# Patient Record
Sex: MALE | Race: ASIAN | URBAN - METROPOLITAN AREA
[De-identification: names, ages, dates, MRNs, and addresses within clinical notes are randomized per-mention and may not be internally consistent; named-entity substitution may affect disease eponyms.]

---

## 2023-02-13 ENCOUNTER — INPATIENT (INPATIENT)
Facility: HOSPITAL | Age: 78
LOS: 11 days | Discharge: HOME HEALTH | DRG: 65 | End: 2023-02-25
Attending: INTERNAL MEDICINE | Admitting: INTERNAL MEDICINE
Payer: MEDICARE

## 2023-02-13 VITALS
HEART RATE: 68 BPM | TEMPERATURE: 98 F | WEIGHT: 134.92 LBS | OXYGEN SATURATION: 98 % | DIASTOLIC BLOOD PRESSURE: 65 MMHG | SYSTOLIC BLOOD PRESSURE: 149 MMHG | RESPIRATION RATE: 18 BRPM

## 2023-02-13 DIAGNOSIS — G93.40 ENCEPHALOPATHY, UNSPECIFIED: ICD-10-CM

## 2023-02-13 LAB
ALBUMIN SERPL ELPH-MCNC: 3.4 G/DL — LOW (ref 3.5–5.2)
ALP SERPL-CCNC: 72 U/L — SIGNIFICANT CHANGE UP (ref 30–115)
ALT FLD-CCNC: 15 U/L — SIGNIFICANT CHANGE UP (ref 0–41)
ANION GAP SERPL CALC-SCNC: 6 MMOL/L — LOW (ref 7–14)
APPEARANCE UR: CLEAR — SIGNIFICANT CHANGE UP
AST SERPL-CCNC: 13 U/L — SIGNIFICANT CHANGE UP (ref 0–41)
B-OH-BUTYR SERPL-SCNC: <0.2 MMOL/L — SIGNIFICANT CHANGE UP
BACTERIA # UR AUTO: NEGATIVE — SIGNIFICANT CHANGE UP
BASE EXCESS BLDV CALC-SCNC: -1.2 MMOL/L — SIGNIFICANT CHANGE UP (ref -2–3)
BASOPHILS # BLD AUTO: 0.04 K/UL — SIGNIFICANT CHANGE UP (ref 0–0.2)
BASOPHILS NFR BLD AUTO: 0.5 % — SIGNIFICANT CHANGE UP (ref 0–1)
BILIRUB SERPL-MCNC: 0.3 MG/DL — SIGNIFICANT CHANGE UP (ref 0.2–1.2)
BILIRUB UR-MCNC: NEGATIVE — SIGNIFICANT CHANGE UP
BUN SERPL-MCNC: 35 MG/DL — HIGH (ref 10–20)
CA-I SERPL-SCNC: 1.24 MMOL/L — SIGNIFICANT CHANGE UP (ref 1.15–1.33)
CALCIUM SERPL-MCNC: 8.5 MG/DL — SIGNIFICANT CHANGE UP (ref 8.4–10.4)
CHLORIDE SERPL-SCNC: 104 MMOL/L — SIGNIFICANT CHANGE UP (ref 98–110)
CO2 SERPL-SCNC: 27 MMOL/L — SIGNIFICANT CHANGE UP (ref 17–32)
COLOR SPEC: SIGNIFICANT CHANGE UP
CREAT SERPL-MCNC: 2.1 MG/DL — HIGH (ref 0.7–1.5)
DIFF PNL FLD: NEGATIVE — SIGNIFICANT CHANGE UP
EGFR: 32 ML/MIN/1.73M2 — LOW
EOSINOPHIL # BLD AUTO: 0.4 K/UL — SIGNIFICANT CHANGE UP (ref 0–0.7)
EOSINOPHIL NFR BLD AUTO: 5 % — SIGNIFICANT CHANGE UP (ref 0–8)
EPI CELLS # UR: 1 /HPF — SIGNIFICANT CHANGE UP (ref 0–5)
GAS PNL BLDV: 139 MMOL/L — SIGNIFICANT CHANGE UP (ref 136–145)
GAS PNL BLDV: SIGNIFICANT CHANGE UP
GAS PNL BLDV: SIGNIFICANT CHANGE UP
GLUCOSE SERPL-MCNC: 224 MG/DL — HIGH (ref 70–99)
GLUCOSE UR QL: ABNORMAL
HCO3 BLDV-SCNC: 24 MMOL/L — SIGNIFICANT CHANGE UP (ref 22–29)
HCT VFR BLD CALC: 33.1 % — LOW (ref 42–52)
HCT VFR BLDA CALC: 33 % — LOW (ref 39–51)
HGB BLD CALC-MCNC: 11.1 G/DL — LOW (ref 12.6–17.4)
HGB BLD-MCNC: 11.1 G/DL — LOW (ref 14–18)
HYALINE CASTS # UR AUTO: 3 /LPF — SIGNIFICANT CHANGE UP (ref 0–7)
IMM GRANULOCYTES NFR BLD AUTO: 1 % — HIGH (ref 0.1–0.3)
KETONES UR-MCNC: NEGATIVE — SIGNIFICANT CHANGE UP
LACTATE BLDV-MCNC: 1.2 MMOL/L — SIGNIFICANT CHANGE UP (ref 0.5–2)
LACTATE SERPL-SCNC: 1.4 MMOL/L — SIGNIFICANT CHANGE UP (ref 0.7–2)
LEUKOCYTE ESTERASE UR-ACNC: NEGATIVE — SIGNIFICANT CHANGE UP
LYMPHOCYTES # BLD AUTO: 0.91 K/UL — LOW (ref 1.2–3.4)
LYMPHOCYTES # BLD AUTO: 11.4 % — LOW (ref 20.5–51.1)
MCHC RBC-ENTMCNC: 29.6 PG — SIGNIFICANT CHANGE UP (ref 27–31)
MCHC RBC-ENTMCNC: 33.5 G/DL — SIGNIFICANT CHANGE UP (ref 32–37)
MCV RBC AUTO: 88.3 FL — SIGNIFICANT CHANGE UP (ref 80–94)
MONOCYTES # BLD AUTO: 0.43 K/UL — SIGNIFICANT CHANGE UP (ref 0.1–0.6)
MONOCYTES NFR BLD AUTO: 5.4 % — SIGNIFICANT CHANGE UP (ref 1.7–9.3)
NEUTROPHILS # BLD AUTO: 6.12 K/UL — SIGNIFICANT CHANGE UP (ref 1.4–6.5)
NEUTROPHILS NFR BLD AUTO: 76.7 % — HIGH (ref 42.2–75.2)
NITRITE UR-MCNC: NEGATIVE — SIGNIFICANT CHANGE UP
NRBC # BLD: 0 /100 WBCS — SIGNIFICANT CHANGE UP (ref 0–0)
PCO2 BLDV: 43 MMHG — SIGNIFICANT CHANGE UP (ref 42–55)
PH BLDV: 7.36 — SIGNIFICANT CHANGE UP (ref 7.32–7.43)
PH UR: 6.5 — SIGNIFICANT CHANGE UP (ref 5–8)
PLATELET # BLD AUTO: 201 K/UL — SIGNIFICANT CHANGE UP (ref 130–400)
PO2 BLDV: 48 MMHG — SIGNIFICANT CHANGE UP
POTASSIUM BLDV-SCNC: 3.8 MMOL/L — SIGNIFICANT CHANGE UP (ref 3.5–5.1)
POTASSIUM SERPL-MCNC: 3.9 MMOL/L — SIGNIFICANT CHANGE UP (ref 3.5–5)
POTASSIUM SERPL-SCNC: 3.9 MMOL/L — SIGNIFICANT CHANGE UP (ref 3.5–5)
PROT SERPL-MCNC: 5.2 G/DL — LOW (ref 6–8)
PROT UR-MCNC: ABNORMAL
RBC # BLD: 3.75 M/UL — LOW (ref 4.7–6.1)
RBC # FLD: 13.5 % — SIGNIFICANT CHANGE UP (ref 11.5–14.5)
RBC CASTS # UR COMP ASSIST: 0 /HPF — SIGNIFICANT CHANGE UP (ref 0–4)
SALICYLATES SERPL-MCNC: <0.3 MG/DL — LOW (ref 4–30)
SAO2 % BLDV: 80.9 % — SIGNIFICANT CHANGE UP
SARS-COV-2 RNA SPEC QL NAA+PROBE: SIGNIFICANT CHANGE UP
SODIUM SERPL-SCNC: 137 MMOL/L — SIGNIFICANT CHANGE UP (ref 135–146)
SP GR SPEC: 1.02 — SIGNIFICANT CHANGE UP (ref 1.01–1.03)
TROPONIN T SERPL-MCNC: <0.01 NG/ML — SIGNIFICANT CHANGE UP
UROBILINOGEN FLD QL: SIGNIFICANT CHANGE UP
WBC # BLD: 7.98 K/UL — SIGNIFICANT CHANGE UP (ref 4.8–10.8)
WBC # FLD AUTO: 7.98 K/UL — SIGNIFICANT CHANGE UP (ref 4.8–10.8)
WBC UR QL: 1 /HPF — SIGNIFICANT CHANGE UP (ref 0–5)

## 2023-02-13 PROCEDURE — 73560 X-RAY EXAM OF KNEE 1 OR 2: CPT | Mod: LT

## 2023-02-13 PROCEDURE — 71045 X-RAY EXAM CHEST 1 VIEW: CPT

## 2023-02-13 PROCEDURE — 83721 ASSAY OF BLOOD LIPOPROTEIN: CPT

## 2023-02-13 PROCEDURE — 89060 EXAM SYNOVIAL FLUID CRYSTALS: CPT

## 2023-02-13 PROCEDURE — 83540 ASSAY OF IRON: CPT

## 2023-02-13 PROCEDURE — 36415 COLL VENOUS BLD VENIPUNCTURE: CPT

## 2023-02-13 PROCEDURE — 95816 EEG AWAKE AND DROWSY: CPT

## 2023-02-13 PROCEDURE — 87046 STOOL CULTR AEROBIC BACT EA: CPT

## 2023-02-13 PROCEDURE — 84550 ASSAY OF BLOOD/URIC ACID: CPT

## 2023-02-13 PROCEDURE — 84156 ASSAY OF PROTEIN URINE: CPT

## 2023-02-13 PROCEDURE — 87045 FECES CULTURE AEROBIC BACT: CPT

## 2023-02-13 PROCEDURE — 84100 ASSAY OF PHOSPHORUS: CPT

## 2023-02-13 PROCEDURE — 82570 ASSAY OF URINE CREATININE: CPT

## 2023-02-13 PROCEDURE — 82746 ASSAY OF FOLIC ACID SERUM: CPT

## 2023-02-13 PROCEDURE — 87075 CULTR BACTERIA EXCEPT BLOOD: CPT

## 2023-02-13 PROCEDURE — 85027 COMPLETE CBC AUTOMATED: CPT

## 2023-02-13 PROCEDURE — 82962 GLUCOSE BLOOD TEST: CPT

## 2023-02-13 PROCEDURE — 70450 CT HEAD/BRAIN W/O DYE: CPT | Mod: 26,MA

## 2023-02-13 PROCEDURE — 86850 RBC ANTIBODY SCREEN: CPT

## 2023-02-13 PROCEDURE — 83036 HEMOGLOBIN GLYCOSYLATED A1C: CPT

## 2023-02-13 PROCEDURE — 87493 C DIFF AMPLIFIED PROBE: CPT

## 2023-02-13 PROCEDURE — 87077 CULTURE AEROBIC IDENTIFY: CPT

## 2023-02-13 PROCEDURE — 85025 COMPLETE CBC W/AUTO DIFF WBC: CPT

## 2023-02-13 PROCEDURE — 87040 BLOOD CULTURE FOR BACTERIA: CPT

## 2023-02-13 PROCEDURE — U0005: CPT

## 2023-02-13 PROCEDURE — 70547 MR ANGIOGRAPHY NECK W/O DYE: CPT

## 2023-02-13 PROCEDURE — 86900 BLOOD TYPING SEROLOGIC ABO: CPT

## 2023-02-13 PROCEDURE — 76775 US EXAM ABDO BACK WALL LIM: CPT

## 2023-02-13 PROCEDURE — 80053 COMPREHEN METABOLIC PANEL: CPT

## 2023-02-13 PROCEDURE — 93970 EXTREMITY STUDY: CPT

## 2023-02-13 PROCEDURE — 89051 BODY FLUID CELL COUNT: CPT

## 2023-02-13 PROCEDURE — 87070 CULTURE OTHR SPECIMN AEROBIC: CPT

## 2023-02-13 PROCEDURE — 87205 SMEAR GRAM STAIN: CPT

## 2023-02-13 PROCEDURE — 86803 HEPATITIS C AB TEST: CPT

## 2023-02-13 PROCEDURE — 83935 ASSAY OF URINE OSMOLALITY: CPT

## 2023-02-13 PROCEDURE — 84133 ASSAY OF URINE POTASSIUM: CPT

## 2023-02-13 PROCEDURE — 71045 X-RAY EXAM CHEST 1 VIEW: CPT | Mod: 26

## 2023-02-13 PROCEDURE — 86901 BLOOD TYPING SEROLOGIC RH(D): CPT

## 2023-02-13 PROCEDURE — 97166 OT EVAL MOD COMPLEX 45 MIN: CPT | Mod: GO

## 2023-02-13 PROCEDURE — 97116 GAIT TRAINING THERAPY: CPT | Mod: GP

## 2023-02-13 PROCEDURE — 83550 IRON BINDING TEST: CPT

## 2023-02-13 PROCEDURE — 93005 ELECTROCARDIOGRAM TRACING: CPT

## 2023-02-13 PROCEDURE — 97161 PT EVAL LOW COMPLEX 20 MIN: CPT | Mod: GP

## 2023-02-13 PROCEDURE — 70544 MR ANGIOGRAPHY HEAD W/O DYE: CPT

## 2023-02-13 PROCEDURE — 70551 MRI BRAIN STEM W/O DYE: CPT

## 2023-02-13 PROCEDURE — 82607 VITAMIN B-12: CPT

## 2023-02-13 PROCEDURE — U0003: CPT

## 2023-02-13 PROCEDURE — 93306 TTE W/DOPPLER COMPLETE: CPT

## 2023-02-13 PROCEDURE — 84540 ASSAY OF URINE/UREA-N: CPT

## 2023-02-13 PROCEDURE — 83735 ASSAY OF MAGNESIUM: CPT

## 2023-02-13 PROCEDURE — 84443 ASSAY THYROID STIM HORMONE: CPT

## 2023-02-13 PROCEDURE — 80061 LIPID PANEL: CPT

## 2023-02-13 PROCEDURE — 84300 ASSAY OF URINE SODIUM: CPT

## 2023-02-13 PROCEDURE — 82728 ASSAY OF FERRITIN: CPT

## 2023-02-13 PROCEDURE — 81001 URINALYSIS AUTO W/SCOPE: CPT

## 2023-02-13 RX ORDER — SODIUM CHLORIDE 9 MG/ML
1000 INJECTION, SOLUTION INTRAVENOUS ONCE
Refills: 0 | Status: COMPLETED | OUTPATIENT
Start: 2023-02-13 | End: 2023-02-13

## 2023-02-13 RX ADMIN — SODIUM CHLORIDE 1000 MILLILITER(S): 9 INJECTION, SOLUTION INTRAVENOUS at 16:39

## 2023-02-13 NOTE — ED ADULT NURSE NOTE - NSIMPLEMENTINTERV_GEN_ALL_ED
Implemented All Fall with Harm Risk Interventions:  [place] to call system. Call bell, personal items and telephone within reach. Instruct patient to call for assistance. Room bathroom lighting operational. Non-slip footwear when patient is off stretcher. Physically safe environment: no spills, clutter or unnecessary equipment. Stretcher in lowest position, wheels locked, appropriate side rails in place. Provide visual cue, wrist band, yellow gown, etc. Monitor gait and stability. Monitor for mental status changes and reorient to person, place, and time. Review medications for side effects contributing to fall risk. Reinforce activity limits and safety measures with patient and family. Provide visual clues: red socks.

## 2023-02-13 NOTE — ED ADULT NURSE REASSESSMENT NOTE - NS ED NURSE REASSESS COMMENT FT1
Received pt from previous RN, cantonese speaking, Pt AxOx3. Family present at bedside. NAD. Resting comfortably.  Call bell within reach. Safety measures maintained. Bed alarm maintained.

## 2023-02-13 NOTE — ED PROVIDER NOTE - CLINICAL SUMMARY MEDICAL DECISION MAKING FREE TEXT BOX
Labs and EKG were ordered and reviewed.  Imaging was ordered and reviewed by me.  Appropriate medications for patient's presenting complaints were ordered and effects were reassessed.  Patient's records (prior hospital, ED visit, and/or nursing home notes if available) were reviewed.  Additional history was obtained from EMS, family, and/or PCP (where available).  Escalation to admission/observation was considered.  Patient requires inpatient hospitalization - monitored setting.  Concern for increased confusion in the setting of abnormal CT scan.  Will need inpatient workup.

## 2023-02-13 NOTE — ED PROVIDER NOTE - OBJECTIVE STATEMENT
77-year-old male with past medical history of hypertension, hyperlipidemia, diabetes presenting for evaluation of weakness, dizziness, urinary frequency over the last 3 days.  Patient is currently not dizzy.  No head injury.  No LOC.  No fevers, chills, dysuria, nausea, vomiting, abdominal pain, or flank pain.  No chest pain or shortness of breath.  No headache or paresthesias.

## 2023-02-13 NOTE — ED PROVIDER NOTE - NS ED ATTENDING STATEMENT MOD
This was a shared visit with the ZUIR. I reviewed and verified the documentation and independently performed the documented:

## 2023-02-14 LAB
A1C WITH ESTIMATED AVERAGE GLUCOSE RESULT: 6.3 % — HIGH (ref 4–5.6)
ALBUMIN SERPL ELPH-MCNC: 3.1 G/DL — LOW (ref 3.5–5.2)
ALP SERPL-CCNC: 69 U/L — SIGNIFICANT CHANGE UP (ref 30–115)
ALT FLD-CCNC: 15 U/L — SIGNIFICANT CHANGE UP (ref 0–41)
ANION GAP SERPL CALC-SCNC: 11 MMOL/L — SIGNIFICANT CHANGE UP (ref 7–14)
AST SERPL-CCNC: 14 U/L — SIGNIFICANT CHANGE UP (ref 0–41)
BASOPHILS # BLD AUTO: 0.06 K/UL — SIGNIFICANT CHANGE UP (ref 0–0.2)
BASOPHILS NFR BLD AUTO: 0.9 % — SIGNIFICANT CHANGE UP (ref 0–1)
BILIRUB SERPL-MCNC: 0.3 MG/DL — SIGNIFICANT CHANGE UP (ref 0.2–1.2)
BUN SERPL-MCNC: 27 MG/DL — HIGH (ref 10–20)
CALCIUM SERPL-MCNC: 8.7 MG/DL — SIGNIFICANT CHANGE UP (ref 8.4–10.5)
CHLORIDE SERPL-SCNC: 111 MMOL/L — HIGH (ref 98–110)
CO2 SERPL-SCNC: 23 MMOL/L — SIGNIFICANT CHANGE UP (ref 17–32)
CREAT SERPL-MCNC: 1.8 MG/DL — HIGH (ref 0.7–1.5)
EGFR: 38 ML/MIN/1.73M2 — LOW
EOSINOPHIL # BLD AUTO: 0.44 K/UL — SIGNIFICANT CHANGE UP (ref 0–0.7)
EOSINOPHIL NFR BLD AUTO: 6.4 % — SIGNIFICANT CHANGE UP (ref 0–8)
ESTIMATED AVERAGE GLUCOSE: 134 MG/DL — HIGH (ref 68–114)
GLUCOSE BLDC GLUCOMTR-MCNC: 119 MG/DL — HIGH (ref 70–99)
GLUCOSE BLDC GLUCOMTR-MCNC: 149 MG/DL — HIGH (ref 70–99)
GLUCOSE BLDC GLUCOMTR-MCNC: 160 MG/DL — HIGH (ref 70–99)
GLUCOSE BLDC GLUCOMTR-MCNC: 282 MG/DL — HIGH (ref 70–99)
GLUCOSE SERPL-MCNC: 129 MG/DL — HIGH (ref 70–99)
HCT VFR BLD CALC: 34.5 % — LOW (ref 42–52)
HCV AB S/CO SERPL IA: 0.03 COI — SIGNIFICANT CHANGE UP
HCV AB SERPL-IMP: SIGNIFICANT CHANGE UP
HGB BLD-MCNC: 11.6 G/DL — LOW (ref 14–18)
IMM GRANULOCYTES NFR BLD AUTO: 1.2 % — HIGH (ref 0.1–0.3)
LYMPHOCYTES # BLD AUTO: 1.12 K/UL — LOW (ref 1.2–3.4)
LYMPHOCYTES # BLD AUTO: 16.4 % — LOW (ref 20.5–51.1)
MAGNESIUM SERPL-MCNC: 2 MG/DL — SIGNIFICANT CHANGE UP (ref 1.8–2.4)
MCHC RBC-ENTMCNC: 29.6 PG — SIGNIFICANT CHANGE UP (ref 27–31)
MCHC RBC-ENTMCNC: 33.6 G/DL — SIGNIFICANT CHANGE UP (ref 32–37)
MCV RBC AUTO: 88 FL — SIGNIFICANT CHANGE UP (ref 80–94)
MONOCYTES # BLD AUTO: 0.42 K/UL — SIGNIFICANT CHANGE UP (ref 0.1–0.6)
MONOCYTES NFR BLD AUTO: 6.1 % — SIGNIFICANT CHANGE UP (ref 1.7–9.3)
NEUTROPHILS # BLD AUTO: 4.73 K/UL — SIGNIFICANT CHANGE UP (ref 1.4–6.5)
NEUTROPHILS NFR BLD AUTO: 69 % — SIGNIFICANT CHANGE UP (ref 42.2–75.2)
NRBC # BLD: 0 /100 WBCS — SIGNIFICANT CHANGE UP (ref 0–0)
PHOSPHATE SERPL-MCNC: 3.8 MG/DL — SIGNIFICANT CHANGE UP (ref 2.1–4.9)
PLATELET # BLD AUTO: 207 K/UL — SIGNIFICANT CHANGE UP (ref 130–400)
POTASSIUM SERPL-MCNC: 3.7 MMOL/L — SIGNIFICANT CHANGE UP (ref 3.5–5)
POTASSIUM SERPL-SCNC: 3.7 MMOL/L — SIGNIFICANT CHANGE UP (ref 3.5–5)
PROT SERPL-MCNC: 5 G/DL — LOW (ref 6–8)
RBC # BLD: 3.92 M/UL — LOW (ref 4.7–6.1)
RBC # FLD: 13.4 % — SIGNIFICANT CHANGE UP (ref 11.5–14.5)
SODIUM SERPL-SCNC: 145 MMOL/L — SIGNIFICANT CHANGE UP (ref 135–146)
WBC # BLD: 6.85 K/UL — SIGNIFICANT CHANGE UP (ref 4.8–10.8)
WBC # FLD AUTO: 6.85 K/UL — SIGNIFICANT CHANGE UP (ref 4.8–10.8)

## 2023-02-14 PROCEDURE — 99222 1ST HOSP IP/OBS MODERATE 55: CPT

## 2023-02-14 RX ORDER — HEPARIN SODIUM 5000 [USP'U]/ML
5000 INJECTION INTRAVENOUS; SUBCUTANEOUS EVERY 8 HOURS
Refills: 0 | Status: DISCONTINUED | OUTPATIENT
Start: 2023-02-14 | End: 2023-02-25

## 2023-02-14 RX ORDER — SODIUM CHLORIDE 9 MG/ML
1000 INJECTION, SOLUTION INTRAVENOUS
Refills: 0 | Status: DISCONTINUED | OUTPATIENT
Start: 2023-02-14 | End: 2023-02-25

## 2023-02-14 RX ORDER — TAMSULOSIN HYDROCHLORIDE 0.4 MG/1
0.4 CAPSULE ORAL AT BEDTIME
Refills: 0 | Status: DISCONTINUED | OUTPATIENT
Start: 2023-02-14 | End: 2023-02-25

## 2023-02-14 RX ORDER — ATORVASTATIN CALCIUM 80 MG/1
20 TABLET, FILM COATED ORAL AT BEDTIME
Refills: 0 | Status: DISCONTINUED | OUTPATIENT
Start: 2023-02-14 | End: 2023-02-16

## 2023-02-14 RX ORDER — VALSARTAN 80 MG/1
1 TABLET ORAL
Qty: 0 | Refills: 0 | DISCHARGE

## 2023-02-14 RX ORDER — AMLODIPINE BESYLATE 2.5 MG/1
5 TABLET ORAL DAILY
Refills: 0 | Status: DISCONTINUED | OUTPATIENT
Start: 2023-02-14 | End: 2023-02-18

## 2023-02-14 RX ORDER — DEXTROSE 50 % IN WATER 50 %
15 SYRINGE (ML) INTRAVENOUS ONCE
Refills: 0 | Status: DISCONTINUED | OUTPATIENT
Start: 2023-02-14 | End: 2023-02-25

## 2023-02-14 RX ORDER — INSULIN GLARGINE 100 [IU]/ML
4 INJECTION, SOLUTION SUBCUTANEOUS AT BEDTIME
Refills: 0 | Status: DISCONTINUED | OUTPATIENT
Start: 2023-02-14 | End: 2023-02-22

## 2023-02-14 RX ORDER — ASPIRIN/CALCIUM CARB/MAGNESIUM 324 MG
81 TABLET ORAL DAILY
Refills: 0 | Status: DISCONTINUED | OUTPATIENT
Start: 2023-02-14 | End: 2023-02-25

## 2023-02-14 RX ORDER — CARVEDILOL PHOSPHATE 80 MG/1
1 CAPSULE, EXTENDED RELEASE ORAL
Qty: 0 | Refills: 0 | DISCHARGE

## 2023-02-14 RX ORDER — INSULIN LISPRO 100/ML
VIAL (ML) SUBCUTANEOUS
Refills: 0 | Status: DISCONTINUED | OUTPATIENT
Start: 2023-02-14 | End: 2023-02-25

## 2023-02-14 RX ORDER — DEXTROSE 50 % IN WATER 50 %
25 SYRINGE (ML) INTRAVENOUS ONCE
Refills: 0 | Status: DISCONTINUED | OUTPATIENT
Start: 2023-02-14 | End: 2023-02-25

## 2023-02-14 RX ORDER — CARVEDILOL PHOSPHATE 80 MG/1
6.25 CAPSULE, EXTENDED RELEASE ORAL EVERY 12 HOURS
Refills: 0 | Status: DISCONTINUED | OUTPATIENT
Start: 2023-02-14 | End: 2023-02-25

## 2023-02-14 RX ORDER — AMLODIPINE BESYLATE 2.5 MG/1
1 TABLET ORAL
Qty: 0 | Refills: 0 | DISCHARGE

## 2023-02-14 RX ORDER — GLUCAGON INJECTION, SOLUTION 0.5 MG/.1ML
1 INJECTION, SOLUTION SUBCUTANEOUS ONCE
Refills: 0 | Status: DISCONTINUED | OUTPATIENT
Start: 2023-02-14 | End: 2023-02-25

## 2023-02-14 RX ORDER — VALSARTAN 80 MG/1
320 TABLET ORAL DAILY
Refills: 0 | Status: DISCONTINUED | OUTPATIENT
Start: 2023-02-14 | End: 2023-02-20

## 2023-02-14 RX ORDER — INSULIN LISPRO 100/ML
3 VIAL (ML) SUBCUTANEOUS
Refills: 0 | Status: DISCONTINUED | OUTPATIENT
Start: 2023-02-14 | End: 2023-02-22

## 2023-02-14 RX ORDER — SODIUM CHLORIDE 9 MG/ML
1000 INJECTION, SOLUTION INTRAVENOUS
Refills: 0 | Status: DISCONTINUED | OUTPATIENT
Start: 2023-02-14 | End: 2023-02-17

## 2023-02-14 RX ORDER — DEXTROSE 50 % IN WATER 50 %
12.5 SYRINGE (ML) INTRAVENOUS ONCE
Refills: 0 | Status: DISCONTINUED | OUTPATIENT
Start: 2023-02-14 | End: 2023-02-25

## 2023-02-14 RX ADMIN — HEPARIN SODIUM 5000 UNIT(S): 5000 INJECTION INTRAVENOUS; SUBCUTANEOUS at 21:33

## 2023-02-14 RX ADMIN — Medication 81 MILLIGRAM(S): at 11:10

## 2023-02-14 RX ADMIN — VALSARTAN 320 MILLIGRAM(S): 80 TABLET ORAL at 05:54

## 2023-02-14 RX ADMIN — HEPARIN SODIUM 5000 UNIT(S): 5000 INJECTION INTRAVENOUS; SUBCUTANEOUS at 05:54

## 2023-02-14 RX ADMIN — ATORVASTATIN CALCIUM 20 MILLIGRAM(S): 80 TABLET, FILM COATED ORAL at 21:33

## 2023-02-14 RX ADMIN — HEPARIN SODIUM 5000 UNIT(S): 5000 INJECTION INTRAVENOUS; SUBCUTANEOUS at 14:27

## 2023-02-14 RX ADMIN — AMLODIPINE BESYLATE 5 MILLIGRAM(S): 2.5 TABLET ORAL at 05:54

## 2023-02-14 RX ADMIN — Medication 3 UNIT(S): at 11:10

## 2023-02-14 RX ADMIN — TAMSULOSIN HYDROCHLORIDE 0.4 MILLIGRAM(S): 0.4 CAPSULE ORAL at 21:33

## 2023-02-14 RX ADMIN — Medication 3 UNIT(S): at 08:44

## 2023-02-14 RX ADMIN — INSULIN GLARGINE 4 UNIT(S): 100 INJECTION, SOLUTION SUBCUTANEOUS at 21:33

## 2023-02-14 RX ADMIN — Medication 2: at 17:20

## 2023-02-14 RX ADMIN — Medication 3 UNIT(S): at 17:19

## 2023-02-14 RX ADMIN — SODIUM CHLORIDE 100 MILLILITER(S): 9 INJECTION, SOLUTION INTRAVENOUS at 11:09

## 2023-02-14 RX ADMIN — CARVEDILOL PHOSPHATE 6.25 MILLIGRAM(S): 80 CAPSULE, EXTENDED RELEASE ORAL at 05:54

## 2023-02-14 RX ADMIN — Medication 6: at 11:10

## 2023-02-14 RX ADMIN — CARVEDILOL PHOSPHATE 6.25 MILLIGRAM(S): 80 CAPSULE, EXTENDED RELEASE ORAL at 17:54

## 2023-02-14 NOTE — H&P ADULT - NSICDXPASTMEDICALHX_GEN_ALL_CORE_FT
PAST MEDICAL HISTORY:  Diabetes     History of BPH     HTN (hypertension)     Seasonal allergies

## 2023-02-14 NOTE — H&P ADULT - NSHPPHYSICALEXAM_GEN_ALL_CORE
T(C): 36.3 (02-13-23 @ 22:40), Max: 36.4 (02-13-23 @ 12:42)  HR: 83 (02-13-23 @ 22:40) (68 - 83)  BP: 144/74 (02-13-23 @ 22:40) (144/74 - 149/65)  RR: 18 (02-13-23 @ 22:40) (18 - 18)  SpO2: 96% (02-13-23 @ 22:40) (96% - 98%)    CONSTITUTIONAL: Well groomed, no apparent distress  RESP: No respiratory distress, no use of accessory muscles; CTA b/l, no WRR  CV: RRR, +S1S2, no MRG; no JVD; no peripheral edema  GI: Soft, NT, ND, no rebound, no guarding; no palpable masses; no hepatosplenomegaly; no hernia palpated  PSYCH: Appropriate insight/judgment; A+O x 3, mood and affect appropriate, recent/remote memory intact T(C): 36.3 (02-13-23 @ 22:40), Max: 36.4 (02-13-23 @ 12:42)  HR: 83 (02-13-23 @ 22:40) (68 - 83)  BP: 144/74 (02-13-23 @ 22:40) (144/74 - 149/65)  RR: 18 (02-13-23 @ 22:40) (18 - 18)  SpO2: 96% (02-13-23 @ 22:40) (96% - 98%)    CONSTITUTIONAL: Well groomed, no apparent distress  RESP: No respiratory distress, no use of accessory muscles; CTA b/l, no WRR  CV: RRR, +S1S2, no MRG; no JVD; no peripheral edema  GI: Soft, NT, ND, no rebound, no guarding; no palpable masses; no hepatosplenomegaly; no hernia palpated  PSYCH:  A+O x 1 follows command. Most exam limited due to translation issues

## 2023-02-14 NOTE — H&P ADULT - ASSESSMENT
77-year-old cantonese speaking male with past medical history of hypertension, hyperlipidemia, DM and BPH presenting for evaluation of weakness, dizziness, urinary frequency over the last 3 days.     # Lethargy   # Delirium   Lethargy wit no focal findings or LOC. As per family no fevers or weight changes at home   - On examination patient wakes up and responds to his name. Follows commands, but looks tired  - Hemodynamically stable   - CT head:  Right basilar ganglia hypodensity possible chronic infarcts   - f/u  MRI head   - No evidence of infection  and Home medications do not involve any neurologic side effects   - If workup negative - Might consider doing age related cancer screening       # HTN  - c/w Amlodipine 5mg and Valsartan 320ng   - Monitor BP     # DM  - Jardiance 25mg at home   - Monitor FS and start Insulin premeal/long acting     # HLD  - c/w statin   - ASA primary ppx    # BPH  - c/w Tamsulosin     #DVT - lovenox sq   #GI - pantoprazole   #Diet - DASH, CC  #Activity - IAT   #Code - Full code     Disposition - IP  Med/Rec: Completed bedside with wife            77-year-old cantonese speaking male with past medical history of hypertension, hyperlipidemia, DM and BPH presenting for evaluation of weakness, dizziness, urinary frequency over the last 3 days.     # Lethargy   # Delirium   Lethargy wit no focal findings or LOC. As per family no fevers or weight changes at home   - On examination patient wakes up and responds to his name. Follows commands, but looks tired  - Hemodynamically stable   - CT head:  Right basilar ganglia hypodensity possible chronic infarcts   - f/u  MRI head   - No evidence of infection  and Home medications do not involve any neurologic side effects   - If workup negative - Might consider doing age related cancer screening       # HTN  - c/w Amlodipine 5mg and Valsartan 320ng   - Monitor BP     # DM  - Jardiance 25mg at home   - Monitor FS and start Insulin premeal/long acting     # HLD  - c/w statin   - ASA primary ppx    # BPH  - c/w Tamsulosin     #DVT - Heparin  #GI - N/A   #Diet -  CC  #Activity - IAT   #Code - Full code     Disposition - IP  Med/Rec: Completed bedside with wife

## 2023-02-14 NOTE — H&P ADULT - HISTORY OF PRESENT ILLNESS
77-year-old male with past medical history of hypertension, hyperlipidemia, diabetes presenting for evaluation of weakness, dizziness, urinary frequency over the last 3 days.  Patient is currently not dizzy.  No head injury.  No LOC.  No fevers, chills, dysuria, nausea, vomiting, abdominal pain, or flank pain.  No chest pain or shortness of breath.  No headache or paresthesias    Vital Signs Last 24 Hrs  T(C): 36.3 (13 Feb 2023 22:40), Max: 36.4 (13 Feb 2023 12:42)  T(F): 97.4 (13 Feb 2023 22:40), Max: 97.6 (13 Feb 2023 12:42)  HR: 83 (13 Feb 2023 22:40) (68 - 83)  BP: 144/74 (13 Feb 2023 22:40) (144/74 - 149/65)  BP(mean): --  RR: 18 (13 Feb 2023 22:40) (18 - 18)  SpO2: 96% (13 Feb 2023 22:40) (96% - 98%)    Parameters below as of 13 Feb 2023 22:40  Patient On (Oxygen Delivery Method): room air     77-year-old cantonese speaking male with past medical history of hypertension, hyperlipidemia, DM and BPH presenting for evaluation of weakness, dizziness, urinary frequency over the last 3 days. Family describe that their main concern is his lethargy that has been persistent and associated with some memory changes. No LOC, weakness,  No fevers, chills, dysuria, nausea, vomiting, abdominal pain, or flank pain.  No chest pain or shortness of breath.  No headache or paresthesias    Vital Signs Last 24 Hrs  T(C): 36.3 (13 Feb 2023 22:40), Max: 36.4 (13 Feb 2023 12:42)  T(F): 97.4 (13 Feb 2023 22:40), Max: 97.6 (13 Feb 2023 12:42)  HR: 83 (13 Feb 2023 22:40) (68 - 83)  BP: 144/74 (13 Feb 2023 22:40) (144/74 - 149/65)  BP(mean): --  RR: 18 (13 Feb 2023 22:40) (18 - 18)  SpO2: 96% (13 Feb 2023 22:40) (96% - 98%)    Parameters below as of 13 Feb 2023 22:40  Patient On (Oxygen Delivery Method): room air

## 2023-02-14 NOTE — H&P ADULT - NSHPLABSRESULTS_GEN_ALL_CORE
11.1   7.98  )-----------( 201      ( 13 Feb 2023 16:28 )             33.1     02-13    137  |  104  |  35<H>  ----------------------------<  224<H>  3.9   |  27  |  2.1<H>    Ca    8.5      13 Feb 2023 16:28    TPro  5.2<L>  /  Alb  3.4<L>  /  TBili  0.3  /  DBili  x   /  AST  13  /  ALT  15  /  AlkPhos  72  02-13    < from: CT Head No Cont (02.13.23 @ 17:10) >    IMPRESSION:    A focal hypodensity is noted in the right basal ganglia consistent with a   chronic infarct.    No evidence of acute intracranial hemorrhage or acute territorial infarct.    No evidence of mass or midline shift.    < end of copied text >

## 2023-02-14 NOTE — PATIENT PROFILE ADULT - FUNCTIONAL ASSESSMENT - BASIC MOBILITY 6.
2-calculated by average/Not able to assess (calculate score using [facility] averaging method)

## 2023-02-14 NOTE — PATIENT PROFILE ADULT - FALL HARM RISK - HARM RISK INTERVENTIONS
Assistance with ambulation/Assistance OOB with selected safe patient handling equipment/Communicate Risk of Fall with Harm to all staff/Discuss with provider need for PT consult/Monitor gait and stability/Reinforce activity limits and safety measures with patient and family/Sit up slowly, dangle for a short time, stand at bedside before walking/Tailored Fall Risk Interventions/Visual Cue: Yellow wristband and red socks/Bed in lowest position, wheels locked, appropriate side rails in place/Call bell, personal items and telephone in reach/Instruct patient to call for assistance before getting out of bed or chair/Non-slip footwear when patient is out of bed/[place] to call system/Physically safe environment - no spills, clutter or unnecessary equipment/Purposeful Proactive Rounding/Room/bathroom lighting operational, light cord in reach

## 2023-02-14 NOTE — H&P ADULT - ATTENDING COMMENTS
77-year-old cantonese speaking male with past medical history of hypertension, hyperlipidemia, DM and BPH presenting for evaluation of weakness, dizziness, urinary frequency over the last 3 days.     # Lethargy   # Delirium   Lethargy wit no focal findings or LOC. As per family no fevers or weight changes at home   - On examination patient wakes up and responds to his name. Follows commands, but looks tired  - Hemodynamically stable   - CT head:  Right basilar ganglia hypodensity possible chronic infarcts   - f/u  MRI head   - No evidence of infection  and Home medications do not involve any neurologic side effects   - If workup negative - Might consider doing age related cancer screening       # HTN  - c/w Amlodipine 5mg and Valsartan 320ng   - Monitor BP     # DM  - Jardiance 25mg at home   - Monitor FS and start Insulin premeal/long acting     # HLD  - c/w statin   - ASA primary ppx    # BPH  - c/w Tamsulosin     #DVT - Heparin  #GI - N/A   #Diet -  CC  #Activity - IAT   #Code - Full code     Disposition - IP  Med/Rec: Completed bedside with wife 77-year-old cantonese speaking male with past medical history of hypertension, hyperlipidemia, DM and BPH presenting for evaluation of weakness, dizziness, urinary frequency over the last 3 days.     1. Alteration of mental status associated with weakness unclear etiology associated with dehydration  * Lethargy wit no focal findings or LOC. As per family no fevers or weight changes at home  * right eye ptosis but seems old    - CXR:WNL                  - u/a:WNL   - Admit to medicine                 - CT head:  Right basilar ganglia hypodensity possible chronic infarcts   - Cont IVF   - MRI brain:pending   - PT eval:pending        2. HTN  - c/w Amlodipine 5mg and Valsartan 320ng   - Monitor BP     3. DM- II   - Jardiance 25mg at home   - Monitor FS and start Insulin premeal/long acting     4. HLD  - c/w statin   - ASA primary ppx    5. BPH  - c/w Tamsulosin     #DVT - Heparin  #GI - N/A   #Diet -  CC  #Activity - IAT   #Code - Full code 77-year-old cantonese speaking male with past medical history of hypertension, hyperlipidemia, DM and BPH presenting for evaluation of weakness, dizziness, urinary frequency over the last 3 days.     1. Alteration of mental status associated with weakness unclear etiology associated with dehydration  * Lethargy wit no focal findings or LOC. As per family no fevers or weight changes at home  * right eye ptosis but seems old    - CXR:WNL                  - u/a:WNL      -TSH:pending   - Admit to medicine                 - CT head:  Right basilar ganglia hypodensity possible chronic infarcts   - Cont IVF   - MRI brain:pending   - PT eval:pending        2. HTN  - c/w Amlodipine 5mg and Valsartan 320ng   - Monitor BP     3. DM- II   - Jardiance 25mg at home   - Monitor FS and start Insulin premeal/long acting     4. HLD  - c/w statin   - ASA primary ppx    5. BPH  - c/w Tamsulosin     #DVT - Heparin  #GI - N/A   #Diet -  CC  #Activity - IAT   #Code - Full code

## 2023-02-15 LAB
ALBUMIN SERPL ELPH-MCNC: 3.2 G/DL — LOW (ref 3.5–5.2)
ALP SERPL-CCNC: 73 U/L — SIGNIFICANT CHANGE UP (ref 30–115)
ALT FLD-CCNC: 17 U/L — SIGNIFICANT CHANGE UP (ref 0–41)
ANION GAP SERPL CALC-SCNC: 11 MMOL/L — SIGNIFICANT CHANGE UP (ref 7–14)
AST SERPL-CCNC: 18 U/L — SIGNIFICANT CHANGE UP (ref 0–41)
BASOPHILS # BLD AUTO: 0.05 K/UL — SIGNIFICANT CHANGE UP (ref 0–0.2)
BASOPHILS NFR BLD AUTO: 0.6 % — SIGNIFICANT CHANGE UP (ref 0–1)
BILIRUB SERPL-MCNC: 0.5 MG/DL — SIGNIFICANT CHANGE UP (ref 0.2–1.2)
BLD GP AB SCN SERPL QL: SIGNIFICANT CHANGE UP
BUN SERPL-MCNC: 26 MG/DL — HIGH (ref 10–20)
CALCIUM SERPL-MCNC: 8.8 MG/DL — SIGNIFICANT CHANGE UP (ref 8.4–10.5)
CHLORIDE SERPL-SCNC: 104 MMOL/L — SIGNIFICANT CHANGE UP (ref 98–110)
CO2 SERPL-SCNC: 25 MMOL/L — SIGNIFICANT CHANGE UP (ref 17–32)
CREAT SERPL-MCNC: 1.8 MG/DL — HIGH (ref 0.7–1.5)
EGFR: 38 ML/MIN/1.73M2 — LOW
EOSINOPHIL # BLD AUTO: 0.58 K/UL — SIGNIFICANT CHANGE UP (ref 0–0.7)
EOSINOPHIL NFR BLD AUTO: 7.2 % — SIGNIFICANT CHANGE UP (ref 0–8)
FERRITIN SERPL-MCNC: 111 NG/ML — SIGNIFICANT CHANGE UP (ref 30–400)
FOLATE SERPL-MCNC: 15.4 NG/ML — SIGNIFICANT CHANGE UP
GLUCOSE BLDC GLUCOMTR-MCNC: 126 MG/DL — HIGH (ref 70–99)
GLUCOSE BLDC GLUCOMTR-MCNC: 132 MG/DL — HIGH (ref 70–99)
GLUCOSE BLDC GLUCOMTR-MCNC: 167 MG/DL — HIGH (ref 70–99)
GLUCOSE SERPL-MCNC: 135 MG/DL — HIGH (ref 70–99)
HCT VFR BLD CALC: 35.6 % — LOW (ref 42–52)
HGB BLD-MCNC: 12.2 G/DL — LOW (ref 14–18)
IMM GRANULOCYTES NFR BLD AUTO: 1.1 % — HIGH (ref 0.1–0.3)
IRON SATN MFR SERPL: 33 % — SIGNIFICANT CHANGE UP (ref 15–50)
IRON SATN MFR SERPL: 70 UG/DL — SIGNIFICANT CHANGE UP (ref 35–150)
LYMPHOCYTES # BLD AUTO: 1.26 K/UL — SIGNIFICANT CHANGE UP (ref 1.2–3.4)
LYMPHOCYTES # BLD AUTO: 15.6 % — LOW (ref 20.5–51.1)
MAGNESIUM SERPL-MCNC: 1.9 MG/DL — SIGNIFICANT CHANGE UP (ref 1.8–2.4)
MCHC RBC-ENTMCNC: 29.5 PG — SIGNIFICANT CHANGE UP (ref 27–31)
MCHC RBC-ENTMCNC: 34.3 G/DL — SIGNIFICANT CHANGE UP (ref 32–37)
MCV RBC AUTO: 86 FL — SIGNIFICANT CHANGE UP (ref 80–94)
MONOCYTES # BLD AUTO: 0.45 K/UL — SIGNIFICANT CHANGE UP (ref 0.1–0.6)
MONOCYTES NFR BLD AUTO: 5.6 % — SIGNIFICANT CHANGE UP (ref 1.7–9.3)
NEUTROPHILS # BLD AUTO: 5.66 K/UL — SIGNIFICANT CHANGE UP (ref 1.4–6.5)
NEUTROPHILS NFR BLD AUTO: 69.9 % — SIGNIFICANT CHANGE UP (ref 42.2–75.2)
NRBC # BLD: 0 /100 WBCS — SIGNIFICANT CHANGE UP (ref 0–0)
PLATELET # BLD AUTO: 202 K/UL — SIGNIFICANT CHANGE UP (ref 130–400)
POTASSIUM SERPL-MCNC: 3.4 MMOL/L — LOW (ref 3.5–5)
POTASSIUM SERPL-SCNC: 3.4 MMOL/L — LOW (ref 3.5–5)
PROT SERPL-MCNC: 5.3 G/DL — LOW (ref 6–8)
RBC # BLD: 4.14 M/UL — LOW (ref 4.7–6.1)
RBC # FLD: 13.4 % — SIGNIFICANT CHANGE UP (ref 11.5–14.5)
SODIUM SERPL-SCNC: 140 MMOL/L — SIGNIFICANT CHANGE UP (ref 135–146)
TIBC SERPL-MCNC: 214 UG/DL — LOW (ref 220–430)
TSH SERPL-MCNC: 2.47 UIU/ML — SIGNIFICANT CHANGE UP (ref 0.27–4.2)
UIBC SERPL-MCNC: 144 UG/DL — SIGNIFICANT CHANGE UP (ref 110–370)
VIT B12 SERPL-MCNC: 694 PG/ML — SIGNIFICANT CHANGE UP (ref 232–1245)
WBC # BLD: 8.09 K/UL — SIGNIFICANT CHANGE UP (ref 4.8–10.8)
WBC # FLD AUTO: 8.09 K/UL — SIGNIFICANT CHANGE UP (ref 4.8–10.8)

## 2023-02-15 PROCEDURE — 99232 SBSQ HOSP IP/OBS MODERATE 35: CPT

## 2023-02-15 PROCEDURE — 76775 US EXAM ABDO BACK WALL LIM: CPT | Mod: 26

## 2023-02-15 RX ADMIN — ATORVASTATIN CALCIUM 20 MILLIGRAM(S): 80 TABLET, FILM COATED ORAL at 21:57

## 2023-02-15 RX ADMIN — HEPARIN SODIUM 5000 UNIT(S): 5000 INJECTION INTRAVENOUS; SUBCUTANEOUS at 05:49

## 2023-02-15 RX ADMIN — Medication 81 MILLIGRAM(S): at 11:25

## 2023-02-15 RX ADMIN — HEPARIN SODIUM 5000 UNIT(S): 5000 INJECTION INTRAVENOUS; SUBCUTANEOUS at 21:58

## 2023-02-15 RX ADMIN — Medication 3 UNIT(S): at 16:55

## 2023-02-15 RX ADMIN — AMLODIPINE BESYLATE 5 MILLIGRAM(S): 2.5 TABLET ORAL at 05:49

## 2023-02-15 RX ADMIN — TAMSULOSIN HYDROCHLORIDE 0.4 MILLIGRAM(S): 0.4 CAPSULE ORAL at 21:58

## 2023-02-15 RX ADMIN — Medication 3 UNIT(S): at 07:41

## 2023-02-15 RX ADMIN — CARVEDILOL PHOSPHATE 6.25 MILLIGRAM(S): 80 CAPSULE, EXTENDED RELEASE ORAL at 05:49

## 2023-02-15 RX ADMIN — Medication 2: at 16:56

## 2023-02-15 RX ADMIN — CARVEDILOL PHOSPHATE 6.25 MILLIGRAM(S): 80 CAPSULE, EXTENDED RELEASE ORAL at 17:33

## 2023-02-15 RX ADMIN — Medication 3 UNIT(S): at 11:25

## 2023-02-15 RX ADMIN — INSULIN GLARGINE 4 UNIT(S): 100 INJECTION, SOLUTION SUBCUTANEOUS at 23:05

## 2023-02-15 RX ADMIN — HEPARIN SODIUM 5000 UNIT(S): 5000 INJECTION INTRAVENOUS; SUBCUTANEOUS at 14:04

## 2023-02-15 RX ADMIN — VALSARTAN 320 MILLIGRAM(S): 80 TABLET ORAL at 05:50

## 2023-02-15 NOTE — PROGRESS NOTE ADULT - SUBJECTIVE AND OBJECTIVE BOX
JACQUELIN CAI 77y Male  MRN#: [id number]   Hospital Day: 2d    SUBJECTIVE  Patient is a 77y old Male who presents with a chief complaint of Lethargy (2023 01:33)  Currently admitted to medicine with the primary diagnosis of Encephalopathy      INTERVAL HPI AND OVERNIGHT EVENTS:  Patient was examined and seen at bedside. This morning he is resting comfortably in bed and reports no issues or overnight events.    OBJECTIVE  PAST MEDICAL & SURGICAL HISTORY  HTN (hypertension)    Seasonal allergies    History of BPH    Diabetes    No significant past surgical history      ALLERGIES:  [This allergen will not trigger allergy alert] pollen (Unknown)  No Known Drug Allergies    MEDICATIONS:  STANDING MEDICATIONS  amLODIPine   Tablet 5 milliGRAM(s) Oral daily  aspirin  chewable 81 milliGRAM(s) Oral daily  atorvastatin 20 milliGRAM(s) Oral at bedtime  carvedilol 6.25 milliGRAM(s) Oral every 12 hours  dextrose 5%. 1000 milliLiter(s) IV Continuous <Continuous>  dextrose 5%. 1000 milliLiter(s) IV Continuous <Continuous>  dextrose 50% Injectable 25 Gram(s) IV Push once  dextrose 50% Injectable 12.5 Gram(s) IV Push once  dextrose 50% Injectable 25 Gram(s) IV Push once  glucagon  Injectable 1 milliGRAM(s) IntraMuscular once  heparin   Injectable 5000 Unit(s) SubCutaneous every 8 hours  insulin glargine Injectable (LANTUS) 4 Unit(s) SubCutaneous at bedtime  insulin lispro (ADMELOG) corrective regimen sliding scale   SubCutaneous three times a day before meals  insulin lispro Injectable (ADMELOG) 3 Unit(s) SubCutaneous three times a day before meals  sodium chloride 0.45%. 1000 milliLiter(s) IV Continuous <Continuous>  tamsulosin 0.4 milliGRAM(s) Oral at bedtime  valsartan 320 milliGRAM(s) Oral daily    PRN MEDICATIONS  dextrose Oral Gel 15 Gram(s) Oral once PRN      VITAL SIGNS: Last 24 Hours  T(C): 36.6 (15 Feb 2023 13:41), Max: 36.7 (2023 20:21)  T(F): 97.8 (15 Feb 2023 13:41), Max: 98.1 (2023 20:21)  HR: 76 (15 Feb 2023 13:41) (63 - 76)  BP: 137/64 (15 Feb 2023 13:41) (137/64 - 181/76)  BP(mean): --  RR: 18 (15 Feb 2023 13:41) (16 - 18)  SpO2: --    LABS:                        12.2   8.09  )-----------( 202      ( 15 Feb 2023 09:32 )             35.6     02-15    140  |  104  |  26<H>  ----------------------------<  135<H>  3.4<L>   |  25  |  1.8<H>    Ca    8.8      15 Feb 2023 09:32  Phos  3.8     02-14  Mg     1.9     02-15    TPro  5.3<L>  /  Alb  3.2<L>  /  TBili  0.5  /  DBili  x   /  AST  18  /  ALT  17  /  AlkPhos  73  02-15      Urinalysis Basic - ( 2023 16:28 )    Color: Light Yellow / Appearance: Clear / S.025 / pH: x  Gluc: x / Ketone: Negative  / Bili: Negative / Urobili: <2 mg/dL   Blood: x / Protein: 300 mg/dL / Nitrite: Negative   Leuk Esterase: Negative / RBC: 0 /HPF / WBC 1 /HPF   Sq Epi: x / Non Sq Epi: 1 /HPF / Bacteria: Negative            Culture - Urine (collected 2023 16:28)  Source: Clean Catch Clean Catch (Midstream)  Preliminary Report (15 Feb 2023 08:09):    10,000 - 49,000 CFU/mL Enterococcus species    <10,000 CFU/ml Normal Urogenital jared present      CARDIAC MARKERS ( 2023 16:28 )  x     / <0.01 ng/mL / x     / x     / x          RADIOLOGY:      PHYSICAL EXAM:  CONSTITUTIONAL: No acute distress, drowsy AAOx3  HEAD: Atraumatic, normocephalic  EYES: EOM intact, PERRLA, conjunctiva and sclera clear  ENT: moist mucous membranes  PULMONARY: Clear to auscultation bilaterally  CARDIOVASCULAR: Regular rate and rhythm  GASTROINTESTINAL: Soft, non-tender, non-distended; bowel sounds present  MUSCULOSKELETAL: no edema  NEUROLOGY: non-focal  SKIN: warm and dry    ASSESSMENT and PLAN     77 year old male with PMH of hypertension, hyperlipidemia, diabetes mellitus type II presenting with chief complaint of weakness with associated dizziness, urinary frequency, persistent lethargy and memory changes over the past several days, with CT head showing focal hypodensity of the R basal ganglia consistent with chronic infarct. The patient is currently being evaluated for weakness and AMS. Cantonese speaking      # Weakness and Lethargy   - Per wife, pt BP was ~200 at home, and unable to hold objects with his L hand.   - Family reports pt has episodes lethargy and memory changes  - CT head showed focal hypodensity of the R basal ganglia consistent with chronic infarct.  - MR head and EEG ordered. Pending  - Pt drowsy but arousable. Follows commands.     #Hypertension  - BP today of 181/76 (before medications)  - c/w amlodipine 5 mg PO qd, valsartan 320 mg PO qd, carvedilol 6.25 mg PO q12h    # Hyperlipidemia  - c/w atorvastatin 20 mg PO qd    # Diabetes mellitus type II  - A1C: 6.3%  - Monitor FS, target between 140-180  - Lispro 3 U sq TID, before meals  - Lantus 4 U sq qd, before bedtime  - ISS    # BPH  - Continue with tamsulosin 0.4 mg PO qd    #Misc  - DVT Prophylaxis: Aspirin 81 mg PO qd, Heparin 5000 U sq q8h    Dispo: Pending MRI   JACQUELIN CAI 77y Male  MRN#: [id number]   Hospital Day: 2d    SUBJECTIVE  Patient is a 77y old Male who presents with a chief complaint of Lethargy (2023 01:33)  Currently admitted to medicine with the primary diagnosis of Encephalopathy    mental status improved  no fever, chills, syncope, seizure, headache, cough, SOB, abd pain, N/V/D, urinary symptoms, legs swelling    INTERVAL HPI AND OVERNIGHT EVENTS:  Patient was examined and seen at bedside. This morning he is resting comfortably in bed and reports no issues or overnight events.    OBJECTIVE  PAST MEDICAL & SURGICAL HISTORY  HTN (hypertension)    Seasonal allergies    History of BPH    Diabetes    No significant past surgical history      ALLERGIES:  [This allergen will not trigger allergy alert] pollen (Unknown)  No Known Drug Allergies    MEDICATIONS:  STANDING MEDICATIONS  amLODIPine   Tablet 5 milliGRAM(s) Oral daily  aspirin  chewable 81 milliGRAM(s) Oral daily  atorvastatin 20 milliGRAM(s) Oral at bedtime  carvedilol 6.25 milliGRAM(s) Oral every 12 hours  dextrose 5%. 1000 milliLiter(s) IV Continuous <Continuous>  dextrose 5%. 1000 milliLiter(s) IV Continuous <Continuous>  dextrose 50% Injectable 25 Gram(s) IV Push once  dextrose 50% Injectable 12.5 Gram(s) IV Push once  dextrose 50% Injectable 25 Gram(s) IV Push once  glucagon  Injectable 1 milliGRAM(s) IntraMuscular once  heparin   Injectable 5000 Unit(s) SubCutaneous every 8 hours  insulin glargine Injectable (LANTUS) 4 Unit(s) SubCutaneous at bedtime  insulin lispro (ADMELOG) corrective regimen sliding scale   SubCutaneous three times a day before meals  insulin lispro Injectable (ADMELOG) 3 Unit(s) SubCutaneous three times a day before meals  sodium chloride 0.45%. 1000 milliLiter(s) IV Continuous <Continuous>  tamsulosin 0.4 milliGRAM(s) Oral at bedtime  valsartan 320 milliGRAM(s) Oral daily    PRN MEDICATIONS  dextrose Oral Gel 15 Gram(s) Oral once PRN      VITAL SIGNS: Last 24 Hours  T(C): 36.6 (15 Feb 2023 13:41), Max: 36.7 (2023 20:21)  T(F): 97.8 (15 Feb 2023 13:41), Max: 98.1 (2023 20:21)  HR: 76 (15 Feb 2023 13:41) (63 - 76)  BP: 137/64 (15 Feb 2023 13:41) (137/64 - 181/76)  BP(mean): --  RR: 18 (15 Feb 2023 13:41) (16 - 18)  SpO2: --    LABS:                        12.2   8.09  )-----------( 202      ( 15 Feb 2023 09:32 )             35.6     02-15    140  |  104  |  26<H>  ----------------------------<  135<H>  3.4<L>   |  25  |  1.8<H>    Ca    8.8      15 Feb 2023 09:32  Phos  3.8     02-14  Mg     1.9     02-15    TPro  5.3<L>  /  Alb  3.2<L>  /  TBili  0.5  /  DBili  x   /  AST  18  /  ALT  17  /  AlkPhos  73  02-15      Urinalysis Basic - ( 2023 16:28 )    Color: Light Yellow / Appearance: Clear / S.025 / pH: x  Gluc: x / Ketone: Negative  / Bili: Negative / Urobili: <2 mg/dL   Blood: x / Protein: 300 mg/dL / Nitrite: Negative   Leuk Esterase: Negative / RBC: 0 /HPF / WBC 1 /HPF   Sq Epi: x / Non Sq Epi: 1 /HPF / Bacteria: Negative            Culture - Urine (collected 2023 16:28)  Source: Clean Catch Clean Catch (Midstream)  Preliminary Report (15 Feb 2023 08:09):    10,000 - 49,000 CFU/mL Enterococcus species    <10,000 CFU/ml Normal Urogenital jared present      CARDIAC MARKERS ( 2023 16:28 )  x     / <0.01 ng/mL / x     / x     / x          RADIOLOGY:      PHYSICAL EXAM:  CONSTITUTIONAL: No acute distress, drowsy AAOx3  HEAD: Atraumatic, normocephalic  EYES: EOM intact, PERRLA, conjunctiva and sclera clear  ENT: moist mucous membranes  PULMONARY: Clear to auscultation bilaterally  CARDIOVASCULAR: Regular rate and rhythm  GASTROINTESTINAL: Soft, non-tender, non-distended; bowel sounds present  MUSCULOSKELETAL: no edema  NEUROLOGY: moves all ext, nom motor weakness, mild right ptosis, sensations intact in all ext, CN2-12 intact, non-focal  SKIN: warm and dry    ASSESSMENT and PLAN     77 year old male with PMH of hypertension, hyperlipidemia, diabetes mellitus type II presenting with chief complaint of weakness with associated dizziness, urinary frequency, persistent lethargy and memory changes over the past several days, with CT head showing focal hypodensity of the R basal ganglia consistent with chronic infarct. The patient is currently being evaluated for weakness and AMS. Cantonese speaking      # Weakness and Lethargy   - Per wife, pt BP was ~200 at home, and unable to hold objects with his L hand.   - Family reports pt has episodes lethargy and memory changes  - CT head showed focal hypodensity of the R basal ganglia consistent with chronic infarct.  - MR head and EEG ordered. Pending  - Pt drowsy but arousable. Follows commands.     #Hypertension  - BP today of 181/76 (before medications)  - c/w amlodipine 5 mg PO qd, valsartan 320 mg PO qd, carvedilol 6.25 mg PO q12h    # Hyperlipidemia  - c/w atorvastatin 20 mg PO qd    # Diabetes mellitus type II  - A1C: 6.3%  - Monitor FS, target between 140-180  - Lispro 3 U sq TID, before meals  - Lantus 4 U sq qd, before bedtime  - ISS    # BPH  - Continue with tamsulosin 0.4 mg PO qd    #Misc  - DVT Prophylaxis: Aspirin 81 mg PO qd, Heparin 5000 U sq q8h    Dispo: Pending MRI

## 2023-02-15 NOTE — PHYSICAL THERAPY INITIAL EVALUATION ADULT - PERTINENT HX OF CURRENT PROBLEM, REHAB EVAL
77-year-old cantonese speaking male with past medical history of hypertension, hyperlipidemia, DM and BPH presenting for evaluation of weakness, dizziness, urinary frequency over the last 3 days.

## 2023-02-15 NOTE — MEDICAL STUDENT PROGRESS NOTE(EDUCATION) - NS MD HP STUD ASPLAN PLAN FT
# Weakness   # CT Head findings  - Patient has symptoms of weakness, dizziness, lethargy and memory changes  - CT head showed focal hypodensity of the R basal ganglia consistent with chronic infarct.  - Follow-up MR head recommended by radiology, pending.     #Hypertension  - BP today of 181/76 (before medications)  - Continue with amlodipine 5 mg PO qd, valsartan 320 mg PO qd, carvedilol 6.25 mg PO q12h    # Hyperlipidemia  - Continue with atorvastatin 20 mg PO qd    # Diabetes mellitus type II  - A1C: 6.3%  - Monitor FS, target between 140-180  - Lispro 3 U sq TID, before meals  - Lantus 4 U sq qd, before bedtime  - ISS    # BPH  - Continue with tamsulosin 0.4 mg PO qd        #Misc  - DVT Prophylaxis: Aspirin 81 mg PO qd, Heparin 5000 U sq q8h    Dispo: Pending MRI   # Weakness   # CT Head findings  - Patient has symptoms of weakness, dizziness, lethargy and memory changes  - CT head showed focal hypodensity of the R basal ganglia consistent with chronic infarct.  - Follow-up MR head recommended by radiology, pending.   - EEG ordered, pending.    #Hypertension  - BP today of 181/76 (before medications)  - Continue with amlodipine 5 mg PO qd, valsartan 320 mg PO qd, carvedilol 6.25 mg PO q12h    # Hyperlipidemia  - Continue with atorvastatin 20 mg PO qd    # Diabetes mellitus type II  - A1C: 6.3%  - Monitor FS, target between 140-180  - Lispro 3 U sq TID, before meals  - Lantus 4 U sq qd, before bedtime  - ISS    # BPH  - Continue with tamsulosin 0.4 mg PO qd        #Misc  - DVT Prophylaxis: Aspirin 81 mg PO qd, Heparin 5000 U sq q8h    Dispo: Pending MRI

## 2023-02-15 NOTE — PHYSICAL THERAPY INITIAL EVALUATION ADULT - GENERAL OBSERVATIONS, REHAB EVAL
Pt encountered in semi-mckinney position in bed, Cantonese speaking, son in law, Luis E present for translation, A&Ox4, +IV lock, in NAD, no c/o pain and agreeable to participate with PT. Pt performed bed mobility indep, transfers sit/stand indep, and ambulated 400 ft using RW, CGA, with dec hannah, and dec step length. Pt neg 9 steps CGA with 1 HR and step to pattern. Pt left in bed as found in NAD with alarm and call kelsey in reach, ZNOIA Caldera made aware. Pt will benefit from skilled PT 3-5x/wk for thera ex, functional mobility, balance and gait training.

## 2023-02-15 NOTE — MEDICAL STUDENT PROGRESS NOTE(EDUCATION) - SUBJECTIVE AND OBJECTIVE BOX
JACQUELIN CAI 77y Male  MRN#: [id number]   Hospital Day: 2d    HPI:  77-year-old cantonese speaking male with past medical history of hypertension, hyperlipidemia, DM and BPH presenting for evaluation of weakness, dizziness, urinary frequency over the last 3 days. Family describe that their main concern is his lethargy that has been persistent and associated with some memory changes. No LOC, weakness,  No fevers, chills, dysuria, nausea, vomiting, abdominal pain, or flank pain.  No chest pain or shortness of breath.  No headache or paresthesias    Vital Signs Last 24 Hrs  T(C): 36.3 (2023 22:40), Max: 36.4 (2023 12:42)  T(F): 97.4 (2023 22:40), Max: 97.6 (2023 12:42)  HR: 83 (2023 22:40) (68 - 83)  BP: 144/74 (2023 22:40) (144/74 - 149/65)  BP(mean): --  RR: 18 (2023 22:40) (18 - 18)  SpO2: 96% (2023 22:40) (96% - 98%)    Parameters below as of 2023 22:40  Patient On (Oxygen Delivery Method): room air     (2023 01:33)      SUBJECTIVE  Patient is a 77y old Male who presents with a chief complaint of Lethargy (2023 01:33)  Currently admitted to medicine with the primary diagnosis of Encephalopathy      INTERVAL HPI AND OVERNIGHT EVENTS:  Patient was examined and seen at bedside. This morning he is resting comfortably in bed and reports no issues or overnight events. Patient is A and O x3; he reports still feeling symptoms of weakness at this time, but has no other complaints. The patient stated that he had not been eating while he was in the hospital, but was eating breakfast during my visit. The patient reports no bowel movements during hospital stay and ambulates with assistance.      OBJECTIVE  PAST MEDICAL & SURGICAL HISTORY  HTN (hypertension)  Seasonal allergies  History of BPH  Diabetes  No significant past surgical history      ALLERGIES:  [This allergen will not trigger allergy alert] pollen (Unknown)  No Known Drug Allergies    MEDICATIONS:  STANDING MEDICATIONS  amLODIPine   Tablet 5 milliGRAM(s) Oral daily  aspirin  chewable 81 milliGRAM(s) Oral daily  atorvastatin 20 milliGRAM(s) Oral at bedtime  carvedilol 6.25 milliGRAM(s) Oral every 12 hours  dextrose 5%. 1000 milliLiter(s) IV Continuous <Continuous>  dextrose 5%. 1000 milliLiter(s) IV Continuous <Continuous>  dextrose 50% Injectable 25 Gram(s) IV Push once  dextrose 50% Injectable 12.5 Gram(s) IV Push once  dextrose 50% Injectable 25 Gram(s) IV Push once  glucagon  Injectable 1 milliGRAM(s) IntraMuscular once  heparin   Injectable 5000 Unit(s) SubCutaneous every 8 hours  insulin glargine Injectable (LANTUS) 4 Unit(s) SubCutaneous at bedtime  insulin lispro (ADMELOG) corrective regimen sliding scale   SubCutaneous three times a day before meals  insulin lispro Injectable (ADMELOG) 3 Unit(s) SubCutaneous three times a day before meals  sodium chloride 0.45%. 1000 milliLiter(s) IV Continuous <Continuous>  tamsulosin 0.4 milliGRAM(s) Oral at bedtime  valsartan 320 milliGRAM(s) Oral daily    PRN MEDICATIONS  dextrose Oral Gel 15 Gram(s) Oral once PRN      VITAL SIGNS: Last 24 Hours  T(C): 36.6 (15 Feb 2023 13:41), Max: 36.7 (2023 20:21)  T(F): 97.8 (15 Feb 2023 13:41), Max: 98.1 (2023 20:21)  HR: 76 (15 Feb 2023 13:41) (63 - 76)  BP: 137/64 (15 Feb 2023 13:41) (137/64 - 181/76)  BP(mean): --  RR: 18 (15 Feb 2023 13:41) (16 - 18)  SpO2: --    LABS:                        12.2   8.09  )-----------( 202      ( 15 Feb 2023 09:32 )             35.6     02-15    140  |  104  |  26<H>  ----------------------------<  135<H>  3.4<L>   |  25  |  1.8<H>    Ca    8.8      15 Feb 2023 09:32  Phos  3.8     02-14  Mg     1.9     02-15    TPro  5.3<L>  /  Alb  3.2<L>  /  TBili  0.5  /  DBili  x   /  AST  18  /  ALT  17  /  AlkPhos  73  02-15      Urinalysis Basic - ( 2023 16:28 )    Color: Light Yellow / Appearance: Clear / S.025 / pH: x  Gluc: x / Ketone: Negative  / Bili: Negative / Urobili: <2 mg/dL   Blood: x / Protein: 300 mg/dL / Nitrite: Negative   Leuk Esterase: Negative / RBC: 0 /HPF / WBC 1 /HPF   Sq Epi: x / Non Sq Epi: 1 /HPF / Bacteria: Negative            Culture - Urine (collected 2023 16:28)  Source: Clean Catch Clean Catch (Midstream)  Preliminary Report (15 Feb 2023 08:09):    10,000 - 49,000 CFU/mL Enterococcus species    <10,000 CFU/ml Normal Urogenital jared present      CARDIAC MARKERS ( 2023 16:28 )  x     / <0.01 ng/mL / x     / x     / x          RADIOLOGY:      PHYSICAL EXAM:  CONSTITUTIONAL: No acute distress, well-developed, well-groomed, AAOx3  HEAD: Atraumatic, normocephalic  PULMONARY: Clear to auscultation bilaterally; no wheezes, rales, or rhonchi  CARDIOVASCULAR: Regular rate and rhythm; no murmurs, rubs, or gallops  GASTROINTESTINAL: Soft, non-tender, non-distended; bowel sounds present

## 2023-02-15 NOTE — PROGRESS NOTE ADULT - ATTENDING COMMENTS
my note replace resident note in case of discrepancy     77-year-old cantonese speaking male with past medical history of hypertension, hyperlipidemia, DM and BPH presenting for evaluation of weakness, dizziness, urinary frequency over the last 3 days.     1. Alteration of mental status associated with weakness lesly HTN encephelopathy   * today back to baseline  * right eye ptosis old per the wife     - CXR:WNL                  - u/a:WNL      -TSH:pending   - CT head:  Right basilar ganglia hypodensity possible chronic infarcts   - control BP   - r/u seizure, REEG   - MRI brain:pending   - PT eval:pending    #SAGRARIO VS CKD31, lesly CKD3a from uncontrolled HTN  - s/p IVF no improvement  - dc ivf  - check renal US      #HTN  - c/w Amlodipine 5mg and Valsartan 320ng   - Monitor BP     3. DM- II   - Jardiance 25mg at home   - Monitor FS and start Insulin premeal/long acting     4. HLD  - c/w statin   - ASA primary ppx    5. BPH  - c/w Tamsulosin     #DVT - Heparin  #GI - N/A   #Diet -  CC  #Activity - IAT   #Code - Full code.   pending: brain MRI and REEG, PT eval my note replace resident note in case of discrepancy     77-year-old cantonese speaking male with past medical history of hypertension, hyperlipidemia, DM and BPH presenting for evaluation of weakness, dizziness, urinary frequency over the last 3 days.     1. Alteration of mental status associated with weakness lesly HTN encephelopathy   * today back to baseline  * right eye ptosis old per the wife     - CXR:WNL                  - u/a:WNL      -TSH:pending   - CT head:  Right basilar ganglia hypodensity possible chronic infarcts   - control BP   - r/u seizure, REEG   - MRI brain:pending   - PT eval:pending    #SAGRARIO VS CKD31, lesly CKD3a from uncontrolled HTN  - s/p IVF no improvement  - dc ivf  - check renal US      #HTN urgency: resolved  - c/w Amlodipine 5mg and Valsartan 320ng   - Monitor BP     3. DM- II   - Jardiance 25mg at home   - Monitor FS and start Insulin premeal/long acting     4. HLD  - c/w statin   - ASA primary ppx    5. BPH  - c/w Tamsulosin     #DVT - Heparin  #GI - N/A   #Diet -  CC  #Activity - IAT   #Code - Full code.   pending: brain MRI and REEG, PT eval my note replace resident note in case of discrepancy     77-year-old cantonese speaking male with past medical history of hypertension, hyperlipidemia, DM and BPH presenting for evaluation of weakness, dizziness, urinary frequency over the last 3 days.     1. Alteration of mental status associated with weakness lesly HTN encephelopathy   * today back to baseline  * right eye ptosis old per the wife     - CXR:WNL                  - u/a:WNL, Ucx enteroccocus doubt real infection as no sepsis and normal UA,   -TSH:pending   - CT head:  Right basilar ganglia hypodensity possible chronic infarcts   - control BP   - r/u seizure, REEG   - MRI brain:pending   - PT eval:pending    #SAGRARIO VS CKD31, lesly CKD3a from uncontrolled HTN  - s/p IVF no improvement  - dc ivf  - check renal US      #HTN urgency: resolved  - c/w Amlodipine 5mg and Valsartan 320ng   - Monitor BP     3. DM- II   - Jardiance 25mg at home   - Monitor FS and start Insulin premeal/long acting     4. HLD  - c/w statin   - ASA primary ppx    5. BPH  - c/w Tamsulosin     #DVT - Heparin  #GI - N/A   #Diet -  CC  #Activity - IAT   #Code - Full code.   pending: brain MRI and REEG, PT eval

## 2023-02-15 NOTE — MEDICAL STUDENT PROGRESS NOTE(EDUCATION) - NS MD HP STUD ASPLAN ASSES FT
Patient is a 77 year old male with PMH of hypertension, hyperlipidemia, diabetes mellitus type II presenting with chief complaint of weakness with associated dizziness, urinary frequency, persistent lethargy and memory changes over the past several days, with CT head showing focal hypodensity of the R basal ganglia consistent with chronic infarct. The patient is currently being evaluated for weakness and AMS.

## 2023-02-16 LAB
-  AMPICILLIN: SIGNIFICANT CHANGE UP
-  CIPROFLOXACIN: SIGNIFICANT CHANGE UP
-  LEVOFLOXACIN: SIGNIFICANT CHANGE UP
-  NITROFURANTOIN: SIGNIFICANT CHANGE UP
-  TETRACYCLINE: SIGNIFICANT CHANGE UP
-  VANCOMYCIN: SIGNIFICANT CHANGE UP
ALBUMIN SERPL ELPH-MCNC: 3.1 G/DL — LOW (ref 3.5–5.2)
ALP SERPL-CCNC: 72 U/L — SIGNIFICANT CHANGE UP (ref 30–115)
ALT FLD-CCNC: 15 U/L — SIGNIFICANT CHANGE UP (ref 0–41)
ANION GAP SERPL CALC-SCNC: 11 MMOL/L — SIGNIFICANT CHANGE UP (ref 7–14)
AST SERPL-CCNC: 17 U/L — SIGNIFICANT CHANGE UP (ref 0–41)
BASOPHILS # BLD AUTO: 0.05 K/UL — SIGNIFICANT CHANGE UP (ref 0–0.2)
BASOPHILS NFR BLD AUTO: 0.6 % — SIGNIFICANT CHANGE UP (ref 0–1)
BILIRUB SERPL-MCNC: 0.5 MG/DL — SIGNIFICANT CHANGE UP (ref 0.2–1.2)
BUN SERPL-MCNC: 28 MG/DL — HIGH (ref 10–20)
CALCIUM SERPL-MCNC: 8.5 MG/DL — SIGNIFICANT CHANGE UP (ref 8.4–10.5)
CHLORIDE SERPL-SCNC: 110 MMOL/L — SIGNIFICANT CHANGE UP (ref 98–110)
CO2 SERPL-SCNC: 21 MMOL/L — SIGNIFICANT CHANGE UP (ref 17–32)
CREAT SERPL-MCNC: 1.8 MG/DL — HIGH (ref 0.7–1.5)
CULTURE RESULTS: SIGNIFICANT CHANGE UP
EGFR: 38 ML/MIN/1.73M2 — LOW
EOSINOPHIL # BLD AUTO: 0.51 K/UL — SIGNIFICANT CHANGE UP (ref 0–0.7)
EOSINOPHIL NFR BLD AUTO: 6.4 % — SIGNIFICANT CHANGE UP (ref 0–8)
GLUCOSE BLDC GLUCOMTR-MCNC: 130 MG/DL — HIGH (ref 70–99)
GLUCOSE BLDC GLUCOMTR-MCNC: 157 MG/DL — HIGH (ref 70–99)
GLUCOSE BLDC GLUCOMTR-MCNC: 162 MG/DL — HIGH (ref 70–99)
GLUCOSE BLDC GLUCOMTR-MCNC: 163 MG/DL — HIGH (ref 70–99)
GLUCOSE BLDC GLUCOMTR-MCNC: 165 MG/DL — HIGH (ref 70–99)
GLUCOSE SERPL-MCNC: 120 MG/DL — HIGH (ref 70–99)
HCT VFR BLD CALC: 36.5 % — LOW (ref 42–52)
HGB BLD-MCNC: 12.2 G/DL — LOW (ref 14–18)
IMM GRANULOCYTES NFR BLD AUTO: 1 % — HIGH (ref 0.1–0.3)
LYMPHOCYTES # BLD AUTO: 1.36 K/UL — SIGNIFICANT CHANGE UP (ref 1.2–3.4)
LYMPHOCYTES # BLD AUTO: 17.1 % — LOW (ref 20.5–51.1)
MAGNESIUM SERPL-MCNC: 1.9 MG/DL — SIGNIFICANT CHANGE UP (ref 1.8–2.4)
MCHC RBC-ENTMCNC: 29.5 PG — SIGNIFICANT CHANGE UP (ref 27–31)
MCHC RBC-ENTMCNC: 33.4 G/DL — SIGNIFICANT CHANGE UP (ref 32–37)
MCV RBC AUTO: 88.2 FL — SIGNIFICANT CHANGE UP (ref 80–94)
METHOD TYPE: SIGNIFICANT CHANGE UP
MONOCYTES # BLD AUTO: 0.49 K/UL — SIGNIFICANT CHANGE UP (ref 0.1–0.6)
MONOCYTES NFR BLD AUTO: 6.2 % — SIGNIFICANT CHANGE UP (ref 1.7–9.3)
NEUTROPHILS # BLD AUTO: 5.47 K/UL — SIGNIFICANT CHANGE UP (ref 1.4–6.5)
NEUTROPHILS NFR BLD AUTO: 68.7 % — SIGNIFICANT CHANGE UP (ref 42.2–75.2)
NRBC # BLD: 0 /100 WBCS — SIGNIFICANT CHANGE UP (ref 0–0)
ORGANISM # SPEC MICROSCOPIC CNT: SIGNIFICANT CHANGE UP
ORGANISM # SPEC MICROSCOPIC CNT: SIGNIFICANT CHANGE UP
PLATELET # BLD AUTO: 196 K/UL — SIGNIFICANT CHANGE UP (ref 130–400)
POTASSIUM SERPL-MCNC: 3.9 MMOL/L — SIGNIFICANT CHANGE UP (ref 3.5–5)
POTASSIUM SERPL-SCNC: 3.9 MMOL/L — SIGNIFICANT CHANGE UP (ref 3.5–5)
PROT SERPL-MCNC: 5.2 G/DL — LOW (ref 6–8)
RBC # BLD: 4.14 M/UL — LOW (ref 4.7–6.1)
RBC # FLD: 13.3 % — SIGNIFICANT CHANGE UP (ref 11.5–14.5)
SODIUM SERPL-SCNC: 142 MMOL/L — SIGNIFICANT CHANGE UP (ref 135–146)
SPECIMEN SOURCE: SIGNIFICANT CHANGE UP
WBC # BLD: 7.96 K/UL — SIGNIFICANT CHANGE UP (ref 4.8–10.8)
WBC # FLD AUTO: 7.96 K/UL — SIGNIFICANT CHANGE UP (ref 4.8–10.8)

## 2023-02-16 PROCEDURE — 70551 MRI BRAIN STEM W/O DYE: CPT | Mod: 26

## 2023-02-16 PROCEDURE — 99233 SBSQ HOSP IP/OBS HIGH 50: CPT

## 2023-02-16 RX ORDER — ATORVASTATIN CALCIUM 80 MG/1
80 TABLET, FILM COATED ORAL AT BEDTIME
Refills: 0 | Status: DISCONTINUED | OUTPATIENT
Start: 2023-02-16 | End: 2023-02-25

## 2023-02-16 RX ADMIN — CARVEDILOL PHOSPHATE 6.25 MILLIGRAM(S): 80 CAPSULE, EXTENDED RELEASE ORAL at 05:20

## 2023-02-16 RX ADMIN — Medication 3 UNIT(S): at 17:16

## 2023-02-16 RX ADMIN — AMLODIPINE BESYLATE 5 MILLIGRAM(S): 2.5 TABLET ORAL at 05:20

## 2023-02-16 RX ADMIN — ATORVASTATIN CALCIUM 80 MILLIGRAM(S): 80 TABLET, FILM COATED ORAL at 21:53

## 2023-02-16 RX ADMIN — Medication 3 UNIT(S): at 08:57

## 2023-02-16 RX ADMIN — SODIUM CHLORIDE 100 MILLILITER(S): 9 INJECTION, SOLUTION INTRAVENOUS at 12:09

## 2023-02-16 RX ADMIN — HEPARIN SODIUM 5000 UNIT(S): 5000 INJECTION INTRAVENOUS; SUBCUTANEOUS at 13:37

## 2023-02-16 RX ADMIN — VALSARTAN 320 MILLIGRAM(S): 80 TABLET ORAL at 05:19

## 2023-02-16 RX ADMIN — Medication 3 UNIT(S): at 12:05

## 2023-02-16 RX ADMIN — CARVEDILOL PHOSPHATE 6.25 MILLIGRAM(S): 80 CAPSULE, EXTENDED RELEASE ORAL at 17:17

## 2023-02-16 RX ADMIN — Medication 2: at 17:16

## 2023-02-16 RX ADMIN — Medication 2: at 12:05

## 2023-02-16 RX ADMIN — HEPARIN SODIUM 5000 UNIT(S): 5000 INJECTION INTRAVENOUS; SUBCUTANEOUS at 21:53

## 2023-02-16 RX ADMIN — Medication 81 MILLIGRAM(S): at 12:06

## 2023-02-16 RX ADMIN — INSULIN GLARGINE 4 UNIT(S): 100 INJECTION, SOLUTION SUBCUTANEOUS at 23:35

## 2023-02-16 RX ADMIN — TAMSULOSIN HYDROCHLORIDE 0.4 MILLIGRAM(S): 0.4 CAPSULE ORAL at 21:53

## 2023-02-16 RX ADMIN — HEPARIN SODIUM 5000 UNIT(S): 5000 INJECTION INTRAVENOUS; SUBCUTANEOUS at 05:19

## 2023-02-16 NOTE — CONSULT NOTE ADULT - NS ATTEND AMEND GEN_ALL_CORE FT
Patient seen and examined and agree with above except as noted.  Patients history, notes ,labs, imaging, vitals and meds reviewed personally.  Patient presenting with weakness and dizziness.  Initial CTH showed infarct on right which was thought to be chronic.  MRI brain demonstrated it was acute  Etiology of stroke unclear  NIHSS 1  mrankin 2    Plan as above   Plan amended by me please review

## 2023-02-16 NOTE — CONSULT NOTE ADULT - SUBJECTIVE AND OBJECTIVE BOX
Neurology consult    Chief Complaint: weakness and letharthy  HPI:    77 year old male with PMH of hypertension, hyperlipidemia, diabetes mellitus type II presenting with chief complaint of weakness with associated dizziness, urinary frequency, persistent lethargy and memory changes over the past several days, with CT head showing focal hypodensity of the R basal ganglia consistent with chronic infarct. The patient is currently being evaluated for weakness and AMS. Cantonese speaking  # Weakness and Lethargy   - Per wife, pt BP was ~200 at home, and unable to hold objects with his L hand.   - Family reports pt has episodes lethargy and memory changes  - CT head showed focal hypodensity of the R basal ganglia consistent with chronic infarct.  - MR head and EEG ordered. Pending  - Pt drowsy but arousable. Follows commands.   - EEG (-) for seizure activity      Vital Signs Last 24 Hrs  T(C): 36.3 (13 Feb 2023 22:40), Max: 36.4 (13 Feb 2023 12:42)  T(F): 97.4 (13 Feb 2023 22:40), Max: 97.6 (13 Feb 2023 12:42)  HR: 83 (13 Feb 2023 22:40) (68 - 83)  BP: 144/74 (13 Feb 2023 22:40) (144/74 - 149/65)  BP(mean): --  RR: 18 (13 Feb 2023 22:40) (18 - 18)  SpO2: 96% (13 Feb 2023 22:40) (96% - 98%)    Parameters below as of 13 Feb 2023 22:40  Patient On (Oxygen Delivery Method): room air     (14 Feb 2023 01:33)      PMHx:   HTN (hypertension)  Seasonal allergies  History of BPH  Diabetes    PFHx:   No pertinent family history in first degree relatives    PSuHx:   No significant past surgical history        Medications:  MEDICATIONS  (STANDING):  amLODIPine   Tablet 5 milliGRAM(s) Oral daily  aspirin  chewable 81 milliGRAM(s) Oral daily  atorvastatin 80 milliGRAM(s) Oral at bedtime  carvedilol 6.25 milliGRAM(s) Oral every 12 hours  dextrose 5%. 1000 milliLiter(s) (50 mL/Hr) IV Continuous <Continuous>  dextrose 5%. 1000 milliLiter(s) (100 mL/Hr) IV Continuous <Continuous>  dextrose 50% Injectable 25 Gram(s) IV Push once  dextrose 50% Injectable 12.5 Gram(s) IV Push once  dextrose 50% Injectable 25 Gram(s) IV Push once  glucagon  Injectable 1 milliGRAM(s) IntraMuscular once  heparin   Injectable 5000 Unit(s) SubCutaneous every 8 hours  insulin glargine Injectable (LANTUS) 4 Unit(s) SubCutaneous at bedtime  insulin lispro (ADMELOG) corrective regimen sliding scale   SubCutaneous three times a day before meals  insulin lispro Injectable (ADMELOG) 3 Unit(s) SubCutaneous three times a day before meals  sodium chloride 0.45%. 1000 milliLiter(s) (100 mL/Hr) IV Continuous <Continuous>  tamsulosin 0.4 milliGRAM(s) Oral at bedtime  valsartan 320 milliGRAM(s) Oral daily    MEDICATIONS  (PRN):  dextrose Oral Gel 15 Gram(s) Oral once PRN Blood Glucose LESS THAN 70 milliGRAM(s)/deciliter    Home Medications:  amLODIPine 5 mg oral tablet: 1 tab(s) orally once a day (14 Feb 2023 02:08)  aspirin 81 mg oral tablet: 1 tab(s) orally once a day (14 Feb 2023 02:08)  atorvastatin 20 mg oral tablet: 1 tab(s) orally once a day (14 Feb 2023 02:08)  carvedilol 6.25 mg oral tablet: 1 tab(s) orally 2 times a day (14 Feb 2023 02:08)  Jardiance 25 mg oral tablet: 1 tab(s) orally once a day (in the morning) (14 Feb 2023 02:08)  Nephplex Rx oral tablet: 1 tab(s) orally once a day (14 Feb 2023 02:08)  tamsulosin 0.4 mg oral capsule: 1 cap(s) orally once a day (14 Feb 2023 02:08)  valsartan 320 mg oral tablet: 1 tab(s) orally once a day (14 Feb 2023 02:08)    Labs:                        12.2   7.96  )-----------( 196      ( 16 Feb 2023 04:30 )             36.5     02-16    142  |  110  |  28<H>  ----------------------------<  120<H>  3.9   |  21  |  1.8<H>    Ca    8.5      16 Feb 2023 04:30  Mg     1.9     02-16    TPro  5.2<L>  /  Alb  3.1<L>  /  TBili  0.5  /  DBili  x   /  AST  17  /  ALT  15  /  AlkPhos  72  02-16    CAPILLARY BLOOD GLUCOSE      POCT Blood Glucose.: 163 mg/dL (16 Feb 2023 16:33)    LIVER FUNCTIONS - ( 16 Feb 2023 04:30 )  Alb: 3.1 g/dL / Pro: 5.2 g/dL / ALK PHOS: 72 U/L / ALT: 15 U/L / AST: 17 U/L / GGT: x           CSF:      PHYSICAL EXAMINATION:  General: Well-developed, well nourished, in no acute distress.  Eyes: Conjunctiva and sclera clear.  Neurologic:  - Mental Status:  Alert, awake, oriented to person, not to place,  Speaks Cantonese able to name object glasses knows what it is used for.   -Language: No aphasia and no dysarthria  - Cranial Nerves II-XII:    II:  Visual fields are full to confrontation; Pupils are equal, round, and reactive to light;   III, IV, VI:  Extraocular movements are intact without nystagmus.  V:  Facial sensation is intact in the V1-V3 distribution bilaterally.  VII:  Face is symmetric with normal smile. Right eye pitosis chronic   VIII:  Hearing is intact to finger rub.  IX, X:  Uvula is midline and soft palate rises symmetrically  XI:  Head turning and shoulder shrug are intact.  XII:  Tongue protudes in the midline.  - Motor:    Left arm s/p trauma chronic deformed able to lift arm up and hold MS 4/5  Right arm MS 5/5   Bilateral lower extremity 5/5  - Sensory:  Intact throughout to light touch.  - Coordination:  Finger-nose-finger intact  - Gait:  unable to assess for safety of patient    Radiology:  MR Head No Cont:  (16 Feb 2023 08:15)  CT Head No Cont:  (13 Feb 2023 17:10)    < from: CT Head No Cont (02.13.23 @ 17:10) >  MPRESSION:    A focal hypodensity is noted in the right basal ganglia consistent with a   chronic infarct.    No evidence of acute intracranial hemorrhage or acute territorial infarct.    No evidence of mass or midline shift.    < end of copied text >  < from: MR Head No Cont (02.16.23 @ 08:15) >  IMPRESSION:  Acute infarct involving the anterior right thalamus without evidence of   hemorrhage.    Mild chronic microvascular type changes as well as a chronic lacunar   infarct involving the left caudate head..    < end of copied text >

## 2023-02-16 NOTE — PROGRESS NOTE ADULT - SUBJECTIVE AND OBJECTIVE BOX
JACQUELIN CAI  [facility] 012 A ([facility])      Patient is a 77y old  Male who presents with a chief complaint of Lethargy (15 Feb 2023 15:21)        Interval events:  Cantonese : Son-in-law Kayden per patient request.  Patient seen and examined at bedside. Had MRI brain done, shows thalamic stroke. Patient has no complaints. Per family, seems to be back at baseline.     -PMHx: HTN (hypertension)    Seasonal allergies    History of BPH    Diabetes      -PSHx:No significant past surgical history            REVIEW OF SYSTEMS:  CONSTITUTIONAL: No fever, weight loss, or fatigue  RESPIRATORY: No cough, wheezing, chills or hemoptysis; No shortness of breath  CARDIOVASCULAR: No chest pain, palpitations, dizziness, or leg swelling  GASTROINTESTINAL: No abdominal or epigastric pain. No nausea, vomiting, or hematemesis; No diarrhea or constipation. No melena or hematochezia.  NEUROLOGICAL: No headaches  LYMPH NODES: No enlarged glands  MUSCULOSKELETAL: No joint pain or swelling; No muscle, back, or extremity pain      T(C): , Max: 36.6 (02-16-23 @ 13:20)  HR: 65 (02-16-23 @ 13:20)  BP: 158/71 (02-16-23 @ 13:20)  RR: 18 (02-16-23 @ 13:20)  SpO2: --  CAPILLARY BLOOD GLUCOSE      POCT Blood Glucose.: 162 mg/dL (16 Feb 2023 11:15)  POCT Blood Glucose.: 130 mg/dL (16 Feb 2023 07:36)  POCT Blood Glucose.: 157 mg/dL (15 Feb 2023 23:03)  POCT Blood Glucose.: 167 mg/dL (15 Feb 2023 16:45)      PHYSICAL EXAM:  CONSTITUTIONAL: No acute distress, drowsy AAOx2-3  HEAD: Atraumatic, normocephalic  EYES: EOM intact, PERRLA, conjunctiva and sclera clear  ENT: moist mucous membranes  PULMONARY: Clear to auscultation bilaterally  CARDIOVASCULAR: Regular rate and rhythm  GASTROINTESTINAL: Soft, non-tender, non-distended; bowel sounds present  MUSCULOSKELETAL: no edema  NEUROLOGY: moves all ext, nom motor weakness, mild right ptosis, sensations intact in all ext, CN2-12 intact, non-focal  SKIN: warm and dry    Care Discussed with Consultants/Other Providers [ x] YES  [ ] NO    LABS:          12.2  7.96  )-------(196          36.5  N=68.7  L=17.1  MCV=88.2    142|110|28<H>  ------------------<120<H>  3.9|21|1.8<H>  eGFR:--  Ca:8.5            Microbiology:    Culture - Urine (collected 02-13-23 @ 16:28)  Source: Clean Catch Clean Catch (Midstream)  Final Report (02-16-23 @ 13:15):    10,000 - 49,000 CFU/mL Enterococcus faecalis    <10,000 CFU/ml Normal Urogenital jared present  Organism: Enterococcus faecalis (02-16-23 @ 13:15)  Organism: Enterococcus faecalis (02-16-23 @ 13:15)      -  Ampicillin: S <=2 Predicts results to ampicillin/sulbactam, amoxacillin-clavulanate and  piperacillin-tazobactam.      -  Ciprofloxacin: S <=1      -  Levofloxacin: S <=1      -  Nitrofurantoin: S <=32 Should not be used to treat pyelonephritis.      -  Tetracycline: R >8      -  Vancomycin: S 2      Method Type: TERRI        RADIOLOGY & ADDITIONAL TESTS:  < from: MR Head No Cont (02.16.23 @ 08:15) >  IMPRESSION:  Acute infarct involving the anterior right thalamus without evidence of   hemorrhage.    Mild chronic microvascular type changes as well as a chronic lacunar   infarct involving the left caudate head..    < end of copied text >    < from: US Renal (02.15.23 @ 19:27) >  IMPRESSION:    No hydronephrosis.    Right ureteral jet is not identified on this examination, a nonspecific   finding    Enlarged prostate.    Left renal cyst    < end of copied text >        Medications:  amLODIPine   Tablet 5 milliGRAM(s) Oral daily  aspirin  chewable 81 milliGRAM(s) Oral daily  atorvastatin 80 milliGRAM(s) Oral at bedtime  carvedilol 6.25 milliGRAM(s) Oral every 12 hours  dextrose 5%. 1000 milliLiter(s) IV Continuous <Continuous>  dextrose 5%. 1000 milliLiter(s) IV Continuous <Continuous>  dextrose 50% Injectable 25 Gram(s) IV Push once  dextrose 50% Injectable 12.5 Gram(s) IV Push once  dextrose 50% Injectable 25 Gram(s) IV Push once  dextrose Oral Gel 15 Gram(s) Oral once PRN  glucagon  Injectable 1 milliGRAM(s) IntraMuscular once  heparin   Injectable 5000 Unit(s) SubCutaneous every 8 hours  insulin glargine Injectable (LANTUS) 4 Unit(s) SubCutaneous at bedtime  insulin lispro (ADMELOG) corrective regimen sliding scale   SubCutaneous three times a day before meals  insulin lispro Injectable (ADMELOG) 3 Unit(s) SubCutaneous three times a day before meals  sodium chloride 0.45%. 1000 milliLiter(s) IV Continuous <Continuous>  tamsulosin 0.4 milliGRAM(s) Oral at bedtime  valsartan 320 milliGRAM(s) Oral daily

## 2023-02-16 NOTE — PROGRESS NOTE ADULT - SUBJECTIVE AND OBJECTIVE BOX
JACQUELIN CAI 77y Male  MRN#: [id number]   Hospital Day: 3d    SUBJECTIVE  Patient is a 77y old Male who presents with a chief complaint of Lethargy (16 Feb 2023 15:08)  Currently admitted to medicine with the primary diagnosis of Encephalopathy      INTERVAL HPI AND OVERNIGHT EVENTS:  Patient was examined and seen at bedside. This morning he is resting comfortably in bed and reports no issues or overnight events.    OBJECTIVE  PAST MEDICAL & SURGICAL HISTORY  HTN (hypertension)    Seasonal allergies    History of BPH    Diabetes    No significant past surgical history      ALLERGIES:  [This allergen will not trigger allergy alert] pollen (Unknown)  No Known Drug Allergies    MEDICATIONS:  STANDING MEDICATIONS  amLODIPine   Tablet 5 milliGRAM(s) Oral daily  aspirin  chewable 81 milliGRAM(s) Oral daily  atorvastatin 80 milliGRAM(s) Oral at bedtime  carvedilol 6.25 milliGRAM(s) Oral every 12 hours  dextrose 5%. 1000 milliLiter(s) IV Continuous <Continuous>  dextrose 5%. 1000 milliLiter(s) IV Continuous <Continuous>  dextrose 50% Injectable 25 Gram(s) IV Push once  dextrose 50% Injectable 12.5 Gram(s) IV Push once  dextrose 50% Injectable 25 Gram(s) IV Push once  glucagon  Injectable 1 milliGRAM(s) IntraMuscular once  heparin   Injectable 5000 Unit(s) SubCutaneous every 8 hours  insulin glargine Injectable (LANTUS) 4 Unit(s) SubCutaneous at bedtime  insulin lispro (ADMELOG) corrective regimen sliding scale   SubCutaneous three times a day before meals  insulin lispro Injectable (ADMELOG) 3 Unit(s) SubCutaneous three times a day before meals  sodium chloride 0.45%. 1000 milliLiter(s) IV Continuous <Continuous>  tamsulosin 0.4 milliGRAM(s) Oral at bedtime  valsartan 320 milliGRAM(s) Oral daily    PRN MEDICATIONS  dextrose Oral Gel 15 Gram(s) Oral once PRN      VITAL SIGNS: Last 24 Hours  T(C): 36.6 (16 Feb 2023 13:20), Max: 36.6 (16 Feb 2023 13:20)  T(F): 97.8 (16 Feb 2023 13:20), Max: 97.8 (16 Feb 2023 13:20)  HR: 65 (16 Feb 2023 13:20) (65 - 79)  BP: 158/71 (16 Feb 2023 13:20) (139/65 - 170/69)  BP(mean): --  RR: 18 (16 Feb 2023 13:20) (18 - 18)  SpO2: --    LABS:                        12.2   7.96  )-----------( 196      ( 16 Feb 2023 04:30 )             36.5     02-16    142  |  110  |  28<H>  ----------------------------<  120<H>  3.9   |  21  |  1.8<H>    Ca    8.5      16 Feb 2023 04:30  Mg     1.9     02-16    TPro  5.2<L>  /  Alb  3.1<L>  /  TBili  0.5  /  DBili  x   /  AST  17  /  ALT  15  /  AlkPhos  72  02-16              Culture - Urine (collected 13 Feb 2023 16:28)  Source: Clean Catch Clean Catch (Midstream)  Final Report (16 Feb 2023 13:15):    10,000 - 49,000 CFU/mL Enterococcus faecalis    <10,000 CFU/ml Normal Urogenital jared present  Organism: Enterococcus faecalis (16 Feb 2023 13:15)  Organism: Enterococcus faecalis (16 Feb 2023 13:15)          RADIOLOGY:      PHYSICAL EXAM:  CONSTITUTIONAL: No acute distress lethargic  HEAD: Atraumatic, normocephalic  EYES: EOM intact, PERRLA, conjunctiva and sclera clear  ENT: moist mucous membranes  PULMONARY: Clear to auscultation bilaterally  CARDIOVASCULAR: Regular rate and rhythm  GASTROINTESTINAL: Soft, non-tender, non-distended; bowel sounds present  MUSCULOSKELETAL: no edemal  SKIN: warm and dry    ASSESSMENT and PLAN     77 year old male with PMH of hypertension, hyperlipidemia, diabetes mellitus type II presenting with chief complaint of weakness with associated dizziness, urinary frequency, persistent lethargy and memory changes over the past several days, with CT head showing focal hypodensity of the R basal ganglia consistent with chronic infarct. The patient is currently being evaluated for weakness and AMS. Cantonese speaking      # Weakness and Lethargy   - Per wife, pt BP was ~200 at home, and unable to hold objects with his L hand.   - Family reports pt has episodes lethargy and memory changes  - CT head showed focal hypodensity of the R basal ganglia consistent with chronic infarct.  - MR head and EEG ordered. Pending  - Pt drowsy but arousable. Follows commands.   - EEG (-) for seizure activity   - MRI shows acute R anterior thalamic stroke. Neuro consulted. Echo ordered, Lipitor increased to 80mg  - Family updated    #Hypertension  - BP today of 181/76 (before medications)  - c/w amlodipine 5 mg PO qd, valsartan 320 mg PO qd, carvedilol 6.25 mg PO q12h    # Hyperlipidemia  - c/w atorvastatin 20 mg PO qd    # Diabetes mellitus type II  - A1C: 6.3%  - Monitor FS, target between 140-180  - Lispro 3 U sq TID, before meals  - Lantus 4 U sq qd, before bedtime  - ISS    # BPH  - Continue with tamsulosin 0.4 mg PO qd    #Misc  - DVT Prophylaxis: Aspirin 81 mg PO qd, Heparin 5000 U sq q8h    Dispo: Pending MRI

## 2023-02-16 NOTE — PROGRESS NOTE ADULT - ASSESSMENT
77-year-old cantonese speaking male with past medical history of hypertension, hyperlipidemia, DM and BPH presenting for evaluation of weakness, dizziness, urinary frequency over the last 3 days.     #Alteration of mental status associated with weakness likley HTN encephelopathy   #Acute anterior right thalamic stroke   * today back to baseline  * right eye ptosis old per the wife     - CXR:WNL                  - u/a:WNL, Ucx enteroccocus doubt real infection as no sepsis and normal UA,   -TSH: wnl  - CT head:  Right basilar ganglia hypodensity possible chronic infarcts   -MR head: Acute anterior right thalamic stroke   -EEG wnl   -PT: home PT   -neuro consult  -ECHO with bubble study  -fall & aspiration precautions   -secondary stroke prevention: lipitor 80, asa 81, BP control; f/u rest of neuro recs     #SAGRARIO VS CKD3, likely CKD3a from uncontrolled HTN  -renal U/S reviewed  -Cr 1.8 now; was 2.1 on admission  -trend Cr; likely baseline now      #HTN urgency: resolved  - c/w Amlodipine 5mg and Valsartan 320ng   - Monitor BP     #DM- II   - Jardiance 25mg at home   - Monitor FS and start Insulin premeal/long acting (Lantus 4u qhs, Lispro 3u qac, ISS)     #HLD  - c/w statin   - ASA 81     #BPH  - c/w Tamsulosin     #DVT - Heparin  #GI - N/A   #Diet -  CC  #Activity - IAT   #Code - Full code.   pending: neuro consult, ECHO

## 2023-02-16 NOTE — CONSULT NOTE ADULT - ASSESSMENT
77 year old male Cantonese speaking used   with PMH of hypertension, hyperlipidemia, diabetes mellitus type II presenting with chief complaint of weakness with associated dizziness, urinary frequency, persistent lethargy and memory changes over the past several days, with CT head showing focal hypodensity of the R basal ganglia consistent with chronic infarct. Neurology consulted for abnormal MRI 2/16/2023 reveals acute infarct involving the anterior right thalamus without evidence of hemorrhage. Mild chronic microvascular type changes as well as a chronic lacunar infarct involving the left caudate head. on exam pt is AOX1, NIHSS 3. Patient does not know his age and year, slight aphasia, no dysarthria, follows commands, no gaze preference, visual field intact, no facial palsy, left arm chronic deformity able to lift both arms, sensation intact, no ataxia. Patient is not a candidate for IV thrombolytics and IA intervention.     #Infarct anterior right thalamus with out evidence of hemorrhage                       77 year old male Cantonese speaking used   with PMH of hypertension, hyperlipidemia, diabetes mellitus type II presenting with chief complaint of weakness with associated dizziness, urinary frequency, persistent lethargy and memory changes over the past several days, with CT head showing focal hypodensity of the R basal ganglia consistent with chronic infarct. Neurology consulted for abnormal MRI 2/16/2023 reveals acute infarct involving the anterior right thalamus without evidence of hemorrhage. Mild chronic microvascular type changes as well as a chronic lacunar infarct involving the left caudate head. on exam pt is AOX1, NIHSS 3. Patient does not know his age and year, slight aphasia, no dysarthria, follows commands, no gaze preference, visual field intact, no facial palsy, left arm chronic deformity able to lift both arms, sensation intact, no ataxia. Patient is out of the window for IV thrombolytics and is not a candidate for IV thrombolytics and IA intervention.    #Infarct anterior right thalamus with out evidence of hemorrhage    Recommendations:  TTE with bubble if has no contraindications  ILR  Continue ASA 81 mg  Continue Atorvastatin 80 mg  LDL, A1c  Telemonitor  Neuro check q4  Control BP   Aspiration precaution  Fall precaution  PT/OT/SLP  Case discussed with Dr. Byrne                         77 year old male Cantonese speaking used   with PMH of hypertension, hyperlipidemia, diabetes mellitus type II presenting with chief complaint of weakness with associated dizziness, urinary frequency, persistent lethargy and memory changes over the past several days, with CT head showing focal hypodensity of the R basal ganglia consistent with chronic infarct. Neurology consulted for abnormal MRI 2/16/2023 reveals acute infarct involving the anterior right thalamus without evidence of hemorrhage. Mild chronic microvascular type changes as well as a chronic lacunar infarct involving the left caudate head. on exam pt is AOX1, NIHSS 3. Patient does not know his age and year, slight aphasia, no dysarthria, follows commands, no gaze preference, visual field intact, no facial palsy, left arm chronic deformity able to lift both arms, sensation intact, no ataxia. Patient is out of the window for IV thrombolytics and is not a candidate for IV thrombolytics and IA intervention.    #Infarct anterior right thalamus with out evidence of hemorrhage    Recommendations:  MRA H+N w/o SYEDA (poor GFR)  TTE with contrast if has no contraindications  ILR  Continue ASA 81 mg  Add plavix 75mg QD  Continue Atorvastatin 80 mg  LDL, A1c  Telemonitoring  Neuro check q4  Control BP   Aspiration precaution  Fall precaution  PT/OT/SLP  Case discussed with Dr. Byrne

## 2023-02-17 LAB
ALBUMIN SERPL ELPH-MCNC: 3.2 G/DL — LOW (ref 3.5–5.2)
ALP SERPL-CCNC: 69 U/L — SIGNIFICANT CHANGE UP (ref 30–115)
ALT FLD-CCNC: 28 U/L — SIGNIFICANT CHANGE UP (ref 0–41)
ANION GAP SERPL CALC-SCNC: 13 MMOL/L — SIGNIFICANT CHANGE UP (ref 7–14)
AST SERPL-CCNC: 33 U/L — SIGNIFICANT CHANGE UP (ref 0–41)
BASOPHILS # BLD AUTO: 0.05 K/UL — SIGNIFICANT CHANGE UP (ref 0–0.2)
BASOPHILS NFR BLD AUTO: 0.6 % — SIGNIFICANT CHANGE UP (ref 0–1)
BILIRUB SERPL-MCNC: 0.6 MG/DL — SIGNIFICANT CHANGE UP (ref 0.2–1.2)
BUN SERPL-MCNC: 29 MG/DL — HIGH (ref 10–20)
CALCIUM SERPL-MCNC: 8.8 MG/DL — SIGNIFICANT CHANGE UP (ref 8.4–10.5)
CHLORIDE SERPL-SCNC: 108 MMOL/L — SIGNIFICANT CHANGE UP (ref 98–110)
CHOLEST SERPL-MCNC: 245 MG/DL — HIGH
CO2 SERPL-SCNC: 19 MMOL/L — SIGNIFICANT CHANGE UP (ref 17–32)
CREAT SERPL-MCNC: 1.7 MG/DL — HIGH (ref 0.7–1.5)
EGFR: 41 ML/MIN/1.73M2 — LOW
EOSINOPHIL # BLD AUTO: 0.47 K/UL — SIGNIFICANT CHANGE UP (ref 0–0.7)
EOSINOPHIL NFR BLD AUTO: 5.5 % — SIGNIFICANT CHANGE UP (ref 0–8)
GLUCOSE BLDC GLUCOMTR-MCNC: 134 MG/DL — HIGH (ref 70–99)
GLUCOSE BLDC GLUCOMTR-MCNC: 192 MG/DL — HIGH (ref 70–99)
GLUCOSE BLDC GLUCOMTR-MCNC: 225 MG/DL — HIGH (ref 70–99)
GLUCOSE BLDC GLUCOMTR-MCNC: 243 MG/DL — HIGH (ref 70–99)
GLUCOSE SERPL-MCNC: 109 MG/DL — HIGH (ref 70–99)
HCT VFR BLD CALC: 39.8 % — LOW (ref 42–52)
HDLC SERPL-MCNC: 49 MG/DL — SIGNIFICANT CHANGE UP
HGB BLD-MCNC: 13.6 G/DL — LOW (ref 14–18)
IMM GRANULOCYTES NFR BLD AUTO: 0.7 % — HIGH (ref 0.1–0.3)
LDLC SERPL DIRECT ASSAY-MCNC: 114 MG/DL — SIGNIFICANT CHANGE UP
LIPID PNL WITH DIRECT LDL SERPL: ABNORMAL
LYMPHOCYTES # BLD AUTO: 1.18 K/UL — LOW (ref 1.2–3.4)
LYMPHOCYTES # BLD AUTO: 13.9 % — LOW (ref 20.5–51.1)
MAGNESIUM SERPL-MCNC: 1.9 MG/DL — SIGNIFICANT CHANGE UP (ref 1.8–2.4)
MCHC RBC-ENTMCNC: 29.6 PG — SIGNIFICANT CHANGE UP (ref 27–31)
MCHC RBC-ENTMCNC: 34.2 G/DL — SIGNIFICANT CHANGE UP (ref 32–37)
MCV RBC AUTO: 86.5 FL — SIGNIFICANT CHANGE UP (ref 80–94)
MONOCYTES # BLD AUTO: 0.5 K/UL — SIGNIFICANT CHANGE UP (ref 0.1–0.6)
MONOCYTES NFR BLD AUTO: 5.9 % — SIGNIFICANT CHANGE UP (ref 1.7–9.3)
NEUTROPHILS # BLD AUTO: 6.22 K/UL — SIGNIFICANT CHANGE UP (ref 1.4–6.5)
NEUTROPHILS NFR BLD AUTO: 73.4 % — SIGNIFICANT CHANGE UP (ref 42.2–75.2)
NON HDL CHOLESTEROL: 196 MG/DL — HIGH
NRBC # BLD: 0 /100 WBCS — SIGNIFICANT CHANGE UP (ref 0–0)
PLATELET # BLD AUTO: 180 K/UL — SIGNIFICANT CHANGE UP (ref 130–400)
POTASSIUM SERPL-MCNC: 4.2 MMOL/L — SIGNIFICANT CHANGE UP (ref 3.5–5)
POTASSIUM SERPL-SCNC: 4.2 MMOL/L — SIGNIFICANT CHANGE UP (ref 3.5–5)
PROT SERPL-MCNC: 5.6 G/DL — LOW (ref 6–8)
RBC # BLD: 4.6 M/UL — LOW (ref 4.7–6.1)
RBC # FLD: 13.8 % — SIGNIFICANT CHANGE UP (ref 11.5–14.5)
SODIUM SERPL-SCNC: 140 MMOL/L — SIGNIFICANT CHANGE UP (ref 135–146)
TRIGL SERPL-MCNC: 511 MG/DL — HIGH
WBC # BLD: 8.48 K/UL — SIGNIFICANT CHANGE UP (ref 4.8–10.8)
WBC # FLD AUTO: 8.48 K/UL — SIGNIFICANT CHANGE UP (ref 4.8–10.8)

## 2023-02-17 PROCEDURE — 93970 EXTREMITY STUDY: CPT | Mod: 26

## 2023-02-17 PROCEDURE — 99222 1ST HOSP IP/OBS MODERATE 55: CPT

## 2023-02-17 PROCEDURE — 99233 SBSQ HOSP IP/OBS HIGH 50: CPT

## 2023-02-17 RX ORDER — CLOPIDOGREL BISULFATE 75 MG/1
75 TABLET, FILM COATED ORAL DAILY
Refills: 0 | Status: DISCONTINUED | OUTPATIENT
Start: 2023-02-17 | End: 2023-02-25

## 2023-02-17 RX ADMIN — Medication 4: at 11:47

## 2023-02-17 RX ADMIN — HEPARIN SODIUM 5000 UNIT(S): 5000 INJECTION INTRAVENOUS; SUBCUTANEOUS at 05:12

## 2023-02-17 RX ADMIN — CARVEDILOL PHOSPHATE 6.25 MILLIGRAM(S): 80 CAPSULE, EXTENDED RELEASE ORAL at 17:03

## 2023-02-17 RX ADMIN — TAMSULOSIN HYDROCHLORIDE 0.4 MILLIGRAM(S): 0.4 CAPSULE ORAL at 21:50

## 2023-02-17 RX ADMIN — AMLODIPINE BESYLATE 5 MILLIGRAM(S): 2.5 TABLET ORAL at 05:11

## 2023-02-17 RX ADMIN — CARVEDILOL PHOSPHATE 6.25 MILLIGRAM(S): 80 CAPSULE, EXTENDED RELEASE ORAL at 05:11

## 2023-02-17 RX ADMIN — ATORVASTATIN CALCIUM 80 MILLIGRAM(S): 80 TABLET, FILM COATED ORAL at 21:50

## 2023-02-17 RX ADMIN — HEPARIN SODIUM 5000 UNIT(S): 5000 INJECTION INTRAVENOUS; SUBCUTANEOUS at 21:50

## 2023-02-17 RX ADMIN — CLOPIDOGREL BISULFATE 75 MILLIGRAM(S): 75 TABLET, FILM COATED ORAL at 11:49

## 2023-02-17 RX ADMIN — Medication 81 MILLIGRAM(S): at 11:48

## 2023-02-17 RX ADMIN — Medication 4: at 16:40

## 2023-02-17 RX ADMIN — HEPARIN SODIUM 5000 UNIT(S): 5000 INJECTION INTRAVENOUS; SUBCUTANEOUS at 13:48

## 2023-02-17 RX ADMIN — INSULIN GLARGINE 4 UNIT(S): 100 INJECTION, SOLUTION SUBCUTANEOUS at 21:51

## 2023-02-17 RX ADMIN — Medication 3 UNIT(S): at 07:47

## 2023-02-17 RX ADMIN — Medication 3 UNIT(S): at 16:40

## 2023-02-17 RX ADMIN — VALSARTAN 320 MILLIGRAM(S): 80 TABLET ORAL at 05:11

## 2023-02-17 RX ADMIN — Medication 3 UNIT(S): at 11:46

## 2023-02-17 NOTE — PROGRESS NOTE ADULT - SUBJECTIVE AND OBJECTIVE BOX
Patient is a 77y old  Male who presents with a chief complaint of Lethargy (16 Feb 2023 17:16)    Patient was seen and examined.  Pt awake, responding to verbal commands  no new complaints    PAST MEDICAL & SURGICAL HISTORY:  HTN (hypertension)  Seasonal allergies  History of BPH  Diabetes    No significant past surgical history    Allergies  [This allergen will not trigger allergy alert] pollen (Unknown)  No Known Drug Allergies    MEDICATIONS  (STANDING):  amLODIPine   Tablet 5 milliGRAM(s) Oral daily  aspirin  chewable 81 milliGRAM(s) Oral daily  atorvastatin 80 milliGRAM(s) Oral at bedtime  carvedilol 6.25 milliGRAM(s) Oral every 12 hours  clopidogrel Tablet 75 milliGRAM(s) Oral daily  heparin   Injectable 5000 Unit(s) SubCutaneous every 8 hours  insulin glargine Injectable (LANTUS) 4 Unit(s) SubCutaneous at bedtime  insulin lispro (ADMELOG) corrective regimen sliding scale   SubCutaneous three times a day before meals  insulin lispro Injectable (ADMELOG) 3 Unit(s) SubCutaneous three times a day before meals  tamsulosin 0.4 milliGRAM(s) Oral at bedtime  valsartan 320 milliGRAM(s) Oral daily    MEDICATIONS  (PRN):  dextrose Oral Gel 15 Gram(s) Oral once PRN Blood Glucose LESS THAN 70 milliGRAM(s)/deciliter    Vital Signs Last 24 Hrs  T(C): 36.3  T(F): 97.3  HR: 68  BP: 132/65  BP(mean): --  RR: 18  SpO2: --    O/E:  Awake, alert, not in distress.  HEENT: atraumatic, EOMI.  Chest: clear.  CVS: SIS2 +, no murmur.  P/A: Soft, BS+  CNS: awake, alert , orientedx2, chronic right eye ptosis Power RUE, /RLE, LLE 5/5, LUE _Chronic sec to trauma)4/5  Ext: no edema feet.  Skin: no rash, no ulcers.  All systems reviewed positive findings as above.    POCT Blood Glucose.: 225 mg/dL (17 Feb 2023 10:53)  POCT Blood Glucose.: 134 mg/dL (17 Feb 2023 07:35)  POCT Blood Glucose.: 165 mg/dL (16 Feb 2023 23:28)  POCT Blood Glucose.: 163 mg/dL (16 Feb 2023 16:33)                          13.6<L>  8.48  )-----------( 180      ( 17 Feb 2023 07:55 )             39.8<L>                        12.2<L>  7.96  )-----------( 196      ( 16 Feb 2023 04:30 )             36.5<L>    02-17    140  |  108  |  29<H>  ----------------------------<  109<H>  4.2   |  19  |  1.7<H>  02-16    142  |  110  |  28<H>  ----------------------------<  120<H>  3.9   |  21  |  1.8<H>    Ca    8.8      17 Feb 2023 07:55  Ca    8.5      16 Feb 2023 04:30  Mg     1.9     02-17    TPro  5.6<L>  /  Alb  3.2<L>  /  TBili  0.6  /  DBili  x   /  AST  33  /  ALT  28  /  AlkPhos  69  02-17  TPro  5.2<L>  /  Alb  3.1<L>  /  TBili  0.5  /  DBili  x   /  AST  17  /  ALT  15  /  AlkPhos  72  02-16

## 2023-02-17 NOTE — PROGRESS NOTE ADULT - ASSESSMENT
77-year-old cantonese speaking male with past medical history of hypertension, hyperlipidemia, DM and BPH presenting for evaluation of weakness, dizziness, urinary frequency over the last 3 days. Family describe that their main concern is his lethargy that has been persistent and associated with some memory changes.    # Encephelopathy   #Acute CVA right  anterior thalamic   - MR Head No Cont (02.16.23 @ 08:15) >Acute infarct involving the anterior right thalamus without evidence of hemorrhage. Mild chronic microvascular type changes as well as a chronic lacunar infarct involving the left caudate head..  -  CT Head No Cont (02.13.23 @ 17:10) >A focal hypodensity is noted in the right basal ganglia consistent with a chronic infarct.  -  EEG Awake or Drowsy (02.15.23 @ 16:30) >Normal  - LDL Cholesterol Calculated: Unable to calculate LDL, TG is >400 mg/dL (02.17.23 @ 07:55)  - c/w ASA, statin  - neuro eval: MRA H+N w/o SYEDA (poor GFR), TTE with contrast   - EP eval for  ILR  - Add plavix 75mg QD  - Telemonitoring  - Neuro check q4  - monitor BP  - PT/OT eval    # Hypertensive urgency-  resolved  - c/w  norvasc, valsartan, coreg  - Monitor BP     # DM type 2  - A1C with Estimated Average Glucose Result: 6.3 % (02.14.23 @ 05:49)  - monitor FS  - c/w lantus, lispro    # CKD stage3  -  US Renal (02.15.23 @ 19:27) No hydronephrosis. Right ureteral jet is not identified on this examination, a nonspecific   finding. Enlarged prostate. Left renal cyst    # H/o BPH  - c/w  flomax    # DVT prophylaxis    # Full code    # Pending: MRA H+N w/o SYEDA,  TTE with contrast , EP eval for  ILR, monitor BP  # Disposition: transfer to telemetry

## 2023-02-18 LAB
GLUCOSE BLDC GLUCOMTR-MCNC: 159 MG/DL — HIGH (ref 70–99)
GLUCOSE BLDC GLUCOMTR-MCNC: 171 MG/DL — HIGH (ref 70–99)
GLUCOSE BLDC GLUCOMTR-MCNC: 190 MG/DL — HIGH (ref 70–99)
GLUCOSE BLDC GLUCOMTR-MCNC: 249 MG/DL — HIGH (ref 70–99)

## 2023-02-18 PROCEDURE — 99223 1ST HOSP IP/OBS HIGH 75: CPT | Mod: 57

## 2023-02-18 PROCEDURE — 99233 SBSQ HOSP IP/OBS HIGH 50: CPT

## 2023-02-18 RX ORDER — ACETAMINOPHEN 500 MG
650 TABLET ORAL EVERY 6 HOURS
Refills: 0 | Status: COMPLETED | OUTPATIENT
Start: 2023-02-18 | End: 2023-02-20

## 2023-02-18 RX ORDER — OXYCODONE AND ACETAMINOPHEN 5; 325 MG/1; MG/1
1 TABLET ORAL EVERY 4 HOURS
Refills: 0 | Status: DISCONTINUED | OUTPATIENT
Start: 2023-02-18 | End: 2023-02-18

## 2023-02-18 RX ORDER — AMLODIPINE BESYLATE 2.5 MG/1
10 TABLET ORAL DAILY
Refills: 0 | Status: DISCONTINUED | OUTPATIENT
Start: 2023-02-19 | End: 2023-02-25

## 2023-02-18 RX ORDER — AMLODIPINE BESYLATE 2.5 MG/1
5 TABLET ORAL ONCE
Refills: 0 | Status: DISCONTINUED | OUTPATIENT
Start: 2023-02-18 | End: 2023-02-20

## 2023-02-18 RX ADMIN — HEPARIN SODIUM 5000 UNIT(S): 5000 INJECTION INTRAVENOUS; SUBCUTANEOUS at 14:14

## 2023-02-18 RX ADMIN — VALSARTAN 320 MILLIGRAM(S): 80 TABLET ORAL at 06:19

## 2023-02-18 RX ADMIN — HEPARIN SODIUM 5000 UNIT(S): 5000 INJECTION INTRAVENOUS; SUBCUTANEOUS at 22:30

## 2023-02-18 RX ADMIN — AMLODIPINE BESYLATE 5 MILLIGRAM(S): 2.5 TABLET ORAL at 06:19

## 2023-02-18 RX ADMIN — CARVEDILOL PHOSPHATE 6.25 MILLIGRAM(S): 80 CAPSULE, EXTENDED RELEASE ORAL at 18:09

## 2023-02-18 RX ADMIN — INSULIN GLARGINE 4 UNIT(S): 100 INJECTION, SOLUTION SUBCUTANEOUS at 22:30

## 2023-02-18 RX ADMIN — Medication 650 MILLIGRAM(S): at 18:12

## 2023-02-18 RX ADMIN — ATORVASTATIN CALCIUM 80 MILLIGRAM(S): 80 TABLET, FILM COATED ORAL at 22:31

## 2023-02-18 RX ADMIN — Medication 81 MILLIGRAM(S): at 11:35

## 2023-02-18 RX ADMIN — TAMSULOSIN HYDROCHLORIDE 0.4 MILLIGRAM(S): 0.4 CAPSULE ORAL at 22:30

## 2023-02-18 RX ADMIN — Medication 3 UNIT(S): at 11:34

## 2023-02-18 RX ADMIN — Medication 4: at 11:35

## 2023-02-18 RX ADMIN — Medication 2: at 07:33

## 2023-02-18 RX ADMIN — Medication 3 UNIT(S): at 18:07

## 2023-02-18 RX ADMIN — CARVEDILOL PHOSPHATE 6.25 MILLIGRAM(S): 80 CAPSULE, EXTENDED RELEASE ORAL at 06:20

## 2023-02-18 RX ADMIN — HEPARIN SODIUM 5000 UNIT(S): 5000 INJECTION INTRAVENOUS; SUBCUTANEOUS at 06:19

## 2023-02-18 RX ADMIN — Medication 2: at 18:08

## 2023-02-18 RX ADMIN — Medication 3 UNIT(S): at 07:32

## 2023-02-18 RX ADMIN — CLOPIDOGREL BISULFATE 75 MILLIGRAM(S): 75 TABLET, FILM COATED ORAL at 11:35

## 2023-02-18 NOTE — CONSULT NOTE ADULT - SUBJECTIVE AND OBJECTIVE BOX
Patient is a 77y old  Male who presents with a chief complaint of Lethargy (17 Feb 2023 13:07)        HPI:  77-year-old cantonese speaking male with past medical history of hypertension, hyperlipidemia, DM and BPH presenting for evaluation of weakness, dizziness, urinary frequency over the last 3 days. Family describe that their main concern is his lethargy that has been persistent and associated with some memory changes. No LOC, weakness,  No fevers, chills, dysuria, nausea, vomiting, abdominal pain, or flank pain.  No chest pain or shortness of breath.  No headache or paresthesias    Vital Signs Last 24 Hrs  T(C): 36.3 (13 Feb 2023 22:40), Max: 36.4 (13 Feb 2023 12:42)  T(F): 97.4 (13 Feb 2023 22:40), Max: 97.6 (13 Feb 2023 12:42)  HR: 83 (13 Feb 2023 22:40) (68 - 83)  BP: 144/74 (13 Feb 2023 22:40) (144/74 - 149/65)  BP(mean): --  RR: 18 (13 Feb 2023 22:40) (18 - 18)  SpO2: 96% (13 Feb 2023 22:40) (96% - 98%)    Parameters below as of 13 Feb 2023 22:40  Patient On (Oxygen Delivery Method): room air     (14 Feb 2023 01:33)      Electrophysiology:  77y Male    REVIEW OF SYSTEMS    [ ] A ten-point review of systems was otherwise negative except as noted.  [ ] Due to altered mental status/intubation, subjective information were not able to be obtained from the patient. History was obtained, to the extent possible, from review of the chart and collateral sources of information.      PAST MEDICAL & SURGICAL HISTORY:  HTN (hypertension)      Seasonal allergies      History of BPH      Diabetes      No significant past surgical history          Home Medications:  amLODIPine 5 mg oral tablet: 1 tab(s) orally once a day (14 Feb 2023 02:08)  aspirin 81 mg oral tablet: 1 tab(s) orally once a day (14 Feb 2023 02:08)  atorvastatin 20 mg oral tablet: 1 tab(s) orally once a day (14 Feb 2023 02:08)  carvedilol 6.25 mg oral tablet: 1 tab(s) orally 2 times a day (14 Feb 2023 02:08)  Jardiance 25 mg oral tablet: 1 tab(s) orally once a day (in the morning) (14 Feb 2023 02:08)  Nephplex Rx oral tablet: 1 tab(s) orally once a day (14 Feb 2023 02:08)  tamsulosin 0.4 mg oral capsule: 1 cap(s) orally once a day (14 Feb 2023 02:08)  valsartan 320 mg oral tablet: 1 tab(s) orally once a day (14 Feb 2023 02:08)      Allergies:  [This allergen will not trigger allergy alert] pollen (Unknown)  No Known Drug Allergies      FAMILY HISTORY:  No pertinent family history in first degree relatives        SOCIAL HISTORY:    CIGARETTES:  ALCOHOL:  MARIJUANA:  ILLICIT DRUGS:        PREVIOUS DIAGNOSTIC TESTING:      ECHO  FINDINGS:    STRESS  FINDINGS:    CATHETERIZATION  FINDINGS:    ELECTROPHYSIOLOGY STUDY  FINDINGS:    CAROTID ULTRASOUND:  FINDINGS    VENOUS DUPLEX SCAN:  FINDINGS:    CHEST CT PULMONARY ANGIO with IV Contrast:  FINDINGS:      MEDICATIONS  (STANDING):  amLODIPine   Tablet 5 milliGRAM(s) Oral daily  aspirin  chewable 81 milliGRAM(s) Oral daily  atorvastatin 80 milliGRAM(s) Oral at bedtime  carvedilol 6.25 milliGRAM(s) Oral every 12 hours  clopidogrel Tablet 75 milliGRAM(s) Oral daily  dextrose 5%. 1000 milliLiter(s) (50 mL/Hr) IV Continuous <Continuous>  dextrose 5%. 1000 milliLiter(s) (100 mL/Hr) IV Continuous <Continuous>  dextrose 50% Injectable 25 Gram(s) IV Push once  dextrose 50% Injectable 12.5 Gram(s) IV Push once  dextrose 50% Injectable 25 Gram(s) IV Push once  glucagon  Injectable 1 milliGRAM(s) IntraMuscular once  heparin   Injectable 5000 Unit(s) SubCutaneous every 8 hours  insulin glargine Injectable (LANTUS) 4 Unit(s) SubCutaneous at bedtime  insulin lispro (ADMELOG) corrective regimen sliding scale   SubCutaneous three times a day before meals  insulin lispro Injectable (ADMELOG) 3 Unit(s) SubCutaneous three times a day before meals  tamsulosin 0.4 milliGRAM(s) Oral at bedtime  valsartan 320 milliGRAM(s) Oral daily    MEDICATIONS  (PRN):  dextrose Oral Gel 15 Gram(s) Oral once PRN Blood Glucose LESS THAN 70 milliGRAM(s)/deciliter      Vital Signs Last 24 Hrs  T(C): 37.7 (18 Feb 2023 08:44), Max: 37.7 (18 Feb 2023 08:44)  T(F): 99.8 (18 Feb 2023 08:44), Max: 99.8 (18 Feb 2023 08:44)  HR: 87 (18 Feb 2023 08:44) (68 - 88)  BP: 179/80 (18 Feb 2023 08:44) (126/60 - 179/80)  BP(mean): --  RR: 18 (18 Feb 2023 05:31) (18 - 18)  SpO2: --        PHYSICAL EXAM:    GENERAL: In no apparent distress, well nourished, and hydrated.  HEAD:  Atraumatic, Normocephalic  EYES: EOMI, PERRLA, conjunctiva and sclera clear  NECK: Supple and normal thyroid.  No JVD or carotid bruit.  Carotid pulse is 2+ bilaterally.  HEART: Regular rate and rhythm; No murmurs, rubs, or gallops.  PULMONARY: Clear to auscultation and perfusion.  No rales, wheezing, or rhonchi bilaterally.  ABDOMEN: Soft, Nontender, Nondistended; Bowel sounds present  EXTREMITIES:  2+ Peripheral Pulses, No clubbing, cyanosis, or edema  NEUROLOGICAL: Grossly nonfocal    I&O's Detail    17 Feb 2023 07:01  -  18 Feb 2023 07:00  --------------------------------------------------------  IN:    sodium chloride 0.45%: 300 mL  Total IN: 300 mL    OUT:  Total OUT: 0 mL    Total NET: 300 mL        Daily     Daily     INTERPRETATION OF TELEMETRY:    ECG:        LABS:                        13.6   8.48  )-----------( 180      ( 17 Feb 2023 07:55 )             39.8     02-17    140  |  108  |  29<H>  ----------------------------<  109<H>  4.2   |  19  |  1.7<H>    Ca    8.8      17 Feb 2023 07:55  Mg     1.9     02-17    TPro  5.6<L>  /  Alb  3.2<L>  /  TBili  0.6  /  DBili  x   /  AST  33  /  ALT  28  /  AlkPhos  69  02-17            BNP            RADIOLOGY & ADDITIONAL STUDIES:       Patient is a 77y old  Male who presents with a chief complaint of Lethargy (17 Feb 2023 13:07)        HPI:  77-year-old cantonese speaking male with past medical history of hypertension, hyperlipidemia, DM and BPH presenting for evaluation of weakness, dizziness, urinary frequency over the last 3 days. Family describe that their main concern is his lethargy that has been persistent and associated with some memory changes. No LOC, weakness,  No fevers, chills, dysuria, nausea, vomiting, abdominal pain, or flank pain.  No chest pain or shortness of breath.  No headache or paresthesias    Vital Signs Last 24 Hrs  T(C): 36.3 (13 Feb 2023 22:40), Max: 36.4 (13 Feb 2023 12:42)  T(F): 97.4 (13 Feb 2023 22:40), Max: 97.6 (13 Feb 2023 12:42)  HR: 83 (13 Feb 2023 22:40) (68 - 83)  BP: 144/74 (13 Feb 2023 22:40) (144/74 - 149/65)  BP(mean): --  RR: 18 (13 Feb 2023 22:40) (18 - 18)  SpO2: 96% (13 Feb 2023 22:40) (96% - 98%)    Parameters below as of 13 Feb 2023 22:40  Patient On (Oxygen Delivery Method): room air     (14 Feb 2023 01:33)      Electrophysiology:  77y Male cantonese speaking with h/o hypertension, hyperlipidemia, DM and BPH, admitted with with weakness, lethargy and urinary symptoms. On Head CT scan was found to have evidence of chronic infarcts. MRI revealed acute anterior right thalamus infarct.  EP consulted for long term cardiac monitoring  Pts son-in-law Luis E at bedside, wife and daughter at bedside:  denies h/o palpitations, dizziness, lightheadedness, prior syncope episodes    REVIEW OF SYSTEMS    [x ] A ten-point review of systems was otherwise negative except as noted.  [ ] Due to altered mental status/intubation, subjective information were not able to be obtained from the patient. History was obtained, to the extent possible, from review of the chart and collateral sources of information.      PAST MEDICAL & SURGICAL HISTORY:  HTN (hypertension)      Seasonal allergies      History of BPH      Diabetes      No significant past surgical history          Home Medications:  amLODIPine 5 mg oral tablet: 1 tab(s) orally once a day (14 Feb 2023 02:08)  aspirin 81 mg oral tablet: 1 tab(s) orally once a day (14 Feb 2023 02:08)  atorvastatin 20 mg oral tablet: 1 tab(s) orally once a day (14 Feb 2023 02:08)  carvedilol 6.25 mg oral tablet: 1 tab(s) orally 2 times a day (14 Feb 2023 02:08)  Jardiance 25 mg oral tablet: 1 tab(s) orally once a day (in the morning) (14 Feb 2023 02:08)  Nephplex Rx oral tablet: 1 tab(s) orally once a day (14 Feb 2023 02:08)  tamsulosin 0.4 mg oral capsule: 1 cap(s) orally once a day (14 Feb 2023 02:08)  valsartan 320 mg oral tablet: 1 tab(s) orally once a day (14 Feb 2023 02:08)      Allergies:  [This allergen will not trigger allergy alert] pollen (Unknown)  No Known Drug Allergies      FAMILY HISTORY:  No pertinent family history in first degree relatives        SOCIAL HISTORY:    CIGARETTES:  ALCOHOL:  MARIJUANA:  ILLICIT DRUGS:        PREVIOUS DIAGNOSTIC TESTING:      ECHO  FINDINGS:    STRESS  FINDINGS:    CATHETERIZATION  FINDINGS:    ELECTROPHYSIOLOGY STUDY  FINDINGS:    CAROTID ULTRASOUND:  FINDINGS    VENOUS DUPLEX SCAN:  FINDINGS:  < from: [facility] Duplex Lower Ext Vein Scan, Bilat (02.17.23 @ 21:51) >  IMPRESSION:  No evidence of deep venous thrombosis in either lower extremity.    < end of copied text >    CHEST CT PULMONARY ANGIO with IV Contrast:  FINDINGS:      MEDICATIONS  (STANDING):  amLODIPine   Tablet 5 milliGRAM(s) Oral daily  aspirin  chewable 81 milliGRAM(s) Oral daily  atorvastatin 80 milliGRAM(s) Oral at bedtime  carvedilol 6.25 milliGRAM(s) Oral every 12 hours  clopidogrel Tablet 75 milliGRAM(s) Oral daily  dextrose 5%. 1000 milliLiter(s) (50 mL/Hr) IV Continuous <Continuous>  dextrose 5%. 1000 milliLiter(s) (100 mL/Hr) IV Continuous <Continuous>  dextrose 50% Injectable 25 Gram(s) IV Push once  dextrose 50% Injectable 12.5 Gram(s) IV Push once  dextrose 50% Injectable 25 Gram(s) IV Push once  glucagon  Injectable 1 milliGRAM(s) IntraMuscular once  heparin   Injectable 5000 Unit(s) SubCutaneous every 8 hours  insulin glargine Injectable (LANTUS) 4 Unit(s) SubCutaneous at bedtime  insulin lispro (ADMELOG) corrective regimen sliding scale   SubCutaneous three times a day before meals  insulin lispro Injectable (ADMELOG) 3 Unit(s) SubCutaneous three times a day before meals  tamsulosin 0.4 milliGRAM(s) Oral at bedtime  valsartan 320 milliGRAM(s) Oral daily    MEDICATIONS  (PRN):  dextrose Oral Gel 15 Gram(s) Oral once PRN Blood Glucose LESS THAN 70 milliGRAM(s)/deciliter      Vital Signs Last 24 Hrs  T(C): 37.7 (18 Feb 2023 08:44), Max: 37.7 (18 Feb 2023 08:44)  T(F): 99.8 (18 Feb 2023 08:44), Max: 99.8 (18 Feb 2023 08:44)  HR: 87 (18 Feb 2023 08:44) (68 - 88)  BP: 179/80 (18 Feb 2023 08:44) (126/60 - 179/80)  BP(mean): --  RR: 18 (18 Feb 2023 05:31) (18 - 18)  SpO2: --        PHYSICAL EXAM:    GENERAL: In no apparent distress, well nourished, and hydrated.  HEAD:  Atraumatic, Normocephalic  EYES: EOMI, PERRLA, conjunctiva and sclera clear  NECK: Supple and normal thyroid.  No JVD or carotid bruit.  Carotid pulse is 2+ bilaterally.  HEART: Regular rate and rhythm; No murmurs, rubs, or gallops.  PULMONARY: Clear to auscultation and perfusion.  No rales, wheezing, or rhonchi bilaterally.  ABDOMEN: Soft, Nontender, Nondistended; Bowel sounds present  EXTREMITIES:  2+ Peripheral Pulses, No clubbing, cyanosis, or edema  NEUROLOGICAL: Grossly nonfocal    I&O's Detail    17 Feb 2023 07:01  -  18 Feb 2023 07:00  --------------------------------------------------------  IN:    sodium chloride 0.45%: 300 mL  Total IN: 300 mL    OUT:  Total OUT: 0 mL    Total NET: 300 mL        Daily     Daily     INTERPRETATION OF TELEMETRY:    ECG:        LABS:                        13.6   8.48  )-----------( 180      ( 17 Feb 2023 07:55 )             39.8     02-17    140  |  108  |  29<H>  ----------------------------<  109<H>  4.2   |  19  |  1.7<H>    Ca    8.8      17 Feb 2023 07:55  Mg     1.9     02-17    TPro  5.6<L>  /  Alb  3.2<L>  /  TBili  0.6  /  DBili  x   /  AST  33  /  ALT  28  /  AlkPhos  69  02-17            BNP            RADIOLOGY & ADDITIONAL STUDIES:    < from: MR Head No Cont (02.16.23 @ 08:15) >  IMPRESSION:  Acute infarct involving the anterior right thalamus without evidence of   hemorrhage.    Mild chronic microvascular type changes as well as a chronic lacunar   infarct involving the left caudate head..    < end of copied text >    < from: CT Head No Cont (02.13.23 @ 17:10) >  IMPRESSION:    A focal hypodensity is noted in the right basal ganglia consistent with a   chronic infarct.    No evidence of acute intracranial hemorrhage or acute territorial infarct.    No evidence of mass or midline shift.    < end of copied text >

## 2023-02-18 NOTE — PROGRESS NOTE ADULT - ASSESSMENT
77-year-old cantonese speaking male with past medical history of hypertension, hyperlipidemia, DM and BPH presenting for evaluation of weakness, dizziness, urinary frequency over the last 3 days. Family describe that their main concern is his lethargy that has been persistent and associated with some memory changes.    # Encephelopathy   #Acute CVA right  anterior thalamic   - MR Head No Cont (02.16.23 @ 08:15) >Acute infarct involving the anterior right thalamus without evidence of hemorrhage. Mild chronic microvascular type changes as well as a chronic lacunar infarct involving the left caudate head..  -  CT Head No Cont (02.13.23 @ 17:10) >A focal hypodensity is noted in the right basal ganglia consistent with a chronic infarct.  - EEG Awake or Drowsy (02.15.23 @ 16:30) >Normal  - LDL Cholesterol Calculated: Unable to calculate LDL, TG is >400 mg/dL (02.17.23 @ 07:55)  - c/w ASA, statin, plavix  - neuro eval: MRA H+N w/o SYEDA (poor GFR), TTE with contrast   - EP eval for  ILR  - Telemonitoring  - Neuro check q4  - monitor BP  - PT/OT eval    # Hypertensive urgency-  resolved  # Hypertension - incontrolled  - c/w  valsartan, coreg  - increase norvasc 10mg daily  - Monitor BP     # DM type 2  - A1C with Estimated Average Glucose Result: 6.3 % (02.14.23 @ 05:49)  - monitor FS  - c/w lantus, lispro    # CKD stage3  -  US Renal (02.15.23 @ 19:27) No hydronephrosis. Right ureteral jet is not identified on this examination, a nonspecific   finding. Enlarged prostate. Left renal cyst    # H/o BPH  - c/w  flomax    # DVT prophylaxis    # Full code    # Pending: MRA H+N w/o SYEDA,  TTE with contrast , EP eval for  ILR, monitor BP  # Disposition: transfer to telemetry

## 2023-02-18 NOTE — CONSULT NOTE ADULT - NS ATTEND AMEND GEN_ALL_CORE FT
complex patient  Cryptogenic stroke  recommend ILR   risks/benefits discussed with patient and family

## 2023-02-18 NOTE — PROGRESS NOTE ADULT - SUBJECTIVE AND OBJECTIVE BOX
JACQUELIN CAI 77y Male  MRN#: [id number]   Hospital Day: 5d    SUBJECTIVE  Patient is a 77y old Male who presents with a chief complaint of Lethargy (18 Feb 2023 11:13)  Currently admitted to medicine with the primary diagnosis of Encephalopathy      INTERVAL HPI AND OVERNIGHT EVENTS:  Patient was examined and seen at bedside. This morning he is resting comfortably in bed and reports no issues or overnight events.    OBJECTIVE  PAST MEDICAL & SURGICAL HISTORY  HTN (hypertension)    Seasonal allergies    History of BPH    Diabetes    No significant past surgical history      ALLERGIES:  [This allergen will not trigger allergy alert] pollen (Unknown)  No Known Drug Allergies    MEDICATIONS:  STANDING MEDICATIONS  amLODIPine   Tablet 5 milliGRAM(s) Oral once  aspirin  chewable 81 milliGRAM(s) Oral daily  atorvastatin 80 milliGRAM(s) Oral at bedtime  carvedilol 6.25 milliGRAM(s) Oral every 12 hours  clopidogrel Tablet 75 milliGRAM(s) Oral daily  dextrose 5%. 1000 milliLiter(s) IV Continuous <Continuous>  dextrose 5%. 1000 milliLiter(s) IV Continuous <Continuous>  dextrose 50% Injectable 25 Gram(s) IV Push once  dextrose 50% Injectable 12.5 Gram(s) IV Push once  dextrose 50% Injectable 25 Gram(s) IV Push once  glucagon  Injectable 1 milliGRAM(s) IntraMuscular once  heparin   Injectable 5000 Unit(s) SubCutaneous every 8 hours  insulin glargine Injectable (LANTUS) 4 Unit(s) SubCutaneous at bedtime  insulin lispro (ADMELOG) corrective regimen sliding scale   SubCutaneous three times a day before meals  insulin lispro Injectable (ADMELOG) 3 Unit(s) SubCutaneous three times a day before meals  tamsulosin 0.4 milliGRAM(s) Oral at bedtime  valsartan 320 milliGRAM(s) Oral daily    PRN MEDICATIONS  dextrose Oral Gel 15 Gram(s) Oral once PRN      VITAL SIGNS: Last 24 Hours  T(C): 37.7 (18 Feb 2023 08:44), Max: 37.7 (18 Feb 2023 08:44)  T(F): 99.8 (18 Feb 2023 08:44), Max: 99.8 (18 Feb 2023 08:44)  HR: 87 (18 Feb 2023 08:44) (82 - 88)  BP: 179/80 (18 Feb 2023 08:44) (151/67 - 179/80)  BP(mean): --  RR: 18 (18 Feb 2023 05:31) (18 - 18)  SpO2: --    LABS:                        13.6   8.48  )-----------( 180      ( 17 Feb 2023 07:55 )             39.8     02-17    140  |  108  |  29<H>  ----------------------------<  109<H>  4.2   |  19  |  1.7<H>    Ca    8.8      17 Feb 2023 07:55  Mg     1.9     02-17    TPro  5.6<L>  /  Alb  3.2<L>  /  TBili  0.6  /  DBili  x   /  AST  33  /  ALT  28  /  AlkPhos  69  02-17                  RADIOLOGY:      PHYSICAL EXAM:  CONSTITUTIONAL: No acute distress, AAOx3  HEAD: Atraumatic, normocephalic  EYES: EOM intact, PERRLA, conjunctiva and sclera clear  ENT: moist mucous membranes  PULMONARY: Clear to auscultation bilaterally  CARDIOVASCULAR: Regular rate and rhythm  GASTROINTESTINAL: Soft, non-tender, non-distended; bowel sounds present  MUSCULOSKELETAL: no edema  NEUROLOGY: non-focal  SKIN: warm and dry    ASSESSMENT and PLAN    77-year-old cantonese speaking male with past medical history of hypertension, hyperlipidemia, DM and BPH presenting for evaluation of weakness, dizziness, urinary frequency over the last 3 days. Family describe that their main concern is his lethargy that has been persistent and associated with some memory changes.    # Encephelopathy   #Acute CVA right  anterior thalamic   - MR HeadAcute infarct involving the anterior right thalamus without evidence of hemorrhage 2/16.  -  CT Head on 2/13 right basal ganglia consistent with a chronic infarct.  - EEG on 2/15 no epileptic activity   - LDL Cholesterol Calculated: Unable to calculate LDL, TG is >400 mg/dL (02.17.23 @ 07:55)  - c/w ASA, statin, plavix  - neuro eval: MRA H+N w/o SYEDA (poor GFR), TTE with contrast Pending 2/18  - EP consulted possible ILR on Monday/tuesday   - Telemonitoring  - Neuro check q4  - monitor BP  - PT/OT eval  - Pt complaining of RLL pain. Duplex (-) for DVT 2/18    # Hypertensive urgency  -  resolved    # Hypertension  - c/w  valsartan, coreg  - increase norvasc 10mg daily  - Monitor BP     # DM type 2  - A1C with Estimated Average Glucose Result: 6.3 % (02.14.23 @ 05:49)  - monitor FS  - c/w lantus, lispro    # CKD stage3  -  US Renal (02.15.23 @ 19:27) No hydronephrosis. Right ureteral jet is not identified on this examination, a nonspecific   finding. Enlarged prostate. Left renal cyst    # H/o BPH  - c/w  flomax    # DVT prophylaxis    # Full code

## 2023-02-18 NOTE — PROGRESS NOTE ADULT - SUBJECTIVE AND OBJECTIVE BOX
Patient is a 77y old  Male who presents with a chief complaint of Lethargy (16 Feb 2023 17:16)    Patient was seen and examined.  no new complaints    PAST MEDICAL & SURGICAL HISTORY:  HTN (hypertension)  Seasonal allergies  History of BPH  Diabetes    No significant past surgical history    Allergies  [This allergen will not trigger allergy alert] pollen (Unknown)  No Known Drug Allergies    MEDICATIONS  (STANDING):  amLODIPine   Tablet 5 milliGRAM(s) Oral daily  aspirin  chewable 81 milliGRAM(s) Oral daily  atorvastatin 80 milliGRAM(s) Oral at bedtime  carvedilol 6.25 milliGRAM(s) Oral every 12 hours  clopidogrel Tablet 75 milliGRAM(s) Oral daily  heparin   Injectable 5000 Unit(s) SubCutaneous every 8 hours  insulin glargine Injectable (LANTUS) 4 Unit(s) SubCutaneous at bedtime  insulin lispro (ADMELOG) corrective regimen sliding scale   SubCutaneous three times a day before meals  insulin lispro Injectable (ADMELOG) 3 Unit(s) SubCutaneous three times a day before meals  tamsulosin 0.4 milliGRAM(s) Oral at bedtime  valsartan 320 milliGRAM(s) Oral daily    MEDICATIONS  (PRN):  dextrose Oral Gel 15 Gram(s) Oral once PRN Blood Glucose LESS THAN 70 milliGRAM(s)/deciliter    O/E:  Awake, alert, not in distress.  HEENT: atraumatic, EOMI.  Chest: clear.  CVS: SIS2 +, no murmur.  P/A: Soft, BS+  CNS: awake, alert , orientedx2, chronic right eye ptosis Power RUE, /RLE, LLE 5/5, LUE _Chronic sec to trauma)4/5  Ext: no edema feet.  Skin: no rash, no ulcers.  All systems reviewed positive findings as above.                          13.6<L>  8.48  )-----------( 180      ( 17 Feb 2023 07:55 )             39.8<L>  02-17    140  |  108  |  29<H>  ----------------------------<  109<H>  4.2   |  19  |  1.7<H>    Ca    8.8      17 Feb 2023 07:55  Mg     1.9     02-17    TPro  5.6<L>  /  Alb  3.2<L>  /  TBili  0.6  /  DBili  x   /  AST  33  /  ALT  28  /  AlkPhos  69  02-17

## 2023-02-18 NOTE — CONSULT NOTE ADULT - ASSESSMENT
77y Male cantonese speaking with h/o hypertension, hyperlipidemia, DM and BPH, admitted with weakness, lethargy and urinary symptoms. On Head CT scan was found to have evidence of chronic infarcts. MRI revealed acute anterior right thalamus infarct.  EP consulted for long term cardiac monitoring    CVA  HTN  DM  BPH    Implantable loop recorder placement for long term cardiac monitoring was discussed with patient's family.   They want to think about it, will follow up  con't current care  TTE, with bubble study  please repeat EKG  Maintain electrolytes K>4.0 Mg >2.0

## 2023-02-19 LAB
ALBUMIN SERPL ELPH-MCNC: 2.9 G/DL — LOW (ref 3.5–5.2)
ALP SERPL-CCNC: 68 U/L — SIGNIFICANT CHANGE UP (ref 30–115)
ALT FLD-CCNC: 41 U/L — SIGNIFICANT CHANGE UP (ref 0–41)
ANION GAP SERPL CALC-SCNC: 16 MMOL/L — HIGH (ref 7–14)
AST SERPL-CCNC: 26 U/L — SIGNIFICANT CHANGE UP (ref 0–41)
BASOPHILS # BLD AUTO: 0.03 K/UL — SIGNIFICANT CHANGE UP (ref 0–0.2)
BASOPHILS NFR BLD AUTO: 0.3 % — SIGNIFICANT CHANGE UP (ref 0–1)
BILIRUB SERPL-MCNC: 0.9 MG/DL — SIGNIFICANT CHANGE UP (ref 0.2–1.2)
BUN SERPL-MCNC: 33 MG/DL — HIGH (ref 10–20)
CALCIUM SERPL-MCNC: 8.6 MG/DL — SIGNIFICANT CHANGE UP (ref 8.4–10.5)
CHLORIDE SERPL-SCNC: 109 MMOL/L — SIGNIFICANT CHANGE UP (ref 98–110)
CO2 SERPL-SCNC: 17 MMOL/L — SIGNIFICANT CHANGE UP (ref 17–32)
CREAT SERPL-MCNC: 2.1 MG/DL — HIGH (ref 0.7–1.5)
EGFR: 32 ML/MIN/1.73M2 — LOW
EOSINOPHIL # BLD AUTO: 0.01 K/UL — SIGNIFICANT CHANGE UP (ref 0–0.7)
EOSINOPHIL NFR BLD AUTO: 0.1 % — SIGNIFICANT CHANGE UP (ref 0–8)
GLUCOSE BLDC GLUCOMTR-MCNC: 133 MG/DL — HIGH (ref 70–99)
GLUCOSE BLDC GLUCOMTR-MCNC: 154 MG/DL — HIGH (ref 70–99)
GLUCOSE BLDC GLUCOMTR-MCNC: 167 MG/DL — HIGH (ref 70–99)
GLUCOSE BLDC GLUCOMTR-MCNC: 192 MG/DL — HIGH (ref 70–99)
GLUCOSE BLDC GLUCOMTR-MCNC: 220 MG/DL — HIGH (ref 70–99)
GLUCOSE SERPL-MCNC: 148 MG/DL — HIGH (ref 70–99)
HCT VFR BLD CALC: 39.7 % — LOW (ref 42–52)
HGB BLD-MCNC: 13 G/DL — LOW (ref 14–18)
IMM GRANULOCYTES NFR BLD AUTO: 0.8 % — HIGH (ref 0.1–0.3)
LYMPHOCYTES # BLD AUTO: 0.99 K/UL — LOW (ref 1.2–3.4)
LYMPHOCYTES # BLD AUTO: 8.8 % — LOW (ref 20.5–51.1)
MAGNESIUM SERPL-MCNC: 2 MG/DL — SIGNIFICANT CHANGE UP (ref 1.8–2.4)
MCHC RBC-ENTMCNC: 29.2 PG — SIGNIFICANT CHANGE UP (ref 27–31)
MCHC RBC-ENTMCNC: 32.7 G/DL — SIGNIFICANT CHANGE UP (ref 32–37)
MCV RBC AUTO: 89.2 FL — SIGNIFICANT CHANGE UP (ref 80–94)
MONOCYTES # BLD AUTO: 1.07 K/UL — HIGH (ref 0.1–0.6)
MONOCYTES NFR BLD AUTO: 9.6 % — HIGH (ref 1.7–9.3)
NEUTROPHILS # BLD AUTO: 9.01 K/UL — HIGH (ref 1.4–6.5)
NEUTROPHILS NFR BLD AUTO: 80.4 % — HIGH (ref 42.2–75.2)
NRBC # BLD: 0 /100 WBCS — SIGNIFICANT CHANGE UP (ref 0–0)
PLATELET # BLD AUTO: 228 K/UL — SIGNIFICANT CHANGE UP (ref 130–400)
POTASSIUM SERPL-MCNC: 4 MMOL/L — SIGNIFICANT CHANGE UP (ref 3.5–5)
POTASSIUM SERPL-SCNC: 4 MMOL/L — SIGNIFICANT CHANGE UP (ref 3.5–5)
PROT SERPL-MCNC: 5.8 G/DL — LOW (ref 6–8)
RBC # BLD: 4.45 M/UL — LOW (ref 4.7–6.1)
RBC # FLD: 14.5 % — SIGNIFICANT CHANGE UP (ref 11.5–14.5)
SODIUM SERPL-SCNC: 142 MMOL/L — SIGNIFICANT CHANGE UP (ref 135–146)
WBC # BLD: 11.2 K/UL — HIGH (ref 4.8–10.8)
WBC # FLD AUTO: 11.2 K/UL — HIGH (ref 4.8–10.8)

## 2023-02-19 PROCEDURE — 70544 MR ANGIOGRAPHY HEAD W/O DYE: CPT | Mod: 26

## 2023-02-19 PROCEDURE — 99233 SBSQ HOSP IP/OBS HIGH 50: CPT

## 2023-02-19 PROCEDURE — 70547 MR ANGIOGRAPHY NECK W/O DYE: CPT | Mod: 26

## 2023-02-19 RX ORDER — SODIUM BICARBONATE 1 MEQ/ML
650 SYRINGE (ML) INTRAVENOUS THREE TIMES A DAY
Refills: 0 | Status: DISCONTINUED | OUTPATIENT
Start: 2023-02-19 | End: 2023-02-25

## 2023-02-19 RX ADMIN — Medication 3 UNIT(S): at 11:37

## 2023-02-19 RX ADMIN — CARVEDILOL PHOSPHATE 6.25 MILLIGRAM(S): 80 CAPSULE, EXTENDED RELEASE ORAL at 06:02

## 2023-02-19 RX ADMIN — AMLODIPINE BESYLATE 10 MILLIGRAM(S): 2.5 TABLET ORAL at 06:02

## 2023-02-19 RX ADMIN — HEPARIN SODIUM 5000 UNIT(S): 5000 INJECTION INTRAVENOUS; SUBCUTANEOUS at 21:29

## 2023-02-19 RX ADMIN — CARVEDILOL PHOSPHATE 6.25 MILLIGRAM(S): 80 CAPSULE, EXTENDED RELEASE ORAL at 17:16

## 2023-02-19 RX ADMIN — HEPARIN SODIUM 5000 UNIT(S): 5000 INJECTION INTRAVENOUS; SUBCUTANEOUS at 13:53

## 2023-02-19 RX ADMIN — ATORVASTATIN CALCIUM 80 MILLIGRAM(S): 80 TABLET, FILM COATED ORAL at 21:27

## 2023-02-19 RX ADMIN — VALSARTAN 320 MILLIGRAM(S): 80 TABLET ORAL at 06:02

## 2023-02-19 RX ADMIN — Medication 650 MILLIGRAM(S): at 21:27

## 2023-02-19 RX ADMIN — Medication 3 UNIT(S): at 17:15

## 2023-02-19 RX ADMIN — INSULIN GLARGINE 4 UNIT(S): 100 INJECTION, SOLUTION SUBCUTANEOUS at 23:54

## 2023-02-19 RX ADMIN — Medication 81 MILLIGRAM(S): at 11:38

## 2023-02-19 RX ADMIN — HEPARIN SODIUM 5000 UNIT(S): 5000 INJECTION INTRAVENOUS; SUBCUTANEOUS at 06:02

## 2023-02-19 RX ADMIN — CLOPIDOGREL BISULFATE 75 MILLIGRAM(S): 75 TABLET, FILM COATED ORAL at 11:38

## 2023-02-19 RX ADMIN — Medication 4: at 17:16

## 2023-02-19 RX ADMIN — Medication 650 MILLIGRAM(S): at 13:54

## 2023-02-19 RX ADMIN — Medication 2: at 11:38

## 2023-02-19 RX ADMIN — Medication 3 UNIT(S): at 08:23

## 2023-02-19 RX ADMIN — Medication 2: at 08:24

## 2023-02-19 RX ADMIN — TAMSULOSIN HYDROCHLORIDE 0.4 MILLIGRAM(S): 0.4 CAPSULE ORAL at 21:27

## 2023-02-19 NOTE — PROGRESS NOTE ADULT - ASSESSMENT
77-year-old cantonese speaking male with past medical history of hypertension, hyperlipidemia, DM and BPH presenting for evaluation of weakness, dizziness, urinary frequency over the last 3 days. Family describe that their main concern is his lethargy that has been persistent and associated with some memory changes.    # Encephelopathy   #Acute CVA right  anterior thalamic   - MR Head No Cont (02.16.23 @ 08:15) >Acute infarct involving the anterior right thalamus without evidence of hemorrhage. Mild chronic microvascular type changes as well as a chronic lacunar infarct involving the left caudate head..  -  CT Head No Cont (02.13.23 @ 17:10) >A focal hypodensity is noted in the right basal ganglia consistent with a chronic infarct.  - EEG Awake or Drowsy (02.15.23 @ 16:30) >Normal  - LDL Cholesterol Calculated: Unable to calculate LDL, TG is >400 mg/dL (02.17.23 @ 07:55)  - c/w ASA, statin, plavix  - neuro eval: MRA H+N w/o SYEDA (poor GFR), TTE with contrast   - EP eval:  Implantable loop recorder placement for long term cardiac monitoring was discussed with patient's family. They want to think about it, will follow up  - Telemonitoring- no events  - Neuro check q4  - monitor BP  - PT/OT eval    # Hypertensive urgency-  resolved  # Hypertension  - c/w  valsartan, coreg, norvasc   - monitor BP    # DM type 2  - A1C with Estimated Average Glucose Result: 6.3 % (02.14.23 @ 05:49)  - monitor FS  - c/w lantus, lispro    # CKD stage3  # Metabolic acidosis  -  US Renal (02.15.23 @ 19:27) No hydronephrosis. Right ureteral jet is not identified on this examination, a nonspecific   finding. Enlarged prostate. Left renal cyst  - Start sodium Bicarb    # H/o BPH  - c/w  flomax    # DVT prophylaxis    # Full code    # Pending: MRA H+N w/o SYEDA,  TTE with contrast , monitor BP, EKG  # Disposition: transfer to telemetry

## 2023-02-19 NOTE — CHART NOTE - NSCHARTNOTEFT_GEN_A_CORE
Electrophysiology PA Note    spoke with family  agreeable for ILR  will plan upcoming week  no need for NPO  please check COVID PCR

## 2023-02-19 NOTE — PROGRESS NOTE ADULT - SUBJECTIVE AND OBJECTIVE BOX
Patient is a 77y old  Male who presents with a chief complaint of Lethargy (16 Feb 2023 17:16)    Patient was seen and examined.  no new complaints    PAST MEDICAL & SURGICAL HISTORY:  HTN (hypertension)  Seasonal allergies  History of BPH  Diabetes    No significant past surgical history    Allergies  [This allergen will not trigger allergy alert] pollen (Unknown)  No Known Drug Allergies    MEDICATIONS  (STANDING):  amLODIPine   Tablet 10 milliGRAM(s) Oral daily  aspirin  chewable 81 milliGRAM(s) Oral daily  atorvastatin 80 milliGRAM(s) Oral at bedtime  carvedilol 6.25 milliGRAM(s) Oral every 12 hours  clopidogrel Tablet 75 milliGRAM(s) Oral daily  glucagon  Injectable 1 milliGRAM(s) IntraMuscular once  heparin   Injectable 5000 Unit(s) SubCutaneous every 8 hours  insulin glargine Injectable (LANTUS) 4 Unit(s) SubCutaneous at bedtime  insulin lispro (ADMELOG) corrective regimen sliding scale   SubCutaneous three times a day before meals  insulin lispro Injectable (ADMELOG) 3 Unit(s) SubCutaneous three times a day before meals  tamsulosin 0.4 milliGRAM(s) Oral at bedtime  valsartan 320 milliGRAM(s) Oral daily    MEDICATIONS  (PRN):  acetaminophen     Tablet .. 650 milliGRAM(s) Oral every 6 hours PRN Temp greater or equal to 38C (100.4F), Mild Pain (1 - 3), Moderate Pain (4 - 6)  dextrose Oral Gel 15 Gram(s) Oral once PRN Blood Glucose LESS THAN 70 milliGRAM(s)/deciliter  oxycodone    5 mG/acetaminophen 325 mG 1 Tablet(s) Oral every 4 hours PRN Moderate Pain (4 - 6)    T(C): 36.8 (02-19-23 @ 05:34), Max: 37.6 (02-18-23 @ 18:19)  HR: 90 (02-19-23 @ 11:45) (87 - 101)  BP: 138/63 (02-19-23 @ 11:45) (127/59 - 178/75)  RR: 18 (02-18-23 @ 21:19) (18 - 18)  SpO2: --    O/E:  Awake, alert, not in distress.  HEENT: atraumatic, EOMI.  Chest: clear.  CVS: SIS2 +, no murmur.  P/A: Soft, BS+  CNS: awake, alert , orientedx2, chronic right eye ptosis Power RUE, /RLE, LLE 5/5, LUE _Chronic sec to trauma)4/5  Ext: no edema feet.  Skin: no rash, no ulcers.  All systems reviewed positive findings as above.                                 13.0<L>  11.20<H> )-----------( 228      ( 19 Feb 2023 07:16 )             39.7<L>  02-19    142  |  109  |  33<H>  ----------------------------<  148<H>  4.0   |  17  |  2.1<H>    Ca    8.6      19 Feb 2023 07:16  Mg     2.0     02-19    TPro  5.8<L>  /  Alb  2.9<L>  /  TBili  0.9  /  DBili  x   /  AST  26  /  ALT  41  /  AlkPhos  68  02-19

## 2023-02-20 LAB
ALBUMIN SERPL ELPH-MCNC: 2.6 G/DL — LOW (ref 3.5–5.2)
ALP SERPL-CCNC: 65 U/L — SIGNIFICANT CHANGE UP (ref 30–115)
ALT FLD-CCNC: 37 U/L — SIGNIFICANT CHANGE UP (ref 0–41)
ANION GAP SERPL CALC-SCNC: 13 MMOL/L — SIGNIFICANT CHANGE UP (ref 7–14)
AST SERPL-CCNC: 37 U/L — SIGNIFICANT CHANGE UP (ref 0–41)
BASOPHILS # BLD AUTO: 0.03 K/UL — SIGNIFICANT CHANGE UP (ref 0–0.2)
BASOPHILS NFR BLD AUTO: 0.3 % — SIGNIFICANT CHANGE UP (ref 0–1)
BILIRUB SERPL-MCNC: 0.9 MG/DL — SIGNIFICANT CHANGE UP (ref 0.2–1.2)
BUN SERPL-MCNC: 40 MG/DL — HIGH (ref 10–20)
CALCIUM SERPL-MCNC: 8.2 MG/DL — LOW (ref 8.4–10.5)
CHLORIDE SERPL-SCNC: 108 MMOL/L — SIGNIFICANT CHANGE UP (ref 98–110)
CO2 SERPL-SCNC: 20 MMOL/L — SIGNIFICANT CHANGE UP (ref 17–32)
CREAT SERPL-MCNC: 2.8 MG/DL — HIGH (ref 0.7–1.5)
EGFR: 23 ML/MIN/1.73M2 — LOW
EOSINOPHIL # BLD AUTO: 0.02 K/UL — SIGNIFICANT CHANGE UP (ref 0–0.7)
EOSINOPHIL NFR BLD AUTO: 0.2 % — SIGNIFICANT CHANGE UP (ref 0–8)
GLUCOSE BLDC GLUCOMTR-MCNC: 148 MG/DL — HIGH (ref 70–99)
GLUCOSE BLDC GLUCOMTR-MCNC: 151 MG/DL — HIGH (ref 70–99)
GLUCOSE BLDC GLUCOMTR-MCNC: 192 MG/DL — HIGH (ref 70–99)
GLUCOSE BLDC GLUCOMTR-MCNC: 196 MG/DL — HIGH (ref 70–99)
GLUCOSE SERPL-MCNC: 156 MG/DL — HIGH (ref 70–99)
HCT VFR BLD CALC: 33 % — LOW (ref 42–52)
HCT VFR BLD CALC: 34.9 % — LOW (ref 42–52)
HGB BLD-MCNC: 11.1 G/DL — LOW (ref 14–18)
HGB BLD-MCNC: 11.6 G/DL — LOW (ref 14–18)
IMM GRANULOCYTES NFR BLD AUTO: 0.6 % — HIGH (ref 0.1–0.3)
LYMPHOCYTES # BLD AUTO: 1.12 K/UL — LOW (ref 1.2–3.4)
LYMPHOCYTES # BLD AUTO: 11.5 % — LOW (ref 20.5–51.1)
MAGNESIUM SERPL-MCNC: 2.2 MG/DL — SIGNIFICANT CHANGE UP (ref 1.8–2.4)
MCHC RBC-ENTMCNC: 29.4 PG — SIGNIFICANT CHANGE UP (ref 27–31)
MCHC RBC-ENTMCNC: 29.4 PG — SIGNIFICANT CHANGE UP (ref 27–31)
MCHC RBC-ENTMCNC: 33.2 G/DL — SIGNIFICANT CHANGE UP (ref 32–37)
MCHC RBC-ENTMCNC: 33.6 G/DL — SIGNIFICANT CHANGE UP (ref 32–37)
MCV RBC AUTO: 87.5 FL — SIGNIFICANT CHANGE UP (ref 80–94)
MCV RBC AUTO: 88.6 FL — SIGNIFICANT CHANGE UP (ref 80–94)
MONOCYTES # BLD AUTO: 0.89 K/UL — HIGH (ref 0.1–0.6)
MONOCYTES NFR BLD AUTO: 9.1 % — SIGNIFICANT CHANGE UP (ref 1.7–9.3)
NEUTROPHILS # BLD AUTO: 7.63 K/UL — HIGH (ref 1.4–6.5)
NEUTROPHILS NFR BLD AUTO: 78.3 % — HIGH (ref 42.2–75.2)
NRBC # BLD: 0 /100 WBCS — SIGNIFICANT CHANGE UP (ref 0–0)
NRBC # BLD: 0 /100 WBCS — SIGNIFICANT CHANGE UP (ref 0–0)
PLATELET # BLD AUTO: 224 K/UL — SIGNIFICANT CHANGE UP (ref 130–400)
PLATELET # BLD AUTO: 236 K/UL — SIGNIFICANT CHANGE UP (ref 130–400)
POTASSIUM SERPL-MCNC: 3.8 MMOL/L — SIGNIFICANT CHANGE UP (ref 3.5–5)
POTASSIUM SERPL-SCNC: 3.8 MMOL/L — SIGNIFICANT CHANGE UP (ref 3.5–5)
PROT SERPL-MCNC: 5.2 G/DL — LOW (ref 6–8)
RBC # BLD: 3.77 M/UL — LOW (ref 4.7–6.1)
RBC # BLD: 3.94 M/UL — LOW (ref 4.7–6.1)
RBC # FLD: 14.3 % — SIGNIFICANT CHANGE UP (ref 11.5–14.5)
RBC # FLD: 14.4 % — SIGNIFICANT CHANGE UP (ref 11.5–14.5)
SARS-COV-2 RNA SPEC QL NAA+PROBE: SIGNIFICANT CHANGE UP
SARS-COV-2 RNA SPEC QL NAA+PROBE: SIGNIFICANT CHANGE UP
SODIUM SERPL-SCNC: 141 MMOL/L — SIGNIFICANT CHANGE UP (ref 135–146)
WBC # BLD: 9.09 K/UL — SIGNIFICANT CHANGE UP (ref 4.8–10.8)
WBC # BLD: 9.75 K/UL — SIGNIFICANT CHANGE UP (ref 4.8–10.8)
WBC # FLD AUTO: 9.09 K/UL — SIGNIFICANT CHANGE UP (ref 4.8–10.8)
WBC # FLD AUTO: 9.75 K/UL — SIGNIFICANT CHANGE UP (ref 4.8–10.8)

## 2023-02-20 PROCEDURE — 93010 ELECTROCARDIOGRAM REPORT: CPT

## 2023-02-20 PROCEDURE — 99232 SBSQ HOSP IP/OBS MODERATE 35: CPT

## 2023-02-20 PROCEDURE — 71045 X-RAY EXAM CHEST 1 VIEW: CPT | Mod: 26

## 2023-02-20 RX ORDER — SODIUM CHLORIDE 9 MG/ML
500 INJECTION, SOLUTION INTRAVENOUS ONCE
Refills: 0 | Status: COMPLETED | OUTPATIENT
Start: 2023-02-20 | End: 2023-02-20

## 2023-02-20 RX ORDER — POTASSIUM CHLORIDE 20 MEQ
20 PACKET (EA) ORAL ONCE
Refills: 0 | Status: COMPLETED | OUTPATIENT
Start: 2023-02-20 | End: 2023-02-20

## 2023-02-20 RX ADMIN — Medication 3 UNIT(S): at 08:12

## 2023-02-20 RX ADMIN — Medication 650 MILLIGRAM(S): at 15:05

## 2023-02-20 RX ADMIN — Medication 20 MILLIEQUIVALENT(S): at 09:07

## 2023-02-20 RX ADMIN — ATORVASTATIN CALCIUM 80 MILLIGRAM(S): 80 TABLET, FILM COATED ORAL at 22:16

## 2023-02-20 RX ADMIN — CARVEDILOL PHOSPHATE 6.25 MILLIGRAM(S): 80 CAPSULE, EXTENDED RELEASE ORAL at 05:58

## 2023-02-20 RX ADMIN — Medication 2: at 17:32

## 2023-02-20 RX ADMIN — CARVEDILOL PHOSPHATE 6.25 MILLIGRAM(S): 80 CAPSULE, EXTENDED RELEASE ORAL at 17:33

## 2023-02-20 RX ADMIN — CLOPIDOGREL BISULFATE 75 MILLIGRAM(S): 75 TABLET, FILM COATED ORAL at 11:27

## 2023-02-20 RX ADMIN — HEPARIN SODIUM 5000 UNIT(S): 5000 INJECTION INTRAVENOUS; SUBCUTANEOUS at 22:16

## 2023-02-20 RX ADMIN — Medication 2: at 11:26

## 2023-02-20 RX ADMIN — Medication 81 MILLIGRAM(S): at 11:27

## 2023-02-20 RX ADMIN — TAMSULOSIN HYDROCHLORIDE 0.4 MILLIGRAM(S): 0.4 CAPSULE ORAL at 22:16

## 2023-02-20 RX ADMIN — VALSARTAN 320 MILLIGRAM(S): 80 TABLET ORAL at 05:58

## 2023-02-20 RX ADMIN — Medication 650 MILLIGRAM(S): at 22:16

## 2023-02-20 RX ADMIN — Medication 3 UNIT(S): at 11:26

## 2023-02-20 RX ADMIN — HEPARIN SODIUM 5000 UNIT(S): 5000 INJECTION INTRAVENOUS; SUBCUTANEOUS at 05:58

## 2023-02-20 RX ADMIN — HEPARIN SODIUM 5000 UNIT(S): 5000 INJECTION INTRAVENOUS; SUBCUTANEOUS at 15:05

## 2023-02-20 RX ADMIN — AMLODIPINE BESYLATE 10 MILLIGRAM(S): 2.5 TABLET ORAL at 05:59

## 2023-02-20 RX ADMIN — Medication 650 MILLIGRAM(S): at 05:58

## 2023-02-20 RX ADMIN — INSULIN GLARGINE 4 UNIT(S): 100 INJECTION, SOLUTION SUBCUTANEOUS at 22:16

## 2023-02-20 RX ADMIN — Medication 3 UNIT(S): at 17:32

## 2023-02-20 NOTE — CONSULT NOTE ADULT - ASSESSMENT
IMPRESSION: Rehab of R thalamic stroke / HTN, HLD, DM, BPH    PRECAUTIONS: [   ] Cardiac  [   ] Respiratory  [   ] Seizures [   ] Contact Isolation  [   ] Droplet Isolation  [   ] Other    Weight Bearing Status:     RECOMMENDATION:    Out of Bed to Chair     DVT/Decubiti Prophylaxis    REHAB PLAN:     [  x  ] Bedside P/T 3-5 times a week   [  x  ]   Bedside O/T  2-3 times a week             [    ] Speech Therapy               [    ]  No Rehab Therapy Indicated   Conditioning/ROM                                    ADL  Bed Mobility                                               Conditioning/ROM  Transfers                                                     Bed Mobility  Sitting /Standing Balance                         Transfers                                        Gait Training                                               Sitting/Standing Balance  Stair Training [   ]Applicable                    Home equipment Eval                                                                        Splinting  [   ] Only      GOALS:   ADL   [  x  ]   Independent                    Transfers  [  x  ] Independent                          Ambulation  [ x   ] Independent     [   x  ] With device                            [    ]  CG                                                         [    ]  CG                                                                  [    ] CG                            [    ] Min A                                                   [    ] Min A                                                              [    ] Min  A          DISCHARGE PLAN:   [    ]  Good candidate for Intensive Rehabilitation/Hospital based                                             Will tolerate 3hrs Intensive Rehab Daily                                       [     ]  Short Term Rehab in Skilled Nursing Facility                                       [   x  ]  Home with Outpatient or  services                                         [     ]  Possible Candidate for Intensive Hospital based Rehab

## 2023-02-20 NOTE — PROGRESS NOTE ADULT - ASSESSMENT
77-year-old cantonese speaking male with past medical history of hypertension, hyperlipidemia, DM and BPH presenting for evaluation of weakness, dizziness, urinary frequency over the last 3 days. Family describe that their main concern is his lethargy that has been persistent and associated with some memory changes.    # Encephelopathy   #Acute CVA right  anterior thalamic   - MR HeadAcute infarct involving the anterior right thalamus without evidence of hemorrhage 2/16.  -  CT Head on 2/13 right basal ganglia consistent with a chronic infarct.  - EEG on 2/15 no epileptic activity   - LDL Cholesterol Calculated: Unable to calculate LDL, TG is >400 mg/dL (02.17.23 @ 07:55)  - c/w ASA, statin, plavix  - neuro eval: MRA H+N w/o SYEDA (poor GFR), TTE with contrast Pending 2/18  - EP consulted possible ILR on Monday  - Telemonitoring  - Neuro check q4  - monitor BP  - PT/OT eval    #Right leg pain  - pt with hx of gout  - uric acid ordered  - family would like to give pt a "chinese herb" that has worked in the past, family to bring in herb for review by medical team   - Physiatry recommends home PT     # Hypertensive urgency  -  resolved    # Hypertension  - c/w  coreg  - increase norvasc 10mg daily  - holding valsartan due to rising cr    # DM type 2  - A1C with Estimated Average Glucose Result: 6.3 % (02.14.23 @ 05:49)  - monitor FS  - c/w lantus, lispro    # CKD stage3  -  US Renal (02.15.23 @ 19:27) No hydronephrosis. Right ureteral jet is not identified on this examination, a nonspecific   finding. Enlarged prostate. Left renal cyst    # H/o BPH  - c/w  flomax    # DVT prophylaxis    # Full code  Pending: MRI, Loop placement, uric acid level, review of home Gout medication to consider starting patient back on  77-year-old cantonese speaking male with past medical history of hypertension, hyperlipidemia, DM and BPH presenting for evaluation of weakness, dizziness, urinary frequency over the last 3 days. Family describe that their main concern is his lethargy that has been persistent and associated with some memory changes.    # Encephelopathy   #Acute CVA right  anterior thalamic   - MR HeadAcute infarct involving the anterior right thalamus without evidence of hemorrhage 2/16.  -  CT Head on 2/13 right basal ganglia consistent with a chronic infarct.  - MRA Head and Neck - no carotid artery stenosis, MRA brain also showed no pathology   - EEG on 2/15 no epileptic activity   - LDL Cholesterol Calculated: Unable to calculate LDL, TG is >400 mg/dL (02.17.23 @ 07:55)  - c/w ASA, statin, plavix  - neuro eval: MRA H+N w/o SYEDA (poor GFR), TTE with contrast Pending 2/18  - EP consulted possible ILR on Monday  - Telemonitoring  - Neuro check q4  - monitor BP  - PT/OT eval    #Right leg pain  - pt with hx of gout  - uric acid ordered  - family would like to give pt a "chinese herb" that has worked in the past, family to bring in herb for review by medical team   - Physiatry recommends home PT     # Hypertensive urgency  -  resolved    # Hypertension  - c/w  coreg  - increase norvasc 10mg daily  - holding valsartan due to rising cr    # DM type 2  - A1C with Estimated Average Glucose Result: 6.3 % (02.14.23 @ 05:49)  - monitor FS  - c/w lantus, lispro    #SAGRARIO on CKD stage3  -  US Renal (02.15.23 @ 19:27) No hydronephrosis. Right ureteral jet is not identified on this examination, a nonspecific   finding. Enlarged prostate. Left renal cyst  - 2/20 held valsartan 320mg qd due to sagrario  - monitor bp and creatinine closely     # H/o BPH  - c/w  flomax    # DVT prophylaxis    # Full code  Pending: bp, creatinine, Loop placement, uric acid level, review of home Gout medication to consider starting patient back on

## 2023-02-20 NOTE — PROGRESS NOTE ADULT - ASSESSMENT
77-year-old cantonese speaking male with past medical history of hypertension, hyperlipidemia, DM and BPH presenting for evaluation of weakness, dizziness, urinary frequency over the last 3 days. Family describe that their main concern is his lethargy that has been persistent and associated with some memory changes.    #fever, tachy 2/20/23  -f/u covid, ua / urine studies, ecg, blood culture    # Encephelopathy   #Acute CVA right  anterior thalamic   - MR HeadAcute infarct involving the anterior right thalamus without evidence of hemorrhage 2/16.  -  CT Head on 2/13 right basal ganglia consistent with a chronic infarct.  - EEG on 2/15 no epileptic activity   - LDL Cholesterol Calculated: Unable to calculate LDL, TG is >400 mg/dL (02.17.23 @ 07:55)  - c/w ASA, statin, plavix  - neuro eval: MRA H+N w/o SYEDA (poor GFR), TTE with contrast Pending 2/18  - EP consulted possible ILR on Monday/tuesday   - Telemonitoring  - Neuro check q4  - monitor BP  - PT/OT eval  - Pt complaining of RLL pain. Duplex (-) for DVT 2/18  - placed physiatry consult     # Hypertensive urgency  -  resolved    # Hypertension  - c/w  coreg  - increase norvasc 10mg daily  - holding valsartan due to rising cr    # DM type 2  - A1C with Estimated Average Glucose Result: 6.3 % (02.14.23 @ 05:49)  - monitor FS  - c/w lantus, lispro    # CKD stage3  -  US Renal (02.15.23 @ 19:27) No hydronephrosis. Right ureteral jet is not identified on this examination, a nonspecific   finding. Enlarged prostate. Left renal cyst    # H/o BPH  - c/w  flomax    # DVT prophylaxis    # Full code  #plan to dc tele kvng 77-year-old cantonese speaking male with past medical history of hypertension, hyperlipidemia, DM and BPH presenting for evaluation of weakness, dizziness, urinary frequency over the last 3 days. Family describe that their main concern is his lethargy that has been persistent and associated with some memory changes.    #fever, tachy 2/20/23  -f/u covid, ua / urine studies, ecg, blood culture, covid   -repeat heart rate was 82 after tylenol     # Encephelopathy   #Acute CVA right  anterior thalamic   - MR HeadAcute infarct involving the anterior right thalamus without evidence of hemorrhage 2/16.  -  CT Head on 2/13 right basal ganglia consistent with a chronic infarct.  - EEG on 2/15 no epileptic activity   - LDL Cholesterol Calculated: Unable to calculate LDL, TG is >400 mg/dL (02.17.23 @ 07:55)  - c/w ASA, statin, plavix  - neuro eval: MRA H+N w/o SYEDA (poor GFR), TTE with contrast Pending 2/18  - EP consulted possible ILR on Monday/tuesday   - Telemonitoring  - Neuro check q4  - monitor BP  - PT/OT eval  - Pt complaining of RLL pain. Duplex (-) for DVT 2/18  - placed physiatry consult     # Hypertensive urgency  -  resolved    # Hypertension  - c/w  coreg  - increase norvasc 10mg daily  - holding valsartan due to rising cr    # DM type 2  - A1C with Estimated Average Glucose Result: 6.3 % (02.14.23 @ 05:49)  - monitor FS  - c/w lantus, lispro    # CKD stage3  -  US Renal (02.15.23 @ 19:27) No hydronephrosis. Right ureteral jet is not identified on this examination, a nonspecific   finding. Enlarged prostate. Left renal cyst    # H/o BPH  - c/w  flomax    # DVT prophylaxis    # Full code  #plan to dc tele kvng

## 2023-02-20 NOTE — CHART NOTE - NSCHARTNOTEFT_GEN_A_CORE
Electrophysiology PA Note     foe loop recorder placement  ADDON  no need for NPO    COVID-19 PCR: Maria Victoria (19 Feb 2023 18:20)

## 2023-02-20 NOTE — PROGRESS NOTE ADULT - SUBJECTIVE AND OBJECTIVE BOX
Patient is a 77y old  Male who presents with a chief complaint of Lethargy (20 Feb 2023 13:19)      Patient seen and examined at bedside.  Patient denies any chest pain or shortness of breath     ALLERGIES:  [This allergen will not trigger allergy alert] pollen (Unknown)  No Known Drug Allergies    MEDICATIONS:  acetaminophen     Tablet .. 650 milliGRAM(s) Oral every 6 hours PRN  amLODIPine   Tablet 10 milliGRAM(s) Oral daily  aspirin  chewable 81 milliGRAM(s) Oral daily  atorvastatin 80 milliGRAM(s) Oral at bedtime  carvedilol 6.25 milliGRAM(s) Oral every 12 hours  clopidogrel Tablet 75 milliGRAM(s) Oral daily  dextrose 5%. 1000 milliLiter(s) IV Continuous <Continuous>  dextrose 5%. 1000 milliLiter(s) IV Continuous <Continuous>  dextrose 50% Injectable 25 Gram(s) IV Push once  dextrose 50% Injectable 12.5 Gram(s) IV Push once  dextrose 50% Injectable 25 Gram(s) IV Push once  dextrose Oral Gel 15 Gram(s) Oral once PRN  glucagon  Injectable 1 milliGRAM(s) IntraMuscular once  heparin   Injectable 5000 Unit(s) SubCutaneous every 8 hours  insulin glargine Injectable (LANTUS) 4 Unit(s) SubCutaneous at bedtime  insulin lispro (ADMELOG) corrective regimen sliding scale   SubCutaneous three times a day before meals  insulin lispro Injectable (ADMELOG) 3 Unit(s) SubCutaneous three times a day before meals  oxycodone    5 mG/acetaminophen 325 mG 1 Tablet(s) Oral every 4 hours PRN  sodium bicarbonate 650 milliGRAM(s) Oral three times a day  tamsulosin 0.4 milliGRAM(s) Oral at bedtime    Vital Signs Last 24 Hrs  T(F): 98.5 (20 Feb 2023 12:46), Max: 98.5 (20 Feb 2023 12:46)  HR: 75 (20 Feb 2023 12:46) (75 - 87)  BP: 118/56 (20 Feb 2023 12:46) (118/56 - 123/56)  RR: 18 (20 Feb 2023 12:46) (18 - 18)  SpO2: --  I&O's Summary    19 Feb 2023 07:01  -  20 Feb 2023 07:00  --------------------------------------------------------  IN: 481 mL / OUT: 3 mL / NET: 478 mL        PHYSICAL EXAM:  General: NAD, Alert  ENT: MMM  Neck: Supple, No JVD  Lungs: Clear to auscultation bilaterally, no crackles   Cardio: RRR, S1/S2, No murmurs  Abdomen: Soft, Nontender, Nondistended; Bowel sounds present  Extremities: No cyanosis, No edema    LABS:                        11.1   9.09  )-----------( 236      ( 20 Feb 2023 11:45 )             33.0     02-20    141  |  108  |  40  ----------------------------<  156  3.8   |  20  |  2.8    Ca    8.2      20 Feb 2023 06:13  Mg     2.2     02-20    TPro  5.2  /  Alb  2.6  /  TBili  0.9  /  DBili  x   /  AST  37  /  ALT  37  /  AlkPhos  65  02-20 02-17 Chol 245 mg/dL LDL -- HDL 49 mg/dL Trig 511 mg/dL              POCT Blood Glucose.: 196 mg/dL (20 Feb 2023 16:49)  POCT Blood Glucose.: 192 mg/dL (20 Feb 2023 11:23)  POCT Blood Glucose.: 148 mg/dL (20 Feb 2023 07:17)  POCT Blood Glucose.: 133 mg/dL (19 Feb 2023 23:51)  POCT Blood Glucose.: 154 mg/dL (19 Feb 2023 21:53)          COVID-19 PCR: NotDetec (02-19-23 @ 18:20)  COVID-19 PCR: NotDetec (02-13-23 @ 15:45)      RADIOLOGY & ADDITIONAL TESTS:    Care Discussed with Consultants/Other Providers:  Patient is a 77y old  Male who presents with a chief complaint of Lethargy (20 Feb 2023 13:19)      Patient seen and examined at bedside.  Patient denies any chest pain or shortness of breath     ALLERGIES:  [This allergen will not trigger allergy alert] pollen (Unknown)  No Known Drug Allergies    MEDICATIONS:  acetaminophen     Tablet .. 650 milliGRAM(s) Oral every 6 hours PRN  amLODIPine   Tablet 10 milliGRAM(s) Oral daily  aspirin  chewable 81 milliGRAM(s) Oral daily  atorvastatin 80 milliGRAM(s) Oral at bedtime  carvedilol 6.25 milliGRAM(s) Oral every 12 hours  clopidogrel Tablet 75 milliGRAM(s) Oral daily  dextrose 5%. 1000 milliLiter(s) IV Continuous <Continuous>  dextrose 5%. 1000 milliLiter(s) IV Continuous <Continuous>  dextrose 50% Injectable 25 Gram(s) IV Push once  dextrose 50% Injectable 12.5 Gram(s) IV Push once  dextrose 50% Injectable 25 Gram(s) IV Push once  dextrose Oral Gel 15 Gram(s) Oral once PRN  glucagon  Injectable 1 milliGRAM(s) IntraMuscular once  heparin   Injectable 5000 Unit(s) SubCutaneous every 8 hours  insulin glargine Injectable (LANTUS) 4 Unit(s) SubCutaneous at bedtime  insulin lispro (ADMELOG) corrective regimen sliding scale   SubCutaneous three times a day before meals  insulin lispro Injectable (ADMELOG) 3 Unit(s) SubCutaneous three times a day before meals  oxycodone    5 mG/acetaminophen 325 mG 1 Tablet(s) Oral every 4 hours PRN  sodium bicarbonate 650 milliGRAM(s) Oral three times a day  tamsulosin 0.4 milliGRAM(s) Oral at bedtime    Vital Signs Last 24 Hrs  T(F): 98.5 (20 Feb 2023 12:46), Max: 98.5 (20 Feb 2023 12:46)  HR: 75 (20 Feb 2023 12:46) (75 - 87)  BP: 118/56 (20 Feb 2023 12:46) (118/56 - 123/56)  RR: 18 (20 Feb 2023 12:46) (18 - 18)  SpO2: --  I&O's Summary    19 Feb 2023 07:01  -  20 Feb 2023 07:00  --------------------------------------------------------  IN: 481 mL / OUT: 3 mL / NET: 478 mL        PHYSICAL EXAM:  General: NAD, Alert  ENT: MMM  Neck: Supple, No JVD  Lungs: Clear to auscultation bilaterally, no crackles   Cardio: RRR, S1/S2, No murmurs  Abdomen: Soft, Nontender, Nondistended; Bowel sounds present  Extremities: No cyanosis, No edema    LABS:                        11.1   9.09  )-----------( 236      ( 20 Feb 2023 11:45 )             33.0     02-20    141  |  108  |  40  ----------------------------<  156  3.8   |  20  |  2.8    Ca    8.2      20 Feb 2023 06:13  Mg     2.2     02-20    TPro  5.2  /  Alb  2.6  /  TBili  0.9  /  DBili  x   /  AST  37  /  ALT  37  /  AlkPhos  65  02-20 02-17 Chol 245 mg/dL LDL -- HDL 49 mg/dL Trig 511 mg/dL              POCT Blood Glucose.: 196 mg/dL (20 Feb 2023 16:49)  POCT Blood Glucose.: 192 mg/dL (20 Feb 2023 11:23)  POCT Blood Glucose.: 148 mg/dL (20 Feb 2023 07:17)  POCT Blood Glucose.: 133 mg/dL (19 Feb 2023 23:51)  POCT Blood Glucose.: 154 mg/dL (19 Feb 2023 21:53)          COVID-19 PCR: NotDetec (02-19-23 @ 18:20)  COVID-19 PCR: NotDetec (02-13-23 @ 15:45)      RADIOLOGY & ADDITIONAL TESTS:  < from: MR Angio Neck No Cont (02.19.23 @ 19:47) >    MRA neck:  There is good flow-related signal within the bilateral common and   internal carotid arteries. The vertebral arteries are well visualized.   There is no dissection or vascular aneurysm.      IMPRESSION:  MRA brain: No stenosis or aneurysm.    MRA neck: No stenosis. No dissection.    < end of copied text >  < from: MR Angio Head No Cont (02.19.23 @ 19:36) >  MRA neck:  There is good flow-related signal within the bilateral common and   internal carotid arteries. The vertebral arteries are well visualized.   There is no dissection or vascular aneurysm.      IMPRESSION:  MRA brain: No stenosis or aneurysm.    MRA neck: No stenosis. No dissection.    < end of copied text >  < from: MR Angio Head No Cont (02.19.23 @ 19:36) >    MRA brain:  There is good flow-related signal within the bilateral anterior, middle,   and posterior cerebral arteries, and within the basilar artery.  The   intracranial portions of the vertebral arteries and internal carotid   arteries are well visualized.    < end of copied text >    Care Discussed with Consultants/Other Providers:

## 2023-02-20 NOTE — CONSULT NOTE ADULT - SUBJECTIVE AND OBJECTIVE BOX
HPI:  77-year-old cantonese speaking male with past medical history of hypertension, hyperlipidemia, DM and BPH presenting for evaluation of weakness, dizziness, urinary frequency over the last 3 days. Family describe that their main concern is his lethargy that has been persistent and associated with some memory changes. No LOC, weakness,  No fevers, chills, dysuria, nausea, vomiting, abdominal pain, or flank pain.  No chest pain or shortness of breath.  No headache or paresthesias    Vital Signs Last 24 Hrs  T(C): 36.3 (13 Feb 2023 22:40), Max: 36.4 (13 Feb 2023 12:42)  T(F): 97.4 (13 Feb 2023 22:40), Max: 97.6 (13 Feb 2023 12:42)  HR: 83 (13 Feb 2023 22:40) (68 - 83)  BP: 144/74 (13 Feb 2023 22:40) (144/74 - 149/65)  BP(mean): --  RR: 18 (13 Feb 2023 22:40) (18 - 18)  SpO2: 96% (13 Feb 2023 22:40) (96% - 98%)    Parameters below as of 13 Feb 2023 22:40  Patient On (Oxygen Delivery Method): room air    CT Head No Cont:  (13 Feb 2023 17:10)    < from: CT Head No Cont (02.13.23 @ 17:10) >  IMPRESSION:     A focal hypodensity is noted in the right basal ganglia consistent with a   chronic infarct.    No evidence of acute intracranial hemorrhage or acute territorial infarct.    No evidence of mass or midline shift.    < end of copied text >      < from: MR Head No Cont (02.16.23 @ 08:15) >  IMPRESSION:  Acute infarct involving the anterior right thalamus without evidence of   hemorrhage.    Mild chronic microvascular type changes as well as a chronic lacunar   infarct involving the left caudate head..    < end of copied text >    Medical charts / labs / imaging studies / PT notes reviewed       PAST MEDICAL & SURGICAL HISTORY:  HTN (hypertension)      Seasonal allergies      History of BPH      Diabetes      No significant past surgical history          Hospital Course:    TODAY'S SUBJECTIVE & REVIEW OF SYMPTOMS:     Constitutional WNL   Cardio WNL   Resp WNL   GI WNL  Heme WNL  Endo WNL  Skin WNL  MSK WNL  Neuro Weakness / lethargy   Cognitive WNL  Psych WNL      MEDICATIONS  (STANDING):  amLODIPine   Tablet 10 milliGRAM(s) Oral daily  aspirin  chewable 81 milliGRAM(s) Oral daily  atorvastatin 80 milliGRAM(s) Oral at bedtime  carvedilol 6.25 milliGRAM(s) Oral every 12 hours  clopidogrel Tablet 75 milliGRAM(s) Oral daily  dextrose 5%. 1000 milliLiter(s) (100 mL/Hr) IV Continuous <Continuous>  dextrose 5%. 1000 milliLiter(s) (50 mL/Hr) IV Continuous <Continuous>  dextrose 50% Injectable 25 Gram(s) IV Push once  dextrose 50% Injectable 12.5 Gram(s) IV Push once  dextrose 50% Injectable 25 Gram(s) IV Push once  glucagon  Injectable 1 milliGRAM(s) IntraMuscular once  heparin   Injectable 5000 Unit(s) SubCutaneous every 8 hours  insulin glargine Injectable (LANTUS) 4 Unit(s) SubCutaneous at bedtime  insulin lispro (ADMELOG) corrective regimen sliding scale   SubCutaneous three times a day before meals  insulin lispro Injectable (ADMELOG) 3 Unit(s) SubCutaneous three times a day before meals  sodium bicarbonate 650 milliGRAM(s) Oral three times a day  tamsulosin 0.4 milliGRAM(s) Oral at bedtime    MEDICATIONS  (PRN):  acetaminophen     Tablet .. 650 milliGRAM(s) Oral every 6 hours PRN Temp greater or equal to 38C (100.4F), Mild Pain (1 - 3), Moderate Pain (4 - 6)  dextrose Oral Gel 15 Gram(s) Oral once PRN Blood Glucose LESS THAN 70 milliGRAM(s)/deciliter  oxycodone    5 mG/acetaminophen 325 mG 1 Tablet(s) Oral every 4 hours PRN Moderate Pain (4 - 6)      FAMILY HISTORY:  No pertinent family history in first degree relatives        Allergies    [This allergen will not trigger allergy alert] pollen (Unknown)  No Known Drug Allergies    Intolerances        SOCIAL HISTORY:    [  ] Etoh  [  ] Smoking  [  ] Substance abuse     Home Environment:  [   ] Home Alone  [ x  ] Lives with Family  [   ] Home Health Aid    Dwelling:  [   ] Apartment  [ x  ] Private House  [   ] Adult Home  [   ] Skilled Nursing Facility      [   ] Short Term  [   ] Long Term  [x   ] Stairs       Elevator [   ]    FUNCTIONAL STATUS PTA: (Check all that apply)  Ambulation: [  x  ]Independent    [   ] Dependent     [   ] Non-Ambulatory  Assistive Device: [  x ] SA Cane  [   ]  Q Cane  [   ] Walker  [   ]  Wheelchair  ADL : [ x  ] Independent  [    ]  Dependent       Vital Signs Last 24 Hrs  T(C): 36.9 (20 Feb 2023 12:46), Max: 36.9 (20 Feb 2023 12:46)  T(F): 98.5 (20 Feb 2023 12:46), Max: 98.5 (20 Feb 2023 12:46)  HR: 75 (20 Feb 2023 12:46) (75 - 87)  BP: 118/56 (20 Feb 2023 12:46) (118/56 - 123/56)  BP(mean): --  RR: 18 (20 Feb 2023 12:46) (18 - 18)  SpO2: --          PHYSICAL EXAM: Awake & Alert  GENERAL: NAD  HEAD:  Normocephalic  CHEST/LUNG: Clear   HEART: S1S2+  ABDOMEN: Soft, Nontender  EXTREMITIES:  no calf tenderness    NERVOUS SYSTEM:  Cranial Nerves 2-12 intact [   ] Abnormal  [  x]right eye ptosis - chronic   ROM: WFL all extremities [   ]  Abnormal [ x  ] limited LUE - chronic   Motor Strength: WFL all extremities  [   ]  Abnormal [ x  ]limited LUE - chronic   Sensation: intact to light touch [   ] Abnormal [   ]    FUNCTIONAL STATUS:  Bed Mobility: Independent [   ]  Supervision [ x  ]  Needs Assistance [   ]  N/A [   ]  Transfers: Independent [   ]  Supervision [ x  ]  Needs Assistance [   ]  N/A [   ]   Ambulation: Independent [   ]  Supervision [ x  ]  Needs Assistance [   ]  N/A [   ]  ADL: Independent [   ] Requires Assistance [   ] N/A [   ]      LABS:                        11.6   9.75  )-----------( 224      ( 20 Feb 2023 06:13 )             34.9     02-20    141  |  108  |  40<H>  ----------------------------<  156<H>  3.8   |  20  |  2.8<H>    Ca    8.2<L>      20 Feb 2023 06:13  Mg     2.2     02-20    TPro  5.2<L>  /  Alb  2.6<L>  /  TBili  0.9  /  DBili  x   /  AST  37  /  ALT  37  /  AlkPhos  65  02-20          RADIOLOGY & ADDITIONAL STUDIES:

## 2023-02-20 NOTE — PROGRESS NOTE ADULT - SUBJECTIVE AND OBJECTIVE BOX
SUBJECTIVE:    Patient is a 77y old Male who presents with a chief complaint of Lethargy (19 Feb 2023 13:07)    Currently admitted to medicine with the primary diagnosis of:    Today is hospital day 7d.     Overnight Events:     fever, tachy 2/20/23  -f/u covid, ua / urine studies, ecg, blood culture    PAST MEDICAL & SURGICAL HISTORY  HTN (hypertension)    Seasonal allergies    History of BPH    Diabetes    No significant past surgical history      ALLERGIES:  [This allergen will not trigger allergy alert] pollen (Unknown)  No Known Drug Allergies    MEDICATIONS:  STANDING MEDICATIONS  amLODIPine   Tablet 10 milliGRAM(s) Oral daily  aspirin  chewable 81 milliGRAM(s) Oral daily  atorvastatin 80 milliGRAM(s) Oral at bedtime  carvedilol 6.25 milliGRAM(s) Oral every 12 hours  clopidogrel Tablet 75 milliGRAM(s) Oral daily  dextrose 5%. 1000 milliLiter(s) IV Continuous <Continuous>  dextrose 5%. 1000 milliLiter(s) IV Continuous <Continuous>  dextrose 50% Injectable 25 Gram(s) IV Push once  dextrose 50% Injectable 12.5 Gram(s) IV Push once  dextrose 50% Injectable 25 Gram(s) IV Push once  glucagon  Injectable 1 milliGRAM(s) IntraMuscular once  heparin   Injectable 5000 Unit(s) SubCutaneous every 8 hours  insulin glargine Injectable (LANTUS) 4 Unit(s) SubCutaneous at bedtime  insulin lispro (ADMELOG) corrective regimen sliding scale   SubCutaneous three times a day before meals  insulin lispro Injectable (ADMELOG) 3 Unit(s) SubCutaneous three times a day before meals  sodium bicarbonate 650 milliGRAM(s) Oral three times a day  tamsulosin 0.4 milliGRAM(s) Oral at bedtime    PRN MEDICATIONS  acetaminophen     Tablet .. 650 milliGRAM(s) Oral every 6 hours PRN  dextrose Oral Gel 15 Gram(s) Oral once PRN  oxycodone    5 mG/acetaminophen 325 mG 1 Tablet(s) Oral every 4 hours PRN    VITALS:   ICU Vital Signs Last 24 Hrs  T(C): 36.1 (20 Feb 2023 09:10), Max: 36.4 (19 Feb 2023 19:54)  T(F): 97 (20 Feb 2023 09:10), Max: 97.6 (19 Feb 2023 19:54)  HR: 81 (20 Feb 2023 08:05) (81 - 87)  BP: 122/56 (20 Feb 2023 08:05) (119/57 - 123/56)  BP(mean): --  ABP: --  ABP(mean): --  RR: 18 (19 Feb 2023 19:54) (18 - 18)  SpO2: --        LABS:                        11.6   9.75  )-----------( 224      ( 20 Feb 2023 06:13 )             34.9     02-20    141  |  108  |  40<H>  ----------------------------<  156<H>  3.8   |  20  |  2.8<H>    Ca    8.2<L>      20 Feb 2023 06:13  Mg     2.2     02-20    TPro  5.2<L>  /  Alb  2.6<L>  /  TBili  0.9  /  DBili  x   /  AST  37  /  ALT  37  /  AlkPhos  65  02-20                    RADIOLOGY:  < from: Xray Chest 1 View-PORTABLE IMMEDIATE (Xray Chest 1 View-PORTABLE IMMEDIATE .) (02.20.23 @ 08:45) >  No focal consolidation, pneumothorax or pleural effusion.    Stable cardiomediastinal silhouette.    Unchanged osseous structures.    < end of copied text >    PHYSICAL EXAM:  GEN: laying in bed  LUNGS: clear bl , no resp distress  HEART: s1 s2 no murmor  ABD: warmth in suprapubic area  EXT: no lower extremity edema  NEURO: able to converse with nurse who speaks cantonese.

## 2023-02-21 LAB
ALBUMIN SERPL ELPH-MCNC: 2.4 G/DL — LOW (ref 3.5–5.2)
ALP SERPL-CCNC: 78 U/L — SIGNIFICANT CHANGE UP (ref 30–115)
ALT FLD-CCNC: 52 U/L — HIGH (ref 0–41)
ANION GAP SERPL CALC-SCNC: 13 MMOL/L — SIGNIFICANT CHANGE UP (ref 7–14)
APPEARANCE UR: ABNORMAL
AST SERPL-CCNC: 68 U/L — HIGH (ref 0–41)
BACTERIA # UR AUTO: NEGATIVE — SIGNIFICANT CHANGE UP
BASOPHILS # BLD AUTO: 0.02 K/UL — SIGNIFICANT CHANGE UP (ref 0–0.2)
BASOPHILS NFR BLD AUTO: 0.2 % — SIGNIFICANT CHANGE UP (ref 0–1)
BILIRUB SERPL-MCNC: 0.6 MG/DL — SIGNIFICANT CHANGE UP (ref 0.2–1.2)
BILIRUB UR-MCNC: NEGATIVE — SIGNIFICANT CHANGE UP
BUN SERPL-MCNC: 53 MG/DL — HIGH (ref 10–20)
C DIFF BY PCR RESULT: SIGNIFICANT CHANGE UP
CALCIUM SERPL-MCNC: 8.2 MG/DL — LOW (ref 8.4–10.5)
CHLORIDE SERPL-SCNC: 108 MMOL/L — SIGNIFICANT CHANGE UP (ref 98–110)
CO2 SERPL-SCNC: 19 MMOL/L — SIGNIFICANT CHANGE UP (ref 17–32)
COD CRY URNS QL: NEGATIVE — SIGNIFICANT CHANGE UP
COLOR SPEC: YELLOW — SIGNIFICANT CHANGE UP
COMMENT - URINE: SIGNIFICANT CHANGE UP
CREAT ?TM UR-MCNC: 112 MG/DL — SIGNIFICANT CHANGE UP
CREAT SERPL-MCNC: 3.3 MG/DL — HIGH (ref 0.7–1.5)
DIFF PNL FLD: ABNORMAL
EGFR: 18 ML/MIN/1.73M2 — LOW
EOSINOPHIL # BLD AUTO: 0.06 K/UL — SIGNIFICANT CHANGE UP (ref 0–0.7)
EOSINOPHIL NFR BLD AUTO: 0.7 % — SIGNIFICANT CHANGE UP (ref 0–8)
EPI CELLS # UR: 6 /HPF — HIGH (ref 0–5)
GLUCOSE BLDC GLUCOMTR-MCNC: 157 MG/DL — HIGH (ref 70–99)
GLUCOSE BLDC GLUCOMTR-MCNC: 169 MG/DL — HIGH (ref 70–99)
GLUCOSE BLDC GLUCOMTR-MCNC: 233 MG/DL — HIGH (ref 70–99)
GLUCOSE BLDC GLUCOMTR-MCNC: 235 MG/DL — HIGH (ref 70–99)
GLUCOSE BLDC GLUCOMTR-MCNC: 278 MG/DL — HIGH (ref 70–99)
GLUCOSE SERPL-MCNC: 154 MG/DL — HIGH (ref 70–99)
GLUCOSE UR QL: ABNORMAL
HCT VFR BLD CALC: 31.7 % — LOW (ref 42–52)
HGB BLD-MCNC: 10.6 G/DL — LOW (ref 14–18)
HYALINE CASTS # UR AUTO: 12 /LPF — HIGH (ref 0–7)
IMM GRANULOCYTES NFR BLD AUTO: 0.4 % — HIGH (ref 0.1–0.3)
KETONES UR-MCNC: NEGATIVE — SIGNIFICANT CHANGE UP
LEUKOCYTE ESTERASE UR-ACNC: NEGATIVE — SIGNIFICANT CHANGE UP
LYMPHOCYTES # BLD AUTO: 0.95 K/UL — LOW (ref 1.2–3.4)
LYMPHOCYTES # BLD AUTO: 11.8 % — LOW (ref 20.5–51.1)
MAGNESIUM SERPL-MCNC: 2.3 MG/DL — SIGNIFICANT CHANGE UP (ref 1.8–2.4)
MCHC RBC-ENTMCNC: 29.7 PG — SIGNIFICANT CHANGE UP (ref 27–31)
MCHC RBC-ENTMCNC: 33.4 G/DL — SIGNIFICANT CHANGE UP (ref 32–37)
MCV RBC AUTO: 88.8 FL — SIGNIFICANT CHANGE UP (ref 80–94)
MONOCYTES # BLD AUTO: 0.78 K/UL — HIGH (ref 0.1–0.6)
MONOCYTES NFR BLD AUTO: 9.7 % — HIGH (ref 1.7–9.3)
NEUTROPHILS # BLD AUTO: 6.2 K/UL — SIGNIFICANT CHANGE UP (ref 1.4–6.5)
NEUTROPHILS NFR BLD AUTO: 77.2 % — HIGH (ref 42.2–75.2)
NITRITE UR-MCNC: NEGATIVE — SIGNIFICANT CHANGE UP
NRBC # BLD: 0 /100 WBCS — SIGNIFICANT CHANGE UP (ref 0–0)
OSMOLALITY UR: 456 MOS/KG — SIGNIFICANT CHANGE UP (ref 50–1200)
PH UR: 6 — SIGNIFICANT CHANGE UP (ref 5–8)
PLATELET # BLD AUTO: 220 K/UL — SIGNIFICANT CHANGE UP (ref 130–400)
POTASSIUM SERPL-MCNC: 3.9 MMOL/L — SIGNIFICANT CHANGE UP (ref 3.5–5)
POTASSIUM SERPL-SCNC: 3.9 MMOL/L — SIGNIFICANT CHANGE UP (ref 3.5–5)
POTASSIUM UR-SCNC: 25 MMOL/L — SIGNIFICANT CHANGE UP
PROT ?TM UR-MCNC: 251 MG/DLG/24H — SIGNIFICANT CHANGE UP
PROT SERPL-MCNC: 5 G/DL — LOW (ref 6–8)
PROT UR-MCNC: ABNORMAL
PROT/CREAT UR-RTO: 2.2 RATIO — HIGH (ref 0–0.2)
RBC # BLD: 3.57 M/UL — LOW (ref 4.7–6.1)
RBC # FLD: 14.2 % — SIGNIFICANT CHANGE UP (ref 11.5–14.5)
RBC CASTS # UR COMP ASSIST: 4 /HPF — SIGNIFICANT CHANGE UP (ref 0–4)
SODIUM SERPL-SCNC: 140 MMOL/L — SIGNIFICANT CHANGE UP (ref 135–146)
SODIUM UR-SCNC: 30 MMOL/L — SIGNIFICANT CHANGE UP
SP GR SPEC: 1.02 — SIGNIFICANT CHANGE UP (ref 1.01–1.03)
URATE SERPL-MCNC: 10.7 MG/DL — HIGH (ref 3.4–8.8)
UROBILINOGEN FLD QL: SIGNIFICANT CHANGE UP
UUN UR-MCNC: 796 MG/DL — SIGNIFICANT CHANGE UP
WBC # BLD: 8.04 K/UL — SIGNIFICANT CHANGE UP (ref 4.8–10.8)
WBC # FLD AUTO: 8.04 K/UL — SIGNIFICANT CHANGE UP (ref 4.8–10.8)
WBC UR QL: 9 /HPF — HIGH (ref 0–5)

## 2023-02-21 PROCEDURE — 73560 X-RAY EXAM OF KNEE 1 OR 2: CPT | Mod: 26,RT

## 2023-02-21 PROCEDURE — 99233 SBSQ HOSP IP/OBS HIGH 50: CPT

## 2023-02-21 RX ORDER — ACETAMINOPHEN 500 MG
650 TABLET ORAL ONCE
Refills: 0 | Status: COMPLETED | OUTPATIENT
Start: 2023-02-21 | End: 2023-02-21

## 2023-02-21 RX ORDER — LOPERAMIDE HCL 2 MG
2 TABLET ORAL EVERY 6 HOURS
Refills: 0 | Status: DISCONTINUED | OUTPATIENT
Start: 2023-02-21 | End: 2023-02-21

## 2023-02-21 RX ORDER — POTASSIUM CHLORIDE 20 MEQ
20 PACKET (EA) ORAL ONCE
Refills: 0 | Status: COMPLETED | OUTPATIENT
Start: 2023-02-21 | End: 2023-02-21

## 2023-02-21 RX ADMIN — AMLODIPINE BESYLATE 10 MILLIGRAM(S): 2.5 TABLET ORAL at 05:37

## 2023-02-21 RX ADMIN — CLOPIDOGREL BISULFATE 75 MILLIGRAM(S): 75 TABLET, FILM COATED ORAL at 12:15

## 2023-02-21 RX ADMIN — Medication 20 MILLIEQUIVALENT(S): at 12:15

## 2023-02-21 RX ADMIN — CARVEDILOL PHOSPHATE 6.25 MILLIGRAM(S): 80 CAPSULE, EXTENDED RELEASE ORAL at 17:01

## 2023-02-21 RX ADMIN — Medication 20 MILLIGRAM(S): at 12:15

## 2023-02-21 RX ADMIN — HEPARIN SODIUM 5000 UNIT(S): 5000 INJECTION INTRAVENOUS; SUBCUTANEOUS at 05:39

## 2023-02-21 RX ADMIN — Medication 650 MILLIGRAM(S): at 13:23

## 2023-02-21 RX ADMIN — Medication 3 UNIT(S): at 17:01

## 2023-02-21 RX ADMIN — HEPARIN SODIUM 5000 UNIT(S): 5000 INJECTION INTRAVENOUS; SUBCUTANEOUS at 21:58

## 2023-02-21 RX ADMIN — Medication 650 MILLIGRAM(S): at 13:53

## 2023-02-21 RX ADMIN — Medication 4: at 12:15

## 2023-02-21 RX ADMIN — Medication 650 MILLIGRAM(S): at 21:58

## 2023-02-21 RX ADMIN — Medication 3 UNIT(S): at 07:45

## 2023-02-21 RX ADMIN — Medication 81 MILLIGRAM(S): at 12:14

## 2023-02-21 RX ADMIN — INSULIN GLARGINE 4 UNIT(S): 100 INJECTION, SOLUTION SUBCUTANEOUS at 21:58

## 2023-02-21 RX ADMIN — CARVEDILOL PHOSPHATE 6.25 MILLIGRAM(S): 80 CAPSULE, EXTENDED RELEASE ORAL at 05:39

## 2023-02-21 RX ADMIN — Medication 3 UNIT(S): at 12:15

## 2023-02-21 RX ADMIN — Medication 2: at 07:45

## 2023-02-21 RX ADMIN — Medication 4: at 17:02

## 2023-02-21 RX ADMIN — TAMSULOSIN HYDROCHLORIDE 0.4 MILLIGRAM(S): 0.4 CAPSULE ORAL at 21:58

## 2023-02-21 RX ADMIN — ATORVASTATIN CALCIUM 80 MILLIGRAM(S): 80 TABLET, FILM COATED ORAL at 21:58

## 2023-02-21 RX ADMIN — Medication 650 MILLIGRAM(S): at 05:37

## 2023-02-21 RX ADMIN — HEPARIN SODIUM 5000 UNIT(S): 5000 INJECTION INTRAVENOUS; SUBCUTANEOUS at 13:23

## 2023-02-21 NOTE — PROGRESS NOTE ADULT - ASSESSMENT
77-year-old cantonese speaking male with past medical history of hypertension, hyperlipidemia, DM and BPH presenting for evaluation of weakness, dizziness, urinary frequency over the last 3 days. Family describe that their main concern is his lethargy that has been persistent and associated with some memory changes.    A/P:   Acute CVA right  anterior thalamic:   Patient presented with AMS, urinary incontinence   Neuro exam on 2/16 no focal deficit.   Today patient can't move the LE likely from knee pain.   CT Head on 2/13 showed right basal ganglia hypodensity consistent with a chronic infarct.  MRA Head and Neck - no carotid artery stenosis.  EEG on 2/15 no epileptic activity   Continue ASA, Plavix and Lipitor.   EP follow up for loop recorder.   PT/OT eval    Fever:   Likely from Right knee arthritis, possible pseudogout vs OA vs Gout.   Bilateral knee tenderness, worse on the right.   Send Knee Xray, started on Prednisone.   Orthopedic consult for possible knee arthrocentesis.   UA is negative, COVID-19 negative, CXR is clear.     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   Patient with urine incontinence, worsening renal function.   Aguirre placed 200 cc came out, UA noted mild proteinuria.   Pending urine studies.   Recent Renal US showed no hydronephrosis.   Hold Valsartan.     HTN: Continue Coreg and Norvasc, Valsartan held due to Acute Kidney Injury.     DM type 2  A1C 6.3, Continue Lantus 4 units and Lispro 3 units.     BPH: continue flomax    DVT prophylaxis: heparin sc.     # Full code  #Progress Note Handoff:  Pending (specify):  improving Acute Kidney Injury, fever.   Family discussion: with his daughter and wife at bedside.   Disposition: Home with home PT< will need another PT evaluation.    77-year-old cantonese speaking male with past medical history of hypertension, hyperlipidemia, DM and BPH presenting for evaluation of weakness, dizziness, urinary frequency over the last 3 days. Family describe that their main concern is his lethargy that has been persistent and associated with some memory changes.    A/P:   Acute CVA right  anterior thalamic:   Patient presented with AMS, urinary incontinence   Neuro exam on 2/16 no focal deficit.   Today patient can't move the LE likely from knee pain.   CT Head on 2/13 showed right basal ganglia hypodensity consistent with a chronic infarct.  MRA Head and Neck - no carotid artery stenosis.  EEG on 2/15 no epileptic activity   Continue ASA, Plavix and Lipitor.   EP follow up for loop recorder.   PT/OT eval    Fever:   Likely from Right knee arthritis, possible pseudogout vs OA vs Gout. Septic Arthritis is less likely as the patient has bilateral arthritis less on left, also no leukocytosis.   Bilateral knee tenderness, worse on the right.   Send Knee Xray, started on Prednisone 20mg BID.   Orthopedic consult for possible knee arthrocentesis.   UA is negative, COVID-19 negative, CXR is clear. Send blood cx.     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   Patient with urine incontinence, worsening renal function.   Aguirre placed 200 cc came out, UA noted mild proteinuria.   Pending urine studies.   Recent Renal US showed no hydronephrosis.   Hold Valsartan.     HTN: Continue Coreg and Norvasc, Valsartan held due to Acute Kidney Injury.     DM type 2  A1C 6.3, Continue Lantus 4 units and Lispro 3 units.     BPH: continue flomax    DVT prophylaxis: heparin sc.     # Full code  #Progress Note Handoff:  Pending (specify):  improving Acute Kidney Injury, fever.   Family discussion: with his daughter and wife at bedside.   Disposition: Home with home PT< will need another PT evaluation.

## 2023-02-21 NOTE — PROGRESS NOTE ADULT - SUBJECTIVE AND OBJECTIVE BOX
JACQUELIN CAI  77y  Male      Patient is a 77y old  Male who presents with a chief complaint of Lethargy (2023 11:28)      INTERVAL HPI/OVERNIGHT EVENTS:  He had fever today, he has severe pain in right knee and less in left knee, no chest pain or abdominal pain.   Vital Signs Last 24 Hrs  T(C): 38.3 (2023 14:16), Max: 38.6 (2023 12:40)  T(F): 100.9 (2023 14:16), Max: 101.4 (2023 12:40)  HR: 89 (2023 14:16) (78 - 89)  BP: 135/65 (2023 14:16) (104/54 - 135/65)  BP(mean): --  RR: 18 (2023 14:16) (18 - 18)  SpO2: 95% (2023 12:40) (95% - 95%)    Parameters below as of 2023 12:40  Patient On (Oxygen Delivery Method): room air          23 @ 07:01  -  23 @ 07:00  --------------------------------------------------------  IN: 786 mL / OUT: 1 mL / NET: 785 mL    23 @ 07:01  23 @ 15:22  --------------------------------------------------------  IN: 681 mL / OUT: 2 mL / NET: 679 mL            Consultant(s) Notes Reviewed:  [x ] YES  [ ] NO          MEDICATIONS  (STANDING):  acetaminophen     Tablet .. 650 milliGRAM(s) Oral once  amLODIPine   Tablet 10 milliGRAM(s) Oral daily  aspirin  chewable 81 milliGRAM(s) Oral daily  atorvastatin 80 milliGRAM(s) Oral at bedtime  carvedilol 6.25 milliGRAM(s) Oral every 12 hours  clopidogrel Tablet 75 milliGRAM(s) Oral daily  dextrose 5%. 1000 milliLiter(s) (100 mL/Hr) IV Continuous <Continuous>  dextrose 5%. 1000 milliLiter(s) (50 mL/Hr) IV Continuous <Continuous>  dextrose 50% Injectable 25 Gram(s) IV Push once  dextrose 50% Injectable 12.5 Gram(s) IV Push once  dextrose 50% Injectable 25 Gram(s) IV Push once  glucagon  Injectable 1 milliGRAM(s) IntraMuscular once  heparin   Injectable 5000 Unit(s) SubCutaneous every 8 hours  insulin glargine Injectable (LANTUS) 4 Unit(s) SubCutaneous at bedtime  insulin lispro (ADMELOG) corrective regimen sliding scale   SubCutaneous three times a day before meals  insulin lispro Injectable (ADMELOG) 3 Unit(s) SubCutaneous three times a day before meals  predniSONE   Tablet 20 milliGRAM(s) Oral two times a day  sodium bicarbonate 650 milliGRAM(s) Oral three times a day  tamsulosin 0.4 milliGRAM(s) Oral at bedtime    MEDICATIONS  (PRN):  dextrose Oral Gel 15 Gram(s) Oral once PRN Blood Glucose LESS THAN 70 milliGRAM(s)/deciliter  oxycodone    5 mG/acetaminophen 325 mG 1 Tablet(s) Oral every 4 hours PRN Moderate Pain (4 - 6)      LABS                          10.6   8.04  )-----------( 220      ( 2023 05:56 )             31.7     -    140  |  108  |  53<H>  ----------------------------<  154<H>  3.9   |  19  |  3.3<H>    Ca    8.2<L>      2023 05:56  Mg     2.3         TPro  5.0<L>  /  Alb  2.4<L>  /  TBili  0.6  /  DBili  x   /  AST  68<H>  /  ALT  52<H>  /  AlkPhos  78        Urinalysis Basic - ( 2023 13:26 )    Color: Yellow / Appearance: Slightly Turbid / S.016 / pH: x  Gluc: x / Ketone: Negative  / Bili: Negative / Urobili: <2 mg/dL   Blood: x / Protein: 300 mg/dL / Nitrite: Negative   Leuk Esterase: Negative / RBC: 4 /HPF / WBC 9 /HPF   Sq Epi: x / Non Sq Epi: 6 /HPF / Bacteria: Negative        Lactate Trend        CAPILLARY BLOOD GLUCOSE      POCT Blood Glucose.: 235 mg/dL (2023 11:56)        RADIOLOGY & ADDITIONAL TESTS:    Imaging Personally Reviewed:  [ ] YES  [ ] NO    HEALTH ISSUES - PROBLEM Dx:          PHYSICAL EXAM:  GENERAL: NAD, well-developed.  HEAD:  Atraumatic, Normocephalic.  EYES: EOMI, PERRLA, conjunctiva and sclera clear.  NECK: Supple, No JVD.  CHEST/LUNG: Clear to auscultation bilaterally; No wheeze.  HEART: Regular rate and rhythm; S1 S2.   ABDOMEN: Soft, Nontender, Nondistended; Bowel sounds present.  EXTREMITIES: normal pulse, right knee effusion, tenderness and limitation ROM, left knee mild tenderness and effusion with limitation ROM.   PSYCH: AAOx3.  NEUROLOGY: CN II-XII intact,   SKIN: No rashes or lesions.

## 2023-02-21 NOTE — PROGRESS NOTE ADULT - SUBJECTIVE AND OBJECTIVE BOX
SUBJECTIVE:    Patient is a 77y old Male who presents with a chief complaint of Lethargy (20 Feb 2023 17:41)    Currently admitted to medicine with the primary diagnosis of:    Today is hospital day 8d.     Overnight Events:     loop recorder   worsening caio     PAST MEDICAL & SURGICAL HISTORY  HTN (hypertension)    Seasonal allergies    History of BPH    Diabetes    No significant past surgical history    ALLERGIES:  [This allergen will not trigger allergy alert] pollen (Unknown)  No Known Drug Allergies    MEDICATIONS:  STANDING MEDICATIONS  amLODIPine   Tablet 10 milliGRAM(s) Oral daily  aspirin  chewable 81 milliGRAM(s) Oral daily  atorvastatin 80 milliGRAM(s) Oral at bedtime  carvedilol 6.25 milliGRAM(s) Oral every 12 hours  clopidogrel Tablet 75 milliGRAM(s) Oral daily  dextrose 5%. 1000 milliLiter(s) IV Continuous <Continuous>  dextrose 5%. 1000 milliLiter(s) IV Continuous <Continuous>  dextrose 50% Injectable 25 Gram(s) IV Push once  dextrose 50% Injectable 12.5 Gram(s) IV Push once  dextrose 50% Injectable 25 Gram(s) IV Push once  glucagon  Injectable 1 milliGRAM(s) IntraMuscular once  heparin   Injectable 5000 Unit(s) SubCutaneous every 8 hours  insulin glargine Injectable (LANTUS) 4 Unit(s) SubCutaneous at bedtime  insulin lispro (ADMELOG) corrective regimen sliding scale   SubCutaneous three times a day before meals  insulin lispro Injectable (ADMELOG) 3 Unit(s) SubCutaneous three times a day before meals  potassium chloride   Powder 20 milliEquivalent(s) Oral once  sodium bicarbonate 650 milliGRAM(s) Oral three times a day  tamsulosin 0.4 milliGRAM(s) Oral at bedtime    PRN MEDICATIONS  acetaminophen     Tablet .. 650 milliGRAM(s) Oral every 6 hours PRN  dextrose Oral Gel 15 Gram(s) Oral once PRN  loperamide 2 milliGRAM(s) Oral every 6 hours PRN  oxycodone    5 mG/acetaminophen 325 mG 1 Tablet(s) Oral every 4 hours PRN    VITALS:   ICU Vital Signs Last 24 Hrs  T(C): 36.7 (21 Feb 2023 04:49), Max: 36.9 (20 Feb 2023 12:46)  T(F): 98 (21 Feb 2023 04:49), Max: 98.5 (20 Feb 2023 12:46)  HR: 78 (21 Feb 2023 04:49) (75 - 81)  BP: 104/54 (21 Feb 2023 04:49) (104/54 - 118/57)  BP(mean): --  ABP: --  ABP(mean): --  RR: 18 (21 Feb 2023 04:49) (18 - 18)  SpO2: --        LABS:                        10.6   8.04  )-----------( 220      ( 21 Feb 2023 05:56 )             31.7     02-21    140  |  108  |  53<H>  ----------------------------<  154<H>  3.9   |  19  |  3.3<H>    Ca    8.2<L>      21 Feb 2023 05:56  Mg     2.3     02-21    TPro  5.0<L>  /  Alb  2.4<L>  /  TBili  0.6  /  DBili  x   /  AST  68<H>  /  ALT  52<H>  /  AlkPhos  78  02-21                    RADIOLOGY:  < from: US Renal (02.15.23 @ 19:27) >  IMPRESSION:    No hydronephrosis.    Right ureteral jet is not identified on this examination, a nonspecific   finding    Enlarged prostate.    Left renal cyst    < end of copied text >    PHYSICAL EXAM:  GEN: laying in bed  Head: atraumatic   LUNGS: clear bl  HEART: s1 s2  ABD: nontender  EXT: no lower extremity edema

## 2023-02-21 NOTE — PROGRESS NOTE ADULT - ASSESSMENT
77-year-old cantonese speaking male with past medical history of hypertension, hyperlipidemia, DM and BPH presenting for evaluation of weakness, dizziness, urinary frequency over the last 3 days. Family describe that their main concern is his lethargy that has been persistent and associated with some memory changes.    #fever, tachy 2/20/23  - ua / urine studies, blood culture  -repeat heart rate was 82 after tylenol     # Encephelopathy   #Acute CVA right  anterior thalamic   - MR HeadAcute infarct involving the anterior right thalamus without evidence of hemorrhage 2/16.  -  CT Head on 2/13 right basal ganglia consistent with a chronic infarct.  - MRA brain: No stenosis or aneurysm. MRA neck: No stenosis. No dissection.  - EEG on 2/15 no epileptic activity   - LDL Cholesterol Calculated: Unable to calculate LDL, TG is >400 mg/dL (02.17.23 @ 07:55)  - c/w ASA, statin, plavix  - TTE Pending  - EP consulted possible ILR possibly today    # Hypertension  - c/w  coreg  - norvasc 10mg daily  - holding valsartan due to rising cr    # DM type 2  - A1C with Estimated Average Glucose Result: 6.3 % (02.14.23 @ 05:49)  - monitor FS  - c/w lantus, lispro    # SAGRARIO on CKD stage3  -  US Renal (02.15.23 @ 19:27) No hydronephrosis. Right ureteral jet is not identified on this examination, a nonspecific   finding. Enlarged prostate. Left renal cyst  - holding valsartan due to rising cr    # H/o BPH  - c/w  flomax    #h/o gout  -uses a herbal supplement at home  -after sagrario resolves consider allopurinol     # DVT prophylaxis - hep  # Full code

## 2023-02-22 LAB
ALBUMIN SERPL ELPH-MCNC: 2.4 G/DL — LOW (ref 3.5–5.2)
ALP SERPL-CCNC: 87 U/L — SIGNIFICANT CHANGE UP (ref 30–115)
ALT FLD-CCNC: 50 U/L — HIGH (ref 0–41)
ANION GAP SERPL CALC-SCNC: 15 MMOL/L — HIGH (ref 7–14)
AST SERPL-CCNC: 50 U/L — HIGH (ref 0–41)
BASOPHILS # BLD AUTO: 0 K/UL — SIGNIFICANT CHANGE UP (ref 0–0.2)
BASOPHILS NFR BLD AUTO: 0 % — SIGNIFICANT CHANGE UP (ref 0–1)
BILIRUB SERPL-MCNC: 0.4 MG/DL — SIGNIFICANT CHANGE UP (ref 0.2–1.2)
BUN SERPL-MCNC: 58 MG/DL — HIGH (ref 10–20)
CALCIUM SERPL-MCNC: 8 MG/DL — LOW (ref 8.4–10.5)
CHLORIDE SERPL-SCNC: 106 MMOL/L — SIGNIFICANT CHANGE UP (ref 98–110)
CO2 SERPL-SCNC: 17 MMOL/L — SIGNIFICANT CHANGE UP (ref 17–32)
CREAT SERPL-MCNC: 2.7 MG/DL — HIGH (ref 0.7–1.5)
EGFR: 24 ML/MIN/1.73M2 — LOW
EOSINOPHIL # BLD AUTO: 0 K/UL — SIGNIFICANT CHANGE UP (ref 0–0.7)
EOSINOPHIL NFR BLD AUTO: 0 % — SIGNIFICANT CHANGE UP (ref 0–8)
GLUCOSE BLDC GLUCOMTR-MCNC: 196 MG/DL — HIGH (ref 70–99)
GLUCOSE BLDC GLUCOMTR-MCNC: 202 MG/DL — HIGH (ref 70–99)
GLUCOSE BLDC GLUCOMTR-MCNC: 223 MG/DL — HIGH (ref 70–99)
GLUCOSE BLDC GLUCOMTR-MCNC: 229 MG/DL — HIGH (ref 70–99)
GLUCOSE BLDC GLUCOMTR-MCNC: 310 MG/DL — HIGH (ref 70–99)
GLUCOSE SERPL-MCNC: 189 MG/DL — HIGH (ref 70–99)
HCT VFR BLD CALC: 30.3 % — LOW (ref 42–52)
HGB BLD-MCNC: 10.1 G/DL — LOW (ref 14–18)
IMM GRANULOCYTES NFR BLD AUTO: 0.6 % — HIGH (ref 0.1–0.3)
LYMPHOCYTES # BLD AUTO: 0.6 K/UL — LOW (ref 1.2–3.4)
LYMPHOCYTES # BLD AUTO: 6.7 % — LOW (ref 20.5–51.1)
MAGNESIUM SERPL-MCNC: 2.4 MG/DL — SIGNIFICANT CHANGE UP (ref 1.8–2.4)
MCHC RBC-ENTMCNC: 29.6 PG — SIGNIFICANT CHANGE UP (ref 27–31)
MCHC RBC-ENTMCNC: 33.3 G/DL — SIGNIFICANT CHANGE UP (ref 32–37)
MCV RBC AUTO: 88.9 FL — SIGNIFICANT CHANGE UP (ref 80–94)
MONOCYTES # BLD AUTO: 0.59 K/UL — SIGNIFICANT CHANGE UP (ref 0.1–0.6)
MONOCYTES NFR BLD AUTO: 6.5 % — SIGNIFICANT CHANGE UP (ref 1.7–9.3)
NEUTROPHILS # BLD AUTO: 7.78 K/UL — HIGH (ref 1.4–6.5)
NEUTROPHILS NFR BLD AUTO: 86.2 % — HIGH (ref 42.2–75.2)
NRBC # BLD: 0 /100 WBCS — SIGNIFICANT CHANGE UP (ref 0–0)
PLATELET # BLD AUTO: 232 K/UL — SIGNIFICANT CHANGE UP (ref 130–400)
POTASSIUM SERPL-MCNC: 3.8 MMOL/L — SIGNIFICANT CHANGE UP (ref 3.5–5)
POTASSIUM SERPL-SCNC: 3.8 MMOL/L — SIGNIFICANT CHANGE UP (ref 3.5–5)
PROT SERPL-MCNC: 5.1 G/DL — LOW (ref 6–8)
RBC # BLD: 3.41 M/UL — LOW (ref 4.7–6.1)
RBC # FLD: 13.8 % — SIGNIFICANT CHANGE UP (ref 11.5–14.5)
SODIUM SERPL-SCNC: 138 MMOL/L — SIGNIFICANT CHANGE UP (ref 135–146)
WBC # BLD: 9.02 K/UL — SIGNIFICANT CHANGE UP (ref 4.8–10.8)
WBC # FLD AUTO: 9.02 K/UL — SIGNIFICANT CHANGE UP (ref 4.8–10.8)

## 2023-02-22 PROCEDURE — 99233 SBSQ HOSP IP/OBS HIGH 50: CPT

## 2023-02-22 RX ORDER — INSULIN GLARGINE 100 [IU]/ML
12 INJECTION, SOLUTION SUBCUTANEOUS AT BEDTIME
Refills: 0 | Status: DISCONTINUED | OUTPATIENT
Start: 2023-02-22 | End: 2023-02-23

## 2023-02-22 RX ORDER — CHLORHEXIDINE GLUCONATE 213 G/1000ML
1 SOLUTION TOPICAL DAILY
Refills: 0 | Status: DISCONTINUED | OUTPATIENT
Start: 2023-02-22 | End: 2023-02-25

## 2023-02-22 RX ORDER — POTASSIUM CHLORIDE 20 MEQ
20 PACKET (EA) ORAL ONCE
Refills: 0 | Status: COMPLETED | OUTPATIENT
Start: 2023-02-22 | End: 2023-02-22

## 2023-02-22 RX ORDER — NYSTATIN CREAM 100000 [USP'U]/G
1 CREAM TOPICAL
Refills: 0 | Status: DISCONTINUED | OUTPATIENT
Start: 2023-02-22 | End: 2023-02-25

## 2023-02-22 RX ORDER — INSULIN LISPRO 100/ML
4 VIAL (ML) SUBCUTANEOUS
Refills: 0 | Status: DISCONTINUED | OUTPATIENT
Start: 2023-02-22 | End: 2023-02-23

## 2023-02-22 RX ADMIN — ATORVASTATIN CALCIUM 80 MILLIGRAM(S): 80 TABLET, FILM COATED ORAL at 22:24

## 2023-02-22 RX ADMIN — Medication 20 MILLIGRAM(S): at 05:38

## 2023-02-22 RX ADMIN — Medication 4: at 11:52

## 2023-02-22 RX ADMIN — HEPARIN SODIUM 5000 UNIT(S): 5000 INJECTION INTRAVENOUS; SUBCUTANEOUS at 12:52

## 2023-02-22 RX ADMIN — INSULIN GLARGINE 12 UNIT(S): 100 INJECTION, SOLUTION SUBCUTANEOUS at 22:25

## 2023-02-22 RX ADMIN — CHLORHEXIDINE GLUCONATE 1 APPLICATION(S): 213 SOLUTION TOPICAL at 12:52

## 2023-02-22 RX ADMIN — TAMSULOSIN HYDROCHLORIDE 0.4 MILLIGRAM(S): 0.4 CAPSULE ORAL at 22:24

## 2023-02-22 RX ADMIN — Medication 20 MILLIGRAM(S): at 17:39

## 2023-02-22 RX ADMIN — HEPARIN SODIUM 5000 UNIT(S): 5000 INJECTION INTRAVENOUS; SUBCUTANEOUS at 22:22

## 2023-02-22 RX ADMIN — Medication 650 MILLIGRAM(S): at 12:30

## 2023-02-22 RX ADMIN — AMLODIPINE BESYLATE 10 MILLIGRAM(S): 2.5 TABLET ORAL at 05:38

## 2023-02-22 RX ADMIN — Medication 3 UNIT(S): at 08:16

## 2023-02-22 RX ADMIN — HEPARIN SODIUM 5000 UNIT(S): 5000 INJECTION INTRAVENOUS; SUBCUTANEOUS at 05:39

## 2023-02-22 RX ADMIN — CLOPIDOGREL BISULFATE 75 MILLIGRAM(S): 75 TABLET, FILM COATED ORAL at 12:30

## 2023-02-22 RX ADMIN — Medication 650 MILLIGRAM(S): at 22:24

## 2023-02-22 RX ADMIN — CARVEDILOL PHOSPHATE 6.25 MILLIGRAM(S): 80 CAPSULE, EXTENDED RELEASE ORAL at 17:39

## 2023-02-22 RX ADMIN — Medication 4: at 08:16

## 2023-02-22 RX ADMIN — Medication 81 MILLIGRAM(S): at 12:30

## 2023-02-22 RX ADMIN — Medication 20 MILLIEQUIVALENT(S): at 10:29

## 2023-02-22 RX ADMIN — Medication 3 UNIT(S): at 17:13

## 2023-02-22 RX ADMIN — CARVEDILOL PHOSPHATE 6.25 MILLIGRAM(S): 80 CAPSULE, EXTENDED RELEASE ORAL at 05:38

## 2023-02-22 RX ADMIN — Medication 3 UNIT(S): at 11:52

## 2023-02-22 RX ADMIN — Medication 8: at 17:02

## 2023-02-22 RX ADMIN — Medication 650 MILLIGRAM(S): at 05:38

## 2023-02-22 NOTE — PROGRESS NOTE ADULT - ASSESSMENT
77-year-old cantonese speaking male with past medical history of hypertension, hyperlipidemia, DM and BPH presenting for evaluation of weakness, dizziness, urinary frequency over the last 3 days. Family describe that their main concern is his lethargy that has been persistent and associated with some memory changes.    #Fever   - possible pseudogout vs OA vs Gout. Septic Arthritis is less likely as the patient has bilateral arthritis less on left, also no leukocytosis.   Bilateral knee tenderness, worse on the right.   No acute fracture is seen.    There are tricompartmental degenerative changes ranging to moderate in   the patellofemoral compartment.    There is a large knee joint effusion.   Prednisone 20mg BID.   Orthopedic consult for possible knee arthrocentesis.   UA is negative, COVID-19 negative, CXR is clear. f/u blood  cx.     # Encephelopathy   #Acute CVA right  anterior thalamic   - MR HeadAcute infarct involving the anterior right thalamus without evidence of hemorrhage 2/16.  -  CT Head on 2/13 right basal ganglia consistent with a chronic infarct.  - MRA brain: No stenosis or aneurysm. MRA neck: No stenosis. No dissection.  - EEG on 2/15 no epileptic activity   - LDL Cholesterol Calculated: Unable to calculate LDL, TG is >400 mg/dL (02.17.23 @ 07:55)  - c/w ASA, statin, plavix  - TTE Pending  - family considering ILR possibly,     # Hypertension  - c/w  coreg  - norvasc 10mg daily  - holding valsartan due to rising cr    # DM type 2  - A1C with Estimated Average Glucose Result: 6.3 % (02.14.23 @ 05:49)  - monitor FS  - c/w lantus, lispro    # SAGRARIO on CKD stage3  -  US Renal (02.15.23 @ 19:27) No hydronephrosis. Right ureteral jet is not identified on this examination, a nonspecific   finding. Enlarged prostate. Left renal cyst  - holding valsartan due to rising cr    # H/o BPH  - c/w  flomax    #h/o gout  -uses a herbal supplement at home  -after sagrario resolves consider allopurinol     # DVT prophylaxis - hep  # Full

## 2023-02-22 NOTE — PROGRESS NOTE ADULT - SUBJECTIVE AND OBJECTIVE BOX
SUBJECTIVE:    Patient is a 77y old Male who presents with a chief complaint of Lethargy (2023 15:03)    Currently admitted to medicine with the primary diagnosis of:    Today is hospital day 9d.     Overnight Events:     knee swelling improving     PAST MEDICAL & SURGICAL HISTORY  HTN (hypertension)    Seasonal allergies    History of BPH    Diabetes    No significant past surgical history    ALLERGIES:  [This allergen will not trigger allergy alert] pollen (Unknown)  No Known Drug Allergies    MEDICATIONS:  STANDING MEDICATIONS  amLODIPine   Tablet 10 milliGRAM(s) Oral daily  aspirin  chewable 81 milliGRAM(s) Oral daily  atorvastatin 80 milliGRAM(s) Oral at bedtime  carvedilol 6.25 milliGRAM(s) Oral every 12 hours  chlorhexidine 2% Cloths 1 Application(s) Topical daily  clopidogrel Tablet 75 milliGRAM(s) Oral daily  dextrose 5%. 1000 milliLiter(s) IV Continuous <Continuous>  dextrose 5%. 1000 milliLiter(s) IV Continuous <Continuous>  dextrose 50% Injectable 25 Gram(s) IV Push once  dextrose 50% Injectable 12.5 Gram(s) IV Push once  dextrose 50% Injectable 25 Gram(s) IV Push once  glucagon  Injectable 1 milliGRAM(s) IntraMuscular once  heparin   Injectable 5000 Unit(s) SubCutaneous every 8 hours  insulin glargine Injectable (LANTUS) 4 Unit(s) SubCutaneous at bedtime  insulin lispro (ADMELOG) corrective regimen sliding scale   SubCutaneous three times a day before meals  insulin lispro Injectable (ADMELOG) 3 Unit(s) SubCutaneous three times a day before meals  predniSONE   Tablet 20 milliGRAM(s) Oral two times a day  sodium bicarbonate 650 milliGRAM(s) Oral three times a day  tamsulosin 0.4 milliGRAM(s) Oral at bedtime    PRN MEDICATIONS  dextrose Oral Gel 15 Gram(s) Oral once PRN  oxycodone    5 mG/acetaminophen 325 mG 1 Tablet(s) Oral every 4 hours PRN    VITALS:   ICU Vital Signs Last 24 Hrs  T(C): 35.8 (2023 14:17), Max: 37.6 (2023 17:45)  T(F): 96.5 (2023 14:17), Max: 99.6 (2023 17:45)  HR: 74 (2023 14:17) (74 - 81)  BP: 99/50 (2023 14:17) (99/50 - 120/58)  BP(mean): --  ABP: --  ABP(mean): --  RR: 18 (2023 14:17) (18 - 18)  SpO2: --        LABS:                        10.1   9.02  )-----------( 232      ( 2023 01:39 )             30.3         138  |  106  |  58<H>  ----------------------------<  189<H>  3.8   |  17  |  2.7<H>    Ca    8.0<L>      2023 01:39  Mg     2.4         TPro  5.1<L>  /  Alb  2.4<L>  /  TBili  0.4  /  DBili  x   /  AST  50<H>  /  ALT  50<H>  /  AlkPhos  87        Urinalysis Basic - ( 2023 13:26 )    Color: Yellow / Appearance: Slightly Turbid / S.016 / pH: x  Gluc: x / Ketone: Negative  / Bili: Negative / Urobili: <2 mg/dL   Blood: x / Protein: 300 mg/dL / Nitrite: Negative   Leuk Esterase: Negative / RBC: 4 /HPF / WBC 9 /HPF   Sq Epi: x / Non Sq Epi: 6 /HPF / Bacteria: Negative            Culture - Blood (collected 2023 11:45)  Source: .Blood None  Preliminary Report (2023 23:01):    No growth to date.            RADIOLOGY:  < from: Xray Knee 1 or 2 Views, Right (23 @ 13:34) >  No acute fracture is seen.    There are tricompartmental degenerative changes ranging to moderate in   the patellofemoral compartment.    There is a large knee joint effusion.    < end of copied text >      PHYSICAL EXAM:  GEN: laying in bed  LUNGS: clear bl  HEART: s1 s2 no murmur  ABD: nontender  EXT: no lower extremity edema  NEURO:lethargic, wakes up when fam comes

## 2023-02-22 NOTE — CHART NOTE - NSCHARTNOTEFT_GEN_A_CORE
Pt's son, Luis E Encarnacion, does not want his mother to have a loop recorder.   Pt can follow up with us as an outpt.   Please recall EP if any changes.

## 2023-02-22 NOTE — PROGRESS NOTE ADULT - ASSESSMENT
78 yo Cantonese speaking M PMHx hypertension, hyperlipidemia, DM II, and BPH presented for evaluation of weakness, dizziness, urinary frequency for 3 days prior to admission. Family describe that their main concern is his lethargy that has been persistent and associated with some memory changes.    Acute CVA right  anterior thalamic:   -Patient presented with AMS, urinary incontinence   -Neuro exam on 2/16 no focal deficit.   -Today patient can't move the LE likely from knee pain.   -CT Head on 2/13 showed right basal ganglia hypodensity consistent with a chronic infarct.  -MRA Head and Neck - no carotid artery stenosis.  -EEG on 2/15 no epileptic activity   -Continue ASA, Plavix and Lipitor.   -family declining EP eval  -PT/OT eval    Fever:   - unclear etiology  - cultures pending  - COVID -19 negative    Knee effusion  - possible pseudogout vs OA vs Gout. Septic Arthritis is less likely as the patient has bilateral arthritis less on left, also no leukocytosis.   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   -Patient with urine incontinence, worsening renal function.   -Aguirre placed 200 cc came out, UA noted mild proteinuria.   -Pending urine studies.   -Recent Renal US showed no hydronephrosis.   -Hold Valsartan.     HTN  -Continue Coreg and Norvasc  -Valsartan held due to Acute Kidney Injury.     DM type 2  -A1C 6.3, Continue Lantus 4 units and Lispro 3 units.     BPH  -continue flomax    DVT prophylaxis: heparin sc.     # Full code  #Progress Note Handoff:  Pending (specify):  improving Acute Kidney Injury  Family discussion: with his daughter at bedside.   Disposition: Home with home PT< will need another PT evaluation.

## 2023-02-22 NOTE — PROGRESS NOTE ADULT - SUBJECTIVE AND OBJECTIVE BOX
CHIEF COMPLAINT:    Patient is a 77y old  Male who presents with a chief complaint of Lethargy     INTERVAL HPI/OVERNIGHT EVENTS:    Patient seen and examined at bedside. No acute overnight events occurred.    ROS: Reports improvement in weakness. All other systems are negative.    Medications:  Standing  amLODIPine   Tablet 10 milliGRAM(s) Oral daily  aspirin  chewable 81 milliGRAM(s) Oral daily  atorvastatin 80 milliGRAM(s) Oral at bedtime  carvedilol 6.25 milliGRAM(s) Oral every 12 hours  chlorhexidine 2% Cloths 1 Application(s) Topical daily  clopidogrel Tablet 75 milliGRAM(s) Oral daily  dextrose 5%. 1000 milliLiter(s) IV Continuous <Continuous>  dextrose 5%. 1000 milliLiter(s) IV Continuous <Continuous>  dextrose 50% Injectable 25 Gram(s) IV Push once  dextrose 50% Injectable 12.5 Gram(s) IV Push once  dextrose 50% Injectable 25 Gram(s) IV Push once  glucagon  Injectable 1 milliGRAM(s) IntraMuscular once  heparin   Injectable 5000 Unit(s) SubCutaneous every 8 hours  insulin glargine Injectable (LANTUS) 4 Unit(s) SubCutaneous at bedtime  insulin lispro (ADMELOG) corrective regimen sliding scale   SubCutaneous three times a day before meals  insulin lispro Injectable (ADMELOG) 3 Unit(s) SubCutaneous three times a day before meals  predniSONE   Tablet 20 milliGRAM(s) Oral two times a day  sodium bicarbonate 650 milliGRAM(s) Oral three times a day  tamsulosin 0.4 milliGRAM(s) Oral at bedtime    PRN Meds  dextrose Oral Gel 15 Gram(s) Oral once PRN  oxycodone    5 mG/acetaminophen 325 mG 1 Tablet(s) Oral every 4 hours PRN        Vital Signs:    T(F): 96.5 (23 @ 14:17), Max: 99.6 (23 @ 17:45)  HR: 74 (23 @ 14:17) (74 - 81)  BP: 99/50 (23 @ 14:17) (99/50 - 120/58)  RR: 18 (23 @ 14:17) (18 - 18)  SpO2: --  I&O's Summary    2023 07:01  -  2023 07:00  --------------------------------------------------------  IN: 1141 mL / OUT: 602 mL / NET: 539 mL    2023 07:01  -  2023 17:28  --------------------------------------------------------  IN: 0 mL / OUT: 900 mL / NET: -900 mL      Daily     Daily Weight in k.8 (2023 04:57)  CAPILLARY BLOOD GLUCOSE      POCT Blood Glucose.: 310 mg/dL (2023 16:58)  POCT Blood Glucose.: 223 mg/dL (2023 11:39)  POCT Blood Glucose.: 229 mg/dL (2023 11:37)  POCT Blood Glucose.: 202 mg/dL (2023 07:28)  POCT Blood Glucose.: 278 mg/dL (2023 21:22)      PHYSICAL EXAM:  GENERAL:  NAD  SKIN: No rashes or lesions  HEENT: Atraumatic. Normocephalic. Anicteric  NECK:  No JVD.   PULMONARY: Clear to ausculation bilaterally. No wheezing. No rales  CVS: Normal S1, S2. Regular rate and rhythm. No murmurs.  ABDOMEN/GI: Soft, Nontender, Nondistended; Bowel sounds are present  EXTREMITIES:  No edema B/L LE.  NEUROLOGIC:  No motor deficit.  PSYCH: Alert & oriented x 3, normal affect      LABS:                        10.1   9.02  )-----------( 232      ( 2023 01:39 )             30.3     02-    138  |  106  |  58<H>  ----------------------------<  189<H>  3.8   |  17  |  2.7<H>    Ca    8.0<L>      2023 01:39  Mg     2.4         TPro  5.1<L>  /  Alb  2.4<L>  /  TBili  0.4  /  DBili  x   /  AST  50<H>  /  ALT  50<H>  /  AlkPhos  87              Culture - Blood (collected 2023 11:45)  Source: .Blood None  Preliminary Report (2023 23:01):    No growth to date.        RADIOLOGY & ADDITIONAL TESTS:  Imaging or report Personally Reviewed:  [ ] YES  [ ] NO -->no new images    Telemetry reviewed independently - NSR, no acute events  EKG reviewed independently -->no new EKGs    Consultant(s) Notes Reviewed:  [ ] YES  [ ] NO  Care Discussed with Consultants/Other Providers [ ] YES  [ ] NO    Case discussed with resident  Care discussed with pt

## 2023-02-22 NOTE — OCCUPATIONAL THERAPY INITIAL EVALUATION ADULT - SPECIFY REASON(S)
Unable to arouse despite provision of sternal rub and cold compress. RN made aware. As per RN, pt unarousable this AM. Will follow up when appropriate.

## 2023-02-23 LAB
ALBUMIN SERPL ELPH-MCNC: 2.4 G/DL — LOW (ref 3.5–5.2)
ALBUMIN SERPL ELPH-MCNC: 2.5 G/DL — LOW (ref 3.5–5.2)
ALP SERPL-CCNC: 81 U/L — SIGNIFICANT CHANGE UP (ref 30–115)
ALP SERPL-CCNC: 86 U/L — SIGNIFICANT CHANGE UP (ref 30–115)
ALT FLD-CCNC: 46 U/L — HIGH (ref 0–41)
ALT FLD-CCNC: 51 U/L — HIGH (ref 0–41)
ANION GAP SERPL CALC-SCNC: 12 MMOL/L — SIGNIFICANT CHANGE UP (ref 7–14)
ANION GAP SERPL CALC-SCNC: 14 MMOL/L — SIGNIFICANT CHANGE UP (ref 7–14)
AST SERPL-CCNC: 36 U/L — SIGNIFICANT CHANGE UP (ref 0–41)
AST SERPL-CCNC: 43 U/L — HIGH (ref 0–41)
B PERT IGG+IGM PNL SER: ABNORMAL
BASOPHILS # BLD AUTO: 0 K/UL — SIGNIFICANT CHANGE UP (ref 0–0.2)
BASOPHILS # BLD AUTO: 0.01 K/UL — SIGNIFICANT CHANGE UP (ref 0–0.2)
BASOPHILS NFR BLD AUTO: 0 % — SIGNIFICANT CHANGE UP (ref 0–1)
BASOPHILS NFR BLD AUTO: 0.1 % — SIGNIFICANT CHANGE UP (ref 0–1)
BILIRUB SERPL-MCNC: 0.2 MG/DL — SIGNIFICANT CHANGE UP (ref 0.2–1.2)
BILIRUB SERPL-MCNC: 0.2 MG/DL — SIGNIFICANT CHANGE UP (ref 0.2–1.2)
BUN SERPL-MCNC: 65 MG/DL — CRITICAL HIGH (ref 10–20)
BUN SERPL-MCNC: 67 MG/DL — CRITICAL HIGH (ref 10–20)
CALCIUM SERPL-MCNC: 7.8 MG/DL — LOW (ref 8.4–10.5)
CALCIUM SERPL-MCNC: 7.9 MG/DL — LOW (ref 8.4–10.5)
CHLORIDE SERPL-SCNC: 106 MMOL/L — SIGNIFICANT CHANGE UP (ref 98–110)
CHLORIDE SERPL-SCNC: 107 MMOL/L — SIGNIFICANT CHANGE UP (ref 98–110)
CO2 SERPL-SCNC: 16 MMOL/L — LOW (ref 17–32)
CO2 SERPL-SCNC: 17 MMOL/L — SIGNIFICANT CHANGE UP (ref 17–32)
COLOR FLD: SIGNIFICANT CHANGE UP
CREAT SERPL-MCNC: 2.4 MG/DL — HIGH (ref 0.7–1.5)
CREAT SERPL-MCNC: 2.6 MG/DL — HIGH (ref 0.7–1.5)
CULTURE RESULTS: SIGNIFICANT CHANGE UP
EGFR: 25 ML/MIN/1.73M2 — LOW
EGFR: 27 ML/MIN/1.73M2 — LOW
EOSINOPHIL # BLD AUTO: 0 K/UL — SIGNIFICANT CHANGE UP (ref 0–0.7)
EOSINOPHIL # BLD AUTO: 0 K/UL — SIGNIFICANT CHANGE UP (ref 0–0.7)
EOSINOPHIL NFR BLD AUTO: 0 % — SIGNIFICANT CHANGE UP (ref 0–8)
EOSINOPHIL NFR BLD AUTO: 0 % — SIGNIFICANT CHANGE UP (ref 0–8)
FLUID INTAKE SUBSTANCE CLASS: SIGNIFICANT CHANGE UP
GLUCOSE BLDC GLUCOMTR-MCNC: 187 MG/DL — HIGH (ref 70–99)
GLUCOSE BLDC GLUCOMTR-MCNC: 235 MG/DL — HIGH (ref 70–99)
GLUCOSE BLDC GLUCOMTR-MCNC: 251 MG/DL — HIGH (ref 70–99)
GLUCOSE BLDC GLUCOMTR-MCNC: 341 MG/DL — HIGH (ref 70–99)
GLUCOSE BLDC GLUCOMTR-MCNC: 358 MG/DL — HIGH (ref 70–99)
GLUCOSE SERPL-MCNC: 178 MG/DL — HIGH (ref 70–99)
GLUCOSE SERPL-MCNC: 204 MG/DL — HIGH (ref 70–99)
HCT VFR BLD CALC: 27.6 % — LOW (ref 42–52)
HCT VFR BLD CALC: 28.8 % — LOW (ref 42–52)
HGB BLD-MCNC: 9.1 G/DL — LOW (ref 14–18)
HGB BLD-MCNC: 9.5 G/DL — LOW (ref 14–18)
IMM GRANULOCYTES NFR BLD AUTO: 0.7 % — HIGH (ref 0.1–0.3)
IMM GRANULOCYTES NFR BLD AUTO: 0.8 % — HIGH (ref 0.1–0.3)
LYMPHOCYTES # BLD AUTO: 0.65 K/UL — LOW (ref 1.2–3.4)
LYMPHOCYTES # BLD AUTO: 0.82 K/UL — LOW (ref 1.2–3.4)
LYMPHOCYTES # BLD AUTO: 10.4 % — LOW (ref 20.5–51.1)
LYMPHOCYTES # BLD AUTO: 7.4 % — LOW (ref 20.5–51.1)
LYMPHOCYTES # FLD: 3 — SIGNIFICANT CHANGE UP
MAGNESIUM SERPL-MCNC: 2.4 MG/DL — SIGNIFICANT CHANGE UP (ref 1.8–2.4)
MAGNESIUM SERPL-MCNC: 2.4 MG/DL — SIGNIFICANT CHANGE UP (ref 1.8–2.4)
MCHC RBC-ENTMCNC: 28.9 PG — SIGNIFICANT CHANGE UP (ref 27–31)
MCHC RBC-ENTMCNC: 29.2 PG — SIGNIFICANT CHANGE UP (ref 27–31)
MCHC RBC-ENTMCNC: 33 G/DL — SIGNIFICANT CHANGE UP (ref 32–37)
MCHC RBC-ENTMCNC: 33 G/DL — SIGNIFICANT CHANGE UP (ref 32–37)
MCV RBC AUTO: 87.5 FL — SIGNIFICANT CHANGE UP (ref 80–94)
MCV RBC AUTO: 88.5 FL — SIGNIFICANT CHANGE UP (ref 80–94)
MESOTHL CELL # FLD: 2 % — SIGNIFICANT CHANGE UP
MONOCYTES # BLD AUTO: 0.34 K/UL — SIGNIFICANT CHANGE UP (ref 0.1–0.6)
MONOCYTES # BLD AUTO: 0.4 K/UL — SIGNIFICANT CHANGE UP (ref 0.1–0.6)
MONOCYTES NFR BLD AUTO: 3.8 % — SIGNIFICANT CHANGE UP (ref 1.7–9.3)
MONOCYTES NFR BLD AUTO: 5.1 % — SIGNIFICANT CHANGE UP (ref 1.7–9.3)
MONOS+MACROS # FLD: 1 % — SIGNIFICANT CHANGE UP
NEUTROPHILS # BLD AUTO: 6.62 K/UL — HIGH (ref 1.4–6.5)
NEUTROPHILS # BLD AUTO: 7.78 K/UL — HIGH (ref 1.4–6.5)
NEUTROPHILS NFR BLD AUTO: 83.7 % — HIGH (ref 42.2–75.2)
NEUTROPHILS NFR BLD AUTO: 88 % — HIGH (ref 42.2–75.2)
NEUTROPHILS-BODY FLUID: 94 % — SIGNIFICANT CHANGE UP
NRBC # BLD: 0 /100 WBCS — SIGNIFICANT CHANGE UP (ref 0–0)
NRBC # BLD: 0 /100 WBCS — SIGNIFICANT CHANGE UP (ref 0–0)
PLATELET # BLD AUTO: 233 K/UL — SIGNIFICANT CHANGE UP (ref 130–400)
PLATELET # BLD AUTO: 252 K/UL — SIGNIFICANT CHANGE UP (ref 130–400)
POTASSIUM SERPL-MCNC: 3.9 MMOL/L — SIGNIFICANT CHANGE UP (ref 3.5–5)
POTASSIUM SERPL-MCNC: 4 MMOL/L — SIGNIFICANT CHANGE UP (ref 3.5–5)
POTASSIUM SERPL-SCNC: 3.9 MMOL/L — SIGNIFICANT CHANGE UP (ref 3.5–5)
POTASSIUM SERPL-SCNC: 4 MMOL/L — SIGNIFICANT CHANGE UP (ref 3.5–5)
PROT SERPL-MCNC: 4.8 G/DL — LOW (ref 6–8)
PROT SERPL-MCNC: 5.1 G/DL — LOW (ref 6–8)
RBC # BLD: 3.12 M/UL — LOW (ref 4.7–6.1)
RBC # BLD: 3.29 M/UL — LOW (ref 4.7–6.1)
RBC # FLD: 13.6 % — SIGNIFICANT CHANGE UP (ref 11.5–14.5)
RBC # FLD: 13.6 % — SIGNIFICANT CHANGE UP (ref 11.5–14.5)
RCV VOL RI: HIGH /UL (ref 0–0)
SODIUM SERPL-SCNC: 136 MMOL/L — SIGNIFICANT CHANGE UP (ref 135–146)
SODIUM SERPL-SCNC: 136 MMOL/L — SIGNIFICANT CHANGE UP (ref 135–146)
SPECIMEN SOURCE: SIGNIFICANT CHANGE UP
TOTAL NUCLEATED CELL COUNT, BODY FLUID: 5561 /UL — SIGNIFICANT CHANGE UP
TUBE TYPE: SIGNIFICANT CHANGE UP
WBC # BLD: 7.9 K/UL — SIGNIFICANT CHANGE UP (ref 4.8–10.8)
WBC # BLD: 8.84 K/UL — SIGNIFICANT CHANGE UP (ref 4.8–10.8)
WBC # FLD AUTO: 7.9 K/UL — SIGNIFICANT CHANGE UP (ref 4.8–10.8)
WBC # FLD AUTO: 8.84 K/UL — SIGNIFICANT CHANGE UP (ref 4.8–10.8)

## 2023-02-23 PROCEDURE — 73560 X-RAY EXAM OF KNEE 1 OR 2: CPT | Mod: 26,LT

## 2023-02-23 PROCEDURE — 99233 SBSQ HOSP IP/OBS HIGH 50: CPT

## 2023-02-23 RX ORDER — INSULIN LISPRO 100/ML
6 VIAL (ML) SUBCUTANEOUS
Refills: 0 | Status: DISCONTINUED | OUTPATIENT
Start: 2023-02-23 | End: 2023-02-25

## 2023-02-23 RX ORDER — INSULIN GLARGINE 100 [IU]/ML
18 INJECTION, SOLUTION SUBCUTANEOUS AT BEDTIME
Refills: 0 | Status: DISCONTINUED | OUTPATIENT
Start: 2023-02-23 | End: 2023-02-25

## 2023-02-23 RX ADMIN — Medication 650 MILLIGRAM(S): at 14:02

## 2023-02-23 RX ADMIN — ATORVASTATIN CALCIUM 80 MILLIGRAM(S): 80 TABLET, FILM COATED ORAL at 21:42

## 2023-02-23 RX ADMIN — HEPARIN SODIUM 5000 UNIT(S): 5000 INJECTION INTRAVENOUS; SUBCUTANEOUS at 05:45

## 2023-02-23 RX ADMIN — INSULIN GLARGINE 18 UNIT(S): 100 INJECTION, SOLUTION SUBCUTANEOUS at 22:13

## 2023-02-23 RX ADMIN — TAMSULOSIN HYDROCHLORIDE 0.4 MILLIGRAM(S): 0.4 CAPSULE ORAL at 21:42

## 2023-02-23 RX ADMIN — CARVEDILOL PHOSPHATE 6.25 MILLIGRAM(S): 80 CAPSULE, EXTENDED RELEASE ORAL at 17:34

## 2023-02-23 RX ADMIN — Medication 8: at 11:20

## 2023-02-23 RX ADMIN — Medication 20 MILLIGRAM(S): at 17:34

## 2023-02-23 RX ADMIN — NYSTATIN CREAM 1 APPLICATION(S): 100000 CREAM TOPICAL at 05:49

## 2023-02-23 RX ADMIN — Medication 4 UNIT(S): at 16:44

## 2023-02-23 RX ADMIN — Medication 81 MILLIGRAM(S): at 11:22

## 2023-02-23 RX ADMIN — Medication 4 UNIT(S): at 08:12

## 2023-02-23 RX ADMIN — Medication 650 MILLIGRAM(S): at 05:48

## 2023-02-23 RX ADMIN — AMLODIPINE BESYLATE 10 MILLIGRAM(S): 2.5 TABLET ORAL at 08:14

## 2023-02-23 RX ADMIN — Medication 2: at 08:11

## 2023-02-23 RX ADMIN — HEPARIN SODIUM 5000 UNIT(S): 5000 INJECTION INTRAVENOUS; SUBCUTANEOUS at 21:43

## 2023-02-23 RX ADMIN — Medication 6: at 16:43

## 2023-02-23 RX ADMIN — Medication 650 MILLIGRAM(S): at 21:42

## 2023-02-23 RX ADMIN — Medication 20 MILLIGRAM(S): at 05:49

## 2023-02-23 RX ADMIN — Medication 4 UNIT(S): at 11:20

## 2023-02-23 RX ADMIN — CARVEDILOL PHOSPHATE 6.25 MILLIGRAM(S): 80 CAPSULE, EXTENDED RELEASE ORAL at 05:46

## 2023-02-23 RX ADMIN — CLOPIDOGREL BISULFATE 75 MILLIGRAM(S): 75 TABLET, FILM COATED ORAL at 11:22

## 2023-02-23 RX ADMIN — HEPARIN SODIUM 5000 UNIT(S): 5000 INJECTION INTRAVENOUS; SUBCUTANEOUS at 14:02

## 2023-02-23 NOTE — PROGRESS NOTE ADULT - SUBJECTIVE AND OBJECTIVE BOX
CHIEF COMPLAINT:    Patient is a 77y old  Male who presents with a chief complaint of Lethargy     INTERVAL HPI/OVERNIGHT EVENTS:    Patient seen and examined at bedside. No acute overnight events occurred.    ROS: Reports continued improvement in knee swelling and pain. All other systems are negative.    Medications:  Standing  amLODIPine   Tablet 10 milliGRAM(s) Oral daily  aspirin  chewable 81 milliGRAM(s) Oral daily  atorvastatin 80 milliGRAM(s) Oral at bedtime  carvedilol 6.25 milliGRAM(s) Oral every 12 hours  chlorhexidine 2% Cloths 1 Application(s) Topical daily  clopidogrel Tablet 75 milliGRAM(s) Oral daily  dextrose 5%. 1000 milliLiter(s) IV Continuous <Continuous>  dextrose 5%. 1000 milliLiter(s) IV Continuous <Continuous>  dextrose 50% Injectable 25 Gram(s) IV Push once  dextrose 50% Injectable 12.5 Gram(s) IV Push once  dextrose 50% Injectable 25 Gram(s) IV Push once  glucagon  Injectable 1 milliGRAM(s) IntraMuscular once  heparin   Injectable 5000 Unit(s) SubCutaneous every 8 hours  insulin glargine Injectable (LANTUS) 12 Unit(s) SubCutaneous at bedtime  insulin lispro (ADMELOG) corrective regimen sliding scale   SubCutaneous three times a day before meals  insulin lispro Injectable (ADMELOG) 4 Unit(s) SubCutaneous three times a day before meals  nystatin Cream 1 Application(s) Topical two times a day  predniSONE   Tablet 20 milliGRAM(s) Oral two times a day  sodium bicarbonate 650 milliGRAM(s) Oral three times a day  tamsulosin 0.4 milliGRAM(s) Oral at bedtime    PRN Meds  dextrose Oral Gel 15 Gram(s) Oral once PRN  oxycodone    5 mG/acetaminophen 325 mG 1 Tablet(s) Oral every 4 hours PRN        Vital Signs:    T(F): 97.1 (02-23-23 @ 13:15), Max: 98.2 (02-22-23 @ 20:10)  HR: 68 (02-23-23 @ 13:15) (65 - 73)  BP: 104/55 (02-23-23 @ 13:15) (101/55 - 114/63)  RR: 18 (02-23-23 @ 13:15) (18 - 18)  SpO2: 96% (02-22-23 @ 20:10) (96% - 96%)  I&O's Summary    22 Feb 2023 07:01  -  23 Feb 2023 07:00  --------------------------------------------------------  IN: 0 mL / OUT: 900 mL / NET: -900 mL    23 Feb 2023 07:01  -  23 Feb 2023 17:17  --------------------------------------------------------  IN: 0 mL / OUT: 1 mL / NET: -1 mL      POCT Blood Glucose.: 251 mg/dL (23 Feb 2023 16:37)  POCT Blood Glucose.: 341 mg/dL (23 Feb 2023 11:15)  POCT Blood Glucose.: 358 mg/dL (23 Feb 2023 11:14)  POCT Blood Glucose.: 187 mg/dL (23 Feb 2023 07:40)  POCT Blood Glucose.: 196 mg/dL (22 Feb 2023 21:26)      PHYSICAL EXAM:  GENERAL:  NAD  SKIN: No rashes or lesions  HEENT: Atraumatic. Normocephalic. Anicteric  NECK:  No JVD.   PULMONARY: Clear to ausculation bilaterally. No wheezing. No rales  CVS: Normal S1, S2. Regular rate and rhythm. No murmurs.  ABDOMEN/GI: Soft, Nontender, Nondistended; Bowel sounds are present  EXTREMITIES:  No edema B/L LE.  NEUROLOGIC:  No motor deficit.  PSYCH: Alert & oriented x 3, normal affect      LABS:                        9.1    7.90  )-----------( 252      ( 23 Feb 2023 04:58 )             27.6     02-23    136  |  107  |  65<HH>  ----------------------------<  178<H>  4.0   |  17  |  2.4<H>    Ca    7.9<L>      23 Feb 2023 04:58  Mg     2.4     02-23    TPro  4.8<L>  /  Alb  2.4<L>  /  TBili  0.2  /  DBili  x   /  AST  36  /  ALT  46<H>  /  AlkPhos  81  02-23            Culture - Stool (collected 21 Feb 2023 13:27)  Source: .Stool Feces  Final Report (23 Feb 2023 17:13):    No enteric pathogens isolated.    (Stool culture examined for Salmonella,    Shigella, Campylobacter, Aeromonas, Plesiomonas,    Vibrio, E.coli O157 and Yersinia)        RADIOLOGY & ADDITIONAL TESTS:  Imaging or report Personally Reviewed:  [ ] YES  [ ] NO -->no new images    Telemetry reviewed independently - NSR, no acute events  EKG reviewed independently -->no new EKGs    Consultant(s) Notes Reviewed:  [ ] YES  [ ] NO  Care Discussed with Consultants/Other Providers [ ] YES  [ ] NO    Case discussed with resident  Care discussed with pt

## 2023-02-23 NOTE — PROGRESS NOTE ADULT - ASSESSMENT
77-year-old cantonese speaking male with past medical history of hypertension, hyperlipidemia, DM and BPH presenting for evaluation of weakness, dizziness, urinary frequency over the last 3 days. Family describe that their main concern is his lethargy that has been persistent and associated with some memory changes.    #Fever   #knee swelling  - possible pseudogout vs OA vs Gout. Septic Arthritis is less likely as the patient has bilateral arthritis less on left, also no leukocytosis.   Bilateral knee tenderness, worse on the right.   UA is negative, COVID-19 negative, CXR is clear. f/u blood  cx.   -xray right:  No acute fracture is seen.  There are tricompartmental degenerative changes ranging to moderate in   the patellofemoral compartment.  There is a large knee joint effusion.  - Prednisone 20mg BID (over colchicine due to sagrario) for suspected gout  - knee arthrocentesis performed by ortho:   Cell count back at 5k, patient does not meet criteria for septic arthritis   Please follow-up crystal analysis and have patient f/u with rheumatology      # Encephelopathy   #Acute CVA right  anterior thalamic   - MR HeadAcute infarct involving the anterior right thalamus without evidence of hemorrhage 2/16.  -  CT Head on 2/13 right basal ganglia consistent with a chronic infarct.  - MRA brain: No stenosis or aneurysm. MRA neck: No stenosis. No dissection.  - EEG on 2/15 no epileptic activity   - LDL Cholesterol Calculated: Unable to calculate LDL, TG is >400 mg/dL (02.17.23 @ 07:55)  - c/w ASA, statin, plavix  - TTE Pending  - family does not want loop recorder, f/u ep outpatient      # Hypertension  - c/w  coreg  - norvasc 10mg daily  - holding valsartan due to rising cr    # DM type 2  - A1C with Estimated Average Glucose Result: 6.3 % (02.14.23 @ 05:49)  - monitor FS  - c/w lantus, lispro    # SAGRARIO on CKD stage3  -  US Renal (02.15.23 @ 19:27) No hydronephrosis. Right ureteral jet is not identified on this examination, a nonspecific   finding. Enlarged prostate. Left renal cyst  - holding valsartan due to rising cr    # H/o BPH  - c/w  flomax    #h/o gout  -uses a herbal supplement at home  -after sagrario resolves consider allopurinol  -currently receiving steroids    # DVT prophylaxis - hep  # Full code

## 2023-02-23 NOTE — PROGRESS NOTE ADULT - ASSESSMENT
76 yo Cantonese speaking M PMHx hypertension, hyperlipidemia, DM II, and BPH presented for evaluation of weakness, dizziness, urinary frequency for 3 days prior to admission. Family describe that their main concern is his lethargy that has been persistent and associated with some memory changes.    Acute CVA right  anterior thalamic:   -Patient presented with AMS, urinary incontinence   -Neuro exam on 2/16 no focal deficit.   -Today patient can't move the LE likely from knee pain.   -CT Head on 2/13 showed right basal ganglia hypodensity consistent with a chronic infarct.  -MRA Head and Neck - no carotid artery stenosis.  -EEG on 2/15 no epileptic activity   -Continue ASA, Plavix and Lipitor.   -family declining ILR  -PT/OT eval    Fever:   - unclear etiology  - cultures pending  - COVID -19 negative  - synovial fluid negative for infeciton    Knee effusion  - possible pseudogout vs OA vs Gout. Septic Arthritis is less likely as the patient has bilateral arthritis less on left, also no leukocytosis.   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   -Patient with urine incontinence, worsening renal function.   -Aguirre placed 200 cc came out, UA noted mild proteinuria.   -Pending urine studies.   -Recent Renal US showed no hydronephrosis.   -Hold Valsartan.   - can do TOV    HTN  -Continue Coreg and Norvasc  -Valsartan held due to Acute Kidney Injury.     DM type 2  -FS uncontrolled likely due to steroids  - insulin adjusted again today    BPH  -continue flomax    DVT prophylaxis: heparin sc.     # Full code  #Progress Note Handoff:  Pending (specify):  improving Acute Kidney Injury  Family discussion: with his wife at bedside.  [id number] Renee  Disposition: Home with home PT< will need another PT evaluation.

## 2023-02-23 NOTE — CONSULT NOTE ADULT - ASSESSMENT
Orthopaedic Surgery Consult Note    Time consult called: 10:00 AM  Time patient seen: 10:15 AM  Time abx given:  Phone number:    [phone]    76yo Male PMH HTN, HLD, DM, BPH, gout presents with bilateral knee pain, R=L. Patient states that he has had bilateral knee pain for past several months. Pain has been worsening for past month. States both knees hurt equally. Does not have pain in any other joints. Patient admitted for weakness, dizziness, urinary frequency and lethargy. Denies f/c/n/v/sob/chest pain.     PMH/PSH  Encephalopathy    No pertinent family history in first degree relatives    Handoff    MEWS Score    HTN (hypertension)    Seasonal allergies    History of BPH    Diabetes    Encephalopathy    No significant past surgical history    FAILURE TO THRIVE    SysAdmin_VisitLink        Medications  amLODIPine   Tablet 10 milliGRAM(s) Oral daily  aspirin  chewable 81 milliGRAM(s) Oral daily  atorvastatin 80 milliGRAM(s) Oral at bedtime  carvedilol 6.25 milliGRAM(s) Oral every 12 hours  chlorhexidine 2% Cloths 1 Application(s) Topical daily  clopidogrel Tablet 75 milliGRAM(s) Oral daily  dextrose 5%. 1000 milliLiter(s) IV Continuous <Continuous>  dextrose 5%. 1000 milliLiter(s) IV Continuous <Continuous>  dextrose 50% Injectable 25 Gram(s) IV Push once  dextrose 50% Injectable 12.5 Gram(s) IV Push once  dextrose 50% Injectable 25 Gram(s) IV Push once  dextrose Oral Gel 15 Gram(s) Oral once PRN  glucagon  Injectable 1 milliGRAM(s) IntraMuscular once  heparin   Injectable 5000 Unit(s) SubCutaneous every 8 hours  insulin glargine Injectable (LANTUS) 12 Unit(s) SubCutaneous at bedtime  insulin lispro (ADMELOG) corrective regimen sliding scale   SubCutaneous three times a day before meals  insulin lispro Injectable (ADMELOG) 4 Unit(s) SubCutaneous three times a day before meals  nystatin Cream 1 Application(s) Topical two times a day  oxycodone    5 mG/acetaminophen 325 mG 1 Tablet(s) Oral every 4 hours PRN  predniSONE   Tablet 20 milliGRAM(s) Oral two times a day  sodium bicarbonate 650 milliGRAM(s) Oral three times a day  tamsulosin 0.4 milliGRAM(s) Oral at bedtime      Home Medications:  amLODIPine 5 mg oral tablet: 1 tab(s) orally once a day (14 Feb 2023 02:08)  aspirin 81 mg oral tablet: 1 tab(s) orally once a day (14 Feb 2023 02:08)  atorvastatin 20 mg oral tablet: 1 tab(s) orally once a day (14 Feb 2023 02:08)  carvedilol 6.25 mg oral tablet: 1 tab(s) orally 2 times a day (14 Feb 2023 02:08)  Jardiance 25 mg oral tablet: 1 tab(s) orally once a day (in the morning) (14 Feb 2023 02:08)  Nephplex Rx oral tablet: 1 tab(s) orally once a day (14 Feb 2023 02:08)  tamsulosin 0.4 mg oral capsule: 1 cap(s) orally once a day (14 Feb 2023 02:08)  valsartan 320 mg oral tablet: 1 tab(s) orally once a day (14 Feb 2023 02:08)        Allergies  [This allergen will not trigger allergy alert] pollen (Unknown)  No Known Drug Allergies        T(C): 36.2 (02-23-23 @ 05:16), Max: 36.8 (02-22-23 @ 20:10)  HR: 65 (02-23-23 @ 08:08) (65 - 74)  BP: 114/63 (02-23-23 @ 08:08) (99/50 - 114/63)  RR: 18 (02-23-23 @ 05:16) (18 - 18)  SpO2: 96% (02-22-23 @ 20:10) (96% - 96%)    P/E:  AOx3, NAD  Non-labored breathing    RLE  Skin intact  No ecchymosis  No erythema  Moderate knee swelling  TTP globally over knee  ROM knee 10 to 70  No pain with micromotion  Compartments soft and compressible, no pain w/ passive stretch of digits  SILT SP/DP/T/Sural/Saph  Firing TA/EHL/FHL/GS  DP pulse 2+, cap refill <2    LLE  Skin intact  Minimal knee swelling  TTP globally over knee  ROM knee 10 to 90  No pain with micromotion  Compartments soft and compressible, no pain w/ passive stretch of digits  SILT SP/DP/T/Sural/Saph  Firing TA/EHL/FHL/GS  DP pulse 2+, cap refill <2          Labs                        9.1    7.90  )-----------( 252      ( 23 Feb 2023 04:58 )             27.6     02-23    136  |  107  |  65<HH>  ----------------------------<  178<H>  4.0   |  17  |  2.4<H>    Ca    7.9<L>      23 Feb 2023 04:58  Mg     2.4     02-23    TPro  4.8<L>  /  Alb  2.4<L>  /  TBili  0.2  /  DBili  x   /  AST  36  /  ALT  46<H>  /  AlkPhos  81  02-23    LIVER FUNCTIONS - ( 23 Feb 2023 04:58 )  Alb: 2.4 g/dL / Pro: 4.8 g/dL / ALK PHOS: 81 U/L / ALT: 46 U/L / AST: 36 U/L / GGT: x               Imaging:    XR R knee  No fractures or dislocations, moderate joint effusion, mild OA    A/P:  76yo Male w/ bilateral knee pain, R=L. Orthopaedics consulted for R knee joint effusion on physical exam to rule out septic arthritis. Bedside arthrocentesis of R knee was performed under sterile technique. 20 cc of clear brown/reddish fluid was obtained and sent for culture/gram stain, cell count, crystal analysis. Given physical exam and no WBC/fever, low suspicion for septic arthritis at this time, likely gout flare-up.    F/u arthrocentesis   Weight bearing: WBAT BLE  NPO until cell count returns  DVT ppx: SCD, per primary team  Colchine for possible gout flare-up  XR L knee  Pain control  Ice/elevation       Orthopaedic Surgery Consult Note    Time consult called: 10:00 AM  Time patient seen: 10:15 AM  Time abx given:  Phone number:    [phone]    76yo Male PMH HTN, HLD, DM, BPH, gout presents with bilateral knee pain, R=L. Patient states that he has had bilateral knee pain for past several months. Pain has been worsening for past month. States both knees hurt equally. Does not have pain in any other joints. Patient admitted for weakness, dizziness, urinary frequency and lethargy. Denies f/c/n/v/sob/chest pain.     PMH/PSH  Encephalopathy    No pertinent family history in first degree relatives    Handoff    MEWS Score    HTN (hypertension)    Seasonal allergies    History of BPH    Diabetes    Encephalopathy    No significant past surgical history    FAILURE TO THRIVE    SysAdmin_VisitLink        Medications  amLODIPine   Tablet 10 milliGRAM(s) Oral daily  aspirin  chewable 81 milliGRAM(s) Oral daily  atorvastatin 80 milliGRAM(s) Oral at bedtime  carvedilol 6.25 milliGRAM(s) Oral every 12 hours  chlorhexidine 2% Cloths 1 Application(s) Topical daily  clopidogrel Tablet 75 milliGRAM(s) Oral daily  dextrose 5%. 1000 milliLiter(s) IV Continuous <Continuous>  dextrose 5%. 1000 milliLiter(s) IV Continuous <Continuous>  dextrose 50% Injectable 25 Gram(s) IV Push once  dextrose 50% Injectable 12.5 Gram(s) IV Push once  dextrose 50% Injectable 25 Gram(s) IV Push once  dextrose Oral Gel 15 Gram(s) Oral once PRN  glucagon  Injectable 1 milliGRAM(s) IntraMuscular once  heparin   Injectable 5000 Unit(s) SubCutaneous every 8 hours  insulin glargine Injectable (LANTUS) 12 Unit(s) SubCutaneous at bedtime  insulin lispro (ADMELOG) corrective regimen sliding scale   SubCutaneous three times a day before meals  insulin lispro Injectable (ADMELOG) 4 Unit(s) SubCutaneous three times a day before meals  nystatin Cream 1 Application(s) Topical two times a day  oxycodone    5 mG/acetaminophen 325 mG 1 Tablet(s) Oral every 4 hours PRN  predniSONE   Tablet 20 milliGRAM(s) Oral two times a day  sodium bicarbonate 650 milliGRAM(s) Oral three times a day  tamsulosin 0.4 milliGRAM(s) Oral at bedtime      Home Medications:  amLODIPine 5 mg oral tablet: 1 tab(s) orally once a day (14 Feb 2023 02:08)  aspirin 81 mg oral tablet: 1 tab(s) orally once a day (14 Feb 2023 02:08)  atorvastatin 20 mg oral tablet: 1 tab(s) orally once a day (14 Feb 2023 02:08)  carvedilol 6.25 mg oral tablet: 1 tab(s) orally 2 times a day (14 Feb 2023 02:08)  Jardiance 25 mg oral tablet: 1 tab(s) orally once a day (in the morning) (14 Feb 2023 02:08)  Nephplex Rx oral tablet: 1 tab(s) orally once a day (14 Feb 2023 02:08)  tamsulosin 0.4 mg oral capsule: 1 cap(s) orally once a day (14 Feb 2023 02:08)  valsartan 320 mg oral tablet: 1 tab(s) orally once a day (14 Feb 2023 02:08)        Allergies  [This allergen will not trigger allergy alert] pollen (Unknown)  No Known Drug Allergies        T(C): 36.2 (02-23-23 @ 05:16), Max: 36.8 (02-22-23 @ 20:10)  HR: 65 (02-23-23 @ 08:08) (65 - 74)  BP: 114/63 (02-23-23 @ 08:08) (99/50 - 114/63)  RR: 18 (02-23-23 @ 05:16) (18 - 18)  SpO2: 96% (02-22-23 @ 20:10) (96% - 96%)    P/E:  AOx3, NAD  Non-labored breathing    RLE  Skin intact  No ecchymosis  No erythema  Moderate knee swelling  TTP globally over knee  ROM knee 10 to 70  No pain with micromotion  Compartments soft and compressible, no pain w/ passive stretch of digits  SILT SP/DP/T/Sural/Saph  Firing TA/EHL/FHL/GS  DP pulse 2+, cap refill <2    LLE  Skin intact  Minimal knee swelling  TTP globally over knee  ROM knee 10 to 90  No pain with micromotion  Compartments soft and compressible, no pain w/ passive stretch of digits  SILT SP/DP/T/Sural/Saph  Firing TA/EHL/FHL/GS  DP pulse 2+, cap refill <2          Labs                        9.1    7.90  )-----------( 252      ( 23 Feb 2023 04:58 )             27.6     02-23    136  |  107  |  65<HH>  ----------------------------<  178<H>  4.0   |  17  |  2.4<H>    Ca    7.9<L>      23 Feb 2023 04:58  Mg     2.4     02-23    TPro  4.8<L>  /  Alb  2.4<L>  /  TBili  0.2  /  DBili  x   /  AST  36  /  ALT  46<H>  /  AlkPhos  81  02-23    LIVER FUNCTIONS - ( 23 Feb 2023 04:58 )  Alb: 2.4 g/dL / Pro: 4.8 g/dL / ALK PHOS: 81 U/L / ALT: 46 U/L / AST: 36 U/L / GGT: x               Imaging:    XR R knee  No fractures or dislocations, moderate joint effusion, mild OA    A/P:  76yo Male w/ bilateral knee pain, R=L. Orthopaedics consulted for R knee joint effusion on physical exam to rule out septic arthritis. Bedside arthrocentesis of R knee was performed under sterile technique. 20 cc of clear brown/reddish fluid was obtained and sent for culture/gram stain, cell count, crystal analysis. Given physical exam and no WBC/fever, low suspicion for septic arthritis at this time, likely gout flare-up.    F/u arthrocentesis   Weight bearing: WBAT BLE  NPO until cell count returns  DVT ppx: SCD, per primary team  Colchine for possible gout flare-up  XR L knee  Pain control  Ice/elevation    Update Note  - Cell count back at 5k   patient does not meet criteria for septic arthritis  - Please follow-up crystal analysis and have patient f/u with rheumatology     Orthopaedic Surgery Consult Note    Time consult called: 10:00 AM  Time patient seen: 10:15 AM  Time abx given:  Phone number:    [phone]    76yo Male PMH HTN, HLD, DM, BPH, gout presents with bilateral knee pain, R=L. Patient states that he has had bilateral knee pain for past several months. Pain has been worsening for past month. States both knees hurt equally. Does not have pain in any other joints. Patient admitted for weakness, dizziness, urinary frequency and lethargy. Denies f/c/n/v/sob/chest pain.     PMH/PSH  Encephalopathy    No pertinent family history in first degree relatives    Handoff    MEWS Score    HTN (hypertension)    Seasonal allergies    History of BPH    Diabetes    Encephalopathy    No significant past surgical history    FAILURE TO THRIVE    SysAdmin_VisitLink        Medications  amLODIPine   Tablet 10 milliGRAM(s) Oral daily  aspirin  chewable 81 milliGRAM(s) Oral daily  atorvastatin 80 milliGRAM(s) Oral at bedtime  carvedilol 6.25 milliGRAM(s) Oral every 12 hours  chlorhexidine 2% Cloths 1 Application(s) Topical daily  clopidogrel Tablet 75 milliGRAM(s) Oral daily  dextrose 5%. 1000 milliLiter(s) IV Continuous <Continuous>  dextrose 5%. 1000 milliLiter(s) IV Continuous <Continuous>  dextrose 50% Injectable 25 Gram(s) IV Push once  dextrose 50% Injectable 12.5 Gram(s) IV Push once  dextrose 50% Injectable 25 Gram(s) IV Push once  dextrose Oral Gel 15 Gram(s) Oral once PRN  glucagon  Injectable 1 milliGRAM(s) IntraMuscular once  heparin   Injectable 5000 Unit(s) SubCutaneous every 8 hours  insulin glargine Injectable (LANTUS) 12 Unit(s) SubCutaneous at bedtime  insulin lispro (ADMELOG) corrective regimen sliding scale   SubCutaneous three times a day before meals  insulin lispro Injectable (ADMELOG) 4 Unit(s) SubCutaneous three times a day before meals  nystatin Cream 1 Application(s) Topical two times a day  oxycodone    5 mG/acetaminophen 325 mG 1 Tablet(s) Oral every 4 hours PRN  predniSONE   Tablet 20 milliGRAM(s) Oral two times a day  sodium bicarbonate 650 milliGRAM(s) Oral three times a day  tamsulosin 0.4 milliGRAM(s) Oral at bedtime      Home Medications:  amLODIPine 5 mg oral tablet: 1 tab(s) orally once a day (14 Feb 2023 02:08)  aspirin 81 mg oral tablet: 1 tab(s) orally once a day (14 Feb 2023 02:08)  atorvastatin 20 mg oral tablet: 1 tab(s) orally once a day (14 Feb 2023 02:08)  carvedilol 6.25 mg oral tablet: 1 tab(s) orally 2 times a day (14 Feb 2023 02:08)  Jardiance 25 mg oral tablet: 1 tab(s) orally once a day (in the morning) (14 Feb 2023 02:08)  Nephplex Rx oral tablet: 1 tab(s) orally once a day (14 Feb 2023 02:08)  tamsulosin 0.4 mg oral capsule: 1 cap(s) orally once a day (14 Feb 2023 02:08)  valsartan 320 mg oral tablet: 1 tab(s) orally once a day (14 Feb 2023 02:08)        Allergies  [This allergen will not trigger allergy alert] pollen (Unknown)  No Known Drug Allergies        T(C): 36.2 (02-23-23 @ 05:16), Max: 36.8 (02-22-23 @ 20:10)  HR: 65 (02-23-23 @ 08:08) (65 - 74)  BP: 114/63 (02-23-23 @ 08:08) (99/50 - 114/63)  RR: 18 (02-23-23 @ 05:16) (18 - 18)  SpO2: 96% (02-22-23 @ 20:10) (96% - 96%)    P/E:  AOx3, NAD  Non-labored breathing    RLE  Skin intact  No ecchymosis  No erythema  Moderate knee swelling  TTP globally over knee  ROM knee 10 to 70  No pain with micromotion  Compartments soft and compressible, no pain w/ passive stretch of digits  SILT SP/DP/T/Sural/Saph  Firing TA/EHL/FHL/GS  DP pulse 2+, cap refill <2    LLE  Skin intact  Minimal knee swelling  TTP globally over knee  ROM knee 10 to 90  No pain with micromotion  Compartments soft and compressible, no pain w/ passive stretch of digits  SILT SP/DP/T/Sural/Saph  Firing TA/EHL/FHL/GS  DP pulse 2+, cap refill <2          Labs                        9.1    7.90  )-----------( 252      ( 23 Feb 2023 04:58 )             27.6     02-23    136  |  107  |  65<HH>  ----------------------------<  178<H>  4.0   |  17  |  2.4<H>    Ca    7.9<L>      23 Feb 2023 04:58  Mg     2.4     02-23    TPro  4.8<L>  /  Alb  2.4<L>  /  TBili  0.2  /  DBili  x   /  AST  36  /  ALT  46<H>  /  AlkPhos  81  02-23    LIVER FUNCTIONS - ( 23 Feb 2023 04:58 )  Alb: 2.4 g/dL / Pro: 4.8 g/dL / ALK PHOS: 81 U/L / ALT: 46 U/L / AST: 36 U/L / GGT: x               Imaging:    XR R knee  No fractures or dislocations, moderate joint effusion, mild OA    A/P:  76yo Male w/ bilateral knee pain, R=L. Orthopaedics consulted for R knee joint effusion on physical exam to rule out septic arthritis. Bedside arthrocentesis of R knee was performed under sterile technique. 20 cc of clear brown/reddish fluid was obtained and sent for culture/gram stain, cell count, crystal analysis. Given physical exam and no WBC/fever, low suspicion for septic arthritis at this time, likely gout flare-up.    F/u arthrocentesis   Weight bearing: WBAT BLE  NPO until cell count returns  DVT ppx: SCD, per primary team  Colchine for possible gout flare-up  XR L knee  Pain control  Ice/elevation    Update Note  - Cell count back at 5k   patient does not meet criteria for septic arthritis  - Please follow-up crystal analysis and have patient f/u with rheumatology  tap  5 K wbc  + crystals  Imp gout  no evidence of infection

## 2023-02-23 NOTE — PROGRESS NOTE ADULT - SUBJECTIVE AND OBJECTIVE BOX
SUBJECTIVE:    Patient is a 77y old Male who presents with a chief complaint of Lethargy (23 Feb 2023 10:39)    Currently admitted to medicine with the primary diagnosis of:    Today is hospital day 10d.     Overnight Events:     knee swelling improving     PAST MEDICAL & SURGICAL HISTORY  HTN (hypertension)    Seasonal allergies    History of BPH    Diabetes    No significant past surgical history        ALLERGIES:  [This allergen will not trigger allergy alert] pollen (Unknown)  No Known Drug Allergies    MEDICATIONS:  STANDING MEDICATIONS  amLODIPine   Tablet 10 milliGRAM(s) Oral daily  aspirin  chewable 81 milliGRAM(s) Oral daily  atorvastatin 80 milliGRAM(s) Oral at bedtime  carvedilol 6.25 milliGRAM(s) Oral every 12 hours  chlorhexidine 2% Cloths 1 Application(s) Topical daily  clopidogrel Tablet 75 milliGRAM(s) Oral daily  dextrose 5%. 1000 milliLiter(s) IV Continuous <Continuous>  dextrose 5%. 1000 milliLiter(s) IV Continuous <Continuous>  dextrose 50% Injectable 25 Gram(s) IV Push once  dextrose 50% Injectable 12.5 Gram(s) IV Push once  dextrose 50% Injectable 25 Gram(s) IV Push once  glucagon  Injectable 1 milliGRAM(s) IntraMuscular once  heparin   Injectable 5000 Unit(s) SubCutaneous every 8 hours  insulin glargine Injectable (LANTUS) 12 Unit(s) SubCutaneous at bedtime  insulin lispro (ADMELOG) corrective regimen sliding scale   SubCutaneous three times a day before meals  insulin lispro Injectable (ADMELOG) 4 Unit(s) SubCutaneous three times a day before meals  nystatin Cream 1 Application(s) Topical two times a day  predniSONE   Tablet 20 milliGRAM(s) Oral two times a day  sodium bicarbonate 650 milliGRAM(s) Oral three times a day  tamsulosin 0.4 milliGRAM(s) Oral at bedtime    PRN MEDICATIONS  dextrose Oral Gel 15 Gram(s) Oral once PRN  oxycodone    5 mG/acetaminophen 325 mG 1 Tablet(s) Oral every 4 hours PRN    VITALS:   ICU Vital Signs Last 24 Hrs  T(C): 36.2 (23 Feb 2023 13:15), Max: 36.8 (22 Feb 2023 20:10)  T(F): 97.1 (23 Feb 2023 13:15), Max: 98.2 (22 Feb 2023 20:10)  HR: 68 (23 Feb 2023 13:15) (65 - 73)  BP: 104/55 (23 Feb 2023 13:15) (101/55 - 114/63)  BP(mean): --  ABP: --  ABP(mean): --  RR: 18 (23 Feb 2023 13:15) (18 - 18)  SpO2: 96% (22 Feb 2023 20:10) (96% - 96%)    O2 Parameters below as of 22 Feb 2023 20:10  Patient On (Oxygen Delivery Method): room air            LABS:                        9.1    7.90  )-----------( 252      ( 23 Feb 2023 04:58 )             27.6     02-23    136  |  107  |  65<HH>  ----------------------------<  178<H>  4.0   |  17  |  2.4<H>    Ca    7.9<L>      23 Feb 2023 04:58  Mg     2.4     02-23    TPro  4.8<L>  /  Alb  2.4<L>  /  TBili  0.2  /  DBili  x   /  AST  36  /  ALT  46<H>  /  AlkPhos  81  02-23              Culture - Stool (collected 21 Feb 2023 13:27)  Source: .Stool Feces  Preliminary Report (22 Feb 2023 22:25):    No enteric pathogens to date: Final culture pending            RADIOLOGY:  < from: Xray Knee 1 or 2 Views, Right (02.21.23 @ 13:34) >  No acute fracture is seen.    There are tricompartmental degenerative changes ranging to moderate in   the patellofemoral compartment.    There is a large knee joint effusion.    < end of copied text >    PHYSICAL EXAM:  GEN: laying in bed  Head: atraumatic   LUNGS: clear bl  HEART: s1 s2  ABD: nontender  EXT: no lower extremity edema

## 2023-02-24 LAB
ACANTHOCYTES BLD QL SMEAR: SLIGHT — SIGNIFICANT CHANGE UP
ALBUMIN SERPL ELPH-MCNC: 2.5 G/DL — LOW (ref 3.5–5.2)
ALBUMIN SERPL ELPH-MCNC: 2.6 G/DL — LOW (ref 3.5–5.2)
ALP SERPL-CCNC: 104 U/L — SIGNIFICANT CHANGE UP (ref 30–115)
ALP SERPL-CCNC: 92 U/L — SIGNIFICANT CHANGE UP (ref 30–115)
ALT FLD-CCNC: 48 U/L — HIGH (ref 0–41)
ALT FLD-CCNC: 48 U/L — HIGH (ref 0–41)
ANION GAP SERPL CALC-SCNC: 14 MMOL/L — SIGNIFICANT CHANGE UP (ref 7–14)
ANION GAP SERPL CALC-SCNC: 15 MMOL/L — HIGH (ref 7–14)
ANISOCYTOSIS BLD QL: SLIGHT — SIGNIFICANT CHANGE UP
AST SERPL-CCNC: 28 U/L — SIGNIFICANT CHANGE UP (ref 0–41)
AST SERPL-CCNC: 31 U/L — SIGNIFICANT CHANGE UP (ref 0–41)
BASOPHILS # BLD AUTO: 0 K/UL — SIGNIFICANT CHANGE UP (ref 0–0.2)
BASOPHILS # BLD AUTO: 0 K/UL — SIGNIFICANT CHANGE UP (ref 0–0.2)
BASOPHILS NFR BLD AUTO: 0 % — SIGNIFICANT CHANGE UP (ref 0–1)
BASOPHILS NFR BLD AUTO: 0 % — SIGNIFICANT CHANGE UP (ref 0–1)
BILIRUB SERPL-MCNC: 0.2 MG/DL — SIGNIFICANT CHANGE UP (ref 0.2–1.2)
BILIRUB SERPL-MCNC: 0.3 MG/DL — SIGNIFICANT CHANGE UP (ref 0.2–1.2)
BUN SERPL-MCNC: 71 MG/DL — CRITICAL HIGH (ref 10–20)
BUN SERPL-MCNC: 78 MG/DL — CRITICAL HIGH (ref 10–20)
CALCIUM SERPL-MCNC: 8.3 MG/DL — LOW (ref 8.4–10.4)
CALCIUM SERPL-MCNC: 8.4 MG/DL — SIGNIFICANT CHANGE UP (ref 8.4–10.5)
CHLORIDE SERPL-SCNC: 106 MMOL/L — SIGNIFICANT CHANGE UP (ref 98–110)
CHLORIDE SERPL-SCNC: 107 MMOL/L — SIGNIFICANT CHANGE UP (ref 98–110)
CO2 SERPL-SCNC: 15 MMOL/L — LOW (ref 17–32)
CO2 SERPL-SCNC: 17 MMOL/L — SIGNIFICANT CHANGE UP (ref 17–32)
CREAT SERPL-MCNC: 2.2 MG/DL — HIGH (ref 0.7–1.5)
CREAT SERPL-MCNC: 2.2 MG/DL — HIGH (ref 0.7–1.5)
DACRYOCYTES BLD QL SMEAR: SLIGHT — SIGNIFICANT CHANGE UP
EGFR: 30 ML/MIN/1.73M2 — LOW
EGFR: 30 ML/MIN/1.73M2 — LOW
EOSINOPHIL # BLD AUTO: 0 K/UL — SIGNIFICANT CHANGE UP (ref 0–0.7)
EOSINOPHIL # BLD AUTO: 0 K/UL — SIGNIFICANT CHANGE UP (ref 0–0.7)
EOSINOPHIL NFR BLD AUTO: 0 % — SIGNIFICANT CHANGE UP (ref 0–8)
EOSINOPHIL NFR BLD AUTO: 0 % — SIGNIFICANT CHANGE UP (ref 0–8)
GLUCOSE BLDC GLUCOMTR-MCNC: 242 MG/DL — HIGH (ref 70–99)
GLUCOSE BLDC GLUCOMTR-MCNC: 281 MG/DL — HIGH (ref 70–99)
GLUCOSE BLDC GLUCOMTR-MCNC: 298 MG/DL — HIGH (ref 70–99)
GLUCOSE BLDC GLUCOMTR-MCNC: 361 MG/DL — HIGH (ref 70–99)
GLUCOSE SERPL-MCNC: 255 MG/DL — HIGH (ref 70–99)
GLUCOSE SERPL-MCNC: 260 MG/DL — HIGH (ref 70–99)
GRAM STN FLD: SIGNIFICANT CHANGE UP
HCT VFR BLD CALC: 30.3 % — LOW (ref 42–52)
HCT VFR BLD CALC: 33.2 % — LOW (ref 42–52)
HGB BLD-MCNC: 10.1 G/DL — LOW (ref 14–18)
HGB BLD-MCNC: 11 G/DL — LOW (ref 14–18)
IMM GRANULOCYTES NFR BLD AUTO: 0.9 % — HIGH (ref 0.1–0.3)
LYMPHOCYTES # BLD AUTO: 0.39 K/UL — LOW (ref 1.2–3.4)
LYMPHOCYTES # BLD AUTO: 0.55 K/UL — LOW (ref 1.2–3.4)
LYMPHOCYTES # BLD AUTO: 5.3 % — LOW (ref 20.5–51.1)
LYMPHOCYTES # BLD AUTO: 7.4 % — LOW (ref 20.5–51.1)
MAGNESIUM SERPL-MCNC: 2.4 MG/DL — SIGNIFICANT CHANGE UP (ref 1.8–2.4)
MAGNESIUM SERPL-MCNC: 2.4 MG/DL — SIGNIFICANT CHANGE UP (ref 1.8–2.4)
MANUAL SMEAR VERIFICATION: SIGNIFICANT CHANGE UP
MCHC RBC-ENTMCNC: 29.2 PG — SIGNIFICANT CHANGE UP (ref 27–31)
MCHC RBC-ENTMCNC: 29.3 PG — SIGNIFICANT CHANGE UP (ref 27–31)
MCHC RBC-ENTMCNC: 33.1 G/DL — SIGNIFICANT CHANGE UP (ref 32–37)
MCHC RBC-ENTMCNC: 33.3 G/DL — SIGNIFICANT CHANGE UP (ref 32–37)
MCV RBC AUTO: 87.8 FL — SIGNIFICANT CHANGE UP (ref 80–94)
MCV RBC AUTO: 88.1 FL — SIGNIFICANT CHANGE UP (ref 80–94)
MICROCYTES BLD QL: SLIGHT — SIGNIFICANT CHANGE UP
MONOCYTES # BLD AUTO: 0.16 K/UL — SIGNIFICANT CHANGE UP (ref 0.1–0.6)
MONOCYTES # BLD AUTO: 0.19 K/UL — SIGNIFICANT CHANGE UP (ref 0.1–0.6)
MONOCYTES NFR BLD AUTO: 2.2 % — SIGNIFICANT CHANGE UP (ref 1.7–9.3)
MONOCYTES NFR BLD AUTO: 2.6 % — SIGNIFICANT CHANGE UP (ref 1.7–9.3)
NEUTROPHILS # BLD AUTO: 6.58 K/UL — HIGH (ref 1.4–6.5)
NEUTROPHILS # BLD AUTO: 6.61 K/UL — HIGH (ref 1.4–6.5)
NEUTROPHILS NFR BLD AUTO: 88.6 % — HIGH (ref 42.2–75.2)
NEUTROPHILS NFR BLD AUTO: 89.5 % — HIGH (ref 42.2–75.2)
NEUTS HYPERSEG # BLD: PRESENT — SIGNIFICANT CHANGE UP
NRBC # BLD: 0 /100 WBCS — SIGNIFICANT CHANGE UP (ref 0–0)
OVALOCYTES BLD QL SMEAR: SLIGHT — SIGNIFICANT CHANGE UP
PLAT MORPH BLD: NORMAL — SIGNIFICANT CHANGE UP
PLATELET # BLD AUTO: 280 K/UL — SIGNIFICANT CHANGE UP (ref 130–400)
PLATELET # BLD AUTO: 329 K/UL — SIGNIFICANT CHANGE UP (ref 130–400)
POIKILOCYTOSIS BLD QL AUTO: SLIGHT — SIGNIFICANT CHANGE UP
POLYCHROMASIA BLD QL SMEAR: SLIGHT — SIGNIFICANT CHANGE UP
POTASSIUM SERPL-MCNC: 4.2 MMOL/L — SIGNIFICANT CHANGE UP (ref 3.5–5)
POTASSIUM SERPL-MCNC: 4.5 MMOL/L — SIGNIFICANT CHANGE UP (ref 3.5–5)
POTASSIUM SERPL-SCNC: 4.2 MMOL/L — SIGNIFICANT CHANGE UP (ref 3.5–5)
POTASSIUM SERPL-SCNC: 4.5 MMOL/L — SIGNIFICANT CHANGE UP (ref 3.5–5)
PROT SERPL-MCNC: 5.3 G/DL — LOW (ref 6–8)
PROT SERPL-MCNC: 5.5 G/DL — LOW (ref 6–8)
RBC # BLD: 3.45 M/UL — LOW (ref 4.7–6.1)
RBC # BLD: 3.77 M/UL — LOW (ref 4.7–6.1)
RBC # FLD: 13.2 % — SIGNIFICANT CHANGE UP (ref 11.5–14.5)
RBC # FLD: 13.3 % — SIGNIFICANT CHANGE UP (ref 11.5–14.5)
RBC BLD AUTO: ABNORMAL
SODIUM SERPL-SCNC: 137 MMOL/L — SIGNIFICANT CHANGE UP (ref 135–146)
SODIUM SERPL-SCNC: 137 MMOL/L — SIGNIFICANT CHANGE UP (ref 135–146)
SPECIMEN SOURCE: SIGNIFICANT CHANGE UP
SYNOVIAL CRYSTALS CLARITY: ABNORMAL
SYNOVIAL CRYSTALS COLOR: ABNORMAL
SYNOVIAL CRYSTALS ID: ABNORMAL
SYNOVIAL CRYSTALS TUBE: SIGNIFICANT CHANGE UP
VARIANT LYMPHS # BLD: 3.5 % — SIGNIFICANT CHANGE UP (ref 0–5)
WBC # BLD: 7.39 K/UL — SIGNIFICANT CHANGE UP (ref 4.8–10.8)
WBC # BLD: 7.43 K/UL — SIGNIFICANT CHANGE UP (ref 4.8–10.8)
WBC # FLD AUTO: 7.39 K/UL — SIGNIFICANT CHANGE UP (ref 4.8–10.8)
WBC # FLD AUTO: 7.43 K/UL — SIGNIFICANT CHANGE UP (ref 4.8–10.8)

## 2023-02-24 PROCEDURE — 99232 SBSQ HOSP IP/OBS MODERATE 35: CPT

## 2023-02-24 RX ADMIN — Medication 81 MILLIGRAM(S): at 11:36

## 2023-02-24 RX ADMIN — Medication 6 UNIT(S): at 17:01

## 2023-02-24 RX ADMIN — CLOPIDOGREL BISULFATE 75 MILLIGRAM(S): 75 TABLET, FILM COATED ORAL at 11:36

## 2023-02-24 RX ADMIN — Medication 6: at 16:30

## 2023-02-24 RX ADMIN — HEPARIN SODIUM 5000 UNIT(S): 5000 INJECTION INTRAVENOUS; SUBCUTANEOUS at 05:40

## 2023-02-24 RX ADMIN — Medication 650 MILLIGRAM(S): at 05:40

## 2023-02-24 RX ADMIN — Medication 6 UNIT(S): at 11:43

## 2023-02-24 RX ADMIN — CARVEDILOL PHOSPHATE 6.25 MILLIGRAM(S): 80 CAPSULE, EXTENDED RELEASE ORAL at 17:02

## 2023-02-24 RX ADMIN — Medication 20 MILLIGRAM(S): at 05:40

## 2023-02-24 RX ADMIN — ATORVASTATIN CALCIUM 80 MILLIGRAM(S): 80 TABLET, FILM COATED ORAL at 21:18

## 2023-02-24 RX ADMIN — CHLORHEXIDINE GLUCONATE 1 APPLICATION(S): 213 SOLUTION TOPICAL at 12:40

## 2023-02-24 RX ADMIN — Medication 10: at 11:43

## 2023-02-24 RX ADMIN — Medication 650 MILLIGRAM(S): at 21:18

## 2023-02-24 RX ADMIN — AMLODIPINE BESYLATE 10 MILLIGRAM(S): 2.5 TABLET ORAL at 05:40

## 2023-02-24 RX ADMIN — TAMSULOSIN HYDROCHLORIDE 0.4 MILLIGRAM(S): 0.4 CAPSULE ORAL at 21:18

## 2023-02-24 RX ADMIN — HEPARIN SODIUM 5000 UNIT(S): 5000 INJECTION INTRAVENOUS; SUBCUTANEOUS at 13:04

## 2023-02-24 RX ADMIN — Medication 4: at 07:56

## 2023-02-24 RX ADMIN — HEPARIN SODIUM 5000 UNIT(S): 5000 INJECTION INTRAVENOUS; SUBCUTANEOUS at 21:19

## 2023-02-24 RX ADMIN — CARVEDILOL PHOSPHATE 6.25 MILLIGRAM(S): 80 CAPSULE, EXTENDED RELEASE ORAL at 05:40

## 2023-02-24 RX ADMIN — Medication 650 MILLIGRAM(S): at 13:04

## 2023-02-24 RX ADMIN — Medication 20 MILLIGRAM(S): at 17:02

## 2023-02-24 RX ADMIN — INSULIN GLARGINE 18 UNIT(S): 100 INJECTION, SOLUTION SUBCUTANEOUS at 21:19

## 2023-02-24 RX ADMIN — Medication 6 UNIT(S): at 07:56

## 2023-02-24 NOTE — OCCUPATIONAL THERAPY INITIAL EVALUATION ADULT - GENERAL OBSERVATIONS, REHAB EVAL
OT attempt to evaluate performed with assistance of Cantonese  Mauricio, #[id number]. OT co-tx with PT.
Pt encountered supine in bed. Asleep. Arousable with repeated verbal/light tactile stim. [facility]  at start of session however pt intermitanly engaged. During this time, pt's family entered room, pt became more alert and engaged. Family provided translation for remainder of assessment. Upon sitting EOB, pt noted to be soiled. Pt retrned to supine position. Assisted PCA with hygiene care. Pt required RN assistance for skin assessment/ further evaluation. Transfer assessment to be obtained next encounter.

## 2023-02-24 NOTE — PROGRESS NOTE ADULT - SUBJECTIVE AND OBJECTIVE BOX
CHIEF COMPLAINT:    Patient is a 77y old  Male who presents with a chief complaint of Lethargy     INTERVAL HPI/OVERNIGHT EVENTS:    Patient seen and examined at bedside. No acute overnight events occurred.    ROS: Denies SOB, chest painAll other systems are negative.    Medications:  Standing  amLODIPine   Tablet 10 milliGRAM(s) Oral daily  aspirin  chewable 81 milliGRAM(s) Oral daily  atorvastatin 80 milliGRAM(s) Oral at bedtime  carvedilol 6.25 milliGRAM(s) Oral every 12 hours  chlorhexidine 2% Cloths 1 Application(s) Topical daily  clopidogrel Tablet 75 milliGRAM(s) Oral daily  dextrose 5%. 1000 milliLiter(s) IV Continuous <Continuous>  dextrose 5%. 1000 milliLiter(s) IV Continuous <Continuous>  dextrose 50% Injectable 25 Gram(s) IV Push once  dextrose 50% Injectable 12.5 Gram(s) IV Push once  dextrose 50% Injectable 25 Gram(s) IV Push once  glucagon  Injectable 1 milliGRAM(s) IntraMuscular once  heparin   Injectable 5000 Unit(s) SubCutaneous every 8 hours  insulin glargine Injectable (LANTUS) 18 Unit(s) SubCutaneous at bedtime  insulin lispro (ADMELOG) corrective regimen sliding scale   SubCutaneous three times a day before meals  insulin lispro Injectable (ADMELOG) 6 Unit(s) SubCutaneous three times a day before meals  nystatin Cream 1 Application(s) Topical two times a day  predniSONE   Tablet 20 milliGRAM(s) Oral two times a day  sodium bicarbonate 650 milliGRAM(s) Oral three times a day  tamsulosin 0.4 milliGRAM(s) Oral at bedtime    PRN Meds  dextrose Oral Gel 15 Gram(s) Oral once PRN  oxycodone    5 mG/acetaminophen 325 mG 1 Tablet(s) Oral every 4 hours PRN        Vital Signs:    T(F): 97 (23 @ 13:47), Max: 98.5 (23 @ 20:02)  HR: 68 (23 @ 13:47) (62 - 71)  BP: 132/61 (23 @ 13:47) (111/53 - 132/61)  RR: 18 (23 @ 13:47) (18 - 18)  SpO2: 97% (23 @ 20:02) (97% - 97%)  I&O's Summary    2023 07:01  -  2023 07:00  --------------------------------------------------------  IN: 0 mL / OUT: 1 mL / NET: -1 mL    2023 07:01  -  2023 16:13  --------------------------------------------------------  IN: 240 mL / OUT: 0 mL / NET: 240 mL      Daily     Daily Weight in k (2023 05:13)  CAPILLARY BLOOD GLUCOSE      POCT Blood Glucose.: 361 mg/dL (2023 11:31)  POCT Blood Glucose.: 242 mg/dL (2023 07:49)  POCT Blood Glucose.: 235 mg/dL (2023 21:55)  POCT Blood Glucose.: 251 mg/dL (2023 16:37)      PHYSICAL EXAM:  GENERAL:  NAD  SKIN: No rashes or lesions  HEENT: Atraumatic. Normocephalic. Anicteric  NECK:  No JVD.   PULMONARY: Clear to ausculation bilaterally. No wheezing. No rales  CVS: Normal S1, S2. Regular rate and rhythm. No murmurs.  ABDOMEN/GI: Soft, Nontender, Nondistended; Bowel sounds are present  EXTREMITIES:  No edema B/L LE.  NEUROLOGIC:  No motor deficit.  PSYCH: Alert & oriented x 3, normal affect      LABS:                        11.0   7.43  )-----------( 329      ( 2023 05:52 )             33.2         137  |  106  |  71<HH>  ----------------------------<  255<H>  4.2   |  17  |  2.2<H>    Ca    8.3<L>      2023 05:52  Mg     2.4         TPro  5.5<L>  /  Alb  2.5<L>  /  TBili  0.3  /  DBili  x   /  AST  28  /  ALT  48<H>  /  AlkPhos  92              Culture - Joint Fluid (collected 2023 11:20)  Source: Knee Synovial Fluid  Gram Stain (2023 02:45):    polymorphonuclear leukocytes seen    No organisms seen    by cytocentrifuge        RADIOLOGY & ADDITIONAL TESTS:  Imaging or report Personally Reviewed:  [ ] YES  [ ] NO -->no new images    Telemetry reviewed independently - NSR, no acute events  EKG reviewed independently -->no new EKGs    Consultant(s) Notes Reviewed:  [ ] YES  [ ] NO  Care Discussed with Consultants/Other Providers [ ] YES  [ ] NO    Case discussed with resident  Care discussed with pt

## 2023-02-24 NOTE — DISCHARGE NOTE PROVIDER - CARE PROVIDERS DIRECT ADDRESSES
[email or web address]

## 2023-02-24 NOTE — CHART NOTE - NSCHARTNOTEFT_GEN_A_CORE
RN called the writer that patient family is insisting to discharge patient now. Spoke to patient wife, daughter and son in law at bedside through [facility] . Explained the management plan and informed that the patient discharge is pending due to pending echo and urine reports and abnormal renal function tests improvement. Family agrees that they will likely to discharge patient tomorrow once ECHO is done. Reassurance give for the echo to be done on priority basis. Will follow RN called the writer that patient family is insisting to discharge patient now. Spoke to patient wife, daughter and son in law at bedside through [facility] . Explained the management plan and informed that the patient discharge is pending due to pending echo and urine reports and abnormal renal function tests improvement. Family agrees that they will likely to discharge patient tomorrow once ECHO is done. Reassurance given for the echo to be done on priority basis. Will follow

## 2023-02-24 NOTE — DISCHARGE NOTE PROVIDER - NSDCCPCAREPLAN_GEN_ALL_CORE_FT
PRINCIPAL DISCHARGE DIAGNOSIS  Diagnosis: Encephalopathy  Assessment and Plan of Treatment: You had a stroke causing your neurological symptoms. make sure to follow up with neurology, cardiology and your Primary care doc for outpateint monitoring.       PRINCIPAL DISCHARGE DIAGNOSIS  Diagnosis: Encephalopathy  Assessment and Plan of Treatment: You had a stroke causing your neurological symptoms. make sure to follow up with neurology, cardiology and your Primary care doc for outpateint monitoring.  You were pending an echocardiogram. Because you didn't have an IV the echo was not performed. You verbalized your risk of understanding that the echo wansn't complete and without this information we cannot say that the heart is healthy and you are not optimized with your medicaitons. You may have a clot in your heart or a hole in the heart which could have contributed to the stroke.      SECONDARY DISCHARGE DIAGNOSES  Diagnosis: Gout  Assessment and Plan of Treatment: You were found to have gout. Follow up with your PMD for further management.    Diagnosis: Stage 3 chronic kidney disease  Assessment and Plan of Treatment: you were found to have kidney disease. you must follow with you pmd for francisca palacios within one week as you will like need to stop the sodium bicarb

## 2023-02-24 NOTE — DISCHARGE NOTE PROVIDER - PROVIDER TOKENS
PROVIDER:[TOKEN:[[id number]],FOLLOWUP:[1 week],ESTABLISHEDPATIENT:[T]],PROVIDER:[TOKEN:[[id number]],FOLLOWUP:[2 weeks]],PROVIDER:[TOKEN:[[id number]],FOLLOWUP:[2 weeks]]

## 2023-02-24 NOTE — DISCHARGE NOTE PROVIDER - HOSPITAL COURSE
77-year-old cantonese speaking male with past medical history of hypertension, hyperlipidemia, DM and BPH presenting for evaluation of weakness, dizziness, urinary frequency over the last 3 days. Family describe that their main concern is his lethargy that has been persistent and associated with some memory changes.    #Fever   #knee swelling  -likely due to gout  UA is negative, COVID-19 negative, CXR is clear. f/u blood  cx.   -xray right:  No acute fracture is seen.  There are tricompartmental degenerative changes ranging to moderate in   the patellofemoral compartment.  There is a large knee joint effusion.  - Prednisone 20mg BID (over colchicine due to sagrario) for suspected gout  - knee arthrocentesis performed by ortho:   Cell count back at 5k, patient does not meet criteria for septic arthritis   Please follow-up crystal analysis and have patient f/u with rheumatology      # Encephelopathy   #Acute CVA right  anterior thalamic   - MR HeadAcute infarct involving the anterior right thalamus without evidence of hemorrhage 2/16.  -  CT Head on 2/13 right basal ganglia consistent with a chronic infarct.  - MRA brain: No stenosis or aneurysm. MRA neck: No stenosis. No dissection.  - EEG on 2/15 no epileptic activity   - LDL Cholesterol Calculated: Unable to calculate LDL, TG is >400 mg/dL (02.17.23 @ 07:55)  - c/w ASA, statin, plavix  - TTE Pending  - family does not want loop recorder, f/u ep outpatient      # Hypertension  - c/w  coreg  - norvasc 10mg daily  - holding valsartan due to rising cr    # DM type 2  - A1C with Estimated Average Glucose Result: 6.3 % (02.14.23 @ 05:49)  - monitor FS  - c/w lantus, lispro    # SAGRARIO on CKD stage3  -  US Renal (02.15.23 @ 19:27) No hydronephrosis. Right ureteral jet is not identified on this examination, a nonspecific   finding. Enlarged prostate. Left renal cyst  - holding valsartan due to rising cr    # H/o BPH  - c/w  flomax    #h/o gout  -uses a herbal supplement at home  -after sagrario resolves consider allopurinol  -currently receiving steroids    # DVT prophylaxis - hep  # Full code    77-year-old cantonese speaking male with past medical history of hypertension, hyperlipidemia, DM and BPH presenting for evaluation of weakness, dizziness, urinary frequency over the last 3 days. Family describe that their main concern is his lethargy that has been persistent and associated with some memory changes.    # Encephelopathy   #Acute CVA right  anterior thalamic   - MR HeadAcute infarct involving the anterior right thalamus without evidence of hemorrhage 2/16.  -  CT Head on 2/13 right basal ganglia consistent with a chronic infarct.  - MRA brain: No stenosis or aneurysm. MRA neck: No stenosis. No dissection.  - EEG on 2/15 no epileptic activity   - LDL Cholesterol Calculated: Unable to calculate LDL, TG is >400 mg/dL (02.17.23 @ 07:55)  - c/w ASA, statin, plavix  - TTE Pending  - family does not want loop recorder, f/u ep outpatient     #Fever   #knee swelling  #h/o gout  - symptoms likely due to gout  -uses a herbal supplement at home  -UA is negative, COVID-19 negative, CXR is clear. f/u blood  cx.   -xray right:  No acute fracture is seen.  There are tricompartmental degenerative changes ranging to moderate in   the patellofemoral compartment.  There is a large knee joint effusion.  - knee arthrocentesis performed by ortho:   Cell count back at 5k, patient does not meet criteria for septic arthritis  pending crystal analysis  - Prednisone 20mg BID (choosen over other gout meds due to sagrario) for suspected gout  -follow up with your pcp outpatient for gout management     # Hypertension  # SAGRARIO on CKD stage3  -  US Renal (02.15.23 @ 19:27) No hydronephrosis. Right ureteral jet is not identified on this examination, a nonspecific   finding. Enlarged prostate. Left renal cyst  - c/w  coreg  -valsartan was held due to rising cr and norvasc was increased form 5 to 10mg daily    # DM type 2  - A1C with Estimated Average Glucose Result: 6.3 % (02.14.23 @ 05:49)

## 2023-02-24 NOTE — DISCHARGE NOTE PROVIDER - NSDCMRMEDTOKEN_GEN_ALL_CORE_FT
amLODIPine 5 mg oral tablet: 1 tab(s) orally once a day  aspirin 81 mg oral tablet: 1 tab(s) orally once a day  atorvastatin 20 mg oral tablet: 1 tab(s) orally once a day  carvedilol 6.25 mg oral tablet: 1 tab(s) orally 2 times a day  Jardiance 25 mg oral tablet: 1 tab(s) orally once a day (in the morning)  Nephplex Rx oral tablet: 1 tab(s) orally once a day  tamsulosin 0.4 mg oral capsule: 1 cap(s) orally once a day  valsartan 320 mg oral tablet: 1 tab(s) orally once a day   amLODIPine 5 mg oral tablet: 1 tab(s) orally once a day  aspirin 81 mg oral tablet: 1 tab(s) orally once a day  carvedilol 6.25 mg oral tablet: 1 tab(s) orally 2 times a day  clopidogrel 75 mg oral tablet: 1 tab(s) orally once a day  Jardiance 25 mg oral tablet: 1 tab(s) orally once a day (in the morning)  Lipitor 80 mg oral tablet: 1 tab(s) orally once a day (at bedtime)  Nephplex Rx oral tablet: 1 tab(s) orally once a day  sodium bicarbonate 650 mg oral tablet: 1 tab(s) orally 2 times a day   tamsulosin 0.4 mg oral capsule: 1 cap(s) orally once a day  valsartan 320 mg oral tablet: 1 tab(s) orally once a day

## 2023-02-24 NOTE — OCCUPATIONAL THERAPY INITIAL EVALUATION ADULT - SHORT TERM MEMORY, REHAB EVAL
Pt's family reports change to STM in last two weeks leading up to admission. At baseline, pt manages his own medication and medical appointments. On assessment, pt had difficulty following instructions for assessment despite translation in primary language. Will continue to asses

## 2023-02-24 NOTE — PROGRESS NOTE ADULT - ASSESSMENT
Refill Approved    Rx renewed per Medication Renewal Policy. Medication was last renewed on 6/19/18.    Adriana Quezada, [facility] Triage/Med Refill 7/8/2019     Requested Prescriptions   Pending Prescriptions Disp Refills     simvastatin (ZOCOR) 20 MG tablet [Pharmacy Med Name: SIMVASTATIN 20MG  TAB] 90 tablet 3     Sig: TAKE 1 TABLET BY MOUTH ONCE DAILY AT BEDTIME       Statins Refill Protocol (Hmg CoA Reductase Inhibitors) Passed - 7/8/2019  9:53 AM        Passed - PCP or prescribing provider visit in past 12 months      Last office visit with prescriber/PCP: 5/30/2019 Amrik Mejia MD OR same dept: 5/30/2019 Amrik Mejia MD OR same specialty: 5/30/2019 Amrik Mejia MD  Last physical: Visit date not found Last MTM visit: Visit date not found   Next visit within 3 mo: Visit date not found  Next physical within 3 mo: Visit date not found  Prescriber OR PCP: Amrik Mejia MD  Last diagnosis associated with med order: 1. Hypercholesteremia  - simvastatin (ZOCOR) 20 MG tablet [Pharmacy Med Name: SIMVASTATIN 20MG  TAB]; TAKE 1 TABLET BY MOUTH ONCE DAILY AT BEDTIME  Dispense: 90 tablet; Refill: 3    If protocol passes may refill for 12 months if within 3 months of last provider visit (or a total of 15 months).                          78 yo Cantonese speaking M PMHx hypertension, hyperlipidemia, DM II, and BPH presented for evaluation of weakness, dizziness, urinary frequency for 3 days prior to admission. Family describe that their main concern is his lethargy that has been persistent and associated with some memory changes.    Acute CVA right  anterior thalamic:   -Patient presented with AMS, urinary incontinence   -Neuro exam on 2/16 no focal deficit.   -CT Head on 2/13 showed right basal ganglia hypodensity consistent with a chronic infarct.  -MRA Head and Neck - no carotid artery stenosis.  -EEG on 2/15 no epileptic activity   -Continue ASA, Plavix and Lipitor.   -family declining ILR  -PT/OT eval  - STILL pending echo--escalated to administration    Fever:   - unclear etiology  - cultures pending  - COVID -19 negative  - synovial fluid negative for infection    Knee effusion  - possible pseudogout vs OA vs Gout. Septic Arthritis is less likely as the patient has bilateral arthritis less on left, also no leukocytosis.   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   -Patient with urine incontinence, worsening renal function.   -Aguirre placed 200 cc came out, UA noted mild proteinuria.   -Pending urine studies.   -Recent Renal US showed no hydronephrosis.   -Hold Valsartan.   -passed TOV, renal function stable, suspect pt is at his baseline    HTN  -Continue Coreg and Norvasc  -Valsartan held due to Acute Kidney Injury.     DM type 2  -FS uncontrolled likely due to steroids  - insulin adjusted again today    BPH  -continue flomax    DVT prophylaxis: heparin sc.     # Full code  #Progress Note Handoff:  Pending (specify):  improving Acute Kidney Injury, echo  Family discussion: unable to get , will try again  Disposition: Home with home PT< will need another PT evaluation.

## 2023-02-24 NOTE — OCCUPATIONAL THERAPY INITIAL EVALUATION ADULT - FINE MOTOR COORDINATION, LEFT HAND, FINGER TO NOSE, OT EVAL
min impaired + tremor; family reports he had a L sided tremor pre admission however has since worsened

## 2023-02-24 NOTE — OCCUPATIONAL THERAPY INITIAL EVALUATION ADULT - FINE MOTOR COORDINATION, LEFT HAND, MANIPULATION OF OBJECTS, OT EVAL
moderate impairment Arava Counseling:  Patient counseled regarding adverse effects of Arava including but not limited to nausea, vomiting, abnormalities in liver function tests. Patients may develop mouth sores, rash, diarrhea, and abnormalities in blood counts. The patient understands that monitoring is required including LFTs and blood counts.  There is a rare possibility of scarring of the liver and lung problems that can occur when taking methotrexate. Persistent nausea, loss of appetite, pale stools, dark urine, cough, and shortness of breath should be reported immediately. Patient advised to discontinue Arava treatment and consult with a physician prior to attempting conception. The patient will have to undergo a treatment to eliminate Arava from the body prior to conception.

## 2023-02-24 NOTE — DISCHARGE NOTE NURSING/CASE MANAGEMENT/SOCIAL WORK - PATIENT PORTAL LINK FT
You can access the FollowMyHealth Patient Portal offered by [facility] by registering at the following website: [email or web address]. By joining [facility]’s FollowMyHealth portal, you will also be able to view your health information using other applications (apps) compatible with our system.

## 2023-02-24 NOTE — OCCUPATIONAL THERAPY INITIAL EVALUATION ADULT - LEVEL OF INDEPENDENCE: SIT/STAND, REHAB EVAL
Pt was found to be soiled upon sitting EOB, pt returned to supine position for hygiene care/unable to perform

## 2023-02-24 NOTE — OCCUPATIONAL THERAPY INITIAL EVALUATION ADULT - GROSSLY INTACT, SENSORY
Difficult to formally assess all components 2* decreased command following despite translation in primary language. Appears intact bilaterally to light touch. Pt denies any numbness or tingling

## 2023-02-24 NOTE — OCCUPATIONAL THERAPY INITIAL EVALUATION ADULT - PERTINENT HX OF CURRENT PROBLEM, REHAB EVAL
77-year-old cantonese speaking male with past medical history of hypertension, hyperlipidemia, DM and BPH presenting for evaluation of weakness, dizziness, urinary frequency over the last 3 days. Family describe that their main concern is his lethargy that has been persistent and associated with some memory changes. No LOC, weakness,  No fevers, chills, dysuria, nausea, vomiting, abdominal pain, or flank pain.  No chest pain or shortness of breath.  No headache or paresthesias
Pt is a right hand dominant male admitted 2/13 with past medical history of hypertension, hyperlipidemia, DM and BPH presenting for evaluation of weakness, dizziness, urinary frequency over the last 3 days. Family describe that their main concern is his lethargy that has been persistent and associated with some memory changes. No LOC, weakness,  No fevers, chills, dysuria, nausea, vomiting, abdominal pain, or flank pain.  No chest pain or shortness of breath.  No headache or paresthesias.  CT head showed focal hypodensity of the R basal ganglia consistent with chronic infarct. possible pseudogout vs OA vs Gout. Septic Arthritis is less likely as the patient has bilateral arthritis less on left, also no leukocytosis. Pt seen by Orthopedics s/p arthrocentesis, WBAT

## 2023-02-24 NOTE — OCCUPATIONAL THERAPY INITIAL EVALUATION ADULT - LUE MMT, REHAB EVAL
~4-/5, wrist not tested (baseline from  previous injury), gross grasp ~3+/5 in allowed range/grossly assessed due to

## 2023-02-24 NOTE — OCCUPATIONAL THERAPY INITIAL EVALUATION ADULT - RANGE OF MOTION EXAMINATION, UPPER EXTREMITY
except L wrist fixed in extension (baseline) and limited flexion at MCP joints (baseline)/bilateral UE Active ROM was WFL  (within functional limits)

## 2023-02-24 NOTE — PROGRESS NOTE ADULT - PROVIDER SPECIALTY LIST ADULT
Hospitalist
Internal Medicine
Hospitalist
Internal Medicine
Hospitalist
Hospitalist
Internal Medicine
Hospitalist
Internal Medicine
Hospitalist

## 2023-02-24 NOTE — DISCHARGE NOTE NURSING/CASE MANAGEMENT/SOCIAL WORK - NSDCPEFALRISK_GEN_ALL_CORE
For information on Fall & Injury Prevention, visit: [email or web address] OR  [email or web address] OR  https://www.cdc.gov/steadi/patient.html

## 2023-02-24 NOTE — OCCUPATIONAL THERAPY INITIAL EVALUATION ADULT - PLANNED THERAPY INTERVENTIONS, OT EVAL
ADL retraining/balance training/bed mobility training/fine motor coordination training/joint mobilization/motor coordination training/neuromuscular re-education/ROM/strengthening/transfer training

## 2023-02-24 NOTE — OCCUPATIONAL THERAPY INITIAL EVALUATION ADULT - PATIENT PROFILE REVIEW, REHAB EVAL
Pt's chart thoroughly reviewed. Pt seen for OT IE 10:30-11:26/yes
[id number] Pt chart thoroughly reviewed prior to OT evaluation./yes

## 2023-02-25 VITALS
TEMPERATURE: 97 F | SYSTOLIC BLOOD PRESSURE: 120 MMHG | DIASTOLIC BLOOD PRESSURE: 58 MMHG | RESPIRATION RATE: 18 BRPM | HEART RATE: 65 BPM

## 2023-02-25 LAB
ALBUMIN SERPL ELPH-MCNC: 2.5 G/DL — LOW (ref 3.5–5.2)
ALBUMIN SERPL ELPH-MCNC: 2.7 G/DL — LOW (ref 3.5–5.2)
ALP SERPL-CCNC: 82 U/L — SIGNIFICANT CHANGE UP (ref 30–115)
ALP SERPL-CCNC: 86 U/L — SIGNIFICANT CHANGE UP (ref 30–115)
ALT FLD-CCNC: 39 U/L — SIGNIFICANT CHANGE UP (ref 0–41)
ALT FLD-CCNC: 42 U/L — HIGH (ref 0–41)
ANION GAP SERPL CALC-SCNC: 12 MMOL/L — SIGNIFICANT CHANGE UP (ref 7–14)
ANION GAP SERPL CALC-SCNC: 13 MMOL/L — SIGNIFICANT CHANGE UP (ref 7–14)
AST SERPL-CCNC: 21 U/L — SIGNIFICANT CHANGE UP (ref 0–41)
AST SERPL-CCNC: 22 U/L — SIGNIFICANT CHANGE UP (ref 0–41)
BASOPHILS # BLD AUTO: 0.01 K/UL — SIGNIFICANT CHANGE UP (ref 0–0.2)
BASOPHILS # BLD AUTO: 0.02 K/UL — SIGNIFICANT CHANGE UP (ref 0–0.2)
BASOPHILS NFR BLD AUTO: 0.1 % — SIGNIFICANT CHANGE UP (ref 0–1)
BASOPHILS NFR BLD AUTO: 0.2 % — SIGNIFICANT CHANGE UP (ref 0–1)
BILIRUB SERPL-MCNC: 0.3 MG/DL — SIGNIFICANT CHANGE UP (ref 0.2–1.2)
BILIRUB SERPL-MCNC: 0.4 MG/DL — SIGNIFICANT CHANGE UP (ref 0.2–1.2)
BUN SERPL-MCNC: 70 MG/DL — CRITICAL HIGH (ref 10–20)
BUN SERPL-MCNC: 73 MG/DL — CRITICAL HIGH (ref 10–20)
CALCIUM SERPL-MCNC: 8.3 MG/DL — LOW (ref 8.4–10.5)
CALCIUM SERPL-MCNC: 8.5 MG/DL — SIGNIFICANT CHANGE UP (ref 8.4–10.5)
CHLORIDE SERPL-SCNC: 101 MMOL/L — SIGNIFICANT CHANGE UP (ref 98–110)
CHLORIDE SERPL-SCNC: 106 MMOL/L — SIGNIFICANT CHANGE UP (ref 98–110)
CO2 SERPL-SCNC: 17 MMOL/L — SIGNIFICANT CHANGE UP (ref 17–32)
CO2 SERPL-SCNC: 18 MMOL/L — SIGNIFICANT CHANGE UP (ref 17–32)
CREAT SERPL-MCNC: 2.1 MG/DL — HIGH (ref 0.7–1.5)
CREAT SERPL-MCNC: 2.2 MG/DL — HIGH (ref 0.7–1.5)
CULTURE RESULTS: SIGNIFICANT CHANGE UP
EGFR: 30 ML/MIN/1.73M2 — LOW
EGFR: 32 ML/MIN/1.73M2 — LOW
EOSINOPHIL # BLD AUTO: 0 K/UL — SIGNIFICANT CHANGE UP (ref 0–0.7)
EOSINOPHIL # BLD AUTO: 0 K/UL — SIGNIFICANT CHANGE UP (ref 0–0.7)
EOSINOPHIL NFR BLD AUTO: 0 % — SIGNIFICANT CHANGE UP (ref 0–8)
EOSINOPHIL NFR BLD AUTO: 0 % — SIGNIFICANT CHANGE UP (ref 0–8)
GLUCOSE BLDC GLUCOMTR-MCNC: 175 MG/DL — HIGH (ref 70–99)
GLUCOSE BLDC GLUCOMTR-MCNC: 379 MG/DL — HIGH (ref 70–99)
GLUCOSE SERPL-MCNC: 200 MG/DL — HIGH (ref 70–99)
GLUCOSE SERPL-MCNC: 233 MG/DL — HIGH (ref 70–99)
HCT VFR BLD CALC: 29.2 % — LOW (ref 42–52)
HCT VFR BLD CALC: 31.9 % — LOW (ref 42–52)
HGB BLD-MCNC: 10.7 G/DL — LOW (ref 14–18)
HGB BLD-MCNC: 9.9 G/DL — LOW (ref 14–18)
IMM GRANULOCYTES NFR BLD AUTO: 1.7 % — HIGH (ref 0.1–0.3)
IMM GRANULOCYTES NFR BLD AUTO: 1.9 % — HIGH (ref 0.1–0.3)
LYMPHOCYTES # BLD AUTO: 0.72 K/UL — LOW (ref 1.2–3.4)
LYMPHOCYTES # BLD AUTO: 0.87 K/UL — LOW (ref 1.2–3.4)
LYMPHOCYTES # BLD AUTO: 9.3 % — LOW (ref 20.5–51.1)
LYMPHOCYTES # BLD AUTO: 9.6 % — LOW (ref 20.5–51.1)
MAGNESIUM SERPL-MCNC: 2.3 MG/DL — SIGNIFICANT CHANGE UP (ref 1.8–2.4)
MAGNESIUM SERPL-MCNC: 2.4 MG/DL — SIGNIFICANT CHANGE UP (ref 1.8–2.4)
MCHC RBC-ENTMCNC: 28.9 PG — SIGNIFICANT CHANGE UP (ref 27–31)
MCHC RBC-ENTMCNC: 29.1 PG — SIGNIFICANT CHANGE UP (ref 27–31)
MCHC RBC-ENTMCNC: 33.5 G/DL — SIGNIFICANT CHANGE UP (ref 32–37)
MCHC RBC-ENTMCNC: 33.9 G/DL — SIGNIFICANT CHANGE UP (ref 32–37)
MCV RBC AUTO: 85.1 FL — SIGNIFICANT CHANGE UP (ref 80–94)
MCV RBC AUTO: 86.7 FL — SIGNIFICANT CHANGE UP (ref 80–94)
MONOCYTES # BLD AUTO: 0.29 K/UL — SIGNIFICANT CHANGE UP (ref 0.1–0.6)
MONOCYTES # BLD AUTO: 0.42 K/UL — SIGNIFICANT CHANGE UP (ref 0.1–0.6)
MONOCYTES NFR BLD AUTO: 3.7 % — SIGNIFICANT CHANGE UP (ref 1.7–9.3)
MONOCYTES NFR BLD AUTO: 4.7 % — SIGNIFICANT CHANGE UP (ref 1.7–9.3)
NEUTROPHILS # BLD AUTO: 6.6 K/UL — HIGH (ref 1.4–6.5)
NEUTROPHILS # BLD AUTO: 7.55 K/UL — HIGH (ref 1.4–6.5)
NEUTROPHILS NFR BLD AUTO: 83.6 % — HIGH (ref 42.2–75.2)
NEUTROPHILS NFR BLD AUTO: 85.2 % — HIGH (ref 42.2–75.2)
NRBC # BLD: 0 /100 WBCS — SIGNIFICANT CHANGE UP (ref 0–0)
NRBC # BLD: 0 /100 WBCS — SIGNIFICANT CHANGE UP (ref 0–0)
PLATELET # BLD AUTO: 297 K/UL — SIGNIFICANT CHANGE UP (ref 130–400)
PLATELET # BLD AUTO: 354 K/UL — SIGNIFICANT CHANGE UP (ref 130–400)
POTASSIUM SERPL-MCNC: 4.5 MMOL/L — SIGNIFICANT CHANGE UP (ref 3.5–5)
POTASSIUM SERPL-MCNC: 4.5 MMOL/L — SIGNIFICANT CHANGE UP (ref 3.5–5)
POTASSIUM SERPL-SCNC: 4.5 MMOL/L — SIGNIFICANT CHANGE UP (ref 3.5–5)
POTASSIUM SERPL-SCNC: 4.5 MMOL/L — SIGNIFICANT CHANGE UP (ref 3.5–5)
PROT SERPL-MCNC: 5.1 G/DL — LOW (ref 6–8)
PROT SERPL-MCNC: 5.6 G/DL — LOW (ref 6–8)
RBC # BLD: 3.43 M/UL — LOW (ref 4.7–6.1)
RBC # BLD: 3.68 M/UL — LOW (ref 4.7–6.1)
RBC # FLD: 13.2 % — SIGNIFICANT CHANGE UP (ref 11.5–14.5)
RBC # FLD: 13.2 % — SIGNIFICANT CHANGE UP (ref 11.5–14.5)
SODIUM SERPL-SCNC: 132 MMOL/L — LOW (ref 135–146)
SODIUM SERPL-SCNC: 135 MMOL/L — SIGNIFICANT CHANGE UP (ref 135–146)
SPECIMEN SOURCE: SIGNIFICANT CHANGE UP
WBC # BLD: 7.75 K/UL — SIGNIFICANT CHANGE UP (ref 4.8–10.8)
WBC # BLD: 9.03 K/UL — SIGNIFICANT CHANGE UP (ref 4.8–10.8)
WBC # FLD AUTO: 7.75 K/UL — SIGNIFICANT CHANGE UP (ref 4.8–10.8)
WBC # FLD AUTO: 9.03 K/UL — SIGNIFICANT CHANGE UP (ref 4.8–10.8)

## 2023-02-25 PROCEDURE — 99239 HOSP IP/OBS DSCHRG MGMT >30: CPT

## 2023-02-25 PROCEDURE — 93306 TTE W/DOPPLER COMPLETE: CPT | Mod: 26

## 2023-02-25 RX ORDER — CLOPIDOGREL BISULFATE 75 MG/1
1 TABLET, FILM COATED ORAL
Qty: 30 | Refills: 0
Start: 2023-02-25 | End: 2023-03-26

## 2023-02-25 RX ORDER — ATORVASTATIN CALCIUM 80 MG/1
1 TABLET, FILM COATED ORAL
Qty: 30 | Refills: 0
Start: 2023-02-25 | End: 2023-03-26

## 2023-02-25 RX ORDER — SODIUM BICARBONATE 1 MEQ/ML
1 SYRINGE (ML) INTRAVENOUS
Qty: 60 | Refills: 0
Start: 2023-02-25 | End: 2023-03-26

## 2023-02-25 RX ORDER — ATORVASTATIN CALCIUM 80 MG/1
1 TABLET, FILM COATED ORAL
Qty: 0 | Refills: 0 | DISCHARGE

## 2023-02-25 RX ADMIN — CLOPIDOGREL BISULFATE 75 MILLIGRAM(S): 75 TABLET, FILM COATED ORAL at 11:46

## 2023-02-25 RX ADMIN — NYSTATIN CREAM 1 APPLICATION(S): 100000 CREAM TOPICAL at 06:09

## 2023-02-25 RX ADMIN — HEPARIN SODIUM 5000 UNIT(S): 5000 INJECTION INTRAVENOUS; SUBCUTANEOUS at 06:08

## 2023-02-25 RX ADMIN — Medication 81 MILLIGRAM(S): at 11:46

## 2023-02-25 RX ADMIN — Medication 2: at 08:00

## 2023-02-25 RX ADMIN — AMLODIPINE BESYLATE 10 MILLIGRAM(S): 2.5 TABLET ORAL at 06:08

## 2023-02-25 RX ADMIN — CHLORHEXIDINE GLUCONATE 1 APPLICATION(S): 213 SOLUTION TOPICAL at 11:49

## 2023-02-25 RX ADMIN — Medication 20 MILLIGRAM(S): at 06:08

## 2023-02-25 RX ADMIN — Medication 6 UNIT(S): at 09:05

## 2023-02-25 RX ADMIN — Medication 6 UNIT(S): at 11:47

## 2023-02-25 RX ADMIN — Medication 10: at 11:47

## 2023-02-25 RX ADMIN — CARVEDILOL PHOSPHATE 6.25 MILLIGRAM(S): 80 CAPSULE, EXTENDED RELEASE ORAL at 06:08

## 2023-02-25 RX ADMIN — Medication 650 MILLIGRAM(S): at 06:08

## 2023-03-07 DIAGNOSIS — E86.0 DEHYDRATION: ICD-10-CM

## 2023-03-07 DIAGNOSIS — N17.9 ACUTE KIDNEY FAILURE, UNSPECIFIED: ICD-10-CM

## 2023-03-07 DIAGNOSIS — Z86.73 PERSONAL HISTORY OF TRANSIENT ISCHEMIC ATTACK (TIA), AND CEREBRAL INFARCTION WITHOUT RESIDUAL DEFICITS: ICD-10-CM

## 2023-03-07 DIAGNOSIS — N18.31 CHRONIC KIDNEY DISEASE, STAGE 3A: ICD-10-CM

## 2023-03-07 DIAGNOSIS — Z79.82 LONG TERM (CURRENT) USE OF ASPIRIN: ICD-10-CM

## 2023-03-07 DIAGNOSIS — I12.9 HYPERTENSIVE CHRONIC KIDNEY DISEASE WITH STAGE 1 THROUGH STAGE 4 CHRONIC KIDNEY DISEASE, OR UNSPECIFIED CHRONIC KIDNEY DISEASE: ICD-10-CM

## 2023-03-07 DIAGNOSIS — I63.89 OTHER CEREBRAL INFARCTION: ICD-10-CM

## 2023-03-07 DIAGNOSIS — R41.0 DISORIENTATION, UNSPECIFIED: ICD-10-CM

## 2023-03-07 DIAGNOSIS — M10.9 GOUT, UNSPECIFIED: ICD-10-CM

## 2023-03-07 DIAGNOSIS — H02.401 UNSPECIFIED PTOSIS OF RIGHT EYELID: ICD-10-CM

## 2023-03-07 DIAGNOSIS — E87.20 ACIDOSIS, UNSPECIFIED: ICD-10-CM

## 2023-03-07 DIAGNOSIS — I16.0 HYPERTENSIVE URGENCY: ICD-10-CM

## 2023-03-07 DIAGNOSIS — E11.22 TYPE 2 DIABETES MELLITUS WITH DIABETIC CHRONIC KIDNEY DISEASE: ICD-10-CM

## 2023-03-07 DIAGNOSIS — R50.9 FEVER, UNSPECIFIED: ICD-10-CM

## 2023-03-07 DIAGNOSIS — M25.461 EFFUSION, RIGHT KNEE: ICD-10-CM

## 2023-03-07 DIAGNOSIS — N40.0 BENIGN PROSTATIC HYPERPLASIA WITHOUT LOWER URINARY TRACT SYMPTOMS: ICD-10-CM

## 2023-03-07 DIAGNOSIS — J30.2 OTHER SEASONAL ALLERGIC RHINITIS: ICD-10-CM

## 2023-03-07 DIAGNOSIS — R29.701 NIHSS SCORE 1: ICD-10-CM

## 2023-03-07 DIAGNOSIS — Z79.84 LONG TERM (CURRENT) USE OF ORAL HYPOGLYCEMIC DRUGS: ICD-10-CM

## 2023-03-07 DIAGNOSIS — M17.0 BILATERAL PRIMARY OSTEOARTHRITIS OF KNEE: ICD-10-CM

## 2023-03-07 DIAGNOSIS — E78.5 HYPERLIPIDEMIA, UNSPECIFIED: ICD-10-CM

## 2023-03-07 DIAGNOSIS — G93.49 OTHER ENCEPHALOPATHY: ICD-10-CM

## 2023-03-07 DIAGNOSIS — N28.1 CYST OF KIDNEY, ACQUIRED: ICD-10-CM

## 2023-03-09 LAB
CULTURE RESULTS: NO GROWTH — SIGNIFICANT CHANGE UP
SPECIMEN SOURCE: SIGNIFICANT CHANGE UP

## 2023-04-20 NOTE — OCCUPATIONAL THERAPY INITIAL EVALUATION ADULT - LIVES WITH, PROFILE
[FreeTextEntry1] : follow up\par Interview and discussion conducted in Pakistani by Pakistani speaking physician\par  [de-identified] : 56 years old female with metastatic brain cancer, seizures, prediabetes, hypercholesterolemia, presents for follow up, states feeling well, due for labs , follow up with neurologist and oncologist, consult note reviewed Apartment, +TRIPP/children/spouse

## 2023-11-15 NOTE — DISCHARGE NOTE PROVIDER - CARE PROVIDER_API CALL
none Ricky Cates  Internal Medicine  Jairon ZAMUDIO,    Phone: [phone]  Fax: ([phone]  Established Patient  Follow Up Time: 1 week    John Calixto)  Cardiac Electrophysiology; Cardiovascular Disease; [facility]  [address] [place] [address]  Phone: [phone]  Fax: [phone]  Follow Up Time: 2 weeks    Jimenez Gay)  EEGEpilepsy; Neurology  [address] [place] [address], [address]  [place] [address]  Phone: [phone]  Fax: [phone]  Follow Up Time: 2 weeks

## 2023-12-15 ENCOUNTER — INPATIENT (INPATIENT)
Facility: HOSPITAL | Age: 78
LOS: 27 days | Discharge: NURSING, REHAB OR LONG-TERM CARE | DRG: 4 | End: 2024-01-12
Attending: SURGERY | Admitting: SURGERY
Payer: MEDICARE

## 2023-12-15 VITALS
OXYGEN SATURATION: 99 % | TEMPERATURE: 98 F | DIASTOLIC BLOOD PRESSURE: 69 MMHG | RESPIRATION RATE: 18 BRPM | SYSTOLIC BLOOD PRESSURE: 149 MMHG | HEART RATE: 88 BPM

## 2023-12-15 DIAGNOSIS — S06.5X9A TRAUMATIC SUBDURAL HEMORRHAGE WITH LOSS OF CONSCIOUSNESS OF UNSPECIFIED DURATION, INITIAL ENCOUNTER: ICD-10-CM

## 2023-12-15 LAB
ALBUMIN SERPL ELPH-MCNC: 4.4 G/DL — SIGNIFICANT CHANGE UP (ref 3.5–5.2)
ALBUMIN SERPL ELPH-MCNC: 4.4 G/DL — SIGNIFICANT CHANGE UP (ref 3.5–5.2)
ALP SERPL-CCNC: 49 U/L — SIGNIFICANT CHANGE UP (ref 30–115)
ALP SERPL-CCNC: 49 U/L — SIGNIFICANT CHANGE UP (ref 30–115)
ALT FLD-CCNC: 17 U/L — SIGNIFICANT CHANGE UP (ref 0–41)
ALT FLD-CCNC: 17 U/L — SIGNIFICANT CHANGE UP (ref 0–41)
ANION GAP SERPL CALC-SCNC: 14 MMOL/L — SIGNIFICANT CHANGE UP (ref 7–14)
ANION GAP SERPL CALC-SCNC: 14 MMOL/L — SIGNIFICANT CHANGE UP (ref 7–14)
APTT BLD: 36.4 SEC — SIGNIFICANT CHANGE UP (ref 27–39.2)
APTT BLD: 36.4 SEC — SIGNIFICANT CHANGE UP (ref 27–39.2)
AST SERPL-CCNC: 19 U/L — SIGNIFICANT CHANGE UP (ref 0–41)
AST SERPL-CCNC: 19 U/L — SIGNIFICANT CHANGE UP (ref 0–41)
BASOPHILS # BLD AUTO: 0.06 K/UL — SIGNIFICANT CHANGE UP (ref 0–0.2)
BASOPHILS # BLD AUTO: 0.06 K/UL — SIGNIFICANT CHANGE UP (ref 0–0.2)
BASOPHILS NFR BLD AUTO: 0.5 % — SIGNIFICANT CHANGE UP (ref 0–1)
BASOPHILS NFR BLD AUTO: 0.5 % — SIGNIFICANT CHANGE UP (ref 0–1)
BILIRUB SERPL-MCNC: 0.9 MG/DL — SIGNIFICANT CHANGE UP (ref 0.2–1.2)
BILIRUB SERPL-MCNC: 0.9 MG/DL — SIGNIFICANT CHANGE UP (ref 0.2–1.2)
BUN SERPL-MCNC: 42 MG/DL — HIGH (ref 10–20)
BUN SERPL-MCNC: 42 MG/DL — HIGH (ref 10–20)
CALCIUM SERPL-MCNC: 9.2 MG/DL — SIGNIFICANT CHANGE UP (ref 8.4–10.5)
CALCIUM SERPL-MCNC: 9.2 MG/DL — SIGNIFICANT CHANGE UP (ref 8.4–10.5)
CHLORIDE SERPL-SCNC: 103 MMOL/L — SIGNIFICANT CHANGE UP (ref 98–110)
CHLORIDE SERPL-SCNC: 103 MMOL/L — SIGNIFICANT CHANGE UP (ref 98–110)
CO2 SERPL-SCNC: 24 MMOL/L — SIGNIFICANT CHANGE UP (ref 17–32)
CO2 SERPL-SCNC: 24 MMOL/L — SIGNIFICANT CHANGE UP (ref 17–32)
CREAT SERPL-MCNC: 2.8 MG/DL — HIGH (ref 0.7–1.5)
CREAT SERPL-MCNC: 2.8 MG/DL — HIGH (ref 0.7–1.5)
EGFR: 22 ML/MIN/1.73M2 — LOW
EGFR: 22 ML/MIN/1.73M2 — LOW
EOSINOPHIL # BLD AUTO: 0.39 K/UL — SIGNIFICANT CHANGE UP (ref 0–0.7)
EOSINOPHIL # BLD AUTO: 0.39 K/UL — SIGNIFICANT CHANGE UP (ref 0–0.7)
EOSINOPHIL NFR BLD AUTO: 3.2 % — SIGNIFICANT CHANGE UP (ref 0–8)
EOSINOPHIL NFR BLD AUTO: 3.2 % — SIGNIFICANT CHANGE UP (ref 0–8)
GLUCOSE SERPL-MCNC: 149 MG/DL — HIGH (ref 70–99)
GLUCOSE SERPL-MCNC: 149 MG/DL — HIGH (ref 70–99)
HCT VFR BLD CALC: 36.5 % — LOW (ref 42–52)
HCT VFR BLD CALC: 36.5 % — LOW (ref 42–52)
HGB BLD-MCNC: 12.2 G/DL — LOW (ref 14–18)
HGB BLD-MCNC: 12.2 G/DL — LOW (ref 14–18)
IMM GRANULOCYTES NFR BLD AUTO: 0.3 % — SIGNIFICANT CHANGE UP (ref 0.1–0.3)
IMM GRANULOCYTES NFR BLD AUTO: 0.3 % — SIGNIFICANT CHANGE UP (ref 0.1–0.3)
INR BLD: 0.98 RATIO — SIGNIFICANT CHANGE UP (ref 0.65–1.3)
INR BLD: 0.98 RATIO — SIGNIFICANT CHANGE UP (ref 0.65–1.3)
LYMPHOCYTES # BLD AUTO: 1.01 K/UL — LOW (ref 1.2–3.4)
LYMPHOCYTES # BLD AUTO: 1.01 K/UL — LOW (ref 1.2–3.4)
LYMPHOCYTES # BLD AUTO: 8.2 % — LOW (ref 20.5–51.1)
LYMPHOCYTES # BLD AUTO: 8.2 % — LOW (ref 20.5–51.1)
MCHC RBC-ENTMCNC: 29.8 PG — SIGNIFICANT CHANGE UP (ref 27–31)
MCHC RBC-ENTMCNC: 29.8 PG — SIGNIFICANT CHANGE UP (ref 27–31)
MCHC RBC-ENTMCNC: 33.4 G/DL — SIGNIFICANT CHANGE UP (ref 32–37)
MCHC RBC-ENTMCNC: 33.4 G/DL — SIGNIFICANT CHANGE UP (ref 32–37)
MCV RBC AUTO: 89 FL — SIGNIFICANT CHANGE UP (ref 80–94)
MCV RBC AUTO: 89 FL — SIGNIFICANT CHANGE UP (ref 80–94)
MONOCYTES # BLD AUTO: 0.98 K/UL — HIGH (ref 0.1–0.6)
MONOCYTES # BLD AUTO: 0.98 K/UL — HIGH (ref 0.1–0.6)
MONOCYTES NFR BLD AUTO: 8 % — SIGNIFICANT CHANGE UP (ref 1.7–9.3)
MONOCYTES NFR BLD AUTO: 8 % — SIGNIFICANT CHANGE UP (ref 1.7–9.3)
NEUTROPHILS # BLD AUTO: 9.79 K/UL — HIGH (ref 1.4–6.5)
NEUTROPHILS # BLD AUTO: 9.79 K/UL — HIGH (ref 1.4–6.5)
NEUTROPHILS NFR BLD AUTO: 79.8 % — HIGH (ref 42.2–75.2)
NEUTROPHILS NFR BLD AUTO: 79.8 % — HIGH (ref 42.2–75.2)
NRBC # BLD: 0 /100 WBCS — SIGNIFICANT CHANGE UP (ref 0–0)
NRBC # BLD: 0 /100 WBCS — SIGNIFICANT CHANGE UP (ref 0–0)
PLATELET # BLD AUTO: 240 K/UL — SIGNIFICANT CHANGE UP (ref 130–400)
PLATELET # BLD AUTO: 240 K/UL — SIGNIFICANT CHANGE UP (ref 130–400)
PMV BLD: 8.6 FL — SIGNIFICANT CHANGE UP (ref 7.4–10.4)
PMV BLD: 8.6 FL — SIGNIFICANT CHANGE UP (ref 7.4–10.4)
POTASSIUM SERPL-MCNC: 4 MMOL/L — SIGNIFICANT CHANGE UP (ref 3.5–5)
POTASSIUM SERPL-MCNC: 4 MMOL/L — SIGNIFICANT CHANGE UP (ref 3.5–5)
POTASSIUM SERPL-SCNC: 4 MMOL/L — SIGNIFICANT CHANGE UP (ref 3.5–5)
POTASSIUM SERPL-SCNC: 4 MMOL/L — SIGNIFICANT CHANGE UP (ref 3.5–5)
PROT SERPL-MCNC: 6.8 G/DL — SIGNIFICANT CHANGE UP (ref 6–8)
PROT SERPL-MCNC: 6.8 G/DL — SIGNIFICANT CHANGE UP (ref 6–8)
PROTHROM AB SERPL-ACNC: 11.2 SEC — SIGNIFICANT CHANGE UP (ref 9.95–12.87)
PROTHROM AB SERPL-ACNC: 11.2 SEC — SIGNIFICANT CHANGE UP (ref 9.95–12.87)
RBC # BLD: 4.1 M/UL — LOW (ref 4.7–6.1)
RBC # BLD: 4.1 M/UL — LOW (ref 4.7–6.1)
RBC # FLD: 13.3 % — SIGNIFICANT CHANGE UP (ref 11.5–14.5)
RBC # FLD: 13.3 % — SIGNIFICANT CHANGE UP (ref 11.5–14.5)
SODIUM SERPL-SCNC: 141 MMOL/L — SIGNIFICANT CHANGE UP (ref 135–146)
SODIUM SERPL-SCNC: 141 MMOL/L — SIGNIFICANT CHANGE UP (ref 135–146)
WBC # BLD: 12.27 K/UL — HIGH (ref 4.8–10.8)
WBC # BLD: 12.27 K/UL — HIGH (ref 4.8–10.8)
WBC # FLD AUTO: 12.27 K/UL — HIGH (ref 4.8–10.8)
WBC # FLD AUTO: 12.27 K/UL — HIGH (ref 4.8–10.8)

## 2023-12-15 PROCEDURE — 99291 CRITICAL CARE FIRST HOUR: CPT

## 2023-12-15 PROCEDURE — 70450 CT HEAD/BRAIN W/O DYE: CPT | Mod: 26,MA

## 2023-12-15 PROCEDURE — 72170 X-RAY EXAM OF PELVIS: CPT | Mod: 26

## 2023-12-15 PROCEDURE — 71045 X-RAY EXAM CHEST 1 VIEW: CPT | Mod: 26

## 2023-12-15 PROCEDURE — 72125 CT NECK SPINE W/O DYE: CPT | Mod: 26,MA

## 2023-12-15 RX ORDER — LEVETIRACETAM 250 MG/1
1000 TABLET, FILM COATED ORAL ONCE
Refills: 0 | Status: COMPLETED | OUTPATIENT
Start: 2023-12-15 | End: 2023-12-15

## 2023-12-15 RX ADMIN — LEVETIRACETAM 400 MILLIGRAM(S): 250 TABLET, FILM COATED ORAL at 22:40

## 2023-12-15 NOTE — CONSULT NOTE ADULT - SUBJECTIVE AND OBJECTIVE BOX
TRAUMA ACTIVATION LEVEL:  CODE / ALERT  / CONSULT  ACTIVATED BY: EMS**  /  ED**  INTUBATED: YES** / NO**    MECHANISM OF INJURY:   [] Blunt     [] MVC	  [] Fall	  [] Pedestrian Struck	  [] Motorcycle     [] Assault     [] Bicycle collision    [] Sports injury    [] Penetrating    [] Gun Shot Wound      [] Stab Wound    GCS: 15 	E: 4	V: 5	M: 6    HPI:    78yM w/ PMHx of ***  s/p ******.      Trauma assessment in ED: ABCs intact , GCS 15 , AAOx3.    Obvious external signs of injury:    PAST MEDICAL & SURGICAL HISTORY:  HTN (hypertension)      Seasonal allergies      History of BPH      Diabetes      No significant past surgical history        Allergies    [This allergen will not trigger allergy alert] pollen (Unknown)  No Known Drug Allergies    Intolerances      Home Medications:  amLODIPine 5 mg oral tablet: 1 tab(s) orally once a day (14 Feb 2023 02:08)  aspirin 81 mg oral tablet: 1 tab(s) orally once a day (14 Feb 2023 02:08)  carvedilol 6.25 mg oral tablet: 1 tab(s) orally 2 times a day (14 Feb 2023 02:08)  Jardiance 25 mg oral tablet: 1 tab(s) orally once a day (in the morning) (14 Feb 2023 02:08)  Nephplex Rx oral tablet: 1 tab(s) orally once a day (14 Feb 2023 02:08)  tamsulosin 0.4 mg oral capsule: 1 cap(s) orally once a day (14 Feb 2023 02:08)  valsartan 320 mg oral tablet: 1 tab(s) orally once a day (14 Feb 2023 02:08)      ROS: 10-system review is otherwise negative except HPI above.      Primary Survey:    A - airway intact  B - bilateral breath sounds and good chest rise  C - palpable pulses in all extremities  D - GCS 15 on arrival, MACIEL  Exposure obtained    Vital Signs Last 24 Hrs  T(C): 36.9 (15 Dec 2023 18:05), Max: 36.9 (15 Dec 2023 18:05)  T(F): 98.4 (15 Dec 2023 18:05), Max: 98.4 (15 Dec 2023 18:05)  HR: 101 (15 Dec 2023 22:35) (88 - 101)  BP: 150/64 (15 Dec 2023 23:15) (149/64 - 152/71)  BP(mean): 97 (15 Dec 2023 22:20) (97 - 97)  RR: 19 (15 Dec 2023 22:20) (18 - 19)  SpO2: 96% (15 Dec 2023 22:20) (96% - 99%)    Parameters below as of 15 Dec 2023 22:20  Patient On (Oxygen Delivery Method): room air        Secondary Survey:   General: NAD  HEENT: Normocephalic, atraumatic, EOMI, PEERLA. no scalp lacerations   Neck: Soft, midline trachea. no c-spine tenderness  Chest: No chest wall tenderness, no subcutaneous emphysema   Cardiac: S1, S2, RRR  Respiratory: Bilateral breath sounds, clear and equal bilaterally  Abdomen: Soft, non-distended, non-tender, no rebound, no guarding.  Groin: Normal appearing, pelvis stable   Ext:  Moving b/l upper and lower extremities. Palpable Radial b/l UE, b/l DP palpable in LE.   Back: No T/L/S spine tenderness, No palpable runoff/stepoff/deformity  Rectal: No mishel blood, DARIUS with good tone    FAST:     Labs:  CAPILLARY BLOOD GLUCOSE      POCT Blood Glucose.: 142 mg/dL (15 Dec 2023 20:05)                          12.2   12.27 )-----------( 240      ( 15 Dec 2023 20:29 )             36.5       Auto Neutrophil %: 79.8 % (12-15-23 @ 20:29)  Auto Immature Granulocyte %: 0.3 % (12-15-23 @ 20:29)    12-15    141  |  103  |  42<H>  ----------------------------<  149<H>  4.0   |  24  |  2.8<H>      Calcium: 9.2 mg/dL (12-15-23 @ 20:29)      LFTs:             6.8  | 0.9  | 19       ------------------[49      ( 15 Dec 2023 20:29 )  4.4  | x    | 17          Lipase:x      Amylase:x             Coags:     11.20  ----< 0.98    ( 15 Dec 2023 20:29 )     36.4                Urinalysis Basic - ( 15 Dec 2023 20:29 )    Color: x / Appearance: x / SG: x / pH: x  Gluc: 149 mg/dL / Ketone: x  / Bili: x / Urobili: x   Blood: x / Protein: x / Nitrite: x   Leuk Esterase: x / RBC: x / WBC x   Sq Epi: x / Non Sq Epi: x / Bacteria: x                RADIOLOGY & ADDITIONAL STUDIES:  ---------------------------------------------------------------------------------------    ASSESSMENT:  78y Male  w/ PMHx of *** seen as (Code Trauma / Trauma Alert / Trauma Consult) s/p ****** with complaint of *** , external signs of trauma include *** . Trauma assessment in ED: ABCs intact , GCS 15 , AAOx3,  MACIEL.     Injuries identified:   -   -   -     PLAN:   - Trauma Labs: (CBC, BMP, Coags, T&S, UA, EtOH level)  Additional studies:  EKG  Utox    Trauma Imaging to include the following:  - CXR, Pelvic Xray  - CT Head,  CT C-spine, CT Max/Face, CT Chest, CT Abd/Pelvis  - Extremity films: None    Additional consultations:  - Neurosurgery  - Orthopedics  - OMFS  - PT/Rehab/SW  - Hospitalist/Medicine     Disposition pending results of above labs and imaging  Above plan discussed with Trauma attending,  ***  , patient, patient family, and ED team  --------------------------------------------------------------------------------------  12-16-23 @ 00:24    TRAUMA SENIOR SPECTRA: 4792  TRAUMA TEAM SPECTRA: [id number] TRAUMA ACTIVATION LEVEL:  CODE / ALERT  / CONSULT  ACTIVATED BY: EMS**  /  ED**  INTUBATED: YES** / NO**    MECHANISM OF INJURY:   [] Blunt     [] MVC	  [] Fall	  [] Pedestrian Struck	  [] Motorcycle     [] Assault     [] Bicycle collision    [] Sports injury    [] Penetrating    [] Gun Shot Wound      [] Stab Wound    GCS: 15 	E: 4	V: 5	M: 6    HPI:    78yM w/ PMHx of ***  s/p ******.      Trauma assessment in ED: ABCs intact , GCS 15 , AAOx3.    Obvious external signs of injury:    PAST MEDICAL & SURGICAL HISTORY:  HTN (hypertension)      Seasonal allergies      History of BPH      Diabetes      No significant past surgical history        Allergies    [This allergen will not trigger allergy alert] pollen (Unknown)  No Known Drug Allergies    Intolerances      Home Medications:  amLODIPine 5 mg oral tablet: 1 tab(s) orally once a day (14 Feb 2023 02:08)  aspirin 81 mg oral tablet: 1 tab(s) orally once a day (14 Feb 2023 02:08)  carvedilol 6.25 mg oral tablet: 1 tab(s) orally 2 times a day (14 Feb 2023 02:08)  Jardiance 25 mg oral tablet: 1 tab(s) orally once a day (in the morning) (14 Feb 2023 02:08)  Nephplex Rx oral tablet: 1 tab(s) orally once a day (14 Feb 2023 02:08)  tamsulosin 0.4 mg oral capsule: 1 cap(s) orally once a day (14 Feb 2023 02:08)  valsartan 320 mg oral tablet: 1 tab(s) orally once a day (14 Feb 2023 02:08)      ROS: 10-system review is otherwise negative except HPI above.      Primary Survey:    A - airway intact  B - bilateral breath sounds and good chest rise  C - palpable pulses in all extremities  D - GCS 15 on arrival, MACIEL  Exposure obtained    Vital Signs Last 24 Hrs  T(C): 36.9 (15 Dec 2023 18:05), Max: 36.9 (15 Dec 2023 18:05)  T(F): 98.4 (15 Dec 2023 18:05), Max: 98.4 (15 Dec 2023 18:05)  HR: 101 (15 Dec 2023 22:35) (88 - 101)  BP: 150/64 (15 Dec 2023 23:15) (149/64 - 152/71)  BP(mean): 97 (15 Dec 2023 22:20) (97 - 97)  RR: 19 (15 Dec 2023 22:20) (18 - 19)  SpO2: 96% (15 Dec 2023 22:20) (96% - 99%)    Parameters below as of 15 Dec 2023 22:20  Patient On (Oxygen Delivery Method): room air        Secondary Survey:   General: NAD  HEENT: Normocephalic, atraumatic, EOMI, PEERLA. no scalp lacerations   Neck: Soft, midline trachea. no c-spine tenderness  Chest: No chest wall tenderness, no subcutaneous emphysema   Cardiac: S1, S2, RRR  Respiratory: Bilateral breath sounds, clear and equal bilaterally  Abdomen: Soft, non-distended, non-tender, no rebound, no guarding.  Groin: Normal appearing, pelvis stable   Ext:  Moving b/l upper and lower extremities. Palpable Radial b/l UE, b/l DP palpable in LE.   Back: No T/L/S spine tenderness, No palpable runoff/stepoff/deformity  Rectal: No mishel blood, DARIUS with good tone    FAST:     Labs:  CAPILLARY BLOOD GLUCOSE      POCT Blood Glucose.: 142 mg/dL (15 Dec 2023 20:05)                          12.2   12.27 )-----------( 240      ( 15 Dec 2023 20:29 )             36.5       Auto Neutrophil %: 79.8 % (12-15-23 @ 20:29)  Auto Immature Granulocyte %: 0.3 % (12-15-23 @ 20:29)    12-15    141  |  103  |  42<H>  ----------------------------<  149<H>  4.0   |  24  |  2.8<H>      Calcium: 9.2 mg/dL (12-15-23 @ 20:29)      LFTs:             6.8  | 0.9  | 19       ------------------[49      ( 15 Dec 2023 20:29 )  4.4  | x    | 17          Lipase:x      Amylase:x             Coags:     11.20  ----< 0.98    ( 15 Dec 2023 20:29 )     36.4                Urinalysis Basic - ( 15 Dec 2023 20:29 )    Color: x / Appearance: x / SG: x / pH: x  Gluc: 149 mg/dL / Ketone: x  / Bili: x / Urobili: x   Blood: x / Protein: x / Nitrite: x   Leuk Esterase: x / RBC: x / WBC x   Sq Epi: x / Non Sq Epi: x / Bacteria: x                RADIOLOGY & ADDITIONAL STUDIES:  ---------------------------------------------------------------------------------------    ASSESSMENT:  78y Male  w/ PMHx of *** seen as (Code Trauma / Trauma Alert / Trauma Consult) s/p ****** with complaint of *** , external signs of trauma include *** . Trauma assessment in ED: ABCs intact , GCS 15 , AAOx3,  MACIEL.     Injuries identified:   -   -   -     PLAN:   - Trauma Labs: (CBC, BMP, Coags, T&S, UA, EtOH level)  Additional studies:  EKG  Utox    Trauma Imaging to include the following:  - CXR, Pelvic Xray  - CT Head,  CT C-spine, CT Max/Face, CT Chest, CT Abd/Pelvis  - Extremity films: None    Additional consultations:  - Neurosurgery  - Orthopedics  - OMFS  - PT/Rehab/SW  - Hospitalist/Medicine     Disposition pending results of above labs and imaging  Above plan discussed with Trauma attending,  ***  , patient, patient family, and ED team  --------------------------------------------------------------------------------------  12-16-23 @ 00:24    TRAUMA SENIOR SPECTRA: [id number]  TRAUMA TEAM SPECTRA: [id number]

## 2023-12-15 NOTE — ED PROVIDER NOTE - PROGRESS NOTE DETAILS
Javier: received call from radiology regarding urgent findings. subdural x2 with no midline shift. trauma consult placed. nsx consult placed. keppra ordered as well as CT C/A/P Javier: approved for SICU [place]: Pt noted to be more somnolent. Continues to respond to verbal stimuli and states his name; following commands intermittently as before. Pending CT C/A/P and repeat CT head; per sx pt cannot be admitted until scans completed.

## 2023-12-15 NOTE — ED PROVIDER NOTE - OBJECTIVE STATEMENT
78-year-old male with PMH CVA on aspirin Plavix, HTN, HLD, CKD, BPH presenting for headache and generalized weakness s/p head trauma X 2 days.  Per patient's daughter who is bedside patient was getting out of his car 2 days ago and had a mechanical fall, hitting his head on the concrete, -LOC. Stood up on his own and walked but that evening was unable to walk again. Since then patient has complained of feeling weak and having persistent posterior headache.  Unable to obtain detailed history from patient despite  use.

## 2023-12-15 NOTE — ED PROVIDER NOTE - NSTIMEPROVIDERCAREINITIATE_GEN_ER
15-Dec-2023 19:01 Full Thickness Lip Wedge Repair (Flap) Text: Given the location of the defect and the proximity to free margins a full thickness wedge repair was deemed most appropriate.  Using a sterile surgical marker, the appropriate repair was drawn incorporating the defect and placing the expected incisions perpendicular to the vermilion border.  The vermilion border was also meticulously outlined to ensure appropriate reapproximation during the repair.  The area thus outlined was incised through and through with a #15 scalpel blade.  The muscularis and dermis were reaproximated with deep sutures following hemostasis. Care was taken to realign the vermilion border before proceeding with the superficial closure.  Once the vermilion was realigned the superfical and mucosal closure was finished.

## 2023-12-15 NOTE — ED ADULT NURSE REASSESSMENT NOTE - NS ED NURSE REASSESS COMMENT FT1
Pt A+Ox4 at baseline as per daughter. Upon assessing pt using the NIHSS, I found the pts mental status to have changed. Pt is much more lethargic than he was earlier. Pt was having some difficulty keeping his eyes open and following commands during my assessment, even with assistance from his daughter and the  etc. VS are stable, safety maintained, NAD at this time. I spoke to ANM and had upgraded pt to ED CC for closer monitoring.

## 2023-12-15 NOTE — ED PROVIDER NOTE - PHYSICAL EXAMINATION
CONSTITUTIONAL: Well-developed; well-nourished; in no acute distress.   SKIN: Warm, dry  HEAD: Normocephalic; atraumatic  EYES: PERRL, EOMI, normal sclera and conjunctiva   ENT: No nasal discharge; airway clear.  NECK: Supple; non tender.  CARD:  Regular rate and rhythm. Normal S1, S2. 2+ distal pulses.  RESP: No increased WOB. CTA b/l without wheezes, crackles, rhonchi  ABD: Soft, nontender, nondistended. Pelvis stable.  BACK:   EXT: Normal ROM.   NEURO: Alert. Oriented x2. Patient intermittently answering questions appropriately and other times repeating the question.  Intermittently following commands.  4/5 strength bilateral lower extremities.  4+/5 strength bilateral upper extremity. Subjective sensation intact. Left facial droop, chronic.   PSYCH: Cooperative, appropriate.

## 2023-12-15 NOTE — ED PROVIDER NOTE - CLINICAL SUMMARY MEDICAL DECISION MAKING FREE TEXT BOX
Signout received from Dr. Lee.  Patient presented after a fall.  Required labs and imaging.  Was found to be subdural hemorrhage.  Neurosurgery and trauma were consulted.  Patient to be admitted for further evaluation and management.

## 2023-12-15 NOTE — CONSULT NOTE ADULT - PROBLEM SELECTOR RECOMMENDATION 9
1- Admit to NeuroICU  2- Hold anti-coagulation  3- Repeat CT Head wo con in 6 hrs  4- Bedrest  5- SCD's  6- Keppra 500mg BID  7- Neurochecks Q1hr  8- Discussed with Dr Owens

## 2023-12-15 NOTE — ED PROVIDER NOTE - ATTENDING CONTRIBUTION TO CARE
77-year-old male Cantonese speaking his presents with prior history of hypertension dyslipidemia diabetes BPH prior CVA on aspirin and Plavix who states he had a mechanical fall 2 days ago while getting out of the car fell backwards hit his head.  Afterwards he was able to ambulate.  But for the past 1 day family was unable to ambulate him.  He states that his lower extremities are painful to movement and weak.  Family denies any LOC.  They deny any confusion at home.  On exam oriented to person and place but just not to date.  He does follow simple commands.  Elevates his arms for 10 seconds.  Sensation intact in upper extremities.  Lower extremities with 2 out of 5 strength bilaterally.  Plan is to obtain CT imaging and labs

## 2023-12-15 NOTE — CONSULT NOTE ADULT - SUBJECTIVE AND OBJECTIVE BOX
HISTORY OF PRESENT ILLNESS:   HPI was obtained through a Cantonese  in ED. Patients daughter reports that the patient had a GLDF x 2 days prior and hit his head. She reported that she does not know if the patient had any LOC because her father told them later on in the day that he hit his head. The patient takes Aspirin and Plavix which the daughter states was prescribed by his PCP. He responds to verbal and follows simple commands. His daughter reports that her father fell 2 years prior, hit his head, and was "fine after a couple of days".       PAST MEDICAL & SURGICAL HISTORY:  HTN (hypertension)      Seasonal allergies      History of BPH      Diabetes      No significant past surgical history        FAMILY HISTORY:  No pertinent family history in first degree relatives        SOCIAL HISTORY:  Tobacco Use:  EtOH use:   Substance:    Allergies    [This allergen will not trigger allergy alert] pollen (Unknown)  No Known Drug Allergies    Intolerances        REVIEW OF SYSTEMS  negative for any major pulmonary, GI, cardiac, and psychiatric history other than those listed above.    MEDICATIONS:  Antibiotics:    Neuro:  levETIRAcetam  IVPB 1000 milliGRAM(s) IV Intermittent once    Anticoagulation:    OTHER:    IVF:      Vital Signs Last 24 Hrs  T(C): 36.9 (15 Dec 2023 18:05), Max: 36.9 (15 Dec 2023 18:05)  T(F): 98.4 (15 Dec 2023 18:05), Max: 98.4 (15 Dec 2023 18:05)  HR: 88 (15 Dec 2023 18:05) (88 - 88)  BP: 149/69 (15 Dec 2023 18:05) (149/69 - 149/69)  BP(mean): --  RR: 18 (15 Dec 2023 18:05) (18 - 18)  SpO2: 99% (15 Dec 2023 18:05) (99% - 99%)    Parameters below as of 15 Dec 2023 18:05  Patient On (Oxygen Delivery Method): room air        PHYSICAL EXAM:  A&Ox3 through , GCS 14  PERRL, 4-2mm b/l, EOMI  No pronator drift noted  CV: RRR  Respiratory: CTA b/l, equal rise and fall of chest  Abdomen: soft, NT/ND  motor: 5/5 in b/l UE @ bicep/tricep, hand intrinsics 5/5 in right hand and 4/5 in left hand  Moves b/l feet/toes to command, 3-/5 in b/l LE @ hip/knee flex-ext  Superficial sensation intact in b/l UE/LE    LABS:                        12.2   12.27 )-----------( 240      ( 15 Dec 2023 20:29 )             36.5     12-15    141  |  103  |  42<H>  ----------------------------<  149<H>  4.0   |  24  |  2.8<H>    Ca    9.2      15 Dec 2023 20:29    TPro  6.8  /  Alb  4.4  /  TBili  0.9  /  DBili  x   /  AST  19  /  ALT  17  /  AlkPhos  49  12-15    PT/INR - ( 15 Dec 2023 20:29 )   PT: 11.20 sec;   INR: 0.98 ratio         PTT - ( 15 Dec 2023 20:29 )  PTT:36.4 sec  Urinalysis Basic - ( 15 Dec 2023 20:29 )    Color: x / Appearance: x / SG: x / pH: x  Gluc: 149 mg/dL / Ketone: x  / Bili: x / Urobili: x   Blood: x / Protein: x / Nitrite: x   Leuk Esterase: x / RBC: x / WBC x   Sq Epi: x / Non Sq Epi: x / Bacteria: x      CULTURES:      RADIOLOGY & ADDITIONAL STUDIES:  CT Head wo con:   IMPRESSION:    Acute subdural hemorrhages which measure 0.7 cm along the left hemisphere, 0.3 cm along the right frontal area, and 1.1 cm along the left falx. No midline shift.        Dr. Pineda Mendoza discussed preliminary findings with DANY Alfonso DO; on 12/15/2023 9:56 PM with readback.        ******PRELIMINARY REPORT******    ******PRELIMINARY REPORT******      PINEDA MENDOZA MD; Resident Radiologist  This document is a PRELIMINARY interpretation and is pending final attending approval. Dec 15 2023 10:17PM    CT C-spine wo con:   IMPRESSION:        No current acute cervical spine injury.    Chronic cervical spondylosis C2-3 through C6-7 levels.          Plan:

## 2023-12-16 DIAGNOSIS — I62.00 NONTRAUMATIC SUBDURAL HEMORRHAGE, UNSPECIFIED: ICD-10-CM

## 2023-12-16 PROBLEM — J30.2 OTHER SEASONAL ALLERGIC RHINITIS: Chronic | Status: ACTIVE | Noted: 2023-02-13

## 2023-12-16 PROBLEM — I10 ESSENTIAL (PRIMARY) HYPERTENSION: Chronic | Status: ACTIVE | Noted: 2023-02-13

## 2023-12-16 PROBLEM — E11.9 TYPE 2 DIABETES MELLITUS WITHOUT COMPLICATIONS: Chronic | Status: ACTIVE | Noted: 2023-02-14

## 2023-12-16 PROBLEM — Z87.438 PERSONAL HISTORY OF OTHER DISEASES OF MALE GENITAL ORGANS: Chronic | Status: ACTIVE | Noted: 2023-02-14

## 2023-12-16 LAB
ANION GAP SERPL CALC-SCNC: 13 MMOL/L — SIGNIFICANT CHANGE UP (ref 7–14)
ANION GAP SERPL CALC-SCNC: 13 MMOL/L — SIGNIFICANT CHANGE UP (ref 7–14)
ANION GAP SERPL CALC-SCNC: 16 MMOL/L — HIGH (ref 7–14)
ANION GAP SERPL CALC-SCNC: 16 MMOL/L — HIGH (ref 7–14)
APPEARANCE UR: CLEAR — SIGNIFICANT CHANGE UP
APPEARANCE UR: CLEAR — SIGNIFICANT CHANGE UP
BASOPHILS # BLD AUTO: 0.03 K/UL — SIGNIFICANT CHANGE UP (ref 0–0.2)
BASOPHILS # BLD AUTO: 0.03 K/UL — SIGNIFICANT CHANGE UP (ref 0–0.2)
BASOPHILS # BLD AUTO: 0.04 K/UL — SIGNIFICANT CHANGE UP (ref 0–0.2)
BASOPHILS # BLD AUTO: 0.04 K/UL — SIGNIFICANT CHANGE UP (ref 0–0.2)
BASOPHILS NFR BLD AUTO: 0.2 % — SIGNIFICANT CHANGE UP (ref 0–1)
BASOPHILS NFR BLD AUTO: 0.2 % — SIGNIFICANT CHANGE UP (ref 0–1)
BASOPHILS NFR BLD AUTO: 0.3 % — SIGNIFICANT CHANGE UP (ref 0–1)
BASOPHILS NFR BLD AUTO: 0.3 % — SIGNIFICANT CHANGE UP (ref 0–1)
BILIRUB UR-MCNC: NEGATIVE — SIGNIFICANT CHANGE UP
BILIRUB UR-MCNC: NEGATIVE — SIGNIFICANT CHANGE UP
BUN SERPL-MCNC: 40 MG/DL — HIGH (ref 10–20)
BUN SERPL-MCNC: 40 MG/DL — HIGH (ref 10–20)
BUN SERPL-MCNC: 42 MG/DL — HIGH (ref 10–20)
BUN SERPL-MCNC: 42 MG/DL — HIGH (ref 10–20)
CALCIUM SERPL-MCNC: 8.2 MG/DL — LOW (ref 8.4–10.5)
CALCIUM SERPL-MCNC: 8.2 MG/DL — LOW (ref 8.4–10.5)
CALCIUM SERPL-MCNC: 8.6 MG/DL — SIGNIFICANT CHANGE UP (ref 8.4–10.5)
CALCIUM SERPL-MCNC: 8.6 MG/DL — SIGNIFICANT CHANGE UP (ref 8.4–10.5)
CHLORIDE SERPL-SCNC: 108 MMOL/L — SIGNIFICANT CHANGE UP (ref 98–110)
CHLORIDE SERPL-SCNC: 108 MMOL/L — SIGNIFICANT CHANGE UP (ref 98–110)
CHLORIDE SERPL-SCNC: 116 MMOL/L — HIGH (ref 98–110)
CHLORIDE SERPL-SCNC: 116 MMOL/L — HIGH (ref 98–110)
CO2 SERPL-SCNC: 18 MMOL/L — SIGNIFICANT CHANGE UP (ref 17–32)
CO2 SERPL-SCNC: 18 MMOL/L — SIGNIFICANT CHANGE UP (ref 17–32)
CO2 SERPL-SCNC: 21 MMOL/L — SIGNIFICANT CHANGE UP (ref 17–32)
CO2 SERPL-SCNC: 21 MMOL/L — SIGNIFICANT CHANGE UP (ref 17–32)
COLOR SPEC: YELLOW — SIGNIFICANT CHANGE UP
COLOR SPEC: YELLOW — SIGNIFICANT CHANGE UP
CREAT SERPL-MCNC: 2.6 MG/DL — HIGH (ref 0.7–1.5)
DIFF PNL FLD: ABNORMAL
DIFF PNL FLD: ABNORMAL
EGFR: 24 ML/MIN/1.73M2 — LOW
EOSINOPHIL # BLD AUTO: 0.02 K/UL — SIGNIFICANT CHANGE UP (ref 0–0.7)
EOSINOPHIL # BLD AUTO: 0.02 K/UL — SIGNIFICANT CHANGE UP (ref 0–0.7)
EOSINOPHIL # BLD AUTO: 0.06 K/UL — SIGNIFICANT CHANGE UP (ref 0–0.7)
EOSINOPHIL # BLD AUTO: 0.06 K/UL — SIGNIFICANT CHANGE UP (ref 0–0.7)
EOSINOPHIL NFR BLD AUTO: 0.2 % — SIGNIFICANT CHANGE UP (ref 0–8)
EOSINOPHIL NFR BLD AUTO: 0.2 % — SIGNIFICANT CHANGE UP (ref 0–8)
EOSINOPHIL NFR BLD AUTO: 0.4 % — SIGNIFICANT CHANGE UP (ref 0–8)
EOSINOPHIL NFR BLD AUTO: 0.4 % — SIGNIFICANT CHANGE UP (ref 0–8)
GLUCOSE SERPL-MCNC: 142 MG/DL — HIGH (ref 70–99)
GLUCOSE SERPL-MCNC: 142 MG/DL — HIGH (ref 70–99)
GLUCOSE SERPL-MCNC: 147 MG/DL — HIGH (ref 70–99)
GLUCOSE SERPL-MCNC: 147 MG/DL — HIGH (ref 70–99)
GLUCOSE UR QL: >=1000 MG/DL
GLUCOSE UR QL: >=1000 MG/DL
HCT VFR BLD CALC: 30.1 % — LOW (ref 42–52)
HCT VFR BLD CALC: 30.1 % — LOW (ref 42–52)
HCT VFR BLD CALC: 36.2 % — LOW (ref 42–52)
HCT VFR BLD CALC: 36.2 % — LOW (ref 42–52)
HGB BLD-MCNC: 10 G/DL — LOW (ref 14–18)
HGB BLD-MCNC: 10 G/DL — LOW (ref 14–18)
HGB BLD-MCNC: 11.9 G/DL — LOW (ref 14–18)
HGB BLD-MCNC: 11.9 G/DL — LOW (ref 14–18)
IMM GRANULOCYTES NFR BLD AUTO: 0.5 % — HIGH (ref 0.1–0.3)
IMM GRANULOCYTES NFR BLD AUTO: 0.5 % — HIGH (ref 0.1–0.3)
IMM GRANULOCYTES NFR BLD AUTO: 0.9 % — HIGH (ref 0.1–0.3)
IMM GRANULOCYTES NFR BLD AUTO: 0.9 % — HIGH (ref 0.1–0.3)
KETONES UR-MCNC: 15 MG/DL
KETONES UR-MCNC: 15 MG/DL
LEUKOCYTE ESTERASE UR-ACNC: NEGATIVE — SIGNIFICANT CHANGE UP
LEUKOCYTE ESTERASE UR-ACNC: NEGATIVE — SIGNIFICANT CHANGE UP
LYMPHOCYTES # BLD AUTO: 0.93 K/UL — LOW (ref 1.2–3.4)
LYMPHOCYTES # BLD AUTO: 0.93 K/UL — LOW (ref 1.2–3.4)
LYMPHOCYTES # BLD AUTO: 1.02 K/UL — LOW (ref 1.2–3.4)
LYMPHOCYTES # BLD AUTO: 1.02 K/UL — LOW (ref 1.2–3.4)
LYMPHOCYTES # BLD AUTO: 7.2 % — LOW (ref 20.5–51.1)
LYMPHOCYTES # BLD AUTO: 7.2 % — LOW (ref 20.5–51.1)
LYMPHOCYTES # BLD AUTO: 7.5 % — LOW (ref 20.5–51.1)
LYMPHOCYTES # BLD AUTO: 7.5 % — LOW (ref 20.5–51.1)
MAGNESIUM SERPL-MCNC: 2.5 MG/DL — HIGH (ref 1.8–2.4)
MCHC RBC-ENTMCNC: 29.3 PG — SIGNIFICANT CHANGE UP (ref 27–31)
MCHC RBC-ENTMCNC: 29.3 PG — SIGNIFICANT CHANGE UP (ref 27–31)
MCHC RBC-ENTMCNC: 29.7 PG — SIGNIFICANT CHANGE UP (ref 27–31)
MCHC RBC-ENTMCNC: 29.7 PG — SIGNIFICANT CHANGE UP (ref 27–31)
MCHC RBC-ENTMCNC: 32.9 G/DL — SIGNIFICANT CHANGE UP (ref 32–37)
MCHC RBC-ENTMCNC: 32.9 G/DL — SIGNIFICANT CHANGE UP (ref 32–37)
MCHC RBC-ENTMCNC: 33.2 G/DL — SIGNIFICANT CHANGE UP (ref 32–37)
MCHC RBC-ENTMCNC: 33.2 G/DL — SIGNIFICANT CHANGE UP (ref 32–37)
MCV RBC AUTO: 89.2 FL — SIGNIFICANT CHANGE UP (ref 80–94)
MCV RBC AUTO: 89.2 FL — SIGNIFICANT CHANGE UP (ref 80–94)
MCV RBC AUTO: 89.3 FL — SIGNIFICANT CHANGE UP (ref 80–94)
MCV RBC AUTO: 89.3 FL — SIGNIFICANT CHANGE UP (ref 80–94)
MONOCYTES # BLD AUTO: 1.13 K/UL — HIGH (ref 0.1–0.6)
MONOCYTES # BLD AUTO: 1.13 K/UL — HIGH (ref 0.1–0.6)
MONOCYTES # BLD AUTO: 1.16 K/UL — HIGH (ref 0.1–0.6)
MONOCYTES # BLD AUTO: 1.16 K/UL — HIGH (ref 0.1–0.6)
MONOCYTES NFR BLD AUTO: 8.3 % — SIGNIFICANT CHANGE UP (ref 1.7–9.3)
MONOCYTES NFR BLD AUTO: 8.3 % — SIGNIFICANT CHANGE UP (ref 1.7–9.3)
MONOCYTES NFR BLD AUTO: 9 % — SIGNIFICANT CHANGE UP (ref 1.7–9.3)
MONOCYTES NFR BLD AUTO: 9 % — SIGNIFICANT CHANGE UP (ref 1.7–9.3)
NEUTROPHILS # BLD AUTO: 10.57 K/UL — HIGH (ref 1.4–6.5)
NEUTROPHILS # BLD AUTO: 10.57 K/UL — HIGH (ref 1.4–6.5)
NEUTROPHILS # BLD AUTO: 11.22 K/UL — HIGH (ref 1.4–6.5)
NEUTROPHILS # BLD AUTO: 11.22 K/UL — HIGH (ref 1.4–6.5)
NEUTROPHILS NFR BLD AUTO: 82.5 % — HIGH (ref 42.2–75.2)
NEUTROPHILS NFR BLD AUTO: 82.5 % — HIGH (ref 42.2–75.2)
NEUTROPHILS NFR BLD AUTO: 83 % — HIGH (ref 42.2–75.2)
NEUTROPHILS NFR BLD AUTO: 83 % — HIGH (ref 42.2–75.2)
NITRITE UR-MCNC: NEGATIVE — SIGNIFICANT CHANGE UP
NITRITE UR-MCNC: NEGATIVE — SIGNIFICANT CHANGE UP
NRBC # BLD: 0 /100 WBCS — SIGNIFICANT CHANGE UP (ref 0–0)
PH UR: 6 — SIGNIFICANT CHANGE UP (ref 5–8)
PH UR: 6 — SIGNIFICANT CHANGE UP (ref 5–8)
PHOSPHATE SERPL-MCNC: 3.8 MG/DL — SIGNIFICANT CHANGE UP (ref 2.1–4.9)
PHOSPHATE SERPL-MCNC: 3.8 MG/DL — SIGNIFICANT CHANGE UP (ref 2.1–4.9)
PHOSPHATE SERPL-MCNC: 4 MG/DL — SIGNIFICANT CHANGE UP (ref 2.1–4.9)
PHOSPHATE SERPL-MCNC: 4 MG/DL — SIGNIFICANT CHANGE UP (ref 2.1–4.9)
PLATELET # BLD AUTO: 221 K/UL — SIGNIFICANT CHANGE UP (ref 130–400)
PLATELET # BLD AUTO: 221 K/UL — SIGNIFICANT CHANGE UP (ref 130–400)
PLATELET # BLD AUTO: 254 K/UL — SIGNIFICANT CHANGE UP (ref 130–400)
PLATELET # BLD AUTO: 254 K/UL — SIGNIFICANT CHANGE UP (ref 130–400)
PMV BLD: 8.7 FL — SIGNIFICANT CHANGE UP (ref 7.4–10.4)
POTASSIUM SERPL-MCNC: 3.9 MMOL/L — SIGNIFICANT CHANGE UP (ref 3.5–5)
POTASSIUM SERPL-MCNC: 3.9 MMOL/L — SIGNIFICANT CHANGE UP (ref 3.5–5)
POTASSIUM SERPL-MCNC: 4 MMOL/L — SIGNIFICANT CHANGE UP (ref 3.5–5)
POTASSIUM SERPL-MCNC: 4 MMOL/L — SIGNIFICANT CHANGE UP (ref 3.5–5)
POTASSIUM SERPL-SCNC: 3.9 MMOL/L — SIGNIFICANT CHANGE UP (ref 3.5–5)
POTASSIUM SERPL-SCNC: 3.9 MMOL/L — SIGNIFICANT CHANGE UP (ref 3.5–5)
POTASSIUM SERPL-SCNC: 4 MMOL/L — SIGNIFICANT CHANGE UP (ref 3.5–5)
POTASSIUM SERPL-SCNC: 4 MMOL/L — SIGNIFICANT CHANGE UP (ref 3.5–5)
PROT UR-MCNC: 300 MG/DL
PROT UR-MCNC: 300 MG/DL
RBC # BLD: 3.37 M/UL — LOW (ref 4.7–6.1)
RBC # BLD: 3.37 M/UL — LOW (ref 4.7–6.1)
RBC # BLD: 4.06 M/UL — LOW (ref 4.7–6.1)
RBC # BLD: 4.06 M/UL — LOW (ref 4.7–6.1)
RBC # FLD: 13.3 % — SIGNIFICANT CHANGE UP (ref 11.5–14.5)
RBC # FLD: 13.3 % — SIGNIFICANT CHANGE UP (ref 11.5–14.5)
RBC # FLD: 13.5 % — SIGNIFICANT CHANGE UP (ref 11.5–14.5)
RBC # FLD: 13.5 % — SIGNIFICANT CHANGE UP (ref 11.5–14.5)
SODIUM SERPL-SCNC: 142 MMOL/L — SIGNIFICANT CHANGE UP (ref 135–146)
SODIUM SERPL-SCNC: 142 MMOL/L — SIGNIFICANT CHANGE UP (ref 135–146)
SODIUM SERPL-SCNC: 150 MMOL/L — HIGH (ref 135–146)
SODIUM SERPL-SCNC: 150 MMOL/L — HIGH (ref 135–146)
SP GR SPEC: >1.03 — HIGH (ref 1–1.03)
SP GR SPEC: >1.03 — HIGH (ref 1–1.03)
UROBILINOGEN FLD QL: 0.2 MG/DL — SIGNIFICANT CHANGE UP (ref 0.2–1)
UROBILINOGEN FLD QL: 0.2 MG/DL — SIGNIFICANT CHANGE UP (ref 0.2–1)
WBC # BLD: 12.83 K/UL — HIGH (ref 4.8–10.8)
WBC # BLD: 12.83 K/UL — HIGH (ref 4.8–10.8)
WBC # BLD: 13.54 K/UL — HIGH (ref 4.8–10.8)
WBC # BLD: 13.54 K/UL — HIGH (ref 4.8–10.8)
WBC # FLD AUTO: 12.83 K/UL — HIGH (ref 4.8–10.8)
WBC # FLD AUTO: 12.83 K/UL — HIGH (ref 4.8–10.8)
WBC # FLD AUTO: 13.54 K/UL — HIGH (ref 4.8–10.8)
WBC # FLD AUTO: 13.54 K/UL — HIGH (ref 4.8–10.8)

## 2023-12-16 PROCEDURE — 87635 SARS-COV-2 COVID-19 AMP PRB: CPT

## 2023-12-16 PROCEDURE — 92526 ORAL FUNCTION THERAPY: CPT | Mod: GN

## 2023-12-16 PROCEDURE — 99221 1ST HOSP IP/OBS SF/LOW 40: CPT

## 2023-12-16 PROCEDURE — 95816 EEG AWAKE AND DROWSY: CPT

## 2023-12-16 PROCEDURE — 83930 ASSAY OF BLOOD OSMOLALITY: CPT

## 2023-12-16 PROCEDURE — 97110 THERAPEUTIC EXERCISES: CPT | Mod: GP

## 2023-12-16 PROCEDURE — 81001 URINALYSIS AUTO W/SCOPE: CPT

## 2023-12-16 PROCEDURE — 87040 BLOOD CULTURE FOR BACTERIA: CPT

## 2023-12-16 PROCEDURE — P9016: CPT

## 2023-12-16 PROCEDURE — 82140 ASSAY OF AMMONIA: CPT

## 2023-12-16 PROCEDURE — 84443 ASSAY THYROID STIM HORMONE: CPT

## 2023-12-16 PROCEDURE — 83605 ASSAY OF LACTIC ACID: CPT

## 2023-12-16 PROCEDURE — 36430 TRANSFUSION BLD/BLD COMPNT: CPT

## 2023-12-16 PROCEDURE — 82040 ASSAY OF SERUM ALBUMIN: CPT

## 2023-12-16 PROCEDURE — 84100 ASSAY OF PHOSPHORUS: CPT

## 2023-12-16 PROCEDURE — 84480 ASSAY TRIIODOTHYRONINE (T3): CPT

## 2023-12-16 PROCEDURE — 82803 BLOOD GASES ANY COMBINATION: CPT

## 2023-12-16 PROCEDURE — 95714 VEEG EA 12-26 HR UNMNTR: CPT

## 2023-12-16 PROCEDURE — 99291 CRITICAL CARE FIRST HOUR: CPT | Mod: 24,25

## 2023-12-16 PROCEDURE — 82962 GLUCOSE BLOOD TEST: CPT

## 2023-12-16 PROCEDURE — 86850 RBC ANTIBODY SCREEN: CPT

## 2023-12-16 PROCEDURE — 80164 ASSAY DIPROPYLACETIC ACD TOT: CPT

## 2023-12-16 PROCEDURE — 93306 TTE W/DOPPLER COMPLETE: CPT

## 2023-12-16 PROCEDURE — 94002 VENT MGMT INPAT INIT DAY: CPT

## 2023-12-16 PROCEDURE — 84436 ASSAY OF TOTAL THYROXINE: CPT

## 2023-12-16 PROCEDURE — 95700 EEG CONT REC W/VID EEG TECH: CPT

## 2023-12-16 PROCEDURE — 87640 STAPH A DNA AMP PROBE: CPT

## 2023-12-16 PROCEDURE — 80048 BASIC METABOLIC PNL TOTAL CA: CPT

## 2023-12-16 PROCEDURE — 82570 ASSAY OF URINE CREATININE: CPT

## 2023-12-16 PROCEDURE — 99223 1ST HOSP IP/OBS HIGH 75: CPT

## 2023-12-16 PROCEDURE — 93005 ELECTROCARDIOGRAM TRACING: CPT

## 2023-12-16 PROCEDURE — 83036 HEMOGLOBIN GLYCOSYLATED A1C: CPT

## 2023-12-16 PROCEDURE — 80076 HEPATIC FUNCTION PANEL: CPT

## 2023-12-16 PROCEDURE — C9399: CPT

## 2023-12-16 PROCEDURE — 94003 VENT MGMT INPAT SUBQ DAY: CPT

## 2023-12-16 PROCEDURE — 83735 ASSAY OF MAGNESIUM: CPT

## 2023-12-16 PROCEDURE — 85027 COMPLETE CBC AUTOMATED: CPT

## 2023-12-16 PROCEDURE — 85014 HEMATOCRIT: CPT

## 2023-12-16 PROCEDURE — 86923 COMPATIBILITY TEST ELECTRIC: CPT

## 2023-12-16 PROCEDURE — 85610 PROTHROMBIN TIME: CPT

## 2023-12-16 PROCEDURE — 36415 COLL VENOUS BLD VENIPUNCTURE: CPT

## 2023-12-16 PROCEDURE — 97161 PT EVAL LOW COMPLEX 20 MIN: CPT | Mod: GP

## 2023-12-16 PROCEDURE — 87493 C DIFF AMPLIFIED PROBE: CPT

## 2023-12-16 PROCEDURE — 93010 ELECTROCARDIOGRAM REPORT: CPT

## 2023-12-16 PROCEDURE — 36556 INSERT NON-TUNNEL CV CATH: CPT

## 2023-12-16 PROCEDURE — L8699: CPT

## 2023-12-16 PROCEDURE — 87186 SC STD MICRODIL/AGAR DIL: CPT

## 2023-12-16 PROCEDURE — 71045 X-RAY EXAM CHEST 1 VIEW: CPT

## 2023-12-16 PROCEDURE — 87070 CULTURE OTHR SPECIMN AEROBIC: CPT

## 2023-12-16 PROCEDURE — 0225U NFCT DS DNA&RNA 21 SARSCOV2: CPT

## 2023-12-16 PROCEDURE — 87077 CULTURE AEROBIC IDENTIFY: CPT

## 2023-12-16 PROCEDURE — 71045 X-RAY EXAM CHEST 1 VIEW: CPT | Mod: 26

## 2023-12-16 PROCEDURE — 84295 ASSAY OF SERUM SODIUM: CPT

## 2023-12-16 PROCEDURE — 83935 ASSAY OF URINE OSMOLALITY: CPT

## 2023-12-16 PROCEDURE — 84300 ASSAY OF URINE SODIUM: CPT

## 2023-12-16 PROCEDURE — 94760 N-INVAS EAR/PLS OXIMETRY 1: CPT

## 2023-12-16 PROCEDURE — 85018 HEMOGLOBIN: CPT

## 2023-12-16 PROCEDURE — 76937 US GUIDE VASCULAR ACCESS: CPT | Mod: 26

## 2023-12-16 PROCEDURE — 70450 CT HEAD/BRAIN W/O DYE: CPT

## 2023-12-16 PROCEDURE — 86901 BLOOD TYPING SEROLOGIC RH(D): CPT

## 2023-12-16 PROCEDURE — 87641 MR-STAPH DNA AMP PROBE: CPT

## 2023-12-16 PROCEDURE — 92610 EVALUATE SWALLOWING FUNCTION: CPT | Mod: GN

## 2023-12-16 PROCEDURE — 86900 BLOOD TYPING SEROLOGIC ABO: CPT

## 2023-12-16 PROCEDURE — 85730 THROMBOPLASTIN TIME PARTIAL: CPT

## 2023-12-16 PROCEDURE — 74177 CT ABD & PELVIS W/CONTRAST: CPT | Mod: 26,MA

## 2023-12-16 PROCEDURE — 71260 CT THORAX DX C+: CPT | Mod: 26,MA

## 2023-12-16 PROCEDURE — 99233 SBSQ HOSP IP/OBS HIGH 50: CPT

## 2023-12-16 PROCEDURE — C9113: CPT

## 2023-12-16 PROCEDURE — T1013: CPT

## 2023-12-16 PROCEDURE — 85025 COMPLETE CBC W/AUTO DIFF WBC: CPT

## 2023-12-16 PROCEDURE — 84132 ASSAY OF SERUM POTASSIUM: CPT

## 2023-12-16 PROCEDURE — 73560 X-RAY EXAM OF KNEE 1 OR 2: CPT | Mod: 50

## 2023-12-16 PROCEDURE — 70450 CT HEAD/BRAIN W/O DYE: CPT | Mod: 26,MA

## 2023-12-16 PROCEDURE — 95720 EEG PHY/QHP EA INCR W/VEEG: CPT

## 2023-12-16 PROCEDURE — 94640 AIRWAY INHALATION TREATMENT: CPT

## 2023-12-16 PROCEDURE — 80053 COMPREHEN METABOLIC PANEL: CPT

## 2023-12-16 PROCEDURE — 82330 ASSAY OF CALCIUM: CPT

## 2023-12-16 RX ORDER — ATORVASTATIN CALCIUM 80 MG/1
20 TABLET, FILM COATED ORAL AT BEDTIME
Refills: 0 | Status: DISCONTINUED | OUTPATIENT
Start: 2023-12-16 | End: 2024-01-12

## 2023-12-16 RX ORDER — FAMOTIDINE 10 MG/ML
10 INJECTION INTRAVENOUS DAILY
Refills: 0 | Status: DISCONTINUED | OUTPATIENT
Start: 2023-12-16 | End: 2023-12-16

## 2023-12-16 RX ORDER — AMLODIPINE BESYLATE 2.5 MG/1
5 TABLET ORAL AT BEDTIME
Refills: 0 | Status: DISCONTINUED | OUTPATIENT
Start: 2023-12-16 | End: 2023-12-16

## 2023-12-16 RX ORDER — MAGNESIUM SULFATE 500 MG/ML
2 VIAL (ML) INJECTION ONCE
Refills: 0 | Status: DISCONTINUED | OUTPATIENT
Start: 2023-12-16 | End: 2023-12-16

## 2023-12-16 RX ORDER — METOPROLOL TARTRATE 50 MG
5 TABLET ORAL EVERY 6 HOURS
Refills: 0 | Status: DISCONTINUED | OUTPATIENT
Start: 2023-12-16 | End: 2023-12-17

## 2023-12-16 RX ORDER — HEPARIN SODIUM 5000 [USP'U]/ML
5000 INJECTION INTRAVENOUS; SUBCUTANEOUS EVERY 8 HOURS
Refills: 0 | Status: DISCONTINUED | OUTPATIENT
Start: 2023-12-16 | End: 2024-01-12

## 2023-12-16 RX ORDER — SODIUM BICARBONATE 1 MEQ/ML
650 SYRINGE (ML) INTRAVENOUS
Refills: 0 | Status: DISCONTINUED | OUTPATIENT
Start: 2023-12-16 | End: 2023-12-20

## 2023-12-16 RX ORDER — SODIUM CHLORIDE 9 MG/ML
1000 INJECTION INTRAMUSCULAR; INTRAVENOUS; SUBCUTANEOUS
Refills: 0 | Status: DISCONTINUED | OUTPATIENT
Start: 2023-12-16 | End: 2023-12-16

## 2023-12-16 RX ORDER — INSULIN LISPRO 100/ML
VIAL (ML) SUBCUTANEOUS
Refills: 0 | Status: DISCONTINUED | OUTPATIENT
Start: 2023-12-16 | End: 2023-12-16

## 2023-12-16 RX ORDER — SODIUM CHLORIDE 9 MG/ML
1000 INJECTION, SOLUTION INTRAVENOUS
Refills: 0 | Status: DISCONTINUED | OUTPATIENT
Start: 2023-12-16 | End: 2023-12-16

## 2023-12-16 RX ORDER — DEXTROSE 50 % IN WATER 50 %
12.5 SYRINGE (ML) INTRAVENOUS ONCE
Refills: 0 | Status: DISCONTINUED | OUTPATIENT
Start: 2023-12-16 | End: 2023-12-16

## 2023-12-16 RX ORDER — CHLORHEXIDINE GLUCONATE 213 G/1000ML
1 SOLUTION TOPICAL
Refills: 0 | Status: DISCONTINUED | OUTPATIENT
Start: 2023-12-16 | End: 2024-01-12

## 2023-12-16 RX ORDER — DESMOPRESSIN ACETATE 0.1 MG/1
18.5 TABLET ORAL ONCE
Refills: 0 | Status: DISCONTINUED | OUTPATIENT
Start: 2023-12-16 | End: 2023-12-16

## 2023-12-16 RX ORDER — INSULIN LISPRO 100/ML
VIAL (ML) SUBCUTANEOUS EVERY 6 HOURS
Refills: 0 | Status: DISCONTINUED | OUTPATIENT
Start: 2023-12-16 | End: 2023-12-17

## 2023-12-16 RX ORDER — CEFEPIME 1 G/1
INJECTION, POWDER, FOR SOLUTION INTRAMUSCULAR; INTRAVENOUS
Refills: 0 | Status: DISCONTINUED | OUTPATIENT
Start: 2023-12-16 | End: 2023-12-16

## 2023-12-16 RX ORDER — CEFEPIME 1 G/1
2000 INJECTION, POWDER, FOR SOLUTION INTRAMUSCULAR; INTRAVENOUS EVERY 24 HOURS
Refills: 0 | Status: DISCONTINUED | OUTPATIENT
Start: 2023-12-17 | End: 2023-12-17

## 2023-12-16 RX ORDER — TAMSULOSIN HYDROCHLORIDE 0.4 MG/1
2 CAPSULE ORAL
Refills: 0 | DISCHARGE

## 2023-12-16 RX ORDER — DESMOPRESSIN ACETATE 0.1 MG/1
18 TABLET ORAL ONCE
Refills: 0 | Status: DISCONTINUED | OUTPATIENT
Start: 2023-12-16 | End: 2023-12-16

## 2023-12-16 RX ORDER — ACETAMINOPHEN 500 MG
650 TABLET ORAL EVERY 6 HOURS
Refills: 0 | Status: COMPLETED | OUTPATIENT
Start: 2023-12-16 | End: 2023-12-18

## 2023-12-16 RX ORDER — DEXTROSE 50 % IN WATER 50 %
25 SYRINGE (ML) INTRAVENOUS ONCE
Refills: 0 | Status: DISCONTINUED | OUTPATIENT
Start: 2023-12-16 | End: 2023-12-16

## 2023-12-16 RX ORDER — GLUCAGON INJECTION, SOLUTION 0.5 MG/.1ML
1 INJECTION, SOLUTION SUBCUTANEOUS ONCE
Refills: 0 | Status: DISCONTINUED | OUTPATIENT
Start: 2023-12-16 | End: 2023-12-16

## 2023-12-16 RX ORDER — LEVETIRACETAM 250 MG/1
750 TABLET, FILM COATED ORAL EVERY 12 HOURS
Refills: 0 | Status: DISCONTINUED | OUTPATIENT
Start: 2023-12-16 | End: 2023-12-17

## 2023-12-16 RX ORDER — DEXTROSE 50 % IN WATER 50 %
15 SYRINGE (ML) INTRAVENOUS ONCE
Refills: 0 | Status: DISCONTINUED | OUTPATIENT
Start: 2023-12-16 | End: 2023-12-16

## 2023-12-16 RX ORDER — CARVEDILOL PHOSPHATE 80 MG/1
6.25 CAPSULE, EXTENDED RELEASE ORAL EVERY 12 HOURS
Refills: 0 | Status: DISCONTINUED | OUTPATIENT
Start: 2023-12-16 | End: 2023-12-16

## 2023-12-16 RX ORDER — LEVETIRACETAM 250 MG/1
500 TABLET, FILM COATED ORAL EVERY 12 HOURS
Refills: 0 | Status: DISCONTINUED | OUTPATIENT
Start: 2023-12-16 | End: 2023-12-16

## 2023-12-16 RX ORDER — TAMSULOSIN HYDROCHLORIDE 0.4 MG/1
0.8 CAPSULE ORAL AT BEDTIME
Refills: 0 | Status: DISCONTINUED | OUTPATIENT
Start: 2023-12-16 | End: 2023-12-19

## 2023-12-16 RX ORDER — SODIUM CHLORIDE 9 MG/ML
1000 INJECTION, SOLUTION INTRAVENOUS
Refills: 0 | Status: DISCONTINUED | OUTPATIENT
Start: 2023-12-16 | End: 2023-12-17

## 2023-12-16 RX ORDER — CEFEPIME 1 G/1
INJECTION, POWDER, FOR SOLUTION INTRAMUSCULAR; INTRAVENOUS
Refills: 0 | Status: DISCONTINUED | OUTPATIENT
Start: 2023-12-16 | End: 2023-12-17

## 2023-12-16 RX ORDER — VALSARTAN 80 MG/1
320 TABLET ORAL DAILY
Refills: 0 | Status: DISCONTINUED | OUTPATIENT
Start: 2023-12-16 | End: 2023-12-16

## 2023-12-16 RX ORDER — METOPROLOL TARTRATE 50 MG
2.5 TABLET ORAL EVERY 6 HOURS
Refills: 0 | Status: DISCONTINUED | OUTPATIENT
Start: 2023-12-16 | End: 2023-12-16

## 2023-12-16 RX ORDER — CEFEPIME 1 G/1
2000 INJECTION, POWDER, FOR SOLUTION INTRAMUSCULAR; INTRAVENOUS ONCE
Refills: 0 | Status: COMPLETED | OUTPATIENT
Start: 2023-12-16 | End: 2023-12-16

## 2023-12-16 RX ORDER — FAMOTIDINE 10 MG/ML
20 INJECTION INTRAVENOUS DAILY
Refills: 0 | Status: DISCONTINUED | OUTPATIENT
Start: 2023-12-16 | End: 2023-12-17

## 2023-12-16 RX ORDER — METOPROLOL TARTRATE 50 MG
2.5 TABLET ORAL ONCE
Refills: 0 | Status: COMPLETED | OUTPATIENT
Start: 2023-12-16 | End: 2023-12-16

## 2023-12-16 RX ORDER — ATORVASTATIN CALCIUM 80 MG/1
1 TABLET, FILM COATED ORAL
Refills: 0 | DISCHARGE

## 2023-12-16 RX ORDER — ACETAMINOPHEN 500 MG
1000 TABLET ORAL ONCE
Refills: 0 | Status: DISCONTINUED | OUTPATIENT
Start: 2023-12-16 | End: 2023-12-17

## 2023-12-16 RX ADMIN — HEPARIN SODIUM 5000 UNIT(S): 5000 INJECTION INTRAVENOUS; SUBCUTANEOUS at 22:07

## 2023-12-16 RX ADMIN — SODIUM CHLORIDE 100 MILLILITER(S): 9 INJECTION INTRAMUSCULAR; INTRAVENOUS; SUBCUTANEOUS at 06:46

## 2023-12-16 RX ADMIN — Medication 2: at 11:57

## 2023-12-16 RX ADMIN — SODIUM CHLORIDE 100 MILLILITER(S): 9 INJECTION INTRAMUSCULAR; INTRAVENOUS; SUBCUTANEOUS at 05:05

## 2023-12-16 RX ADMIN — LEVETIRACETAM 400 MILLIGRAM(S): 250 TABLET, FILM COATED ORAL at 05:06

## 2023-12-16 RX ADMIN — Medication 5 MILLIGRAM(S): at 17:47

## 2023-12-16 RX ADMIN — FAMOTIDINE 104 MILLIGRAM(S): 10 INJECTION INTRAVENOUS at 11:57

## 2023-12-16 RX ADMIN — CHLORHEXIDINE GLUCONATE 1 APPLICATION(S): 213 SOLUTION TOPICAL at 06:43

## 2023-12-16 RX ADMIN — Medication 5 MILLIGRAM(S): at 11:58

## 2023-12-16 RX ADMIN — ATORVASTATIN CALCIUM 20 MILLIGRAM(S): 80 TABLET, FILM COATED ORAL at 22:08

## 2023-12-16 RX ADMIN — Medication 2.5 MILLIGRAM(S): at 08:28

## 2023-12-16 RX ADMIN — CEFEPIME 100 MILLIGRAM(S): 1 INJECTION, POWDER, FOR SOLUTION INTRAMUSCULAR; INTRAVENOUS at 18:04

## 2023-12-16 RX ADMIN — LEVETIRACETAM 400 MILLIGRAM(S): 250 TABLET, FILM COATED ORAL at 19:03

## 2023-12-16 RX ADMIN — Medication 650 MILLIGRAM(S): at 18:58

## 2023-12-16 RX ADMIN — TAMSULOSIN HYDROCHLORIDE 0.8 MILLIGRAM(S): 0.4 CAPSULE ORAL at 22:08

## 2023-12-16 RX ADMIN — Medication 2: at 17:51

## 2023-12-16 NOTE — CONSULT NOTE ADULT - ATTENDING COMMENTS
Unable to examine patient due to midline placement.   Reviewed chart above.   Agree with EP. Tele showing artifact, no VT seen.   -Continue treatment per primary team.   -No further cardiac work up needed.

## 2023-12-16 NOTE — PROGRESS NOTE ADULT - SUBJECTIVE AND OBJECTIVE BOX
78-year-old male with PMH CVA on aspirin Plavix, HTN, HLD, CKD, BPH presenting for headache and generalized weakness s/p head trauma X 2 days.  Per patient's daughter who is bedside patient was getting out of his car 2 days ago and had a mechanical fall, hitting his head on the concrete, -LOC. Stood up on his own and walked but that evening was unable to walk again. Since then patient has complained of feeling weak and having persistent posterior headache.  Unable to obtain detailed history from patient despite  use.  Pt seen and examined at bedside and appear in NAD, denied headache at time of exam.    ICU Vital Signs Last 24 Hrs  T(C): 37.8 (16 Dec 2023 06:00), Max: 38.2 (16 Dec 2023 01:30)  T(F): 100.1 (16 Dec 2023 06:00), Max: 100.8 (16 Dec 2023 01:30)  HR: 135 (16 Dec 2023 06:00) (88 - 135)  BP: 142/98 (16 Dec 2023 06:00) (137/60 - 170/70)  BP(mean): 115 (16 Dec 2023 06:00) (93 - 115)  ABP: --  ABP(mean): --  RR: 17 (16 Dec 2023 06:00) (17 - 20)  SpO2: 96% (16 Dec 2023 06:00) (95% - 99%)    O2 Parameters below as of 16 Dec 2023 06:00  Patient On (Oxygen Delivery Method): room air        MEDICATIONS  (STANDING):  acetaminophen     Tablet .. 650 milliGRAM(s) Oral every 6 hours  atorvastatin 20 milliGRAM(s) Oral at bedtime  chlorhexidine 2% Cloths 1 Application(s) Topical <User Schedule>  famotidine  IVPB 20 milliGRAM(s) IV Intermittent daily  insulin lispro (ADMELOG) corrective regimen sliding scale   SubCutaneous every 6 hours  levETIRAcetam  IVPB 500 milliGRAM(s) IV Intermittent every 12 hours  magnesium sulfate  IVPB 2 Gram(s) IV Intermittent once  metoprolol tartrate Injectable 2.5 milliGRAM(s) IV Push every 6 hours  sodium bicarbonate 650 milliGRAM(s) Oral two times a day  sodium chloride 0.9%. 1000 milliLiter(s) (100 mL/Hr) IV Continuous <Continuous>  tamsulosin 0.8 milliGRAM(s) Oral at bedtime      Exam:    lethargic but open eyes to voice                follows simple commands               oriented x2 to name and place, confuse to year               PERRL               mild left facial weakness               moves upper extremities antigravity and symmetrical                 minimal movement to bilateral lower extremities but withdraw to pain               seems in pain when attempt to passive lift up both legs                                12.2   12.27 )-----------( 240      ( 15 Dec 2023 20:29 )             36.5         12-15    141  |  103  |  42<H>  ----------------------------<  149<H>  4.0   |  24  |  2.8<H>    Ca    9.2      15 Dec 2023 20:29    TPro  6.8  /  Alb  4.4  /  TBili  0.9  /  DBili  x   /  AST  19  /  ALT  17  /  AlkPhos  49  12-15      CT head:   compared to CT head 12/15/2023 at 9:49 PM:  Essentially unchanged acute subdural hemorrhages measuring up to 0.7 cm   along the left hemisphere, 0.4 cm along the right frontal extra-axial   space, and 1.1 cm along the left falx. No midline shift.

## 2023-12-16 NOTE — CONSULT NOTE ADULT - SUBJECTIVE AND OBJECTIVE BOX
Patient is a 78y old  Male who presents with a chief complaint of SDH (16 Dec 2023 08:33)    HPI:  77-year-old male Cantonese speaking his presents with prior history of hypertension dyslipidemia diabetes BPH prior CVA on aspirin and Plavix who states he had a mechanical fall 2 days ago while getting out of the car fell backwards hit his head.  Afterwards he was able to ambulate.  But for the past 1 day family was unable to ambulate him.    Initial CT head remarkable for 3 Acute subdural hemorrhages: 0.7 cm along the left hemisphere, 0.3 cm along the right frontal area, and 1.1 cm along the left falx. No midline shift. Repeat CT head was obtained prior to 6 hour tacos because patient was appearing lethargic vs sundowning. CT head #2 with stable findings. CT c-spine and chest/abd/pelvis unremarkable for acute pathology.     EP: EP was consulted due to VT on tele.  Tele reviewed.  Episode is artifact and not VT.  Pt is unarousable per RN    PAST MEDICAL & SURGICAL HISTORY:  HTN (hypertension)      Seasonal allergies      History of BPH      Diabetes      No significant past surgical history      PREVIOUS DIAGNOSTIC TESTING:      ECHO  FINDINGS:  < from: TTE Echo Complete w/ Contrast w/ Doppler (02.25.23 @ 09:31) >  Summary:   1. Normal global left ventricular systolic function.   2. LV Ejection Fraction by Du's Method with a biplane EF of 69 %.   3. Spectral Doppler shows impaired relaxation pattern of left   ventricular myocardial filling (Grade I diastolic dysfunction).   4. Normal left atrial size.   5. Normal right atrial size.   6. Trace mitral valve regurgitation.   7. Mild tricuspid regurgitation.    < end of copied text >      MEDICATIONS  (STANDING):  acetaminophen     Tablet .. 650 milliGRAM(s) Oral every 6 hours  atorvastatin 20 milliGRAM(s) Oral at bedtime  chlorhexidine 2% Cloths 1 Application(s) Topical <User Schedule>  famotidine  IVPB 20 milliGRAM(s) IV Intermittent daily  insulin lispro (ADMELOG) corrective regimen sliding scale   SubCutaneous every 6 hours  levETIRAcetam  IVPB 500 milliGRAM(s) IV Intermittent every 12 hours  metoprolol tartrate Injectable 5 milliGRAM(s) IV Push every 6 hours  sodium bicarbonate 650 milliGRAM(s) Oral two times a day  sodium chloride 0.9%. 1000 milliLiter(s) (100 mL/Hr) IV Continuous <Continuous>  tamsulosin 0.8 milliGRAM(s) Oral at bedtime    MEDICATIONS  (PRN):  acetaminophen   IVPB .. 1000 milliGRAM(s) IV Intermittent once PRN Mild Pain (1 - 3)      FAMILY HISTORY:  No pertinent family history in first degree relatives    SOCIAL HISTORY:  lives at home    CIGARETTES:  none    ALCOHOL:    none    Past Surgical History:    Allergies:    [This allergen will not trigger allergy alert] pollen (Unknown)  No Known Drug Allergies      REVIEW OF SYSTEMS:  unable to obtain    Vital Signs Last 24 Hrs  T(C): 38.2 (16 Dec 2023 12:00), Max: 38.2 (16 Dec 2023 01:30)  T(F): 100.7 (16 Dec 2023 12:00), Max: 100.8 (16 Dec 2023 01:30)  HR: 92 (16 Dec 2023 12:15) (88 - 135)  BP: 160/72 (16 Dec 2023 12:15) (137/60 - 170/73)  BP(mean): 103 (16 Dec 2023 12:15) (93 - 115)  RR: 19 (16 Dec 2023 12:15) (17 - 22)  SpO2: 97% (16 Dec 2023 12:15) (93% - 99%)    Parameters below as of 16 Dec 2023 12:15  Patient On (Oxygen Delivery Method): nasal cannula  O2 Flow (L/min): 2      PHYSICAL EXAM:    GENERAL: In no apparent distress, well nourished, and hydrated.  HEART: Regular rate and rhythm; No murmurs, rubs, or gallops.  PULMONARY: Clear to auscultation and perfusion.  No rales, wheezing, or rhonchi bilaterally.  ABDOMEN: Soft, Nontender, Nondistended; Bowel sounds present  EXTREMITIES:  2+ Peripheral Pulses, No clubbing, cyanosis, or edema  NEUROLOGICAL: lethargic    INTERPRETATION OF TELEMETRY:  NSR.  Artifact.  No VT      LABS:                        11.9   13.54 )-----------( 221      ( 16 Dec 2023 11:29 )             36.2     12-16    142  |  108  |  42<H>  ----------------------------<  147<H>  4.0   |  18  |  2.6<H>    Ca    8.6      16 Dec 2023 11:29  Phos  4.0     12-16  Mg     2.5     12-16    TPro  6.8  /  Alb  4.4  /  TBili  0.9  /  DBili  x   /  AST  19  /  ALT  17  /  AlkPhos  49  12-15        PT/INR - ( 15 Dec 2023 20:29 )   PT: 11.20 sec;   INR: 0.98 ratio         PTT - ( 15 Dec 2023 20:29 )  PTT:36.4 sec  Urinalysis Basic - ( 16 Dec 2023 11:29 )    Color: x / Appearance: x / SG: x / pH: x  Gluc: 147 mg/dL / Ketone: x  / Bili: x / Urobili: x   Blood: x / Protein: x / Nitrite: x   Leuk Esterase: x / RBC: x / WBC x   Sq Epi: x / Non Sq Epi: x / Bacteria: x      BNP      RADIOLOGY & ADDITIONAL STUDIES:

## 2023-12-16 NOTE — H&P ADULT - ASSESSMENT
ASSESSMENT:  78M w/ PMH/PSH as above who presents as a trauma consult s/p mechanical fall 3 days ago, +HT -LOC -AC, underwent the above workup in ED and was found to have the following injuries:    Injuries identified:   - SDH      PLAN:   - Trauma surgery admission, SICU level of care  - Q1H neurochecks  - Repeat CTH performed 2/2 concern for worsening mental status in ED, stable from prior.  Discussed with neurosurgery and no acute intervention, no need for repeat CTH unless acute change in mental status, continue Q1H neurochecks, hold DVT ppx and ASA/Plavix  - Discussed with trauma attending    Disposition pending results of above labs and imaging  Above plan discussed with Trauma attending, Dr. Montemayor  , patient, patient family, and ED team  --------------------------------------------------------------------------------------  12-16-23 @ 03:26    TRAUMA SENIOR SPECTRA: [id number]  TRAUMA TEAM SPECTRA: [id number] ASSESSMENT:  78M w/ PMH/PSH as above who presents as a trauma consult s/p mechanical fall 3 days ago, +HT -LOC -AC, underwent the above workup in ED and was found to have the following injuries:    Injuries identified:   - SDH      PLAN:   - Trauma surgery admission, SICU level of care  - Q1H neurochecks  - Repeat CTH performed 2/2 concern for worsening mental status in ED, stable from prior.  Discussed with neurosurgery and no acute intervention, no need for repeat CTH unless acute change in mental status, continue Q1H neurochecks, hold DVT ppx and ASA/Plavix  - Discussed with trauma attending    Disposition pending results of above labs and imaging  Above plan discussed with Trauma attending, Dr. Montemayor  , patient, patient family, and ED team  --------------------------------------------------------------------------------------  12-16-23 @ 03:26    TRAUMA SENIOR SPECTRA: [id number]  TRAUMA TEAM SPECTRA: [id number]

## 2023-12-16 NOTE — CONSULT NOTE ADULT - SUBJECTIVE AND OBJECTIVE BOX
Outpt cardiologist:    HPI:  77-year-old male Cantonese speaking his presents with prior history of hypertension dyslipidemia diabetes BPH prior CVA on aspirin and Plavix who states he had a mechanical fall 2 days ago while getting out of the car fell backwards hit his head.  Afterwards he was able to ambulate.  But for the past 1 day family was unable to ambulate him.  He states that his lower extremities are painful to movement and weak.  Family denies any LOC.  They deny any confusion at home.  On exam oriented to person and place but just not to date.  He does follow simple commands.  Elevates his arms for 10 seconds.  Sensation intact in upper extremities.  Lower extremities with 2 out of 5 strength bilaterally."        Trauma assessment in ED: ABCs intact , GCS 14 , AAOx1    Obvious external signs of injury: None    PAST MEDICAL & SURGICAL HISTORY:  HTN (hypertension)      Seasonal allergies      History of BPH      Diabetes      No significant past surgical history        Allergies    [This allergen will not trigger allergy alert] pollen (Unknown)  No Known Drug Allergies    Intolerances      Home Medications:  amLODIPine 5 mg oral tablet: 1 tab(s) orally once a day (14 Feb 2023 02:08)  aspirin 81 mg oral tablet: 1 tab(s) orally once a day (14 Feb 2023 02:08)  carvedilol 6.25 mg oral tablet: 1 tab(s) orally 2 times a day (14 Feb 2023 02:08)  Jardiance 25 mg oral tablet: 1 tab(s) orally once a day (in the morning) (14 Feb 2023 02:08)  Nephplex Rx oral tablet: 1 tab(s) orally once a day (14 Feb 2023 02:08)  tamsulosin 0.4 mg oral capsule: 1 cap(s) orally once a day (14 Feb 2023 02:08)  valsartan 320 mg oral tablet: 1 tab(s) orally once a day (14 Feb 2023 02:08)      ROS: 10-system review is otherwise negative except HPI above.      Primary Survey:    A - airway intact  B - bilateral breath sounds and good chest rise  C - palpable pulses in all extremities  D - GCS 15 on arrival, MACIEL  Exposure obtained       (16 Dec 2023 00:01)      ---  cardio fellow additional notes:    Patient evaluated at bedside - minimally arousable; Tele reviewed - likely artifact.      PAST MEDICAL & SURGICAL HISTORY  HTN (hypertension)    Seasonal allergies    History of BPH    Diabetes    No significant past surgical history        FAMILY HISTORY:  FAMILY HISTORY:  No pertinent family history in first degree relatives        SOCIAL HISTORY:  Social History:      ALLERGIES:  [This allergen will not trigger allergy alert] pollen (Unknown)  No Known Drug Allergies      MEDICATIONS:  acetaminophen     Tablet .. 650 milliGRAM(s) Oral every 6 hours  atorvastatin 20 milliGRAM(s) Oral at bedtime  cefepime   IVPB      cefepime   IVPB 2000 milliGRAM(s) IV Intermittent once  chlorhexidine 2% Cloths 1 Application(s) Topical <User Schedule>  famotidine  IVPB 20 milliGRAM(s) IV Intermittent daily  insulin lispro (ADMELOG) corrective regimen sliding scale   SubCutaneous every 6 hours  levETIRAcetam  IVPB 500 milliGRAM(s) IV Intermittent every 12 hours  metoprolol tartrate Injectable 5 milliGRAM(s) IV Push every 6 hours  sodium bicarbonate 650 milliGRAM(s) Oral two times a day  sodium chloride 0.9%. 1000 milliLiter(s) (100 mL/Hr) IV Continuous <Continuous>  tamsulosin 0.8 milliGRAM(s) Oral at bedtime    PRN:  acetaminophen   IVPB .. 1000 milliGRAM(s) IV Intermittent once PRN      HOME MEDICATIONS:  Home Medications:  amLODIPine 5 mg oral tablet: 1 tab(s) orally once a day (14 Feb 2023 02:08)  aspirin 81 mg oral tablet: 1 tab(s) orally once a day (14 Feb 2023 02:08)  atorvastatin 20 mg oral tablet: 1 tab(s) orally once a day (16 Dec 2023 03:34)  carvedilol 6.25 mg oral tablet: 1 tab(s) orally 2 times a day (14 Feb 2023 02:08)  Jardiance 25 mg oral tablet: 1 tab(s) orally once a day (in the morning) (14 Feb 2023 02:08)  Nephplex Rx oral tablet: 1 tab(s) orally once a day (14 Feb 2023 02:08)  SITagliptin 50 mg oral tablet: 1 tab(s) orally once a day (16 Dec 2023 03:34)  tamsulosin 0.4 mg oral capsule: 2 cap(s) orally once a day (at bedtime) (16 Dec 2023 03:29)  valsartan 320 mg oral tablet: 1 tab(s) orally once a day (14 Feb 2023 02:08)      VITALS:   T(F): 100.7 (12-16 @ 12:00), Max: 100.8 (12-16 @ 01:30)  HR: 98 (12-16 @ 15:00) (88 - 135)  BP: 155/78 (12-16 @ 15:00) (137/60 - 170/73)  BP(mean): 107 (12-16 @ 15:00) (93 - 115)  RR: 25 (12-16 @ 15:00) (17 - 25)  SpO2: 95% (12-16 @ 15:00) (93% - 99%)    I&O's Summary    15 Dec 2023 07:01  -  16 Dec 2023 07:00  --------------------------------------------------------  IN: 200 mL / OUT: 0 mL / NET: 200 mL    16 Dec 2023 07:01  -  16 Dec 2023 15:28  --------------------------------------------------------  IN: 650 mL / OUT: 500 mL / NET: 150 mL      PHYSICAL EXAM: (limited)  General: NAD, Though minimally arousable.   Cardio: regular, S1, S2  Pulm: decr bs  Abdomen: Soft, non-tender, non-distended  Extremities: No edema b/l le    LABS:                        11.9   13.54 )-----------( 221      ( 16 Dec 2023 11:29 )             36.2     12-16    142  |  108  |  42<H>  ----------------------------<  147<H>  4.0   |  18  |  2.6<H>    Ca    8.6      16 Dec 2023 11:29  Phos  4.0     12-16  Mg     2.5     12-16    TPro  6.8  /  Alb  4.4  /  TBili  0.9  /  DBili  x   /  AST  19  /  ALT  17  /  AlkPhos  49  12-15    PT/INR - ( 15 Dec 2023 20:29 )   PT: 11.20 sec;   INR: 0.98 ratio    PTT - ( 15 Dec 2023 20:29 )  PTT:36.4 sec      Troponin trend:      RADIOLOGY:  -CXR:  -TTE:  -CCTA:  -STRESS TEST:  -CATHETERIZATION:  -OTHER:  ECG:      TELEMETRY EVENTS:

## 2023-12-16 NOTE — PROGRESS NOTE ADULT - ASSESSMENT
77-year-old male Cantonese speaking his presents with prior history of hypertension dyslipidemia diabetes BPH prior CVA on aspirin and Plavix who states he had a mechanical fall 2 days ago while getting out of the car fell backwards hit his head.  Afterwards he was able to ambulate.  But for the past 1 day family was unable to ambulate him.  He states that his lower extremities are painful to movement and weak.  Family denies any LOC.  They deny any confusion at home.     - pt still lethargic but easy to arouse and follows commands  - follow up CT head stable, no neurosurgical intervention indicated at this time  - pt with bilateral lower extremities weakness, follow up trauma work up and r/o pelvic and fracture of the extremities    if all negative, consider MRI of T-L spine  - case discussed with Dr. Dasilva

## 2023-12-16 NOTE — H&P ADULT - NSHPPHYSICALEXAM_GEN_ALL_CORE
Vital Signs Last 24 Hrs  T(C): 38.2 (16 Dec 2023 01:30), Max: 38.2 (16 Dec 2023 01:30)  T(F): 100.8 (16 Dec 2023 01:30), Max: 100.8 (16 Dec 2023 01:30)  HR: 104 (16 Dec 2023 03:00) (88 - 104)  BP: 156/72 (16 Dec 2023 03:00) (137/60 - 158/70)  BP(mean): 103 (16 Dec 2023 03:00) (93 - 103)  RR: 18 (16 Dec 2023 03:00) (18 - 20)  SpO2: 99% (16 Dec 2023 03:00) (96% - 99%)    Parameters below as of 16 Dec 2023 03:00  Patient On (Oxygen Delivery Method): room air        Secondary Survey:   General: NAD  HEENT: Normocephalic, atraumatic, EOMI, PEERLA. no scalp lacerations   Neck: Soft, midline trachea. no c-spine tenderness  Chest: No chest wall tenderness, no subcutaneous emphysema   Cardiac: RR  Respiratory: Bilateral breath sounds, clear and equal bilaterally  Abdomen: Soft, non-distended, non-tender, no rebound, no guarding.  Groin: Normal appearing, pelvis stable   Ext:  Moving b/l upper and lower extremities. Significant bilateral LE weakness, approx 3/5, sensation intact.  FROM in UE.  Palpable distal pulses  Back: No T/L/S spine tenderness, No palpable runoff/stepoff/deformity

## 2023-12-16 NOTE — CONSULT NOTE ADULT - ASSESSMENT
77-year-old male Cantonese speaking his presents with prior history of hypertension dyslipidemia diabetes BPH prior CVA on aspirin and Plavix who states he had a mechanical fall 2 days ago while getting out of the car fell backwards hit his head.  Afterwards he was able to ambulate.  But for the past 1 day family was unable to ambulate him. Imaging revealed SDH.  EP was consulted for VT on tele.  Tele reviewed - episode is artifact    SDH  Hx CVA Feb 2023  HTN  DM    Plan  No evidence of VT on tele.  Episode in question is artifact  No further work up needed  Pt was offered a loop recorder in Feb for long term cardiac monitoring after CVA.  At that time, pt refused.  I will speak with the daughter to see if they are agreeable to loop recorder this admission. 77-year-old male Cantonese speaking his presents with prior history of hypertension dyslipidemia diabetes BPH prior CVA on aspirin and Plavix who states he had a mechanical fall 2 days ago while getting out of the car fell backwards hit his head.  Afterwards he was able to ambulate.  But for the past 1 day family was unable to ambulate him. Imaging revealed SDH.  EP was consulted for VT on tele.  Tele reviewed - episode is artifact    SDH  Hx CVA Feb 2023  HTN  DM    Plan  No evidence of VT on tele.  Episode in question is artifact  No further work up needed  Pt was offered a loop recorder in Feb for long term cardiac monitoring after CVA.  At that time, pt refused. I called pt's daughter to discuss loop recorder to see if they are agreeable to implant this admission.  She would like to discuss it with the rest of her family.  I will follow up with them tomorrow

## 2023-12-16 NOTE — H&P ADULT - HISTORY OF PRESENT ILLNESS
TRAUMA ACTIVATION LEVEL:  CODE / ALERT  / CONSULT  ACTIVATED BY: EMS**  /  ED**  INTUBATED: YES** / NO**    MECHANISM OF INJURY:   [] Blunt     [] MVC	  [x] Fall	  [] Pedestrian Struck	  [] Motorcycle     [] Assault     [] Bicycle collision    [] Sports injury    [] Penetrating    [] Gun Shot Wound      [] Stab Wound    GCS: 14 	E: 4	V: 4	M: 6    HPI:"77-year-old male Cantonese speaking his presents with prior history of hypertension dyslipidemia diabetes BPH prior CVA on aspirin and Plavix who states he had a mechanical fall 2 days ago while getting out of the car fell backwards hit his head.  Afterwards he was able to ambulate.  But for the past 1 day family was unable to ambulate him.  He states that his lower extremities are painful to movement and weak.  Family denies any LOC.  They deny any confusion at home.  On exam oriented to person and place but just not to date.  He does follow simple commands.  Elevates his arms for 10 seconds.  Sensation intact in upper extremities.  Lower extremities with 2 out of 5 strength bilaterally."        Trauma assessment in ED: ABCs intact , GCS 14 , AAOx1    Obvious external signs of injury: None    PAST MEDICAL & SURGICAL HISTORY:  HTN (hypertension)      Seasonal allergies      History of BPH      Diabetes      No significant past surgical history        Allergies    [This allergen will not trigger allergy alert] pollen (Unknown)  No Known Drug Allergies    Intolerances      Home Medications:  amLODIPine 5 mg oral tablet: 1 tab(s) orally once a day (14 Feb 2023 02:08)  aspirin 81 mg oral tablet: 1 tab(s) orally once a day (14 Feb 2023 02:08)  carvedilol 6.25 mg oral tablet: 1 tab(s) orally 2 times a day (14 Feb 2023 02:08)  Jardiance 25 mg oral tablet: 1 tab(s) orally once a day (in the morning) (14 Feb 2023 02:08)  Nephplex Rx oral tablet: 1 tab(s) orally once a day (14 Feb 2023 02:08)  tamsulosin 0.4 mg oral capsule: 1 cap(s) orally once a day (14 Feb 2023 02:08)  valsartan 320 mg oral tablet: 1 tab(s) orally once a day (14 Feb 2023 02:08)      ROS: 10-system review is otherwise negative except HPI above.      Primary Survey:    A - airway intact  B - bilateral breath sounds and good chest rise  C - palpable pulses in all extremities  D - GCS 15 on arrival, JANELL  Exposure obtained

## 2023-12-16 NOTE — CONSULT NOTE ADULT - ASSESSMENT
#NSVT - likely artifact  #H/o CVA w acute SDH    Plans   - agree with EP assessment that tele events were likely artifact  - no further input from cardio perspective other then ILR as EP suggested if patient/family agreeable

## 2023-12-16 NOTE — CONSULT NOTE ADULT - ASSESSMENT
78y Male s/p mechanical fall. +HT, +AC (Aspirin and Plavix), -LOC    NEURO:  #Acute SDH   -CT head #1: SDH x 3: 0.7 cm along the left hemisphere, 0.3 cm along the right frontal area, and 1.1 cm along the left falx. No midline shift  -CT head #2: Stable  -Q1 neuro checks  -Keppra 500mg BID  #Acute pain    -APAP prn     RESP:   #Oxygenation    -RA  #Activity    -increase as tolerated    CARDS:   #HTN  -hold valsartan 320mg and amlodipine 5mg. Monitor BP   -start home coreg 6.25  #HLD  -c/w home atorvastatin  #pending EKG    GI/NUTR:   #Diet, NPO:   Except Medications [Active]    -aspiration precautions, HOB 30  #GI Prophylaxis    -not indicated  #Bowel regimen     - none    - last bowel movement  PTA    /RENAL:   #urine output in critically ill  -IVF NS 100cc/hr  -Monitor I/O     Labs:          BUN/Cr- 42/2.8  -->          Electrolytes-Na 141 // K 4.0 // Mg -- //  Phos -- (12-15 @ 20:29)  #CKD   -baseline Cr 2.2-2.7  -c/w home sodium bicarb 650mg BID  #BPH  -c/w home flomax      HEME/ONC:   #DVT prophylaxis     -SCDs. Hold chemical DVT ppx.        Labs: Hb/Hct:  12.2/36.5  (12-15 @ 20:29)  -->                      Plts:  240  -->                 PTT/INR:  36.4/0.98  --->       ID:  WBC- 12.27  --->>  Temp trend- 24hrs T(F): 100.8 (12-16 @ 01:30), Max: 100.8 (12-16 @ 01:30)  Current antibiotics- none      ENDO:  #DM     -ISS    MSK:     Activity - Increase As Tolerated:     Time/Priority:  Routine (12-16-23 @ 04:03)      LINES/DRAINS:  PIV     ADVANCED DIRECTIVES:  Full Code    HCP/Emergency Contact-    INDICATION FOR SICU/SDU: Non-traumatic subdural hemorrhage             DISPO:   Case discussed with attending Dr. Montemayor 78y Male s/p mechanical fall. +HT, +AC (Aspirin and Plavix), -LOC    NEURO:  #Acute SDH   -CT head #1: SDH x 3: 0.7 cm along the left hemisphere, 0.3 cm along the right frontal area, and 1.1 cm along the left falx. No midline shift  -CT head #2: Stable  -Q1 neuro checks  -Keppra 500mg BID  #Acute pain    -APAP prn     RESP:   #Oxygenation    -RA  #Activity    -increase as tolerated    CARDS:   #HTN  -hold valsartan 320mg and amlodipine 5mg. Monitor BP   -start home coreg 6.25  #HLD  -c/w home atorvastatin  #pending EKG    GI/NUTR:   #Diet, NPO:   Except Medications [Active]    -aspiration precautions, HOB 30  #GI Prophylaxis    -not indicated  #Bowel regimen     - none    - last bowel movement  PTA    /RENAL:   #urine output in critically ill  -IVF NS 100cc/hr  -Monitor I/O     Labs:          BUN/Cr- 42/2.8  -->          Electrolytes-Na 141 // K 4.0 // Mg -- //  Phos -- (12-15 @ 20:29)  #CKD   -baseline Cr 2.2-2.7  -c/w home sodium bicarb 650mg BID  #BPH  -c/w home flomax      HEME/ONC:   #DVT prophylaxis     -SCDs. Hold chemical DVT ppx.        Labs: Hb/Hct:  12.2/36.5  (12-15 @ 20:29)  -->                      Plts:  240  -->                 PTT/INR:  36.4/0.98  --->       ID:  WBC- 12.27  --->>  Temp trend- 24hrs T(F): 100.8 (12-16 @ 01:30), Max: 100.8 (12-16 @ 01:30)  Current antibiotics- none      ENDO:  #DM     -ISS     -FSG q6 while NPO    MSK:     Activity - Increase As Tolerated:     Time/Priority:  Routine (12-16-23 @ 04:03)      LINES/DRAINS:  PIV     ADVANCED DIRECTIVES:  Full Code    HCP/Emergency Contact-    INDICATION FOR SICU/SDU: Non-traumatic subdural hemorrhage             DISPO:   Case discussed with attending Dr. Montemayor 78y Male s/p mechanical fall. +HT, +AC (Aspirin and Plavix), -LOC    NEURO:  #Acute SDH   -CT head #1: SDH x 3: 0.7 cm along the left hemisphere, 0.3 cm along the right frontal area, and 1.1 cm along the left falx. No midline shift  -CT head #2: Stable  -Q1 neuro checks  -Keppra 500mg BID  #Acute pain    -APAP prn     RESP:   #Oxygenation    -RA  #Activity    -increase as tolerated    CARDS:   #HTN  -hold valsartan 320mg and amlodipine 5mg. Monitor BP   -start home coreg 6.25  #HLD  -c/w home atorvastatin  #pending EKG    GI/NUTR:   #Diet, NPO:   Except Medications [Active]    -aspiration precautions, HOB 30  #GI Prophylaxis    -pepcid  #Bowel regimen     - none    - last bowel movement  PTA    /RENAL:   #urine output in critically ill  -IVF NS 100cc/hr  -Monitor I/O     Labs:          BUN/Cr- 42/2.8  -->          Electrolytes-Na 141 // K 4.0 // Mg -- //  Phos -- (12-15 @ 20:29)  #CKD   -baseline Cr 2.2-2.7  -c/w home sodium bicarb 650mg BID  #BPH  -c/w home flomax      HEME/ONC:   #DVT prophylaxis     -SCDs. Hold chemical DVT ppx.        Labs: Hb/Hct:  12.2/36.5  (12-15 @ 20:29)  -->                      Plts:  240  -->                 PTT/INR:  36.4/0.98  --->       ID:  WBC- 12.27  --->>  Temp trend- 24hrs T(F): 100.8 (12-16 @ 01:30), Max: 100.8 (12-16 @ 01:30)  Current antibiotics- none      ENDO:  #DM     -ISS     -FSG q6 while NPO    MSK:     Activity - Increase As Tolerated:     Time/Priority:  Routine (12-16-23 @ 04:03)      LINES/DRAINS:  PIV     ADVANCED DIRECTIVES:  Full Code    HCP/Emergency Contact-    INDICATION FOR SICU/SDU: Non-traumatic subdural hemorrhage             DISPO:   Case discussed with attending Dr. Montemayor 78y Male s/p mechanical fall. +HT, +AC (Aspirin and Plavix), -LOC    NEURO:  #Acute SDH   -CT head #1: SDH x 3: 0.7 cm along the left hemisphere, 0.3 cm along the right frontal area, and 1.1 cm along the left falx. No midline shift  -CT head #2: Stable  -Q1 neuro checks  -Keppra 500mg BID  -No DDAVP or platelets at this time. Discussed w/ SICU attending.   #Acute pain    -APAP prn     RESP:   #Oxygenation    -RA  #Activity    -increase as tolerated    CARDS:   #HTN  -hold valsartan 320mg and amlodipine 5mg. Monitor BP   -start home coreg 6.25  #HLD  -c/w home atorvastatin  #pending EKG    GI/NUTR:   #Diet, NPO:   Except Medications [Active]    -aspiration precautions, HOB 30  #GI Prophylaxis    -pepcid  #Bowel regimen     - none    - last bowel movement  PTA    /RENAL:   #urine output in critically ill  -IVF NS 100cc/hr  -Monitor I/O     Labs:          BUN/Cr- 42/2.8  -->          Electrolytes-Na 141 // K 4.0 // Mg -- //  Phos -- (12-15 @ 20:29)  #CKD   -baseline Cr 2.2-2.7  -c/w home sodium bicarb 650mg BID  #BPH  -c/w home flomax      HEME/ONC:   #DVT prophylaxis     -SCDs. Hold chemical DVT ppx.        Labs: Hb/Hct:  12.2/36.5  (12-15 @ 20:29)  -->                      Plts:  240  -->                 PTT/INR:  36.4/0.98  --->       ID:  WBC- 12.27  --->>  Temp trend- 24hrs T(F): 100.8 (12-16 @ 01:30), Max: 100.8 (12-16 @ 01:30)  Current antibiotics- none      ENDO:  #DM     -ISS     -FSG q6 while NPO    MSK:     Activity - Increase As Tolerated:     Time/Priority:  Routine (12-16-23 @ 04:03)      LINES/DRAINS:  PIV     ADVANCED DIRECTIVES:  Full Code    HCP/Emergency Contact-    INDICATION FOR SICU/SDU: Non-traumatic subdural hemorrhage             DISPO:   Case discussed with attending Dr. Montemayor

## 2023-12-16 NOTE — CONSULT NOTE ADULT - SUBJECTIVE AND OBJECTIVE BOX
SUMMARY:    OVERNIGHT EVENTS:     ADMISSION SCORES:   GCS: HH: MF: NIHSS: ICH Score:    SEDATION SCORES:  RASS: CAM-ICU:     REVIEW OF SYSTEMS:    VITALS: [] Reviewed    IMAGING/DATA: [] Reviewed    IVF FLUIDS/MEDICATIONS: [] Reviewed    ALLERGIES: Allergies    [This allergen will not trigger allergy alert] pollen (Unknown)  No Known Drug Allergies    Intolerances        DEVICES:   [] Restraints [] PIVs [] ET tube [] central line [] PICC [] arterial line [] martinez [] NGT/OGT [] EVD [] LD [] MARIA LUISA/HMV [] LiCOX [] ICP monitor [] Trach [] PEG [] Chest Tube [] other:    EXAMINATION:  General: No acute distress  HEENT: Anicteric sclerae  Cardiac: [id number]  Lungs: Clear  Abdomen: Soft, non-tender, +BS  Extremities: No c/c/e  Skin/Incision Site: Clean, dry and intact  Neurologic: Lethargic, opens eyes to repeated verbal stimuli, only states his name, oriented x1, pupils are 3 mm and reactive bilaterally, able to follow commands, + Right Facial, RUE +drift that does not hit the bed, LUE AG, RLE same jerry of the bed (moves his foot), LLE some effort against gravity, sensation intact      ICU Vital Signs Last 24 Hrs  T(C): 36.9 (15 Dec 2023 18:05), Max: 36.9 (15 Dec 2023 18:05)  T(F): 98.4 (15 Dec 2023 18:05), Max: 98.4 (15 Dec 2023 18:05)  HR: 101 (15 Dec 2023 22:35) (88 - 101)  BP: 150/64 (15 Dec 2023 23:15) (149/64 - 152/71)  BP(mean): 97 (15 Dec 2023 22:20) (97 - 97)  ABP: --  ABP(mean): --  RR: 19 (15 Dec 2023 22:20) (18 - 19)  SpO2: 96% (15 Dec 2023 22:20) (96% - 99%)      LABS:  Na: 141 (12-15 @ 20:29)  K: 4.0 (12-15 @ 20:29)  Cl: 103 (12-15 @ 20:29)  CO2: 24 (12-15 @ 20:29)  BUN: 42 (12-15 @ 20:29)  Cr: 2.8 (12-15 @ 20:29)  Glu: 149(12-15 @ 20:29)    Hgb: 12.2 (12-15 @ 20:29)  Hct: 36.5 (12-15 @ 20:29)  WBC: 12.27 (12-15 @ 20:29)  Plt: 240 (12-15 @ 20:29)    INR: 0.98 12-15-23 @ 20:29  PTT: 36.4 12-15-23 @ 20:29      LIVER FUNCTIONS - ( 15 Dec 2023 20:29 )  Alb: 4.4 g/dL / Pro: 6.8 g/dL / ALK PHOS: 49 U/L / ALT: 17 U/L / AST: 19 U/L / GGT: x          SUMMARY:    OVERNIGHT EVENTS:     ADMISSION SCORES:   GCS: HH: MF: NIHSS: ICH Score:    SEDATION SCORES:  RASS: CAM-ICU:     REVIEW OF SYSTEMS:    VITALS: [] Reviewed    IMAGING/DATA: [] Reviewed    IVF FLUIDS/MEDICATIONS: [] Reviewed    ALLERGIES: Allergies    [This allergen will not trigger allergy alert] pollen (Unknown)  No Known Drug Allergies    Intolerances        DEVICES:   [] Restraints [] PIVs [] ET tube [] central line [] PICC [] arterial line [] martinez [] NGT/OGT [] EVD [] LD [] MARIA LUISA/HMV [] LiCOX [] ICP monitor [] Trach [] PEG [] Chest Tube [] other:    EXAMINATION:  General: No acute distress  HEENT: Anicteric sclerae  Cardiac: [id number]  Lungs: Clear  Abdomen: Soft, non-tender, +BS  Extremities: No c/c/e  Skin/Incision Site: Clean, dry and intact  Neurologic: Lethargic, opens eyes to repeated verbal stimuli, only states his name, oriented x1, pupils are 3 mm and reactive bilaterally, able to follow commands, + Right Facial, RUE +drift that does not hit the bed, LUE AG, RLE same jerry of the bed (moves his foot), LLE some effort against gravity, sensation intact      ICU Vital Signs Last 24 Hrs  T(C): 36.9 (15 Dec 2023 18:05), Max: 36.9 (15 Dec 2023 18:05)  T(F): 98.4 (15 Dec 2023 18:05), Max: 98.4 (15 Dec 2023 18:05)  HR: 101 (15 Dec 2023 22:35) (88 - 101)  BP: 150/64 (15 Dec 2023 23:15) (149/64 - 152/71)  BP(mean): 97 (15 Dec 2023 22:20) (97 - 97)  ABP: --  ABP(mean): --  RR: 19 (15 Dec 2023 22:20) (18 - 19)  SpO2: 96% (15 Dec 2023 22:20) (96% - 99%)      LABS:  Na: 141 (12-15 @ 20:29)  K: 4.0 (12-15 @ 20:29)  Cl: 103 (12-15 @ 20:29)  CO2: 24 (12-15 @ 20:29)  BUN: 42 (12-15 @ 20:29)  Cr: 2.8 (12-15 @ 20:29)  Glu: 149(12-15 @ 20:29)    Hgb: 12.2 (12-15 @ 20:29)  Hct: 36.5 (12-15 @ 20:29)  WBC: 12.27 (12-15 @ 20:29)  Plt: 240 (12-15 @ 20:29)    INR: 0.98 12-15-23 @ 20:29  PTT: 36.4 12-15-23 @ 20:29      LIVER FUNCTIONS - ( 15 Dec 2023 20:29 )  Alb: 4.4 g/dL / Pro: 6.8 g/dL / ALK PHOS: 49 U/L / ALT: 17 U/L / AST: 19 U/L / GGT: x          SUMMARY:   HPI: 77-year-old male Cantonese speaking his presents with prior history of hypertension dyslipidemia diabetes BPH prior CVA on aspirin and Plavix who states he had a mechanical fall 2 days ago while getting out of the car fell backwards hit his head.  Afterwards he was able to ambulate.  But for the past 1 day family was unable to ambulate him.  He states that his lower extremities are painful to movement and weak.  Family denies any LOC.    ADMISSION SCORES:   GCS: 14    SEDATION SCORES:  RASS: CAM-ICU:     REVIEW OF SYSTEMS:    VITALS: [] Reviewed    IMAGING/DATA: [] Reviewed    IVF FLUIDS/MEDICATIONS: [] Reviewed    ALLERGIES: Allergies    [This allergen will not trigger allergy alert] pollen (Unknown)  No Known Drug Allergies    Intolerances    DEVICES:   [] Restraints [] PIVs [] ET tube [] central line [] PICC [] arterial line [] martinez [] NGT/OGT [] EVD [] LD [] MARIA LUISA/HMV [] LiCOX [] ICP monitor [] Trach [] PEG [] Chest Tube [] other:    EXAMINATION:  General: No acute distress  HEENT: Anicteric sclerae  Cardiac: [id number]  Lungs: Clear  Abdomen: Soft, non-tender, +BS  Extremities: No c/c/e  Skin/Incision Site: Clean, dry and intact  Neurologic: Lethargic, opens eyes to repeated verbal stimuli, only states his name, oriented x1, pupils are 3 mm and reactive bilaterally, able to follow commands, + Right Facial, RUE +drift that does not hit the bed, LUE AG, RLE same jerry of the bed (moves his foot), LLE some effort against gravity, sensation intact      ICU Vital Signs Last 24 Hrs  T(C): 36.9 (15 Dec 2023 18:05), Max: 36.9 (15 Dec 2023 18:05)  T(F): 98.4 (15 Dec 2023 18:05), Max: 98.4 (15 Dec 2023 18:05)  HR: 101 (15 Dec 2023 22:35) (88 - 101)  BP: 150/64 (15 Dec 2023 23:15) (149/64 - 152/71)  BP(mean): 97 (15 Dec 2023 22:20) (97 - 97)  ABP: --  ABP(mean): --  RR: 19 (15 Dec 2023 22:20) (18 - 19)  SpO2: 96% (15 Dec 2023 22:20) (96% - 99%)      LABS:  Na: 141 (12-15 @ 20:29)  K: 4.0 (12-15 @ 20:29)  Cl: 103 (12-15 @ 20:29)  CO2: 24 (12-15 @ 20:29)  BUN: 42 (12-15 @ 20:29)  Cr: 2.8 (12-15 @ 20:29)  Glu: 149(12-15 @ 20:29)    Hgb: 12.2 (12-15 @ 20:29)  Hct: 36.5 (12-15 @ 20:29)  WBC: 12.27 (12-15 @ 20:29)  Plt: 240 (12-15 @ 20:29)    INR: 0.98 12-15-23 @ 20:29  PTT: 36.4 12-15-23 @ 20:29      LIVER FUNCTIONS - ( 15 Dec 2023 20:29 )  Alb: 4.4 g/dL / Pro: 6.8 g/dL / ALK PHOS: 49 U/L / ALT: 17 U/L / AST: 19 U/L / GGT: x             CT Head No Cont (12.16.23 @ 01:39)     IMPRESSION:    Since CT head 12/15/2023 at 9:49 PM:    Essentially unchanged acute subdural hemorrhages measuring up to 0.7 cm   along the left hemisphere, 0.4 cm along the right frontal extra-axial   space, and 1.1 cm along the left falx. No midline shift. SUMMARY:   HPI: 77-year-old male Cantonese speaking his presents with prior history of hypertension dyslipidemia diabetes BPH prior CVA on aspirin and Plavix who states he had a mechanical fall 2 days ago while getting out of the car fell backwards hit his head.  Afterwards he was able to ambulate.  But for the past 1 day family was unable to ambulate him.  He states that his lower extremities are painful to movement and weak.  Family denies any LOC.    ADMISSION SCORES:   GCS: 14    SEDATION SCORES:  RASS: CAM-ICU:     REVIEW OF SYSTEMS:    VITALS: [] Reviewed    IMAGING/DATA: [] Reviewed    IVF FLUIDS/MEDICATIONS: [] Reviewed    ALLERGIES: Allergies    [This allergen will not trigger allergy alert] pollen (Unknown)  No Known Drug Allergies    Intolerances    DEVICES:   [] Restraints [] PIVs [] ET tube [] central line [] PICC [] arterial line [] martinez [] NGT/OGT [] EVD [] LD [] MARIA LUISA/HMV [] LiCOX [] ICP monitor [] Trach [] PEG [] Chest Tube [] other:    EXAMINATION:  General: No acute distress  HEENT: Anicteric sclerae  Cardiac: [id number]  Lungs: Clear  Abdomen: Soft, non-tender, +BS  Extremities: No c/c/e  Skin/Incision Site: Clean, dry and intact  Neurologic: Lethargic, opens eyes to repeated verbal stimuli, only states his name, oriented x1, pupils are 3 mm and reactive bilaterally, able to follow commands, + Right Facial, RUE +drift that does not hit the bed, LUE AG, RLE same jerry of the bed (moves his foot), LLE some effort against gravity, sensation intact      ICU Vital Signs Last 24 Hrs  T(C): 36.9 (15 Dec 2023 18:05), Max: 36.9 (15 Dec 2023 18:05)  T(F): 98.4 (15 Dec 2023 18:05), Max: 98.4 (15 Dec 2023 18:05)  HR: 101 (15 Dec 2023 22:35) (88 - 101)  BP: 150/64 (15 Dec 2023 23:15) (149/64 - 152/71)  BP(mean): 97 (15 Dec 2023 22:20) (97 - 97)  ABP: --  ABP(mean): --  RR: 19 (15 Dec 2023 22:20) (18 - 19)  SpO2: 96% (15 Dec 2023 22:20) (96% - 99%)      LABS:  Na: 141 (12-15 @ 20:29)  K: 4.0 (12-15 @ 20:29)  Cl: 103 (12-15 @ 20:29)  CO2: 24 (12-15 @ 20:29)  BUN: 42 (12-15 @ 20:29)  Cr: 2.8 (12-15 @ 20:29)  Glu: 149(12-15 @ 20:29)    Hgb: 12.2 (12-15 @ 20:29)  Hct: 36.5 (12-15 @ 20:29)  WBC: 12.27 (12-15 @ 20:29)  Plt: 240 (12-15 @ 20:29)    INR: 0.98 12-15-23 @ 20:29  PTT: 36.4 12-15-23 @ 20:29      LIVER FUNCTIONS - ( 15 Dec 2023 20:29 )  Alb: 4.4 g/dL / Pro: 6.8 g/dL / ALK PHOS: 49 U/L / ALT: 17 U/L / AST: 19 U/L / GGT: x             CT Head No Cont (12.16.23 @ 01:39)     IMPRESSION:    Since CT head 12/15/2023 at 9:49 PM:    Essentially unchanged acute subdural hemorrhages measuring up to 0.7 cm   along the left hemisphere, 0.4 cm along the right frontal extra-axial   space, and 1.1 cm along the left falx. No midline shift. SUMMARY:   HPI: 77-year-old male Cantonese speaking his presents with prior history of hypertension dyslipidemia diabetes BPH prior CVA on aspirin and Plavix who states he had a mechanical fall 2 days ago while getting out of the car fell backwards hit his head.  Afterwards he was able to ambulate.  But for the past 1 day family was unable to ambulate him.  He states that his lower extremities are painful to movement and weak.  Family denies any LOC.    On ADMISSION SCORES:   GCS: 14    SEDATION SCORES:  RASS: CAM-ICU:     REVIEW OF SYSTEMS:    VITALS: [X]  ICU Vital Signs Last 24 Hrs  T(C): 37.8 (16 Dec 2023 06:00), Max: 38.2 (16 Dec 2023 01:30)  T(F): 100.1 (16 Dec 2023 06:00), Max: 100.8 (16 Dec 2023 01:30)  HR: 135 (16 Dec 2023 06:00) (88 - 135)  BP: 142/98 (16 Dec 2023 06:00) (137/60 - 170/70)  BP(mean): 115 (16 Dec 2023 06:00) (93 - 115)  RR: 17 (16 Dec 2023 06:00) (17 - 20)  SpO2: 96% (16 Dec 2023 06:00) (95% - 99%)    O2 Parameters below as of 16 Dec 2023 06:00  Patient On (Oxygen Delivery Method): room air      15 Dec 2023 07:01  -  16 Dec 2023 07:00  --------------------------------------------------------  IN:    sodium chloride 0.9%: 200 mL  Total IN: 200 mL    OUT:  Total OUT: 0 mL    Total NET: 200 mL        MEDICATIONS  (STANDING):  acetaminophen     Tablet .. 650 milliGRAM(s) Oral every 6 hours  atorvastatin 20 milliGRAM(s) Oral at bedtime  chlorhexidine 2% Cloths 1 Application(s) Topical <User Schedule>  famotidine  IVPB 20 milliGRAM(s) IV Intermittent daily  insulin lispro (ADMELOG) corrective regimen sliding scale   SubCutaneous every 6 hours  levETIRAcetam  IVPB 500 milliGRAM(s) IV Intermittent every 12 hours  metoprolol tartrate Injectable 2.5 milliGRAM(s) IV Push every 6 hours  sodium bicarbonate 650 milliGRAM(s) Oral two times a day  sodium chloride 0.9%. 1000 milliLiter(s) (100 mL/Hr) IV Continuous <Continuous>  tamsulosin 0.8 milliGRAM(s) Oral at bedtime      CT Head No Cont (12.16.23 @ 01:39)     IMPRESSION:    Since CT head 12/15/2023 at 9:49 PM:    Essentially unchanged acute subdural hemorrhages measuring up to 0.7 cm   along the left hemisphere, 0.4 cm along the right frontal extra-axial   space, and 1.1 cm along the left falx. No midline shift.    MEDICATIONS  (PRN):    12-15    141  |  103  |  42<H>  ----------------------------<  149<H>  4.0   |  24  |  2.8<H>    Ca    9.2      15 Dec 2023 20:29    TPro  6.8  /  Alb  4.4  /  TBili  0.9  /  DBili  x   /  AST  19  /  ALT  17  /  AlkPhos  49  12-15                          12.2   12.27 )-----------( 240      ( 15 Dec 2023 20:29 )             36.5           EXAMINATION:  General: No acute distress  HEENT: Anicteric sclerae  Cardiac: [id number]  Lungs: Clear  Abdomen: Soft, non-tender, +BS  Extremities: No c/c/e  Skin/Incision Site: Clean, dry and intact  Neurologic: Lethargic, opens eyes to repeated verbal stimuli, only states his name, oriented x1, pupils are 3 mm and reactive bilaterally, able to follow commands, + Right Facial, RUE +drift that does not hit the bed, LUE AG, RLE same jerry of the bed (moves his foot), LLE some effort against gravity, sensation intact        along the left hemisphere, 0.4 cm along the right frontal extra-axial   space, and 1.1 cm along the left falx. No midline shift. SUMMARY:   HPI: 77-year-old male Cantonese speaking his presents with prior history of hypertension dyslipidemia diabetes BPH prior CVA on aspirin and Plavix who states he had a mechanical fall 2 days ago while getting out of the car fell backwards hit his head.  Afterwards he was able to ambulate.  But for the past 1 day family was unable to ambulate him.  He states that his lower extremities are painful to movement and weak.  Family denies any LOC.    On ADMISSION SCORES:   GCS: 14    SEDATION SCORES:  RASS: CAM-ICU:     REVIEW OF SYSTEMS:    VITALS: [X]  ICU Vital Signs Last 24 Hrs  T(C): 37.8 (16 Dec 2023 06:00), Max: 38.2 (16 Dec 2023 01:30)  T(F): 100.1 (16 Dec 2023 06:00), Max: 100.8 (16 Dec 2023 01:30)  HR: 135 (16 Dec 2023 06:00) (88 - 135)  BP: 142/98 (16 Dec 2023 06:00) (137/60 - 170/70)  BP(mean): 115 (16 Dec 2023 06:00) (93 - 115)  RR: 17 (16 Dec 2023 06:00) (17 - 20)  SpO2: 96% (16 Dec 2023 06:00) (95% - 99%)    O2 Parameters below as of 16 Dec 2023 06:00  Patient On (Oxygen Delivery Method): room air      15 Dec 2023 07:01  -  16 Dec 2023 07:00  --------------------------------------------------------  IN:    sodium chloride 0.9%: 200 mL  Total IN: 200 mL    OUT:  Total OUT: 0 mL    Total NET: 200 mL        MEDICATIONS  (STANDING):  acetaminophen     Tablet .. 650 milliGRAM(s) Oral every 6 hours  atorvastatin 20 milliGRAM(s) Oral at bedtime  chlorhexidine 2% Cloths 1 Application(s) Topical <User Schedule>  famotidine  IVPB 20 milliGRAM(s) IV Intermittent daily  insulin lispro (ADMELOG) corrective regimen sliding scale   SubCutaneous every 6 hours  levETIRAcetam  IVPB 500 milliGRAM(s) IV Intermittent every 12 hours  metoprolol tartrate Injectable 2.5 milliGRAM(s) IV Push every 6 hours  sodium bicarbonate 650 milliGRAM(s) Oral two times a day  sodium chloride 0.9%. 1000 milliLiter(s) (100 mL/Hr) IV Continuous <Continuous>  tamsulosin 0.8 milliGRAM(s) Oral at bedtime      CT Head No Cont (12.16.23 @ 01:39)     IMPRESSION:    Since CT head 12/15/2023 at 9:49 PM:    Essentially unchanged acute subdural hemorrhages measuring up to 0.7 cm   along the left hemisphere, 0.4 cm along the right frontal extra-axial   space, and 1.1 cm along the left falx. No midline shift.    MEDICATIONS  (PRN):    12-15    141  |  103  |  42<H>  ----------------------------<  149<H>  4.0   |  24  |  2.8<H>    Ca    9.2      15 Dec 2023 20:29    TPro  6.8  /  Alb  4.4  /  TBili  0.9  /  DBili  x   /  AST  19  /  ALT  17  /  AlkPhos  49  12-15                          12.2   12.27 )-----------( 240      ( 15 Dec 2023 20:29 )             36.5           EXAMINATION:  General: No acute distress  HEENT: Anicteric sclerae  Cardiac: [id number]  Lungs: Clear  Abdomen: Soft, non-tender, +BS  Extremities: No c/c/e  Skin/Incision Site: Clean, dry and intact  Neurologic: Lethargic, opens eyes to repeated verbal stimuli, only states his name, oriented x1, pupils are 3 mm and reactive bilaterally, able to follow commands, + Right Facial, RUE +drift that does not hit the bed, LUE AG, RLE same jerry of the bed (moves his foot), LLE some effort against gravity, sensation intact        along the left hemisphere, 0.4 cm along the right frontal extra-axial   space, and 1.1 cm along the left falx. No midline shift.

## 2023-12-16 NOTE — H&P ADULT - NSHPLABSRESULTS_GEN_ALL_CORE
Labs:  CAPILLARY BLOOD GLUCOSE      POCT Blood Glucose.: 142 mg/dL (15 Dec 2023 20:05)                          12.2   12.27 )-----------( 240      ( 15 Dec 2023 20:29 )             36.5       Auto Neutrophil %: 79.8 % (12-15-23 @ 20:29)  Auto Immature Granulocyte %: 0.3 % (12-15-23 @ 20:29)    12-15    141  |  103  |  42<H>  ----------------------------<  149<H>  4.0   |  24  |  2.8<H>      Calcium: 9.2 mg/dL (12-15-23 @ 20:29)      LFTs:             6.8  | 0.9  | 19       ------------------[49      ( 15 Dec 2023 20:29 )  4.4  | x    | 17          Lipase:x      Amylase:x             Coags:     11.20  ----< 0.98    ( 15 Dec 2023 20:29 )     36.4                Urinalysis Basic - ( 15 Dec 2023 20:29 )    Color: x / Appearance: x / SG: x / pH: x  Gluc: 149 mg/dL / Ketone: x  / Bili: x / Urobili: x   Blood: x / Protein: x / Nitrite: x   Leuk Esterase: x / RBC: x / WBC x   Sq Epi: x / Non Sq Epi: x / Bacteria: x                RADIOLOGY & ADDITIONAL STUDIES:  ---------------------------------------------------------------------------------------  < from: CT Head No Cont (12.16.23 @ 01:39) >    IMPRESSION:    Since CT head 12/15/2023 at 9:49 PM:    Essentially unchanged acute subdural hemorrhages measuring up to 0.7 cm   along the left hemisphere, 0.4 cm along the right frontal extra-axial   space, and 1.1 cm along the left falx. No midline shift.    < end of copied text >    < from: CT Chest w/ IV Cont (12.16.23 @ 01:40) >    IMPRESSION:    No evidence of an acute traumatic solid organ or osseous injury.    Question of focal mural thickening of theanterior urinary bladder   bladder wall with associated calcifications vs stones. Nonemergent   follow-up CT urogram is recommended.      --- End of Report ---    < end of copied text >

## 2023-12-16 NOTE — CONSULT NOTE ADULT - SUBJECTIVE AND OBJECTIVE BOX
JACQUELIN CAI   [id number]   [birth year]  78yM    Admit Date: 12-16-23  Indication for SDU/SICU:  OR #1-        ============================  HPI   Home (12-15-23 @ 18:05)  77-year-old male Cantonese speaking presents with prior history of HTN, HLD, Diabetes, BPH, prior CVA on aspirin and Plavix who states he had a mechanical fall 3 days ago while getting out of the car fell backwards hit his head.  Afterwards he was able to ambulate.  But for the past 1 day family was unable to ambulate him.  He states that his lower extremities are painful to movement and weak.  Family denies any LOC. Per daughter patient he often forgets things and cannot recall year or place. Patient following commands bedside.   Initial CT head remarkable for 3 Acute subdural hemorrhages: 0.7 cm along the left hemisphere, 0.3 cm along the right frontal area, and 1.1 cm along the left falx. No midline shift. Repeat CT head was obtained prior to 6 hour tacos because patient was appearing lethargic vs sundowning. CT head #2 with stable findings. CT c-spine and chest/abd/pelvis unremarkable for acute pathology.     SICU Consult for q1 Vascular checks      Pertinent Imaging  CTH #1: Acute subdural hemorrhages which measure 0.7 cm along the left hemisphere, 0.3 cm along the right frontal area, and 1.1 cm along the left falx. No midline shift.  CT head #2: Essentially unchanged acute subdural hemorrhages measuring up to 0.7 cm along the left hemisphere, 0.4 cm along the right frontal extra-axial space, and 1.1 cm along the left falx. No midline shift.  CT c-spine: No current acute cervical spine injury.  CT chest: No evidence of an acute traumatic solid organ or osseous injury.  CT abd/pelvis: No evidence of an acute traumatic solid organ or osseous injury. Question of focal mural thickening of the anterior urinary bladder wall with associated calcifications vs stones. Nonemergent follow-up CT urogram is recommended.       24 Hour Events  -Admission under SICU service    [X] A ten-point review of systems was otherwise negative except as noted above.  [  ] Due to altered mental status/intubation, subjective information was not attained from the patient. History was obtained, to the extent possible, from review of the chart and collateral sources of information.    =========================================================================================================================================      PMH  PAST MEDICAL & SURGICAL HISTORY:  HTN (hypertension)  Seasonal allergies  History of BPH  Diabetes  No significant past surgical history    Home Meds:  Home Medications:  amLODIPine 5 mg oral tablet: 1 tab(s) orally once a day (14 Feb 2023 02:08)  aspirin 81 mg oral tablet: 1 tab(s) orally once a day (14 Feb 2023 02:08)  atorvastatin 20 mg oral tablet: 1 tab(s) orally once a day (16 Dec 2023 03:34)  carvedilol 6.25 mg oral tablet: 1 tab(s) orally 2 times a day (14 Feb 2023 02:08)  Jardiance 25 mg oral tablet: 1 tab(s) orally once a day (in the morning) (14 Feb 2023 02:08)  Nephplex Rx oral tablet: 1 tab(s) orally once a day (14 Feb 2023 02:08)  SITagliptin 50 mg oral tablet: 1 tab(s) orally once a day (16 Dec 2023 03:34)  tamsulosin 0.4 mg oral capsule: 2 cap(s) orally once a day (at bedtime) (16 Dec 2023 03:29)  valsartan 320 mg oral tablet: 1 tab(s) orally once a day (14 Feb 2023 02:08)     Allergies  Allergies  [This allergen will not trigger allergy alert] pollen (Unknown)  No Known Drug Allergies    Intolerances       Current Medications:  amLODIPine   Tablet 5 milliGRAM(s) Oral at bedtime  atorvastatin 20 milliGRAM(s) Oral at bedtime  carvedilol 6.25 milliGRAM(s) Oral every 12 hours  dextrose 5%. 1000 milliLiter(s) (50 mL/Hr) IV Continuous <Continuous>  dextrose 5%. 1000 milliLiter(s) (100 mL/Hr) IV Continuous <Continuous>  dextrose 50% Injectable 25 Gram(s) IV Push once  dextrose 50% Injectable 25 Gram(s) IV Push once  dextrose 50% Injectable 12.5 Gram(s) IV Push once  dextrose Oral Gel 15 Gram(s) Oral once PRN Blood Glucose LESS THAN 70 milliGRAM(s)/deciliter  glucagon  Injectable 1 milliGRAM(s) IntraMuscular once  insulin lispro (ADMELOG) corrective regimen sliding scale   SubCutaneous three times a day before meals  sodium bicarbonate 650 milliGRAM(s) Oral two times a day  tamsulosin 0.8 milliGRAM(s) Oral at bedtime  valsartan 320 milliGRAM(s) Oral daily      Vital Sings, Intake/Output (Last 24Hours)  ICU Vital Signs Last 24 Hrs  T(C): 38.2 (16 Dec 2023 01:30), Max: 38.2 (16 Dec 2023 01:30)  T(F): 100.8 (16 Dec 2023 01:30), Max: 100.8 (16 Dec 2023 01:30)  HR: 104 (16 Dec 2023 03:00) (88 - 104)  BP: 156/72 (16 Dec 2023 03:00) (137/60 - 158/70)  BP(mean): 103 (16 Dec 2023 03:00) (93 - 103)  ABP: --  ABP(mean): --  RR: 18 (16 Dec 2023 03:00) (18 - 20)  SpO2: 99% (16 Dec 2023 03:00) (96% - 99%)    O2 Parameters below as of 16 Dec 2023 03:00  Patient On (Oxygen Delivery Method): room air          I&O's Summary      Physical Exam:  ----------------------------------------------------------------------------------------------------------  GCS: 14    Exam: A&Ox2 (person and time), following commands, no facial asymmetry PERRL, b/l UE strength: 4/5. b/l LE strength 2/5    RESPIRATORY:  Normal expansion/effort    CARDIOVASCULAR:  S1/S2.  RRR No peripheral edema    GASTROINTESTINAL:  Abdomen soft, non-tender, non-distended    MUSCULOSKELETAL:  Extremities warm, pink, well-perfused.    DERM:  No skin breakdown     :   Exam: no martinez catheter in place.     Tubes/Lines/Drains   ----------------------------------------------------------------------------------------------------------  [x] Peripheral IV  [ ] Urinary Catheter Martinez                                               [ ] Central Venous Line                   [ ] Arterial Line		       JACQUELIN CAI   [id number]   [birth year]  78yM    Admit Date: 12-16-23  Indication for SDU/SICU:  OR #1-        ============================  HPI   Home (12-15-23 @ 18:05)  77-year-old male Cantonese speaking presents with prior history of HTN, HLD, Diabetes, BPH, prior CVA on aspirin and Plavix who states he had a mechanical fall 3 days ago while getting out of the car fell backwards hit his head.  Afterwards he was able to ambulate.  But for the past 1 day family was unable to ambulate him.  He states that his lower extremities are painful to movement and weak.  Family denies any LOC. Per daughter patient he often forgets things and cannot recall year or place. Patient following commands bedside.   Initial CT head remarkable for 3 Acute subdural hemorrhages: 0.7 cm along the left hemisphere, 0.3 cm along the right frontal area, and 1.1 cm along the left falx. No midline shift. Repeat CT head was obtained prior to 6 hour tacos because patient was appearing lethargic vs sundowning. CT head #2 with stable findings. CT c-spine and chest/abd/pelvis unremarkable for acute pathology.     SICU Consult for q1 Vascular checks      Pertinent Imaging  CTH #1: Acute subdural hemorrhages which measure 0.7 cm along the left hemisphere, 0.3 cm along the right frontal area, and 1.1 cm along the left falx. No midline shift.  CT head #2: Essentially unchanged acute subdural hemorrhages measuring up to 0.7 cm along the left hemisphere, 0.4 cm along the right frontal extra-axial space, and 1.1 cm along the left falx. No midline shift.  CT c-spine: No current acute cervical spine injury.  CT chest: No evidence of an acute traumatic solid organ or osseous injury.  CT abd/pelvis: No evidence of an acute traumatic solid organ or osseous injury. Question of focal mural thickening of the anterior urinary bladder wall with associated calcifications vs stones. Nonemergent follow-up CT urogram is recommended.       24 Hour Events  -Admission under SICU service    [X] A ten-point review of systems was otherwise negative except as noted above.  [  ] Due to altered mental status/intubation, subjective information was not attained from the patient. History was obtained, to the extent possible, from review of the chart and collateral sources of information.    =========================================================================================================================================      PMH  PAST MEDICAL & SURGICAL HISTORY:  HTN (hypertension)  Seasonal allergies  History of BPH  Diabetes  No significant past surgical history    Home Meds:  Home Medications:  amLODIPine 5 mg oral tablet: 1 tab(s) orally once a day (14 Feb 2023 02:08)  aspirin 81 mg oral tablet: 1 tab(s) orally once a day (14 Feb 2023 02:08)  atorvastatin 20 mg oral tablet: 1 tab(s) orally once a day (16 Dec 2023 03:34)  carvedilol 6.25 mg oral tablet: 1 tab(s) orally 2 times a day (14 Feb 2023 02:08)  Jardiance 25 mg oral tablet: 1 tab(s) orally once a day (in the morning) (14 Feb 2023 02:08)  Nephplex Rx oral tablet: 1 tab(s) orally once a day (14 Feb 2023 02:08)  SITagliptin 50 mg oral tablet: 1 tab(s) orally once a day (16 Dec 2023 03:34)  tamsulosin 0.4 mg oral capsule: 2 cap(s) orally once a day (at bedtime) (16 Dec 2023 03:29)  valsartan 320 mg oral tablet: 1 tab(s) orally once a day (14 Feb 2023 02:08)     Allergies  Allergies  [This allergen will not trigger allergy alert] pollen (Unknown)  No Known Drug Allergies    Intolerances       Current Medications:  amLODIPine   Tablet 5 milliGRAM(s) Oral at bedtime  atorvastatin 20 milliGRAM(s) Oral at bedtime  carvedilol 6.25 milliGRAM(s) Oral every 12 hours  dextrose 5%. 1000 milliLiter(s) (50 mL/Hr) IV Continuous <Continuous>  dextrose 5%. 1000 milliLiter(s) (100 mL/Hr) IV Continuous <Continuous>  dextrose 50% Injectable 25 Gram(s) IV Push once  dextrose 50% Injectable 25 Gram(s) IV Push once  dextrose 50% Injectable 12.5 Gram(s) IV Push once  dextrose Oral Gel 15 Gram(s) Oral once PRN Blood Glucose LESS THAN 70 milliGRAM(s)/deciliter  glucagon  Injectable 1 milliGRAM(s) IntraMuscular once  insulin lispro (ADMELOG) corrective regimen sliding scale   SubCutaneous three times a day before meals  sodium bicarbonate 650 milliGRAM(s) Oral two times a day  tamsulosin 0.8 milliGRAM(s) Oral at bedtime  valsartan 320 milliGRAM(s) Oral daily      Vital Sings, Intake/Output (Last 24Hours)  ICU Vital Signs Last 24 Hrs  T(C): 38.2 (16 Dec 2023 01:30), Max: 38.2 (16 Dec 2023 01:30)  T(F): 100.8 (16 Dec 2023 01:30), Max: 100.8 (16 Dec 2023 01:30)  HR: 104 (16 Dec 2023 03:00) (88 - 104)  BP: 156/72 (16 Dec 2023 03:00) (137/60 - 158/70)  BP(mean): 103 (16 Dec 2023 03:00) (93 - 103)  ABP: --  ABP(mean): --  RR: 18 (16 Dec 2023 03:00) (18 - 20)  SpO2: 99% (16 Dec 2023 03:00) (96% - 99%)    O2 Parameters below as of 16 Dec 2023 03:00  Patient On (Oxygen Delivery Method): room air          I&O's Summary      Physical Exam:  ----------------------------------------------------------------------------------------------------------  GCS: 14    Exam: A&Ox2 (person and time), following commands, no facial asymmetry PERRL, b/l UE strength: 4/5. b/l LE strength 2/5    RESPIRATORY:  Normal expansion/effort    CARDIOVASCULAR:  S1/S2.  RRR No peripheral edema    GASTROINTESTINAL:  Abdomen soft, non-tender, non-distended    MUSCULOSKELETAL:  Extremities warm, pink, well-perfused.    DERM:  No skin breakdown     :   Exam: no martinez catheter in place.     Tubes/Lines/Drains   ----------------------------------------------------------------------------------------------------------  [x] Peripheral IV  [ ] Urinary Catheter Martinez                                               [ ] Central Venous Line                   [ ] Arterial Line		       JACQUELIN CAI   [id number]   [birth year]  78yM    Admit Date: 12-16-23  Indication for SDU/SICU:  OR #1-        ============================  HPI   Home (12-15-23 @ 18:05)  77-year-old male Cantonese speaking presents with prior history of HTN, HLD, Diabetes, BPH, prior CVA on aspirin and Plavix who states he had a mechanical fall 3 days ago while getting out of the car fell backwards hit his head.  Afterwards he was able to ambulate.  But for the past 1 day family was unable to ambulate him.  He states that his lower extremities are painful to movement and weak.  Family denies any LOC. Per daughter patient he often forgets things and cannot recall year or place. Patient following commands bedside.   Initial CT head remarkable for 3 Acute subdural hemorrhages: 0.7 cm along the left hemisphere, 0.3 cm along the right frontal area, and 1.1 cm along the left falx. No midline shift. Repeat CT head was obtained prior to 6 hour tacos because patient was appearing lethargic vs sundowning. CT head #2 with stable findings. CT c-spine and chest/abd/pelvis unremarkable for acute pathology.     SICU Consult for q1 neuro checks      Pertinent Imaging  CTH #1: Acute subdural hemorrhages which measure 0.7 cm along the left hemisphere, 0.3 cm along the right frontal area, and 1.1 cm along the left falx. No midline shift.  CT head #2: Essentially unchanged acute subdural hemorrhages measuring up to 0.7 cm along the left hemisphere, 0.4 cm along the right frontal extra-axial space, and 1.1 cm along the left falx. No midline shift.  CT c-spine: No current acute cervical spine injury.  CT chest: No evidence of an acute traumatic solid organ or osseous injury.  CT abd/pelvis: No evidence of an acute traumatic solid organ or osseous injury. Question of focal mural thickening of the anterior urinary bladder wall with associated calcifications vs stones. Nonemergent follow-up CT urogram is recommended.       24 Hour Events  -Admission under SICU service    [X] A ten-point review of systems was otherwise negative except as noted above.  [  ] Due to altered mental status/intubation, subjective information was not attained from the patient. History was obtained, to the extent possible, from review of the chart and collateral sources of information.    =========================================================================================================================================      PMH  PAST MEDICAL & SURGICAL HISTORY:  HTN (hypertension)  Seasonal allergies  History of BPH  Diabetes  No significant past surgical history    Home Meds:  Home Medications:  amLODIPine 5 mg oral tablet: 1 tab(s) orally once a day (14 Feb 2023 02:08)  aspirin 81 mg oral tablet: 1 tab(s) orally once a day (14 Feb 2023 02:08)  atorvastatin 20 mg oral tablet: 1 tab(s) orally once a day (16 Dec 2023 03:34)  carvedilol 6.25 mg oral tablet: 1 tab(s) orally 2 times a day (14 Feb 2023 02:08)  Jardiance 25 mg oral tablet: 1 tab(s) orally once a day (in the morning) (14 Feb 2023 02:08)  Nephplex Rx oral tablet: 1 tab(s) orally once a day (14 Feb 2023 02:08)  SITagliptin 50 mg oral tablet: 1 tab(s) orally once a day (16 Dec 2023 03:34)  tamsulosin 0.4 mg oral capsule: 2 cap(s) orally once a day (at bedtime) (16 Dec 2023 03:29)  valsartan 320 mg oral tablet: 1 tab(s) orally once a day (14 Feb 2023 02:08)     Allergies  Allergies  [This allergen will not trigger allergy alert] pollen (Unknown)  No Known Drug Allergies    Intolerances       Current Medications:  amLODIPine   Tablet 5 milliGRAM(s) Oral at bedtime  atorvastatin 20 milliGRAM(s) Oral at bedtime  carvedilol 6.25 milliGRAM(s) Oral every 12 hours  dextrose 5%. 1000 milliLiter(s) (50 mL/Hr) IV Continuous <Continuous>  dextrose 5%. 1000 milliLiter(s) (100 mL/Hr) IV Continuous <Continuous>  dextrose 50% Injectable 25 Gram(s) IV Push once  dextrose 50% Injectable 25 Gram(s) IV Push once  dextrose 50% Injectable 12.5 Gram(s) IV Push once  dextrose Oral Gel 15 Gram(s) Oral once PRN Blood Glucose LESS THAN 70 milliGRAM(s)/deciliter  glucagon  Injectable 1 milliGRAM(s) IntraMuscular once  insulin lispro (ADMELOG) corrective regimen sliding scale   SubCutaneous three times a day before meals  sodium bicarbonate 650 milliGRAM(s) Oral two times a day  tamsulosin 0.8 milliGRAM(s) Oral at bedtime  valsartan 320 milliGRAM(s) Oral daily      Vital Sings, Intake/Output (Last 24Hours)  ICU Vital Signs Last 24 Hrs  T(C): 38.2 (16 Dec 2023 01:30), Max: 38.2 (16 Dec 2023 01:30)  T(F): 100.8 (16 Dec 2023 01:30), Max: 100.8 (16 Dec 2023 01:30)  HR: 104 (16 Dec 2023 03:00) (88 - 104)  BP: 156/72 (16 Dec 2023 03:00) (137/60 - 158/70)  BP(mean): 103 (16 Dec 2023 03:00) (93 - 103)  ABP: --  ABP(mean): --  RR: 18 (16 Dec 2023 03:00) (18 - 20)  SpO2: 99% (16 Dec 2023 03:00) (96% - 99%)    O2 Parameters below as of 16 Dec 2023 03:00  Patient On (Oxygen Delivery Method): room air          I&O's Summary      Physical Exam:  ----------------------------------------------------------------------------------------------------------  GCS: 14    Exam: A&Ox2 (person and time), following commands, no facial asymmetry PERRL, b/l UE strength: 4/5. b/l LE strength 2/5    RESPIRATORY:  Normal expansion/effort    CARDIOVASCULAR:  S1/S2.  RRR No peripheral edema    GASTROINTESTINAL:  Abdomen soft, non-tender, non-distended    MUSCULOSKELETAL:  Extremities warm, pink, well-perfused.    DERM:  No skin breakdown     :   Exam: no martinez catheter in place.     Tubes/Lines/Drains   ----------------------------------------------------------------------------------------------------------  [x] Peripheral IV  [ ] Urinary Catheter Martinez                                               [ ] Central Venous Line                   [ ] Arterial Line		       JACQUELIN CAI   [id number]   [birth year]  78yM    Admit Date: 12-16-23  Indication for SDU/SICU:  OR #1-        ============================  HPI   Home (12-15-23 @ 18:05)  77-year-old male Cantonese speaking presents with prior history of HTN, HLD, Diabetes, BPH, prior CVA on aspirin and Plavix who states he had a mechanical fall 3 days ago while getting out of the car fell backwards hit his head.  Afterwards he was able to ambulate.  But for the past 1 day family was unable to ambulate him.  He states that his lower extremities are painful to movement and weak.  Family denies any LOC. Per daughter patient he often forgets things and cannot recall year or place. Patient following commands bedside.   Initial CT head remarkable for 3 Acute subdural hemorrhages: 0.7 cm along the left hemisphere, 0.3 cm along the right frontal area, and 1.1 cm along the left falx. No midline shift. Repeat CT head was obtained prior to 6 hour tacos because patient was appearing lethargic vs sundowning. CT head #2 with stable findings. CT c-spine and chest/abd/pelvis unremarkable for acute pathology.     SICU Consult for q1 neuro checks      Pertinent Imaging  CTH #1: Acute subdural hemorrhages which measure 0.7 cm along the left hemisphere, 0.3 cm along the right frontal area, and 1.1 cm along the left falx. No midline shift.  CT head #2: Essentially unchanged acute subdural hemorrhages measuring up to 0.7 cm along the left hemisphere, 0.4 cm along the right frontal extra-axial space, and 1.1 cm along the left falx. No midline shift.  CT c-spine: No current acute cervical spine injury.  CT chest: No evidence of an acute traumatic solid organ or osseous injury.  CT abd/pelvis: No evidence of an acute traumatic solid organ or osseous injury. Question of focal mural thickening of the anterior urinary bladder wall with associated calcifications vs stones. Nonemergent follow-up CT urogram is recommended.       24 Hour Events  -Admission under SICU service    [X] A ten-point review of systems was otherwise negative except as noted above.  [  ] Due to altered mental status/intubation, subjective information was not attained from the patient. History was obtained, to the extent possible, from review of the chart and collateral sources of information.    =========================================================================================================================================      PMH  PAST MEDICAL & SURGICAL HISTORY:  HTN (hypertension)  Seasonal allergies  History of BPH  Diabetes  No significant past surgical history    Home Meds:  Home Medications:  amLODIPine 5 mg oral tablet: 1 tab(s) orally once a day (14 Feb 2023 02:08)  aspirin 81 mg oral tablet: 1 tab(s) orally once a day (14 Feb 2023 02:08)  atorvastatin 20 mg oral tablet: 1 tab(s) orally once a day (16 Dec 2023 03:34)  carvedilol 6.25 mg oral tablet: 1 tab(s) orally 2 times a day (14 Feb 2023 02:08)  Jardiance 25 mg oral tablet: 1 tab(s) orally once a day (in the morning) (14 Feb 2023 02:08)  Nephplex Rx oral tablet: 1 tab(s) orally once a day (14 Feb 2023 02:08)  SITagliptin 50 mg oral tablet: 1 tab(s) orally once a day (16 Dec 2023 03:34)  tamsulosin 0.4 mg oral capsule: 2 cap(s) orally once a day (at bedtime) (16 Dec 2023 03:29)  valsartan 320 mg oral tablet: 1 tab(s) orally once a day (14 Feb 2023 02:08)     Allergies  Allergies  [This allergen will not trigger allergy alert] pollen (Unknown)  No Known Drug Allergies    Intolerances       Current Medications:  amLODIPine   Tablet 5 milliGRAM(s) Oral at bedtime  atorvastatin 20 milliGRAM(s) Oral at bedtime  carvedilol 6.25 milliGRAM(s) Oral every 12 hours  dextrose 5%. 1000 milliLiter(s) (50 mL/Hr) IV Continuous <Continuous>  dextrose 5%. 1000 milliLiter(s) (100 mL/Hr) IV Continuous <Continuous>  dextrose 50% Injectable 25 Gram(s) IV Push once  dextrose 50% Injectable 25 Gram(s) IV Push once  dextrose 50% Injectable 12.5 Gram(s) IV Push once  dextrose Oral Gel 15 Gram(s) Oral once PRN Blood Glucose LESS THAN 70 milliGRAM(s)/deciliter  glucagon  Injectable 1 milliGRAM(s) IntraMuscular once  insulin lispro (ADMELOG) corrective regimen sliding scale   SubCutaneous three times a day before meals  sodium bicarbonate 650 milliGRAM(s) Oral two times a day  tamsulosin 0.8 milliGRAM(s) Oral at bedtime  valsartan 320 milliGRAM(s) Oral daily      Vital Sings, Intake/Output (Last 24Hours)  ICU Vital Signs Last 24 Hrs  T(C): 38.2 (16 Dec 2023 01:30), Max: 38.2 (16 Dec 2023 01:30)  T(F): 100.8 (16 Dec 2023 01:30), Max: 100.8 (16 Dec 2023 01:30)  HR: 104 (16 Dec 2023 03:00) (88 - 104)  BP: 156/72 (16 Dec 2023 03:00) (137/60 - 158/70)  BP(mean): 103 (16 Dec 2023 03:00) (93 - 103)  ABP: --  ABP(mean): --  RR: 18 (16 Dec 2023 03:00) (18 - 20)  SpO2: 99% (16 Dec 2023 03:00) (96% - 99%)    O2 Parameters below as of 16 Dec 2023 03:00  Patient On (Oxygen Delivery Method): room air          I&O's Summary      Physical Exam:  ----------------------------------------------------------------------------------------------------------  GCS: 14    Exam: A&Ox2 (person and time), following commands, no facial asymmetry PERRL, b/l UE strength: 4/5. b/l LE strength 2/5    RESPIRATORY:  Normal expansion/effort    CARDIOVASCULAR:  S1/S2.  RRR No peripheral edema    GASTROINTESTINAL:  Abdomen soft, non-tender, non-distended    MUSCULOSKELETAL:  Extremities warm, pink, well-perfused.    DERM:  No skin breakdown     :   Exam: no martinez catheter in place.     Tubes/Lines/Drains   ----------------------------------------------------------------------------------------------------------  [x] Peripheral IV  [ ] Urinary Catheter Martinez                                               [ ] Central Venous Line                   [ ] Arterial Line

## 2023-12-16 NOTE — CONSULT NOTE ADULT - CRITICAL CARE ATTENDING COMMENT
78 year old make hx of HTN, BPH, CKD, Stroke (1 year ago), HLD, DM presents s/p Fall. Patient fell on Wednesday upon exiting a bus, hitting the back of his head on concrete. No LOC. Fall was witnessed by  of bus. Since than family states he is having difficulty ambulating, and periods of confusion. CT Head showing an acute SDH along the left hemisphere, and along the left falx. No shift. Patient takes ASA and Plavix daily.     Plan:  - q 1 neuro checks  - BP goal 110-150   - DDVAP for reversal of ASA/ Plavix- 0.3 mcg/kg IVPB   - Continue Keppra 500 mg BID   - Repeat 3 hour CT Scan completed- stable  - Hold AP and DVT ppx   - Will follow with SICU team

## 2023-12-16 NOTE — CONSULT NOTE ADULT - ASSESSMENT
Assessment: 78 year old make hx of HTN, BPH, CKD, Stroke (1 year ago), HLD, DM presents s/p Fall. Patient fell on Wednesday upon exiting a bus, hitting the back of his head on concrete. No LOC. Fall was witnessed by  of bus. Since than family states he is having difficulty ambulating, and periods of confusion. CT Head showing an acute SDH along the left hemisphere, and along the left falx. No shift. Patient takes ASA and Plavix daily.     Plan:  - q 1 neuro checks  - BP goal 110-150   - DDVAP for reversal of ASA/ Plavix- 0.3 mcg/kg IVPB   - Continue Keppra 500 mg BID   - Repeat CT Scan Head now   - Hold AP and DVT ppx     NCC will continue to follow     ex [id number]  Assessment: 78 year old make hx of HTN, BPH, CKD, Stroke (1 year ago), HLD, DM presents s/p Fall. Patient fell on Wednesday upon exiting a bus, hitting the back of his head on concrete. No LOC. Fall was witnessed by  of bus. Since than family states he is having difficulty ambulating, and periods of confusion. CT Head showing an acute SDH along the left hemisphere, and along the left falx. No shift. Patient takes ASA and Plavix daily.     Plan:  - q 1 neuro checks  - BP goal 110-150   - DDVAP for reversal of ASA/ Plavix- 0.3 mcg/kg IVPB   - Continue Keppra 500 mg BID   - Repeat CT Scan Head now   - Hold AP and DVT ppx     NCC will continue to follow     ex [id number]  Assessment: 78 year old make hx of HTN, BPH, CKD, Stroke (1 year ago), HLD, DM presents s/p Fall. Patient fell on Wednesday upon exiting a bus, hitting the back of his head on concrete. No LOC. Fall was witnessed by  of bus. Since than family states he is having difficulty ambulating, and periods of confusion. CT Head showing an acute SDH along the left hemisphere, and along the left falx. No shift. Patient takes ASA and Plavix daily.     Plan:  - q 1 neuro checks  - BP goal 110-150   - DDVAP for reversal of ASA/ Plavix- 0.3 mcg/kg IVPB   - Continue Keppra 500 mg BID   - Repeat 3 hour CT Scan completed- stable  - Hold AP and DVT ppx     NCC will continue to follow     ex [id number]  Assessment: 78 year old make hx of HTN, BPH, CKD, Stroke (1 year ago), HLD, DM presents s/p Fall. Patient fell on Wednesday upon exiting a bus, hitting the back of his head on concrete. No LOC. Fall was witnessed by  of bus. Since than family states he is having difficulty ambulating, and periods of confusion. CT Head showing an acute SDH along the left hemisphere, and along the left falx. No shift. Patient takes ASA and Plavix daily.     Plan:  - q 1 neuro checks  - BP goal 110-150   - DDVAP for reversal of ASA/ Plavix- 0.3 mcg/kg IVPB   - Continue Keppra 500 mg BID   - Repeat 3 hour CT Scan completed- stable  - Hold AP and DVT ppx     NCC will continue to follow     ex [id number]

## 2023-12-16 NOTE — CONSULT NOTE ADULT - ATTENDING COMMENTS
Critical Care: [id number]   This patient has a high probability of sudden, clinically significant deterioration, which requires the highest level of physician preparedness to intervene urgently. I managed/supervised life or organ supporting interventions that required frequent physician assessment. I devoted my full attention in the ICU to the direct care of this patient for the period of time indicated below. Time I spent with the family or surrogate(s) is included only if the patient was incapable of providing the necessary information or participating in medical decision making. Time devoted to teaching and to any procedures I billed separately is not included.     JACQUELIN CAI 78y Male admitted to [ x] SICU /[ ] SDU with SDHs after the fall 3 days prior, presented to ED by family due to altered mental status and lethargy. Admitted to SICU for neurological monitoring with neuro checks q1hr, high risk for hemodynamic instability, airway at risk for obstruction, electrolytes abnormalities, Episode of V. Tach this am (12/16/23)-> rate controlled at this time to low 100s.  Patient is examined and evaluated at the bedside with SICU team. Treatment plan discussed with SICU team, nurses and primary team.   Chest X-ray and all relevant studies reviewed during rounds.  Will continue hemodynamic monitoring as per protocol in SICU.    Neuro:  GCS [13 ] AAOx1  [x ]  Neurovascular checks as per SICU protocol                 [ ] 3% NaCl     [ ] 2% NaCl                [x ] Keppra  [ ] Lamictal  [ ] Depakote  [ ] Dilantin                Pharmacological Paralysis [ ] Yes  [x ]  No      Sedated/Pain control with                 [ ] Dilaudid drip, [ ]  Ketamine drip, [ ] Fentanyl drip, [ ] Propofol, [ ] Precedex, [ ] Versed drip, [ ] Ativan drip,                           [ ] OxyContin standing,  [ ] OxyContin PRN, [ ] Dilaudid PRN pushes, [ ] Fentanyl PRN pushes, [ ] PCA,                [x ] Tylenol IV/PO, [ ] Gabapentin, [ ]  Ketorolac, [ ] Tramadol,  [ ] Lidoderm Patch       Other Medications               [ ] Seroquel, [ ] Zyprexa, [ ] Haldol,  [ ] Clonazepam [ ] Xanax, [ ] Versed/Ativan PRN, [ ] Valium [x ] None               [ ] Robaxin   [ ] Baclofen  [ ] Flexeril               [ ] CIWA (Ativan/Valium/ Librium)  CV: continue to monitor, home meds will be restarted when patient pass bedside swallow eval. Start IV Lopressor for now  On pressors [ ]  Yes  [x ]  No          [ ]  Levophed, [ ] Tulio-Synephrine, [ ] Vasopressin, [ ]  Epinephrine          [ ] Dobutamine, [ ] Milrinone, [ ]  Midodrine,  [ ] Others    Other Cardiac Meds          [ ] Amiodarone IV/PO, [ ] Digoxin, [ ] Cardizem drip, [ ] Cleviprex drip, [ ] Esmolol drip, [ ] Cardene drip  Respiratory: Acute respiratory insufficiency -> continue monitoring, CXR bilateral minimal congestion cannot exclude aspiration at this time                        None Invasive Support  [x ] Incentive Spirometer poorly compliant                                  [ ] BiPAP   [ ] CPAP/NIV   [ ] HFNC   [ ] NR Face Mask  [x ] NC@2L  [ ] Trach Caller [ ] Room Air                        Ventilatory support  [ ] Yes [x ] No                            [ ] SBT                                  [ ] PC    [ ] VC   [ ] AC/PRVC   [ ] BiVent/APRV   [ ]CPAP   GI  [ x]  bowel regiment to be started when tolerates PO [x ] BM none  [x ] Flatus +  [ ] Ostomy   [ ] NG tube                 Prophylaxis [ x] PPI  [ ] H2 Blockers  [ ] Others  Nutrition: continue   [ x] Diet  NPO awaiting eval [ ] TPN/PPN   [ ] calories count   [ ]  Tube Feeds     Renal: Continue I&Os monitoring, Aguirre catheter  [ ] Yes  [x ]  No  [ ] Consideration for discontinuation [ x] Taxes cath/PrimaFit  [ ] TOV                                                               [ ] HD/CVVH   [ ] Urine output       Lytes/Acid-base: replete hypokalemia, hypomagnesemia, hypocalcemia, hypophosphatemia        IV Fluids   [ ] LR  [x ] NS@100ml/hr  [ ] D5W1/2NS [ ] Bicarbonate Drip  [ ] Albumin [ ] Lasix drip/push  [ ] Bumex drip/push  ID: leukocytosis -> continue to monitor: WBC 12.3         IV Abx [ ] Yes, [x ] No;  ID consulted [ ] Yes, [x ] No        Cultures send  [ ] Respiratory   [ ] Blood   [ ] Urine  [ ] Fluids  [ ] Tissue  [x ]  None  Lines:   [ ] Right   [ ] Left  [ ] Bilateral                     [ ] Subclavian TLC        [ ]  Internal Jugular TLC     [ ]  Femoral TLC                     [ ] Subclavian Cordis    [ ] Internal Jugular Cordis   [ ] Femoral Cordis                     [ ] HD catheter    [ ] PICC     [ ]  Midline   [x ] Peripheral IVs                                                 [ ] Right   [ ] Left   [x ] None                     [ ] Radial A-Line    [ ] Femoral A-Line   [ ] Axillary A-Line               Heme: continue to evaluate for acute blood loss anemia- trend Hg/Hct                     AC Reversal Indicated [ ] Yes  [ x] No                                      [ ] Kcentra,  [ ] 3Factors concentrate, [ ] Andexxa                     Transfused  indicated  [ ] Yes, [ x] No    [ ] PRBCs   [ ] Platelets   [ ] FFPs   [ ] Cryoprecipitate                    Should be started on or continued with  following  [ ] Yes,  [x ] No                               [ ] Lovenox  [ ] Coumadin  [ ] Heparin drip  [ ] DOVACs  [ ] ASA  [ ] Antiplatelets   Endocrine: Prevent and treat hyperglycemia with insulin as needed,                                [ x] Sliding scale,  [ ] Lantus/Regular Insuline                              Insuline drip for hyperglycemia [ ] Yes, [ x] No   PV: follow pulse exam  Skin: decub precautions  DVT Prophylaxis:  [x ] SCDs  [ ] Heparin SQ  [ ] Lovenox  SQ  Stress Gastritis Prophylaxis: PPI/H2 Blockers if indicated  Mobility: patient is evaluated at the bedside with mobility team and the goals for today are discussed with PT [x ] bed rest with HOB at 30 degrees    PATIENT/FAMILY/SURROGATE CONFERENCE:  [ x] Yes with patient's family over the phone. [ ] No  PURPOSE: To obtain necessary information, To discuss treatment options under consideration today.    I saw and evaluated the patient personally. I have reviewed and agree with note above. Treatment plan discussed with SICU team, nurses and primary team at the time of the multidisciplinary rounds. The above note is NOT written at the time of rounds and will reflect all changes throughout management of the patient for the day note is written for.    Shawnee Degroot MD, FACS  Trauma/ACS/SCC Attending Critical Care: [id number]   This patient has a high probability of sudden, clinically significant deterioration, which requires the highest level of physician preparedness to intervene urgently. I managed/supervised life or organ supporting interventions that required frequent physician assessment. I devoted my full attention in the ICU to the direct care of this patient for the period of time indicated below. Time I spent with the family or surrogate(s) is included only if the patient was incapable of providing the necessary information or participating in medical decision making. Time devoted to teaching and to any procedures I billed separately is not included.     JACQUELIN CAI 78y Male admitted to [ x] SICU /[ ] SDU with SDHs after the fall 3 days prior, presented to ED by family due to altered mental status and lethargy. Admitted to SICU for neurological monitoring with neuro checks q1hr, high risk for hemodynamic instability, airway at risk for obstruction, electrolytes abnormalities, Episode of V. Tach this am (12/16/23)-> rate controlled at this time to low 100s.  Patient is examined and evaluated at the bedside with SICU team. Treatment plan discussed with SICU team, nurses and primary team.   Chest X-ray and all relevant studies reviewed during rounds.  Will continue hemodynamic monitoring as per protocol in SICU.    Neuro:  GCS [13 ] AAOx1  [x ]  Neurovascular checks as per SICU protocol                 [ ] 3% NaCl     [ ] 2% NaCl                [x ] Keppra  [ ] Lamictal  [ ] Depakote  [ ] Dilantin                Pharmacological Paralysis [ ] Yes  [x ]  No      Sedated/Pain control with                 [ ] Dilaudid drip, [ ]  Ketamine drip, [ ] Fentanyl drip, [ ] Propofol, [ ] Precedex, [ ] Versed drip, [ ] Ativan drip,                           [ ] OxyContin standing,  [ ] OxyContin PRN, [ ] Dilaudid PRN pushes, [ ] Fentanyl PRN pushes, [ ] PCA,                [x ] Tylenol IV/PO, [ ] Gabapentin, [ ]  Ketorolac, [ ] Tramadol,  [ ] Lidoderm Patch       Other Medications               [ ] Seroquel, [ ] Zyprexa, [ ] Haldol,  [ ] Clonazepam [ ] Xanax, [ ] Versed/Ativan PRN, [ ] Valium [x ] None               [ ] Robaxin   [ ] Baclofen  [ ] Flexeril               [ ] CIWA (Ativan/Valium/ Librium)  CV: continue to monitor, home meds will be restarted when patient pass bedside swallow eval. Start IV Lopressor for now  On pressors [ ]  Yes  [x ]  No          [ ]  Levophed, [ ] Tulio-Synephrine, [ ] Vasopressin, [ ]  Epinephrine          [ ] Dobutamine, [ ] Milrinone, [ ]  Midodrine,  [ ] Others    Other Cardiac Meds          [ ] Amiodarone IV/PO, [ ] Digoxin, [ ] Cardizem drip, [ ] Cleviprex drip, [ ] Esmolol drip, [ ] Cardene drip  Respiratory: Acute respiratory insufficiency -> continue monitoring, CXR bilateral minimal congestion cannot exclude aspiration at this time                        None Invasive Support  [x ] Incentive Spirometer poorly compliant                                  [ ] BiPAP   [ ] CPAP/NIV   [ ] HFNC   [ ] NR Face Mask  [x ] NC@2L  [ ] Trach Caller [ ] Room Air                        Ventilatory support  [ ] Yes [x ] No                            [ ] SBT                                  [ ] PC    [ ] VC   [ ] AC/PRVC   [ ] BiVent/APRV   [ ]CPAP   GI  [ x]  bowel regiment to be started when tolerates PO [x ] BM none  [x ] Flatus +  [ ] Ostomy   [ ] NG tube                 Prophylaxis [ x] PPI  [ ] H2 Blockers  [ ] Others  Nutrition: continue   [ x] Diet  NPO awaiting eval [ ] TPN/PPN   [ ] calories count   [ ]  Tube Feeds     Renal: Continue I&Os monitoring, Aguirre catheter  [ ] Yes  [x ]  No  [ ] Consideration for discontinuation [ x] Taxes cath/PrimaFit  [ ] TOV                                                               [ ] HD/CVVH   [ ] Urine output       Lytes/Acid-base: replete hypokalemia, hypomagnesemia, hypocalcemia, hypophosphatemia        IV Fluids   [ ] LR  [x ] NS@100ml/hr  [ ] D5W1/2NS [ ] Bicarbonate Drip  [ ] Albumin [ ] Lasix drip/push  [ ] Bumex drip/push  ID: leukocytosis -> continue to monitor: WBC 12.3         IV Abx [ ] Yes, [x ] No;  ID consulted [ ] Yes, [x ] No        Cultures send  [ ] Respiratory   [ ] Blood   [ ] Urine  [ ] Fluids  [ ] Tissue  [x ]  None  Lines:   [ ] Right   [ ] Left  [ ] Bilateral                     [ ] Subclavian TLC        [ ]  Internal Jugular TLC     [ ]  Femoral TLC                     [ ] Subclavian Cordis    [ ] Internal Jugular Cordis   [ ] Femoral Cordis                     [ ] HD catheter    [ ] PICC     [ ]  Midline   [x ] Peripheral IVs                                                 [ ] Right   [ ] Left   [x ] None                     [ ] Radial A-Line    [ ] Femoral A-Line   [ ] Axillary A-Line               Heme: continue to evaluate for acute blood loss anemia- trend Hg/Hct                     AC Reversal Indicated [ ] Yes  [ x] No                                      [ ] Kcentra,  [ ] 3Factors concentrate, [ ] Andexxa                     Transfused  indicated  [ ] Yes, [ x] No    [ ] PRBCs   [ ] Platelets   [ ] FFPs   [ ] Cryoprecipitate                    Should be started on or continued with  following  [ ] Yes,  [x ] No                               [ ] Lovenox  [ ] Coumadin  [ ] Heparin drip  [ ] DOVACs  [ ] ASA  [ ] Antiplatelets   Endocrine: Prevent and treat hyperglycemia with insulin as needed,                                [ x] Sliding scale,  [ ] Lantus/Regular Insuline                              Insuline drip for hyperglycemia [ ] Yes, [ x] No   PV: follow pulse exam  Skin: decub precautions  DVT Prophylaxis:  [x ] SCDs  [ ] Heparin SQ  [ ] Lovenox  SQ  Stress Gastritis Prophylaxis: PPI/H2 Blockers if indicated  Mobility: patient is evaluated at the bedside with mobility team and the goals for today are discussed with PT [x ] bed rest with HOB at 30 degrees    PATIENT/FAMILY/SURROGATE CONFERENCE:  [ x] Yes with patient's family over the phone. [ ] No  PURPOSE: To obtain necessary information, To discuss treatment options under consideration today.    I saw and evaluated the patient personally. I have reviewed and agree with note above. Treatment plan discussed with SICU team, nurses and primary team at the time of the multidisciplinary rounds. The above note is NOT written at the time of rounds and will reflect all changes throughout management of the patient for the day note is written for.    Shawnee Degroot MD, FACS  Trauma/ACS/SCC Attending

## 2023-12-16 NOTE — H&P ADULT - ATTENDING COMMENTS
Trauma/ACS Attending Note Attestation    Patient is examined and evaluated at the bedside with the residents/PAs. Treatment plan discussed with the team, nurses, and consulting physicians and consulting teams. Medications, radiological studies and all other relevant studies reviewed.     78y Male with PMH HTN, dyslipidemia, diabetes, BPH, prior CVA on ASA/Plavix s/p mechanical fall 3 days ago while getting out of car. He fell backwards and hit his head. He was initially able to ambulate, but for the past day he has had weakness in his lower extremities. Family does not believe he lost consciousness. Patient is somnolent but arousable.    Vital Signs Last 24 Hrs  T(C): 37.8 (16 Dec 2023 06:00), Max: 38.2 (16 Dec 2023 01:30)  T(F): 100.1 (16 Dec 2023 06:00), Max: 100.8 (16 Dec 2023 01:30)  HR: 135 (16 Dec 2023 06:00) (88 - 135)  BP: 142/98 (16 Dec 2023 06:00) (137/60 - 170/70)  BP(mean): 115 (16 Dec 2023 06:00) (93 - 115)  RR: 17 (16 Dec 2023 06:00) (17 - 20)  SpO2: 96% (16 Dec 2023 06:00) (95% - 99%)    Parameters below as of 16 Dec 2023 06:00  Patient On (Oxygen Delivery Method): room air        Primary:  Airway - intact  Breathing - breath sounds bilaterally  Circulation - 2+ throughout  Disability - GCS 14, moving all extremities though decreased strength in lower extremities  Exposure - patient was exposed    Secondary:  General: no apparent distress, calm and cooperative  HEENT: NCAT, EOMI, trachea midline  Cardiac: regular rate and rhythm  Respiratory: Normal respiratory effort, breathing even and unlabored, no chest wall tenderness  Abdomen: Soft, non-distended, non-tender  Musculoskeletal: ROM grossly intact, no obvious swelling, no tenderness to palpation, no spinal tenderness or step offs  Neuro: somnolent but arousable, lower extremity strength 3/5  Psych:  unable to adequately assess  Vascular: Pulses 2+ throughout, extremities well perfused  Skin: Warm/dry                          12.2   12.27 )-----------( 240      ( 15 Dec 2023 20:29 )             36.5     12-15    141  |  103  |  42<H>  ----------------------------<  149<H>  4.0   |  24  |  2.8<H>    Ca    9.2      15 Dec 2023 20:29    TPro  6.8  /  Alb  4.4  /  TBili  0.9  /  DBili  x   /  AST  19  /  ALT  17  /  AlkPhos  49  12-15      CXR reviewed and interpreted by me - no hemothorax/pneumothorax  CTH/Csp reviewed and interpreted by me - scattered subdural hemorrhages  CT C/A/P reviewed and interpreted by me - no acute traumatic injuries    Assessment/Plan:  78y Male s/p fall with scattered subdural hemorrhages. Recommended CT C/A/P given somnolence to complete trauma work up. After speaking to daughter, patient appears to have become more somnolent overnight, so we recommended repeat CTH with CT C/A/P. Repeat CTH is stable. NSGY would still like Q1H neuro checks, so we will admit to SICU. Will hold ASA/Plavix, but given that the injury was three days ago and the bleed is stable, we will hold on giving DDAVP. PLT level is acceptable. Will monitor weakness in SICU. NSGY recommending MRI of spine if weakness is not improved. Will have to clarify when DVT ppx can be started. Restart home meds as able. Replete electrolytes. Sliding scale insulin for hx of diabetes. Will need to trend creatinine. Creatinine in february of this year was around 2.2 and is now 2.8.    Isidro Montemayor MD  Trauma/ACS/Surgical Critical Care Attending

## 2023-12-17 LAB
ANION GAP SERPL CALC-SCNC: 13 MMOL/L — SIGNIFICANT CHANGE UP (ref 7–14)
ANION GAP SERPL CALC-SCNC: 13 MMOL/L — SIGNIFICANT CHANGE UP (ref 7–14)
ANION GAP SERPL CALC-SCNC: 15 MMOL/L — HIGH (ref 7–14)
ANION GAP SERPL CALC-SCNC: 15 MMOL/L — HIGH (ref 7–14)
BUN SERPL-MCNC: 42 MG/DL — HIGH (ref 10–20)
BUN SERPL-MCNC: 42 MG/DL — HIGH (ref 10–20)
BUN SERPL-MCNC: 46 MG/DL — HIGH (ref 10–20)
BUN SERPL-MCNC: 46 MG/DL — HIGH (ref 10–20)
CALCIUM SERPL-MCNC: 8 MG/DL — LOW (ref 8.4–10.5)
CALCIUM SERPL-MCNC: 8 MG/DL — LOW (ref 8.4–10.5)
CALCIUM SERPL-MCNC: 8.4 MG/DL — SIGNIFICANT CHANGE UP (ref 8.4–10.5)
CALCIUM SERPL-MCNC: 8.4 MG/DL — SIGNIFICANT CHANGE UP (ref 8.4–10.5)
CHLORIDE SERPL-SCNC: 111 MMOL/L — HIGH (ref 98–110)
CHLORIDE SERPL-SCNC: 111 MMOL/L — HIGH (ref 98–110)
CHLORIDE SERPL-SCNC: 114 MMOL/L — HIGH (ref 98–110)
CHLORIDE SERPL-SCNC: 114 MMOL/L — HIGH (ref 98–110)
CO2 SERPL-SCNC: 18 MMOL/L — SIGNIFICANT CHANGE UP (ref 17–32)
CO2 SERPL-SCNC: 18 MMOL/L — SIGNIFICANT CHANGE UP (ref 17–32)
CO2 SERPL-SCNC: 19 MMOL/L — SIGNIFICANT CHANGE UP (ref 17–32)
CO2 SERPL-SCNC: 19 MMOL/L — SIGNIFICANT CHANGE UP (ref 17–32)
CREAT SERPL-MCNC: 3 MG/DL — HIGH (ref 0.7–1.5)
EGFR: 21 ML/MIN/1.73M2 — LOW
GAS PNL BLDA: SIGNIFICANT CHANGE UP
GAS PNL BLDA: SIGNIFICANT CHANGE UP
GLUCOSE SERPL-MCNC: 180 MG/DL — HIGH (ref 70–99)
GLUCOSE SERPL-MCNC: 180 MG/DL — HIGH (ref 70–99)
GLUCOSE SERPL-MCNC: 202 MG/DL — HIGH (ref 70–99)
GLUCOSE SERPL-MCNC: 202 MG/DL — HIGH (ref 70–99)
MAGNESIUM SERPL-MCNC: 2.6 MG/DL — HIGH (ref 1.8–2.4)
MAGNESIUM SERPL-MCNC: 2.6 MG/DL — HIGH (ref 1.8–2.4)
OSMOLALITY SERPL: 327 MOSMOL/KG — HIGH (ref 280–301)
OSMOLALITY SERPL: 327 MOSMOL/KG — HIGH (ref 280–301)
PHOSPHATE SERPL-MCNC: 4.2 MG/DL — SIGNIFICANT CHANGE UP (ref 2.1–4.9)
PHOSPHATE SERPL-MCNC: 4.2 MG/DL — SIGNIFICANT CHANGE UP (ref 2.1–4.9)
POTASSIUM SERPL-MCNC: 4 MMOL/L — SIGNIFICANT CHANGE UP (ref 3.5–5)
POTASSIUM SERPL-MCNC: 4 MMOL/L — SIGNIFICANT CHANGE UP (ref 3.5–5)
POTASSIUM SERPL-MCNC: 4.8 MMOL/L — SIGNIFICANT CHANGE UP (ref 3.5–5)
POTASSIUM SERPL-MCNC: 4.8 MMOL/L — SIGNIFICANT CHANGE UP (ref 3.5–5)
POTASSIUM SERPL-SCNC: 4 MMOL/L — SIGNIFICANT CHANGE UP (ref 3.5–5)
POTASSIUM SERPL-SCNC: 4 MMOL/L — SIGNIFICANT CHANGE UP (ref 3.5–5)
POTASSIUM SERPL-SCNC: 4.8 MMOL/L — SIGNIFICANT CHANGE UP (ref 3.5–5)
POTASSIUM SERPL-SCNC: 4.8 MMOL/L — SIGNIFICANT CHANGE UP (ref 3.5–5)
SODIUM SERPL-SCNC: 143 MMOL/L — SIGNIFICANT CHANGE UP (ref 135–146)
SODIUM SERPL-SCNC: 143 MMOL/L — SIGNIFICANT CHANGE UP (ref 135–146)
SODIUM SERPL-SCNC: 147 MMOL/L — HIGH (ref 135–146)
SODIUM SERPL-SCNC: 147 MMOL/L — HIGH (ref 135–146)

## 2023-12-17 PROCEDURE — 99232 SBSQ HOSP IP/OBS MODERATE 35: CPT

## 2023-12-17 PROCEDURE — 93306 TTE W/DOPPLER COMPLETE: CPT | Mod: 26

## 2023-12-17 PROCEDURE — 99291 CRITICAL CARE FIRST HOUR: CPT | Mod: 24

## 2023-12-17 PROCEDURE — 71045 X-RAY EXAM CHEST 1 VIEW: CPT | Mod: 26

## 2023-12-17 PROCEDURE — 99231 SBSQ HOSP IP/OBS SF/LOW 25: CPT

## 2023-12-17 RX ORDER — SODIUM CHLORIDE 9 MG/ML
250 INJECTION, SOLUTION INTRAVENOUS ONCE
Refills: 0 | Status: COMPLETED | OUTPATIENT
Start: 2023-12-17 | End: 2023-12-17

## 2023-12-17 RX ORDER — PIPERACILLIN AND TAZOBACTAM 4; .5 G/20ML; G/20ML
3.38 INJECTION, POWDER, LYOPHILIZED, FOR SOLUTION INTRAVENOUS ONCE
Refills: 0 | Status: DISCONTINUED | OUTPATIENT
Start: 2023-12-17 | End: 2023-12-17

## 2023-12-17 RX ORDER — VALPROIC ACID (AS SODIUM SALT) 250 MG/5ML
500 SOLUTION, ORAL ORAL EVERY 8 HOURS
Refills: 0 | Status: DISCONTINUED | OUTPATIENT
Start: 2023-12-17 | End: 2023-12-17

## 2023-12-17 RX ORDER — METOPROLOL TARTRATE 50 MG
12.5 TABLET ORAL EVERY 8 HOURS
Refills: 0 | Status: DISCONTINUED | OUTPATIENT
Start: 2023-12-17 | End: 2023-12-17

## 2023-12-17 RX ORDER — VALPROIC ACID (AS SODIUM SALT) 250 MG/5ML
500 SOLUTION, ORAL ORAL EVERY 8 HOURS
Refills: 0 | Status: DISCONTINUED | OUTPATIENT
Start: 2023-12-17 | End: 2023-12-19

## 2023-12-17 RX ORDER — SENNA PLUS 8.6 MG/1
2 TABLET ORAL AT BEDTIME
Refills: 0 | Status: DISCONTINUED | OUTPATIENT
Start: 2023-12-17 | End: 2023-12-20

## 2023-12-17 RX ORDER — LEVETIRACETAM 250 MG/1
500 TABLET, FILM COATED ORAL EVERY 12 HOURS
Refills: 0 | Status: DISCONTINUED | OUTPATIENT
Start: 2023-12-17 | End: 2023-12-17

## 2023-12-17 RX ORDER — LEVETIRACETAM 250 MG/1
750 TABLET, FILM COATED ORAL EVERY 12 HOURS
Refills: 0 | Status: DISCONTINUED | OUTPATIENT
Start: 2023-12-17 | End: 2023-12-17

## 2023-12-17 RX ORDER — PIPERACILLIN AND TAZOBACTAM 4; .5 G/20ML; G/20ML
3.38 INJECTION, POWDER, LYOPHILIZED, FOR SOLUTION INTRAVENOUS EVERY 8 HOURS
Refills: 0 | Status: DISCONTINUED | OUTPATIENT
Start: 2023-12-17 | End: 2023-12-17

## 2023-12-17 RX ORDER — LEVETIRACETAM 250 MG/1
500 TABLET, FILM COATED ORAL
Refills: 0 | Status: DISCONTINUED | OUTPATIENT
Start: 2023-12-17 | End: 2023-12-18

## 2023-12-17 RX ORDER — LABETALOL HCL 100 MG
100 TABLET ORAL EVERY 8 HOURS
Refills: 0 | Status: DISCONTINUED | OUTPATIENT
Start: 2023-12-17 | End: 2023-12-17

## 2023-12-17 RX ORDER — VALPROIC ACID (AS SODIUM SALT) 250 MG/5ML
1750 SOLUTION, ORAL ORAL ONCE
Refills: 0 | Status: DISCONTINUED | OUTPATIENT
Start: 2023-12-17 | End: 2023-12-17

## 2023-12-17 RX ORDER — INSULIN LISPRO 100/ML
VIAL (ML) SUBCUTANEOUS EVERY 6 HOURS
Refills: 0 | Status: DISCONTINUED | OUTPATIENT
Start: 2023-12-17 | End: 2023-12-20

## 2023-12-17 RX ORDER — LABETALOL HCL 100 MG
100 TABLET ORAL EVERY 8 HOURS
Refills: 0 | Status: DISCONTINUED | OUTPATIENT
Start: 2023-12-17 | End: 2023-12-19

## 2023-12-17 RX ORDER — FAMOTIDINE 10 MG/ML
10 INJECTION INTRAVENOUS DAILY
Refills: 0 | Status: DISCONTINUED | OUTPATIENT
Start: 2023-12-17 | End: 2023-12-17

## 2023-12-17 RX ORDER — PIPERACILLIN AND TAZOBACTAM 4; .5 G/20ML; G/20ML
3.38 INJECTION, POWDER, LYOPHILIZED, FOR SOLUTION INTRAVENOUS EVERY 12 HOURS
Refills: 0 | Status: DISCONTINUED | OUTPATIENT
Start: 2023-12-17 | End: 2023-12-18

## 2023-12-17 RX ORDER — SODIUM CHLORIDE 9 MG/ML
1000 INJECTION, SOLUTION INTRAVENOUS
Refills: 0 | Status: DISCONTINUED | OUTPATIENT
Start: 2023-12-17 | End: 2023-12-18

## 2023-12-17 RX ORDER — POLYETHYLENE GLYCOL 3350 17 G/17G
17 POWDER, FOR SOLUTION ORAL DAILY
Refills: 0 | Status: DISCONTINUED | OUTPATIENT
Start: 2023-12-17 | End: 2023-12-18

## 2023-12-17 RX ORDER — ACETAMINOPHEN 500 MG
1000 TABLET ORAL ONCE
Refills: 0 | Status: COMPLETED | OUTPATIENT
Start: 2023-12-17 | End: 2023-12-17

## 2023-12-17 RX ORDER — VALPROIC ACID (AS SODIUM SALT) 250 MG/5ML
1750 SOLUTION, ORAL ORAL ONCE
Refills: 0 | Status: COMPLETED | OUTPATIENT
Start: 2023-12-17 | End: 2023-12-17

## 2023-12-17 RX ORDER — FAMOTIDINE 10 MG/ML
20 INJECTION INTRAVENOUS
Refills: 0 | Status: DISCONTINUED | OUTPATIENT
Start: 2023-12-17 | End: 2023-12-18

## 2023-12-17 RX ADMIN — SODIUM CHLORIDE 100 MILLILITER(S): 9 INJECTION, SOLUTION INTRAVENOUS at 00:18

## 2023-12-17 RX ADMIN — Medication 2 MILLIGRAM(S): at 09:51

## 2023-12-17 RX ADMIN — SODIUM CHLORIDE 1000 MILLILITER(S): 9 INJECTION, SOLUTION INTRAVENOUS at 10:14

## 2023-12-17 RX ADMIN — SODIUM CHLORIDE 1000 MILLILITER(S): 9 INJECTION, SOLUTION INTRAVENOUS at 13:00

## 2023-12-17 RX ADMIN — Medication 5 MILLIGRAM(S): at 00:17

## 2023-12-17 RX ADMIN — LEVETIRACETAM 500 MILLIGRAM(S): 250 TABLET, FILM COATED ORAL at 18:50

## 2023-12-17 RX ADMIN — PIPERACILLIN AND TAZOBACTAM 25 GRAM(S): 4; .5 INJECTION, POWDER, LYOPHILIZED, FOR SOLUTION INTRAVENOUS at 14:09

## 2023-12-17 RX ADMIN — Medication 500 MILLIGRAM(S): at 22:26

## 2023-12-17 RX ADMIN — Medication 400 MILLIGRAM(S): at 11:55

## 2023-12-17 RX ADMIN — Medication 1000 MILLIGRAM(S): at 13:57

## 2023-12-17 RX ADMIN — HEPARIN SODIUM 5000 UNIT(S): 5000 INJECTION INTRAVENOUS; SUBCUTANEOUS at 14:09

## 2023-12-17 RX ADMIN — TAMSULOSIN HYDROCHLORIDE 0.8 MILLIGRAM(S): 0.4 CAPSULE ORAL at 22:26

## 2023-12-17 RX ADMIN — Medication 650 MILLIGRAM(S): at 05:37

## 2023-12-17 RX ADMIN — POLYETHYLENE GLYCOL 3350 17 GRAM(S): 17 POWDER, FOR SOLUTION ORAL at 14:09

## 2023-12-17 RX ADMIN — Medication 2 MILLIGRAM(S): at 11:50

## 2023-12-17 RX ADMIN — LEVETIRACETAM 400 MILLIGRAM(S): 250 TABLET, FILM COATED ORAL at 05:37

## 2023-12-17 RX ADMIN — Medication 650 MILLIGRAM(S): at 17:32

## 2023-12-17 RX ADMIN — Medication 4: at 17:31

## 2023-12-17 RX ADMIN — ATORVASTATIN CALCIUM 20 MILLIGRAM(S): 80 TABLET, FILM COATED ORAL at 22:27

## 2023-12-17 RX ADMIN — HEPARIN SODIUM 5000 UNIT(S): 5000 INJECTION INTRAVENOUS; SUBCUTANEOUS at 05:36

## 2023-12-17 RX ADMIN — Medication 67.5 MILLIGRAM(S): at 10:49

## 2023-12-17 RX ADMIN — SODIUM CHLORIDE 120 MILLILITER(S): 9 INJECTION, SOLUTION INTRAVENOUS at 22:26

## 2023-12-17 RX ADMIN — Medication 650 MILLIGRAM(S): at 00:17

## 2023-12-17 RX ADMIN — CHLORHEXIDINE GLUCONATE 1 APPLICATION(S): 213 SOLUTION TOPICAL at 05:38

## 2023-12-17 RX ADMIN — Medication 12.5 MILLIGRAM(S): at 12:02

## 2023-12-17 RX ADMIN — SODIUM CHLORIDE 100 MILLILITER(S): 9 INJECTION, SOLUTION INTRAVENOUS at 05:37

## 2023-12-17 RX ADMIN — Medication 650 MILLIGRAM(S): at 00:35

## 2023-12-17 RX ADMIN — SENNA PLUS 2 TABLET(S): 8.6 TABLET ORAL at 22:27

## 2023-12-17 RX ADMIN — Medication 500 MILLIGRAM(S): at 14:09

## 2023-12-17 RX ADMIN — HEPARIN SODIUM 5000 UNIT(S): 5000 INJECTION INTRAVENOUS; SUBCUTANEOUS at 22:27

## 2023-12-17 RX ADMIN — Medication 2: at 00:17

## 2023-12-17 RX ADMIN — Medication 650 MILLIGRAM(S): at 05:55

## 2023-12-17 RX ADMIN — Medication 2: at 05:38

## 2023-12-17 RX ADMIN — Medication 2: at 12:32

## 2023-12-17 RX ADMIN — FAMOTIDINE 20 MILLIGRAM(S): 10 INJECTION INTRAVENOUS at 14:09

## 2023-12-17 RX ADMIN — Medication 100 MILLIGRAM(S): at 18:32

## 2023-12-17 RX ADMIN — Medication 5 MILLIGRAM(S): at 05:36

## 2023-12-17 RX ADMIN — Medication 650 MILLIGRAM(S): at 18:31

## 2023-12-17 NOTE — PROGRESS NOTE ADULT - SUBJECTIVE AND OBJECTIVE BOX
JACQUELIN CAI   [id number]   [birth year]  78yM    Admit Date: 12-16-23  Indication for SDU/SICU: Q1 neuro checks        ============================  HPI   Home (12-15-23 @ 18:05)  77-year-old male Cantonese speaking presents with prior history of HTN, HLD, Diabetes, BPH, prior CVA on aspirin and Plavix who states he had a mechanical fall 3 days ago while getting out of the car fell backwards hit his head.  Afterwards he was able to ambulate.  But for the past 1 day family was unable to ambulate him.  He states that his lower extremities are painful to movement and weak.  Family denies any LOC. Per daughter patient he often forgets things and cannot recall year or place. Patient following commands bedside.   Initial CT head remarkable for 3 Acute subdural hemorrhages: 0.7 cm along the left hemisphere, 0.3 cm along the right frontal area, and 1.1 cm along the left falx. No midline shift. Repeat CT head was obtained prior to 6 hour tacos because patient was appearing lethargic vs sundowning. CT head #2 with stable findings. CT c-spine and chest/abd/pelvis unremarkable for acute pathology.     SICU Consult for q1 neuro checks      Pertinent Imaging  CTH #1: Acute subdural hemorrhages which measure 0.7 cm along the left hemisphere, 0.3 cm along the right frontal area, and 1.1 cm along the left falx. No midline shift.  CT head #2: Essentially unchanged acute subdural hemorrhages measuring up to 0.7 cm along the left hemisphere, 0.4 cm along the right frontal extra-axial space, and 1.1 cm along the left falx. No midline shift.  CT c-spine: No current acute cervical spine injury.  CT chest: No evidence of an acute traumatic solid organ or osseous injury.  CT abd/pelvis: No evidence of an acute traumatic solid organ or osseous injury. Question of focal mural thickening of the anterior urinary bladder wall with associated calcifications vs stones. Nonemergent follow-up CT urogram is recommended.       24 Hour Events  12/16  Night  -pt aaox1, arousable to verbal stimuation, PERRLA bilaterally, strength 4/5 UE and 2/5 LE.   -TF started  -CTH #3 stable  -GOC tomorrow for PEG placement   -Hematuria, martinez in place, no leakage noted  - CXR AM  -Na 150 and Cl 116 on BMP switched IVF to 0.5NS. f/u AM BMP  - free water deficit 1.1L --> started free water flushes 150cc q8 hours (f/u AM BMP and will adjust as needed to maintain serum sodium 140-150)    12/16   Day  -repeat bedside swallow today- failed   -one non-sustained episode of Vtach in AM, EP consult: No arrhythmias on tele only artifact. No further work up needed. Recommend ILR prior to discharge if patient/family agreeable  -metoprolol increased to 5mg Q6H  -bladder scan STAT 386, SC 500cc, next TOV 16:00  -GOC discussion when family comes  -cards consult placed per EP reccomendation  -Interpretor ID [id number] Annmarie: On exam GCS 12, opens eyes to voice, following commands, more lethargic, febrile to 100.7> IV tylenol, WBC 13.5 from 12: start cefepime for aspiration PNA   -CThead #3 for increased lethargy: Stable subdural hemorrhages compared the same day prior CT head. No midline shift.  -Eye twitching noted,per Dr. Quiles patient may be having focal awake seizure, recommends valproic acid load with 30mg/kg followed by 500 q 8 daily. Obtain video EEG. Obtain ammonia level while on valproic acid. Per Dr. Degroot, hold off on valproate until EEG obtained. Keppra increased to 750 BID  -martinez and right basilic midline placed   -Per NSGY, ok to start DVT ppx- HSQ started due to Cr. Continue q1 neuro checks per NSGY, will f/u about ASA   -GOC with family: full code, would like to continue current medical management   -UA negative      [X] A ten-point review of systems was otherwise negative except as noted above.  [  ] Due to altered mental status/intubation, subjective information was not attained from the patient. History was obtained, to the extent possible, from review of the chart and collateral sources of information.   JACQUELIN CAI   [id number]   [birth year]  78yM    Admit Date: 12-16-23  Indication for SDU/SICU: Q1 neuro checks        ============================  HPI   Home (12-15-23 @ 18:05)  77-year-old male Cantonese speaking presents with prior history of HTN, HLD, Diabetes, BPH, prior CVA on aspirin and Plavix who states he had a mechanical fall 3 days ago while getting out of the car fell backwards hit his head.  Afterwards he was able to ambulate.  But for the past 1 day family was unable to ambulate him.  He states that his lower extremities are painful to movement and weak.  Family denies any LOC. Per daughter patient he often forgets things and cannot recall year or place. Patient following commands bedside.   Initial CT head remarkable for 3 Acute subdural hemorrhages: 0.7 cm along the left hemisphere, 0.3 cm along the right frontal area, and 1.1 cm along the left falx. No midline shift. Repeat CT head was obtained prior to 6 hour tacos because patient was appearing lethargic vs sundowning. CT head #2 with stable findings. CT c-spine and chest/abd/pelvis unremarkable for acute pathology.     SICU Consult for q1 neuro checks      Pertinent Imaging  CTH #1: Acute subdural hemorrhages which measure 0.7 cm along the left hemisphere, 0.3 cm along the right frontal area, and 1.1 cm along the left falx. No midline shift.  CT head #2: Essentially unchanged acute subdural hemorrhages measuring up to 0.7 cm along the left hemisphere, 0.4 cm along the right frontal extra-axial space, and 1.1 cm along the left falx. No midline shift.  CT c-spine: No current acute cervical spine injury.  CT chest: No evidence of an acute traumatic solid organ or osseous injury.  CT abd/pelvis: No evidence of an acute traumatic solid organ or osseous injury. Question of focal mural thickening of the anterior urinary bladder wall with associated calcifications vs stones. Nonemergent follow-up CT urogram is recommended.       24 Hour Events  12/16  Night  -pt aaox1, arousable to verbal stimuation, PERRLA bilaterally, strength 4/5 UE and 2/5 LE.   -TF started  -CTH #3 stable  -GOC tomorrow for PEG placement   -Hematuria, martinez in place, no leakage noted  - CXR AM  -Na 150 and Cl 116 on BMP switched IVF to 0.5NS. f/u AM BMP  - free water deficit 1.1L --> started free water flushes 150cc q8 hours (f/u AM BMP and will adjust as needed to maintain serum sodium 140-150)    12/16   Day  -repeat bedside swallow today- failed   -one non-sustained episode of Vtach in AM, EP consult: No arrhythmias on tele only artifact. No further work up needed. Recommend ILR prior to discharge if patient/family agreeable  -metoprolol increased to 5mg Q6H  -bladder scan STAT 386, SC 500cc, next TOV 16:00  -GOC discussion when family comes  -cards consult placed per EP reccomendation  -Interpretor ID [id number] Annmarie: On exam GCS 12, opens eyes to voice, following commands, more lethargic, febrile to 100.7> IV tylenol, WBC 13.5 from 12: start cefepime for aspiration PNA   -CThead #3 for increased lethargy: Stable subdural hemorrhages compared the same day prior CT head. No midline shift.  -Eye twitching noted,per Dr. Quiles patient may be having focal awake seizure, recommends valproic acid load with 30mg/kg followed by 500 q 8 daily. Obtain video EEG. Obtain ammonia level while on valproic acid. Per Dr. Degroot, hold off on valproate until EEG obtained. Keppra increased to 750 BID  -martinez and right basilic midline placed   -Per NSGY, ok to start DVT ppx- HSQ started due to Cr. Continue q1 neuro checks per NSGY, will f/u about ASA   -GOC with family: full code, would like to continue current medical management   -UA negative      [X] A ten-point review of systems was otherwise negative except as noted above.  [  ] Due to altered mental status/intubation, subjective information was not attained from the patient. History was obtained, to the extent possible, from review of the chart and collateral sources of information.   JACQUELIN CAI   [id number]   [birth year]  78yM    Admit Date: 12-16-23  Indication for SDU/SICU: Q1 neuro checks        ============================  HPI   Home (12-15-23 @ 18:05)  77-year-old male Cantonese speaking presents with prior history of HTN, HLD, Diabetes, BPH, prior CVA on aspirin and Plavix who states he had a mechanical fall 3 days ago while getting out of the car fell backwards hit his head.  Afterwards he was able to ambulate.  But for the past 1 day family was unable to ambulate him.  He states that his lower extremities are painful to movement and weak.  Family denies any LOC. Per daughter patient he often forgets things and cannot recall year or place. Patient following commands bedside.   Initial CT head remarkable for 3 Acute subdural hemorrhages: 0.7 cm along the left hemisphere, 0.3 cm along the right frontal area, and 1.1 cm along the left falx. No midline shift. Repeat CT head was obtained prior to 6 hour tacos because patient was appearing lethargic vs sundowning. CT head #2 with stable findings. CT c-spine and chest/abd/pelvis unremarkable for acute pathology.     SICU Consult for q1 neuro checks      Pertinent Imaging  CTH #1: Acute subdural hemorrhages which measure 0.7 cm along the left hemisphere, 0.3 cm along the right frontal area, and 1.1 cm along the left falx. No midline shift.  CT head #2: Essentially unchanged acute subdural hemorrhages measuring up to 0.7 cm along the left hemisphere, 0.4 cm along the right frontal extra-axial space, and 1.1 cm along the left falx. No midline shift.  CT c-spine: No current acute cervical spine injury.  CT chest: No evidence of an acute traumatic solid organ or osseous injury.  CT abd/pelvis: No evidence of an acute traumatic solid organ or osseous injury. Question of focal mural thickening of the anterior urinary bladder wall with associated calcifications vs stones. Nonemergent follow-up CT urogram is recommended.       24 Hour Events  12/16  Night  -pt aaox1, arousable to verbal stimuation, PERRLA bilaterally, strength 4/5 UE and 2/5 LE.   -TF started  -CTH #3 stable  -GOC tomorrow for PEG placement   -Hematuria, martinez in place, no leakage noted  - CXR AM  -Na 150 and Cl 116 on BMP switched IVF to 0.5NS. f/u AM BMP  - free water deficit 1.1L --> started free water flushes 150cc q8 hours (f/u AM BMP and will adjust as needed to maintain serum sodium 140-150)    12/16   Day  -repeat bedside swallow today- failed   -one non-sustained episode of Vtach in AM, EP consult: No arrhythmias on tele only artifact. No further work up needed. Recommend ILR prior to discharge if patient/family agreeable  -metoprolol increased to 5mg Q6H  -bladder scan STAT 386, SC 500cc, next TOV 16:00  -GOC discussion when family comes  -cards consult placed per EP reccomendation  -Interpretor ID [id number] Annmarie: On exam GCS 12, opens eyes to voice, following commands, more lethargic, febrile to 100.7> IV tylenol, WBC 13.5 from 12: start cefepime for aspiration PNA   -CThead #3 for increased lethargy: Stable subdural hemorrhages compared the same day prior CT head. No midline shift.  -Eye twitching noted,per Dr. Quiles patient may be having focal awake seizure, recommends valproic acid load with 30mg/kg followed by 500 q 8 daily. Obtain video EEG. Obtain ammonia level while on valproic acid. Per Dr. Degroot, hold off on valproate until EEG obtained. Keppra increased to 750 BID  -martinez and right basilic midline placed   -Per NSGY, ok to start DVT ppx- HSQ started due to Cr. Continue q1 neuro checks per NSGY, will f/u about ASA   -GOC with family: full code, would like to continue current medical management   -UA negative      [X] A ten-point review of systems was otherwise negative except as noted above.  [  ] Due to altered mental status/intubation, subjective information was not attained from the patient. History was obtained, to the extent possible, from review of the chart and collateral sources of information.    Daily Height in cm: 156 (16 Dec 2023 16:00)    Daily   Diet, NPO with Tube Feed:   Tube Feeding Modality: Nasogastric  Glucerna 1.2 Tonio  Total Volume for 24 Hours (mL): 480  Continuous  Starting Tube Feed Rate mL per Hour: 10  Increase Tube Feed Rate by (mL): 10     Every 4 hours  Until Goal Tube Feed Rate (mL per Hour): 20  Tube Feed Duration (in Hours): 24  Tube Feed Start Time: 18:00  Free Water Flush  Bolus   Total Volume per Flush (mL): 150   Frequency: Every 8 Hours (12-16-23 @ 22:34)    CURRENT MEDS:  Neurologic Medications  acetaminophen     Tablet .. 650 milliGRAM(s) Oral every 6 hours  acetaminophen   IVPB .. 1000 milliGRAM(s) IV Intermittent once PRN Mild Pain (1 - 3)  levETIRAcetam  IVPB 750 milliGRAM(s) IV Intermittent every 12 hours    Respiratory Medications    Cardiovascular Medications  metoprolol tartrate Injectable 5 milliGRAM(s) IV Push every 6 hours    Gastrointestinal Medications  famotidine Injectable 10 milliGRAM(s) IV Push daily  sodium bicarbonate 650 milliGRAM(s) Oral two times a day  sodium chloride 0.45%. 1000 milliLiter(s) IV Continuous <Continuous>    Genitourinary Medications  tamsulosin 0.8 milliGRAM(s) Oral at bedtime    Hematologic/Oncologic Medications  heparin   Injectable 5000 Unit(s) SubCutaneous every 8 hours    Antimicrobial/Immunologic Medications  cefepime   IVPB      cefepime   IVPB 2000 milliGRAM(s) IV Intermittent every 24 hours    Endocrine/Metabolic Medications  atorvastatin 20 milliGRAM(s) Oral at bedtime  insulin lispro (ADMELOG) corrective regimen sliding scale   SubCutaneous every 6 hours    Topical/Other Medications  chlorhexidine 2% Cloths 1 Application(s) Topical <User Schedule>    ICU Vital Signs Last 24 Hrs  T(C): 36.9 (17 Dec 2023 08:00), Max: 38.2 (16 Dec 2023 12:00)  T(F): 98.5 (17 Dec 2023 08:00), Max: 100.7 (16 Dec 2023 12:00)  HR: 78 (17 Dec 2023 08:00) (78 - 104)  BP: 133/64 (17 Dec 2023 08:00) (111/53 - 170/73)  BP(mean): 92 (17 Dec 2023 08:00) (76 - 107)  ABP: --  ABP(mean): --  RR: 21 (17 Dec 2023 08:00) (14 - 24)  SpO2: 98% (17 Dec 2023 08:00) (94% - 98%)    O2 Parameters below as of 17 Dec 2023 08:00  Patient On (Oxygen Delivery Method): nasal cannula  O2 Flow (L/min): 2            I&O's Summary    16 Dec 2023 07:01  -  17 Dec 2023 07:00  --------------------------------------------------------  IN: 2880 mL / OUT: 2000 mL / NET: 880 mL    17 Dec 2023 07:01  -  17 Dec 2023 08:20  --------------------------------------------------------  IN: 200 mL / OUT: 200 mL / NET: 0 mL      I&O's Detail    16 Dec 2023 07:01  -  17 Dec 2023 07:00  --------------------------------------------------------  IN:    Enteral Tube Flush: 150 mL    Glucerna: 180 mL    IV PiggyBack: 250 mL    sodium chloride 0.45%: 800 mL    sodium chloride 0.9%: 1500 mL  Total IN: 2880 mL    OUT:    Indwelling Catheter - Urethral (mL): 1500 mL    Voided (mL): 500 mL  Total OUT: 2000 mL    Total NET: 880 mL      17 Dec 2023 07:01  -  17 Dec 2023 08:20  --------------------------------------------------------  IN:    sodium chloride 0.45%: 200 mL  Total IN: 200 mL    OUT:    Indwelling Catheter - Urethral (mL): 200 mL  Total OUT: 200 mL    Total NET: 0 mL              acetaminophen     Tablet .. 650 milliGRAM(s) Oral every 6 hours  acetaminophen   IVPB .. 1000 milliGRAM(s) IV Intermittent once PRN  atorvastatin 20 milliGRAM(s) Oral at bedtime  cefepime   IVPB      cefepime   IVPB 2000 milliGRAM(s) IV Intermittent every 24 hours  chlorhexidine 2% Cloths 1 Application(s) Topical <User Schedule>  famotidine Injectable 10 milliGRAM(s) IV Push daily  heparin   Injectable 5000 Unit(s) SubCutaneous every 8 hours  insulin lispro (ADMELOG) corrective regimen sliding scale   SubCutaneous every 6 hours  levETIRAcetam  IVPB 750 milliGRAM(s) IV Intermittent every 12 hours  metoprolol tartrate Injectable 5 milliGRAM(s) IV Push every 6 hours  sodium bicarbonate 650 milliGRAM(s) Oral two times a day  sodium chloride 0.45%. 1000 milliLiter(s) IV Continuous <Continuous>  tamsulosin 0.8 milliGRAM(s) Oral at bedtime                  PHYSICAL EXAM:   ----------------------------------------------------------------------------------------------------------  NEURO:   GCS 12, squeezes hands b/l, MAEx4, arousable to verbal and painful stimuli, pupils reactive to light bilaterally. +EEG in place.     RESPIRATORY:  Normal expansion/effort. 2 L NC saturating well     CARDIOVASCULAR:  S1/S2.  RRR     GASTROINTESTINAL:  Abdomen soft, non-tender, non-distended. Dobhoff in place.     MUSCULOSKELETAL:  Extremities warm, pink, well-perfused. Palpable DP pulses b/l. Strength 4/5 UE, 2/5 LE     DERM:  No skin breakdown     :   Martinez catheter in place            JACQUELIN CAI   [id number]   [birth year]  78yM    Admit Date: 12-16-23  Indication for SDU/SICU: Q1 neuro checks        ============================  HPI   Home (12-15-23 @ 18:05)  77-year-old male Cantonese speaking presents with prior history of HTN, HLD, Diabetes, BPH, prior CVA on aspirin and Plavix who states he had a mechanical fall 3 days ago while getting out of the car fell backwards hit his head.  Afterwards he was able to ambulate.  But for the past 1 day family was unable to ambulate him.  He states that his lower extremities are painful to movement and weak.  Family denies any LOC. Per daughter patient he often forgets things and cannot recall year or place. Patient following commands bedside.   Initial CT head remarkable for 3 Acute subdural hemorrhages: 0.7 cm along the left hemisphere, 0.3 cm along the right frontal area, and 1.1 cm along the left falx. No midline shift. Repeat CT head was obtained prior to 6 hour tacos because patient was appearing lethargic vs sundowning. CT head #2 with stable findings. CT c-spine and chest/abd/pelvis unremarkable for acute pathology.     SICU Consult for q1 neuro checks      Pertinent Imaging  CTH #1: Acute subdural hemorrhages which measure 0.7 cm along the left hemisphere, 0.3 cm along the right frontal area, and 1.1 cm along the left falx. No midline shift.  CT head #2: Essentially unchanged acute subdural hemorrhages measuring up to 0.7 cm along the left hemisphere, 0.4 cm along the right frontal extra-axial space, and 1.1 cm along the left falx. No midline shift.  CT c-spine: No current acute cervical spine injury.  CT chest: No evidence of an acute traumatic solid organ or osseous injury.  CT abd/pelvis: No evidence of an acute traumatic solid organ or osseous injury. Question of focal mural thickening of the anterior urinary bladder wall with associated calcifications vs stones. Nonemergent follow-up CT urogram is recommended.       24 Hour Events  12/16  Night  -pt aaox1, arousable to verbal stimuation, PERRLA bilaterally, strength 4/5 UE and 2/5 LE.   -TF started  -CTH #3 stable  -GOC tomorrow for PEG placement   -Hematuria, martinez in place, no leakage noted  - CXR AM  -Na 150 and Cl 116 on BMP switched IVF to 0.5NS. f/u AM BMP  - free water deficit 1.1L --> started free water flushes 150cc q8 hours (f/u AM BMP and will adjust as needed to maintain serum sodium 140-150)    12/16   Day  -repeat bedside swallow today- failed   -one non-sustained episode of Vtach in AM, EP consult: No arrhythmias on tele only artifact. No further work up needed. Recommend ILR prior to discharge if patient/family agreeable  -metoprolol increased to 5mg Q6H  -bladder scan STAT 386, SC 500cc, next TOV 16:00  -GOC discussion when family comes  -cards consult placed per EP reccomendation  -Interpretor ID [id number] Annmarie: On exam GCS 12, opens eyes to voice, following commands, more lethargic, febrile to 100.7> IV tylenol, WBC 13.5 from 12: start cefepime for aspiration PNA   -CThead #3 for increased lethargy: Stable subdural hemorrhages compared the same day prior CT head. No midline shift.  -Eye twitching noted,per Dr. Quiles patient may be having focal awake seizure, recommends valproic acid load with 30mg/kg followed by 500 q 8 daily. Obtain video EEG. Obtain ammonia level while on valproic acid. Per Dr. Degroot, hold off on valproate until EEG obtained. Keppra increased to 750 BID  -martinez and right basilic midline placed   -Per NSGY, ok to start DVT ppx- HSQ started due to Cr. Continue q1 neuro checks per NSGY, will f/u about ASA   -GOC with family: full code, would like to continue current medical management   -UA negative      [X] A ten-point review of systems was otherwise negative except as noted above.  [  ] Due to altered mental status/intubation, subjective information was not attained from the patient. History was obtained, to the extent possible, from review of the chart and collateral sources of information.    Daily Height in cm: 156 (16 Dec 2023 16:00)    Daily   Diet, NPO with Tube Feed:   Tube Feeding Modality: Nasogastric  Glucerna 1.2 Tonio  Total Volume for 24 Hours (mL): 480  Continuous  Starting Tube Feed Rate mL per Hour: 10  Increase Tube Feed Rate by (mL): 10     Every 4 hours  Until Goal Tube Feed Rate (mL per Hour): 20  Tube Feed Duration (in Hours): 24  Tube Feed Start Time: 18:00  Free Water Flush  Bolus   Total Volume per Flush (mL): 150   Frequency: Every 8 Hours (12-16-23 @ 22:34)    CURRENT MEDS:  Neurologic Medications  acetaminophen     Tablet .. 650 milliGRAM(s) Oral every 6 hours  acetaminophen   IVPB .. 1000 milliGRAM(s) IV Intermittent once PRN Mild Pain (1 - 3)  levETIRAcetam  IVPB 750 milliGRAM(s) IV Intermittent every 12 hours    Respiratory Medications    Cardiovascular Medications  metoprolol tartrate Injectable 5 milliGRAM(s) IV Push every 6 hours    Gastrointestinal Medications  famotidine Injectable 10 milliGRAM(s) IV Push daily  sodium bicarbonate 650 milliGRAM(s) Oral two times a day  sodium chloride 0.45%. 1000 milliLiter(s) IV Continuous <Continuous>    Genitourinary Medications  tamsulosin 0.8 milliGRAM(s) Oral at bedtime    Hematologic/Oncologic Medications  heparin   Injectable 5000 Unit(s) SubCutaneous every 8 hours    Antimicrobial/Immunologic Medications  cefepime   IVPB      cefepime   IVPB 2000 milliGRAM(s) IV Intermittent every 24 hours    Endocrine/Metabolic Medications  atorvastatin 20 milliGRAM(s) Oral at bedtime  insulin lispro (ADMELOG) corrective regimen sliding scale   SubCutaneous every 6 hours    Topical/Other Medications  chlorhexidine 2% Cloths 1 Application(s) Topical <User Schedule>    ICU Vital Signs Last 24 Hrs  T(C): 36.9 (17 Dec 2023 08:00), Max: 38.2 (16 Dec 2023 12:00)  T(F): 98.5 (17 Dec 2023 08:00), Max: 100.7 (16 Dec 2023 12:00)  HR: 78 (17 Dec 2023 08:00) (78 - 104)  BP: 133/64 (17 Dec 2023 08:00) (111/53 - 170/73)  BP(mean): 92 (17 Dec 2023 08:00) (76 - 107)  ABP: --  ABP(mean): --  RR: 21 (17 Dec 2023 08:00) (14 - 24)  SpO2: 98% (17 Dec 2023 08:00) (94% - 98%)    O2 Parameters below as of 17 Dec 2023 08:00  Patient On (Oxygen Delivery Method): nasal cannula  O2 Flow (L/min): 2            I&O's Summary    16 Dec 2023 07:01  -  17 Dec 2023 07:00  --------------------------------------------------------  IN: 2880 mL / OUT: 2000 mL / NET: 880 mL    17 Dec 2023 07:01  -  17 Dec 2023 08:20  --------------------------------------------------------  IN: 200 mL / OUT: 200 mL / NET: 0 mL      I&O's Detail    16 Dec 2023 07:01  -  17 Dec 2023 07:00  --------------------------------------------------------  IN:    Enteral Tube Flush: 150 mL    Glucerna: 180 mL    IV PiggyBack: 250 mL    sodium chloride 0.45%: 800 mL    sodium chloride 0.9%: 1500 mL  Total IN: 2880 mL    OUT:    Indwelling Catheter - Urethral (mL): 1500 mL    Voided (mL): 500 mL  Total OUT: 2000 mL    Total NET: 880 mL      17 Dec 2023 07:01  -  17 Dec 2023 08:20  --------------------------------------------------------  IN:    sodium chloride 0.45%: 200 mL  Total IN: 200 mL    OUT:    Indwelling Catheter - Urethral (mL): 200 mL  Total OUT: 200 mL    Total NET: 0 mL              acetaminophen     Tablet .. 650 milliGRAM(s) Oral every 6 hours  acetaminophen   IVPB .. 1000 milliGRAM(s) IV Intermittent once PRN  atorvastatin 20 milliGRAM(s) Oral at bedtime  cefepime   IVPB      cefepime   IVPB 2000 milliGRAM(s) IV Intermittent every 24 hours  chlorhexidine 2% Cloths 1 Application(s) Topical <User Schedule>  famotidine Injectable 10 milliGRAM(s) IV Push daily  heparin   Injectable 5000 Unit(s) SubCutaneous every 8 hours  insulin lispro (ADMELOG) corrective regimen sliding scale   SubCutaneous every 6 hours  levETIRAcetam  IVPB 750 milliGRAM(s) IV Intermittent every 12 hours  metoprolol tartrate Injectable 5 milliGRAM(s) IV Push every 6 hours  sodium bicarbonate 650 milliGRAM(s) Oral two times a day  sodium chloride 0.45%. 1000 milliLiter(s) IV Continuous <Continuous>  tamsulosin 0.8 milliGRAM(s) Oral at bedtime                  PHYSICAL EXAM:   ----------------------------------------------------------------------------------------------------------  NEURO:   GCS 12, squeezes hands b/l, MAEx4, arousable to verbal and painful stimuli, pupils reactive to light bilaterally. +EEG in place.     RESPIRATORY:  Normal expansion/effort. 2 L NC saturating well     CARDIOVASCULAR:  S1/S2.  RRR     GASTROINTESTINAL:  Abdomen soft, non-tender, non-distended. Dobhoff in place.     MUSCULOSKELETAL:  Extremities warm, pink, well-perfused. Palpable DP pulses b/l. Strength 4/5 UE, 2/5 LE     DERM:  No skin breakdown     :   Martinez catheter in place

## 2023-12-17 NOTE — PROGRESS NOTE ADULT - SUBJECTIVE AND OBJECTIVE BOX
GENERAL SURGERY PROGRESS NOTE     JACQUELIN CAI  [address] [place] [address] day :1d    POD:  Procedure: Insertion of midline catheter      Surgical Attending: Emergency Doctor Unknown  Overnight events:    24 Hour Events  12/16  Night  -pt aaox1, arousable to verbal stimuation, PERRLA bilaterally, strength 4/5 UE and 2/5 LE.   -TF started  -CTH #3 stable  -GOC tomorrow for PEG placement   -Hematuria, martinez in place, no leakage noted  - CXR AM  -Na 150 and Cl 116 on BMP switched IVF to 0.5NS. f/u AM BMP  - free water deficit 1.1L --> started free water flushes 150cc q8 hours (f/u AM BMP and will adjust as needed to maintain serum sodium 140-150)    12/16   Day  -repeat bedside swallow today- failed   -one non-sustained episode of Vtach in AM, EP consult: No arrhythmias on tele only artifact. No further work up needed. Recommend ILR prior to discharge if patient/family agreeable  -metoprolol increased to 5mg Q6H  -bladder scan STAT 386, SC 500cc, next TOV 16:00  -GOC discussion when family comes  -cards consult placed per EP reccomendation  -Interpretor ID [id number] Annmarie: On exam GCS 12, opens eyes to voice, following commands, more lethargic, febrile to 100.7> IV tylenol, WBC 13.5 from 12: start cefepime for aspiration PNA   -CThead #3 for increased lethargy: Stable subdural hemorrhages compared the same day prior CT head. No midline shift.  -Eye twitching noted,per Dr. Quiles patient may be having focal awake seizure, recommends valproic acid load with 30mg/kg followed by 500 q 8 daily. Obtain video EEG. Obtain ammonia level while on valproic acid. Per Dr. Degroot, hold off on valproate until EEG obtained. Keppra increased to 750 BID  -martinez and right basilic midline placed   -Per NSGY, ok to start DVT ppx- HSQ started due to Cr. Continue q1 neuro checks per NSGY, will f/u about ASA   -GOC with family: full code, would like to continue current medical management   -UA negative          T(F): 98.8 (12-17-23 @ 05:00), Max: 100.7 (12-16-23 @ 12:00)  HR: 88 (12-17-23 @ 05:00) (82 - 105)  BP: 131/61 (12-17-23 @ 05:00) (111/53 - 170/73)  ABP: --  ABP(mean): --  RR: 16 (12-17-23 @ 05:00) (16 - 24)  SpO2: 98% (12-17-23 @ 05:00) (93% - 98%)    IN'S / OUT's:    12-15-23 @ 07:01  -  12-16-23 @ 07:00  --------------------------------------------------------  IN:    sodium chloride 0.9%: 200 mL  Total IN: 200 mL    OUT:  Total OUT: 0 mL    Total NET: 200 mL      12-16-23 @ 07:01  -  12-17-23 @ 06:16  --------------------------------------------------------  IN:    Enteral Tube Flush: 150 mL    Glucerna: 180 mL    IV PiggyBack: 250 mL    sodium chloride 0.45%: 800 mL    sodium chloride 0.9%: 1500 mL  Total IN: 2880 mL    OUT:    Indwelling Catheter - Urethral (mL): 1350 mL    Voided (mL): 500 mL  Total OUT: 1850 mL    Total NET: 1030 mL          PHYSICAL EXAM:  GENERAL: NAD, well-appearing  CHEST/LUNG: Clear to auscultation bilaterally  HEART: Regular rate and rhythm  ABDOMEN: Soft, Nontender, Nondistended;   EXTREMITIES:  No clubbing, cyanosis, or edema      LABS  Labs:  CAPILLARY BLOOD GLUCOSE      POCT Blood Glucose.: 191 mg/dL (17 Dec 2023 05:26)  POCT Blood Glucose.: 162 mg/dL (17 Dec 2023 00:13)  POCT Blood Glucose.: 161 mg/dL (16 Dec 2023 17:17)  POCT Blood Glucose.: 151 mg/dL (16 Dec 2023 11:50)  POCT Blood Glucose.: 147 mg/dL (16 Dec 2023 06:48)                          10.0   12.83 )-----------( 254      ( 16 Dec 2023 21:27 )             30.1       Auto Neutrophil %: 82.5 % (12-16-23 @ 21:27)  Auto Immature Granulocyte %: 0.9 % (12-16-23 @ 21:27)  Auto Neutrophil %: 83.0 % (12-16-23 @ 11:29)  Auto Immature Granulocyte %: 0.5 % (12-16-23 @ 11:29)    12-16    150<H>  |  116<H>  |  40<H>  ----------------------------<  142<H>  3.9   |  21  |  2.6<H>      Calcium: 8.2 mg/dL (12-16-23 @ 21:27)      LFTs:             6.8  | 0.9  | 19       ------------------[49      ( 15 Dec 2023 20:29 )  4.4  | x    | 17          Lipase:x      Amylase:x             Coags:     11.20  ----< 0.98    ( 15 Dec 2023 20:29 )     36.4                Urinalysis Basic - ( 16 Dec 2023 21:27 )    Color: x / Appearance: x / SG: x / pH: x  Gluc: 142 mg/dL / Ketone: x  / Bili: x / Urobili: x   Blood: x / Protein: x / Nitrite: x   Leuk Esterase: x / RBC: x / WBC x   Sq Epi: x / Non Sq Epi: x / Bacteria: x            RADIOLOGY & ADDITIONAL TESTS:      A/P:  JACQUELIN CAI is a 78M w/ PMH/PSH as above who presents as a trauma consult s/p mechanical fall 3 days ago, +HT -LOC -AC,     Plan:  - f/u TTE   - EP Recommend ILR prior to discharge if patient/family agreeable. No arrhythmias on tele only artifact  - follow up CT head stable, no neurosurgical intervention indicated at this time  - f/u AM CXR  - Q1 Neurochecks on EEG monitoring   - GOC for PEG placement   - care per sicu     Disposition:  ***    Above plan discussed with Attending Surgeon Dr. Degroot  , patient, patient family, and Primary team      TAP (Trauma, Acute care, Pediatrics) Spectra [phone]   GENERAL SURGERY PROGRESS NOTE     JACQUELIN CAI  [address] [place] [address] day :1d    POD:  Procedure: Insertion of midline catheter      Surgical Attending: Emergency Doctor Unknown  Overnight events:    24 Hour Events  12/16  Night  -pt aaox1, arousable to verbal stimuation, PERRLA bilaterally, strength 4/5 UE and 2/5 LE.   -TF started  -CTH #3 stable  -GOC tomorrow for PEG placement   -Hematuria, martinez in place, no leakage noted  - CXR AM  -Na 150 and Cl 116 on BMP switched IVF to 0.5NS. f/u AM BMP  - free water deficit 1.1L --> started free water flushes 150cc q8 hours (f/u AM BMP and will adjust as needed to maintain serum sodium 140-150)    12/16   Day  -repeat bedside swallow today- failed   -one non-sustained episode of Vtach in AM, EP consult: No arrhythmias on tele only artifact. No further work up needed. Recommend ILR prior to discharge if patient/family agreeable  -metoprolol increased to 5mg Q6H  -bladder scan STAT 386, SC 500cc, next TOV 16:00  -GOC discussion when family comes  -cards consult placed per EP reccomendation  -Interpretor ID [id number] Annmarie: On exam GCS 12, opens eyes to voice, following commands, more lethargic, febrile to 100.7> IV tylenol, WBC 13.5 from 12: start cefepime for aspiration PNA   -CThead #3 for increased lethargy: Stable subdural hemorrhages compared the same day prior CT head. No midline shift.  -Eye twitching noted,per Dr. Quiles patient may be having focal awake seizure, recommends valproic acid load with 30mg/kg followed by 500 q 8 daily. Obtain video EEG. Obtain ammonia level while on valproic acid. Per Dr. Degroot, hold off on valproate until EEG obtained. Keppra increased to 750 BID  -martinez and right basilic midline placed   -Per NSGY, ok to start DVT ppx- HSQ started due to Cr. Continue q1 neuro checks per NSGY, will f/u about ASA   -GOC with family: full code, would like to continue current medical management   -UA negative          T(F): 98.8 (12-17-23 @ 05:00), Max: 100.7 (12-16-23 @ 12:00)  HR: 88 (12-17-23 @ 05:00) (82 - 105)  BP: 131/61 (12-17-23 @ 05:00) (111/53 - 170/73)  ABP: --  ABP(mean): --  RR: 16 (12-17-23 @ 05:00) (16 - 24)  SpO2: 98% (12-17-23 @ 05:00) (93% - 98%)    IN'S / OUT's:    12-15-23 @ 07:01  -  12-16-23 @ 07:00  --------------------------------------------------------  IN:    sodium chloride 0.9%: 200 mL  Total IN: 200 mL    OUT:  Total OUT: 0 mL    Total NET: 200 mL      12-16-23 @ 07:01  -  12-17-23 @ 06:16  --------------------------------------------------------  IN:    Enteral Tube Flush: 150 mL    Glucerna: 180 mL    IV PiggyBack: 250 mL    sodium chloride 0.45%: 800 mL    sodium chloride 0.9%: 1500 mL  Total IN: 2880 mL    OUT:    Indwelling Catheter - Urethral (mL): 1350 mL    Voided (mL): 500 mL  Total OUT: 1850 mL    Total NET: 1030 mL          PHYSICAL EXAM:  GENERAL: NAD, well-appearing  CHEST/LUNG: Clear to auscultation bilaterally  HEART: Regular rate and rhythm  ABDOMEN: Soft, Nontender, Nondistended;   EXTREMITIES:  No clubbing, cyanosis, or edema      LABS  Labs:  CAPILLARY BLOOD GLUCOSE      POCT Blood Glucose.: 191 mg/dL (17 Dec 2023 05:26)  POCT Blood Glucose.: 162 mg/dL (17 Dec 2023 00:13)  POCT Blood Glucose.: 161 mg/dL (16 Dec 2023 17:17)  POCT Blood Glucose.: 151 mg/dL (16 Dec 2023 11:50)  POCT Blood Glucose.: 147 mg/dL (16 Dec 2023 06:48)                          10.0   12.83 )-----------( 254      ( 16 Dec 2023 21:27 )             30.1       Auto Neutrophil %: 82.5 % (12-16-23 @ 21:27)  Auto Immature Granulocyte %: 0.9 % (12-16-23 @ 21:27)  Auto Neutrophil %: 83.0 % (12-16-23 @ 11:29)  Auto Immature Granulocyte %: 0.5 % (12-16-23 @ 11:29)    12-16    150<H>  |  116<H>  |  40<H>  ----------------------------<  142<H>  3.9   |  21  |  2.6<H>      Calcium: 8.2 mg/dL (12-16-23 @ 21:27)      LFTs:             6.8  | 0.9  | 19       ------------------[49      ( 15 Dec 2023 20:29 )  4.4  | x    | 17          Lipase:x      Amylase:x             Coags:     11.20  ----< 0.98    ( 15 Dec 2023 20:29 )     36.4                Urinalysis Basic - ( 16 Dec 2023 21:27 )    Color: x / Appearance: x / SG: x / pH: x  Gluc: 142 mg/dL / Ketone: x  / Bili: x / Urobili: x   Blood: x / Protein: x / Nitrite: x   Leuk Esterase: x / RBC: x / WBC x   Sq Epi: x / Non Sq Epi: x / Bacteria: x            RADIOLOGY & ADDITIONAL TESTS:      A/P:  JACQUELIN CAI is a 78M w/ PMH/PSH as above who presents as a trauma consult s/p mechanical fall 3 days ago, +HT -LOC -AC,     Plan:  - f/u TTE   - EP Recommend ILR prior to discharge if patient/family agreeable. No arrhythmias on tele only artifact  - follow up CT head stable, no neurosurgical intervention indicated at this time  - f/u AM CXR  - Q1 Neurochecks on EEG monitoring   - GOC for PEG placement   - care per sicu     Disposition:  ***    Above plan discussed with Attending Surgeon Dr. Degroot  , patient, patient family, and Primary team      TAP (Trauma, Acute care, Pediatrics) Spectra [phone]

## 2023-12-17 NOTE — EEG REPORT - NS EEG TEXT BOX
[facility] Neurology Department   Inpatient Continuous video-Electroencephalogram      Patient Name:	JACQUELIN CAI    :	1945  MRN:	-    Study Start Date/Time:	2023, 5:36:12 PM  Study End Date/Time: 2023, 7:00 AM     Referred by:  Dr. Dc     Brief Clinical History:  JACQUELIN CAI is a 78 year old Female; study performed to investigate for seizures or markers of epilepsy.   Technologist notes: -  Diagnosis Code:  R56.9 convulsions/seizure    Pertinent Medication:  n/a    Acquisition Details:  Electroencephalography was acquired using a minimum of 21 channels on an [employer] [facility] system v 9.3.1 with electrode placement according to the standard International 10-20 system following ACNS (American Clinical Neurophysiology Society) guidelines.  Anterior temporal T1 and T2 electrodes were utilized whenever possible.  The XLTEK automated spike & seizure detections were all reviewed in detail, in addition to the entire raw EEG.    Findings:  Background:  continuous, with predominantly alpha > theta frequencies.  Generalized Slowing:  None  Voltage:  Normal (20+ uV)  Organization:  Appropriate anterior-posterior gradient  Posterior Dominant Rhythm:  8-8.5 Hz symmetric, well-organized, and well-modulated  Sleep:  Symmetric, synchronous spindles and K complexes.  Focal abnormalities:  No persistent asymmetries of voltage or frequency.  Spontaneous Activity:  No epileptiform discharges    Events:  On 2023 at 4:35, the patient had jerking movements of the left hand. There was myogenic correlate.     Provocations:  1)	Hyperventilation: was not performed.  2)	Photic stimulation: was not performed.  Daily Summary:    1)	Unremarkable awake and sleep recording.  2)	The events of left arm jerking did not have an electrographic correlate other than myogenic artifact. Although cannot rule out focal motor seizure, the events did not appear stereotypic in nature.       Taryn Charles MD  Attending Neurologist, [facility] Epilepsy Program   [facility] Neurology Department   Inpatient Continuous video-Electroencephalogram      Patient Name:	JACQUELIN CAI    :	1945  MRN:	-    Study Start Date/Time:	2023, 5:36:12 PM  Study End Date/Time: 2023, 7:00 AM     Referred by:  Dr. Dc     Brief Clinical History:  JACQUELIN CAI is a 78 year old Female; study performed to investigate for seizures or markers of epilepsy.   Technologist notes: -  Diagnosis Code:  R56.9 convulsions/seizure    Pertinent Medication:  n/a    Acquisition Details:  Electroencephalography was acquired using a minimum of 21 channels on an [employer] [facility] system v 9.3.1 with electrode placement according to the standard International 10-20 system following ACNS (American Clinical Neurophysiology Society) guidelines.  Anterior temporal T1 and T2 electrodes were utilized whenever possible.  The XLTEK automated spike & seizure detections were all reviewed in detail, in addition to the entire raw EEG.    Findings:  Background:  continuous, with predominantly alpha > theta frequencies.  Generalized Slowing:  None  Voltage:  Normal (20+ uV)  Organization:  Appropriate anterior-posterior gradient  Posterior Dominant Rhythm:  8-8.5 Hz symmetric, well-organized, and well-modulated  Sleep:  Symmetric, synchronous spindles and K complexes.  Focal abnormalities:  No persistent asymmetries of voltage or frequency.  Spontaneous Activity:  No epileptiform discharges    Events:  On 2023 at 4:35, the patient had jerking movements of the left hand. There was myogenic correlate.     Provocations:  1)	Hyperventilation: was not performed.  2)	Photic stimulation: was not performed.  Daily Summary:    1)	Unremarkable awake and sleep recording.  2)	The events of left arm jerking did not have an electrographic correlate other than myogenic artifact. Although cannot rule out focal motor seizure, the events did not appear stereotypic in nature.       Taryn Charles MD  Attending Neurologist, [facility] Epilepsy Program   [facility] Neurology Department   Inpatient Continuous video-Electroencephalogram      Patient Name:	JACQUELIN CAI    :	1945  MRN:	-    Study Start Date/Time:	2023, 5:36:12 PM  Study End Date/Time: 2023, 7:24  AM     Referred by:  Dr. Dc     Brief Clinical History:  JACQUELIN CAI is a 78 year old Female; study performed to investigate for seizures or markers of epilepsy.   Technologist notes: -  Diagnosis Code:  R56.9 convulsions/seizure    Pertinent Medication:  n/a    Acquisition Details:  Electroencephalography was acquired using a minimum of 21 channels on an [employer] [facility] system v 9.3.1 with electrode placement according to the standard International 10-20 system following ACNS (American Clinical Neurophysiology Society) guidelines.  Anterior temporal T1 and T2 electrodes were utilized whenever possible.  The XLTEK automated spike & seizure detections were all reviewed in detail, in addition to the entire raw EEG.    Findings:  Background:  continuous, with predominantly alpha > theta frequencies.  Generalized Slowing:  None  Voltage:  Normal (20+ uV)  Organization:  Appropriate anterior-posterior gradient  Posterior Dominant Rhythm:  8-8.5 Hz symmetric, well-organized, and well-modulated  Sleep:  Symmetric, synchronous spindles and K complexes.  Focal abnormalities:  No persistent asymmetries of voltage or frequency.  Spontaneous Activity:  No epileptiform discharges    Events:  On 2023 at 4:35, the patient had jerking movements of the left hand. There was myogenic correlate.     On 2023 at 7:24,  the patient had facial twitching. . There was myogenic correlate.     Provocations:  1)	Hyperventilation: was not performed.  2)	Photic stimulation: was not performed.  Daily Summary:    1)	Unremarkable awake and sleep recording.  2)	The events of left arm jerking and facial twitching did not have an electrographic correlate other than myogenic artifact. Although cannot rule out focal motor seizure, the events did not appear stereotypic in nature.       Taryn Charles MD  Attending Neurologist, [facility] Epilepsy Program   [facility] Neurology Department   Inpatient Continuous video-Electroencephalogram      Patient Name:	JACQUELIN CAI    :	1945  MRN:	-    Study Start Date/Time:	2023, 5:36:12 PM  Study End Date/Time: 2023, 7:24  AM     Referred by:  Dr. Dc     Brief Clinical History:  JACQUELIN CAI is a 78 year old Female; study performed to investigate for seizures or markers of epilepsy.   Technologist notes: -  Diagnosis Code:  R56.9 convulsions/seizure    Pertinent Medication:  n/a    Acquisition Details:  Electroencephalography was acquired using a minimum of 21 channels on an [employer] [facility] system v 9.3.1 with electrode placement according to the standard International 10-20 system following ACNS (American Clinical Neurophysiology Society) guidelines.  Anterior temporal T1 and T2 electrodes were utilized whenever possible.  The XLTEK automated spike & seizure detections were all reviewed in detail, in addition to the entire raw EEG.    Findings:  Background:  continuous, with predominantly alpha > theta frequencies.  Generalized Slowing:  None  Voltage:  Normal (20+ uV)  Organization:  Appropriate anterior-posterior gradient  Posterior Dominant Rhythm:  8-8.5 Hz symmetric, well-organized, and well-modulated  Sleep:  Symmetric, synchronous spindles and K complexes.  Focal abnormalities:  No persistent asymmetries of voltage or frequency.  Spontaneous Activity:  No epileptiform discharges    Events:  On 2023 at 4:35, the patient had jerking movements of the left hand. There was myogenic correlate.     On 2023 at 7:24,  the patient had facial twitching. . There was myogenic correlate.     Provocations:  1)	Hyperventilation: was not performed.  2)	Photic stimulation: was not performed.  Daily Summary:    1)	Unremarkable awake and sleep recording.  2)	The events of left arm jerking and facial twitching did not have an electrographic correlate other than myogenic artifact. Although cannot rule out focal motor seizure, the events did not appear stereotypic in nature.       Taryn Charles MD  Attending Neurologist, [facility] Epilepsy Program

## 2023-12-17 NOTE — PROGRESS NOTE ADULT - SUBJECTIVE AND OBJECTIVE BOX
SUMMARY:   HPI: 77-year-old male Cantonese speaking his presents with prior history of hypertension dyslipidemia diabetes BPH prior CVA on aspirin and Plavix who states he had a mechanical fall 2 days ago while getting out of the car fell backwards hit his head.  Afterwards he was able to ambulate.  But for the past 1 day family was unable to ambulate him.  He states that his lower extremities are painful to movement and weak.  Family denies any LOC.    On ADMISSION SCORES:   GCS: 14    SEDATION SCORES:  RASS: CAM-ICU:     REVIEW OF SYSTEMS:    VITALS: [X]  ICU Vital Signs Last 24 Hrs  T(C): 37.8 (16 Dec 2023 06:00), Max: 38.2 (16 Dec 2023 01:30)  T(F): 100.1 (16 Dec 2023 06:00), Max: 100.8 (16 Dec 2023 01:30)  HR: 135 (16 Dec 2023 06:00) (88 - 135)  BP: 142/98 (16 Dec 2023 06:00) (137/60 - 170/70)  BP(mean): 115 (16 Dec 2023 06:00) (93 - 115)  RR: 17 (16 Dec 2023 06:00) (17 - 20)  SpO2: 96% (16 Dec 2023 06:00) (95% - 99%)    O2 Parameters below as of 16 Dec 2023 06:00  Patient On (Oxygen Delivery Method): room air      15 Dec 2023 07:01  -  16 Dec 2023 07:00  --------------------------------------------------------  IN:    sodium chloride 0.9%: 200 mL  Total IN: 200 mL    OUT:  Total OUT: 0 mL    Total NET: 200 mL        MEDICATIONS  (STANDING):  acetaminophen     Tablet .. 650 milliGRAM(s) Oral every 6 hours  atorvastatin 20 milliGRAM(s) Oral at bedtime  chlorhexidine 2% Cloths 1 Application(s) Topical <User Schedule>  famotidine  IVPB 20 milliGRAM(s) IV Intermittent daily  insulin lispro (ADMELOG) corrective regimen sliding scale   SubCutaneous every 6 hours  levETIRAcetam  IVPB 500 milliGRAM(s) IV Intermittent every 12 hours  metoprolol tartrate Injectable 2.5 milliGRAM(s) IV Push every 6 hours  sodium bicarbonate 650 milliGRAM(s) Oral two times a day  sodium chloride 0.9%. 1000 milliLiter(s) (100 mL/Hr) IV Continuous <Continuous>  tamsulosin 0.8 milliGRAM(s) Oral at bedtime      CT Head No Cont (12.16.23 @ 01:39)     IMPRESSION:    Since CT head 12/15/2023 at 9:49 PM:    Essentially unchanged acute subdural hemorrhages measuring up to 0.7 cm   along the left hemisphere, 0.4 cm along the right frontal extra-axial   space, and 1.1 cm along the left falx. No midline shift.    MEDICATIONS  (PRN):    12-15    141  |  103  |  42<H>  ----------------------------<  149<H>  4.0   |  24  |  2.8<H>    Ca    9.2      15 Dec 2023 20:29    TPro  6.8  /  Alb  4.4  /  TBili  0.9  /  DBili  x   /  AST  19  /  ALT  17  /  AlkPhos  49  12-15                          12.2   12.27 )-----------( 240      ( 15 Dec 2023 20:29 )             36.5           EXAMINATION:  General: No acute distress  HEENT: Anicteric sclerae  Cardiac: [id number]  Lungs: Clear  Abdomen: Soft, non-tender, +BS  Extremities: No c/c/e  Skin/Incision Site: Clean, dry and intact  Neurologic: Lethargic, opens eyes to repeated verbal stimuli, only states his name, oriented x1, pupils are 3 mm and reactive bilaterally, able to follow commands, + Right Facial, RUE +drift that does not hit the bed, LUE AG, RLE same jerry of the bed (moves his foot), LLE some effort against gravity, sensation intact        along the left hemisphere, 0.4 cm along the right frontal extra-axial   space, and 1.1 cm along the left falx. No midline shift. SUMMARY:   HPI: 77-year-old male Cantonese speaking his presents with prior history of hypertension dyslipidemia diabetes BPH prior CVA on aspirin and Plavix who states he had a mechanical fall 2 days ago while getting out of the car fell backwards hit his head.  Afterwards he was able to ambulate.  But for the past 1 day family was unable to ambulate him.  He states that his lower extremities are painful to movement and weak.  Family denies any LOC.    On ADMISSION SCORES:   GCS: 14    SEDATION SCORES:  RASS: CAM-ICU:     REVIEW OF SYSTEMS:    VITALS: [X]  ICU Vital Signs Last 24 Hrs  T(C): 37.8 (16 Dec 2023 06:00), Max: 38.2 (16 Dec 2023 01:30)  T(F): 100.1 (16 Dec 2023 06:00), Max: 100.8 (16 Dec 2023 01:30)  HR: 135 (16 Dec 2023 06:00) (88 - 135)  BP: 142/98 (16 Dec 2023 06:00) (137/60 - 170/70)  BP(mean): 115 (16 Dec 2023 06:00) (93 - 115)  RR: 17 (16 Dec 2023 06:00) (17 - 20)  SpO2: 96% (16 Dec 2023 06:00) (95% - 99%)    O2 Parameters below as of 16 Dec 2023 06:00  Patient On (Oxygen Delivery Method): room air      15 Dec 2023 07:01  -  16 Dec 2023 07:00  --------------------------------------------------------  IN:    sodium chloride 0.9%: 200 mL  Total IN: 200 mL    OUT:  Total OUT: 0 mL    Total NET: 200 mL        MEDICATIONS  (STANDING):  acetaminophen     Tablet .. 650 milliGRAM(s) Oral every 6 hours  atorvastatin 20 milliGRAM(s) Oral at bedtime  chlorhexidine 2% Cloths 1 Application(s) Topical <User Schedule>  famotidine  IVPB 20 milliGRAM(s) IV Intermittent daily  insulin lispro (ADMELOG) corrective regimen sliding scale   SubCutaneous every 6 hours  levETIRAcetam  IVPB 500 milliGRAM(s) IV Intermittent every 12 hours  metoprolol tartrate Injectable 2.5 milliGRAM(s) IV Push every 6 hours  sodium bicarbonate 650 milliGRAM(s) Oral two times a day  sodium chloride 0.9%. 1000 milliLiter(s) (100 mL/Hr) IV Continuous <Continuous>  tamsulosin 0.8 milliGRAM(s) Oral at bedtime      CT Head No Cont (12.16.23 @ 01:39)     IMPRESSION:    Since CT head 12/15/2023 at 9:49 PM:    Essentially unchanged acute subdural hemorrhages measuring up to 0.7 cm   along the left hemisphere, 0.4 cm along the right frontal extra-axial   space, and 1.1 cm along the left falx. No midline shift.    MEDICATIONS  (PRN):    12-15    141  |  103  |  42<H>  ----------------------------<  149<H>  4.0   |  24  |  2.8<H>    Ca    9.2      15 Dec 2023 20:29    TPro  6.8  /  Alb  4.4  /  TBili  0.9  /  DBili  x   /  AST  19  /  ALT  17  /  AlkPhos  49  12-15                          12.2   12.27 )-----------( 240      ( 15 Dec 2023 20:29 )             36.5           EXAMINATION:  General: No acute distress  HEENT: Anicteric sclerae  Cardiac: [id number]  Lungs: Clear  Abdomen: Soft, non-tender, +BS  Extremities: No c/c/e  Skin/Incision Site: Clean, dry and intact  Neurologic: Lethargic, opens eyes to repeated verbal stimuli, only states his name, oriented x1, pupils are 3 mm and reactive bilaterally, able to follow commands, + Right Facial, RUE +drift that does not hit the bed, LUE AG, RLE same jerry of the bed (moves his foot), LLE some effort against gravity, sensation intact        along the left hemisphere, 0.4 cm along the right frontal extra-axial   space, and 1.1 cm along the left falx. No midline shift.

## 2023-12-17 NOTE — CONSULT NOTE ADULT - ASSESSMENT
78y Male with PMH HTN, dyslipidemia, diabetes, BPH, prior CVA on ASA/Plavix s/p mechanical fall 3 days ago while getting out of car. Brought to ED by family due to confusion. CT head shows Acute subdural hemorrhage along the left cerebral falx. Additional  smaller subdural hemorrhages along the left holohemispheric convexity, right frontoparietal convexity, and along the left tentorium. Admitted to SICU for trauma workup and management.    #Acute SDH   - repeat CT head (12/16/23) shows stable subdural hemorrhage   - management per trauma team  - consider loop recorder if family agree    # SAGRARIO on CKD  # Hypernatremia likely secondary to decreased PO intake  - c/w gentle IV hydration maintenance and consider increase PO free water  - nephro consult    #h/o stroke (2/2023)  - RIGHT anterior thalamus infarct  - LEFT caudate head lacunar infarct  - f/u Neurosurgery team regarding DAPT     #r/o seizure  - f/u vEEG     # Aspiration PNA  - on zokeenann     Summer Munson   Covering hospitalist  I can be reached directly on MS Teams.

## 2023-12-17 NOTE — PROGRESS NOTE ADULT - ATTENDING COMMENTS
Critical Care: [id number]   This patient has a high probability of sudden, clinically significant deterioration, which requires the highest level of physician preparedness to intervene urgently. I managed/supervised life or organ supporting interventions that required frequent physician assessment. I devoted my full attention in the ICU to the direct care of this patient for the period of time indicated below. Time I spent with the family or surrogate(s) is included only if the patient was incapable of providing the necessary information or participating in medical decision making. Time devoted to teaching and to any procedures I billed separately is not included.     JACQUELIN CAI 78y Male admitted to [ x] SICU /[ ] SDU with SDHs after the fall 3 days prior, presented to ED by family due to altered mental status and lethargy. Admitted to SICU for neurological monitoring with neuro checks q1hr, high risk for hemodynamic instability, airway at risk for obstruction, electrolytes abnormalities, Episode of V. Tach in am of 12/16/23 -> rate controlled at this time to low 80-100s.  Patient is examined and evaluated at the bedside with SICU team. Treatment plan discussed with SICU team, nurses and primary team.   Chest X-ray and all relevant studies reviewed during rounds.  Will continue hemodynamic monitoring as per protocol in SICU.    Neuro:  GCS [12 ] AAOx1  [x ]  Neurovascular checks as per SICU protocol, continues to demonstrates seizures likes activities                  [ ] 3% NaCl     [ ] 2% NaCl  [x] continues EEG                [x ] Keppra dose increase yesterday to 750mg q12hrs  [ ] Lamictal  [ x] Depakote will add  [ ] Dilantin                Pharmacological Paralysis [ ] Yes  [x ]  No      Sedated/Pain control with                 [ ] Dilaudid drip, [ ]  Ketamine drip, [ ] Fentanyl drip, [ ] Propofol, [ ] Precedex, [ ] Versed drip, [ ] Ativan drip,                           [ ] OxyContin standing,  [ ] OxyContin PRN, [ ] Dilaudid PRN pushes, [ ] Fentanyl PRN pushes, [ ] PCA,                [x ] Tylenol IV/PO, [ ] Gabapentin, [ ]  Ketorolac, [ ] Tramadol,  [ ] Lidoderm Patch       Other Medications               [ ] Seroquel, [ ] Zyprexa, [ ] Haldol,  [ ] Clonazepam [ ] Xanax, [ ] Versed/Ativan PRN, [ ] Valium [x ] None               [ ] Robaxin   [ ] Baclofen  [ ] Flexeril               [ ] CIWA (Ativan/Valium/ Librium)  CV: continue to monitor, home meds will be restarted when patient pass bedside swallow eval. Lopressor to be started via Dobhoff  On pressors [ ]  Yes  [x ]  No          [ ]  Levophed, [ ] Tulio-Synephrine, [ ] Vasopressin, [ ]  Epinephrine          [ ] Dobutamine, [ ] Milrinone, [ ]  Midodrine,  [ ] Others    Other Cardiac Meds          [ ] Amiodarone IV/PO, [ ] Digoxin, [ ] Cardizem drip, [ ] Cleviprex drip, [ ] Esmolol drip, [ ] Cardene drip  Respiratory: Acute respiratory insufficiency -> continue monitoring, CXR bilateral minimal congestion                         None Invasive Support  [x ] Incentive Spirometer poorly compliant                                  [ ] BiPAP   [ ] CPAP/NIV   [ ] HFNC   [ ] NR Face Mask  [x ] NC@2L  [ ] Trach Caller [ ] Room Air                        Ventilatory support  [ ] Yes [x ] No                            [ ] SBT                                  [ ] PC    [ ] VC   [ ] AC/PRVC   [ ] BiVent/APRV   [ ]CPAP   GI  [ x]  bowel regiment to be started when tolerates PO [x ] BM none  [x ] Flatus +  [ ] Ostomy   [ ] NG tube                 Prophylaxis [ x] PPI  [ ] H2 Blockers  [ ] Others  Nutrition: continue   [ x] Diet  NPO [ ] TPN/PPN   [ ] calories count   [x ]  Tube Feeds Glucerna 1.2     Renal: Continue I&Os monitoring, Aguirre catheter  [x ] Yes  [ ]  No  [ ] Consideration for discontinuation [ ] Taxes cath/PrimaFit  [ ] TOV                                                               [ ] HD/CVVH   [x ] Urine output 1300ml/24hrs   Creatinine 2.8->2.6->3.0       Lytes/Acid-base: replete hypokalemia, hypomagnesemia, hypocalcemia, hypophosphatemia        IV Fluids   [ ] LR  [x ]1/2 NS@100ml/hr  [ ] D5W1/2NS [ ] Bicarbonate Drip  [ ] Albumin [ ] Lasix drip/push  [ ] Bumex drip/push  ID: leukocytosis -> continue to monitor: WBC 12.3 ->12.8        IV Abx [ ] Yes, [x ] No;  ID consulted [ ] Yes, [x ] No        Cultures send  [ ] Respiratory   [ ] Blood   [ ] Urine  [ ] Fluids  [ ] Tissue  [x ]  None  Lines:   [ ] Right   [ ] Left  [ ] Bilateral                     [ ] Subclavian TLC        [ ]  Internal Jugular TLC     [ ]  Femoral TLC                     [ ] Subclavian Cordis    [ ] Internal Jugular Cordis   [ ] Femoral Cordis                     [ ] HD catheter    [ ] PICC     [ ]  Midline   [x ] Peripheral IVs                                                 [ ] Right   [ ] Left   [x ] None                     [ ] Radial A-Line    [ ] Femoral A-Line   [ ] Axillary A-Line               Heme: continue to evaluate for acute blood loss anemia- trend Hg/Hct                     AC Reversal Indicated [ ] Yes  [ x] No                                      [ ] Kcentra,  [ ] 3Factors concentrate, [ ] Andexxa                     Transfused  indicated  [ ] Yes, [ x] No    [ ] PRBCs   [ ] Platelets   [ ] FFPs   [ ] Cryoprecipitate                    Should be started on or continued with  following  [ ] Yes,  [x ] No                               [ ] Lovenox  [ ] Coumadin  [ ] Heparin drip  [ ] DOVACs  [ ] ASA  [ ] Antiplatelets   Endocrine: Prevent and treat hyperglycemia with insulin as needed,                                [ x] Sliding scale,  [ ] Lantus/Regular Insuline                              Insuline drip for hyperglycemia [ ] Yes, [ x] No   PV: follow pulse exam  Skin: decub precautions  DVT Prophylaxis:  [x ] SCDs  [ x] Heparin SQ  [ ] Lovenox  SQ  Stress Gastritis Prophylaxis: PPI/H2 Blockers if indicated  Mobility: patient is evaluated at the bedside with mobility team and the goals for today are discussed with PT [x ] bed rest with HOB at 30 degrees    PATIENT/FAMILY/SURROGATE CONFERENCE:  [ x] Yes with patient's family over the phone. [ ] No  PURPOSE: To obtain necessary information, To discuss treatment options under consideration today.    I saw and evaluated the patient personally. I have reviewed and agree with note above. Treatment plan discussed with SICU team, nurses and primary team at the time of the multidisciplinary rounds. The above note is NOT written at the time of rounds and will reflect all changes throughout management of the patient for the day note is written for.    Shawnee Degroot MD, FACS  Trauma/ACS/SCC Attending Critical Care: [id number]   This patient has a high probability of sudden, clinically significant deterioration, which requires the highest level of physician preparedness to intervene urgently. I managed/supervised life or organ supporting interventions that required frequent physician assessment. I devoted my full attention in the ICU to the direct care of this patient for the period of time indicated below. Time I spent with the family or surrogate(s) is included only if the patient was incapable of providing the necessary information or participating in medical decision making. Time devoted to teaching and to any procedures I billed separately is not included.     JACQUELIN CAI 78y Male admitted to [ x] SICU /[ ] SDU with SDHs after the fall 3 days prior, presented to ED by family due to altered mental status and lethargy. Admitted to SICU for neurological monitoring with neuro checks q1hr, high risk for hemodynamic instability, airway at risk for obstruction, electrolytes abnormalities, Episode of V. Tach in am of 12/16/23 -> rate controlled at this time to low 80-100s.  Patient is examined and evaluated at the bedside with SICU team. Treatment plan discussed with SICU team, nurses and primary team.   Chest X-ray and all relevant studies reviewed during rounds.  Will continue hemodynamic monitoring as per protocol in SICU.    Neuro:  GCS [12 ] AAOx1  [x ]  Neurovascular checks as per SICU protocol, continues to demonstrates seizures likes activities                  [ ] 3% NaCl     [ ] 2% NaCl  [x] continues EEG                [x ] Keppra dose increase yesterday to 750mg q12hrs  [ ] Lamictal  [ x] Depakote will add  [ ] Dilantin                Pharmacological Paralysis [ ] Yes  [x ]  No      Sedated/Pain control with                 [ ] Dilaudid drip, [ ]  Ketamine drip, [ ] Fentanyl drip, [ ] Propofol, [ ] Precedex, [ ] Versed drip, [ ] Ativan drip,                           [ ] OxyContin standing,  [ ] OxyContin PRN, [ ] Dilaudid PRN pushes, [ ] Fentanyl PRN pushes, [ ] PCA,                [x ] Tylenol IV/PO, [ ] Gabapentin, [ ]  Ketorolac, [ ] Tramadol,  [ ] Lidoderm Patch       Other Medications               [ ] Seroquel, [ ] Zyprexa, [ ] Haldol,  [ ] Clonazepam [ ] Xanax, [ ] Versed/Ativan PRN, [ ] Valium [x ] None               [ ] Robaxin   [ ] Baclofen  [ ] Flexeril               [ ] CIWA (Ativan/Valium/ Librium)  CV: continue to monitor, home meds will be restarted when patient pass bedside swallow eval. Lopressor to be started via Dobhoff  On pressors [ ]  Yes  [x ]  No          [ ]  Levophed, [ ] Tulio-Synephrine, [ ] Vasopressin, [ ]  Epinephrine          [ ] Dobutamine, [ ] Milrinone, [ ]  Midodrine,  [ ] Others    Other Cardiac Meds          [ ] Amiodarone IV/PO, [ ] Digoxin, [ ] Cardizem drip, [ ] Cleviprex drip, [ ] Esmolol drip, [ ] Cardene drip  Respiratory: Acute respiratory insufficiency -> continue monitoring, CXR bilateral minimal congestion                         None Invasive Support  [x ] Incentive Spirometer poorly compliant                                  [ ] BiPAP   [ ] CPAP/NIV   [ ] HFNC   [ ] NR Face Mask  [x ] NC@2L  [ ] Trach Caller [ ] Room Air                        Ventilatory support  [ ] Yes [x ] No                            [ ] SBT                                  [ ] PC    [ ] VC   [ ] AC/PRVC   [ ] BiVent/APRV   [ ]CPAP   GI  [ x]  bowel regiment to be started when tolerates PO [x ] BM none  [x ] Flatus +  [ ] Ostomy   [ ] NG tube                 Prophylaxis [ x] PPI  [ ] H2 Blockers  [ ] Others  Nutrition: continue   [ x] Diet  NPO [ ] TPN/PPN   [ ] calories count   [x ]  Tube Feeds Glucerna 1.2     Renal: Continue I&Os monitoring, Aguirre catheter  [x ] Yes  [ ]  No  [ ] Consideration for discontinuation [ ] Taxes cath/PrimaFit  [ ] TOV                                                               [ ] HD/CVVH   [x ] Urine output 1300ml/24hrs   Creatinine 2.8->2.6->3.0       Lytes/Acid-base: replete hypokalemia, hypomagnesemia, hypocalcemia, hypophosphatemia        IV Fluids   [ ] LR  [x ]1/2 NS@100ml/hr  [ ] D5W1/2NS [ ] Bicarbonate Drip  [ ] Albumin [ ] Lasix drip/push  [ ] Bumex drip/push  ID: leukocytosis -> continue to monitor: WBC 12.3 ->12.8        IV Abx [ ] Yes, [x ] No;  ID consulted [ ] Yes, [x ] No        Cultures send  [ ] Respiratory   [ ] Blood   [ ] Urine  [ ] Fluids  [ ] Tissue  [x ]  None  Lines:   [ ] Right   [ ] Left  [ ] Bilateral                     [ ] Subclavian TLC        [ ]  Internal Jugular TLC     [ ]  Femoral TLC                     [ ] Subclavian Cordis    [ ] Internal Jugular Cordis   [ ] Femoral Cordis                     [ ] HD catheter    [ ] PICC     [ ]  Midline   [x ] Peripheral IVs                                                 [ ] Right   [ ] Left   [x ] None                     [ ] Radial A-Line    [ ] Femoral A-Line   [ ] Axillary A-Line               Heme: continue to evaluate for acute blood loss anemia- trend Hg/Hct                     AC Reversal Indicated [ ] Yes  [ x] No                                      [ ] Kcentra,  [ ] 3Factors concentrate, [ ] Andexxa                     Transfused  indicated  [ ] Yes, [ x] No    [ ] PRBCs   [ ] Platelets   [ ] FFPs   [ ] Cryoprecipitate                    Should be started on or continued with  following  [ ] Yes,  [x ] No                               [ ] Lovenox  [ ] Coumadin  [ ] Heparin drip  [ ] DOVACs  [ ] ASA  [ ] Antiplatelets   Endocrine: Prevent and treat hyperglycemia with insulin as needed,                                [ x] Sliding scale,  [ ] Lantus/Regular Insuline                              Insuline drip for hyperglycemia [ ] Yes, [ x] No   PV: follow pulse exam  Skin: decub precautions  DVT Prophylaxis:  [x ] SCDs  [ x] Heparin SQ  [ ] Lovenox  SQ  Stress Gastritis Prophylaxis: PPI/H2 Blockers if indicated  Mobility: patient is evaluated at the bedside with mobility team and the goals for today are discussed with PT [x ] bed rest with HOB at 30 degrees    PATIENT/FAMILY/SURROGATE CONFERENCE:  [ x] Yes with patient's family over the phone. [ ] No  PURPOSE: To obtain necessary information, To discuss treatment options under consideration today.    I saw and evaluated the patient personally. I have reviewed and agree with note above. Treatment plan discussed with SICU team, nurses and primary team at the time of the multidisciplinary rounds. The above note is NOT written at the time of rounds and will reflect all changes throughout management of the patient for the day note is written for.    Shawnee Degroot MD, FACS  Trauma/ACS/SCC Attending

## 2023-12-17 NOTE — PROGRESS NOTE ADULT - ASSESSMENT
Assessment and Recommendation: 	  78y Male s/p mechanical fall. +HT, +AC (Aspirin and Plavix), -LOC    NEURO:  #Acute SDH   -CT head #1: SDH x 3: 0.7 cm along the left hemisphere, 0.3 cm along the right frontal area, and 1.1 cm along the left falx. No midline shift  -CT head #2: Stable  -CT head #3 for increased lethargy: Stable subdural hemorrhages compared the same day prior CT head. No midline shift.  -Q1 neuro checks  -Keppra 750mg q12   #Acute pain    -APAP prn  #h/o stroke (2/2023)  - RIGHT anterior thalamus infarct  - LEFT caudate head lacunar infarct  - F/U restarting ASA tmw with NSGY   #r/o seizure  - f/u vEEG     RESP:   #Oxygenation    -2 L NC   #Activity    -increase as tolerated    CARDS:   #HTN  -hold valsartan 320mg and amlodipine 5mg. Monitor BP. SBP goal 140-160  -hold coreg  -metoprolol 5mg IV q6 hours  #HLD  -hold atorvastatin  #NSVT on tele   -EP/Cards: No arrhythmias on tele only artifact. No further work up needed. Recommend ILR prior to discharge if patient/family agreeable  Imaging:   EKG 12/16: Sinus tach   Echo 2/2023: EF 69%, G1DD, trace MR, mild TR   Echo pending     GI/NUTR:   #Diet, Glucerna 1.2 @10cc (w/ free water flushes 150 q8)    -failed beside swallow     -Dobhoff @70     -aspiration precautions, HOB 30  #GI Prophylaxis    -Pepcid 20mg IV  #Bowel regimen     - None    - Last bowel movement  12/15 PTA    /RENAL:   #urine output in critically ill  -IVF 1/2 NS @ 100cc/hr  -Indwelling catheter placed 12/16   -UA neg    Labs:          BUN/Cr- 42/2.6  -->,  40/2.6  -->          Electrolytes-Na 150 // K 3.9 // Mg 2.5 //  Phos 3.8 (12-16 @ 21:27)  #Hypernatremia  - sodium goal 140-150  - free water deficit 1.1 L  - currently on free water flushes 150 q8, adjust as needed    #CKD   -baseline Cr 2.1  -c/w home sodium bicarb 650mg BID  #BPH  -c/w home flomax        HEME/ONC:   #DVT prophylaxis     -SCDs. HSQ started per NSGY     Labs: Hb/Hct:  11.9/36.2  -->,  10.0/30.1  -->                      Plts:  221  -->,  254  -->                 PTT/INR:        ID:  WBC- 12.27  --->>,  13.54  --->>,  12.83  --->>  Temp trend- 24hrs T(F): 100.2 (12-16 @ 21:00), Max: 100.8 (12-16 @ 01:30)  Antibiotics-  Current antibiotics- cefepime 2g q 24 x 7 days for asp PNA *renally dosed (12/16-      ENDO:  #DM     -ISS     -FSG q6 while NPO    MSK:     Activity - Increase As Tolerated    LINES/DRAINS:  PIV, right basilic midline (placed 12/16), martinez (placed 12/16)    ADVANCED DIRECTIVES:  Full Code    HCP/Emergency Contact-Sarah Gamino (Wife) [phone] Daughter (Rashi Heaton) [phone]    INDICATION FOR SICU/SDU: Non-traumatic subdural hemorrhage             DISPO:   SICU   Assessment and Recommendation: 	  78y Male s/p mechanical fall. +HT, +AC (Aspirin and Plavix), -LOC    NEURO:  #Acute SDH   -CT head #1: SDH x 3: 0.7 cm along the left hemisphere, 0.3 cm along the right frontal area, and 1.1 cm along the left falx. No midline shift  -CT head #2: Stable  -CT head #3 for increased lethargy: Stable subdural hemorrhages compared the same day prior CT head. No midline shift.  -Q1 neuro checks  -Keppra 750mg q12   #Acute pain    -APAP prn  #h/o stroke (2/2023)  - RIGHT anterior thalamus infarct  - LEFT caudate head lacunar infarct  - F/U restarting ASA tmw with NSGY   #r/o seizure  - f/u vEEG     RESP:   #Oxygenation    -2 L NC   #Activity    -increase as tolerated    CARDS:   #HTN  -hold valsartan 320mg and amlodipine 5mg. Monitor BP. SBP goal 140-160  -hold coreg  -metoprolol 5mg IV q6 hours  #HLD  -hold atorvastatin  #NSVT on tele   -EP/Cards: No arrhythmias on tele only artifact. No further work up needed. Recommend ILR prior to discharge if patient/family agreeable  Imaging:   EKG 12/16: Sinus tach   Echo 2/2023: EF 69%, G1DD, trace MR, mild TR   Echo pending     GI/NUTR:   #Diet, Glucerna 1.2 @10cc (w/ free water flushes 150 q8)    -failed beside swallow     -Dobhoff @70     -aspiration precautions, HOB 30  #GI Prophylaxis    -Pepcid 20mg IV  #Bowel regimen     - None    - Last bowel movement  12/15 PTA    /RENAL:   #urine output in critically ill  -IVF 1/2 NS @ 100cc/hr  -Indwelling catheter placed 12/16   -UA neg    Labs:          BUN/Cr- 42/2.6  -->,  40/2.6  -->          Electrolytes-Na 150 // K 3.9 // Mg 2.5 //  Phos 3.8 (12-16 @ 21:27)  #Hypernatremia  - sodium goal 140-150  - free water deficit 1.1 L  - currently on free water flushes 150 q8, adjust as needed    #CKD   -baseline Cr 2.1  -c/w home sodium bicarb 650mg BID  #BPH  -c/w home flomax        HEME/ONC:   #DVT prophylaxis     -SCDs. HSQ started per NSGY     Labs: Hb/Hct:  11.9/36.2  -->,  10.0/30.1  -->                      Plts:  221  -->,  254  -->                 PTT/INR:        ID:  WBC- 12.27  --->>,  13.54  --->>,  12.83  --->>  Temp trend- 24hrs T(F): 100.2 (12-16 @ 21:00), Max: 100.8 (12-16 @ 01:30)  Antibiotics-  Current antibiotics- cefepime 2g q 24 x 7 days for asp PNA *renally dosed (12/16-      ENDO:  #DM     -ISS     -FSG q6 while NPO    MSK:     Activity - Increase As Tolerated    LINES/DRAINS:  PIV, right basilic midline (placed 12/16), martinez (placed 12/16)    ADVANCED DIRECTIVES:  Full Code    HCP/Emergency Contact-Sarah Gamino (Wife) [phone] Daughter (Rashi Heaton) [phone]    INDICATION FOR SICU/SDU: Non-traumatic subdural hemorrhage             DISPO:   SICU   Assessment and Recommendation: 	  78y Male s/p mechanical fall. +HT, +AC (Aspirin and Plavix), -LOC    NEURO:  #Acute SDH   -CT head #1: SDH x 3: 0.7 cm along the left hemisphere, 0.3 cm along the right frontal area, and 1.1 cm along the left falx. No midline shift  -CT head #2: Stable  -CT head #3 for increased lethargy: Stable subdural hemorrhages compared the same day prior CT head. No midline shift.  -Q1 neuro checks  -Keppra 750mg q12   #Acute pain    -APAP prn  #h/o stroke (2/2023)  - RIGHT anterior thalamus infarct  - LEFT caudate head lacunar infarct  - F/U restarting ASA tmw with NSGY   #r/o seizure  - f/u vEEG     RESP:   #Oxygenation    -2 L NC saturating well   #Activity    -increase as tolerated    CARDS:   #HTN  -hold valsartan 320mg and amlodipine 5mg. Monitor BP. SBP goal 140-160  -hold coreg  -metoprolol 5mg IV q6 hours  #HLD  -hold atorvastatin  #NSVT on tele   -EP/Cards: No arrhythmias on tele only artifact. No further work up needed. Recommend ILR prior to discharge if patient/family agreeable  Imaging:   EKG 12/16: Sinus tach   Echo 2/2023: EF 69%, G1DD, trace MR, mild TR   Echo pending     GI/NUTR:   #Diet, Glucerna 1.2 @10cc (w/ free water flushes 150 q8)    -failed beside swallow     -Dobhoff @70     -aspiration precautions, HOB 30  #GI Prophylaxis    -Pepcid 20mg IV  #Bowel regimen     - Senna    - Last bowel movement  12/15 PTA      /RENAL:   #urine output in critically ill  -IVF 1/2 NS @ 100cc/hr  -Indwelling catheter placed 12/16   -UA neg   #Hypernatremia  - sodium goal 140-150  - free water deficit 1.1 L  - currently on free water flushes 150 q8, adjust as needed    Labs:          BUN/Cr- 40/2.6  -->,  46/3.0  -->          Electrolytes-Na 147 // K 4.8 // Mg 2.6 //  Phos 4.2 (12-17 @ 05:37)    #CKD   -baseline Cr 2.1  -c/w home sodium bicarb 650mg BID  #BPH  -c/w home flomax        HEME/ONC:   #DVT prophylaxis     -SCDs. HSQ started per NSGY.     -F/U restarting ASA/Plavix with NSGY     Labs: Hb/Hct:  11.9/36.2  -->,  10.0/30.1  -->                      Plts:  221  -->,  254  -->                 PTT/INR:        ID:  WBC- 12.27  --->>,  13.54  --->>,  12.83  --->>  Temp trend- 24hrs T(F): 98.5 (12-17 @ 08:00), Max: 100.7 (12-16 @ 12:00)  Antibiotics-  Current antibiotics- cefepime 2g q 24 x 7 days for asp PNA *renally dosed (12/16-    ENDO:  #DM     -ISS     -FSG q6 while NPO    MSK:     Activity - Increase As Tolerated    LINES/DRAINS:  PIV, right basilic midline (placed 12/16), martinez (placed 12/16)    ADVANCED DIRECTIVES:  Full Code    HCP/Emergency Contact-Sarah Gamino (Wife) [phone] Daughter (Rashi Heaton) [phone]    INDICATION FOR SICU/SDU: Non-traumatic subdural hemorrhage          DISPO:   SICU   Assessment and Recommendation: 	  78y Male s/p mechanical fall. +HT, +AC (Aspirin and Plavix), -LOC    NEURO:  #Acute SDH   -CT head #1: SDH x 3: 0.7 cm along the left hemisphere, 0.3 cm along the right frontal area, and 1.1 cm along the left falx. No midline shift  -CT head #2: Stable  -CT head #3 for increased lethargy: Stable subdural hemorrhages compared the same day prior CT head. No midline shift.  -Q1 neuro checks  -Keppra 750mg q12   #Acute pain    -APAP prn  #h/o stroke (2/2023)  - RIGHT anterior thalamus infarct  - LEFT caudate head lacunar infarct  - F/U restarting ASA tmw with NSGY   #r/o seizure  - f/u vEEG     RESP:   #Oxygenation    -2 L NC saturating well   #Activity    -increase as tolerated    CARDS:   #HTN  -hold valsartan 320mg and amlodipine 5mg. Monitor BP. SBP goal 140-160  -hold coreg  -metoprolol 5mg IV q6 hours  #HLD  -hold atorvastatin  #NSVT on tele   -EP/Cards: No arrhythmias on tele only artifact. No further work up needed. Recommend ILR prior to discharge if patient/family agreeable  Imaging:   EKG 12/16: Sinus tach   Echo 2/2023: EF 69%, G1DD, trace MR, mild TR   Echo pending     GI/NUTR:   #Diet, Glucerna 1.2 @10cc (w/ free water flushes 150 q8)    -failed beside swallow     -Dobhoff @70     -aspiration precautions, HOB 30  #GI Prophylaxis    -Pepcid 20mg IV  #Bowel regimen     - Senna    - Last bowel movement  12/15 PTA      /RENAL:   #urine output in critically ill  -IVF 1/2 NS @ 100cc/hr  -Indwelling catheter placed 12/16   -UA neg   #Hypernatremia  - sodium goal 140-150  - free water deficit 1.1 L  - currently on free water flushes 150 q8, adjust as needed    Labs:          BUN/Cr- 40/2.6  -->,  46/3.0  -->          Electrolytes-Na 147 // K 4.8 // Mg 2.6 //  Phos 4.2 (12-17 @ 05:37)    #CKD   -baseline Cr 2.1  -c/w home sodium bicarb 650mg BID  #BPH  -c/w home flomax        HEME/ONC:   #DVT prophylaxis     -SCDs. HSQ started per NSGY.     -F/U restarting ASA/Plavix with NSGY     Labs: Hb/Hct:  11.9/36.2  -->,  10.0/30.1  -->                      Plts:  221  -->,  254  -->                 PTT/INR:        ID:  WBC- 12.27  --->>,  13.54  --->>,  12.83  --->>  Temp trend- 24hrs T(F): 98.5 (12-17 @ 08:00), Max: 100.7 (12-16 @ 12:00)  Antibiotics-  Current antibiotics- cefepime 2g q 24 x 7 days for asp PNA *renally dosed (12/16-    ENDO:  #DM     -ISS     -FSG q6 while NPO    MSK:     Activity - Increase As Tolerated    LINES/DRAINS:  PIV, right basilic midline (placed 12/16), martinez (placed 12/16)    ADVANCED DIRECTIVES:  Full Code    HCP/Emergency Contact-Sarah Gamino (Wife) [phone] Daughter (Rashi Heaton) [phone]    INDICATION FOR SICU/SDU: Non-traumatic subdural hemorrhage          DISPO:   SICU

## 2023-12-17 NOTE — PROGRESS NOTE ADULT - ASSESSMENT
Assessment: 78 year old make hx of HTN, BPH, CKD, Stroke (1 year ago), HLD, DM presents s/p Fall. Patient fell on Wednesday upon exiting a bus, hitting the back of his head on concrete. No LOC. Fall was witnessed by  of bus. Since than family states he is having difficulty ambulating, and periods of confusion. CT Head showing an acute SDH along the left hemisphere, and along the left falx. No shift. Patient takes ASA and Plavix daily.     Plan:  - q 1 neuro checks  - BP goal 110-150   - DDVAP for reversal of ASA/ Plavix- 0.3 mcg/kg IVPB   - Continue Keppra 500 mg BID   - Repeat 3 hour CT Scan completed- stable  - Hold AP and DVT ppx     NCC will continue to follow     ex [id number]  Assessment: 78 year old make hx of HTN, BPH, CKD, Stroke (1 year ago), HLD, DM presents s/p Fall. Patient fell on Wednesday upon exiting a bus, hitting the back of his head on concrete. No LOC. Fall was witnessed by  of bus. Since than family states he is having difficulty ambulating, and periods of confusion. CT Head showing an acute SDH along the left hemisphere, and along the left falx. No shift. Patient takes ASA and Plavix daily.     Plan:  - q 1 neuro checks  - BP goal 110-150   - DDVAP for reversal of ASA/ Plavix- 0.3 mcg/kg IVPB   - Continue Keppra 500 mg BID   - Repeat 3 hour CT Scan completed- stable  - Hold AP and DVT ppx     NCC will continue to follow     ex [id number]

## 2023-12-17 NOTE — PROGRESS NOTE ADULT - SUBJECTIVE AND OBJECTIVE BOX
HD#2    B/L SDH and falcine SDH s/p fall     Pt seen and examined at bedside. Pt sleepy but arousable. Currently on Video EEG.    Vital Signs Last 24 Hrs  T(C): 38.3 (17 Dec 2023 12:00), Max: 38.3 (17 Dec 2023 12:00)  T(F): 101 (17 Dec 2023 12:00), Max: 101 (17 Dec 2023 12:00)  HR: 86 (17 Dec 2023 10:00) (77 - 104)  BP: 106/77 (17 Dec 2023 10:00) (106/77 - 164/72)  BP(mean): 84 (17 Dec 2023 10:00) (76 - 107)  RR: 23 (17 Dec 2023 10:00) (14 - 24)  SpO2: 97% (17 Dec 2023 10:00) (94% - 98%)    Parameters below as of 17 Dec 2023 10:00  Patient On (Oxygen Delivery Method): nasal cannula  O2 Flow (L/min): 2      I&O's Detail    16 Dec 2023 07:01  -  17 Dec 2023 07:00  --------------------------------------------------------  IN:    Enteral Tube Flush: 150 mL    Glucerna: 180 mL    IV PiggyBack: 250 mL    sodium chloride 0.45%: 800 mL    sodium chloride 0.9%: 1500 mL  Total IN: 2880 mL    OUT:    Indwelling Catheter - Urethral (mL): 1500 mL    Voided (mL): 500 mL  Total OUT: 2000 mL    Total NET: 880 mL      17 Dec 2023 07:01  -  17 Dec 2023 12:03  --------------------------------------------------------  IN:    Glucerna: 90 mL    Lactated Ringers Bolus: 250 mL    sodium chloride 0.45%: 400 mL  Total IN: 740 mL    OUT:    Indwelling Catheter - Urethral (mL): 440 mL  Total OUT: 440 mL    Total NET: 300 mL        I&O's Summary    16 Dec 2023 07:01  -  17 Dec 2023 07:00  --------------------------------------------------------  IN: 2880 mL / OUT: 2000 mL / NET: 880 mL    17 Dec 2023 07:01  -  17 Dec 2023 12:03  --------------------------------------------------------  IN: 740 mL / OUT: 440 mL / NET: 300 mL        REVIEW OF SYSTEMS    [ ] A ten-point review of systems was otherwise negative except as noted.  [X] Due to altered mental status/intubation, subjective information were not able to be obtained from the patient. History was obtained, to the extent possible, from review of the chart and collateral sources of information.      PHYSICAL EXAM:  Neurological:  PERRL  No tracking at this time  Does not follow commands at this time  MACIEL to pain      LABS:                        10.0   12.83 )-----------( 254      ( 16 Dec 2023 21:27 )             30.1     12-17    147<H>  |  114<H>  |  46<H>  ----------------------------<  180<H>  4.8   |  18  |  3.0<H>    Ca    8.4      17 Dec 2023 05:37  Phos  4.2     12-17  Mg     2.6     12-17    TPro  6.8  /  Alb  4.4  /  TBili  0.9  /  DBili  x   /  AST  19  /  ALT  17  /  AlkPhos  49  12-15    PT/INR - ( 15 Dec 2023 20:29 )   PT: 11.20 sec;   INR: 0.98 ratio         PTT - ( 15 Dec 2023 20:29 )  PTT:36.4 sec  Urinalysis Basic - ( 17 Dec 2023 05:37 )    Color: x / Appearance: x / SG: x / pH: x  Gluc: 180 mg/dL / Ketone: x  / Bili: x / Urobili: x   Blood: x / Protein: x / Nitrite: x   Leuk Esterase: x / RBC: x / WBC x   Sq Epi: x / Non Sq Epi: x / Bacteria: x      CAPILLARY BLOOD GLUCOSE      POCT Blood Glucose.: 191 mg/dL (17 Dec 2023 05:26)  POCT Blood Glucose.: 162 mg/dL (17 Dec 2023 00:13)  POCT Blood Glucose.: 161 mg/dL (16 Dec 2023 17:17)    Allergies    [This allergen will not trigger allergy alert] pollen (Unknown)  No Known Drug Allergies    Intolerances      MEDICATIONS:  Antibiotics:  cefepime   IVPB      cefepime   IVPB 2000 milliGRAM(s) IV Intermittent every 24 hours    Neuro:  acetaminophen     Tablet .. 650 milliGRAM(s) Oral every 6 hours  levETIRAcetam 500 milliGRAM(s) Oral every 12 hours  valproic  acid Syrup 500 milliGRAM(s) Oral every 8 hours      IVF:  sodium bicarbonate 650 milliGRAM(s) Oral two times a day  sodium chloride 0.45%. 1000 milliLiter(s) IV Continuous <Continuous>    RADIOLOGY & ADDITIONAL TESTS:      ASSESSMENT:  78y Male s/p    Non-traumatic subdural hemorrhage    No pertinent family history in first degree relatives    Handoff    MEWS Score    HTN (hypertension)    Seasonal allergies    History of BPH    Diabetes    Subdural hemorrhage    Subdural hematoma without coma with loss of consciousness, initial encounter    Insertion of midline catheter    No significant past surgical history    FALL TWO DAYS AGO    89+    [email or web address]        PLAN:  As per Dr. Dasilva, No Neurosurgical Intervention  Can restart ASA December 22 (7 days)  Need follow up HCT in 2 weeks to decide on Plavix  Can follow up with Dr. Dasilva as o/p

## 2023-12-17 NOTE — CONSULT NOTE ADULT - SUBJECTIVE AND OBJECTIVE BOX
JACQUELIN CAI 78y Male  MRN#: [id number]     SUBJECTIVE  Patient is a 78y old Male who presents with a chief complaint of SDH (17 Dec 2023 12:03)  Today is hospital day 1d, and this morning he is lying in bed without distress.   No acute overnight events.   On VEEG    OBJECTIVE  PAST MEDICAL & SURGICAL HISTORY  HTN (hypertension)    Seasonal allergies    History of BPH    Diabetes    No significant past surgical history      ALLERGIES:  [This allergen will not trigger allergy alert] pollen (Unknown)  No Known Drug Allergies    MEDICATIONS:  STANDING MEDICATIONS  acetaminophen     Tablet .. 650 milliGRAM(s) Oral every 6 hours  atorvastatin 20 milliGRAM(s) Oral at bedtime  chlorhexidine 2% Cloths 1 Application(s) Topical <User Schedule>  heparin   Injectable 5000 Unit(s) SubCutaneous every 8 hours  insulin lispro (ADMELOG) corrective regimen sliding scale   SubCutaneous every 6 hours  labetalol 100 milliGRAM(s) Oral every 8 hours  lactated ringers Bolus 250 milliLiter(s) IV Bolus once  levETIRAcetam 500 milliGRAM(s) Oral every 12 hours  piperacillin/tazobactam IVPB.. 3.375 Gram(s) IV Intermittent every 12 hours  senna 2 Tablet(s) Oral at bedtime  sodium bicarbonate 650 milliGRAM(s) Oral two times a day  sodium chloride 0.45%. 1000 milliLiter(s) IV Continuous <Continuous>  tamsulosin 0.8 milliGRAM(s) Oral at bedtime  valproic  acid Syrup 500 milliGRAM(s) Oral every 8 hours    PRN MEDICATIONS    HOME MEDICATIONS  Home Medications:  amLODIPine 5 mg oral tablet: 1 tab(s) orally once a day (14 Feb 2023 02:08)  aspirin 81 mg oral tablet: 1 tab(s) orally once a day (14 Feb 2023 02:08)  atorvastatin 20 mg oral tablet: 1 tab(s) orally once a day (16 Dec 2023 03:34)  carvedilol 6.25 mg oral tablet: 1 tab(s) orally 2 times a day (14 Feb 2023 02:08)  Jardiance 25 mg oral tablet: 1 tab(s) orally once a day (in the morning) (14 Feb 2023 02:08)  Nephplex Rx oral tablet: 1 tab(s) orally once a day (14 Feb 2023 02:08)  SITagliptin 50 mg oral tablet: 1 tab(s) orally once a day (16 Dec 2023 03:34)  tamsulosin 0.4 mg oral capsule: 2 cap(s) orally once a day (at bedtime) (16 Dec 2023 03:29)  valsartan 320 mg oral tablet: 1 tab(s) orally once a day (14 Feb 2023 02:08)      VITAL SIGNS: Last 24 Hours  T(C): 38.3 (17 Dec 2023 12:00), Max: 38.3 (17 Dec 2023 12:00)  T(F): 101 (17 Dec 2023 12:00), Max: 101 (17 Dec 2023 12:00)  HR: 96 (17 Dec 2023 12:17) (77 - 104)  BP: 162/70 (17 Dec 2023 12:17) (106/77 - 192/81)  BP(mean): 100 (17 Dec 2023 12:17) (76 - 117)  RR: 20 (17 Dec 2023 12:17) (14 - 24)  SpO2: 100% (17 Dec 2023 12:17) (94% - 100%)    12-16-23 @ 07:01  -  12-17-23 @ 07:00  --------------------------------------------------------  IN: 2880 mL / OUT: 2000 mL / NET: 880 mL    12-17-23 @ 07:01  -  12-17-23 @ 12:43  --------------------------------------------------------  IN: 840 mL / OUT: 535 mL / NET: 305 mL        LABS:                        10.0   12.83 )-----------( 254      ( 16 Dec 2023 21:27 )             30.1     12-17    147<H>  |  114<H>  |  46<H>  ----------------------------<  180<H>  4.8   |  18  |  3.0<H>    Ca    8.4      17 Dec 2023 05:37  Phos  4.2     12-17  Mg     2.6     12-17    TPro  6.8  /  Alb  4.4  /  TBili  0.9  /  DBili  x   /  AST  19  /  ALT  17  /  AlkPhos  49  12-15    LIVER FUNCTIONS - ( 15 Dec 2023 20:29 )  Alb: 4.4 g/dL / Pro: 6.8 g/dL / ALK PHOS: 49 U/L / ALT: 17 U/L / AST: 19 U/L / GGT: x           PT/INR - ( 15 Dec 2023 20:29 )   PT: 11.20 sec;   INR: 0.98 ratio         PTT - ( 15 Dec 2023 20:29 )  PTT:36.4 sec  Urinalysis Basic - ( 17 Dec 2023 05:37 )    Color: x / Appearance: x / SG: x / pH: x  Gluc: 180 mg/dL / Ketone: x  / Bili: x / Urobili: x   Blood: x / Protein: x / Nitrite: x   Leuk Esterase: x / RBC: x / WBC x   Sq Epi: x / Non Sq Epi: x / Bacteria: x                CAPILLARY BLOOD GLUCOSE      POCT Blood Glucose.: 165 mg/dL (17 Dec 2023 12:02)      RADIOLOGY:    PHYSICAL EXAM:  GENERAL: NAD, 78y M, confused  HEAD:  Atraumatic, Normocephalic  EYES: conjunctivae clear and sclerae white  NECK: Supple, No JVD  CHEST/LUNG: Clear to auscultation bilaterally; No wheeze; No crackles; No accessory muscles used  HEART: Regular rate and rhythm; No murmurs;   ABDOMEN: Soft, Nontender, Nondistended; Bowel sounds present; No guarding  EXTREMITIES:  2+ Peripheral Pulses, No cyanosis or edema  NEUROLOGY: on VEEG    ADMISSION SUMMARY  Patient is a 78y old Male who presents with a chief complaint of SDH (17 Dec 2023 12:03)    JACQUELIN CAI 78y Male  MRN#: [id number]     SUBJECTIVE  Patient is a 78y old Male who presents with a chief complaint of SDH (17 Dec 2023 12:03)  Today is hospital day 1d, and this morning he is lying in bed without distress.   No acute overnight events.   On VEEG    OBJECTIVE  PAST MEDICAL & SURGICAL HISTORY  HTN (hypertension)    Seasonal allergies    History of BPH    Diabetes    No significant past surgical history      ALLERGIES:  [This allergen will not trigger allergy alert] pollen (Unknown)  No Known Drug Allergies    MEDICATIONS:  STANDING MEDICATIONS  acetaminophen     Tablet .. 650 milliGRAM(s) Oral every 6 hours  atorvastatin 20 milliGRAM(s) Oral at bedtime  chlorhexidine 2% Cloths 1 Application(s) Topical <User Schedule>  heparin   Injectable 5000 Unit(s) SubCutaneous every 8 hours  insulin lispro (ADMELOG) corrective regimen sliding scale   SubCutaneous every 6 hours  labetalol 100 milliGRAM(s) Oral every 8 hours  lactated ringers Bolus 250 milliLiter(s) IV Bolus once  levETIRAcetam 500 milliGRAM(s) Oral every 12 hours  piperacillin/tazobactam IVPB.. 3.375 Gram(s) IV Intermittent every 12 hours  senna 2 Tablet(s) Oral at bedtime  sodium bicarbonate 650 milliGRAM(s) Oral two times a day  sodium chloride 0.45%. 1000 milliLiter(s) IV Continuous <Continuous>  tamsulosin 0.8 milliGRAM(s) Oral at bedtime  valproic  acid Syrup 500 milliGRAM(s) Oral every 8 hours    PRN MEDICATIONS    HOME MEDICATIONS  Home Medications:  amLODIPine 5 mg oral tablet: 1 tab(s) orally once a day (14 Feb 2023 02:08)  aspirin 81 mg oral tablet: 1 tab(s) orally once a day (14 Feb 2023 02:08)  atorvastatin 20 mg oral tablet: 1 tab(s) orally once a day (16 Dec 2023 03:34)  carvedilol 6.25 mg oral tablet: 1 tab(s) orally 2 times a day (14 Feb 2023 02:08)  Jardiance 25 mg oral tablet: 1 tab(s) orally once a day (in the morning) (14 Feb 2023 02:08)  Nephplex Rx oral tablet: 1 tab(s) orally once a day (14 Feb 2023 02:08)  SITagliptin 50 mg oral tablet: 1 tab(s) orally once a day (16 Dec 2023 03:34)  tamsulosin 0.4 mg oral capsule: 2 cap(s) orally once a day (at bedtime) (16 Dec 2023 03:29)  valsartan 320 mg oral tablet: 1 tab(s) orally once a day (14 Feb 2023 02:08)      VITAL SIGNS: Last 24 Hours  T(C): 38.3 (17 Dec 2023 12:00), Max: 38.3 (17 Dec 2023 12:00)  T(F): 101 (17 Dec 2023 12:00), Max: 101 (17 Dec 2023 12:00)  HR: 96 (17 Dec 2023 12:17) (77 - 104)  BP: 162/70 (17 Dec 2023 12:17) (106/77 - 192/81)  BP(mean): 100 (17 Dec 2023 12:17) (76 - 117)  RR: 20 (17 Dec 2023 12:17) (14 - 24)  SpO2: 100% (17 Dec 2023 12:17) (94% - 100%)    12-16-23 @ 07:01  -  12-17-23 @ 07:00  --------------------------------------------------------  IN: 2880 mL / OUT: 2000 mL / NET: 880 mL    12-17-23 @ 07:01  -  12-17-23 @ 12:43  --------------------------------------------------------  IN: 840 mL / OUT: 535 mL / NET: 305 mL        LABS:                        10.0   12.83 )-----------( 254      ( 16 Dec 2023 21:27 )             30.1     12-17    147<H>  |  114<H>  |  46<H>  ----------------------------<  180<H>  4.8   |  18  |  3.0<H>    Ca    8.4      17 Dec 2023 05:37  Phos  4.2     12-17  Mg     2.6     12-17    TPro  6.8  /  Alb  4.4  /  TBili  0.9  /  DBili  x   /  AST  19  /  ALT  17  /  AlkPhos  49  12-15    LIVER FUNCTIONS - ( 15 Dec 2023 20:29 )  Alb: 4.4 g/dL / Pro: 6.8 g/dL / ALK PHOS: 49 U/L / ALT: 17 U/L / AST: 19 U/L / GGT: x           PT/INR - ( 15 Dec 2023 20:29 )   PT: 11.20 sec;   INR: 0.98 ratio         PTT - ( 15 Dec 2023 20:29 )  PTT:36.4 sec  Urinalysis Basic - ( 17 Dec 2023 05:37 )    Color: x / Appearance: x / SG: x / pH: x  Gluc: 180 mg/dL / Ketone: x  / Bili: x / Urobili: x   Blood: x / Protein: x / Nitrite: x   Leuk Esterase: x / RBC: x / WBC x   Sq Epi: x / Non Sq Epi: x / Bacteria: x                CAPILLARY BLOOD GLUCOSE      POCT Blood Glucose.: 165 mg/dL (17 Dec 2023 12:02)      RADIOLOGY:    PHYSICAL EXAM:  GENERAL: NAD, 78y M, confused  HEAD:  Atraumatic, Normocephalic  EYES: conjunctivae clear and sclerae white  NECK: Supple, No JVD  CHEST/LUNG: Clear to auscultation bilaterally; No wheeze; No crackles; No accessory muscles used  HEART: Regular rate and rhythm; No murmurs;   ABDOMEN: Soft, Nontender, Nondistended; Bowel sounds present; No guarding  EXTREMITIES:  2+ Peripheral Pulses, No cyanosis or edema  NEUROLOGY: on VEEG    ADMISSION SUMMARY  Patient is a 78y old Male who presents with a chief complaint of SDH (17 Dec 2023 12:03)

## 2023-12-18 DIAGNOSIS — R41.82 ALTERED MENTAL STATUS, UNSPECIFIED: ICD-10-CM

## 2023-12-18 DIAGNOSIS — Z51.5 ENCOUNTER FOR PALLIATIVE CARE: ICD-10-CM

## 2023-12-18 LAB
ALBUMIN SERPL ELPH-MCNC: 3 G/DL — LOW (ref 3.5–5.2)
ALBUMIN SERPL ELPH-MCNC: 3 G/DL — LOW (ref 3.5–5.2)
AMMONIA BLD-MCNC: 29 UMOL/L — SIGNIFICANT CHANGE UP (ref 11–55)
AMMONIA BLD-MCNC: 29 UMOL/L — SIGNIFICANT CHANGE UP (ref 11–55)
AMMONIA BLD-MCNC: 36 UMOL/L — SIGNIFICANT CHANGE UP (ref 11–55)
AMMONIA BLD-MCNC: 36 UMOL/L — SIGNIFICANT CHANGE UP (ref 11–55)
ANION GAP SERPL CALC-SCNC: 10 MMOL/L — SIGNIFICANT CHANGE UP (ref 7–14)
ANION GAP SERPL CALC-SCNC: 10 MMOL/L — SIGNIFICANT CHANGE UP (ref 7–14)
ANION GAP SERPL CALC-SCNC: 12 MMOL/L — SIGNIFICANT CHANGE UP (ref 7–14)
ANION GAP SERPL CALC-SCNC: 12 MMOL/L — SIGNIFICANT CHANGE UP (ref 7–14)
ANION GAP SERPL CALC-SCNC: 9 MMOL/L — SIGNIFICANT CHANGE UP (ref 7–14)
ANION GAP SERPL CALC-SCNC: 9 MMOL/L — SIGNIFICANT CHANGE UP (ref 7–14)
BASOPHILS # BLD AUTO: 0.02 K/UL — SIGNIFICANT CHANGE UP (ref 0–0.2)
BASOPHILS # BLD AUTO: 0.02 K/UL — SIGNIFICANT CHANGE UP (ref 0–0.2)
BASOPHILS NFR BLD AUTO: 0.2 % — SIGNIFICANT CHANGE UP (ref 0–1)
BASOPHILS NFR BLD AUTO: 0.2 % — SIGNIFICANT CHANGE UP (ref 0–1)
BUN SERPL-MCNC: 41 MG/DL — HIGH (ref 10–20)
BUN SERPL-MCNC: 43 MG/DL — HIGH (ref 10–20)
BUN SERPL-MCNC: 43 MG/DL — HIGH (ref 10–20)
CALCIUM SERPL-MCNC: 7.9 MG/DL — LOW (ref 8.4–10.4)
CALCIUM SERPL-MCNC: 7.9 MG/DL — LOW (ref 8.4–10.4)
CALCIUM SERPL-MCNC: 7.9 MG/DL — LOW (ref 8.4–10.5)
CALCIUM SERPL-MCNC: 7.9 MG/DL — LOW (ref 8.4–10.5)
CALCIUM SERPL-MCNC: 8 MG/DL — LOW (ref 8.4–10.5)
CALCIUM SERPL-MCNC: 8 MG/DL — LOW (ref 8.4–10.5)
CHLORIDE SERPL-SCNC: 109 MMOL/L — SIGNIFICANT CHANGE UP (ref 98–110)
CHLORIDE SERPL-SCNC: 113 MMOL/L — HIGH (ref 98–110)
CHLORIDE SERPL-SCNC: 113 MMOL/L — HIGH (ref 98–110)
CO2 SERPL-SCNC: 20 MMOL/L — SIGNIFICANT CHANGE UP (ref 17–32)
CO2 SERPL-SCNC: 20 MMOL/L — SIGNIFICANT CHANGE UP (ref 17–32)
CO2 SERPL-SCNC: 21 MMOL/L — SIGNIFICANT CHANGE UP (ref 17–32)
CO2 SERPL-SCNC: 21 MMOL/L — SIGNIFICANT CHANGE UP (ref 17–32)
CO2 SERPL-SCNC: 22 MMOL/L — SIGNIFICANT CHANGE UP (ref 17–32)
CO2 SERPL-SCNC: 22 MMOL/L — SIGNIFICANT CHANGE UP (ref 17–32)
CREAT SERPL-MCNC: 2.5 MG/DL — HIGH (ref 0.7–1.5)
CREAT SERPL-MCNC: 2.5 MG/DL — HIGH (ref 0.7–1.5)
CREAT SERPL-MCNC: 2.8 MG/DL — HIGH (ref 0.7–1.5)
EGFR: 22 ML/MIN/1.73M2 — LOW
EGFR: 26 ML/MIN/1.73M2 — LOW
EGFR: 26 ML/MIN/1.73M2 — LOW
EOSINOPHIL # BLD AUTO: 0.16 K/UL — SIGNIFICANT CHANGE UP (ref 0–0.7)
EOSINOPHIL # BLD AUTO: 0.16 K/UL — SIGNIFICANT CHANGE UP (ref 0–0.7)
EOSINOPHIL NFR BLD AUTO: 1.4 % — SIGNIFICANT CHANGE UP (ref 0–8)
EOSINOPHIL NFR BLD AUTO: 1.4 % — SIGNIFICANT CHANGE UP (ref 0–8)
GAS PNL BLDA: SIGNIFICANT CHANGE UP
GAS PNL BLDA: SIGNIFICANT CHANGE UP
GLUCOSE SERPL-MCNC: 178 MG/DL — HIGH (ref 70–99)
GLUCOSE SERPL-MCNC: 178 MG/DL — HIGH (ref 70–99)
GLUCOSE SERPL-MCNC: 204 MG/DL — HIGH (ref 70–99)
GLUCOSE SERPL-MCNC: 204 MG/DL — HIGH (ref 70–99)
GLUCOSE SERPL-MCNC: 212 MG/DL — HIGH (ref 70–99)
GLUCOSE SERPL-MCNC: 212 MG/DL — HIGH (ref 70–99)
HCT VFR BLD CALC: 25.9 % — LOW (ref 42–52)
HCT VFR BLD CALC: 25.9 % — LOW (ref 42–52)
HCT VFR BLD CALC: 26.4 % — LOW (ref 42–52)
HCT VFR BLD CALC: 26.4 % — LOW (ref 42–52)
HGB BLD-MCNC: 8.6 G/DL — LOW (ref 14–18)
HGB BLD-MCNC: 8.6 G/DL — LOW (ref 14–18)
HGB BLD-MCNC: 8.8 G/DL — LOW (ref 14–18)
HGB BLD-MCNC: 8.8 G/DL — LOW (ref 14–18)
IMM GRANULOCYTES NFR BLD AUTO: 0.7 % — HIGH (ref 0.1–0.3)
IMM GRANULOCYTES NFR BLD AUTO: 0.7 % — HIGH (ref 0.1–0.3)
LYMPHOCYTES # BLD AUTO: 0.75 K/UL — LOW (ref 1.2–3.4)
LYMPHOCYTES # BLD AUTO: 0.75 K/UL — LOW (ref 1.2–3.4)
LYMPHOCYTES # BLD AUTO: 6.6 % — LOW (ref 20.5–51.1)
LYMPHOCYTES # BLD AUTO: 6.6 % — LOW (ref 20.5–51.1)
MAGNESIUM SERPL-MCNC: 2.5 MG/DL — HIGH (ref 1.8–2.4)
MAGNESIUM SERPL-MCNC: 2.5 MG/DL — HIGH (ref 1.8–2.4)
MAGNESIUM SERPL-MCNC: 2.6 MG/DL — HIGH (ref 1.8–2.4)
MAGNESIUM SERPL-MCNC: 2.6 MG/DL — HIGH (ref 1.8–2.4)
MCHC RBC-ENTMCNC: 29.7 PG — SIGNIFICANT CHANGE UP (ref 27–31)
MCHC RBC-ENTMCNC: 29.7 PG — SIGNIFICANT CHANGE UP (ref 27–31)
MCHC RBC-ENTMCNC: 30 PG — SIGNIFICANT CHANGE UP (ref 27–31)
MCHC RBC-ENTMCNC: 30 PG — SIGNIFICANT CHANGE UP (ref 27–31)
MCHC RBC-ENTMCNC: 33.2 G/DL — SIGNIFICANT CHANGE UP (ref 32–37)
MCHC RBC-ENTMCNC: 33.2 G/DL — SIGNIFICANT CHANGE UP (ref 32–37)
MCHC RBC-ENTMCNC: 33.3 G/DL — SIGNIFICANT CHANGE UP (ref 32–37)
MCHC RBC-ENTMCNC: 33.3 G/DL — SIGNIFICANT CHANGE UP (ref 32–37)
MCV RBC AUTO: 89.3 FL — SIGNIFICANT CHANGE UP (ref 80–94)
MCV RBC AUTO: 89.3 FL — SIGNIFICANT CHANGE UP (ref 80–94)
MCV RBC AUTO: 90.1 FL — SIGNIFICANT CHANGE UP (ref 80–94)
MCV RBC AUTO: 90.1 FL — SIGNIFICANT CHANGE UP (ref 80–94)
MONOCYTES # BLD AUTO: 0.8 K/UL — HIGH (ref 0.1–0.6)
MONOCYTES # BLD AUTO: 0.8 K/UL — HIGH (ref 0.1–0.6)
MONOCYTES NFR BLD AUTO: 7 % — SIGNIFICANT CHANGE UP (ref 1.7–9.3)
MONOCYTES NFR BLD AUTO: 7 % — SIGNIFICANT CHANGE UP (ref 1.7–9.3)
NEUTROPHILS # BLD AUTO: 9.57 K/UL — HIGH (ref 1.4–6.5)
NEUTROPHILS # BLD AUTO: 9.57 K/UL — HIGH (ref 1.4–6.5)
NEUTROPHILS NFR BLD AUTO: 84.1 % — HIGH (ref 42.2–75.2)
NEUTROPHILS NFR BLD AUTO: 84.1 % — HIGH (ref 42.2–75.2)
NRBC # BLD: 0 /100 WBCS — SIGNIFICANT CHANGE UP (ref 0–0)
PHOSPHATE SERPL-MCNC: 3.2 MG/DL — SIGNIFICANT CHANGE UP (ref 2.1–4.9)
PLATELET # BLD AUTO: 222 K/UL — SIGNIFICANT CHANGE UP (ref 130–400)
PLATELET # BLD AUTO: 222 K/UL — SIGNIFICANT CHANGE UP (ref 130–400)
PLATELET # BLD AUTO: 230 K/UL — SIGNIFICANT CHANGE UP (ref 130–400)
PLATELET # BLD AUTO: 230 K/UL — SIGNIFICANT CHANGE UP (ref 130–400)
PMV BLD: 8.5 FL — SIGNIFICANT CHANGE UP (ref 7.4–10.4)
PMV BLD: 8.5 FL — SIGNIFICANT CHANGE UP (ref 7.4–10.4)
PMV BLD: 9 FL — SIGNIFICANT CHANGE UP (ref 7.4–10.4)
PMV BLD: 9 FL — SIGNIFICANT CHANGE UP (ref 7.4–10.4)
POTASSIUM SERPL-MCNC: 3.8 MMOL/L — SIGNIFICANT CHANGE UP (ref 3.5–5)
POTASSIUM SERPL-SCNC: 3.8 MMOL/L — SIGNIFICANT CHANGE UP (ref 3.5–5)
RBC # BLD: 2.9 M/UL — LOW (ref 4.7–6.1)
RBC # BLD: 2.9 M/UL — LOW (ref 4.7–6.1)
RBC # BLD: 2.93 M/UL — LOW (ref 4.7–6.1)
RBC # BLD: 2.93 M/UL — LOW (ref 4.7–6.1)
RBC # FLD: 13.4 % — SIGNIFICANT CHANGE UP (ref 11.5–14.5)
RBC # FLD: 13.4 % — SIGNIFICANT CHANGE UP (ref 11.5–14.5)
RBC # FLD: 13.6 % — SIGNIFICANT CHANGE UP (ref 11.5–14.5)
RBC # FLD: 13.6 % — SIGNIFICANT CHANGE UP (ref 11.5–14.5)
SODIUM SERPL-SCNC: 140 MMOL/L — SIGNIFICANT CHANGE UP (ref 135–146)
SODIUM SERPL-SCNC: 140 MMOL/L — SIGNIFICANT CHANGE UP (ref 135–146)
SODIUM SERPL-SCNC: 141 MMOL/L — SIGNIFICANT CHANGE UP (ref 135–146)
SODIUM SERPL-SCNC: 141 MMOL/L — SIGNIFICANT CHANGE UP (ref 135–146)
SODIUM SERPL-SCNC: 144 MMOL/L — SIGNIFICANT CHANGE UP (ref 135–146)
SODIUM SERPL-SCNC: 144 MMOL/L — SIGNIFICANT CHANGE UP (ref 135–146)
VALPROATE SERPL-MCNC: 47 UG/ML — LOW (ref 50–100)
VALPROATE SERPL-MCNC: 47 UG/ML — LOW (ref 50–100)
WBC # BLD: 11.38 K/UL — HIGH (ref 4.8–10.8)
WBC # BLD: 11.38 K/UL — HIGH (ref 4.8–10.8)
WBC # BLD: 8.17 K/UL — SIGNIFICANT CHANGE UP (ref 4.8–10.8)
WBC # BLD: 8.17 K/UL — SIGNIFICANT CHANGE UP (ref 4.8–10.8)
WBC # FLD AUTO: 11.38 K/UL — HIGH (ref 4.8–10.8)
WBC # FLD AUTO: 11.38 K/UL — HIGH (ref 4.8–10.8)
WBC # FLD AUTO: 8.17 K/UL — SIGNIFICANT CHANGE UP (ref 4.8–10.8)
WBC # FLD AUTO: 8.17 K/UL — SIGNIFICANT CHANGE UP (ref 4.8–10.8)

## 2023-12-18 PROCEDURE — 99223 1ST HOSP IP/OBS HIGH 75: CPT

## 2023-12-18 PROCEDURE — 95720 EEG PHY/QHP EA INCR W/VEEG: CPT

## 2023-12-18 PROCEDURE — 71045 X-RAY EXAM CHEST 1 VIEW: CPT | Mod: 26

## 2023-12-18 PROCEDURE — 99231 SBSQ HOSP IP/OBS SF/LOW 25: CPT

## 2023-12-18 PROCEDURE — 99291 CRITICAL CARE FIRST HOUR: CPT

## 2023-12-18 PROCEDURE — 70450 CT HEAD/BRAIN W/O DYE: CPT | Mod: 26

## 2023-12-18 RX ORDER — AMLODIPINE BESYLATE 2.5 MG/1
5 TABLET ORAL DAILY
Refills: 0 | Status: DISCONTINUED | OUTPATIENT
Start: 2023-12-18 | End: 2023-12-19

## 2023-12-18 RX ORDER — SODIUM CHLORIDE 9 MG/ML
1000 INJECTION INTRAMUSCULAR; INTRAVENOUS; SUBCUTANEOUS
Refills: 0 | Status: DISCONTINUED | OUTPATIENT
Start: 2023-12-18 | End: 2023-12-18

## 2023-12-18 RX ORDER — INSULIN GLARGINE 100 [IU]/ML
10 INJECTION, SOLUTION SUBCUTANEOUS AT BEDTIME
Refills: 0 | Status: DISCONTINUED | OUTPATIENT
Start: 2023-12-18 | End: 2023-12-19

## 2023-12-18 RX ORDER — POTASSIUM CHLORIDE 20 MEQ
20 PACKET (EA) ORAL ONCE
Refills: 0 | Status: DISCONTINUED | OUTPATIENT
Start: 2023-12-18 | End: 2023-12-18

## 2023-12-18 RX ORDER — IPRATROPIUM/ALBUTEROL SULFATE 18-103MCG
3 AEROSOL WITH ADAPTER (GRAM) INHALATION EVERY 6 HOURS
Refills: 0 | Status: DISCONTINUED | OUTPATIENT
Start: 2023-12-18 | End: 2023-12-20

## 2023-12-18 RX ORDER — SODIUM CHLORIDE 9 MG/ML
1000 INJECTION, SOLUTION INTRAVENOUS
Refills: 0 | Status: DISCONTINUED | OUTPATIENT
Start: 2023-12-18 | End: 2023-12-18

## 2023-12-18 RX ADMIN — Medication 650 MILLIGRAM(S): at 06:10

## 2023-12-18 RX ADMIN — LEVETIRACETAM 500 MILLIGRAM(S): 250 TABLET, FILM COATED ORAL at 05:47

## 2023-12-18 RX ADMIN — HEPARIN SODIUM 5000 UNIT(S): 5000 INJECTION INTRAVENOUS; SUBCUTANEOUS at 13:12

## 2023-12-18 RX ADMIN — SODIUM CHLORIDE 120 MILLILITER(S): 9 INJECTION, SOLUTION INTRAVENOUS at 03:09

## 2023-12-18 RX ADMIN — Medication 650 MILLIGRAM(S): at 00:43

## 2023-12-18 RX ADMIN — Medication 650 MILLIGRAM(S): at 17:16

## 2023-12-18 RX ADMIN — Medication 100 MILLIGRAM(S): at 09:26

## 2023-12-18 RX ADMIN — Medication 5: at 23:07

## 2023-12-18 RX ADMIN — Medication 500 MILLIGRAM(S): at 05:47

## 2023-12-18 RX ADMIN — SODIUM CHLORIDE 70 MILLILITER(S): 9 INJECTION INTRAMUSCULAR; INTRAVENOUS; SUBCUTANEOUS at 11:48

## 2023-12-18 RX ADMIN — Medication 650 MILLIGRAM(S): at 23:17

## 2023-12-18 RX ADMIN — Medication 3: at 11:13

## 2023-12-18 RX ADMIN — Medication 5: at 00:43

## 2023-12-18 RX ADMIN — Medication 100 MILLIGRAM(S): at 03:09

## 2023-12-18 RX ADMIN — CHLORHEXIDINE GLUCONATE 1 APPLICATION(S): 213 SOLUTION TOPICAL at 05:48

## 2023-12-18 RX ADMIN — Medication 5: at 05:47

## 2023-12-18 RX ADMIN — HEPARIN SODIUM 5000 UNIT(S): 5000 INJECTION INTRAVENOUS; SUBCUTANEOUS at 05:47

## 2023-12-18 RX ADMIN — TAMSULOSIN HYDROCHLORIDE 0.8 MILLIGRAM(S): 0.4 CAPSULE ORAL at 21:16

## 2023-12-18 RX ADMIN — SODIUM CHLORIDE 120 MILLILITER(S): 9 INJECTION, SOLUTION INTRAVENOUS at 05:46

## 2023-12-18 RX ADMIN — Medication 500 MILLIGRAM(S): at 13:12

## 2023-12-18 RX ADMIN — Medication 500 MILLIGRAM(S): at 21:16

## 2023-12-18 RX ADMIN — PIPERACILLIN AND TAZOBACTAM 25 GRAM(S): 4; .5 INJECTION, POWDER, LYOPHILIZED, FOR SOLUTION INTRAVENOUS at 17:16

## 2023-12-18 RX ADMIN — AMLODIPINE BESYLATE 5 MILLIGRAM(S): 2.5 TABLET ORAL at 09:26

## 2023-12-18 RX ADMIN — PIPERACILLIN AND TAZOBACTAM 25 GRAM(S): 4; .5 INJECTION, POWDER, LYOPHILIZED, FOR SOLUTION INTRAVENOUS at 05:47

## 2023-12-18 RX ADMIN — HEPARIN SODIUM 5000 UNIT(S): 5000 INJECTION INTRAVENOUS; SUBCUTANEOUS at 21:17

## 2023-12-18 RX ADMIN — Medication 3 MILLILITER(S): at 13:33

## 2023-12-18 RX ADMIN — ATORVASTATIN CALCIUM 20 MILLIGRAM(S): 80 TABLET, FILM COATED ORAL at 21:16

## 2023-12-18 RX ADMIN — POLYETHYLENE GLYCOL 3350 17 GRAM(S): 17 POWDER, FOR SOLUTION ORAL at 11:15

## 2023-12-18 RX ADMIN — Medication 650 MILLIGRAM(S): at 05:46

## 2023-12-18 RX ADMIN — Medication 650 MILLIGRAM(S): at 01:00

## 2023-12-18 RX ADMIN — Medication 100 MILLIGRAM(S): at 17:16

## 2023-12-18 RX ADMIN — Medication 3 MILLILITER(S): at 19:53

## 2023-12-18 RX ADMIN — Medication 650 MILLIGRAM(S): at 05:47

## 2023-12-18 RX ADMIN — INSULIN GLARGINE 10 UNIT(S): 100 INJECTION, SOLUTION SUBCUTANEOUS at 23:08

## 2023-12-18 RX ADMIN — Medication 650 MILLIGRAM(S): at 11:15

## 2023-12-18 NOTE — DIETITIAN INITIAL EVALUATION ADULT - NS FNS DIET ORDER
Diet, NPO with Tube Feed:   Tube Feeding Modality: Nasogastric  Glucerna 1.2 Tonio  Total Volume for 24 Hours (mL): 1320  Continuous  Starting Tube Feed Rate {mL per Hour}: 10  Increase Tube Feed Rate by (mL): 10     Every 4 hours  Until Goal Tube Feed Rate (mL per Hour): 55  Tube Feed Duration (in Hours): 24  Tube Feed Start Time: 10:00   Frequency: Every 4 Hours (12-18-23 @ 02:30)  Tube feed regimen at goal to provide 1584 kcal, 79 g protein, 1069 mL free H2O. Tube feed regimen initiated 12/16 at 10 mL/hr; currently receiving tube feed regimen at goal rate at this time.

## 2023-12-18 NOTE — CONSULT NOTE ADULT - PROBLEM SELECTOR PROBLEM 1
Palliative care by specialist Subdural hematoma without coma with loss of consciousness, initial encounter

## 2023-12-18 NOTE — DIETITIAN INITIAL EVALUATION ADULT - ORAL INTAKE PTA/DIET HISTORY
Pt lethargic at time of RD visit and unable to participate in nutrition interview. No family at bedside. Unable to reach emergency contact at this time.

## 2023-12-18 NOTE — DIETITIAN INITIAL EVALUATION ADULT - ADD RECOMMEND
Recommendation: Would continue Glucerna 1.2 tube feeds, however would provide 18 hour feeds instead of 24 hour duration. Provide Glucerna 1.2 at 75 mL/hr over 18 hour duration (total daily volume 1350 mL Glucerna 1.2 daily). Tube feed regimen at goal to provide 1620 kcal, 81 g protein, 1094 mL free H2O. Provide 100 mL flushes q6hrs. Provide Banatrol twice daily while pt continues to demonstrate loose BMs. Would d/c Banatrol once pt no longer having loose BMs. Would also consider test for c.diff as well if loose BMs persist. If plan to consider initiation of po diet, consult SLP services first to evaluate diet order texture/consistency.

## 2023-12-18 NOTE — PROGRESS NOTE ADULT - SUBJECTIVE AND OBJECTIVE BOX
GENERAL SURGERY PROGRESS NOTE     JACQUELIN CAI  [address] [place] [address] day :2d    POD:  Procedure: Insertion of midline catheter      Surgical Attending: Emergency Doctor Unknown  Overnight events:    24 Hour Events  12/17  NIGHT  -a/ox1 (to person), maintaining his own airway, SpO2 100% on 2L NC, following commands,  strength equal bilaterally in upper extremities, PERRLA bilaterally  -Blood cultures sent      12/17  AM  -patient having seizures in AM, loading depakote 1750mg x1 then 500 q8 and Ativan 2mg x1  -advance dobhoff 5cm- now at 75 cm   -possible transfer to Neurocrit- no available beds per   -added metoprolol 12.5 q8 PO  -250 LR x2   -increased FWF to 200 q4  -f/u famotidine - 20 every other day   -febrile to 101- IV tylenol x1   -seizure episode with /80, 2 ativan x1 given with /80   -switched metoprolol to Labetalol 100 q8  -switched cefepime to zosyn q12, Cr Cl 17.6   -EEG: No generalized slowing. The events of left arm jerking and facial twitching did not have an electrographic correlate other than myogenic artifact. Although cannot rule out focal motor seizure, the events did not appear stereotypic in nature.   -Tightened ISS   -Cr remains 3--> increased IVF to NS @120        T(F): 99.7 (12-18-23 @ 12:00), Max: 99.9 (12-17-23 @ 20:00)  HR: 83 (12-18-23 @ 13:00) (80 - 104)  BP: 136/61 (12-18-23 @ 13:00) (119/59 - 172/67)  ABP: --  ABP(mean): --  RR: 22 (12-18-23 @ 13:00) (18 - 29)  SpO2: 100% (12-18-23 @ 13:00) (100% - 100%)    IN'S / OUT's:    12-17-23 @ 07:01  -  12-18-23 @ 07:00  --------------------------------------------------------  IN:    Enteral Tube Flush: 800 mL    Glucerna: 1035 mL    IV PiggyBack: 100 mL    IV PiggyBack: 50 mL    IV PiggyBack: 100 mL    Lactated Ringers Bolus: 500 mL    sodium chloride 0.45%: 1100 mL    sodium chloride 0.45%: 960 mL    sodium chloride 0.45%: 600 mL  Total IN: 5245 mL    OUT:    Indwelling Catheter - Urethral (mL): 2055 mL  Total OUT: 2055 mL    Total NET: 3190 mL      12-18-23 @ 07:01  -  12-18-23 @ 14:39  --------------------------------------------------------  IN:    Glucerna: 55 mL    sodium chloride 0.45%: 120 mL    sodium chloride 0.9%: 210 mL  Total IN: 385 mL    OUT:    Indwelling Catheter - Urethral (mL): 600 mL    Nasogastric/Oral tube (mL): 0 mL  Total OUT: 600 mL    Total NET: -215 mL          PHYSICAL EXAM:  GENERAL: NAD  HEENT: NG feeding tube in place   CHEST/LUNG: non labored   HEART: Regular rate and rhythm  ABDOMEN: Soft, Nontender, Nondistended;   EXTREMITIES:  No clubbing, cyanosis, or edema  : martinez in place   neuro: GCS 13 (E3, V4, M6) follows commands, moves LUE only, expresses pain when lifting b/l LE        LABS  Labs:  CAPILLARY BLOOD GLUCOSE      POCT Blood Glucose.: 180 mg/dL (18 Dec 2023 11:05)  POCT Blood Glucose.: 197 mg/dL (18 Dec 2023 05:41)  POCT Blood Glucose.: 198 mg/dL (18 Dec 2023 00:39)  POCT Blood Glucose.: 205 mg/dL (17 Dec 2023 17:21)                          8.8    11.38 )-----------( 230      ( 18 Dec 2023 00:30 )             26.4       Auto Neutrophil %: 84.1 % (12-18-23 @ 00:30)  Auto Immature Granulocyte %: 0.7 % (12-18-23 @ 00:30)    12-18    140  |  109  |  41<H>  ----------------------------<  178<H>  3.8   |  22  |  2.8<H>      Calcium: 7.9 mg/dL (12-18-23 @ 11:08)      LFTs:             x    | x    | x        ------------------[x       ( 18 Dec 2023 11:08 )  3.0  | x    | x           Lipase:x      Amylase:x         Blood Gas Arterial, Lactate: 1.3 mmol/L (12-18-23 @ 04:09)  Blood Gas Arterial, Lactate: 1.3 mmol/L (12-17-23 @ 14:26)    ABG - ( 18 Dec 2023 04:09 )  pH: 7.40  /  pCO2: 34    /  pO2: 106   / HCO3: 21    / Base Excess: -3.1  /  SaO2: 99.3            ABG - ( 17 Dec 2023 14:26 )  pH: 7.43  /  pCO2: 33    /  pO2: 127   / HCO3: 22    / Base Excess: -1.7  /  SaO2: 100.0             Coags:            Urinalysis Basic - ( 18 Dec 2023 11:08 )    Color: x / Appearance: x / SG: x / pH: x  Gluc: 178 mg/dL / Ketone: x  / Bili: x / Urobili: x   Blood: x / Protein: x / Nitrite: x   Leuk Esterase: x / RBC: x / WBC x   Sq Epi: x / Non Sq Epi: x / Bacteria: x            RADIOLOGY & ADDITIONAL TESTS:      A/P:  JACQUELIN CAI is a 78yMale 78y Male s/p mechanical fall. +HT, +AC (Aspirin and Plavix), -LOC. Admitted for Acute subdural hemorrhage with no midline shift. repeat CTH stable.       PLAN:   no acute surgical intervention at this time   surgery to follow  continue Anti-convulsants, EEG with no slowing. jerking movements controlled. airway maintained. following commands   q 4 neuro checks  BP goal 110-150   care per SICU   possible transfer to Neurocrit- no available beds per   f/u Goals of care per palliative     Disposition:     Above plan discussed with Attending Surgeon Dr. Degroot  , patient, patient family, and Primary team      TAP (Trauma, Acute care, Pediatrics) Spectra [phone]     GENERAL SURGERY PROGRESS NOTE     JACQUELIN CAI  [address] [place] [address] day :2d    POD:  Procedure: Insertion of midline catheter      Surgical Attending: Emergency Doctor Unknown  Overnight events:    24 Hour Events  12/17  NIGHT  -a/ox1 (to person), maintaining his own airway, SpO2 100% on 2L NC, following commands,  strength equal bilaterally in upper extremities, PERRLA bilaterally  -Blood cultures sent      12/17  AM  -patient having seizures in AM, loading depakote 1750mg x1 then 500 q8 and Ativan 2mg x1  -advance dobhoff 5cm- now at 75 cm   -possible transfer to Neurocrit- no available beds per   -added metoprolol 12.5 q8 PO  -250 LR x2   -increased FWF to 200 q4  -f/u famotidine - 20 every other day   -febrile to 101- IV tylenol x1   -seizure episode with /80, 2 ativan x1 given with /80   -switched metoprolol to Labetalol 100 q8  -switched cefepime to zosyn q12, Cr Cl 17.6   -EEG: No generalized slowing. The events of left arm jerking and facial twitching did not have an electrographic correlate other than myogenic artifact. Although cannot rule out focal motor seizure, the events did not appear stereotypic in nature.   -Tightened ISS   -Cr remains 3--> increased IVF to NS @120        T(F): 99.7 (12-18-23 @ 12:00), Max: 99.9 (12-17-23 @ 20:00)  HR: 83 (12-18-23 @ 13:00) (80 - 104)  BP: 136/61 (12-18-23 @ 13:00) (119/59 - 172/67)  ABP: --  ABP(mean): --  RR: 22 (12-18-23 @ 13:00) (18 - 29)  SpO2: 100% (12-18-23 @ 13:00) (100% - 100%)    IN'S / OUT's:    12-17-23 @ 07:01  -  12-18-23 @ 07:00  --------------------------------------------------------  IN:    Enteral Tube Flush: 800 mL    Glucerna: 1035 mL    IV PiggyBack: 100 mL    IV PiggyBack: 50 mL    IV PiggyBack: 100 mL    Lactated Ringers Bolus: 500 mL    sodium chloride 0.45%: 1100 mL    sodium chloride 0.45%: 960 mL    sodium chloride 0.45%: 600 mL  Total IN: 5245 mL    OUT:    Indwelling Catheter - Urethral (mL): 2055 mL  Total OUT: 2055 mL    Total NET: 3190 mL      12-18-23 @ 07:01  -  12-18-23 @ 14:39  --------------------------------------------------------  IN:    Glucerna: 55 mL    sodium chloride 0.45%: 120 mL    sodium chloride 0.9%: 210 mL  Total IN: 385 mL    OUT:    Indwelling Catheter - Urethral (mL): 600 mL    Nasogastric/Oral tube (mL): 0 mL  Total OUT: 600 mL    Total NET: -215 mL          PHYSICAL EXAM:  GENERAL: NAD  HEENT: NG feeding tube in place   CHEST/LUNG: non labored   HEART: Regular rate and rhythm  ABDOMEN: Soft, Nontender, Nondistended;   EXTREMITIES:  No clubbing, cyanosis, or edema  : martinez in place   neuro: GCS 13 (E3, V4, M6) follows commands, moves LUE only, expresses pain when lifting b/l LE        LABS  Labs:  CAPILLARY BLOOD GLUCOSE      POCT Blood Glucose.: 180 mg/dL (18 Dec 2023 11:05)  POCT Blood Glucose.: 197 mg/dL (18 Dec 2023 05:41)  POCT Blood Glucose.: 198 mg/dL (18 Dec 2023 00:39)  POCT Blood Glucose.: 205 mg/dL (17 Dec 2023 17:21)                          8.8    11.38 )-----------( 230      ( 18 Dec 2023 00:30 )             26.4       Auto Neutrophil %: 84.1 % (12-18-23 @ 00:30)  Auto Immature Granulocyte %: 0.7 % (12-18-23 @ 00:30)    12-18    140  |  109  |  41<H>  ----------------------------<  178<H>  3.8   |  22  |  2.8<H>      Calcium: 7.9 mg/dL (12-18-23 @ 11:08)      LFTs:             x    | x    | x        ------------------[x       ( 18 Dec 2023 11:08 )  3.0  | x    | x           Lipase:x      Amylase:x         Blood Gas Arterial, Lactate: 1.3 mmol/L (12-18-23 @ 04:09)  Blood Gas Arterial, Lactate: 1.3 mmol/L (12-17-23 @ 14:26)    ABG - ( 18 Dec 2023 04:09 )  pH: 7.40  /  pCO2: 34    /  pO2: 106   / HCO3: 21    / Base Excess: -3.1  /  SaO2: 99.3            ABG - ( 17 Dec 2023 14:26 )  pH: 7.43  /  pCO2: 33    /  pO2: 127   / HCO3: 22    / Base Excess: -1.7  /  SaO2: 100.0             Coags:            Urinalysis Basic - ( 18 Dec 2023 11:08 )    Color: x / Appearance: x / SG: x / pH: x  Gluc: 178 mg/dL / Ketone: x  / Bili: x / Urobili: x   Blood: x / Protein: x / Nitrite: x   Leuk Esterase: x / RBC: x / WBC x   Sq Epi: x / Non Sq Epi: x / Bacteria: x            RADIOLOGY & ADDITIONAL TESTS:      A/P:  JACQUELIN CAI is a 78yMale 78y Male s/p mechanical fall. +HT, +AC (Aspirin and Plavix), -LOC. Admitted for Acute subdural hemorrhage with no midline shift. repeat CTH stable.       PLAN:   no acute surgical intervention at this time   surgery to follow  continue Anti-convulsants, EEG with no slowing. jerking movements controlled. airway maintained. following commands   q 4 neuro checks  BP goal 110-150   care per SICU   possible transfer to Neurocrit- no available beds per   f/u Goals of care per palliative     Disposition:     Above plan discussed with Attending Surgeon Dr. Degroot  , patient, patient family, and Primary team      TAP (Trauma, Acute care, Pediatrics) Spectra [phone]

## 2023-12-18 NOTE — DIETITIAN INITIAL EVALUATION ADULT - PERTINENT LABORATORY DATA
12-18    144  |  113<H>  |  43<H>  ----------------------------<  204<H>  3.8   |  21  |  2.8<H>    Ca    7.9<L>      18 Dec 2023 21:45  Phos  3.2     12-18  Mg     2.5     12-18    TPro  x   /  Alb  3.0<L>  /  TBili  x   /  DBili  x   /  AST  x   /  ALT  x   /  AlkPhos  x   12-18  POCT Blood Glucose.: 138 mg/dL (12-18-23 @ 17:14)  A1C with Estimated Average Glucose Result: 6.3 % (02-14-23 @ 05:49)

## 2023-12-18 NOTE — DIETITIAN INITIAL EVALUATION ADULT - PERTINENT MEDS FT
MEDICATIONS  (STANDING):  acetaminophen     Tablet .. 650 milliGRAM(s) Oral every 6 hours  albuterol/ipratropium for Nebulization 3 milliLiter(s) Nebulizer every 6 hours  amLODIPine   Tablet 5 milliGRAM(s) Oral daily  atorvastatin 20 milliGRAM(s) Oral at bedtime  chlorhexidine 2% Cloths 1 Application(s) Topical <User Schedule>  heparin   Injectable 5000 Unit(s) SubCutaneous every 8 hours  insulin glargine Injectable (LANTUS) 10 Unit(s) SubCutaneous at bedtime  insulin lispro (ADMELOG) corrective regimen sliding scale   SubCutaneous every 6 hours  labetalol 100 milliGRAM(s) Oral every 8 hours  senna 2 Tablet(s) Oral at bedtime  sodium bicarbonate 650 milliGRAM(s) Oral two times a day  tamsulosin 0.8 milliGRAM(s) Oral at bedtime  valproic  acid Syrup 500 milliGRAM(s) Oral every 8 hours

## 2023-12-18 NOTE — PROGRESS NOTE ADULT - SUBJECTIVE AND OBJECTIVE BOX
SUMMARY:   HPI: 77-year-old male Cantonese speaking his presents with prior history of hypertension dyslipidemia diabetes BPH prior CVA on aspirin and Plavix who states he had a mechanical fall 2 days ago while getting out of the car fell backwards hit his head.  Afterwards he was able to ambulate.  But for the past 1 day family was unable to ambulate him.  He states that his lower extremities are painful to movement and weak.  Family denies any LOC.    On ADMISSION SCORES:   GCS: 14    SEDATION SCORES:  RASS: CAM-ICU:     REVIEW OF SYSTEMS:    ICU Vital Signs Last 24 Hrs  T(C): 37.6 (18 Dec 2023 08:00), Max: 38.3 (17 Dec 2023 12:00)  T(F): 99.6 (18 Dec 2023 08:00), Max: 101 (17 Dec 2023 12:00)  HR: 88 (18 Dec 2023 08:00) (77 - 104)  BP: 161/69 (18 Dec 2023 08:00) (106/77 - 202/79)  BP(mean): 99 (18 Dec 2023 08:00) (80 - 126)  RR: 24 (18 Dec 2023 08:00) (17 - 40)  SpO2: 100% (18 Dec 2023 08:00) (97% - 100%)    O2 Parameters below as of 18 Dec 2023 08:00  Patient On (Oxygen Delivery Method): nasal cannula  O2 Flow (L/min): 2      17 Dec 2023 07:01  -  18 Dec 2023 07:00  --------------------------------------------------------  IN:    Enteral Tube Flush: 800 mL    Glucerna: 1035 mL    IV PiggyBack: 100 mL    IV PiggyBack: 50 mL    IV PiggyBack: 100 mL    Lactated Ringers Bolus: 500 mL    sodium chloride 0.45%: 1100 mL    sodium chloride 0.45%: 960 mL    sodium chloride 0.45%: 600 mL  Total IN: 5245 mL    OUT:    Indwelling Catheter - Urethral (mL): 2055 mL  Total OUT: 2055 mL    Total NET: 3190 mL      18 Dec 2023 07:01  -  18 Dec 2023 08:25  --------------------------------------------------------  IN:    Glucerna: 55 mL    sodium chloride 0.45%: 120 mL  Total IN: 175 mL    OUT:    Indwelling Catheter - Urethral (mL): 175 mL  Total OUT: 175 mL    Total NET: 0 mL    MEDICATIONS  (STANDING):  acetaminophen     Tablet .. 650 milliGRAM(s) Oral every 6 hours  atorvastatin 20 milliGRAM(s) Oral at bedtime  chlorhexidine 2% Cloths 1 Application(s) Topical <User Schedule>  famotidine    Tablet 20 milliGRAM(s) Oral every 48 hours  heparin   Injectable 5000 Unit(s) SubCutaneous every 8 hours  insulin lispro (ADMELOG) corrective regimen sliding scale   SubCutaneous every 6 hours  labetalol 100 milliGRAM(s) Oral every 8 hours  levETIRAcetam  Solution 500 milliGRAM(s) Oral two times a day  piperacillin/tazobactam IVPB.. 3.375 Gram(s) IV Intermittent every 12 hours  polyethylene glycol 3350 17 Gram(s) Oral daily  senna 2 Tablet(s) Oral at bedtime  sodium bicarbonate 650 milliGRAM(s) Oral two times a day  sodium chloride 0.45%. 1000 milliLiter(s) (120 mL/Hr) IV Continuous <Continuous>  tamsulosin 0.8 milliGRAM(s) Oral at bedtime  valproic  acid Syrup 500 milliGRAM(s) Oral every 8 hours        CT Head No Cont (12.16.23 @ 01:39)     IMPRESSION:    Since CT head 12/15/2023 at 9:49 PM:    Essentially unchanged acute subdural hemorrhages measuring up to 0.7 cm   along the left hemisphere, 0.4 cm along the right frontal extra-axial   space, and 1.1 cm along the left falx. No midline shift.    MEDICATIONS  (PRN):    12-18    141  |  109  |  41<H>  ----------------------------<  212<H>  3.8   |  20  |  2.5<H>    Ca    8.0<L>      18 Dec 2023 00:30  Phos  3.2     12-18  Mg     2.6     12-18        12-15    141  |  103  |  42<H>  ----------------------------<  149<H>  4.0   |  24  |  2.8<H>    Ca    9.2      15 Dec 2023 20:29    TPro  6.8  /  Alb  4.4  /  TBili  0.9  /  DBili  x   /  AST  19  /  ALT  17  /  AlkPhos  49  12-15                          8.8    11.38 )-----------( 230      ( 18 Dec 2023 00:30 )             26.4                             12.2   12.27 )-----------( 240      ( 15 Dec 2023 20:29 )             36.5      EEG.- 12/17- 12/18    Findings:  Background:  continuous, with predominantly alpha > theta frequencies.  Generalized Slowing:  None  Voltage:  Normal (20+ uV)  Organization:  Appropriate anterior-posterior gradient  Posterior Dominant Rhythm:  8-8.5 Hz symmetric, well-organized, and well-modulated  Sleep:  Symmetric, synchronous spindles and K complexes.  Focal abnormalities:  No persistent asymmetries of voltage or frequency.  Spontaneous Activity:  No epileptiform discharges    Events:  On 12/17/2023 at 4:35, the patient had jerking movements of the left hand. There was myogenic correlate.     On 12/17/2023 at 7:24,  the patient had facial twitching. . There was myogenic correlate.     Provocations:  1)	Hyperventilation: was not performed.  2)	Photic stimulation: was not performed.  Daily Summary:    1)	Unremarkable awake and sleep recording.  2)	The events of left arm jerking and facial twitching did not have an electrographic correlate other than myogenic artifact. Although cannot rule out focal motor seizure, the events did not appear stereotypic in nature.       EXAMINATION:  General: No acute distress  HEENT: Anicteric sclerae  Cardiac: [id number]  Lungs: Clear  Abdomen: Soft, non-tender, +BS  Extremities: No c/c/e  Skin/Incision Site: Clean, dry and intact  Neurologic: Lethargic, opens eyes to repeated verbal stimuli, only states his name, oriented x1, pupils are 3 mm and reactive bilaterally, able to follow commands, + Right Facial, RUE +drift that does not hit the bed, LUE AG, RLE same jerry of the bed (moves his foot), LLE some effort against gravity, sensation intact       SUMMARY:   HPI: 77-year-old male Cantonese speaking his presents with prior history of hypertension dyslipidemia diabetes BPH prior CVA on aspirin and Plavix who states he had a mechanical fall 2 days ago while getting out of the car fell backwards hit his head.  Afterwards he was able to ambulate.  But for the past 1 day family was unable to ambulate him.  He states that his lower extremities are painful to movement and weak.  Family denies any LOC.    On ADMISSION SCORES:   GCS: 14    SEDATION SCORES:  RASS: CAM-ICU:     REVIEW OF SYSTEMS:    ICU Vital Signs Last 24 Hrs  T(C): 37.6 (18 Dec 2023 08:00), Max: 38.3 (17 Dec 2023 12:00)  T(F): 99.6 (18 Dec 2023 08:00), Max: 101 (17 Dec 2023 12:00)  HR: 88 (18 Dec 2023 08:00) (77 - 104)  BP: 161/69 (18 Dec 2023 08:00) (106/77 - 202/79)  BP(mean): 99 (18 Dec 2023 08:00) (80 - 126)  RR: 24 (18 Dec 2023 08:00) (17 - 40)  SpO2: 100% (18 Dec 2023 08:00) (97% - 100%)    O2 Parameters below as of 18 Dec 2023 08:00  Patient On (Oxygen Delivery Method): nasal cannula  O2 Flow (L/min): 2      17 Dec 2023 07:01  -  18 Dec 2023 07:00  --------------------------------------------------------  IN:    Enteral Tube Flush: 800 mL    Glucerna: 1035 mL    IV PiggyBack: 100 mL    IV PiggyBack: 50 mL    IV PiggyBack: 100 mL    Lactated Ringers Bolus: 500 mL    sodium chloride 0.45%: 1100 mL    sodium chloride 0.45%: 960 mL    sodium chloride 0.45%: 600 mL  Total IN: 5245 mL    OUT:    Indwelling Catheter - Urethral (mL): 2055 mL  Total OUT: 2055 mL    Total NET: 3190 mL      18 Dec 2023 07:01  -  18 Dec 2023 08:25  --------------------------------------------------------  IN:    Glucerna: 55 mL    sodium chloride 0.45%: 120 mL  Total IN: 175 mL    OUT:    Indwelling Catheter - Urethral (mL): 175 mL  Total OUT: 175 mL    Total NET: 0 mL    MEDICATIONS  (STANDING):  acetaminophen     Tablet .. 650 milliGRAM(s) Oral every 6 hours  atorvastatin 20 milliGRAM(s) Oral at bedtime  chlorhexidine 2% Cloths 1 Application(s) Topical <User Schedule>  famotidine    Tablet 20 milliGRAM(s) Oral every 48 hours  heparin   Injectable 5000 Unit(s) SubCutaneous every 8 hours  insulin lispro (ADMELOG) corrective regimen sliding scale   SubCutaneous every 6 hours  labetalol 100 milliGRAM(s) Oral every 8 hours  levETIRAcetam  Solution 500 milliGRAM(s) Oral two times a day  piperacillin/tazobactam IVPB.. 3.375 Gram(s) IV Intermittent every 12 hours  polyethylene glycol 3350 17 Gram(s) Oral daily  senna 2 Tablet(s) Oral at bedtime  sodium bicarbonate 650 milliGRAM(s) Oral two times a day  sodium chloride 0.45%. 1000 milliLiter(s) (120 mL/Hr) IV Continuous <Continuous>  tamsulosin 0.8 milliGRAM(s) Oral at bedtime  valproic  acid Syrup 500 milliGRAM(s) Oral every 8 hours        CT Head No Cont (12.16.23 @ 01:39)     IMPRESSION:    Since CT head 12/15/2023 at 9:49 PM:    Essentially unchanged acute subdural hemorrhages measuring up to 0.7 cm   along the left hemisphere, 0.4 cm along the right frontal extra-axial   space, and 1.1 cm along the left falx. No midline shift.    MEDICATIONS  (PRN):    12-18    141  |  109  |  41<H>  ----------------------------<  212<H>  3.8   |  20  |  2.5<H>    Ca    8.0<L>      18 Dec 2023 00:30  Phos  3.2     12-18  Mg     2.6     12-18        12-15    141  |  103  |  42<H>  ----------------------------<  149<H>  4.0   |  24  |  2.8<H>    Ca    9.2      15 Dec 2023 20:29    TPro  6.8  /  Alb  4.4  /  TBili  0.9  /  DBili  x   /  AST  19  /  ALT  17  /  AlkPhos  49  12-15                          8.8    11.38 )-----------( 230      ( 18 Dec 2023 00:30 )             26.4                             12.2   12.27 )-----------( 240      ( 15 Dec 2023 20:29 )             36.5      EEG.- 12/17- 12/18    Findings:  Background:  continuous, with predominantly alpha > theta frequencies.  Generalized Slowing:  None  Voltage:  Normal (20+ uV)  Organization:  Appropriate anterior-posterior gradient  Posterior Dominant Rhythm:  8-8.5 Hz symmetric, well-organized, and well-modulated  Sleep:  Symmetric, synchronous spindles and K complexes.  Focal abnormalities:  No persistent asymmetries of voltage or frequency.  Spontaneous Activity:  No epileptiform discharges    Events:  On 12/17/2023 at 4:35, the patient had jerking movements of the left hand. There was myogenic correlate.     On 12/17/2023 at 7:24,  the patient had facial twitching. . There was myogenic correlate.     Provocations:  1)	Hyperventilation: was not performed.  2)	Photic stimulation: was not performed.  Daily Summary:    1)	Unremarkable awake and sleep recording.  2)	The events of left arm jerking and facial twitching did not have an electrographic correlate other than myogenic artifact. Although cannot rule out focal motor seizure, the events did not appear stereotypic in nature.       EXAMINATION:  General: No acute distress  HEENT: Anicteric sclerae  Cardiac: [id number]  Lungs: Clear  Abdomen: Soft, non-tender, +BS  Extremities: No c/c/e  Skin/Incision Site: Clean, dry and intact  Neurologic: Lethargic, opens eyes to repeated verbal stimuli, only states his name, oriented x1, pupils are 3 mm and reactive bilaterally, able to follow commands, + Right Facial, RUE +drift that does not hit the bed, LUE AG, RLE same jerry of the bed (moves his foot), LLE some effort against gravity, sensation intact       SUMMARY:   HPI: 77-year-old male Cantonese speaking his presents with prior history of hypertension dyslipidemia diabetes BPH prior CVA on aspirin and Plavix who states he had a mechanical fall 2 days ago while getting out of the car fell backwards hit his head.  Afterwards he was able to ambulate.  But for the past 1 day family was unable to ambulate him.  He states that his lower extremities are painful to movement and weak.  Family denies any LOC.    On ADMISSION SCORES:   GCS: 14    SEDATION SCORES:  RASS: CAM-ICU:     REVIEW OF SYSTEMS:    ICU Vital Signs Last 24 Hrs  T(C): 37.6 (18 Dec 2023 08:00), Max: 38.3 (17 Dec 2023 12:00)  T(F): 99.6 (18 Dec 2023 08:00), Max: 101 (17 Dec 2023 12:00)  HR: 88 (18 Dec 2023 08:00) (77 - 104)  BP: 161/69 (18 Dec 2023 08:00) (106/77 - 202/79)  BP(mean): 99 (18 Dec 2023 08:00) (80 - 126)  RR: 24 (18 Dec 2023 08:00) (17 - 40)  SpO2: 100% (18 Dec 2023 08:00) (97% - 100%)    O2 Parameters below as of 18 Dec 2023 08:00  Patient On (Oxygen Delivery Method): nasal cannula  O2 Flow (L/min): 2      17 Dec 2023 07:01  -  18 Dec 2023 07:00  --------------------------------------------------------  IN:    Enteral Tube Flush: 800 mL    Glucerna: 1035 mL    IV PiggyBack: 100 mL    IV PiggyBack: 50 mL    IV PiggyBack: 100 mL    Lactated Ringers Bolus: 500 mL    sodium chloride 0.45%: 1100 mL    sodium chloride 0.45%: 960 mL    sodium chloride 0.45%: 600 mL  Total IN: 5245 mL    OUT:    Indwelling Catheter - Urethral (mL): 2055 mL  Total OUT: 2055 mL    Total NET: 3190 mL      18 Dec 2023 07:01  -  18 Dec 2023 08:25  --------------------------------------------------------  IN:    Glucerna: 55 mL    sodium chloride 0.45%: 120 mL  Total IN: 175 mL    OUT:    Indwelling Catheter - Urethral (mL): 175 mL  Total OUT: 175 mL    Total NET: 0 mL    MEDICATIONS  (STANDING):  acetaminophen     Tablet .. 650 milliGRAM(s) Oral every 6 hours  atorvastatin 20 milliGRAM(s) Oral at bedtime  chlorhexidine 2% Cloths 1 Application(s) Topical <User Schedule>  famotidine    Tablet 20 milliGRAM(s) Oral every 48 hours  heparin   Injectable 5000 Unit(s) SubCutaneous every 8 hours  insulin lispro (ADMELOG) corrective regimen sliding scale   SubCutaneous every 6 hours  labetalol 100 milliGRAM(s) Oral every 8 hours  levETIRAcetam  Solution 500 milliGRAM(s) Oral two times a day  piperacillin/tazobactam IVPB.. 3.375 Gram(s) IV Intermittent every 12 hours  polyethylene glycol 3350 17 Gram(s) Oral daily  senna 2 Tablet(s) Oral at bedtime  sodium bicarbonate 650 milliGRAM(s) Oral two times a day  sodium chloride 0.45%. 1000 milliLiter(s) (120 mL/Hr) IV Continuous <Continuous>  tamsulosin 0.8 milliGRAM(s) Oral at bedtime  valproic  acid Syrup 500 milliGRAM(s) Oral every 8 hours        CT Head No Cont (12.16.23 @ 01:39)     IMPRESSION:    Since CT head 12/15/2023 at 9:49 PM:    Essentially unchanged acute subdural hemorrhages measuring up to 0.7 cm   along the left hemisphere, 0.4 cm along the right frontal extra-axial   space, and 1.1 cm along the left falx. No midline shift.    MEDICATIONS  (PRN):    12-18    141  |  109  |  41<H>  ----------------------------<  212<H>  3.8   |  20  |  2.5<H>    Ca    8.0<L>      18 Dec 2023 00:30  Phos  3.2     12-18  Mg     2.6     12-18        12-15    141  |  103  |  42<H>  ----------------------------<  149<H>  4.0   |  24  |  2.8<H>    Ca    9.2      15 Dec 2023 20:29    TPro  6.8  /  Alb  4.4  /  TBili  0.9  /  DBili  x   /  AST  19  /  ALT  17  /  AlkPhos  49  12-15                          8.8    11.38 )-----------( 230      ( 18 Dec 2023 00:30 )             26.4                             12.2   12.27 )-----------( 240      ( 15 Dec 2023 20:29 )             36.5      EEG.- 12/17- 12/18    Background-continues, symmetrical , slightly less than optimally organized reaching 7- 8 hz that is reactive    Focal and generalized slowing-----------  mild generalized slowing. Mild bilateral FTC focal slowing with shifting asymmetry    Interictal activity-----none    Events---------- on video review there were episodes of right eyelid/facial spasm that were not associated with epileptiform EEG changes    Seizures--------------none    Impression: abnormal as above     EEG.- 12/16- 12/17    Findings:  Background:  continuous, with predominantly alpha > theta frequencies.  Generalized Slowing:  None  Voltage:  Normal (20+ uV)  Organization:  Appropriate anterior-posterior gradient  Posterior Dominant Rhythm:  8-8.5 Hz symmetric, well-organized, and well-modulated  Sleep:  Symmetric, synchronous spindles and K complexes.  Focal abnormalities:  No persistent asymmetries of voltage or frequency.  Spontaneous Activity:  No epileptiform discharges    Events:  On 12/17/2023 at 4:35, the patient had jerking movements of the left hand. There was myogenic correlate.     On 12/17/2023 at 7:24,  the patient had facial twitching. . There was myogenic correlate.     Provocations:  1)	Hyperventilation: was not performed.  2)	Photic stimulation: was not performed.  Daily Summary:    1)	Unremarkable awake and sleep recording.  2)	The events of left arm jerking and facial twitching did not have an electrographic correlate other than myogenic artifact. Although cannot rule out focal motor seizure, the events did not appear stereotypic in nature.       EXAMINATION:  General: No acute distress  HEENT: Anicteric sclerae  Cardiac: [id number]  Lungs: Clear  Abdomen: Soft, non-tender, +BS  Extremities: No c/c/e  Skin/Incision Site: Clean, dry and intact  Neurologic: Lethargic, opens eyes to repeated verbal stimuli, only states his name, oriented x1, pupils are 3 mm and reactive bilaterally, able to follow commands, + Right Facial, RUE +drift that does not hit the bed, LUE AG, RLE same jerry of the bed (moves his foot), LLE some effort against gravity, sensation intact       SUMMARY:   HPI: 77-year-old male Cantonese speaking his presents with prior history of hypertension dyslipidemia diabetes BPH prior CVA on aspirin and Plavix who states he had a mechanical fall 2 days ago while getting out of the car fell backwards hit his head.  Afterwards he was able to ambulate.  But for the past 1 day family was unable to ambulate him.  He states that his lower extremities are painful to movement and weak.  Family denies any LOC.    On ADMISSION SCORES:   GCS: 14    SEDATION SCORES:  RASS: CAM-ICU:     REVIEW OF SYSTEMS:    ICU Vital Signs Last 24 Hrs  T(C): 37.6 (18 Dec 2023 08:00), Max: 38.3 (17 Dec 2023 12:00)  T(F): 99.6 (18 Dec 2023 08:00), Max: 101 (17 Dec 2023 12:00)  HR: 88 (18 Dec 2023 08:00) (77 - 104)  BP: 161/69 (18 Dec 2023 08:00) (106/77 - 202/79)  BP(mean): 99 (18 Dec 2023 08:00) (80 - 126)  RR: 24 (18 Dec 2023 08:00) (17 - 40)  SpO2: 100% (18 Dec 2023 08:00) (97% - 100%)    O2 Parameters below as of 18 Dec 2023 08:00  Patient On (Oxygen Delivery Method): nasal cannula  O2 Flow (L/min): 2      17 Dec 2023 07:01  -  18 Dec 2023 07:00  --------------------------------------------------------  IN:    Enteral Tube Flush: 800 mL    Glucerna: 1035 mL    IV PiggyBack: 100 mL    IV PiggyBack: 50 mL    IV PiggyBack: 100 mL    Lactated Ringers Bolus: 500 mL    sodium chloride 0.45%: 1100 mL    sodium chloride 0.45%: 960 mL    sodium chloride 0.45%: 600 mL  Total IN: 5245 mL    OUT:    Indwelling Catheter - Urethral (mL): 2055 mL  Total OUT: 2055 mL    Total NET: 3190 mL      18 Dec 2023 07:01  -  18 Dec 2023 08:25  --------------------------------------------------------  IN:    Glucerna: 55 mL    sodium chloride 0.45%: 120 mL  Total IN: 175 mL    OUT:    Indwelling Catheter - Urethral (mL): 175 mL  Total OUT: 175 mL    Total NET: 0 mL    MEDICATIONS  (STANDING):  acetaminophen     Tablet .. 650 milliGRAM(s) Oral every 6 hours  atorvastatin 20 milliGRAM(s) Oral at bedtime  chlorhexidine 2% Cloths 1 Application(s) Topical <User Schedule>  famotidine    Tablet 20 milliGRAM(s) Oral every 48 hours  heparin   Injectable 5000 Unit(s) SubCutaneous every 8 hours  insulin lispro (ADMELOG) corrective regimen sliding scale   SubCutaneous every 6 hours  labetalol 100 milliGRAM(s) Oral every 8 hours  levETIRAcetam  Solution 500 milliGRAM(s) Oral two times a day  piperacillin/tazobactam IVPB.. 3.375 Gram(s) IV Intermittent every 12 hours  polyethylene glycol 3350 17 Gram(s) Oral daily  senna 2 Tablet(s) Oral at bedtime  sodium bicarbonate 650 milliGRAM(s) Oral two times a day  sodium chloride 0.45%. 1000 milliLiter(s) (120 mL/Hr) IV Continuous <Continuous>  tamsulosin 0.8 milliGRAM(s) Oral at bedtime  valproic  acid Syrup 500 milliGRAM(s) Oral every 8 hours        CT Head No Cont (12.16.23 @ 01:39)     IMPRESSION:    Since CT head 12/15/2023 at 9:49 PM:    Essentially unchanged acute subdural hemorrhages measuring up to 0.7 cm   along the left hemisphere, 0.4 cm along the right frontal extra-axial   space, and 1.1 cm along the left falx. No midline shift.    MEDICATIONS  (PRN):    12-18    141  |  109  |  41<H>  ----------------------------<  212<H>  3.8   |  20  |  2.5<H>    Ca    8.0<L>      18 Dec 2023 00:30  Phos  3.2     12-18  Mg     2.6     12-18        12-15    141  |  103  |  42<H>  ----------------------------<  149<H>  4.0   |  24  |  2.8<H>    Ca    9.2      15 Dec 2023 20:29    TPro  6.8  /  Alb  4.4  /  TBili  0.9  /  DBili  x   /  AST  19  /  ALT  17  /  AlkPhos  49  12-15                          8.8    11.38 )-----------( 230      ( 18 Dec 2023 00:30 )             26.4                             12.2   12.27 )-----------( 240      ( 15 Dec 2023 20:29 )             36.5      EEG.- 12/17- 12/18    Background-continues, symmetrical , slightly less than optimally organized reaching 7- 8 hz that is reactive    Focal and generalized slowing-----------  mild generalized slowing. Mild bilateral FTC focal slowing with shifting asymmetry    Interictal activity-----none    Events---------- on video review there were episodes of right eyelid/facial spasm that were not associated with epileptiform EEG changes    Seizures--------------none    Impression: abnormal as above     EEG.- 12/16- 12/17    Findings:  Background:  continuous, with predominantly alpha > theta frequencies.  Generalized Slowing:  None  Voltage:  Normal (20+ uV)  Organization:  Appropriate anterior-posterior gradient  Posterior Dominant Rhythm:  8-8.5 Hz symmetric, well-organized, and well-modulated  Sleep:  Symmetric, synchronous spindles and K complexes.  Focal abnormalities:  No persistent asymmetries of voltage or frequency.  Spontaneous Activity:  No epileptiform discharges    Events:  On 12/17/2023 at 4:35, the patient had jerking movements of the left hand. There was myogenic correlate.     On 12/17/2023 at 7:24,  the patient had facial twitching. . There was myogenic correlate.     Provocations:  1)	Hyperventilation: was not performed.  2)	Photic stimulation: was not performed.  Daily Summary:    1)	Unremarkable awake and sleep recording.  2)	The events of left arm jerking and facial twitching did not have an electrographic correlate other than myogenic artifact. Although cannot rule out focal motor seizure, the events did not appear stereotypic in nature.       EXAMINATION:  General: No acute distress  HEENT: Anicteric sclerae  Cardiac: [id number]  Lungs: Clear  Abdomen: Soft, non-tender, +BS  Extremities: No c/c/e  Skin/Incision Site: Clean, dry and intact  Neurologic: Lethargic, opens eyes to repeated verbal stimuli, only states his name, oriented x1, pupils are 3 mm and reactive bilaterally, able to follow commands, + Right Facial, RUE +drift that does not hit the bed, LUE AG, RLE same jerry of the bed (moves his foot), LLE some effort against gravity, sensation intact

## 2023-12-18 NOTE — PROGRESS NOTE ADULT - ASSESSMENT
Assessment: 78 year old make hx of HTN, BPH, CKD, Stroke (1 year ago), HLD, DM presents s/p Fall. Patient fell on Wednesday upon exiting a bus, hitting the back of his head on concrete. No LOC. Fall was witnessed by  of bus. Since than family states he is having difficulty ambulating, and periods of confusion. CT Head showing an acute SDH along the left hemisphere, and along the left falx. No shift. Patient takes ASA and Plavix daily.     Plan:  - q 2 neuro checks  - BP goal 110-150   - Continuous EEG monitoring  - If EEG neg for seizure and Valproic acid Level corrected for albumin > 50 - < 100 would attempt to consolidate to One AED  - Continue Keppra 500 mg BID  - Valproic acid - Check Trough  - Check Nh3 level   - F/UCT Scan completed- stable    NCC will continue to follow     ex [id number]  Assessment: 78 year old make hx of HTN, BPH, CKD, Stroke (1 year ago), HLD, DM presents s/p Fall. Patient fell on Wednesday upon exiting a bus, hitting the back of his head on concrete. No LOC. Fall was witnessed by  of bus. Since than family states he is having difficulty ambulating, and periods of confusion. CT Head showing an acute SDH along the left hemisphere, and along the left falx. No shift. Patient takes ASA and Plavix daily.     Plan:  - q 2 neuro checks  - BP goal 110-150   - Continuous EEG monitoring  - If EEG neg for seizure and Valproic acid Level corrected for albumin > 50 - < 100 would attempt to consolidate to One AED  - Continue Keppra 500 mg BID  - Valproic acid - Check Trough  - Check Nh3 level   - F/UCT Scan completed- stable    NCC will continue to follow     ex [id number]  Assessment: 78 year old make hx of HTN, BPH, CKD, Stroke (1 year ago), HLD, DM presents s/p Fall. Patient fell on Wednesday upon exiting a bus, hitting the back of his head on concrete. No LOC. Fall was witnessed by  of bus. Since than family states he is having difficulty ambulating, and periods of confusion. CT Head showing an acute SDH along the left hemisphere, and along the left falx. No shift. Patient takes ASA and Plavix daily.     Plan:  - q 4 neuro checks  - BP goal 110-150   - Continuous EEG monitoring  - If EEG neg for seizure and Valproic acid Level corrected for albumin . Goal Valproic acid  level > 50 - < 100 ; would attempt to consolidate to one AED and D/C keppra  - Continue on EEG whild downgrading AED meds  - Continue Keppra 500 mg BID  - Valproic acid - Check Trough  - Check Nh3 level   - F/UCT Scan completed- stable  - Will sign off and transfer consultation to Neurology         ex [id number]  Assessment: 78 year old make hx of HTN, BPH, CKD, Stroke (1 year ago), HLD, DM presents s/p Fall. Patient fell on Wednesday upon exiting a bus, hitting the back of his head on concrete. No LOC. Fall was witnessed by  of bus. Since than family states he is having difficulty ambulating, and periods of confusion. CT Head showing an acute SDH along the left hemisphere, and along the left falx. No shift. Patient takes ASA and Plavix daily.     Plan:  - q 4 neuro checks  - BP goal 110-150   - Continuous EEG monitoring  - If EEG neg for seizure and Valproic acid Level corrected for albumin . Goal Valproic acid  level > 50 - < 100 ; would attempt to consolidate to one AED and D/C keppra  - Continue on EEG whild downgrading AED meds  - Continue Keppra 500 mg BID  - Valproic acid - Check Trough  - Check Nh3 level   - F/UCT Scan completed- stable  - Will sign off and transfer consultation to Neurology         ex [id number]

## 2023-12-18 NOTE — PROGRESS NOTE ADULT - SUBJECTIVE AND OBJECTIVE BOX
JACQUELIN CAI   [id number]   [birth year]  78yM    Admit Date: 12-16-23  Indication for SDU/SICU: Q1 neuro checks        ============================  HPI   Home (12-15-23 @ 18:05)  78-year-old male Taish[place] speaking presents with prior history of HTN, HLD, Diabetes, BPH, prior CVA on aspirin and Plavix who states he had a mechanical fall 3 days ago while getting out of the car fell backwards hit his head.  Afterwards he was able to ambulate.  But for the past 1 day family was unable to ambulate him.  He states that his lower extremities are painful to movement and weak.  Family denies any LOC. Per daughter patient he often forgets things and cannot recall year or place. Patient following commands bedside.   Initial CT head remarkable for 3 Acute subdural hemorrhages: 0.7 cm along the left hemisphere, 0.3 cm along the right frontal area, and 1.1 cm along the left falx. No midline shift. Repeat CT head was obtained prior to 6 hour tacos because patient was appearing lethargic vs sundowning. CT head #2 with stable findings. CT c-spine and chest/abd/pelvis unremarkable for acute pathology.     SICU Consult for q1 neuro checks      Pertinent Imaging  CTH #1: Acute subdural hemorrhages which measure 0.7 cm along the left hemisphere, 0.3 cm along the right frontal area, and 1.1 cm along the left falx. No midline shift.  CT head #2: Essentially unchanged acute subdural hemorrhages measuring up to 0.7 cm along the left hemisphere, 0.4 cm along the right frontal extra-axial space, and 1.1 cm along the left falx. No midline shift.  CT c-spine: No current acute cervical spine injury.  CT chest: No evidence of an acute traumatic solid organ or osseous injury.  CT abd/pelvis: No evidence of an acute traumatic solid organ or osseous injury. Question of focal mural thickening of the anterior urinary bladder wall with associated calcifications vs stones. Nonemergent follow-up CT urogram is recommended.       24 Hour Events  12/17  NIGHT  -a/ox1 (to person), maintaining his own airway, SpO2 100% on 2L NC, following commands,  strength equal bilaterally in upper extremities, PERRLA bilaterally  -Blood cultures sent      12/17  AM  -patient having seizures in AM, loading depakote 1750mg x1 then 500 q8 and Ativan 2mg x1  -advance dobhoff 5cm- now at 75 cm   -possible transfer to Neurocrit- no available beds per   -added metoprolol 12.5 q8 PO  -250 LR x2   -increased FWF to 200 q4  -f/u famotidine - 20 every other day   -febrile to 101- IV tylenol x1   -seizure episode with /80, 2 ativan x1 given with /80   -switched metoprolol to Labetalol 100 q8  -switched cefepime to zosyn q12, Cr Cl 17.6   -EEG: No generalized slowing. The events of left arm jerking and facial twitching did not have an electrographic correlate other than myogenic artifact. Although cannot rule out focal motor seizure, the events did not appear stereotypic in nature.   -Tightened ISS   -Cr remains 3--> increased IVF to NS @120      [X] A ten-point review of systems was otherwise negative except as noted above.  [  ] Due to altered mental status/intubation, subjective information was not attained from the patient. History was obtained, to the extent possible, from review of the chart and collateral sources of information.    =========================================================================================================================================   JACQUELIN CAI   [id number]   [birth year]  78yM    Admit Date: 12-16-23  Indication for SDU/SICU: Q1 neuro checks        ============================  HPI   Home (12-15-23 @ 18:05)  78-year-old male Taish[place] speaking presents with prior history of HTN, HLD, Diabetes, BPH, prior CVA on aspirin and Plavix who states he had a mechanical fall 3 days ago while getting out of the car fell backwards hit his head.  Afterwards he was able to ambulate.  But for the past 1 day family was unable to ambulate him.  He states that his lower extremities are painful to movement and weak.  Family denies any LOC. Per daughter patient he often forgets things and cannot recall year or place. Patient following commands bedside.   Initial CT head remarkable for 3 Acute subdural hemorrhages: 0.7 cm along the left hemisphere, 0.3 cm along the right frontal area, and 1.1 cm along the left falx. No midline shift. Repeat CT head was obtained prior to 6 hour tacos because patient was appearing lethargic vs sundowning. CT head #2 with stable findings. CT c-spine and chest/abd/pelvis unremarkable for acute pathology.     SICU Consult for q1 neuro checks      Pertinent Imaging  CTH #1: Acute subdural hemorrhages which measure 0.7 cm along the left hemisphere, 0.3 cm along the right frontal area, and 1.1 cm along the left falx. No midline shift.  CT head #2: Essentially unchanged acute subdural hemorrhages measuring up to 0.7 cm along the left hemisphere, 0.4 cm along the right frontal extra-axial space, and 1.1 cm along the left falx. No midline shift.  CT c-spine: No current acute cervical spine injury.  CT chest: No evidence of an acute traumatic solid organ or osseous injury.  CT abd/pelvis: No evidence of an acute traumatic solid organ or osseous injury. Question of focal mural thickening of the anterior urinary bladder wall with associated calcifications vs stones. Nonemergent follow-up CT urogram is recommended.       24 Hour Events  12/17  NIGHT  -a/ox1 (to person), maintaining his own airway, SpO2 100% on 2L NC, following commands,  strength equal bilaterally in upper extremities, PERRLA bilaterally  -Blood cultures sent      12/17  AM  -patient having seizures in AM, loading depakote 1750mg x1 then 500 q8 and Ativan 2mg x1  -advance dobhoff 5cm- now at 75 cm   -possible transfer to Neurocrit- no available beds per   -added metoprolol 12.5 q8 PO  -250 LR x2   -increased FWF to 200 q4  -f/u famotidine - 20 every other day   -febrile to 101- IV tylenol x1   -seizure episode with /80, 2 ativan x1 given with /80   -switched metoprolol to Labetalol 100 q8  -switched cefepime to zosyn q12, Cr Cl 17.6   -EEG: No generalized slowing. The events of left arm jerking and facial twitching did not have an electrographic correlate other than myogenic artifact. Although cannot rule out focal motor seizure, the events did not appear stereotypic in nature.   -Tightened ISS   -Cr remains 3--> increased IVF to NS @120      [X] A ten-point review of systems was otherwise negative except as noted above.  [  ] Due to altered mental status/intubation, subjective information was not attained from the patient. History was obtained, to the extent possible, from review of the chart and collateral sources of information.    =========================================================================================================================================   JACQUELIN CAI   [id number]   [birth year]  78yM    Admit Date: 12-16-23  Indication for SDU/SICU: Q1 neuro checks        ============================  HPI   Home (12-15-23 @ 18:05)  78-year-old male Taish[place] speaking presents with prior history of HTN, HLD, Diabetes, BPH, prior CVA on aspirin and Plavix who states he had a mechanical fall 3 days ago while getting out of the car fell backwards hit his head.  Afterwards he was able to ambulate.  But for the past 1 day family was unable to ambulate him.  He states that his lower extremities are painful to movement and weak.  Family denies any LOC. Per daughter patient he often forgets things and cannot recall year or place. Patient following commands bedside.   Initial CT head remarkable for 3 Acute subdural hemorrhages: 0.7 cm along the left hemisphere, 0.3 cm along the right frontal area, and 1.1 cm along the left falx. No midline shift. Repeat CT head was obtained prior to 6 hour tacos because patient was appearing lethargic vs sundowning. CT head #2 with stable findings. CT c-spine and chest/abd/pelvis unremarkable for acute pathology.     SICU Consult for q1 neuro checks      Pertinent Imaging  CTH #1: Acute subdural hemorrhages which measure 0.7 cm along the left hemisphere, 0.3 cm along the right frontal area, and 1.1 cm along the left falx. No midline shift.  CT head #2: Essentially unchanged acute subdural hemorrhages measuring up to 0.7 cm along the left hemisphere, 0.4 cm along the right frontal extra-axial space, and 1.1 cm along the left falx. No midline shift.  CT c-spine: No current acute cervical spine injury.  CT chest: No evidence of an acute traumatic solid organ or osseous injury.  CT abd/pelvis: No evidence of an acute traumatic solid organ or osseous injury. Question of focal mural thickening of the anterior urinary bladder wall with associated calcifications vs stones. Nonemergent follow-up CT urogram is recommended.       24 Hour Events  12/17  NIGHT  -a/ox1 (to person), maintaining his own airway, SpO2 100% on 2L NC, following commands,  strength equal bilaterally in upper extremities, PERRLA bilaterally  -Blood cultures sent      12/17  AM  -patient having seizures in AM, loading depakote 1750mg x1 then 500 q8 and Ativan 2mg x1  -advance dobhoff 5cm- now at 75 cm   -possible transfer to Neurocrit- no available beds per   -added metoprolol 12.5 q8 PO  -250 LR x2   -increased FWF to 200 q4  -f/u famotidine - 20 every other day   -febrile to 101- IV tylenol x1   -seizure episode with /80, 2 ativan x1 given with /80   -switched metoprolol to Labetalol 100 q8  -switched cefepime to zosyn q12, Cr Cl 17.6   -EEG: No generalized slowing. The events of left arm jerking and facial twitching did not have an electrographic correlate other than myogenic artifact. Although cannot rule out focal motor seizure, the events did not appear stereotypic in nature.   -Tightened ISS   -Cr remains 3--> increased IVF to NS @120      [X] A ten-point review of systems was otherwise negative except as noted above.  [  ] Due to altered mental status/intubation, subjective information was not attained from the patient. History was obtained, to the extent possible, from review of the chart and collateral sources of information.    =========================================================================================================================================  Daily     Daily   Diet, NPO with Tube Feed:   Tube Feeding Modality: Nasogastric  Glucerna 1.2 Tonio  Total Volume for 24 Hours (mL): 1320  Continuous  Starting Tube Feed Rate mL per Hour: 10  Increase Tube Feed Rate by (mL): 10     Every 4 hours  Until Goal Tube Feed Rate (mL per Hour): 55  Tube Feed Duration (in Hours): 24  Tube Feed Start Time: 10:00   Frequency: Every 4 Hours (12-18-23 @ 02:30)    CURRENT MEDS:  Neurologic Medications  acetaminophen     Tablet .. 650 milliGRAM(s) Oral every 6 hours  levETIRAcetam  Solution 500 milliGRAM(s) Oral two times a day  valproic  acid Syrup 500 milliGRAM(s) Oral every 8 hours    Respiratory Medications  albuterol/ipratropium for Nebulization 3 milliLiter(s) Nebulizer every 6 hours    Cardiovascular Medications  amLODIPine   Tablet 5 milliGRAM(s) Oral daily  labetalol 100 milliGRAM(s) Oral every 8 hours    Gastrointestinal Medications  polyethylene glycol 3350 17 Gram(s) Oral daily  senna 2 Tablet(s) Oral at bedtime  sodium bicarbonate 650 milliGRAM(s) Oral two times a day    Genitourinary Medications  tamsulosin 0.8 milliGRAM(s) Oral at bedtime    Hematologic/Oncologic Medications  heparin   Injectable 5000 Unit(s) SubCutaneous every 8 hours    Antimicrobial/Immunologic Medications  piperacillin/tazobactam IVPB.. 3.375 Gram(s) IV Intermittent every 12 hours    Endocrine/Metabolic Medications  atorvastatin 20 milliGRAM(s) Oral at bedtime  insulin glargine Injectable (LANTUS) 10 Unit(s) SubCutaneous at bedtime  insulin lispro (ADMELOG) corrective regimen sliding scale   SubCutaneous every 6 hours    Topical/Other Medications  chlorhexidine 2% Cloths 1 Application(s) Topical <User Schedule>    ICU Vital Signs Last 24 Hrs  T(C): 37.6 (18 Dec 2023 08:00), Max: 38.3 (17 Dec 2023 12:00)  T(F): 99.6 (18 Dec 2023 08:00), Max: 101 (17 Dec 2023 12:00)  HR: 86 (18 Dec 2023 09:00) (83 - 104)  BP: 164/73 (18 Dec 2023 09:00) (119/59 - 201/88)  BP(mean): 105 (18 Dec 2023 09:00) (80 - 126)  ABP: --  ABP(mean): --  RR: 27 (18 Dec 2023 09:00) (18 - 29)  SpO2: 100% (18 Dec 2023 09:00) (100% - 100%)    O2 Parameters below as of 18 Dec 2023 08:00  Patient On (Oxygen Delivery Method): nasal cannula  O2 Flow (L/min): 2    ABG - ( 18 Dec 2023 04:09 )  pH, Arterial: 7.40  pH, Blood: x     /  pCO2: 34    /  pO2: 106   / HCO3: 21    / Base Excess: -3.1  /  SaO2: 99.3      I&O's Summary    17 Dec 2023 07:01  -  18 Dec 2023 07:00  --------------------------------------------------------  IN: 5245 mL / OUT: 2055 mL / NET: 3190 mL    18 Dec 2023 07:01  -  18 Dec 2023 11:11  --------------------------------------------------------  IN: 175 mL / OUT: 350 mL / NET: -175 mL      I&O's Detail    17 Dec 2023 07:01  -  18 Dec 2023 07:00  --------------------------------------------------------  IN:    Enteral Tube Flush: 800 mL    Glucerna: 1035 mL    IV PiggyBack: 100 mL    IV PiggyBack: 50 mL    IV PiggyBack: 100 mL    Lactated Ringers Bolus: 500 mL    sodium chloride 0.45%: 1100 mL    sodium chloride 0.45%: 960 mL    sodium chloride 0.45%: 600 mL  Total IN: 5245 mL    OUT:    Indwelling Catheter - Urethral (mL): 2055 mL  Total OUT: 2055 mL    Total NET: 3190 mL      18 Dec 2023 07:01  -  18 Dec 2023 11:11  --------------------------------------------------------  IN:    Glucerna: 55 mL    sodium chloride 0.45%: 120 mL  Total IN: 175 mL    OUT:    Indwelling Catheter - Urethral (mL): 350 mL  Total OUT: 350 mL    Total NET: -175 mL    PHYSICAL EXAM:     communicated with patient via cantonese  ID # [id number]  GCS 13 (E3, V4, M6)  follows commands, moves LUE only, expresses pain when lifting b/l LE  no acute distress  equal chest rise b/l, B/L rhonchi noted  abdomen soft  extremities soft  urinary cath in place, no scrotal swelling noted JACQUELIN CAI   [id number]   [birth year]  78yM    Admit Date: 12-16-23  Indication for SDU/SICU: Q1 neuro checks        ============================  HPI   Home (12-15-23 @ 18:05)  78-year-old male Taish[place] speaking presents with prior history of HTN, HLD, Diabetes, BPH, prior CVA on aspirin and Plavix who states he had a mechanical fall 3 days ago while getting out of the car fell backwards hit his head.  Afterwards he was able to ambulate.  But for the past 1 day family was unable to ambulate him.  He states that his lower extremities are painful to movement and weak.  Family denies any LOC. Per daughter patient he often forgets things and cannot recall year or place. Patient following commands bedside.   Initial CT head remarkable for 3 Acute subdural hemorrhages: 0.7 cm along the left hemisphere, 0.3 cm along the right frontal area, and 1.1 cm along the left falx. No midline shift. Repeat CT head was obtained prior to 6 hour tacos because patient was appearing lethargic vs sundowning. CT head #2 with stable findings. CT c-spine and chest/abd/pelvis unremarkable for acute pathology.     SICU Consult for q1 neuro checks      Pertinent Imaging  CTH #1: Acute subdural hemorrhages which measure 0.7 cm along the left hemisphere, 0.3 cm along the right frontal area, and 1.1 cm along the left falx. No midline shift.  CT head #2: Essentially unchanged acute subdural hemorrhages measuring up to 0.7 cm along the left hemisphere, 0.4 cm along the right frontal extra-axial space, and 1.1 cm along the left falx. No midline shift.  CT c-spine: No current acute cervical spine injury.  CT chest: No evidence of an acute traumatic solid organ or osseous injury.  CT abd/pelvis: No evidence of an acute traumatic solid organ or osseous injury. Question of focal mural thickening of the anterior urinary bladder wall with associated calcifications vs stones. Nonemergent follow-up CT urogram is recommended.       24 Hour Events  12/17  NIGHT  -a/ox1 (to person), maintaining his own airway, SpO2 100% on 2L NC, following commands,  strength equal bilaterally in upper extremities, PERRLA bilaterally  -Blood cultures sent      12/17  AM  -patient having seizures in AM, loading depakote 1750mg x1 then 500 q8 and Ativan 2mg x1  -advance dobhoff 5cm- now at 75 cm   -possible transfer to Neurocrit- no available beds per   -added metoprolol 12.5 q8 PO  -250 LR x2   -increased FWF to 200 q4  -f/u famotidine - 20 every other day   -febrile to 101- IV tylenol x1   -seizure episode with /80, 2 ativan x1 given with /80   -switched metoprolol to Labetalol 100 q8  -switched cefepime to zosyn q12, Cr Cl 17.6   -EEG: No generalized slowing. The events of left arm jerking and facial twitching did not have an electrographic correlate other than myogenic artifact. Although cannot rule out focal motor seizure, the events did not appear stereotypic in nature.   -Tightened ISS   -Cr remains 3--> increased IVF to NS @120      [X] A ten-point review of systems was otherwise negative except as noted above.  [  ] Due to altered mental status/intubation, subjective information was not attained from the patient. History was obtained, to the extent possible, from review of the chart and collateral sources of information.    =========================================================================================================================================  Daily     Daily   Diet, NPO with Tube Feed:   Tube Feeding Modality: Nasogastric  Glucerna 1.2 Tonio  Total Volume for 24 Hours (mL): 1320  Continuous  Starting Tube Feed Rate mL per Hour: 10  Increase Tube Feed Rate by (mL): 10     Every 4 hours  Until Goal Tube Feed Rate (mL per Hour): 55  Tube Feed Duration (in Hours): 24  Tube Feed Start Time: 10:00   Frequency: Every 4 Hours (12-18-23 @ 02:30)    CURRENT MEDS:  Neurologic Medications  acetaminophen     Tablet .. 650 milliGRAM(s) Oral every 6 hours  levETIRAcetam  Solution 500 milliGRAM(s) Oral two times a day  valproic  acid Syrup 500 milliGRAM(s) Oral every 8 hours    Respiratory Medications  albuterol/ipratropium for Nebulization 3 milliLiter(s) Nebulizer every 6 hours    Cardiovascular Medications  amLODIPine   Tablet 5 milliGRAM(s) Oral daily  labetalol 100 milliGRAM(s) Oral every 8 hours    Gastrointestinal Medications  polyethylene glycol 3350 17 Gram(s) Oral daily  senna 2 Tablet(s) Oral at bedtime  sodium bicarbonate 650 milliGRAM(s) Oral two times a day    Genitourinary Medications  tamsulosin 0.8 milliGRAM(s) Oral at bedtime    Hematologic/Oncologic Medications  heparin   Injectable 5000 Unit(s) SubCutaneous every 8 hours    Antimicrobial/Immunologic Medications  piperacillin/tazobactam IVPB.. 3.375 Gram(s) IV Intermittent every 12 hours    Endocrine/Metabolic Medications  atorvastatin 20 milliGRAM(s) Oral at bedtime  insulin glargine Injectable (LANTUS) 10 Unit(s) SubCutaneous at bedtime  insulin lispro (ADMELOG) corrective regimen sliding scale   SubCutaneous every 6 hours    Topical/Other Medications  chlorhexidine 2% Cloths 1 Application(s) Topical <User Schedule>    ICU Vital Signs Last 24 Hrs  T(C): 37.6 (18 Dec 2023 08:00), Max: 38.3 (17 Dec 2023 12:00)  T(F): 99.6 (18 Dec 2023 08:00), Max: 101 (17 Dec 2023 12:00)  HR: 86 (18 Dec 2023 09:00) (83 - 104)  BP: 164/73 (18 Dec 2023 09:00) (119/59 - 201/88)  BP(mean): 105 (18 Dec 2023 09:00) (80 - 126)  ABP: --  ABP(mean): --  RR: 27 (18 Dec 2023 09:00) (18 - 29)  SpO2: 100% (18 Dec 2023 09:00) (100% - 100%)    O2 Parameters below as of 18 Dec 2023 08:00  Patient On (Oxygen Delivery Method): nasal cannula  O2 Flow (L/min): 2    ABG - ( 18 Dec 2023 04:09 )  pH, Arterial: 7.40  pH, Blood: x     /  pCO2: 34    /  pO2: 106   / HCO3: 21    / Base Excess: -3.1  /  SaO2: 99.3      I&O's Summary    17 Dec 2023 07:01  -  18 Dec 2023 07:00  --------------------------------------------------------  IN: 5245 mL / OUT: 2055 mL / NET: 3190 mL    18 Dec 2023 07:01  -  18 Dec 2023 11:11  --------------------------------------------------------  IN: 175 mL / OUT: 350 mL / NET: -175 mL      I&O's Detail    17 Dec 2023 07:01  -  18 Dec 2023 07:00  --------------------------------------------------------  IN:    Enteral Tube Flush: 800 mL    Glucerna: 1035 mL    IV PiggyBack: 100 mL    IV PiggyBack: 50 mL    IV PiggyBack: 100 mL    Lactated Ringers Bolus: 500 mL    sodium chloride 0.45%: 1100 mL    sodium chloride 0.45%: 960 mL    sodium chloride 0.45%: 600 mL  Total IN: 5245 mL    OUT:    Indwelling Catheter - Urethral (mL): 2055 mL  Total OUT: 2055 mL    Total NET: 3190 mL      18 Dec 2023 07:01  -  18 Dec 2023 11:11  --------------------------------------------------------  IN:    Glucerna: 55 mL    sodium chloride 0.45%: 120 mL  Total IN: 175 mL    OUT:    Indwelling Catheter - Urethral (mL): 350 mL  Total OUT: 350 mL    Total NET: -175 mL    PHYSICAL EXAM:     communicated with patient via cantonese  ID # [id number]  GCS 13 (E3, V4, M6)  follows commands, moves LUE only, expresses pain when lifting b/l LE  no acute distress  equal chest rise b/l, B/L rhonchi noted  abdomen soft  extremities soft  urinary cath in place, no scrotal swelling noted

## 2023-12-18 NOTE — DIETITIAN INITIAL EVALUATION ADULT - NUTRITIONGOAL OUTCOME1
Pt to demonstrate tolerance to diet order, meeting >85% & <105% of estimated nutrient needs over next 3-5 days.    Pt at high nutrition risk; RD to follow-up in 3-5 days.    Monitor: Skin, labs, BM, wt, nutrition focused physical findings, body composition, diet order, GI, swallow function.

## 2023-12-18 NOTE — CONSULT NOTE ADULT - SUBJECTIVE AND OBJECTIVE BOX
CC:     HPI:  TRAUMA ACTIVATION LEVEL:  CODE / ALERT  / CONSULT  ACTIVATED BY: EMS**  /  ED**  INTUBATED: YES** / NO**    MECHANISM OF INJURY:   [] Blunt     [] MVC	  [x] Fall	  [] Pedestrian Struck	  [] Motorcycle     [] Assault     [] Bicycle collision    [] Sports injury    [] Penetrating    [] Gun Shot Wound      [] Stab Wound    GCS: 14 	E: 4	V: 4	M: 6    HPI:"77-year-old male Cantonese speaking his presents with prior history of hypertension dyslipidemia diabetes BPH prior CVA on aspirin and Plavix who states he had a mechanical fall 2 days ago while getting out of the car fell backwards hit his head.  Afterwards he was able to ambulate.  But for the past 1 day family was unable to ambulate him.  He states that his lower extremities are painful to movement and weak.  Family denies any LOC.  They deny any confusion at home.  On exam oriented to person and place but just not to date.  He does follow simple commands.  Elevates his arms for 10 seconds.  Sensation intact in upper extremities.  Lower extremities with 2 out of 5 strength bilaterally."        Trauma assessment in ED: ABCs intact , GCS 14 , AAOx1    Obvious external signs of injury: None    PAST MEDICAL & SURGICAL HISTORY:  HTN (hypertension)      Seasonal allergies      History of BPH      Diabetes      No significant past surgical history        Allergies    [This allergen will not trigger allergy alert] pollen (Unknown)  No Known Drug Allergies    Intolerances      Home Medications:  amLODIPine 5 mg oral tablet: 1 tab(s) orally once a day (14 Feb 2023 02:08)  aspirin 81 mg oral tablet: 1 tab(s) orally once a day (14 Feb 2023 02:08)  carvedilol 6.25 mg oral tablet: 1 tab(s) orally 2 times a day (14 Feb 2023 02:08)  Jardiance 25 mg oral tablet: 1 tab(s) orally once a day (in the morning) (14 Feb 2023 02:08)  Nephplex Rx oral tablet: 1 tab(s) orally once a day (14 Feb 2023 02:08)  tamsulosin 0.4 mg oral capsule: 1 cap(s) orally once a day (14 Feb 2023 02:08)  valsartan 320 mg oral tablet: 1 tab(s) orally once a day (14 Feb 2023 02:08)      ROS: 10-system review is otherwise negative except HPI above.      Primary Survey:    A - airway intact  B - bilateral breath sounds and good chest rise  C - palpable pulses in all extremities  D - GCS 15 on arrival, MACIEL  Exposure obtained       (16 Dec 2023 00:01)    PERTINENT PM/SXH:   HTN (hypertension)    Seasonal allergies    History of BPH    Diabetes      No significant past surgical history      FAMILY HISTORY:  No pertinent family history in first degree relatives      ITEMS NOT CHECKED ARE NOT PRESENT    SOCIAL HISTORY:   Significant other/partner[ ]  Children[ ]  Latter-day/Spirituality:  Substance hx:  [ ]   Tobacco hx:  [ ]   Alcohol hx: [ ]   Living Situation: [ ]Home  [ ]Long term care  [ ]Rehab [ ]Other  Home Services: [ ] HHA [ ] Visting RN [ ] Hospice  Occupation:  Home Opioid hx:  [ ] Y [ ] N [ ] I-Stop Reference No:     ADVANCE DIRECTIVES:     [ ] Full Code [ ] DNR  MOLST  [ ]  Living Will  [ ]   DECISION MAKER(s):  [ ] Health Care Proxy(s)  [ ] Surrogate(s)  [ ] Guardian           Name(s): Phone Number(s):   Wife Negro Fair [phone]  Dtr Rashi Bryantang - [phone]   BASELINE (I)ADL(s) (prior to admission):    [place]: [x ]Total  [ ] Moderate [ ]Dependent  Palliative Performance Status Version 2:         %    http://npcrc.org/files/news/palliative_performance_scale_ppsv2.pdf    Allergies    [This allergen will not trigger allergy alert] pollen (Unknown)  No Known Drug Allergies    Intolerances    MEDICATIONS  (STANDING):  acetaminophen     Tablet .. 650 milliGRAM(s) Oral every 6 hours  albuterol/ipratropium for Nebulization 3 milliLiter(s) Nebulizer every 6 hours  amLODIPine   Tablet 5 milliGRAM(s) Oral daily  atorvastatin 20 milliGRAM(s) Oral at bedtime  chlorhexidine 2% Cloths 1 Application(s) Topical <User Schedule>  heparin   Injectable 5000 Unit(s) SubCutaneous every 8 hours  insulin glargine Injectable (LANTUS) 10 Unit(s) SubCutaneous at bedtime  insulin lispro (ADMELOG) corrective regimen sliding scale   SubCutaneous every 6 hours  labetalol 100 milliGRAM(s) Oral every 8 hours  levETIRAcetam  Solution 500 milliGRAM(s) Oral two times a day  piperacillin/tazobactam IVPB.. 3.375 Gram(s) IV Intermittent every 12 hours  polyethylene glycol 3350 17 Gram(s) Oral daily  senna 2 Tablet(s) Oral at bedtime  sodium bicarbonate 650 milliGRAM(s) Oral two times a day  tamsulosin 0.8 milliGRAM(s) Oral at bedtime  valproic  acid Syrup 500 milliGRAM(s) Oral every 8 hours    MEDICATIONS  (PRN):    PRESENT SYMPTOMS: [ ]Unable to obtain due to poor mentation   Source if other than patient:  [ ]Family   [ ]Team     Pain: [ ]yes [ ]no  QOL impact -   Location -                    Aggravating factors -  Quality -  Radiation -  Timing-  Severity (0-10 scale):  Minimal acceptable level (0-10 scale):     CPOT:    https://www.[facility].org/getattachment/[email or web address]/Critical-Care-Pain-Observation-Tool-(CPOT)    PAIN AD Score:   http://geriatrictoolkit.[facility]/cog/painad.pdf (press ctrl +  left click to view)    Dyspnea:                           [ ]None[ ]Mild [ ]Moderate [ ]Severe     Respiratory Distress Observation Scale (RDOS):   A score of 0 to 2 signifies little or no respiratory distress, 3 signifies mild distress, scores 4 to 6 indicate moderate distress, and scores greater than 7 signify severe distress  https://www.[facility].ca/sites/default/files/[email or web address].pdf    Anxiety:                             [ ]None[ ]Mild [ ]Moderate [ ]Severe   Fatigue:                             [ ]None[ ]Mild [ ]Moderate [ ]Severe   Nausea:                             [ ]None[ ]Mild [ ]Moderate [ ]Severe   Loss of appetite:              [ ]None[ ]Mild [ ]Moderate [ ]Severe   Constipation:                    [ ]None[ ]Mild [ ]Moderate [ ]Severe    Other Symptoms:  [ ]All other review of systems negative     Palliative Performance Status Version 2:         %    http://npcrc.org/files/news/palliative_performance_scale_ppsv2.pdf    PHYSICAL EXAM:  Vital Signs Last 24 Hrs  T(C): 37.6 (18 Dec 2023 08:00), Max: 38.3 (17 Dec 2023 12:00)  T(F): 99.6 (18 Dec 2023 08:00), Max: 101 (17 Dec 2023 12:00)  HR: 86 (18 Dec 2023 09:00) (83 - 104)  BP: 164/73 (18 Dec 2023 09:00) (119/59 - 201/88)  BP(mean): 105 (18 Dec 2023 09:00) (80 - 126)  RR: 27 (18 Dec 2023 09:00) (18 - 29)  SpO2: 100% (18 Dec 2023 09:00) (100% - 100%)    Parameters below as of 18 Dec 2023 08:00  Patient On (Oxygen Delivery Method): nasal cannula  O2 Flow (L/min): 2   I&O's Summary    17 Dec 2023 07:01  -  18 Dec 2023 07:00  --------------------------------------------------------  IN: 5245 mL / OUT: 2055 mL / NET: 3190 mL    18 Dec 2023 07:01  -  18 Dec 2023 11:11  --------------------------------------------------------  IN: 175 mL / OUT: 350 mL / NET: -175 mL        GENERAL:  [ ] No acute distress [ ]Lethargic  [ ]Unarousable  [ ]Verbal  [ ]Non-Verbal [ ]Cachexia    BEHAVIORAL/PSYCH:  [ ]Alert and Oriented x  [ ] Anxiety [ ] Delirium [ ] Agitation [ ] Calm   EYES: [ ] No scleral icterus [ ] Scleral icterus [ ] Closed  ENMT:  [ ]Dry mouth  [ ]No external oral lesions [ ] No external ear or nose lesions  CARDIOVASCULAR:  [ ]Regular [ ]Irregular [ ]Tachy [ ]Not Tachy  [ ]Nikita [ ] Edema [ ] No edema  PULMONARY:  [ ]Tachypnea  [ ]Audible excessive secretions [ ] No labored breathing [ ] labored breathing  GASTROINTESTINAL: [ ]Soft  [ ]Distended  [ ]Not distended [ ]Non tender [ ]Tender  MUSCULOSKELETAL: [ ]No clubbing [ ] clubbing  [ ] No cyanosis [ ] cyanosis  NEUROLOGIC: [ ]No focal deficits  [ ]Follows commands  [ ]Does not follow commands  [ ]Cognitive impairment  [ ]Dysphagia  [ ]Dysarthria  [ ]Paresis   SKIN: [ ] Jaundiced [ ] Non-jaundiced [ ]Rash [ ]No Rash [ ] Warm [ ] Dry  MISC/LINES: [ ] ET tube [ ] Trach [ ]NGT/OGT [ ]PEG [ ]Aguirre    LABS: reviewed by me                        8.8    11.38 )-----------( 230      ( 18 Dec 2023 00:30 )             26.4   12-18    141  |  109  |  41<H>  ----------------------------<  212<H>  3.8   |  20  |  2.5<H>    Ca    8.0<L>      18 Dec 2023 00:30  Phos  3.2     12-18  Mg     2.6     12-18        Urinalysis Basic - ( 18 Dec 2023 00:30 )    Color: x / Appearance: x / SG: x / pH: x  Gluc: 212 mg/dL / Ketone: x  / Bili: x / Urobili: x   Blood: x / Protein: x / Nitrite: x   Leuk Esterase: x / RBC: x / WBC x   Sq Epi: x / Non Sq Epi: x / Bacteria: x      RADIOLOGY & ADDITIONAL STUDIES: reviewed by me    EKG: reviewed by me        Palliative Care Spiritual/Emotional Screening Tool Question  Severity (0-4):                    OR                    [ ] Unable to determine/NA  Score of 2 or greater indicates recommendation of Chaplaincy referral  Chaplaincy Referral: [ ] Yes [ ] Refused [ ] Following     Caregiver [place]:  [ ] Yes [ ] No [ ] Deferred  Social Work Referral [ ]  Patient and Family Centered Care Referral [ ]    Anticipatory Grief Present: [ ] Yes [ ] No [ ] Deferred  Social Work Referral [ ]  Patient and Family Centered Care Referral [ ]    Patient discussed with primary medical team MD  Palliative care education provided to patient and/or family   CC:     HPI:  TRAUMA ACTIVATION LEVEL:  CODE / ALERT  / CONSULT  ACTIVATED BY: EMS**  /  ED**  INTUBATED: YES** / NO**    MECHANISM OF INJURY:   [] Blunt     [] MVC	  [x] Fall	  [] Pedestrian Struck	  [] Motorcycle     [] Assault     [] Bicycle collision    [] Sports injury    [] Penetrating    [] Gun Shot Wound      [] Stab Wound    GCS: 14 	E: 4	V: 4	M: 6    HPI:"77-year-old male Cantonese speaking his presents with prior history of hypertension dyslipidemia diabetes BPH prior CVA on aspirin and Plavix who states he had a mechanical fall 2 days ago while getting out of the car fell backwards hit his head.  Afterwards he was able to ambulate.  But for the past 1 day family was unable to ambulate him.  He states that his lower extremities are painful to movement and weak.  Family denies any LOC.  They deny any confusion at home.  On exam oriented to person and place but just not to date.  He does follow simple commands.  Elevates his arms for 10 seconds.  Sensation intact in upper extremities.  Lower extremities with 2 out of 5 strength bilaterally."        Trauma assessment in ED: ABCs intact , GCS 14 , AAOx1    Obvious external signs of injury: None    PAST MEDICAL & SURGICAL HISTORY:  HTN (hypertension)      Seasonal allergies      History of BPH      Diabetes      No significant past surgical history        Allergies    [This allergen will not trigger allergy alert] pollen (Unknown)  No Known Drug Allergies    Intolerances      Home Medications:  amLODIPine 5 mg oral tablet: 1 tab(s) orally once a day (14 Feb 2023 02:08)  aspirin 81 mg oral tablet: 1 tab(s) orally once a day (14 Feb 2023 02:08)  carvedilol 6.25 mg oral tablet: 1 tab(s) orally 2 times a day (14 Feb 2023 02:08)  Jardiance 25 mg oral tablet: 1 tab(s) orally once a day (in the morning) (14 Feb 2023 02:08)  Nephplex Rx oral tablet: 1 tab(s) orally once a day (14 Feb 2023 02:08)  tamsulosin 0.4 mg oral capsule: 1 cap(s) orally once a day (14 Feb 2023 02:08)  valsartan 320 mg oral tablet: 1 tab(s) orally once a day (14 Feb 2023 02:08)      ROS: 10-system review is otherwise negative except HPI above.      Primary Survey:    A - airway intact  B - bilateral breath sounds and good chest rise  C - palpable pulses in all extremities  D - GCS 15 on arrival, MACIEL  Exposure obtained       (16 Dec 2023 00:01)    PERTINENT PM/SXH:   HTN (hypertension)    Seasonal allergies    History of BPH    Diabetes      No significant past surgical history      FAMILY HISTORY:  No pertinent family history in first degree relatives      ITEMS NOT CHECKED ARE NOT PRESENT    SOCIAL HISTORY:   Significant other/partner[ ]  Children[ ]  Confucianism/Spirituality:  Substance hx:  [ ]   Tobacco hx:  [ ]   Alcohol hx: [ ]   Living Situation: [ ]Home  [ ]Long term care  [ ]Rehab [ ]Other  Home Services: [ ] HHA [ ] Visting RN [ ] Hospice  Occupation:  Home Opioid hx:  [ ] Y [ ] N [ ] I-Stop Reference No:     ADVANCE DIRECTIVES:     [ ] Full Code [ ] DNR  MOLST  [ ]  Living Will  [ ]   DECISION MAKER(s):  [ ] Health Care Proxy(s)  [ ] Surrogate(s)  [ ] Guardian           Name(s): Phone Number(s):   Wife Negro Fair [phone]  Dtr Rashi Bryantang - [phone]   BASELINE (I)ADL(s) (prior to admission):    [place]: [x ]Total  [ ] Moderate [ ]Dependent  Palliative Performance Status Version 2:         %    http://npcrc.org/files/news/palliative_performance_scale_ppsv2.pdf    Allergies    [This allergen will not trigger allergy alert] pollen (Unknown)  No Known Drug Allergies    Intolerances    MEDICATIONS  (STANDING):  acetaminophen     Tablet .. 650 milliGRAM(s) Oral every 6 hours  albuterol/ipratropium for Nebulization 3 milliLiter(s) Nebulizer every 6 hours  amLODIPine   Tablet 5 milliGRAM(s) Oral daily  atorvastatin 20 milliGRAM(s) Oral at bedtime  chlorhexidine 2% Cloths 1 Application(s) Topical <User Schedule>  heparin   Injectable 5000 Unit(s) SubCutaneous every 8 hours  insulin glargine Injectable (LANTUS) 10 Unit(s) SubCutaneous at bedtime  insulin lispro (ADMELOG) corrective regimen sliding scale   SubCutaneous every 6 hours  labetalol 100 milliGRAM(s) Oral every 8 hours  levETIRAcetam  Solution 500 milliGRAM(s) Oral two times a day  piperacillin/tazobactam IVPB.. 3.375 Gram(s) IV Intermittent every 12 hours  polyethylene glycol 3350 17 Gram(s) Oral daily  senna 2 Tablet(s) Oral at bedtime  sodium bicarbonate 650 milliGRAM(s) Oral two times a day  tamsulosin 0.8 milliGRAM(s) Oral at bedtime  valproic  acid Syrup 500 milliGRAM(s) Oral every 8 hours    MEDICATIONS  (PRN):    PRESENT SYMPTOMS: [ ]Unable to obtain due to poor mentation   Source if other than patient:  [ ]Family   [ ]Team     Pain: [ ]yes [ ]no  QOL impact -   Location -                    Aggravating factors -  Quality -  Radiation -  Timing-  Severity (0-10 scale):  Minimal acceptable level (0-10 scale):     CPOT:    https://www.[facility].org/getattachment/[email or web address]/Critical-Care-Pain-Observation-Tool-(CPOT)    PAIN AD Score:   http://geriatrictoolkit.[facility]/cog/painad.pdf (press ctrl +  left click to view)    Dyspnea:                           [ ]None[ ]Mild [ ]Moderate [ ]Severe     Respiratory Distress Observation Scale (RDOS):   A score of 0 to 2 signifies little or no respiratory distress, 3 signifies mild distress, scores 4 to 6 indicate moderate distress, and scores greater than 7 signify severe distress  https://www.[email or web address]/sites/default/files/PDFS/[email or web address]-pcjtocbkgsg-sowtzupp-aadhpeibquv-izbvo.pdf    Anxiety:                             [ ]None[ ]Mild [ ]Moderate [ ]Severe   Fatigue:                             [ ]None[ ]Mild [ ]Moderate [ ]Severe   Nausea:                             [ ]None[ ]Mild [ ]Moderate [ ]Severe   Loss of appetite:              [ ]None[ ]Mild [ ]Moderate [ ]Severe   Constipation:                    [ ]None[ ]Mild [ ]Moderate [ ]Severe    Other Symptoms:  [ ]All other review of systems negative     Palliative Performance Status Version 2:         %    http://npcrc.org/files/news/palliative_performance_scale_ppsv2.pdf    PHYSICAL EXAM:  Vital Signs Last 24 Hrs  T(C): 37.6 (18 Dec 2023 08:00), Max: 38.3 (17 Dec 2023 12:00)  T(F): 99.6 (18 Dec 2023 08:00), Max: 101 (17 Dec 2023 12:00)  HR: 86 (18 Dec 2023 09:00) (83 - 104)  BP: 164/73 (18 Dec 2023 09:00) (119/59 - 201/88)  BP(mean): 105 (18 Dec 2023 09:00) (80 - 126)  RR: 27 (18 Dec 2023 09:00) (18 - 29)  SpO2: 100% (18 Dec 2023 09:00) (100% - 100%)    Parameters below as of 18 Dec 2023 08:00  Patient On (Oxygen Delivery Method): nasal cannula  O2 Flow (L/min): 2   I&O's Summary    17 Dec 2023 07:01  -  18 Dec 2023 07:00  --------------------------------------------------------  IN: 5245 mL / OUT: 2055 mL / NET: 3190 mL    18 Dec 2023 07:01  -  18 Dec 2023 11:11  --------------------------------------------------------  IN: 175 mL / OUT: 350 mL / NET: -175 mL        GENERAL:  [ ] No acute distress [ ]Lethargic  [ ]Unarousable  [ ]Verbal  [ ]Non-Verbal [ ]Cachexia    BEHAVIORAL/PSYCH:  [ ]Alert and Oriented x  [ ] Anxiety [ ] Delirium [ ] Agitation [ ] Calm   EYES: [ ] No scleral icterus [ ] Scleral icterus [ ] Closed  ENMT:  [ ]Dry mouth  [ ]No external oral lesions [ ] No external ear or nose lesions  CARDIOVASCULAR:  [ ]Regular [ ]Irregular [ ]Tachy [ ]Not Tachy  [ ]Nikita [ ] Edema [ ] No edema  PULMONARY:  [ ]Tachypnea  [ ]Audible excessive secretions [ ] No labored breathing [ ] labored breathing  GASTROINTESTINAL: [ ]Soft  [ ]Distended  [ ]Not distended [ ]Non tender [ ]Tender  MUSCULOSKELETAL: [ ]No clubbing [ ] clubbing  [ ] No cyanosis [ ] cyanosis  NEUROLOGIC: [ ]No focal deficits  [ ]Follows commands  [ ]Does not follow commands  [ ]Cognitive impairment  [ ]Dysphagia  [ ]Dysarthria  [ ]Paresis   SKIN: [ ] Jaundiced [ ] Non-jaundiced [ ]Rash [ ]No Rash [ ] Warm [ ] Dry  MISC/LINES: [ ] ET tube [ ] Trach [ ]NGT/OGT [ ]PEG [ ]Aguirre    LABS: reviewed by me                        8.8    11.38 )-----------( 230      ( 18 Dec 2023 00:30 )             26.4   12-18    141  |  109  |  41<H>  ----------------------------<  212<H>  3.8   |  20  |  2.5<H>    Ca    8.0<L>      18 Dec 2023 00:30  Phos  3.2     12-18  Mg     2.6     12-18        Urinalysis Basic - ( 18 Dec 2023 00:30 )    Color: x / Appearance: x / SG: x / pH: x  Gluc: 212 mg/dL / Ketone: x  / Bili: x / Urobili: x   Blood: x / Protein: x / Nitrite: x   Leuk Esterase: x / RBC: x / WBC x   Sq Epi: x / Non Sq Epi: x / Bacteria: x      RADIOLOGY & ADDITIONAL STUDIES: reviewed by me    EKG: reviewed by me        Palliative Care Spiritual/Emotional Screening Tool Question  Severity (0-4):                    OR                    [ ] Unable to determine/NA  Score of 2 or greater indicates recommendation of Chaplaincy referral  Chaplaincy Referral: [ ] Yes [ ] Refused [ ] Following     Caregiver [place]:  [ ] Yes [ ] No [ ] Deferred  Social Work Referral [ ]  Patient and Family Centered Care Referral [ ]    Anticipatory Grief Present: [ ] Yes [ ] No [ ] Deferred  Social Work Referral [ ]  Patient and Family Centered Care Referral [ ]    Patient discussed with primary medical team MD  Palliative care education provided to patient and/or family   CC:     HPI:  TRAUMA ACTIVATION LEVEL:  CODE / ALERT  / CONSULT  ACTIVATED BY: EMS**  /  ED**  INTUBATED: YES** / NO**    MECHANISM OF INJURY:   [] Blunt     [] MVC	  [x] Fall	  [] Pedestrian Struck	  [] Motorcycle     [] Assault     [] Bicycle collision    [] Sports injury    [] Penetrating    [] Gun Shot Wound      [] Stab Wound    GCS: 14 	E: 4	V: 4	M: 6    HPI:"77-year-old male Cantonese speaking his presents with prior history of hypertension dyslipidemia diabetes BPH prior CVA on aspirin and Plavix who states he had a mechanical fall 2 days ago while getting out of the car fell backwards hit his head.  Afterwards he was able to ambulate.  But for the past 1 day family was unable to ambulate him.  He states that his lower extremities are painful to movement and weak.  Family denies any LOC.  They deny any confusion at home.  On exam oriented to person and place but just not to date.  He does follow simple commands.  Elevates his arms for 10 seconds.  Sensation intact in upper extremities.  Lower extremities with 2 out of 5 strength bilaterally."        Trauma assessment in ED: ABCs intact , GCS 14 , AAOx1    Obvious external signs of injury: None    PAST MEDICAL & SURGICAL HISTORY:  HTN (hypertension)      Seasonal allergies      History of BPH      Diabetes      No significant past surgical history        Allergies    [This allergen will not trigger allergy alert] pollen (Unknown)  No Known Drug Allergies    Intolerances      Home Medications:  amLODIPine 5 mg oral tablet: 1 tab(s) orally once a day (14 Feb 2023 02:08)  aspirin 81 mg oral tablet: 1 tab(s) orally once a day (14 Feb 2023 02:08)  carvedilol 6.25 mg oral tablet: 1 tab(s) orally 2 times a day (14 Feb 2023 02:08)  Jardiance 25 mg oral tablet: 1 tab(s) orally once a day (in the morning) (14 Feb 2023 02:08)  Nephplex Rx oral tablet: 1 tab(s) orally once a day (14 Feb 2023 02:08)  tamsulosin 0.4 mg oral capsule: 1 cap(s) orally once a day (14 Feb 2023 02:08)  valsartan 320 mg oral tablet: 1 tab(s) orally once a day (14 Feb 2023 02:08)      ROS: 10-system review is otherwise negative except HPI above.      Primary Survey:    A - airway intact  B - bilateral breath sounds and good chest rise  C - palpable pulses in all extremities  D - GCS 15 on arrival, MACIEL  Exposure obtained       (16 Dec 2023 00:01)    PERTINENT PM/SXH:   HTN (hypertension)    Seasonal allergies    History of BPH    Diabetes      No significant past surgical history      FAMILY HISTORY:  No pertinent family history in first degree relatives      ITEMS NOT CHECKED ARE NOT PRESENT    SOCIAL HISTORY:   Significant other/partnerx[ ]  Children[x ]  Protestant/Spirituality:  Substance hx:  [ ]   Tobacco hx:  [ ]   Alcohol hx: [ ]   Living Situation: [ x]Home  [ ]Long term care  [ ]Rehab [ ]Other  Home Services: [ ] HHA [ ] Visting RN [ ] Hospice  Occupation:  Home Opioid hx:  [ ] Y [ ] N [ ] I-Stop Reference No:     ADVANCE DIRECTIVES:     [ ] Full Code [ ] DNR  MOLST  [ ]  Living Will  [ ]   DECISION MAKER(s):  [ ] Health Care Proxy(s)  [ ] Surrogate(s)  [ ] Guardian           Name(s): Phone Number(s):   Wife Negro Fair [phone]  Dtr Rashi Bryantang - [phone]   BASELINE (I)ADL(s) (prior to admission):    [place]: [x ]Total  [ ] Moderate [ ]Dependent  Palliative Performance Status Version 2:         %    http://npcrc.org/files/news/palliative_performance_scale_ppsv2.pdf    Allergies    [This allergen will not trigger allergy alert] pollen (Unknown)  No Known Drug Allergies    Intolerances    MEDICATIONS  (STANDING):  acetaminophen     Tablet .. 650 milliGRAM(s) Oral every 6 hours  albuterol/ipratropium for Nebulization 3 milliLiter(s) Nebulizer every 6 hours  amLODIPine   Tablet 5 milliGRAM(s) Oral daily  atorvastatin 20 milliGRAM(s) Oral at bedtime  chlorhexidine 2% Cloths 1 Application(s) Topical <User Schedule>  heparin   Injectable 5000 Unit(s) SubCutaneous every 8 hours  insulin glargine Injectable (LANTUS) 10 Unit(s) SubCutaneous at bedtime  insulin lispro (ADMELOG) corrective regimen sliding scale   SubCutaneous every 6 hours  labetalol 100 milliGRAM(s) Oral every 8 hours  levETIRAcetam  Solution 500 milliGRAM(s) Oral two times a day  piperacillin/tazobactam IVPB.. 3.375 Gram(s) IV Intermittent every 12 hours  polyethylene glycol 3350 17 Gram(s) Oral daily  senna 2 Tablet(s) Oral at bedtime  sodium bicarbonate 650 milliGRAM(s) Oral two times a day  tamsulosin 0.8 milliGRAM(s) Oral at bedtime  valproic  acid Syrup 500 milliGRAM(s) Oral every 8 hours    MEDICATIONS  (PRN):    PRESENT SYMPTOMS: [x ]Unable to obtain due to poor mentation   Source if other than patient:  [ ]Family   [ ]Team     Pain: [ ]yes [ ]no  QOL impact -   Location -                    Aggravating factors -  Quality -  Radiation -  Timing-  Severity (0-10 scale):  Minimal acceptable level (0-10 scale):     CPOT:  4  https://www.[facility].org/getattachment/[email or web address]/Critical-Care-Pain-Observation-Tool-(CPOT)    PAIN AD Score:   http://geriatrictoolkit.[facility]/cog/painad.pdf (press ctrl +  left click to view)    Dyspnea:                           [ ]None[ ]Mild [ ]Moderate [ ]Severe     Respiratory Distress Observation Scale (RDOS): 2  A score of 0 to 2 signifies little or no respiratory distress, 3 signifies mild distress, scores 4 to 6 indicate moderate distress, and scores greater than 7 signify severe distress  [email or web address]://www.[facility].ca/sites/default/[email or web address]-[email or web address]-tivfy.pdf    Anxiety:                             [ ]None[ ]Mild [ ]Moderate [ ]Severe   Fatigue:                             [ ]None[ ]Mild [ ]Moderate [ ]Severe   Nausea:                             [ ]None[ ]Mild [ ]Moderate [ ]Severe   Loss of appetite:              [ ]None[ ]Mild [ ]Moderate [ ]Severe   Constipation:                    [ ]None[ ]Mild [ ]Moderate [ ]Severe    Other Symptoms:  [ ]All other review of systems negative     Palliative Performance Status Version 2:         %    [email or web address]    PHYSICAL EXAM:  Vital Signs Last 24 Hrs  T(C): 37.6 (18 Dec 2023 08:00), Max: 38.3 (17 Dec 2023 12:00)  T(F): 99.6 (18 Dec 2023 08:00), Max: 101 (17 Dec 2023 12:00)  HR: 86 (18 Dec 2023 09:00) (83 - 104)  BP: 164/73 (18 Dec 2023 09:00) (119/59 - 201/88)  BP(mean): 105 (18 Dec 2023 09:00) (80 - 126)  RR: 27 (18 Dec 2023 09:00) (18 - 29)  SpO2: 100% (18 Dec 2023 09:00) (100% - 100%)    Parameters below as of 18 Dec 2023 08:00  Patient On (Oxygen Delivery Method): nasal cannula  O2 Flow (L/min): 2   I&O's Summary    17 Dec 2023 07:01  -  18 Dec 2023 07:00  --------------------------------------------------------  IN: 5245 mL / OUT: 2055 mL / NET: 3190 mL    18 Dec 2023 07:01  -  18 Dec 2023 11:11  --------------------------------------------------------  IN: 175 mL / OUT: 350 mL / NET: -175 mL        GENERAL:  [x ] No acute distress [ ]Lethargic  [ ]Unarousable  [ ]Verbal  [x ]Non-Verbal [ ]Cachexia    BEHAVIORAL/PSYCH:  [ ]Alert and Oriented x  [ ] Anxiety [ ] Delirium [ ] Agitation [ ] Calm   EYES: [ ] No scleral icterus [ ] Scleral icterus [x ] Closed  ENMT:  [ ]Dry mouth  [ ]No external oral lesions [ ] No external ear or nose lesions  CARDIOVASCULAR:  [ ]Regular [ ]Irregular [ ]Tachy [ ]Not Tachy  [ ]Nikita [ ] Edema [ ] No edema  PULMONARY:  [ ]Tachypnea  [ x]Audible excessive secretions [ x] No labored breathing [ ] labored breathing  GASTROINTESTINAL: [ ]Soft  [ ]Distended  [ ]Not distended [ ]Non tender [ ]Tender  MUSCULOSKELETAL: [ ]No clubbing [ ] clubbing  [ x] No cyanosis [ ] cyanosis  NEUROLOGIC: [ ]No focal deficits  [ ]Follows commands  [x ]Does not follow commands  [ ]Cognitive impairment  [ ]Dysphagia  [ ]Dysarthria  [ ]Paresis   SKIN: [ ] Jaundiced [ ] Non-jaundiced [ ]Rash [ ]No Rash [ ] Warm [ ] Dry  MISC/LINES: [ ] ET tube [ ] Trach [x ]NGT/OGT [ ]PEG [x ]Aguirre    LABS: reviewed by me                        8.8    11.38 )-----------( 230      ( 18 Dec 2023 00:30 )             26.4   12-18    141  |  109  |  41<H>  ----------------------------<  212<H>  3.8   |  20  |  2.5<H>    Ca    8.0<L>      18 Dec 2023 00:30  Phos  3.2     12-18  Mg     2.6     12-18        Urinalysis Basic - ( 18 Dec 2023 00:30 )    Color: x / Appearance: x / SG: x / pH: x  Gluc: 212 mg/dL / Ketone: x  / Bili: x / Urobili: x   Blood: x / Protein: x / Nitrite: x   Leuk Esterase: x / RBC: x / WBC x   Sq Epi: x / Non Sq Epi: x / Bacteria: x      RADIOLOGY & ADDITIONAL STUDIES: reviewed by me    EKG: reviewed by me        Palliative Care Spiritual/Emotional Screening Tool Question  Severity (0-4):      0              OR                    [ ] Unable to determine/NA  Score of 2 or greater indicates recommendation of Chaplaincy referral  Chaplaincy Referral: [ ] Yes [ ] Refused [ ] Following     Caregiver [place]:  [ ] Yes [ ] No [ x] Deferred  Social Work Referral [ ]  Patient and Family Centered Care Referral [ ]    Anticipatory Grief Present: [ ] Yes [ ] No [x ] Deferred  Social Work Referral [ ]  Patient and Family Centered Care Referral [ ]    Patient discussed with primary medical team MD  Palliative care education provided to patient and/or family   CC:     HPI:  TRAUMA ACTIVATION LEVEL:  CODE / ALERT  / CONSULT  ACTIVATED BY: EMS**  /  ED**  INTUBATED: YES** / NO**    MECHANISM OF INJURY:   [] Blunt     [] MVC	  [x] Fall	  [] Pedestrian Struck	  [] Motorcycle     [] Assault     [] Bicycle collision    [] Sports injury    [] Penetrating    [] Gun Shot Wound      [] Stab Wound    GCS: 14 	E: 4	V: 4	M: 6    HPI:"77-year-old male Cantonese speaking his presents with prior history of hypertension dyslipidemia diabetes BPH prior CVA on aspirin and Plavix who states he had a mechanical fall 2 days ago while getting out of the car fell backwards hit his head.  Afterwards he was able to ambulate.  But for the past 1 day family was unable to ambulate him.  He states that his lower extremities are painful to movement and weak.  Family denies any LOC.  They deny any confusion at home.  On exam oriented to person and place but just not to date.  He does follow simple commands.  Elevates his arms for 10 seconds.  Sensation intact in upper extremities.  Lower extremities with 2 out of 5 strength bilaterally."        Trauma assessment in ED: ABCs intact , GCS 14 , AAOx1    Obvious external signs of injury: None    PAST MEDICAL & SURGICAL HISTORY:  HTN (hypertension)      Seasonal allergies      History of BPH      Diabetes      No significant past surgical history        Allergies    [This allergen will not trigger allergy alert] pollen (Unknown)  No Known Drug Allergies    Intolerances      Home Medications:  amLODIPine 5 mg oral tablet: 1 tab(s) orally once a day (14 Feb 2023 02:08)  aspirin 81 mg oral tablet: 1 tab(s) orally once a day (14 Feb 2023 02:08)  carvedilol 6.25 mg oral tablet: 1 tab(s) orally 2 times a day (14 Feb 2023 02:08)  Jardiance 25 mg oral tablet: 1 tab(s) orally once a day (in the morning) (14 Feb 2023 02:08)  Nephplex Rx oral tablet: 1 tab(s) orally once a day (14 Feb 2023 02:08)  tamsulosin 0.4 mg oral capsule: 1 cap(s) orally once a day (14 Feb 2023 02:08)  valsartan 320 mg oral tablet: 1 tab(s) orally once a day (14 Feb 2023 02:08)      ROS: 10-system review is otherwise negative except HPI above.      Primary Survey:    A - airway intact  B - bilateral breath sounds and good chest rise  C - palpable pulses in all extremities  D - GCS 15 on arrival, MACIEL  Exposure obtained       (16 Dec 2023 00:01)    PERTINENT PM/SXH:   HTN (hypertension)    Seasonal allergies    History of BPH    Diabetes      No significant past surgical history      FAMILY HISTORY:  No pertinent family history in first degree relatives      ITEMS NOT CHECKED ARE NOT PRESENT    SOCIAL HISTORY:   Significant other/partnerx[ ]  Children[x ]  Muslim/Spirituality:  Substance hx:  [ ]   Tobacco hx:  [ ]   Alcohol hx: [ ]   Living Situation: [ x]Home  [ ]Long term care  [ ]Rehab [ ]Other  Home Services: [ ] HHA [ ] Visting RN [ ] Hospice  Occupation:  Home Opioid hx:  [ ] Y [ ] N [ ] I-Stop Reference No:     ADVANCE DIRECTIVES:     [ ] Full Code [ ] DNR  MOLST  [ ]  Living Will  [ ]   DECISION MAKER(s):  [ ] Health Care Proxy(s)  [ ] Surrogate(s)  [ ] Guardian           Name(s): Phone Number(s):   Wife Negro Fair [phone]  Dtr Rashi Bryantang - [phone]   BASELINE (I)ADL(s) (prior to admission):    [place]: [x ]Total  [ ] Moderate [ ]Dependent  Palliative Performance Status Version 2:         %    http://[email or web address]/files/[email or web address]_[email or web address]    Allergies    [This allergen will not trigger allergy alert] pollen (Unknown)  No Known Drug Allergies    Intolerances    MEDICATIONS  (STANDING):  acetaminophen     Tablet .. 650 milliGRAM(s) Oral every 6 hours  albuterol/ipratropium for Nebulization 3 milliLiter(s) Nebulizer every 6 hours  amLODIPine   Tablet 5 milliGRAM(s) Oral daily  atorvastatin 20 milliGRAM(s) Oral at bedtime  chlorhexidine 2% Cloths 1 Application(s) Topical <User Schedule>  heparin   Injectable 5000 Unit(s) SubCutaneous every 8 hours  insulin glargine Injectable (LANTUS) 10 Unit(s) SubCutaneous at bedtime  insulin lispro (ADMELOG) corrective regimen sliding scale   SubCutaneous every 6 hours  labetalol 100 milliGRAM(s) Oral every 8 hours  levETIRAcetam  Solution 500 milliGRAM(s) Oral two times a day  piperacillin/tazobactam IVPB.. 3.375 Gram(s) IV Intermittent every 12 hours  polyethylene glycol 3350 17 Gram(s) Oral daily  senna 2 Tablet(s) Oral at bedtime  sodium bicarbonate 650 milliGRAM(s) Oral two times a day  tamsulosin 0.8 milliGRAM(s) Oral at bedtime  valproic  acid Syrup 500 milliGRAM(s) Oral every 8 hours    MEDICATIONS  (PRN):    PRESENT SYMPTOMS: [x ]Unable to obtain due to poor mentation   Source if other than patient:  [ ]Family   [ ]Team     Pain: [ ]yes [ ]no  QOL impact -   Location -                    Aggravating factors -  Quality -  Radiation -  Timing-  Severity (0-10 scale):  Minimal acceptable level (0-10 scale):     CPOT:  4  https://[email or web address]/getattachment/[email or web address]/Critical-Care-Pain-Observation-Tool-(CPOT)    PAIN AD Score:   [email or web address]/painad.pdf (press ctrl +  left click to view)    Dyspnea:                           [ ]None[ ]Mild [ ]Moderate [ ]Severe     Respiratory Distress Observation Scale (RDOS): 2  A score of 0 to 2 signifies little or no respiratory distress, 3 signifies mild distress, scores 4 to 6 indicate moderate distress, and scores greater than 7 signify severe distress  [email or web address]-ggixddxlpst-yzhjbtme-ldcnctznlja-zlmtz.pdf    Anxiety:                             [ ]None[ ]Mild [ ]Moderate [ ]Severe   Fatigue:                             [ ]None[ ]Mild [ ]Moderate [ ]Severe   Nausea:                             [ ]None[ ]Mild [ ]Moderate [ ]Severe   Loss of appetite:              [ ]None[ ]Mild [ ]Moderate [ ]Severe   Constipation:                    [ ]None[ ]Mild [ ]Moderate [ ]Severe    Other Symptoms:  [ ]All other review of systems negative     Palliative Performance Status Version 2:         %    [email or web address]    PHYSICAL EXAM:  Vital Signs Last 24 Hrs  T(C): 37.6 (18 Dec 2023 08:00), Max: 38.3 (17 Dec 2023 12:00)  T(F): 99.6 (18 Dec 2023 08:00), Max: 101 (17 Dec 2023 12:00)  HR: 86 (18 Dec 2023 09:00) (83 - 104)  BP: 164/73 (18 Dec 2023 09:00) (119/59 - 201/88)  BP(mean): 105 (18 Dec 2023 09:00) (80 - 126)  RR: 27 (18 Dec 2023 09:00) (18 - 29)  SpO2: 100% (18 Dec 2023 09:00) (100% - 100%)    Parameters below as of 18 Dec 2023 08:00  Patient On (Oxygen Delivery Method): nasal cannula  O2 Flow (L/min): 2   I&O's Summary    17 Dec 2023 07:01  -  18 Dec 2023 07:00  --------------------------------------------------------  IN: 5245 mL / OUT: 2055 mL / NET: 3190 mL    18 Dec 2023 07:01  -  18 Dec 2023 11:11  --------------------------------------------------------  IN: 175 mL / OUT: 350 mL / NET: -175 mL        GENERAL:  [x ] No acute distress [ ]Lethargic  [ ]Unarousable  [ ]Verbal  [x ]Non-Verbal [ ]Cachexia    BEHAVIORAL/PSYCH:  [ ]Alert and Oriented x  [ ] Anxiety [ ] Delirium [ ] Agitation [ ] Calm   EYES: [ ] No scleral icterus [ ] Scleral icterus [x ] Closed  ENMT:  [ ]Dry mouth  [ ]No external oral lesions [ ] No external ear or nose lesions  CARDIOVASCULAR:  [ ]Regular [ ]Irregular [ ]Tachy [ ]Not Tachy  [ ]Nikita [ ] Edema [ ] No edema  PULMONARY:  [ ]Tachypnea  [ x]Audible excessive secretions [ x] No labored breathing [ ] labored breathing  GASTROINTESTINAL: [ ]Soft  [ ]Distended  [ ]Not distended [ ]Non tender [ ]Tender  MUSCULOSKELETAL: [ ]No clubbing [ ] clubbing  [ x] No cyanosis [ ] cyanosis  NEUROLOGIC: [ ]No focal deficits  [ ]Follows commands  [x ]Does not follow commands  [ ]Cognitive impairment  [ ]Dysphagia  [ ]Dysarthria  [ ]Paresis   SKIN: [ ] Jaundiced [ ] Non-jaundiced [ ]Rash [ ]No Rash [ ] Warm [ ] Dry  MISC/LINES: [ ] ET tube [ ] Trach [x ]NGT/OGT [ ]PEG [x ]Aguirre    LABS: reviewed by me                        8.8    11.38 )-----------( 230      ( 18 Dec 2023 00:30 )             26.4   12-18    141  |  109  |  41<H>  ----------------------------<  212<H>  3.8   |  20  |  2.5<H>    Ca    8.0<L>      18 Dec 2023 00:30  Phos  3.2     12-18  Mg     2.6     12-18        Urinalysis Basic - ( 18 Dec 2023 00:30 )    Color: x / Appearance: x / SG: x / pH: x  Gluc: 212 mg/dL / Ketone: x  / Bili: x / Urobili: x   Blood: x / Protein: x / Nitrite: x   Leuk Esterase: x / RBC: x / WBC x   Sq Epi: x / Non Sq Epi: x / Bacteria: x      RADIOLOGY & ADDITIONAL STUDIES: reviewed by me    EKG: reviewed by me        Palliative Care Spiritual/Emotional Screening Tool Question  Severity (0-4):      0              OR                    [ ] Unable to determine/NA  Score of 2 or greater indicates recommendation of Chaplaincy referral  Chaplaincy Referral: [ ] Yes [ ] Refused [ ] Following     Caregiver [place]:  [ ] Yes [ ] No [ x] Deferred  Social Work Referral [ ]  Patient and Family Centered Care Referral [ ]    Anticipatory Grief Present: [ ] Yes [ ] No [x ] Deferred  Social Work Referral [ ]  Patient and Family Centered Care Referral [ ]    Patient discussed with primary medical team MD  Palliative care education provided to patient and/or family

## 2023-12-18 NOTE — PROGRESS NOTE ADULT - ASSESSMENT
Assessment and Recommendation: 	  78y Male s/p mechanical fall. +HT, +AC (Aspirin and Plavix), -LOC    NEURO:  #Acute SDH   -CT head #1: SDH x 3: 0.7 cm along the left hemisphere, 0.3 cm along the right frontal area, and 1.1 cm along the left falx. No midline shift  -CT head #2: Stable  -CT head #3 for increased lethargy: Stable subdural hemorrhages compared the same day prior CT head. No midline shift.  -Q1 neuro checks  -Keppra 500mg q12   #Acute pain    -APAP prn  #h/o stroke (2/2023)  - RIGHT anterior thalamus infarct  - LEFT caudate head lacunar infarct  #focal seizure  - vEEG  -2 Ativan x2   -Valproic acid load x1, continue with 500q8; f/u valproic acid level 12/18 11 AM prior to fourth dose     RESP:   #Oxygenation    -2 L NC saturating well   #Activity    -increase as tolerated    CARDS:   #HTN  -hold valsartan 320mg and amlodipine 5mg. Monitor BP. SBP goal 140-160  -hold coreg  -switched metoprolol to Labetalol 100 q8  #HLD  -hold atorvastatin  #NSVT on tele   -EP/Cards: No arrhythmias on tele only artifact. No further work up needed. Recommend ILR prior to discharge if patient/family agreeable  Imaging:   EKG 12/16: Sinus tach   Echo 2/2023: EF 69%, G1DD, trace MR, mild TR   Echo: 12/2023: EF 66%, G1DD, trace MR, mild TR     GI/NUTR:   #Diet, Glucerna 1.2 goal @55 (w/ free water flushes 150 q8)    -failed beside swallow     -Dobhoff @75    -aspiration precautions, HOB 30  #GI Prophylaxis    -20 Pepcid every other day   #Bowel regimen     - Miralax, Senna    - Last bowel movement  12/15 PTA      /RENAL:   #urine output in critically ill  -IVF 1/2 NS @ 120cc/hr  -Indwelling catheter placed 12/16   -UA neg   #Hypernatremia  - sodium goal 140-150  - free water deficit 1.1 L  - currently on free water flushes 200 q4, adjust as needed  #urine output in critically ill    -indwelling martinez (placed     Labs:          BUN/Cr- 46/3.0  -->,  42/3.0  -->          Electrolytes-Na 143 // K 4.0 // Mg -- //  Phos -- (12-17 @ 16:01)  #CKD   -baseline Cr 2.1  -c/w home sodium bicarb 650mg BID  #BPH  -c/w home flomax        HEME/ONC:   #DVT prophylaxis     -SCDs. HSQ started per NSGY. Per NSGY, can restart ASA December 22 (7 days)  Need follow up HCT in 2 weeks to decide on Plavix    Labs: Hb/Hct:  10.0/30.1  -->,  8.8/26.4  -->                      Plts:  254  -->,  230  -->                 PTT/INR:      T&S Expires:       ID:  WBC- 13.54  --->>,  12.83  --->>,  11.38  --->>  Temp trend- 24hrs T(F): 99.9 (12-17 @ 20:00), Max: 101 (12-17 @ 12:00)  Zosyn 3.375 q12 (12/17 - )  Cefepime 2g q 24 (12/16 - 12/17)  Blood cultures pending sent 12/17    ENDO:  #DM     -ISS     -FSG q6 while NPO    MSK:     Activity - Increase As Tolerated    LINES/DRAINS:  PIV, right basilic midline (placed 12/16), martinez (placed 12/16)    ADVANCED DIRECTIVES:  Full Code    HCP/Emergency Contact-Sarah Gamino (Wife) [phone] Daughter (Rashi Heaton) [phone]    INDICATION FOR SICU/SDU: Non-traumatic subdural hemorrhage          DISPO:   SICU     Assessment and Recommendation: 	  78y Male s/p mechanical fall. +HT, +AC (Aspirin and Plavix), -LOC    NEURO:  #Acute SDH   -CT head #1: SDH x 3: 0.7 cm along the left hemisphere, 0.3 cm along the right frontal area, and 1.1 cm along the left falx. No midline shift  -CT head #2: Stable  -CT head #3 for increased lethargy: Stable subdural hemorrhages compared the same day prior CT head. No midline shift.  -Q1 neuro checks  -Keppra 500mg q12   #Acute pain    -APAP prn  #h/o stroke (2/2023)  - RIGHT anterior thalamus infarct  - LEFT caudate head lacunar infarct  #focal seizure  - vEEG  -2 Ativan x2   -Valproic acid load x1, continue with 500q8; f/u valproic acid level 12/18 11 AM prior to fourth dose     RESP:   #Oxygenation    -2 L NC saturating well   #Activity    -increase as tolerated    CARDS:   #HTN  -hold valsartan 320mg and amlodipine 5mg. Monitor BP. SBP goal 140-160  -hold coreg  -switched metoprolol to Labetalol 100 q8  #HLD  -hold atorvastatin  #NSVT on tele   -EP/Cards: No arrhythmias on tele only artifact. No further work up needed. Recommend ILR prior to discharge if patient/family agreeable  Imaging:   EKG 12/16: Sinus tach   Echo 2/2023: EF 69%, G1DD, trace MR, mild TR   Echo: 12/2023: EF 66%, G1DD, trace MR, mild TR     GI/NUTR:   #Diet, Glucerna 1.2 goal @55 (w/ free water flushes 150 q8)    -failed beside swallow     -Dobhoff @75    -aspiration precautions, HOB 30  #GI Prophylaxis    -20 Pepcid every other day   #Bowel regimen     - Miralax, Senna    - Last bowel movement  12/15 PTA      /RENAL:   #urine output in critically ill  -IVF 1/2 NS @ 120cc/hr  -Indwelling catheter placed 12/16   -UA neg   #Hypernatremia  - sodium goal 140-150  - free water deficit 1.1 L  - currently on free water flushes 200 q4, adjust as needed  #urine output in critically ill    -indwelling martinez (placed     Labs:          BUN/Cr- 46/3.0  -->,  42/3.0  -->          Electrolytes-Na 143 // K 4.0 // Mg -- //  Phos -- (12-17 @ 16:01)  #CKD   -baseline Cr 2.1  -c/w home sodium bicarb 650mg BID  #BPH  -c/w home flomax        HEME/ONC:   #DVT prophylaxis     -SCDs. HSQ started per NSGY. Per NSGY, can restart ASA December 22 (7 days)  Need follow up HCT in 2 weeks to decide on Plavix    Labs: Hb/Hct:  10.0/30.1  -->,  8.8/26.4  -->                      Plts:  254  -->,  230  -->                 PTT/INR:      T&S Expires:       ID:  WBC- 13.54  --->>,  12.83  --->>,  11.38  --->>  Temp trend- 24hrs T(F): 99.9 (12-17 @ 20:00), Max: 101 (12-17 @ 12:00)  Zosyn 3.375 q12 (12/17 - )  Cefepime 2g q 24 (12/16 - 12/17)  Blood cultures pending sent 12/17    ENDO:  #DM     -ISS     -FSG q6 while NPO    MSK:     Activity - Increase As Tolerated    LINES/DRAINS:  PIV, right basilic midline (placed 12/16), martinez (placed 12/16)    ADVANCED DIRECTIVES:  Full Code    HCP/Emergency Contact-Sarah Gamino (Wife) [phone] Daughter (Rashi Heaton) [phone]    INDICATION FOR SICU/SDU: Non-traumatic subdural hemorrhage          DISPO:   SICU     Assessment and Recommendation: 	  78y Male s/p mechanical fall. +HT, +AC (Aspirin and Plavix), -LOC    NEURO:  #Acute SDH   -CT head #1: SDH x 3: 0.7 cm along the left hemisphere, 0.3 cm along the right frontal area, and 1.1 cm along the left falx. No midline shift  -CT head #2: Stable  -CT head #3 for increased lethargy: Stable subdural hemorrhages compared the same day prior CT head. No midline shift.  -pending repeat head CT #4 for altered mental status  -Q1 neuro checks  -Keppra 500mg q12   #Acute pain    -APAP ATC  #h/o stroke (2/2023)  - RIGHT anterior thalamus infarct  - LEFT caudate head lacunar infarct  - as per family at bedside, patient has residual right sided weakness  #focal seizure  - vEEG  -2 Ativan x2   -Valproic acid load x1, continue with 500q8; f/u valproic acid level 12/18 11 AM prior to fourth dose     RESP:   #Oxygenation    -2 L NC saturating well     -added chest PT and duoneb treatments  #Activity    -increase as tolerated    CARDS:   #HTN  -restarted home amlodipine 5 QD  -switched metoprolol to Labetalol 100 q8  -hold home valsartan 320mg. Monitor BP. SBP goal 110-150 per NCC  -hold home coreg  #HLD  -hold atorvastatin  #NSVT on tele   -EP/Cards: No arrhythmias on tele only artifact. No further work up needed. Recommend ILR prior to discharge if patient/family agreeable  Imaging:   EKG 12/16: Sinus tach   Echo 2/2023: EF 69%, G1DD, trace MR, mild TR   Echo: 12/2023: EF 66%, G1DD, trace MR, mild TR     GI/NUTR:   #Diet, Glucerna 1.2 goal @55 (w/ free water flushes 150 q8)    -failed beside swallow     -Dobhoff @75    -aspiration precautions, HOB 30  #GI Prophylaxis    -not indicated   #Bowel regimen     - Miralax, Senna    - Last bowel movement  12/18      /RENAL:   #urine output in critically ill  -IVL, tolerating tube feeds  -Indwelling catheter placed 12/16   -UA neg   #Hypernatremia  - sodium goal 140-150    Labs:          BUN/Cr- 42/3.0  -->,  41/2.5  -->          Electrolytes-Na 141 // K 3.8 // Mg 2.6 //  Phos 3.2 (12-18 @ 00:30)  #CKD   -baseline Cr 2.1  -c/w home sodium bicarb 650mg BID  #BPH  -c/w home flomax        HEME/ONC:   #DVT prophylaxis     -SCDs. HSQ started per NSGY. Per NSGY, can restart ASA December 22 (7 days)  Need follow up HCT in 2 weeks to decide on Plavix    Labs: Hb/Hct:  10.0/30.1  -->,  8.8/26.4  -->                      Plts:  254  -->,  230  -->                 PTT/INR:        ID:  WBC- 13.54  --->>,  12.83  --->>,  11.38  --->>  Temp trend- 24hrs T(F): 99.7 (12-18 @ 12:00), Max: 101 (12-17 @ 12:00)  Current antibiotics    -Zosyn 3.375 q12 (12/17 -12/18 )    -Cefepime 2g q 24 (12/16 - 12/17)  Blood cultures pending sent 12/17    ENDO:  #DM     -ISS    -added lantus 10 HS     -FSG q6 while NPO    MSK:     Activity - Increase As Tolerated    LINES/DRAINS:  PIV, right basilic midline (placed 12/16), martinez (placed 12/16)    ADVANCED DIRECTIVES:  Full Code    HCP/Emergency Contact-Sarah Gamino (Wife) [phone] Daughter (Rashi Heaton) [phone]    INDICATION FOR SICU/SDU: Non-traumatic subdural hemorrhage          DISPO:   SICU     Assessment and Recommendation: 	  78y Male s/p mechanical fall. +HT, +AC (Aspirin and Plavix), -LOC    NEURO:  #Acute SDH   -CT head #1: SDH x 3: 0.7 cm along the left hemisphere, 0.3 cm along the right frontal area, and 1.1 cm along the left falx. No midline shift  -CT head #2: Stable  -CT head #3 for increased lethargy: Stable subdural hemorrhages compared the same day prior CT head. No midline shift.  -pending repeat head CT #4 for altered mental status  -Q1 neuro checks  -Keppra 500mg q12   #Acute pain    -APAP ATC  #h/o stroke (2/2023)  - RIGHT anterior thalamus infarct  - LEFT caudate head lacunar infarct  - as per family at bedside, patient has residual right sided weakness  #focal seizure  - vEEG  -2 Ativan x2   -Valproic acid load x1, continue with 500q8; f/u valproic acid level 12/18 11 AM prior to fourth dose     RESP:   #Oxygenation    -2 L NC saturating well     -added chest PT and duoneb treatments  #Activity    -increase as tolerated    CARDS:   #HTN  -restarted home amlodipine 5 QD  -switched metoprolol to Labetalol 100 q8  -hold home valsartan 320mg. Monitor BP. SBP goal 110-150 per NCC  -hold home coreg  #HLD  -hold atorvastatin  #NSVT on tele   -EP/Cards: No arrhythmias on tele only artifact. No further work up needed. Recommend ILR prior to discharge if patient/family agreeable  Imaging:   EKG 12/16: Sinus tach   Echo 2/2023: EF 69%, G1DD, trace MR, mild TR   Echo: 12/2023: EF 66%, G1DD, trace MR, mild TR     GI/NUTR:   #Diet, Glucerna 1.2 goal @55 (w/ free water flushes 150 q8)    -failed beside swallow     -Dobhoff @75    -aspiration precautions, HOB 30  #GI Prophylaxis    -not indicated   #Bowel regimen     - Miralax, Senna    - Last bowel movement  12/18      /RENAL:   #urine output in critically ill  -IVL, tolerating tube feeds  -Indwelling catheter placed 12/16   -UA neg   #Hypernatremia  - sodium goal 140-150    Labs:          BUN/Cr- 42/3.0  -->,  41/2.5  -->          Electrolytes-Na 141 // K 3.8 // Mg 2.6 //  Phos 3.2 (12-18 @ 00:30)  #CKD   -baseline Cr 2.1  -c/w home sodium bicarb 650mg BID  #BPH  -c/w home flomax        HEME/ONC:   #DVT prophylaxis     -SCDs. HSQ started per NSGY. Per NSGY, can restart ASA December 22 (7 days)  Need follow up HCT in 2 weeks to decide on Plavix    Labs: Hb/Hct:  10.0/30.1  -->,  8.8/26.4  -->                      Plts:  254  -->,  230  -->                 PTT/INR:        ID:  WBC- 13.54  --->>,  12.83  --->>,  11.38  --->>  Temp trend- 24hrs T(F): 99.7 (12-18 @ 12:00), Max: 101 (12-17 @ 12:00)  Current antibiotics    -Zosyn 3.375 q12 (12/17 -12/18 )    -Cefepime 2g q 24 (12/16 - 12/17)  Blood cultures pending sent 12/17    ENDO:  #DM     -ISS    -added lantus 10 HS     -FSG q6 while NPO    MSK:     Activity - Increase As Tolerated    LINES/DRAINS:  PIV, right basilic midline (placed 12/16), martinez (placed 12/16)    ADVANCED DIRECTIVES:  Full Code    HCP/Emergency Contact-Sarah Gamino (Wife) [phone] Daughter (Rashi Heaton) [phone]    INDICATION FOR SICU/SDU: Non-traumatic subdural hemorrhage          DISPO:   SICU

## 2023-12-18 NOTE — DIETITIAN INITIAL EVALUATION ADULT - OTHER INFO
Pertinent Medical Information: Pt  s/p mechanical fall. +HT, +AC, -LOC. Admitted for Acute subdural hemorrhage with no midline shift.    PMH includes hypertension dyslipidemia diabetes BPH prior CVA.

## 2023-12-18 NOTE — CONSULT NOTE ADULT - NS ATTEND AMEND GEN_ALL_CORE FT
Case and films reviewed extensively. Agree with above plan. Will cont to follow.
Complex patient  prior Cryptogenic stroke  Fall with SDH  no syncope  prognosis grave  No arrhythmias on tely only artifact  Recommend ILR prior to discharge if patient/family agreeable
78yMale being evaluated for goals of care and symptom management r/t fall w SDH. Pt in SICU and on EEG monitoring. Pt overall prognosis is guarded, ongoing acute management with concern that he might need trach/PEG.     Discussed with patient's wife at bedside using Cantonese . Despite interpretation services, she appears to have limited understanding regarding patient's current medical condition. She requests that I speak with her daughter. Discussed with patient's daughter and HARPER (Luis E). They report that a doctor informed them that patient might need PEG/trach. She is not ready to make a decision, but would like to know patient's neurologic prognosis prior to making this decision. If there is a chance that he will recover neurologically, then they would like to proceed with these treatment options to give him this chance. If there is little to no chance of neuro recovery, then they would likely not want these interventions.     - family require neurologic prognostication prior to making additional decisions.

## 2023-12-18 NOTE — CONSULT NOTE ADULT - PROBLEM SELECTOR PROBLEM 2
Subdural hematoma without coma with loss of consciousness, initial encounter Palliative care by specialist

## 2023-12-18 NOTE — CONSULT NOTE ADULT - ASSESSMENT
78yMale being evaluated for      MEDD (morphine equivalent daily dose):    Education about palliative care provided to patient/family.  See Recs below.    Please call [phone] with questions or concerns 24/7.   We will continue to follow.    78yMale being evaluated for      MEDD (morphine equivalent daily dose):    Education about palliative care provided to patient/family.  See Recs below.    Please call [phone] with questions or concerns 24/7.   We will continue to follow.    78yMale being evaluated for goals of care and symptom management r/t fall w SDH. Pt in SICU and on EEG monitoring. Pt overall prognosis is guarded, ongoing acute management. Palliative care consulted to introduce discussion of code status and further discussions if needed       MEDD (morphine equivalent daily dose): 0    Education about palliative care provided to patient/family.  See Recs below.    Please call [phone] with questions or concerns 24/7.   We will continue to follow.

## 2023-12-18 NOTE — EEG REPORT - NS EEG TEXT BOX
Epilepsy Attending Note:     JACQUELIN CAI    78y Male  MRN [id number]    Vital Signs Last 24 Hrs  T(C): 37.6 (18 Dec 2023 08:00), Max: 38.3 (17 Dec 2023 12:00)  T(F): 99.6 (18 Dec 2023 08:00), Max: 101 (17 Dec 2023 12:00)  HR: 86 (18 Dec 2023 09:00) (83 - 104)  BP: 164/73 (18 Dec 2023 09:00) (119/59 - 202/79)  BP(mean): 105 (18 Dec 2023 09:00) (80 - 126)  RR: 27 (18 Dec 2023 09:00) (18 - 40)  SpO2: 100% (18 Dec 2023 09:00) (100% - 100%)    Parameters below as of 18 Dec 2023 08:00  Patient On (Oxygen Delivery Method): nasal cannula  O2 Flow (L/min): 2                            8.8    11.38 )-----------( 230      ( 18 Dec 2023 00:30 )             26.4       12-18    141  |  109  |  41<H>  ----------------------------<  212<H>  3.8   |  20  |  2.5<H>    Ca    8.0<L>      18 Dec 2023 00:30  Phos  3.2     12-18  Mg     2.6     12-18        MEDICATIONS  (STANDING):  acetaminophen     Tablet .. 650 milliGRAM(s) Oral every 6 hours  albuterol/ipratropium for Nebulization 3 milliLiter(s) Nebulizer every 6 hours  amLODIPine   Tablet 5 milliGRAM(s) Oral daily  atorvastatin 20 milliGRAM(s) Oral at bedtime  chlorhexidine 2% Cloths 1 Application(s) Topical <User Schedule>  heparin   Injectable 5000 Unit(s) SubCutaneous every 8 hours  insulin glargine Injectable (LANTUS) 10 Unit(s) SubCutaneous at bedtime  insulin lispro (ADMELOG) corrective regimen sliding scale   SubCutaneous every 6 hours  labetalol 100 milliGRAM(s) Oral every 8 hours  levETIRAcetam  Solution 500 milliGRAM(s) Oral two times a day  piperacillin/tazobactam IVPB.. 3.375 Gram(s) IV Intermittent every 12 hours  polyethylene glycol 3350 17 Gram(s) Oral daily  senna 2 Tablet(s) Oral at bedtime  sodium bicarbonate 650 milliGRAM(s) Oral two times a day  tamsulosin 0.8 milliGRAM(s) Oral at bedtime  valproic  acid Syrup 500 milliGRAM(s) Oral every 8 hours    MEDICATIONS  (PRN):            VEEG in the last 24 hours:    Background-continues, symmetrical , slightly less than optimally organized reaching 7- 8 hz that is reactive    Focal and generalized slowing-----------  mild generalized slowing. Mild bilateral FTC focal slowing with shifting asymmetry    Interictal activity-----none    Events---------- on video review there were episodes of right eyelid/facial spasm that were not associated with epileptiform EEG changes    Seizures--------------none    Impression: abnormal as above    Plan - as/NCC team     Epilepsy Attending Note:     JACQUELIN CAI    78y Male  MRN [id number]    Vital Signs Last 24 Hrs  T(C): 37.6 (18 Dec 2023 08:00), Max: 38.3 (17 Dec 2023 12:00)  T(F): 99.6 (18 Dec 2023 08:00), Max: 101 (17 Dec 2023 12:00)  HR: 86 (18 Dec 2023 09:00) (83 - 104)  BP: 164/73 (18 Dec 2023 09:00) (119/59 - 202/79)  BP(mean): 105 (18 Dec 2023 09:00) (80 - 126)  RR: 27 (18 Dec 2023 09:00) (18 - 40)  SpO2: 100% (18 Dec 2023 09:00) (100% - 100%)    Parameters below as of 18 Dec 2023 08:00  Patient On (Oxygen Delivery Method): nasal cannula  O2 Flow (L/min): 2                            8.8    11.38 )-----------( 230      ( 18 Dec 2023 00:30 )             26.4       12-18    141  |  109  |  41<H>  ----------------------------<  212<H>  3.8   |  20  |  2.5<H>    Ca    8.0<L>      18 Dec 2023 00:30  Phos  3.2     12-18  Mg     2.6     12-18        MEDICATIONS  (STANDING):  acetaminophen     Tablet .. 650 milliGRAM(s) Oral every 6 hours  albuterol/ipratropium for Nebulization 3 milliLiter(s) Nebulizer every 6 hours  amLODIPine   Tablet 5 milliGRAM(s) Oral daily  atorvastatin 20 milliGRAM(s) Oral at bedtime  chlorhexidine 2% Cloths 1 Application(s) Topical <User Schedule>  heparin   Injectable 5000 Unit(s) SubCutaneous every 8 hours  insulin glargine Injectable (LANTUS) 10 Unit(s) SubCutaneous at bedtime  insulin lispro (ADMELOG) corrective regimen sliding scale   SubCutaneous every 6 hours  labetalol 100 milliGRAM(s) Oral every 8 hours  levETIRAcetam  Solution 500 milliGRAM(s) Oral two times a day  piperacillin/tazobactam IVPB.. 3.375 Gram(s) IV Intermittent every 12 hours  polyethylene glycol 3350 17 Gram(s) Oral daily  senna 2 Tablet(s) Oral at bedtime  sodium bicarbonate 650 milliGRAM(s) Oral two times a day  tamsulosin 0.8 milliGRAM(s) Oral at bedtime  valproic  acid Syrup 500 milliGRAM(s) Oral every 8 hours    MEDICATIONS  (PRN):            VEEG in the last 24 hours:    Background-continues, symmetrical , slightly less than optimally organized reaching 7- 8 hz that is reactive    Focal and generalized slowing-----------  mild generalized slowing. Mild bilateral FTC focal slowing with shifting asymmetry    Interictal activity-----none    Events---------- on video review there were episodes of right eyelid/facial spasm that were not associated with epileptiform EEG changes    Seizures--------------none    Impression: abnormal as above    Plan - as/NCC team

## 2023-12-18 NOTE — PROGRESS NOTE ADULT - ATTENDING COMMENTS
Patient remains obtunded, GCS fluctuating 11-13.  Has still video EEG as he had seizures, new onset.  On Keppra, started on Depakote yesterday.  On tube feeds.  Creat 2.8, baseline 2.1    ASSESSMENT:  77 y/o male, S/P Fall.  Traumatic Left SDH.  Traumatic Right SDH.  Seizures.  Atelectasis.  Hypertensive urgency.  SAGRARIO on CKD.  Dysphagia.    PLAN:  - get repeat CT head today  - intermittent neuro checks  - NCC f/u - recommended to stop Keppra and continue Depakote, follow level today  - get Neurology eval  - needs aggressive chest PT, suctioning; nebulizer treatments  - keep normotensive - on Labetalol 100 mg q8h po  - continue tube feeds  - aspiration  precautions at all time  - follow serum electrolytes and UOP  - ID- universal precautions  - DVT prophylaxis  Family was at bedside and was update on the above.  They will think about DNR/DNI.  For now is full code  Palliative acre eval needed

## 2023-12-18 NOTE — DIETITIAN INITIAL EVALUATION ADULT - NSFNSGIASSESSMENTFT_GEN_A_CORE
last BM 12/18; frequent loose BMs reported. No nausea/vomiting reported at this time. Abd noted non-distended per progress notes.

## 2023-12-19 DIAGNOSIS — R13.10 DYSPHAGIA, UNSPECIFIED: ICD-10-CM

## 2023-12-19 PROCEDURE — 99232 SBSQ HOSP IP/OBS MODERATE 35: CPT

## 2023-12-19 PROCEDURE — 99233 SBSQ HOSP IP/OBS HIGH 50: CPT

## 2023-12-19 PROCEDURE — 73560 X-RAY EXAM OF KNEE 1 OR 2: CPT | Mod: 26,50

## 2023-12-19 PROCEDURE — 36620 INSERTION CATHETER ARTERY: CPT

## 2023-12-19 PROCEDURE — 95720 EEG PHY/QHP EA INCR W/VEEG: CPT

## 2023-12-19 PROCEDURE — 71045 X-RAY EXAM CHEST 1 VIEW: CPT | Mod: 26

## 2023-12-19 PROCEDURE — 99291 CRITICAL CARE FIRST HOUR: CPT

## 2023-12-19 RX ORDER — ACETAMINOPHEN 500 MG
1000 TABLET ORAL ONCE
Refills: 0 | Status: COMPLETED | OUTPATIENT
Start: 2023-12-19 | End: 2023-12-19

## 2023-12-19 RX ORDER — LABETALOL HCL 100 MG
10 TABLET ORAL ONCE
Refills: 0 | Status: COMPLETED | OUTPATIENT
Start: 2023-12-19 | End: 2023-12-19

## 2023-12-19 RX ORDER — DOXAZOSIN MESYLATE 4 MG
4 TABLET ORAL AT BEDTIME
Refills: 0 | Status: DISCONTINUED | OUTPATIENT
Start: 2023-12-19 | End: 2024-01-12

## 2023-12-19 RX ORDER — NICARDIPINE HYDROCHLORIDE 30 MG/1
5 CAPSULE, EXTENDED RELEASE ORAL
Qty: 40 | Refills: 0 | Status: DISCONTINUED | OUTPATIENT
Start: 2023-12-19 | End: 2023-12-20

## 2023-12-19 RX ORDER — HYDRALAZINE HCL 50 MG
10 TABLET ORAL ONCE
Refills: 0 | Status: COMPLETED | OUTPATIENT
Start: 2023-12-19 | End: 2023-12-19

## 2023-12-19 RX ORDER — NICARDIPINE HYDROCHLORIDE 30 MG/1
5 CAPSULE, EXTENDED RELEASE ORAL
Qty: 40 | Refills: 0 | Status: DISCONTINUED | OUTPATIENT
Start: 2023-12-19 | End: 2023-12-19

## 2023-12-19 RX ORDER — LABETALOL HCL 100 MG
200 TABLET ORAL EVERY 8 HOURS
Refills: 0 | Status: DISCONTINUED | OUTPATIENT
Start: 2023-12-19 | End: 2023-12-19

## 2023-12-19 RX ORDER — BACITRACIN ZINC 500 UNIT/G
1 OINTMENT IN PACKET (EA) TOPICAL EVERY 12 HOURS
Refills: 0 | Status: DISCONTINUED | OUTPATIENT
Start: 2023-12-19 | End: 2024-01-12

## 2023-12-19 RX ORDER — LABETALOL HCL 100 MG
200 TABLET ORAL EVERY 8 HOURS
Refills: 0 | Status: DISCONTINUED | OUTPATIENT
Start: 2023-12-19 | End: 2023-12-20

## 2023-12-19 RX ORDER — INSULIN GLARGINE 100 [IU]/ML
15 INJECTION, SOLUTION SUBCUTANEOUS AT BEDTIME
Refills: 0 | Status: DISCONTINUED | OUTPATIENT
Start: 2023-12-19 | End: 2023-12-20

## 2023-12-19 RX ORDER — AMLODIPINE BESYLATE 2.5 MG/1
5 TABLET ORAL ONCE
Refills: 0 | Status: COMPLETED | OUTPATIENT
Start: 2023-12-19 | End: 2023-12-19

## 2023-12-19 RX ORDER — VALPROIC ACID (AS SODIUM SALT) 250 MG/5ML
500 SOLUTION, ORAL ORAL EVERY 12 HOURS
Refills: 0 | Status: DISCONTINUED | OUTPATIENT
Start: 2023-12-19 | End: 2023-12-27

## 2023-12-19 RX ORDER — HYDROMORPHONE HYDROCHLORIDE 2 MG/ML
0.2 INJECTION INTRAMUSCULAR; INTRAVENOUS; SUBCUTANEOUS ONCE
Refills: 0 | Status: DISCONTINUED | OUTPATIENT
Start: 2023-12-19 | End: 2023-12-19

## 2023-12-19 RX ORDER — ACETAMINOPHEN 500 MG
650 TABLET ORAL EVERY 6 HOURS
Refills: 0 | Status: DISCONTINUED | OUTPATIENT
Start: 2023-12-19 | End: 2024-01-01

## 2023-12-19 RX ORDER — AMLODIPINE BESYLATE 2.5 MG/1
10 TABLET ORAL DAILY
Refills: 0 | Status: DISCONTINUED | OUTPATIENT
Start: 2023-12-20 | End: 2023-12-22

## 2023-12-19 RX ADMIN — Medication 500 MILLIGRAM(S): at 05:06

## 2023-12-19 RX ADMIN — AMLODIPINE BESYLATE 5 MILLIGRAM(S): 2.5 TABLET ORAL at 05:05

## 2023-12-19 RX ADMIN — Medication 650 MILLIGRAM(S): at 17:32

## 2023-12-19 RX ADMIN — NICARDIPINE HYDROCHLORIDE 25 MG/HR: 30 CAPSULE, EXTENDED RELEASE ORAL at 19:30

## 2023-12-19 RX ADMIN — Medication 100 MILLIGRAM(S): at 01:00

## 2023-12-19 RX ADMIN — HEPARIN SODIUM 5000 UNIT(S): 5000 INJECTION INTRAVENOUS; SUBCUTANEOUS at 21:58

## 2023-12-19 RX ADMIN — Medication 3 MILLILITER(S): at 14:07

## 2023-12-19 RX ADMIN — Medication 11: at 17:37

## 2023-12-19 RX ADMIN — NICARDIPINE HYDROCHLORIDE 25 MG/HR: 30 CAPSULE, EXTENDED RELEASE ORAL at 15:42

## 2023-12-19 RX ADMIN — Medication 10 MILLIGRAM(S): at 05:52

## 2023-12-19 RX ADMIN — Medication 1000 MILLIGRAM(S): at 05:56

## 2023-12-19 RX ADMIN — HEPARIN SODIUM 5000 UNIT(S): 5000 INJECTION INTRAVENOUS; SUBCUTANEOUS at 13:00

## 2023-12-19 RX ADMIN — Medication 200 MILLIGRAM(S): at 10:34

## 2023-12-19 RX ADMIN — CHLORHEXIDINE GLUCONATE 1 APPLICATION(S): 213 SOLUTION TOPICAL at 05:05

## 2023-12-19 RX ADMIN — Medication 4 MILLIGRAM(S): at 21:58

## 2023-12-19 RX ADMIN — Medication 400 MILLIGRAM(S): at 17:31

## 2023-12-19 RX ADMIN — SENNA PLUS 2 TABLET(S): 8.6 TABLET ORAL at 21:58

## 2023-12-19 RX ADMIN — Medication 9: at 11:30

## 2023-12-19 RX ADMIN — Medication 500 MILLIGRAM(S): at 17:32

## 2023-12-19 RX ADMIN — Medication 650 MILLIGRAM(S): at 11:17

## 2023-12-19 RX ADMIN — Medication 3 MILLILITER(S): at 20:03

## 2023-12-19 RX ADMIN — ATORVASTATIN CALCIUM 20 MILLIGRAM(S): 80 TABLET, FILM COATED ORAL at 21:58

## 2023-12-19 RX ADMIN — Medication 10 MILLIGRAM(S): at 13:36

## 2023-12-19 RX ADMIN — Medication 650 MILLIGRAM(S): at 05:05

## 2023-12-19 RX ADMIN — Medication 3: at 05:04

## 2023-12-19 RX ADMIN — Medication 10 MILLIGRAM(S): at 09:43

## 2023-12-19 RX ADMIN — AMLODIPINE BESYLATE 5 MILLIGRAM(S): 2.5 TABLET ORAL at 14:50

## 2023-12-19 RX ADMIN — HEPARIN SODIUM 5000 UNIT(S): 5000 INJECTION INTRAVENOUS; SUBCUTANEOUS at 05:06

## 2023-12-19 RX ADMIN — Medication 650 MILLIGRAM(S): at 00:45

## 2023-12-19 RX ADMIN — Medication 1 APPLICATION(S): at 20:25

## 2023-12-19 RX ADMIN — Medication 10 MILLIGRAM(S): at 05:08

## 2023-12-19 RX ADMIN — INSULIN GLARGINE 15 UNIT(S): 100 INJECTION, SOLUTION SUBCUTANEOUS at 22:27

## 2023-12-19 RX ADMIN — Medication 200 MILLIGRAM(S): at 21:58

## 2023-12-19 RX ADMIN — Medication 3 MILLILITER(S): at 08:33

## 2023-12-19 NOTE — CONSULT NOTE ADULT - ASSESSMENT
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  NCCTH w/ Acute traumatic SDH along the central falx cerebri. GCS 14. s/p reversal w/ DDVAP and nuerocritical care management. He was started on Levetriacetam 500mg for seizure ppx. 12/17/23 EEG reported myogenic correlation to right facial and RUE twitching, however, no clear seizure or epileptiform activity. s/p Valproate 500mg w/ no improvement in clinical activity. EGG remain stable Neurocritical rec to d/c Levetiracetam and consult Gen Neurology for further seizure management. Clinically not at baseline, awake, aphasic (with ), persistent right face and arm twitching likely cortical irritation.   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  NCCTH w/ Acute traumatic SDH along the central falx cerebri. GCS 14. s/p reversal w/ DDVAP and nuerocritical care management. He was started on Levetriacetam 500mg for seizure ppx. 12/17/23 EEG reported myogenic correlation to right facial and RUE twitching, however, no clear seizure or epileptiform activity. s/p Valproate 500mg w/ no improvement in clinical activity. EGG remain stable Neurocritical rec to d/c Levetiracetam and consult Gen Neurology for further seizure management. Clinically not at baseline, awake, aphasic (with ), persistent right face and arm twitching likely cortical irritation.   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  NCCTH w/ Acute traumatic SDH along the central falx cerebri. GCS 14. s/p reversal w/ DDVAP and nuerocritical care management. He was started on Levetriacetam 500mg for seizure ppx. 12/17/23 EEG reported myogenic correlation to right facial and RUE twitching, however, no clear seizure or epileptiform activity. s/p Valproate 500mg w/ no improvement in clinical activity. EGG remain stable Neurocritical rec to d/c Levetiracetam and consult Gen Neurology for further seizure management. Clinically not at baseline, awake, aphasic (with ), persistent right face and arm twitching likely cortical irritation.   NH3: 36  Valproate 37 (subtherapeutic); calculated for Albumin 108 (supra therapeutic)       Impression  Concussion  TBI  SDH    Recommendation  d/c EEG  change Valproate to 500mg BID   plan for rEEG 2 days prior to discharge  continue to monitor clinically for seizure   remaining management per surgery team

## 2023-12-19 NOTE — PROCEDURE NOTE - NSPROCDETAILS_GEN_ALL_CORE
location identified, draped/prepped, sterile technique used/sterile dressing applied/sterile technique, catheter placed/supine position/ultrasound guidance
location identified, draped/prepped, sterile technique used, needle inserted/introduced/positive blood return obtained via catheter/connected to a pressurized flush line/sutured in place/hemostasis with direct pressure, dressing applied/Seldinger technique/all materials/supplies accounted for at end of procedure

## 2023-12-19 NOTE — CONSULT NOTE ADULT - SUBJECTIVE AND OBJECTIVE BOX
Neurology Consult    Patient is a 78y old  Male who presents with a chief complaint of SDH (19 Dec 2023 01:12)  Neurology consulted for further seizure management   [id number] Yana         HPI:  TRAUMA ACTIVATION LEVEL:  CODE / ALERT  / CONSULT  ACTIVATED BY: EMS**  /  ED**  INTUBATED: YES** / NO**    MECHANISM OF INJURY:   [] Blunt     [] MVC	  [x] Fall	  [] Pedestrian Struck	  [] Motorcycle     [] Assault     [] Bicycle collision    [] Sports injury    [] Penetrating    [] Gun Shot Wound      [] Stab Wound    GCS: 14 	E: 4	V: 4	M: 6    HPI:"77-year-old male Cantonese speaking his presents with prior history of hypertension dyslipidemia diabetes BPH prior CVA on aspirin and Plavix who states he had a mechanical fall 2 days ago while getting out of the car fell backwards hit his head.  Afterwards he was able to ambulate.  But for the past 1 day family was unable to ambulate him.  He states that his lower extremities are painful to movement and weak.  Family denies any LOC.  They deny any confusion at home.  On exam oriented to person and place but just not to date.  He does follow simple commands.  Elevates his arms for 10 seconds.  Sensation intact in upper extremities.  Lower extremities with 2 out of 5 strength bilaterally."        Trauma assessment in ED: ABCs intact , GCS 14 , AAOx1    Obvious external signs of injury: None    PAST MEDICAL & SURGICAL HISTORY:  HTN (hypertension)      Seasonal allergies      History of BPH      Diabetes      No significant past surgical history        Allergies    [This allergen will not trigger allergy alert] pollen (Unknown)  No Known Drug Allergies    Intolerances      Home Medications:  amLODIPine 5 mg oral tablet: 1 tab(s) orally once a day (14 Feb 2023 02:08)  aspirin 81 mg oral tablet: 1 tab(s) orally once a day (14 Feb 2023 02:08)  carvedilol 6.25 mg oral tablet: 1 tab(s) orally 2 times a day (14 Feb 2023 02:08)  Jardiance 25 mg oral tablet: 1 tab(s) orally once a day (in the morning) (14 Feb 2023 02:08)  Nephplex Rx oral tablet: 1 tab(s) orally once a day (14 Feb 2023 02:08)  tamsulosin 0.4 mg oral capsule: 1 cap(s) orally once a day (14 Feb 2023 02:08)  valsartan 320 mg oral tablet: 1 tab(s) orally once a day (14 Feb 2023 02:08)      ROS: 10-system review is otherwise negative except HPI above.      Primary Survey:    A - airway intact  B - bilateral breath sounds and good chest rise  C - palpable pulses in all extremities  D - GCS 15 on arrival, MACIEL  Exposure obtained       (16 Dec 2023 00:01)      PAST MEDICAL & SURGICAL HISTORY:  HTN (hypertension)      Seasonal allergies      History of BPH      Diabetes      No significant past surgical history          FAMILY HISTORY:  No pertinent family history in first degree relatives        Social History: (-) x 3    Allergies    [This allergen will not trigger allergy alert] pollen (Unknown)  No Known Drug Allergies    Intolerances        MEDICATIONS  (STANDING):  acetaminophen     Tablet .. 650 milliGRAM(s) Oral every 6 hours  albuterol/ipratropium for Nebulization 3 milliLiter(s) Nebulizer every 6 hours  amLODIPine   Tablet 5 milliGRAM(s) Oral daily  atorvastatin 20 milliGRAM(s) Oral at bedtime  chlorhexidine 2% Cloths 1 Application(s) Topical <User Schedule>  doxazosin 4 milliGRAM(s) Oral at bedtime  heparin   Injectable 5000 Unit(s) SubCutaneous every 8 hours  insulin glargine Injectable (LANTUS) 15 Unit(s) SubCutaneous at bedtime  insulin lispro (ADMELOG) corrective regimen sliding scale   SubCutaneous every 6 hours  labetalol 200 milliGRAM(s) Oral every 8 hours  senna 2 Tablet(s) Oral at bedtime  sodium bicarbonate 650 milliGRAM(s) Oral two times a day  valproic  acid Syrup 500 milliGRAM(s) Oral every 12 hours    MEDICATIONS  (PRN):      Review of systems:    Constitutional: as per HPI  Eyes: No eye pain or discharge  ENMT:  No difficulty hearing; No sinus or throat pain  Neck: No pain or stiffness  Respiratory: No cough, wheezing, chills or hemoptysis  Cardiovascular: No chest pain, palpitations, shortness of breath, dyspnea on exertion  Gastrointestinal: No abdominal pain, nausea, vomiting or hematemesis; No diarrhea or constipation.   Genitourinary: No dysuria, frequency, hematuria or incontinence  Neurological: As per HPI  Skin: No rashes or lesions   Endocrine: No heat or cold intolerance; No hair loss  Musculoskeletal: No joint pain or swelling  Psychiatric: No depression, anxiety, mood swings  Heme/Lymph: No easy bruising or bleeding gums    Vital Signs Last 24 Hrs  T(C): 37.3 (19 Dec 2023 12:00), Max: 37.8 (18 Dec 2023 20:00)  T(F): 99.1 (19 Dec 2023 12:00), Max: 100.1 (18 Dec 2023 20:00)  HR: 93 (19 Dec 2023 13:00) (80 - 97)  BP: 175/75 (19 Dec 2023 13:00) (127/58 - 211/86)  BP(mean): 108 (19 Dec 2023 13:00) (84 - 123)  RR: 22 (19 Dec 2023 13:00) (17 - 28)  SpO2: 100% (19 Dec 2023 13:00) (98% - 100%)    Parameters below as of 19 Dec 2023 12:00  Patient On (Oxygen Delivery Method): nasal cannula  O2 Flow (L/min): 1      Examination:  General:  very weak   Cognitive/Language:  aphasic. not following   Eyes: PERRL.  No ptosis  Face:  no facial asymmetry.  right facial twitching   Ears/Nose/Throat:  deferred   Motor examination:  b/l UE antigravity.   Reflexes:  deferred   Sensory examination:   deferred   Cerebellum: deferred.   Gait deferred     Labs:   CBC Full  -  ( 18 Dec 2023 21:45 )  WBC Count : 8.17 K/uL  RBC Count : 2.90 M/uL  Hemoglobin : 8.6 g/dL  Hematocrit : 25.9 %  Platelet Count - Automated : 222 K/uL  Mean Cell Volume : 89.3 fL  Mean Cell Hemoglobin : 29.7 pg  Mean Cell Hemoglobin Concentration : 33.2 g/dL      12-18    144  |  113<H>  |  43<H>  ----------------------------<  204<H>  3.8   |  21  |  2.8<H>    Ca    7.9<L>      18 Dec 2023 21:45  Phos  3.2     12-18  Mg     2.5     12-18    TPro  x   /  Alb  3.0<L>  /  TBili  x   /  DBili  x   /  AST  x   /  ALT  x   /  AlkPhos  x   12-18    LIVER FUNCTIONS - ( 18 Dec 2023 11:08 )  Alb: 3.0 g/dL / Pro: x     / ALK PHOS: x     / ALT: x     / AST: x     / GGT: x             Urinalysis Basic - ( 18 Dec 2023 21:45 )    Color: x / Appearance: x / SG: x / pH: x  Gluc: 204 mg/dL / Ketone: x  / Bili: x / Urobili: x   Blood: x / Protein: x / Nitrite: x   Leuk Esterase: x / RBC: x / WBC x   Sq Epi: x / Non Sq Epi: x / Bacteria: x          Neuroimaging:  NCHCT: CT Head No Cont:   ACC: [id number] EXAM:  CT BRAIN   ORDERED BY: KARISHMA CABRERA     PROCEDURE DATE:  12/18/2023          INTERPRETATION:  CLINICAL INDICATION: Follow-up subdural hematoma. Change   in mental status.    Technique: CT of the head was performed without contrast.    Multiple contiguous axial images were acquired from the skullbase to the   vertex without the administration of intravenous contrast.  Coronal and   sagittal reformations were made.    COMPARISON:  prior head CT dated 12/16/2023.    FINDINGS:    Redemonstration of acute subdural hematoma along the left aspect of the   falx as well as the left cerebral hemisphere. The subdural hematoma   measures up to 1.2 cm in greatest thickness without mass effect or   midline shift. There is an additional small subdural hematoma along the   right frontoparietal convexity measuring up to 5 mm in thickness,   unchanged.    There is a chronic right thalamic lacunar infarct. Ventricles and sulci   are otherwise unremarkable.    The calvarium is intact. The visualized intraorbital compartments,   paranasal sinuses and mastoid complexes appear free of acute disease.    IMPRESSION:  No significant change since recent head CT of 12/16/2023.    Stable bilateral acute subdural hematomas (left greater than right)   without mass effect or midline shift.    --- End of Report ---            CHANTAL BELL MD; Attending Radiologist  This document has been electronically signed. Dec 18 2023  8:23PM (12-18-23 @ 18:15)      12-19-23 @ 13:59       Neurology Consult    Patient is a 78y old  Male who presents with a chief complaint of SDH (19 Dec 2023 01:12)  Neurology consulted for further seizure management   [id number] Yana         HPI:  TRAUMA ACTIVATION LEVEL:  CODE / ALERT  / CONSULT  ACTIVATED BY: EMS**  /  ED**  INTUBATED: YES** / NO**    MECHANISM OF INJURY:   [] Blunt     [] MVC	  [x] Fall	  [] Pedestrian Struck	  [] Motorcycle     [] Assault     [] Bicycle collision    [] Sports injury    [] Penetrating    [] Gun Shot Wound      [] Stab Wound    GCS: 14 	E: 4	V: 4	M: 6    HPI:"77-year-old male Cantonese speaking his presents with prior history of hypertension dyslipidemia diabetes BPH prior CVA on aspirin and Plavix who states he had a mechanical fall 2 days ago while getting out of the car fell backwards hit his head.  Afterwards he was able to ambulate.  But for the past 1 day family was unable to ambulate him.  He states that his lower extremities are painful to movement and weak.  Family denies any LOC.  They deny any confusion at home.  On exam oriented to person and place but just not to date.  He does follow simple commands.  Elevates his arms for 10 seconds.  Sensation intact in upper extremities.  Lower extremities with 2 out of 5 strength bilaterally."        Trauma assessment in ED: ABCs intact , GCS 14 , AAOx1    Obvious external signs of injury: None    PAST MEDICAL & SURGICAL HISTORY:  HTN (hypertension)      Seasonal allergies      History of BPH      Diabetes      No significant past surgical history        Allergies    [This allergen will not trigger allergy alert] pollen (Unknown)  No Known Drug Allergies    Intolerances      Home Medications:  amLODIPine 5 mg oral tablet: 1 tab(s) orally once a day (14 Feb 2023 02:08)  aspirin 81 mg oral tablet: 1 tab(s) orally once a day (14 Feb 2023 02:08)  carvedilol 6.25 mg oral tablet: 1 tab(s) orally 2 times a day (14 Feb 2023 02:08)  Jardiance 25 mg oral tablet: 1 tab(s) orally once a day (in the morning) (14 Feb 2023 02:08)  Nephplex Rx oral tablet: 1 tab(s) orally once a day (14 Feb 2023 02:08)  tamsulosin 0.4 mg oral capsule: 1 cap(s) orally once a day (14 Feb 2023 02:08)  valsartan 320 mg oral tablet: 1 tab(s) orally once a day (14 Feb 2023 02:08)      ROS: 10-system review is otherwise negative except HPI above.      Primary Survey:    A - airway intact  B - bilateral breath sounds and good chest rise  C - palpable pulses in all extremities  D - GCS 15 on arrival, MACIEL  Exposure obtained       (16 Dec 2023 00:01)      PAST MEDICAL & SURGICAL HISTORY:  HTN (hypertension)      Seasonal allergies      History of BPH      Diabetes      No significant past surgical history          FAMILY HISTORY:  No pertinent family history in first degree relatives        Social History: (-) x 3    Allergies    [This allergen will not trigger allergy alert] pollen (Unknown)  No Known Drug Allergies    Intolerances        MEDICATIONS  (STANDING):  acetaminophen     Tablet .. 650 milliGRAM(s) Oral every 6 hours  albuterol/ipratropium for Nebulization 3 milliLiter(s) Nebulizer every 6 hours  amLODIPine   Tablet 5 milliGRAM(s) Oral daily  atorvastatin 20 milliGRAM(s) Oral at bedtime  chlorhexidine 2% Cloths 1 Application(s) Topical <User Schedule>  doxazosin 4 milliGRAM(s) Oral at bedtime  heparin   Injectable 5000 Unit(s) SubCutaneous every 8 hours  insulin glargine Injectable (LANTUS) 15 Unit(s) SubCutaneous at bedtime  insulin lispro (ADMELOG) corrective regimen sliding scale   SubCutaneous every 6 hours  labetalol 200 milliGRAM(s) Oral every 8 hours  senna 2 Tablet(s) Oral at bedtime  sodium bicarbonate 650 milliGRAM(s) Oral two times a day  valproic  acid Syrup 500 milliGRAM(s) Oral every 12 hours    MEDICATIONS  (PRN):      Review of systems:    Constitutional: as per HPI  Eyes: No eye pain or discharge  ENMT:  No difficulty hearing; No sinus or throat pain  Neck: No pain or stiffness  Respiratory: No cough, wheezing, chills or hemoptysis  Cardiovascular: No chest pain, palpitations, shortness of breath, dyspnea on exertion  Gastrointestinal: No abdominal pain, nausea, vomiting or hematemesis; No diarrhea or constipation.   Genitourinary: No dysuria, frequency, hematuria or incontinence  Neurological: As per HPI  Skin: No rashes or lesions   Endocrine: No heat or cold intolerance; No hair loss  Musculoskeletal: No joint pain or swelling  Psychiatric: No depression, anxiety, mood swings  Heme/Lymph: No easy bruising or bleeding gums    Vital Signs Last 24 Hrs  T(C): 37.3 (19 Dec 2023 12:00), Max: 37.8 (18 Dec 2023 20:00)  T(F): 99.1 (19 Dec 2023 12:00), Max: 100.1 (18 Dec 2023 20:00)  HR: 93 (19 Dec 2023 13:00) (80 - 97)  BP: 175/75 (19 Dec 2023 13:00) (127/58 - 211/86)  BP(mean): 108 (19 Dec 2023 13:00) (84 - 123)  RR: 22 (19 Dec 2023 13:00) (17 - 28)  SpO2: 100% (19 Dec 2023 13:00) (98% - 100%)    Parameters below as of 19 Dec 2023 12:00  Patient On (Oxygen Delivery Method): nasal cannula  O2 Flow (L/min): 1      Examination:  General:  very weak   Cognitive/Language:  aphasic. not following   Eyes: PERRL.  No ptosis  Face:  no facial asymmetry.  right facial twitching   Ears/Nose/Throat:  deferred   Motor examination:  b/l UE antigravity.   Reflexes:  deferred   Sensory examination:   deferred   Cerebellum: deferred.   Gait deferred     Labs:   CBC Full  -  ( 18 Dec 2023 21:45 )  WBC Count : 8.17 K/uL  RBC Count : 2.90 M/uL  Hemoglobin : 8.6 g/dL  Hematocrit : 25.9 %  Platelet Count - Automated : 222 K/uL  Mean Cell Volume : 89.3 fL  Mean Cell Hemoglobin : 29.7 pg  Mean Cell Hemoglobin Concentration : 33.2 g/dL      12-18    144  |  113<H>  |  43<H>  ----------------------------<  204<H>  3.8   |  21  |  2.8<H>    Ca    7.9<L>      18 Dec 2023 21:45  Phos  3.2     12-18  Mg     2.5     12-18    TPro  x   /  Alb  3.0<L>  /  TBili  x   /  DBili  x   /  AST  x   /  ALT  x   /  AlkPhos  x   12-18    LIVER FUNCTIONS - ( 18 Dec 2023 11:08 )  Alb: 3.0 g/dL / Pro: x     / ALK PHOS: x     / ALT: x     / AST: x     / GGT: x             Urinalysis Basic - ( 18 Dec 2023 21:45 )    Color: x / Appearance: x / SG: x / pH: x  Gluc: 204 mg/dL / Ketone: x  / Bili: x / Urobili: x   Blood: x / Protein: x / Nitrite: x   Leuk Esterase: x / RBC: x / WBC x   Sq Epi: x / Non Sq Epi: x / Bacteria: x          Neuroimaging:  NCHCT: CT Head No Cont:   ACC: [id number] EXAM:  CT BRAIN   ORDERED BY: KARISHAM CABRERA     PROCEDURE DATE:  12/18/2023          INTERPRETATION:  CLINICAL INDICATION: Follow-up subdural hematoma. Change   in mental status.    Technique: CT of the head was performed without contrast.    Multiple contiguous axial images were acquired from the skullbase to the   vertex without the administration of intravenous contrast.  Coronal and   sagittal reformations were made.    COMPARISON:  prior head CT dated 12/16/2023.    FINDINGS:    Redemonstration of acute subdural hematoma along the left aspect of the   falx as well as the left cerebral hemisphere. The subdural hematoma   measures up to 1.2 cm in greatest thickness without mass effect or   midline shift. There is an additional small subdural hematoma along the   right frontoparietal convexity measuring up to 5 mm in thickness,   unchanged.    There is a chronic right thalamic lacunar infarct. Ventricles and sulci   are otherwise unremarkable.    The calvarium is intact. The visualized intraorbital compartments,   paranasal sinuses and mastoid complexes appear free of acute disease.    IMPRESSION:  No significant change since recent head CT of 12/16/2023.    Stable bilateral acute subdural hematomas (left greater than right)   without mass effect or midline shift.    --- End of Report ---            CHANTAL BELL MD; Attending Radiologist  This document has been electronically signed. Dec 18 2023  8:23PM (12-18-23 @ 18:15)      12-19-23 @ 13:59

## 2023-12-19 NOTE — PROGRESS NOTE ADULT - SUBJECTIVE AND OBJECTIVE BOX
CC:       HPI:"77-year-old male Cantonese speaking his presents with prior history of hypertension dyslipidemia diabetes BPH prior CVA on aspirin and Plavix who states he had a mechanical fall 2 days ago while getting out of the car fell backwards hit his head.  Afterwards he was able to ambulate.  But for the past 1 day family was unable to ambulate him.  He states that his lower extremities are painful to movement and weak.  Family denies any LOC.  They deny any confusion at home.  On exam oriented to person and place but just not to date.  He does follow simple commands.  Elevates his arms for 10 seconds.  Sensation intact in upper extremities.  Lower extremities with 2 out of 5 strength bilaterally."         (16 Dec 2023 00:01)    Interval history:  Discussed with patient's wife and daughter.     PERTINENT PM/SXH:   HTN (hypertension)    Seasonal allergies    History of BPH    Diabetes      No significant past surgical history      FAMILY HISTORY:  No pertinent family history in first degree relatives      ITEMS NOT CHECKED ARE NOT PRESENT    SOCIAL HISTORY:   Significant other/partner [x ]  Children[x ]  Yazidism/Spirituality:  Substance hx:  [ ]   Tobacco hx:  [ ]   Alcohol hx: [ ]   Living Situation: [ x]Home  [ ]Long term care  [ ]Rehab [ ]Other  Home Services: [ ] HHA [ ] Visting RN [ ] Hospice  Occupation:  Home Opioid hx:  [ ] Y [ ] N [ ] I-Stop Reference No:     ADVANCE DIRECTIVES:     [ ] Full Code [ ] DNR  MOLST  [ ]  Living Will  [ ]   DECISION MAKER(s):  [ ] Health Care Proxy(s)  [ ] Surrogate(s)  [ ] Guardian           Name(s): Phone Number(s):   Wife Negro Fair [phone]  Dtr Rashi Bryantang - [phone]   BASELINE (I)ADL(s) (prior to admission):    [place]: [x ]Total  [ ] Moderate [ ]Dependent  Palliative Performance Status Version 2:         %    http://npcrc.org/files/news/palliative_performance_scale_[email or web address].pdf    Allergies    [This allergen will not trigger allergy alert] pollen (Unknown)  No Known Drug Allergies    Intolerances    MEDICATIONS  (STANDING):  acetaminophen     Tablet .. 650 milliGRAM(s) Oral every 6 hours  albuterol/ipratropium for Nebulization 3 milliLiter(s) Nebulizer every 6 hours  amLODIPine   Tablet 5 milliGRAM(s) Oral daily  atorvastatin 20 milliGRAM(s) Oral at bedtime  chlorhexidine 2% Cloths 1 Application(s) Topical <User Schedule>  heparin   Injectable 5000 Unit(s) SubCutaneous every 8 hours  insulin glargine Injectable (LANTUS) 10 Unit(s) SubCutaneous at bedtime  insulin lispro (ADMELOG) corrective regimen sliding scale   SubCutaneous every 6 hours  labetalol 100 milliGRAM(s) Oral every 8 hours  levETIRAcetam  Solution 500 milliGRAM(s) Oral two times a day  piperacillin/tazobactam IVPB.. 3.375 Gram(s) IV Intermittent every 12 hours  polyethylene glycol 3350 17 Gram(s) Oral daily  senna 2 Tablet(s) Oral at bedtime  sodium bicarbonate 650 milliGRAM(s) Oral two times a day  tamsulosin 0.8 milliGRAM(s) Oral at bedtime  valproic  acid Syrup 500 milliGRAM(s) Oral every 8 hours    MEDICATIONS  (PRN):    PRESENT SYMPTOMS: [x ]Unable to obtain due to poor mentation   Source if other than patient:  [ ]Family   [ ]Team     Pain: [ ]yes [ ]no  QOL impact -   Location -                    Aggravating factors -  Quality -  Radiation -  Timing-  Severity (0-10 scale):  Minimal acceptable level (0-10 scale):     CPOT:  3  https://www.sccm.org/getattachment/ges98a41-9f0v-3c1a-0g9v-2598x4584b8e/Critical-Care-Pain-Observation-Tool-(CPOT)    PAIN AD Score:   [email or web address]://[email or web address]/cog/painad.pdf (press ctrl +  left click to view)    Dyspnea:                           [ ]None[ ]Mild [ ]Moderate [ ]Severe     Respiratory Distress Observation Scale (RDOS): 1  A score of 0 to 2 signifies little or no respiratory distress, 3 signifies mild distress, scores 4 to 6 indicate moderate distress, and scores greater than 7 signify severe distress  [email or web address]    Anxiety:                             [ ]None[ ]Mild [ ]Moderate [ ]Severe   Fatigue:                             [ ]None[ ]Mild [ ]Moderate [ ]Severe   Nausea:                             [ ]None[ ]Mild [ ]Moderate [ ]Severe   Loss of appetite:              [ ]None[ ]Mild [ ]Moderate [ ]Severe   Constipation:                    [ ]None[ ]Mild [ ]Moderate [ ]Severe    Other Symptoms:  [ ]All other review of systems negative     Palliative Performance Status Version 2:         %    [email or web address]://[email or web address]_performance_scale_[email or web address].pdf    PHYSICAL EXAM:  T(C): 37.3 (12-19-23 @ 12:00), Max: 37.8 (12-18-23 @ 20:00)  T(F): 99.1 (12-19-23 @ 12:00), Max: 100.1 (12-18-23 @ 20:00)  HR: 93 (12-19-23 @ 13:00) (80 - 97)  BP: 175/75 (12-19-23 @ 13:00) (127/58 - 211/86)  ABP: --  ABP(mean): --  RR: 22 (12-19-23 @ 13:00) (17 - 28)  SpO2: 100% (12-19-23 @ 13:00) (98% - 100%)        GENERAL:  [x ] No acute distress [ ]Lethargic  [ ]Unarousable  [ ]Verbal  [x ]Non-Verbal [ ]Cachexia    BEHAVIORAL/PSYCH:  [ ]Alert and Oriented x  [ ] Anxiety [ ] Delirium [ ] Agitation [ ] Calm   EYES: [ ] No scleral icterus [ ] Scleral icterus [x ] Closed  ENMT:  [ ]Dry mouth  [ ]No external oral lesions [ ] No external ear or nose lesions  CARDIOVASCULAR:  [ ]Regular [ ]Irregular [ ]Tachy [ ]Not Tachy  [ ]Nikita [ ] Edema [ ] No edema  PULMONARY:  [ ]Tachypnea  [ x]Audible excessive secretions [ x] No labored breathing [ ] labored breathing  GASTROINTESTINAL: [ ]Soft  [ ]Distended  [ ]Not distended [ ]Non tender [ ]Tender  MUSCULOSKELETAL: [ ]No clubbing [ ] clubbing  [ x] No cyanosis [ ] cyanosis  NEUROLOGIC: [ ]No focal deficits  [ ]Follows commands  [x ]Does not follow commands  [ ]Cognitive impairment  [ ]Dysphagia  [ ]Dysarthria  [ ]Paresis   SKIN: [ ] Jaundiced [ ] Non-jaundiced [ ]Rash [ ]No Rash [ ] Warm [ ] Dry  MISC/LINES: [ ] ET tube [ ] Trach [x ]NGT/OGT [ ]PEG [x ]Aguirre    LABS: reviewed by me                        8.8    11.38 )-----------( 230      ( 18 Dec 2023 00:30 )             26.4   12-18    141  |  109  |  41<H>  ----------------------------<  212<H>  3.8   |  20  |  2.5<H>    Ca    8.0<L>      18 Dec 2023 00:30  Phos  3.2     12-18  Mg     2.6     12-18        Urinalysis Basic - ( 18 Dec 2023 00:30 )    Color: x / Appearance: x / SG: x / pH: x  Gluc: 212 mg/dL / Ketone: x  / Bili: x / Urobili: x   Blood: x / Protein: x / Nitrite: x   Leuk Esterase: x / RBC: x / WBC x   Sq Epi: x / Non Sq Epi: x / Bacteria: x      RADIOLOGY & ADDITIONAL STUDIES: reviewed by me    EKG: reviewed by me        Palliative Care Spiritual/Emotional Screening Tool Question  Severity (0-4):      0              OR                    [ ] Unable to determine/NA  Score of 2 or greater indicates recommendation of Chaplaincy referral  Chaplaincy Referral: [ ] Yes [ ] Refused [ ] Following     Caregiver [place]:  [ ] Yes [ ] No [ x] Deferred  Social Work Referral [ ]  Patient and Family Centered Care Referral [ ]    Anticipatory Grief Present: [ ] Yes [ ] No [x ] Deferred  Social Work Referral [ ]  Patient and Family Centered Care Referral [ ]    Patient discussed with primary medical team MD  Palliative care education provided to patient and/or family   CC:       HPI:"77-year-old male Cantonese speaking his presents with prior history of hypertension dyslipidemia diabetes BPH prior CVA on aspirin and Plavix who states he had a mechanical fall 2 days ago while getting out of the car fell backwards hit his head.  Afterwards he was able to ambulate.  But for the past 1 day family was unable to ambulate him.  He states that his lower extremities are painful to movement and weak.  Family denies any LOC.  They deny any confusion at home.  On exam oriented to person and place but just not to date.  He does follow simple commands.  Elevates his arms for 10 seconds.  Sensation intact in upper extremities.  Lower extremities with 2 out of 5 strength bilaterally."         (16 Dec 2023 00:01)    Interval history:  Discussed with patient's wife and daughter.     PERTINENT PM/SXH:   HTN (hypertension)    Seasonal allergies    History of BPH    Diabetes      No significant past surgical history      FAMILY HISTORY:  No pertinent family history in first degree relatives      ITEMS NOT CHECKED ARE NOT PRESENT    SOCIAL HISTORY:   Significant other/partner [x ]  Children[x ]  Mandaen/Spirituality:  Substance hx:  [ ]   Tobacco hx:  [ ]   Alcohol hx: [ ]   Living Situation: [ x]Home  [ ]Long term care  [ ]Rehab [ ]Other  Home Services: [ ] HHA [ ] Visting RN [ ] Hospice  Occupation:  Home Opioid hx:  [ ] Y [ ] N [ ] I-Stop Reference No:     ADVANCE DIRECTIVES:     [ ] Full Code [ ] DNR  MOLST  [ ]  Living Will  [ ]   DECISION MAKER(s):  [ ] Health Care Proxy(s)  [ ] Surrogate(s)  [ ] Guardian           Name(s): Phone Number(s):   Wife Negro Fair [phone]  Dtr Rashi Bryantang - [phone]   BASELINE (I)ADL(s) (prior to admission):    [place]: [x ]Total  [ ] Moderate [ ]Dependent  Palliative Performance Status Version 2:         %    http://npcrc.org/files/news/palliative_performance_scale_[email or web address].pdf    Allergies    [This allergen will not trigger allergy alert] pollen (Unknown)  No Known Drug Allergies    Intolerances    MEDICATIONS  (STANDING):  acetaminophen     Tablet .. 650 milliGRAM(s) Oral every 6 hours  albuterol/ipratropium for Nebulization 3 milliLiter(s) Nebulizer every 6 hours  amLODIPine   Tablet 5 milliGRAM(s) Oral daily  atorvastatin 20 milliGRAM(s) Oral at bedtime  chlorhexidine 2% Cloths 1 Application(s) Topical <User Schedule>  heparin   Injectable 5000 Unit(s) SubCutaneous every 8 hours  insulin glargine Injectable (LANTUS) 10 Unit(s) SubCutaneous at bedtime  insulin lispro (ADMELOG) corrective regimen sliding scale   SubCutaneous every 6 hours  labetalol 100 milliGRAM(s) Oral every 8 hours  levETIRAcetam  Solution 500 milliGRAM(s) Oral two times a day  piperacillin/tazobactam IVPB.. 3.375 Gram(s) IV Intermittent every 12 hours  polyethylene glycol 3350 17 Gram(s) Oral daily  senna 2 Tablet(s) Oral at bedtime  sodium bicarbonate 650 milliGRAM(s) Oral two times a day  tamsulosin 0.8 milliGRAM(s) Oral at bedtime  valproic  acid Syrup 500 milliGRAM(s) Oral every 8 hours    MEDICATIONS  (PRN):    PRESENT SYMPTOMS: [x ]Unable to obtain due to poor mentation   Source if other than patient:  [ ]Family   [ ]Team     Pain: [ ]yes [ ]no  QOL impact -   Location -                    Aggravating factors -  Quality -  Radiation -  Timing-  Severity (0-10 scale):  Minimal acceptable level (0-10 scale):     CPOT:  3  https://www.sccm.org/getattachment/mxa76j41-0u0q-6c2c-9z1d-2821j1876q3n/Critical-Care-Pain-Observation-Tool-(CPOT)    PAIN AD Score:   http://geriatrictoolkit.[email or web address]/cog/painad.pdf (press ctrl +  left click to view)    Dyspnea:                           [ ]None[ ]Mild [ ]Moderate [ ]Severe     Respiratory Distress Observation Scale (RDOS): 1  A score of 0 to 2 signifies little or no respiratory distress, 3 signifies mild distress, scores 4 to 6 indicate moderate distress, and scores greater than 7 signify severe distress  [email or web address]/[email or web address]/[email or web address]-pyhugptm-[email or web address]-[email or web address]    Anxiety:                             [ ]None[ ]Mild [ ]Moderate [ ]Severe   Fatigue:                             [ ]None[ ]Mild [ ]Moderate [ ]Severe   Nausea:                             [ ]None[ ]Mild [ ]Moderate [ ]Severe   Loss of appetite:              [ ]None[ ]Mild [ ]Moderate [ ]Severe   Constipation:                    [ ]None[ ]Mild [ ]Moderate [ ]Severe    Other Symptoms:  [ ]All other review of systems negative     Palliative Performance Status Version 2:         %    [email or web address]/[email or web address]_performance_scale_[email or web address].pdf    PHYSICAL EXAM:  T(C): 37.3 (12-19-23 @ 12:00), Max: 37.8 (12-18-23 @ 20:00)  T(F): 99.1 (12-19-23 @ 12:00), Max: 100.1 (12-18-23 @ 20:00)  HR: 93 (12-19-23 @ 13:00) (80 - 97)  BP: 175/75 (12-19-23 @ 13:00) (127/58 - 211/86)  ABP: --  ABP(mean): --  RR: 22 (12-19-23 @ 13:00) (17 - 28)  SpO2: 100% (12-19-23 @ 13:00) (98% - 100%)        GENERAL:  [x ] No acute distress [ ]Lethargic  [ ]Unarousable  [ ]Verbal  [x ]Non-Verbal [ ]Cachexia    BEHAVIORAL/PSYCH:  [ ]Alert and Oriented x  [ ] Anxiety [ ] Delirium [ ] Agitation [ ] Calm   EYES: [ ] No scleral icterus [ ] Scleral icterus [x ] Closed  ENMT:  [ ]Dry mouth  [ ]No external oral lesions [ ] No external ear or nose lesions  CARDIOVASCULAR:  [ ]Regular [ ]Irregular [ ]Tachy [ ]Not Tachy  [ ]Nikita [ ] Edema [ ] No edema  PULMONARY:  [ ]Tachypnea  [ x]Audible excessive secretions [ x] No labored breathing [ ] labored breathing  GASTROINTESTINAL: [ ]Soft  [ ]Distended  [ ]Not distended [ ]Non tender [ ]Tender  MUSCULOSKELETAL: [ ]No clubbing [ ] clubbing  [ x] No cyanosis [ ] cyanosis  NEUROLOGIC: [ ]No focal deficits  [ ]Follows commands  [x ]Does not follow commands  [ ]Cognitive impairment  [ ]Dysphagia  [ ]Dysarthria  [ ]Paresis   SKIN: [ ] Jaundiced [ ] Non-jaundiced [ ]Rash [ ]No Rash [ ] Warm [ ] Dry  MISC/LINES: [ ] ET tube [ ] Trach [x ]NGT/OGT [ ]PEG [x ]Aguirre    LABS: reviewed by me                        8.8    11.38 )-----------( 230      ( 18 Dec 2023 00:30 )             26.4   12-18    141  |  109  |  41<H>  ----------------------------<  212<H>  3.8   |  20  |  2.5<H>    Ca    8.0<L>      18 Dec 2023 00:30  Phos  3.2     12-18  Mg     2.6     12-18        Urinalysis Basic - ( 18 Dec 2023 00:30 )    Color: x / Appearance: x / SG: x / pH: x  Gluc: 212 mg/dL / Ketone: x  / Bili: x / Urobili: x   Blood: x / Protein: x / Nitrite: x   Leuk Esterase: x / RBC: x / WBC x   Sq Epi: x / Non Sq Epi: x / Bacteria: x      RADIOLOGY & ADDITIONAL STUDIES: reviewed by me    EKG: reviewed by me        Palliative Care Spiritual/Emotional Screening Tool Question  Severity (0-4):      0              OR                    [ ] Unable to determine/NA  Score of 2 or greater indicates recommendation of Chaplaincy referral  Chaplaincy Referral: [ ] Yes [ ] Refused [ ] Following     Caregiver [place]:  [ ] Yes [ ] No [ x] Deferred  Social Work Referral [ ]  Patient and Family Centered Care Referral [ ]    Anticipatory Grief Present: [ ] Yes [ ] No [x ] Deferred  Social Work Referral [ ]  Patient and Family Centered Care Referral [ ]    Patient discussed with primary medical team MD  Palliative care education provided to patient and/or family

## 2023-12-19 NOTE — PROGRESS NOTE ADULT - SUBJECTIVE AND OBJECTIVE BOX
JACQUELIN CAI   [id number]   [birth year]  78yM    Admit Date: 12-16-23  Indication for SDU/SICU: Q1 neuro checks        ============================   24 Hour [id number]/18  NIGHT  -stable SDH, no acute findings on HCT  -b/l pupils reactive to light 3mm b/l, MACIEL to stimulus  -left wrist extension w/o rigidity  -b/l knee xray due to discomfort w/ rom, no swelling   -Ammonia lvl 36       DAY  -neurology c/s for sz; neurocrit to discuss with Dr. Quiles whether to defer to neurology for EEG interpretation, also Q2 neuro checks?  -on reassessment, GCS 14, E=3, V=5, M=6, still lethargic   -pending CTH #4 read   -hypertensive to 160s, amlodipine 5mg added for BP control  -dc GI ppx, no indication per Dr. Roman   -IVL  -lantus 10mg added for elevated FS  -chest PT + duoneb added to improve resp status   -dc zosyn, no aspiration PNA per Dr. Roman   -palliative c/s, repeat GOC   -corrected valproic acid level 108--> 500mg next dose, 500mg AM, afternoon 250mg per pharmacy --> neurocrit to sign off, defer to neurology, recommend dc Keppra, c/w current dose valproate      [X] A ten-point review of systems was otherwise negative except as noted above.  [  ] Due to altered mental status/intubation, subjective information was not attained from the patient. History was obtained, to the extent possible, from review of the chart and collateral sources of information.    =========================================================================================================================================   JACQUELIN CAI   [id number]   [birth year]  78yM    Admit Date: 12-16-23  Indication for SDU/SICU: Q1 neuro checks        ============================   24 Hour [id number]/18  NIGHT  -stable SDH, no acute findings on HCT  -b/l pupils reactive to light 3mm b/l, MACIEL to stimulus  -left wrist extension w/o rigidity  -b/l knee xray due to discomfort w/ rom, no swelling   -Ammonia lvl 36       DAY  -neurology c/s for sz; neurocrit to discuss with Dr. Quiles whether to defer to neurology for EEG interpretation, also Q2 neuro checks?  -on reassessment, GCS 14, E=3, V=5, M=6, still lethargic   -pending CTH #4 read   -hypertensive to 160s, amlodipine 5mg added for BP control  -dc GI ppx, no indication per Dr. Roman   -IVL  -lantus 10mg added for elevated FS  -chest PT + duoneb added to improve resp status   -dc zosyn, no aspiration PNA per Dr. Roman   -palliative c/s, repeat GOC   -corrected valproic acid level 108--> 500mg next dose, 500mg AM, afternoon 250mg per pharmacy --> neurocrit to sign off, defer to neurology, recommend dc Keppra, c/w current dose valproate      [X] A ten-point review of systems was otherwise negative except as noted above.  [  ] Due to altered mental status/intubation, subjective information was not attained from the patient. History was obtained, to the extent possible, from review of the chart and collateral sources of information.    =========================================================================================================================================   JACQUELIN CAI   [id number]   [birth year]  78yM    Admit Date: 12-16-23  Indication for SDU/SICU: Q1 neuro checks        ============================   24 Hour [id number]/18  NIGHT  -stable SDH, no acute findings on HCT  -b/l pupils reactive to light 3mm b/l, MACIEL to stimulus  -left wrist extension w/o rigidity  -b/l knee xray due to discomfort w/ rom, no swelling   -Ammonia lvl 36       DAY  -neurology c/s for sz; neurocrit to discuss with Dr. Quiles whether to defer to neurology for EEG interpretation, also Q2 neuro checks?  -on reassessment, GCS 14, E=3, V=5, M=6, still lethargic   -pending CTH #4 read   -hypertensive to 160s, amlodipine 5mg added for BP control  -dc GI ppx, no indication per Dr. Roman   -IVL  -lantus 10mg added for elevated FS  -chest PT + duoneb added to improve resp status   -dc zosyn, no aspiration PNA per Dr. Roman   -palliative c/s, repeat GOC   -corrected valproic acid level 108--> 500mg next dose, 500mg AM, afternoon 250mg per pharmacy --> neurocrit to sign off, defer to neurology, recommend dc Keppra, c/w current dose valproate      [X] A ten-point review of systems was otherwise negative except as noted above.  [  ] Due to altered mental status/intubation, subjective information was not attained from the patient. History was obtained, to the extent possible, from review of the chart and collateral sources of information.    =========================================================================================================================================  Daily     Daily   Diet, NPO with Tube Feed:   Tube Feeding Modality: Nasogastric  Glucerna 1.2 Tonio  Total Volume for 24 Hours (mL): 1350  Continuous  Starting Tube Feed Rate mL per Hour: 55  Increase Tube Feed Rate by (mL): 10     Every 4 hours  Until Goal Tube Feed Rate (mL per Hour): 75  Tube Feed Duration (in Hours): 18  Tube Feed Start Time: 10:00   Frequency: Every 4 Hours  Free Water Flush Instructions:  Banatrol 2x daily if pt having loose BMs (12-18-23 @ 23:08)    CURRENT MEDS:  Neurologic Medications  acetaminophen     Tablet .. 650 milliGRAM(s) Oral every 6 hours  valproic  acid Syrup 500 milliGRAM(s) Oral every 8 hours    Respiratory Medications  albuterol/ipratropium for Nebulization 3 milliLiter(s) Nebulizer every 6 hours    Cardiovascular Medications  amLODIPine   Tablet 5 milliGRAM(s) Oral daily  doxazosin 4 milliGRAM(s) Oral at bedtime  labetalol 100 milliGRAM(s) Oral every 8 hours    Gastrointestinal Medications  senna 2 Tablet(s) Oral at bedtime  sodium bicarbonate 650 milliGRAM(s) Oral two times a day    Genitourinary Medications    Hematologic/Oncologic Medications  heparin   Injectable 5000 Unit(s) SubCutaneous every 8 hours    Antimicrobial/Immunologic Medications    Endocrine/Metabolic Medications  atorvastatin 20 milliGRAM(s) Oral at bedtime  insulin glargine Injectable (LANTUS) 10 Unit(s) SubCutaneous at bedtime  insulin lispro (ADMELOG) corrective regimen sliding scale   SubCutaneous every 6 hours    Topical/Other Medications  chlorhexidine 2% Cloths 1 Application(s) Topical <User Schedule>    ICU Vital Signs Last 24 Hrs  T(C): 37.8 (19 Dec 2023 08:00), Max: 37.8 (18 Dec 2023 20:00)  T(F): 100.1 (19 Dec 2023 08:00), Max: 100.1 (18 Dec 2023 20:00)  HR: 94 (19 Dec 2023 08:05) (80 - 97)  BP: 173/73 (19 Dec 2023 08:05) (127/58 - 211/86)  BP(mean): 105 (19 Dec 2023 08:05) (84 - 123)  ABP: --  ABP(mean): --  RR: 24 (19 Dec 2023 08:05) (17 - 28)  SpO2: 100% (19 Dec 2023 08:05) (100% - 100%)    O2 Parameters below as of 19 Dec 2023 08:00  Patient On (Oxygen Delivery Method): nasal cannula  O2 Flow (L/min): 1          ABG - ( 18 Dec 2023 04:09 )  pH, Arterial: 7.40  pH, Blood: x     /  pCO2: 34    /  pO2: 106   / HCO3: 21    / Base Excess: -3.1  /  SaO2: 99.3              I&O's Summary    18 Dec 2023 07:01  -  19 Dec 2023 07:00  --------------------------------------------------------  IN: 1485 mL / OUT: 1700 mL / NET: -215 mL    19 Dec 2023 07:01  -  19 Dec 2023 08:17  --------------------------------------------------------  IN: 150 mL / OUT: 60 mL / NET: 90 mL      I&O's Detail    18 Dec 2023 07:01  -  19 Dec 2023 07:00  --------------------------------------------------------  IN:    Glucerna: 945 mL    sodium chloride 0.45%: 120 mL    sodium chloride 0.9%: 420 mL  Total IN: 1485 mL    OUT:    Indwelling Catheter - Urethral (mL): 1700 mL    Nasogastric/Oral tube (mL): 0 mL  Total OUT: 1700 mL    Total NET: -215 mL      19 Dec 2023 07:01  -  19 Dec 2023 08:17  --------------------------------------------------------  IN:    Glucerna: 150 mL  Total IN: 150 mL    OUT:    Indwelling Catheter - Urethral (mL): 60 mL  Total OUT: 60 mL    Total NET: 90 mL              acetaminophen     Tablet .. 650 milliGRAM(s) Oral every 6 hours  albuterol/ipratropium for Nebulization 3 milliLiter(s) Nebulizer every 6 hours  amLODIPine   Tablet 5 milliGRAM(s) Oral daily  atorvastatin 20 milliGRAM(s) Oral at bedtime  chlorhexidine 2% Cloths 1 Application(s) Topical <User Schedule>  doxazosin 4 milliGRAM(s) Oral at bedtime  heparin   Injectable 5000 Unit(s) SubCutaneous every 8 hours  insulin glargine Injectable (LANTUS) 10 Unit(s) SubCutaneous at bedtime  insulin lispro (ADMELOG) corrective regimen sliding scale   SubCutaneous every 6 hours  labetalol 100 milliGRAM(s) Oral every 8 hours  senna 2 Tablet(s) Oral at bedtime  sodium bicarbonate 650 milliGRAM(s) Oral two times a day  valproic  acid Syrup 500 milliGRAM(s) Oral every 8 hours                  PHYSICAL EXAM:   communicated with patient via  ID # [id number]- John Paul   GCS 13 (E3, V4, M6)  follows commands  no acute distress  hypertensive to 170/70, RRR,  palpable DP+PT b/l  equal chest rise b/l, B/L rhonchi noted  abdomen soft, nt, nd   extremities soft  urinary cath in place         JACQUELIN CAI   [id number]   [birth year]  78yM    Admit Date: 12-16-23  Indication for SDU/SICU: Q1 neuro checks        ============================   24 Hour [id number]/18  NIGHT  -stable SDH, no acute findings on HCT  -b/l pupils reactive to light 3mm b/l, MACIEL to stimulus  -left wrist extension w/o rigidity  -b/l knee xray due to discomfort w/ rom, no swelling   -Ammonia lvl 36       DAY  -neurology c/s for sz; neurocrit to discuss with Dr. Quiles whether to defer to neurology for EEG interpretation, also Q2 neuro checks?  -on reassessment, GCS 14, E=3, V=5, M=6, still lethargic   -pending CTH #4 read   -hypertensive to 160s, amlodipine 5mg added for BP control  -dc GI ppx, no indication per Dr. Roman   -IVL  -lantus 10mg added for elevated FS  -chest PT + duoneb added to improve resp status   -dc zosyn, no aspiration PNA per Dr. Roman   -palliative c/s, repeat GOC   -corrected valproic acid level 108--> 500mg next dose, 500mg AM, afternoon 250mg per pharmacy --> neurocrit to sign off, defer to neurology, recommend dc Keppra, c/w current dose valproate      [X] A ten-point review of systems was otherwise negative except as noted above.  [  ] Due to altered mental status/intubation, subjective information was not attained from the patient. History was obtained, to the extent possible, from review of the chart and collateral sources of information.    =========================================================================================================================================  Daily     Daily   Diet, NPO with Tube Feed:   Tube Feeding Modality: Nasogastric  Glucerna 1.2 Tonio  Total Volume for 24 Hours (mL): 1350  Continuous  Starting Tube Feed Rate mL per Hour: 55  Increase Tube Feed Rate by (mL): 10     Every 4 hours  Until Goal Tube Feed Rate (mL per Hour): 75  Tube Feed Duration (in Hours): 18  Tube Feed Start Time: 10:00   Frequency: Every 4 Hours  Free Water Flush Instructions:  Banatrol 2x daily if pt having loose BMs (12-18-23 @ 23:08)    CURRENT MEDS:  Neurologic Medications  acetaminophen     Tablet .. 650 milliGRAM(s) Oral every 6 hours  valproic  acid Syrup 500 milliGRAM(s) Oral every 8 hours    Respiratory Medications  albuterol/ipratropium for Nebulization 3 milliLiter(s) Nebulizer every 6 hours    Cardiovascular Medications  amLODIPine   Tablet 5 milliGRAM(s) Oral daily  doxazosin 4 milliGRAM(s) Oral at bedtime  labetalol 100 milliGRAM(s) Oral every 8 hours    Gastrointestinal Medications  senna 2 Tablet(s) Oral at bedtime  sodium bicarbonate 650 milliGRAM(s) Oral two times a day    Genitourinary Medications    Hematologic/Oncologic Medications  heparin   Injectable 5000 Unit(s) SubCutaneous every 8 hours    Antimicrobial/Immunologic Medications    Endocrine/Metabolic Medications  atorvastatin 20 milliGRAM(s) Oral at bedtime  insulin glargine Injectable (LANTUS) 10 Unit(s) SubCutaneous at bedtime  insulin lispro (ADMELOG) corrective regimen sliding scale   SubCutaneous every 6 hours    Topical/Other Medications  chlorhexidine 2% Cloths 1 Application(s) Topical <User Schedule>    ICU Vital Signs Last 24 Hrs  T(C): 37.8 (19 Dec 2023 08:00), Max: 37.8 (18 Dec 2023 20:00)  T(F): 100.1 (19 Dec 2023 08:00), Max: 100.1 (18 Dec 2023 20:00)  HR: 94 (19 Dec 2023 08:05) (80 - 97)  BP: 173/73 (19 Dec 2023 08:05) (127/58 - 211/86)  BP(mean): 105 (19 Dec 2023 08:05) (84 - 123)  ABP: --  ABP(mean): --  RR: 24 (19 Dec 2023 08:05) (17 - 28)  SpO2: 100% (19 Dec 2023 08:05) (100% - 100%)    O2 Parameters below as of 19 Dec 2023 08:00  Patient On (Oxygen Delivery Method): nasal cannula  O2 Flow (L/min): 1          ABG - ( 18 Dec 2023 04:09 )  pH, Arterial: 7.40  pH, Blood: x     /  pCO2: 34    /  pO2: 106   / HCO3: 21    / Base Excess: -3.1  /  SaO2: 99.3              I&O's Summary    18 Dec 2023 07:01  -  19 Dec 2023 07:00  --------------------------------------------------------  IN: 1485 mL / OUT: 1700 mL / NET: -215 mL    19 Dec 2023 07:01  -  19 Dec 2023 08:17  --------------------------------------------------------  IN: 150 mL / OUT: 60 mL / NET: 90 mL      I&O's Detail    18 Dec 2023 07:01  -  19 Dec 2023 07:00  --------------------------------------------------------  IN:    Glucerna: 945 mL    sodium chloride 0.45%: 120 mL    sodium chloride 0.9%: 420 mL  Total IN: 1485 mL    OUT:    Indwelling Catheter - Urethral (mL): 1700 mL    Nasogastric/Oral tube (mL): 0 mL  Total OUT: 1700 mL    Total NET: -215 mL      19 Dec 2023 07:01  -  19 Dec 2023 08:17  --------------------------------------------------------  IN:    Glucerna: 150 mL  Total IN: 150 mL    OUT:    Indwelling Catheter - Urethral (mL): 60 mL  Total OUT: 60 mL    Total NET: 90 mL              acetaminophen     Tablet .. 650 milliGRAM(s) Oral every 6 hours  albuterol/ipratropium for Nebulization 3 milliLiter(s) Nebulizer every 6 hours  amLODIPine   Tablet 5 milliGRAM(s) Oral daily  atorvastatin 20 milliGRAM(s) Oral at bedtime  chlorhexidine 2% Cloths 1 Application(s) Topical <User Schedule>  doxazosin 4 milliGRAM(s) Oral at bedtime  heparin   Injectable 5000 Unit(s) SubCutaneous every 8 hours  insulin glargine Injectable (LANTUS) 10 Unit(s) SubCutaneous at bedtime  insulin lispro (ADMELOG) corrective regimen sliding scale   SubCutaneous every 6 hours  labetalol 100 milliGRAM(s) Oral every 8 hours  senna 2 Tablet(s) Oral at bedtime  sodium bicarbonate 650 milliGRAM(s) Oral two times a day  valproic  acid Syrup 500 milliGRAM(s) Oral every 8 hours                  PHYSICAL EXAM:   communicated with patient via  ID # [id number]- John Paul   GCS 13 (E3, V4, M6)  follows commands  no acute distress  hypertensive to 170/70, RRR,  palpable DP+PT b/l  equal chest rise b/l, B/L rhonchi noted  abdomen soft, nt, nd   extremities soft  urinary cath in place         JACQUELIN CAI   [id number]   [birth year]  78yM    Admit Date: 12-16-23  Indication for SDU/SICU: Q1 neuro checks        ============================   24 Hour Pshtvo31/18  NIGHT  -stable SDH, no acute findings on HCT  -b/l pupils reactive to light 3mm b/l, MACIEL to stimulus  -left wrist extension w/o rigidity  -b/l knee xray due to discomfort w/ rom, no swelling   -Ammonia lvl 36       DAY  -neurology c/s for sz; neurocrit to discuss with Dr. Quiles whether to defer to neurology for EEG interpretation, also Q2 neuro checks?  -on reassessment, GCS 14, E=3, V=5, M=6, still lethargic   -pending CTH #4 read   -hypertensive to 160s, amlodipine 5mg added for BP control  -dc GI ppx, no indication per Dr. Roman   -IVL  -lantus 10mg added for elevated FS  -chest PT + duoneb added to improve resp status   -dc zosyn, no aspiration PNA per Dr. Roman   -palliative c/s, repeat GOC   -corrected valproic acid level 108--> 500mg next dose, 500mg AM, afternoon 250mg per pharmacy --> neurocrit to sign off, defer to neurology, recommend dc Keppra, c/w current dose valproate      [X] A ten-point review of systems was otherwise negative except as noted above.  [  ] Due to altered mental status/intubation, subjective information was not attained from the patient. History was obtained, to the extent possible, from review of the chart and collateral sources of information.    =========================================================================================================================================  Daily     Daily   Diet, NPO with Tube Feed:   Tube Feeding Modality: Nasogastric  Glucerna 1.2 Tonio  Total Volume for 24 Hours (mL): 1350  Continuous  Starting Tube Feed Rate mL per Hour: 55  Increase Tube Feed Rate by (mL): 10     Every 4 hours  Until Goal Tube Feed Rate (mL per Hour): 75  Tube Feed Duration (in Hours): 18  Tube Feed Start Time: 10:00   Frequency: Every 4 Hours  Free Water Flush Instructions:  Banatrol 2x daily if pt having loose BMs (12-18-23 @ 23:08)    CURRENT MEDS:  Neurologic Medications  acetaminophen     Tablet .. 650 milliGRAM(s) Oral every 6 hours  valproic  acid Syrup 500 milliGRAM(s) Oral every 8 hours    Respiratory Medications  albuterol/ipratropium for Nebulization 3 milliLiter(s) Nebulizer every 6 hours    Cardiovascular Medications  amLODIPine   Tablet 5 milliGRAM(s) Oral daily  doxazosin 4 milliGRAM(s) Oral at bedtime  labetalol 100 milliGRAM(s) Oral every 8 hours    Gastrointestinal Medications  senna 2 Tablet(s) Oral at bedtime  sodium bicarbonate 650 milliGRAM(s) Oral two times a day    Genitourinary Medications    Hematologic/Oncologic Medications  heparin   Injectable 5000 Unit(s) SubCutaneous every 8 hours    Antimicrobial/Immunologic Medications    Endocrine/Metabolic Medications  atorvastatin 20 milliGRAM(s) Oral at bedtime  insulin glargine Injectable (LANTUS) 10 Unit(s) SubCutaneous at bedtime  insulin lispro (ADMELOG) corrective regimen sliding scale   SubCutaneous every 6 hours    Topical/Other Medications  chlorhexidine 2% Cloths 1 Application(s) Topical <User Schedule>    ICU Vital Signs Last 24 Hrs  T(C): 37.8 (19 Dec 2023 08:00), Max: 37.8 (18 Dec 2023 20:00)  T(F): 100.1 (19 Dec 2023 08:00), Max: 100.1 (18 Dec 2023 20:00)  HR: 94 (19 Dec 2023 08:05) (80 - 97)  BP: 173/73 (19 Dec 2023 08:05) (127/58 - 211/86)  BP(mean): 105 (19 Dec 2023 08:05) (84 - 123)  ABP: --  ABP(mean): --  RR: 24 (19 Dec 2023 08:05) (17 - 28)  SpO2: 100% (19 Dec 2023 08:05) (100% - 100%)    O2 Parameters below as of 19 Dec 2023 08:00  Patient On (Oxygen Delivery Method): nasal cannula  O2 Flow (L/min): 1          ABG - ( 18 Dec 2023 04:09 )  pH, Arterial: 7.40  pH, Blood: x     /  pCO2: 34    /  pO2: 106   / HCO3: 21    / Base Excess: -3.1  /  SaO2: 99.3              I&O's Summary    18 Dec 2023 07:01  -  19 Dec 2023 07:00  --------------------------------------------------------  IN: 1485 mL / OUT: 1700 mL / NET: -215 mL    19 Dec 2023 07:01  -  19 Dec 2023 08:17  --------------------------------------------------------  IN: 150 mL / OUT: 60 mL / NET: 90 mL      I&O's Detail    18 Dec 2023 07:01  -  19 Dec 2023 07:00  --------------------------------------------------------  IN:    Glucerna: 945 mL    sodium chloride 0.45%: 120 mL    sodium chloride 0.9%: 420 mL  Total IN: 1485 mL    OUT:    Indwelling Catheter - Urethral (mL): 1700 mL    Nasogastric/Oral tube (mL): 0 mL  Total OUT: 1700 mL    Total NET: -215 mL      19 Dec 2023 07:01  -  19 Dec 2023 08:17  --------------------------------------------------------  IN:    Glucerna: 150 mL  Total IN: 150 mL    OUT:    Indwelling Catheter - Urethral (mL): 60 mL  Total OUT: 60 mL    Total NET: 90 mL              acetaminophen     Tablet .. 650 milliGRAM(s) Oral every 6 hours  albuterol/ipratropium for Nebulization 3 milliLiter(s) Nebulizer every 6 hours  amLODIPine   Tablet 5 milliGRAM(s) Oral daily  atorvastatin 20 milliGRAM(s) Oral at bedtime  chlorhexidine 2% Cloths 1 Application(s) Topical <User Schedule>  doxazosin 4 milliGRAM(s) Oral at bedtime  heparin   Injectable 5000 Unit(s) SubCutaneous every 8 hours  insulin glargine Injectable (LANTUS) 10 Unit(s) SubCutaneous at bedtime  insulin lispro (ADMELOG) corrective regimen sliding scale   SubCutaneous every 6 hours  labetalol 100 milliGRAM(s) Oral every 8 hours  senna 2 Tablet(s) Oral at bedtime  sodium bicarbonate 650 milliGRAM(s) Oral two times a day  valproic  acid Syrup 500 milliGRAM(s) Oral every 8 hours                  PHYSICAL EXAM:   General/ Neuro: Communicated with patient via  ID #[id number]- John Paul. GCS 11 (E3, V2, M6), follows commands/ squeezes hands, withdraws from pain  Respiratory: 1 L NC saturating well. Equal chest rise bilaterally  Cardiovascular: Hypertensive to 170/70, RRR, palpable DP+PT b/l, cap refill <3 sec   Abdomen: Soft, NT, ND  Ext: Soft  : Urinary cath in place         JACQUELIN CAI   [id number]   [birth year]  78yM    Admit Date: 12-16-23  Indication for SDU/SICU: Q1 neuro checks        ============================   24 Hour [id number]/18  NIGHT  -stable SDH, no acute findings on HCT  -b/l pupils reactive to light 3mm b/l, MACIEL to stimulus  -left wrist extension w/o rigidity  -b/l knee xray due to discomfort w/ rom, no swelling   -Ammonia lvl 36       DAY  -neurology c/s for sz; neurocrit to discuss with Dr. Quiles whether to defer to neurology for EEG interpretation, also Q2 neuro checks?  -on reassessment, GCS 14, E=3, V=5, M=6, still lethargic   -pending CTH #4 read   -hypertensive to 160s, amlodipine 5mg added for BP control  -dc GI ppx, no indication per Dr. Roman   -IVL  -lantus 10mg added for elevated FS  -chest PT + duoneb added to improve resp status   -dc zosyn, no aspiration PNA per Dr. Roman   -palliative c/s, repeat GOC   -corrected valproic acid level 108--> 500mg next dose, 500mg AM, afternoon 250mg per pharmacy --> neurocrit to sign off, defer to neurology, recommend dc Keppra, c/w current dose valproate      [X] A ten-point review of systems was otherwise negative except as noted above.  [  ] Due to altered mental status/intubation, subjective information was not attained from the patient. History was obtained, to the extent possible, from review of the chart and collateral sources of information.    =========================================================================================================================================  Daily     Daily   Diet, NPO with Tube Feed:   Tube Feeding Modality: Nasogastric  Glucerna 1.2 Tonio  Total Volume for 24 Hours (mL): 1350  Continuous  Starting Tube Feed Rate mL per Hour: 55  Increase Tube Feed Rate by (mL): 10     Every 4 hours  Until Goal Tube Feed Rate (mL per Hour): 75  Tube Feed Duration (in Hours): 18  Tube Feed Start Time: 10:00   Frequency: Every 4 Hours  Free Water Flush Instructions:  Banatrol 2x daily if pt having loose BMs (12-18-23 @ 23:08)    CURRENT MEDS:  Neurologic Medications  acetaminophen     Tablet .. 650 milliGRAM(s) Oral every 6 hours  valproic  acid Syrup 500 milliGRAM(s) Oral every 8 hours    Respiratory Medications  albuterol/ipratropium for Nebulization 3 milliLiter(s) Nebulizer every 6 hours    Cardiovascular Medications  amLODIPine   Tablet 5 milliGRAM(s) Oral daily  doxazosin 4 milliGRAM(s) Oral at bedtime  labetalol 100 milliGRAM(s) Oral every 8 hours    Gastrointestinal Medications  senna 2 Tablet(s) Oral at bedtime  sodium bicarbonate 650 milliGRAM(s) Oral two times a day    Genitourinary Medications    Hematologic/Oncologic Medications  heparin   Injectable 5000 Unit(s) SubCutaneous every 8 hours    Antimicrobial/Immunologic Medications    Endocrine/Metabolic Medications  atorvastatin 20 milliGRAM(s) Oral at bedtime  insulin glargine Injectable (LANTUS) 10 Unit(s) SubCutaneous at bedtime  insulin lispro (ADMELOG) corrective regimen sliding scale   SubCutaneous every 6 hours    Topical/Other Medications  chlorhexidine 2% Cloths 1 Application(s) Topical <User Schedule>    ICU Vital Signs Last 24 Hrs  T(C): 37.8 (19 Dec 2023 08:00), Max: 37.8 (18 Dec 2023 20:00)  T(F): 100.1 (19 Dec 2023 08:00), Max: 100.1 (18 Dec 2023 20:00)  HR: 94 (19 Dec 2023 08:05) (80 - 97)  BP: 173/73 (19 Dec 2023 08:05) (127/58 - 211/86)  BP(mean): 105 (19 Dec 2023 08:05) (84 - 123)  ABP: --  ABP(mean): --  RR: 24 (19 Dec 2023 08:05) (17 - 28)  SpO2: 100% (19 Dec 2023 08:05) (100% - 100%)    O2 Parameters below as of 19 Dec 2023 08:00  Patient On (Oxygen Delivery Method): nasal cannula  O2 Flow (L/min): 1          ABG - ( 18 Dec 2023 04:09 )  pH, Arterial: 7.40  pH, Blood: x     /  pCO2: 34    /  pO2: 106   / HCO3: 21    / Base Excess: -3.1  /  SaO2: 99.3              I&O's Summary    18 Dec 2023 07:01  -  19 Dec 2023 07:00  --------------------------------------------------------  IN: 1485 mL / OUT: 1700 mL / NET: -215 mL    19 Dec 2023 07:01  -  19 Dec 2023 08:17  --------------------------------------------------------  IN: 150 mL / OUT: 60 mL / NET: 90 mL      I&O's Detail    18 Dec 2023 07:01  -  19 Dec 2023 07:00  --------------------------------------------------------  IN:    Glucerna: 945 mL    sodium chloride 0.45%: 120 mL    sodium chloride 0.9%: 420 mL  Total IN: 1485 mL    OUT:    Indwelling Catheter - Urethral (mL): 1700 mL    Nasogastric/Oral tube (mL): 0 mL  Total OUT: 1700 mL    Total NET: -215 mL      19 Dec 2023 07:01  -  19 Dec 2023 08:17  --------------------------------------------------------  IN:    Glucerna: 150 mL  Total IN: 150 mL    OUT:    Indwelling Catheter - Urethral (mL): 60 mL  Total OUT: 60 mL    Total NET: 90 mL              acetaminophen     Tablet .. 650 milliGRAM(s) Oral every 6 hours  albuterol/ipratropium for Nebulization 3 milliLiter(s) Nebulizer every 6 hours  amLODIPine   Tablet 5 milliGRAM(s) Oral daily  atorvastatin 20 milliGRAM(s) Oral at bedtime  chlorhexidine 2% Cloths 1 Application(s) Topical <User Schedule>  doxazosin 4 milliGRAM(s) Oral at bedtime  heparin   Injectable 5000 Unit(s) SubCutaneous every 8 hours  insulin glargine Injectable (LANTUS) 10 Unit(s) SubCutaneous at bedtime  insulin lispro (ADMELOG) corrective regimen sliding scale   SubCutaneous every 6 hours  labetalol 100 milliGRAM(s) Oral every 8 hours  senna 2 Tablet(s) Oral at bedtime  sodium bicarbonate 650 milliGRAM(s) Oral two times a day  valproic  acid Syrup 500 milliGRAM(s) Oral every 8 hours                  PHYSICAL EXAM:   General/ Neuro: Communicated with patient via  ID #[id number]- John Paul. GCS 11 (E3, V2, M6), follows commands/ squeezes hands, withdraws from pain  Respiratory: 1 L NC saturating well. Equal chest rise bilaterally  Cardiovascular: Hypertensive to 170/70, RRR, palpable DP+PT b/l, cap refill <3 sec   Abdomen: Soft, NT, ND  Ext: Soft  : Urinary cath in place

## 2023-12-19 NOTE — PROGRESS NOTE ADULT - ASSESSMENT
78M who presented as a TRAUMA CONSULT s/p mechanical fall (+HT, -LOC, -AC). Found to have an acute subdural hemorrhage. Hospital course has been complicated by multiples seizures and uncontrolled hypertension.     PLAN:  - EEG completed - no seizures captures; EEG discontinued   - Q4H neuro checks   - Continue Valproate 500 mg q12h - follow level   - Monitor BP - Labetalol 200 mg Q8H w/ parameters, Amlodipine 5 mg QD (can increase to 10 mg QD if needed)   - Monitor glucose - on ISS, Lantus 15U   - Continue holding Plavix, restart ASA on 12/22   - DVT ppx w/ HSQ  - Obtain daily labs w/ repletion of electrolytes as needed   - Monitor UOP     # Neuro - recall 2 days prior to discharge for spot EEG   # S/S - unable to assess PO intake 2/2 increased lethargy, decreased arousability   _________________________  Trauma Team Surgery  [phone]      Date/Time: 12-19-23 @ 13:22

## 2023-12-19 NOTE — EEG REPORT - NS EEG TEXT BOX
Epilepsy Attending Note:     JACQUELIN CAI    78y Male  MRN [id number]    Vital Signs Last 24 Hrs  T(C): 37.8 (19 Dec 2023 08:00), Max: 37.8 (18 Dec 2023 20:00)  T(F): 100.1 (19 Dec 2023 08:00), Max: 100.1 (18 Dec 2023 20:00)  HR: 94 (19 Dec 2023 09:02) (80 - 97)  BP: 158/68 (19 Dec 2023 09:02) (127/58 - 211/86)  BP(mean): 98 (19 Dec 2023 09:02) (84 - 123)  RR: 22 (19 Dec 2023 09:02) (17 - 28)  SpO2: 100% (19 Dec 2023 09:02) (99% - 100%)    Parameters below as of 19 Dec 2023 08:00  Patient On (Oxygen Delivery Method): nasal cannula  O2 Flow (L/min): 1                            8.6    8.17  )-----------( 222      ( 18 Dec 2023 21:45 )             25.9       12-18    144  |  113<H>  |  43<H>  ----------------------------<  204<H>  3.8   |  21  |  2.8<H>    Ca    7.9<L>      18 Dec 2023 21:45  Phos  3.2     12-18  Mg     2.5     -18    TPro  x   /  Alb  3.0<L>  /  TBili  x   /  DBili  x   /  AST  x   /  ALT  x   /  AlkPhos  x   12-18      MEDICATIONS  (STANDING):  acetaminophen     Tablet .. 650 milliGRAM(s) Oral every 6 hours  albuterol/ipratropium for Nebulization 3 milliLiter(s) Nebulizer every 6 hours  amLODIPine   Tablet 5 milliGRAM(s) Oral daily  atorvastatin 20 milliGRAM(s) Oral at bedtime  chlorhexidine 2% Cloths 1 Application(s) Topical <User Schedule>  doxazosin 4 milliGRAM(s) Oral at bedtime  heparin   Injectable 5000 Unit(s) SubCutaneous every 8 hours  insulin glargine Injectable (LANTUS) 10 Unit(s) SubCutaneous at bedtime  insulin lispro (ADMELOG) corrective regimen sliding scale   SubCutaneous every 6 hours  labetalol 100 milliGRAM(s) Oral every 8 hours  senna 2 Tablet(s) Oral at bedtime  sodium bicarbonate 650 milliGRAM(s) Oral two times a day  valproic  acid Syrup 500 milliGRAM(s) Oral every 8 hours    MEDICATIONS  (PRN):      Valproic Acid Level, Serum: 47.0 ug/mL [50.0 - 100.0] (23 @ 11:08)        VEEG in the last 24 hours:    Background - continuous, symmetrical, less than optimally organized, reaching frequencies in the range of 7-8 Hz, showing reactivity.    Focal and generalized slowin. mild to moderate generalized slowing  2. mild bilateral focal slowing, mainly over anterior quadrants with shifting asymmetry, right slightly more than left  3. bifrontal slowing    Interictal activity - none    Events - it is of note, that there were few events captured. To the best, they could be described as right facial spasm/ possible blepharospasm. They  were not associated with epileptiform EEG changes.     Seizures - none    Impression: VEEG as above    Plan - per NCC team     Epilepsy Attending Note:     JACQUELIN CAI    78y Male  MRN [id number]    Vital Signs Last 24 Hrs  T(C): 37.8 (19 Dec 2023 08:00), Max: 37.8 (18 Dec 2023 20:00)  T(F): 100.1 (19 Dec 2023 08:00), Max: 100.1 (18 Dec 2023 20:00)  HR: 94 (19 Dec 2023 09:02) (80 - 97)  BP: 158/68 (19 Dec 2023 09:02) (127/58 - 211/86)  BP(mean): 98 (19 Dec 2023 09:02) (84 - 123)  RR: 22 (19 Dec 2023 09:02) (17 - 28)  SpO2: 100% (19 Dec 2023 09:02) (99% - 100%)    Parameters below as of 19 Dec 2023 08:00  Patient On (Oxygen Delivery Method): nasal cannula  O2 Flow (L/min): 1                            8.6    8.17  )-----------( 222      ( 18 Dec 2023 21:45 )             25.9       12-18    144  |  113<H>  |  43<H>  ----------------------------<  204<H>  3.8   |  21  |  2.8<H>    Ca    7.9<L>      18 Dec 2023 21:45  Phos  3.2     12-18  Mg     2.5     -18    TPro  x   /  Alb  3.0<L>  /  TBili  x   /  DBili  x   /  AST  x   /  ALT  x   /  AlkPhos  x   12-18      MEDICATIONS  (STANDING):  acetaminophen     Tablet .. 650 milliGRAM(s) Oral every 6 hours  albuterol/ipratropium for Nebulization 3 milliLiter(s) Nebulizer every 6 hours  amLODIPine   Tablet 5 milliGRAM(s) Oral daily  atorvastatin 20 milliGRAM(s) Oral at bedtime  chlorhexidine 2% Cloths 1 Application(s) Topical <User Schedule>  doxazosin 4 milliGRAM(s) Oral at bedtime  heparin   Injectable 5000 Unit(s) SubCutaneous every 8 hours  insulin glargine Injectable (LANTUS) 10 Unit(s) SubCutaneous at bedtime  insulin lispro (ADMELOG) corrective regimen sliding scale   SubCutaneous every 6 hours  labetalol 100 milliGRAM(s) Oral every 8 hours  senna 2 Tablet(s) Oral at bedtime  sodium bicarbonate 650 milliGRAM(s) Oral two times a day  valproic  acid Syrup 500 milliGRAM(s) Oral every 8 hours    MEDICATIONS  (PRN):      Valproic Acid Level, Serum: 47.0 ug/mL [50.0 - 100.0] (23 @ 11:08)        VEEG in the last 24 hours:    Background - continuous, symmetrical, less than optimally organized, reaching frequencies in the range of 7-8 Hz, showing reactivity.    Focal and generalized slowin. mild to moderate generalized slowing  2. mild bilateral focal slowing, mainly over anterior quadrants with shifting asymmetry, right slightly more than left  3. bifrontal slowing    Interictal activity - none    Events - it is of note, that there were few events captured. To the best, they could be described as right facial spasm/ possible blepharospasm. They  were not associated with epileptiform EEG changes.     Seizures - none    Impression: VEEG as above    Plan - per NCC team

## 2023-12-19 NOTE — PROGRESS NOTE ADULT - ATTENDING COMMENTS
Patient had repeat CT head yesterday - was stable, no change.  Patient's mental status unchanged, remains lethargic, GCS 11.  On tube feeds.  Still with video EEG, no seizure activity for the past 24h.  Off Keppra and on Depakote as per NCC.  Still hypertensive to   Glu is better after started on Latus + ISS.    ASSESSMENT:  79 y/o male, S/P Fall.  Traumatic Left SDH.  Traumatic Right SDH.  Seizures.  History of CVA with right hemiparesis.  Atelectasis.  Hypertensive urgency.  Uncontrolled DM.  SAGRARIO on CKD.  Dysphagia.    PLAN:  - continue intermittent neuro checks  - on Depakote - follow level  - Neurology eval needed - still pending, needs to follow on video EEG  - chest PT, nebulizer treatments  - keep normotensive - increase Labetalol 200 mg q8h po with parameters and Amlodipine 5 mg  - tube feeds; bowel regiment  - get S&S eval with family at bedside  - aspiration  precautions   - follow serum electrolytes and UOP  - ID- universal precautions  - DVT prophylaxis  Again all the above was discussed with family ( from [facility] # [id number]). Patient had repeat CT head yesterday - was stable, no change.  Patient's mental status unchanged, remains lethargic, GCS 11.  On tube feeds.  Still with video EEG, no seizure activity for the past 24h.  Off Keppra and on Depakote as per NCC.  Still hypertensive to   Glu is better after started on Latus + ISS.    ASSESSMENT:  79 y/o male, S/P Fall.  Traumatic Left SDH.  Traumatic Right SDH.  Seizures.  History of CVA with right hemiparesis.  Atelectasis.  Hypertensive urgency.  Uncontrolled DM.  SAGRARIO on CKD.  Dysphagia.    PLAN:  - continue intermittent neuro checks  - on Depakote - follow level  - Neurology eval needed - still pending, needs to follow on video EEG  - chest PT, nebulizer treatments  - keep normotensive - increase Labetalol 200 mg q8h po with parameters and Amlodipine 5 mg  - tube feeds; bowel regiment  - get S&S eval with family at bedside  - aspiration  precautions   - follow serum electrolytes and UOP  - ID- universal precautions  - DVT prophylaxis  Again all the above was discussed with family ( from Roni # [id number]).

## 2023-12-19 NOTE — SWALLOW BEDSIDE ASSESSMENT ADULT - SLP PERTINENT HISTORY OF CURRENT PROBLEM
77-year-old male Cantonese speaking his presents with prior history of hypertension dyslipidemia diabetes BPH prior CVA on aspirin and Plavix who states he had a mechanical fall 2 days ago while getting out of the car fell backwards hit his head.  Afterwards he was able to ambulate.  But for the past 1 day family was unable to ambulate him.  He states that his lower extremities are painful to movement and weak.  Family denies any LOC.  They deny any confusion at home.  On exam oriented to person and place but just not to date.  He does follow simple commands.  Elevates his arms for 10 seconds.  Sensation intact in upper extremities.  Lower extremities with 2 out of 5 strength bilaterally."

## 2023-12-19 NOTE — PROGRESS NOTE ADULT - ASSESSMENT
78y Male s/p mechanical fall. +HT, +AC (Aspirin and Plavix), -LOC    NEURO:  #Acute SDH   -12/18 HCT#4 -stable SDH, no new acute findings  -Q1 neuro checks    d/w team downgrading q4  #Acute pain    -APAP ATC  #h/o stroke (2/2023) w/residual right sided weakness  - RIGHT anterior thalamus infarct,  LEFT caudate head lacunar infarct  #focal seizure  - vEEG active,    - Valproic 500q8; corrected valproic acid level 108, next level 12/19 8pm    -12/18 Ammonia lvl 36       NCC: d/c keppra due to possible contribution to somnolence continue with valproic acid current dose,q4 neurochecks  -pending Neurology c/s    RESP:   #Oxygenation    -2 L NC saturating well     -chest PT and duoneb treatments  #Activity    -increase as tolerated    CARDS:   #hx of HTN,   -Amlodipine 5 QD  -Labetalol 100 q8 (Home Coreg 6.25mg q12 )  -Valsartan 320mg HOLDING restart prn  #HLD  -hold atorvastatin, unable to crush  #NSVT    -EP/Cards: No arrhythmias on tele only artifact. No further work up needed. Recommend ILR prior to discharge if patient/family agreeable  Imaging:   EKG 12/16: Sinus tach   Echo: 12/2023: EF 66%, G1DD, trace MR, mild TR     GI/NUTR:   #Diet, NPO with Tube Feed  Glucerna 1.2 at 75cc/hr, feed duration 18hrs w/ banatrol    -Bedside swallow evaluation failed    -d/w team introducing PEG to family if patient nutritional status does not improved    -Dobhoff @75cm    -aspiration precautions, HOB 30  #GI Prophylaxis    -not indicated   #Bowel regimen     - Senna discontinue if further loose stool    - Last bowel movement  12/18    /RENAL:   #urine output in critically ill, IVL  -Indwelling catheter placed 12/16   -UA neg   - sodium goal 140-150    Labs:          BUN/Cr- 41/2.8  -->,  43/2.8  -->          Electrolytes-Na 144 // K 3.8 // Mg 2.5 //  Phos 3.2 (12-18 @ 21:45)  CKD   -baseline Cr 2.1  -c/w home sodium bicarb 650mg BID  #hx BPH    -cardura 4mg (flomax at home, non crushable)      HEME/ONC:   #DVT prophylaxis     -SCDs. HSQ started per NSGY. Per NSGY, can restart ASA December 22 (7 days)  #hx of CVA    -ASA can restart 12/22 as per NSGY    -Plavis HOLDING, will require repeat HCT in 2 wks prior to restart     Labs: Hb/Hct:  8.8/26.4  (12-18 @ 00:30)  -->,  8.6/25.9  (12-18 @ 21:45)  -->                      Plts:  230  -->,  222  -->       ID:  #recurrent fevers  Cultures    Bcx 12/17-  Current antibiotics, none    -Zosyn 3.375 q12 (12/17 -12/18 )    -Cefepime 2g q 24 (12/16 - 12/17)  WBC- 12.83  --->>,  11.38  --->>,  8.17  --->>  Temp trend- 24hrs T(F): 99.3 (12-19 @ 00:00), Max: 100.1 (12-18 @ 20:00)    ENDO:  #DM     -ISS    -Lantus 10 HS     -FSG q6 while on TF    MSK:     Activity - Increase As Tolerated    LINES/DRAINS:  PIV, right basilic midline (placed 12/16), martinez (placed 12/16)    ADVANCED DIRECTIVES:  Full Code    HCP/Emergency Contact-Sarah Gamino (Wife) [phone]    DISPO: Downgrade if neurochecks q4  Social work, case management consult for dispo 78y Male s/p mechanical fall. +HT, +AC (Aspirin and Plavix), -LOC    NEURO:  #Acute SDH   -12/18 HCT#4 -stable SDH, no new acute findings  -Q1 neuro checks    d/w team downgrading q4  #Acute pain    -APAP ATC  #h/o stroke (2/2023) w/residual right sided weakness  - RIGHT anterior thalamus infarct,  LEFT caudate head lacunar infarct  #focal seizure  - vEEG active,    - Valproic 500q8; corrected valproic acid level 108, next level 12/19 8pm    -12/18 Ammonia lvl 36       NCC: d/c keppra due to possible contribution to somnolence continue with valproic acid current dose,q4 neurochecks  -pending Neurology c/s    RESP:   #Oxygenation    -2 L NC saturating well     -chest PT and duoneb treatments  #Activity    -increase as tolerated    CARDS:   #hx of HTN,   -Amlodipine 5 QD  -Labetalol 100 q8 (Home Coreg 6.25mg q12 )  -Valsartan 320mg HOLDING restart prn  #HLD  -hold atorvastatin, unable to crush  #NSVT    -EP/Cards: No arrhythmias on tele only artifact. No further work up needed. Recommend ILR prior to discharge if patient/family agreeable  Imaging:   EKG 12/16: Sinus tach   Echo: 12/2023: EF 66%, G1DD, trace MR, mild TR     GI/NUTR:   #Diet, NPO with Tube Feed  Glucerna 1.2 at 75cc/hr, feed duration 18hrs w/ banatrol    -Bedside swallow evaluation failed    -d/w team introducing PEG to family if patient nutritional status does not improved    -Dobhoff @75cm    -aspiration precautions, HOB 30  #GI Prophylaxis    -not indicated   #Bowel regimen     - Senna discontinue if further loose stool    - Last bowel movement  12/18    /RENAL:   #urine output in critically ill, IVL  -Indwelling catheter placed 12/16   -UA neg   - sodium goal 140-150    Labs:          BUN/Cr- 41/2.8  -->,  43/2.8  -->          Electrolytes-Na 144 // K 3.8 // Mg 2.5 //  Phos 3.2 (12-18 @ 21:45)  CKD   -baseline Cr 2.1  -c/w home sodium bicarb 650mg BID  #hx BPH    -cardura 4mg (flomax at home, non crushable)      HEME/ONC:   #DVT prophylaxis     -SCDs. HSQ started per NSGY. Per NSGY, can restart ASA December 22 (7 days)  #hx of CVA    -ASA can restart 12/22 as per NSGY    -Plavis HOLDING, will require repeat HCT in 2 wks prior to restart     Labs: Hb/Hct:  8.8/26.4  (12-18 @ 00:30)  -->,  8.6/25.9  (12-18 @ 21:45)  -->                      Plts:  230  -->,  222  -->       ID:  #recurrent fevers  Cultures    Bcx 12/17-  Current antibiotics, none    -Zosyn 3.375 q12 (12/17 -12/18 )    -Cefepime 2g q 24 (12/16 - 12/17)  WBC- 12.83  --->>,  11.38  --->>,  8.17  --->>  Temp trend- 24hrs T(F): 99.3 (12-19 @ 00:00), Max: 100.1 (12-18 @ 20:00)    ENDO:  #DM     -ISS    -Lantus 10 HS     -FSG q6 while on TF    MSK:     Activity - Increase As Tolerated    LINES/DRAINS:  PIV, right basilic midline (placed 12/16), martinez (placed 12/16)    ADVANCED DIRECTIVES:  Full Code    HCP/Emergency Contact-Sarah Gamino (Wife) [phone]    DISPO: Downgrade if neurochecks q4  Social work, case management consult for dispo 78y Male s/p mechanical fall. +HT, +AC (Aspirin and Plavix), -LOC    NEURO:  #Acute SDH   -12/18 HCT#4 -stable SDH, no new acute findings  -Q4 neuro checks  #Acute pain    -APAP ATC  #h/o stroke (2/2023) w/residual right sided weakness  - RIGHT anterior thalamus infarct,  LEFT caudate head lacunar infarct  #focal seizure    -vEEG 12/19: No seizures    - Valproic 500q8; corrected valproic acid level 108, next level 12/19 8pm    -12/18 Ammonia lvl 36       NCC: d/c keppra due to possible contribution to somnolence continue with valproic acid current dose,q4 neurochecks    -pending Neurology c/s    RESP:   #Oxygenation    -1 L NC saturating well     -chest PT q4 and duoneb treatments  #Activity    -increase as tolerated    CARDS:   #hx of HTN  -Intermittent labetalol pushes prn   -Amlodipine 5 QD  -Labetalol 200 q8 (Home Coreg 6.25mg q12 )  -Valsartan 320mg HOLDING restart prn  #HLD  -hold atorvastatin, unable to crush  #NSVT    -EP/Cards: No arrhythmias on tele only artifact. No further work up needed. Recommend ILR prior to discharge if patient/family agreeable  Imaging:   EKG 12/16: Sinus tach   Echo: 12/2023: EF 66%, G1DD, trace MR, mild TR     GI/NUTR:   #Diet, NPO with Tube Feed  Glucerna 1.2 at goal 75cc/hr, feed duration 18hrs w/ banatrol    -Bedside swallow evaluation failed    -Speech and swallow cs placed 12/19; if fails, family will need to consider PEG placement    -Dobhoff @75cm    -aspiration precautions, HOB 30  #GI Prophylaxis    -not indicated   #Bowel regimen     - Senna     - Last bowel movement  12/19    /RENAL:   #urine output in critically ill, IVL  -Indwelling catheter placed 12/16   -UA neg   -Sodium goal 140-150    Labs:          BUN/Cr- 41/2.8  -->,  43/2.8  -->          Electrolytes-Na 144 // K 3.8 // Mg 2.5 //  Phos 3.2 (12-18 @ 21:45)  CKD   -baseline Cr 2.1  -c/w home sodium bicarb 650mg BID  #hx BPH    -cardura 4mg (flomax at home, non crushable)      HEME/ONC:   #DVT prophylaxis     -SCDs. HSQ started per NSGY. Per NSGY, can restart ASA December 22 (7 days)  #hx of CVA    -ASA can restart 12/22 as per NSGY    -Plavis HOLDING, will require repeat HCT in 2 wks prior to restart     Labs: Hb/Hct:  8.8/26.4  (12-18 @ 00:30)  -->,  8.6/25.9  (12-18 @ 21:45)  -->                      Plts:  230  -->,  222  -->       ID:  #recurrent fevers  Cultures    Bcx 12/17- received   Current antibiotics, none    -Zosyn 3.375 q12 (12/17 -12/18 )    -Cefepime 2g q 24 (12/16 - 12/17)  WBC- 12.83  --->>,  11.38  --->>,  8.17  --->>  Temp trend- 24hrs T(F): 99.3 (12-19 @ 00:00), Max: 100.1 (12-18 @ 20:00)    ENDO:  #DM     -ISS    -Lantus increased to 15 units HS     -FSG q6 while on TF    MSK:     Activity - Increase As Tolerated    -B/L knee X ray- no fractures, b/l effusions     LINES/DRAINS:  PIV, right basilic midline (placed 12/16), martinez (placed 12/16)    ADVANCED DIRECTIVES:  Full Code    HCP/Emergency Contact-Sarah Gamino (Wife) [phone]    DISPO: SICU  Social work, case management consult for dispo 78y Male s/p mechanical fall. +HT, +AC (Aspirin and Plavix), -LOC    NEURO:  #Acute SDH   -12/18 HCT#4 -stable SDH, no new acute findings  -Q4 neuro checks  #Acute pain    -APAP ATC  #h/o stroke (2/2023) w/residual right sided weakness  - RIGHT anterior thalamus infarct,  LEFT caudate head lacunar infarct  #focal seizure    -vEEG 12/19: No seizures    - Valproic 500q8; corrected valproic acid level 108, next level 12/19 8pm    -12/18 Ammonia lvl 36       NCC: d/c keppra due to possible contribution to somnolence continue with valproic acid current dose,q4 neurochecks    -pending Neurology c/s    RESP:   #Oxygenation    -1 L NC saturating well     -chest PT q4 and duoneb treatments  #Activity    -increase as tolerated    CARDS:   #hx of HTN  -Intermittent labetalol pushes prn   -Amlodipine 5 QD  -Labetalol 200 q8 (Home Coreg 6.25mg q12 )  -Valsartan 320mg HOLDING restart prn  #HLD  -hold atorvastatin, unable to crush  #NSVT    -EP/Cards: No arrhythmias on tele only artifact. No further work up needed. Recommend ILR prior to discharge if patient/family agreeable  Imaging:   EKG 12/16: Sinus tach   Echo: 12/2023: EF 66%, G1DD, trace MR, mild TR     GI/NUTR:   #Diet, NPO with Tube Feed  Glucerna 1.2 at goal 75cc/hr, feed duration 18hrs w/ banatrol    -Bedside swallow evaluation failed    -Speech and swallow cs placed 12/19; if fails, family will need to consider PEG placement    -Dobhoff @75cm    -aspiration precautions, HOB 30  #GI Prophylaxis    -not indicated   #Bowel regimen     - Senna     - Last bowel movement  12/19    /RENAL:   #urine output in critically ill, IVL  -Indwelling catheter placed 12/16   -UA neg   -Sodium goal 140-150    Labs:          BUN/Cr- 41/2.8  -->,  43/2.8  -->          Electrolytes-Na 144 // K 3.8 // Mg 2.5 //  Phos 3.2 (12-18 @ 21:45)  CKD   -baseline Cr 2.1  -c/w home sodium bicarb 650mg BID  #hx BPH    -cardura 4mg (flomax at home, non crushable)      HEME/ONC:   #DVT prophylaxis     -SCDs. HSQ started per NSGY. Per NSGY, can restart ASA December 22 (7 days)  #hx of CVA    -ASA can restart 12/22 as per NSGY    -Plavis HOLDING, will require repeat HCT in 2 wks prior to restart     Labs: Hb/Hct:  8.8/26.4  (12-18 @ 00:30)  -->,  8.6/25.9  (12-18 @ 21:45)  -->                      Plts:  230  -->,  222  -->       ID:  #recurrent fevers  Cultures    Bcx 12/17- received   Current antibiotics, none    -Zosyn 3.375 q12 (12/17 -12/18 )    -Cefepime 2g q 24 (12/16 - 12/17)  WBC- 12.83  --->>,  11.38  --->>,  8.17  --->>  Temp trend- 24hrs T(F): 99.3 (12-19 @ 00:00), Max: 100.1 (12-18 @ 20:00)    ENDO:  #DM     -ISS    -Lantus increased to 15 units HS     -FSG q6 while on TF    MSK:     Activity - Increase As Tolerated    -B/L knee X ray- no fractures, b/l effusions     LINES/DRAINS:  PIV, right basilic midline (placed 12/16), martinez (placed 12/16)    ADVANCED DIRECTIVES:  Full Code    HCP/Emergency Contact-Sarah Gamino (Wife) [phone]    DISPO: SICU  Social work, case management consult for dispo

## 2023-12-19 NOTE — PROGRESS NOTE ADULT - ASSESSMENT
78y Male being evaluated for goals of care and symptom management r/t fall w SDH. Pt in SICU and on EEG monitoring. Pt overall prognosis is guarded, ongoing acute management with concern that he might need PEG.     12/19: Discussed with patient's wife and daughter. At this time, they are still hoping for patient's mental/functional status to improve. They hope that he will be able to eat on his own. They would want PEG placement only if there is significant chance that patient's neurologic status will improve.     Plan:  - symptoms per primary team  - ongoing GOC discussions  - reached out to neurology team regarding neurologic prognostication  - will discuss code status with family    Please call [phone] with questions or concerns 24/7.   We will continue to follow.

## 2023-12-19 NOTE — PROCEDURE NOTE - NSICDXPROCEDURE_GEN_ALL_CORE_FT
PROCEDURES:  Insertion of midline catheter 16-Dec-2023 17:06:46  Kristin Mason  
PROCEDURES:  Insertion of arterial line with imaging guidance 19-Dec-2023 17:50:52  Lakeisha Mena

## 2023-12-19 NOTE — PHYSICAL THERAPY INITIAL EVALUATION ADULT - SPECIFY REASON(S)
Case discussed with ZONIA Ferrell. Pt is hypertensive and was just put on a Cardene drip. Will hold PT and follow up as appropriate.
PT attempted to see pt for eval however, despite using Toishanese ; maximum verbal/ tactile stimuli, pt unable to open his eyes and engage with therapist. Pt's RN (Yesi) was also present

## 2023-12-19 NOTE — CONSULT NOTE ADULT - ATTENDING COMMENTS
Seen and examined the patient, Patient is alert and follows some commands with mimic., Does not respond to . VEEGs showed no evidence of seizure. Right facial twitching has no epileptiform correlation. Reduced Depakote to 500 mg BID and stop VEEG. Repeat REEG before discharge.

## 2023-12-19 NOTE — PROGRESS NOTE ADULT - SUBJECTIVE AND OBJECTIVE BOX
TRAUMA SURGERY PROGRESS NOTE    Patient: JACQUELIN CAI , 78y ([birth year])Male   MRN: [id number]  Location: [address]  Visit: 12-16-23 Inpatient  Date: 12-19-23 @ 13:22    Hospital Day #: 5    DIAGNOSIS / PROCEDURES:   * Acute subdural hemorrhage     INTERVAL HX:   Patient has been persistently hypertensive w/ max /86, HR 94. Labetalol dose increased to 200mg q8h. Started on amlodipine 5mg QD. Zosyn d/c'd yesterday as suspicion for aspiration PNA is low. No seizures, mental status stable. Lantus 15U added due to fingerstick glucose of 245. Now on q4h neuro checks. Satting at 100% on 1L nasal cannula. UOP 50-60 cc/hr.     VITALS:   T(F): 99.1 (12-19-23 @ 12:00), Max: 100.1 (12-18-23 @ 20:00)  HR: 93 (12-19-23 @ 13:00)  BP: 175/75 (12-19-23 @ 13:00)  RR: 22 (12-19-23 @ 13:00)  SpO2: 100% (12-19-23 @ 13:00) 1L/min O2 via nasal cannula     DIET:   Diet, NPO with Tube Feed:   Tube Feeding Modality: Nasogastric  Glucerna 1.2 Tonio  Total Volume for 24 Hours (mL): 1350  Continuous  Starting Tube Feed Rate mL per Hour: 55  Increase Tube Feed Rate by (mL): 10     Every 4 hours  Until Goal Tube Feed Rate (mL per Hour): 75  Tube Feed Duration (in Hours): 18  Tube Feed Start Time: 10:00   Frequency: Every 4 Hours  Free Water Flush Instructions:  Banatrol 2x daily if pt having loose BMs      FLUIDS:         12-18 @ 07:01  -  12-19 @ 07:00  --------------------------------------------------------  OUT:    Indwelling Catheter - Urethral (mL): 1700 mL    Nasogastric/Oral tube (mL): 0 mL  Total OUT: 1700 mL      12-19 @ 07:01  -  12-19 @ 13:22  --------------------------------------------------------  OUT:    Indwelling Catheter - Urethral (mL): 460 mL    Voided (mL): 0 mL  Total OUT: 460 mL       AC/DVT PPX:  heparin   Injectable 5000 Unit(s) SubCutaneous every 8 hours    PHYSICAL EXAM:  General Appearance: NAD   HEENT: EOMI, sclera non-icteric; NGT in place   Heart: regular rate   Lungs: Breathing comfortably  Abdomen:  Soft, nontender, nondistended.   MSK/Extremities: Warm & well-perfused.   Skin: Warm, dry. No jaundice.   Neuro: GCS 14 (E=3, V=5, M=6), follows commands     LABS:                        8.6    8.17  )-----------( 222       12-18 @ 21:45             25.9     144  |  113  |  43  ----------------------------<  204        12-18 @ 21:45  3.8   |  21  |  2.8    Calcium: 7.9 mg/dL *L* (12-18 @ 21:45)  Magnesium: 2.5 mg/dL *H* (12-18 @ 21:45)  Phosphorus: 3.2 mg/dL (12-18 @ 21:45)      LIVER FUNCTIONS - ( 18 Dec 2023 11:08 )  Alb: 3.0 g/dL / Pro: x     / ALK PHOS: x     / ALT: x     / AST: x     / GGT: x             RADIOLOGY & OTHER STUDIES:   CT Head No Cont (12.18.23 @ 18:15) >  IMPRESSION:  No significant change since recent head CT of 12/16/2023.    Stable bilateral acute subdural hematomas (left greater than right)   without mass effect or midline shift.    --- End of Report ---    < end of copied text >  ----------------------------------  Xray Chest 1 View- PORTABLE-Routine (12.19.23 @ 05:46) >  Findings/  impression:    Support devices: Nasogastric tube passed beyond EG junction    Cardiac/ Mediastinum: Stable    Lungs/ Pleura: Low lung volumes. No consolidation or pneumothorax.    Skeletal/ soft tissues: Stable    ACCESS DEVICES:  [X] Peripheral IV  [ ] Central Venous Line	[ ] R	[ ] L	[ ] IJ	[ ] Fem	[ ] SC	Placed:   [ ] Arterial Line		[ ] R	[ ] L	[ ] Fem	[ ] Rad	[ ] Ax	Placed:   [ ] PICC:					[ ] Mediport  [X] Urinary Catheter,  Date Placed:   12/16  [ ] Chest tube: [ ] Right, [ ] Left  [ ] MARIA LUISA/Ronald Drains  TRAUMA SURGERY PROGRESS NOTE    Patient: JACQUELIN CAI , 78y ([birth year])Male   MRN: [id number]  Location: [address]  Visit: 12-16-23 Inpatient  Date: 12-19-23 @ 13:22    Hospital Day #: 5    DIAGNOSIS / PROCEDURES:   * Acute subdural hemorrhage     INTERVAL HX:   Patient has been persistently hypertensive w/ max /86, HR 94. Labetalol dose increased to 200mg q8h. Started on amlodipine 5mg QD. Zosyn d/c'd yesterday as suspicion for aspiration PNA is low. No seizures, mental status stable. Lantus 15U added due to fingerstick glucose of 245. Now on q4h neuro checks. Satting at 100% on 1L nasal cannula. UOP 50-60 cc/hr.     VITALS:   T(F): 99.1 (12-19-23 @ 12:00), Max: 100.1 (12-18-23 @ 20:00)  HR: 93 (12-19-23 @ 13:00)  BP: 175/75 (12-19-23 @ 13:00)  RR: 22 (12-19-23 @ 13:00)  SpO2: 100% (12-19-23 @ 13:00) 1L/min O2 via nasal cannula     DIET:   Diet, NPO with Tube Feed:   Tube Feeding Modality: Nasogastric  Glucerna 1.2 Tonio  Total Volume for 24 Hours (mL): 1350  Continuous  Starting Tube Feed Rate mL per Hour: 55  Increase Tube Feed Rate by (mL): 10     Every 4 hours  Until Goal Tube Feed Rate (mL per Hour): 75  Tube Feed Duration (in Hours): 18  Tube Feed Start Time: 10:00   Frequency: Every 4 Hours  Free Water Flush Instructions:  Banatrol 2x daily if pt having loose BMs      FLUIDS:         12-18 @ 07:01  -  12-19 @ 07:00  --------------------------------------------------------  OUT:    Indwelling Catheter - Urethral (mL): 1700 mL    Nasogastric/Oral tube (mL): 0 mL  Total OUT: 1700 mL      12-19 @ 07:01  -  12-19 @ 13:22  --------------------------------------------------------  OUT:    Indwelling Catheter - Urethral (mL): 460 mL    Voided (mL): 0 mL  Total OUT: 460 mL       AC/DVT PPX:  heparin   Injectable 5000 Unit(s) SubCutaneous every 8 hours    PHYSICAL EXAM:  General Appearance: NAD   HEENT: EOMI, sclera non-icteric; NGT in place   Heart: regular rate   Lungs: Breathing comfortably  Abdomen:  Soft, nontender, nondistended.   MSK/Extremities: Warm & well-perfused.   Skin: Warm, dry. No jaundice.   Neuro: GCS 14 (E=3, V=5, M=6), follows commands     LABS:                        8.6    8.17  )-----------( 222       12-18 @ 21:45             25.9     144  |  113  |  43  ----------------------------<  204        12-18 @ 21:45  3.8   |  21  |  2.8    Calcium: 7.9 mg/dL *L* (12-18 @ 21:45)  Magnesium: 2.5 mg/dL *H* (12-18 @ 21:45)  Phosphorus: 3.2 mg/dL (12-18 @ 21:45)      LIVER FUNCTIONS - ( 18 Dec 2023 11:08 )  Alb: 3.0 g/dL / Pro: x     / ALK PHOS: x     / ALT: x     / AST: x     / GGT: x             RADIOLOGY & OTHER STUDIES:   CT Head No Cont (12.18.23 @ 18:15) >  IMPRESSION:  No significant change since recent head CT of 12/16/2023.    Stable bilateral acute subdural hematomas (left greater than right)   without mass effect or midline shift.    --- End of Report ---    < end of copied text >  ----------------------------------  Xray Chest 1 View- PORTABLE-Routine (12.19.23 @ 05:46) >  Findings/  impression:    Support devices: Nasogastric tube passed beyond EG junction    Cardiac/ Mediastinum: Stable    Lungs/ Pleura: Low lung volumes. No consolidation or pneumothorax.    Skeletal/ soft tissues: Stable    ACCESS DEVICES:  [X] Peripheral IV  [ ] Central Venous Line	[ ] R	[ ] L	[ ] IJ	[ ] Fem	[ ] SC	Placed:   [ ] Arterial Line		[ ] R	[ ] L	[ ] Fem	[ ] Rad	[ ] Ax	Placed:   [ ] PICC:					[ ] Mediport  [X] Urinary Catheter,  Date Placed:   12/16  [ ] Chest tube: [ ] Right, [ ] Left  [ ] MARIA LUISA/Ronald Drains

## 2023-12-20 DIAGNOSIS — N17.9 ACUTE KIDNEY FAILURE, UNSPECIFIED: ICD-10-CM

## 2023-12-20 LAB
ALBUMIN SERPL ELPH-MCNC: 2.8 G/DL — LOW (ref 3.5–5.2)
ALBUMIN SERPL ELPH-MCNC: 2.8 G/DL — LOW (ref 3.5–5.2)
ANION GAP SERPL CALC-SCNC: 13 MMOL/L — SIGNIFICANT CHANGE UP (ref 7–14)
BUN SERPL-MCNC: 50 MG/DL — HIGH (ref 10–20)
BUN SERPL-MCNC: 50 MG/DL — HIGH (ref 10–20)
BUN SERPL-MCNC: 53 MG/DL — HIGH (ref 10–20)
BUN SERPL-MCNC: 53 MG/DL — HIGH (ref 10–20)
CALCIUM SERPL-MCNC: 8.4 MG/DL — SIGNIFICANT CHANGE UP (ref 8.4–10.5)
CALCIUM SERPL-MCNC: 8.4 MG/DL — SIGNIFICANT CHANGE UP (ref 8.4–10.5)
CALCIUM SERPL-MCNC: 8.6 MG/DL — SIGNIFICANT CHANGE UP (ref 8.4–10.5)
CALCIUM SERPL-MCNC: 8.6 MG/DL — SIGNIFICANT CHANGE UP (ref 8.4–10.5)
CHLORIDE SERPL-SCNC: 117 MMOL/L — HIGH (ref 98–110)
CHLORIDE SERPL-SCNC: 117 MMOL/L — HIGH (ref 98–110)
CHLORIDE SERPL-SCNC: 121 MMOL/L — HIGH (ref 98–110)
CHLORIDE SERPL-SCNC: 121 MMOL/L — HIGH (ref 98–110)
CO2 SERPL-SCNC: 21 MMOL/L — SIGNIFICANT CHANGE UP (ref 17–32)
CO2 SERPL-SCNC: 21 MMOL/L — SIGNIFICANT CHANGE UP (ref 17–32)
CO2 SERPL-SCNC: 23 MMOL/L — SIGNIFICANT CHANGE UP (ref 17–32)
CO2 SERPL-SCNC: 23 MMOL/L — SIGNIFICANT CHANGE UP (ref 17–32)
CREAT SERPL-MCNC: 2.5 MG/DL — HIGH (ref 0.7–1.5)
CREAT SERPL-MCNC: 2.5 MG/DL — HIGH (ref 0.7–1.5)
CREAT SERPL-MCNC: 2.6 MG/DL — HIGH (ref 0.7–1.5)
CREAT SERPL-MCNC: 2.6 MG/DL — HIGH (ref 0.7–1.5)
EGFR: 24 ML/MIN/1.73M2 — LOW
EGFR: 24 ML/MIN/1.73M2 — LOW
EGFR: 26 ML/MIN/1.73M2 — LOW
EGFR: 26 ML/MIN/1.73M2 — LOW
GLUCOSE SERPL-MCNC: 215 MG/DL — HIGH (ref 70–99)
GLUCOSE SERPL-MCNC: 215 MG/DL — HIGH (ref 70–99)
GLUCOSE SERPL-MCNC: 262 MG/DL — HIGH (ref 70–99)
GLUCOSE SERPL-MCNC: 262 MG/DL — HIGH (ref 70–99)
HCT VFR BLD CALC: 26.8 % — LOW (ref 42–52)
HCT VFR BLD CALC: 26.8 % — LOW (ref 42–52)
HGB BLD-MCNC: 9 G/DL — LOW (ref 14–18)
HGB BLD-MCNC: 9 G/DL — LOW (ref 14–18)
MAGNESIUM SERPL-MCNC: 2.9 MG/DL — HIGH (ref 1.8–2.4)
MAGNESIUM SERPL-MCNC: 2.9 MG/DL — HIGH (ref 1.8–2.4)
MCHC RBC-ENTMCNC: 29.9 PG — SIGNIFICANT CHANGE UP (ref 27–31)
MCHC RBC-ENTMCNC: 29.9 PG — SIGNIFICANT CHANGE UP (ref 27–31)
MCHC RBC-ENTMCNC: 33.6 G/DL — SIGNIFICANT CHANGE UP (ref 32–37)
MCHC RBC-ENTMCNC: 33.6 G/DL — SIGNIFICANT CHANGE UP (ref 32–37)
MCV RBC AUTO: 89 FL — SIGNIFICANT CHANGE UP (ref 80–94)
MCV RBC AUTO: 89 FL — SIGNIFICANT CHANGE UP (ref 80–94)
NRBC # BLD: 0 /100 WBCS — SIGNIFICANT CHANGE UP (ref 0–0)
NRBC # BLD: 0 /100 WBCS — SIGNIFICANT CHANGE UP (ref 0–0)
PHOSPHATE SERPL-MCNC: 3.3 MG/DL — SIGNIFICANT CHANGE UP (ref 2.1–4.9)
PHOSPHATE SERPL-MCNC: 3.3 MG/DL — SIGNIFICANT CHANGE UP (ref 2.1–4.9)
PLATELET # BLD AUTO: 282 K/UL — SIGNIFICANT CHANGE UP (ref 130–400)
PLATELET # BLD AUTO: 282 K/UL — SIGNIFICANT CHANGE UP (ref 130–400)
PMV BLD: 8.9 FL — SIGNIFICANT CHANGE UP (ref 7.4–10.4)
PMV BLD: 8.9 FL — SIGNIFICANT CHANGE UP (ref 7.4–10.4)
POTASSIUM SERPL-MCNC: 3.8 MMOL/L — SIGNIFICANT CHANGE UP (ref 3.5–5)
POTASSIUM SERPL-MCNC: 3.8 MMOL/L — SIGNIFICANT CHANGE UP (ref 3.5–5)
POTASSIUM SERPL-MCNC: 4.2 MMOL/L — SIGNIFICANT CHANGE UP (ref 3.5–5)
POTASSIUM SERPL-MCNC: 4.2 MMOL/L — SIGNIFICANT CHANGE UP (ref 3.5–5)
POTASSIUM SERPL-SCNC: 3.8 MMOL/L — SIGNIFICANT CHANGE UP (ref 3.5–5)
POTASSIUM SERPL-SCNC: 3.8 MMOL/L — SIGNIFICANT CHANGE UP (ref 3.5–5)
POTASSIUM SERPL-SCNC: 4.2 MMOL/L — SIGNIFICANT CHANGE UP (ref 3.5–5)
POTASSIUM SERPL-SCNC: 4.2 MMOL/L — SIGNIFICANT CHANGE UP (ref 3.5–5)
RBC # BLD: 3.01 M/UL — LOW (ref 4.7–6.1)
RBC # BLD: 3.01 M/UL — LOW (ref 4.7–6.1)
RBC # FLD: 14 % — SIGNIFICANT CHANGE UP (ref 11.5–14.5)
RBC # FLD: 14 % — SIGNIFICANT CHANGE UP (ref 11.5–14.5)
SODIUM SERPL-SCNC: 151 MMOL/L — HIGH (ref 135–146)
SODIUM SERPL-SCNC: 151 MMOL/L — HIGH (ref 135–146)
SODIUM SERPL-SCNC: 157 MMOL/L — HIGH (ref 135–146)
SODIUM SERPL-SCNC: 157 MMOL/L — HIGH (ref 135–146)
VALPROATE SERPL-MCNC: 37 UG/ML — LOW (ref 50–100)
VALPROATE SERPL-MCNC: 37 UG/ML — LOW (ref 50–100)
VALPROATE SERPL-MCNC: 51 UG/ML — SIGNIFICANT CHANGE UP (ref 50–100)
VALPROATE SERPL-MCNC: 51 UG/ML — SIGNIFICANT CHANGE UP (ref 50–100)
WBC # BLD: 7.95 K/UL — SIGNIFICANT CHANGE UP (ref 4.8–10.8)
WBC # BLD: 7.95 K/UL — SIGNIFICANT CHANGE UP (ref 4.8–10.8)
WBC # FLD AUTO: 7.95 K/UL — SIGNIFICANT CHANGE UP (ref 4.8–10.8)
WBC # FLD AUTO: 7.95 K/UL — SIGNIFICANT CHANGE UP (ref 4.8–10.8)

## 2023-12-20 PROCEDURE — 99291 CRITICAL CARE FIRST HOUR: CPT

## 2023-12-20 PROCEDURE — 99233 SBSQ HOSP IP/OBS HIGH 50: CPT

## 2023-12-20 RX ORDER — INSULIN LISPRO 100/ML
VIAL (ML) SUBCUTANEOUS EVERY 4 HOURS
Refills: 0 | Status: DISCONTINUED | OUTPATIENT
Start: 2023-12-20 | End: 2023-12-30

## 2023-12-20 RX ORDER — LABETALOL HCL 100 MG
300 TABLET ORAL EVERY 8 HOURS
Refills: 0 | Status: DISCONTINUED | OUTPATIENT
Start: 2023-12-20 | End: 2023-12-20

## 2023-12-20 RX ORDER — SODIUM CHLORIDE 9 MG/ML
250 INJECTION, SOLUTION INTRAVENOUS ONCE
Refills: 0 | Status: COMPLETED | OUTPATIENT
Start: 2023-12-20 | End: 2023-12-20

## 2023-12-20 RX ORDER — LABETALOL HCL 100 MG
200 TABLET ORAL EVERY 8 HOURS
Refills: 0 | Status: DISCONTINUED | OUTPATIENT
Start: 2023-12-20 | End: 2023-12-22

## 2023-12-20 RX ORDER — INSULIN GLARGINE 100 [IU]/ML
15 INJECTION, SOLUTION SUBCUTANEOUS AT BEDTIME
Refills: 0 | Status: DISCONTINUED | OUTPATIENT
Start: 2023-12-20 | End: 2023-12-21

## 2023-12-20 RX ORDER — IPRATROPIUM/ALBUTEROL SULFATE 18-103MCG
3 AEROSOL WITH ADAPTER (GRAM) INHALATION EVERY 4 HOURS
Refills: 0 | Status: DISCONTINUED | OUTPATIENT
Start: 2023-12-20 | End: 2023-12-24

## 2023-12-20 RX ORDER — INSULIN GLARGINE 100 [IU]/ML
20 INJECTION, SOLUTION SUBCUTANEOUS AT BEDTIME
Refills: 0 | Status: DISCONTINUED | OUTPATIENT
Start: 2023-12-20 | End: 2023-12-20

## 2023-12-20 RX ORDER — INSULIN HUMAN 100 [IU]/ML
1 INJECTION, SOLUTION SUBCUTANEOUS
Qty: 100 | Refills: 0 | Status: DISCONTINUED | OUTPATIENT
Start: 2023-12-20 | End: 2023-12-20

## 2023-12-20 RX ADMIN — Medication 650 MILLIGRAM(S): at 06:20

## 2023-12-20 RX ADMIN — Medication 650 MILLIGRAM(S): at 05:20

## 2023-12-20 RX ADMIN — Medication 650 MILLIGRAM(S): at 12:35

## 2023-12-20 RX ADMIN — Medication 500 MILLIGRAM(S): at 18:08

## 2023-12-20 RX ADMIN — Medication 500 MILLIGRAM(S): at 05:21

## 2023-12-20 RX ADMIN — CHLORHEXIDINE GLUCONATE 1 APPLICATION(S): 213 SOLUTION TOPICAL at 05:13

## 2023-12-20 RX ADMIN — Medication 300 MILLIGRAM(S): at 12:36

## 2023-12-20 RX ADMIN — INSULIN GLARGINE 15 UNIT(S): 100 INJECTION, SOLUTION SUBCUTANEOUS at 22:00

## 2023-12-20 RX ADMIN — Medication 1 APPLICATION(S): at 18:15

## 2023-12-20 RX ADMIN — Medication 650 MILLIGRAM(S): at 00:02

## 2023-12-20 RX ADMIN — SODIUM CHLORIDE 250 MILLILITER(S): 9 INJECTION, SOLUTION INTRAVENOUS at 13:46

## 2023-12-20 RX ADMIN — Medication 7: at 22:11

## 2023-12-20 RX ADMIN — Medication 5: at 18:08

## 2023-12-20 RX ADMIN — Medication 650 MILLIGRAM(S): at 05:21

## 2023-12-20 RX ADMIN — INSULIN HUMAN 1 UNIT(S)/HR: 100 INJECTION, SOLUTION SUBCUTANEOUS at 04:00

## 2023-12-20 RX ADMIN — Medication 3 MILLILITER(S): at 19:46

## 2023-12-20 RX ADMIN — Medication 4 MILLIGRAM(S): at 22:04

## 2023-12-20 RX ADMIN — Medication 13: at 00:02

## 2023-12-20 RX ADMIN — Medication 200 MILLIGRAM(S): at 22:14

## 2023-12-20 RX ADMIN — Medication 650 MILLIGRAM(S): at 23:18

## 2023-12-20 RX ADMIN — HEPARIN SODIUM 5000 UNIT(S): 5000 INJECTION INTRAVENOUS; SUBCUTANEOUS at 05:20

## 2023-12-20 RX ADMIN — Medication 650 MILLIGRAM(S): at 18:07

## 2023-12-20 RX ADMIN — HEPARIN SODIUM 5000 UNIT(S): 5000 INJECTION INTRAVENOUS; SUBCUTANEOUS at 22:05

## 2023-12-20 RX ADMIN — Medication 1 APPLICATION(S): at 05:21

## 2023-12-20 RX ADMIN — Medication 200 MILLIGRAM(S): at 05:20

## 2023-12-20 RX ADMIN — Medication 3 MILLILITER(S): at 07:48

## 2023-12-20 RX ADMIN — Medication 5: at 14:55

## 2023-12-20 RX ADMIN — Medication 650 MILLIGRAM(S): at 01:00

## 2023-12-20 RX ADMIN — AMLODIPINE BESYLATE 10 MILLIGRAM(S): 2.5 TABLET ORAL at 05:20

## 2023-12-20 RX ADMIN — ATORVASTATIN CALCIUM 20 MILLIGRAM(S): 80 TABLET, FILM COATED ORAL at 22:04

## 2023-12-20 RX ADMIN — HEPARIN SODIUM 5000 UNIT(S): 5000 INJECTION INTRAVENOUS; SUBCUTANEOUS at 13:39

## 2023-12-20 RX ADMIN — Medication 5: at 12:38

## 2023-12-20 NOTE — PROGRESS NOTE ADULT - ATTENDING COMMENTS
Patient remains lethargic, GCS 11.  Had  and required Cardene drip, this am better.  Had uncontrolled DM with Glu>220, now on Insulin drip.  On tube feeds Glucerna at goal.  Has Na 151 this am.    ASSESSMENT:  77 y/o male, S/P Fall.  Traumatic Left SDH.  Traumatic Right SDH.  Seizures.  History of CVA with right hemiparesis.  Atelectasis.  Hypertensive urgency.  Uncontrolled DM.  SAGRARIO on CKD.  Dysphagia.    PLAN:  - intermittent neuro checks  - on Depakote 500 mg BID, level wnl  - Neurology f/u needed  - aggressive chest PT; nebulizer treatments as needed  - keep normotensive - increase Labetalol, continue Amlodipine  - o n tube feeds; bowel regiment  - give free water 250 ml q6h via NGT  - follow serum electrolytes and UOP  - ID - universal precautions  - DVT prophylaxis  All the above was discussed with family at bedside,  from Roni #[id number]  Patient will need PEG Patient remains lethargic, GCS 11.  Had  and required Cardene drip, this am better.  Had uncontrolled DM with Glu>220, now on Insulin drip.  On tube feeds Glucerna at goal.  Has Na 151 this am.    ASSESSMENT:  79 y/o male, S/P Fall.  Traumatic Left SDH.  Traumatic Right SDH.  Seizures.  History of CVA with right hemiparesis.  Atelectasis.  Hypertensive urgency.  Uncontrolled DM.  SAGRARIO on CKD.  Dysphagia.    PLAN:  - intermittent neuro checks  - on Depakote 500 mg BID, level wnl  - Neurology f/u needed  - aggressive chest PT; nebulizer treatments as needed  - keep normotensive - increase Labetalol, continue Amlodipine  - o n tube feeds; bowel regiment  - give free water 250 ml q6h via NGT  - follow serum electrolytes and UOP  - ID - universal precautions  - DVT prophylaxis  All the above was discussed with family at bedside,  from Roni #[id number]  Patient will need PEG Patient remains lethargic, GCS 11.  Had  and required Cardene drip, this am better.  Had uncontrolled DM with Glu>220, now on Insulin drip.  On tube feeds Glucerna at goal.  Has Na 151 this am.    ASSESSMENT:  77 y/o male, S/P Fall.  Traumatic Left SDH.  Traumatic Right SDH.  Seizures.  History of CVA with right hemiparesis.  Atelectasis.  Hypertensive urgency.  Uncontrolled DM.  SAGRARIO on CKD.  Dysphagia.  Uncontrolled DM.  Hypernatremia.    PLAN:  - intermittent neuro checks  - on Depakote 500 mg BID, level wnl  - Neurology f/u needed  - aggressive chest PT; nebulizer treatments as needed  - keep normotensive - increase Labetalol, continue Amlodipine  - o n tube feeds; bowel regiment  - give free water 250 ml q6h via NGT  - follow serum electrolytes and UOP  - increase Lantus, and tight ISS  - ID - universal precautions  - DVT prophylaxis  All the above was discussed with family at bedside,  from Roni #[id number]  Patient will need PEG Patient remains lethargic, GCS 11.  Had  and required Cardene drip, this am better.  Had uncontrolled DM with Glu>220, now on Insulin drip.  On tube feeds Glucerna at goal.  Has Na 151 this am.    ASSESSMENT:  79 y/o male, S/P Fall.  Traumatic Left SDH.  Traumatic Right SDH.  Seizures.  History of CVA with right hemiparesis.  Atelectasis.  Hypertensive urgency.  Uncontrolled DM.  SAGRARIO on CKD.  Dysphagia.  Uncontrolled DM.  Hypernatremia.    PLAN:  - intermittent neuro checks  - on Depakote 500 mg BID, level wnl  - Neurology f/u needed  - aggressive chest PT; nebulizer treatments as needed  - keep normotensive - increase Labetalol, continue Amlodipine  - o n tube feeds; bowel regiment  - give free water 250 ml q6h via NGT  - follow serum electrolytes and UOP  - increase Lantus, and tight ISS  - ID - universal precautions  - DVT prophylaxis  All the above was discussed with family at bedside,  from Roni #[id number]  Patient will need PEG

## 2023-12-20 NOTE — SWALLOW BEDSIDE ASSESSMENT ADULT - SWALLOW EVAL: DIAGNOSIS
Unable to assess PO intake 2/2 to increased lethargy. Decreased arousability.
Unable to assess PO intake 2/2 to increased lethargy. Decreased arousability.

## 2023-12-20 NOTE — PROGRESS NOTE ADULT - SUBJECTIVE AND OBJECTIVE BOX
TRAUMA SURGERY PROGRESS NOTE    Patient: JACQUELIN CAI , 78y ([birth year])Male   MRN: [id number]  Location: [address]  Visit: 12-16-23 Inpatient  Date: 12-20-23 @ 18:41    Hospital Day #: 6    DIAGNOSIS / PROCEDURES:   * Acute subdural hemorrhage       INTERVAL HX:  Patient was unable to participate in S/S due to poor participation/comprehension. Patient booked for PEG placement on Friday 12/22. Otherwise his BP has been controlled on Labetalol 200mg q8h. Feeds switched to 24hr at 55 cc/hr for better glycemic control.       VITALS:   T(F): 100.3 (12-20-23 @ 16:00), Max: 100.3 (12-19-23 @ 20:00)  HR: 96 (12-20-23 @ 18:00)  BP: 166/75 (12-20-23 @ 11:00)  RR: 21 (12-20-23 @ 18:00)  SpO2: 97% (12-20-23 @ 18:00)      DIET:   Diet, NPO with Tube Feed:   Tube Feeding Modality: Nasogastric  Glucerna 1.2 Tonio  Total Volume for 24 Hours (mL): 1320  Continuous  Starting Tube Feed Rate mL per Hour: 55  Until Goal Tube Feed Rate (mL per Hour): 55  Tube Feed Duration (in Hours): 24  Tube Feed Start Time: 12:00  Free Water Flush  Bolus   Total Volume per Flush (mL): 300   Frequency: Every 4 Hours        12-19 @ 07:01  -  12-20 @ 07:00  --------------------------------------------------------  OUT:    Indwelling Catheter - Urethral (mL): 460 mL    Voided (mL): 760 mL  Total OUT: 1220 mL      12-20 @ 07:01  -  12-20 @ 18:41  --------------------------------------------------------  OUT:    Voided (mL): 1055 mL  Total OUT: 1055 mL    AC/DVT PPX:  heparin   Injectable 5000 Unit(s) SubCutaneous every 8 hours      PHYSICAL EXAM:  General Appearance: NAD   HEENT: EOMI, sclera non-icteric; NGT in place   Heart: regular rate   Lungs: Breathing comfortably  Abdomen:  Soft, nontender, nondistended.   MSK/Extremities: Warm & well-perfused.   Skin: Warm, dry. No jaundice.   Neuro: GCS 14 (E=3, V=5, M=6), follows commands       LABS:                        9.0    7.95  )-----------( 282       12-20 @ 03:43             26.8     157  |  121  |  53  ----------------------------<  215        12-20 @ 16:16  4.2   |  23  |  2.5        Calcium: 8.4 mg/dL (12-20 @ 16:16)  Magnesium: 2.9 mg/dL *H* (12-20 @ 03:43)  Phosphorus: 3.3 mg/dL (12-20 @ 03:43)      LIVER FUNCTIONS - ( 20 Dec 2023 01:00 )  Alb: 2.8 g/dL / Pro: x     / ALK PHOS: x     / ALT: x     / AST: x     / GGT: x              (collected 12-18 @ 00:30)  Source: .Blood Blood  Preliminary Report (12-20 @ 14:01):    No growth at 48 Hours        RADIOLOGY & OTHER STUDIES:   No new imaging obtained in past 24h       ACCESS DEVICES:  [X] Peripheral IV  [ ] Central Venous Line	[ ] R	[ ] L	[ ] IJ	[ ] Fem	[ ] SC	Placed:   [ ] Arterial Line		[ ] R	[ ] L	[ ] Fem	[ ] Rad	[ ] Ax	Placed:   [ ] PICC:					[ ] Mediport  [ ] Urinary Catheter,  Date Placed:   [ ] Chest tube: [ ] Right, [ ] Left  [ ] MARIA LUISA/Ronald Drains  TRAUMA SURGERY PROGRESS NOTE    Patient: JACQUELIN CAI , 78y ([birth year])Male   MRN: [id number]  Location: [address]  Visit: 12-16-23 Inpatient  Date: 12-20-23 @ 18:41    Hospital Day #: 6    DIAGNOSIS / PROCEDURES:   * Acute subdural hemorrhage       INTERVAL HX:  Patient was unable to participate in S/S due to poor participation/comprehension. Patient booked for PEG placement on Friday 12/22. Otherwise his BP has been controlled on Labetalol 200mg q8h. Feeds switched to 24hr at 55 cc/hr for better glycemic control.       VITALS:   T(F): 100.3 (12-20-23 @ 16:00), Max: 100.3 (12-19-23 @ 20:00)  HR: 96 (12-20-23 @ 18:00)  BP: 166/75 (12-20-23 @ 11:00)  RR: 21 (12-20-23 @ 18:00)  SpO2: 97% (12-20-23 @ 18:00)      DIET:   Diet, NPO with Tube Feed:   Tube Feeding Modality: Nasogastric  Glucerna 1.2 Tonio  Total Volume for 24 Hours (mL): 1320  Continuous  Starting Tube Feed Rate mL per Hour: 55  Until Goal Tube Feed Rate (mL per Hour): 55  Tube Feed Duration (in Hours): 24  Tube Feed Start Time: 12:00  Free Water Flush  Bolus   Total Volume per Flush (mL): 300   Frequency: Every 4 Hours        12-19 @ 07:01  -  12-20 @ 07:00  --------------------------------------------------------  OUT:    Indwelling Catheter - Urethral (mL): 460 mL    Voided (mL): 760 mL  Total OUT: 1220 mL      12-20 @ 07:01  -  12-20 @ 18:41  --------------------------------------------------------  OUT:    Voided (mL): 1055 mL  Total OUT: 1055 mL    AC/DVT PPX:  heparin   Injectable 5000 Unit(s) SubCutaneous every 8 hours      PHYSICAL EXAM:  General Appearance: NAD   HEENT: EOMI, sclera non-icteric; NGT in place   Heart: regular rate   Lungs: Breathing comfortably  Abdomen:  Soft, nontender, nondistended.   MSK/Extremities: Warm & well-perfused.   Skin: Warm, dry. No jaundice.   Neuro: GCS 14 (E=3, V=5, M=6), follows commands       LABS:                        9.0    7.95  )-----------( 282       12-20 @ 03:43             26.8     157  |  121  |  53  ----------------------------<  215        12-20 @ 16:16  4.2   |  23  |  2.5        Calcium: 8.4 mg/dL (12-20 @ 16:16)  Magnesium: 2.9 mg/dL *H* (12-20 @ 03:43)  Phosphorus: 3.3 mg/dL (12-20 @ 03:43)      LIVER FUNCTIONS - ( 20 Dec 2023 01:00 )  Alb: 2.8 g/dL / Pro: x     / ALK PHOS: x     / ALT: x     / AST: x     / GGT: x              (collected 12-18 @ 00:30)  Source: .Blood Blood  Preliminary Report (12-20 @ 14:01):    No growth at 48 Hours        RADIOLOGY & OTHER STUDIES:   No new imaging obtained in past 24h       ACCESS DEVICES:  [X] Peripheral IV  [ ] Central Venous Line	[ ] R	[ ] L	[ ] IJ	[ ] Fem	[ ] SC	Placed:   [ ] Arterial Line		[ ] R	[ ] L	[ ] Fem	[ ] Rad	[ ] Ax	Placed:   [ ] PICC:					[ ] Mediport  [ ] Urinary Catheter,  Date Placed:   [ ] Chest tube: [ ] Right, [ ] Left  [ ] MARIA LUISA/Ronald Drains

## 2023-12-20 NOTE — PHYSICAL THERAPY INITIAL EVALUATION ADULT - GENERAL OBSERVATIONS, REHAB EVAL
SICU. 77 y/o M rec'd/left in bed, nad, + tele, A-line, martinez, RT LE SCD, wife presents. pt appeared sleepy most of the time. attempted Cantonese  #[id number], however, the  stated that the [facility]; used [facility]  #[id number] for the rest of the session with the wife; pt not following VC's at this time. pt presents with acute bilat (RT>LT) knees pain with any attempted ROM (h/o gout). lacks initiation of movement today, vitals: 130/59 87 98% on RA. *** at the start of the session pt presented with RT facial twitching x 2-3 min > RN made aware. SICU. 77 y/o M rec'd/left in bed, nad, + tele, A-line, martinez, RT LE SCD, wife presents. pt appeared sleepy most of the time. attempted Cantonese  #[id number], however, the  stated that [facility]; used [facility]  #[id number] for the rest of the session with the wife; pt not following VC's at this time. pt presents with acute bilat (RT>LT) knees pain with any attempted ROM (h/o gout). lacks initiation of movement today, vitals: 130/59 87 98% on RA. *** at the start of the session pt presented with RT facial twitching x 2-3 min > RN made aware.

## 2023-12-20 NOTE — PROGRESS NOTE ADULT - ASSESSMENT
78y Male s/p mechanical fall. +HT, +AC (Aspirin and Plavix), -LOC    NEURO:  #Acute SDH   -12/18 HCT#4 -stable SDH, no new acute findings  -Q4 neuro checks  #Acute pain    -APAP ATC  #h/o stroke (2/2023) w/residual right sided weakness  - RIGHT anterior thalamus infarct,  LEFT caudate head lacunar infarct  #focal seizure    -vEEG 12/19: No seizures; Discontinued 12/19    - Valproic 500q8 lowered to 500q12;: level 51     -NCC: d/c keppra due to possible contribution to somnolence continue with valproic acid current dose, q4 neurochecks    -Neurology cs- d/c EEG, 2 days prior to discharge call neurology so they can do a spot EEG, lower valporic acid to 500 q12 from q8     RESP:   #Oxygenation    -1 L NC saturating well     -chest PT q4 and duoneb treatments  #Activity    -increase as tolerated    CARDS:   #hx of HTN  -Cardene gtt started 12/19 @12.5  -Amlodipine 10 QD  -Labetalol 200 q8 (Home Coreg 6.25mg q12 )  -Valsartan 320mg HOLDING   #HLD  -hold atorvastatin, unable to crush  #NSVT    -EP/Cards: No arrhythmias on tele only artifact. No further work up needed. Recommend ILR prior to discharge if patient/family agreeable  Imaging:   EKG 12/16: Sinus tach   Echo: 12/2023: EF 66%, G1DD, trace MR, mild TR     GI/NUTR:   #Diet, NPO with Tube Feed  Glucerna 1.2 at goal 75cc/hr, feed duration 18hrs w/ banatrol    -Bedside swallow evaluation failed    -Speech and swallow cs placed 12/19- SLP unable to assess 2/2 lethargy    -Possible PEG placement for Friday; decision     -Dobhoff @75cm    -aspiration precautions, HOB 30  #GI Prophylaxis    -not indicated   #Bowel regimen     - Senna     - Last bowel movement  12/19    /RENAL:   #urine output in critically ill, IVL  -Indwelling catheter removed 12/19, episodes of continence q shift bladder scan  -UA neg   -Sodium goal 140-150    Labs:          BUN/Cr- 41/2.8  -->,  43/2.8  -->          Electrolytes-Na 144 // K 3.8 // Mg 2.5 //  Phos 3.2 (12-18 @ 21:45)    CKD   -baseline Cr 2.1  -c/w home sodium bicarb 650mg BID  #hx BPH    -cardura 4mg (flomax at home, non crushable)      HEME/ONC:   #DVT prophylaxis     -SCDs. HSQ started per NSGY. Per NSGY, can restart ASA December 22 (7 days)  #hx of CVA    -ASA can restart 12/22 as per NSGY    -Plavis HOLDING, will require repeat HCT in 2 wks prior to restart   WBC- 11.38  --->>,  8.17  --->>  Temp trend- 24hrs T(F): 100.2 (12-20 @ 00:00), Max: 101.1 (12-19 @ 16:00)      ID:  #recurrent fevers  Cultures    Bcx 12/17- prelim neg   WBC- 11.38  --->>,  8.17  --->>  Temp trend- 24hrs T(F): 100.2 (12-20 @ 00:00), Max: 101.1 (12-19 @ 16:00)  Current antibiotics, none    -Zosyn 3.375 q12 (12/17 -12/18 )    -Cefepime 2g q 24 (12/16 - 12/17)      ENDO:  #DM     -ISS    -Lantus increased to 15 units HS d/w team transition to Lantus q12     -FSG q6 while on TF    MSK:     Activity - Increase As Tolerated    -B/L knee X ray 12/19- no fractures, b/l effusions     LINES/DRAINS:  PIV, right basilic midline (placed 12/16), martinez (placed 12/16- d/c 12/19), right radial arterial line (placed 12/19)    ADVANCED DIRECTIVES:  Full Code    HCP/Emergency Contact-Sarah Gamino (Wife) [phone]    DISPO: Downgrade  Social work, case management consult for dispo       78y Male s/p mechanical fall. +HT, +AC (Aspirin and Plavix), -LOC    NEURO:  #Acute SDH   -12/18 HCT#4 -stable SDH, no new acute findings  -Q4 neuro checks  #Acute pain    -APAP ATC  #h/o stroke (2/2023) w/residual right sided weakness  - RIGHT anterior thalamus infarct,  LEFT caudate head lacunar infarct  #focal seizure    -vEEG 12/19: No seizures; Discontinued 12/19    - Valproic 500q8 lowered to 500q12;: level 51     -NCC: d/c keppra due to possible contribution to somnolence continue with valproic acid current dose, q4 neurochecks    -Neurology cs- d/c EEG, 2 days prior to discharge call neurology so they can do a spot EEG, lower valporic acid to 500 q12 from q8     RESP:   #Oxygenation    -1 L NC saturating well     -chest PT q4 and duoneb treatments  #Activity    -increase as tolerated    CARDS:   #hx of HTN  -Cardene gtt started 12/19 @12.5  -Amlodipine 10 QD  -Labetalol 200 q8 (Home Coreg 6.25mg q12 )  -Valsartan 320mg HOLDING   #HLD  -hold atorvastatin, unable to crush  #NSVT    -EP/Cards: No arrhythmias on tele only artifact. No further work up needed. Recommend ILR prior to discharge if patient/family agreeable  Imaging:   EKG 12/16: Sinus tach   Echo: 12/2023: EF 66%, G1DD, trace MR, mild TR     GI/NUTR:   #Diet, NPO with Tube Feed  Glucerna 1.2 at goal 75cc/hr, feed duration 18hrs w/ banatrol    -Bedside swallow evaluation failed    -Speech and swallow cs placed 12/19- SLP unable to assess 2/2 lethargy    -Possible PEG placement for Friday; decision     -Dobhoff @75cm    -aspiration precautions, HOB 30  #GI Prophylaxis    -not indicated   #Bowel regimen     - Senna     - Last bowel movement  12/19    /RENAL:   #urine output in critically ill, IVL  -Indwelling catheter removed 12/19, episodes of continence q shift bladder scan  -UA neg   -Sodium goal 140-150    Labs:          BUN/Cr- 41/2.8  -->,  43/2.8  -->          Electrolytes-Na 144 // K 3.8 // Mg 2.5 //  Phos 3.2 (12-18 @ 21:45)    CKD   -baseline Cr 2.1  -c/w home sodium bicarb 650mg BID  #hx BPH    -cardura 4mg (flomax at home, non crushable)      HEME/ONC:   #DVT prophylaxis     -SCDs. HSQ started per NSGY. Per NSGY, can restart ASA December 22 (7 days)  #hx of CVA    -ASA can restart 12/22 as per NSGY    -Plavis HOLDING, will require repeat HCT in 2 wks prior to restart   WBC- 11.38  --->>,  8.17  --->>  Temp trend- 24hrs T(F): 100.2 (12-20 @ 00:00), Max: 101.1 (12-19 @ 16:00)      ID:  #recurrent fevers  Cultures    Bcx 12/17- prelim neg   WBC- 11.38  --->>,  8.17  --->>  Temp trend- 24hrs T(F): 100.2 (12-20 @ 00:00), Max: 101.1 (12-19 @ 16:00)  Current antibiotics, none    -Zosyn 3.375 q12 (12/17 -12/18 )    -Cefepime 2g q 24 (12/16 - 12/17)      ENDO:  #DM     -ISS    -Lantus increased to 15 units HS d/w team transition to Lantus q12     -FSG q6 while on TF    MSK:     Activity - Increase As Tolerated    -B/L knee X ray 12/19- no fractures, b/l effusions     LINES/DRAINS:  PIV, right basilic midline (placed 12/16), martinez (placed 12/16- d/c 12/19), right radial arterial line (placed 12/19)    ADVANCED DIRECTIVES:  Full Code    HCP/Emergency Contact-Sarah Gamino (Wife) [phone]    DISPO: Downgrade  Social work, case management consult for dispo       78y Male s/p mechanical fall. +HT, +AC (Aspirin and Plavix), -LOC    NEURO:  #Acute SDH   -12/18 HCT#4 -stable SDH, no new acute findings  -Q4 neuro checks  #Acute pain    -APAP ATC  #h/o stroke (2/2023) w/residual right sided weakness  - RIGHT anterior thalamus infarct,  LEFT caudate head lacunar infarct  #focal seizure    -vEEG 12/19: No seizures; Discontinued 12/19    - Valproic 500q8 lowered to 500q12;: level 51     -NCC: d/c keppra due to possible contribution to somnolence continue with valproic acid current dose, q4 neurochecks    -Neurology cs- d/c EEG, 2 days prior to discharge call neurology so they can do a spot EEG, lower valporic acid to 500 q12 from q8     RESP:   #Oxygenation    -wean from NC to room air as tolerated     -chest PT q4 and duoneb treatments  #Activity    -increase as tolerated    CARDS:   #Acute hypotension  -250 cc LR bolus x1 on 12/20   #hx of HTN  -Off cardene gtt  -Amlodipine 10 QD  -Labetalol 200 q8 (Home Coreg 6.25mg q12 )  -Valsartan 320mg HOLDING   #HLD  -hold atorvastatin, unable to crush  #NSVT    -EP/Cards: No arrhythmias on tele only artifact. No further work up needed. Recommend ILR prior to discharge if patient/family agreeable  Imaging:   EKG 12/16: Sinus tach   Echo: 12/2023: EF 66%, G1DD, trace MR, mild TR     GI/NUTR:   #Diet, NPO with Tube Feed  Glucerna 1.2 at goal 75cc/hr, feed duration 18hrs w/ banatrol      -Bedside swallow evaluation failed    -Speech and swallow cs placed 12/20- SLP unable to assess 2/2 lethargy    -PEG placement to be scheduled for Friday;  primary team to obtain consent     -Dobhoff @75cm    -aspiration precautions, HOB 30  #GI Prophylaxis    -not indicated   #Bowel regimen     - Last bowel movement  12/20    /RENAL:   #urine output in critically ill, IVL  -Indwelling catheter removed 12/19, condom cath in place, q shift bladder scan  -UA neg   -Sodium goal 140-150  #Hypernatremia  -FWD 1.3 L, FWF 200q6     Labs:          BUN/Cr- 43/2.8  -->,  50/2.6  -->          Electrolytes-Na 151 // K 3.8 // Mg 2.9 //  Phos 3.3 (12-20 @ 03:43)  #CKD   -baseline Cr 2.1  -c/w home sodium bicarb 650mg BID  #hx BPH    -cardura 4mg (flomax at home, non crushable)      HEME/ONC:   #DVT prophylaxis     -SCDs. HSQ started per NSGY. Per NSGY, can restart ASA December 22  #hx of CVA    -ASA can restart 12/22 as per NSGY    -Plavis HOLDING, will require repeat HCT in 2 wks prior to restart  WBC- 11.38  --->>,  8.17  --->>,  7.95  --->>  Temp trend- 24hrs T(F): 99.9 (12-20 @ 12:00), Max: 101.1 (12-19 @ 16:00)    ID:  #recurrent fevers  Cultures    Bcx 12/17- prelim neg   WBC- 11.38  --->>,  8.17  --->>,  7.95  --->>  Temp trend- 24hrs T(F): 99.9 (12-20 @ 12:00), Max: 101.1 (12-19 @ 16:00)  Current antibiotics, none    -Zosyn 3.375 q12 (12/17 -12/18 )    -Cefepime 2g q 24 (12/16 - 12/17)    ENDO:  #DM     -Tightened ISS     -Off insulin gtt 12/20      -Lantus 15 units HS      -FSG q4 while on TF    MSK:     Activity - Increase As Tolerated    -B/L knee X ray 12/19- no fractures, b/l effusions     LINES/DRAINS:  PIV, right basilic midline (placed 12/16), martinez (placed 12/16- d/c 12/19), right radial arterial line (placed 12/19)    ADVANCED DIRECTIVES:  Full Code    HCP/Emergency Contact-Sarah Gamino (Wife) [phone]    DISPO  Social work, case management consult for dispo       78y Male s/p mechanical fall. +HT, +AC (Aspirin and Plavix), -LOC    NEURO:  #Acute SDH   -12/18 HCT#4 -stable SDH, no new acute findings  -Q4 neuro checks  #Acute pain    -APAP ATC  #h/o stroke (2/2023) w/residual right sided weakness  - RIGHT anterior thalamus infarct,  LEFT caudate head lacunar infarct  #focal seizure    -vEEG 12/19: No seizures; Discontinued 12/19    - Valproic 500q8 lowered to 500q12;: level 51     -NCC: d/c keppra due to possible contribution to somnolence continue with valproic acid current dose, q4 neurochecks    -Neurology cs- d/c EEG, 2 days prior to discharge call neurology so they can do a spot EEG, lower valporic acid to 500 q12 from q8     RESP:   #Oxygenation    -wean from NC to room air as tolerated     -chest PT q4 and duoneb treatments  #Activity    -increase as tolerated    CARDS:   #Acute hypotension  -250 cc LR bolus x1 on 12/20   #hx of HTN  -Off cardene gtt  -Amlodipine 10 QD  -Labetalol 200 q8 (Home Coreg 6.25mg q12 )  -Valsartan 320mg HOLDING   #HLD  -hold atorvastatin, unable to crush  #NSVT    -EP/Cards: No arrhythmias on tele only artifact. No further work up needed. Recommend ILR prior to discharge if patient/family agreeable  Imaging:   EKG 12/16: Sinus tach   Echo: 12/2023: EF 66%, G1DD, trace MR, mild TR     GI/NUTR:   #Diet, NPO with Tube Feed  Glucerna 1.2 at goal 75cc/hr, feed duration 18hrs w/ banatrol      -Bedside swallow evaluation failed    -Speech and swallow cs placed 12/20- SLP unable to assess 2/2 lethargy    -PEG placement to be scheduled for Friday;  primary team to obtain consent     -Dobhoff @75cm    -aspiration precautions, HOB 30  #GI Prophylaxis    -not indicated   #Bowel regimen     - Last bowel movement  12/20    /RENAL:   #urine output in critically ill, IVL  -Indwelling catheter removed 12/19, condom cath in place, q shift bladder scan  -UA neg   -Sodium goal 140-150  #Hypernatremia  -FWD 1.3 L, FWF 200q6     Labs:          BUN/Cr- 43/2.8  -->,  50/2.6  -->          Electrolytes-Na 151 // K 3.8 // Mg 2.9 //  Phos 3.3 (12-20 @ 03:43)  #CKD   -baseline Cr 2.1  -c/w home sodium bicarb 650mg BID  #hx BPH    -cardura 4mg (flomax at home, non crushable)      HEME/ONC:   #DVT prophylaxis     -SCDs. HSQ started per NSGY. Per NSGY, can restart ASA December 22  #hx of CVA    -ASA can restart 12/22 as per NSGY    -Plavis HOLDING, will require repeat HCT in 2 wks prior to restart  WBC- 11.38  --->>,  8.17  --->>,  7.95  --->>  Temp trend- 24hrs T(F): 99.9 (12-20 @ 12:00), Max: 101.1 (12-19 @ 16:00)    ID:  #recurrent fevers  Cultures    Bcx 12/17- prelim neg   WBC- 11.38  --->>,  8.17  --->>,  7.95  --->>  Temp trend- 24hrs T(F): 99.9 (12-20 @ 12:00), Max: 101.1 (12-19 @ 16:00)  Current antibiotics, none    -Zosyn 3.375 q12 (12/17 -12/18 )    -Cefepime 2g q 24 (12/16 - 12/17)    ENDO:  #DM     -Tightened ISS     -Off insulin gtt 12/20      -Lantus 15 units HS      -FSG q4 while on TF    MSK:     Activity - Increase As Tolerated    -B/L knee X ray 12/19- no fractures, b/l effusions     LINES/DRAINS:  PIV, right basilic midline (placed 12/16), martinez (placed 12/16- d/c 12/19), right radial arterial line (placed 12/19)    ADVANCED DIRECTIVES:  Full Code    HCP/Emergency Contact-Sarah Gamino (Wife) [phone]    DISPO  Social work, case management consult for dispo

## 2023-12-20 NOTE — PROGRESS NOTE ADULT - ASSESSMENT
78M who presented as a TRAUMA CONSULT s/p mechanical fall (+HT, -LOC, -AC). Found to have an acute subdural hemorrhage. Hospital course has been complicated by multiples seizures and uncontrolled hypertension.     PLAN:  - Booked for PEG placement w/ Dr Roman on Fri 12/22  - Preop Thurs night - make NPO @ midnight, obtain CBC, BMP, Mag, Phos, PT, PTT, INR, type & screen   - Q4H neuro checks   - Continue Valproate 500 mg q12h - follow level   - Monitor BP - Labetalol 200 mg Q8H w/ parameters, Amlodipine 10 mg QD  - Monitor glucose - on ISS, Lantus 15U   - Continue holding Plavix, restart ASA on 12/22   - DVT ppx w/ HSQ  - Obtain daily labs w/ repletion of electrolytes as needed   - Monitor UOP     # Neuro - recall 2 days prior to discharge for spot EEG   # EP - will place ILR prior to discharge (due to NSVT)   # S/S - unable to assess PO intake 2/2 increased lethargy, decreased arousability --> booked for PEG on Fri 12/22    Dispo: needs AKIL (per PT)   _________________________  Trauma Team Surgery  [phone]   78M who presented as a TRAUMA CONSULT s/p mechanical fall (+HT, -LOC, -AC). Found to have an acute subdural hemorrhage. Hospital course has been complicated by multiples seizures and uncontrolled hypertension.     PLAN:  - Booked for PEG placement w/ Dr Roman on Fri 12/22  - Preop Thurs night - make NPO @ midnight, obtain CBC, BMP, Mag, Phos, PT, PTT, INR, type & screen   - Q4H neuro checks   - Continue Valproate 500 mg q12h - follow level   - Monitor BP - Labetalol 200 mg Q8H w/ parameters, Amlodipine 10 mg QD  - Monitor glucose - on ISS, Lantus 15U   - Continue holding Plavix, restart ASA on 12/22   - DVT ppx w/ HSQ  - Obtain daily labs w/ repletion of electrolytes as needed   - Monitor UOP     # Neuro - recall 2 days prior to discharge for spot EEG   # EP - will place ILR prior to discharge (due to NSVT)   # S/S - unable to assess PO intake 2/2 increased lethargy, decreased arousability --> booked for PEG on Fri 12/22    Dispo: needs AKIL (per PT)   _________________________  Trauma Team Surgery  [phone]

## 2023-12-20 NOTE — SWALLOW BEDSIDE ASSESSMENT ADULT - SWALLOW EVAL: RECOMMENDED FEEDING/EATING TECHNIQUES
maintain upright posture during/after eating for 30 mins/oral hygiene/position upright (90 degrees)
maintain upright posture during/after eating for 30 mins/oral hygiene/position upright (90 degrees)

## 2023-12-20 NOTE — PROGRESS NOTE ADULT - SUBJECTIVE AND OBJECTIVE BOX
JACQUELIN CAI   [id number]   [birth year]  78yM    Admit Date: 12-16-23  Indication for SDU/SICU: Q1 neuro checks        ============================  24 Hour [id number] 12/19  NIGHT  -spoke with family beside translating, patient following commands intermittently, NG tube still in place            DAY:  -no seizures, stable mental status; go down to neuro checks q4  -wean to RA  -inc labetalol 200mg q8h; inc amlodipine to 10mg   -10 Labetalol x1 and hydralazine 10 x1  -inc lantus to 15u; FS >200  -b/l knee xray-> no FX, bilateral effusions  -family requesting speech/swallow, cs placed   -valproic acid level tonight   -cont holding plavix, start ASA 12/22  -F/U palliative   -per neurology: d/c EEG, 2 days prior to discharge call neurology so they can do a spot -EEG, lower valporic acid to 500 q12 from q8   -BCx prelim neg   -SLP: unable to assess 2/2 lethargy  -cardene gtt started   -d/c martinez > incontinent       [X] A ten-point review of systems was otherwise negative except as noted above.  [  ] Due to altered mental status/intubation, subjective information was not attained from the patient. History was obtained, to the extent possible, from review of the chart and collateral sources of information.    =========================================================================================================================================   JACQUELIN CAI   [id number]   [birth year]  78yM    Admit Date: 12-16-23  Indication for SDU/SICU: Q1 neuro checks        ============================  24 Hour [id number] 12/19  NIGHT  -spoke with family beside translating, patient following commands intermittently, NG tube still in place            DAY:  -no seizures, stable mental status; go down to neuro checks q4  -wean to RA  -inc labetalol 200mg q8h; inc amlodipine to 10mg   -10 Labetalol x1 and hydralazine 10 x1  -inc lantus to 15u; FS >200  -b/l knee xray-> no FX, bilateral effusions  -family requesting speech/swallow, cs placed   -valproic acid level tonight   -cont holding plavix, start ASA 12/22  -F/U palliative   -per neurology: d/c EEG, 2 days prior to discharge call neurology so they can do a spot -EEG, lower valporic acid to 500 q12 from q8   -BCx prelim neg   -SLP: unable to assess 2/2 lethargy  -cardene gtt started   -d/c martinez > incontinent       [X] A ten-point review of systems was otherwise negative except as noted above.  [  ] Due to altered mental status/intubation, subjective information was not attained from the patient. History was obtained, to the extent possible, from review of the chart and collateral sources of information.    =========================================================================================================================================   JACQUELIN CAI   [id number]   [birth year]  78yM    Admit Date: 12-16-23  Indication for SDU/SICU: Q1 neuro checks        ============================  24 Hour [id number] 12/19  NIGHT  -spoke with family beside translating, patient following commands intermittently, NG tube still in place            DAY:  -no seizures, stable mental status; go down to neuro checks q4  -wean to RA  -inc labetalol 200mg q8h; inc amlodipine to 10mg   -10 Labetalol x1 and hydralazine 10 x1  -inc lantus to 15u; FS >200  -b/l knee xray-> no FX, bilateral effusions  -family requesting speech/swallow, cs placed   -valproic acid level tonight   -cont holding plavix, start ASA 12/22  -F/U palliative   -per neurology: d/c EEG, 2 days prior to discharge call neurology so they can do a spot -EEG, lower valporic acid to 500 q12 from q8   -BCx prelim neg   -SLP: unable to assess 2/2 lethargy  -cardene gtt started   -d/c martinez > incontinent       [X] A ten-point review of systems was otherwise negative except as noted above.  [  ] Due to altered mental status/intubation, subjective information was not attained from the patient. History was obtained, to the extent possible, from review of the chart and collateral sources of information.    =========================================================================================================================================  Daily     Daily   Diet, NPO with Tube Feed:   Tube Feeding Modality: Nasogastric  Glucerna 1.2 Tonio  Total Volume for 24 Hours (mL): 1800  Continuous  Starting Tube Feed Rate mL per Hour: 75  Until Goal Tube Feed Rate (mL per Hour): 75  Tube Feed Duration (in Hours): 24  Tube Feed Start Time: 12:00  Tube Feed Stop Time: 06:00  Free Water Flush  Bolus   Total Volume per Flush (mL): 200   Frequency: Every 6 Hours (12-20-23 @ 11:11)    CURRENT MEDS:  Neurologic Medications  acetaminophen     Tablet .. 650 milliGRAM(s) Oral every 6 hours  valproic  acid Syrup 500 milliGRAM(s) Oral every 12 hours    Respiratory Medications  albuterol/ipratropium for Nebulization 3 milliLiter(s) Nebulizer every 6 hours    Cardiovascular Medications  amLODIPine   Tablet 10 milliGRAM(s) Oral daily  doxazosin 4 milliGRAM(s) Oral at bedtime  labetalol 200 milliGRAM(s) Oral every 8 hours    Gastrointestinal Medications  sodium bicarbonate 650 milliGRAM(s) Oral two times a day    Genitourinary Medications    Hematologic/Oncologic Medications  heparin   Injectable 5000 Unit(s) SubCutaneous every 8 hours    Antimicrobial/Immunologic Medications    Endocrine/Metabolic Medications  atorvastatin 20 milliGRAM(s) Oral at bedtime  insulin glargine Injectable (LANTUS) 15 Unit(s) SubCutaneous at bedtime  insulin lispro (ADMELOG) corrective regimen sliding scale   SubCutaneous every 4 hours    Topical/Other Medications  bacitracin   Ointment 1 Application(s) Topical every 12 hours  chlorhexidine 2% Cloths 1 Application(s) Topical <User Schedule>    ICU Vital Signs Last 24 Hrs  T(C): 37.7 (20 Dec 2023 12:00), Max: 38.4 (19 Dec 2023 16:00)  T(F): 99.9 (20 Dec 2023 12:00), Max: 101.1 (19 Dec 2023 16:00)  HR: 85 (20 Dec 2023 13:00) (81 - 107)  BP: 166/75 (20 Dec 2023 11:00) (129/58 - 187/77)  BP(mean): 108 (20 Dec 2023 11:00) (83 - 110)  ABP: 119/34 (20 Dec 2023 13:00) (119/34 - 178/49)  ABP(mean): 61 (20 Dec 2023 13:00) (61 - 81)  RR: 17 (20 Dec 2023 13:00) (16 - 30)  SpO2: 97% (20 Dec 2023 13:00) (97% - 100%)    O2 Parameters below as of 20 Dec 2023 13:00  Patient On (Oxygen Delivery Method): room air              I&O's Summary    19 Dec 2023 07:01  -  20 Dec 2023 07:00  --------------------------------------------------------  IN: 2015.5 mL / OUT: 1220 mL / NET: 795.5 mL    20 Dec 2023 07:01  -  20 Dec 2023 14:08  --------------------------------------------------------  IN: 234 mL / OUT: 620 mL / NET: -386 mL      I&O's Detail    19 Dec 2023 07:01  -  20 Dec 2023 07:00  --------------------------------------------------------  IN:    Glucerna: 1800 mL    Insulin: 16 mL    NiCARdipine: 12.5 mL    NiCARdipine: 187 mL  Total IN: 2015.5 mL    OUT:    Indwelling Catheter - Urethral (mL): 460 mL    Voided (mL): 760 mL  Total OUT: 1220 mL    Total NET: 795.5 mL      20 Dec 2023 07:01  -  20 Dec 2023 14:08  --------------------------------------------------------  IN:    Glucerna: 225 mL    Insulin: 9 mL  Total IN: 234 mL    OUT:    Voided (mL): 620 mL  Total OUT: 620 mL    Total NET: -386 mL              acetaminophen     Tablet .. 650 milliGRAM(s) Oral every 6 hours  albuterol/ipratropium for Nebulization 3 milliLiter(s) Nebulizer every 6 hours  amLODIPine   Tablet 10 milliGRAM(s) Oral daily  atorvastatin 20 milliGRAM(s) Oral at bedtime  bacitracin   Ointment 1 Application(s) Topical every 12 hours  chlorhexidine 2% Cloths 1 Application(s) Topical <User Schedule>  doxazosin 4 milliGRAM(s) Oral at bedtime  heparin   Injectable 5000 Unit(s) SubCutaneous every 8 hours  insulin glargine Injectable (LANTUS) 15 Unit(s) SubCutaneous at bedtime  insulin lispro (ADMELOG) corrective regimen sliding scale   SubCutaneous every 4 hours  labetalol 200 milliGRAM(s) Oral every 8 hours  sodium bicarbonate 650 milliGRAM(s) Oral two times a day  valproic  acid Syrup 500 milliGRAM(s) Oral every 12 hours                  PHYSICAL EXAM:   General/ Neuro: Communicated with patient via  ID #[id number]- Ya Anna, withdrawing from pain  Respiratory: 1 L NC saturating well. Equal chest rise bilaterally, clear breath sounds b/l   Cardiovascular:   Abdomen: Soft, NT, ND  Ext: Soft  : Condom catheter in place            JACQUELIN CAI   [id number]   [birth year]  78yM    Admit Date: 12-16-23  Indication for SDU/SICU: Q1 neuro checks        ============================  24 Hour [id number] 12/19  NIGHT  -spoke with family beside translating, patient following commands intermittently, NG tube still in place            DAY:  -no seizures, stable mental status; go down to neuro checks q4  -wean to RA  -inc labetalol 200mg q8h; inc amlodipine to 10mg   -10 Labetalol x1 and hydralazine 10 x1  -inc lantus to 15u; FS >200  -b/l knee xray-> no FX, bilateral effusions  -family requesting speech/swallow, cs placed   -valproic acid level tonight   -cont holding plavix, start ASA 12/22  -F/U palliative   -per neurology: d/c EEG, 2 days prior to discharge call neurology so they can do a spot -EEG, lower valporic acid to 500 q12 from q8   -BCx prelim neg   -SLP: unable to assess 2/2 lethargy  -cardene gtt started   -d/c martinez > incontinent       [X] A ten-point review of systems was otherwise negative except as noted above.  [  ] Due to altered mental status/intubation, subjective information was not attained from the patient. History was obtained, to the extent possible, from review of the chart and collateral sources of information.    =========================================================================================================================================  Daily     Daily   Diet, NPO with Tube Feed:   Tube Feeding Modality: Nasogastric  Glucerna 1.2 Tonio  Total Volume for 24 Hours (mL): 1800  Continuous  Starting Tube Feed Rate mL per Hour: 75  Until Goal Tube Feed Rate (mL per Hour): 75  Tube Feed Duration (in Hours): 24  Tube Feed Start Time: 12:00  Tube Feed Stop Time: 06:00  Free Water Flush  Bolus   Total Volume per Flush (mL): 200   Frequency: Every 6 Hours (12-20-23 @ 11:11)    CURRENT MEDS:  Neurologic Medications  acetaminophen     Tablet .. 650 milliGRAM(s) Oral every 6 hours  valproic  acid Syrup 500 milliGRAM(s) Oral every 12 hours    Respiratory Medications  albuterol/ipratropium for Nebulization 3 milliLiter(s) Nebulizer every 6 hours    Cardiovascular Medications  amLODIPine   Tablet 10 milliGRAM(s) Oral daily  doxazosin 4 milliGRAM(s) Oral at bedtime  labetalol 200 milliGRAM(s) Oral every 8 hours    Gastrointestinal Medications  sodium bicarbonate 650 milliGRAM(s) Oral two times a day    Genitourinary Medications    Hematologic/Oncologic Medications  heparin   Injectable 5000 Unit(s) SubCutaneous every 8 hours    Antimicrobial/Immunologic Medications    Endocrine/Metabolic Medications  atorvastatin 20 milliGRAM(s) Oral at bedtime  insulin glargine Injectable (LANTUS) 15 Unit(s) SubCutaneous at bedtime  insulin lispro (ADMELOG) corrective regimen sliding scale   SubCutaneous every 4 hours    Topical/Other Medications  bacitracin   Ointment 1 Application(s) Topical every 12 hours  chlorhexidine 2% Cloths 1 Application(s) Topical <User Schedule>    ICU Vital Signs Last 24 Hrs  T(C): 37.7 (20 Dec 2023 12:00), Max: 38.4 (19 Dec 2023 16:00)  T(F): 99.9 (20 Dec 2023 12:00), Max: 101.1 (19 Dec 2023 16:00)  HR: 85 (20 Dec 2023 13:00) (81 - 107)  BP: 166/75 (20 Dec 2023 11:00) (129/58 - 187/77)  BP(mean): 108 (20 Dec 2023 11:00) (83 - 110)  ABP: 119/34 (20 Dec 2023 13:00) (119/34 - 178/49)  ABP(mean): 61 (20 Dec 2023 13:00) (61 - 81)  RR: 17 (20 Dec 2023 13:00) (16 - 30)  SpO2: 97% (20 Dec 2023 13:00) (97% - 100%)    O2 Parameters below as of 20 Dec 2023 13:00  Patient On (Oxygen Delivery Method): room air              I&O's Summary    19 Dec 2023 07:01  -  20 Dec 2023 07:00  --------------------------------------------------------  IN: 2015.5 mL / OUT: 1220 mL / NET: 795.5 mL    20 Dec 2023 07:01  -  20 Dec 2023 14:08  --------------------------------------------------------  IN: 234 mL / OUT: 620 mL / NET: -386 mL      I&O's Detail    19 Dec 2023 07:01  -  20 Dec 2023 07:00  --------------------------------------------------------  IN:    Glucerna: 1800 mL    Insulin: 16 mL    NiCARdipine: 12.5 mL    NiCARdipine: 187 mL  Total IN: 2015.5 mL    OUT:    Indwelling Catheter - Urethral (mL): 460 mL    Voided (mL): 760 mL  Total OUT: 1220 mL    Total NET: 795.5 mL      20 Dec 2023 07:01  -  20 Dec 2023 14:08  --------------------------------------------------------  IN:    Glucerna: 225 mL    Insulin: 9 mL  Total IN: 234 mL    OUT:    Voided (mL): 620 mL  Total OUT: 620 mL    Total NET: -386 mL              acetaminophen     Tablet .. 650 milliGRAM(s) Oral every 6 hours  albuterol/ipratropium for Nebulization 3 milliLiter(s) Nebulizer every 6 hours  amLODIPine   Tablet 10 milliGRAM(s) Oral daily  atorvastatin 20 milliGRAM(s) Oral at bedtime  bacitracin   Ointment 1 Application(s) Topical every 12 hours  chlorhexidine 2% Cloths 1 Application(s) Topical <User Schedule>  doxazosin 4 milliGRAM(s) Oral at bedtime  heparin   Injectable 5000 Unit(s) SubCutaneous every 8 hours  insulin glargine Injectable (LANTUS) 15 Unit(s) SubCutaneous at bedtime  insulin lispro (ADMELOG) corrective regimen sliding scale   SubCutaneous every 4 hours  labetalol 200 milliGRAM(s) Oral every 8 hours  sodium bicarbonate 650 milliGRAM(s) Oral two times a day  valproic  acid Syrup 500 milliGRAM(s) Oral every 12 hours                  PHYSICAL EXAM:   General/ Neuro: Communicated with patient via  ID #[id number]- Ya Anna, withdrawing from pain  Respiratory: 1 L NC saturating well. Equal chest rise bilaterally, clear breath sounds b/l   Cardiovascular:   Abdomen: Soft, NT, ND  Ext: Soft  : Condom catheter in place            JACQUELIN CAI   [id number]   [birth year]  78yM    Admit Date: 12-16-23  Indication for SDU/SICU: Q1 neuro checks        ============================  24 Hour [id number]/19 12/19  NIGHT  -spoke with family beside translating, patient following commands intermittently, NG tube still in place            DAY:  -no seizures, stable mental status; go down to neuro checks q4  -wean to RA  -inc labetalol 200mg q8h; inc amlodipine to 10mg   -10 Labetalol x1 and hydralazine 10 x1  -inc lantus to 15u; FS >200  -b/l knee xray-> no FX, bilateral effusions  -family requesting speech/swallow, cs placed   -valproic acid level tonight   -cont holding plavix, start ASA 12/22  -F/U palliative   -per neurology: d/c EEG, 2 days prior to discharge call neurology so they can do a spot -EEG, lower valporic acid to 500 q12 from q8   -BCx prelim neg   -SLP: unable to assess 2/2 lethargy  -cardene gtt started   -d/c martinez > incontinent       [X] A ten-point review of systems was otherwise negative except as noted above.  [  ] Due to altered mental status/intubation, subjective information was not attained from the patient. History was obtained, to the extent possible, from review of the chart and collateral sources of information.    =========================================================================================================================================  Daily     Daily   Diet, NPO with Tube Feed:   Tube Feeding Modality: Nasogastric  Glucerna 1.2 Tonio  Total Volume for 24 Hours (mL): 1800  Continuous  Starting Tube Feed Rate mL per Hour: 75  Until Goal Tube Feed Rate (mL per Hour): 75  Tube Feed Duration (in Hours): 24  Tube Feed Start Time: 12:00  Tube Feed Stop Time: 06:00  Free Water Flush  Bolus   Total Volume per Flush (mL): 200   Frequency: Every 6 Hours (12-20-23 @ 11:11)    CURRENT MEDS:  Neurologic Medications  acetaminophen     Tablet .. 650 milliGRAM(s) Oral every 6 hours  valproic  acid Syrup 500 milliGRAM(s) Oral every 12 hours    Respiratory Medications  albuterol/ipratropium for Nebulization 3 milliLiter(s) Nebulizer every 6 hours    Cardiovascular Medications  amLODIPine   Tablet 10 milliGRAM(s) Oral daily  doxazosin 4 milliGRAM(s) Oral at bedtime  labetalol 200 milliGRAM(s) Oral every 8 hours    Gastrointestinal Medications  sodium bicarbonate 650 milliGRAM(s) Oral two times a day    Genitourinary Medications    Hematologic/Oncologic Medications  heparin   Injectable 5000 Unit(s) SubCutaneous every 8 hours    Antimicrobial/Immunologic Medications    Endocrine/Metabolic Medications  atorvastatin 20 milliGRAM(s) Oral at bedtime  insulin glargine Injectable (LANTUS) 15 Unit(s) SubCutaneous at bedtime  insulin lispro (ADMELOG) corrective regimen sliding scale   SubCutaneous every 4 hours    Topical/Other Medications  bacitracin   Ointment 1 Application(s) Topical every 12 hours  chlorhexidine 2% Cloths 1 Application(s) Topical <User Schedule>    ICU Vital Signs Last 24 Hrs  T(C): 37.7 (20 Dec 2023 12:00), Max: 38.4 (19 Dec 2023 16:00)  T(F): 99.9 (20 Dec 2023 12:00), Max: 101.1 (19 Dec 2023 16:00)  HR: 85 (20 Dec 2023 13:00) (81 - 107)  BP: 166/75 (20 Dec 2023 11:00) (129/58 - 187/77)  BP(mean): 108 (20 Dec 2023 11:00) (83 - 110)  ABP: 119/34 (20 Dec 2023 13:00) (119/34 - 178/49)  ABP(mean): 61 (20 Dec 2023 13:00) (61 - 81)  RR: 17 (20 Dec 2023 13:00) (16 - 30)  SpO2: 97% (20 Dec 2023 13:00) (97% - 100%)    O2 Parameters below as of 20 Dec 2023 13:00  Patient On (Oxygen Delivery Method): room air              I&O's Summary    19 Dec 2023 07:01  -  20 Dec 2023 07:00  --------------------------------------------------------  IN: 2015.5 mL / OUT: 1220 mL / NET: 795.5 mL    20 Dec 2023 07:01  -  20 Dec 2023 14:08  --------------------------------------------------------  IN: 234 mL / OUT: 620 mL / NET: -386 mL      I&O's Detail    19 Dec 2023 07:01  -  20 Dec 2023 07:00  --------------------------------------------------------  IN:    Glucerna: 1800 mL    Insulin: 16 mL    NiCARdipine: 12.5 mL    NiCARdipine: 187 mL  Total IN: 2015.5 mL    OUT:    Indwelling Catheter - Urethral (mL): 460 mL    Voided (mL): 760 mL  Total OUT: 1220 mL    Total NET: 795.5 mL      20 Dec 2023 07:01  -  20 Dec 2023 14:08  --------------------------------------------------------  IN:    Glucerna: 225 mL    Insulin: 9 mL  Total IN: 234 mL    OUT:    Voided (mL): 620 mL  Total OUT: 620 mL    Total NET: -386 mL              acetaminophen     Tablet .. 650 milliGRAM(s) Oral every 6 hours  albuterol/ipratropium for Nebulization 3 milliLiter(s) Nebulizer every 6 hours  amLODIPine   Tablet 10 milliGRAM(s) Oral daily  atorvastatin 20 milliGRAM(s) Oral at bedtime  bacitracin   Ointment 1 Application(s) Topical every 12 hours  chlorhexidine 2% Cloths 1 Application(s) Topical <User Schedule>  doxazosin 4 milliGRAM(s) Oral at bedtime  heparin   Injectable 5000 Unit(s) SubCutaneous every 8 hours  insulin glargine Injectable (LANTUS) 15 Unit(s) SubCutaneous at bedtime  insulin lispro (ADMELOG) corrective regimen sliding scale   SubCutaneous every 4 hours  labetalol 200 milliGRAM(s) Oral every 8 hours  sodium bicarbonate 650 milliGRAM(s) Oral two times a day  valproic  acid Syrup 500 milliGRAM(s) Oral every 12 hours                  PHYSICAL EXAM:   General/ Neuro: Communicated with patient via  ID #[id number]- Ya Anna, withdrawing from pain,   Respiratory: 1 L NC saturating well. Equal chest rise bilaterally, clear breath sounds b/l   Cardiovascular:   Abdomen: Soft, NT, ND  Ext: Soft  : Condom catheter in place            JACQUELIN CAI   [id number]   [birth year]  78yM    Admit Date: 12-16-23  Indication for SDU/SICU: Q1 neuro checks        ============================  24 Hour [id number] 12/19  NIGHT  -spoke with family beside translating, patient following commands intermittently, NG tube still in place            DAY:  -no seizures, stable mental status; go down to neuro checks q4  -wean to RA  -inc labetalol 200mg q8h; inc amlodipine to 10mg   -10 Labetalol x1 and hydralazine 10 x1  -inc lantus to 15u; FS >200  -b/l knee xray-> no FX, bilateral effusions  -family requesting speech/swallow, cs placed   -valproic acid level tonight   -cont holding plavix, start ASA 12/22  -F/U palliative   -per neurology: d/c EEG, 2 days prior to discharge call neurology so they can do a spot -EEG, lower valporic acid to 500 q12 from q8   -BCx prelim neg   -SLP: unable to assess 2/2 lethargy  -cardene gtt started   -d/c martinez > incontinent       [X] A ten-point review of systems was otherwise negative except as noted above.  [  ] Due to altered mental status/intubation, subjective information was not attained from the patient. History was obtained, to the extent possible, from review of the chart and collateral sources of information.    =========================================================================================================================================  Daily     Daily   Diet, NPO with Tube Feed:   Tube Feeding Modality: Nasogastric  Glucerna 1.2 Tonio  Total Volume for 24 Hours (mL): 1800  Continuous  Starting Tube Feed Rate mL per Hour: 75  Until Goal Tube Feed Rate (mL per Hour): 75  Tube Feed Duration (in Hours): 24  Tube Feed Start Time: 12:00  Tube Feed Stop Time: 06:00  Free Water Flush  Bolus   Total Volume per Flush (mL): 200   Frequency: Every 6 Hours (12-20-23 @ 11:11)    CURRENT MEDS:  Neurologic Medications  acetaminophen     Tablet .. 650 milliGRAM(s) Oral every 6 hours  valproic  acid Syrup 500 milliGRAM(s) Oral every 12 hours    Respiratory Medications  albuterol/ipratropium for Nebulization 3 milliLiter(s) Nebulizer every 6 hours    Cardiovascular Medications  amLODIPine   Tablet 10 milliGRAM(s) Oral daily  doxazosin 4 milliGRAM(s) Oral at bedtime  labetalol 200 milliGRAM(s) Oral every 8 hours    Gastrointestinal Medications  sodium bicarbonate 650 milliGRAM(s) Oral two times a day    Genitourinary Medications    Hematologic/Oncologic Medications  heparin   Injectable 5000 Unit(s) SubCutaneous every 8 hours    Antimicrobial/Immunologic Medications    Endocrine/Metabolic Medications  atorvastatin 20 milliGRAM(s) Oral at bedtime  insulin glargine Injectable (LANTUS) 15 Unit(s) SubCutaneous at bedtime  insulin lispro (ADMELOG) corrective regimen sliding scale   SubCutaneous every 4 hours    Topical/Other Medications  bacitracin   Ointment 1 Application(s) Topical every 12 hours  chlorhexidine 2% Cloths 1 Application(s) Topical <User Schedule>    ICU Vital Signs Last 24 Hrs  T(C): 37.7 (20 Dec 2023 12:00), Max: 38.4 (19 Dec 2023 16:00)  T(F): 99.9 (20 Dec 2023 12:00), Max: 101.1 (19 Dec 2023 16:00)  HR: 85 (20 Dec 2023 13:00) (81 - 107)  BP: 166/75 (20 Dec 2023 11:00) (129/58 - 187/77)  BP(mean): 108 (20 Dec 2023 11:00) (83 - 110)  ABP: 119/34 (20 Dec 2023 13:00) (119/34 - 178/49)  ABP(mean): 61 (20 Dec 2023 13:00) (61 - 81)  RR: 17 (20 Dec 2023 13:00) (16 - 30)  SpO2: 97% (20 Dec 2023 13:00) (97% - 100%)    O2 Parameters below as of 20 Dec 2023 13:00  Patient On (Oxygen Delivery Method): room air              I&O's Summary    19 Dec 2023 07:01  -  20 Dec 2023 07:00  --------------------------------------------------------  IN: 2015.5 mL / OUT: 1220 mL / NET: 795.5 mL    20 Dec 2023 07:01  -  20 Dec 2023 14:08  --------------------------------------------------------  IN: 234 mL / OUT: 620 mL / NET: -386 mL      I&O's Detail    19 Dec 2023 07:01  -  20 Dec 2023 07:00  --------------------------------------------------------  IN:    Glucerna: 1800 mL    Insulin: 16 mL    NiCARdipine: 12.5 mL    NiCARdipine: 187 mL  Total IN: 2015.5 mL    OUT:    Indwelling Catheter - Urethral (mL): 460 mL    Voided (mL): 760 mL  Total OUT: 1220 mL    Total NET: 795.5 mL      20 Dec 2023 07:01  -  20 Dec 2023 14:08  --------------------------------------------------------  IN:    Glucerna: 225 mL    Insulin: 9 mL  Total IN: 234 mL    OUT:    Voided (mL): 620 mL  Total OUT: 620 mL    Total NET: -386 mL              acetaminophen     Tablet .. 650 milliGRAM(s) Oral every 6 hours  albuterol/ipratropium for Nebulization 3 milliLiter(s) Nebulizer every 6 hours  amLODIPine   Tablet 10 milliGRAM(s) Oral daily  atorvastatin 20 milliGRAM(s) Oral at bedtime  bacitracin   Ointment 1 Application(s) Topical every 12 hours  chlorhexidine 2% Cloths 1 Application(s) Topical <User Schedule>  doxazosin 4 milliGRAM(s) Oral at bedtime  heparin   Injectable 5000 Unit(s) SubCutaneous every 8 hours  insulin glargine Injectable (LANTUS) 15 Unit(s) SubCutaneous at bedtime  insulin lispro (ADMELOG) corrective regimen sliding scale   SubCutaneous every 4 hours  labetalol 200 milliGRAM(s) Oral every 8 hours  sodium bicarbonate 650 milliGRAM(s) Oral two times a day  valproic  acid Syrup 500 milliGRAM(s) Oral every 12 hours                  PHYSICAL EXAM:   General/ Neuro: Communicated with patient via  ID #[id number]- Ya Anna, withdrawing from pain,   Respiratory: 1 L NC saturating well. Equal chest rise bilaterally, clear breath sounds b/l   Cardiovascular:   Abdomen: Soft, NT, ND  Ext: Soft  : Condom catheter in place            JACQUELIN CAI   [id number]   [birth year]  78yM    Admit Date: 12-16-23  Indication for SDU/SICU: Q1 neuro checks        ============================  24 Hour [id number] 12/19  NIGHT  -spoke with family beside translating, patient following commands intermittently, NG tube still in place            DAY:  -no seizures, stable mental status; go down to neuro checks q4  -wean to RA  -inc labetalol 200mg q8h; inc amlodipine to 10mg   -10 Labetalol x1 and hydralazine 10 x1  -inc lantus to 15u; FS >200  -b/l knee xray-> no FX, bilateral effusions  -family requesting speech/swallow, cs placed   -valproic acid level tonight   -cont holding plavix, start ASA 12/22  -F/U palliative   -per neurology: d/c EEG, 2 days prior to discharge call neurology so they can do a spot -EEG, lower valporic acid to 500 q12 from q8   -BCx prelim neg   -SLP: unable to assess 2/2 lethargy  -cardene gtt started   -d/c martinez > incontinent       [X] A ten-point review of systems was otherwise negative except as noted above.  [  ] Due to altered mental status/intubation, subjective information was not attained from the patient. History was obtained, to the extent possible, from review of the chart and collateral sources of information.    =========================================================================================================================================  Daily     Daily   Diet, NPO with Tube Feed:   Tube Feeding Modality: Nasogastric  Glucerna 1.2 Tonio  Total Volume for 24 Hours (mL): 1800  Continuous  Starting Tube Feed Rate mL per Hour: 75  Until Goal Tube Feed Rate (mL per Hour): 75  Tube Feed Duration (in Hours): 24  Tube Feed Start Time: 12:00  Tube Feed Stop Time: 06:00  Free Water Flush  Bolus   Total Volume per Flush (mL): 200   Frequency: Every 6 Hours (12-20-23 @ 11:11)    CURRENT MEDS:  Neurologic Medications  acetaminophen     Tablet .. 650 milliGRAM(s) Oral every 6 hours  valproic  acid Syrup 500 milliGRAM(s) Oral every 12 hours    Respiratory Medications  albuterol/ipratropium for Nebulization 3 milliLiter(s) Nebulizer every 6 hours    Cardiovascular Medications  amLODIPine   Tablet 10 milliGRAM(s) Oral daily  doxazosin 4 milliGRAM(s) Oral at bedtime  labetalol 200 milliGRAM(s) Oral every 8 hours    Gastrointestinal Medications  sodium bicarbonate 650 milliGRAM(s) Oral two times a day    Genitourinary Medications    Hematologic/Oncologic Medications  heparin   Injectable 5000 Unit(s) SubCutaneous every 8 hours    Antimicrobial/Immunologic Medications    Endocrine/Metabolic Medications  atorvastatin 20 milliGRAM(s) Oral at bedtime  insulin glargine Injectable (LANTUS) 15 Unit(s) SubCutaneous at bedtime  insulin lispro (ADMELOG) corrective regimen sliding scale   SubCutaneous every 4 hours    Topical/Other Medications  bacitracin   Ointment 1 Application(s) Topical every 12 hours  chlorhexidine 2% Cloths 1 Application(s) Topical <User Schedule>    ICU Vital Signs Last 24 Hrs  T(C): 37.7 (20 Dec 2023 12:00), Max: 38.4 (19 Dec 2023 16:00)  T(F): 99.9 (20 Dec 2023 12:00), Max: 101.1 (19 Dec 2023 16:00)  HR: 85 (20 Dec 2023 13:00) (81 - 107)  BP: 166/75 (20 Dec 2023 11:00) (129/58 - 187/77)  BP(mean): 108 (20 Dec 2023 11:00) (83 - 110)  ABP: 119/34 (20 Dec 2023 13:00) (119/34 - 178/49)  ABP(mean): 61 (20 Dec 2023 13:00) (61 - 81)  RR: 17 (20 Dec 2023 13:00) (16 - 30)  SpO2: 97% (20 Dec 2023 13:00) (97% - 100%)    O2 Parameters below as of 20 Dec 2023 13:00  Patient On (Oxygen Delivery Method): room air              I&O's Summary    19 Dec 2023 07:01  -  20 Dec 2023 07:00  --------------------------------------------------------  IN: 2015.5 mL / OUT: 1220 mL / NET: 795.5 mL    20 Dec 2023 07:01  -  20 Dec 2023 14:08  --------------------------------------------------------  IN: 234 mL / OUT: 620 mL / NET: -386 mL      I&O's Detail    19 Dec 2023 07:01  -  20 Dec 2023 07:00  --------------------------------------------------------  IN:    Glucerna: 1800 mL    Insulin: 16 mL    NiCARdipine: 12.5 mL    NiCARdipine: 187 mL  Total IN: 2015.5 mL    OUT:    Indwelling Catheter - Urethral (mL): 460 mL    Voided (mL): 760 mL  Total OUT: 1220 mL    Total NET: 795.5 mL      20 Dec 2023 07:01  -  20 Dec 2023 14:08  --------------------------------------------------------  IN:    Glucerna: 225 mL    Insulin: 9 mL  Total IN: 234 mL    OUT:    Voided (mL): 620 mL  Total OUT: 620 mL    Total NET: -386 mL              acetaminophen     Tablet .. 650 milliGRAM(s) Oral every 6 hours  albuterol/ipratropium for Nebulization 3 milliLiter(s) Nebulizer every 6 hours  amLODIPine   Tablet 10 milliGRAM(s) Oral daily  atorvastatin 20 milliGRAM(s) Oral at bedtime  bacitracin   Ointment 1 Application(s) Topical every 12 hours  chlorhexidine 2% Cloths 1 Application(s) Topical <User Schedule>  doxazosin 4 milliGRAM(s) Oral at bedtime  heparin   Injectable 5000 Unit(s) SubCutaneous every 8 hours  insulin glargine Injectable (LANTUS) 15 Unit(s) SubCutaneous at bedtime  insulin lispro (ADMELOG) corrective regimen sliding scale   SubCutaneous every 4 hours  labetalol 200 milliGRAM(s) Oral every 8 hours  sodium bicarbonate 650 milliGRAM(s) Oral two times a day  valproic  acid Syrup 500 milliGRAM(s) Oral every 12 hours                  PHYSICAL EXAM:   General/ Neuro: Communicated with patient via  ID #[id number]- Dulce Castillo, lethargic, withdrawing from pain, GCS 8 (E2, V2, M4)  Respiratory: 1 L NC saturating well. Equal chest rise bilaterally, clear breath sounds b/l   Cardiovascular: RRR. Normotensive /40s, off cardene gtt   Abdomen: Soft, NT, ND. +BS   : Condom catheter in place            JACQUELIN CAI   [id number]   [birth year]  78yM    Admit Date: 12-16-23  Indication for SDU/SICU: Q1 neuro checks        ============================  24 Hour [id number] 12/19  NIGHT  -spoke with family beside translating, patient following commands intermittently, NG tube still in place            DAY:  -no seizures, stable mental status; go down to neuro checks q4  -wean to RA  -inc labetalol 200mg q8h; inc amlodipine to 10mg   -10 Labetalol x1 and hydralazine 10 x1  -inc lantus to 15u; FS >200  -b/l knee xray-> no FX, bilateral effusions  -family requesting speech/swallow, cs placed   -valproic acid level tonight   -cont holding plavix, start ASA 12/22  -F/U palliative   -per neurology: d/c EEG, 2 days prior to discharge call neurology so they can do a spot -EEG, lower valporic acid to 500 q12 from q8   -BCx prelim neg   -SLP: unable to assess 2/2 lethargy  -cardene gtt started   -d/c martinez > incontinent       [X] A ten-point review of systems was otherwise negative except as noted above.  [  ] Due to altered mental status/intubation, subjective information was not attained from the patient. History was obtained, to the extent possible, from review of the chart and collateral sources of information.    =========================================================================================================================================  Daily     Daily   Diet, NPO with Tube Feed:   Tube Feeding Modality: Nasogastric  Glucerna 1.2 Tonio  Total Volume for 24 Hours (mL): 1800  Continuous  Starting Tube Feed Rate mL per Hour: 75  Until Goal Tube Feed Rate (mL per Hour): 75  Tube Feed Duration (in Hours): 24  Tube Feed Start Time: 12:00  Tube Feed Stop Time: 06:00  Free Water Flush  Bolus   Total Volume per Flush (mL): 200   Frequency: Every 6 Hours (12-20-23 @ 11:11)    CURRENT MEDS:  Neurologic Medications  acetaminophen     Tablet .. 650 milliGRAM(s) Oral every 6 hours  valproic  acid Syrup 500 milliGRAM(s) Oral every 12 hours    Respiratory Medications  albuterol/ipratropium for Nebulization 3 milliLiter(s) Nebulizer every 6 hours    Cardiovascular Medications  amLODIPine   Tablet 10 milliGRAM(s) Oral daily  doxazosin 4 milliGRAM(s) Oral at bedtime  labetalol 200 milliGRAM(s) Oral every 8 hours    Gastrointestinal Medications  sodium bicarbonate 650 milliGRAM(s) Oral two times a day    Genitourinary Medications    Hematologic/Oncologic Medications  heparin   Injectable 5000 Unit(s) SubCutaneous every 8 hours    Antimicrobial/Immunologic Medications    Endocrine/Metabolic Medications  atorvastatin 20 milliGRAM(s) Oral at bedtime  insulin glargine Injectable (LANTUS) 15 Unit(s) SubCutaneous at bedtime  insulin lispro (ADMELOG) corrective regimen sliding scale   SubCutaneous every 4 hours    Topical/Other Medications  bacitracin   Ointment 1 Application(s) Topical every 12 hours  chlorhexidine 2% Cloths 1 Application(s) Topical <User Schedule>    ICU Vital Signs Last 24 Hrs  T(C): 37.7 (20 Dec 2023 12:00), Max: 38.4 (19 Dec 2023 16:00)  T(F): 99.9 (20 Dec 2023 12:00), Max: 101.1 (19 Dec 2023 16:00)  HR: 85 (20 Dec 2023 13:00) (81 - 107)  BP: 166/75 (20 Dec 2023 11:00) (129/58 - 187/77)  BP(mean): 108 (20 Dec 2023 11:00) (83 - 110)  ABP: 119/34 (20 Dec 2023 13:00) (119/34 - 178/49)  ABP(mean): 61 (20 Dec 2023 13:00) (61 - 81)  RR: 17 (20 Dec 2023 13:00) (16 - 30)  SpO2: 97% (20 Dec 2023 13:00) (97% - 100%)    O2 Parameters below as of 20 Dec 2023 13:00  Patient On (Oxygen Delivery Method): room air              I&O's Summary    19 Dec 2023 07:01  -  20 Dec 2023 07:00  --------------------------------------------------------  IN: 2015.5 mL / OUT: 1220 mL / NET: 795.5 mL    20 Dec 2023 07:01  -  20 Dec 2023 14:08  --------------------------------------------------------  IN: 234 mL / OUT: 620 mL / NET: -386 mL      I&O's Detail    19 Dec 2023 07:01  -  20 Dec 2023 07:00  --------------------------------------------------------  IN:    Glucerna: 1800 mL    Insulin: 16 mL    NiCARdipine: 12.5 mL    NiCARdipine: 187 mL  Total IN: 2015.5 mL    OUT:    Indwelling Catheter - Urethral (mL): 460 mL    Voided (mL): 760 mL  Total OUT: 1220 mL    Total NET: 795.5 mL      20 Dec 2023 07:01  -  20 Dec 2023 14:08  --------------------------------------------------------  IN:    Glucerna: 225 mL    Insulin: 9 mL  Total IN: 234 mL    OUT:    Voided (mL): 620 mL  Total OUT: 620 mL    Total NET: -386 mL              acetaminophen     Tablet .. 650 milliGRAM(s) Oral every 6 hours  albuterol/ipratropium for Nebulization 3 milliLiter(s) Nebulizer every 6 hours  amLODIPine   Tablet 10 milliGRAM(s) Oral daily  atorvastatin 20 milliGRAM(s) Oral at bedtime  bacitracin   Ointment 1 Application(s) Topical every 12 hours  chlorhexidine 2% Cloths 1 Application(s) Topical <User Schedule>  doxazosin 4 milliGRAM(s) Oral at bedtime  heparin   Injectable 5000 Unit(s) SubCutaneous every 8 hours  insulin glargine Injectable (LANTUS) 15 Unit(s) SubCutaneous at bedtime  insulin lispro (ADMELOG) corrective regimen sliding scale   SubCutaneous every 4 hours  labetalol 200 milliGRAM(s) Oral every 8 hours  sodium bicarbonate 650 milliGRAM(s) Oral two times a day  valproic  acid Syrup 500 milliGRAM(s) Oral every 12 hours                  PHYSICAL EXAM:   General/ Neuro: Communicated with patient via  ID #[id number]- Dulce Castillo, lethargic, withdrawing from pain, GCS 8 (E2, V2, M4)  Respiratory: 1 L NC saturating well. Equal chest rise bilaterally, clear breath sounds b/l   Cardiovascular: RRR. Normotensive /40s, off cardene gtt   Abdomen: Soft, NT, ND. +BS   : Condom catheter in place

## 2023-12-20 NOTE — PROGRESS NOTE ADULT - SUBJECTIVE AND OBJECTIVE BOX
CC:       HPI:"77-year-old male Cantonese speaking his presents with prior history of hypertension dyslipidemia diabetes BPH prior CVA on aspirin and Plavix who states he had a mechanical fall 2 days ago while getting out of the car fell backwards hit his head.  Afterwards he was able to ambulate.  But for the past 1 day family was unable to ambulate him.  He states that his lower extremities are painful to movement and weak.  Family denies any LOC.  They deny any confusion at home.  On exam oriented to person and place but just not to date.  He does follow simple commands.  Elevates his arms for 10 seconds.  Sensation intact in upper extremities.  Lower extremities with 2 out of 5 strength bilaterally."       (16 Dec 2023 00:01)    Interval history:  Discussed with patient's daughter using Cantonese .     PERTINENT PM/SXH:   HTN (hypertension)    Seasonal allergies    History of BPH    Diabetes      No significant past surgical history      FAMILY HISTORY:  No pertinent family history in first degree relatives      ITEMS NOT CHECKED ARE NOT PRESENT    SOCIAL HISTORY:   Significant other/partner [x ]  Children[x ]  Yazidism/Spirituality:  Substance hx:  [ ]   Tobacco hx:  [ ]   Alcohol hx: [ ]   Living Situation: [ x]Home  [ ]Long term care  [ ]Rehab [ ]Other  Home Services: [ ] HHA [ ] Visting RN [ ] Hospice  Occupation:  Home Opioid hx:  [ ] Y [ ] N [ ] I-Stop Reference No:     ADVANCE DIRECTIVES:     [ ] Full Code [ ] DNR  MOLST  [ ]  Living Will  [ ]   DECISION MAKER(s):  [ ] Health Care Proxy(s)  [ ] Surrogate(s)  [ ] Guardian           Name(s): Phone Number(s):   Wife Negro Fair [phone]  Dtr Rashi Bryantang - [phone]   BASELINE (I)ADL(s) (prior to admission):    [place]: [x ]Total  [ ] Moderate [ ]Dependent  Palliative Performance Status Version 2:         %    http://npcrc.org/files/news/palliative_performance_scale_[email or web address].pdf    Allergies    [This allergen will not trigger allergy alert] pollen (Unknown)  No Known Drug Allergies    Intolerances    MEDICATIONS  (STANDING):  acetaminophen     Tablet .. 650 milliGRAM(s) Oral every 6 hours  albuterol/ipratropium for Nebulization 3 milliLiter(s) Nebulizer every 6 hours  amLODIPine   Tablet 5 milliGRAM(s) Oral daily  atorvastatin 20 milliGRAM(s) Oral at bedtime  chlorhexidine 2% Cloths 1 Application(s) Topical <User Schedule>  heparin   Injectable 5000 Unit(s) SubCutaneous every 8 hours  insulin glargine Injectable (LANTUS) 10 Unit(s) SubCutaneous at bedtime  insulin lispro (ADMELOG) corrective regimen sliding scale   SubCutaneous every 6 hours  labetalol 100 milliGRAM(s) Oral every 8 hours  levETIRAcetam  Solution 500 milliGRAM(s) Oral two times a day  piperacillin/tazobactam IVPB.. 3.375 Gram(s) IV Intermittent every 12 hours  polyethylene glycol 3350 17 Gram(s) Oral daily  senna 2 Tablet(s) Oral at bedtime  sodium bicarbonate 650 milliGRAM(s) Oral two times a day  tamsulosin 0.8 milliGRAM(s) Oral at bedtime  valproic  acid Syrup 500 milliGRAM(s) Oral every 8 hours    MEDICATIONS  (PRN):    PRESENT SYMPTOMS: [x ]Unable to obtain due to poor mentation   Source if other than patient:  [ ]Family   [ ]Team     Pain: [ ]yes [ ]no  QOL impact -   Location -                    Aggravating factors -  Quality -  Radiation -  Timing-  Severity (0-10 scale):  Minimal acceptable level (0-10 scale):     CPOT:  2  https://www.sccm.org/getattachment/upj31y09-0a0l-0h2e-0b0f-6405d4812c0m/Critical-Care-Pain-Observation-Tool-(CPOT)    PAIN AD Score:   http://[email or web address].[email or web address]/cog/painad.pdf (press ctrl +  left click to view)    Dyspnea:                           [ ]None[ ]Mild [ ]Moderate [ ]Severe     Respiratory Distress Observation Scale (RDOS): 1  A score of 0 to 2 signifies little or no respiratory distress, 3 signifies mild distress, scores 4 to 6 indicate moderate distress, and scores greater than 7 signify severe distress  [email or web address]    Anxiety:                             [ ]None[ ]Mild [ ]Moderate [ ]Severe   Fatigue:                             [ ]None[ ]Mild [ ]Moderate [ ]Severe   Nausea:                             [ ]None[ ]Mild [ ]Moderate [ ]Severe   Loss of appetite:              [ ]None[ ]Mild [ ]Moderate [ ]Severe   Constipation:                    [ ]None[ ]Mild [ ]Moderate [ ]Severe    Other Symptoms:  [ ]All other review of systems negative     Palliative Performance Status Version 2:         %    [email or web address]_performance_scale_[email or web address].pdf    PHYSICAL EXAM:  ICU Vital Signs Last 24 Hrs  T(C): 37.9 (20 Dec 2023 16:00), Max: 38.3 (19 Dec 2023 17:00)  T(F): 100.3 (20 Dec 2023 16:00), Max: 101 (19 Dec 2023 17:00)  HR: 86 (20 Dec 2023 16:00) (80 - 105)  BP: 166/75 (20 Dec 2023 11:00) (129/58 - 173/71)  BP(mean): 108 (20 Dec 2023 11:00) (83 - 108)  ABP: 124/38 (20 Dec 2023 16:00) (107/36 - 178/49)  ABP(mean): 64 (20 Dec 2023 16:00) (60 - 81)  RR: 20 (20 Dec 2023 16:00) (16 - 30)  SpO2: 98% (20 Dec 2023 16:00) (97% - 100%)    O2 Parameters below as of 20 Dec 2023 16:00  Patient On (Oxygen Delivery Method): room air        GENERAL:  [x ] No acute distress [ ]Lethargic  [ ]Unarousable  [ ]Verbal  [x ]Non-Verbal [ ]Cachexia    BEHAVIORAL/PSYCH:  [ ]Alert and Oriented x  [ ] Anxiety [ ] Delirium [ ] Agitation [ ] Calm   EYES: [ ] No scleral icterus [ ] Scleral icterus [x ] Closed  ENMT:  [ ]Dry mouth  [ ]No external oral lesions [ ] No external ear or nose lesions  CARDIOVASCULAR:  [ ]Regular [ ]Irregular [ ]Tachy [ ]Not Tachy  [ ]Nikita [ ] Edema [ ] No edema  PULMONARY:  [ ]Tachypnea  [ x]Audible excessive secretions [ x] No labored breathing [ ] labored breathing  GASTROINTESTINAL: [ ]Soft  [ ]Distended  [ ]Not distended [ ]Non tender [ ]Tender  MUSCULOSKELETAL: [ ]No clubbing [ ] clubbing  [ x] No cyanosis [ ] cyanosis  NEUROLOGIC: [ ]No focal deficits  [ ]Follows commands  [x ]Does not follow commands  [ ]Cognitive impairment  [ ]Dysphagia  [ ]Dysarthria  [ ]Paresis   SKIN: [ ] Jaundiced [ ] Non-jaundiced [ ]Rash [ ]No Rash [ ] Warm [ ] Dry  MISC/LINES: [ ] ET tube [ ] Trach [x ]NGT/OGT [ ]PEG [x ]Aguirre    LABS: reviewed by me               RADIOLOGY & ADDITIONAL STUDIES: reviewed by me    EKG: reviewed by me        Palliative Care Spiritual/Emotional Screening Tool Question  Severity (0-4):      0              OR                    [ ] Unable to determine/NA  Score of 2 or greater indicates recommendation of Chaplaincy referral  Chaplaincy Referral: [ ] Yes [ ] Refused [ ] Following     Caregiver [place]:  [ ] Yes [ ] No [ x] Deferred  Social Work Referral [ ]  Patient and Family Centered Care Referral [ ]    Anticipatory Grief Present: [ ] Yes [ ] No [x ] Deferred  Social Work Referral [ ]  Patient and Family Centered Care Referral [ ]    Patient discussed with primary medical team MD  Palliative care education provided to patient and/or family   CC:       HPI:"77-year-old male Cantonese speaking his presents with prior history of hypertension dyslipidemia diabetes BPH prior CVA on aspirin and Plavix who states he had a mechanical fall 2 days ago while getting out of the car fell backwards hit his head.  Afterwards he was able to ambulate.  But for the past 1 day family was unable to ambulate him.  He states that his lower extremities are painful to movement and weak.  Family denies any LOC.  They deny any confusion at home.  On exam oriented to person and place but just not to date.  He does follow simple commands.  Elevates his arms for 10 seconds.  Sensation intact in upper extremities.  Lower extremities with 2 out of 5 strength bilaterally."       (16 Dec 2023 00:01)    Interval history:  Discussed with patient's daughter using Cantonese .     PERTINENT PM/SXH:   HTN (hypertension)    Seasonal allergies    History of BPH    Diabetes      No significant past surgical history      FAMILY HISTORY:  No pertinent family history in first degree relatives      ITEMS NOT CHECKED ARE NOT PRESENT    SOCIAL HISTORY:   Significant other/partner [x ]  Children[x ]  Jehovah's witness/Spirituality:  Substance hx:  [ ]   Tobacco hx:  [ ]   Alcohol hx: [ ]   Living Situation: [ x]Home  [ ]Long term care  [ ]Rehab [ ]Other  Home Services: [ ] HHA [ ] Visting RN [ ] Hospice  Occupation:  Home Opioid hx:  [ ] Y [ ] N [ ] I-Stop Reference No:     ADVANCE DIRECTIVES:     [ ] Full Code [ ] DNR  MOLST  [ ]  Living Will  [ ]   DECISION MAKER(s):  [ ] Health Care Proxy(s)  [ ] Surrogate(s)  [ ] Guardian           Name(s): Phone Number(s):   Wife Negro Fair [phone]  Dtr Rashi Bryantang - [phone]   BASELINE (I)ADL(s) (prior to admission):    [place]: [x ]Total  [ ] Moderate [ ]Dependent  Palliative Performance Status Version 2:         %    http://npcrc.org/files/news/palliative_performance_scale_[email or web address].pdf    Allergies    [This allergen will not trigger allergy alert] pollen (Unknown)  No Known Drug Allergies    Intolerances    MEDICATIONS  (STANDING):  acetaminophen     Tablet .. 650 milliGRAM(s) Oral every 6 hours  albuterol/ipratropium for Nebulization 3 milliLiter(s) Nebulizer every 6 hours  amLODIPine   Tablet 5 milliGRAM(s) Oral daily  atorvastatin 20 milliGRAM(s) Oral at bedtime  chlorhexidine 2% Cloths 1 Application(s) Topical <User Schedule>  heparin   Injectable 5000 Unit(s) SubCutaneous every 8 hours  insulin glargine Injectable (LANTUS) 10 Unit(s) SubCutaneous at bedtime  insulin lispro (ADMELOG) corrective regimen sliding scale   SubCutaneous every 6 hours  labetalol 100 milliGRAM(s) Oral every 8 hours  levETIRAcetam  Solution 500 milliGRAM(s) Oral two times a day  piperacillin/tazobactam IVPB.. 3.375 Gram(s) IV Intermittent every 12 hours  polyethylene glycol 3350 17 Gram(s) Oral daily  senna 2 Tablet(s) Oral at bedtime  sodium bicarbonate 650 milliGRAM(s) Oral two times a day  tamsulosin 0.8 milliGRAM(s) Oral at bedtime  valproic  acid Syrup 500 milliGRAM(s) Oral every 8 hours    MEDICATIONS  (PRN):    PRESENT SYMPTOMS: [x ]Unable to obtain due to poor mentation   Source if other than patient:  [ ]Family   [ ]Team     Pain: [ ]yes [ ]no  QOL impact -   Location -                    Aggravating factors -  Quality -  Radiation -  Timing-  Severity (0-10 scale):  Minimal acceptable level (0-10 scale):     CPOT:  2  https://www.sccm.org/getattachment/kse71f17-6m0a-3y9p-0h3l-1323i4061w9l/Critical-Care-Pain-Observation-Tool-(CPOT)    PAIN AD Score:   http://geriatrictoolkit.[place].[facility]/cog/painad.pdf (press ctrl +  left click to view)    Dyspnea:                           [ ]None[ ]Mild [ ]Moderate [ ]Severe     Respiratory Distress Observation Scale (RDOS): 1  A score of 0 to 2 signifies little or no respiratory distress, 3 signifies mild distress, scores 4 to 6 indicate moderate distress, and scores greater than 7 signify severe distress  [email or web address]/sites/default/[email or web address]    Anxiety:                             [ ]None[ ]Mild [ ]Moderate [ ]Severe   Fatigue:                             [ ]None[ ]Mild [ ]Moderate [ ]Severe   Nausea:                             [ ]None[ ]Mild [ ]Moderate [ ]Severe   Loss of appetite:              [ ]None[ ]Mild [ ]Moderate [ ]Severe   Constipation:                    [ ]None[ ]Mild [ ]Moderate [ ]Severe    Other Symptoms:  [ ]All other review of systems negative     Palliative Performance Status Version 2:         %    [email or web address]://[email or web address]/files/news/palliative_performance_scale_[email or web address].pdf    PHYSICAL EXAM:  ICU Vital Signs Last 24 Hrs  T(C): 37.9 (20 Dec 2023 16:00), Max: 38.3 (19 Dec 2023 17:00)  T(F): 100.3 (20 Dec 2023 16:00), Max: 101 (19 Dec 2023 17:00)  HR: 86 (20 Dec 2023 16:00) (80 - 105)  BP: 166/75 (20 Dec 2023 11:00) (129/58 - 173/71)  BP(mean): 108 (20 Dec 2023 11:00) (83 - 108)  ABP: 124/38 (20 Dec 2023 16:00) (107/36 - 178/49)  ABP(mean): 64 (20 Dec 2023 16:00) (60 - 81)  RR: 20 (20 Dec 2023 16:00) (16 - 30)  SpO2: 98% (20 Dec 2023 16:00) (97% - 100%)    O2 Parameters below as of 20 Dec 2023 16:00  Patient On (Oxygen Delivery Method): room air        GENERAL:  [x ] No acute distress [ ]Lethargic  [ ]Unarousable  [ ]Verbal  [x ]Non-Verbal [ ]Cachexia    BEHAVIORAL/PSYCH:  [ ]Alert and Oriented x  [ ] Anxiety [ ] Delirium [ ] Agitation [ ] Calm   EYES: [ ] No scleral icterus [ ] Scleral icterus [x ] Closed  ENMT:  [ ]Dry mouth  [ ]No external oral lesions [ ] No external ear or nose lesions  CARDIOVASCULAR:  [ ]Regular [ ]Irregular [ ]Tachy [ ]Not Tachy  [ ]Nikita [ ] Edema [ ] No edema  PULMONARY:  [ ]Tachypnea  [ x]Audible excessive secretions [ x] No labored breathing [ ] labored breathing  GASTROINTESTINAL: [ ]Soft  [ ]Distended  [ ]Not distended [ ]Non tender [ ]Tender  MUSCULOSKELETAL: [ ]No clubbing [ ] clubbing  [ x] No cyanosis [ ] cyanosis  NEUROLOGIC: [ ]No focal deficits  [ ]Follows commands  [x ]Does not follow commands  [ ]Cognitive impairment  [ ]Dysphagia  [ ]Dysarthria  [ ]Paresis   SKIN: [ ] Jaundiced [ ] Non-jaundiced [ ]Rash [ ]No Rash [ ] Warm [ ] Dry  MISC/LINES: [ ] ET tube [ ] Trach [x ]NGT/OGT [ ]PEG [x ]Aguirre    LABS: reviewed by me               RADIOLOGY & ADDITIONAL STUDIES: reviewed by me    EKG: reviewed by me        Palliative Care Spiritual/Emotional Screening Tool Question  Severity (0-4):      0              OR                    [ ] Unable to determine/NA  Score of 2 or greater indicates recommendation of Chaplaincy referral  Chaplaincy Referral: [ ] Yes [ ] Refused [ ] Following     Caregiver [place]:  [ ] Yes [ ] No [ x] Deferred  Social Work Referral [ ]  Patient and Family Centered Care Referral [ ]    Anticipatory Grief Present: [ ] Yes [ ] No [x ] Deferred  Social Work Referral [ ]  Patient and Family Centered Care Referral [ ]    Patient discussed with primary medical team MD  Palliative care education provided to patient and/or family

## 2023-12-20 NOTE — PROGRESS NOTE ADULT - ASSESSMENT
78y Male being evaluated for goals of care and symptom management r/t fall w SDH. Pt in SICU and on EEG monitoring. Pt overall prognosis is guarded, ongoing acute management with concern that he might need PEG.     12/20: Discussed with patient's daughter using Cantonese . She reports being updated by ICU team. She hopes that patient's mental status will continue to improve and that he will be able to eat on his own. Discussed what we should do if patient ever becomes more sick. She has had discussions with her mother (patient's spouse). They had decided on full code. She was not aware of risks of CPR (eg. broken ribs, limited success rate). She will discuss this more with her mother. They would want him to be intubated if his life depended on it to see if he would be able to recover.    Plan:  - symptoms per primary team  - ongoing [facility] discussions  - full code    Please call [phone] with questions or concerns 24/7.   We will continue to follow.    78y Male being evaluated for goals of care and symptom management r/t fall w SDH. Pt in SICU and on EEG monitoring. Pt overall prognosis is guarded, ongoing acute management with concern that he might need PEG.     12/20: Discussed with patient's daughter using Cantonese . She reports being updated by ICU team. She hopes that patient's mental status will continue to improve and that he will be able to eat on his own. Discussed what we should do if patient ever becomes more sick. She has had discussions with her mother (patient's spouse). They had decided on full code. She was not aware of risks of CPR (eg. broken ribs, limited success rate). She will discuss this more with her mother. They would want him to be intubated if his life depended on it to see if he would be able to recover.    Plan:  - symptoms per primary team  - ongoing [facility] discussions  - full code    Please call [phone] with questions or concerns 24/7.   We will continue to follow.

## 2023-12-21 DIAGNOSIS — J96.01 ACUTE RESPIRATORY FAILURE WITH HYPOXIA: ICD-10-CM

## 2023-12-21 LAB
ALBUMIN SERPL ELPH-MCNC: 2.6 G/DL — LOW (ref 3.5–5.2)
ALP SERPL-CCNC: 76 U/L — SIGNIFICANT CHANGE UP (ref 30–115)
ALP SERPL-CCNC: 76 U/L — SIGNIFICANT CHANGE UP (ref 30–115)
ALT FLD-CCNC: 50 U/L — HIGH (ref 0–41)
ALT FLD-CCNC: 50 U/L — HIGH (ref 0–41)
ANION GAP SERPL CALC-SCNC: 10 MMOL/L — SIGNIFICANT CHANGE UP (ref 7–14)
ANION GAP SERPL CALC-SCNC: 10 MMOL/L — SIGNIFICANT CHANGE UP (ref 7–14)
ANION GAP SERPL CALC-SCNC: 12 MMOL/L — SIGNIFICANT CHANGE UP (ref 7–14)
ANION GAP SERPL CALC-SCNC: 14 MMOL/L — SIGNIFICANT CHANGE UP (ref 7–14)
ANION GAP SERPL CALC-SCNC: 14 MMOL/L — SIGNIFICANT CHANGE UP (ref 7–14)
APTT BLD: 42.5 SEC — HIGH (ref 27–39.2)
APTT BLD: 42.5 SEC — HIGH (ref 27–39.2)
AST SERPL-CCNC: 61 U/L — HIGH (ref 0–41)
AST SERPL-CCNC: 61 U/L — HIGH (ref 0–41)
BASOPHILS # BLD AUTO: 0 K/UL — SIGNIFICANT CHANGE UP (ref 0–0.2)
BASOPHILS # BLD AUTO: 0 K/UL — SIGNIFICANT CHANGE UP (ref 0–0.2)
BASOPHILS NFR BLD AUTO: 0 % — SIGNIFICANT CHANGE UP (ref 0–1)
BASOPHILS NFR BLD AUTO: 0 % — SIGNIFICANT CHANGE UP (ref 0–1)
BILIRUB SERPL-MCNC: <0.2 MG/DL — SIGNIFICANT CHANGE UP (ref 0.2–1.2)
BILIRUB SERPL-MCNC: <0.2 MG/DL — SIGNIFICANT CHANGE UP (ref 0.2–1.2)
BUN SERPL-MCNC: 56 MG/DL — HIGH (ref 10–20)
BUN SERPL-MCNC: 60 MG/DL — HIGH (ref 10–20)
BUN SERPL-MCNC: 60 MG/DL — HIGH (ref 10–20)
BUN SERPL-MCNC: 61 MG/DL — CRITICAL HIGH (ref 10–20)
BUN SERPL-MCNC: 61 MG/DL — CRITICAL HIGH (ref 10–20)
CALCIUM SERPL-MCNC: 7.9 MG/DL — LOW (ref 8.4–10.4)
CALCIUM SERPL-MCNC: 7.9 MG/DL — LOW (ref 8.4–10.4)
CALCIUM SERPL-MCNC: 8.1 MG/DL — LOW (ref 8.4–10.4)
CALCIUM SERPL-MCNC: 8.1 MG/DL — LOW (ref 8.4–10.4)
CALCIUM SERPL-MCNC: 8.3 MG/DL — LOW (ref 8.4–10.5)
CALCIUM SERPL-MCNC: 8.3 MG/DL — LOW (ref 8.4–10.5)
CALCIUM SERPL-MCNC: 8.9 MG/DL — SIGNIFICANT CHANGE UP (ref 8.4–10.5)
CALCIUM SERPL-MCNC: 8.9 MG/DL — SIGNIFICANT CHANGE UP (ref 8.4–10.5)
CHLORIDE SERPL-SCNC: 115 MMOL/L — HIGH (ref 98–110)
CHLORIDE SERPL-SCNC: 115 MMOL/L — HIGH (ref 98–110)
CHLORIDE SERPL-SCNC: 118 MMOL/L — HIGH (ref 98–110)
CHLORIDE SERPL-SCNC: 121 MMOL/L — HIGH (ref 98–110)
CHLORIDE SERPL-SCNC: 121 MMOL/L — HIGH (ref 98–110)
CO2 SERPL-SCNC: 22 MMOL/L — SIGNIFICANT CHANGE UP (ref 17–32)
CO2 SERPL-SCNC: 23 MMOL/L — SIGNIFICANT CHANGE UP (ref 17–32)
CO2 SERPL-SCNC: 23 MMOL/L — SIGNIFICANT CHANGE UP (ref 17–32)
CO2 SERPL-SCNC: 24 MMOL/L — SIGNIFICANT CHANGE UP (ref 17–32)
CO2 SERPL-SCNC: 24 MMOL/L — SIGNIFICANT CHANGE UP (ref 17–32)
CREAT SERPL-MCNC: 2.7 MG/DL — HIGH (ref 0.7–1.5)
CREAT SERPL-MCNC: 2.7 MG/DL — HIGH (ref 0.7–1.5)
CREAT SERPL-MCNC: 2.8 MG/DL — HIGH (ref 0.7–1.5)
EGFR: 22 ML/MIN/1.73M2 — LOW
EGFR: 23 ML/MIN/1.73M2 — LOW
EGFR: 23 ML/MIN/1.73M2 — LOW
EOSINOPHIL # BLD AUTO: 0.17 K/UL — SIGNIFICANT CHANGE UP (ref 0–0.7)
EOSINOPHIL # BLD AUTO: 0.17 K/UL — SIGNIFICANT CHANGE UP (ref 0–0.7)
EOSINOPHIL NFR BLD AUTO: 1.8 % — SIGNIFICANT CHANGE UP (ref 0–8)
EOSINOPHIL NFR BLD AUTO: 1.8 % — SIGNIFICANT CHANGE UP (ref 0–8)
GAS PNL BLDA: SIGNIFICANT CHANGE UP
GLUCOSE SERPL-MCNC: 181 MG/DL — HIGH (ref 70–99)
GLUCOSE SERPL-MCNC: 181 MG/DL — HIGH (ref 70–99)
GLUCOSE SERPL-MCNC: 190 MG/DL — HIGH (ref 70–99)
GLUCOSE SERPL-MCNC: 190 MG/DL — HIGH (ref 70–99)
GLUCOSE SERPL-MCNC: 193 MG/DL — HIGH (ref 70–99)
GLUCOSE SERPL-MCNC: 193 MG/DL — HIGH (ref 70–99)
GLUCOSE SERPL-MCNC: 215 MG/DL — HIGH (ref 70–99)
GLUCOSE SERPL-MCNC: 215 MG/DL — HIGH (ref 70–99)
GRAM STN FLD: ABNORMAL
GRAM STN FLD: ABNORMAL
HCT VFR BLD CALC: 26 % — LOW (ref 42–52)
HCT VFR BLD CALC: 26 % — LOW (ref 42–52)
HCT VFR BLD CALC: 26.4 % — LOW (ref 42–52)
HCT VFR BLD CALC: 26.4 % — LOW (ref 42–52)
HCT VFR BLD CALC: 27 % — LOW (ref 42–52)
HCT VFR BLD CALC: 27 % — LOW (ref 42–52)
HGB BLD-MCNC: 8.1 G/DL — LOW (ref 14–18)
HGB BLD-MCNC: 8.1 G/DL — LOW (ref 14–18)
HGB BLD-MCNC: 8.4 G/DL — LOW (ref 14–18)
HGB BLD-MCNC: 8.4 G/DL — LOW (ref 14–18)
HGB BLD-MCNC: 8.8 G/DL — LOW (ref 14–18)
HGB BLD-MCNC: 8.8 G/DL — LOW (ref 14–18)
INR BLD: 1.04 RATIO — SIGNIFICANT CHANGE UP (ref 0.65–1.3)
INR BLD: 1.04 RATIO — SIGNIFICANT CHANGE UP (ref 0.65–1.3)
LYMPHOCYTES # BLD AUTO: 0.85 K/UL — LOW (ref 1.2–3.4)
LYMPHOCYTES # BLD AUTO: 0.85 K/UL — LOW (ref 1.2–3.4)
LYMPHOCYTES # BLD AUTO: 8.8 % — LOW (ref 20.5–51.1)
LYMPHOCYTES # BLD AUTO: 8.8 % — LOW (ref 20.5–51.1)
MAGNESIUM SERPL-MCNC: 2.7 MG/DL — HIGH (ref 1.8–2.4)
MAGNESIUM SERPL-MCNC: 2.7 MG/DL — HIGH (ref 1.8–2.4)
MAGNESIUM SERPL-MCNC: 2.8 MG/DL — HIGH (ref 1.8–2.4)
MAGNESIUM SERPL-MCNC: 2.8 MG/DL — HIGH (ref 1.8–2.4)
MCHC RBC-ENTMCNC: 28.7 PG — SIGNIFICANT CHANGE UP (ref 27–31)
MCHC RBC-ENTMCNC: 28.7 PG — SIGNIFICANT CHANGE UP (ref 27–31)
MCHC RBC-ENTMCNC: 29 PG — SIGNIFICANT CHANGE UP (ref 27–31)
MCHC RBC-ENTMCNC: 29 PG — SIGNIFICANT CHANGE UP (ref 27–31)
MCHC RBC-ENTMCNC: 29.6 PG — SIGNIFICANT CHANGE UP (ref 27–31)
MCHC RBC-ENTMCNC: 29.6 PG — SIGNIFICANT CHANGE UP (ref 27–31)
MCHC RBC-ENTMCNC: 31.2 G/DL — LOW (ref 32–37)
MCHC RBC-ENTMCNC: 31.2 G/DL — LOW (ref 32–37)
MCHC RBC-ENTMCNC: 31.8 G/DL — LOW (ref 32–37)
MCHC RBC-ENTMCNC: 31.8 G/DL — LOW (ref 32–37)
MCHC RBC-ENTMCNC: 32.6 G/DL — SIGNIFICANT CHANGE UP (ref 32–37)
MCHC RBC-ENTMCNC: 32.6 G/DL — SIGNIFICANT CHANGE UP (ref 32–37)
MCV RBC AUTO: 90.9 FL — SIGNIFICANT CHANGE UP (ref 80–94)
MCV RBC AUTO: 90.9 FL — SIGNIFICANT CHANGE UP (ref 80–94)
MCV RBC AUTO: 91 FL — SIGNIFICANT CHANGE UP (ref 80–94)
MCV RBC AUTO: 91 FL — SIGNIFICANT CHANGE UP (ref 80–94)
MCV RBC AUTO: 92.2 FL — SIGNIFICANT CHANGE UP (ref 80–94)
MCV RBC AUTO: 92.2 FL — SIGNIFICANT CHANGE UP (ref 80–94)
MONOCYTES # BLD AUTO: 0.6 K/UL — SIGNIFICANT CHANGE UP (ref 0.1–0.6)
MONOCYTES # BLD AUTO: 0.6 K/UL — SIGNIFICANT CHANGE UP (ref 0.1–0.6)
MONOCYTES NFR BLD AUTO: 6.2 % — SIGNIFICANT CHANGE UP (ref 1.7–9.3)
MONOCYTES NFR BLD AUTO: 6.2 % — SIGNIFICANT CHANGE UP (ref 1.7–9.3)
NEUTROPHILS # BLD AUTO: 7.8 K/UL — HIGH (ref 1.4–6.5)
NEUTROPHILS # BLD AUTO: 7.8 K/UL — HIGH (ref 1.4–6.5)
NEUTROPHILS NFR BLD AUTO: 80.5 % — HIGH (ref 42.2–75.2)
NEUTROPHILS NFR BLD AUTO: 80.5 % — HIGH (ref 42.2–75.2)
NRBC # BLD: 0 /100 WBCS — SIGNIFICANT CHANGE UP (ref 0–0)
PHOSPHATE SERPL-MCNC: 4.1 MG/DL — SIGNIFICANT CHANGE UP (ref 2.1–4.9)
PHOSPHATE SERPL-MCNC: 4.1 MG/DL — SIGNIFICANT CHANGE UP (ref 2.1–4.9)
PHOSPHATE SERPL-MCNC: 4.7 MG/DL — SIGNIFICANT CHANGE UP (ref 2.1–4.9)
PHOSPHATE SERPL-MCNC: 4.7 MG/DL — SIGNIFICANT CHANGE UP (ref 2.1–4.9)
PLATELET # BLD AUTO: 267 K/UL — SIGNIFICANT CHANGE UP (ref 130–400)
PLATELET # BLD AUTO: 267 K/UL — SIGNIFICANT CHANGE UP (ref 130–400)
PLATELET # BLD AUTO: 285 K/UL — SIGNIFICANT CHANGE UP (ref 130–400)
PLATELET # BLD AUTO: 285 K/UL — SIGNIFICANT CHANGE UP (ref 130–400)
PLATELET # BLD AUTO: 286 K/UL — SIGNIFICANT CHANGE UP (ref 130–400)
PLATELET # BLD AUTO: 286 K/UL — SIGNIFICANT CHANGE UP (ref 130–400)
PMV BLD: 8.6 FL — SIGNIFICANT CHANGE UP (ref 7.4–10.4)
PMV BLD: 8.6 FL — SIGNIFICANT CHANGE UP (ref 7.4–10.4)
PMV BLD: 8.9 FL — SIGNIFICANT CHANGE UP (ref 7.4–10.4)
PMV BLD: 8.9 FL — SIGNIFICANT CHANGE UP (ref 7.4–10.4)
PMV BLD: 9 FL — SIGNIFICANT CHANGE UP (ref 7.4–10.4)
PMV BLD: 9 FL — SIGNIFICANT CHANGE UP (ref 7.4–10.4)
POTASSIUM SERPL-MCNC: 4 MMOL/L — SIGNIFICANT CHANGE UP (ref 3.5–5)
POTASSIUM SERPL-MCNC: 4 MMOL/L — SIGNIFICANT CHANGE UP (ref 3.5–5)
POTASSIUM SERPL-MCNC: 4.2 MMOL/L — SIGNIFICANT CHANGE UP (ref 3.5–5)
POTASSIUM SERPL-MCNC: 4.2 MMOL/L — SIGNIFICANT CHANGE UP (ref 3.5–5)
POTASSIUM SERPL-MCNC: 4.6 MMOL/L — SIGNIFICANT CHANGE UP (ref 3.5–5)
POTASSIUM SERPL-SCNC: 4 MMOL/L — SIGNIFICANT CHANGE UP (ref 3.5–5)
POTASSIUM SERPL-SCNC: 4 MMOL/L — SIGNIFICANT CHANGE UP (ref 3.5–5)
POTASSIUM SERPL-SCNC: 4.2 MMOL/L — SIGNIFICANT CHANGE UP (ref 3.5–5)
POTASSIUM SERPL-SCNC: 4.2 MMOL/L — SIGNIFICANT CHANGE UP (ref 3.5–5)
POTASSIUM SERPL-SCNC: 4.6 MMOL/L — SIGNIFICANT CHANGE UP (ref 3.5–5)
PROT SERPL-MCNC: 5.1 G/DL — LOW (ref 6–8)
PROT SERPL-MCNC: 5.1 G/DL — LOW (ref 6–8)
PROTHROM AB SERPL-ACNC: 11.9 SEC — SIGNIFICANT CHANGE UP (ref 9.95–12.87)
PROTHROM AB SERPL-ACNC: 11.9 SEC — SIGNIFICANT CHANGE UP (ref 9.95–12.87)
RBC # BLD: 2.82 M/UL — LOW (ref 4.7–6.1)
RBC # BLD: 2.82 M/UL — LOW (ref 4.7–6.1)
RBC # BLD: 2.9 M/UL — LOW (ref 4.7–6.1)
RBC # BLD: 2.9 M/UL — LOW (ref 4.7–6.1)
RBC # BLD: 2.97 M/UL — LOW (ref 4.7–6.1)
RBC # BLD: 2.97 M/UL — LOW (ref 4.7–6.1)
RBC # FLD: 14.2 % — SIGNIFICANT CHANGE UP (ref 11.5–14.5)
RBC # FLD: 14.2 % — SIGNIFICANT CHANGE UP (ref 11.5–14.5)
RBC # FLD: 14.3 % — SIGNIFICANT CHANGE UP (ref 11.5–14.5)
RBC # FLD: 14.3 % — SIGNIFICANT CHANGE UP (ref 11.5–14.5)
RBC # FLD: 14.5 % — SIGNIFICANT CHANGE UP (ref 11.5–14.5)
RBC # FLD: 14.5 % — SIGNIFICANT CHANGE UP (ref 11.5–14.5)
SODIUM SERPL-SCNC: 149 MMOL/L — HIGH (ref 135–146)
SODIUM SERPL-SCNC: 149 MMOL/L — HIGH (ref 135–146)
SODIUM SERPL-SCNC: 152 MMOL/L — HIGH (ref 135–146)
SODIUM SERPL-SCNC: 152 MMOL/L — HIGH (ref 135–146)
SODIUM SERPL-SCNC: 153 MMOL/L — HIGH (ref 135–146)
SODIUM SERPL-SCNC: 153 MMOL/L — HIGH (ref 135–146)
SODIUM SERPL-SCNC: 157 MMOL/L — HIGH (ref 135–146)
SODIUM SERPL-SCNC: 157 MMOL/L — HIGH (ref 135–146)
SPECIMEN SOURCE: SIGNIFICANT CHANGE UP
SPECIMEN SOURCE: SIGNIFICANT CHANGE UP
VALPROATE SERPL-MCNC: 34 UG/ML — LOW (ref 50–100)
VALPROATE SERPL-MCNC: 34 UG/ML — LOW (ref 50–100)
WBC # BLD: 8.41 K/UL — SIGNIFICANT CHANGE UP (ref 4.8–10.8)
WBC # BLD: 8.41 K/UL — SIGNIFICANT CHANGE UP (ref 4.8–10.8)
WBC # BLD: 9.02 K/UL — SIGNIFICANT CHANGE UP (ref 4.8–10.8)
WBC # BLD: 9.02 K/UL — SIGNIFICANT CHANGE UP (ref 4.8–10.8)
WBC # BLD: 9.69 K/UL — SIGNIFICANT CHANGE UP (ref 4.8–10.8)
WBC # BLD: 9.69 K/UL — SIGNIFICANT CHANGE UP (ref 4.8–10.8)
WBC # FLD AUTO: 8.41 K/UL — SIGNIFICANT CHANGE UP (ref 4.8–10.8)
WBC # FLD AUTO: 8.41 K/UL — SIGNIFICANT CHANGE UP (ref 4.8–10.8)
WBC # FLD AUTO: 9.02 K/UL — SIGNIFICANT CHANGE UP (ref 4.8–10.8)
WBC # FLD AUTO: 9.02 K/UL — SIGNIFICANT CHANGE UP (ref 4.8–10.8)
WBC # FLD AUTO: 9.69 K/UL — SIGNIFICANT CHANGE UP (ref 4.8–10.8)
WBC # FLD AUTO: 9.69 K/UL — SIGNIFICANT CHANGE UP (ref 4.8–10.8)

## 2023-12-21 PROCEDURE — 71045 X-RAY EXAM CHEST 1 VIEW: CPT | Mod: 26

## 2023-12-21 PROCEDURE — 99233 SBSQ HOSP IP/OBS HIGH 50: CPT

## 2023-12-21 PROCEDURE — 99291 CRITICAL CARE FIRST HOUR: CPT

## 2023-12-21 RX ORDER — ACETAMINOPHEN 500 MG
1000 TABLET ORAL ONCE
Refills: 0 | Status: COMPLETED | OUTPATIENT
Start: 2023-12-21 | End: 2023-12-21

## 2023-12-21 RX ORDER — PIPERACILLIN AND TAZOBACTAM 4; .5 G/20ML; G/20ML
3.38 INJECTION, POWDER, LYOPHILIZED, FOR SOLUTION INTRAVENOUS ONCE
Refills: 0 | Status: COMPLETED | OUTPATIENT
Start: 2023-12-22 | End: 2023-12-22

## 2023-12-21 RX ORDER — SODIUM CHLORIDE 9 MG/ML
250 INJECTION, SOLUTION INTRAVENOUS ONCE
Refills: 0 | Status: COMPLETED | OUTPATIENT
Start: 2023-12-21 | End: 2023-12-21

## 2023-12-21 RX ORDER — CHLORHEXIDINE GLUCONATE 213 G/1000ML
15 SOLUTION TOPICAL
Refills: 0 | Status: DISCONTINUED | OUTPATIENT
Start: 2023-12-21 | End: 2024-01-12

## 2023-12-21 RX ORDER — VANCOMYCIN HCL 1 G
750 VIAL (EA) INTRAVENOUS ONCE
Refills: 0 | Status: COMPLETED | OUTPATIENT
Start: 2023-12-21 | End: 2023-12-21

## 2023-12-21 RX ORDER — DEXMEDETOMIDINE HYDROCHLORIDE IN 0.9% SODIUM CHLORIDE 4 UG/ML
0.3 INJECTION INTRAVENOUS
Qty: 400 | Refills: 0 | Status: DISCONTINUED | OUTPATIENT
Start: 2023-12-21 | End: 2023-12-24

## 2023-12-21 RX ORDER — PIPERACILLIN AND TAZOBACTAM 4; .5 G/20ML; G/20ML
3.38 INJECTION, POWDER, LYOPHILIZED, FOR SOLUTION INTRAVENOUS ONCE
Refills: 0 | Status: DISCONTINUED | OUTPATIENT
Start: 2023-12-21 | End: 2023-12-21

## 2023-12-21 RX ORDER — PIPERACILLIN AND TAZOBACTAM 4; .5 G/20ML; G/20ML
3.38 INJECTION, POWDER, LYOPHILIZED, FOR SOLUTION INTRAVENOUS EVERY 8 HOURS
Refills: 0 | Status: DISCONTINUED | OUTPATIENT
Start: 2023-12-22 | End: 2023-12-24

## 2023-12-21 RX ORDER — SODIUM CHLORIDE 9 MG/ML
1000 INJECTION, SOLUTION INTRAVENOUS
Refills: 0 | Status: DISCONTINUED | OUTPATIENT
Start: 2023-12-21 | End: 2023-12-23

## 2023-12-21 RX ORDER — MIDAZOLAM HYDROCHLORIDE 1 MG/ML
1 INJECTION, SOLUTION INTRAMUSCULAR; INTRAVENOUS ONCE
Refills: 0 | Status: DISCONTINUED | OUTPATIENT
Start: 2023-12-21 | End: 2023-12-21

## 2023-12-21 RX ORDER — MAGNESIUM SULFATE 500 MG/ML
2 VIAL (ML) INJECTION ONCE
Refills: 0 | Status: COMPLETED | OUTPATIENT
Start: 2023-12-21 | End: 2023-12-21

## 2023-12-21 RX ORDER — DEXMEDETOMIDINE HYDROCHLORIDE IN 0.9% SODIUM CHLORIDE 4 UG/ML
0.1 INJECTION INTRAVENOUS
Qty: 400 | Refills: 0 | Status: DISCONTINUED | OUTPATIENT
Start: 2023-12-21 | End: 2023-12-21

## 2023-12-21 RX ADMIN — Medication 200 MILLIGRAM(S): at 05:33

## 2023-12-21 RX ADMIN — SODIUM CHLORIDE 1000 MILLILITER(S): 9 INJECTION, SOLUTION INTRAVENOUS at 05:36

## 2023-12-21 RX ADMIN — CHLORHEXIDINE GLUCONATE 1 APPLICATION(S): 213 SOLUTION TOPICAL at 05:42

## 2023-12-21 RX ADMIN — Medication 1000 MILLIGRAM(S): at 23:30

## 2023-12-21 RX ADMIN — Medication 200 MILLIGRAM(S): at 21:47

## 2023-12-21 RX ADMIN — Medication 650 MILLIGRAM(S): at 17:54

## 2023-12-21 RX ADMIN — Medication 5: at 21:47

## 2023-12-21 RX ADMIN — Medication 3: at 05:40

## 2023-12-21 RX ADMIN — Medication 1 APPLICATION(S): at 05:35

## 2023-12-21 RX ADMIN — Medication 5: at 01:38

## 2023-12-21 RX ADMIN — Medication 500 MILLIGRAM(S): at 17:54

## 2023-12-21 RX ADMIN — Medication 400 MILLIGRAM(S): at 23:00

## 2023-12-21 RX ADMIN — HEPARIN SODIUM 5000 UNIT(S): 5000 INJECTION INTRAVENOUS; SUBCUTANEOUS at 14:06

## 2023-12-21 RX ADMIN — Medication 650 MILLIGRAM(S): at 12:58

## 2023-12-21 RX ADMIN — HEPARIN SODIUM 5000 UNIT(S): 5000 INJECTION INTRAVENOUS; SUBCUTANEOUS at 05:34

## 2023-12-21 RX ADMIN — ATORVASTATIN CALCIUM 20 MILLIGRAM(S): 80 TABLET, FILM COATED ORAL at 21:47

## 2023-12-21 RX ADMIN — Medication 650 MILLIGRAM(S): at 06:03

## 2023-12-21 RX ADMIN — Medication 1 APPLICATION(S): at 17:55

## 2023-12-21 RX ADMIN — Medication 7: at 17:55

## 2023-12-21 RX ADMIN — CHLORHEXIDINE GLUCONATE 15 MILLILITER(S): 213 SOLUTION TOPICAL at 17:54

## 2023-12-21 RX ADMIN — Medication 500 MILLIGRAM(S): at 05:34

## 2023-12-21 RX ADMIN — Medication 650 MILLIGRAM(S): at 05:33

## 2023-12-21 RX ADMIN — AMLODIPINE BESYLATE 10 MILLIGRAM(S): 2.5 TABLET ORAL at 05:34

## 2023-12-21 RX ADMIN — HEPARIN SODIUM 5000 UNIT(S): 5000 INJECTION INTRAVENOUS; SUBCUTANEOUS at 21:46

## 2023-12-21 RX ADMIN — Medication 5: at 10:10

## 2023-12-21 RX ADMIN — Medication 25 GRAM(S): at 22:24

## 2023-12-21 RX ADMIN — Medication 4 MILLIGRAM(S): at 21:47

## 2023-12-21 NOTE — PROGRESS NOTE ADULT - SUBJECTIVE AND OBJECTIVE BOX
CC:       HPI:"77-year-old male Cantonese speaking his presents with prior history of hypertension dyslipidemia diabetes BPH prior CVA on aspirin and Plavix who states he had a mechanical fall 2 days ago while getting out of the car fell backwards hit his head.  Afterwards he was able to ambulate.  But for the past 1 day family was unable to ambulate him.  He states that his lower extremities are painful to movement and weak.  Family denies any LOC.  They deny any confusion at home.  On exam oriented to person and place but just not to date.  He does follow simple commands.  Elevates his arms for 10 seconds.  Sensation intact in upper extremities.  Lower extremities with 2 out of 5 strength bilaterally."       (16 Dec 2023 00:01)    Interval history:  Patient intubated this morning. Discussed with family at bedside.     PERTINENT PM/SXH:   HTN (hypertension)    Seasonal allergies    History of BPH    Diabetes      No significant past surgical history      FAMILY HISTORY:  No pertinent family history in first degree relatives      ITEMS NOT CHECKED ARE NOT PRESENT    SOCIAL HISTORY:   Significant other/partner [x ]  Children[x ]  Latter-day/Spirituality:  Substance hx:  [ ]   Tobacco hx:  [ ]   Alcohol hx: [ ]   Living Situation: [ x]Home  [ ]Long term care  [ ]Rehab [ ]Other  Home Services: [ ] HHA [ ] Visting RN [ ] Hospice  Occupation:  Home Opioid hx:  [ ] Y [ ] N [ ] I-Stop Reference No:     ADVANCE DIRECTIVES:     [ ] Full Code [ ] DNR  MOLST  [ ]  Living Will  [ ]   DECISION MAKER(s):  [ ] Health Care Proxy(s)  [ ] Surrogate(s)  [ ] Guardian           Name(s): Phone Number(s):   Wife Negro Fair [phone]  Dtr Rashi Bryantang - [phone]   BASELINE (I)ADL(s) (prior to admission):    Person: [x ]Total  [ ] Moderate [ ]Dependent  Palliative Performance Status Version 2:         %    http://npcrc.org/files/news/palliative_performance_scale_ppsv2.pdf    Allergies    [This allergen will not trigger allergy alert] pollen (Unknown)  No Known Drug Allergies    Intolerances    MEDICATIONS  (STANDING):  acetaminophen     Tablet .. 650 milliGRAM(s) Oral every 6 hours  albuterol/ipratropium for Nebulization 3 milliLiter(s) Nebulizer every 4 hours  amLODIPine   Tablet 10 milliGRAM(s) Oral daily  atorvastatin 20 milliGRAM(s) Oral at bedtime  bacitracin   Ointment 1 Application(s) Topical every 12 hours  chlorhexidine 0.12% Liquid 15 milliLiter(s) Oral Mucosa two times a day  chlorhexidine 2% Cloths 1 Application(s) Topical <User Schedule>  dexMEDEtomidine Infusion 0.3 MICROgram(s)/kG/Hr (4.63 mL/Hr) IV Continuous <Continuous>  doxazosin 4 milliGRAM(s) Oral at bedtime  heparin   Injectable 5000 Unit(s) SubCutaneous every 8 hours  insulin glargine Injectable (LANTUS) 15 Unit(s) SubCutaneous at bedtime  insulin lispro (ADMELOG) corrective regimen sliding scale   SubCutaneous every 4 hours  labetalol 200 milliGRAM(s) Oral every 8 hours  valproic  acid Syrup 500 milliGRAM(s) Oral every 12 hours    MEDICATIONS  (PRN):      PRESENT SYMPTOMS: [x ]Unable to obtain due to poor mentation   Source if other than patient:  [ ]Family   [ ]Team     Pain: [ ]yes [ ]no  QOL impact -   Location -                    Aggravating factors -  Quality -  Radiation -  Timing-  Severity (0-10 scale):  Minimal acceptable level (0-10 scale):     CPOT:  2  https://www.[facility].org/getattachment/[email or web address]/Critical-Care-Pain-Observation-Tool-(CPOT)    PAIN AD Score:   http://geriatrictoolkit.[place].[facility]/cog/painad.pdf (press ctrl +  left click to view)    Dyspnea:                           [ ]None[ ]Mild [ ]Moderate [ ]Severe     Respiratory Distress Observation Scale (RDOS): 1  A score of 0 to 2 signifies little or no respiratory distress, 3 signifies mild distress, scores 4 to 6 indicate moderate distress, and scores greater than 7 signify severe distress  https://www.[facility].ca/sites/default/files/PDFS/450114-exhjclqfohi-xqgmydji-eoptbcqknla-diwcy.pdf    Anxiety:                             [ ]None[ ]Mild [ ]Moderate [ ]Severe   Fatigue:                             [ ]None[ ]Mild [ ]Moderate [ ]Severe   Nausea:                             [ ]None[ ]Mild [ ]Moderate [ ]Severe   Loss of appetite:              [ ]None[ ]Mild [ ]Moderate [ ]Severe   Constipation:                    [ ]None[ ]Mild [ ]Moderate [ ]Severe    Other Symptoms:  [ ]All other review of systems negative     Palliative Performance Status Version 2:         %    http://[facility].org/files/news/palliative_performance_scale_ppsv2.pdf    PHYSICAL EXAM:  ICU Vital Signs Last 24 Hrs  T(C): 36.8 (21 Dec 2023 12:00), Max: 38.7 (20 Dec 2023 20:00)  T(F): 98.2 (21 Dec 2023 12:00), Max: 101.7 (20 Dec 2023 20:00)  HR: 77 (21 Dec 2023 13:00) (77 - 100)  BP: 100/52 (21 Dec 2023 13:00) (91/50 - 151/78)  BP(mean): 73 (21 Dec 2023 13:00) (66 - 98)  ABP: 117/42 (21 Dec 2023 13:00) (70/48 - 204/64)  ABP(mean): 64 (21 Dec 2023 13:00) (35 - 102)  RR: 18 (21 Dec 2023 13:00) (14 - 40)  SpO2: 99% (21 Dec 2023 13:00) (97% - 100%)    O2 Parameters below as of 21 Dec 2023 13:00  Patient On (Oxygen Delivery Method): ventilator        GENERAL:  [x ] No acute distress [ ]Lethargic  [ ]Unarousable  [ ]Verbal  [x ]Non-Verbal [ ]Cachexia    BEHAVIORAL/PSYCH:  [ ]Alert and Oriented x  [ ] Anxiety [ ] Delirium [ ] Agitation [ ] Calm   EYES: [ ] No scleral icterus [ ] Scleral icterus [x ] Closed  ENMT:  [ ]Dry mouth  [ ]No external oral lesions [ ] No external ear or nose lesions  CARDIOVASCULAR:  [ ]Regular [ ]Irregular [ ]Tachy [ ]Not Tachy  [ ]Nikita [ ] Edema [ ] No edema  PULMONARY:  [ ]Tachypnea  [ x]Audible excessive secretions [ x] No labored breathing [ ] labored breathing  GASTROINTESTINAL: [ ]Soft  [ ]Distended  [ ]Not distended [ ]Non tender [ ]Tender  MUSCULOSKELETAL: [ ]No clubbing [ ] clubbing  [ x] No cyanosis [ ] cyanosis  NEUROLOGIC: [ ]No focal deficits  [ ]Follows commands  [x ]Does not follow commands  [ ]Cognitive impairment  [ ]Dysphagia  [ ]Dysarthria  [ ]Paresis   SKIN: [ ] Jaundiced [ ] Non-jaundiced [ ]Rash [ ]No Rash [ ] Warm [ ] Dry  MISC/LINES: [ ] ET tube [ ] Trach [x ]NGT/OGT [ ]PEG [x ]Aguirre    LABS: reviewed by me               RADIOLOGY & ADDITIONAL STUDIES: reviewed by me    EKG: reviewed by me        Palliative Care Spiritual/Emotional Screening Tool Question  Severity (0-4):      0              OR                    [ ] Unable to determine/NA  Score of 2 or greater indicates recommendation of Chaplaincy referral  Chaplaincy Referral: [ ] Yes [ ] Refused [ ] Following     Caregiver [place]:  [ ] Yes [ ] No [ x] Deferred  Social Work Referral [ ]  Patient and Family Centered Care Referral [ ]    Anticipatory Grief Present: [ ] Yes [ ] No [x ] Deferred  Social Work Referral [ ]  Patient and Family Centered Care Referral [ ]    Patient discussed with primary medical team MD  Palliative care education provided to patient and/or family   CC:       HPI:"77-year-old male Cantonese speaking his presents with prior history of hypertension dyslipidemia diabetes BPH prior CVA on aspirin and Plavix who states he had a mechanical fall 2 days ago while getting out of the car fell backwards hit his head.  Afterwards he was able to ambulate.  But for the past 1 day family was unable to ambulate him.  He states that his lower extremities are painful to movement and weak.  Family denies any LOC.  They deny any confusion at home.  On exam oriented to person and place but just not to date.  He does follow simple commands.  Elevates his arms for 10 seconds.  Sensation intact in upper extremities.  Lower extremities with 2 out of 5 strength bilaterally."       (16 Dec 2023 00:01)    Interval history:  Patient intubated this morning. Discussed with family at bedside.     PERTINENT PM/SXH:   HTN (hypertension)    Seasonal allergies    History of BPH    Diabetes      No significant past surgical history      FAMILY HISTORY:  No pertinent family history in first degree relatives      ITEMS NOT CHECKED ARE NOT PRESENT    SOCIAL HISTORY:   Significant other/partner [x ]  Children[x ]  Advent/Spirituality:  Substance hx:  [ ]   Tobacco hx:  [ ]   Alcohol hx: [ ]   Living Situation: [ x]Home  [ ]Long term care  [ ]Rehab [ ]Other  Home Services: [ ] HHA [ ] Visting RN [ ] Hospice  Occupation:  Home Opioid hx:  [ ] Y [ ] N [ ] I-Stop Reference No:     ADVANCE DIRECTIVES:     [ ] Full Code [ ] DNR  MOLST  [ ]  Living Will  [ ]   DECISION MAKER(s):  [ ] Health Care Proxy(s)  [ ] Surrogate(s)  [ ] Guardian           Name(s): Phone Number(s):   Wife Negro Fair [phone]  Dtr Rashi Bryantang - [phone]   BASELINE (I)ADL(s) (prior to admission):    [place]: [x ]Total  [ ] Moderate [ ]Dependent  Palliative Performance Status Version 2:         %    http://npcrc.org/files/news/palliative_performance_scale_ppsv2.pdf    Allergies    [This allergen will not trigger allergy alert] pollen (Unknown)  No Known Drug Allergies    Intolerances    MEDICATIONS  (STANDING):  acetaminophen     Tablet .. 650 milliGRAM(s) Oral every 6 hours  albuterol/ipratropium for Nebulization 3 milliLiter(s) Nebulizer every 4 hours  amLODIPine   Tablet 10 milliGRAM(s) Oral daily  atorvastatin 20 milliGRAM(s) Oral at bedtime  bacitracin   Ointment 1 Application(s) Topical every 12 hours  chlorhexidine 0.12% Liquid 15 milliLiter(s) Oral Mucosa two times a day  chlorhexidine 2% Cloths 1 Application(s) Topical <User Schedule>  dexMEDEtomidine Infusion 0.3 MICROgram(s)/kG/Hr (4.63 mL/Hr) IV Continuous <Continuous>  doxazosin 4 milliGRAM(s) Oral at bedtime  heparin   Injectable 5000 Unit(s) SubCutaneous every 8 hours  insulin glargine Injectable (LANTUS) 15 Unit(s) SubCutaneous at bedtime  insulin lispro (ADMELOG) corrective regimen sliding scale   SubCutaneous every 4 hours  labetalol 200 milliGRAM(s) Oral every 8 hours  valproic  acid Syrup 500 milliGRAM(s) Oral every 12 hours    MEDICATIONS  (PRN):      PRESENT SYMPTOMS: [x ]Unable to obtain due to poor mentation   Source if other than patient:  [ ]Family   [ ]Team     Pain: [ ]yes [ ]no  QOL impact -   Location -                    Aggravating factors -  Quality -  Radiation -  Timing-  Severity (0-10 scale):  Minimal acceptable level (0-10 scale):     CPOT:  2  https://www.[facility].org/getattachment/[email or web address]/Critical-Care-Pain-Observation-Tool-(CPOT)    PAIN AD Score:   http://geriatrictoolkit.[place].[facility]/cog/painad.pdf (press ctrl +  left click to view)    Dyspnea:                           [ ]None[ ]Mild [ ]Moderate [ ]Severe     Respiratory Distress Observation Scale (RDOS): 1  A score of 0 to 2 signifies little or no respiratory distress, 3 signifies mild distress, scores 4 to 6 indicate moderate distress, and scores greater than 7 signify severe distress  https://www.[facility].ca/sites/default/files/PDFS/260249-pxsgwafxumf-zinmdpgf-kxlytedzfoo-oglzb.pdf    Anxiety:                             [ ]None[ ]Mild [ ]Moderate [ ]Severe   Fatigue:                             [ ]None[ ]Mild [ ]Moderate [ ]Severe   Nausea:                             [ ]None[ ]Mild [ ]Moderate [ ]Severe   Loss of appetite:              [ ]None[ ]Mild [ ]Moderate [ ]Severe   Constipation:                    [ ]None[ ]Mild [ ]Moderate [ ]Severe    Other Symptoms:  [ ]All other review of systems negative     Palliative Performance Status Version 2:         %    http://[facility].org/files/news/palliative_performance_scale_ppsv2.pdf    PHYSICAL EXAM:  ICU Vital Signs Last 24 Hrs  T(C): 36.8 (21 Dec 2023 12:00), Max: 38.7 (20 Dec 2023 20:00)  T(F): 98.2 (21 Dec 2023 12:00), Max: 101.7 (20 Dec 2023 20:00)  HR: 77 (21 Dec 2023 13:00) (77 - 100)  BP: 100/52 (21 Dec 2023 13:00) (91/50 - 151/78)  BP(mean): 73 (21 Dec 2023 13:00) (66 - 98)  ABP: 117/42 (21 Dec 2023 13:00) (70/48 - 204/64)  ABP(mean): 64 (21 Dec 2023 13:00) (35 - 102)  RR: 18 (21 Dec 2023 13:00) (14 - 40)  SpO2: 99% (21 Dec 2023 13:00) (97% - 100%)    O2 Parameters below as of 21 Dec 2023 13:00  Patient On (Oxygen Delivery Method): ventilator        GENERAL:  [x ] No acute distress [ ]Lethargic  [ ]Unarousable  [ ]Verbal  [x ]Non-Verbal [ ]Cachexia    BEHAVIORAL/PSYCH:  [ ]Alert and Oriented x  [ ] Anxiety [ ] Delirium [ ] Agitation [ ] Calm   EYES: [ ] No scleral icterus [ ] Scleral icterus [x ] Closed  ENMT:  [ ]Dry mouth  [ ]No external oral lesions [ ] No external ear or nose lesions  CARDIOVASCULAR:  [ ]Regular [ ]Irregular [ ]Tachy [ ]Not Tachy  [ ]Nikita [ ] Edema [ ] No edema  PULMONARY:  [ ]Tachypnea  [ x]Audible excessive secretions [ x] No labored breathing [ ] labored breathing  GASTROINTESTINAL: [ ]Soft  [ ]Distended  [ ]Not distended [ ]Non tender [ ]Tender  MUSCULOSKELETAL: [ ]No clubbing [ ] clubbing  [ x] No cyanosis [ ] cyanosis  NEUROLOGIC: [ ]No focal deficits  [ ]Follows commands  [x ]Does not follow commands  [ ]Cognitive impairment  [ ]Dysphagia  [ ]Dysarthria  [ ]Paresis   SKIN: [ ] Jaundiced [ ] Non-jaundiced [ ]Rash [ ]No Rash [ ] Warm [ ] Dry  MISC/LINES: [ ] ET tube [ ] Trach [x ]NGT/OGT [ ]PEG [x ]Aguirre    LABS: reviewed by me               RADIOLOGY & ADDITIONAL STUDIES: reviewed by me    EKG: reviewed by me        Palliative Care Spiritual/Emotional Screening Tool Question  Severity (0-4):      0              OR                    [ ] Unable to determine/NA  Score of 2 or greater indicates recommendation of Chaplaincy referral  Chaplaincy Referral: [ ] Yes [ ] Refused [ ] Following     Caregiver [place]:  [ ] Yes [ ] No [ x] Deferred  Social Work Referral [ ]  Patient and Family Centered Care Referral [ ]    Anticipatory Grief Present: [ ] Yes [ ] No [x ] Deferred  Social Work Referral [ ]  Patient and Family Centered Care Referral [ ]    Patient discussed with primary medical team MD  Palliative care education provided to patient and/or family

## 2023-12-21 NOTE — PROGRESS NOTE ADULT - ASSESSMENT
78yM who presented as a TRAUMA CONSULT s/p mechanical fall (+HT -LOC -AC). Found to have an acute subdural hemorrhage. Hospital course has been complicated by multiple seizures and uncontrolled hypertension. Intubated on 12/21 due to worsening respiratory status,.     PLAN:  - Booked for tracheostomy and PEG placement tomorrow 12/22  - Make NPO @ midnight and start maintenance IVF   - Obtain preop labs: CBC, BMP, Mag, Phos, PT, PTT, INR, type and screen   - On vent support / AC - monitor ABG and wean FiO2 as tolerated   - Administer free water flushes 300 cc q4h via NGT   - Monitor vital signs, I&O's, AM labs  - Replace electrolytes as needed  - Pain control PRN  - DVT ppx with HSQ   - Plan to restart ASA after tomorrow 12/22    # Dispo: OR 12/22 for tracheostomy and PEG placement       Trauma Team Surgery  [phone]      Date/Time: 12-21-23 @ 17:32

## 2023-12-21 NOTE — CHART NOTE - NSCHARTNOTEFT_GEN_A_CORE
Spoke with patients family, spouse Rashi Gamino and Daughter Tong Yarbrough via  Sadiq #[id number] to explain current clinical status.  -patient using accessory muscles on exam this morning  -RR elevated 30-38  With current mental status and worsening respiratory status, decision made to intubate  Family educated that patient will be placed on mechanical ventilator for respiratory support. With Patient's current clinical course and mental status, safe liberation of ventilator will be delayed. SICU team recommends patient obtain an early tracheostomy along with his PEG tube placement in the OR. Family made aware that a tracheostomy placement means patient will be vent dependent until able to protect their own airway. They understand the process and agree with intubation. Consent and final decision to proceed with tracheostomy and PEG to be obtained by surgical team.  Dr. Roman aware of above plan and discussion    Patient intubated approx 0900  Vent 350/14/100/5  Chest xray and ABG pending Spoke with patients family, spouse Rashi Gamino and Daughter Tong Yarbrough via  Sadiq #[id number] to explain current clinical status.  -patient using accessory muscles on exam this morning  -RR elevated 30-38  With current mental status and worsening respiratory status, decision made to intubate  Family educated that patient will be placed on mechanical ventilator for respiratory support. With Patient's current clinical course and mental status, safe liberation of ventilator will be delayed. SICU team recommends patient obtain an early tracheostomy along with his PEG tube placement in the OR. Family made aware that a tracheostomy placement means patient will be vent dependent until able to protect their own airway. They understand the process and agree with intubation. Consent and final decision to proceed with tracheostomy and PEG to be obtained by surgical team.  Dr. Roman aware of above plan and discussion    Patient intubated approx 0900  Vent 350/14/100/5  Chest xray and ABG pending

## 2023-12-21 NOTE — PROGRESS NOTE ADULT - SUBJECTIVE AND OBJECTIVE BOX
TRAUMA SURGERY PROGRESS NOTE    Patient: JACQUELIN CAI , 78y ([birth year])Male   MRN: [id number]  Location: [address]  Visit: 12-16-23 Inpatient  Date: 12-21-23 @ 17:32    Hospital Day #:  7    DIAGNOSIS / PROCEDURES:   * Acute subdural hemorrhage     INTERVAL HX:   Patient was intubated at 9AM this morning 2/2 worsening respiratory status. Current settings are 350/14/100/5. Aguirre placed today. Patient will be going to the OR tomorrow for trach/PEG.     VITALS:   T(F): 102 (12-21-23 @ 16:45), Max: 102 (12-21-23 @ 16:45)  HR: 88 (12-21-23 @ 17:00)  BP: 123/60 (12-21-23 @ 17:00)  RR: 18 (12-21-23 @ 17:00)  SpO2: 100% (12-21-23 @ 17:00)  Mode: AC/ CMV (Assist Control/ Continuous Mandatory Ventilation), RR (machine): 14, TV (machine): 350, FiO2: 40, PEEP: 5, ITime: 1, MAP: 8, PIP: 17    DIET:   Diet, NPO after Midnight:      NPO Start Date: 21-Dec-2023,   NPO Start Time: 23:59    Diet, NPO with Tube Feed:   Tube Feeding Modality: Nasogastric  Glucerna 1.2 Tonio  Total Volume for 24 Hours (mL): 1320  Continuous  Starting Tube Feed Rate mL per Hour: 55  Until Goal Tube Feed Rate (mL per Hour): 55  Tube Feed Duration (in Hours): 24  Tube Feed Start Time: 12:00  Free Water Flush  Bolus   Total Volume per Flush (mL): 300   Frequency: Every 4 Hours      FLUIDS:   sodium chloride 0.45%.: Solution, 1000 milliLiter(s) infuse at 100 mL/Hr        12-20 @ 07:01  -  12-21 @ 07:00  --------------------------------------------------------  OUT:    Voided (mL): 1790 mL  Total OUT: 1790 mL      12-21 @ 07:01  -  12-21 @ 17:32  --------------------------------------------------------  OUT:    Indwelling Catheter - Urethral (mL): 571 mL  Total OUT: 571 mL      AC/DVT PPX:  heparin   Injectable 5000 Unit(s) SubCutaneous every 8 hours    PHYSICAL EXAM:  General Appearance: intubated   HEENT: EOMI, sclera non-icteric.  Heart: RRR   Lungs: Equal chest rise bilateral  Abdomen:  Soft, nontender, nondistended.   MSK/Extremities: Warm & well-perfused.   Skin: Warm, dry. No jaundice.   Neuro: GCS 9T (E=3, V=1T, M=6)     LABS:                        8.4    8.41  )-----------( 286       12-21 @ 02:23             26.4     152  |  118  |  56  ----------------------------<  181        12-21 @ 02:23  4.0   |  24  |  2.7        Calcium: 8.3 mg/dL *L* (12-21 @ 02:23)  Magnesium: 2.8 mg/dL *H* (12-21 @ 00:30)  Phosphorus: 4.1 mg/dL (12-21 @ 00:30)      LIVER FUNCTIONS - ( 21 Dec 2023 02:23 )  Alb: 2.6 g/dL / Pro: 5.1 g/dL / ALK PHOS: 76 U/L / ALT: 50 U/L / AST: 61 U/L / GGT: x            (collected 12-18 @ 00:30)  Source: .Blood Blood  Preliminary Report (12-21 @ 14:00):    No growth at 72 Hours        RADIOLOGY & OTHER STUDIES:   Xray Chest 1 View- PORTABLE-Urgent (12.21.23 @ 10:22) >  FINDINGS:    Support devices: Interval retraction of endotracheal tube, now positioned   4.0 cm from the more. Enteric tube courses below the diaphragm with tip   overlying the midline abdomen.    Cardiac/mediastinum/hilum: Stable.    Lung parenchyma/Pleura: No focal consolidation. No pleural effusion. No   pneumothorax.    Skeleton/soft tissues: Stable.    IMPRESSION:    Endotracheal and enteric tubes in appropriate position.    No focal consolidation or pneumothorax.    --- End of Report ---    ACCESS DEVICES:  [X] Peripheral IV  [ ] Central Venous Line	[ ] R	[ ] L	[ ] IJ	[ ] Fem	[ ] SC	Placed:   [ ] Arterial Line		[ ] R	[ ] L	[ ] Fem	[ ] Rad	[ ] Ax	Placed:   [ ] PICC:					[ ] Mediport  [X] Urinary Catheter,  Date Placed:   12/21  [ ] Chest tube: [ ] Right, [ ] Left  [ ] MARIA LUISA/Ronald Drains  [X] ETT  TRAUMA SURGERY PROGRESS NOTE    Patient: JACQUELIN CAI , 78y ([birth year])Male   MRN: [id number]  Location: [address]  Visit: 12-16-23 Inpatient  Date: 12-21-23 @ 17:32    Hospital Day #:  7    DIAGNOSIS / PROCEDURES:   * Acute subdural hemorrhage     INTERVAL HX:   Patient was intubated at 9AM this morning 2/2 worsening respiratory status. Current settings are 350/14/100/5. Aguirre placed today. Patient will be going to the OR tomorrow for trach/PEG.     VITALS:   T(F): 102 (12-21-23 @ 16:45), Max: 102 (12-21-23 @ 16:45)  HR: 88 (12-21-23 @ 17:00)  BP: 123/60 (12-21-23 @ 17:00)  RR: 18 (12-21-23 @ 17:00)  SpO2: 100% (12-21-23 @ 17:00)  Mode: AC/ CMV (Assist Control/ Continuous Mandatory Ventilation), RR (machine): 14, TV (machine): 350, FiO2: 40, PEEP: 5, ITime: 1, MAP: 8, PIP: 17    DIET:   Diet, NPO after Midnight:      NPO Start Date: 21-Dec-2023,   NPO Start Time: 23:59    Diet, NPO with Tube Feed:   Tube Feeding Modality: Nasogastric  Glucerna 1.2 Tonio  Total Volume for 24 Hours (mL): 1320  Continuous  Starting Tube Feed Rate mL per Hour: 55  Until Goal Tube Feed Rate (mL per Hour): 55  Tube Feed Duration (in Hours): 24  Tube Feed Start Time: 12:00  Free Water Flush  Bolus   Total Volume per Flush (mL): 300   Frequency: Every 4 Hours      FLUIDS:   sodium chloride 0.45%.: Solution, 1000 milliLiter(s) infuse at 100 mL/Hr        12-20 @ 07:01  -  12-21 @ 07:00  --------------------------------------------------------  OUT:    Voided (mL): 1790 mL  Total OUT: 1790 mL      12-21 @ 07:01  -  12-21 @ 17:32  --------------------------------------------------------  OUT:    Indwelling Catheter - Urethral (mL): 571 mL  Total OUT: 571 mL      AC/DVT PPX:  heparin   Injectable 5000 Unit(s) SubCutaneous every 8 hours    PHYSICAL EXAM:  General Appearance: intubated   HEENT: EOMI, sclera non-icteric.  Heart: RRR   Lungs: Equal chest rise bilateral  Abdomen:  Soft, nontender, nondistended.   MSK/Extremities: Warm & well-perfused.   Skin: Warm, dry. No jaundice.   Neuro: GCS 9T (E=3, V=1T, M=6)     LABS:                        8.4    8.41  )-----------( 286       12-21 @ 02:23             26.4     152  |  118  |  56  ----------------------------<  181        12-21 @ 02:23  4.0   |  24  |  2.7        Calcium: 8.3 mg/dL *L* (12-21 @ 02:23)  Magnesium: 2.8 mg/dL *H* (12-21 @ 00:30)  Phosphorus: 4.1 mg/dL (12-21 @ 00:30)      LIVER FUNCTIONS - ( 21 Dec 2023 02:23 )  Alb: 2.6 g/dL / Pro: 5.1 g/dL / ALK PHOS: 76 U/L / ALT: 50 U/L / AST: 61 U/L / GGT: x            (collected 12-18 @ 00:30)  Source: .Blood Blood  Preliminary Report (12-21 @ 14:00):    No growth at 72 Hours        RADIOLOGY & OTHER STUDIES:   Xray Chest 1 View- PORTABLE-Urgent (12.21.23 @ 10:22) >  FINDINGS:    Support devices: Interval retraction of endotracheal tube, now positioned   4.0 cm from the more. Enteric tube courses below the diaphragm with tip   overlying the midline abdomen.    Cardiac/mediastinum/hilum: Stable.    Lung parenchyma/Pleura: No focal consolidation. No pleural effusion. No   pneumothorax.    Skeleton/soft tissues: Stable.    IMPRESSION:    Endotracheal and enteric tubes in appropriate position.    No focal consolidation or pneumothorax.    --- End of Report ---    ACCESS DEVICES:  [X] Peripheral IV  [ ] Central Venous Line	[ ] R	[ ] L	[ ] IJ	[ ] Fem	[ ] SC	Placed:   [ ] Arterial Line		[ ] R	[ ] L	[ ] Fem	[ ] Rad	[ ] Ax	Placed:   [ ] PICC:					[ ] Mediport  [X] Urinary Catheter,  Date Placed:   12/21  [ ] Chest tube: [ ] Right, [ ] Left  [ ] MARIA LUISA/Ronald Drains  [X] ETT

## 2023-12-21 NOTE — PROGRESS NOTE ADULT - ASSESSMENT
78y Male being evaluated for goals of care and symptom management r/t fall w SDH. Pt in SICU and on EEG monitoring. Pt overall prognosis is guarded, ongoing acute management with concern that he might need PEG.     12/21: Met with patient's wife at bedside. She directed me to speak with her daughter and son-in-law instead. They have discussed the issue with SICU and surgical team. They would like to proceed with trach/PEG and other interventions to help the patient live. They understand that neurologic prognosis is uncertain, but they want to give him every chance to recover.     Plan:  - symptoms per primary team  - GOC: live longer, no limits to care  - full code    Discussed with SICU team.     Palliative care will sign off.     Please call [phone] with questions or concerns 24/7.   We will continue to follow.

## 2023-12-21 NOTE — PROGRESS NOTE ADULT - ASSESSMENT
Assessment and Plan  78y Male s/p mechanical fall. +HT, +AC (Aspirin and Plavix), -LOC    NEURO:  #Acute SDH   -12/18 HCT#4 -stable SDH, no new acute findings  -Q4 neuro checks  #Acute pain    -APAP ATC  #h/o stroke (2/2023) w/residual right sided weakness  - RIGHT anterior thalamus infarct,  LEFT caudate head lacunar infarct  #focal seizure    -vEEG 12/19: No seizures; Discontinued 12/19    - Valproic 500q8 lowered to 500q12;level 51; next level 12/21 16;00     -NCC: d/c keppra due to possible contribution to somnolence continue with valproic acid current dose, q4 neurochecks    -Neurology cs- d/c EEG, 2 days prior to discharge call neurology so they can do a spot EEG, lower valporic acid to 500 q12 from q8     RESP:   #Oxygenation    -wean from NC to room air as tolerated     -chest PT q4 and duoneb treatments q 4   -consider sputum cx  #Activity    -increase as tolerated    CARDS:   #acute hypotension likely 2/2 volume depltion  -NICOM 13%, 250cc bolus x 1 12/20 ovn  #hx of HTN  -Off cardene gtt  -Amlodipine 10 QD  -Labetalol 200 q8 (Home Coreg 6.25mg q12 )  -Valsartan 320mg HOLDING   #HLD  -hold atorvastatin, unable to crush  #NSVT    -EP/Cards: No arrhythmias on tele only artifact. No further work up needed. Recommend ILR prior to discharge if patient/family agreeable  Imaging:   EKG 12/16: Sinus tach   Echo: 12/2023: EF 66%, G1DD, trace MR, mild TR     GI/NUTR:   #Diet, NPO with Tube Feed  Glucerna 1.2 at goal 55cc/hr, feed duration 24 hrs    -Bedside swallow evaluation failed    -Speech and swallow cs placed 12/20- SLP unable to assess 2/2 lethargy    -PEG placement to be scheduled for Friday; primary team to obtain consent     -Dobhoff @75cm    -aspiration precautions, HOB 30  #GI Prophylaxis    -not indicated   #Bowel regimen     -D/c senna     -Last bowel movement  12/20    /RENAL:   #urine output in critically ill, IVL  -Indwelling catheter removed 12/19, condom cath in place, q shift bladder scan  -UA neg   -Sodium goal 140-150  #Hypernatremia  -FWF 300q4; free water deficit 1.7L     Labs:          BUN/Cr- 53/2.5  -->,  56/2.8  -->          Electrolytes-Na 157 // K 4.2 // Mg 2.8 //  Phos 4.1 (12-21 @ 00:30)  #CKD   -baseline Cr 2.1  -c/w home sodium bicarb 650mg BID  #hx BPH    -cardura 4mg (flomax at home, non crushable)      HEME/ONC:   #DVT prophylaxis     -SCDs. HSQ started per NSGY. Per NSGY, can restart ASA Dec 22  #hx of CVA    -ASA can restart 12/22 as per NSGY    -Plavix HOLDING, will require repeat HCT in 2 wks prior to restart    Labs: Hb/Hct:  9.0/26.8  -->,  8.8/27.0  -->                      Plts:  282  -->,  267  -->                 PTT/INR:        ID:  #recurrent fevers  WBC- 8.17  --->>,  7.95  --->>,  9.02  --->>  Temp trend- 24hrs T(F): 101.1 (12-21 @ 00:00), Max: 101.7 (12-20 @ 20:00)  Cultures    Bcx 12/17- prelim neg   Current antibiotics, none    -Zosyn 3.375 q12 (12/17 -12/18 )    -Cefepime 2g q 24 (12/16 - 12/17)    ENDO:  #DM     -Tightened ISS     -Off insulin gtt 12/20      -Lantus 15 units HS      -FSG q4 while on TF    MSK:     Activity - Increase As Tolerated    -B/L knee X ray 12/19- no fractures, b/l effusions     LINES/DRAINS:  PIV, right basilic midline (placed 12/16), martinez (placed 12/16- d/c 12/19), right radial arterial line (placed 12/19)    ADVANCED DIRECTIVES:  Full Code    HCP/Emergency Contact-Sarah Gamino (Wife) [phone]    DISPO  Social work, case management consult for dispo Assessment and Plan  78y Male s/p mechanical fall. +HT, +AC (Aspirin and Plavix), -LOC    NEURO:  #Acute SDH   -12/18 HCT#4 -stable SDH, no new acute findings  -Q4 neuro checks  #Acute pain    -APAP ATC  #h/o stroke (2/2023) w/residual right sided weakness  - RIGHT anterior thalamus infarct,  LEFT caudate head lacunar infarct  #focal seizure    -vEEG 12/19: No seizures; Discontinued 12/19    - Valproic 500q8 lowered to 500q12;level 51; next level 12/21 16;00     -NCC: d/c keppra due to possible contribution to somnolence continue with valproic acid current dose, q4 neurochecks    -Neurology cs- d/c EEG, 2 days prior to discharge call neurology so they can do a spot EEG, lower valporic acid to 500 q12 from q8     RESP:   #Oxygenation    -wean from NC to room air as tolerated     -chest PT q4 and duoneb treatments q 4   -consider sputum cx  #Activity    -increase as tolerated    CARDS:   #acute hypotension likely 2/2 volume depltion  -NICOM 13%, 250cc bolus x 1 12/20 ovn  #hx of HTN  -Off cardene gtt  -Amlodipine 10 QD  -Labetalol 200 q8 (Home Coreg 6.25mg q12 )  -Valsartan 320mg HOLDING   #HLD  -hold atorvastatin, unable to crush  #NSVT    -EP/Cards: No arrhythmias on tele only artifact. No further work up needed. Recommend ILR prior to discharge if patient/family agreeable  Imaging:   EKG 12/16: Sinus tach   Echo: 12/2023: EF 66%, G1DD, trace MR, mild TR     GI/NUTR:   #Diet, NPO with Tube Feed  Glucerna 1.2 at goal 55cc/hr, feed duration 24 hrs    -Bedside swallow evaluation failed    -Speech and swallow cs placed 12/20- SLP unable to assess 2/2 lethargy    -PEG placement to be scheduled for Friday; primary team to obtain consent     -Dobhoff @75cm    -aspiration precautions, HOB 30  #GI Prophylaxis    -not indicated   #Bowel regimen     -D/c senna     -Last bowel movement  12/20    /RENAL:   #urine output in critically ill, IVL  -Indwelling catheter removed 12/19, condom cath in place, q shift bladder scan  -UA neg   -Sodium goal 140-150  #Hypernatremia  -FWF 300q4; free water deficit 1.7L     Labs:          BUN/Cr- 53/2.5  -->,  56/2.8  -->          Electrolytes-Na 157 // K 4.2 // Mg 2.8 //  Phos 4.1 (12-21 @ 00:30)  #CKD   -baseline Cr 2.1  -c/w home sodium bicarb 650mg BID  #hx BPH    -cardura 4mg (flomax at home, non crushable)      HEME/ONC:   #DVT prophylaxis     -SCDs. HSQ started per NSGY. Per NSGY, can restart ASA Dec 22  #hx of CVA    -ASA can restart 12/22 as per NSGY    -Plavix HOLDING, will require repeat HCT in 2 wks prior to restart    Labs: Hb/Hct:  9.0/26.8  -->,  8.8/27.0  -->                      Plts:  282  -->,  267  -->                 PTT/INR:        ID:  #recurrent fevers  WBC- 8.17  --->>,  7.95  --->>,  9.02  --->>  Temp trend- 24hrs T(F): 101.1 (12-21 @ 00:00), Max: 101.7 (12-20 @ 20:00)  Cultures    Bcx 12/17- prelim neg   Current antibiotics, none    -Zosyn 3.375 q12 (12/17 -12/18 )    -Cefepime 2g q 24 (12/16 - 12/17)    ENDO:  #DM     -Tightened ISS     -Off insulin gtt 12/20      -Lantus 15 units HS      -FSG q4 while on TF    MSK:     Activity - Increase As Tolerated    -B/L knee X ray 12/19- no fractures, b/l effusions     LINES/DRAINS:  PIV, right basilic midline (placed 12/16), martinez (placed 12/16- d/c 12/19), right radial arterial line (placed 12/19)    ADVANCED DIRECTIVES:  Full Code    HCP/Emergency Contact-Sarah Gamino (Wife) [phone]    DISPO  Social work, case management consult for dispo Assessment and Plan  78y Male s/p mechanical fall. +HT, +AC (Aspirin and Plavix), -LOC    NEURO:  #Acute SDH   -12/18 HCT#4 -stable SDH, no new acute findings  -Q4 neuro checks  #Acute pain    -APAP ATC  #h/o stroke (2/2023) w/residual right sided weakness  - RIGHT anterior thalamus infarct,  LEFT caudate head lacunar infarct  #focal seizure    -vEEG 12/19: No seizures; Discontinued 12/19    - Valproic 500q8 lowered to 500q12;level 51; next level 12/21 16;00     -NCC: d/c keppra due to possible contribution to somnolence continue with valproic acid current dose, q4 neurochecks    -Neurology cs- d/c EEG, 2 days prior to discharge call neurology so they can do a spot EEG, lower valporic acid to 500 q12 from q8     RESP:   #Oxygenation    -wean from NC to room air as tolerated     -chest PT q4 and duoneb treatments q 4   -consider sputum cx  #Activity    -increase as tolerated    CARDS:   #acute hypotension likely 2/2 volume depltion  -NICOM 13%, 250cc bolus x 1 12/20 ovn  #hx of HTN  -Off cardene gtt  -Amlodipine 10 QD  -Labetalol 200 q8 (Home Coreg 6.25mg q12 )  -Valsartan 320mg HOLDING   #HLD  -hold atorvastatin, unable to crush  #NSVT    -EP/Cards: No arrhythmias on tele only artifact. No further work up needed. Recommend ILR prior to discharge if patient/family agreeable  Imaging:   EKG 12/16: Sinus tach   Echo: 12/2023: EF 66%, G1DD, trace MR, mild TR     GI/NUTR:   #Diet, NPO with Tube Feed  Glucerna 1.2 at goal 55cc/hr, feed duration 24 hrs    -Bedside swallow evaluation failed    -Speech and swallow cs placed 12/20- SLP unable to assess 2/2 lethargy    -PEG placement to be scheduled for Friday; primary team to obtain consent     -Dobhoff @75cm    -aspiration precautions, HOB 30  #GI Prophylaxis    -not indicated   #Bowel regimen     -D/c senna     -Last bowel movement  12/20    /RENAL:   #urine output in critically ill, IVL  -Indwelling catheter removed 12/19, condom cath in place, q shift bladder scan  -UA neg   -Sodium goal 140-150  #Hypernatremia  -FWF 300q4; free water deficit 1.7L      Labs:          BUN/Cr- 56/2.8  -->,  56/2.7  -->          Electrolytes-Na 152 // K 4.0 // Mg -- //  Phos -- (12-21 @ 02:23)    #CKD   -baseline Cr 2.1  -c/w home sodium bicarb 650mg BID  #hx BPH    -cardura 4mg (flomax at home, non crushable)      HEME/ONC:   #DVT prophylaxis     -SCDs. HSQ started per NSGY. Per NSGY, can restart ASA Dec 22  #hx of CVA    -ASA can restart 12/22 as per NSGY    -Plavix HOLDING, will require repeat HCT in 2 wks prior to restart    Labs: Hb/Hct:  8.8/27.0  -->,  8.4/26.4  -->                      Plts:  267  -->,  286  -->                 PTT/INR:        ID:  #recurrent fevers  WBC- 7.95  --->>,  9.02  --->>,  8.41  --->>  Temp trend- 24hrs T(F): 101.1 (12-21 @ 00:00), Max: 101.7 (12-20 @ 20:00)    Cultures    Bcx 12/17- prelim neg   Current antibiotics, none    -Zosyn 3.375 q12 (12/17 -12/18 )    -Cefepime 2g q 24 (12/16 - 12/17)    ENDO:  #DM     -Tightened ISS     -Off insulin gtt 12/20      -Lantus 15 units HS      -FSG q4 while on TF    MSK:     Activity - Increase As Tolerated    -B/L knee X ray 12/19- no fractures, b/l effusions     LINES/DRAINS:  PIV, right basilic midline (placed 12/16), martinez (placed 12/16- d/c 12/19), right radial arterial line (placed 12/19)    ADVANCED DIRECTIVES:  Full Code    HCP/Emergency Contact-Sarah Gamino (Wife) [phone]    DISPO  Social work, case management consult for dispo Assessment and Plan  78y Male s/p mechanical fall. +HT, +AC (Aspirin and Plavix), -LOC    NEURO:  #Acute SDH   -12/18 HCT#4 -stable SDH, no new acute findings  -Q4 neuro checks  #Acute pain    -APAP ATC  #h/o stroke (2/2023) w/residual right sided weakness  - RIGHT anterior thalamus infarct,  LEFT caudate head lacunar infarct  #focal seizure    -vEEG 12/19: No seizures; Discontinued 12/19    - Valproic 500q8 lowered to 500q12;level 51; next level 12/21 16;00     -NCC: d/c keppra due to possible contribution to somnolence continue with valproic acid current dose, q4 neurochecks    -Neurology cs- d/c EEG, 2 days prior to discharge call neurology so they can do a spot EEG, lower valporic acid to 500 q12 from q8     RESP:   #Oxygenation    -wean from NC to room air as tolerated     -chest PT q4 and duoneb treatments q 4   -consider sputum cx  #Activity    -increase as tolerated    CARDS:   #acute hypotension likely 2/2 volume depltion  -NICOM 13%, 250cc bolus x 1 12/20 ovn  #hx of HTN  -Off cardene gtt  -Amlodipine 10 QD  -Labetalol 200 q8 (Home Coreg 6.25mg q12 )  -Valsartan 320mg HOLDING   #HLD  -hold atorvastatin, unable to crush  #NSVT    -EP/Cards: No arrhythmias on tele only artifact. No further work up needed. Recommend ILR prior to discharge if patient/family agreeable  Imaging:   EKG 12/16: Sinus tach   Echo: 12/2023: EF 66%, G1DD, trace MR, mild TR     GI/NUTR:   #Diet, NPO with Tube Feed  Glucerna 1.2 at goal 55cc/hr, feed duration 24 hrs    -Bedside swallow evaluation failed    -Speech and swallow cs placed 12/20- SLP unable to assess 2/2 lethargy    -PEG placement to be scheduled for Friday; primary team to obtain consent     -Dobhoff @75cm    -aspiration precautions, HOB 30  #GI Prophylaxis    -not indicated   #Bowel regimen     -D/c senna     -Last bowel movement  12/20    /RENAL:   #urine output in critically ill, IVL  -Indwelling catheter removed 12/19, condom cath in place, q shift bladder scan  -UA neg   -Sodium goal 140-150  #Hypernatremia  -FWF 300q4; free water deficit 1.7L      Labs:          BUN/Cr- 56/2.8  -->,  56/2.7  -->          Electrolytes-Na 152 // K 4.0 // Mg -- //  Phos -- (12-21 @ 02:23)    #CKD   -baseline Cr 2.1  -c/w home sodium bicarb 650mg BID  #hx BPH    -cardura 4mg (flomax at home, non crushable)      HEME/ONC:   #DVT prophylaxis     -SCDs. HSQ started per NSGY. Per NSGY, can restart ASA Dec 22  #hx of CVA    -ASA can restart 12/22 as per NSGY    -Plavix HOLDING, will require repeat HCT in 2 wks prior to restart    Labs: Hb/Hct:  8.8/27.0  -->,  8.4/26.4  -->                      Plts:  267  -->,  286  -->                 PTT/INR:        ID:  #recurrent fevers  WBC- 7.95  --->>,  9.02  --->>,  8.41  --->>  Temp trend- 24hrs T(F): 101.1 (12-21 @ 00:00), Max: 101.7 (12-20 @ 20:00)    Cultures    Bcx 12/17- prelim neg   Current antibiotics, none    -Zosyn 3.375 q12 (12/17 -12/18 )    -Cefepime 2g q 24 (12/16 - 12/17)    ENDO:  #DM     -Tightened ISS     -Off insulin gtt 12/20      -Lantus 15 units HS      -FSG q4 while on TF    MSK:     Activity - Increase As Tolerated    -B/L knee X ray 12/19- no fractures, b/l effusions     LINES/DRAINS:  PIV, right basilic midline (placed 12/16), martinez (placed 12/16- d/c 12/19), right radial arterial line (placed 12/19)    ADVANCED DIRECTIVES:  Full Code    HCP/Emergency Contact-Sarah Gamino (Wife) [phone]    DISPO  Social work, case management consult for dispo 78y Male s/p mechanical fall. +HT, +AC (Aspirin and Plavix), -LOC    NEURO:  #Acute SDH   -12/18 HCT#4 -stable SDH, no new acute findings  -Q4 neuro checks  #Acute pain    -APAP ATC  #h/o stroke (2/2023) w/residual right sided weakness  - RIGHT anterior thalamus infarct,  LEFT caudate head lacunar infarct  #focal seizure    -vEEG 12/19: No seizures; Discontinued 12/19    - Valproic 500q8 lowered to 500q12;level 51; next level 12/21 16;00     -NCC:valproic acid current dose, q4 neurochecks    -Neurology cs- d/c EEG, 2 days prior to discharge call neurology so they can do a spot EEG, valproic acid to 500 q12 from q8     RESP:   #Oxygenation    -wean from NC to room air as tolerated     -chest PT q4 and duoneb treatments q 4   -consider sputum cx  #Activity    -increase as tolerated    CARDS:   #acute hypotension likely 2/2 volume depltion  -NICOM 13%, 250cc bolus x 1 12/20 ovn  #hx of HTN  -Off cardene gtt  -Amlodipine 10 QD  -Labetalol 200 q8 (Home Coreg 6.25mg q12 )  -Valsartan 320mg HOLDING   #HLD  -hold atorvastatin, unable to crush  #NSVT    -EP/Cards: No arrhythmias on tele only artifact. No further work up needed. Recommend ILR prior to discharge if patient/family agreeable  Imaging:   EKG 12/16: Sinus tach   Echo: 12/2023: EF 66%, G1DD, trace MR, mild TR     GI/NUTR:   #Diet, NPO with Tube Feed  Glucerna 1.2 at goal 55cc/hr, feed duration 24 hrs    -Bedside swallow evaluation failed    -Speech and swallow cs placed 12/20- SLP unable to assess 2/2 lethargy    -PEG placement to be scheduled for Friday; primary team to obtain consent     -Dobhoff @75cm    -aspiration precautions, HOB 30  #GI Prophylaxis    -not indicated   #Bowel regimen     -D/c senna     -Last bowel movement  12/20    /RENAL:   #urine output in critically ill, IVL  -Indwelling catheter removed 12/19, condom cath in place, q shift bladder scan  -UA neg   -Sodium goal 140-150  #Hypernatremia  -FWF 300q4; free water deficit 1.7L      Labs:          BUN/Cr- 56/2.8  -->,  56/2.7  -->          Electrolytes-Na 152 // K 4.0 // Mg -- //  Phos -- (12-21 @ 02:23)    #CKD   -baseline Cr 2.1  -c/w home sodium bicarb 650mg BID  #hx BPH    -cardura 4mg (flomax at home, non crushable)      HEME/ONC:   #DVT prophylaxis     -SCDs. HSQ started per NSGY. Per NSGY, can restart ASA Dec 22  #hx of CVA    -ASA can restart 12/22 as per NSGY    -Plavix HOLDING, will require repeat HCT in 2 wks prior to restart    Labs: Hb/Hct:  8.8/27.0  -->,  8.4/26.4  -->                      Plts:  267  -->,  286  -->                 PTT/INR:        ID:  #recurrent fevers  WBC- 7.95  --->>,  9.02  --->>,  8.41  --->>  Temp trend- 24hrs T(F): 101.1 (12-21 @ 00:00), Max: 101.7 (12-20 @ 20:00)    Cultures    Bcx 12/17- prelim neg   Current antibiotics, none    -Zosyn 3.375 q12 (12/17 -12/18 )    -Cefepime 2g q 24 (12/16 - 12/17)    ENDO:  #DM     -Tightened ISS     -Off insulin gtt 12/20      -Lantus 15 units HS      -FSG q4 while on TF    MSK:     Activity - Increase As Tolerated    -B/L knee X ray 12/19- no fractures, b/l effusions     LINES/DRAINS:  PIV, right basilic midline (placed 12/16), martinez (placed 12/16- d/c 12/19), right radial arterial line (placed 12/19)    ADVANCED DIRECTIVES:  Full Code    HCP/Emergency Contact-Sarah Gamino (Wife) [phone]    DISPO  Social work, case management consult for dispo 78y Male s/p mechanical fall. +HT, +AC (Aspirin and Plavix), -LOC    NEURO:  #Acute SDH   -12/18 HCT#4 -stable SDH, no new acute findings  -Q4 neuro checks  #Acute pain    -APAP ATC  #h/o stroke (2/2023) w/residual right sided weakness  - RIGHT anterior thalamus infarct,  LEFT caudate head lacunar infarct  #focal seizure    -vEEG 12/19: No seizures; Discontinued 12/19    - Valproic 500q8 lowered to 500q12;level 51; next level 12/21 16;00     -NCC:valproic acid current dose, q4 neurochecks    -Neurology cs- d/c EEG, 2 days prior to discharge call neurology so they can do a spot EEG, valproic acid to 500 q12 from q8     RESP:   #Oxygenation    -wean from NC to room air as tolerated     -chest PT q4 and duoneb treatments q 4   -consider sputum cx  #Activity    -increase as tolerated    CARDS:   #acute hypotension likely 2/2 volume depltion  -NICOM 13%, 250cc bolus x 1 12/20 ovn  #hx of HTN  -Off cardene gtt  -Amlodipine 10 QD  -Labetalol 200 q8 (Home Coreg 6.25mg q12 )  -Valsartan 320mg HOLDING   #HLD  -hold atorvastatin, unable to crush  #NSVT    -EP/Cards: No arrhythmias on tele only artifact. No further work up needed. Recommend ILR prior to discharge if patient/family agreeable  Imaging:   EKG 12/16: Sinus tach   Echo: 12/2023: EF 66%, G1DD, trace MR, mild TR     GI/NUTR:   #Diet, NPO with Tube Feed  Glucerna 1.2 at goal 55cc/hr, feed duration 24 hrs    -Bedside swallow evaluation failed    -Speech and swallow cs placed 12/20- SLP unable to assess 2/2 lethargy    -PEG placement to be scheduled for Friday; primary team to obtain consent     -Dobhoff @75cm    -aspiration precautions, HOB 30  #GI Prophylaxis    -not indicated   #Bowel regimen     -D/c senna     -Last bowel movement  12/20    /RENAL:   #urine output in critically ill, IVL  -Indwelling catheter removed 12/19, condom cath in place, q shift bladder scan  -UA neg   -Sodium goal 140-150  #Hypernatremia  -FWF 300q4; free water deficit 1.7L      Labs:          BUN/Cr- 56/2.8  -->,  56/2.7  -->          Electrolytes-Na 152 // K 4.0 // Mg -- //  Phos -- (12-21 @ 02:23)    #CKD   -baseline Cr 2.1  -c/w home sodium bicarb 650mg BID  #hx BPH    -cardura 4mg (flomax at home, non crushable)      HEME/ONC:   #DVT prophylaxis     -SCDs. HSQ started per NSGY. Per NSGY, can restart ASA Dec 22  #hx of CVA    -ASA can restart 12/22 as per NSGY    -Plavix HOLDING, will require repeat HCT in 2 wks prior to restart    Labs: Hb/Hct:  8.8/27.0  -->,  8.4/26.4  -->                      Plts:  267  -->,  286  -->                 PTT/INR:        ID:  #recurrent fevers  WBC- 7.95  --->>,  9.02  --->>,  8.41  --->>  Temp trend- 24hrs T(F): 101.1 (12-21 @ 00:00), Max: 101.7 (12-20 @ 20:00)    Cultures    Bcx 12/17- prelim neg   Current antibiotics, none    -Zosyn 3.375 q12 (12/17 -12/18 )    -Cefepime 2g q 24 (12/16 - 12/17)    ENDO:  #DM     -Tightened ISS     -Off insulin gtt 12/20      -Lantus 15 units HS      -FSG q4 while on TF    MSK:     Activity - Increase As Tolerated    -B/L knee X ray 12/19- no fractures, b/l effusions     LINES/DRAINS:  PIV, right basilic midline (placed 12/16), martinez (placed 12/16- d/c 12/19), right radial arterial line (placed 12/19)    ADVANCED DIRECTIVES:  Full Code    HCP/Emergency Contact-Sarah Gamino (Wife) [phone]    DISPO  Social work, case management consult for dispo 78y Male s/p mechanical fall. +HT, +AC (Aspirin and Plavix), -LOC    NEURO:  #Acute SDH   -12/18 HCT#4 -stable SDH, no new acute findings  -Q4 neuro checks  #Acute pain    -APAP ATC  #h/o stroke (2/2023) w/residual right sided weakness  - RIGHT anterior thalamus infarct,  LEFT caudate head lacunar infarct  #focal seizure    -vEEG 12/19: No seizures; Discontinued 12/19    - Valproic 500q8 lowered to 500q12;level 51; next level 12/21 16;00     -NCC:valproic acid current dose, q4 neurochecks    -Neurology cs- d/c EEG, 2 days prior to discharge call neurology so they can do a spot EEG, valproic acid to 500 q12 from q8     RESP:   #Oxygenation    -wean from NC to room air as tolerated     -chest PT q4 and duoneb treatments q 4   -consider sputum cx  #Activity    -increase as tolerated    CARDS:   #acute hypotension likely 2/2 volume depletion, NICOM responsive    -500cc total, no further events  #hx of HTN  -Amlodipine 10 QD  -Labetalol 200 q8 (Home Coreg 6.25mg q12 )  -Valsartan 320mg HOLDING   #HLD  -hold atorvastatin, unable to crush via dobhoff  #NSVT    -EP/Cards: No arrhythmias on tele only artifact. No further work up needed. Recommend ILR prior to discharge if patient/family agreeable  Imaging:   EKG 12/16: Sinus tach   Echo: 12/2023: EF 66%, G1DD, trace MR, mild TR     GI/NUTR:   #Diet, NPO with Tube Feed  Glucerna 1.2 at goal 55cc/hr, feed duration 24 hrs    -Bedside swallow evaluation failed    -Speech and swallow cs placed 12/20- SLP unable to assess 2/2 lethargy    -PEG placement to be scheduled for Friday; primary team to obtain consent     -Dobhoff @75cm    -aspiration precautions, HOB 30  #GI Prophylaxis    -not indicated   #Bowel regimen     -multiple loose bowel movement, with fever trend, last dose senna 12/19 at 2200, await 48hrs    -no bowel regiment    -Last bowel movement  12/21    /RENAL:   #urine output in critically ill, IVL  -Indwelling catheter removed 12/19, condom cath in place, q shift bladder scan  -UA neg   -Sodium goal 140-150  #fluid status  20 Dec 2023 07:01  -  21 Dec 2023 07:00  IN:    Enteral Tube Flush: 1100 mL    Glucerna: 1235 mL    Insulin: 9 mL    Lactated Ringers Bolus: 750 mL  Total IN: 3094 mL  OUT:    Voided (mL): 1790 mL  Total OUT: 1790 mL  Total NET: 1304 mL  #Hypernatremia  -FWF 300q4; free water deficit 1.7L  #CKD   -baseline Cr 2.1  -c/w home sodium bicarb 650mg BID  #hx BPH    -cardura 4mg (flomax at home, non crushable)      Labs:          BUN/Cr- 56/2.8  -->,  56/2.7  -->          Electrolytes-Na 152 // K 4.0 // Mg -- //  Phos -- (12-21 @ 02:23)      HEME/ONC:   #DVT prophylaxis     -SCDs. HSQ    -d/w NSGY can restart ASA Dec 22  #hx of CVA    -ASA can restart 12/22 as per NSGY    -Plavix HOLDING, will require repeat HCT in 2 wks prior to restart    Labs: Hb/Hct:  8.8/27.0  -->,  8.4/26.4  -->                      Plts:  267  -->,  286  -->                 PTT/INR:        ID:  #recurrent fevers    -no elevated WBC, d/w team Roselyn, can send after 12/21 at 10pm  Cultures    Bcx 12/17- prelim neg   Current antibiotics, none    -Zosyn 3.375 q12 (12/17 -12/18 )    -Cefepime 2g q 24 (12/16 - 12/17)  WBC- 7.95  --->>,  9.02  --->>,  8.41  --->>  Temp trend- 24hrs T(F): 101.1 (12-21 @ 00:00), Max: 101.7 (12-20 @ 20:00)    ENDO:  #DM     -Tightened ISS     -Off insulin gtt 12/20      -Lantus 15 units HS      -FSG q4 while on TF    MSK:     Activity - Increase As Tolerated    -B/L knee X ray 12/19- no fractures, b/l effusions     LINES/DRAINS:  PIV, right basilic midline (placed 12/16), right radial arterial line (placed 12/19)  Discontinued  Aguirre (placed 12/16- d/c 12/19)    ADVANCED DIRECTIVES:  Full Code    HCP/Emergency Contact-Sarah Gamino (Wife) [phone]    DISPO:  pending PEG placement, d/w primary team DVT/ASA hold  Social work, case management consult for dispo 78y Male s/p mechanical fall. +HT, +AC (Aspirin and Plavix), -LOC    NEURO:  #Acute SDH   -12/18 HCT#4 -stable SDH, no new acute findings  -Q4 neuro checks  #Acute pain    -APAP ATC  #h/o stroke (2/2023) w/residual right sided weakness  - RIGHT anterior thalamus infarct,  LEFT caudate head lacunar infarct  #focal seizure    -vEEG 12/19: No seizures; Discontinued 12/19    - Valproic 500q8 lowered to 500q12;level 51; next level 12/21 16;00     -NCC:valproic acid current dose, q4 neurochecks    -Neurology cs- d/c EEG, 2 days prior to discharge call neurology so they can do a spot EEG, valproic acid to 500 q12 from q8     RESP:   #Oxygenation    -wean from NC to room air as tolerated     -chest PT q4 and duoneb treatments q 4   -consider sputum cx  #Activity    -increase as tolerated    CARDS:   #acute hypotension likely 2/2 volume depletion, NICOM responsive    -500cc total, no further events  #hx of HTN  -Amlodipine 10 QD  -Labetalol 200 q8 (Home Coreg 6.25mg q12 )  -Valsartan 320mg HOLDING   #HLD  -hold atorvastatin, unable to crush via dobhoff  #NSVT    -EP/Cards: No arrhythmias on tele only artifact. No further work up needed. Recommend ILR prior to discharge if patient/family agreeable  Imaging:   EKG 12/16: Sinus tach   Echo: 12/2023: EF 66%, G1DD, trace MR, mild TR     GI/NUTR:   #Diet, NPO with Tube Feed  Glucerna 1.2 at goal 55cc/hr, feed duration 24 hrs    -Bedside swallow evaluation failed    -Speech and swallow cs placed 12/20- SLP unable to assess 2/2 lethargy    -PEG placement to be scheduled for Friday; primary team to obtain consent     -Dobhoff @75cm    -aspiration precautions, HOB 30  #GI Prophylaxis    -not indicated   #Bowel regimen     -multiple loose bowel movement, with fever trend, last dose senna 12/19 at 2200, await 48hrs    -no bowel regiment    -Last bowel movement  12/21    /RENAL:   #urine output in critically ill, IVL  -Indwelling catheter removed 12/19, condom cath in place, q shift bladder scan  -UA neg   -Sodium goal 140-150  #fluid status  20 Dec 2023 07:01  -  21 Dec 2023 07:00  IN:    Enteral Tube Flush: 1100 mL    Glucerna: 1235 mL    Insulin: 9 mL    Lactated Ringers Bolus: 750 mL  Total IN: 3094 mL  OUT:    Voided (mL): 1790 mL  Total OUT: 1790 mL  Total NET: 1304 mL  #Hypernatremia  -FWF 300q4; free water deficit 1.7L  #CKD   -baseline Cr 2.1  -c/w home sodium bicarb 650mg BID  #hx BPH    -cardura 4mg (flomax at home, non crushable)      Labs:          BUN/Cr- 56/2.8  -->,  56/2.7  -->          Electrolytes-Na 152 // K 4.0 // Mg -- //  Phos -- (12-21 @ 02:23)      HEME/ONC:   #DVT prophylaxis     -SCDs. HSQ    -d/w NSGY can restart ASA Dec 22  #hx of CVA    -ASA can restart 12/22 as per NSGY    -Plavix HOLDING, will require repeat HCT in 2 wks prior to restart    Labs: Hb/Hct:  8.8/27.0  -->,  8.4/26.4  -->                      Plts:  267  -->,  286  -->                 PTT/INR:        ID:  #recurrent fevers    -no elevated WBC, d/w team Roselyn, can send after 12/21 at 10pm  Cultures    Bcx 12/17- prelim neg   Current antibiotics, none    -Zosyn 3.375 q12 (12/17 -12/18 )    -Cefepime 2g q 24 (12/16 - 12/17)  WBC- 7.95  --->>,  9.02  --->>,  8.41  --->>  Temp trend- 24hrs T(F): 101.1 (12-21 @ 00:00), Max: 101.7 (12-20 @ 20:00)    ENDO:  #DM     -Tightened ISS     -Off insulin gtt 12/20      -Lantus 15 units HS      -FSG q4 while on TF    MSK:     Activity - Increase As Tolerated    -B/L knee X ray 12/19- no fractures, b/l effusions     LINES/DRAINS:  PIV, right basilic midline (placed 12/16), right radial arterial line (placed 12/19)  Discontinued  Aguirre (placed 12/16- d/c 12/19)    ADVANCED DIRECTIVES:  Full Code    HCP/Emergency Contact-Sarah Gamino (Wife) [phone]    DISPO:  pending PEG placement, d/w primary team DVT/ASA hold  Social work, case management consult for dispo

## 2023-12-21 NOTE — PROGRESS NOTE ADULT - ATTENDING COMMENTS
Patient required Cardene drip yesterday for uncontrolled HTN, better this am, off the drip.  Was also on Insulin drip, but off currently.  He looks labored breathing, using accessory muscles.  Patient was in respiratory failure and intubated at the time of rounding.  Na 152 this am.    ASSESSMENT:  79 y/o male, S/P Fall.  Traumatic Left and Right SDHs.   Seizures.  History of CVA with right hemiparesis.  Acute respiratory failure.  Hypertensive urgency.  Uncontrolled DM.  SAGRARIO on CKD.  Dysphagia.  Hypernatremia.    PLAN:  - sedation - use Propofol, Fentanyl prn  - on Vent support, AC , follow ABG, wean FiO2  - keep normotensive  - on tube feeds  - follow serum electrolytes and UOP  - give free water 300 ml q4h via NGT  - on Lantus and ISS; follow FS  - ID: universal precautions  - DVT prophylaxis  Family was at beside and updated on all the above.  Patient will need Tracheostomy and PEG placement.    Informed consent was obtained from patient's wife for the above procedure with  from [facility] after explaining all the risks and benefits of it including but not limited to infection, bleeding and etc. She understood and agreed. All questions were answered. Patient required Cardene drip yesterday for uncontrolled HTN, better this am, off the drip.  Was also on Insulin drip, but off currently.  He looks labored breathing, using accessory muscles.  Patient was in respiratory failure and intubated at the time of rounding.  Na 152 this am.    ASSESSMENT:  77 y/o male, S/P Fall.  Traumatic Left and Right SDHs.   Seizures.  History of CVA with right hemiparesis.  Acute respiratory failure.  Hypertensive urgency.  Uncontrolled DM.  SAGRARIO on CKD.  Dysphagia.  Hypernatremia.    PLAN:  - sedation - use Propofol, Fentanyl prn  - on Vent support, AC , follow ABG, wean FiO2  - keep normotensive  - on tube feeds  - follow serum electrolytes and UOP  - give free water 300 ml q4h via NGT  - on Lantus and ISS; follow FS  - ID: universal precautions  - DVT prophylaxis  Family was at beside and updated on all the above.  Patient will need Tracheostomy and PEG placement.    Informed consent was obtained from patient's wife for the above procedure with  from [facility] after explaining all the risks and benefits of it including but not limited to infection, bleeding and etc. She understood and agreed. All questions were answered.

## 2023-12-22 ENCOUNTER — TRANSCRIPTION ENCOUNTER (OUTPATIENT)
Age: 78
End: 2023-12-22

## 2023-12-22 LAB
ANION GAP SERPL CALC-SCNC: 11 MMOL/L — SIGNIFICANT CHANGE UP (ref 7–14)
ANION GAP SERPL CALC-SCNC: 11 MMOL/L — SIGNIFICANT CHANGE UP (ref 7–14)
ANION GAP SERPL CALC-SCNC: 12 MMOL/L — SIGNIFICANT CHANGE UP (ref 7–14)
ANION GAP SERPL CALC-SCNC: 12 MMOL/L — SIGNIFICANT CHANGE UP (ref 7–14)
BUN SERPL-MCNC: 58 MG/DL — HIGH (ref 10–20)
BUN SERPL-MCNC: 58 MG/DL — HIGH (ref 10–20)
BUN SERPL-MCNC: 62 MG/DL — CRITICAL HIGH (ref 10–20)
BUN SERPL-MCNC: 62 MG/DL — CRITICAL HIGH (ref 10–20)
CALCIUM SERPL-MCNC: 7.5 MG/DL — LOW (ref 8.4–10.4)
CALCIUM SERPL-MCNC: 7.5 MG/DL — LOW (ref 8.4–10.4)
CALCIUM SERPL-MCNC: 7.7 MG/DL — LOW (ref 8.4–10.5)
CALCIUM SERPL-MCNC: 7.7 MG/DL — LOW (ref 8.4–10.5)
CHLORIDE SERPL-SCNC: 112 MMOL/L — HIGH (ref 98–110)
CHLORIDE SERPL-SCNC: 112 MMOL/L — HIGH (ref 98–110)
CHLORIDE SERPL-SCNC: 114 MMOL/L — HIGH (ref 98–110)
CHLORIDE SERPL-SCNC: 114 MMOL/L — HIGH (ref 98–110)
CO2 SERPL-SCNC: 21 MMOL/L — SIGNIFICANT CHANGE UP (ref 17–32)
CREAT SERPL-MCNC: 2.6 MG/DL — HIGH (ref 0.7–1.5)
CREAT SERPL-MCNC: 2.6 MG/DL — HIGH (ref 0.7–1.5)
CREAT SERPL-MCNC: 2.7 MG/DL — HIGH (ref 0.7–1.5)
CREAT SERPL-MCNC: 2.7 MG/DL — HIGH (ref 0.7–1.5)
EGFR: 23 ML/MIN/1.73M2 — LOW
EGFR: 23 ML/MIN/1.73M2 — LOW
EGFR: 24 ML/MIN/1.73M2 — LOW
EGFR: 24 ML/MIN/1.73M2 — LOW
GAS PNL BLDA: SIGNIFICANT CHANGE UP
GAS PNL BLDA: SIGNIFICANT CHANGE UP
GLUCOSE SERPL-MCNC: 177 MG/DL — HIGH (ref 70–99)
GLUCOSE SERPL-MCNC: 177 MG/DL — HIGH (ref 70–99)
GLUCOSE SERPL-MCNC: 191 MG/DL — HIGH (ref 70–99)
GLUCOSE SERPL-MCNC: 191 MG/DL — HIGH (ref 70–99)
GRAM STN FLD: ABNORMAL
GRAM STN FLD: ABNORMAL
HCT VFR BLD CALC: 24.5 % — LOW (ref 42–52)
HCT VFR BLD CALC: 24.5 % — LOW (ref 42–52)
HGB BLD-MCNC: 8 G/DL — LOW (ref 14–18)
HGB BLD-MCNC: 8 G/DL — LOW (ref 14–18)
MAGNESIUM SERPL-MCNC: 3 MG/DL — HIGH (ref 1.8–2.4)
MAGNESIUM SERPL-MCNC: 3 MG/DL — HIGH (ref 1.8–2.4)
MCHC RBC-ENTMCNC: 29.9 PG — SIGNIFICANT CHANGE UP (ref 27–31)
MCHC RBC-ENTMCNC: 29.9 PG — SIGNIFICANT CHANGE UP (ref 27–31)
MCHC RBC-ENTMCNC: 32.7 G/DL — SIGNIFICANT CHANGE UP (ref 32–37)
MCHC RBC-ENTMCNC: 32.7 G/DL — SIGNIFICANT CHANGE UP (ref 32–37)
MCV RBC AUTO: 91.4 FL — SIGNIFICANT CHANGE UP (ref 80–94)
MCV RBC AUTO: 91.4 FL — SIGNIFICANT CHANGE UP (ref 80–94)
MRSA PCR RESULT.: NEGATIVE — SIGNIFICANT CHANGE UP
MRSA PCR RESULT.: NEGATIVE — SIGNIFICANT CHANGE UP
NRBC # BLD: 0 /100 WBCS — SIGNIFICANT CHANGE UP (ref 0–0)
NRBC # BLD: 0 /100 WBCS — SIGNIFICANT CHANGE UP (ref 0–0)
PHOSPHATE SERPL-MCNC: 6.2 MG/DL — HIGH (ref 2.1–4.9)
PHOSPHATE SERPL-MCNC: 6.2 MG/DL — HIGH (ref 2.1–4.9)
PLATELET # BLD AUTO: 252 K/UL — SIGNIFICANT CHANGE UP (ref 130–400)
PLATELET # BLD AUTO: 252 K/UL — SIGNIFICANT CHANGE UP (ref 130–400)
PMV BLD: 9.2 FL — SIGNIFICANT CHANGE UP (ref 7.4–10.4)
PMV BLD: 9.2 FL — SIGNIFICANT CHANGE UP (ref 7.4–10.4)
POTASSIUM SERPL-MCNC: 4.6 MMOL/L — SIGNIFICANT CHANGE UP (ref 3.5–5)
POTASSIUM SERPL-MCNC: 4.6 MMOL/L — SIGNIFICANT CHANGE UP (ref 3.5–5)
POTASSIUM SERPL-MCNC: 4.7 MMOL/L — SIGNIFICANT CHANGE UP (ref 3.5–5)
POTASSIUM SERPL-MCNC: 4.7 MMOL/L — SIGNIFICANT CHANGE UP (ref 3.5–5)
POTASSIUM SERPL-SCNC: 4.6 MMOL/L — SIGNIFICANT CHANGE UP (ref 3.5–5)
POTASSIUM SERPL-SCNC: 4.6 MMOL/L — SIGNIFICANT CHANGE UP (ref 3.5–5)
POTASSIUM SERPL-SCNC: 4.7 MMOL/L — SIGNIFICANT CHANGE UP (ref 3.5–5)
POTASSIUM SERPL-SCNC: 4.7 MMOL/L — SIGNIFICANT CHANGE UP (ref 3.5–5)
RBC # BLD: 2.68 M/UL — LOW (ref 4.7–6.1)
RBC # BLD: 2.68 M/UL — LOW (ref 4.7–6.1)
RBC # FLD: 14.5 % — SIGNIFICANT CHANGE UP (ref 11.5–14.5)
RBC # FLD: 14.5 % — SIGNIFICANT CHANGE UP (ref 11.5–14.5)
SODIUM SERPL-SCNC: 144 MMOL/L — SIGNIFICANT CHANGE UP (ref 135–146)
SODIUM SERPL-SCNC: 144 MMOL/L — SIGNIFICANT CHANGE UP (ref 135–146)
SODIUM SERPL-SCNC: 147 MMOL/L — HIGH (ref 135–146)
SODIUM SERPL-SCNC: 147 MMOL/L — HIGH (ref 135–146)
SPECIMEN SOURCE: SIGNIFICANT CHANGE UP
SPECIMEN SOURCE: SIGNIFICANT CHANGE UP
WBC # BLD: 13.71 K/UL — HIGH (ref 4.8–10.8)
WBC # BLD: 13.71 K/UL — HIGH (ref 4.8–10.8)
WBC # FLD AUTO: 13.71 K/UL — HIGH (ref 4.8–10.8)
WBC # FLD AUTO: 13.71 K/UL — HIGH (ref 4.8–10.8)

## 2023-12-22 PROCEDURE — 99291 CRITICAL CARE FIRST HOUR: CPT | Mod: 25

## 2023-12-22 PROCEDURE — 43246 EGD PLACE GASTROSTOMY TUBE: CPT

## 2023-12-22 PROCEDURE — 31600 PLANNED TRACHEOSTOMY: CPT

## 2023-12-22 PROCEDURE — 71045 X-RAY EXAM CHEST 1 VIEW: CPT | Mod: 26

## 2023-12-22 RX ORDER — VALPROIC ACID (AS SODIUM SALT) 250 MG/5ML
500 SOLUTION, ORAL ORAL ONCE
Refills: 0 | Status: DISCONTINUED | OUTPATIENT
Start: 2023-12-22 | End: 2023-12-22

## 2023-12-22 RX ORDER — HEPARIN SODIUM 5000 [USP'U]/ML
5000 INJECTION INTRAVENOUS; SUBCUTANEOUS ONCE
Refills: 0 | Status: COMPLETED | OUTPATIENT
Start: 2023-12-22 | End: 2023-12-22

## 2023-12-22 RX ORDER — NOREPINEPHRINE BITARTRATE/D5W 8 MG/250ML
0.05 PLASTIC BAG, INJECTION (ML) INTRAVENOUS
Qty: 8 | Refills: 0 | Status: DISCONTINUED | OUTPATIENT
Start: 2023-12-22 | End: 2023-12-22

## 2023-12-22 RX ORDER — VALPROIC ACID (AS SODIUM SALT) 250 MG/5ML
250 SOLUTION, ORAL ORAL EVERY 6 HOURS
Refills: 0 | Status: COMPLETED | OUTPATIENT
Start: 2023-12-22 | End: 2023-12-23

## 2023-12-22 RX ORDER — VANCOMYCIN HCL 1 G
750 VIAL (EA) INTRAVENOUS EVERY 12 HOURS
Refills: 0 | Status: DISCONTINUED | OUTPATIENT
Start: 2023-12-22 | End: 2023-12-22

## 2023-12-22 RX ORDER — SODIUM CHLORIDE 9 MG/ML
500 INJECTION, SOLUTION INTRAVENOUS ONCE
Refills: 0 | Status: COMPLETED | OUTPATIENT
Start: 2023-12-22 | End: 2023-12-22

## 2023-12-22 RX ORDER — VANCOMYCIN HCL 1 G
500 VIAL (EA) INTRAVENOUS EVERY 24 HOURS
Refills: 0 | Status: DISCONTINUED | OUTPATIENT
Start: 2023-12-22 | End: 2023-12-22

## 2023-12-22 RX ORDER — ASPIRIN/CALCIUM CARB/MAGNESIUM 324 MG
81 TABLET ORAL DAILY
Refills: 0 | Status: DISCONTINUED | OUTPATIENT
Start: 2023-12-23 | End: 2024-01-12

## 2023-12-22 RX ORDER — SODIUM CHLORIDE 9 MG/ML
250 INJECTION, SOLUTION INTRAVENOUS ONCE
Refills: 0 | Status: DISCONTINUED | OUTPATIENT
Start: 2023-12-22 | End: 2023-12-22

## 2023-12-22 RX ORDER — PANTOPRAZOLE SODIUM 20 MG/1
40 TABLET, DELAYED RELEASE ORAL EVERY 24 HOURS
Refills: 0 | Status: DISCONTINUED | OUTPATIENT
Start: 2023-12-22 | End: 2024-01-01

## 2023-12-22 RX ORDER — CALCIUM GLUCONATE 100 MG/ML
1 VIAL (ML) INTRAVENOUS ONCE
Refills: 0 | Status: COMPLETED | OUTPATIENT
Start: 2023-12-22 | End: 2023-12-22

## 2023-12-22 RX ADMIN — Medication 650 MILLIGRAM(S): at 05:40

## 2023-12-22 RX ADMIN — Medication 1 APPLICATION(S): at 05:10

## 2023-12-22 RX ADMIN — Medication 100 MILLIGRAM(S): at 10:49

## 2023-12-22 RX ADMIN — Medication 650 MILLIGRAM(S): at 12:39

## 2023-12-22 RX ADMIN — AMLODIPINE BESYLATE 10 MILLIGRAM(S): 2.5 TABLET ORAL at 05:10

## 2023-12-22 RX ADMIN — PIPERACILLIN AND TAZOBACTAM 25 GRAM(S): 4; .5 INJECTION, POWDER, LYOPHILIZED, FOR SOLUTION INTRAVENOUS at 10:49

## 2023-12-22 RX ADMIN — PIPERACILLIN AND TAZOBACTAM 25 GRAM(S): 4; .5 INJECTION, POWDER, LYOPHILIZED, FOR SOLUTION INTRAVENOUS at 02:14

## 2023-12-22 RX ADMIN — Medication 5.78 MICROGRAM(S)/KG/MIN: at 16:15

## 2023-12-22 RX ADMIN — SODIUM CHLORIDE 1000 MILLILITER(S): 9 INJECTION, SOLUTION INTRAVENOUS at 09:09

## 2023-12-22 RX ADMIN — Medication 3 MILLILITER(S): at 19:10

## 2023-12-22 RX ADMIN — Medication 100 GRAM(S): at 05:08

## 2023-12-22 RX ADMIN — HEPARIN SODIUM 5000 UNIT(S): 5000 INJECTION INTRAVENOUS; SUBCUTANEOUS at 22:05

## 2023-12-22 RX ADMIN — Medication 500 MILLIGRAM(S): at 05:09

## 2023-12-22 RX ADMIN — Medication 3: at 06:35

## 2023-12-22 RX ADMIN — SODIUM CHLORIDE 1000 MILLILITER(S): 9 INJECTION, SOLUTION INTRAVENOUS at 17:44

## 2023-12-22 RX ADMIN — Medication 3: at 17:42

## 2023-12-22 RX ADMIN — Medication 52.5 MILLIGRAM(S): at 21:10

## 2023-12-22 RX ADMIN — PIPERACILLIN AND TAZOBACTAM 25 GRAM(S): 4; .5 INJECTION, POWDER, LYOPHILIZED, FOR SOLUTION INTRAVENOUS at 16:59

## 2023-12-22 RX ADMIN — Medication 200 MILLIGRAM(S): at 06:36

## 2023-12-22 RX ADMIN — PANTOPRAZOLE SODIUM 40 MILLIGRAM(S): 20 TABLET, DELAYED RELEASE ORAL at 05:10

## 2023-12-22 RX ADMIN — SODIUM CHLORIDE 100 MILLILITER(S): 9 INJECTION, SOLUTION INTRAVENOUS at 21:10

## 2023-12-22 RX ADMIN — Medication 3: at 22:09

## 2023-12-22 RX ADMIN — Medication 250 MILLIGRAM(S): at 00:21

## 2023-12-22 RX ADMIN — CHLORHEXIDINE GLUCONATE 15 MILLILITER(S): 213 SOLUTION TOPICAL at 17:43

## 2023-12-22 RX ADMIN — CHLORHEXIDINE GLUCONATE 1 APPLICATION(S): 213 SOLUTION TOPICAL at 06:41

## 2023-12-22 RX ADMIN — CHLORHEXIDINE GLUCONATE 15 MILLILITER(S): 213 SOLUTION TOPICAL at 05:10

## 2023-12-22 RX ADMIN — HEPARIN SODIUM 5000 UNIT(S): 5000 INJECTION INTRAVENOUS; SUBCUTANEOUS at 09:19

## 2023-12-22 RX ADMIN — Medication 650 MILLIGRAM(S): at 05:10

## 2023-12-22 RX ADMIN — SODIUM CHLORIDE 100 MILLILITER(S): 9 INJECTION, SOLUTION INTRAVENOUS at 00:21

## 2023-12-22 RX ADMIN — Medication 7: at 02:17

## 2023-12-22 RX ADMIN — PIPERACILLIN AND TAZOBACTAM 25 GRAM(S): 4; .5 INJECTION, POWDER, LYOPHILIZED, FOR SOLUTION INTRAVENOUS at 22:06

## 2023-12-22 RX ADMIN — Medication 1 APPLICATION(S): at 17:44

## 2023-12-22 NOTE — PROGRESS NOTE ADULT - ASSESSMENT
78yM who presented as a TRAUMA CONSULT s/p mechanical fall (+HT -LOC -AC). Found to have an acute subdural hemorrhage. Hospital course has been complicated by multiple seizures and uncontrolled hypertension. Intubated on 12/21 due to worsening respiratory status.     Scheduled for tracheostomy and PEG placement today w/ Dr Roman.     PLAN:  - Will f/u OR   - Continue Zosyn  - Hold TF 6 hours after OR   - F/u ID consult   - Monitor vitals, I&Os, labs   - Replace electrolytes as needed   - Resume ASA after procedure   - Pain control PRN   - DVT ppx with HSQ     # Dispo: OR 12/22 for tracheostomy and PEG placement       Trauma Team Surgery  [phone]   78yM who presented as a TRAUMA CONSULT s/p mechanical fall (+HT -LOC -AC). Found to have an acute subdural hemorrhage. Hospital course has been complicated by multiple seizures and uncontrolled hypertension. Intubated on 12/21 due to worsening respiratory status.     Scheduled for tracheostomy and PEG placement today w/ Dr Roman.     PLAN:  - Will f/u OR   - Continue Zosyn  - Hold TF 6 hours after OR   - F/u ID consult   - Monitor vitals, I&Os, labs   - Replace electrolytes as needed   - Resume ASA after procedure   - Pain control PRN   - DVT ppx with HSQ     # Dispo: OR 12/22 for tracheostomy and PEG placement       Trauma Team Surgery  [phone]

## 2023-12-22 NOTE — PROGRESS NOTE ADULT - ATTENDING COMMENTS
Patient intubated since yesterday for respiratory failure possibly due to PNA.  On Vent support, AC, Vt 350ml, RR 14, FiO2 40%, PEEP 5.  ABG 7.39/36/161  On Precedex, sedated.  Had fever last night to 102F, started on Zosyn and Vanco. Patient intubated since yesterday for respiratory failure possibly due to PNA.  On Vent support, AC, Vt 350ml, RR 14, FiO2 40%, PEEP 5.  ABG 7.39/36/161  On Precedex, sedated.  Had fever last night to 102F, started on Zosyn and Vanco.  Na 147, better.    ASSESSMENT:  77 y/o male, S/P Fall.  Traumatic Left and Right SDHs.   Seizures.  History of CVA with right hemiparesis.  Acute respiratory failure.  Hypertensive urgency.  Uncontrolled DM.  SAGRARIO on CKD.  Dysphagia.  Hypernatremia.    PLAN:  - sedation - on Precedex, use Fentanyl prn  - continue Vent support - will have tracheostomy today  - keep MAP>65; give iv bolus 500 ml  - on tube feeds on hold for OR today  - follow serum electrolytes and UOP  - continue free water 300 ml q4h via NGT  - ID: on Zosyn and Vanco  - DVT prophylaxis    Patient is scheduled today for Trach and PEG. Consent in chart.  Patient's family updated at beside,  from Roni # [id number] Patient intubated since yesterday for respiratory failure possibly due to PNA.  On Vent support, AC, Vt 350ml, RR 14, FiO2 40%, PEEP 5.  ABG 7.39/36/161  On Precedex, sedated.  Had fever last night to 102F, started on Zosyn and Vanco.  Na 147, better.    ASSESSMENT:  79 y/o male, S/P Fall.  Traumatic Left and Right SDHs.   Seizures.  History of CVA with right hemiparesis.  Acute respiratory failure.  Hypertensive urgency.  Uncontrolled DM.  SAGRARIO on CKD.  Dysphagia.  Hypernatremia.    PLAN:  - sedation - on Precedex, use Fentanyl prn  - continue Vent support - will have tracheostomy today  - keep MAP>65; give iv bolus 500 ml  - on tube feeds on hold for OR today  - follow serum electrolytes and UOP  - continue free water 300 ml q4h via NGT  - ID: on Zosyn and Vanco  - DVT prophylaxis    Patient is scheduled today for Trach and PEG. Consent in chart.  Patient's family updated at beside,  from Roni # [id number]

## 2023-12-22 NOTE — BRIEF OPERATIVE NOTE - PRIMARY SURGEON
Dr. Roman Dr. Roman [facility] Dr. Roman [facility]

## 2023-12-22 NOTE — CHART NOTE - NSCHARTNOTEFT_GEN_A_CORE
Post Operative Note  Patient: JACQUELIN CAI 78y (1945) Male   MRN: [id number]  Location: [place] [address] A  Visit: 12-16-23 Inpatient  Date: 12-22-23 @ 17:03    Procedure: Tracheostomy with PEG tube insertion   -Size 9 portex cuffed tracheotomy, EGD and 20Fr PEG insertion with direct visualization, 2cm at skin.  EBL 3cc, , Duration 1 hour 30 minutes,               Physical Examination:  General Appearance: NAD, ***   HEENT: EOMI, sclera non-icteric.  Heart: RRR  Lungs: CTABL  Abdomen:  Soft, *** tender, appropriate for post-op course, nondistended. No rigidity, guarding, or rebound tenderness.   MSK/Extremities: Warm & well-perfused. Peripheral pulses intact.  Skin: Warm, dry. No jaundice.   Incisions/Wounds: Dressings in place, clean, dry and intact, no signs of infection/active bleeding/drainage    Medications: [Standing]  acetaminophen     Tablet .. 650 milliGRAM(s) Oral every 6 hours  albuterol/ipratropium for Nebulization 3 milliLiter(s) Nebulizer every 4 hours  atorvastatin 20 milliGRAM(s) Oral at bedtime  bacitracin   Ointment 1 Application(s) Topical every 12 hours  chlorhexidine 0.12% Liquid 15 milliLiter(s) Oral Mucosa two times a day  chlorhexidine 2% Cloths 1 Application(s) Topical <User Schedule>  dexMEDEtomidine Infusion 0.3 MICROgram(s)/kG/Hr IV Continuous <Continuous>  doxazosin 4 milliGRAM(s) Oral at bedtime  heparin   Injectable 5000 Unit(s) SubCutaneous every 8 hours  insulin lispro (ADMELOG) corrective regimen sliding scale   SubCutaneous every 4 hours  labetalol 200 milliGRAM(s) Oral every 8 hours  lactated ringers Bolus 500 milliLiter(s) IV Bolus once  norepinephrine Infusion 0.05 MICROgram(s)/kG/Min IV Continuous <Continuous>  pantoprazole  Injectable 40 milliGRAM(s) IV Push every 24 hours  piperacillin/tazobactam IVPB.- 3.375 Gram(s) IV Intermittent once  piperacillin/tazobactam IVPB.. 3.375 Gram(s) IV Intermittent every 8 hours  sodium chloride 0.45%. 1000 milliLiter(s) IV Continuous <Continuous>  valproic  acid Syrup 500 milliGRAM(s) Oral every 12 hours    Medications: [PRN]  acetaminophen     Tablet .. 650 milliGRAM(s) Oral every 6 hours  albuterol/ipratropium for Nebulization 3 milliLiter(s) Nebulizer every 4 hours  atorvastatin 20 milliGRAM(s) Oral at bedtime  bacitracin   Ointment 1 Application(s) Topical every 12 hours  chlorhexidine 0.12% Liquid 15 milliLiter(s) Oral Mucosa two times a day  chlorhexidine 2% Cloths 1 Application(s) Topical <User Schedule>  dexMEDEtomidine Infusion 0.3 MICROgram(s)/kG/Hr IV Continuous <Continuous>  doxazosin 4 milliGRAM(s) Oral at bedtime  heparin   Injectable 5000 Unit(s) SubCutaneous every 8 hours  insulin lispro (ADMELOG) corrective regimen sliding scale   SubCutaneous every 4 hours  labetalol 200 milliGRAM(s) Oral every 8 hours  lactated ringers Bolus 500 milliLiter(s) IV Bolus once  norepinephrine Infusion 0.05 MICROgram(s)/kG/Min IV Continuous <Continuous>  pantoprazole  Injectable 40 milliGRAM(s) IV Push every 24 hours  piperacillin/tazobactam IVPB.- 3.375 Gram(s) IV Intermittent once  piperacillin/tazobactam IVPB.. 3.375 Gram(s) IV Intermittent every 8 hours  sodium chloride 0.45%. 1000 milliLiter(s) IV Continuous <Continuous>  valproic  acid Syrup 500 milliGRAM(s) Oral every 12 hours    Labs:                        8.1    9.69  )-----------( 285      ( 21 Dec 2023 21:04 )             26.0     12-22    147<H>  |  114<H>  |  62<HH>  ----------------------------<  191<H>  4.6   |  21  |  2.7<H>    Ca    7.7<L>      22 Dec 2023 04:50  Phos  4.7     12-21  Mg     2.7     12-21    TPro  x   /  Alb  2.6<L>  /  TBili  x   /  DBili  x   /  AST  x   /  ALT  x   /  AlkPhos  x   12-21    PT/INR - ( 21 Dec 2023 21:04 )   PT: 11.90 sec;   INR: 1.04 ratio         PTT - ( 21 Dec 2023 21:04 )  PTT:42.5 sec        Imaging:  No post-op imaging studies    Assessment:  78yMale patient S/P JACQUELIN CAI    Plan:  ***  - Monitor vitals  - Monitor post-op labs and replete as necessary  - Monitor for bowel function  - Continue Pain Medications if necessary  - Continue Antibiotics if necessary  - Encourage ambulation as tolerated  - Monitor urine output and trial of void once Aguirre removed  - DVT and GI Prophylaxis  - Monitor wound and dressing for changes, redress as needed.      Date/Time: 12-22-23 @ 17:04 Post Operative Note  Patient: JACQUELIN CAI 78y (1945) Male   MRN: [id number]  Location: [place] [address] A  Visit: 12-16-23 Inpatient  Date: 12-22-23 @ 17:03    Procedure: Tracheostomy with PEG tube insertion   -Size 9 portex cuffed tracheotomy, EGD and 20Fr PEG insertion with direct visualization, 2cm at skin.  EBL 3cc, , Duration 1 hour 30 minutes,               Physical Examination:  General Appearance: NAD, ***   HEENT: EOMI, sclera non-icteric.  Heart: RRR  Lungs: CTABL  Abdomen:  Soft, *** tender, appropriate for post-op course, nondistended. No rigidity, guarding, or rebound tenderness.   MSK/Extremities: Warm & well-perfused. Peripheral pulses intact.  Skin: Warm, dry. No jaundice.   Incisions/Wounds: Dressings in place, clean, dry and intact, no signs of infection/active bleeding/drainage    Medications: [Standing]  acetaminophen     Tablet .. 650 milliGRAM(s) Oral every 6 hours  albuterol/ipratropium for Nebulization 3 milliLiter(s) Nebulizer every 4 hours  atorvastatin 20 milliGRAM(s) Oral at bedtime  bacitracin   Ointment 1 Application(s) Topical every 12 hours  chlorhexidine 0.12% Liquid 15 milliLiter(s) Oral Mucosa two times a day  chlorhexidine 2% Cloths 1 Application(s) Topical <User Schedule>  dexMEDEtomidine Infusion 0.3 MICROgram(s)/kG/Hr IV Continuous <Continuous>  doxazosin 4 milliGRAM(s) Oral at bedtime  heparin   Injectable 5000 Unit(s) SubCutaneous every 8 hours  insulin lispro (ADMELOG) corrective regimen sliding scale   SubCutaneous every 4 hours  labetalol 200 milliGRAM(s) Oral every 8 hours  lactated ringers Bolus 500 milliLiter(s) IV Bolus once  norepinephrine Infusion 0.05 MICROgram(s)/kG/Min IV Continuous <Continuous>  pantoprazole  Injectable 40 milliGRAM(s) IV Push every 24 hours  piperacillin/tazobactam IVPB.- 3.375 Gram(s) IV Intermittent once  piperacillin/tazobactam IVPB.. 3.375 Gram(s) IV Intermittent every 8 hours  sodium chloride 0.45%. 1000 milliLiter(s) IV Continuous <Continuous>  valproic  acid Syrup 500 milliGRAM(s) Oral every 12 hours    Medications: [PRN]  acetaminophen     Tablet .. 650 milliGRAM(s) Oral every 6 hours  albuterol/ipratropium for Nebulization 3 milliLiter(s) Nebulizer every 4 hours  atorvastatin 20 milliGRAM(s) Oral at bedtime  bacitracin   Ointment 1 Application(s) Topical every 12 hours  chlorhexidine 0.12% Liquid 15 milliLiter(s) Oral Mucosa two times a day  chlorhexidine 2% Cloths 1 Application(s) Topical <User Schedule>  dexMEDEtomidine Infusion 0.3 MICROgram(s)/kG/Hr IV Continuous <Continuous>  doxazosin 4 milliGRAM(s) Oral at bedtime  heparin   Injectable 5000 Unit(s) SubCutaneous every 8 hours  insulin lispro (ADMELOG) corrective regimen sliding scale   SubCutaneous every 4 hours  labetalol 200 milliGRAM(s) Oral every 8 hours  lactated ringers Bolus 500 milliLiter(s) IV Bolus once  norepinephrine Infusion 0.05 MICROgram(s)/kG/Min IV Continuous <Continuous>  pantoprazole  Injectable 40 milliGRAM(s) IV Push every 24 hours  piperacillin/tazobactam IVPB.- 3.375 Gram(s) IV Intermittent once  piperacillin/tazobactam IVPB.. 3.375 Gram(s) IV Intermittent every 8 hours  sodium chloride 0.45%. 1000 milliLiter(s) IV Continuous <Continuous>  valproic  acid Syrup 500 milliGRAM(s) Oral every 12 hours    Labs:                        8.1    9.69  )-----------( 285      ( 21 Dec 2023 21:04 )             26.0     12-22    147<H>  |  114<H>  |  62<HH>  ----------------------------<  191<H>  4.6   |  21  |  2.7<H>    Ca    7.7<L>      22 Dec 2023 04:50  Phos  4.7     12-21  Mg     2.7     12-21    TPro  x   /  Alb  2.6<L>  /  TBili  x   /  DBili  x   /  AST  x   /  ALT  x   /  AlkPhos  x   12-21    PT/INR - ( 21 Dec 2023 21:04 )   PT: 11.90 sec;   INR: 1.04 ratio         PTT - ( 21 Dec 2023 21:04 )  PTT:42.5 sec        Imaging:  No post-op imaging studies    Assessment:  78yMale patient S/P JACQUELIN CAI    Plan:  ***  - Monitor vitals  - Monitor post-op labs and replete as necessary  - Monitor for bowel function  - Continue Pain Medications if necessary  - Continue Antibiotics if necessary  - Encourage ambulation as tolerated  - Monitor urine output and trial of void once Aguirre removed  - DVT and GI Prophylaxis  - Monitor wound and dressing for changes, redress as needed.      Date/Time: 12-22-23 @ 17:04 Post Operative Note  Patient: JACQUELIN CAI 78y (1945) Male   MRN: [id number]  Location: [place] [address] A  Visit: 12-16-23 Inpatient  Date: 12-22-23 @ 17:03    Procedure: Tracheostomy with PEG tube insertion   -Size 9 portex cuffed tracheotomy, EGD and 20Fr PEG insertion with direct visualization, 2cm at skin.  Per anesthesia sign out, EBL 3cc, , Duration 1 hour 30 minutes, U/O NA     Physical Examination:  General Appearance: Sedated at this time with Precedex 0.03  Heart: RRR. On levophed @0.05   Lungs: Size 9 trach in place, 350/14/40/5. CTABL  Abdomen:  Soft, nontender, nondistended. +20 Fr PEG in place, 2 cm at skin, soft, c/d/i.   MSK/Extremities: Warm & well-perfused. Peripheral pulses intact.      Imaging:  No post-op imaging labs or studies indicated per SICU attending     Assessment:  78y Male patient S/P tracheostomy with PEG tube insertion     Plan:  - Ok to use PEG at midnight ; plan to resume tube feeds at midnight   - Wean levophed gtt as tolerated; 500 cc bolus to be given   - Wean Precedex as tolerated    - Monitor vitals  - Monitor for bowel function  - Continue Pain Medications   - Continue Antibiotics   - Monitor urine output   - DVT and GI Prophylaxis Post Operative Note  Patient: JACQUELIN CAI 78y (1945) Male   MRN: [id number]  Location: [place] [address] A  Visit: 12-16-23 Inpatient  Date: 12-22-23 @ 17:03    Procedure: Tracheostomy with PEG tube insertion   -Size 9 portex cuffed tracheotomy, EGD and 20Fr PEG insertion with direct visualization, 2cm at skin.  Per anesthesia sign out, EBL 3cc, , Duration 1 hour 30 minutes, U/O NA     Physical Examination:  General Appearance: Sedated at this time with Precedex 0.03  Heart: RRR. On levophed @0.05   Lungs: Size 9 trach in place, 350/14/40/5. CTABL  Abdomen:  Soft, nontender, nondistended. +20 Fr PEG in place, 2 cm at skin, soft, c/d/i.   MSK/Extremities: Warm & well-perfused. Peripheral pulses intact.      Imaging:  No post-op imaging labs or studies indicated per SICU attending     Assessment:  78y Male patient S/P tracheostomy with PEG tube insertion     Plan:  - Ok to use PEG at midnight ; plan to resume tube feeds at midnight   - Wean levophed gtt as tolerated; 500 cc bolus to be given   - Wean Precedex as tolerated    - Monitor vitals  - Monitor for bowel function  - Continue Pain Medications   - Continue Antibiotics   - Monitor urine output   - DVT and GI Prophylaxis

## 2023-12-22 NOTE — PROGRESS NOTE ADULT - SUBJECTIVE AND OBJECTIVE BOX
TRAUMA SURGERY PROGRESS NOTE    Patient: JACQUELIN CAI , 78y ([birth year])Male   MRN: [id number]  Location: [address]  Visit: 12-16-23 Inpatient  Date: 12-22-23 @ 11:48    Hospital Day #: 8    DIAGNOSIS / PROCEDURES:   * Acute subdural hemorrhage     INTERVAL HX:   On minimal vent settings 40/5. Going to OR for trach and peg this afternoon. New consent form in chart. Patient had multiple loose bowel movements yesterday, febrile w/ Tmax 102.4. Pending C. diff. Trach cultures growing GNR, GPR, GPC. Zosyn started.     VITALS:   T(F): 98.2 (12-22-23 @ 07:00), Max: 102.4 (12-21-23 @ 18:00)  HR: 66 (12-22-23 @ 10:00)  BP: 103/55 (12-22-23 @ 10:00)  RR: 18 (12-22-23 @ 10:00)  SpO2: 99% (12-22-23 @ 10:00)  Mode: AC/ CMV (Assist Control/ Continuous Mandatory Ventilation), RR (machine): 14, TV (machine): 350, FiO2: 40, PEEP: 5, ITime: 1, MAP: 9, PIP: 15    DIET:   Diet, NPO after Midnight:      NPO Start Date: 21-Dec-2023,   NPO Start Time: 23:59    Diet, NPO with Tube Feed:   Tube Feeding Modality: Nasogastric  Glucerna 1.2 Tonio  Total Volume for 24 Hours (mL): 1320  Continuous  Starting Tube Feed Rate mL per Hour: 55  Until Goal Tube Feed Rate (mL per Hour): 55  Tube Feed Duration (in Hours): 24  Tube Feed Start Time: 12:00  Free Water Flush  Bolus   Total Volume per Flush (mL): 300   Frequency: Every 4 Hours      FLUIDS:   sodium chloride 0.45%.: Solution, 1000 milliLiter(s) infuse at 100 mL/Hr        12-21 @ 07:01  -  12-22 @ 07:00  --------------------------------------------------------  OUT:    Indwelling Catheter - Urethral (mL): 1386 mL  Total OUT: 1386 mL      12-22 @ 07:01  -  12-22 @ 11:48  --------------------------------------------------------  OUT:    Indwelling Catheter - Urethral (mL): 180 mL    Nasogastric/Oral tube (mL): 0 mL  Total OUT: 180 mL      GI PPX:  pantoprazole  Injectable 40 milliGRAM(s) IV Push every 24 hours    AC/DVT PPX:  heparin   Injectable 5000 Unit(s) SubCutaneous every 8 hours    ABx: piperacillin/tazobactam IVPB.. 3.375 Gram(s) IV Intermittent every 8 hours      Bowel Movement: : [X] YES [] NO  Flatus: : [X] YES [] NO    PHYSICAL EXAM:  General Appearance: sedated, intubated   HEENT: EOMI, sclera non-icteric.  Heart: regular rate   Lungs: Equal chest rise bilateral  Abdomen:  Soft, nontender, nondistended.   MSK/Extremities: Warm & well-perfused.   Skin: Warm, dry. No jaundice.   Neuro: GCS 9T (E=3, V=1T, M=6)       LABS:                        8.1    9.69  )-----------( 285       12-21 @ 21:04             26.0     147  |  114  |  62  ----------------------------<  191        12-22 @ 04:50  4.6   |  21  |  2.7      Coagulation Studies - 12-21 @ 21:04  PT: 11.90 sec  PTT: 42.5 sec<H>  INR: 1.04 ratio    Calcium: 7.7 mg/dL *L* (12-22 @ 04:50)  Magnesium: 2.7 mg/dL *H* (12-21 @ 21:04)  Phosphorus: 4.7 mg/dL (12-21 @ 21:04)      LIVER FUNCTIONS - ( 21 Dec 2023 21:04 )  Alb: 2.6 g/dL / Pro: x     / ALK PHOS: x     / ALT: x     / AST: x     / GGT: x              (collected 12-21 @ 18:14)  Source: ET Tube ET Tube     (collected 12-21 @ 07:00)  Source: .Sputum Sputum     (collected 12-18 @ 00:30)  Source: .Blood Blood  Preliminary Report (12-21 @ 14:00):    No growth at 72 Hours        RADIOLOGY & OTHER STUDIES:   Xray Chest 1 View- PORTABLE-Routine (12.22.23 @ 06:36) >  Findings:    Support devices: Enteric catheter extends below the hemidiaphragm.   Endotracheal tube is above the more. Telemetry leads are seen    Cardiac/mediastinum/hilum: Unremarkable.    Lung parenchyma/Pleura: Within normal limits.    Skeleton/soft tissues: Unremarkable.    Impression:    Low lung volumes leads to magnification of the pulmonary interstitium.   Support devices as described. Stable.    --- End of Report ---      ACCESS DEVICES:  [X] Peripheral IV  [ ] Central Venous Line	[ ] R	[ ] L	[ ] IJ	[ ] Fem	[ ] SC	Placed:   [ ] Arterial Line		[ ] R	[ ] L	[ ] Fem	[ ] Rad	[ ] Ax	Placed:   [ ] PICC:					[ ] Mediport  [X] Urinary Catheter,  Date Placed:   12/21  [ ] Chest tube: [ ] Right, [ ] Left  [ ] MARIA LUISA/Ronald Drains  [X] ETT   TRAUMA SURGERY PROGRESS NOTE    Patient: JACQUELIN CAI , 78y ([birth year])Male   MRN: [id number]  Location: [address]  Visit: 12-16-23 Inpatient  Date: 12-22-23 @ 11:48    Hospital Day #: 8    DIAGNOSIS / PROCEDURES:   * Acute subdural hemorrhage     INTERVAL HX:   On minimal vent settings 40/5. Going to OR for trach and peg this afternoon. New consent form in chart. Patient had multiple loose bowel movements yesterday, febrile w/ Tmax 102.4. Pending C. diff. Trach cultures growing GNR, GPR, GPC. Zosyn started.     VITALS:   T(F): 98.2 (12-22-23 @ 07:00), Max: 102.4 (12-21-23 @ 18:00)  HR: 66 (12-22-23 @ 10:00)  BP: 103/55 (12-22-23 @ 10:00)  RR: 18 (12-22-23 @ 10:00)  SpO2: 99% (12-22-23 @ 10:00)  Mode: AC/ CMV (Assist Control/ Continuous Mandatory Ventilation), RR (machine): 14, TV (machine): 350, FiO2: 40, PEEP: 5, ITime: 1, MAP: 9, PIP: 15    DIET:   Diet, NPO after Midnight:      NPO Start Date: 21-Dec-2023,   NPO Start Time: 23:59    Diet, NPO with Tube Feed:   Tube Feeding Modality: Nasogastric  Glucerna 1.2 Tonio  Total Volume for 24 Hours (mL): 1320  Continuous  Starting Tube Feed Rate mL per Hour: 55  Until Goal Tube Feed Rate (mL per Hour): 55  Tube Feed Duration (in Hours): 24  Tube Feed Start Time: 12:00  Free Water Flush  Bolus   Total Volume per Flush (mL): 300   Frequency: Every 4 Hours      FLUIDS:   sodium chloride 0.45%.: Solution, 1000 milliLiter(s) infuse at 100 mL/Hr        12-21 @ 07:01  -  12-22 @ 07:00  --------------------------------------------------------  OUT:    Indwelling Catheter - Urethral (mL): 1386 mL  Total OUT: 1386 mL      12-22 @ 07:01  -  12-22 @ 11:48  --------------------------------------------------------  OUT:    Indwelling Catheter - Urethral (mL): 180 mL    Nasogastric/Oral tube (mL): 0 mL  Total OUT: 180 mL      GI PPX:  pantoprazole  Injectable 40 milliGRAM(s) IV Push every 24 hours    AC/DVT PPX:  heparin   Injectable 5000 Unit(s) SubCutaneous every 8 hours    ABx: piperacillin/tazobactam IVPB.. 3.375 Gram(s) IV Intermittent every 8 hours      Bowel Movement: : [X] YES [] NO  Flatus: : [X] YES [] NO    PHYSICAL EXAM:  General Appearance: sedated, intubated   HEENT: EOMI, sclera non-icteric.  Heart: regular rate   Lungs: Equal chest rise bilateral  Abdomen:  Soft, nontender, nondistended.   MSK/Extremities: Warm & well-perfused.   Skin: Warm, dry. No jaundice.   Neuro: GCS 9T (E=3, V=1T, M=6)       LABS:                        8.1    9.69  )-----------( 285       12-21 @ 21:04             26.0     147  |  114  |  62  ----------------------------<  191        12-22 @ 04:50  4.6   |  21  |  2.7      Coagulation Studies - 12-21 @ 21:04  PT: 11.90 sec  PTT: 42.5 sec<H>  INR: 1.04 ratio    Calcium: 7.7 mg/dL *L* (12-22 @ 04:50)  Magnesium: 2.7 mg/dL *H* (12-21 @ 21:04)  Phosphorus: 4.7 mg/dL (12-21 @ 21:04)      LIVER FUNCTIONS - ( 21 Dec 2023 21:04 )  Alb: 2.6 g/dL / Pro: x     / ALK PHOS: x     / ALT: x     / AST: x     / GGT: x              (collected 12-21 @ 18:14)  Source: ET Tube ET Tube     (collected 12-21 @ 07:00)  Source: .Sputum Sputum     (collected 12-18 @ 00:30)  Source: .Blood Blood  Preliminary Report (12-21 @ 14:00):    No growth at 72 Hours        RADIOLOGY & OTHER STUDIES:   Xray Chest 1 View- PORTABLE-Routine (12.22.23 @ 06:36) >  Findings:    Support devices: Enteric catheter extends below the hemidiaphragm.   Endotracheal tube is above the more. Telemetry leads are seen    Cardiac/mediastinum/hilum: Unremarkable.    Lung parenchyma/Pleura: Within normal limits.    Skeleton/soft tissues: Unremarkable.    Impression:    Low lung volumes leads to magnification of the pulmonary interstitium.   Support devices as described. Stable.    --- End of Report ---      ACCESS DEVICES:  [X] Peripheral IV  [ ] Central Venous Line	[ ] R	[ ] L	[ ] IJ	[ ] Fem	[ ] SC	Placed:   [ ] Arterial Line		[ ] R	[ ] L	[ ] Fem	[ ] Rad	[ ] Ax	Placed:   [ ] PICC:					[ ] Mediport  [X] Urinary Catheter,  Date Placed:   12/21  [ ] Chest tube: [ ] Right, [ ] Left  [ ] MARIA LUISA/Ronald Drains  [X] ETT

## 2023-12-22 NOTE — BRIEF OPERATIVE NOTE - OPERATION/FINDINGS
size 9 portex cuffed tracheotomy, 20Fr PEG insertion with direct visualization, 2cm at skin size 9 portex cuffed tracheotomy, EGD and 20Fr PEG insertion with direct visualization, 2cm at skin.

## 2023-12-22 NOTE — PROGRESS NOTE ADULT - ASSESSMENT
Assessment and Plan  78y Male s/p mechanical fall. +HT, +AC (Aspirin and Plavix), -LOC    NEURO:  #Acute SDH   -12/18 HCT#4 -stable SDH, no new acute findings  -Q4 neuro checks  #Acute pain    -APAP ATC  #h/o stroke (2/2023) w/residual right sided weakness  - RIGHT anterior thalamus infarct,  LEFT caudate head lacunar infarct  #focal seizure    -vEEG 12/19: No seizures; Discontinued 12/19    - Valproic 500q8 lowered to 500q12;level 51; next level 12/21 16;00     -NCC:valproic acid current dose, q4 neurochecks    -Neurology cs- d/c EEG, 2 days prior to discharge call neurology so they can do a spot EEG, valproic acid to 500 q12 from q8     RESP:   #Respiratory Failure secondary to impaired secretion clearance  - intubated 12/21; ETT @ 22, 8.0 Fr   - Mechanical ventilation: AC Volume 350/14/40/5    -chest PT q4 and duoneb treatments q 4   -deep tracheal aspirate cx pending (due to spiking fevers)  #Activity    -increase as tolerated    CARDS:   #hx of HTN  -Amlodipine 10 QD  -Labetalol 200 q8 (Home Coreg 6.25mg q12 )  -Valsartan 320mg HOLDING   #HLD  -hold atorvastatin, unable to crush via dobhoff  #NSVT    -EP/Cards: No arrhythmias on tele only artifact. No further work up needed. Recommend ILR prior to discharge if patient/family agreeable  Imaging:   Echo: 12/2023: EF 66%, G1DD, trace MR, mild TR     GI/NUTR:   #Diet, NPO with Tube Feed  Glucerna 1.2 at goal 55cc/hr, feed duration 24 hr; NPO after MN for OR    -Bedside swallow evaluation failed    -Speech and swallow cs placed 12/20- SLP unable to assess 2/2 lethargy    -PEG placement TODAY 12/22; primary team to obtain consent     -Dobhoff @75cm    -aspiration precautions, HOB 30  #GI Prophylaxis  -Protonix 40mg   #Bowel regimen     -multiple loose bowel movement, with fever trend, last dose senna 12/19 at 2200, d/w team Cdiff, can send after 12/21 at 10pm    -no bowel regimen    -Last bowel movement  12/21    /RENAL:   #urine output in critically ill  -Indwelling catheter   -UA neg   -Sodium goal 140-150  #Hypernatremia  -latest Na 149  -FWF 300q4; free water deficit 1.7L  #CKD   -baseline Cr 2.1  -c/w home sodium bicarb 650mg BID  #hx BPH    -cardura 4mg (flomax at home, non crushable)      Monitor UO-martinez in place    Current Rx:     Labs:          BUN/Cr- 61/2.8  -->,  60/2.8  -->          Electrolytes-Na 149 // K 4.6 // Mg 2.7 //  Phos 4.7 (12-21 @ 21:04)      Home Rx: tamsulosin 0.4 mg oral capsule  HEME/ONC:   #DVT prophylaxis     -SCDs. HSQ; (AM dose held for OR)    -d/w NSGY can restart ASA Dec 22 (holding for OR)   #hx of CVA    -ASA can restart 12/22 as per NSGY, currently holding until after trach/peg    -Plavix HOLDING, will require repeat HCT in 2 wks prior to restart    Labs: Hb/Hct:  8.4/26.4  -->,  8.1/26.0  -->                      Plts:  286  -->,  285  -->                 PTT/INR:  42.5/1.04  --->       ID:  #recurrent fevers    -no elevated WBC, d/w team Roselyn, can send after 12/21 at 10pm  -restarted on Zosyn 12/21 night  Cultures    Bcx 12/17- prelim neg   Tracheal aspirate 12/21: numerous GNR, GPC, GPR  Current antibiotics:  given 750mg vanc x 1 12/21 night  Started on Zosyn (12/22-  Previous abx:    -Zosyn 3.375 q12 (12/17 -12/18 )    -Cefepime 2g q 24 (12/16 - 12/17)  WBC- 9.02  --->>,  8.41  --->>,  9.69  --->>  Temp trend- 24hrs T(F): 100.1 (12-22 @ 01:00), Max: 102.4 (12-21 @ 18:00)    ENDO:  #DM     -Tightened ISS     -Off insulin gtt 12/20      -Lantus 15 units HS     -FSG q4 while on TF    MSK:     Activity - Increase As Tolerated    -B/L knee X ray 12/19- no fractures, b/l effusions     LINES/DRAINS:  PIV, right basilic midline (placed 12/16), right radial arterial line (placed 12/19)  Martinez (placed 12/16- d/c 12/19; reinserted 12/22)    ADVANCED DIRECTIVES:  Full Code    HCP/Emergency Contact-Sarah Gamino (Wife) [phone]    DISPO:  pending PEG placement, d/w primary team DVT/ASA hold  Social work, case management consult for dispo   Assessment and Plan  78y Male s/p mechanical fall. +HT, +AC (Aspirin and Plavix), -LOC    NEURO:  #Acute SDH   -12/18 HCT#4 -stable SDH, no new acute findings  -Q4 neuro checks  #Acute pain    -APAP ATC  #h/o stroke (2/2023) w/residual right sided weakness  - RIGHT anterior thalamus infarct,  LEFT caudate head lacunar infarct  #focal seizure    -vEEG 12/19: No seizures; Discontinued 12/19    - Valproic 500q8 lowered to 500q12;level 51; next level 12/21 16;00     -NCC:valproic acid current dose, q4 neurochecks    -Neurology cs- d/c EEG, 2 days prior to discharge call neurology so they can do a spot EEG, valproic acid to 500 q12 from q8     RESP:   #Respiratory Failure secondary to impaired secretion clearance  - intubated 12/21; ETT @ 22, 8.0 Fr   - Mechanical ventilation: AC Volume 350/14/40/5    -chest PT q4 and duoneb treatments q 4   -deep tracheal aspirate cx pending (due to spiking fevers)  #Activity    -increase as tolerated    CARDS:   #hx of HTN  -Amlodipine 10 QD  -Labetalol 200 q8 (Home Coreg 6.25mg q12 )  -Valsartan 320mg HOLDING   #HLD  -hold atorvastatin, unable to crush via dobhoff  #NSVT    -EP/Cards: No arrhythmias on tele only artifact. No further work up needed. Recommend ILR prior to discharge if patient/family agreeable  Imaging:   Echo: 12/2023: EF 66%, G1DD, trace MR, mild TR     GI/NUTR:   #Diet, NPO with Tube Feed  Glucerna 1.2 at goal 55cc/hr, feed duration 24 hr; NPO after MN for OR    -Bedside swallow evaluation failed    -Speech and swallow cs placed 12/20- SLP unable to assess 2/2 lethargy    -PEG placement TODAY 12/22; primary team to obtain consent     -Dobhoff @75cm    -aspiration precautions, HOB 30  #GI Prophylaxis  -Protonix 40mg   #Bowel regimen     -multiple loose bowel movement, with fever trend, last dose senna 12/19 at 2200, d/w team Cdiff, can send after 12/21 at 10pm    -no bowel regimen    -Last bowel movement  12/21    /RENAL:   #urine output in critically ill  -Indwelling catheter   -UA neg   -Sodium goal 140-150  #Hypernatremia  -latest Na 149  -FWF 300q4; free water deficit 1.7L  #CKD   -baseline Cr 2.1  -c/w home sodium bicarb 650mg BID  #hx BPH    -cardura 4mg (flomax at home, non crushable)      Monitor UO-martinez in place    Current Rx:     Labs:          BUN/Cr- 61/2.8  -->,  60/2.8  -->          Electrolytes-Na 149 // K 4.6 // Mg 2.7 //  Phos 4.7 (12-21 @ 21:04)      Home Rx: tamsulosin 0.4 mg oral capsule  HEME/ONC:   #DVT prophylaxis     -SCDs. HSQ; (AM dose held for OR)    -d/w NSGY can restart ASA Dec 22 (holding for OR)   #hx of CVA    -ASA can restart 12/22 as per NSGY, currently holding until after trach/peg    -Plavix HOLDING, will require repeat HCT in 2 wks prior to restart    Labs: Hb/Hct:  8.4/26.4  -->,  8.1/26.0  -->                      Plts:  286  -->,  285  -->                 PTT/INR:  42.5/1.04  --->       ID:  #recurrent fevers    -no elevated WBC, d/w team Roselyn, can send after 12/21 at 10pm  -restarted on Zosyn 12/21 night  Cultures    Bcx 12/17- prelim neg   Tracheal aspirate 12/21: numerous GNR, GPC, GPR  Current antibiotics:  given 750mg vanc x 1 12/21 night  Started on Zosyn (12/22-  Previous abx:    -Zosyn 3.375 q12 (12/17 -12/18 )    -Cefepime 2g q 24 (12/16 - 12/17)  WBC- 9.02  --->>,  8.41  --->>,  9.69  --->>  Temp trend- 24hrs T(F): 100.1 (12-22 @ 01:00), Max: 102.4 (12-21 @ 18:00)    ENDO:  #DM     -Tightened ISS     -Off insulin gtt 12/20      -Lantus 15 units HS     -FSG q4 while on TF    MSK:     Activity - Increase As Tolerated    -B/L knee X ray 12/19- no fractures, b/l effusions     LINES/DRAINS:  PIV, right basilic midline (placed 12/16), right radial arterial line (placed 12/19)  Martinez (placed 12/16- d/c 12/19; reinserted 12/22)    ADVANCED DIRECTIVES:  Full Code    HCP/Emergency Contact-Sarah Gamino (Wife) [phone]    DISPO:  pending PEG placement, d/w primary team DVT/ASA hold  Social work, case management consult for dispo   Assessment and Plan  78y Male s/p mechanical fall. +HT, +AC (Aspirin and Plavix), -LOC    NEURO:  #Acute SDH   -12/18 HCT#4 -stable SDH, no new acute findings  -Q4 neuro checks  #Acute pain    -APAP ATC  #h/o stroke (2/2023) w/residual right sided weakness  - RIGHT anterior thalamus infarct,  LEFT caudate head lacunar infarct  #focal seizure    -vEEG 12/19: No seizures; Discontinued 12/19    - Valproic 500q8 lowered to 500q12;level 51; next level 12/21 16;00     -NCC:valproic acid current dose, q4 neurochecks    -Neurology cs- d/c EEG, 2 days prior to discharge call neurology so they can do a spot EEG, valproic acid to 500 q12 from q8     RESP:   #Respiratory Failure secondary to impaired secretion clearance  - intubated 12/21; ETT @ 22, 8.0 Fr   - Mechanical ventilation:   RR (machine): 14, TV (machine): 350, FiO2: 40, PEEP: 5  12-22 @ 03:33--7.39 / 36 / 161 / 22 / 99.7  O2 99.7  Lac 1.7    12-21 @ 15:47--7.43 / 34 / 182 / 23 / 98.9  O2 98.9  Lac 1.2      -chest PT q4 and duoneb treatments q 4   -deep tracheal aspirate cx pending (due to spiking fevers)  #Activity    -increase as tolerated    CARDS:   #hx of HTN  -Amlodipine 10 QD  -Labetalol 200 q8 (Home Coreg 6.25mg q12 )  -Valsartan 320mg HOLDING   #HLD  -hold atorvastatin, unable to crush via dobhoff  #NSVT    -EP/Cards: No arrhythmias on tele only artifact. No further work up needed. Recommend ILR prior to discharge if patient/family agreeable  Imaging:   Echo: 12/2023: EF 66%, G1DD, trace MR, mild TR     GI/NUTR:   #Diet, NPO with Tube Feed  Glucerna 1.2 at goal 55cc/hr, feed duration 24 hr; NPO after MN for OR    -Bedside swallow evaluation failed    -Speech and swallow cs placed 12/20- SLP unable to assess 2/2 lethargy    -PEG placement TODAY 12/22; primary team to obtain consent     -Dobhoff @75cm    -aspiration precautions, HOB 30  #GI Prophylaxis  -Protonix 40mg   #Bowel regimen     -multiple loose bowel movement, with fever trend, last dose senna 12/19 at 2200, d/w team Roselyn, can send after 12/21 at 10pm    -no bowel regimen    -Last bowel movement  12/21    /RENAL:   #urine output in critically ill  -Indwelling catheter   -UA neg   -Sodium goal 140-150  #Hypernatremia  -latest Na 149  -FWF 300q4; free water deficit 1.7L  #CKD   -baseline Cr 2.1  -c/w home sodium bicarb 650mg BID  #hx BPH    -cardura 4mg (flomax at home, non crushable)      Monitor UO-martinez in place    Current Rx:     Labs:          BUN/Cr- 61/2.8  -->,  60/2.8  -->          Electrolytes-Na 149 // K 4.6 // Mg 2.7 //  Phos 4.7 (12-21 @ 21:04)      Home Rx: tamsulosin 0.4 mg oral capsule  HEME/ONC:   #DVT prophylaxis     -SCDs. HSQ; (AM dose held for OR)    -d/w NSGY can restart ASA Dec 22 (holding for OR)   #hx of CVA    -ASA can restart 12/22 as per NSGY, currently holding until after trach/peg    -Plavix HOLDING, will require repeat HCT in 2 wks prior to restart    Labs: Hb/Hct:  8.4/26.4  -->,  8.1/26.0  -->                      Plts:  286  -->,  285  -->                 PTT/INR:  42.5/1.04  --->       ID:  #recurrent fevers    -no elevated WBC, d/w team Roselyn, can send after 12/21 at 10pm  -restarted on Zosyn 12/21 night  Cultures    Bcx 12/17- prelim neg   Tracheal aspirate 12/21: numerous GNR, GPC, GPR  Current antibiotics:  given 750mg vanc x 1 12/21 night  Started on Zosyn (12/22-  Previous abx:    -Zosyn 3.375 q12 (12/17 -12/18 )    -Cefepime 2g q 24 (12/16 - 12/17)  WBC- 9.02  --->>,  8.41  --->>,  9.69  --->>  Temp trend- 24hrs T(F): 100.1 (12-22 @ 01:00), Max: 102.4 (12-21 @ 18:00)    ENDO:  #DM     -Tightened ISS     -Off insulin gtt 12/20      -Lantus 15 units HS     -FSG q4 while on TF    MSK:     Activity - Increase As Tolerated    -B/L knee X ray 12/19- no fractures, b/l effusions     LINES/DRAINS:  PIV, right basilic midline (placed 12/16), right radial arterial line (placed 12/19)  Martinez (placed 12/16- d/c 12/19; reinserted 12/22)    ADVANCED DIRECTIVES:  Full Code    HCP/Emergency Contact-Sarah Gamino (Wife) [phone]    DISPO:  pending PEG placement, d/w primary team DVT/ASA hold  Social work, case management consult for dispo   Assessment and Plan  78y Male s/p mechanical fall. +HT, +AC (Aspirin and Plavix), -LOC    NEURO:  #Acute SDH   -12/18 HCT#4 -stable SDH, no new acute findings  -Q4 neuro checks  #Acute pain    -APAP ATC  #h/o stroke (2/2023) w/residual right sided weakness  - RIGHT anterior thalamus infarct,  LEFT caudate head lacunar infarct  #focal seizure    -vEEG 12/19: No seizures; Discontinued 12/19    - Valproic 500q8 lowered to 500q12;level 51; next level 12/21 16;00     -NCC:valproic acid current dose, q4 neurochecks    -Neurology cs- d/c EEG, 2 days prior to discharge call neurology so they can do a spot EEG, valproic acid to 500 q12 from q8     RESP:   #Respiratory Failure secondary to impaired secretion clearance  - intubated 12/21; ETT @ 22, 8.0 Fr   - Mechanical ventilation:   RR (machine): 14, TV (machine): 350, FiO2: 40, PEEP: 5  12-22 @ 03:33--7.39 / 36 / 161 / 22 / 99.7  O2 99.7  Lac 1.7    12-21 @ 15:47--7.43 / 34 / 182 / 23 / 98.9  O2 98.9  Lac 1.2      -chest PT q4 and duoneb treatments q 4   -deep tracheal aspirate cx pending (due to spiking fevers)  #Activity    -increase as tolerated    CARDS:   #hx of HTN  -Amlodipine 10 QD  -Labetalol 200 q8 (Home Coreg 6.25mg q12 )  -Valsartan 320mg HOLDING   #HLD  -hold atorvastatin, unable to crush via dobhoff  #NSVT    -EP/Cards: No arrhythmias on tele only artifact. No further work up needed. Recommend ILR prior to discharge if patient/family agreeable  Imaging:   Echo: 12/2023: EF 66%, G1DD, trace MR, mild TR     GI/NUTR:   #Diet, NPO with Tube Feed  Glucerna 1.2 at goal 55cc/hr, feed duration 24 hr; NPO after MN for OR    -Bedside swallow evaluation failed    -Speech and swallow cs placed 12/20- SLP unable to assess 2/2 lethargy    -PEG placement TODAY 12/22; primary team to obtain consent     -Dobhoff @75cm    -aspiration precautions, HOB 30  #GI Prophylaxis  -Protonix 40mg   #Bowel regimen     -multiple loose bowel movement, with fever trend, last dose senna 12/19 at 2200, d/w team Roselyn, can send after 12/21 at 10pm    -no bowel regimen    -Last bowel movement  12/21    /RENAL:   #urine output in critically ill  -Indwelling catheter   -UA neg   -Sodium goal 140-150  #Hypernatremia  -latest Na 149  -FWF 300q4; free water deficit 1.7L  #CKD   -baseline Cr 2.1  -c/w home sodium bicarb 650mg BID  #hx BPH    -cardura 4mg (flomax at home, non crushable)      Monitor UO-martinez in place    Current Rx:     Labs:          BUN/Cr- 61/2.8  -->,  60/2.8  -->          Electrolytes-Na 149 // K 4.6 // Mg 2.7 //  Phos 4.7 (12-21 @ 21:04)      Home Rx: tamsulosin 0.4 mg oral capsule  HEME/ONC:   #DVT prophylaxis     -SCDs. HSQ; (AM dose held for OR)    -d/w NSGY can restart ASA Dec 22 (holding for OR)   #hx of CVA    -ASA can restart 12/22 as per NSGY, currently holding until after trach/peg    -Plavix HOLDING, will require repeat HCT in 2 wks prior to restart    Labs: Hb/Hct:  8.4/26.4  -->,  8.1/26.0  -->                      Plts:  286  -->,  285  -->                 PTT/INR:  42.5/1.04  --->       ID:  #recurrent fevers    -no elevated WBC, d/w team Roselyn, can send after 12/21 at 10pm  -restarted on Zosyn 12/21 night  Cultures    Bcx 12/17- prelim neg   Tracheal aspirate 12/21: numerous GNR, GPC, GPR  Current antibiotics:  given 750mg vanc x 1 12/21 night  Started on Zosyn (12/22-  Previous abx:    -Zosyn 3.375 q12 (12/17 -12/18 )    -Cefepime 2g q 24 (12/16 - 12/17)  WBC- 9.02  --->>,  8.41  --->>,  9.69  --->>  Temp trend- 24hrs T(F): 100.1 (12-22 @ 01:00), Max: 102.4 (12-21 @ 18:00)    ENDO:  #DM     -Tightened ISS     -Off insulin gtt 12/20      -Lantus 15 units HS     -FSG q4 while on TF    MSK:     Activity - Increase As Tolerated    -B/L knee X ray 12/19- no fractures, b/l effusions     LINES/DRAINS:  PIV, right basilic midline (placed 12/16), right radial arterial line (placed 12/19)  Martinez (placed 12/16- d/c 12/19; reinserted 12/22)    ADVANCED DIRECTIVES:  Full Code    HCP/Emergency Contact-Sarah Gamino (Wife) [phone]    DISPO:  pending PEG placement, d/w primary team DVT/ASA hold  Social work, case management consult for dispo     Assessment and Plan  78y Male s/p mechanical fall. +HT, +AC (Aspirin and Plavix), -LOC    NEURO:  #Acute SDH   -12/18 HCT#4 -stable SDH, no new acute findings  -Q4 neuro checks  #Acute pain    -APAP ATC  #h/o stroke (2/2023) w/residual right sided weakness  - RIGHT anterior thalamus infarct,  LEFT caudate head lacunar infarct  #focal seizure    -vEEG 12/19: No seizures; Discontinued 12/19    - Valproic acid 500q24; f/u next level 12/3 AM     -NCC: valproic acid current dose, q4 neurochecks    -Neurology cs- d/c EEG, 2 days prior to discharge call neurology so they can do a spot EEG    RESP:   #Respiratory Failure secondary to impaired secretion clearance  - Intubated 12/21; ETT @ 22, 8.0 Fr   - Mechanical ventilation:   RR (machine): 14, TV (machine): 350, FiO2: 40, PEEP: 5  12-22 @ 03:33--7.39 / 36 / 161 / 22 / 99.7  O2 99.7  Lac 1.7    12-21 @ 15:47--7.43 / 34 / 182 / 23 / 98.9  O2 98.9  Lac 1.2      -chest PT q4 and duoneb treatments q 4     -deep tracheal aspirate cx sent (due to spiking fevers)  #Activity    -increase as tolerated    CARDS:   #hx of HTN  -Amlodipine 10 QD  -Labetalol 200 q8 (Home Coreg 6.25mg q12 )  -Valsartan 320mg HOLDING   #HLD  -hold atorvastatin, unable to crush via dobhoff  #NSVT    -EP/Cards: No arrhythmias on tele only artifact. No further work up needed. Recommend ILR prior to discharge if patient/family agreeable  Imaging:   Echo: 12/2023: EF 66%, G1DD, trace MR, mild TR     GI/NUTR:   #Diet, NPO with Tube Feed  Glucerna 1.2 at goal 55cc/hr, feed duration 24 hr; NPO after MN for OR    -Bedside swallow evaluation failed    -Speech and swallow cs placed 12/20- SLP unable to assess 2/2 lethargy    -PEG placement TODAY 12/22    -Dobhoff @75cm    -aspiration precautions, HOB 30  #GI Prophylaxis  -Protonix 40mg   #Bowel regimen     -Multiple loose bowel movementS, last dose senna 12/19 at 2200    -no bowel regimen    -Last bowel movement  12/22    /RENAL:   #urine output in critically ill  -Indwelling catheter   -UA neg   -Sodium goal 140-150  #Hypernatremia  -latest Na 147  -FWF 300q4; free water deficit 1.7L  #CKD   -baseline Cr 2.1  -holding home sodium bicarb 650mg BID  #hx BPH    -cardura 4mg (flomax at home, non crushable)      Monitor UO-martinez in place    Labs:          BUN/Cr- 60/2.8  -->,  62/2.7  -->          Electrolytes-Na 147 // K 4.6 // Mg -- //  Phos -- (12-22 @ 04:50)    HEME/ONC:   #DVT prophylaxis     -SCDs. HSQ  #hx of CVA    -ASA can restart 12/22 as per NSGY, currently holding until after trach/peg    -Plavix HOLDING, will require repeat HCT in 2 wks prior to restart    Labs: Hb/Hct:  8.4/26.4  -->,  8.1/26.0  -->                      Plts:  286  -->,  285  -->                 PTT/INR:  42.5/1.04  --->       Home Rx: clopidogrel 75 mg oral tablet: 1 tab(s) orally once a day    ID:  #recurrent fevers  Cultures    Bcx 12/17- prelim neg   Tracheal aspirate 12/21: numerous GNR, GPC, GPR  Tracheal aspirate 12/22: pending   Current antibiotics:  750mg vanc x 1 12/21 night. Vanc 500q24 (12/22 - ), trough before third dose, next 12/24 9 AM  Started on Zosyn (12/22-  F/U ID cs recs   Previous abx:    -Zosyn 3.375 q12 (12/17 -12/18 )    -Cefepime 2g q 24 (12/16 - 12/17)  WBC- 9.02  --->>,  8.41  --->>,  9.69  --->>  Temp trend- 24hrs T(F): 100.1 (12-22 @ 01:00), Max: 102.4 (12-21 @ 18:00)    ENDO:  #DM     -Tightened ISS     -Off insulin gtt 12/20      -Lantus 15 units HS- held for OR      -FSG q4 while on TF    MSK:     Activity - Increase As Tolerated    -B/L knee X ray 12/19- no fractures, b/l effusions     LINES/DRAINS:  PIV, right basilic midline (placed 12/16), right radial arterial line (placed 12/19)  Martinez (placed 12/16- d/c 12/19; reinserted 12/22)    ADVANCED DIRECTIVES:  Full Code    HCP/Emergency Contact-Sarah Gamino (Wife) [phone]    DISPO:  pending PEG placement  Social work, case management consult for dispo       Assessment and Plan  78y Male s/p mechanical fall. +HT, +AC (Aspirin and Plavix), -LOC    NEURO:  #Acute SDH   -12/18 HCT#4 -stable SDH, no new acute findings  -Q4 neuro checks  #Acute pain    -APAP ATC  #h/o stroke (2/2023) w/residual right sided weakness  - RIGHT anterior thalamus infarct,  LEFT caudate head lacunar infarct  #focal seizure    -vEEG 12/19: No seizures; Discontinued 12/19    - Valproic acid 500q24; f/u next level 12/3 AM     -NCC: valproic acid current dose, q4 neurochecks    -Neurology cs- d/c EEG, 2 days prior to discharge call neurology so they can do a spot EEG    RESP:   #Respiratory Failure secondary to impaired secretion clearance  - Intubated 12/21; ETT @ 22, 8.0 Fr   - Mechanical ventilation:   RR (machine): 14, TV (machine): 350, FiO2: 40, PEEP: 5  12-22 @ 03:33--7.39 / 36 / 161 / 22 / 99.7  O2 99.7  Lac 1.7    12-21 @ 15:47--7.43 / 34 / 182 / 23 / 98.9  O2 98.9  Lac 1.2      -chest PT q4 and duoneb treatments q 4     -deep tracheal aspirate cx sent (due to spiking fevers)  #Activity    -increase as tolerated    CARDS:   #hx of HTN  -Amlodipine 10 QD  -Labetalol 200 q8 (Home Coreg 6.25mg q12 )  -Valsartan 320mg HOLDING   #HLD  -hold atorvastatin, unable to crush via dobhoff  #NSVT    -EP/Cards: No arrhythmias on tele only artifact. No further work up needed. Recommend ILR prior to discharge if patient/family agreeable  Imaging:   Echo: 12/2023: EF 66%, G1DD, trace MR, mild TR     GI/NUTR:   #Diet, NPO with Tube Feed  Glucerna 1.2 at goal 55cc/hr, feed duration 24 hr; NPO after MN for OR    -Bedside swallow evaluation failed    -Speech and swallow cs placed 12/20- SLP unable to assess 2/2 lethargy    -PEG placement TODAY 12/22    -Dobhoff @75cm    -aspiration precautions, HOB 30  #GI Prophylaxis  -Protonix 40mg   #Bowel regimen     -Multiple loose bowel movementS, last dose senna 12/19 at 2200    -no bowel regimen    -Last bowel movement  12/22    /RENAL:   #urine output in critically ill  -Indwelling catheter   -UA neg   -Sodium goal 140-150  #Hypernatremia  -latest Na 147  -FWF 300q4; free water deficit 1.7L  #CKD   -baseline Cr 2.1  -holding home sodium bicarb 650mg BID  #hx BPH    -cardura 4mg (flomax at home, non crushable)      Monitor UO-martinez in place    Labs:          BUN/Cr- 60/2.8  -->,  62/2.7  -->          Electrolytes-Na 147 // K 4.6 // Mg -- //  Phos -- (12-22 @ 04:50)    HEME/ONC:   #DVT prophylaxis     -SCDs. HSQ  #hx of CVA    -ASA can restart 12/22 as per NSGY, currently holding until after trach/peg    -Plavix HOLDING, will require repeat HCT in 2 wks prior to restart    Labs: Hb/Hct:  8.4/26.4  -->,  8.1/26.0  -->                      Plts:  286  -->,  285  -->                 PTT/INR:  42.5/1.04  --->       Home Rx: clopidogrel 75 mg oral tablet: 1 tab(s) orally once a day    ID:  #recurrent fevers  Cultures    Bcx 12/17- prelim neg   Tracheal aspirate 12/21: numerous GNR, GPC, GPR  Tracheal aspirate 12/22: pending   Current antibiotics:  750mg vanc x 1 12/21 night. Vanc 500q24 (12/22 - ), trough before third dose, next 12/24 9 AM  Started on Zosyn (12/22-  F/U ID cs recs   Previous abx:    -Zosyn 3.375 q12 (12/17 -12/18 )    -Cefepime 2g q 24 (12/16 - 12/17)  WBC- 9.02  --->>,  8.41  --->>,  9.69  --->>  Temp trend- 24hrs T(F): 100.1 (12-22 @ 01:00), Max: 102.4 (12-21 @ 18:00)    ENDO:  #DM     -Tightened ISS     -Off insulin gtt 12/20      -Lantus 15 units HS- held for OR      -FSG q4 while on TF    MSK:     Activity - Increase As Tolerated    -B/L knee X ray 12/19- no fractures, b/l effusions     LINES/DRAINS:  PIV, right basilic midline (placed 12/16), right radial arterial line (placed 12/19)  Martinez (placed 12/16- d/c 12/19; reinserted 12/22)    ADVANCED DIRECTIVES:  Full Code    HCP/Emergency Contact-Sarah Gamino (Wife) [phone]    DISPO:  pending PEG placement  Social work, case management consult for dispo

## 2023-12-22 NOTE — PROGRESS NOTE ADULT - SUBJECTIVE AND OBJECTIVE BOX
JACQUELIN CAI   [id number]   [birth year]  78yM    Admit Date: 12-16-23  Indication for SDU/SICU: Q1 neuro checks        ============================  24 Hour [id number]      [X] A ten-point review of systems was otherwise negative except as noted above.  [  ] Due to altered mental status/intubation, subjective information was not attained from the patient. History was obtained, to the extent possible, from review of the chart and collateral sources of information.    =========================================================================================================================================   JACQUELIN CAI   [id number]   [birth year]  78yM    Admit Date: 12-16-23  Indication for SDU/SICU: Q1 neuro checks        ============================  24 Hour [id number]      [X] A ten-point review of systems was otherwise negative except as noted above.  [  ] Due to altered mental status/intubation, subjective information was not attained from the patient. History was obtained, to the extent possible, from review of the chart and collateral sources of information.    =========================================================================================================================================   JACQUELIN CAI   [id number]   [birth year]  78yM    Admit Date: 12-16-23  Indication for SDU/SICU: Q1 neuro checks        ============================  24 Hour Events;  12/21  NIGHT  -tracheal asp- numerous GNR, GP cocci, GPR  -ETT @ 22, 8.0 Fr  -temp 102 to 103, given 750 vanc x 1, started on zosyn per Dr. Degroot  -PM rounds: responsive to pain, + corneal, + gag, vented on 400/14/40/5   -2gm mg x 1 for shivering   -1g Ca gluconate    DAY  - intubated at 0900 d/t worsening respiratory status, settings - 350/14/100/5.  - ETT @20  - CXR showed ETT well positioned, no consolidations/PTX  - ABG @ 0959 - 7.42/37/355/24, base excess -0.3, sattting at 100%.  - BCx, 12/18 - no growth at 72hrs  - bladder scan @ 1414 - 500. Aguirre placed,  - NPO at midnight for OR tomorrow   - maintenance IV fluids at midnight  - d/c PM lantus since will be NPO at midnight, reorder once on tube feeds restart      [X] A ten-point review of systems was otherwise negative except as noted above.  [  ] Due to altered mental status/intubation, subjective information was not attained from the patient. History was obtained, to the extent possible, from review of the chart and collateral sources of information.    =========================================================================================================================================   JACQUELIN CAI   [id number]   [birth year]  78yM    Admit Date: 12-16-23  Indication for SDU/SICU: Q1 neuro checks        ============================  24 Hour Events;  12/21  NIGHT  -tracheal asp- numerous GNR, GP cocci, GPR  -ETT @ 22, 8.0 Fr  -temp 102 to 103, given 750 vanc x 1, started on zosyn per Dr. Degroot  -PM rounds: responsive to pain, + corneal, + gag, vented on 400/14/40/5   -2gm mg x 1 for shivering   -1g Ca gluconate    DAY  - intubated at 0900 d/t worsening respiratory status, settings - 350/14/100/5.  - ETT @20  - CXR showed ETT well positioned, no consolidations/PTX  - ABG @ 0959 - 7.42/37/355/24, base excess -0.3, sattting at 100%.  - BCx, 12/18 - no growth at 72hrs  - bladder scan @ 1414 - 500. Aguirre placed,  - NPO at midnight for OR tomorrow   - maintenance IV fluids at midnight  - d/c PM lantus since will be NPO at midnight, reorder once on tube feeds restart      [X] A ten-point review of systems was otherwise negative except as noted above.  [  ] Due to altered mental status/intubation, subjective information was not attained from the patient. History was obtained, to the extent possible, from review of the chart and collateral sources of information.    =========================================================================================================================================   JACQUELIN CAI   [id number]   [birth year]  78yM    Admit Date: 12-16-23  Indication for SDU/SICU: Q1 neuro checks        ============================  24 Hour Events;  12/21  NIGHT  -tracheal asp- numerous GNR, GP cocci, GPR  -ETT @ 22, 8.0 Fr  -temp 102 to 103, given 750 vanc x 1, started on zosyn per Dr. Degroot  -PM rounds: responsive to pain, + corneal, + gag, vented on 400/14/40/5   -2gm mg x 1 for shivering   -1g Ca gluconate    DAY  - intubated at 0900 d/t worsening respiratory status, settings - 350/14/100/5.  - ETT @20  - CXR showed ETT well positioned, no consolidations/PTX  - ABG @ 0959 - 7.42/37/355/24, base excess -0.3, sattting at 100%.  - BCx, 12/18 - no growth at 72hrs  - bladder scan @ 1414 - 500. Aguirre placed,  - NPO at midnight for OR tomorrow   - maintenance IV fluids at midnight  - d/c PM lantus since will be NPO at midnight, reorder once on tube feeds restart      [X] A ten-point review of systems was otherwise negative except as noted above.  [  ] Due to altered mental status/intubation, subjective information was not attained from the patient. History was obtained, to the extent possible, from review of the chart and collateral sources of information.    Daily     Daily   Diet, NPO after Midnight:      NPO Start Date: 21-Dec-2023,   NPO Start Time: 23:59 (12-21-23 @ 17:02)  Diet, NPO with Tube Feed:   Tube Feeding Modality: Nasogastric  Glucerna 1.2 Tonio  Total Volume for 24 Hours (mL): 1320  Continuous  Starting Tube Feed Rate mL per Hour: 55  Until Goal Tube Feed Rate (mL per Hour): 55  Tube Feed Duration (in Hours): 24  Tube Feed Start Time: 12:00  Free Water Flush  Bolus   Total Volume per Flush (mL): 300   Frequency: Every 4 Hours (12-20-23 @ 18:01)    CURRENT MEDS:  Neurologic Medications  acetaminophen     Tablet .. 650 milliGRAM(s) Oral every 6 hours  dexMEDEtomidine Infusion 0.3 MICROgram(s)/kG/Hr IV Continuous <Continuous>  valproic  acid Syrup 500 milliGRAM(s) Oral every 12 hours    Respiratory Medications  albuterol/ipratropium for Nebulization 3 milliLiter(s) Nebulizer every 4 hours    Cardiovascular Medications  amLODIPine   Tablet 10 milliGRAM(s) Oral daily  doxazosin 4 milliGRAM(s) Oral at bedtime  labetalol 200 milliGRAM(s) Oral every 8 hours    Gastrointestinal Medications  pantoprazole  Injectable 40 milliGRAM(s) IV Push every 24 hours  sodium chloride 0.45%. 1000 milliLiter(s) IV Continuous <Continuous>    Genitourinary Medications    Hematologic/Oncologic Medications  heparin   Injectable 5000 Unit(s) SubCutaneous every 8 hours    Antimicrobial/Immunologic Medications  piperacillin/tazobactam IVPB.- 3.375 Gram(s) IV Intermittent once  piperacillin/tazobactam IVPB.- 3.375 Gram(s) IV Intermittent once  piperacillin/tazobactam IVPB.. 3.375 Gram(s) IV Intermittent every 8 hours  vancomycin  IVPB 500 milliGRAM(s) IV Intermittent every 24 hours    Endocrine/Metabolic Medications  atorvastatin 20 milliGRAM(s) Oral at bedtime  insulin lispro (ADMELOG) corrective regimen sliding scale   SubCutaneous every 4 hours    Topical/Other Medications  bacitracin   Ointment 1 Application(s) Topical every 12 hours  chlorhexidine 0.12% Liquid 15 milliLiter(s) Oral Mucosa two times a day  chlorhexidine 2% Cloths 1 Application(s) Topical <User Schedule>    ICU Vital Signs Last 24 Hrs  T(C): 36.7 (22 Dec 2023 07:00), Max: 39.1 (21 Dec 2023 18:00)  T(F): 98.2 (22 Dec 2023 07:00), Max: 102.4 (21 Dec 2023 18:00)  HR: 66 (22 Dec 2023 10:00) (61 - 96)  BP: 103/55 (22 Dec 2023 10:00) (88/53 - 123/60)  BP(mean): 75 (22 Dec 2023 10:00) (64 - 92)  ABP: 112/39 (22 Dec 2023 10:00) (95/32 - 173/54)  ABP(mean): 61 (22 Dec 2023 10:00) (54 - 88)  RR: 18 (22 Dec 2023 10:00) (13 - 27)  SpO2: 99% (22 Dec 2023 10:00) (97% - 100%)    O2 Parameters below as of 22 Dec 2023 07:00  Patient On (Oxygen Delivery Method): ventilator    O2 Concentration (%): 40      Mode: AC/ CMV (Assist Control/ Continuous Mandatory Ventilation)  RR (machine): 14  TV (machine): 350  FiO2: 40  PEEP: 5  ITime: 1  MAP: 9  PIP: 15    ABG - ( 22 Dec 2023 03:33 )  pH, Arterial: 7.39  pH, Blood: x     /  pCO2: 36    /  pO2: 161   / HCO3: 22    / Base Excess: -2.6  /  SaO2: 99.7              I&O's Summary    21 Dec 2023 07:01  -  22 Dec 2023 07:00  --------------------------------------------------------  IN: 3305.8 mL / OUT: 1386 mL / NET: 1919.8 mL    22 Dec 2023 07:01  -  22 Dec 2023 10:32  --------------------------------------------------------  IN: 209.2 mL / OUT: 100 mL / NET: 109.2 mL      I&O's Detail    21 Dec 2023 07:01  -  22 Dec 2023 07:00  --------------------------------------------------------  IN:    Dexmedetomidine: 95.8 mL    Enteral Tube Flush: 1200 mL    Glucerna: 660 mL    IV PiggyBack: 50 mL    IV PiggyBack: 100 mL    IV PiggyBack: 250 mL    IV PiggyBack: 100 mL    IV PiggyBack: 50 mL    sodium chloride 0.45%: 800 mL  Total IN: 3305.8 mL    OUT:    Indwelling Catheter - Urethral (mL): 1386 mL  Total OUT: 1386 mL    Total NET: 1919.8 mL      22 Dec 2023 07:01  -  22 Dec 2023 10:32  --------------------------------------------------------  IN:    Dexmedetomidine: 9.2 mL    sodium chloride 0.45%: 200 mL  Total IN: 209.2 mL    OUT:    Indwelling Catheter - Urethral (mL): 100 mL    Nasogastric/Oral tube (mL): 0 mL  Total OUT: 100 mL    Total NET: 109.2 mL              acetaminophen     Tablet .. 650 milliGRAM(s) Oral every 6 hours  albuterol/ipratropium for Nebulization 3 milliLiter(s) Nebulizer every 4 hours  amLODIPine   Tablet 10 milliGRAM(s) Oral daily  atorvastatin 20 milliGRAM(s) Oral at bedtime  bacitracin   Ointment 1 Application(s) Topical every 12 hours  chlorhexidine 0.12% Liquid 15 milliLiter(s) Oral Mucosa two times a day  chlorhexidine 2% Cloths 1 Application(s) Topical <User Schedule>  dexMEDEtomidine Infusion 0.3 MICROgram(s)/kG/Hr IV Continuous <Continuous>  doxazosin 4 milliGRAM(s) Oral at bedtime  heparin   Injectable 5000 Unit(s) SubCutaneous every 8 hours  insulin lispro (ADMELOG) corrective regimen sliding scale   SubCutaneous every 4 hours  labetalol 200 milliGRAM(s) Oral every 8 hours  pantoprazole  Injectable 40 milliGRAM(s) IV Push every 24 hours  piperacillin/tazobactam IVPB.- 3.375 Gram(s) IV Intermittent once  piperacillin/tazobactam IVPB.- 3.375 Gram(s) IV Intermittent once  piperacillin/tazobactam IVPB.. 3.375 Gram(s) IV Intermittent every 8 hours  sodium chloride 0.45%. 1000 milliLiter(s) IV Continuous <Continuous>  valproic  acid Syrup 500 milliGRAM(s) Oral every 12 hours  vancomycin  IVPB 500 milliGRAM(s) IV Intermittent every 24 hours                  PHYSICAL EXAM:   Neuro:  ID [id number] Tenorio, intubated and sedated with Precedex, +cough, +gag, reactive pupils bilaterally   Resp: /14/40/5, Equal chest rise b/l  GI: Abdomen soft, NT, ND. N  ng tube in place with tube feed infusion  extremities soft  b/l LE pt pulse palpable          =========================================================================================================================================   JACQUELIN CAI   [id number]   [birth year]  78yM    Admit Date: 12-16-23  Indication for SDU/SICU: Q1 neuro checks        ============================  24 Hour Events;  12/21  NIGHT  -tracheal asp- numerous GNR, GP cocci, GPR  -ETT @ 22, 8.0 Fr  -temp 102 to 103, given 750 vanc x 1, started on zosyn per Dr. Degroot  -PM rounds: responsive to pain, + corneal, + gag, vented on 400/14/40/5   -2gm mg x 1 for shivering   -1g Ca gluconate    DAY  - intubated at 0900 d/t worsening respiratory status, settings - 350/14/100/5.  - ETT @20  - CXR showed ETT well positioned, no consolidations/PTX  - ABG @ 0959 - 7.42/37/355/24, base excess -0.3, sattting at 100%.  - BCx, 12/18 - no growth at 72hrs  - bladder scan @ 1414 - 500. Aguirre placed,  - NPO at midnight for OR tomorrow   - maintenance IV fluids at midnight  - d/c PM lantus since will be NPO at midnight, reorder once on tube feeds restart      [X] A ten-point review of systems was otherwise negative except as noted above.  [  ] Due to altered mental status/intubation, subjective information was not attained from the patient. History was obtained, to the extent possible, from review of the chart and collateral sources of information.    Daily     Daily   Diet, NPO after Midnight:      NPO Start Date: 21-Dec-2023,   NPO Start Time: 23:59 (12-21-23 @ 17:02)  Diet, NPO with Tube Feed:   Tube Feeding Modality: Nasogastric  Glucerna 1.2 Tonio  Total Volume for 24 Hours (mL): 1320  Continuous  Starting Tube Feed Rate mL per Hour: 55  Until Goal Tube Feed Rate (mL per Hour): 55  Tube Feed Duration (in Hours): 24  Tube Feed Start Time: 12:00  Free Water Flush  Bolus   Total Volume per Flush (mL): 300   Frequency: Every 4 Hours (12-20-23 @ 18:01)    CURRENT MEDS:  Neurologic Medications  acetaminophen     Tablet .. 650 milliGRAM(s) Oral every 6 hours  dexMEDEtomidine Infusion 0.3 MICROgram(s)/kG/Hr IV Continuous <Continuous>  valproic  acid Syrup 500 milliGRAM(s) Oral every 12 hours    Respiratory Medications  albuterol/ipratropium for Nebulization 3 milliLiter(s) Nebulizer every 4 hours    Cardiovascular Medications  amLODIPine   Tablet 10 milliGRAM(s) Oral daily  doxazosin 4 milliGRAM(s) Oral at bedtime  labetalol 200 milliGRAM(s) Oral every 8 hours    Gastrointestinal Medications  pantoprazole  Injectable 40 milliGRAM(s) IV Push every 24 hours  sodium chloride 0.45%. 1000 milliLiter(s) IV Continuous <Continuous>    Genitourinary Medications    Hematologic/Oncologic Medications  heparin   Injectable 5000 Unit(s) SubCutaneous every 8 hours    Antimicrobial/Immunologic Medications  piperacillin/tazobactam IVPB.- 3.375 Gram(s) IV Intermittent once  piperacillin/tazobactam IVPB.- 3.375 Gram(s) IV Intermittent once  piperacillin/tazobactam IVPB.. 3.375 Gram(s) IV Intermittent every 8 hours  vancomycin  IVPB 500 milliGRAM(s) IV Intermittent every 24 hours    Endocrine/Metabolic Medications  atorvastatin 20 milliGRAM(s) Oral at bedtime  insulin lispro (ADMELOG) corrective regimen sliding scale   SubCutaneous every 4 hours    Topical/Other Medications  bacitracin   Ointment 1 Application(s) Topical every 12 hours  chlorhexidine 0.12% Liquid 15 milliLiter(s) Oral Mucosa two times a day  chlorhexidine 2% Cloths 1 Application(s) Topical <User Schedule>    ICU Vital Signs Last 24 Hrs  T(C): 36.7 (22 Dec 2023 07:00), Max: 39.1 (21 Dec 2023 18:00)  T(F): 98.2 (22 Dec 2023 07:00), Max: 102.4 (21 Dec 2023 18:00)  HR: 66 (22 Dec 2023 10:00) (61 - 96)  BP: 103/55 (22 Dec 2023 10:00) (88/53 - 123/60)  BP(mean): 75 (22 Dec 2023 10:00) (64 - 92)  ABP: 112/39 (22 Dec 2023 10:00) (95/32 - 173/54)  ABP(mean): 61 (22 Dec 2023 10:00) (54 - 88)  RR: 18 (22 Dec 2023 10:00) (13 - 27)  SpO2: 99% (22 Dec 2023 10:00) (97% - 100%)    O2 Parameters below as of 22 Dec 2023 07:00  Patient On (Oxygen Delivery Method): ventilator    O2 Concentration (%): 40      Mode: AC/ CMV (Assist Control/ Continuous Mandatory Ventilation)  RR (machine): 14  TV (machine): 350  FiO2: 40  PEEP: 5  ITime: 1  MAP: 9  PIP: 15    ABG - ( 22 Dec 2023 03:33 )  pH, Arterial: 7.39  pH, Blood: x     /  pCO2: 36    /  pO2: 161   / HCO3: 22    / Base Excess: -2.6  /  SaO2: 99.7              I&O's Summary    21 Dec 2023 07:01  -  22 Dec 2023 07:00  --------------------------------------------------------  IN: 3305.8 mL / OUT: 1386 mL / NET: 1919.8 mL    22 Dec 2023 07:01  -  22 Dec 2023 10:32  --------------------------------------------------------  IN: 209.2 mL / OUT: 100 mL / NET: 109.2 mL      I&O's Detail    21 Dec 2023 07:01  -  22 Dec 2023 07:00  --------------------------------------------------------  IN:    Dexmedetomidine: 95.8 mL    Enteral Tube Flush: 1200 mL    Glucerna: 660 mL    IV PiggyBack: 50 mL    IV PiggyBack: 100 mL    IV PiggyBack: 250 mL    IV PiggyBack: 100 mL    IV PiggyBack: 50 mL    sodium chloride 0.45%: 800 mL  Total IN: 3305.8 mL    OUT:    Indwelling Catheter - Urethral (mL): 1386 mL  Total OUT: 1386 mL    Total NET: 1919.8 mL      22 Dec 2023 07:01  -  22 Dec 2023 10:32  --------------------------------------------------------  IN:    Dexmedetomidine: 9.2 mL    sodium chloride 0.45%: 200 mL  Total IN: 209.2 mL    OUT:    Indwelling Catheter - Urethral (mL): 100 mL    Nasogastric/Oral tube (mL): 0 mL  Total OUT: 100 mL    Total NET: 109.2 mL              acetaminophen     Tablet .. 650 milliGRAM(s) Oral every 6 hours  albuterol/ipratropium for Nebulization 3 milliLiter(s) Nebulizer every 4 hours  amLODIPine   Tablet 10 milliGRAM(s) Oral daily  atorvastatin 20 milliGRAM(s) Oral at bedtime  bacitracin   Ointment 1 Application(s) Topical every 12 hours  chlorhexidine 0.12% Liquid 15 milliLiter(s) Oral Mucosa two times a day  chlorhexidine 2% Cloths 1 Application(s) Topical <User Schedule>  dexMEDEtomidine Infusion 0.3 MICROgram(s)/kG/Hr IV Continuous <Continuous>  doxazosin 4 milliGRAM(s) Oral at bedtime  heparin   Injectable 5000 Unit(s) SubCutaneous every 8 hours  insulin lispro (ADMELOG) corrective regimen sliding scale   SubCutaneous every 4 hours  labetalol 200 milliGRAM(s) Oral every 8 hours  pantoprazole  Injectable 40 milliGRAM(s) IV Push every 24 hours  piperacillin/tazobactam IVPB.- 3.375 Gram(s) IV Intermittent once  piperacillin/tazobactam IVPB.- 3.375 Gram(s) IV Intermittent once  piperacillin/tazobactam IVPB.. 3.375 Gram(s) IV Intermittent every 8 hours  sodium chloride 0.45%. 1000 milliLiter(s) IV Continuous <Continuous>  valproic  acid Syrup 500 milliGRAM(s) Oral every 12 hours  vancomycin  IVPB 500 milliGRAM(s) IV Intermittent every 24 hours                  PHYSICAL EXAM:   Neuro:  ID [id number] Tenorio, intubated and sedated with Precedex, +cough, +gag, reactive pupils bilaterally   Resp: /14/40/5, Equal chest rise b/l  GI: Abdomen soft, NT, ND. N  ng tube in place with tube feed infusion  extremities soft  b/l LE pt pulse palpable          =========================================================================================================================================   JACQUELIN CAI   [id number]   [birth year]  78yM    Admit Date: 12-16-23  Indication for SDU/SICU: Q1 neuro checks        ============================  24 Hour Events;  12/21  NIGHT  -tracheal asp- numerous GNR, GP cocci, GPR  -ETT @ 22, 8.0 Fr  -temp 102 to 103, given 750 vanc x 1, started on zosyn per Dr. Dgeroot  -PM rounds: responsive to pain, + corneal, + gag, vented on 400/14/40/5   -2gm mg x 1 for shivering   -1g Ca gluconate    DAY  - intubated at 0900 d/t worsening respiratory status, settings - 350/14/100/5.  - ETT @20  - CXR showed ETT well positioned, no consolidations/PTX  - ABG @ 0959 - 7.42/37/355/24, base excess -0.3, sattting at 100%.  - BCx, 12/18 - no growth at 72hrs  - bladder scan @ 1414 - 500. Aguirre placed,  - NPO at midnight for OR tomorrow   - maintenance IV fluids at midnight  - d/c PM lantus since will be NPO at midnight, reorder once on tube feeds restart      [X] A ten-point review of systems was otherwise negative except as noted above.  [  ] Due to altered mental status/intubation, subjective information was not attained from the patient. History was obtained, to the extent possible, from review of the chart and collateral sources of information.    Daily     Daily   Diet, NPO after Midnight:      NPO Start Date: 21-Dec-2023,   NPO Start Time: 23:59 (12-21-23 @ 17:02)  Diet, NPO with Tube Feed:   Tube Feeding Modality: Nasogastric  Glucerna 1.2 Tonio  Total Volume for 24 Hours (mL): 1320  Continuous  Starting Tube Feed Rate mL per Hour: 55  Until Goal Tube Feed Rate (mL per Hour): 55  Tube Feed Duration (in Hours): 24  Tube Feed Start Time: 12:00  Free Water Flush  Bolus   Total Volume per Flush (mL): 300   Frequency: Every 4 Hours (12-20-23 @ 18:01)    CURRENT MEDS:  Neurologic Medications  acetaminophen     Tablet .. 650 milliGRAM(s) Oral every 6 hours  dexMEDEtomidine Infusion 0.3 MICROgram(s)/kG/Hr IV Continuous <Continuous>  valproic  acid Syrup 500 milliGRAM(s) Oral every 12 hours    Respiratory Medications  albuterol/ipratropium for Nebulization 3 milliLiter(s) Nebulizer every 4 hours    Cardiovascular Medications  amLODIPine   Tablet 10 milliGRAM(s) Oral daily  doxazosin 4 milliGRAM(s) Oral at bedtime  labetalol 200 milliGRAM(s) Oral every 8 hours    Gastrointestinal Medications  pantoprazole  Injectable 40 milliGRAM(s) IV Push every 24 hours  sodium chloride 0.45%. 1000 milliLiter(s) IV Continuous <Continuous>    Genitourinary Medications    Hematologic/Oncologic Medications  heparin   Injectable 5000 Unit(s) SubCutaneous every 8 hours    Antimicrobial/Immunologic Medications  piperacillin/tazobactam IVPB.- 3.375 Gram(s) IV Intermittent once  piperacillin/tazobactam IVPB.- 3.375 Gram(s) IV Intermittent once  piperacillin/tazobactam IVPB.. 3.375 Gram(s) IV Intermittent every 8 hours  vancomycin  IVPB 500 milliGRAM(s) IV Intermittent every 24 hours    Endocrine/Metabolic Medications  atorvastatin 20 milliGRAM(s) Oral at bedtime  insulin lispro (ADMELOG) corrective regimen sliding scale   SubCutaneous every 4 hours    Topical/Other Medications  bacitracin   Ointment 1 Application(s) Topical every 12 hours  chlorhexidine 0.12% Liquid 15 milliLiter(s) Oral Mucosa two times a day  chlorhexidine 2% Cloths 1 Application(s) Topical <User Schedule>    ICU Vital Signs Last 24 Hrs  T(C): 36.7 (22 Dec 2023 07:00), Max: 39.1 (21 Dec 2023 18:00)  T(F): 98.2 (22 Dec 2023 07:00), Max: 102.4 (21 Dec 2023 18:00)  HR: 66 (22 Dec 2023 10:00) (61 - 96)  BP: 103/55 (22 Dec 2023 10:00) (88/53 - 123/60)  BP(mean): 75 (22 Dec 2023 10:00) (64 - 92)  ABP: 112/39 (22 Dec 2023 10:00) (95/32 - 173/54)  ABP(mean): 61 (22 Dec 2023 10:00) (54 - 88)  RR: 18 (22 Dec 2023 10:00) (13 - 27)  SpO2: 99% (22 Dec 2023 10:00) (97% - 100%)    O2 Parameters below as of 22 Dec 2023 07:00  Patient On (Oxygen Delivery Method): ventilator    O2 Concentration (%): 40      Mode: AC/ CMV (Assist Control/ Continuous Mandatory Ventilation)  RR (machine): 14  TV (machine): 350  FiO2: 40  PEEP: 5  ITime: 1  MAP: 9  PIP: 15    ABG - ( 22 Dec 2023 03:33 )  pH, Arterial: 7.39  pH, Blood: x     /  pCO2: 36    /  pO2: 161   / HCO3: 22    / Base Excess: -2.6  /  SaO2: 99.7              I&O's Summary    21 Dec 2023 07:01  -  22 Dec 2023 07:00  --------------------------------------------------------  IN: 3305.8 mL / OUT: 1386 mL / NET: 1919.8 mL    22 Dec 2023 07:01  -  22 Dec 2023 10:32  --------------------------------------------------------  IN: 209.2 mL / OUT: 100 mL / NET: 109.2 mL      I&O's Detail    21 Dec 2023 07:01  -  22 Dec 2023 07:00  --------------------------------------------------------  IN:    Dexmedetomidine: 95.8 mL    Enteral Tube Flush: 1200 mL    Glucerna: 660 mL    IV PiggyBack: 50 mL    IV PiggyBack: 100 mL    IV PiggyBack: 250 mL    IV PiggyBack: 100 mL    IV PiggyBack: 50 mL    sodium chloride 0.45%: 800 mL  Total IN: 3305.8 mL    OUT:    Indwelling Catheter - Urethral (mL): 1386 mL  Total OUT: 1386 mL    Total NET: 1919.8 mL      22 Dec 2023 07:01  -  22 Dec 2023 10:32  --------------------------------------------------------  IN:    Dexmedetomidine: 9.2 mL    sodium chloride 0.45%: 200 mL  Total IN: 209.2 mL    OUT:    Indwelling Catheter - Urethral (mL): 100 mL    Nasogastric/Oral tube (mL): 0 mL  Total OUT: 100 mL    Total NET: 109.2 mL              acetaminophen     Tablet .. 650 milliGRAM(s) Oral every 6 hours  albuterol/ipratropium for Nebulization 3 milliLiter(s) Nebulizer every 4 hours  amLODIPine   Tablet 10 milliGRAM(s) Oral daily  atorvastatin 20 milliGRAM(s) Oral at bedtime  bacitracin   Ointment 1 Application(s) Topical every 12 hours  chlorhexidine 0.12% Liquid 15 milliLiter(s) Oral Mucosa two times a day  chlorhexidine 2% Cloths 1 Application(s) Topical <User Schedule>  dexMEDEtomidine Infusion 0.3 MICROgram(s)/kG/Hr IV Continuous <Continuous>  doxazosin 4 milliGRAM(s) Oral at bedtime  heparin   Injectable 5000 Unit(s) SubCutaneous every 8 hours  insulin lispro (ADMELOG) corrective regimen sliding scale   SubCutaneous every 4 hours  labetalol 200 milliGRAM(s) Oral every 8 hours  pantoprazole  Injectable 40 milliGRAM(s) IV Push every 24 hours  piperacillin/tazobactam IVPB.- 3.375 Gram(s) IV Intermittent once  piperacillin/tazobactam IVPB.- 3.375 Gram(s) IV Intermittent once  piperacillin/tazobactam IVPB.. 3.375 Gram(s) IV Intermittent every 8 hours  sodium chloride 0.45%. 1000 milliLiter(s) IV Continuous <Continuous>  valproic  acid Syrup 500 milliGRAM(s) Oral every 12 hours  vancomycin  IVPB 500 milliGRAM(s) IV Intermittent every 24 hours                  PHYSICAL EXAM:   Neuro:  ID [id number] Jona, intubated and sedated with Precedex 0.3, +cough reflex, +gag, reactive pupils bilaterally   Resp: /14/40/5, Equal chest rise b/l  Cardiovascular: RRR. Hypotensive on arterial line 98/36 (500 cc bolus given). B/L LE DP palpable   GI: Abdomen soft, NT, ND. NGT in place.   Extremities: Soft  : Aguirre in place.             =========================================================================================================================================   JACQUELIN CAI   [id number]   [birth year]  78yM    Admit Date: 12-16-23  Indication for SDU/SICU: Q1 neuro checks        ============================  24 Hour Events;  12/21  NIGHT  -tracheal asp- numerous GNR, GP cocci, GPR  -ETT @ 22, 8.0 Fr  -temp 102 to 103, given 750 vanc x 1, started on zosyn per Dr. Degroot  -PM rounds: responsive to pain, + corneal, + gag, vented on 400/14/40/5   -2gm mg x 1 for shivering   -1g Ca gluconate    DAY  - intubated at 0900 d/t worsening respiratory status, settings - 350/14/100/5.  - ETT @20  - CXR showed ETT well positioned, no consolidations/PTX  - ABG @ 0959 - 7.42/37/355/24, base excess -0.3, sattting at 100%.  - BCx, 12/18 - no growth at 72hrs  - bladder scan @ 1414 - 500. Aguirre placed,  - NPO at midnight for OR tomorrow   - maintenance IV fluids at midnight  - d/c PM lantus since will be NPO at midnight, reorder once on tube feeds restart      [X] A ten-point review of systems was otherwise negative except as noted above.  [  ] Due to altered mental status/intubation, subjective information was not attained from the patient. History was obtained, to the extent possible, from review of the chart and collateral sources of information.    Daily     Daily   Diet, NPO after Midnight:      NPO Start Date: 21-Dec-2023,   NPO Start Time: 23:59 (12-21-23 @ 17:02)  Diet, NPO with Tube Feed:   Tube Feeding Modality: Nasogastric  Glucerna 1.2 Tonio  Total Volume for 24 Hours (mL): 1320  Continuous  Starting Tube Feed Rate mL per Hour: 55  Until Goal Tube Feed Rate (mL per Hour): 55  Tube Feed Duration (in Hours): 24  Tube Feed Start Time: 12:00  Free Water Flush  Bolus   Total Volume per Flush (mL): 300   Frequency: Every 4 Hours (12-20-23 @ 18:01)    CURRENT MEDS:  Neurologic Medications  acetaminophen     Tablet .. 650 milliGRAM(s) Oral every 6 hours  dexMEDEtomidine Infusion 0.3 MICROgram(s)/kG/Hr IV Continuous <Continuous>  valproic  acid Syrup 500 milliGRAM(s) Oral every 12 hours    Respiratory Medications  albuterol/ipratropium for Nebulization 3 milliLiter(s) Nebulizer every 4 hours    Cardiovascular Medications  amLODIPine   Tablet 10 milliGRAM(s) Oral daily  doxazosin 4 milliGRAM(s) Oral at bedtime  labetalol 200 milliGRAM(s) Oral every 8 hours    Gastrointestinal Medications  pantoprazole  Injectable 40 milliGRAM(s) IV Push every 24 hours  sodium chloride 0.45%. 1000 milliLiter(s) IV Continuous <Continuous>    Genitourinary Medications    Hematologic/Oncologic Medications  heparin   Injectable 5000 Unit(s) SubCutaneous every 8 hours    Antimicrobial/Immunologic Medications  piperacillin/tazobactam IVPB.- 3.375 Gram(s) IV Intermittent once  piperacillin/tazobactam IVPB.- 3.375 Gram(s) IV Intermittent once  piperacillin/tazobactam IVPB.. 3.375 Gram(s) IV Intermittent every 8 hours  vancomycin  IVPB 500 milliGRAM(s) IV Intermittent every 24 hours    Endocrine/Metabolic Medications  atorvastatin 20 milliGRAM(s) Oral at bedtime  insulin lispro (ADMELOG) corrective regimen sliding scale   SubCutaneous every 4 hours    Topical/Other Medications  bacitracin   Ointment 1 Application(s) Topical every 12 hours  chlorhexidine 0.12% Liquid 15 milliLiter(s) Oral Mucosa two times a day  chlorhexidine 2% Cloths 1 Application(s) Topical <User Schedule>    ICU Vital Signs Last 24 Hrs  T(C): 36.7 (22 Dec 2023 07:00), Max: 39.1 (21 Dec 2023 18:00)  T(F): 98.2 (22 Dec 2023 07:00), Max: 102.4 (21 Dec 2023 18:00)  HR: 66 (22 Dec 2023 10:00) (61 - 96)  BP: 103/55 (22 Dec 2023 10:00) (88/53 - 123/60)  BP(mean): 75 (22 Dec 2023 10:00) (64 - 92)  ABP: 112/39 (22 Dec 2023 10:00) (95/32 - 173/54)  ABP(mean): 61 (22 Dec 2023 10:00) (54 - 88)  RR: 18 (22 Dec 2023 10:00) (13 - 27)  SpO2: 99% (22 Dec 2023 10:00) (97% - 100%)    O2 Parameters below as of 22 Dec 2023 07:00  Patient On (Oxygen Delivery Method): ventilator    O2 Concentration (%): 40      Mode: AC/ CMV (Assist Control/ Continuous Mandatory Ventilation)  RR (machine): 14  TV (machine): 350  FiO2: 40  PEEP: 5  ITime: 1  MAP: 9  PIP: 15    ABG - ( 22 Dec 2023 03:33 )  pH, Arterial: 7.39  pH, Blood: x     /  pCO2: 36    /  pO2: 161   / HCO3: 22    / Base Excess: -2.6  /  SaO2: 99.7              I&O's Summary    21 Dec 2023 07:01  -  22 Dec 2023 07:00  --------------------------------------------------------  IN: 3305.8 mL / OUT: 1386 mL / NET: 1919.8 mL    22 Dec 2023 07:01  -  22 Dec 2023 10:32  --------------------------------------------------------  IN: 209.2 mL / OUT: 100 mL / NET: 109.2 mL      I&O's Detail    21 Dec 2023 07:01  -  22 Dec 2023 07:00  --------------------------------------------------------  IN:    Dexmedetomidine: 95.8 mL    Enteral Tube Flush: 1200 mL    Glucerna: 660 mL    IV PiggyBack: 50 mL    IV PiggyBack: 100 mL    IV PiggyBack: 250 mL    IV PiggyBack: 100 mL    IV PiggyBack: 50 mL    sodium chloride 0.45%: 800 mL  Total IN: 3305.8 mL    OUT:    Indwelling Catheter - Urethral (mL): 1386 mL  Total OUT: 1386 mL    Total NET: 1919.8 mL      22 Dec 2023 07:01  -  22 Dec 2023 10:32  --------------------------------------------------------  IN:    Dexmedetomidine: 9.2 mL    sodium chloride 0.45%: 200 mL  Total IN: 209.2 mL    OUT:    Indwelling Catheter - Urethral (mL): 100 mL    Nasogastric/Oral tube (mL): 0 mL  Total OUT: 100 mL    Total NET: 109.2 mL              acetaminophen     Tablet .. 650 milliGRAM(s) Oral every 6 hours  albuterol/ipratropium for Nebulization 3 milliLiter(s) Nebulizer every 4 hours  amLODIPine   Tablet 10 milliGRAM(s) Oral daily  atorvastatin 20 milliGRAM(s) Oral at bedtime  bacitracin   Ointment 1 Application(s) Topical every 12 hours  chlorhexidine 0.12% Liquid 15 milliLiter(s) Oral Mucosa two times a day  chlorhexidine 2% Cloths 1 Application(s) Topical <User Schedule>  dexMEDEtomidine Infusion 0.3 MICROgram(s)/kG/Hr IV Continuous <Continuous>  doxazosin 4 milliGRAM(s) Oral at bedtime  heparin   Injectable 5000 Unit(s) SubCutaneous every 8 hours  insulin lispro (ADMELOG) corrective regimen sliding scale   SubCutaneous every 4 hours  labetalol 200 milliGRAM(s) Oral every 8 hours  pantoprazole  Injectable 40 milliGRAM(s) IV Push every 24 hours  piperacillin/tazobactam IVPB.- 3.375 Gram(s) IV Intermittent once  piperacillin/tazobactam IVPB.- 3.375 Gram(s) IV Intermittent once  piperacillin/tazobactam IVPB.. 3.375 Gram(s) IV Intermittent every 8 hours  sodium chloride 0.45%. 1000 milliLiter(s) IV Continuous <Continuous>  valproic  acid Syrup 500 milliGRAM(s) Oral every 12 hours  vancomycin  IVPB 500 milliGRAM(s) IV Intermittent every 24 hours                  PHYSICAL EXAM:   Neuro:  ID [id number] Jona, intubated and sedated with Precedex 0.3, +cough reflex, +gag, reactive pupils bilaterally   Resp: /14/40/5, Equal chest rise b/l  Cardiovascular: RRR. Hypotensive on arterial line 98/36 (500 cc bolus given). B/L LE DP palpable   GI: Abdomen soft, NT, ND. NGT in place.   Extremities: Soft  : Aguirre in place.             =========================================================================================================================================

## 2023-12-23 LAB
-  AMPICILLIN/SULBACTAM: SIGNIFICANT CHANGE UP
-  CEFAZOLIN: SIGNIFICANT CHANGE UP
-  CLINDAMYCIN: SIGNIFICANT CHANGE UP
-  ERYTHROMYCIN: SIGNIFICANT CHANGE UP
-  GENTAMICIN: SIGNIFICANT CHANGE UP
-  OXACILLIN: SIGNIFICANT CHANGE UP
-  RIFAMPIN: SIGNIFICANT CHANGE UP
-  TETRACYCLINE: SIGNIFICANT CHANGE UP
-  TRIMETHOPRIM/SULFAMETHOXAZOLE: SIGNIFICANT CHANGE UP
-  VANCOMYCIN: SIGNIFICANT CHANGE UP
ANION GAP SERPL CALC-SCNC: 11 MMOL/L — SIGNIFICANT CHANGE UP (ref 7–14)
ANION GAP SERPL CALC-SCNC: 11 MMOL/L — SIGNIFICANT CHANGE UP (ref 7–14)
BUN SERPL-MCNC: 52 MG/DL — HIGH (ref 10–20)
BUN SERPL-MCNC: 52 MG/DL — HIGH (ref 10–20)
CALCIUM SERPL-MCNC: 6.1 MG/DL — LOW (ref 8.4–10.5)
CALCIUM SERPL-MCNC: 6.1 MG/DL — LOW (ref 8.4–10.5)
CHLORIDE SERPL-SCNC: 113 MMOL/L — HIGH (ref 98–110)
CHLORIDE SERPL-SCNC: 113 MMOL/L — HIGH (ref 98–110)
CO2 SERPL-SCNC: 16 MMOL/L — LOW (ref 17–32)
CO2 SERPL-SCNC: 16 MMOL/L — LOW (ref 17–32)
CREAT SERPL-MCNC: 2.3 MG/DL — HIGH (ref 0.7–1.5)
CREAT SERPL-MCNC: 2.3 MG/DL — HIGH (ref 0.7–1.5)
CULTURE RESULTS: ABNORMAL
CULTURE RESULTS: SIGNIFICANT CHANGE UP
CULTURE RESULTS: SIGNIFICANT CHANGE UP
EGFR: 28 ML/MIN/1.73M2 — LOW
EGFR: 28 ML/MIN/1.73M2 — LOW
GAS PNL BLDA: SIGNIFICANT CHANGE UP
GLUCOSE SERPL-MCNC: 160 MG/DL — HIGH (ref 70–99)
GLUCOSE SERPL-MCNC: 160 MG/DL — HIGH (ref 70–99)
GRAM STN FLD: ABNORMAL
GRAM STN FLD: ABNORMAL
HCT VFR BLD CALC: 22.5 % — LOW (ref 42–52)
HCT VFR BLD CALC: 22.5 % — LOW (ref 42–52)
HGB BLD-MCNC: 7.4 G/DL — LOW (ref 14–18)
HGB BLD-MCNC: 7.4 G/DL — LOW (ref 14–18)
MAGNESIUM SERPL-MCNC: 2.4 MG/DL — SIGNIFICANT CHANGE UP (ref 1.8–2.4)
MAGNESIUM SERPL-MCNC: 2.4 MG/DL — SIGNIFICANT CHANGE UP (ref 1.8–2.4)
MCHC RBC-ENTMCNC: 30 PG — SIGNIFICANT CHANGE UP (ref 27–31)
MCHC RBC-ENTMCNC: 30 PG — SIGNIFICANT CHANGE UP (ref 27–31)
MCHC RBC-ENTMCNC: 32.9 G/DL — SIGNIFICANT CHANGE UP (ref 32–37)
MCHC RBC-ENTMCNC: 32.9 G/DL — SIGNIFICANT CHANGE UP (ref 32–37)
MCV RBC AUTO: 91.1 FL — SIGNIFICANT CHANGE UP (ref 80–94)
MCV RBC AUTO: 91.1 FL — SIGNIFICANT CHANGE UP (ref 80–94)
METHOD TYPE: SIGNIFICANT CHANGE UP
NRBC # BLD: 0 /100 WBCS — SIGNIFICANT CHANGE UP (ref 0–0)
NRBC # BLD: 0 /100 WBCS — SIGNIFICANT CHANGE UP (ref 0–0)
ORGANISM # SPEC MICROSCOPIC CNT: ABNORMAL
ORGANISM # SPEC MICROSCOPIC CNT: SIGNIFICANT CHANGE UP
PHOSPHATE SERPL-MCNC: 3.9 MG/DL — SIGNIFICANT CHANGE UP (ref 2.1–4.9)
PHOSPHATE SERPL-MCNC: 3.9 MG/DL — SIGNIFICANT CHANGE UP (ref 2.1–4.9)
PLATELET # BLD AUTO: 279 K/UL — SIGNIFICANT CHANGE UP (ref 130–400)
PLATELET # BLD AUTO: 279 K/UL — SIGNIFICANT CHANGE UP (ref 130–400)
PMV BLD: 9.2 FL — SIGNIFICANT CHANGE UP (ref 7.4–10.4)
PMV BLD: 9.2 FL — SIGNIFICANT CHANGE UP (ref 7.4–10.4)
POTASSIUM SERPL-MCNC: 3.1 MMOL/L — LOW (ref 3.5–5)
POTASSIUM SERPL-MCNC: 3.1 MMOL/L — LOW (ref 3.5–5)
POTASSIUM SERPL-SCNC: 3.1 MMOL/L — LOW (ref 3.5–5)
POTASSIUM SERPL-SCNC: 3.1 MMOL/L — LOW (ref 3.5–5)
RBC # BLD: 2.47 M/UL — LOW (ref 4.7–6.1)
RBC # BLD: 2.47 M/UL — LOW (ref 4.7–6.1)
RBC # FLD: 14.4 % — SIGNIFICANT CHANGE UP (ref 11.5–14.5)
RBC # FLD: 14.4 % — SIGNIFICANT CHANGE UP (ref 11.5–14.5)
SODIUM SERPL-SCNC: 140 MMOL/L — SIGNIFICANT CHANGE UP (ref 135–146)
SODIUM SERPL-SCNC: 140 MMOL/L — SIGNIFICANT CHANGE UP (ref 135–146)
SPECIMEN SOURCE: SIGNIFICANT CHANGE UP
WBC # BLD: 13.21 K/UL — HIGH (ref 4.8–10.8)
WBC # BLD: 13.21 K/UL — HIGH (ref 4.8–10.8)
WBC # FLD AUTO: 13.21 K/UL — HIGH (ref 4.8–10.8)
WBC # FLD AUTO: 13.21 K/UL — HIGH (ref 4.8–10.8)

## 2023-12-23 PROCEDURE — 99291 CRITICAL CARE FIRST HOUR: CPT

## 2023-12-23 PROCEDURE — 71045 X-RAY EXAM CHEST 1 VIEW: CPT | Mod: 26

## 2023-12-23 RX ORDER — NOREPINEPHRINE BITARTRATE/D5W 8 MG/250ML
0.05 PLASTIC BAG, INJECTION (ML) INTRAVENOUS
Qty: 8 | Refills: 0 | Status: DISCONTINUED | OUTPATIENT
Start: 2023-12-23 | End: 2023-12-23

## 2023-12-23 RX ORDER — LABETALOL HCL 100 MG
10 TABLET ORAL ONCE
Refills: 0 | Status: COMPLETED | OUTPATIENT
Start: 2023-12-23 | End: 2023-12-23

## 2023-12-23 RX ORDER — AMLODIPINE BESYLATE 2.5 MG/1
5 TABLET ORAL DAILY
Refills: 0 | Status: DISCONTINUED | OUTPATIENT
Start: 2023-12-23 | End: 2023-12-23

## 2023-12-23 RX ORDER — AMLODIPINE BESYLATE 2.5 MG/1
10 TABLET ORAL DAILY
Refills: 0 | Status: DISCONTINUED | OUTPATIENT
Start: 2023-12-24 | End: 2023-12-28

## 2023-12-23 RX ORDER — ACETAMINOPHEN 500 MG
650 TABLET ORAL ONCE
Refills: 0 | Status: COMPLETED | OUTPATIENT
Start: 2023-12-23 | End: 2023-12-23

## 2023-12-23 RX ORDER — AMLODIPINE BESYLATE 2.5 MG/1
5 TABLET ORAL ONCE
Refills: 0 | Status: COMPLETED | OUTPATIENT
Start: 2023-12-23 | End: 2023-12-24

## 2023-12-23 RX ORDER — INSULIN GLARGINE 100 [IU]/ML
15 INJECTION, SOLUTION SUBCUTANEOUS AT BEDTIME
Refills: 0 | Status: DISCONTINUED | OUTPATIENT
Start: 2023-12-23 | End: 2023-12-25

## 2023-12-23 RX ORDER — SODIUM CHLORIDE 9 MG/ML
250 INJECTION, SOLUTION INTRAVENOUS ONCE
Refills: 0 | Status: COMPLETED | OUTPATIENT
Start: 2023-12-23 | End: 2023-12-23

## 2023-12-23 RX ORDER — CALCIUM GLUCONATE 100 MG/ML
2 VIAL (ML) INTRAVENOUS ONCE
Refills: 0 | Status: COMPLETED | OUTPATIENT
Start: 2023-12-23 | End: 2023-12-23

## 2023-12-23 RX ORDER — CALCIUM ACETATE 667 MG
667 TABLET ORAL
Refills: 0 | Status: DISCONTINUED | OUTPATIENT
Start: 2023-12-23 | End: 2023-12-24

## 2023-12-23 RX ORDER — AMLODIPINE BESYLATE 2.5 MG/1
5 TABLET ORAL ONCE
Refills: 0 | Status: COMPLETED | OUTPATIENT
Start: 2023-12-23 | End: 2024-11-20

## 2023-12-23 RX ADMIN — AMLODIPINE BESYLATE 5 MILLIGRAM(S): 2.5 TABLET ORAL at 12:41

## 2023-12-23 RX ADMIN — Medication 650 MILLIGRAM(S): at 18:10

## 2023-12-23 RX ADMIN — Medication 5: at 15:10

## 2023-12-23 RX ADMIN — HEPARIN SODIUM 5000 UNIT(S): 5000 INJECTION INTRAVENOUS; SUBCUTANEOUS at 15:09

## 2023-12-23 RX ADMIN — CHLORHEXIDINE GLUCONATE 1 APPLICATION(S): 213 SOLUTION TOPICAL at 06:24

## 2023-12-23 RX ADMIN — CHLORHEXIDINE GLUCONATE 15 MILLILITER(S): 213 SOLUTION TOPICAL at 05:14

## 2023-12-23 RX ADMIN — CHLORHEXIDINE GLUCONATE 15 MILLILITER(S): 213 SOLUTION TOPICAL at 18:11

## 2023-12-23 RX ADMIN — Medication 1 APPLICATION(S): at 18:12

## 2023-12-23 RX ADMIN — Medication 7: at 10:50

## 2023-12-23 RX ADMIN — PIPERACILLIN AND TAZOBACTAM 25 GRAM(S): 4; .5 INJECTION, POWDER, LYOPHILIZED, FOR SOLUTION INTRAVENOUS at 06:24

## 2023-12-23 RX ADMIN — Medication 200 GRAM(S): at 06:32

## 2023-12-23 RX ADMIN — Medication 5: at 21:56

## 2023-12-23 RX ADMIN — Medication 650 MILLIGRAM(S): at 05:14

## 2023-12-23 RX ADMIN — Medication 500 MILLIGRAM(S): at 18:10

## 2023-12-23 RX ADMIN — Medication 7: at 05:13

## 2023-12-23 RX ADMIN — Medication 4 MILLIGRAM(S): at 21:55

## 2023-12-23 RX ADMIN — INSULIN GLARGINE 15 UNIT(S): 100 INJECTION, SOLUTION SUBCUTANEOUS at 21:54

## 2023-12-23 RX ADMIN — SODIUM CHLORIDE 500 MILLILITER(S): 9 INJECTION, SOLUTION INTRAVENOUS at 01:22

## 2023-12-23 RX ADMIN — HEPARIN SODIUM 5000 UNIT(S): 5000 INJECTION INTRAVENOUS; SUBCUTANEOUS at 05:14

## 2023-12-23 RX ADMIN — Medication 667 MILLIGRAM(S): at 18:10

## 2023-12-23 RX ADMIN — INSULIN GLARGINE 15 UNIT(S): 100 INJECTION, SOLUTION SUBCUTANEOUS at 04:52

## 2023-12-23 RX ADMIN — ATORVASTATIN CALCIUM 20 MILLIGRAM(S): 80 TABLET, FILM COATED ORAL at 21:55

## 2023-12-23 RX ADMIN — Medication 650 MILLIGRAM(S): at 12:22

## 2023-12-23 RX ADMIN — Medication 500 MILLIGRAM(S): at 05:14

## 2023-12-23 RX ADMIN — HEPARIN SODIUM 5000 UNIT(S): 5000 INJECTION INTRAVENOUS; SUBCUTANEOUS at 21:54

## 2023-12-23 RX ADMIN — Medication 1 APPLICATION(S): at 05:14

## 2023-12-23 RX ADMIN — Medication 5: at 18:11

## 2023-12-23 RX ADMIN — Medication 667 MILLIGRAM(S): at 12:23

## 2023-12-23 RX ADMIN — Medication 650 MILLIGRAM(S): at 00:30

## 2023-12-23 RX ADMIN — PANTOPRAZOLE SODIUM 40 MILLIGRAM(S): 20 TABLET, DELAYED RELEASE ORAL at 05:14

## 2023-12-23 RX ADMIN — Medication 4 MILLIGRAM(S): at 00:00

## 2023-12-23 RX ADMIN — Medication 52.5 MILLIGRAM(S): at 00:01

## 2023-12-23 RX ADMIN — PIPERACILLIN AND TAZOBACTAM 25 GRAM(S): 4; .5 INJECTION, POWDER, LYOPHILIZED, FOR SOLUTION INTRAVENOUS at 21:55

## 2023-12-23 RX ADMIN — Medication 650 MILLIGRAM(S): at 00:00

## 2023-12-23 RX ADMIN — ATORVASTATIN CALCIUM 20 MILLIGRAM(S): 80 TABLET, FILM COATED ORAL at 00:00

## 2023-12-23 RX ADMIN — Medication 650 MILLIGRAM(S): at 01:22

## 2023-12-23 RX ADMIN — Medication 650 MILLIGRAM(S): at 13:27

## 2023-12-23 RX ADMIN — PIPERACILLIN AND TAZOBACTAM 25 GRAM(S): 4; .5 INJECTION, POWDER, LYOPHILIZED, FOR SOLUTION INTRAVENOUS at 15:09

## 2023-12-23 RX ADMIN — Medication 81 MILLIGRAM(S): at 12:22

## 2023-12-23 RX ADMIN — Medication 10 MILLIGRAM(S): at 18:49

## 2023-12-23 NOTE — PROGRESS NOTE ADULT - SUBJECTIVE AND OBJECTIVE BOX
JACQUELIN CAI   [id number]   [birth year]  78yM    Admit Date: 12-16-23  Indication for SDU/SICU: Q1 neuro checks        ============================  24 Hour Events    DAY12/22  NIGHT  -PM rounds: levo at .02, precedex at .3, abdomen midly distended  -TF to start at [facility], reschedule lantus  -Na 144, decreased FWF to 200 q4  -Case managemetn c/s placed for vent facility  -off levophed since 2100    DAY  -500 cc bolus for BP 98/36 prior to OR   -Continue vancomycin and zosyn  -Recieved amlodipine and labatelol in AM, d/c'd  -ETT culture pending   -ID consult  -Stat dose of hep sq  -Hold tube feeds 6 hours after OR  -Sputum cx 12/21: numerous staph aureus   -MRSA neg --> D/c vanc   -500 cc bolus to help wean levo off   -s/p Tracheostomy with PEG tube- Size 9 portex cuffed tracheotomy, EGD and 20Fr PEG 2cm at skin.  -ASA to resume tonight;       [X] A ten-point review of systems was otherwise negative except as noted above.  [  ] Due to altered mental status/intubation, subjective information was not attained from the patient. History was obtained, to the extent possible, from review of the chart and collateral sources of information.    =========================================================================================================================================   JACQUELIN CAI   [id number]   [birth year]  78yM    Admit Date: 12-16-23  Indication for SDU/SICU: Q1 neuro checks        ============================  24 Hour Events    DAY12/22  NIGHT  -PM rounds: levo at .02, precedex at .3, abdomen midly distended  -TF to start at [facility], reschedule lantus  -Na 144, decreased FWF to 200 q4  -Case managemetn c/s placed for vent facility  -off levophed since 2100    DAY  -500 cc bolus for BP 98/36 prior to OR   -Continue vancomycin and zosyn  -Recieved amlodipine and labatelol in AM, d/c'd  -ETT culture pending   -ID consult  -Stat dose of hep sq  -Hold tube feeds 6 hours after OR  -Sputum cx 12/21: numerous staph aureus   -MRSA neg --> D/c vanc   -500 cc bolus to help wean levo off   -s/p Tracheostomy with PEG tube- Size 9 portex cuffed tracheotomy, EGD and 20Fr PEG 2cm at skin.  -ASA to resume tonight;       [X] A ten-point review of systems was otherwise negative except as noted above.  [  ] Due to altered mental status/intubation, subjective information was not attained from the patient. History was obtained, to the extent possible, from review of the chart and collateral sources of information.    =========================================================================================================================================   JACQUELIN CAI   [id number]   [birth year]  78yM    Admit Date: 12-16-23  Indication for SDU/SICU: Q1 neuro checks        ============================  24 Hour Events    DAY12/22  NIGHT  -PM rounds: levo at .02, precedex at .3, abdomen midly distended  -TF to start at [facility], reschedule lantus  -Na 144, decreased FWF to 200 q4  -Case managemetn c/s placed for vent facility  -off levophed since 2100    DAY  -500 cc bolus for BP 98/36 prior to OR   -Continue vancomycin and zosyn  -Recieved amlodipine and labatelol in AM, d/c'd  -ETT culture pending   -ID consult  -Stat dose of hep sq  -Hold tube feeds 6 hours after OR  -Sputum cx 12/21: numerous staph aureus   -MRSA neg --> D/c vanc   -500 cc bolus to help wean levo off   -s/p Tracheostomy with PEG tube- Size 9 portex cuffed tracheotomy, EGD and 20Fr PEG 2cm at skin.  -ASA to resume tonight;       [X] A ten-point review of systems was otherwise negative except as noted above.  [  ] Due to altered mental status/intubation, subjective information was not attained from the patient. History was obtained, to the extent possible, from review of the chart and collateral sources of information.    =========================================================================================================================================    Daily Height in cm: 156 (22 Dec 2023 12:43)    Daily     Diet, NPO with Tube Feed:   Tube Feeding Modality: Gastrostomy  Glucerna 1.2 Tonio  Total Volume for 24 Hours (mL): 1320  Continuous  Starting Tube Feed Rate mL per Hour: 20  Increase Tube Feed Rate by (mL): 20     Every 2 hours  Until Goal Tube Feed Rate (mL per Hour): 55  Tube Feed Duration (in Hours): 24  Tube Feed Start Time: 00:00  Free Water Flush  Bolus   Total Volume per Flush (mL): 200   Frequency: Every 4 Hours (12-22-23 @ 21:06)      CURRENT MEDS:  Neurologic Medications  acetaminophen     Tablet .. 650 milliGRAM(s) Oral every 6 hours  dexMEDEtomidine Infusion 0.3 MICROgram(s)/kG/Hr IV Continuous <Continuous>  valproic  acid Syrup 500 milliGRAM(s) Oral every 12 hours    Respiratory Medications  albuterol/ipratropium for Nebulization 3 milliLiter(s) Nebulizer every 4 hours    Cardiovascular Medications  doxazosin 4 milliGRAM(s) Oral at bedtime  norepinephrine Infusion 0.05 MICROgram(s)/kG/Min IV Continuous <Continuous>    Gastrointestinal Medications  pantoprazole  Injectable 40 milliGRAM(s) IV Push every 24 hours  sodium chloride 0.45%. 1000 milliLiter(s) IV Continuous <Continuous>    Genitourinary Medications    Hematologic/Oncologic Medications  aspirin  chewable 81 milliGRAM(s) Enteral Tube daily  heparin   Injectable 5000 Unit(s) SubCutaneous every 8 hours    Antimicrobial/Immunologic Medications  piperacillin/tazobactam IVPB.. 3.375 Gram(s) IV Intermittent every 8 hours    Endocrine/Metabolic Medications  atorvastatin 20 milliGRAM(s) Oral at bedtime  insulin glargine Injectable (LANTUS) 15 Unit(s) SubCutaneous at bedtime  insulin lispro (ADMELOG) corrective regimen sliding scale   SubCutaneous every 4 hours    Topical/Other Medications  bacitracin   Ointment 1 Application(s) Topical every 12 hours  chlorhexidine 0.12% Liquid 15 milliLiter(s) Oral Mucosa two times a day  chlorhexidine 2% Cloths 1 Application(s) Topical <User Schedule>      ICU Vital Signs Last 24 Hrs  T(C): 36.6 (23 Dec 2023 08:00), Max: 38.2 (23 Dec 2023 00:00)  T(F): 97.8 (23 Dec 2023 08:00), Max: 100.8 (23 Dec 2023 00:00)  HR: 71 (23 Dec 2023 07:00) (62 - 96)  BP: 101/51 (23 Dec 2023 07:00) (81/50 - 116/56)  BP(mean): 74 (23 Dec 2023 07:00) (61 - 82)  ABP: 141/43 (23 Dec 2023 07:00) (75/18 - 148/51)  ABP(mean): 66 (23 Dec 2023 07:00) (40 - 76)  RR: 22 (23 Dec 2023 07:00) (14 - 32)  SpO2: 99% (23 Dec 2023 07:00) (93% - 100%)    O2 Parameters below as of 23 Dec 2023 07:00  Patient On (Oxygen Delivery Method): ventilator    O2 Concentration (%): 40        Adult Advanced Hemodynamics Last 24 Hrs  CVP(mm Hg): --  CVP(cm H2O): --  CO: --  CI: --  PA: --  PA(mean): --  PCWP: --  SVR: --  SVRI: --  PVR: --  PVRI: --    Mode: AC/ CMV (Assist Control/ Continuous Mandatory Ventilation)  RR (machine): 14  TV (machine): 350  FiO2: 40  PEEP: 5  ITime: 1  MAP: 9  PIP: 13    ABG - ( 23 Dec 2023 04:46 )  pH, Arterial: 7.42  pH, Blood: x     /  pCO2: 31    /  pO2: 128   / HCO3: 20    / Base Excess: -3.6  /  SaO2: 100.0               I&O's Summary    22 Dec 2023 07:01  -  23 Dec 2023 07:00  --------------------------------------------------------  IN: 4408.5 mL / OUT: 1405 mL / NET: 3003.5 mL      I&O's Detail    22 Dec 2023 07:01  -  23 Dec 2023 07:00  --------------------------------------------------------  IN:    Dexmedetomidine: 98.1 mL    Enteral Tube Flush: 300 mL    Glucerna: 340 mL    IV PiggyBack: 100 mL    IV PiggyBack: 50 mL    IV PiggyBack: 100 mL    IV PiggyBack: 400 mL    Lactated Ringers Bolus: 750 mL    Norepinephrine: 19.7 mL    Norepinephrine: 50.7 mL    sodium chloride 0.45%: 2200 mL  Total IN: 4408.5 mL    OUT:    Indwelling Catheter - Urethral (mL): 1245 mL    Nasogastric/Oral tube (mL): 0 mL    Rectal Tube (mL): 160 mL  Total OUT: 1405 mL    Total NET: 3003.5 mL          PHYSICAL EXAM:    General/Neuro:   RASS: 0            GCS: 9T = E4/V1T/M4      Alert & oriented unable to be assessed s/p trach - nonverbal and does not use non-verbal gestures   Off precedex   Does not follow commands w/    Pupils: PERRL    Lungs:   Cuffed tracheostomy, size 9, on AC//14/40/5   CTAB, Normal expansion/effort.     Cardiovascular : S1, S2. RRR. Minimal peripheral edema   Cardiac Rhythm: Normal Sinus Rhythm    GI: Abdomen soft, Non-tender, minimal distention.   20Fr PEG, 2cm at skin, dressing C/D/I   TF at goal 55cc/hr     Extremities: Extremities warm, pink, well-perfused.     Derm: Good skin turgor, no skin breakdown.      : Condom catheter      CXR:       LABS:  CAPILLARY BLOOD GLUCOSE      POCT Blood Glucose.: 238 mg/dL (23 Dec 2023 04:49)  POCT Blood Glucose.: 142 mg/dL (23 Dec 2023 01:44)  POCT Blood Glucose.: 157 mg/dL (22 Dec 2023 22:01)  POCT Blood Glucose.: 180 mg/dL (22 Dec 2023 17:38)  POCT Blood Glucose.: 135 mg/dL (22 Dec 2023 10:43)                          8.0    13.71 )-----------( 252      ( 22 Dec 2023 20:37 )             24.5       12-22    144  |  112<H>  |  58<H>  ----------------------------<  177<H>  4.7   |  21  |  2.6<H>    Ca    7.5<L>      22 Dec 2023 20:37  Phos  6.2     12-22  Mg     3.0     12-22    TPro  x   /  Alb  2.6<L>  /  TBili  x   /  DBili  x   /  AST  x   /  ALT  x   /  AlkPhos  x   12-21      PT/INR - ( 21 Dec 2023 21:04 )   PT: 11.90 sec;   INR: 1.04 ratio         PTT - ( 21 Dec 2023 21:04 )  PTT:42.5 sec      Urinalysis Basic - ( 22 Dec 2023 20:37 )    Color: x / Appearance: x / SG: x / pH: x  Gluc: 177 mg/dL / Ketone: x  / Bili: x / Urobili: x   Blood: x / Protein: x / Nitrite: x   Leuk Esterase: x / RBC: x / WBC x   Sq Epi: x / Non Sq Epi: x / Bacteria: x        Culture - Sputum (collected 22 Dec 2023 10:40)  Source: Trach Asp Tracheal Aspirate  Gram Stain (23 Dec 2023 03:44):    Moderate polymorphonuclear leukocytes per low power field    No Squamous epithelial cells per low power field    Moderate Gram positive cocci in pairs per oil power field    Culture - Blood (collected 21 Dec 2023 18:19)  Source: .Blood Blood-Peripheral  Preliminary Report (23 Dec 2023 01:02):    No growth at 24 hours    Culture - Blood (collected 21 Dec 2023 18:19)  Source: .Blood Blood-Peripheral  Preliminary Report (23 Dec 2023 01:02):    No growth at 24 hours    Culture - Sputum (collected 21 Dec 2023 18:14)  Source: ET Tube ET Tube  Gram Stain (22 Dec 2023 05:32):    Moderate polymorphonuclear leukocytes per low power field    No Squamous epithelial cells per low power field    Moderate Gram positive cocci in pairs seen per oil power field  Preliminary Report (22 Dec 2023 23:27):    Numerous Staphylococcus aureus    Culture - Sputum (collected 21 Dec 2023 07:00)  Source: .Sputum Sputum  Gram Stain (21 Dec 2023 19:49):    Few polymorphonuclear leukocytes per low power field    Rare Squamous epithelial cells per low power field    Numerous Gram Negative Rods per oil power field    Numerous Gram positive cocci in pairs per oil power field    Few Gram Positive Rods per oil power field  Preliminary Report (22 Dec 2023 11:59):    Numerous Staphylococcus aureus    Normal Respiratory Fany present         JACQUELIN CAI   [id number]   [birth year]  78yM    Admit Date: 12-16-23  Indication for SDU/SICU: Q1 neuro checks        ============================  24 Hour Events    DAY12/22  NIGHT  -PM rounds: levo at .02, precedex at .3, abdomen midly distended  -TF to start at [facility], reschedule lantus  -Na 144, decreased FWF to 200 q4  -Case managemetn c/s placed for vent facility  -off levophed since 2100    DAY  -500 cc bolus for BP 98/36 prior to OR   -Continue vancomycin and zosyn  -Recieved amlodipine and labatelol in AM, d/c'd  -ETT culture pending   -ID consult  -Stat dose of hep sq  -Hold tube feeds 6 hours after OR  -Sputum cx 12/21: numerous staph aureus   -MRSA neg --> D/c vanc   -500 cc bolus to help wean levo off   -s/p Tracheostomy with PEG tube- Size 9 portex cuffed tracheotomy, EGD and 20Fr PEG 2cm at skin.  -ASA to resume tonight;       [X] A ten-point review of systems was otherwise negative except as noted above.  [  ] Due to altered mental status/intubation, subjective information was not attained from the patient. History was obtained, to the extent possible, from review of the chart and collateral sources of information.    =========================================================================================================================================    Daily Height in cm: 156 (22 Dec 2023 12:43)    Daily     Diet, NPO with Tube Feed:   Tube Feeding Modality: Gastrostomy  Glucerna 1.2 Tonio  Total Volume for 24 Hours (mL): 1320  Continuous  Starting Tube Feed Rate mL per Hour: 20  Increase Tube Feed Rate by (mL): 20     Every 2 hours  Until Goal Tube Feed Rate (mL per Hour): 55  Tube Feed Duration (in Hours): 24  Tube Feed Start Time: 00:00  Free Water Flush  Bolus   Total Volume per Flush (mL): 200   Frequency: Every 4 Hours (12-22-23 @ 21:06)      CURRENT MEDS:  Neurologic Medications  acetaminophen     Tablet .. 650 milliGRAM(s) Oral every 6 hours  dexMEDEtomidine Infusion 0.3 MICROgram(s)/kG/Hr IV Continuous <Continuous>  valproic  acid Syrup 500 milliGRAM(s) Oral every 12 hours    Respiratory Medications  albuterol/ipratropium for Nebulization 3 milliLiter(s) Nebulizer every 4 hours    Cardiovascular Medications  doxazosin 4 milliGRAM(s) Oral at bedtime  norepinephrine Infusion 0.05 MICROgram(s)/kG/Min IV Continuous <Continuous>    Gastrointestinal Medications  pantoprazole  Injectable 40 milliGRAM(s) IV Push every 24 hours  sodium chloride 0.45%. 1000 milliLiter(s) IV Continuous <Continuous>    Genitourinary Medications    Hematologic/Oncologic Medications  aspirin  chewable 81 milliGRAM(s) Enteral Tube daily  heparin   Injectable 5000 Unit(s) SubCutaneous every 8 hours    Antimicrobial/Immunologic Medications  piperacillin/tazobactam IVPB.. 3.375 Gram(s) IV Intermittent every 8 hours    Endocrine/Metabolic Medications  atorvastatin 20 milliGRAM(s) Oral at bedtime  insulin glargine Injectable (LANTUS) 15 Unit(s) SubCutaneous at bedtime  insulin lispro (ADMELOG) corrective regimen sliding scale   SubCutaneous every 4 hours    Topical/Other Medications  bacitracin   Ointment 1 Application(s) Topical every 12 hours  chlorhexidine 0.12% Liquid 15 milliLiter(s) Oral Mucosa two times a day  chlorhexidine 2% Cloths 1 Application(s) Topical <User Schedule>      ICU Vital Signs Last 24 Hrs  T(C): 36.6 (23 Dec 2023 08:00), Max: 38.2 (23 Dec 2023 00:00)  T(F): 97.8 (23 Dec 2023 08:00), Max: 100.8 (23 Dec 2023 00:00)  HR: 71 (23 Dec 2023 07:00) (62 - 96)  BP: 101/51 (23 Dec 2023 07:00) (81/50 - 116/56)  BP(mean): 74 (23 Dec 2023 07:00) (61 - 82)  ABP: 141/43 (23 Dec 2023 07:00) (75/18 - 148/51)  ABP(mean): 66 (23 Dec 2023 07:00) (40 - 76)  RR: 22 (23 Dec 2023 07:00) (14 - 32)  SpO2: 99% (23 Dec 2023 07:00) (93% - 100%)    O2 Parameters below as of 23 Dec 2023 07:00  Patient On (Oxygen Delivery Method): ventilator    O2 Concentration (%): 40        Adult Advanced Hemodynamics Last 24 Hrs  CVP(mm Hg): --  CVP(cm H2O): --  CO: --  CI: --  PA: --  PA(mean): --  PCWP: --  SVR: --  SVRI: --  PVR: --  PVRI: --    Mode: AC/ CMV (Assist Control/ Continuous Mandatory Ventilation)  RR (machine): 14  TV (machine): 350  FiO2: 40  PEEP: 5  ITime: 1  MAP: 9  PIP: 13    ABG - ( 23 Dec 2023 04:46 )  pH, Arterial: 7.42  pH, Blood: x     /  pCO2: 31    /  pO2: 128   / HCO3: 20    / Base Excess: -3.6  /  SaO2: 100.0               I&O's Summary    22 Dec 2023 07:01  -  23 Dec 2023 07:00  --------------------------------------------------------  IN: 4408.5 mL / OUT: 1405 mL / NET: 3003.5 mL      I&O's Detail    22 Dec 2023 07:01  -  23 Dec 2023 07:00  --------------------------------------------------------  IN:    Dexmedetomidine: 98.1 mL    Enteral Tube Flush: 300 mL    Glucerna: 340 mL    IV PiggyBack: 100 mL    IV PiggyBack: 50 mL    IV PiggyBack: 100 mL    IV PiggyBack: 400 mL    Lactated Ringers Bolus: 750 mL    Norepinephrine: 19.7 mL    Norepinephrine: 50.7 mL    sodium chloride 0.45%: 2200 mL  Total IN: 4408.5 mL    OUT:    Indwelling Catheter - Urethral (mL): 1245 mL    Nasogastric/Oral tube (mL): 0 mL    Rectal Tube (mL): 160 mL  Total OUT: 1405 mL    Total NET: 3003.5 mL          PHYSICAL EXAM:    General/Neuro:   RASS: 0            GCS: 9T = E4/V1T/M4      Alert & oriented unable to be assessed s/p trach - nonverbal and does not use non-verbal gestures   Off precedex   Does not follow commands w/    Pupils: PERRL    Lungs:   Cuffed tracheostomy, size 9, on AC//14/40/5   CTAB, Normal expansion/effort.     Cardiovascular : S1, S2. RRR. Minimal peripheral edema   Cardiac Rhythm: Normal Sinus Rhythm    GI: Abdomen soft, Non-tender, minimal distention.   20Fr PEG, 2cm at skin, dressing C/D/I   TF at goal 55cc/hr     Extremities: Extremities warm, pink, well-perfused.     Derm: Good skin turgor, no skin breakdown.      : Condom catheter      CXR:       LABS:  CAPILLARY BLOOD GLUCOSE      POCT Blood Glucose.: 238 mg/dL (23 Dec 2023 04:49)  POCT Blood Glucose.: 142 mg/dL (23 Dec 2023 01:44)  POCT Blood Glucose.: 157 mg/dL (22 Dec 2023 22:01)  POCT Blood Glucose.: 180 mg/dL (22 Dec 2023 17:38)  POCT Blood Glucose.: 135 mg/dL (22 Dec 2023 10:43)                          8.0    13.71 )-----------( 252      ( 22 Dec 2023 20:37 )             24.5       12-22    144  |  112<H>  |  58<H>  ----------------------------<  177<H>  4.7   |  21  |  2.6<H>    Ca    7.5<L>      22 Dec 2023 20:37  Phos  6.2     12-22  Mg     3.0     12-22    TPro  x   /  Alb  2.6<L>  /  TBili  x   /  DBili  x   /  AST  x   /  ALT  x   /  AlkPhos  x   12-21      PT/INR - ( 21 Dec 2023 21:04 )   PT: 11.90 sec;   INR: 1.04 ratio         PTT - ( 21 Dec 2023 21:04 )  PTT:42.5 sec      Urinalysis Basic - ( 22 Dec 2023 20:37 )    Color: x / Appearance: x / SG: x / pH: x  Gluc: 177 mg/dL / Ketone: x  / Bili: x / Urobili: x   Blood: x / Protein: x / Nitrite: x   Leuk Esterase: x / RBC: x / WBC x   Sq Epi: x / Non Sq Epi: x / Bacteria: x        Culture - Sputum (collected 22 Dec 2023 10:40)  Source: Trach Asp Tracheal Aspirate  Gram Stain (23 Dec 2023 03:44):    Moderate polymorphonuclear leukocytes per low power field    No Squamous epithelial cells per low power field    Moderate Gram positive cocci in pairs per oil power field    Culture - Blood (collected 21 Dec 2023 18:19)  Source: .Blood Blood-Peripheral  Preliminary Report (23 Dec 2023 01:02):    No growth at 24 hours    Culture - Blood (collected 21 Dec 2023 18:19)  Source: .Blood Blood-Peripheral  Preliminary Report (23 Dec 2023 01:02):    No growth at 24 hours    Culture - Sputum (collected 21 Dec 2023 18:14)  Source: ET Tube ET Tube  Gram Stain (22 Dec 2023 05:32):    Moderate polymorphonuclear leukocytes per low power field    No Squamous epithelial cells per low power field    Moderate Gram positive cocci in pairs seen per oil power field  Preliminary Report (22 Dec 2023 23:27):    Numerous Staphylococcus aureus    Culture - Sputum (collected 21 Dec 2023 07:00)  Source: .Sputum Sputum  Gram Stain (21 Dec 2023 19:49):    Few polymorphonuclear leukocytes per low power field    Rare Squamous epithelial cells per low power field    Numerous Gram Negative Rods per oil power field    Numerous Gram positive cocci in pairs per oil power field    Few Gram Positive Rods per oil power field  Preliminary Report (22 Dec 2023 11:59):    Numerous Staphylococcus aureus    Normal Respiratory Fany present

## 2023-12-23 NOTE — PROGRESS NOTE ADULT - ATTENDING COMMENTS
Patient had Trach and PEG yesterday.  On Vent support at this time.  On AC, FiO2 40%, PEEP 5.  Required Levo postoperatively, no off.  Opens eyes, does not follow commands.  Tube feeds started yesterday.  Na 144 today    ASSESSMENT:  79 y/o male, S/P Fall.  Traumatic Left and Right SDHs.   Seizures.  History of CVA with right hemiparesis.  Acute respiratory failure. S/P Tracheostomy.  Hypertensive urgency.  Uncontrolled DM.  SAGRARIO on CKD.  Dysphagia. S/P PEG.  Hypernatremia.    PLAN:  - sedation - hold Precedex for SAT  - put on PSV as tolerated  - keep MAP>65; use Nicom as needed  - on tube feeds  - follow serum electrolytes and UOP  - now on free water 250 ml q6h  - ID - on Zosyn  - DVT prophylaxis Patient had Trach and PEG yesterday.  On Vent support at this time.  On AC, FiO2 40%, PEEP 5.  Required Levo postoperatively, no off.  Opens eyes, does not follow commands.  Tube feeds started yesterday.  Na 144 today    ASSESSMENT:  77 y/o male, S/P Fall.  Traumatic Left and Right SDHs.   Seizures.  History of CVA with right hemiparesis.  Acute respiratory failure. S/P Tracheostomy.  Hypertensive urgency.  Uncontrolled DM.  SAGRARIO on CKD.  Dysphagia. S/P PEG.  Hypernatremia.    PLAN:  - sedation - hold Precedex for SAT  - put on PSV as tolerated  - keep MAP>65; use Nicom as needed  - on tube feeds  - follow serum electrolytes and UOP  - now on free water 250 ml q6h  - ID - on Zosyn  - DVT prophylaxis

## 2023-12-23 NOTE — CONSULT NOTE ADULT - TIME BILLING
I have personally seen and examined this patient.    I have reviewed all pertinent clinical information and reviewed all relevant imaging and diagnostic studies personally.   I discussed recommendations with the primary team.
SDH, CVA, abnormal ECG

## 2023-12-23 NOTE — CONSULT NOTE ADULT - SUBJECTIVE AND OBJECTIVE BOX
INFECTIOUS DISEASE CONSULT NOTE    Patient is a 78y old  Male who presents with a chief complaint of SDH (23 Dec 2023 00:06)    HPI:  77-year-old male Cantonese speaking his presents with prior history of hypertension dyslipidemia diabetes BPH prior CVA on aspirin and Plavix who states he had a mechanical fall 2 days ago while getting out of the car fell backwards hit his head.  Afterwards he was able to ambulate.  But for the past 1 day family was unable to ambulate him.  He states that his lower extremities are painful to movement and weak.  Family denies any LOC.  They deny any confusion at home.  On exam oriented to person and place but just not to date.  He does follow simple commands.  Elevates his arms for 10 seconds.  Sensation intact in upper extremities.  Lower extremities with 2 out of 5 strength bilaterally.     (16 Dec 2023 00:01)         Prior hospital charts reviewed [Yes]  Primary team notes reviewed [Yes]  Other consultant notes reviewed [Yes]    REVIEW OF SYSTEMS:      PAST MEDICAL & SURGICAL HISTORY:  HTN (hypertension)      Seasonal allergies      History of BPH      Diabetes      No significant past surgical history          SOCIAL HISTORY:  - Born in _____, migrated to  in 19XX  - Currently working as / Retired  - Lives with _____; no pets  - No recent travel  - Denies tobacco use  - Denies alcohol use  - Denies illicit drug use  - Currently sexually active, has one male/female sexual partner    FAMILY HISTORY:  No pertinent family history in first degree relatives        Allergies:  [This allergen will not trigger allergy alert] pollen (Unknown)  No Known Drug Allergies      ANTIMICROBIALS:  piperacillin/tazobactam IVPB.. 3.375 every 8 hours      ANTIMICROBIALS (past 90 days):  MEDICATIONS  (STANDING):    cefepime   IVPB   100 mL/Hr IV Intermittent (12-16-23 @ 18:04)    piperacillin/tazobactam IVPB.-   25 mL/Hr IV Intermittent (12-22-23 @ 02:14)    piperacillin/tazobactam IVPB.-   25 mL/Hr IV Intermittent (12-22-23 @ 10:49)    piperacillin/tazobactam IVPB.-   25 mL/Hr IV Intermittent (12-22-23 @ 16:59)    piperacillin/tazobactam IVPB..   25 mL/Hr IV Intermittent (12-18-23 @ 17:16)   25 mL/Hr IV Intermittent (12-18-23 @ 05:47)   25 mL/Hr IV Intermittent (12-17-23 @ 14:09)    piperacillin/tazobactam IVPB..   25 mL/Hr IV Intermittent (12-23-23 @ 06:24)   25 mL/Hr IV Intermittent (12-22-23 @ 22:06)    vancomycin  IVPB   250 mL/Hr IV Intermittent (12-22-23 @ 00:21)    vancomycin  IVPB   100 mL/Hr IV Intermittent (12-22-23 @ 10:49)        OTHER MEDS:   MEDICATIONS  (STANDING):  acetaminophen     Tablet .. 650 every 6 hours  albuterol/ipratropium for Nebulization 3 every 4 hours  aspirin  chewable 81 daily  atorvastatin 20 at bedtime  dexMEDEtomidine Infusion 0.3 <Continuous>  doxazosin 4 at bedtime  heparin   Injectable 5000 every 8 hours  insulin glargine Injectable (LANTUS) 15 at bedtime  insulin lispro (ADMELOG) corrective regimen sliding scale  every 4 hours  norepinephrine Infusion 0.05 <Continuous>  pantoprazole  Injectable 40 every 24 hours  valproic  acid Syrup 500 every 12 hours      VITALS:  Vital Signs Last 24 Hrs  T(F): 97.8 (12-23-23 @ 08:00), Max: 102.4 (12-21-23 @ 18:00)    Vital Signs Last 24 Hrs  HR: 71 (12-23-23 @ 07:00) (62 - 96)  BP: 101/51 (12-23-23 @ 07:00) (81/50 - 116/56)  RR: 22 (12-23-23 @ 07:00)  SpO2: 99% (12-23-23 @ 07:00) (93% - 100%)  Wt(kg): --    EXAM:    Labs:                        8.0    13.71 )-----------( 252      ( 22 Dec 2023 20:37 )             24.5     12-22    144  |  112<H>  |  58<H>  ----------------------------<  177<H>  4.7   |  21  |  2.6<H>    Ca    7.5<L>      22 Dec 2023 20:37  Phos  6.2     12-22  Mg     3.0     12-22    TPro  x   /  Alb  2.6<L>  /  TBili  x   /  DBili  x   /  AST  x   /  ALT  x   /  AlkPhos  x   12-21      WBC Trend:  WBC Count: 13.71 (12-22-23 @ 20:37)  WBC Count: 9.69 (12-21-23 @ 21:04)  WBC Count: 8.41 (12-21-23 @ 02:23)  WBC Count: 9.02 (12-21-23 @ 00:30)      Auto Neutrophil #: 7.80 K/uL (12-21-23 @ 21:04)  Auto Neutrophil #: 9.57 K/uL (12-18-23 @ 00:30)  Auto Neutrophil #: 10.57 K/uL (12-16-23 @ 21:27)  Auto Neutrophil #: 11.22 K/uL (12-16-23 @ 11:29)  Auto Neutrophil #: 9.79 K/uL (12-15-23 @ 20:29)      Creatine Trend:  Creatinine: 2.6 (12-22)  Creatinine: 2.7 (12-22)  Creatinine: 2.8 (12-21)  Creatinine: 2.8 (12-21)      Liver Biochemical Testing Trend:  Alanine Aminotransferase (ALT/SGPT): 50 *H* (12-21)  Alanine Aminotransferase (ALT/SGPT): 17 (12-15)  Alanine Aminotransferase (ALT/SGPT): 42 *H* (02-25)  Alanine Aminotransferase (ALT/SGPT): 39 (02-25)  Alanine Aminotransferase (ALT/SGPT): 48 *H* (02-24)  Aspartate Aminotransferase (AST/SGOT): 61 (12-21-23 @ 02:23)  Aspartate Aminotransferase (AST/SGOT): 19 (12-15-23 @ 20:29)  Aspartate Aminotransferase (AST/SGOT): 21 (02-25-23 @ 05:46)  Aspartate Aminotransferase (AST/SGOT): 22 (02-25-23 @ 02:32)  Aspartate Aminotransferase (AST/SGOT): 28 (02-24-23 @ 05:52)  Bilirubin Total: <0.2 (12-21)  Bilirubin Total: 0.9 (12-15)  Bilirubin Total, Serum: 0.4 (02-25)  Bilirubin Total, Serum: 0.3 (02-25)  Bilirubin Total, Serum: 0.3 (02-24)      Trend LDH      Auto Eosinophil %: 1.8 % (12-21-23 @ 21:04)      Urinalysis Basic - ( 22 Dec 2023 20:37 )    Color: x / Appearance: x / SG: x / pH: x  Gluc: 177 mg/dL / Ketone: x  / Bili: x / Urobili: x   Blood: x / Protein: x / Nitrite: x   Leuk Esterase: x / RBC: x / WBC x   Sq Epi: x / Non Sq Epi: x / Bacteria: x        MICROBIOLOGY:    MRSA PCR Result.: Negative (12-22-23 @ 11:25)      Culture - Sputum (collected 22 Dec 2023 10:40)  Source: Trach Asp Tracheal Aspirate  Gram Stain (23 Dec 2023 03:44):    Moderate polymorphonuclear leukocytes per low power field    No Squamous epithelial cells per low power field    Moderate Gram positive cocci in pairs per oil power field    Culture - Blood (collected 21 Dec 2023 18:19)  Source: .Blood Blood-Peripheral  Preliminary Report (23 Dec 2023 01:02):    No growth at 24 hours    Culture - Blood (collected 21 Dec 2023 18:19)  Source: .Blood Blood-Peripheral  Preliminary Report (23 Dec 2023 01:02):    No growth at 24 hours    Culture - Sputum (collected 21 Dec 2023 18:14)  Source: ET Tube ET Tube  Gram Stain (22 Dec 2023 05:32):    Moderate polymorphonuclear leukocytes per low power field    No Squamous epithelial cells per low power field    Moderate Gram positive cocci in pairs seen per oil power field  Preliminary Report (22 Dec 2023 23:27):    Numerous Staphylococcus aureus    Culture - Sputum (collected 21 Dec 2023 07:00)  Source: .Sputum Sputum  Gram Stain (21 Dec 2023 19:49):    Few polymorphonuclear leukocytes per low power field    Rare Squamous epithelial cells per low power field    Numerous Gram Negative Rods per oil power field    Numerous Gram positive cocci in pairs per oil power field    Few Gram Positive Rods per oil power field  Preliminary Report (22 Dec 2023 11:59):    Numerous Staphylococcus aureus    Normal Respiratory Fany present        Blood Gas Arterial, Lactate: 1.2 (12-23 @ 04:46)  Blood Gas Arterial, Lactate: 1.7 (12-22 @ 03:33)  Blood Gas Arterial, Lactate: 1.2 (12-21 @ 15:47)  Blood Gas Arterial, Lactate: 1.2 (12-21 @ 09:59)        RADIOLOGY:  imaging below personally reviewed   INFECTIOUS DISEASE CONSULT NOTE    Patient is a 78y old  Male who presents with a chief complaint of SDH (23 Dec 2023 00:06)    HPI:  77-year-old male Cantonese speaking his presents with prior history of hypertension dyslipidemia diabetes BPH prior CVA on aspirin and Plavix who states he had a mechanical fall 2 days ago while getting out of the car fell backwards hit his head.  Afterwards he was able to ambulate.  But for the past 1 day family was unable to ambulate him.  He states that his lower extremities are painful to movement and weak.  Family denies any LOC.  They deny any confusion at home.  On exam oriented to person and place but just not to date.  He does follow simple commands.  Elevates his arms for 10 seconds.  Sensation intact in upper extremities.  Lower extremities with 2 out of 5 strength bilaterally.     (16 Dec 2023 00:01)    Prior hospital charts reviewed [Yes]  Primary team notes reviewed [Yes]  Other consultant notes reviewed [Yes]    REVIEW OF SYSTEMS:  Unable to obtain as patient is trached    PAST MEDICAL & SURGICAL HISTORY:  HTN (hypertension)      Seasonal allergies      History of BPH      Diabetes      No significant past surgical history          SOCIAL HISTORY:  Unable to obtain as patient is trached    FAMILY HISTORY:  Unable to obtain as patient is trached        Allergies:  [This allergen will not trigger allergy alert] pollen (Unknown)  No Known Drug Allergies      ANTIMICROBIALS:  piperacillin/tazobactam IVPB.. 3.375 every 8 hours      ANTIMICROBIALS (past 90 days):  MEDICATIONS  (STANDING):    cefepime   IVPB   100 mL/Hr IV Intermittent (12-16-23 @ 18:04)    piperacillin/tazobactam IVPB.-   25 mL/Hr IV Intermittent (12-22-23 @ 02:14)    piperacillin/tazobactam IVPB.-   25 mL/Hr IV Intermittent (12-22-23 @ 10:49)    piperacillin/tazobactam IVPB.-   25 mL/Hr IV Intermittent (12-22-23 @ 16:59)    piperacillin/tazobactam IVPB..   25 mL/Hr IV Intermittent (12-18-23 @ 17:16)   25 mL/Hr IV Intermittent (12-18-23 @ 05:47)   25 mL/Hr IV Intermittent (12-17-23 @ 14:09)    piperacillin/tazobactam IVPB..   25 mL/Hr IV Intermittent (12-23-23 @ 06:24)   25 mL/Hr IV Intermittent (12-22-23 @ 22:06)    vancomycin  IVPB   250 mL/Hr IV Intermittent (12-22-23 @ 00:21)    vancomycin  IVPB   100 mL/Hr IV Intermittent (12-22-23 @ 10:49)        OTHER MEDS:   MEDICATIONS  (STANDING):  acetaminophen     Tablet .. 650 every 6 hours  albuterol/ipratropium for Nebulization 3 every 4 hours  aspirin  chewable 81 daily  atorvastatin 20 at bedtime  dexMEDEtomidine Infusion 0.3 <Continuous>  doxazosin 4 at bedtime  heparin   Injectable 5000 every 8 hours  insulin glargine Injectable (LANTUS) 15 at bedtime  insulin lispro (ADMELOG) corrective regimen sliding scale  every 4 hours  norepinephrine Infusion 0.05 <Continuous>  pantoprazole  Injectable 40 every 24 hours  valproic  acid Syrup 500 every 12 hours      VITALS:  Vital Signs Last 24 Hrs  T(F): 97.8 (12-23-23 @ 08:00), Max: 102.4 (12-21-23 @ 18:00)    Vital Signs Last 24 Hrs  HR: 71 (12-23-23 @ 07:00) (62 - 96)  BP: 101/51 (12-23-23 @ 07:00) (81/50 - 116/56)  RR: 22 (12-23-23 @ 07:00)  SpO2: 99% (12-23-23 @ 07:00) (93% - 100%)  Wt(kg): --    EXAM:  GENERAL: NAD, lying in bed  HEAD: No head lesions  EYES: Conjunctiva pink and cornea white  EAR, NOSE, MOUTH, THROAT: Normal external ears and nose, no discharges; dry mucous membranes  NECK: Supple, no JVD; Trach site is clean  RESPIRATORY: Decreased bibasilar breath sounds  CARDIOVASCULAR: S1 S2  GASTROINTESTINAL: Soft, nontender, nondistended; normoactive bowel sounds; Wrapped with binder  EXTREMITIES: No clubbing, cyanosis, or petal edema  NERVOUS SYSTEM: Sleepy, not arousable   MUSCULOSKELETAL: No joint erythema, swelling  SKIN: No rashes or lesions, no superficial thrombophlebitis  PSYCH: Calm      Labs:                        8.0    13.71 )-----------( 252      ( 22 Dec 2023 20:37 )             24.5     12-22    144  |  112<H>  |  58<H>  ----------------------------<  177<H>  4.7   |  21  |  2.6<H>    Ca    7.5<L>      22 Dec 2023 20:37  Phos  6.2     12-22  Mg     3.0     12-22    TPro  x   /  Alb  2.6<L>  /  TBili  x   /  DBili  x   /  AST  x   /  ALT  x   /  AlkPhos  x   12-21      WBC Trend:  WBC Count: 13.71 (12-22-23 @ 20:37)  WBC Count: 9.69 (12-21-23 @ 21:04)  WBC Count: 8.41 (12-21-23 @ 02:23)  WBC Count: 9.02 (12-21-23 @ 00:30)      Auto Neutrophil #: 7.80 K/uL (12-21-23 @ 21:04)  Auto Neutrophil #: 9.57 K/uL (12-18-23 @ 00:30)  Auto Neutrophil #: 10.57 K/uL (12-16-23 @ 21:27)  Auto Neutrophil #: 11.22 K/uL (12-16-23 @ 11:29)  Auto Neutrophil #: 9.79 K/uL (12-15-23 @ 20:29)      Creatine Trend:  Creatinine: 2.6 (12-22)  Creatinine: 2.7 (12-22)  Creatinine: 2.8 (12-21)  Creatinine: 2.8 (12-21)      Liver Biochemical Testing Trend:  Alanine Aminotransferase (ALT/SGPT): 50 *H* (12-21)  Alanine Aminotransferase (ALT/SGPT): 17 (12-15)  Alanine Aminotransferase (ALT/SGPT): 42 *H* (02-25)  Alanine Aminotransferase (ALT/SGPT): 39 (02-25)  Alanine Aminotransferase (ALT/SGPT): 48 *H* (02-24)  Aspartate Aminotransferase (AST/SGOT): 61 (12-21-23 @ 02:23)  Aspartate Aminotransferase (AST/SGOT): 19 (12-15-23 @ 20:29)  Aspartate Aminotransferase (AST/SGOT): 21 (02-25-23 @ 05:46)  Aspartate Aminotransferase (AST/SGOT): 22 (02-25-23 @ 02:32)  Aspartate Aminotransferase (AST/SGOT): 28 (02-24-23 @ 05:52)  Bilirubin Total: <0.2 (12-21)  Bilirubin Total: 0.9 (12-15)  Bilirubin Total, Serum: 0.4 (02-25)  Bilirubin Total, Serum: 0.3 (02-25)  Bilirubin Total, Serum: 0.3 (02-24)      Trend LDH      Auto Eosinophil %: 1.8 % (12-21-23 @ 21:04)      Urinalysis Basic - ( 22 Dec 2023 20:37 )    Color: x / Appearance: x / SG: x / pH: x  Gluc: 177 mg/dL / Ketone: x  / Bili: x / Urobili: x   Blood: x / Protein: x / Nitrite: x   Leuk Esterase: x / RBC: x / WBC x   Sq Epi: x / Non Sq Epi: x / Bacteria: x        MICROBIOLOGY:    MRSA PCR Result.: Negative (12-22-23 @ 11:25)      Culture - Sputum (collected 22 Dec 2023 10:40)  Source: Trach Asp Tracheal Aspirate  Gram Stain (23 Dec 2023 03:44):    Moderate polymorphonuclear leukocytes per low power field    No Squamous epithelial cells per low power field    Moderate Gram positive cocci in pairs per oil power field    Culture - Blood (collected 21 Dec 2023 18:19)  Source: .Blood Blood-Peripheral  Preliminary Report (23 Dec 2023 01:02):    No growth at 24 hours    Culture - Blood (collected 21 Dec 2023 18:19)  Source: .Blood Blood-Peripheral  Preliminary Report (23 Dec 2023 01:02):    No growth at 24 hours    Culture - Sputum (collected 21 Dec 2023 18:14)  Source: ET Tube ET Tube  Gram Stain (22 Dec 2023 05:32):    Moderate polymorphonuclear leukocytes per low power field    No Squamous epithelial cells per low power field    Moderate Gram positive cocci in pairs seen per oil power field  Preliminary Report (22 Dec 2023 23:27):    Numerous Staphylococcus aureus    Culture - Sputum (collected 21 Dec 2023 07:00)  Source: .Sputum Sputum  Gram Stain (21 Dec 2023 19:49):    Few polymorphonuclear leukocytes per low power field    Rare Squamous epithelial cells per low power field    Numerous Gram Negative Rods per oil power field    Numerous Gram positive cocci in pairs per oil power field    Few Gram Positive Rods per oil power field  Preliminary Report (22 Dec 2023 11:59):    Numerous Staphylococcus aureus    Normal Respiratory Fany present        Blood Gas Arterial, Lactate: 1.2 (12-23 @ 04:46)  Blood Gas Arterial, Lactate: 1.7 (12-22 @ 03:33)  Blood Gas Arterial, Lactate: 1.2 (12-21 @ 15:47)  Blood Gas Arterial, Lactate: 1.2 (12-21 @ 09:59)        RADIOLOGY:  imaging below personally reviewed    < from: Xray Chest 1 View- PORTABLE-Routine (12.23.23 @ 05:31) >    Impression:  Interval tracheostomy. Interval extubation and removal of enteric tube  No radiographic evidence of acute cardiopulmonary disease.      < end of copied text >

## 2023-12-23 NOTE — CONSULT NOTE ADULT - ASSESSMENT
77-year-old male Cantonese speaking his presents with prior history of hypertension dyslipidemia diabetes BPH prior CVA on aspirin and Plavix who states he had a mechanical fall 2 days ago while getting out of the car fell backwards hit his head. Found to have SDH. Worsening mental status requiring trach and PEG    IMPRESSION:  Fever  - Possibly 2/2 SDH?  - BCx NGTD  MSSA trachitis  - CXR no PNA  Acute hypoxemic respiratory failure s/p trach and PEG 12/22  SDH    RECOMMENDATIONS:  - D/C Zosyn, de-escalate to IV Ancef 2gm q8hrs to complete additional 5 days of treatment (until 12/26)  - Trach care and aggressive suctioning  - Neurosurgery follow up  - Offloading and frequent position changes, aspiration precaution  - Trend WBC, fever curve, transaminases, creatinine daily      Sonja Short D.O.  Attending Physician  Division of Infectious Diseases  [facility]  Please contact me via [employer] Teams       77-year-old male Cantonese speaking his presents with prior history of hypertension dyslipidemia diabetes BPH prior CVA on aspirin and Plavix who states he had a mechanical fall 2 days ago while getting out of the car fell backwards hit his head. Found to have SDH. Worsening mental status requiring trach and PEG    IMPRESSION:  Fever  - Possibly 2/2 SDH?  - BCx NGTD  MSSA trachitis  - CXR no PNA  Acute hypoxemic respiratory failure s/p trach and PEG 12/22  SDH    RECOMMENDATIONS:  - D/C Zosyn, de-escalate to IV Ancef 2gm q8hrs to complete additional 5 days of treatment (until 12/26)  - Trach care and aggressive suctioning  - Neurosurgery follow up  - Offloading and frequent position changes, aspiration precaution  - Trend WBC, fever curve, transaminases, creatinine daily      Sonja Short D.O.  Attending Physician  Division of Infectious Diseases  [facility]  Please contact me via [employer] Teams       77-year-old male Cantonese speaking his presents with prior history of hypertension dyslipidemia diabetes BPH prior CVA on aspirin and Plavix who states he had a mechanical fall 2 days ago while getting out of the car fell backwards hit his head. Found to have SDH. Worsening mental status requiring trach and PEG    IMPRESSION:  Fever  - Possibly 2/2 SDH?  - BCx NGTD  MSSA trachitis  - CXR no PNA  - MRSA nare PCR -  Acute hypoxemic respiratory failure s/p trach and PEG 12/22  SDH    RECOMMENDATIONS:  - D/C Zosyn, de-escalate to IV Ancef 2gm q8hrs to complete additional 5 days of treatment (until 12/26)  - Trach care and aggressive suctioning  - Neurosurgery follow up  - Offloading and frequent position changes, aspiration precaution  - Trend WBC, fever curve, transaminases, creatinine daily      Sonja Short D.O.  Attending Physician  Division of Infectious Diseases  [facility]  Please contact me via [employer] Teams       77-year-old male Cantonese speaking his presents with prior history of hypertension dyslipidemia diabetes BPH prior CVA on aspirin and Plavix who states he had a mechanical fall 2 days ago while getting out of the car fell backwards hit his head. Found to have SDH. Worsening mental status requiring trach and PEG    IMPRESSION:  Fever  - Possibly 2/2 SDH?  - BCx NGTD  MSSA trachitis  - CXR no PNA  - MRSA nare PCR -  Acute hypoxemic respiratory failure s/p trach and PEG 12/22  SDH    RECOMMENDATIONS:  - D/C Zosyn, de-escalate to IV Ancef 2gm q8hrs to complete additional 5 days of treatment (until 12/26)  - Trach care and aggressive suctioning  - Neurosurgery follow up  - Offloading and frequent position changes, aspiration precaution  - Trend WBC, fever curve, transaminases, creatinine daily      Sonja Short D.O.  Attending Physician  Division of Infectious Diseases  [facility]  Please contact me via [employer] Teams

## 2023-12-23 NOTE — PROGRESS NOTE ADULT - ASSESSMENT
78y Male s/p mechanical fall. +HT, +AC (Aspirin and Plavix), -LOC    12/21-intubated for airway protection, accessory muscle use  12/22- s/p tracheostomy and PEG placement, Portex cuffed 9 and 2cm at skin 20fr PEG    NEURO:  #Acute SDH   -12/18 HCT#4 -stable SDH, no new acute findings  -Q4 neuro checks  #Acute pain    -APAP ATC  #h/o stroke (2/2023) w/residual right sided weakness  - RIGHT anterior thalamus infarct,  LEFT caudate head lacunar infarct  #focal seizure    -vEEG 12/19: No seizures; Discontinued 12/19    - Valproic acid 500q24; f/u next level 12/23 AM     -NCC: valproic acid current dose, q4 neurochecks    -Neurology cs- d/c EEG, 2 days prior to discharge call neurology so they can do a spot EEG    RESP:   #Respiratory Failure secondary to impaired secretion clearance, intubated 12/21    -s/p tracheostomy 12/22, size 9 cuffed portex  RR (machine): 16, TV (machine): 350, FiO2: 40, PEEP: 5  12-22 @ 03:33--7.39 / 36 / 161 / 22 / [id number] 99.7 / Lactate 1.7 / iCal 1.09     -attempt SAT/SBT 12/23  - chest PT q4 and duoneb treatments q 4   - deep tracheal aspirate cx sent (due to fevers)  Culture - Sputum . (12.21.23 @ 18:14)-  Numerous Staphylococcus aureus  #Activity    -increase as tolerated    CARDS:   #acute HYPOtension intraop, resolved    -off levophed since 12/22 PM  #hx of HTN  -Amlodipine 10 QD  -Labetalol 200 q8 (Home Coreg 6.25mg q12 )  -Valsartan 320mg HOLDING   #HLD  -atorvastatin  #NSVT    -EP/Cards: No arrhythmias on tele only artifact. No further work up needed. Recommend ILR prior to discharge if patient/family agreeable  Imaging:   Echo: 12/2023: EF 66%, G1DD, trace MR, mild TR     GI/NUTR:   #Diet, NPO with Tube Feed via 20Fr PEG placed 12/22   Glucerna 1.2 at goal 55cc/hr, feed duration 24 hr    12/20- SLP unable to assess 2/2 lethargy    -aspiration precautions, HOB 30  #GI Prophylaxis  -Protonix 40mg   #Bowel regimen     -Multiple loose bowel movements, last dose senna 12/19 at 2200    -no bowel regimen    -Last bowel movement  12/22    /RENAL:   #urine output in critically ill  -Indwelling catheter   -UA neg   -Sodium goal 140-150  #Hypernatremia improving  -FWF 200q4; free water deficit 1.7L   -d/w team decreasing FW w/ improving Na  #CKD   -baseline Cr 2.1  -holding home sodium bicarb 650mg BID  #hx BPH    -cardura 4mg (flomax at home, non crushable)      HEME/ONC:   #DVT prophylaxis     -SCDs. HSQ  #hx of CVA    -ASA can restart 12/22 as per NSGY    -Plavix HOLDING, will require repeat HCT in 2 wks prior to restart, approx 1/1/24    ID:  #recurrent fevers  MRSA PCR neg   Cultures    Bcx 12/18- prelim neg     Tracheal aspirate 12/21: numerous staph aureus     Tracheal aspirate 12/22: pending   Current antibiotics:    Zosyn (12/22-  #ID c/s pending  #multiple loose bowel movements    -WBC trending up    -no bowel regiment    ENDO:  #DM     -ISS     -Off insulin gtt 12/20      -Lantus 15 units HS     -FSG q4 while on TF    MSK:     Activity - Increase As Tolerated    -B/L knee X ray 12/19- no fractures, b/l effusions     LINES/DRAINS:  PIV, right basilic midline (placed 12/16), right radial arterial line (placed 12/19) Aguirre (placed 12/16- d/c 12/19; reinserted 12/22 due to retention), 20Fr PEG (12/22), Tracheostomy (12/22)    ADVANCED DIRECTIVES:  Full Code    HCP/Emergency Contact-Sarah Gamino (Wife) [phone], Cantonese speaking    DISPO:  Social work, case management consult for dispo to [facility] facility     78y Male s/p mechanical fall. +HT, +AC (Aspirin and Plavix), -LOC    12/21-intubated for airway protection, accessory muscle use  12/22- s/p tracheostomy and PEG placement, Portex cuffed 9 and 2cm at skin 20fr PEG    NEURO:  #Acute SDH   -12/18 HCT#4 -stable SDH, no new acute findings  -Q4 neuro checks  #Acute pain    -APAP ATC  #h/o stroke (2/2023) w/residual right sided weakness  - RIGHT anterior thalamus infarct,  LEFT caudate head lacunar infarct  #focal seizure    -vEEG 12/19: No seizures; Discontinued 12/19    - Valproic acid 500q24; f/u next level 12/23 AM     -NCC: valproic acid current dose, q4 neurochecks    -Neurology cs- d/c EEG, 2 days prior to discharge call neurology so they can do a spot EEG    RESP:   #Respiratory Failure secondary to impaired secretion clearance, intubated 12/21    -s/p tracheostomy 12/22, size 9 cuffed portex  RR (machine): 16, TV (machine): 350, FiO2: 40, PEEP: 5  12-22 @ 03:33--7.39 / 36 / 161 / 22 / Nev19Plj 99.7 / Lactate 1.7 / iCal 1.09     -attempt SAT/SBT 12/23  - chest PT q4 and duoneb treatments q 4   - deep tracheal aspirate cx sent (due to fevers)  Culture - Sputum . (12.21.23 @ 18:14)-  Numerous Staphylococcus aureus  #Activity    -increase as tolerated    CARDS:   #acute HYPOtension intraop, resolved    -off levophed since 12/22 PM  #hx of HTN  -Amlodipine 10 QD  -Labetalol 200 q8 (Home Coreg 6.25mg q12 )  -Valsartan 320mg HOLDING   #HLD  -atorvastatin  #NSVT    -EP/Cards: No arrhythmias on tele only artifact. No further work up needed. Recommend ILR prior to discharge if patient/family agreeable  Imaging:   Echo: 12/2023: EF 66%, G1DD, trace MR, mild TR     GI/NUTR:   #Diet, NPO with Tube Feed via 20Fr PEG placed 12/22   Glucerna 1.2 at goal 55cc/hr, feed duration 24 hr    12/20- SLP unable to assess 2/2 lethargy    -aspiration precautions, HOB 30  #GI Prophylaxis  -Protonix 40mg   #Bowel regimen     -Multiple loose bowel movements, last dose senna 12/19 at 2200    -no bowel regimen    -Last bowel movement  12/22    /RENAL:   #urine output in critically ill  -Indwelling catheter   -UA neg   -Sodium goal 140-150  #Hypernatremia improving  -FWF 200q4; free water deficit 1.7L   -d/w team decreasing FW w/ improving Na  #CKD   -baseline Cr 2.1  -holding home sodium bicarb 650mg BID  #hx BPH    -cardura 4mg (flomax at home, non crushable)      HEME/ONC:   #DVT prophylaxis     -SCDs. HSQ  #hx of CVA    -ASA can restart 12/22 as per NSGY    -Plavix HOLDING, will require repeat HCT in 2 wks prior to restart, approx 1/1/24    ID:  #recurrent fevers  MRSA PCR neg   Cultures    Bcx 12/18- prelim neg     Tracheal aspirate 12/21: numerous staph aureus     Tracheal aspirate 12/22: pending   Current antibiotics:    Zosyn (12/22-  #ID c/s pending  #multiple loose bowel movements    -WBC trending up    -no bowel regiment    ENDO:  #DM     -ISS     -Off insulin gtt 12/20      -Lantus 15 units HS     -FSG q4 while on TF    MSK:     Activity - Increase As Tolerated    -B/L knee X ray 12/19- no fractures, b/l effusions     LINES/DRAINS:  PIV, right basilic midline (placed 12/16), right radial arterial line (placed 12/19) Aguirre (placed 12/16- d/c 12/19; reinserted 12/22 due to retention), 20Fr PEG (12/22), Tracheostomy (12/22)    ADVANCED DIRECTIVES:  Full Code    HCP/Emergency Contact-Sarah Gamino (Wife) [phone], Cantonese speaking    DISPO:  Social work, case management consult for dispo to [facility] facility     78y Male s/p mechanical fall. +HT, +AC (Aspirin and Plavix), -LOC    12/21-intubated for airway protection, accessory muscle use  12/22- s/p tracheostomy and PEG placement, Portex cuffed 9 and 2cm at skin 20fr PEG    NEURO:  #Acute SDH   -12/18 HCT#4 -stable SDH, no new acute findings  -Q4 neuro checks  #Acute pain    -APAP ATC  #h/o stroke (2/2023) w/residual right sided weakness  - RIGHT anterior thalamus infarct,  LEFT caudate head lacunar infarct  #focal seizure    -vEEG 12/19: No seizures; Discontinued 12/19    - Valproic acid 500q24; f/u next level 12/23 AM     -NCC: valproic acid current dose, q4 neurochecks    -Neurology cs- d/c EEG, 2 days prior to discharge call neurology so they can do a spot EEG    RESP:   #Respiratory Failure secondary to impaired secretion clearance, intubated 12/21    -s/p tracheostomy 12/22, size 9 cuffed portex  RR (machine): 16, TV (machine): 350, FiO2: 40, PEEP: 5  12-22 @ 03:33--7.39 / 36 / 161 / 22 / [id number] 99.7 / Lactate 1.7 / iCal 1.09     -attempt SAT/SBT 12/23  - chest PT q4 and duoneb treatments q 4   - deep tracheal aspirate cx sent (due to fevers)  Culture - Sputum . (12.21.23 @ 18:14)-  Numerous Staphylococcus aureus  #Activity    -increase as tolerated    CARDS:   #acute HYPOtension intraop, resolved    -off levophed since 12/22 PM  #hx of HTN  -Amlodipine 10 QD HOLDING  -Labetalol 200 q8 (Home Coreg 6.25mg q12 ) HOLDING  -Valsartan 320mg HOLDING   #HLD  -atorvastatin  #NSVT    -EP/Cards: No arrhythmias on tele only artifact. No further work up needed. Recommend ILR prior to discharge if patient/family agreeable  Imaging:   Echo: 12/2023: EF 66%, G1DD, trace MR, mild TR     GI/NUTR:   #Diet, NPO with Tube Feed via 20Fr PEG placed 12/22   Glucerna 1.2 at goal 55cc/hr, feed duration 24 hr    12/20- SLP unable to assess 2/2 lethargy    -aspiration precautions, HOB 30  #GI Prophylaxis  -Protonix 40mg   #Bowel regimen     -Multiple loose bowel movements, last dose senna 12/19 at 2200    -no bowel regimen    -Last bowel movement  12/22    /RENAL:   #urine output in critically ill  -Indwelling catheter   -UA neg   -Sodium goal 140-150  #Hypernatremia improving  -FWF 200q4; free water deficit 1.7L   -d/w team decreasing FW w/ improving Na  #hyperphosphatemia   d/w team starting phosphate binder   d/w team nephrology c/s  #CKD   -baseline Cr 2.1  -holding home sodium bicarb 650mg BID  #hx BPH    -cardura 4mg (flomax at home, non crushable)      Labs:          BUN/Cr- 62/2.7  -->,  58/2.6  -->          Electrolytes-Na 144 // K 4.7 // Mg 3.0 //  Phos 6.2 (12-22 @ 20:37)    HEME/ONC:   #DVT prophylaxis     -SCDs. HSQ  #hx of CVA    -ASA cleared by NSGY    -Plavix HOLDING, will require repeat HCT in 2 wks prior to restart, approx 1/1/24    ID:  #recurrent fevers  MRSA PCR neg   Cultures    Bcx 12/18- prelim neg     Tracheal aspirate 12/21: numerous staph aureus     Tracheal aspirate 12/22: pending   Current antibiotics:    Zosyn (12/22-  #ID c/s pending  #multiple loose bowel movements    -WBC trending up    -no bowel regiment  WBC- 8.41  --->>,  9.69  --->>,  13.71  --->>  Temp trend- 24hrs T(F): 98.3 (12-22 @ 20:00), Max: 100.2 (12-22 @ 05:00)  Current antibiotics-piperacillin/tazobactam IVPB.. 3.375 every 8 hours    ENDO:  #DM     -ISS     -Off insulin gtt 12/20      -Lantus 15 units HS     -FSG q4 while on TF    MSK:     Activity - Increase As Tolerated    -B/L knee X ray 12/19- no fractures, b/l effusions     LINES/DRAINS:  PIV, right basilic midline (placed 12/16), right radial arterial line (placed 12/19) Aguirre (placed 12/16- d/c 12/19; reinserted 12/22 due to retention), 20Fr PEG (12/22), Tracheostomy (12/22)    ADVANCED DIRECTIVES:  Full Code    HCP/Emergency Contact-Sarah Gamino (Wife) [phone], Cantonese speaking    DISPO:  Social work, case management consult for dispo to [facility]     78y Male s/p mechanical fall. +HT, +AC (Aspirin and Plavix), -LOC    12/21-intubated for airway protection, accessory muscle use  12/22- s/p tracheostomy and PEG placement, Portex cuffed 9 and 2cm at skin 20fr PEG    NEURO:  #Acute SDH   -12/18 HCT#4 -stable SDH, no new acute findings  -Q4 neuro checks  #Acute pain    -APAP ATC  #h/o stroke (2/2023) w/residual right sided weakness  - RIGHT anterior thalamus infarct,  LEFT caudate head lacunar infarct  #focal seizure    -vEEG 12/19: No seizures; Discontinued 12/19    - Valproic acid 500q24; f/u next level 12/23 AM     -NCC: valproic acid current dose, q4 neurochecks    -Neurology cs- d/c EEG, 2 days prior to discharge call neurology so they can do a spot EEG    RESP:   #Respiratory Failure secondary to impaired secretion clearance, intubated 12/21    -s/p tracheostomy 12/22, size 9 cuffed portex  RR (machine): 16, TV (machine): 350, FiO2: 40, PEEP: 5  12-22 @ 03:33--7.39 / 36 / 161 / 22 / [id number] 99.7 / Lactate 1.7 / iCal 1.09     -attempt SAT/SBT 12/23  - chest PT q4 and duoneb treatments q 4   - deep tracheal aspirate cx sent (due to fevers)  Culture - Sputum . (12.21.23 @ 18:14)-  Numerous Staphylococcus aureus  #Activity    -increase as tolerated    CARDS:   #acute HYPOtension intraop, resolved    -off levophed since 12/22 PM  #hx of HTN  -Amlodipine 10 QD HOLDING  -Labetalol 200 q8 (Home Coreg 6.25mg q12 ) HOLDING  -Valsartan 320mg HOLDING   #HLD  -atorvastatin  #NSVT    -EP/Cards: No arrhythmias on tele only artifact. No further work up needed. Recommend ILR prior to discharge if patient/family agreeable  Imaging:   Echo: 12/2023: EF 66%, G1DD, trace MR, mild TR     GI/NUTR:   #Diet, NPO with Tube Feed via 20Fr PEG placed 12/22   Glucerna 1.2 at goal 55cc/hr, feed duration 24 hr    12/20- SLP unable to assess 2/2 lethargy    -aspiration precautions, HOB 30  #GI Prophylaxis  -Protonix 40mg   #Bowel regimen     -Multiple loose bowel movements, last dose senna 12/19 at 2200    -no bowel regimen    -Last bowel movement  12/22    /RENAL:   #urine output in critically ill  -Indwelling catheter   -UA neg   -Sodium goal 140-150  #Hypernatremia improving  -FWF 200q4; free water deficit 1.7L   -d/w team decreasing FW w/ improving Na  #hyperphosphatemia   d/w team starting phosphate binder   d/w team nephrology c/s  #CKD   -baseline Cr 2.1  -holding home sodium bicarb 650mg BID  #hx BPH    -cardura 4mg (flomax at home, non crushable)      Labs:          BUN/Cr- 62/2.7  -->,  58/2.6  -->          Electrolytes-Na 144 // K 4.7 // Mg 3.0 //  Phos 6.2 (12-22 @ 20:37)    HEME/ONC:   #DVT prophylaxis     -SCDs. HSQ  #hx of CVA    -ASA cleared by NSGY    -Plavix HOLDING, will require repeat HCT in 2 wks prior to restart, approx 1/1/24    ID:  #recurrent fevers  MRSA PCR neg   Cultures    Bcx 12/18- prelim neg     Tracheal aspirate 12/21: numerous staph aureus     Tracheal aspirate 12/22: pending   Current antibiotics:    Zosyn (12/22-  #ID c/s pending  #multiple loose bowel movements    -WBC trending up    -no bowel regiment  WBC- 8.41  --->>,  9.69  --->>,  13.71  --->>  Temp trend- 24hrs T(F): 98.3 (12-22 @ 20:00), Max: 100.2 (12-22 @ 05:00)  Current antibiotics-piperacillin/tazobactam IVPB.. 3.375 every 8 hours    ENDO:  #DM     -ISS     -Off insulin gtt 12/20      -Lantus 15 units HS     -FSG q4 while on TF    MSK:     Activity - Increase As Tolerated    -B/L knee X ray 12/19- no fractures, b/l effusions     LINES/DRAINS:  PIV, right basilic midline (placed 12/16), right radial arterial line (placed 12/19) Aguirre (placed 12/16- d/c 12/19; reinserted 12/22 due to retention), 20Fr PEG (12/22), Tracheostomy (12/22)    ADVANCED DIRECTIVES:  Full Code    HCP/Emergency Contact-Sarah Gamino (Wife) [phone], Cantonese speaking    DISPO:  Social work, case management consult for dispo to [facility]     78y Male s/p mechanical fall. +HT, +AC (Aspirin and Plavix), -LOC    12/21-intubated for airway protection, accessory muscle use  12/22- s/p tracheostomy and PEG placement, Portex cuffed 9 and 2cm at skin 20fr PEG    NEURO:  #Acute SDH   -12/18 HCT#4 -stable SDH, no new acute findings  -Q4 neuro checks  #Acute pain    -APAP ATC  #h/o stroke (2/2023) w/residual right sided weakness  - RIGHT anterior thalamus infarct,  LEFT caudate head lacunar infarct  #focal seizure    -vEEG 12/19: No seizures; Discontinued 12/19    - Valproic acid 500q24; f/u next level 12/23 AM     -NCC: valproic acid current dose, q4 neurochecks    -Neurology cs- d/c EEG, 2 days prior to discharge call neurology so they can do a spot EEG    RESP:   #Respiratory Failure secondary to impaired secretion clearance, intubated 12/21    -s/p tracheostomy 12/22, size 9 cuffed portex  RR (machine): 14, TV (machine): 350, FiO2: 40, PEEP: 5  ABG 12/23 04:45: 7.42/31/128/20     -attempt SAT/SBT 12/23  - chest PT q4 and duoneb treatments q 4   - deep tracheal aspirate cx sent (due to fevers)  Culture - Sputum . (12.21.23 @ 18:14)-  Numerous Staphylococcus aureus  #Activity    -increase as tolerated    CARDS:   #acute HYPOtension intraop, resolved    -off levophed since 12/22 PM  #hx of HTN  -Amlodipine 10 QD HOLDING  -Labetalol 200 q8 (Home Coreg 6.25mg q12 ) HOLDING  -Valsartan 320mg HOLDING   #HLD  -atorvastatin  #NSVT    -EP/Cards: No arrhythmias on tele only artifact. No further work up needed. Recommend ILR prior to discharge if patient/family agreeable  Imaging:   Echo: 12/2023: EF 66%, G1DD, trace MR, mild TR     GI/NUTR:   #Diet, NPO with Tube Feed via 20Fr PEG placed 12/22   Glucerna 1.2 at goal 55cc/hr, feed duration 24 hr    12/20- SLP unable to assess 2/2 lethargy    -aspiration precautions, HOB 30  #GI Prophylaxis  -Protonix 40mg   #Bowel regimen     -Multiple loose bowel movements, last dose senna 12/19 at 2200    -no bowel regimen    -Last bowel movement  12/22    /RENAL:   #urine output in critically ill  -Indwelling catheter   -UA neg   -Sodium goal 140-150  #Hypernatremia improving  -FWF 200q4; free water deficit 1.7L   -d/w team decreasing FW w/ improving Na  #hyperphosphatemia   d/w team starting phosphate binder   d/w team nephrology c/s  #CKD   -baseline Cr 2.1  -holding home sodium bicarb 650mg BID  #hx BPH    -cardura 4mg (flomax at home, non crushable)      Labs:          BUN/Cr- 62/2.7  -->,  58/2.6  -->          Electrolytes-Na 144 // K 4.7 // Mg 3.0 //  Phos 6.2 (12-22 @ 20:37)    HEME/ONC:   #DVT prophylaxis     -SCDs, HSQ  #hx of CVA    -ASA cleared by NSGY    -Plavix HOLDING, will require repeat HCT in 2 wks prior to restart, approx 1/1/24    ID:  #recurrent fevers  -MRSA PCR neg   Cultures    Bcx 12/18- prelim neg     Tracheal aspirate 12/21: numerous staph aureus     Tracheal aspirate 12/22: pending   Current antibiotics:    Zosyn (12/22-  #ID c/s pending  #multiple loose bowel movements    -WBC trending up    -no bowel regiment  WBC- 8.41  --->>,  9.69  --->>,  13.71  --->>  Temp trend- 24hrs T(F): 98.3 (12-22 @ 20:00), Max: 100.2 (12-22 @ 05:00)  Current antibiotics-piperacillin/tazobactam IVPB.. 3.375 every 8 hours    ENDO:  #DM     -ISS     -Off insulin gtt 12/20      -Lantus 15 units HS     -FSG q4 while on TF    MSK:     Activity - Increase As Tolerated    -B/L knee X ray 12/19- no fractures, b/l effusions     LINES/DRAINS:  PIV, right basilic midline (placed 12/16), right radial arterial line (placed 12/19) Aguirre (placed 12/16- d/c 12/19; reinserted 12/22 due to retention), 20Fr PEG (12/22), Tracheostomy (12/22)    ADVANCED DIRECTIVES:  Full Code    HCP/Emergency Contact-Sarah Gamino (Wife) [phone], Cantonese speaking    DISPO:  Social work, case management consult for dispo to [facility]     78y Male s/p mechanical fall. +HT, +AC (Aspirin and Plavix), -LOC    12/21-intubated for airway protection, accessory muscle use  12/22- s/p tracheostomy and PEG placement, Portex cuffed 9 and 2cm at skin 20fr PEG    NEURO:  #Acute SDH   -12/18 HCT#4 -stable SDH, no new acute findings  -Q4 neuro checks  #Acute pain    -APAP ATC  #h/o stroke (2/2023) w/residual right sided weakness  - RIGHT anterior thalamus infarct,  LEFT caudate head lacunar infarct  #focal seizure    -vEEG 12/19: No seizures; Discontinued 12/19    - Valproic acid 500q24; f/u next level 12/23 AM     -NCC: valproic acid current dose, q4 neurochecks    -Neurology cs- d/c EEG, 2 days prior to discharge call neurology so they can do a spot EEG    RESP:   #Respiratory Failure secondary to impaired secretion clearance, intubated 12/21    -s/p tracheostomy 12/22, size 9 cuffed portex  RR (machine): 14, TV (machine): 350, FiO2: 40, PEEP: 5  ABG 12/23 04:45: 7.42/31/128/20     -attempt SAT/SBT 12/23  - chest PT q4 and duoneb treatments q 4   - deep tracheal aspirate cx sent (due to fevers)  Culture - Sputum . (12.21.23 @ 18:14)-  Numerous Staphylococcus aureus  #Activity    -increase as tolerated    CARDS:   #acute HYPOtension intraop, resolved    -off levophed since 12/22 PM  #hx of HTN  -Amlodipine 10 QD HOLDING  -Labetalol 200 q8 (Home Coreg 6.25mg q12 ) HOLDING  -Valsartan 320mg HOLDING   #HLD  -atorvastatin  #NSVT    -EP/Cards: No arrhythmias on tele only artifact. No further work up needed. Recommend ILR prior to discharge if patient/family agreeable  Imaging:   Echo: 12/2023: EF 66%, G1DD, trace MR, mild TR     GI/NUTR:   #Diet, NPO with Tube Feed via 20Fr PEG placed 12/22   Glucerna 1.2 at goal 55cc/hr, feed duration 24 hr    12/20- SLP unable to assess 2/2 lethargy    -aspiration precautions, HOB 30  #GI Prophylaxis  -Protonix 40mg   #Bowel regimen     -Multiple loose bowel movements, last dose senna 12/19 at 2200    -no bowel regimen    -Last bowel movement  12/22    /RENAL:   #urine output in critically ill  -Indwelling catheter   -UA neg   -Sodium goal 140-150  #Hypernatremia improving  -FWF 200q4; free water deficit 1.7L   -d/w team decreasing FW w/ improving Na  #hyperphosphatemia   d/w team starting phosphate binder   d/w team nephrology c/s  #CKD   -baseline Cr 2.1  -holding home sodium bicarb 650mg BID  #hx BPH    -cardura 4mg (flomax at home, non crushable)      Labs:          BUN/Cr- 62/2.7  -->,  58/2.6  -->          Electrolytes-Na 144 // K 4.7 // Mg 3.0 //  Phos 6.2 (12-22 @ 20:37)    HEME/ONC:   #DVT prophylaxis     -SCDs, HSQ  #hx of CVA    -ASA cleared by NSGY    -Plavix HOLDING, will require repeat HCT in 2 wks prior to restart, approx 1/1/24    ID:  #recurrent fevers  -MRSA PCR neg   Cultures    Bcx 12/18- prelim neg     Tracheal aspirate 12/21: numerous staph aureus     Tracheal aspirate 12/22: pending   Current antibiotics:    Zosyn (12/22-  #ID c/s pending  #multiple loose bowel movements    -WBC trending up    -no bowel regiment  WBC- 8.41  --->>,  9.69  --->>,  13.71  --->>  Temp trend- 24hrs T(F): 98.3 (12-22 @ 20:00), Max: 100.2 (12-22 @ 05:00)  Current antibiotics-piperacillin/tazobactam IVPB.. 3.375 every 8 hours    ENDO:  #DM     -ISS     -Off insulin gtt 12/20      -Lantus 15 units HS     -FSG q4 while on TF    MSK:     Activity - Increase As Tolerated    -B/L knee X ray 12/19- no fractures, b/l effusions     LINES/DRAINS:  PIV, right basilic midline (placed 12/16), right radial arterial line (placed 12/19) Aguirre (placed 12/16- d/c 12/19; reinserted 12/22 due to retention), 20Fr PEG (12/22), Tracheostomy (12/22)    ADVANCED DIRECTIVES:  Full Code    HCP/Emergency Contact-Sarah Gamino (Wife) [phone], Cantonese speaking    DISPO:  Social work, case management consult for dispo to [facility] facility     78y Male s/p mechanical fall. +HT, +AC (Aspirin and Plavix), -LOC    12/21-intubated for airway protection, accessory muscle use  12/22- s/p tracheostomy and PEG placement, Portex cuffed 9 and 2cm at skin 20fr PEG    NEURO:  #Acute SDH   -12/18 HCT#4 -stable SDH, no new acute findings  -Q4 neuro checks  #Acute pain    -APAP ATC  #h/o stroke (2/2023) w/residual right sided weakness  - RIGHT anterior thalamus infarct,  LEFT caudate head lacunar infarct  #focal seizure    -vEEG 12/19: No seizures; Discontinued 12/19    - Valproic acid 500q24; f/u next level 12/23 AM     -NCC: valproic acid current dose, q4 neurochecks    -Neurology cs- d/c EEG, 2 days prior to discharge call neurology so they can do a spot EEG    RESP:   #Respiratory Failure secondary to impaired secretion clearance, intubated 12/21    -s/p tracheostomy 12/22, size 9 cuffed portex  RR (machine): 14, TV (machine): 350, FiO2: 40, PEEP: 5  ABG 12/23 04:45: 7.42/31/128/20     -attempt SAT/SBT 12/23  - chest PT q4 and duoneb treatments q 4   - deep tracheal aspirate cx sent (due to fevers)  Culture - Sputum . (12.21.23 @ 18:14)-  Numerous Staphylococcus aureus  #Activity    -increase as tolerated    CARDS:   #acute HYPOtension intraop, resolved    -off levophed since 12/22 PM  #hx of HTN  -Amlodipine 10 QD HOLDING  -Labetalol 200 q8 (Home Coreg 6.25mg q12 ) HOLDING  -Valsartan 320mg HOLDING   #HLD  -atorvastatin  #NSVT    -EP/Cards: No arrhythmias on tele only artifact. No further work up needed. Recommend ILR prior to discharge if patient/family agreeable  Imaging:   Echo: 12/2023: EF 66%, G1DD, trace MR, mild TR     GI/NUTR:   #Diet, NPO with Tube Feed via 20Fr PEG placed 12/22   Glucerna 1.2 at goal 55cc/hr, feed duration 24 hr    12/20- SLP unable to assess 2/2 lethargy    -aspiration precautions, HOB 30  #GI Prophylaxis  -Protonix 40mg   #Bowel regimen     -Multiple loose bowel movements, last dose senna 12/19 at 2200    -no bowel regimen    -Last bowel movement  12/22    /RENAL:   #urine output in critically ill  -Indwelling catheter D/C --> F/U TOV  -UA neg   -Sodium goal 140-150  #Hypernatremia improving  -FWF 200q4; free water deficit 1.7L   -d/w team decreasing FW w/ improving Na  #hyperphosphatemia  -Started phoslo TID   #CKD   -baseline Cr 2.1  -holding home sodium bicarb 650mg BID  #hx BPH    -cardura 4mg (flomax at home, non crushable)      Labs:          BUN/Cr- 62/2.7  -->,  58/2.6  -->          Electrolytes-Na 144 // K 4.7 // Mg 3.0 //  Phos 6.2 (12-22 @ 20:37)    HEME/ONC:   #DVT prophylaxis     -SCDs, HSQ  #hx of CVA    -ASA cleared by NSGY    -Plavix HOLDING, will require repeat HCT in 2 wks prior to restart, approx 1/1/24    ID:  #recurrent fevers  -MRSA PCR neg   Cultures    Bcx 12/18- prelim neg     Tracheal aspirate 12/21: numerous staph aureus     Tracheal aspirate 12/22: pending   Current antibiotics:    Zosyn (12/22-  #ID c/s pending  #multiple loose bowel movements    -WBC trending up    -no bowel regiment  WBC- 8.41  --->>,  9.69  --->>,  13.71  --->>  Temp trend- 24hrs T(F): 98.3 (12-22 @ 20:00), Max: 100.2 (12-22 @ 05:00)  Current antibiotics-piperacillin/tazobactam IVPB.. 3.375 every 8 hours    ENDO:  #DM     -ISS     -Off insulin gtt 12/20      -Lantus 15 units HS     -FSG q4 while on TF    MSK:     Activity - Increase As Tolerated    -B/L knee X ray 12/19- no fractures, b/l effusions     LINES/DRAINS:  PIV, right basilic midline (placed 12/16), right radial arterial line (placed 12/19) Aguirre (placed 12/16- d/c 12/19; reinserted 12/22 due to retention), 20Fr PEG (12/22), Tracheostomy (12/22)    ADVANCED DIRECTIVES:  Full Code    HCP/Emergency Contact-Sarah Gamino (Wife) [phone], Cantonese speaking    DISPO:  Social work, case management consult for dispo to [facility] facility     78y Male s/p mechanical fall. +HT, +AC (Aspirin and Plavix), -LOC    12/21-intubated for airway protection, accessory muscle use  12/22- s/p tracheostomy and PEG placement, Portex cuffed 9 and 2cm at skin 20fr PEG    NEURO:  #Acute SDH   -12/18 HCT#4 -stable SDH, no new acute findings  -Q4 neuro checks  #Acute pain    -APAP ATC  #h/o stroke (2/2023) w/residual right sided weakness  - RIGHT anterior thalamus infarct,  LEFT caudate head lacunar infarct  #focal seizure    -vEEG 12/19: No seizures; Discontinued 12/19    - Valproic acid 500q24; f/u next level 12/23 AM     -NCC: valproic acid current dose, q4 neurochecks    -Neurology cs- d/c EEG, 2 days prior to discharge call neurology so they can do a spot EEG    RESP:   #Respiratory Failure secondary to impaired secretion clearance, intubated 12/21    -s/p tracheostomy 12/22, size 9 cuffed portex  RR (machine): 14, TV (machine): 350, FiO2: 40, PEEP: 5  ABG 12/23 04:45: 7.42/31/128/20     -attempt SAT/SBT 12/23  - chest PT q4 and duoneb treatments q 4   - deep tracheal aspirate cx sent (due to fevers)  Culture - Sputum . (12.21.23 @ 18:14)-  Numerous Staphylococcus aureus  #Activity    -increase as tolerated    CARDS:   #acute HYPOtension intraop, resolved    -off levophed since 12/22 PM  #hx of HTN  -Amlodipine 10 QD HOLDING  -Labetalol 200 q8 (Home Coreg 6.25mg q12 ) HOLDING  -Valsartan 320mg HOLDING   #HLD  -atorvastatin  #NSVT    -EP/Cards: No arrhythmias on tele only artifact. No further work up needed. Recommend ILR prior to discharge if patient/family agreeable  Imaging:   Echo: 12/2023: EF 66%, G1DD, trace MR, mild TR     GI/NUTR:   #Diet, NPO with Tube Feed via 20Fr PEG placed 12/22   Glucerna 1.2 at goal 55cc/hr, feed duration 24 hr    12/20- SLP unable to assess 2/2 lethargy    -aspiration precautions, HOB 30  #GI Prophylaxis  -Protonix 40mg   #Bowel regimen     -Multiple loose bowel movements, last dose senna 12/19 at 2200    -no bowel regimen    -Last bowel movement  12/22    /RENAL:   #urine output in critically ill  -Indwelling catheter D/C --> F/U TOV  -UA neg   -Sodium goal 140-150  #Hypernatremia improving  -FWF 200q4; free water deficit 1.7L   -d/w team decreasing FW w/ improving Na  #hyperphosphatemia  -Started phoslo TID   #CKD   -baseline Cr 2.1  -holding home sodium bicarb 650mg BID  #hx BPH    -cardura 4mg (flomax at home, non crushable)      Labs:          BUN/Cr- 62/2.7  -->,  58/2.6  -->          Electrolytes-Na 144 // K 4.7 // Mg 3.0 //  Phos 6.2 (12-22 @ 20:37)    HEME/ONC:   #DVT prophylaxis     -SCDs, HSQ  #hx of CVA    -ASA cleared by NSGY    -Plavix HOLDING, will require repeat HCT in 2 wks prior to restart, approx 1/1/24    ID:  #recurrent fevers  -MRSA PCR neg   Cultures    Bcx 12/18- prelim neg     Tracheal aspirate 12/21: numerous staph aureus     Tracheal aspirate 12/22: pending   Current antibiotics:    Zosyn (12/22-  #ID c/s pending  #multiple loose bowel movements    -WBC trending up    -no bowel regiment  WBC- 8.41  --->>,  9.69  --->>,  13.71  --->>  Temp trend- 24hrs T(F): 98.3 (12-22 @ 20:00), Max: 100.2 (12-22 @ 05:00)  Current antibiotics-piperacillin/tazobactam IVPB.. 3.375 every 8 hours    ENDO:  #DM     -ISS     -Off insulin gtt 12/20      -Lantus 15 units HS     -FSG q4 while on TF    MSK:     Activity - Increase As Tolerated    -B/L knee X ray 12/19- no fractures, b/l effusions     LINES/DRAINS:  PIV, right basilic midline (placed 12/16), right radial arterial line (placed 12/19) Aguirre (placed 12/16- d/c 12/19; reinserted 12/22 due to retention), 20Fr PEG (12/22), Tracheostomy (12/22)    ADVANCED DIRECTIVES:  Full Code    HCP/Emergency Contact-Sarah Gamino (Wife) [phone], Cantonese speaking    DISPO:  Social work, case management consult for dispo to [facility] facility

## 2023-12-24 LAB
-  AMPICILLIN/SULBACTAM: SIGNIFICANT CHANGE UP
-  AMPICILLIN/SULBACTAM: SIGNIFICANT CHANGE UP
-  CEFAZOLIN: SIGNIFICANT CHANGE UP
-  CEFAZOLIN: SIGNIFICANT CHANGE UP
-  CLINDAMYCIN: SIGNIFICANT CHANGE UP
-  CLINDAMYCIN: SIGNIFICANT CHANGE UP
-  ERYTHROMYCIN: SIGNIFICANT CHANGE UP
-  ERYTHROMYCIN: SIGNIFICANT CHANGE UP
-  GENTAMICIN: SIGNIFICANT CHANGE UP
-  GENTAMICIN: SIGNIFICANT CHANGE UP
-  OXACILLIN: SIGNIFICANT CHANGE UP
-  OXACILLIN: SIGNIFICANT CHANGE UP
-  RIFAMPIN: SIGNIFICANT CHANGE UP
-  RIFAMPIN: SIGNIFICANT CHANGE UP
-  TETRACYCLINE: SIGNIFICANT CHANGE UP
-  TETRACYCLINE: SIGNIFICANT CHANGE UP
-  TRIMETHOPRIM/SULFAMETHOXAZOLE: SIGNIFICANT CHANGE UP
-  TRIMETHOPRIM/SULFAMETHOXAZOLE: SIGNIFICANT CHANGE UP
-  VANCOMYCIN: SIGNIFICANT CHANGE UP
-  VANCOMYCIN: SIGNIFICANT CHANGE UP
ANION GAP SERPL CALC-SCNC: 14 MMOL/L — SIGNIFICANT CHANGE UP (ref 7–14)
ANION GAP SERPL CALC-SCNC: 14 MMOL/L — SIGNIFICANT CHANGE UP (ref 7–14)
BASOPHILS # BLD AUTO: 0.02 K/UL — SIGNIFICANT CHANGE UP (ref 0–0.2)
BASOPHILS # BLD AUTO: 0.02 K/UL — SIGNIFICANT CHANGE UP (ref 0–0.2)
BASOPHILS NFR BLD AUTO: 0.2 % — SIGNIFICANT CHANGE UP (ref 0–1)
BASOPHILS NFR BLD AUTO: 0.2 % — SIGNIFICANT CHANGE UP (ref 0–1)
BUN SERPL-MCNC: 63 MG/DL — CRITICAL HIGH (ref 10–20)
BUN SERPL-MCNC: 63 MG/DL — CRITICAL HIGH (ref 10–20)
C DIFF BY PCR RESULT: SIGNIFICANT CHANGE UP
C DIFF BY PCR RESULT: SIGNIFICANT CHANGE UP
CALCIUM SERPL-MCNC: 7.2 MG/DL — LOW (ref 8.4–10.4)
CALCIUM SERPL-MCNC: 7.2 MG/DL — LOW (ref 8.4–10.4)
CHLORIDE SERPL-SCNC: 112 MMOL/L — HIGH (ref 98–110)
CHLORIDE SERPL-SCNC: 112 MMOL/L — HIGH (ref 98–110)
CO2 SERPL-SCNC: 18 MMOL/L — SIGNIFICANT CHANGE UP (ref 17–32)
CO2 SERPL-SCNC: 18 MMOL/L — SIGNIFICANT CHANGE UP (ref 17–32)
CREAT SERPL-MCNC: 3 MG/DL — HIGH (ref 0.7–1.5)
CREAT SERPL-MCNC: 3 MG/DL — HIGH (ref 0.7–1.5)
CULTURE RESULTS: ABNORMAL
CULTURE RESULTS: ABNORMAL
EGFR: 21 ML/MIN/1.73M2 — LOW
EGFR: 21 ML/MIN/1.73M2 — LOW
EOSINOPHIL # BLD AUTO: 0.23 K/UL — SIGNIFICANT CHANGE UP (ref 0–0.7)
EOSINOPHIL # BLD AUTO: 0.23 K/UL — SIGNIFICANT CHANGE UP (ref 0–0.7)
EOSINOPHIL NFR BLD AUTO: 1.8 % — SIGNIFICANT CHANGE UP (ref 0–8)
EOSINOPHIL NFR BLD AUTO: 1.8 % — SIGNIFICANT CHANGE UP (ref 0–8)
GAS PNL BLDA: SIGNIFICANT CHANGE UP
GAS PNL BLDA: SIGNIFICANT CHANGE UP
GLUCOSE SERPL-MCNC: 175 MG/DL — HIGH (ref 70–99)
GLUCOSE SERPL-MCNC: 175 MG/DL — HIGH (ref 70–99)
HCT VFR BLD CALC: 22.5 % — LOW (ref 42–52)
HCT VFR BLD CALC: 22.5 % — LOW (ref 42–52)
HCT VFR BLD CALC: 23.4 % — LOW (ref 42–52)
HCT VFR BLD CALC: 23.4 % — LOW (ref 42–52)
HCT VFR BLD CALC: 23.6 % — LOW (ref 42–52)
HCT VFR BLD CALC: 23.6 % — LOW (ref 42–52)
HGB BLD-MCNC: 7.4 G/DL — LOW (ref 14–18)
HGB BLD-MCNC: 7.6 G/DL — LOW (ref 14–18)
HGB BLD-MCNC: 7.6 G/DL — LOW (ref 14–18)
IMM GRANULOCYTES NFR BLD AUTO: 9.8 % — HIGH (ref 0.1–0.3)
IMM GRANULOCYTES NFR BLD AUTO: 9.8 % — HIGH (ref 0.1–0.3)
LYMPHOCYTES # BLD AUTO: 0.79 K/UL — LOW (ref 1.2–3.4)
LYMPHOCYTES # BLD AUTO: 0.79 K/UL — LOW (ref 1.2–3.4)
LYMPHOCYTES # BLD AUTO: 6.1 % — LOW (ref 20.5–51.1)
LYMPHOCYTES # BLD AUTO: 6.1 % — LOW (ref 20.5–51.1)
MAGNESIUM SERPL-MCNC: 2.8 MG/DL — HIGH (ref 1.8–2.4)
MAGNESIUM SERPL-MCNC: 2.8 MG/DL — HIGH (ref 1.8–2.4)
MCHC RBC-ENTMCNC: 28.9 PG — SIGNIFICANT CHANGE UP (ref 27–31)
MCHC RBC-ENTMCNC: 28.9 PG — SIGNIFICANT CHANGE UP (ref 27–31)
MCHC RBC-ENTMCNC: 29.3 PG — SIGNIFICANT CHANGE UP (ref 27–31)
MCHC RBC-ENTMCNC: 29.3 PG — SIGNIFICANT CHANGE UP (ref 27–31)
MCHC RBC-ENTMCNC: 29.6 PG — SIGNIFICANT CHANGE UP (ref 27–31)
MCHC RBC-ENTMCNC: 29.6 PG — SIGNIFICANT CHANGE UP (ref 27–31)
MCHC RBC-ENTMCNC: 31.6 G/DL — LOW (ref 32–37)
MCHC RBC-ENTMCNC: 31.6 G/DL — LOW (ref 32–37)
MCHC RBC-ENTMCNC: 32.2 G/DL — SIGNIFICANT CHANGE UP (ref 32–37)
MCHC RBC-ENTMCNC: 32.2 G/DL — SIGNIFICANT CHANGE UP (ref 32–37)
MCHC RBC-ENTMCNC: 32.9 G/DL — SIGNIFICANT CHANGE UP (ref 32–37)
MCHC RBC-ENTMCNC: 32.9 G/DL — SIGNIFICANT CHANGE UP (ref 32–37)
MCV RBC AUTO: 90 FL — SIGNIFICANT CHANGE UP (ref 80–94)
MCV RBC AUTO: 90 FL — SIGNIFICANT CHANGE UP (ref 80–94)
MCV RBC AUTO: 91.1 FL — SIGNIFICANT CHANGE UP (ref 80–94)
MCV RBC AUTO: 91.1 FL — SIGNIFICANT CHANGE UP (ref 80–94)
MCV RBC AUTO: 91.4 FL — SIGNIFICANT CHANGE UP (ref 80–94)
MCV RBC AUTO: 91.4 FL — SIGNIFICANT CHANGE UP (ref 80–94)
METHOD TYPE: SIGNIFICANT CHANGE UP
METHOD TYPE: SIGNIFICANT CHANGE UP
MONOCYTES # BLD AUTO: 0.86 K/UL — HIGH (ref 0.1–0.6)
MONOCYTES # BLD AUTO: 0.86 K/UL — HIGH (ref 0.1–0.6)
MONOCYTES NFR BLD AUTO: 6.6 % — SIGNIFICANT CHANGE UP (ref 1.7–9.3)
MONOCYTES NFR BLD AUTO: 6.6 % — SIGNIFICANT CHANGE UP (ref 1.7–9.3)
NEUTROPHILS # BLD AUTO: 9.77 K/UL — HIGH (ref 1.4–6.5)
NEUTROPHILS # BLD AUTO: 9.77 K/UL — HIGH (ref 1.4–6.5)
NEUTROPHILS NFR BLD AUTO: 75.5 % — HIGH (ref 42.2–75.2)
NEUTROPHILS NFR BLD AUTO: 75.5 % — HIGH (ref 42.2–75.2)
NRBC # BLD: 0 /100 WBCS — SIGNIFICANT CHANGE UP (ref 0–0)
ORGANISM # SPEC MICROSCOPIC CNT: ABNORMAL
ORGANISM # SPEC MICROSCOPIC CNT: ABNORMAL
ORGANISM # SPEC MICROSCOPIC CNT: SIGNIFICANT CHANGE UP
ORGANISM # SPEC MICROSCOPIC CNT: SIGNIFICANT CHANGE UP
PHOSPHATE SERPL-MCNC: 4 MG/DL — SIGNIFICANT CHANGE UP (ref 2.1–4.9)
PHOSPHATE SERPL-MCNC: 4 MG/DL — SIGNIFICANT CHANGE UP (ref 2.1–4.9)
PLATELET # BLD AUTO: 304 K/UL — SIGNIFICANT CHANGE UP (ref 130–400)
PLATELET # BLD AUTO: 304 K/UL — SIGNIFICANT CHANGE UP (ref 130–400)
PLATELET # BLD AUTO: 313 K/UL — SIGNIFICANT CHANGE UP (ref 130–400)
PLATELET # BLD AUTO: 313 K/UL — SIGNIFICANT CHANGE UP (ref 130–400)
PLATELET # BLD AUTO: 335 K/UL — SIGNIFICANT CHANGE UP (ref 130–400)
PLATELET # BLD AUTO: 335 K/UL — SIGNIFICANT CHANGE UP (ref 130–400)
PMV BLD: 9.2 FL — SIGNIFICANT CHANGE UP (ref 7.4–10.4)
PMV BLD: 9.2 FL — SIGNIFICANT CHANGE UP (ref 7.4–10.4)
PMV BLD: 9.3 FL — SIGNIFICANT CHANGE UP (ref 7.4–10.4)
POTASSIUM SERPL-MCNC: 3.8 MMOL/L — SIGNIFICANT CHANGE UP (ref 3.5–5)
POTASSIUM SERPL-MCNC: 3.8 MMOL/L — SIGNIFICANT CHANGE UP (ref 3.5–5)
POTASSIUM SERPL-SCNC: 3.8 MMOL/L — SIGNIFICANT CHANGE UP (ref 3.5–5)
POTASSIUM SERPL-SCNC: 3.8 MMOL/L — SIGNIFICANT CHANGE UP (ref 3.5–5)
RBC # BLD: 2.5 M/UL — LOW (ref 4.7–6.1)
RBC # BLD: 2.5 M/UL — LOW (ref 4.7–6.1)
RBC # BLD: 2.56 M/UL — LOW (ref 4.7–6.1)
RBC # BLD: 2.56 M/UL — LOW (ref 4.7–6.1)
RBC # BLD: 2.59 M/UL — LOW (ref 4.7–6.1)
RBC # BLD: 2.59 M/UL — LOW (ref 4.7–6.1)
RBC # FLD: 14.4 % — SIGNIFICANT CHANGE UP (ref 11.5–14.5)
RBC # FLD: 14.5 % — SIGNIFICANT CHANGE UP (ref 11.5–14.5)
RBC # FLD: 14.5 % — SIGNIFICANT CHANGE UP (ref 11.5–14.5)
SODIUM SERPL-SCNC: 144 MMOL/L — SIGNIFICANT CHANGE UP (ref 135–146)
SODIUM SERPL-SCNC: 144 MMOL/L — SIGNIFICANT CHANGE UP (ref 135–146)
SPECIMEN SOURCE: SIGNIFICANT CHANGE UP
SPECIMEN SOURCE: SIGNIFICANT CHANGE UP
WBC # BLD: 12.77 K/UL — HIGH (ref 4.8–10.8)
WBC # BLD: 12.77 K/UL — HIGH (ref 4.8–10.8)
WBC # BLD: 12.94 K/UL — HIGH (ref 4.8–10.8)
WBC # BLD: 12.94 K/UL — HIGH (ref 4.8–10.8)
WBC # BLD: 13.22 K/UL — HIGH (ref 4.8–10.8)
WBC # BLD: 13.22 K/UL — HIGH (ref 4.8–10.8)
WBC # FLD AUTO: 12.77 K/UL — HIGH (ref 4.8–10.8)
WBC # FLD AUTO: 12.77 K/UL — HIGH (ref 4.8–10.8)
WBC # FLD AUTO: 12.94 K/UL — HIGH (ref 4.8–10.8)
WBC # FLD AUTO: 12.94 K/UL — HIGH (ref 4.8–10.8)
WBC # FLD AUTO: 13.22 K/UL — HIGH (ref 4.8–10.8)
WBC # FLD AUTO: 13.22 K/UL — HIGH (ref 4.8–10.8)

## 2023-12-24 PROCEDURE — 99291 CRITICAL CARE FIRST HOUR: CPT

## 2023-12-24 PROCEDURE — 71045 X-RAY EXAM CHEST 1 VIEW: CPT | Mod: 26

## 2023-12-24 RX ORDER — ALBUTEROL 90 UG/1
90 AEROSOL, METERED ORAL EVERY 4 HOURS
Refills: 0 | Status: DISCONTINUED | OUTPATIENT
Start: 2023-12-24 | End: 2024-01-02

## 2023-12-24 RX ORDER — CEFAZOLIN SODIUM 1 G
2000 VIAL (EA) INJECTION ONCE
Refills: 0 | Status: DISCONTINUED | OUTPATIENT
Start: 2023-12-24 | End: 2023-12-24

## 2023-12-24 RX ORDER — SEVELAMER CARBONATE 2400 MG/1
800 POWDER, FOR SUSPENSION ORAL EVERY 8 HOURS
Refills: 0 | Status: DISCONTINUED | OUTPATIENT
Start: 2023-12-24 | End: 2023-12-25

## 2023-12-24 RX ORDER — IPRATROPIUM BROMIDE 0.2 MG/ML
1 SOLUTION, NON-ORAL INHALATION EVERY 6 HOURS
Refills: 0 | Status: DISCONTINUED | OUTPATIENT
Start: 2023-12-24 | End: 2024-01-09

## 2023-12-24 RX ORDER — CEFAZOLIN SODIUM 1 G
2000 VIAL (EA) INJECTION EVERY 8 HOURS
Refills: 0 | Status: DISCONTINUED | OUTPATIENT
Start: 2023-12-24 | End: 2023-12-24

## 2023-12-24 RX ORDER — FUROSEMIDE 40 MG
20 TABLET ORAL ONCE
Refills: 0 | Status: COMPLETED | OUTPATIENT
Start: 2023-12-24 | End: 2023-12-24

## 2023-12-24 RX ORDER — CEFAZOLIN SODIUM 1 G
2000 VIAL (EA) INJECTION EVERY 8 HOURS
Refills: 0 | Status: COMPLETED | OUTPATIENT
Start: 2023-12-24 | End: 2023-12-27

## 2023-12-24 RX ORDER — ALBUTEROL 90 UG/1
90 AEROSOL, METERED ORAL EVERY 4 HOURS
Refills: 0 | Status: COMPLETED | OUTPATIENT
Start: 2023-12-24 | End: 2024-11-21

## 2023-12-24 RX ORDER — CEFAZOLIN SODIUM 1 G
VIAL (EA) INJECTION
Refills: 0 | Status: DISCONTINUED | OUTPATIENT
Start: 2023-12-24 | End: 2023-12-24

## 2023-12-24 RX ORDER — LABETALOL HCL 100 MG
100 TABLET ORAL EVERY 8 HOURS
Refills: 0 | Status: DISCONTINUED | OUTPATIENT
Start: 2023-12-24 | End: 2023-12-24

## 2023-12-24 RX ORDER — POTASSIUM CHLORIDE 20 MEQ
20 PACKET (EA) ORAL
Refills: 0 | Status: COMPLETED | OUTPATIENT
Start: 2023-12-24 | End: 2023-12-24

## 2023-12-24 RX ORDER — SEVELAMER CARBONATE 2400 MG/1
800 POWDER, FOR SUSPENSION ORAL THREE TIMES A DAY
Refills: 0 | Status: DISCONTINUED | OUTPATIENT
Start: 2023-12-24 | End: 2023-12-24

## 2023-12-24 RX ADMIN — AMLODIPINE BESYLATE 10 MILLIGRAM(S): 2.5 TABLET ORAL at 05:11

## 2023-12-24 RX ADMIN — Medication 4 MILLIGRAM(S): at 21:15

## 2023-12-24 RX ADMIN — SEVELAMER CARBONATE 800 MILLIGRAM(S): 2400 POWDER, FOR SUSPENSION ORAL at 21:15

## 2023-12-24 RX ADMIN — PIPERACILLIN AND TAZOBACTAM 25 GRAM(S): 4; .5 INJECTION, POWDER, LYOPHILIZED, FOR SOLUTION INTRAVENOUS at 05:12

## 2023-12-24 RX ADMIN — PANTOPRAZOLE SODIUM 40 MILLIGRAM(S): 20 TABLET, DELAYED RELEASE ORAL at 05:08

## 2023-12-24 RX ADMIN — ALBUTEROL 90 PUFF(S): 90 AEROSOL, METERED ORAL at 15:11

## 2023-12-24 RX ADMIN — HEPARIN SODIUM 5000 UNIT(S): 5000 INJECTION INTRAVENOUS; SUBCUTANEOUS at 14:25

## 2023-12-24 RX ADMIN — Medication 1 PUFF(S): at 21:04

## 2023-12-24 RX ADMIN — Medication 500 MILLIGRAM(S): at 17:52

## 2023-12-24 RX ADMIN — Medication 50 MILLIEQUIVALENT(S): at 05:12

## 2023-12-24 RX ADMIN — Medication 1 APPLICATION(S): at 17:52

## 2023-12-24 RX ADMIN — Medication 650 MILLIGRAM(S): at 17:53

## 2023-12-24 RX ADMIN — Medication 50 MILLIEQUIVALENT(S): at 03:31

## 2023-12-24 RX ADMIN — Medication 3: at 03:06

## 2023-12-24 RX ADMIN — CHLORHEXIDINE GLUCONATE 15 MILLILITER(S): 213 SOLUTION TOPICAL at 05:09

## 2023-12-24 RX ADMIN — Medication 650 MILLIGRAM(S): at 12:08

## 2023-12-24 RX ADMIN — AMLODIPINE BESYLATE 5 MILLIGRAM(S): 2.5 TABLET ORAL at 00:08

## 2023-12-24 RX ADMIN — Medication 7: at 14:24

## 2023-12-24 RX ADMIN — Medication 5: at 06:31

## 2023-12-24 RX ADMIN — Medication 81 MILLIGRAM(S): at 11:09

## 2023-12-24 RX ADMIN — ALBUTEROL 90 PUFF(S): 90 AEROSOL, METERED ORAL at 21:05

## 2023-12-24 RX ADMIN — HEPARIN SODIUM 5000 UNIT(S): 5000 INJECTION INTRAVENOUS; SUBCUTANEOUS at 05:09

## 2023-12-24 RX ADMIN — INSULIN GLARGINE 15 UNIT(S): 100 INJECTION, SOLUTION SUBCUTANEOUS at 21:47

## 2023-12-24 RX ADMIN — Medication 500 MILLIGRAM(S): at 05:08

## 2023-12-24 RX ADMIN — Medication 650 MILLIGRAM(S): at 23:25

## 2023-12-24 RX ADMIN — CHLORHEXIDINE GLUCONATE 15 MILLILITER(S): 213 SOLUTION TOPICAL at 17:52

## 2023-12-24 RX ADMIN — Medication 1 APPLICATION(S): at 05:10

## 2023-12-24 RX ADMIN — Medication 650 MILLIGRAM(S): at 17:52

## 2023-12-24 RX ADMIN — Medication 5: at 17:51

## 2023-12-24 RX ADMIN — Medication 20 MILLIGRAM(S): at 10:58

## 2023-12-24 RX ADMIN — Medication 5: at 20:59

## 2023-12-24 RX ADMIN — ATORVASTATIN CALCIUM 20 MILLIGRAM(S): 80 TABLET, FILM COATED ORAL at 21:16

## 2023-12-24 RX ADMIN — Medication 650 MILLIGRAM(S): at 11:09

## 2023-12-24 RX ADMIN — Medication 650 MILLIGRAM(S): at 00:08

## 2023-12-24 RX ADMIN — Medication 5: at 10:58

## 2023-12-24 RX ADMIN — Medication 1 PUFF(S): at 14:17

## 2023-12-24 RX ADMIN — Medication 100 MILLIGRAM(S): at 21:16

## 2023-12-24 RX ADMIN — HEPARIN SODIUM 5000 UNIT(S): 5000 INJECTION INTRAVENOUS; SUBCUTANEOUS at 21:15

## 2023-12-24 RX ADMIN — Medication 100 MILLIGRAM(S): at 11:11

## 2023-12-24 RX ADMIN — CHLORHEXIDINE GLUCONATE 1 APPLICATION(S): 213 SOLUTION TOPICAL at 05:11

## 2023-12-24 RX ADMIN — Medication 650 MILLIGRAM(S): at 05:10

## 2023-12-24 RX ADMIN — Medication 650 MILLIGRAM(S): at 00:24

## 2023-12-24 NOTE — PROGRESS NOTE ADULT - ASSESSMENT
78y Male s/p mechanical fall. +HT, +AC (Aspirin and Plavix), -LOC.    12/21-intubated for airway protection, accessory muscle use  12/22- s/p tracheostomy and PEG placement, Portex cuffed 9 and 2cm at skin 20fr PEG    NEURO:  #Acute SDH   -12/18 HCT#4 -stable SDH, no new acute findings  -Q4 neuro checks  #Acute pain    -APAP ATC  #h/o stroke (2/2023) w/residual right sided weakness  - RIGHT anterior thalamus infarct,  LEFT caudate head lacunar infarct  #focal seizure    -vEEG 12/19: No seizures; Discontinued 12/19    - Valproic acid 500q24; f/u next level 12/23 AM     -NCC: valproic acid current dose, q4 neurochecks    -Neurology cs- d/c EEG, 2 days prior to discharge call neurology so they can do a spot EEG    RESP:   #Respiratory Failure secondary to impaired secretion clearance, intubated 12/21    -s/p tracheostomy 12/22, size 9 cuffed portex  RR (machine): 14, TV (machine): 350, FiO2: 40, PEEP: 5  ABG 12/23 04:45: 7.42/31/128/20   - attempt SAT/SBT 12/24  - chest PT q4 and duoneb treatments q 4   - deep tracheal aspirate cx sent (due to fevers)  Culture - Sputum . (12.21.23 @ 18:14)-  Numerous Staphylococcus aureus  #Activity    -increase as tolerated    CARDS:   #acute HYPOtension intraop, resolved    -off levophed since 12/22 PM  #hx of HTN  -Amlodipine 10 QD HOLDING  -Labetalol 200 q8 (Home Coreg 6.25mg q12 ) HOLDING  -Valsartan 320mg HOLDING   #HLD  -atorvastatin  #NSVT    -EP/Cards: No arrhythmias on tele only artifact. No further work up needed. Recommend ILR prior to discharge if patient/family agreeable  Imaging:   Echo: 12/2023: EF 66%, G1DD, trace MR, mild TR     GI/NUTR:   #Diet, NPO with Tube Feed via 20Fr PEG placed 12/22   Glucerna 1.2 at goal 55cc/hr, feed duration 24 hr    12/20- SLP unable to assess 2/2 lethargy    -aspiration precautions, HOB 30  #GI Prophylaxis  -Protonix 40mg   #Bowel regimen     -Multiple loose bowel movements, last dose senna 12/19 at 2200    -no bowel regimen    -Last bowel movement  12/22    /RENAL:   #urine output in critically ill  -Indwelling catheter D/C --> passed TOV   -UA neg   -Sodium goal 140-150  #Hypernatremia improving  -FWF 200q4; free water deficit 1.7L   -d/w team decreasing FW w/ improving Na  #hyperphosphatemia  -Started phoslo TID   #CKD   -baseline Cr 2.1  -holding home sodium bicarb 650mg BID  #hx BPH    -cardura 4mg (flomax at home, non crushable)      Labs:          BUN/Cr- 58/2.6  -->,  52/2.3  -->          Electrolytes-Na 140 // K 3.1 // Mg 2.4 //  Phos 3.9 (12-23 @ 22:45)    HEME/ONC:   #DVT prophylaxis     -SCDs, HSQ  #hx of CVA    -ASA cleared by NSGY    -Plavix HOLDING, will require repeat HCT in 2 wks prior to restart, approx 1/1/24    Labs: Hb/Hct:  8.0/24.5  -->,  7.4/22.5  -->                      Plts:  252  -->,  279  -->                 PTT/INR:        ID:  #recurrent fevers  -MRSA PCR neg   Cultures    Bcx 12/18- final neg     Tracheal aspirate 12/21: numerous staph aureus     Tracheal aspirate 12/22: pending   Current antibiotics:    Zosyn (12/22-  #ID c/s pending  -pending   #multiple loose bowel movements    -WBC trending up    -no bowel regiment  WBC- 9.69  --->>,  13.71  --->>,  13.21  --->>  Temp trend- 24hrs T(F): 99.5 (12-24 @ 00:00), Max: 100.8 (12-23 @ 13:00)  Current antibiotics-piperacillin/tazobactam IVPB.. 3.375 every 8 hours    ENDO:  #DM     -ISS     -Off insulin gtt 12/20      -Lantus 15 units HS     -FSG q4 while on TF    MSK:     Activity - Increase As Tolerated    -B/L knee X ray 12/19- no fractures, b/l effusions     LINES/DRAINS:  PIV, right basilic midline (placed 12/16), right radial arterial line (placed 12/19) Aguirre (placed 12/16- d/c 12/19; reinserted 12/22 due to retention), 20Fr PEG (12/22), Tracheostomy (12/22)    ADVANCED DIRECTIVES:  Full Code    HCP/Emergency Contact-Sarah Gamino (Wife) [phone], Cantonese speaking    DISPO:  Social work, case management consult for dispo to vent facility - spoke with social work 12/23, starting to work on D/C.   78y Male s/p mechanical fall. +HT, +AC (Aspirin and Plavix), -LOC.    12/21-intubated for airway protection, accessory muscle use  12/22- s/p tracheostomy and PEG placement, Portex cuffed 9 and 2cm at skin 20fr PEG    NEURO:  #Acute SDH   -12/18 HCT#4 -stable SDH, no new acute findings  -Q4 neuro checks  #Acute pain    -APAP ATC  #h/o stroke (2/2023) w/residual right sided weakness  - RIGHT anterior thalamus infarct,  LEFT caudate head lacunar infarct  #focal seizure    -vEEG 12/19: No seizures; Discontinued 12/19    - Valproic acid 500q24; f/u next level 12/23 AM     -NCC: valproic acid current dose, q4 neurochecks    -Neurology cs- d/c EEG, 2 days prior to discharge call neurology so they can do a spot EEG    RESP:   #Respiratory Failure secondary to impaired secretion clearance, intubated 12/21    -s/p tracheostomy 12/22, size 9 cuffed portex  RR (machine): 14, TV (machine): 350, FiO2: 40, PEEP: 5  ABG 12/23 04:45: 7.42/31/128/20   - attempt SAT/SBT 12/24  - chest PT q4 and duoneb treatments q 4   - deep tracheal aspirate cx sent (due to fevers)  Culture - Sputum . (12.21.23 @ 18:14)-  Numerous Staphylococcus aureus  #Activity    -increase as tolerated    CARDS:   #acute HYPOtension intraop, resolved    -off levophed since 12/22 PM  #hx of HTN  -Amlodipine 10 QD HOLDING  -Labetalol 200 q8 (Home Coreg 6.25mg q12 ) HOLDING  -Valsartan 320mg HOLDING   #HLD  -atorvastatin  #NSVT    -EP/Cards: No arrhythmias on tele only artifact. No further work up needed. Recommend ILR prior to discharge if patient/family agreeable  Imaging:   Echo: 12/2023: EF 66%, G1DD, trace MR, mild TR     GI/NUTR:   #Diet, NPO with Tube Feed via 20Fr PEG placed 12/22   Glucerna 1.2 at goal 55cc/hr, feed duration 24 hr    12/20- SLP unable to assess 2/2 lethargy    -aspiration precautions, HOB 30  #GI Prophylaxis  -Protonix 40mg   #Bowel regimen     -Multiple loose bowel movements, last dose senna 12/19 at 2200    -no bowel regimen    -Last bowel movement  12/22    /RENAL:   #urine output in critically ill  -Indwelling catheter D/C --> passed TOV   -UA neg   -Sodium goal 140-150  #Hypernatremia improving  -FWF 200q4; free water deficit 1.7L   -d/w team decreasing FW w/ improving Na  #hyperphosphatemia  -Started phoslo TID   #CKD   -baseline Cr 2.1  -holding home sodium bicarb 650mg BID  #hx BPH    -cardura 4mg (flomax at home, non crushable)      Labs:          BUN/Cr- 58/2.6  -->,  52/2.3  -->          Electrolytes-Na 140 // K 3.1 // Mg 2.4 //  Phos 3.9 (12-23 @ 22:45)    HEME/ONC:   #DVT prophylaxis     -SCDs, HSQ  #hx of CVA    -ASA cleared by NSGY    -Plavix HOLDING, will require repeat HCT in 2 wks prior to restart, approx 1/1/24    Labs: Hb/Hct:  8.0/24.5  -->,  7.4/22.5  -->                      Plts:  252  -->,  279  -->                 PTT/INR:        ID:  #recurrent fevers  -MRSA PCR neg   Cultures    Bcx 12/18- final neg     Tracheal aspirate 12/21: numerous staph aureus     Tracheal aspirate 12/22: pending   Current antibiotics:    Zosyn (12/22-  #ID c/s pending  -pending   #multiple loose bowel movements    -WBC trending up    -no bowel regiment  WBC- 9.69  --->>,  13.71  --->>,  13.21  --->>  Temp trend- 24hrs T(F): 99.5 (12-24 @ 00:00), Max: 100.8 (12-23 @ 13:00)  Current antibiotics-piperacillin/tazobactam IVPB.. 3.375 every 8 hours    ENDO:  #DM     -ISS     -Off insulin gtt 12/20      -Lantus 15 units HS     -FSG q4 while on TF    MSK:     Activity - Increase As Tolerated    -B/L knee X ray 12/19- no fractures, b/l effusions     LINES/DRAINS:  PIV, right basilic midline (placed 12/16), right radial arterial line (placed 12/19) Aguirre (placed 12/16- d/c 12/19; reinserted 12/22 due to retention), 20Fr PEG (12/22), Tracheostomy (12/22)    ADVANCED DIRECTIVES:  Full Code    HCP/Emergency Contact-Sarah Gamino (Wife) [phone], Cantonese speaking    DISPO:  Social work, case management consult for dispo to vent facility - spoke with social work 12/23, starting to work on D/C.   78y Male s/p mechanical fall. +HT, +AC (Aspirin and Plavix), -LOC.    12/21-intubated for airway protection, accessory muscle use  12/22- s/p tracheostomy and PEG placement, Portex cuffed 9 and 2cm at skin 20fr PEG    NEURO:  #Acute SDH   -12/18 HCT#4 -stable SDH, no new acute findings  -Q4 neuro checks  #Acute pain    -APAP ATC  #h/o stroke (2/2023) w/residual right sided weakness  - RIGHT anterior thalamus infarct,  LEFT caudate head lacunar infarct  #focal seizure    -vEEG 12/19: No seizures; Discontinued 12/19    - Valproic acid 500q24; f/u next level 12/23 AM     -NCC: valproic acid current dose, q4 neurochecks    -Neurology cs- d/c EEG, 2 days prior to discharge call neurology so they can do a spot EEG    RESP:   #Respiratory Failure secondary to impaired secretion clearance, intubated 12/21    -s/p tracheostomy 12/22, size 9 cuffed portex  RR (machine): 14, TV (machine): 350, FiO2: 40, PEEP: 5  ABG 12/23 04:45: 7.42/31/128/20   - attempt SAT/SBT 12/24  - chest PT q4 and duoneb treatments q 4   - deep tracheal aspirate cx sent (due to fevers)  Culture - Sputum . (12.21.23 @ 18:14)-  Numerous Staphylococcus aureus  #Activity    -increase as tolerated    CARDS:   #acute HYPOtension intraop, resolved    -off levophed since 12/22 PM  #hx of HTN  -Amlodipine 10 QD restarted  -Labetalol 200 q8 (Home Coreg 6.25mg q12 ) HOLDING  -Valsartan 320mg HOLDING   #HLD  -atorvastatin  #NSVT    -EP/Cards: No arrhythmias on tele only artifact. No further work up needed. Recommend ILR prior to discharge if patient/family agreeable  Imaging:   Echo: 12/2023: EF 66%, G1DD, trace MR, mild TR     GI/NUTR:   #Diet, NPO with Tube Feed via 20Fr PEG placed 12/22   Glucerna 1.2 at goal 55cc/hr, feed duration 24 hr    12/20- SLP unable to assess 2/2 lethargy    -aspiration precautions, HOB 30  #GI Prophylaxis  -Protonix 40mg   #Bowel regimen     -Multiple loose bowel movements, last dose senna 12/19 at 2200    -no bowel regimen    -Last bowel movement  12/22    /RENAL:   #urine output in critically ill  -Indwelling catheter D/C --> passed TOV   -UA neg   -Sodium goal 140-150  #Hypernatremia improving  -FWF 200q4; free water deficit 1.7L   -d/w team decreasing FW w/ improving Na  #hyperphosphatemia  -Started phoslo TID   #CKD   -baseline Cr 2.1  -holding home sodium bicarb 650mg BID  #hx BPH    -cardura 4mg (flomax at home, non crushable)      Labs:          BUN/Cr- 58/2.6  -->,  52/2.3  -->          Electrolytes-Na 140 // K 3.1 // Mg 2.4 //  Phos 3.9 (12-23 @ 22:45)    HEME/ONC:   #DVT prophylaxis     -SCDs, HSQ  #hx of CVA    -ASA cleared by NSGY    -Plavix HOLDING, will require repeat HCT in 2 wks prior to restart, approx 1/1/24    Labs: Hb/Hct:  8.0/24.5  -->,  7.4/22.5  -->                      Plts:  252  -->,  279  -->                 PTT/INR:        ID:  #recurrent fevers  -MRSA PCR neg   Cultures    Bcx 12/18- final neg     Tracheal aspirate 12/21: numerous staph aureus     Tracheal aspirate 12/22: pending   Current antibiotics:    Zosyn (12/22-  #ID c/s pending  -pending   #multiple loose bowel movements    -WBC trending up    -no bowel regiment  WBC- 9.69  --->>,  13.71  --->>,  13.21  --->>  Temp trend- 24hrs T(F): 99.5 (12-24 @ 00:00), Max: 100.8 (12-23 @ 13:00)  Current antibiotics-piperacillin/tazobactam IVPB.. 3.375 every 8 hours    ENDO:  #DM     -ISS     -Off insulin gtt 12/20      -Lantus 15 units HS     -FSG q4 while on TF    MSK:     Activity - Increase As Tolerated    -B/L knee X ray 12/19- no fractures, b/l effusions     LINES/DRAINS:  PIV, right basilic midline (placed 12/16), right radial arterial line (placed 12/19) Aguirre (placed 12/16- d/c 12/19; reinserted 12/22 due to retention), 20Fr PEG (12/22), Tracheostomy (12/22)    ADVANCED DIRECTIVES:  Full Code    HCP/Emergency Contact-Sarah Gamino (Wife) [phone], Cantonese speaking    DISPO:  Social work, case management consult for dispo to vent facility - spoke with social work 12/23, starting to work on D/C.   78y Male s/p mechanical fall. +HT, +AC (Aspirin and Plavix), -LOC.    12/21-intubated for airway protection, accessory muscle use  12/22- s/p tracheostomy and PEG placement, Portex cuffed 9 and 2cm at skin 20fr PEG    NEURO:  #Acute SDH   -12/18 HCT#4 -stable SDH, no new acute findings  -Q4 neuro checks  #Acute pain    -APAP ATC  #h/o stroke (2/2023) w/residual right sided weakness  - RIGHT anterior thalamus infarct,  LEFT caudate head lacunar infarct  #focal seizure    -vEEG 12/19: No seizures; Discontinued 12/19    - Valproic acid 500q24; f/u next level 12/23 AM     -NCC: valproic acid current dose, q4 neurochecks    -Neurology cs- d/c EEG, 2 days prior to discharge call neurology so they can do a spot EEG    RESP:   #Respiratory Failure secondary to impaired secretion clearance, intubated 12/21    -s/p tracheostomy 12/22, size 9 cuffed portex  RR (machine): 14, TV (machine): 350, FiO2: 40, PEEP: 5  ABG 12/23 04:45: 7.42/31/128/20   - attempt SAT/SBT 12/24  - chest PT q4 and duoneb treatments q 4   - deep tracheal aspirate cx sent (due to fevers)  Culture - Sputum . (12.21.23 @ 18:14)-  Numerous Staphylococcus aureus  #Activity    -increase as tolerated    CARDS:   #acute HYPOtension intraop, resolved    -off levophed since 12/22 PM  #hx of HTN  -Amlodipine 10 QD restarted  -Labetalol 200 q8 (Home Coreg 6.25mg q12 ) HOLDING  -Valsartan 320mg HOLDING   #HLD  -atorvastatin  #NSVT    -EP/Cards: No arrhythmias on tele only artifact. No further work up needed. Recommend ILR prior to discharge if patient/family agreeable  Imaging:   Echo: 12/2023: EF 66%, G1DD, trace MR, mild TR     GI/NUTR:   #Diet, NPO with Tube Feed via 20Fr PEG placed 12/22   Glucerna 1.2 at goal 55cc/hr, feed duration 24 hr    12/20- SLP unable to assess 2/2 lethargy    -aspiration precautions, HOB 30  #GI Prophylaxis  -Protonix 40mg   #Bowel regimen     -Multiple loose bowel movements, last dose senna 12/19 at 2200    -no bowel regimen    -Last bowel movement  12/22    /RENAL:   #urine output in critically ill  -Indwelling catheter D/C --> passed TOV   -UA neg   -Sodium goal 140-150  #Hypernatremia improving  -FWF 200q4; free water deficit 1.7L   -d/w team decreasing FW w/ improving Na  #hyperphosphatemia  -Started phoslo TID   #CKD   -baseline Cr 2.1  -holding home sodium bicarb 650mg BID  #hx BPH    -cardura 4mg (flomax at home, non crushable)      Labs:          BUN/Cr- 58/2.6  -->,  52/2.3  -->          Electrolytes-Na 140 // K 3.1 // Mg 2.4 //  Phos 3.9 (12-23 @ 22:45)    HEME/ONC:   #DVT prophylaxis     -SCDs, HSQ  #hx of CVA    -ASA cleared by NSGY    -Plavix HOLDING, will require repeat HCT in 2 wks prior to restart, approx 1/1/24    Labs: Hb/Hct:  8.0/24.5  -->,  7.4/22.5  -->                      Plts:  252  -->,  279  -->                 PTT/INR:        ID:  #recurrent fevers  -MRSA PCR neg   Cultures    Bcx 12/18- final neg     Tracheal aspirate 12/21: numerous staph aureus     Tracheal aspirate 12/22: pending   Current antibiotics:    Zosyn (12/22-  #ID c/s pending  -pending   #multiple loose bowel movements    -WBC trending up    -no bowel regiment  WBC- 9.69  --->>,  13.71  --->>,  13.21  --->>  Temp trend- 24hrs T(F): 99.5 (12-24 @ 00:00), Max: 100.8 (12-23 @ 13:00)  Current antibiotics-piperacillin/tazobactam IVPB.. 3.375 every 8 hours    ENDO:  #DM     -ISS     -Off insulin gtt 12/20      -Lantus 15 units HS     -FSG q4 while on TF    MSK:     Activity - Increase As Tolerated    -B/L knee X ray 12/19- no fractures, b/l effusions     LINES/DRAINS:  PIV, right basilic midline (placed 12/16), right radial arterial line (placed 12/19) Aguirre (placed 12/16- d/c 12/19; reinserted 12/22 due to retention), 20Fr PEG (12/22), Tracheostomy (12/22)    ADVANCED DIRECTIVES:  Full Code    HCP/Emergency Contact-Sarah Gamino (Wife) [phone], Cantonese speaking    DISPO:  Social work, case management consult for dispo to vent facility - spoke with social work 12/23, starting to work on D/C.   78y Male s/p mechanical fall. +HT, +AC (Aspirin and Plavix), -LOC.    12/21-intubated for airway protection, accessory muscle use  12/22- s/p tracheostomy and PEG placement, Portex cuffed 9 and 2cm at skin 20fr PEG    NEURO:  #Acute SDH   -12/18 HCT#4 -stable SDH, no new acute findings  -Q4 neuro checks  #Acute pain    -APAP ATC  #h/o stroke (2/2023) w/residual right sided weakness  - RIGHT anterior thalamus infarct,  LEFT caudate head lacunar infarct  #focal seizure    -vEEG 12/19: No seizures; Discontinued 12/19    - Valproic acid 500q24; f/u next level 12/23 AM     -NCC: valproic acid current dose, q4 neurochecks    -Neurology cs- d/c EEG, 2 days prior to discharge call neurology so they can do a spot EEG    RESP:   #Respiratory Failure secondary to impaired secretion clearance, intubated 12/21    -s/p tracheostomy 12/22, size 9 cuffed portex  RR (machine): 14, TV (machine): 350, FiO2: 40, PEEP: 5  ABG 12/23 04:45: 7.42/31/128/20   - 12/23: tolerated 6 hours of PS > continue to trial 12/24  - chest PT q4 and duoneb treatments q 4   - deep tracheal aspirate cx sent (due to fevers)  Culture - Sputum . (12.21.23 @ 18:14)-  Numerous Staphylococcus aureus  #Activity    -increase as tolerated    CARDS:   #acute HYPOtension intraop, resolved    -off levophed since 12/22 PM  #hx of HTN  -Amlodipine 10 QD restarted  -Labetalol 200 q8 (Home Coreg 6.25mg q12 ) HOLDING  -Valsartan 320mg HOLDING   #HLD  -atorvastatin  #NSVT    -EP/Cards: No arrhythmias on tele only artifact. No further work up needed. Recommend ILR prior to discharge if patient/family agreeable  Imaging:   Echo: 12/2023: EF 66%, G1DD, trace MR, mild TR     GI/NUTR:   #Diet, NPO with Tube Feed via 20Fr PEG placed 12/22   Glucerna 1.2 at goal 55cc/hr, feed duration 24 hr    12/20- SLP unable to assess 2/2 lethargy    -aspiration precautions, HOB 30  #GI Prophylaxis  -Protonix 40mg   #Bowel regimen     -Multiple loose bowel movements, last dose senna 12/19 at 2200 > dignishield placed 12/23 & C. diff sent     -no bowel regimen    -Last bowel movement  12/22    /RENAL:   #urine output in critically ill  -Condom catheter   -UA neg   -Sodium goal 140-150  -12/24: Lasix 20 IVP x1 for net +12L/LOS-->   #Hypernatremia - resolved   -D/C'd FWF 200q4; free water deficit 1.7L  #hyperphosphatemia  -Started phoslo TID   #CKD   -baseline Cr 2.1  -holding home sodium bicarb 650mg BID  #hx BPH    -cardura 4mg (flomax at home, non crushable)      Labs:          BUN/Cr- 58/2.6  -->,  52/2.3  -->          Electrolytes-Na 140 // K 3.1 // Mg 2.4 //  Phos 3.9 (12-23 @ 22:45)    HEME/ONC:   #DVT prophylaxis     -SCDs, HSQ  #hx of CVA    -ASA cleared by NSGY    -Plavix HOLDING, will require repeat HCT in 2 wks prior to restart, approx 1/1/24    Labs: Hb/Hct:  8.0/24.5  -->,  7.4/22.5  -->  11AM CBC                    Plts:  252  -->,  279  -->                 PTT/INR:        ID:  #recurrent fevers  -MRSA PCR neg   Cultures    Bcx 12/18- final neg     Tracheal aspirate 12/21: numerous staph aureus     Tracheal aspirate 12/22: staph aureus    C.Diff PCR pending for continued loose stool/febrile   Current antibiotics:  -IV Ancef 2g q8 (stop date 12/26)   #ID c/s pending  -pending   #multiple loose bowel movements    -WBC trending up    -no bowel regiment  WBC- 9.69  --->>,  13.71  --->>,  13.21  --->>  Temp trend- 24hrs T(F): 99.5 (12-24 @ 00:00), Max: 100.8 (12-23 @ 13:00)  Current antibiotics-piperacillin/tazobactam IVPB.. 3.375 every 8 hours    ENDO:  #DM     -ISS     -Off insulin gtt 12/20      -Lantus 15 units HS     -FSG q4 while on TF    MSK:     Activity - Increase As Tolerated    -B/L knee X ray 12/19- no fractures, b/l effusions     LINES/DRAINS:  PIV, right basilic midline (placed 12/16), right radial arterial line (placed 12/19), 20Fr PEG (12/22), Tracheostomy (12/22)    ADVANCED DIRECTIVES:  Full Code    HCP/Emergency Contact-Sarah Gamino (Wife) [phone], Cantonese speaking    DISPO:  Social work, case management consult for dispo to vent facility - spoke with social work 12/23, starting to work on D/C.   78y Male s/p mechanical fall. +HT, +AC (Aspirin and Plavix), -LOC.    12/21-intubated for airway protection, accessory muscle use  12/22- s/p tracheostomy and PEG placement, Portex cuffed 9 and 2cm at skin 20fr PEG    NEURO:  #Acute SDH   -12/18 HCT#4 -stable SDH, no new acute findings  -Q4 neuro checks  #Acute pain    -APAP ATC  #h/o stroke (2/2023) w/residual right sided weakness  - RIGHT anterior thalamus infarct,  LEFT caudate head lacunar infarct  #focal seizure    -vEEG 12/19: No seizures; Discontinued 12/19    - Valproic acid 500q24; f/u next level 12/23 AM     -NCC: valproic acid current dose, q4 neurochecks    -Neurology cs- d/c EEG, 2 days prior to discharge call neurology so they can do a spot EEG    RESP:   #Respiratory Failure secondary to impaired secretion clearance, intubated 12/21    -s/p tracheostomy 12/22, size 9 cuffed portex  RR (machine): 14, TV (machine): 350, FiO2: 40, PEEP: 5  ABG 12/23 04:45: 7.42/31/128/20   - 12/23: tolerated 6 hours of PS > continue to trial 12/24  - chest PT q4 and duoneb treatments q 4   - deep tracheal aspirate cx sent (due to fevers)  Culture - Sputum . (12.21.23 @ 18:14)-  Numerous Staphylococcus aureus  #Activity    -increase as tolerated    CARDS:   #acute HYPOtension intraop, resolved    -off levophed since 12/22 PM  #hx of HTN  -Amlodipine 10 QD restarted  -Labetalol 200 q8 (Home Coreg 6.25mg q12 ) HOLDING  -Valsartan 320mg HOLDING   #HLD  -atorvastatin  #NSVT    -EP/Cards: No arrhythmias on tele only artifact. No further work up needed. Recommend ILR prior to discharge if patient/family agreeable  Imaging:   Echo: 12/2023: EF 66%, G1DD, trace MR, mild TR     GI/NUTR:   #Diet, NPO with Tube Feed via 20Fr PEG placed 12/22   Glucerna 1.2 at goal 55cc/hr, feed duration 24 hr    12/20- SLP unable to assess 2/2 lethargy    -aspiration precautions, HOB 30  #GI Prophylaxis  -Protonix 40mg   #Bowel regimen     -Multiple loose bowel movements, last dose senna 12/19 at 2200 > dignishield placed 12/23 & C. diff sent     -no bowel regimen    -Last bowel movement  12/22    /RENAL:   #urine output in critically ill  -Condom catheter   -UA neg   -Sodium goal 140-150  -12/24: Lasix 20 IVP x1 for net +12L/LOS-->   #Hypernatremia - resolved   -D/C'd FWF 200q4; free water deficit 1.7L  #hyperphosphatemia  -Started phoslo TID   #CKD   -baseline Cr 2.1  -holding home sodium bicarb 650mg BID  #hx BPH    -cardura 4mg (flomax at home, non crushable)      Labs:          BUN/Cr- 58/2.6  -->,  52/2.3  -->          Electrolytes-Na 140 // K 3.1 // Mg 2.4 //  Phos 3.9 (12-23 @ 22:45)    HEME/ONC:   #DVT prophylaxis     -SCDs, HSQ  #hx of CVA    -ASA cleared by NSGY    -Plavix HOLDING, will require repeat HCT in 2 wks prior to restart, approx 1/1/24    Labs: Hb/Hct:  8.0/24.5  -->,  7.4/22.5  -->  11AM CBC                    Plts:  252  -->,  279  -->                 PTT/INR:        ID:  #recurrent fevers  -MRSA PCR neg   Cultures    Bcx 12/18- final neg     Tracheal aspirate 12/21: numerous staph aureus     Tracheal aspirate 12/22: staph aureus    C.Diff PCR pending for continued loose stool/febrile   Current antibiotics:  -IV Ancef 2g q8 (stop date 12/26)   #ID c/s pending  -pending   #multiple loose bowel movements    -WBC trending up    -no bowel regiment  WBC- 9.69  --->>,  13.71  --->>,  13.21  --->>  Temp trend- 24hrs T(F): 99.5 (12-24 @ 00:00), Max: 100.8 (12-23 @ 13:00)  Current antibiotics-piperacillin/tazobactam IVPB.. 3.375 every 8 hours    ENDO:  #DM     -ISS     -Off insulin gtt 12/20      -Lantus 15 units HS     -FSG q4 while on TF    MSK:     Activity - Increase As Tolerated    -B/L knee X ray 12/19- no fractures, b/l effusions     LINES/DRAINS:  PIV, right basilic midline (placed 12/16), right radial arterial line (placed 12/19), 20Fr PEG (12/22), Tracheostomy (12/22)    ADVANCED DIRECTIVES:  Full Code    HCP/Emergency Contact-Sarah Gamino (Wife) [phone], Cantonese speaking    DISPO:  Social work, case management consult for dispo to vent facility - spoke with social work 12/23, starting to work on D/C.

## 2023-12-24 NOTE — PROGRESS NOTE ADULT - ATTENDING COMMENTS
Patient remains with diminished mental status at baseline.  On Depakote 500 mg/d.  On tube feeds. Has loose BMs about 900 ml/24h  Has low grade fevers, WBC 13.  Spent 6 h on PSV yesterday.    ASSESSMENT:  77 y/o male, S/P Fall.  Traumatic Left and Right SDHs.   Seizures.  History of CVA with right hemiparesis.  Acute respiratory failure. S/P Tracheostomy.  Hypertensive urgency.  Uncontrolled DM.  SAGRARIO on CKD.  Dysphagia. S/P PEG.  Hypernatremia.    PLAN:  - pain control as needed  - on Depakote 500mg/d  - put on PSV toay as long as tolerated  - keep normotensive - on Amlodipine 10 mg/d  - tube feeds  - follow serum electrolytes and UOP  - stop free water 250 ml q6h  - ID - changed to Ancef as per ID  - DVT prophylaxis Patient remains with diminished mental status at baseline.  On Depakote 500 mg/d.  On tube feeds. Has loose BMs about 900 ml/24h  Has low grade fevers, WBC 13.  Spent 6 h on PSV yesterday.    ASSESSMENT:  79 y/o male, S/P Fall.  Traumatic Left and Right SDHs.   Seizures.  History of CVA with right hemiparesis.  Acute respiratory failure. S/P Tracheostomy.  Hypertensive urgency.  Uncontrolled DM.  SAGRARIO on CKD.  Dysphagia. S/P PEG.  Hypernatremia.    PLAN:  - pain control as needed  - on Depakote 500mg/d  - put on PSV toay as long as tolerated  - keep normotensive - on Amlodipine 10 mg/d  - tube feeds  - follow serum electrolytes and UOP  - stop free water 250 ml q6h  - ID - changed to Ancef as per ID  - DVT prophylaxis

## 2023-12-24 NOTE — PROGRESS NOTE ADULT - SUBJECTIVE AND OBJECTIVE BOX
JACQUELIN CAI   [id number]   [birth year]  78yM    Admit Date: 12-16-23  Indication for SDU/SICU: Q1 neuro checks        ============================      OR Stats  OR Time:               IV Fluids:                    EBL:                       Blood Products:                     UOP:      Findings-    24 Hour Events  12/23  PM  -TF at goal 55cc/hr  -Tracheal aspirate growing GPCC in pairs and staph aureus from 12/22, same as 12/21  -Tmax 99.5; WBC 13.2 from 13.7    AM  -SAT/SBT   -F/u ID consult for recurrent fevers/+tracheal aspirate   -F/u hyperphosphatemia / nephro?   -D/c martinez --> TOV passed  -Phoslo started  -F/u placement   -Spoke with social work, starting to work on d/c to vemt facility   -BCx no growth final  -started 5mg amlodopine for SBP 160s persistently > improved to 130s   -10 labetalol IVP x1 for  after pain meds      [X] A ten-point review of systems was otherwise negative except as noted above.  [  ] Due to altered mental status/intubation, subjective information was not attained from the patient. History was obtained, to the extent possible, from review of the chart and collateral sources of information.    ====================================================================================================================================================================================================    PMH  PAST MEDICAL & SURGICAL HISTORY:  HTN (hypertension)  Seasonal allergies  History of BPH  Diabetes  No significant past surgical history    Home Meds:  Home Medications:  amLODIPine 5 mg oral tablet: 1 tab(s) orally once a day (14 Feb 2023 02:08)  aspirin 81 mg oral tablet: 1 tab(s) orally once a day (14 Feb 2023 02:08)  atorvastatin 20 mg oral tablet: 1 tab(s) orally once a day (16 Dec 2023 03:34)  carvedilol 6.25 mg oral tablet: 1 tab(s) orally 2 times a day (14 Feb 2023 02:08)  Jardiance 25 mg oral tablet: 1 tab(s) orally once a day (in the morning) (14 Feb 2023 02:08)  Nephplex Rx oral tablet: 1 tab(s) orally once a day (14 Feb 2023 02:08)  SITagliptin 50 mg oral tablet: 1 tab(s) orally once a day (16 Dec 2023 03:34)  tamsulosin 0.4 mg oral capsule: 2 cap(s) orally once a day (at bedtime) (16 Dec 2023 03:29)  valsartan 320 mg oral tablet: 1 tab(s) orally once a day (14 Feb 2023 02:08)    Allergies  Allergies  [This allergen will not trigger allergy alert] pollen (Unknown)  No Known Drug Allergies  Intolerances       Current Medications:  acetaminophen     Tablet .. 650 milliGRAM(s) Oral every 6 hours  albuterol/ipratropium for Nebulization 3 milliLiter(s) Nebulizer every 4 hours  amLODIPine   Tablet 10 milliGRAM(s) Oral daily  aspirin  chewable 81 milliGRAM(s) Enteral Tube daily  atorvastatin 20 milliGRAM(s) Oral at bedtime  bacitracin   Ointment 1 Application(s) Topical every 12 hours  calcium acetate 667 milliGRAM(s) Oral three times a day with meals  chlorhexidine 0.12% Liquid 15 milliLiter(s) Oral Mucosa two times a day  chlorhexidine 2% Cloths 1 Application(s) Topical <User Schedule>  dexMEDEtomidine Infusion 0.3 MICROgram(s)/kG/Hr (4.63 mL/Hr) IV Continuous <Continuous>  doxazosin 4 milliGRAM(s) Oral at bedtime  heparin   Injectable 5000 Unit(s) SubCutaneous every 8 hours  insulin glargine Injectable (LANTUS) 15 Unit(s) SubCutaneous at bedtime  insulin lispro (ADMELOG) corrective regimen sliding scale   SubCutaneous every 4 hours  pantoprazole  Injectable 40 milliGRAM(s) IV Push every 24 hours  piperacillin/tazobactam IVPB.. 3.375 Gram(s) IV Intermittent every 8 hours  valproic  acid Syrup 500 milliGRAM(s) Oral every 12 hours      Vital Sings, Intake/Output (Last 24Hours)  ICU Vital Signs Last 24 Hrs  T(C): 37.5 (24 Dec 2023 00:00), Max: 38.2 (23 Dec 2023 13:00)  T(F): 99.5 (24 Dec 2023 00:00), Max: 100.8 (23 Dec 2023 13:00)  HR: 83 (24 Dec 2023 00:00) (68 - 90)  BP: 156/67 (24 Dec 2023 00:00) (94/54 - 178/74)  BP(mean): 96 (24 Dec 2023 00:00) (72 - 107)  ABP: 150/40 (24 Dec 2023 00:00) (95/26 - 172/43)  ABP(mean): 67 (24 Dec 2023 00:00) (45 - 76)  RR: 23 (24 Dec 2023 00:00) (17 - 32)  SpO2: 100% (24 Dec 2023 00:00) (97% - 100%)    O2 Parameters below as of 24 Dec 2023 00:00  Patient On (Oxygen Delivery Method): ventilator    O2 Concentration (%): 40      I&O's Summary    22 Dec 2023 07:01  -  23 Dec 2023 07:00  --------------------------------------------------------  IN: 4408.5 mL / OUT: 1405 mL / NET: 3003.5 mL    23 Dec 2023 07:01  -  24 Dec 2023 00:42  --------------------------------------------------------  IN: 2960 mL / OUT: 1450 mL / NET: 1510 mL        Physical Exam:  ----------------------------------------------------------------------------------------------------------  ***    Tubes/Lines/Drains   ----------------------------------------------------------------------------------------------------------  [x] Peripheral IV  [ ] Urinary Catheter Martinez                                               [ ] Central Venous Line                   [ ] Arterial Line		       JACQUELIN CAI   [id number]   [birth year]  78yM    Admit Date: 12-16-23  Indication for SDU/SICU: Q1 neuro checks        ============================      OR Stats  OR Time:               IV Fluids:                    EBL:                       Blood Products:                     UOP:      Findings-    24 Hour Events  12/23  PM  -TF at goal 55cc/hr  -Tracheal aspirate growing GPCC in pairs and staph aureus from 12/22, same as 12/21  -Tmax 99.5; WBC 13.2 from 13.7    AM  -SAT/SBT   -F/u ID consult for recurrent fevers/+tracheal aspirate   -F/u hyperphosphatemia / nephro?   -D/c martinez --> TOV passed  -Phoslo started  -F/u placement   -Spoke with social work, starting to work on d/c to vemt facility   -BCx no growth final  -started 5mg amlodopine for SBP 160s persistently > improved to 130s   -10 labetalol IVP x1 for  after pain meds      [X] A ten-point review of systems was otherwise negative except as noted above.  [  ] Due to altered mental status/intubation, subjective information was not attained from the patient. History was obtained, to the extent possible, from review of the chart and collateral sources of information.    ====================================================================================================================================================================================================    PMH  PAST MEDICAL & SURGICAL HISTORY:  HTN (hypertension)  Seasonal allergies  History of BPH  Diabetes  No significant past surgical history    Home Meds:  Home Medications:  amLODIPine 5 mg oral tablet: 1 tab(s) orally once a day (14 Feb 2023 02:08)  aspirin 81 mg oral tablet: 1 tab(s) orally once a day (14 Feb 2023 02:08)  atorvastatin 20 mg oral tablet: 1 tab(s) orally once a day (16 Dec 2023 03:34)  carvedilol 6.25 mg oral tablet: 1 tab(s) orally 2 times a day (14 Feb 2023 02:08)  Jardiance 25 mg oral tablet: 1 tab(s) orally once a day (in the morning) (14 Feb 2023 02:08)  Nephplex Rx oral tablet: 1 tab(s) orally once a day (14 Feb 2023 02:08)  SITagliptin 50 mg oral tablet: 1 tab(s) orally once a day (16 Dec 2023 03:34)  tamsulosin 0.4 mg oral capsule: 2 cap(s) orally once a day (at bedtime) (16 Dec 2023 03:29)  valsartan 320 mg oral tablet: 1 tab(s) orally once a day (14 Feb 2023 02:08)    Allergies  Allergies  [This allergen will not trigger allergy alert] pollen (Unknown)  No Known Drug Allergies  Intolerances       Current Medications:  acetaminophen     Tablet .. 650 milliGRAM(s) Oral every 6 hours  albuterol/ipratropium for Nebulization 3 milliLiter(s) Nebulizer every 4 hours  amLODIPine   Tablet 10 milliGRAM(s) Oral daily  aspirin  chewable 81 milliGRAM(s) Enteral Tube daily  atorvastatin 20 milliGRAM(s) Oral at bedtime  bacitracin   Ointment 1 Application(s) Topical every 12 hours  calcium acetate 667 milliGRAM(s) Oral three times a day with meals  chlorhexidine 0.12% Liquid 15 milliLiter(s) Oral Mucosa two times a day  chlorhexidine 2% Cloths 1 Application(s) Topical <User Schedule>  dexMEDEtomidine Infusion 0.3 MICROgram(s)/kG/Hr (4.63 mL/Hr) IV Continuous <Continuous>  doxazosin 4 milliGRAM(s) Oral at bedtime  heparin   Injectable 5000 Unit(s) SubCutaneous every 8 hours  insulin glargine Injectable (LANTUS) 15 Unit(s) SubCutaneous at bedtime  insulin lispro (ADMELOG) corrective regimen sliding scale   SubCutaneous every 4 hours  pantoprazole  Injectable 40 milliGRAM(s) IV Push every 24 hours  piperacillin/tazobactam IVPB.. 3.375 Gram(s) IV Intermittent every 8 hours  valproic  acid Syrup 500 milliGRAM(s) Oral every 12 hours      Vital Sings, Intake/Output (Last 24Hours)  ICU Vital Signs Last 24 Hrs  T(C): 37.5 (24 Dec 2023 00:00), Max: 38.2 (23 Dec 2023 13:00)  T(F): 99.5 (24 Dec 2023 00:00), Max: 100.8 (23 Dec 2023 13:00)  HR: 83 (24 Dec 2023 00:00) (68 - 90)  BP: 156/67 (24 Dec 2023 00:00) (94/54 - 178/74)  BP(mean): 96 (24 Dec 2023 00:00) (72 - 107)  ABP: 150/40 (24 Dec 2023 00:00) (95/26 - 172/43)  ABP(mean): 67 (24 Dec 2023 00:00) (45 - 76)  RR: 23 (24 Dec 2023 00:00) (17 - 32)  SpO2: 100% (24 Dec 2023 00:00) (97% - 100%)    O2 Parameters below as of 24 Dec 2023 00:00  Patient On (Oxygen Delivery Method): ventilator    O2 Concentration (%): 40      I&O's Summary    22 Dec 2023 07:01  -  23 Dec 2023 07:00  --------------------------------------------------------  IN: 4408.5 mL / OUT: 1405 mL / NET: 3003.5 mL    23 Dec 2023 07:01  -  24 Dec 2023 00:42  --------------------------------------------------------  IN: 2960 mL / OUT: 1450 mL / NET: 1510 mL        Physical Exam:  ----------------------------------------------------------------------------------------------------------  ***    Tubes/Lines/Drains   ----------------------------------------------------------------------------------------------------------  [x] Peripheral IV  [ ] Urinary Catheter Martinez                                               [ ] Central Venous Line                   [ ] Arterial Line		       JACQUELIN CAI   [id number]   [birth year]  78yM    Admit Date: 12-16-23  Indication for SDU/SICU: Q1 neuro checks        ============================      OR Stats  OR Time:               IV Fluids:                    EBL:                       Blood Products:                     UOP:      Findings-    24 Hour Events  12/23  PM  -TF at goal 55cc/hr  -Tracheal aspirate growing GPCC in pairs and staph aureus from 12/22, same as 12/21  -Tmax 99.5; WBC 13.2 from 13.7    AM  -SAT/SBT   -F/u ID consult for recurrent fevers/+tracheal aspirate   -F/u hyperphosphatemia / nephro?   -D/c martinez --> TOV passed  -Phoslo started  -F/u placement   -Spoke with social work, starting to work on d/c to vemt facility   -BCx no growth final  -started 5mg amlodopine for SBP 160s persistently > improved to 130s   -10 labetalol IVP x1 for  after pain meds      [X] A ten-point review of systems was otherwise negative except as noted above.  [  ] Due to altered mental status/intubation, subjective information was not attained from the patient. History was obtained, to the extent possible, from review of the chart and collateral sources of information.    ====================================================================================================================================================================================================  Daily     Daily     Diet, NPO with Tube Feed:   Tube Feeding Modality: Gastrostomy  Glucerna 1.2 Tonio  Total Volume for 24 Hours (mL): 1320  Continuous  Starting Tube Feed Rate mL per Hour: 20  Increase Tube Feed Rate by (mL): 20     Every 2 hours  Until Goal Tube Feed Rate (mL per Hour): 55  Tube Feed Duration (in Hours): 24  Tube Feed Start Time: 00:00   Frequency: Every 4 Hours (12-24-23 @ 10:46)      CURRENT MEDS:  Neurologic Medications  acetaminophen     Tablet .. 650 milliGRAM(s) Oral every 6 hours  valproic  acid Syrup 500 milliGRAM(s) Oral every 12 hours    Respiratory Medications  albuterol    90 MICROgram(s) HFA Inhaler 90 Puff(s) Inhalation every 4 hours    Cardiovascular Medications  amLODIPine   Tablet 10 milliGRAM(s) Oral daily  doxazosin 4 milliGRAM(s) Oral at bedtime  furosemide   Injectable 20 milliGRAM(s) IV Push once    Gastrointestinal Medications  calcium acetate 667 milliGRAM(s) Oral three times a day with meals  pantoprazole  Injectable 40 milliGRAM(s) IV Push every 24 hours    Genitourinary Medications    Hematologic/Oncologic Medications  aspirin  chewable 81 milliGRAM(s) Enteral Tube daily  heparin   Injectable 5000 Unit(s) SubCutaneous every 8 hours    Antimicrobial/Immunologic Medications  ceFAZolin   IVPB 2000 milliGRAM(s) IV Intermittent once  ceFAZolin   IVPB      ceFAZolin   IVPB 2000 milliGRAM(s) IV Intermittent every 8 hours    Endocrine/Metabolic Medications  atorvastatin 20 milliGRAM(s) Oral at bedtime  insulin glargine Injectable (LANTUS) 15 Unit(s) SubCutaneous at bedtime  insulin lispro (ADMELOG) corrective regimen sliding scale   SubCutaneous every 4 hours    Topical/Other Medications  bacitracin   Ointment 1 Application(s) Topical every 12 hours  chlorhexidine 0.12% Liquid 15 milliLiter(s) Oral Mucosa two times a day  chlorhexidine 2% Cloths 1 Application(s) Topical <User Schedule>      ICU Vital Signs Last 24 Hrs  T(C): 37.9 (24 Dec 2023 08:00), Max: 38.5 (24 Dec 2023 04:00)  T(F): 100.2 (24 Dec 2023 08:00), Max: 101.3 (24 Dec 2023 04:00)  HR: 86 (24 Dec 2023 09:00) (80 - 93)  BP: 140/63 (24 Dec 2023 09:00) (140/60 - 178/74)  BP(mean): 91 (24 Dec 2023 09:00) (87 - 107)  ABP: 128/38 (24 Dec 2023 09:00) (121/35 - 172/43)  ABP(mean): 61 (24 Dec 2023 09:00) (57 - 74)  RR: 14 (24 Dec 2023 09:00) (14 - 31)  SpO2: 98% (24 Dec 2023 09:00) (98% - 100%)    O2 Parameters below as of 24 Dec 2023 09:00  Patient On (Oxygen Delivery Method): ventilator    O2 Concentration (%): 40        Adult Advanced Hemodynamics Last 24 Hrs  CVP(mm Hg): --  CVP(cm H2O): --  CO: --  CI: --  PA: --  PA(mean): --  PCWP: --  SVR: --  SVRI: --  PVR: --  PVRI: --    Mode: AC/ CMV (Assist Control/ Continuous Mandatory Ventilation)  RR (machine): 14  TV (machine): 350  FiO2: 40  PEEP: 5  ITime: 1  MAP: 10  PIP: 15    ABG - ( 24 Dec 2023 05:12 )  pH, Arterial: 7.42  pH, Blood: x     /  pCO2: 30    /  pO2: 125   / HCO3: 20    / Base Excess: -4.3  /  SaO2: 100.0               I&O's Summary    23 Dec 2023 07:01  -  24 Dec 2023 07:00  --------------------------------------------------------  IN: 3845 mL / OUT: 2150 mL / NET: 1695 mL    24 Dec 2023 07:01  -  24 Dec 2023 10:47  --------------------------------------------------------  IN: 55 mL / OUT: 50 mL / NET: 5 mL      I&O's Detail    23 Dec 2023 07:01  -  24 Dec 2023 07:00  --------------------------------------------------------  IN:    Enteral Tube Flush: 800 mL    Glucerna: 1045 mL    IV PiggyBack: 200 mL    IV PiggyBack: 400 mL    sodium chloride 0.45%: 1400 mL  Total IN: 3845 mL    OUT:    Indwelling Catheter - Urethral (mL): 260 mL    Rectal Tube (mL): 890 mL    Voided (mL): 1000 mL  Total OUT: 2150 mL    Total NET: 1695 mL      24 Dec 2023 07:01  -  24 Dec 2023 10:47  --------------------------------------------------------  IN:    Glucerna: 55 mL  Total IN: 55 mL    OUT:    Voided (mL): 50 mL  Total OUT: 50 mL    Total NET: 5 mL          PHYSICAL EXAM:    General/Neuro:   RASS: 0            GCS: 9T = E4/V1T/M4      Alert & oriented unable to be assessed s/p trach - nonverbal and does not use non-verbal gestures   Off precedex   Does not follow commands w/    Pupils: PERRL    Lungs:   Cuffed tracheostomy, size 9, on AC//14/40/5 > trialing PS   CTAB, Normal expansion/effort.     Cardiovascular : S1, S2. RRR. Minimal peripheral edema   Cardiac Rhythm: Normal Sinus Rhythm    GI: Abdomen soft, Non-tender, minimal distention.   20Fr PEG, 2cm at skin, dressing C/D/I   TF at goal 55cc/hr   Dignishield placed for loose stool    Extremities: Extremities warm, pink, well-perfused.     Derm: Good skin turgor, no skin breakdown.      : Condom catheter        CXR:       LABS:  CAPILLARY BLOOD GLUCOSE      POCT Blood Glucose.: 196 mg/dL (24 Dec 2023 10:38)  POCT Blood Glucose.: 192 mg/dL (24 Dec 2023 06:09)  POCT Blood Glucose.: 169 mg/dL (24 Dec 2023 02:57)  POCT Blood Glucose.: 182 mg/dL (23 Dec 2023 21:53)  POCT Blood Glucose.: 182 mg/dL (23 Dec 2023 17:53)  POCT Blood Glucose.: 194 mg/dL (23 Dec 2023 14:52)                          7.4    12.77 )-----------( 304      ( 24 Dec 2023 05:22 )             23.4       12-23    140  |  113<H>  |  52<H>  ----------------------------<  160<H>  3.1<L>   |  16<L>  |  2.3<H>    Ca    6.1<L>      23 Dec 2023 22:45  Phos  3.9     12-23  Mg     2.4     12-23              Urinalysis Basic - ( 23 Dec 2023 22:45 )    Color: x / Appearance: x / SG: x / pH: x  Gluc: 160 mg/dL / Ketone: x  / Bili: x / Urobili: x   Blood: x / Protein: x / Nitrite: x   Leuk Esterase: x / RBC: x / WBC x   Sq Epi: x / Non Sq Epi: x / Bacteria: x        Culture - Sputum (collected 22 Dec 2023 10:40)  Source: Trach Asp Tracheal Aspirate  Gram Stain (23 Dec 2023 03:44):    Moderate polymorphonuclear leukocytes per low power field    No Squamous epithelial cells per low power field    Moderate Gram positive cocci in pairs per oil power field  Preliminary Report (23 Dec 2023 18:57):    Numerous Staphylococcus aureus    Culture - Blood (collected 21 Dec 2023 18:19)  Source: .Blood Blood-Peripheral  Preliminary Report (24 Dec 2023 01:02):    No growth at 48 Hours    Culture - Blood (collected 21 Dec 2023 18:19)  Source: .Blood Blood-Peripheral  Preliminary Report (24 Dec 2023 01:02):    No growth at 48 Hours    Culture - Sputum (collected 21 Dec 2023 18:14)  Source: ET Tube ET Tube  Gram Stain (22 Dec 2023 05:32):    Moderate polymorphonuclear leukocytes per low power field    No Squamous epithelial cells per low power field    Moderate Gram positive cocci in pairs seen per oil power field  Final Report (23 Dec 2023 17:27):    Numerous Staphylococcus aureus    Normal Respiratory Fany absent  Organism: Staphylococcus aureus (23 Dec 2023 17:27)  Organism: Staphylococcus aureus (23 Dec 2023 17:27)             JACQUELIN CAI   [id number]   [birth year]  78yM    Admit Date: 12-16-23  Indication for SDU/SICU: Q1 neuro checks        ============================      OR Stats  OR Time:               IV Fluids:                    EBL:                       Blood Products:                     UOP:      Findings-    24 Hour Events  12/23  PM  -TF at goal 55cc/hr  -Tracheal aspirate growing GPCC in pairs and staph aureus from 12/22, same as 12/21  -Tmax 99.5; WBC 13.2 from 13.7    AM  -SAT/SBT   -F/u ID consult for recurrent fevers/+tracheal aspirate   -F/u hyperphosphatemia / nephro?   -D/c martinez --> TOV passed  -Phoslo started  -F/u placement   -Spoke with social work, starting to work on d/c to vemt facility   -BCx no growth final  -started 5mg amlodopine for SBP 160s persistently > improved to 130s   -10 labetalol IVP x1 for  after pain meds      [X] A ten-point review of systems was otherwise negative except as noted above.  [  ] Due to altered mental status/intubation, subjective information was not attained from the patient. History was obtained, to the extent possible, from review of the chart and collateral sources of information.    ====================================================================================================================================================================================================  Daily     Daily     Diet, NPO with Tube Feed:   Tube Feeding Modality: Gastrostomy  Glucerna 1.2 Tonio  Total Volume for 24 Hours (mL): 1320  Continuous  Starting Tube Feed Rate mL per Hour: 20  Increase Tube Feed Rate by (mL): 20     Every 2 hours  Until Goal Tube Feed Rate (mL per Hour): 55  Tube Feed Duration (in Hours): 24  Tube Feed Start Time: 00:00   Frequency: Every 4 Hours (12-24-23 @ 10:46)      CURRENT MEDS:  Neurologic Medications  acetaminophen     Tablet .. 650 milliGRAM(s) Oral every 6 hours  valproic  acid Syrup 500 milliGRAM(s) Oral every 12 hours    Respiratory Medications  albuterol    90 MICROgram(s) HFA Inhaler 90 Puff(s) Inhalation every 4 hours    Cardiovascular Medications  amLODIPine   Tablet 10 milliGRAM(s) Oral daily  doxazosin 4 milliGRAM(s) Oral at bedtime  furosemide   Injectable 20 milliGRAM(s) IV Push once    Gastrointestinal Medications  calcium acetate 667 milliGRAM(s) Oral three times a day with meals  pantoprazole  Injectable 40 milliGRAM(s) IV Push every 24 hours    Genitourinary Medications    Hematologic/Oncologic Medications  aspirin  chewable 81 milliGRAM(s) Enteral Tube daily  heparin   Injectable 5000 Unit(s) SubCutaneous every 8 hours    Antimicrobial/Immunologic Medications  ceFAZolin   IVPB 2000 milliGRAM(s) IV Intermittent once  ceFAZolin   IVPB      ceFAZolin   IVPB 2000 milliGRAM(s) IV Intermittent every 8 hours    Endocrine/Metabolic Medications  atorvastatin 20 milliGRAM(s) Oral at bedtime  insulin glargine Injectable (LANTUS) 15 Unit(s) SubCutaneous at bedtime  insulin lispro (ADMELOG) corrective regimen sliding scale   SubCutaneous every 4 hours    Topical/Other Medications  bacitracin   Ointment 1 Application(s) Topical every 12 hours  chlorhexidine 0.12% Liquid 15 milliLiter(s) Oral Mucosa two times a day  chlorhexidine 2% Cloths 1 Application(s) Topical <User Schedule>      ICU Vital Signs Last 24 Hrs  T(C): 37.9 (24 Dec 2023 08:00), Max: 38.5 (24 Dec 2023 04:00)  T(F): 100.2 (24 Dec 2023 08:00), Max: 101.3 (24 Dec 2023 04:00)  HR: 86 (24 Dec 2023 09:00) (80 - 93)  BP: 140/63 (24 Dec 2023 09:00) (140/60 - 178/74)  BP(mean): 91 (24 Dec 2023 09:00) (87 - 107)  ABP: 128/38 (24 Dec 2023 09:00) (121/35 - 172/43)  ABP(mean): 61 (24 Dec 2023 09:00) (57 - 74)  RR: 14 (24 Dec 2023 09:00) (14 - 31)  SpO2: 98% (24 Dec 2023 09:00) (98% - 100%)    O2 Parameters below as of 24 Dec 2023 09:00  Patient On (Oxygen Delivery Method): ventilator    O2 Concentration (%): 40        Adult Advanced Hemodynamics Last 24 Hrs  CVP(mm Hg): --  CVP(cm H2O): --  CO: --  CI: --  PA: --  PA(mean): --  PCWP: --  SVR: --  SVRI: --  PVR: --  PVRI: --    Mode: AC/ CMV (Assist Control/ Continuous Mandatory Ventilation)  RR (machine): 14  TV (machine): 350  FiO2: 40  PEEP: 5  ITime: 1  MAP: 10  PIP: 15    ABG - ( 24 Dec 2023 05:12 )  pH, Arterial: 7.42  pH, Blood: x     /  pCO2: 30    /  pO2: 125   / HCO3: 20    / Base Excess: -4.3  /  SaO2: 100.0               I&O's Summary    23 Dec 2023 07:01  -  24 Dec 2023 07:00  --------------------------------------------------------  IN: 3845 mL / OUT: 2150 mL / NET: 1695 mL    24 Dec 2023 07:01  -  24 Dec 2023 10:47  --------------------------------------------------------  IN: 55 mL / OUT: 50 mL / NET: 5 mL      I&O's Detail    23 Dec 2023 07:01  -  24 Dec 2023 07:00  --------------------------------------------------------  IN:    Enteral Tube Flush: 800 mL    Glucerna: 1045 mL    IV PiggyBack: 200 mL    IV PiggyBack: 400 mL    sodium chloride 0.45%: 1400 mL  Total IN: 3845 mL    OUT:    Indwelling Catheter - Urethral (mL): 260 mL    Rectal Tube (mL): 890 mL    Voided (mL): 1000 mL  Total OUT: 2150 mL    Total NET: 1695 mL      24 Dec 2023 07:01  -  24 Dec 2023 10:47  --------------------------------------------------------  IN:    Glucerna: 55 mL  Total IN: 55 mL    OUT:    Voided (mL): 50 mL  Total OUT: 50 mL    Total NET: 5 mL          PHYSICAL EXAM:    General/Neuro:   RASS: 0            GCS: 9T = E4/V1T/M4      Alert & oriented unable to be assessed s/p trach - nonverbal and does not use non-verbal gestures   Off precedex   Does not follow commands w/    Pupils: PERRL    Lungs:   Cuffed tracheostomy, size 9, on AC//14/40/5 > trialing PS   CTAB, Normal expansion/effort.     Cardiovascular : S1, S2. RRR. Minimal peripheral edema   Cardiac Rhythm: Normal Sinus Rhythm    GI: Abdomen soft, Non-tender, minimal distention.   20Fr PEG, 2cm at skin, dressing C/D/I   TF at goal 55cc/hr   Dignishield placed for loose stool    Extremities: Extremities warm, pink, well-perfused.     Derm: Good skin turgor, no skin breakdown.      : Condom catheter        CXR:       LABS:  CAPILLARY BLOOD GLUCOSE      POCT Blood Glucose.: 196 mg/dL (24 Dec 2023 10:38)  POCT Blood Glucose.: 192 mg/dL (24 Dec 2023 06:09)  POCT Blood Glucose.: 169 mg/dL (24 Dec 2023 02:57)  POCT Blood Glucose.: 182 mg/dL (23 Dec 2023 21:53)  POCT Blood Glucose.: 182 mg/dL (23 Dec 2023 17:53)  POCT Blood Glucose.: 194 mg/dL (23 Dec 2023 14:52)                          7.4    12.77 )-----------( 304      ( 24 Dec 2023 05:22 )             23.4       12-23    140  |  113<H>  |  52<H>  ----------------------------<  160<H>  3.1<L>   |  16<L>  |  2.3<H>    Ca    6.1<L>      23 Dec 2023 22:45  Phos  3.9     12-23  Mg     2.4     12-23              Urinalysis Basic - ( 23 Dec 2023 22:45 )    Color: x / Appearance: x / SG: x / pH: x  Gluc: 160 mg/dL / Ketone: x  / Bili: x / Urobili: x   Blood: x / Protein: x / Nitrite: x   Leuk Esterase: x / RBC: x / WBC x   Sq Epi: x / Non Sq Epi: x / Bacteria: x        Culture - Sputum (collected 22 Dec 2023 10:40)  Source: Trach Asp Tracheal Aspirate  Gram Stain (23 Dec 2023 03:44):    Moderate polymorphonuclear leukocytes per low power field    No Squamous epithelial cells per low power field    Moderate Gram positive cocci in pairs per oil power field  Preliminary Report (23 Dec 2023 18:57):    Numerous Staphylococcus aureus    Culture - Blood (collected 21 Dec 2023 18:19)  Source: .Blood Blood-Peripheral  Preliminary Report (24 Dec 2023 01:02):    No growth at 48 Hours    Culture - Blood (collected 21 Dec 2023 18:19)  Source: .Blood Blood-Peripheral  Preliminary Report (24 Dec 2023 01:02):    No growth at 48 Hours    Culture - Sputum (collected 21 Dec 2023 18:14)  Source: ET Tube ET Tube  Gram Stain (22 Dec 2023 05:32):    Moderate polymorphonuclear leukocytes per low power field    No Squamous epithelial cells per low power field    Moderate Gram positive cocci in pairs seen per oil power field  Final Report (23 Dec 2023 17:27):    Numerous Staphylococcus aureus    Normal Respiratory Fany absent  Organism: Staphylococcus aureus (23 Dec 2023 17:27)  Organism: Staphylococcus aureus (23 Dec 2023 17:27)             JACQUELIN CAI   [id number]   [birth year]  78yM    Admit Date: 12-16-23  Indication for SDU/SICU: Q1 neuro checks        ============================      OR Stats  OR Time:               IV Fluids:                    EBL:                       Blood Products:                     UOP:      Findings-    24 Hour Events  12/23  PM  -TF at goal 55cc/hr  -Tracheal aspirate growing GPCC in pairs and staph aureus from 12/22, same as 12/21  -Tmax 99.5; WBC 13.2 from 13.7    AM  -SAT/SBT   -F/u ID consult for recurrent fevers/+tracheal aspirate   -F/u hyperphosphatemia / nephro?   -D/c martinez --> TOV passed  -Phoslo started  -F/u placement   -Spoke with social work, starting to work on d/c to vemt facility   -BCx no growth final  -started 5mg amlodopine for SBP 160s persistently > improved to 130s   -10 labetalol IVP x1 for  after pain meds      [X] A ten-point review of systems was otherwise negative except as noted above.  [  ] Due to altered mental status/intubation, subjective information was not attained from the patient. History was obtained, to the extent possible, from review of the chart and collateral sources of information.    ====================================================================================================================================================================================================  Daily     Daily     Diet, NPO with Tube Feed:   Tube Feeding Modality: Gastrostomy  Glucerna 1.2 Tonio  Total Volume for 24 Hours (mL): 1320  Continuous  Starting Tube Feed Rate mL per Hour: 20  Increase Tube Feed Rate by (mL): 20     Every 2 hours  Until Goal Tube Feed Rate (mL per Hour): 55  Tube Feed Duration (in Hours): 24  Tube Feed Start Time: 00:00   Frequency: Every 4 Hours (12-24-23 @ 10:46)      CURRENT MEDS:  Neurologic Medications  acetaminophen     Tablet .. 650 milliGRAM(s) Oral every 6 hours  valproic  acid Syrup 500 milliGRAM(s) Oral every 12 hours    Respiratory Medications  albuterol    90 MICROgram(s) HFA Inhaler 90 Puff(s) Inhalation every 4 hours    Cardiovascular Medications  amLODIPine   Tablet 10 milliGRAM(s) Oral daily  doxazosin 4 milliGRAM(s) Oral at bedtime  furosemide   Injectable 20 milliGRAM(s) IV Push once    Gastrointestinal Medications  calcium acetate 667 milliGRAM(s) Oral three times a day with meals  pantoprazole  Injectable 40 milliGRAM(s) IV Push every 24 hours    Genitourinary Medications    Hematologic/Oncologic Medications  aspirin  chewable 81 milliGRAM(s) Enteral Tube daily  heparin   Injectable 5000 Unit(s) SubCutaneous every 8 hours    Antimicrobial/Immunologic Medications  ceFAZolin   IVPB 2000 milliGRAM(s) IV Intermittent once  ceFAZolin   IVPB      ceFAZolin   IVPB 2000 milliGRAM(s) IV Intermittent every 8 hours    Endocrine/Metabolic Medications  atorvastatin 20 milliGRAM(s) Oral at bedtime  insulin glargine Injectable (LANTUS) 15 Unit(s) SubCutaneous at bedtime  insulin lispro (ADMELOG) corrective regimen sliding scale   SubCutaneous every 4 hours    Topical/Other Medications  bacitracin   Ointment 1 Application(s) Topical every 12 hours  chlorhexidine 0.12% Liquid 15 milliLiter(s) Oral Mucosa two times a day  chlorhexidine 2% Cloths 1 Application(s) Topical <User Schedule>      ICU Vital Signs Last 24 Hrs  T(C): 37.9 (24 Dec 2023 08:00), Max: 38.5 (24 Dec 2023 04:00)  T(F): 100.2 (24 Dec 2023 08:00), Max: 101.3 (24 Dec 2023 04:00)  HR: 86 (24 Dec 2023 09:00) (80 - 93)  BP: 140/63 (24 Dec 2023 09:00) (140/60 - 178/74)  BP(mean): 91 (24 Dec 2023 09:00) (87 - 107)  ABP: 128/38 (24 Dec 2023 09:00) (121/35 - 172/43)  ABP(mean): 61 (24 Dec 2023 09:00) (57 - 74)  RR: 14 (24 Dec 2023 09:00) (14 - 31)  SpO2: 98% (24 Dec 2023 09:00) (98% - 100%)    O2 Parameters below as of 24 Dec 2023 09:00  Patient On (Oxygen Delivery Method): ventilator    O2 Concentration (%): 40        Adult Advanced Hemodynamics Last 24 Hrs  CVP(mm Hg): --  CVP(cm H2O): --  CO: --  CI: --  PA: --  PA(mean): --  PCWP: --  SVR: --  SVRI: --  PVR: --  PVRI: --    Mode: AC/ CMV (Assist Control/ Continuous Mandatory Ventilation)  RR (machine): 14  TV (machine): 350  FiO2: 40  PEEP: 5  ITime: 1  MAP: 10  PIP: 15    ABG - ( 24 Dec 2023 05:12 )  pH, Arterial: 7.42  pH, Blood: x     /  pCO2: 30    /  pO2: 125   / HCO3: 20    / Base Excess: -4.3  /  SaO2: 100.0               I&O's Summary    23 Dec 2023 07:01  -  24 Dec 2023 07:00  --------------------------------------------------------  IN: 3845 mL / OUT: 2150 mL / NET: 1695 mL    24 Dec 2023 07:01  -  24 Dec 2023 10:47  --------------------------------------------------------  IN: 55 mL / OUT: 50 mL / NET: 5 mL      I&O's Detail    23 Dec 2023 07:01  -  24 Dec 2023 07:00  --------------------------------------------------------  IN:    Enteral Tube Flush: 800 mL    Glucerna: 1045 mL    IV PiggyBack: 200 mL    IV PiggyBack: 400 mL    sodium chloride 0.45%: 1400 mL  Total IN: 3845 mL    OUT:    Indwelling Catheter - Urethral (mL): 260 mL    Rectal Tube (mL): 890 mL    Voided (mL): 1000 mL  Total OUT: 2150 mL    Total NET: 1695 mL      24 Dec 2023 07:01  -  24 Dec 2023 10:47  --------------------------------------------------------  IN:    Glucerna: 55 mL  Total IN: 55 mL    OUT:    Voided (mL): 50 mL  Total OUT: 50 mL    Total NET: 5 mL          PHYSICAL EXAM:    General/Neuro: Utilized  services #[id number]  RASS: 0            GCS: 9T = E4/V1T/M4      Alert & oriented unable to be assessed s/p trach - nonverbal and does not use non-verbal gestures   Off precedex   Does not follow commands w/    Pupils: PERRL    Lungs:   Cuffed tracheostomy, size 9, on AC//14/40/5 > trialing PS   CTAB, Normal expansion/effort.     Cardiovascular : S1, S2. RRR. Minimal peripheral edema   Cardiac Rhythm: Normal Sinus Rhythm    GI: Abdomen soft, Non-tender, minimal distention.   20Fr PEG, 2cm at skin, dressing C/D/I   TF at goal 55cc/hr   Dignishield placed for loose stool    Extremities: Extremities warm, pink, well-perfused.     Derm: Good skin turgor, no skin breakdown.      : Condom catheter        CXR:       LABS:  CAPILLARY BLOOD GLUCOSE      POCT Blood Glucose.: 196 mg/dL (24 Dec 2023 10:38)  POCT Blood Glucose.: 192 mg/dL (24 Dec 2023 06:09)  POCT Blood Glucose.: 169 mg/dL (24 Dec 2023 02:57)  POCT Blood Glucose.: 182 mg/dL (23 Dec 2023 21:53)  POCT Blood Glucose.: 182 mg/dL (23 Dec 2023 17:53)  POCT Blood Glucose.: 194 mg/dL (23 Dec 2023 14:52)                          7.4    12.77 )-----------( 304      ( 24 Dec 2023 05:22 )             23.4       12-23    140  |  113<H>  |  52<H>  ----------------------------<  160<H>  3.1<L>   |  16<L>  |  2.3<H>    Ca    6.1<L>      23 Dec 2023 22:45  Phos  3.9     12-23  Mg     2.4     12-23              Urinalysis Basic - ( 23 Dec 2023 22:45 )    Color: x / Appearance: x / SG: x / pH: x  Gluc: 160 mg/dL / Ketone: x  / Bili: x / Urobili: x   Blood: x / Protein: x / Nitrite: x   Leuk Esterase: x / RBC: x / WBC x   Sq Epi: x / Non Sq Epi: x / Bacteria: x        Culture - Sputum (collected 22 Dec 2023 10:40)  Source: Trach Asp Tracheal Aspirate  Gram Stain (23 Dec 2023 03:44):    Moderate polymorphonuclear leukocytes per low power field    No Squamous epithelial cells per low power field    Moderate Gram positive cocci in pairs per oil power field  Preliminary Report (23 Dec 2023 18:57):    Numerous Staphylococcus aureus    Culture - Blood (collected 21 Dec 2023 18:19)  Source: .Blood Blood-Peripheral  Preliminary Report (24 Dec 2023 01:02):    No growth at 48 Hours    Culture - Blood (collected 21 Dec 2023 18:19)  Source: .Blood Blood-Peripheral  Preliminary Report (24 Dec 2023 01:02):    No growth at 48 Hours    Culture - Sputum (collected 21 Dec 2023 18:14)  Source: ET Tube ET Tube  Gram Stain (22 Dec 2023 05:32):    Moderate polymorphonuclear leukocytes per low power field    No Squamous epithelial cells per low power field    Moderate Gram positive cocci in pairs seen per oil power field  Final Report (23 Dec 2023 17:27):    Numerous Staphylococcus aureus    Normal Respiratory Fany absent  Organism: Staphylococcus aureus (23 Dec 2023 17:27)  Organism: Staphylococcus aureus (23 Dec 2023 17:27)             JACQUELIN CAI   [id number]   [birth year]  78yM    Admit Date: 12-16-23  Indication for SDU/SICU: Q1 neuro checks        ============================      OR Stats  OR Time:               IV Fluids:                    EBL:                       Blood Products:                     UOP:      Findings-    24 Hour Events  12/23  PM  -TF at goal 55cc/hr  -Tracheal aspirate growing GPCC in pairs and staph aureus from 12/22, same as 12/21  -Tmax 99.5; WBC 13.2 from 13.7    AM  -SAT/SBT   -F/u ID consult for recurrent fevers/+tracheal aspirate   -F/u hyperphosphatemia / nephro?   -D/c martinez --> TOV passed  -Phoslo started  -F/u placement   -Spoke with social work, starting to work on d/c to vemt facility   -BCx no growth final  -started 5mg amlodopine for SBP 160s persistently > improved to 130s   -10 labetalol IVP x1 for  after pain meds      [X] A ten-point review of systems was otherwise negative except as noted above.  [  ] Due to altered mental status/intubation, subjective information was not attained from the patient. History was obtained, to the extent possible, from review of the chart and collateral sources of information.    ====================================================================================================================================================================================================  Daily     Daily     Diet, NPO with Tube Feed:   Tube Feeding Modality: Gastrostomy  Glucerna 1.2 Tonio  Total Volume for 24 Hours (mL): 1320  Continuous  Starting Tube Feed Rate mL per Hour: 20  Increase Tube Feed Rate by (mL): 20     Every 2 hours  Until Goal Tube Feed Rate (mL per Hour): 55  Tube Feed Duration (in Hours): 24  Tube Feed Start Time: 00:00   Frequency: Every 4 Hours (12-24-23 @ 10:46)      CURRENT MEDS:  Neurologic Medications  acetaminophen     Tablet .. 650 milliGRAM(s) Oral every 6 hours  valproic  acid Syrup 500 milliGRAM(s) Oral every 12 hours    Respiratory Medications  albuterol    90 MICROgram(s) HFA Inhaler 90 Puff(s) Inhalation every 4 hours    Cardiovascular Medications  amLODIPine   Tablet 10 milliGRAM(s) Oral daily  doxazosin 4 milliGRAM(s) Oral at bedtime  furosemide   Injectable 20 milliGRAM(s) IV Push once    Gastrointestinal Medications  calcium acetate 667 milliGRAM(s) Oral three times a day with meals  pantoprazole  Injectable 40 milliGRAM(s) IV Push every 24 hours    Genitourinary Medications    Hematologic/Oncologic Medications  aspirin  chewable 81 milliGRAM(s) Enteral Tube daily  heparin   Injectable 5000 Unit(s) SubCutaneous every 8 hours    Antimicrobial/Immunologic Medications  ceFAZolin   IVPB 2000 milliGRAM(s) IV Intermittent once  ceFAZolin   IVPB      ceFAZolin   IVPB 2000 milliGRAM(s) IV Intermittent every 8 hours    Endocrine/Metabolic Medications  atorvastatin 20 milliGRAM(s) Oral at bedtime  insulin glargine Injectable (LANTUS) 15 Unit(s) SubCutaneous at bedtime  insulin lispro (ADMELOG) corrective regimen sliding scale   SubCutaneous every 4 hours    Topical/Other Medications  bacitracin   Ointment 1 Application(s) Topical every 12 hours  chlorhexidine 0.12% Liquid 15 milliLiter(s) Oral Mucosa two times a day  chlorhexidine 2% Cloths 1 Application(s) Topical <User Schedule>      ICU Vital Signs Last 24 Hrs  T(C): 37.9 (24 Dec 2023 08:00), Max: 38.5 (24 Dec 2023 04:00)  T(F): 100.2 (24 Dec 2023 08:00), Max: 101.3 (24 Dec 2023 04:00)  HR: 86 (24 Dec 2023 09:00) (80 - 93)  BP: 140/63 (24 Dec 2023 09:00) (140/60 - 178/74)  BP(mean): 91 (24 Dec 2023 09:00) (87 - 107)  ABP: 128/38 (24 Dec 2023 09:00) (121/35 - 172/43)  ABP(mean): 61 (24 Dec 2023 09:00) (57 - 74)  RR: 14 (24 Dec 2023 09:00) (14 - 31)  SpO2: 98% (24 Dec 2023 09:00) (98% - 100%)    O2 Parameters below as of 24 Dec 2023 09:00  Patient On (Oxygen Delivery Method): ventilator    O2 Concentration (%): 40        Adult Advanced Hemodynamics Last 24 Hrs  CVP(mm Hg): --  CVP(cm H2O): --  CO: --  CI: --  PA: --  PA(mean): --  PCWP: --  SVR: --  SVRI: --  PVR: --  PVRI: --    Mode: AC/ CMV (Assist Control/ Continuous Mandatory Ventilation)  RR (machine): 14  TV (machine): 350  FiO2: 40  PEEP: 5  ITime: 1  MAP: 10  PIP: 15    ABG - ( 24 Dec 2023 05:12 )  pH, Arterial: 7.42  pH, Blood: x     /  pCO2: 30    /  pO2: 125   / HCO3: 20    / Base Excess: -4.3  /  SaO2: 100.0               I&O's Summary    23 Dec 2023 07:01  -  24 Dec 2023 07:00  --------------------------------------------------------  IN: 3845 mL / OUT: 2150 mL / NET: 1695 mL    24 Dec 2023 07:01  -  24 Dec 2023 10:47  --------------------------------------------------------  IN: 55 mL / OUT: 50 mL / NET: 5 mL      I&O's Detail    23 Dec 2023 07:01  -  24 Dec 2023 07:00  --------------------------------------------------------  IN:    Enteral Tube Flush: 800 mL    Glucerna: 1045 mL    IV PiggyBack: 200 mL    IV PiggyBack: 400 mL    sodium chloride 0.45%: 1400 mL  Total IN: 3845 mL    OUT:    Indwelling Catheter - Urethral (mL): 260 mL    Rectal Tube (mL): 890 mL    Voided (mL): 1000 mL  Total OUT: 2150 mL    Total NET: 1695 mL      24 Dec 2023 07:01  -  24 Dec 2023 10:47  --------------------------------------------------------  IN:    Glucerna: 55 mL  Total IN: 55 mL    OUT:    Voided (mL): 50 mL  Total OUT: 50 mL    Total NET: 5 mL          PHYSICAL EXAM:    General/Neuro: Utilized  services #[id number]  RASS: 0            GCS: 9T = E4/V1T/M4      Alert & oriented unable to be assessed s/p trach - nonverbal and does not use non-verbal gestures   Off precedex   Does not follow commands w/    Pupils: PERRL    Lungs:   Cuffed tracheostomy, size 9, on AC//14/40/5 > trialing PS   CTAB, Normal expansion/effort.     Cardiovascular : S1, S2. RRR. Minimal peripheral edema   Cardiac Rhythm: Normal Sinus Rhythm    GI: Abdomen soft, Non-tender, minimal distention.   20Fr PEG, 2cm at skin, dressing C/D/I   TF at goal 55cc/hr   Dignishield placed for loose stool    Extremities: Extremities warm, pink, well-perfused.     Derm: Good skin turgor, no skin breakdown.      : Condom catheter        CXR:       LABS:  CAPILLARY BLOOD GLUCOSE      POCT Blood Glucose.: 196 mg/dL (24 Dec 2023 10:38)  POCT Blood Glucose.: 192 mg/dL (24 Dec 2023 06:09)  POCT Blood Glucose.: 169 mg/dL (24 Dec 2023 02:57)  POCT Blood Glucose.: 182 mg/dL (23 Dec 2023 21:53)  POCT Blood Glucose.: 182 mg/dL (23 Dec 2023 17:53)  POCT Blood Glucose.: 194 mg/dL (23 Dec 2023 14:52)                          7.4    12.77 )-----------( 304      ( 24 Dec 2023 05:22 )             23.4       12-23    140  |  113<H>  |  52<H>  ----------------------------<  160<H>  3.1<L>   |  16<L>  |  2.3<H>    Ca    6.1<L>      23 Dec 2023 22:45  Phos  3.9     12-23  Mg     2.4     12-23              Urinalysis Basic - ( 23 Dec 2023 22:45 )    Color: x / Appearance: x / SG: x / pH: x  Gluc: 160 mg/dL / Ketone: x  / Bili: x / Urobili: x   Blood: x / Protein: x / Nitrite: x   Leuk Esterase: x / RBC: x / WBC x   Sq Epi: x / Non Sq Epi: x / Bacteria: x        Culture - Sputum (collected 22 Dec 2023 10:40)  Source: Trach Asp Tracheal Aspirate  Gram Stain (23 Dec 2023 03:44):    Moderate polymorphonuclear leukocytes per low power field    No Squamous epithelial cells per low power field    Moderate Gram positive cocci in pairs per oil power field  Preliminary Report (23 Dec 2023 18:57):    Numerous Staphylococcus aureus    Culture - Blood (collected 21 Dec 2023 18:19)  Source: .Blood Blood-Peripheral  Preliminary Report (24 Dec 2023 01:02):    No growth at 48 Hours    Culture - Blood (collected 21 Dec 2023 18:19)  Source: .Blood Blood-Peripheral  Preliminary Report (24 Dec 2023 01:02):    No growth at 48 Hours    Culture - Sputum (collected 21 Dec 2023 18:14)  Source: ET Tube ET Tube  Gram Stain (22 Dec 2023 05:32):    Moderate polymorphonuclear leukocytes per low power field    No Squamous epithelial cells per low power field    Moderate Gram positive cocci in pairs seen per oil power field  Final Report (23 Dec 2023 17:27):    Numerous Staphylococcus aureus    Normal Respiratory Fany absent  Organism: Staphylococcus aureus (23 Dec 2023 17:27)  Organism: Staphylococcus aureus (23 Dec 2023 17:27)

## 2023-12-25 LAB
ALBUMIN SERPL ELPH-MCNC: 2.4 G/DL — LOW (ref 3.5–5.2)
ALBUMIN SERPL ELPH-MCNC: 2.4 G/DL — LOW (ref 3.5–5.2)
ANION GAP SERPL CALC-SCNC: 13 MMOL/L — SIGNIFICANT CHANGE UP (ref 7–14)
APPEARANCE UR: CLEAR — SIGNIFICANT CHANGE UP
APPEARANCE UR: CLEAR — SIGNIFICANT CHANGE UP
BASE EXCESS BLDA CALC-SCNC: -3.9 MMOL/L — LOW (ref -2–3)
BASE EXCESS BLDA CALC-SCNC: -3.9 MMOL/L — LOW (ref -2–3)
BILIRUB UR-MCNC: NEGATIVE — SIGNIFICANT CHANGE UP
BILIRUB UR-MCNC: NEGATIVE — SIGNIFICANT CHANGE UP
BUN SERPL-MCNC: 65 MG/DL — CRITICAL HIGH (ref 10–20)
BUN SERPL-MCNC: 65 MG/DL — CRITICAL HIGH (ref 10–20)
BUN SERPL-MCNC: 69 MG/DL — CRITICAL HIGH (ref 10–20)
BUN SERPL-MCNC: 69 MG/DL — CRITICAL HIGH (ref 10–20)
CALCIUM SERPL-MCNC: 7.2 MG/DL — LOW (ref 8.4–10.5)
CALCIUM SERPL-MCNC: 7.2 MG/DL — LOW (ref 8.4–10.5)
CALCIUM SERPL-MCNC: 7.5 MG/DL — LOW (ref 8.4–10.5)
CALCIUM SERPL-MCNC: 7.5 MG/DL — LOW (ref 8.4–10.5)
CHLORIDE SERPL-SCNC: 114 MMOL/L — HIGH (ref 98–110)
CHLORIDE SERPL-SCNC: 114 MMOL/L — HIGH (ref 98–110)
CHLORIDE SERPL-SCNC: 116 MMOL/L — HIGH (ref 98–110)
CHLORIDE SERPL-SCNC: 116 MMOL/L — HIGH (ref 98–110)
CO2 SERPL-SCNC: 18 MMOL/L — SIGNIFICANT CHANGE UP (ref 17–32)
CO2 SERPL-SCNC: 18 MMOL/L — SIGNIFICANT CHANGE UP (ref 17–32)
CO2 SERPL-SCNC: 20 MMOL/L — SIGNIFICANT CHANGE UP (ref 17–32)
CO2 SERPL-SCNC: 20 MMOL/L — SIGNIFICANT CHANGE UP (ref 17–32)
COLOR SPEC: YELLOW — SIGNIFICANT CHANGE UP
COLOR SPEC: YELLOW — SIGNIFICANT CHANGE UP
CREAT SERPL-MCNC: 2.7 MG/DL — HIGH (ref 0.7–1.5)
CREAT SERPL-MCNC: 2.7 MG/DL — HIGH (ref 0.7–1.5)
CREAT SERPL-MCNC: 3 MG/DL — HIGH (ref 0.7–1.5)
CREAT SERPL-MCNC: 3 MG/DL — HIGH (ref 0.7–1.5)
DIFF PNL FLD: NEGATIVE — SIGNIFICANT CHANGE UP
DIFF PNL FLD: NEGATIVE — SIGNIFICANT CHANGE UP
EGFR: 21 ML/MIN/1.73M2 — LOW
EGFR: 21 ML/MIN/1.73M2 — LOW
EGFR: 23 ML/MIN/1.73M2 — LOW
EGFR: 23 ML/MIN/1.73M2 — LOW
GLUCOSE SERPL-MCNC: 171 MG/DL — HIGH (ref 70–99)
GLUCOSE SERPL-MCNC: 171 MG/DL — HIGH (ref 70–99)
GLUCOSE SERPL-MCNC: 177 MG/DL — HIGH (ref 70–99)
GLUCOSE SERPL-MCNC: 177 MG/DL — HIGH (ref 70–99)
GLUCOSE UR QL: NEGATIVE MG/DL — SIGNIFICANT CHANGE UP
GLUCOSE UR QL: NEGATIVE MG/DL — SIGNIFICANT CHANGE UP
HCO3 BLDA-SCNC: 20 MMOL/L — LOW (ref 21–28)
HCO3 BLDA-SCNC: 20 MMOL/L — LOW (ref 21–28)
HCT VFR BLD CALC: 23.6 % — LOW (ref 42–52)
HCT VFR BLD CALC: 23.6 % — LOW (ref 42–52)
HGB BLD-MCNC: 7.6 G/DL — LOW (ref 14–18)
HGB BLD-MCNC: 7.6 G/DL — LOW (ref 14–18)
KETONES UR-MCNC: NEGATIVE MG/DL — SIGNIFICANT CHANGE UP
KETONES UR-MCNC: NEGATIVE MG/DL — SIGNIFICANT CHANGE UP
LEUKOCYTE ESTERASE UR-ACNC: NEGATIVE — SIGNIFICANT CHANGE UP
LEUKOCYTE ESTERASE UR-ACNC: NEGATIVE — SIGNIFICANT CHANGE UP
MAGNESIUM SERPL-MCNC: 2.9 MG/DL — HIGH (ref 1.8–2.4)
MCHC RBC-ENTMCNC: 29 PG — SIGNIFICANT CHANGE UP (ref 27–31)
MCHC RBC-ENTMCNC: 29 PG — SIGNIFICANT CHANGE UP (ref 27–31)
MCHC RBC-ENTMCNC: 32.2 G/DL — SIGNIFICANT CHANGE UP (ref 32–37)
MCHC RBC-ENTMCNC: 32.2 G/DL — SIGNIFICANT CHANGE UP (ref 32–37)
MCV RBC AUTO: 90.1 FL — SIGNIFICANT CHANGE UP (ref 80–94)
MCV RBC AUTO: 90.1 FL — SIGNIFICANT CHANGE UP (ref 80–94)
NITRITE UR-MCNC: NEGATIVE — SIGNIFICANT CHANGE UP
NITRITE UR-MCNC: NEGATIVE — SIGNIFICANT CHANGE UP
NRBC # BLD: 0 /100 WBCS — SIGNIFICANT CHANGE UP (ref 0–0)
NRBC # BLD: 0 /100 WBCS — SIGNIFICANT CHANGE UP (ref 0–0)
PCO2 BLDA: 30 MMHG — LOW (ref 35–48)
PCO2 BLDA: 30 MMHG — LOW (ref 35–48)
PH BLDA: 7.42 — SIGNIFICANT CHANGE UP (ref 7.35–7.45)
PH BLDA: 7.42 — SIGNIFICANT CHANGE UP (ref 7.35–7.45)
PH UR: 5.5 — SIGNIFICANT CHANGE UP (ref 5–8)
PH UR: 5.5 — SIGNIFICANT CHANGE UP (ref 5–8)
PHOSPHATE SERPL-MCNC: 3.7 MG/DL — SIGNIFICANT CHANGE UP (ref 2.1–4.9)
PHOSPHATE SERPL-MCNC: 3.7 MG/DL — SIGNIFICANT CHANGE UP (ref 2.1–4.9)
PHOSPHATE SERPL-MCNC: 3.8 MG/DL — SIGNIFICANT CHANGE UP (ref 2.1–4.9)
PHOSPHATE SERPL-MCNC: 3.8 MG/DL — SIGNIFICANT CHANGE UP (ref 2.1–4.9)
PLATELET # BLD AUTO: 391 K/UL — SIGNIFICANT CHANGE UP (ref 130–400)
PLATELET # BLD AUTO: 391 K/UL — SIGNIFICANT CHANGE UP (ref 130–400)
PMV BLD: 9.1 FL — SIGNIFICANT CHANGE UP (ref 7.4–10.4)
PMV BLD: 9.1 FL — SIGNIFICANT CHANGE UP (ref 7.4–10.4)
PO2 BLDA: 123 MMHG — HIGH (ref 83–108)
PO2 BLDA: 123 MMHG — HIGH (ref 83–108)
POTASSIUM SERPL-MCNC: 3.8 MMOL/L — SIGNIFICANT CHANGE UP (ref 3.5–5)
POTASSIUM SERPL-MCNC: 3.8 MMOL/L — SIGNIFICANT CHANGE UP (ref 3.5–5)
POTASSIUM SERPL-MCNC: 3.9 MMOL/L — SIGNIFICANT CHANGE UP (ref 3.5–5)
POTASSIUM SERPL-MCNC: 3.9 MMOL/L — SIGNIFICANT CHANGE UP (ref 3.5–5)
POTASSIUM SERPL-SCNC: 3.8 MMOL/L — SIGNIFICANT CHANGE UP (ref 3.5–5)
POTASSIUM SERPL-SCNC: 3.8 MMOL/L — SIGNIFICANT CHANGE UP (ref 3.5–5)
POTASSIUM SERPL-SCNC: 3.9 MMOL/L — SIGNIFICANT CHANGE UP (ref 3.5–5)
POTASSIUM SERPL-SCNC: 3.9 MMOL/L — SIGNIFICANT CHANGE UP (ref 3.5–5)
PROT UR-MCNC: 100 MG/DL
PROT UR-MCNC: 100 MG/DL
RBC # BLD: 2.62 M/UL — LOW (ref 4.7–6.1)
RBC # BLD: 2.62 M/UL — LOW (ref 4.7–6.1)
RBC # FLD: 14.6 % — HIGH (ref 11.5–14.5)
RBC # FLD: 14.6 % — HIGH (ref 11.5–14.5)
SAO2 % BLDA: 100 % — HIGH (ref 94–98)
SAO2 % BLDA: 100 % — HIGH (ref 94–98)
SODIUM SERPL-SCNC: 145 MMOL/L — SIGNIFICANT CHANGE UP (ref 135–146)
SODIUM SERPL-SCNC: 145 MMOL/L — SIGNIFICANT CHANGE UP (ref 135–146)
SODIUM SERPL-SCNC: 149 MMOL/L — HIGH (ref 135–146)
SODIUM SERPL-SCNC: 149 MMOL/L — HIGH (ref 135–146)
SP GR SPEC: 1.02 — SIGNIFICANT CHANGE UP (ref 1–1.03)
SP GR SPEC: 1.02 — SIGNIFICANT CHANGE UP (ref 1–1.03)
UROBILINOGEN FLD QL: 0.2 MG/DL — SIGNIFICANT CHANGE UP (ref 0.2–1)
UROBILINOGEN FLD QL: 0.2 MG/DL — SIGNIFICANT CHANGE UP (ref 0.2–1)
VALPROATE SERPL-MCNC: 23 UG/ML — LOW (ref 50–100)
VALPROATE SERPL-MCNC: 23 UG/ML — LOW (ref 50–100)
WBC # BLD: 13.63 K/UL — HIGH (ref 4.8–10.8)
WBC # BLD: 13.63 K/UL — HIGH (ref 4.8–10.8)
WBC # FLD AUTO: 13.63 K/UL — HIGH (ref 4.8–10.8)
WBC # FLD AUTO: 13.63 K/UL — HIGH (ref 4.8–10.8)

## 2023-12-25 PROCEDURE — 99291 CRITICAL CARE FIRST HOUR: CPT

## 2023-12-25 PROCEDURE — 71045 X-RAY EXAM CHEST 1 VIEW: CPT | Mod: 26

## 2023-12-25 RX ORDER — FUROSEMIDE 40 MG
20 TABLET ORAL ONCE
Refills: 0 | Status: COMPLETED | OUTPATIENT
Start: 2023-12-25 | End: 2023-12-25

## 2023-12-25 RX ORDER — SALICYLIC ACID 0.5 %
1 CLEANSER (GRAM) TOPICAL EVERY 12 HOURS
Refills: 0 | Status: DISCONTINUED | OUTPATIENT
Start: 2023-12-25 | End: 2024-01-12

## 2023-12-25 RX ORDER — INSULIN GLARGINE 100 [IU]/ML
18 INJECTION, SOLUTION SUBCUTANEOUS AT BEDTIME
Refills: 0 | Status: DISCONTINUED | OUTPATIENT
Start: 2023-12-25 | End: 2023-12-26

## 2023-12-25 RX ORDER — LABETALOL HCL 100 MG
100 TABLET ORAL EVERY 8 HOURS
Refills: 0 | Status: DISCONTINUED | OUTPATIENT
Start: 2023-12-25 | End: 2023-12-28

## 2023-12-25 RX ORDER — SEVELAMER CARBONATE 2400 MG/1
800 POWDER, FOR SUSPENSION ORAL
Refills: 0 | Status: DISCONTINUED | OUTPATIENT
Start: 2023-12-25 | End: 2024-01-12

## 2023-12-25 RX ADMIN — Medication 650 MILLIGRAM(S): at 00:22

## 2023-12-25 RX ADMIN — Medication 1 PUFF(S): at 01:28

## 2023-12-25 RX ADMIN — ALBUTEROL 90 PUFF(S): 90 AEROSOL, METERED ORAL at 11:35

## 2023-12-25 RX ADMIN — CHLORHEXIDINE GLUCONATE 15 MILLILITER(S): 213 SOLUTION TOPICAL at 05:11

## 2023-12-25 RX ADMIN — Medication 7: at 14:23

## 2023-12-25 RX ADMIN — Medication 100 MILLIGRAM(S): at 22:52

## 2023-12-25 RX ADMIN — Medication 20 MILLIGRAM(S): at 13:28

## 2023-12-25 RX ADMIN — Medication 500 MILLIGRAM(S): at 17:08

## 2023-12-25 RX ADMIN — Medication 100 MILLIGRAM(S): at 05:17

## 2023-12-25 RX ADMIN — CHLORHEXIDINE GLUCONATE 1 APPLICATION(S): 213 SOLUTION TOPICAL at 05:17

## 2023-12-25 RX ADMIN — HEPARIN SODIUM 5000 UNIT(S): 5000 INJECTION INTRAVENOUS; SUBCUTANEOUS at 05:12

## 2023-12-25 RX ADMIN — Medication 650 MILLIGRAM(S): at 17:05

## 2023-12-25 RX ADMIN — Medication 5: at 01:22

## 2023-12-25 RX ADMIN — Medication 1 APPLICATION(S): at 05:16

## 2023-12-25 RX ADMIN — Medication 1 APPLICATION(S): at 14:53

## 2023-12-25 RX ADMIN — Medication 100 MILLIGRAM(S): at 13:28

## 2023-12-25 RX ADMIN — Medication 5: at 17:33

## 2023-12-25 RX ADMIN — Medication 650 MILLIGRAM(S): at 05:09

## 2023-12-25 RX ADMIN — PANTOPRAZOLE SODIUM 40 MILLIGRAM(S): 20 TABLET, DELAYED RELEASE ORAL at 05:13

## 2023-12-25 RX ADMIN — ALBUTEROL 90 PUFF(S): 90 AEROSOL, METERED ORAL at 04:00

## 2023-12-25 RX ADMIN — CHLORHEXIDINE GLUCONATE 15 MILLILITER(S): 213 SOLUTION TOPICAL at 17:08

## 2023-12-25 RX ADMIN — Medication 650 MILLIGRAM(S): at 17:09

## 2023-12-25 RX ADMIN — Medication 650 MILLIGRAM(S): at 12:19

## 2023-12-25 RX ADMIN — Medication 100 MILLIGRAM(S): at 12:19

## 2023-12-25 RX ADMIN — Medication 5: at 05:13

## 2023-12-25 RX ADMIN — Medication 650 MILLIGRAM(S): at 06:10

## 2023-12-25 RX ADMIN — SEVELAMER CARBONATE 800 MILLIGRAM(S): 2400 POWDER, FOR SUSPENSION ORAL at 05:09

## 2023-12-25 RX ADMIN — Medication 100 MILLIGRAM(S): at 22:53

## 2023-12-25 RX ADMIN — Medication 650 MILLIGRAM(S): at 13:17

## 2023-12-25 RX ADMIN — Medication 1 APPLICATION(S): at 17:16

## 2023-12-25 RX ADMIN — ALBUTEROL 90 PUFF(S): 90 AEROSOL, METERED ORAL at 01:28

## 2023-12-25 RX ADMIN — ALBUTEROL 90 PUFF(S): 90 AEROSOL, METERED ORAL at 08:18

## 2023-12-25 RX ADMIN — HEPARIN SODIUM 5000 UNIT(S): 5000 INJECTION INTRAVENOUS; SUBCUTANEOUS at 13:27

## 2023-12-25 RX ADMIN — ALBUTEROL 90 PUFF(S): 90 AEROSOL, METERED ORAL at 16:46

## 2023-12-25 RX ADMIN — AMLODIPINE BESYLATE 10 MILLIGRAM(S): 2.5 TABLET ORAL at 05:08

## 2023-12-25 RX ADMIN — Medication 1 PUFF(S): at 14:23

## 2023-12-25 RX ADMIN — SEVELAMER CARBONATE 800 MILLIGRAM(S): 2400 POWDER, FOR SUSPENSION ORAL at 17:15

## 2023-12-25 RX ADMIN — Medication 1 PUFF(S): at 08:19

## 2023-12-25 RX ADMIN — HEPARIN SODIUM 5000 UNIT(S): 5000 INJECTION INTRAVENOUS; SUBCUTANEOUS at 22:53

## 2023-12-25 RX ADMIN — Medication 81 MILLIGRAM(S): at 12:19

## 2023-12-25 RX ADMIN — INSULIN GLARGINE 18 UNIT(S): 100 INJECTION, SOLUTION SUBCUTANEOUS at 22:53

## 2023-12-25 RX ADMIN — Medication 7: at 22:54

## 2023-12-25 RX ADMIN — ATORVASTATIN CALCIUM 20 MILLIGRAM(S): 80 TABLET, FILM COATED ORAL at 22:53

## 2023-12-25 RX ADMIN — Medication 500 MILLIGRAM(S): at 05:12

## 2023-12-25 RX ADMIN — Medication 4 MILLIGRAM(S): at 22:53

## 2023-12-25 NOTE — PROGRESS NOTE ADULT - ASSESSMENT
78y Male s/p mechanical fall. +HT, +AC (Aspirin and Plavix), -LOC.    12/21-intubated for airway protection, accessory muscle use  12/22- s/p tracheostomy and PEG placement, Portex cuffed 9 and 2cm at skin 20fr PEG    NEURO:  #Acute SDH   -12/18 HCT#4 -stable SDH, no new acute findings  -Q4 neuro checks  #Acute pain    -APAP ATC  #h/o stroke (2/2023) w/residual right sided weakness  - RIGHT anterior thalamus infarct,  LEFT caudate head lacunar infarct  #focal seizure    -vEEG 12/19: No seizures; Discontinued 12/19    - Valproic acid 500q24; f/u next level 12/24 AM     -NCC: valproic acid current dose, q4 neurochecks    -Neurology cs- d/c EEG, 2 days prior to discharge call neurology so they can do a spot EEG    RESP:   #Respiratory Failure secondary to impaired secretion clearance, intubated 12/21    -s/p tracheostomy 12/22, size 9 cuffed portex  RR (machine): 14, TV (machine): 350, FiO2: 40, PEEP: 5  ABG  - 12/23: tolerated 6 hours of PS   - 12/24: tolerated 8 hours of PS   - chest PT q4 and duoneb treatments q 4   - deep tracheal aspirate cx sent (due to fevers)  Culture - Sputum . (12.21.23 @ 18:14)-  Numerous Staphylococcus aureus  #Activity    -increase as tolerated    CARDS:   #acute HYPOtension intraop, resolved    -off levophed since 12/22 PM  #hx of HTN  -Amlodipine 10 QD restarted  -Labetalol 200 q8 (Home Coreg 6.25mg q12 ) HOLDING  -Valsartan 320mg HOLDING   #HLD  -atorvastatin  #NSVT    -EP/Cards: No arrhythmias on tele only artifact. No further work up needed. Recommend ILR prior to discharge if patient/family agreeable  Imaging:   Echo: 12/2023: EF 66%, G1DD, trace MR, mild TR     GI/NUTR:   #Diet, NPO with Tube Feed via 20Fr PEG placed 12/22   Glucerna 1.2 at goal 55cc/hr, feed duration 24 hr    12/20- SLP unable to assess 2/2 lethargy    -aspiration precautions, HOB 30  #GI Prophylaxis  -Protonix 40mg   #Bowel regimen     -Multiple loose bowel movements, last dose senna 12/19 at 2200 > dignishield placed 12/23     -C diff negative     -no bowel regimen    -Last bowel movement  12/22    /RENAL:   #urine output in critically ill  -Condom catheter   -UA neg   -Sodium goal 140-150  -12/24: Lasix 20 IVP x1 for net +12L/LOS--> 1.1L UOP   #Hypernatremia - resolved   -D/C'd FWF 200q4; free water deficit 1.7L  #hyperphosphatemia  -Started phoslo TID   #CKD   -baseline Cr 2.1  -holding home sodium bicarb 650mg BID  #hx BPH    -cardura 4mg (flomax at home, non crushable)      Labs:          BUN/Cr- 58/2.6  -->,  52/2.3  -->63/3.0          Electrolytes-Na 144 // K 3.8 // Mg 2.8 //  Phos 4.0 12-24 @ 19:19    HEME/ONC:   #DVT prophylaxis     -SCDs, HSQ  #hx of CVA    -ASA cleared by NSGY    -Plavix HOLDING, will require repeat HCT in 2 wks prior to restart, approx 1/1/24    Labs: Hb/Hct:  7.4/23.4  -->,  7.4/22.5  -->,  7.6/23.6  -->           Plts:  304  -->,  313  -->,  335  -->              PTT/INR:        ID:  #recurrent fevers  -MRSA PCR neg   Cultures    Bcx 12/18- final neg     Tracheal aspirate 12/21: numerous staph aureus     Tracheal aspirate 12/22: staph aureus    C.Diff PCR neg    Blood cx 12/21- prelim neg  Current antibiotics:  -IV Ancef 2g q8 (stop date 12/26)   #ID consult  -Recc D/C zosyn & start IV Ancef  #multiple loose bowel movements    -WBC trending up    -no bowel regiment  WBC- 9.69  --->>,  13.71  --->>,  13.21  --->>13  afebrile      ENDO:  #DM     -ISS     -Off insulin gtt 12/20      -Lantus 15 units HS     -FSG q4 while on TF    MSK:     Activity - Increase As Tolerated    -B/L knee X ray 12/19- no fractures, b/l effusions     LINES/DRAINS:  PIV, right basilic midline (placed 12/16), right radial arterial line (placed 12/19), 20Fr PEG (12/22), Tracheostomy (12/22)    ADVANCED DIRECTIVES:  Full Code    HCP/Emergency Contact-Sarah Gamino (Wife) [phone], Cantonese speaking    DISPO:  Social work, case management consult for dispo to vent facility - spoke with social work 12/23, starting to work on D/C. 78y Male s/p mechanical fall. +HT, +AC (Aspirin and Plavix), -LOC.    12/21-intubated for airway protection, accessory muscle use  12/22- s/p tracheostomy and PEG placement, Portex cuffed 9 and 2cm at skin 20fr PEG    NEURO:  #Acute SDH   -12/18 HCT#4 -stable SDH, no new acute findings  -Q4 neuro checks  #Acute pain    -APAP ATC  #h/o stroke (2/2023) w/residual right sided weakness  - RIGHT anterior thalamus infarct,  LEFT caudate head lacunar infarct  #focal seizure    -vEEG 12/19: No seizures; Discontinued 12/19    - Valproic acid 500q24; f/u next level 12/24 AM     -NCC: valproic acid current dose, q4 neurochecks    -Neurology cs- d/c EEG, 2 days prior to discharge call neurology so they can do a spot EEG    RESP:   #Respiratory Failure secondary to impaired secretion clearance, intubated 12/21    -s/p tracheostomy 12/22, size 9 cuffed portex  RR (machine): 14, TV (machine): 350, FiO2: 40, PEEP: 5  ABG  - 12/23: tolerated 6 hours of PS   - 12/24: tolerated 8 hours of PS   - chest PT q4 and duoneb treatments q 4   - deep tracheal aspirate cx sent (due to fevers)  Culture - Sputum . (12.21.23 @ 18:14)-  Numerous Staphylococcus aureus  #Activity    -increase as tolerated    CARDS:   #acute HYPOtension intraop, resolved    -off levophed since 12/22 PM  #hx of HTN  -Amlodipine 10 QD restarted  -Labetalol 200 q8 (Home Coreg 6.25mg q12 ) HOLDING  -Valsartan 320mg HOLDING   #HLD  -atorvastatin  #NSVT    -EP/Cards: No arrhythmias on tele only artifact. No further work up needed. Recommend ILR prior to discharge if patient/family agreeable  Imaging:   Echo: 12/2023: EF 66%, G1DD, trace MR, mild TR     GI/NUTR:   #Diet, NPO with Tube Feed via 20Fr PEG placed 12/22   Glucerna 1.2 at goal 55cc/hr, feed duration 24 hr    12/20- SLP unable to assess 2/2 lethargy    -aspiration precautions, HOB 30  #GI Prophylaxis  -Protonix 40mg   #Bowel regimen     -Multiple loose bowel movements, last dose senna 12/19 at 2200 > dignishield placed 12/23     -C diff negative     -no bowel regimen    -Last bowel movement  12/22    /RENAL:   #urine output in critically ill  -Condom catheter   -UA neg   -Sodium goal 140-150  -12/24: Lasix 20 IVP x1 for net +12L/LOS--> 1.1L UOP   #Hypernatremia - resolved   -D/C'd FWF 200q4; free water deficit 1.7L  #hyperphosphatemia  -Started phoslo TID   #CKD   -baseline Cr 2.1  -holding home sodium bicarb 650mg BID  #hx BPH    -cardura 4mg (flomax at home, non crushable)      Labs:          BUN/Cr- 58/2.6  -->,  52/2.3  -->63/3.0          Electrolytes-Na 144 // K 3.8 // Mg 2.8 //  Phos 4.0 12-24 @ 19:19    HEME/ONC:   #DVT prophylaxis     -SCDs, HSQ  #hx of CVA    -ASA cleared by NSGY    -Plavix HOLDING, will require repeat HCT in 2 wks prior to restart, approx 1/1/24    Labs: Hb/Hct:  7.4/23.4  -->,  7.4/22.5  -->,  7.6/23.6  -->           Plts:  304  -->,  313  -->,  335  -->              PTT/INR:        ID:  #recurrent fevers  -MRSA PCR neg   Cultures    Bcx 12/18- final neg     Tracheal aspirate 12/21: numerous staph aureus     Tracheal aspirate 12/22: staph aureus    C.Diff PCR neg    Blood cx 12/21- prelim neg  Current antibiotics:  -IV Ancef 2g q8 (stop date 12/26)   #ID consult  -Recc D/C zosyn & start IV Ancef  #multiple loose bowel movements    -WBC trending up    -no bowel regiment  WBC- 9.69  --->>,  13.71  --->>,  13.21  --->>13  afebrile      ENDO:  #DM     -ISS     -Off insulin gtt 12/20      -Lantus 15 units HS     -FSG q4 while on TF    MSK:     Activity - Increase As Tolerated    -B/L knee X ray 12/19- no fractures, b/l effusions     LINES/DRAINS:  PIV, right basilic midline (placed 12/16), right radial arterial line (placed 12/19), 20Fr PEG (12/22), Tracheostomy (12/22)    ADVANCED DIRECTIVES:  Full Code    HCP/Emergency Contact-Sarah Gamino (Wife) [phone], Cantonese speaking    DISPO:  Social work, case management consult for dispo to vent facility - spoke with social work 12/23, starting to work on D/C. 78y Male s/p mechanical fall. +HT, +AC (Aspirin and Plavix), -LOC.    12/21-intubated for airway protection, accessory muscle use  12/22- s/p tracheostomy and PEG placement, Portex cuffed 9 and 2cm at skin 20fr PEG    NEURO:  #Acute SDH   -12/18 HCT#4 -stable SDH, no new acute findings  -Q4 neuro checks  #Acute pain    -APAP ATC  #h/o stroke (2/2023) w/residual right sided weakness  - RIGHT anterior thalamus infarct,  LEFT caudate head lacunar infarct  #focal seizure    -vEEG 12/19: No seizures; Discontinued 12/19    - Valproic acid 500q24; Level 23, albumin 2.1 > f/u with neurology about dose     -NCC: valproic acid current dose, q4 neurochecks    -Neurology cs- d/c EEG, 2 days prior to discharge call neurology so they can do a spot EEG    RESP:   #Respiratory Failure secondary to impaired secretion clearance, intubated 12/21    -s/p tracheostomy 12/22, size 9 cuffed portex  RR (machine): 14, TV (machine): 350, FiO2: 40, PEEP: 5  ABG  - 12/23: tolerated 6 hours of PS   - 12/24: tolerated 8 hours of PS   - chest PT q4 and duoneb treatments q 4   - deep tracheal aspirate cx sent (due to fevers)  Culture - Sputum . (12.21.23 @ 18:14)-  Numerous Staphylococcus aureus  #Activity    -increase as tolerated    CARDS:   #acute HYPOtension intraop, resolved    -off levophed since 12/22 PM  #hx of HTN  -Amlodipine 10 QD restarted  -Labetalol 200 q8 (Home Coreg 6.25mg q12 ) HOLDING  -Valsartan 320mg HOLDING   #HLD  -atorvastatin  #NSVT    -EP/Cards: No arrhythmias on tele only artifact. No further work up needed. Recommend ILR prior to discharge if patient/family agreeable  Imaging:   Echo: 12/2023: EF 66%, G1DD, trace MR, mild TR     GI/NUTR:   #Diet, NPO with Tube Feed via 20Fr PEG placed 12/22   Glucerna 1.2 at goal 55cc/hr, feed duration 24 hr    12/20- SLP unable to assess 2/2 lethargy    -aspiration precautions, HOB 30  #GI Prophylaxis  -Protonix 40mg   #Bowel regimen     -Multiple loose bowel movements, last dose senna 12/19 at 2200 > dignishield placed 12/23     -C diff negative     -no bowel regimen    -Last bowel movement  12/22    /RENAL:   #urine output in critically ill  -Condom catheter   -UA neg   -Sodium goal 140-150  -12/24: Lasix 20 IVP x1 for net +12L/LOS--> 1.1L UOP   #Hypernatremia - resolved   -D/C'd FWF 200q4; free water deficit 1.7L  #hyperphosphatemia  -Started phoslo TID   #CKD   -baseline Cr 2.1  -holding home sodium bicarb 650mg BID  #hx BPH    -cardura 4mg (flomax at home, non crushable)      Labs:          BUN/Cr- 58/2.6  -->,  52/2.3  -->63/3.0          Electrolytes-Na 144 // K 3.8 // Mg 2.8 //  Phos 4.0 12-24 @ 19:19    HEME/ONC:   #DVT prophylaxis     -SCDs, HSQ  #hx of CVA    -ASA cleared by NSGY    -Plavix HOLDING, will require repeat HCT in 2 wks prior to restart, approx 1/1/24    Labs: Hb/Hct:  7.4/23.4  -->,  7.4/22.5  -->,  7.6/23.6  -->           Plts:  304  -->,  313  -->,  335  -->              PTT/INR:        ID:  #recurrent fevers  -MRSA PCR neg   Cultures    Bcx 12/18- final neg     Tracheal aspirate 12/21: numerous staph aureus     Tracheal aspirate 12/22: staph aureus    C.Diff PCR neg    Blood cx 12/21- prelim neg  Current antibiotics:  -IV Ancef 2g q8 (stop date 12/26)   #ID consult  -Recc D/C zosyn & start IV Ancef  #multiple loose bowel movements    -WBC trending up    -no bowel regiment  WBC- 9.69  --->>,  13.71  --->>,  13.21  --->>13  afebrile      ENDO:  #DM     -ISS     -Off insulin gtt 12/20      -Lantus 15 units HS     -FSG q4 while on TF    MSK:     Activity - Increase As Tolerated    -B/L knee X ray 12/19- no fractures, b/l effusions     LINES/DRAINS:  PIV, right basilic midline (placed 12/16), right radial arterial line (placed 12/19), 20Fr PEG (12/22), Tracheostomy (12/22)    ADVANCED DIRECTIVES:  Full Code    HCP/Emergency Contact-Sarah Gamino (Wife) [phone], Cantonese speaking    DISPO:  Social work, case management consult for dispo to vent facility - spoke with social work 12/23, starting to work on D/C. 78y Male s/p mechanical fall. +HT, +AC (Aspirin and Plavix), -LOC.    12/21-intubated for airway protection, accessory muscle use  12/22- s/p tracheostomy and PEG placement, Portex cuffed 9 and 2cm at skin 20fr PEG    NEURO:  #Acute SDH   -12/18 HCT#4 -stable SDH, no new acute findings  -Q4 neuro checks  #Acute pain    -APAP ATC  #h/o stroke (2/2023) w/residual right sided weakness  - RIGHT anterior thalamus infarct,  LEFT caudate head lacunar infarct  #focal seizure    -vEEG 12/19: No seizures; Discontinued 12/19    - Valproic acid 500q24; Level 23, albumin 2.1 > f/u with neurology about dose     -NCC: valproic acid current dose, q4 neurochecks    -Neurology cs- d/c EEG, 2 days prior to discharge call neurology so they can do a spot EEG    RESP:   #Respiratory Failure secondary to impaired secretion clearance, intubated 12/21    -s/p tracheostomy 12/22, size 9 cuffed portex  RR (machine): 14, TV (machine): 350, FiO2: 40, PEEP: 5  ABG  - 12/23: tolerated 6 hours of PS   - 12/24: tolerated 8 hours of PS   - chest PT q4 and duoneb treatments q 4   - deep tracheal aspirate cx sent (due to fevers)  Culture - Sputum . (12.21.23 @ 18:14)-  Numerous Staphylococcus aureus  #Activity    -increase as tolerated    CARDS:   #acute HYPOtension intraop, resolved    -off levophed since 12/22 PM  #hx of HTN  -Amlodipine 10 QD restarted  -Labetalol 200 q8 (Home Coreg 6.25mg q12 ) HOLDING  -Valsartan 320mg HOLDING   #HLD  -atorvastatin  #NSVT    -EP/Cards: No arrhythmias on tele only artifact. No further work up needed. Recommend ILR prior to discharge if patient/family agreeable  Imaging:   Echo: 12/2023: EF 66%, G1DD, trace MR, mild TR     GI/NUTR:   #Diet, NPO with Tube Feed via 20Fr PEG placed 12/22   Glucerna 1.2 at goal 55cc/hr, feed duration 24 hr    12/20- SLP unable to assess 2/2 lethargy    -aspiration precautions, HOB 30  #GI Prophylaxis  -Protonix 40mg   #Bowel regimen     -Multiple loose bowel movements, last dose senna 12/19 at 2200 > dignishield placed 12/23     -C diff negative     -no bowel regimen    -Last bowel movement  12/22    /RENAL:   #urine output in critically ill  -Condom catheter   -UA neg   -Sodium goal 140-150  -12/24: Lasix 20 IVP x1 for net +12L/LOS--> 1.1L UOP   #Hypernatremia - resolved   -D/C'd FWF 200q4; free water deficit 1.7L  #hyperphosphatemia  -Started phoslo TID   #CKD   -baseline Cr 2.1  -holding home sodium bicarb 650mg BID  #hx BPH    -cardura 4mg (flomax at home, non crushable)      Labs:          BUN/Cr- 58/2.6  -->,  52/2.3  -->63/3.0          Electrolytes-Na 144 // K 3.8 // Mg 2.8 //  Phos 4.0 12-24 @ 19:19    HEME/ONC:   #DVT prophylaxis     -SCDs, HSQ  #hx of CVA    -ASA cleared by NSGY    -Plavix HOLDING, will require repeat HCT in 2 wks prior to restart, approx 1/1/24    Labs: Hb/Hct:  7.4/23.4  -->,  7.4/22.5  -->,  7.6/23.6  -->           Plts:  304  -->,  313  -->,  335  -->              PTT/INR:        ID:  #recurrent fevers  -MRSA PCR neg   Cultures    Bcx 12/18- final neg     Tracheal aspirate 12/21: numerous staph aureus     Tracheal aspirate 12/22: staph aureus    C.Diff PCR neg    Blood cx 12/21- prelim neg  Current antibiotics:  -IV Ancef 2g q8 (stop date 12/26)   #ID consult  -Recc D/C zosyn & start IV Ancef  #multiple loose bowel movements    -WBC trending up    -no bowel regiment  WBC- 9.69  --->>,  13.71  --->>,  13.21  --->>13  afebrile      ENDO:  #DM     -ISS     -Off insulin gtt 12/20      -Lantus 15 units HS     -FSG q4 while on TF    MSK:     Activity - Increase As Tolerated    -B/L knee X ray 12/19- no fractures, b/l effusions     LINES/DRAINS:  PIV, right basilic midline (placed 12/16), right radial arterial line (placed 12/19), 20Fr PEG (12/22), Tracheostomy (12/22)    ADVANCED DIRECTIVES:  Full Code    HCP/Emergency Contact-Sarah Gamino (Wife) [phone], Cantonese speaking    DISPO:  Social work, case management consult for dispo to vent facility - spoke with social work 12/23, starting to work on D/C.

## 2023-12-25 NOTE — PROGRESS NOTE ADULT - SUBJECTIVE AND OBJECTIVE BOX
JACQUELIN CAI   [id number]   [birth year]  78yM    Admit Date: 12-16-23  Indication for SDU/SICU: Q1 neuro checks        ============================  24 Hour Events    12/24  OVN:  - Temp 101.3 @6pm; 100.7 @8pm  - BP 150s/60s, started labetalol at 100mg q8h (takes 200mg q8h at home).   - 8pm rounds: Rectal tube in place with brownish-yellow liquid stool content in bag,purewick in place, PEG in place at 3cm at level of dressing, Abd NT, ND, soft, abd binder in place. LE mild +1 pitting edema  - BUN/Cr 63/3.0 from 52/2.3 --> f/u AM BMP, monitor u/o     AM  -D/C zosyn to IV ancef   -PS 5/5 40%   -Lasix 20 IVP   -F/U CBC 11AM 7.4 > 7.4 >7.4   -F/U C diff; negative   -D/C'd free water flushes, Na 140  -valproic acid level in am      [X] A ten-point review of systems was otherwise negative except as noted above.  [  ] Due to altered mental status/intubation, subjective information was not attained from the patient. History was obtained, to the extent possible, from review of the chart and collateral sources of information.    =========================================================================================================================================   JACQUELIN CAI   [id number]   [birth year]  78yM    Admit Date: 12-16-23  Indication for SDU/SICU: Q1 neuro checks        ============================  24 Hour Events    12/24  OVN:  - Temp 101.3 @6pm; 100.7 @8pm  - BP 150s/60s, started labetalol at 100mg q8h (takes 200mg q8h at home).   - 8pm rounds: Rectal tube in place with brownish-yellow liquid stool content in bag,purewick in place, PEG in place at 3cm at level of dressing, Abd NT, ND, soft, abd binder in place. LE mild +1 pitting edema  - BUN/Cr 63/3.0 from 52/2.3 --> f/u AM BMP, monitor u/o     AM  -D/C zosyn to IV ancef   -PS 5/5 40%   -Lasix 20 IVP   -F/U CBC 11AM 7.4 > 7.4 >7.4   -F/U C diff; negative   -D/C'd free water flushes, Na 140  -valproic acid level in am      [X] A ten-point review of systems was otherwise negative except as noted above.  [  ] Due to altered mental status/intubation, subjective information was not attained from the patient. History was obtained, to the extent possible, from review of the chart and collateral sources of information.    =========================================================================================================================================   JACQUELIN CAI   [id number]   [birth year]  78yM    Admit Date: 12-16-23  Indication for SDU/SICU: Q1 neuro checks        ============================  24 Hour Events    12/24  OVN:  - Temp 101.3 @6pm; 100.7 @8pm  - BP 150s/60s, started labetalol at 100mg q8h (takes 200mg q8h at home).   - 8pm rounds: Rectal tube in place with brownish-yellow liquid stool content in bag,purewick in place, PEG in place at 3cm at level of dressing, Abd NT, ND, soft, abd binder in place. LE mild +1 pitting edema  - BUN/Cr 63/3.0 from 52/2.3 --> f/u AM BMP, monitor u/o     AM  -D/C zosyn to IV ancef   -PS 5/5 40%   -Lasix 20 IVP   -F/U CBC 11AM 7.4 > 7.4 >7.4   -F/U C diff; negative   -D/C'd free water flushes, Na 140  -valproic acid level in am      [X] A ten-point review of systems was otherwise negative except as noted above.  [  ] Due to altered mental status/intubation, subjective information was not attained from the patient. History was obtained, to the extent possible, from review of the chart and collateral sources of information.    =========================================================================================================================================  Daily     Daily     Diet, NPO with Tube Feed:   Tube Feeding Modality: Gastrostomy  Glucerna 1.2 Tonio  Total Volume for 24 Hours (mL): 1320  Continuous  Starting Tube Feed Rate mL per Hour: 55  Until Goal Tube Feed Rate (mL per Hour): 55  Tube Feed Duration (in Hours): 24  Tube Feed Start Time: 11:30 (12-24-23 @ 11:24)      CURRENT MEDS:  Neurologic Medications  acetaminophen     Tablet .. 650 milliGRAM(s) Oral every 6 hours  valproic  acid Syrup 500 milliGRAM(s) Oral every 12 hours    Respiratory Medications  albuterol    90 MICROgram(s) HFA Inhaler 90 Puff(s) Inhalation every 4 hours  ipratropium 17 MICROgram(s) HFA Inhaler 1 Puff(s) Inhalation every 6 hours    Cardiovascular Medications  amLODIPine   Tablet 10 milliGRAM(s) Oral daily  doxazosin 4 milliGRAM(s) Oral at bedtime    Gastrointestinal Medications  pantoprazole  Injectable 40 milliGRAM(s) IV Push every 24 hours    Genitourinary Medications    Hematologic/Oncologic Medications  aspirin  chewable 81 milliGRAM(s) Enteral Tube daily  heparin   Injectable 5000 Unit(s) SubCutaneous every 8 hours    Antimicrobial/Immunologic Medications  ceFAZolin   IVPB 2000 milliGRAM(s) IV Intermittent every 8 hours    Endocrine/Metabolic Medications  atorvastatin 20 milliGRAM(s) Oral at bedtime  insulin glargine Injectable (LANTUS) 15 Unit(s) SubCutaneous at bedtime  insulin lispro (ADMELOG) corrective regimen sliding scale   SubCutaneous every 4 hours    Topical/Other Medications  bacitracin   Ointment 1 Application(s) Topical every 12 hours  chlorhexidine 0.12% Liquid 15 milliLiter(s) Oral Mucosa two times a day  chlorhexidine 2% Cloths 1 Application(s) Topical <User Schedule>  sevelamer carbonate 800 milliGRAM(s) Oral every 8 hours      ICU Vital Signs Last 24 Hrs  T(C): 37.9 (25 Dec 2023 05:00), Max: 38.5 (24 Dec 2023 18:00)  T(F): 100.3 (25 Dec 2023 05:00), Max: 101.3 (24 Dec 2023 18:00)  HR: 91 (25 Dec 2023 06:00) (81 - 105)  BP: 139/61 (25 Dec 2023 06:00) (136/60 - 180/72)  BP(mean): 88 (25 Dec 2023 06:00) (87 - 114)  ABP: 141/34 (25 Dec 2023 06:00) (118/35 - 201/58)  ABP(mean): 57 (25 Dec 2023 06:00) (57 - 96)  RR: 27 (25 Dec 2023 06:00) (14 - 41)  SpO2: 99% (25 Dec 2023 06:00) (97% - 99%)    O2 Parameters below as of 25 Dec 2023 06:00  Patient On (Oxygen Delivery Method): ventilator            Adult Advanced Hemodynamics Last 24 Hrs  CVP(mm Hg): --  CVP(cm H2O): --  CO: --  CI: --  PA: --  PA(mean): --  PCWP: --  SVR: --  SVRI: --  PVR: --  PVRI: --    Mode: AC/ CMV (Assist Control/ Continuous Mandatory Ventilation)  RR (machine): 14  TV (machine): 350  FiO2: 40  PEEP: 5  PS: 5  ITime: 1  MAP: 11  PIP: 23    ABG - ( 25 Dec 2023 07:19 )  pH, Arterial: 7.42  pH, Blood: x     /  pCO2: 30    /  pO2: 123   / HCO3: 20    / Base Excess: -3.9  /  SaO2: 100.0               I&O's Summary    24 Dec 2023 07:01  -  25 Dec 2023 07:00  --------------------------------------------------------  IN: 1515 mL / OUT: 2275 mL / NET: -760 mL      I&O's Detail    24 Dec 2023 07:01  -  25 Dec 2023 07:00  --------------------------------------------------------  IN:    Enteral Tube Flush: 100 mL    Glucerna: 1265 mL    IV PiggyBack: 150 mL  Total IN: 1515 mL    OUT:    Rectal Tube (mL): 400 mL    Voided (mL): 1875 mL  Total OUT: 2275 mL    Total NET: -760 mL          PHYSICAL EXAM:  General/Neuro: Utilized  services #[id number]  RASS: 0            GCS: 9T = E4/V1T/M4      Alert & oriented unable to be assessed s/p trach - nonverbal and does not use non-verbal gestures   Off precedex   Does not follow commands w/    Pupils: PERRL    Lungs:   Cuffed tracheostomy, size 9, on AC//14/40/5 > trialing PS   CTAB, Tachypnic, RR 30s.     Cardiovascular : S1, S2. RRR. Minimal peripheral edema   Cardiac Rhythm: Normal Sinus Rhythm    GI: Abdomen soft, Non-tender, minimal distention.   20Fr PEG, 2cm at skin, dressing C/D/I   TF at goal 55cc/hr   Dignishield placed for loose stool    Extremities: Extremities warm, pink, well-perfused.     Derm: Good skin turgor, no skin breakdown.      : Condom catheter      CXR:       LABS:  CAPILLARY BLOOD GLUCOSE      POCT Blood Glucose.: 182 mg/dL (25 Dec 2023 04:31)  POCT Blood Glucose.: 195 mg/dL (25 Dec 2023 00:54)  POCT Blood Glucose.: 190 mg/dL (24 Dec 2023 19:58)  POCT Blood Glucose.: 208 mg/dL (24 Dec 2023 17:42)  POCT Blood Glucose.: 215 mg/dL (24 Dec 2023 14:10)  POCT Blood Glucose.: 196 mg/dL (24 Dec 2023 10:38)                          7.6    13.22 )-----------( 335      ( 24 Dec 2023 19:19 )             23.6       12-25    145  |  114<H>  |  65<HH>  ----------------------------<  177<H>  3.8   |  18  |  2.7<H>    Ca    7.2<L>      25 Dec 2023 04:55  Phos  3.7     12-25  Mg     2.9     12-25    TPro  x   /  Alb  2.4<L>  /  TBili  x   /  DBili  x   /  AST  x   /  ALT  x   /  AlkPhos  x   12-25            Urinalysis Basic - ( 25 Dec 2023 04:55 )    Color: x / Appearance: x / SG: x / pH: x  Gluc: 177 mg/dL / Ketone: x  / Bili: x / Urobili: x   Blood: x / Protein: x / Nitrite: x   Leuk Esterase: x / RBC: x / WBC x   Sq Epi: x / Non Sq Epi: x / Bacteria: x        Culture - Sputum (collected 22 Dec 2023 10:40)  Source: Trach Asp Tracheal Aspirate  Gram Stain (23 Dec 2023 03:44):    Moderate polymorphonuclear leukocytes per low power field    No Squamous epithelial cells per low power field    Moderate Gram positive cocci in pairs per oil power field  Final Report (24 Dec 2023 17:26):    Numerous Staphylococcus aureus  Organism: Staphylococcus aureus (24 Dec 2023 17:26)  Organism: Staphylococcus aureus (24 Dec 2023 17:26)           JACQUELIN CAI   [id number]   [birth year]  78yM    Admit Date: 12-16-23  Indication for SDU/SICU: Q1 neuro checks        ============================  24 Hour Events    12/24  OVN:  - Temp 101.3 @6pm; 100.7 @8pm  - BP 150s/60s, started labetalol at 100mg q8h (takes 200mg q8h at home).   - 8pm rounds: Rectal tube in place with brownish-yellow liquid stool content in bag,purewick in place, PEG in place at 3cm at level of dressing, Abd NT, ND, soft, abd binder in place. LE mild +1 pitting edema  - BUN/Cr 63/3.0 from 52/2.3 --> f/u AM BMP, monitor u/o     AM  -D/C zosyn to IV ancef   -PS 5/5 40%   -Lasix 20 IVP   -F/U CBC 11AM 7.4 > 7.4 >7.4   -F/U C diff; negative   -D/C'd free water flushes, Na 140  -valproic acid level in am      [X] A ten-point review of systems was otherwise negative except as noted above.  [  ] Due to altered mental status/intubation, subjective information was not attained from the patient. History was obtained, to the extent possible, from review of the chart and collateral sources of information.    =========================================================================================================================================  Daily     Daily     Diet, NPO with Tube Feed:   Tube Feeding Modality: Gastrostomy  Glucerna 1.2 Tonio  Total Volume for 24 Hours (mL): 1320  Continuous  Starting Tube Feed Rate mL per Hour: 55  Until Goal Tube Feed Rate (mL per Hour): 55  Tube Feed Duration (in Hours): 24  Tube Feed Start Time: 11:30 (12-24-23 @ 11:24)      CURRENT MEDS:  Neurologic Medications  acetaminophen     Tablet .. 650 milliGRAM(s) Oral every 6 hours  valproic  acid Syrup 500 milliGRAM(s) Oral every 12 hours    Respiratory Medications  albuterol    90 MICROgram(s) HFA Inhaler 90 Puff(s) Inhalation every 4 hours  ipratropium 17 MICROgram(s) HFA Inhaler 1 Puff(s) Inhalation every 6 hours    Cardiovascular Medications  amLODIPine   Tablet 10 milliGRAM(s) Oral daily  doxazosin 4 milliGRAM(s) Oral at bedtime    Gastrointestinal Medications  pantoprazole  Injectable 40 milliGRAM(s) IV Push every 24 hours    Genitourinary Medications    Hematologic/Oncologic Medications  aspirin  chewable 81 milliGRAM(s) Enteral Tube daily  heparin   Injectable 5000 Unit(s) SubCutaneous every 8 hours    Antimicrobial/Immunologic Medications  ceFAZolin   IVPB 2000 milliGRAM(s) IV Intermittent every 8 hours    Endocrine/Metabolic Medications  atorvastatin 20 milliGRAM(s) Oral at bedtime  insulin glargine Injectable (LANTUS) 15 Unit(s) SubCutaneous at bedtime  insulin lispro (ADMELOG) corrective regimen sliding scale   SubCutaneous every 4 hours    Topical/Other Medications  bacitracin   Ointment 1 Application(s) Topical every 12 hours  chlorhexidine 0.12% Liquid 15 milliLiter(s) Oral Mucosa two times a day  chlorhexidine 2% Cloths 1 Application(s) Topical <User Schedule>  sevelamer carbonate 800 milliGRAM(s) Oral every 8 hours      ICU Vital Signs Last 24 Hrs  T(C): 37.9 (25 Dec 2023 05:00), Max: 38.5 (24 Dec 2023 18:00)  T(F): 100.3 (25 Dec 2023 05:00), Max: 101.3 (24 Dec 2023 18:00)  HR: 91 (25 Dec 2023 06:00) (81 - 105)  BP: 139/61 (25 Dec 2023 06:00) (136/60 - 180/72)  BP(mean): 88 (25 Dec 2023 06:00) (87 - 114)  ABP: 141/34 (25 Dec 2023 06:00) (118/35 - 201/58)  ABP(mean): 57 (25 Dec 2023 06:00) (57 - 96)  RR: 27 (25 Dec 2023 06:00) (14 - 41)  SpO2: 99% (25 Dec 2023 06:00) (97% - 99%)    O2 Parameters below as of 25 Dec 2023 06:00  Patient On (Oxygen Delivery Method): ventilator            Adult Advanced Hemodynamics Last 24 Hrs  CVP(mm Hg): --  CVP(cm H2O): --  CO: --  CI: --  PA: --  PA(mean): --  PCWP: --  SVR: --  SVRI: --  PVR: --  PVRI: --    Mode: AC/ CMV (Assist Control/ Continuous Mandatory Ventilation)  RR (machine): 14  TV (machine): 350  FiO2: 40  PEEP: 5  PS: 5  ITime: 1  MAP: 11  PIP: 23    ABG - ( 25 Dec 2023 07:19 )  pH, Arterial: 7.42  pH, Blood: x     /  pCO2: 30    /  pO2: 123   / HCO3: 20    / Base Excess: -3.9  /  SaO2: 100.0               I&O's Summary    24 Dec 2023 07:01  -  25 Dec 2023 07:00  --------------------------------------------------------  IN: 1515 mL / OUT: 2275 mL / NET: -760 mL      I&O's Detail    24 Dec 2023 07:01  -  25 Dec 2023 07:00  --------------------------------------------------------  IN:    Enteral Tube Flush: 100 mL    Glucerna: 1265 mL    IV PiggyBack: 150 mL  Total IN: 1515 mL    OUT:    Rectal Tube (mL): 400 mL    Voided (mL): 1875 mL  Total OUT: 2275 mL    Total NET: -760 mL          PHYSICAL EXAM:  General/Neuro: Utilized  services #[id number]  RASS: 0            GCS: 9T = E4/V1T/M4      Alert & oriented unable to be assessed s/p trach - nonverbal and does not use non-verbal gestures   Off precedex   Does not follow commands w/    Pupils: PERRL    Lungs:   Cuffed tracheostomy, size 9, on AC//14/40/5 > trialing PS   CTAB, Tachypnic, RR 30s.     Cardiovascular : S1, S2. RRR. Minimal peripheral edema   Cardiac Rhythm: Normal Sinus Rhythm    GI: Abdomen soft, Non-tender, minimal distention.   20Fr PEG, 2cm at skin, dressing C/D/I   TF at goal 55cc/hr   Dignishield placed for loose stool    Extremities: Extremities warm, pink, well-perfused.     Derm: Good skin turgor, no skin breakdown.      : Condom catheter      CXR:       LABS:  CAPILLARY BLOOD GLUCOSE      POCT Blood Glucose.: 182 mg/dL (25 Dec 2023 04:31)  POCT Blood Glucose.: 195 mg/dL (25 Dec 2023 00:54)  POCT Blood Glucose.: 190 mg/dL (24 Dec 2023 19:58)  POCT Blood Glucose.: 208 mg/dL (24 Dec 2023 17:42)  POCT Blood Glucose.: 215 mg/dL (24 Dec 2023 14:10)  POCT Blood Glucose.: 196 mg/dL (24 Dec 2023 10:38)                          7.6    13.22 )-----------( 335      ( 24 Dec 2023 19:19 )             23.6       12-25    145  |  114<H>  |  65<HH>  ----------------------------<  177<H>  3.8   |  18  |  2.7<H>    Ca    7.2<L>      25 Dec 2023 04:55  Phos  3.7     12-25  Mg     2.9     12-25    TPro  x   /  Alb  2.4<L>  /  TBili  x   /  DBili  x   /  AST  x   /  ALT  x   /  AlkPhos  x   12-25            Urinalysis Basic - ( 25 Dec 2023 04:55 )    Color: x / Appearance: x / SG: x / pH: x  Gluc: 177 mg/dL / Ketone: x  / Bili: x / Urobili: x   Blood: x / Protein: x / Nitrite: x   Leuk Esterase: x / RBC: x / WBC x   Sq Epi: x / Non Sq Epi: x / Bacteria: x        Culture - Sputum (collected 22 Dec 2023 10:40)  Source: Trach Asp Tracheal Aspirate  Gram Stain (23 Dec 2023 03:44):    Moderate polymorphonuclear leukocytes per low power field    No Squamous epithelial cells per low power field    Moderate Gram positive cocci in pairs per oil power field  Final Report (24 Dec 2023 17:26):    Numerous Staphylococcus aureus  Organism: Staphylococcus aureus (24 Dec 2023 17:26)  Organism: Staphylococcus aureus (24 Dec 2023 17:26)

## 2023-12-25 NOTE — PROGRESS NOTE ADULT - ATTENDING COMMENTS
This patient has a high probability of sudden, clinically significant deterioration, which requires the highest level of physician preparedness to intervene urgently. I managed/supervised life or organ supporting interventions that required frequent physician assessment. I devoted my full attention in the ICU to the direct care of this patient for the period of time indicated below. Time I spent with the family or surrogate(s) is included only if the patient was incapable of providing the necessary information or participating in medical decision making. Time devoted to teaching and to any procedures I billed separately is not included.     Patient is examined and evaluated at the bedside with SICU team. Treatment plan discussed with SICU team, nurses and primary team.   Chest X-ray and all relevant studies reviewed during rounds.  Will continue hemodynamic monitoring as per protocol in SICU.    24h events: vent weans, tolerating pressure support for 6-8 hours, fevers to 101 sporadically    Neuro: GCS [id number]  Medications - tylenol standing for pain/fevers, valproic acid for possible focal seizures after SDH  Studies - none recent  Q4H neurochecks  Hx of CVA with residual R-sided deficit    Respiratory:   Mode: CPAP with PS, FiO2: 40, PEEP: 5, PS: 5, PIP: 12  ABG - ( 25 Dec 2023 07:19 )  pH, Arterial: 7.42  pH, Blood: x     /  pCO2: 30    /  pO2: 123   / HCO3: 20    / Base Excess: -3.9  /  SaO2: 100.0   Medications - albuterol, ipratropium  CXR reviewed and interpreted by me - possible new congestion noted in RLL  S/p tracheostomy on 12/22  Vent weans as able - will attempt T piece or trach collar for a short duration today    CV: hemodynamics acceptable  Medications - labetalol, amlodipine, atorvastatin  Home medications - coreg, valsartan, atorvastatin  Studies - Echo December 2023 - EF 66% with grade 1 diastolic dysfunction    GI: abd s/nt/nd, PEG @ 2.5cm at skin edge  Last BM - very loose stools with rectal tube  Nutrition - TF @ 55cc/h (goal 70cc/h) for 18 h/day  Medications - protonix  Ppx - protonix (prophylaxis)      Renal: Continue I&Os monitoring, replete electrolytes as needed  12-25    145  |  114<H>  |  65<HH>  ----------------------------<  177<H>  3.8   |  18  |  2.7<H>    Ca    7.2<L>      25 Dec 2023 04:55  Phos  3.7     12-25  Mg     2.9     12-25    TPro  x   /  Alb  2.4<L>  /  TBili  x   /  DBili  x   /  AST  x   /  ALT  x   /  AlkPhos  x   12-25    UOP - 1875 over 24h  I/O - 1515/2275  Give Lasix with metolazone  Sevelamer to prevent hyperphosphatemia  Doxazosin for hx of BPH    Heme: continue to evaluate for acute blood loss anemia- trend Hg/Hct                         7.6    13.22 )-----------( 335      ( 24 Dec 2023 19:19 )             23.6     Anticoagulation - SQH  ASA-81    ID: Trend leukocytosis, monitor fevers  Recent culture data - Respiratory cx with MSSA, will obtain urinalysis  Antibiotics - Ancef (plan to stop tomorrow)    Endocrine: Prevent and treat hyperglycemia with insulin as needed, lantus 15 units QHS--> increase to 18 units, SSI    PV: follow pulse exam    MSK: PT/OT when able, mobilize when able    Skin: decub precautions    Lines: PIV, RUE Midline, R radial arterial line, trach, PEG  DVT Prophylaxis: SQH  Stress Gastritis Prophylaxis: protonix   Disposition: SICU    I saw and evaluated the patient personally. I have reviewed and agree with note above. Treatment plan discussed with SICU team, nurses and primary team at the time of the multidisciplinary rounds.     Isidro Montemayor MD  Trauma/ACS/SCC Attending This patient has a high probability of sudden, clinically significant deterioration, which requires the highest level of physician preparedness to intervene urgently. I managed/supervised life or organ supporting interventions that required frequent physician assessment. I devoted my full attention in the ICU to the direct care of this patient for the period of time indicated below. Time I spent with the family or surrogate(s) is included only if the patient was incapable of providing the necessary information or participating in medical decision making. Time devoted to teaching and to any procedures I billed separately is not included.     Patient is examined and evaluated at the bedside with SICU team. Treatment plan discussed with SICU team, nurses and primary team.   Chest X-ray and all relevant studies reviewed during rounds.  Will continue hemodynamic monitoring as per protocol in SICU.    24h events: vent weans, tolerating pressure support for 6-8 hours, fevers to 101 sporadically    Neuro: GCS [id number]  Medications - tylenol standing for pain/fevers, valproic acid for possible focal seizures after SDH  Studies - none recent  Q4H neurochecks  Hx of CVA with residual R-sided deficit    Respiratory:   Mode: CPAP with PS, FiO2: 40, PEEP: 5, PS: 5, PIP: 12  ABG - ( 25 Dec 2023 07:19 )  pH, Arterial: 7.42  pH, Blood: x     /  pCO2: 30    /  pO2: 123   / HCO3: 20    / Base Excess: -3.9  /  SaO2: 100.0   Medications - albuterol, ipratropium  CXR reviewed and interpreted by me - possible new congestion noted in RLL  S/p tracheostomy on 12/22  Vent weans as able - will attempt T piece or trach collar for a short duration today    CV: hemodynamics acceptable  Medications - labetalol, amlodipine, atorvastatin  Home medications - coreg, valsartan, atorvastatin  Studies - Echo December 2023 - EF 66% with grade 1 diastolic dysfunction    GI: abd s/nt/nd, PEG @ 2.5cm at skin edge  Last BM - very loose stools with rectal tube  Nutrition - TF @ 55cc/h (goal 70cc/h) for 18 h/day  Medications - protonix  Ppx - protonix (prophylaxis)      Renal: Continue I&Os monitoring, replete electrolytes as needed  12-25    145  |  114<H>  |  65<HH>  ----------------------------<  177<H>  3.8   |  18  |  2.7<H>    Ca    7.2<L>      25 Dec 2023 04:55  Phos  3.7     12-25  Mg     2.9     12-25    TPro  x   /  Alb  2.4<L>  /  TBili  x   /  DBili  x   /  AST  x   /  ALT  x   /  AlkPhos  x   12-25    UOP - 1875 over 24h  I/O - 1515/2275  Give Lasix with metolazone  Sevelamer to prevent hyperphosphatemia  Doxazosin for hx of BPH    Heme: continue to evaluate for acute blood loss anemia- trend Hg/Hct                         7.6    13.22 )-----------( 335      ( 24 Dec 2023 19:19 )             23.6     Anticoagulation - SQH  ASA-81    ID: Trend leukocytosis, monitor fevers  Recent culture data - Respiratory cx with MSSA, will obtain urinalysis  Antibiotics - Ancef (plan to stop tomorrow)    Endocrine: Prevent and treat hyperglycemia with insulin as needed, lantus 15 units QHS--> increase to 18 units, SSI    PV: follow pulse exam    MSK: PT/OT when able, mobilize when able    Skin: decub precautions    Lines: PIV, RUE Midline, R radial arterial line, trach, PEG  DVT Prophylaxis: SQH  Stress Gastritis Prophylaxis: protonix   Disposition: SICU    I saw and evaluated the patient personally. I have reviewed and agree with note above. Treatment plan discussed with SICU team, nurses and primary team at the time of the multidisciplinary rounds.     Isidro Montemayor MD  Trauma/ACS/SCC Attending

## 2023-12-26 LAB
ANION GAP SERPL CALC-SCNC: 12 MMOL/L — SIGNIFICANT CHANGE UP (ref 7–14)
ANION GAP SERPL CALC-SCNC: 12 MMOL/L — SIGNIFICANT CHANGE UP (ref 7–14)
ANION GAP SERPL CALC-SCNC: 13 MMOL/L — SIGNIFICANT CHANGE UP (ref 7–14)
ANION GAP SERPL CALC-SCNC: 13 MMOL/L — SIGNIFICANT CHANGE UP (ref 7–14)
BASE EXCESS BLDA CALC-SCNC: -1.5 MMOL/L — SIGNIFICANT CHANGE UP (ref -2–3)
BASE EXCESS BLDA CALC-SCNC: -1.5 MMOL/L — SIGNIFICANT CHANGE UP (ref -2–3)
BASOPHILS # BLD AUTO: 0.08 K/UL — SIGNIFICANT CHANGE UP (ref 0–0.2)
BASOPHILS # BLD AUTO: 0.08 K/UL — SIGNIFICANT CHANGE UP (ref 0–0.2)
BASOPHILS NFR BLD AUTO: 0.5 % — SIGNIFICANT CHANGE UP (ref 0–1)
BASOPHILS NFR BLD AUTO: 0.5 % — SIGNIFICANT CHANGE UP (ref 0–1)
BUN SERPL-MCNC: 72 MG/DL — CRITICAL HIGH (ref 10–20)
BUN SERPL-MCNC: 72 MG/DL — CRITICAL HIGH (ref 10–20)
BUN SERPL-MCNC: 74 MG/DL — CRITICAL HIGH (ref 10–20)
BUN SERPL-MCNC: 74 MG/DL — CRITICAL HIGH (ref 10–20)
CALCIUM SERPL-MCNC: 7.9 MG/DL — LOW (ref 8.4–10.4)
CALCIUM SERPL-MCNC: 7.9 MG/DL — LOW (ref 8.4–10.4)
CALCIUM SERPL-MCNC: 8 MG/DL — LOW (ref 8.4–10.5)
CALCIUM SERPL-MCNC: 8 MG/DL — LOW (ref 8.4–10.5)
CHLORIDE SERPL-SCNC: 113 MMOL/L — HIGH (ref 98–110)
CHLORIDE SERPL-SCNC: 113 MMOL/L — HIGH (ref 98–110)
CHLORIDE SERPL-SCNC: 116 MMOL/L — HIGH (ref 98–110)
CHLORIDE SERPL-SCNC: 116 MMOL/L — HIGH (ref 98–110)
CO2 SERPL-SCNC: 21 MMOL/L — SIGNIFICANT CHANGE UP (ref 17–32)
CO2 SERPL-SCNC: 21 MMOL/L — SIGNIFICANT CHANGE UP (ref 17–32)
CO2 SERPL-SCNC: 22 MMOL/L — SIGNIFICANT CHANGE UP (ref 17–32)
CO2 SERPL-SCNC: 22 MMOL/L — SIGNIFICANT CHANGE UP (ref 17–32)
CREAT SERPL-MCNC: 2.7 MG/DL — HIGH (ref 0.7–1.5)
CREAT SERPL-MCNC: 2.7 MG/DL — HIGH (ref 0.7–1.5)
CREAT SERPL-MCNC: 2.9 MG/DL — HIGH (ref 0.7–1.5)
CREAT SERPL-MCNC: 2.9 MG/DL — HIGH (ref 0.7–1.5)
EGFR: 21 ML/MIN/1.73M2 — LOW
EGFR: 21 ML/MIN/1.73M2 — LOW
EGFR: 23 ML/MIN/1.73M2 — LOW
EGFR: 23 ML/MIN/1.73M2 — LOW
EOSINOPHIL # BLD AUTO: 0.34 K/UL — SIGNIFICANT CHANGE UP (ref 0–0.7)
EOSINOPHIL # BLD AUTO: 0.34 K/UL — SIGNIFICANT CHANGE UP (ref 0–0.7)
EOSINOPHIL NFR BLD AUTO: 2.3 % — SIGNIFICANT CHANGE UP (ref 0–8)
EOSINOPHIL NFR BLD AUTO: 2.3 % — SIGNIFICANT CHANGE UP (ref 0–8)
GLUCOSE SERPL-MCNC: 170 MG/DL — HIGH (ref 70–99)
GLUCOSE SERPL-MCNC: 170 MG/DL — HIGH (ref 70–99)
GLUCOSE SERPL-MCNC: 204 MG/DL — HIGH (ref 70–99)
GLUCOSE SERPL-MCNC: 204 MG/DL — HIGH (ref 70–99)
HCO3 BLDA-SCNC: 21 MMOL/L — SIGNIFICANT CHANGE UP (ref 21–28)
HCO3 BLDA-SCNC: 21 MMOL/L — SIGNIFICANT CHANGE UP (ref 21–28)
HCT VFR BLD CALC: 24.3 % — LOW (ref 42–52)
HCT VFR BLD CALC: 24.3 % — LOW (ref 42–52)
HGB BLD-MCNC: 7.7 G/DL — LOW (ref 14–18)
HGB BLD-MCNC: 7.7 G/DL — LOW (ref 14–18)
IMM GRANULOCYTES NFR BLD AUTO: 17.8 % — HIGH (ref 0.1–0.3)
IMM GRANULOCYTES NFR BLD AUTO: 17.8 % — HIGH (ref 0.1–0.3)
LYMPHOCYTES # BLD AUTO: 1.01 K/UL — LOW (ref 1.2–3.4)
LYMPHOCYTES # BLD AUTO: 1.01 K/UL — LOW (ref 1.2–3.4)
LYMPHOCYTES # BLD AUTO: 6.8 % — LOW (ref 20.5–51.1)
LYMPHOCYTES # BLD AUTO: 6.8 % — LOW (ref 20.5–51.1)
MAGNESIUM SERPL-MCNC: 3 MG/DL — HIGH (ref 1.8–2.4)
MAGNESIUM SERPL-MCNC: 3 MG/DL — HIGH (ref 1.8–2.4)
MCHC RBC-ENTMCNC: 29.1 PG — SIGNIFICANT CHANGE UP (ref 27–31)
MCHC RBC-ENTMCNC: 29.1 PG — SIGNIFICANT CHANGE UP (ref 27–31)
MCHC RBC-ENTMCNC: 31.7 G/DL — LOW (ref 32–37)
MCHC RBC-ENTMCNC: 31.7 G/DL — LOW (ref 32–37)
MCV RBC AUTO: 91.7 FL — SIGNIFICANT CHANGE UP (ref 80–94)
MCV RBC AUTO: 91.7 FL — SIGNIFICANT CHANGE UP (ref 80–94)
MONOCYTES # BLD AUTO: 1.5 K/UL — HIGH (ref 0.1–0.6)
MONOCYTES # BLD AUTO: 1.5 K/UL — HIGH (ref 0.1–0.6)
MONOCYTES NFR BLD AUTO: 10.1 % — HIGH (ref 1.7–9.3)
MONOCYTES NFR BLD AUTO: 10.1 % — HIGH (ref 1.7–9.3)
NEUTROPHILS # BLD AUTO: 9.33 K/UL — HIGH (ref 1.4–6.5)
NEUTROPHILS # BLD AUTO: 9.33 K/UL — HIGH (ref 1.4–6.5)
NEUTROPHILS NFR BLD AUTO: 62.5 % — SIGNIFICANT CHANGE UP (ref 42.2–75.2)
NEUTROPHILS NFR BLD AUTO: 62.5 % — SIGNIFICANT CHANGE UP (ref 42.2–75.2)
NRBC # BLD: 0 /100 WBCS — SIGNIFICANT CHANGE UP (ref 0–0)
NRBC # BLD: 0 /100 WBCS — SIGNIFICANT CHANGE UP (ref 0–0)
PCO2 BLDA: 29 MMHG — LOW (ref 35–48)
PCO2 BLDA: 29 MMHG — LOW (ref 35–48)
PH BLDA: 7.47 — HIGH (ref 7.35–7.45)
PH BLDA: 7.47 — HIGH (ref 7.35–7.45)
PHOSPHATE SERPL-MCNC: 3.9 MG/DL — SIGNIFICANT CHANGE UP (ref 2.1–4.9)
PHOSPHATE SERPL-MCNC: 3.9 MG/DL — SIGNIFICANT CHANGE UP (ref 2.1–4.9)
PLATELET # BLD AUTO: 459 K/UL — HIGH (ref 130–400)
PLATELET # BLD AUTO: 459 K/UL — HIGH (ref 130–400)
PMV BLD: 9 FL — SIGNIFICANT CHANGE UP (ref 7.4–10.4)
PMV BLD: 9 FL — SIGNIFICANT CHANGE UP (ref 7.4–10.4)
PO2 BLDA: 142 MMHG — HIGH (ref 83–108)
PO2 BLDA: 142 MMHG — HIGH (ref 83–108)
POTASSIUM SERPL-MCNC: 4.1 MMOL/L — SIGNIFICANT CHANGE UP (ref 3.5–5)
POTASSIUM SERPL-MCNC: 4.1 MMOL/L — SIGNIFICANT CHANGE UP (ref 3.5–5)
POTASSIUM SERPL-MCNC: 4.3 MMOL/L — SIGNIFICANT CHANGE UP (ref 3.5–5)
POTASSIUM SERPL-MCNC: 4.3 MMOL/L — SIGNIFICANT CHANGE UP (ref 3.5–5)
POTASSIUM SERPL-SCNC: 4.1 MMOL/L — SIGNIFICANT CHANGE UP (ref 3.5–5)
POTASSIUM SERPL-SCNC: 4.1 MMOL/L — SIGNIFICANT CHANGE UP (ref 3.5–5)
POTASSIUM SERPL-SCNC: 4.3 MMOL/L — SIGNIFICANT CHANGE UP (ref 3.5–5)
POTASSIUM SERPL-SCNC: 4.3 MMOL/L — SIGNIFICANT CHANGE UP (ref 3.5–5)
RBC # BLD: 2.65 M/UL — LOW (ref 4.7–6.1)
RBC # BLD: 2.65 M/UL — LOW (ref 4.7–6.1)
RBC # FLD: 14.6 % — HIGH (ref 11.5–14.5)
RBC # FLD: 14.6 % — HIGH (ref 11.5–14.5)
SAO2 % BLDA: 99.3 % — HIGH (ref 94–98)
SAO2 % BLDA: 99.3 % — HIGH (ref 94–98)
SODIUM SERPL-SCNC: 147 MMOL/L — HIGH (ref 135–146)
SODIUM SERPL-SCNC: 147 MMOL/L — HIGH (ref 135–146)
SODIUM SERPL-SCNC: 150 MMOL/L — HIGH (ref 135–146)
SODIUM SERPL-SCNC: 150 MMOL/L — HIGH (ref 135–146)
WBC # BLD: 14.92 K/UL — HIGH (ref 4.8–10.8)
WBC # BLD: 14.92 K/UL — HIGH (ref 4.8–10.8)
WBC # FLD AUTO: 14.92 K/UL — HIGH (ref 4.8–10.8)
WBC # FLD AUTO: 14.92 K/UL — HIGH (ref 4.8–10.8)

## 2023-12-26 PROCEDURE — 71045 X-RAY EXAM CHEST 1 VIEW: CPT | Mod: 26

## 2023-12-26 PROCEDURE — 99291 CRITICAL CARE FIRST HOUR: CPT

## 2023-12-26 RX ORDER — INSULIN GLARGINE 100 [IU]/ML
20 INJECTION, SOLUTION SUBCUTANEOUS AT BEDTIME
Refills: 0 | Status: DISCONTINUED | OUTPATIENT
Start: 2023-12-26 | End: 2023-12-29

## 2023-12-26 RX ORDER — SENNA PLUS 8.6 MG/1
2 TABLET ORAL EVERY 12 HOURS
Refills: 0 | Status: DISCONTINUED | OUTPATIENT
Start: 2023-12-26 | End: 2023-12-26

## 2023-12-26 RX ADMIN — Medication 81 MILLIGRAM(S): at 11:26

## 2023-12-26 RX ADMIN — Medication 100 MILLIGRAM(S): at 21:58

## 2023-12-26 RX ADMIN — Medication 1 PUFF(S): at 13:12

## 2023-12-26 RX ADMIN — Medication 650 MILLIGRAM(S): at 06:00

## 2023-12-26 RX ADMIN — SEVELAMER CARBONATE 800 MILLIGRAM(S): 2400 POWDER, FOR SUSPENSION ORAL at 08:52

## 2023-12-26 RX ADMIN — Medication 1 APPLICATION(S): at 05:15

## 2023-12-26 RX ADMIN — Medication 3: at 17:43

## 2023-12-26 RX ADMIN — Medication 1 PUFF(S): at 20:07

## 2023-12-26 RX ADMIN — HEPARIN SODIUM 5000 UNIT(S): 5000 INJECTION INTRAVENOUS; SUBCUTANEOUS at 05:15

## 2023-12-26 RX ADMIN — SEVELAMER CARBONATE 800 MILLIGRAM(S): 2400 POWDER, FOR SUSPENSION ORAL at 17:31

## 2023-12-26 RX ADMIN — Medication 3: at 13:57

## 2023-12-26 RX ADMIN — Medication 650 MILLIGRAM(S): at 00:39

## 2023-12-26 RX ADMIN — Medication 650 MILLIGRAM(S): at 00:00

## 2023-12-26 RX ADMIN — CHLORHEXIDINE GLUCONATE 1 APPLICATION(S): 213 SOLUTION TOPICAL at 05:18

## 2023-12-26 RX ADMIN — Medication 650 MILLIGRAM(S): at 05:15

## 2023-12-26 RX ADMIN — Medication 1 APPLICATION(S): at 17:35

## 2023-12-26 RX ADMIN — ALBUTEROL 90 PUFF(S): 90 AEROSOL, METERED ORAL at 15:19

## 2023-12-26 RX ADMIN — PANTOPRAZOLE SODIUM 40 MILLIGRAM(S): 20 TABLET, DELAYED RELEASE ORAL at 05:15

## 2023-12-26 RX ADMIN — Medication 500 MILLIGRAM(S): at 05:16

## 2023-12-26 RX ADMIN — Medication 650 MILLIGRAM(S): at 12:26

## 2023-12-26 RX ADMIN — Medication 650 MILLIGRAM(S): at 17:31

## 2023-12-26 RX ADMIN — Medication 1 PUFF(S): at 07:58

## 2023-12-26 RX ADMIN — ATORVASTATIN CALCIUM 20 MILLIGRAM(S): 80 TABLET, FILM COATED ORAL at 21:58

## 2023-12-26 RX ADMIN — Medication 100 MILLIGRAM(S): at 13:54

## 2023-12-26 RX ADMIN — INSULIN GLARGINE 20 UNIT(S): 100 INJECTION, SOLUTION SUBCUTANEOUS at 22:08

## 2023-12-26 RX ADMIN — Medication 4 MILLIGRAM(S): at 21:58

## 2023-12-26 RX ADMIN — Medication 9: at 05:27

## 2023-12-26 RX ADMIN — Medication 650 MILLIGRAM(S): at 11:26

## 2023-12-26 RX ADMIN — AMLODIPINE BESYLATE 10 MILLIGRAM(S): 2.5 TABLET ORAL at 05:16

## 2023-12-26 RX ADMIN — Medication 100 MILLIGRAM(S): at 05:16

## 2023-12-26 RX ADMIN — CHLORHEXIDINE GLUCONATE 15 MILLILITER(S): 213 SOLUTION TOPICAL at 17:34

## 2023-12-26 RX ADMIN — HEPARIN SODIUM 5000 UNIT(S): 5000 INJECTION INTRAVENOUS; SUBCUTANEOUS at 13:53

## 2023-12-26 RX ADMIN — CHLORHEXIDINE GLUCONATE 15 MILLILITER(S): 213 SOLUTION TOPICAL at 05:17

## 2023-12-26 RX ADMIN — Medication 500 MILLIGRAM(S): at 17:33

## 2023-12-26 RX ADMIN — Medication 650 MILLIGRAM(S): at 18:30

## 2023-12-26 RX ADMIN — Medication 100 MILLIGRAM(S): at 05:17

## 2023-12-26 RX ADMIN — HEPARIN SODIUM 5000 UNIT(S): 5000 INJECTION INTRAVENOUS; SUBCUTANEOUS at 21:57

## 2023-12-26 RX ADMIN — ALBUTEROL 90 PUFF(S): 90 AEROSOL, METERED ORAL at 12:09

## 2023-12-26 RX ADMIN — SEVELAMER CARBONATE 800 MILLIGRAM(S): 2400 POWDER, FOR SUSPENSION ORAL at 11:52

## 2023-12-26 RX ADMIN — ALBUTEROL 90 PUFF(S): 90 AEROSOL, METERED ORAL at 07:58

## 2023-12-26 RX ADMIN — Medication 7: at 21:56

## 2023-12-26 RX ADMIN — ALBUTEROL 90 PUFF(S): 90 AEROSOL, METERED ORAL at 20:06

## 2023-12-26 RX ADMIN — Medication 100 MILLIGRAM(S): at 13:53

## 2023-12-26 NOTE — PROGRESS NOTE ADULT - SUBJECTIVE AND OBJECTIVE BOX
JACQUELIN CAI   [id number]   [birth year]  78yM    Admit Date: 12-16-23  Indication for SDU/SICU: Q1 neuro checks        ============================  24 Hour Events  12/25  OVN:  - 8pm rounds: Abd mildly distended, soft, binder in place, G-tube in place 3cm at level of dressing, b/l LE +1 pitting edema, pulses palpable wwp. Comfortable in bed, HDS     AM   -started labetalol 100mg q8  -changed TF to 18hr (was on 24 hrs)   -Trach collar trials > T piece 4 hours   -nutrition consult tomorrow for possible formula change 2/2 persistent diarrhea requiring dignishield   -lasix 20 IVP & metalazone 5 if tolerates > -1.4/shift   -f/u sevelamer powder for NGT > pharmacy will put order in   -UA negative  -neuro: no longer trend valporic acid levels unless neuro clinical exam changes, keep 500 q12 PO valporic acid   -BMP 8PM       [X] A ten-point review of systems was otherwise negative except as noted above.  [  ] Due to altered mental status/intubation, subjective information was not attained from the patient. History was obtained, to the extent possible, from review of the chart and collateral sources of information.    =========================================================================================================================================   JACQUELIN CAI   [id number]   [birth year]  78yM    Admit Date: 12-16-23  Indication for SDU/SICU: Q1 neuro checks        ============================  24 Hour Events  12/25  OVN:  - 8pm rounds: Abd mildly distended, soft, binder in place, G-tube in place 3cm at level of dressing, b/l LE +1 pitting edema, pulses palpable wwp. Comfortable in bed, HDS     AM   -started labetalol 100mg q8  -changed TF to 18hr (was on 24 hrs)   -Trach collar trials > T piece 4 hours   -nutrition consult tomorrow for possible formula change 2/2 persistent diarrhea requiring dignishield   -lasix 20 IVP & metalazone 5 if tolerates > -1.4/shift   -f/u sevelamer powder for NGT > pharmacy will put order in   -UA negative  -neuro: no longer trend valporic acid levels unless neuro clinical exam changes, keep 500 q12 PO valporic acid   -BMP 8PM       [X] A ten-point review of systems was otherwise negative except as noted above.  [  ] Due to altered mental status/intubation, subjective information was not attained from the patient. History was obtained, to the extent possible, from review of the chart and collateral sources of information.    =========================================================================================================================================   JACQUELIN CAI   [id number]   [birth year]  78yM    Admit Date: 12-16-23  Indication for SDU/SICU: Q1 neuro checks        ============================  24 Hour Events  12/26  DAY  - Increased lantus to 18U for persistent hyPERglycemia      12/25  OVN:  - 8pm rounds: Abd mildly distended, soft, binder in place, G-tube in place 3cm at level of dressing, b/l LE +1 pitting edema, pulses palpable wwp. Comfortable in bed, HDS     AM   -started labetalol 100mg q8  -changed TF to 18hr (was on 24 hrs)   -Trach collar trials > T piece 4 hours   -nutrition consult tomorrow for possible formula change 2/2 persistent diarrhea requiring dignishield   -lasix 20 IVP & metalazone 5 if tolerates > -1.4/shift   -f/u sevelamer powder for NGT > pharmacy will put order in   -UA negative  -neuro: no longer trend valporic acid levels unless neuro clinical exam changes, keep 500 q12 PO valporic acid   -BMP 8PM       [X] A ten-point review of systems was otherwise negative except as noted above.  [  ] Due to altered mental status/intubation, subjective information was not attained from the patient. History was obtained, to the extent possible, from review of the chart and collateral sources of information.    =========================================================================================================================================    Daily     Daily     Diet, NPO with Tube Feed:   Tube Feeding Modality: Gastrostomy  Glucerna 1.2 Tonio  Total Volume for 24 Hours (mL): 1260  Continuous  Starting Tube Feed Rate mL per Hour: 55  Increase Tube Feed Rate by (mL): 10     Every 4 hours  Until Goal Tube Feed Rate (mL per Hour): 70  Tube Feed Duration (in Hours): 18  Tube Feed Start Time: 12:00  Tube Feed Stop Time: 06:00  Free Water Flush Instructions:  Please flush PEG with 50cc of sterile water before feeds start and 100cc of sterile water after feeds end (12-25-23 @ 11:14)      CURRENT MEDS:  Neurologic Medications  acetaminophen     Tablet .. 650 milliGRAM(s) Oral every 6 hours  valproic  acid Syrup 500 milliGRAM(s) Oral every 12 hours    Respiratory Medications  albuterol    90 MICROgram(s) HFA Inhaler 90 Puff(s) Inhalation every 4 hours  ipratropium 17 MICROgram(s) HFA Inhaler 1 Puff(s) Inhalation every 6 hours    Cardiovascular Medications  amLODIPine   Tablet 10 milliGRAM(s) Oral daily  doxazosin 4 milliGRAM(s) Oral at bedtime  labetalol 100 milliGRAM(s) Oral every 8 hours    Gastrointestinal Medications  pantoprazole  Injectable 40 milliGRAM(s) IV Push every 24 hours    Genitourinary Medications    Hematologic/Oncologic Medications  aspirin  chewable 81 milliGRAM(s) Enteral Tube daily  heparin   Injectable 5000 Unit(s) SubCutaneous every 8 hours    Antimicrobial/Immunologic Medications  ceFAZolin   IVPB 2000 milliGRAM(s) IV Intermittent every 8 hours    Endocrine/Metabolic Medications  atorvastatin 20 milliGRAM(s) Oral at bedtime  insulin glargine Injectable (LANTUS) 18 Unit(s) SubCutaneous at bedtime  insulin lispro (ADMELOG) corrective regimen sliding scale   SubCutaneous every 4 hours    Topical/Other Medications  bacitracin   Ointment 1 Application(s) Topical every 12 hours  chlorhexidine 0.12% Liquid 15 milliLiter(s) Oral Mucosa two times a day  chlorhexidine 2% Cloths 1 Application(s) Topical <User Schedule>  sevelamer carbonate Powder 800 milliGRAM(s) Oral three times a day with meals  vitamin A &amp; D Ointment 1 Application(s) Topical every 12 hours      ICU Vital Signs Last 24 Hrs  T(C): 37.7 (26 Dec 2023 08:00), Max: 37.7 (26 Dec 2023 08:00)  T(F): 99.8 (26 Dec 2023 08:00), Max: 99.8 (26 Dec 2023 08:00)  HR: 83 (26 Dec 2023 09:00) (78 - 99)  BP: 123/58 (26 Dec 2023 09:00) (110/53 - 150/68)  BP(mean): 84 (26 Dec 2023 09:00) (76 - 98)  ABP: 143/36 (26 Dec 2023 09:00) (116/33 - 169/44)  ABP(mean): 64 (26 Dec 2023 09:00) (57 - 78)  RR: 24 (26 Dec 2023 09:00) (15 - 27)  SpO2: 100% (26 Dec 2023 09:00) (96% - 100%)    O2 Parameters below as of 26 Dec 2023 08:00  Patient On (Oxygen Delivery Method): T-piece            Mode: standby  FiO2: 40    ABG - ( 26 Dec 2023 04:30 )  pH, Arterial: 7.47  pH, Blood: x     /  pCO2: 29    /  pO2: 142   / HCO3: 21    / Base Excess: -1.5  /  SaO2: 99.3                I&O's Summary    25 Dec 2023 07:01  -  26 Dec 2023 07:00  --------------------------------------------------------  IN: 1380 mL / OUT: 3565 mL / NET: -2185 mL    26 Dec 2023 07:01  -  26 Dec 2023 10:57  --------------------------------------------------------  IN: 0 mL / OUT: 775 mL / NET: -775 mL      I&O's Detail    25 Dec 2023 07:01  -  26 Dec 2023 07:00  --------------------------------------------------------  IN:    Enteral Tube Flush: 50 mL    Glucerna: 1180 mL    IV PiggyBack: 150 mL  Total IN: 1380 mL    OUT:    Rectal Tube (mL): 600 mL    Voided (mL): 2965 mL  Total OUT: 3565 mL    Total NET: -2185 mL      26 Dec 2023 07:01  -  26 Dec 2023 10:57  --------------------------------------------------------  IN:  Total IN: 0 mL    OUT:    Rectal Tube (mL): 300 mL    Voided (mL): 475 mL  Total OUT: 775 mL    Total NET: -775 mL          PHYSICAL EXAM:    General/Neuro: Pt awake, lying in bed. Appears comfortable. Pt did not follow commands with , will reassess with family at bedside. CNs grossly intact. Unable to assess strength d/t mental status.     Lungs: Minimally rhoncherous bilaterally  Cardiovascular : S1, S2.  Regular rate and rhythm. 2+ pitting edema throughout.  Cardiac Rhythm: Normal Sinus Rhythm  GI: Abdomen soft, Non-tender, mildly distended.    Gastrostomy tube in place.     Extremities: Extremities warm, pink, well-perfused. DP/PT 2+ bilaterally.    Derm: Skin dry, edematous.     : Voiding freely, using condom cath.       CXR: n/a    LABS:  CAPILLARY BLOOD GLUCOSE      POCT Blood Glucose.: 137 mg/dL (26 Dec 2023 10:05)  POCT Blood Glucose.: 241 mg/dL (26 Dec 2023 05:23)  POCT Blood Glucose.: 230 mg/dL (25 Dec 2023 22:47)  POCT Blood Glucose.: 188 mg/dL (25 Dec 2023 17:27)  POCT Blood Glucose.: 211 mg/dL (25 Dec 2023 14:20)                          7.6    13.63 )-----------( 391      ( 25 Dec 2023 20:11 )             23.6       12-25    149<H>  |  116<H>  |  69<HH>  ----------------------------<  171<H>  3.9   |  20  |  3.0<H>    Ca    7.5<L>      25 Dec 2023 20:11  Phos  3.8     12-25  Mg     2.9     12-25    TPro  x   /  Alb  2.4<L>  /  TBili  x   /  DBili  x   /  AST  x   /  ALT  x   /  AlkPhos  x   12-25            Urinalysis Basic - ( 25 Dec 2023 20:11 )    Color: x / Appearance: x / SG: x / pH: x  Gluc: 171 mg/dL / Ketone: x  / Bili: x / Urobili: x   Blood: x / Protein: x / Nitrite: x   Leuk Esterase: x / RBC: x / WBC x   Sq Epi: x / Non Sq Epi: x / Bacteria: x         JACQUELIN CAI   [id number]   [birth year]  78yM    Admit Date: 12-16-23  Indication for SDU/SICU: Q1 neuro checks        ============================  24 Hour Events  12/26  DAY  - Increased lantus to 18U for persistent hyPERglycemia      12/25  OVN:  - 8pm rounds: Abd mildly distended, soft, binder in place, G-tube in place 3cm at level of dressing, b/l LE +1 pitting edema, pulses palpable wwp. Comfortable in bed, HDS     AM   -started labetalol 100mg q8  -changed TF to 18hr (was on 24 hrs)   -Trach collar trials > T piece 4 hours   -nutrition consult tomorrow for possible formula change 2/2 persistent diarrhea requiring dignishield   -lasix 20 IVP & metalazone 5 if tolerates > -1.4/shift   -f/u sevelamer powder for NGT > pharmacy will put order in   -UA negative  -neuro: no longer trend valporic acid levels unless neuro clinical exam changes, keep 500 q12 PO valporic acid   -BMP 8PM       [X] A ten-point review of systems was otherwise negative except as noted above.  [  ] Due to altered mental status/intubation, subjective information was not attained from the patient. History was obtained, to the extent possible, from review of the chart and collateral sources of information.    =========================================================================================================================================    Daily     Daily     Diet, NPO with Tube Feed:   Tube Feeding Modality: Gastrostomy  Glucerna 1.2 Tonio  Total Volume for 24 Hours (mL): 1260  Continuous  Starting Tube Feed Rate mL per Hour: 55  Increase Tube Feed Rate by (mL): 10     Every 4 hours  Until Goal Tube Feed Rate (mL per Hour): 70  Tube Feed Duration (in Hours): 18  Tube Feed Start Time: 12:00  Tube Feed Stop Time: 06:00  Free Water Flush Instructions:  Please flush PEG with 50cc of sterile water before feeds start and 100cc of sterile water after feeds end (12-25-23 @ 11:14)      CURRENT MEDS:  Neurologic Medications  acetaminophen     Tablet .. 650 milliGRAM(s) Oral every 6 hours  valproic  acid Syrup 500 milliGRAM(s) Oral every 12 hours    Respiratory Medications  albuterol    90 MICROgram(s) HFA Inhaler 90 Puff(s) Inhalation every 4 hours  ipratropium 17 MICROgram(s) HFA Inhaler 1 Puff(s) Inhalation every 6 hours    Cardiovascular Medications  amLODIPine   Tablet 10 milliGRAM(s) Oral daily  doxazosin 4 milliGRAM(s) Oral at bedtime  labetalol 100 milliGRAM(s) Oral every 8 hours    Gastrointestinal Medications  pantoprazole  Injectable 40 milliGRAM(s) IV Push every 24 hours    Genitourinary Medications    Hematologic/Oncologic Medications  aspirin  chewable 81 milliGRAM(s) Enteral Tube daily  heparin   Injectable 5000 Unit(s) SubCutaneous every 8 hours    Antimicrobial/Immunologic Medications  ceFAZolin   IVPB 2000 milliGRAM(s) IV Intermittent every 8 hours    Endocrine/Metabolic Medications  atorvastatin 20 milliGRAM(s) Oral at bedtime  insulin glargine Injectable (LANTUS) 18 Unit(s) SubCutaneous at bedtime  insulin lispro (ADMELOG) corrective regimen sliding scale   SubCutaneous every 4 hours    Topical/Other Medications  bacitracin   Ointment 1 Application(s) Topical every 12 hours  chlorhexidine 0.12% Liquid 15 milliLiter(s) Oral Mucosa two times a day  chlorhexidine 2% Cloths 1 Application(s) Topical <User Schedule>  sevelamer carbonate Powder 800 milliGRAM(s) Oral three times a day with meals  vitamin A &amp; D Ointment 1 Application(s) Topical every 12 hours      ICU Vital Signs Last 24 Hrs  T(C): 37.7 (26 Dec 2023 08:00), Max: 37.7 (26 Dec 2023 08:00)  T(F): 99.8 (26 Dec 2023 08:00), Max: 99.8 (26 Dec 2023 08:00)  HR: 83 (26 Dec 2023 09:00) (78 - 99)  BP: 123/58 (26 Dec 2023 09:00) (110/53 - 150/68)  BP(mean): 84 (26 Dec 2023 09:00) (76 - 98)  ABP: 143/36 (26 Dec 2023 09:00) (116/33 - 169/44)  ABP(mean): 64 (26 Dec 2023 09:00) (57 - 78)  RR: 24 (26 Dec 2023 09:00) (15 - 27)  SpO2: 100% (26 Dec 2023 09:00) (96% - 100%)    O2 Parameters below as of 26 Dec 2023 08:00  Patient On (Oxygen Delivery Method): T-piece            Mode: standby  FiO2: 40    ABG - ( 26 Dec 2023 04:30 )  pH, Arterial: 7.47  pH, Blood: x     /  pCO2: 29    /  pO2: 142   / HCO3: 21    / Base Excess: -1.5  /  SaO2: 99.3                I&O's Summary    25 Dec 2023 07:01  -  26 Dec 2023 07:00  --------------------------------------------------------  IN: 1380 mL / OUT: 3565 mL / NET: -2185 mL    26 Dec 2023 07:01  -  26 Dec 2023 10:57  --------------------------------------------------------  IN: 0 mL / OUT: 775 mL / NET: -775 mL      I&O's Detail    25 Dec 2023 07:01  -  26 Dec 2023 07:00  --------------------------------------------------------  IN:    Enteral Tube Flush: 50 mL    Glucerna: 1180 mL    IV PiggyBack: 150 mL  Total IN: 1380 mL    OUT:    Rectal Tube (mL): 600 mL    Voided (mL): 2965 mL  Total OUT: 3565 mL    Total NET: -2185 mL      26 Dec 2023 07:01  -  26 Dec 2023 10:57  --------------------------------------------------------  IN:  Total IN: 0 mL    OUT:    Rectal Tube (mL): 300 mL    Voided (mL): 475 mL  Total OUT: 775 mL    Total NET: -775 mL          PHYSICAL EXAM:    General/Neuro: Pt awake, lying in bed. Appears comfortable. Pt did not follow commands with , will reassess with family at bedside. CNs grossly intact. Unable to assess strength d/t mental status.     Lungs: Minimally rhoncherous bilaterally  Cardiovascular : S1, S2.  Regular rate and rhythm. 2+ pitting edema throughout.  Cardiac Rhythm: Normal Sinus Rhythm  GI: Abdomen soft, Non-tender, mildly distended.    Gastrostomy tube in place.     Extremities: Extremities warm, pink, well-perfused. DP/PT 2+ bilaterally.    Derm: Skin dry, edematous.     : Voiding freely, using condom cath.       CXR: n/a    LABS:  CAPILLARY BLOOD GLUCOSE      POCT Blood Glucose.: 137 mg/dL (26 Dec 2023 10:05)  POCT Blood Glucose.: 241 mg/dL (26 Dec 2023 05:23)  POCT Blood Glucose.: 230 mg/dL (25 Dec 2023 22:47)  POCT Blood Glucose.: 188 mg/dL (25 Dec 2023 17:27)  POCT Blood Glucose.: 211 mg/dL (25 Dec 2023 14:20)                          7.6    13.63 )-----------( 391      ( 25 Dec 2023 20:11 )             23.6       12-25    149<H>  |  116<H>  |  69<HH>  ----------------------------<  171<H>  3.9   |  20  |  3.0<H>    Ca    7.5<L>      25 Dec 2023 20:11  Phos  3.8     12-25  Mg     2.9     12-25    TPro  x   /  Alb  2.4<L>  /  TBili  x   /  DBili  x   /  AST  x   /  ALT  x   /  AlkPhos  x   12-25            Urinalysis Basic - ( 25 Dec 2023 20:11 )    Color: x / Appearance: x / SG: x / pH: x  Gluc: 171 mg/dL / Ketone: x  / Bili: x / Urobili: x   Blood: x / Protein: x / Nitrite: x   Leuk Esterase: x / RBC: x / WBC x   Sq Epi: x / Non Sq Epi: x / Bacteria: x         JACQUELIN CAI   [id number]   [birth year]  78yM    Admit Date: 12-16-23  Indication for SDU/SICU: Q1 neuro checks        ============================  24 Hour Events  12/26  DAY  - Increased lantus to 18U for persistent hyPERglycemia  - metolazone 10mg via PEG x1 for hyPERnatremia      12/25  OVN:  - 8pm rounds: Abd mildly distended, soft, binder in place, G-tube in place 3cm at level of dressing, b/l LE +1 pitting edema, pulses palpable wwp. Comfortable in bed, HDS     AM   -started labetalol 100mg q8  -changed TF to 18hr (was on 24 hrs)   -Trach collar trials > T piece 4 hours   -nutrition consult tomorrow for possible formula change 2/2 persistent diarrhea requiring dignishield   -lasix 20 IVP & metalazone 5 if tolerates > -1.4/shift   -f/u sevelamer powder for NGT > pharmacy will put order in   -UA negative  -neuro: no longer trend valporic acid levels unless neuro clinical exam changes, keep 500 q12 PO valporic acid   -BMP 8PM       [X] A ten-point review of systems was otherwise negative except as noted above.  [  ] Due to altered mental status/intubation, subjective information was not attained from the patient. History was obtained, to the extent possible, from review of the chart and collateral sources of information.    =========================================================================================================================================    Daily     Daily     Diet, NPO with Tube Feed:   Tube Feeding Modality: Gastrostomy  Glucerna 1.2 Tonio  Total Volume for 24 Hours (mL): 1260  Continuous  Starting Tube Feed Rate mL per Hour: 55  Increase Tube Feed Rate by (mL): 10     Every 4 hours  Until Goal Tube Feed Rate (mL per Hour): 70  Tube Feed Duration (in Hours): 18  Tube Feed Start Time: 12:00  Tube Feed Stop Time: 06:00  Free Water Flush Instructions:  Please flush PEG with 50cc of sterile water before feeds start and 100cc of sterile water after feeds end (12-25-23 @ 11:14)      CURRENT MEDS:  Neurologic Medications  acetaminophen     Tablet .. 650 milliGRAM(s) Oral every 6 hours  valproic  acid Syrup 500 milliGRAM(s) Oral every 12 hours    Respiratory Medications  albuterol    90 MICROgram(s) HFA Inhaler 90 Puff(s) Inhalation every 4 hours  ipratropium 17 MICROgram(s) HFA Inhaler 1 Puff(s) Inhalation every 6 hours    Cardiovascular Medications  amLODIPine   Tablet 10 milliGRAM(s) Oral daily  doxazosin 4 milliGRAM(s) Oral at bedtime  labetalol 100 milliGRAM(s) Oral every 8 hours    Gastrointestinal Medications  pantoprazole  Injectable 40 milliGRAM(s) IV Push every 24 hours    Genitourinary Medications    Hematologic/Oncologic Medications  aspirin  chewable 81 milliGRAM(s) Enteral Tube daily  heparin   Injectable 5000 Unit(s) SubCutaneous every 8 hours    Antimicrobial/Immunologic Medications  ceFAZolin   IVPB 2000 milliGRAM(s) IV Intermittent every 8 hours    Endocrine/Metabolic Medications  atorvastatin 20 milliGRAM(s) Oral at bedtime  insulin glargine Injectable (LANTUS) 18 Unit(s) SubCutaneous at bedtime  insulin lispro (ADMELOG) corrective regimen sliding scale   SubCutaneous every 4 hours    Topical/Other Medications  bacitracin   Ointment 1 Application(s) Topical every 12 hours  chlorhexidine 0.12% Liquid 15 milliLiter(s) Oral Mucosa two times a day  chlorhexidine 2% Cloths 1 Application(s) Topical <User Schedule>  sevelamer carbonate Powder 800 milliGRAM(s) Oral three times a day with meals  vitamin A &amp; D Ointment 1 Application(s) Topical every 12 hours      ICU Vital Signs Last 24 Hrs  T(C): 37.7 (26 Dec 2023 08:00), Max: 37.7 (26 Dec 2023 08:00)  T(F): 99.8 (26 Dec 2023 08:00), Max: 99.8 (26 Dec 2023 08:00)  HR: 83 (26 Dec 2023 09:00) (78 - 99)  BP: 123/58 (26 Dec 2023 09:00) (110/53 - 150/68)  BP(mean): 84 (26 Dec 2023 09:00) (76 - 98)  ABP: 143/36 (26 Dec 2023 09:00) (116/33 - 169/44)  ABP(mean): 64 (26 Dec 2023 09:00) (57 - 78)  RR: 24 (26 Dec 2023 09:00) (15 - 27)  SpO2: 100% (26 Dec 2023 09:00) (96% - 100%)    O2 Parameters below as of 26 Dec 2023 08:00  Patient On (Oxygen Delivery Method): T-piece            Mode: standby  FiO2: 40    ABG - ( 26 Dec 2023 04:30 )  pH, Arterial: 7.47  pH, Blood: x     /  pCO2: 29    /  pO2: 142   / HCO3: 21    / Base Excess: -1.5  /  SaO2: 99.3                I&O's Summary    25 Dec 2023 07:01  -  26 Dec 2023 07:00  --------------------------------------------------------  IN: 1380 mL / OUT: 3565 mL / NET: -2185 mL    26 Dec 2023 07:01  -  26 Dec 2023 10:57  --------------------------------------------------------  IN: 0 mL / OUT: 775 mL / NET: -775 mL      I&O's Detail    25 Dec 2023 07:01  -  26 Dec 2023 07:00  --------------------------------------------------------  IN:    Enteral Tube Flush: 50 mL    Glucerna: 1180 mL    IV PiggyBack: 150 mL  Total IN: 1380 mL    OUT:    Rectal Tube (mL): 600 mL    Voided (mL): 2965 mL  Total OUT: 3565 mL    Total NET: -2185 mL      26 Dec 2023 07:01  -  26 Dec 2023 10:57  --------------------------------------------------------  IN:  Total IN: 0 mL    OUT:    Rectal Tube (mL): 300 mL    Voided (mL): 475 mL  Total OUT: 775 mL    Total NET: -775 mL          PHYSICAL EXAM:    General/Neuro: Pt awake, lying in bed. Appears comfortable. Pt did not follow commands with , will reassess with family at bedside. CNs grossly intact. Unable to assess strength d/t mental status.     Lungs: Minimally rhoncherous bilaterally  Cardiovascular : S1, S2.  Regular rate and rhythm. 2+ pitting edema throughout.  Cardiac Rhythm: Normal Sinus Rhythm  GI: Abdomen soft, Non-tender, mildly distended.    Gastrostomy tube in place.     Extremities: Extremities warm, pink, well-perfused. DP/PT 2+ bilaterally.    Derm: Skin dry, edematous.     : Voiding freely, using condom cath.     CXR: n/a    LABS:  CAPILLARY BLOOD GLUCOSE      POCT Blood Glucose.: 137 mg/dL (26 Dec 2023 10:05)  POCT Blood Glucose.: 241 mg/dL (26 Dec 2023 05:23)  POCT Blood Glucose.: 230 mg/dL (25 Dec 2023 22:47)  POCT Blood Glucose.: 188 mg/dL (25 Dec 2023 17:27)  POCT Blood Glucose.: 211 mg/dL (25 Dec 2023 14:20)                          7.6    13.63 )-----------( 391      ( 25 Dec 2023 20:11 )             23.6       12-25    149<H>  |  116<H>  |  69<HH>  ----------------------------<  171<H>  3.9   |  20  |  3.0<H>    Ca    7.5<L>      25 Dec 2023 20:11  Phos  3.8     12-25  Mg     2.9     12-25    TPro  x   /  Alb  2.4<L>  /  TBili  x   /  DBili  x   /  AST  x   /  ALT  x   /  AlkPhos  x   12-25            Urinalysis Basic - ( 25 Dec 2023 20:11 )    Color: x / Appearance: x / SG: x / pH: x  Gluc: 171 mg/dL / Ketone: x  / Bili: x / Urobili: x   Blood: x / Protein: x / Nitrite: x   Leuk Esterase: x / RBC: x / WBC x   Sq Epi: x / Non Sq Epi: x / Bacteria: x         JACQUELIN CAI   [id number]   [birth year]  78yM    Admit Date: 12-16-23  Indication for SDU/SICU: Q1 neuro checks        ============================  24 Hour Events  12/26  DAY  - Increased lantus to 18U for persistent hyPERglycemia  - metolazone 10mg via PEG x1 for hyPERnatremia      12/25  OVN:  - 8pm rounds: Abd mildly distended, soft, binder in place, G-tube in place 3cm at level of dressing, b/l LE +1 pitting edema, pulses palpable wwp. Comfortable in bed, HDS     AM   -started labetalol 100mg q8  -changed TF to 18hr (was on 24 hrs)   -Trach collar trials > T piece 4 hours   -nutrition consult tomorrow for possible formula change 2/2 persistent diarrhea requiring dignishield   -lasix 20 IVP & metalazone 5 if tolerates > -1.4/shift   -f/u sevelamer powder for NGT > pharmacy will put order in   -UA negative  -neuro: no longer trend valporic acid levels unless neuro clinical exam changes, keep 500 q12 PO valporic acid   -BMP 8PM       [X] A ten-point review of systems was otherwise negative except as noted above.  [  ] Due to altered mental status/intubation, subjective information was not attained from the patient. History was obtained, to the extent possible, from review of the chart and collateral sources of information.    =========================================================================================================================================    Daily     Daily     Diet, NPO with Tube Feed:   Tube Feeding Modality: Gastrostomy  Glucerna 1.2 Tonio  Total Volume for 24 Hours (mL): 1260  Continuous  Starting Tube Feed Rate mL per Hour: 55  Increase Tube Feed Rate by (mL): 10     Every 4 hours  Until Goal Tube Feed Rate (mL per Hour): 70  Tube Feed Duration (in Hours): 18  Tube Feed Start Time: 12:00  Tube Feed Stop Time: 06:00  Free Water Flush Instructions:  Please flush PEG with 50cc of sterile water before feeds start and 100cc of sterile water after feeds end (12-25-23 @ 11:14)      CURRENT MEDS:  Neurologic Medications  acetaminophen     Tablet .. 650 milliGRAM(s) Oral every 6 hours  valproic  acid Syrup 500 milliGRAM(s) Oral every 12 hours    Respiratory Medications  albuterol    90 MICROgram(s) HFA Inhaler 90 Puff(s) Inhalation every 4 hours  ipratropium 17 MICROgram(s) HFA Inhaler 1 Puff(s) Inhalation every 6 hours    Cardiovascular Medications  amLODIPine   Tablet 10 milliGRAM(s) Oral daily  doxazosin 4 milliGRAM(s) Oral at bedtime  labetalol 100 milliGRAM(s) Oral every 8 hours    Gastrointestinal Medications  pantoprazole  Injectable 40 milliGRAM(s) IV Push every 24 hours    Genitourinary Medications    Hematologic/Oncologic Medications  aspirin  chewable 81 milliGRAM(s) Enteral Tube daily  heparin   Injectable 5000 Unit(s) SubCutaneous every 8 hours    Antimicrobial/Immunologic Medications  ceFAZolin   IVPB 2000 milliGRAM(s) IV Intermittent every 8 hours    Endocrine/Metabolic Medications  atorvastatin 20 milliGRAM(s) Oral at bedtime  insulin glargine Injectable (LANTUS) 18 Unit(s) SubCutaneous at bedtime  insulin lispro (ADMELOG) corrective regimen sliding scale   SubCutaneous every 4 hours    Topical/Other Medications  bacitracin   Ointment 1 Application(s) Topical every 12 hours  chlorhexidine 0.12% Liquid 15 milliLiter(s) Oral Mucosa two times a day  chlorhexidine 2% Cloths 1 Application(s) Topical <User Schedule>  sevelamer carbonate Powder 800 milliGRAM(s) Oral three times a day with meals  vitamin A &amp; D Ointment 1 Application(s) Topical every 12 hours      ICU Vital Signs Last 24 Hrs  T(C): 37.7 (26 Dec 2023 08:00), Max: 37.7 (26 Dec 2023 08:00)  T(F): 99.8 (26 Dec 2023 08:00), Max: 99.8 (26 Dec 2023 08:00)  HR: 83 (26 Dec 2023 09:00) (78 - 99)  BP: 123/58 (26 Dec 2023 09:00) (110/53 - 150/68)  BP(mean): 84 (26 Dec 2023 09:00) (76 - 98)  ABP: 143/36 (26 Dec 2023 09:00) (116/33 - 169/44)  ABP(mean): 64 (26 Dec 2023 09:00) (57 - 78)  RR: 24 (26 Dec 2023 09:00) (15 - 27)  SpO2: 100% (26 Dec 2023 09:00) (96% - 100%)    O2 Parameters below as of 26 Dec 2023 08:00  Patient On (Oxygen Delivery Method): T-piece            Mode: standby  FiO2: 40    ABG - ( 26 Dec 2023 04:30 )  pH, Arterial: 7.47  pH, Blood: x     /  pCO2: 29    /  pO2: 142   / HCO3: 21    / Base Excess: -1.5  /  SaO2: 99.3                I&O's Summary    25 Dec 2023 07:01  -  26 Dec 2023 07:00  --------------------------------------------------------  IN: 1380 mL / OUT: 3565 mL / NET: -2185 mL    26 Dec 2023 07:01  -  26 Dec 2023 10:57  --------------------------------------------------------  IN: 0 mL / OUT: 775 mL / NET: -775 mL      I&O's Detail    25 Dec 2023 07:01  -  26 Dec 2023 07:00  --------------------------------------------------------  IN:    Enteral Tube Flush: 50 mL    Glucerna: 1180 mL    IV PiggyBack: 150 mL  Total IN: 1380 mL    OUT:    Rectal Tube (mL): 600 mL    Voided (mL): 2965 mL  Total OUT: 3565 mL    Total NET: -2185 mL      26 Dec 2023 07:01  -  26 Dec 2023 10:57  --------------------------------------------------------  IN:  Total IN: 0 mL    OUT:    Rectal Tube (mL): 300 mL    Voided (mL): 475 mL  Total OUT: 775 mL    Total NET: -775 mL          PHYSICAL EXAM:    General/Neuro: Pt awake, lying in bed. Appears comfortable. Pt did not follow commands with , will reassess with family at bedside. CNs grossly intact. Unable to assess strength d/t mental status.     Lungs: Minimally rhoncherous bilaterally  Cardiovascular : S1, S2.  Regular rate and rhythm. 2+ pitting edema throughout.  Cardiac Rhythm: Normal Sinus Rhythm  GI: Abdomen soft, Non-tender, mildly distended.    Gastrostomy tube in place.     Extremities: Extremities warm, pink, well-perfused. DP/PT 2+ bilaterally.    Derm: Skin dry, edematous.     : Voiding freely, using condom cath.     CXR: n/a    LABS:  CAPILLARY BLOOD GLUCOSE      POCT Blood Glucose.: 137 mg/dL (26 Dec 2023 10:05)  POCT Blood Glucose.: 241 mg/dL (26 Dec 2023 05:23)  POCT Blood Glucose.: 230 mg/dL (25 Dec 2023 22:47)  POCT Blood Glucose.: 188 mg/dL (25 Dec 2023 17:27)  POCT Blood Glucose.: 211 mg/dL (25 Dec 2023 14:20)                          7.6    13.63 )-----------( 391      ( 25 Dec 2023 20:11 )             23.6       12-25    149<H>  |  116<H>  |  69<HH>  ----------------------------<  171<H>  3.9   |  20  |  3.0<H>    Ca    7.5<L>      25 Dec 2023 20:11  Phos  3.8     12-25  Mg     2.9     12-25    TPro  x   /  Alb  2.4<L>  /  TBili  x   /  DBili  x   /  AST  x   /  ALT  x   /  AlkPhos  x   12-25            Urinalysis Basic - ( 25 Dec 2023 20:11 )    Color: x / Appearance: x / SG: x / pH: x  Gluc: 171 mg/dL / Ketone: x  / Bili: x / Urobili: x   Blood: x / Protein: x / Nitrite: x   Leuk Esterase: x / RBC: x / WBC x   Sq Epi: x / Non Sq Epi: x / Bacteria: x         JACQUELIN CAI   [id number]   [birth year]  78yM    Admit Date: 12-16-23  Indication for SDU/SICU: Q1 neuro checks        ============================  24 Hour Events  12/26  DAY  - Increased lantus to 18U for persistent hyPERglycemia  - metolazone 10mg via PEG x1 for hyPERnatremia      12/25  OVN:  - 8pm rounds: Abd mildly distended, soft, binder in place, G-tube in place 3cm at level of dressing, b/l LE +1 pitting edema, pulses palpable wwp. Comfortable in bed, HDS     AM   -started labetalol 100mg q8  -changed TF to 18hr (was on 24 hrs)   -Trach collar trials > T piece 4 hours   -nutrition consult tomorrow for possible formula change 2/2 persistent diarrhea requiring dignishield   -lasix 20 IVP & metalazone 5 if tolerates > -1.4/shift   -f/u sevelamer powder for NGT > pharmacy will put order in   -UA negative  -neuro: no longer trend valporic acid levels unless neuro clinical exam changes, keep 500 q12 PO valporic acid   -BMP 8PM       [X] A ten-point review of systems was otherwise negative except as noted above.  [  ] Due to altered mental status/intubation, subjective information was not attained from the patient. History was obtained, to the extent possible, from review of the chart and collateral sources of information.    =========================================================================================================================================    Daily     Daily     Diet, NPO with Tube Feed:   Tube Feeding Modality: Gastrostomy  Glucerna 1.2 Tonio  Total Volume for 24 Hours (mL): 1260  Continuous  Starting Tube Feed Rate mL per Hour: 55  Increase Tube Feed Rate by (mL): 10     Every 4 hours  Until Goal Tube Feed Rate (mL per Hour): 70  Tube Feed Duration (in Hours): 18  Tube Feed Start Time: 12:00  Tube Feed Stop Time: 06:00  Free Water Flush Instructions:  Please flush PEG with 50cc of sterile water before feeds start and 100cc of sterile water after feeds end (12-25-23 @ 11:14)      CURRENT MEDS:  Neurologic Medications  acetaminophen     Tablet .. 650 milliGRAM(s) Oral every 6 hours  valproic  acid Syrup 500 milliGRAM(s) Oral every 12 hours    Respiratory Medications  albuterol    90 MICROgram(s) HFA Inhaler 90 Puff(s) Inhalation every 4 hours  ipratropium 17 MICROgram(s) HFA Inhaler 1 Puff(s) Inhalation every 6 hours    Cardiovascular Medications  amLODIPine   Tablet 10 milliGRAM(s) Oral daily  doxazosin 4 milliGRAM(s) Oral at bedtime  labetalol 100 milliGRAM(s) Oral every 8 hours    Gastrointestinal Medications  pantoprazole  Injectable 40 milliGRAM(s) IV Push every 24 hours    Genitourinary Medications    Hematologic/Oncologic Medications  aspirin  chewable 81 milliGRAM(s) Enteral Tube daily  heparin   Injectable 5000 Unit(s) SubCutaneous every 8 hours    Antimicrobial/Immunologic Medications  ceFAZolin   IVPB 2000 milliGRAM(s) IV Intermittent every 8 hours    Endocrine/Metabolic Medications  atorvastatin 20 milliGRAM(s) Oral at bedtime  insulin glargine Injectable (LANTUS) 18 Unit(s) SubCutaneous at bedtime  insulin lispro (ADMELOG) corrective regimen sliding scale   SubCutaneous every 4 hours    Topical/Other Medications  bacitracin   Ointment 1 Application(s) Topical every 12 hours  chlorhexidine 0.12% Liquid 15 milliLiter(s) Oral Mucosa two times a day  chlorhexidine 2% Cloths 1 Application(s) Topical <User Schedule>  sevelamer carbonate Powder 800 milliGRAM(s) Oral three times a day with meals  vitamin A &amp; D Ointment 1 Application(s) Topical every 12 hours      ICU Vital Signs Last 24 Hrs  T(C): 37.7 (26 Dec 2023 08:00), Max: 37.7 (26 Dec 2023 08:00)  T(F): 99.8 (26 Dec 2023 08:00), Max: 99.8 (26 Dec 2023 08:00)  HR: 83 (26 Dec 2023 09:00) (78 - 99)  BP: 123/58 (26 Dec 2023 09:00) (110/53 - 150/68)  BP(mean): 84 (26 Dec 2023 09:00) (76 - 98)  ABP: 143/36 (26 Dec 2023 09:00) (116/33 - 169/44)  ABP(mean): 64 (26 Dec 2023 09:00) (57 - 78)  RR: 24 (26 Dec 2023 09:00) (15 - 27)  SpO2: 100% (26 Dec 2023 09:00) (96% - 100%)    O2 Parameters below as of 26 Dec 2023 08:00  Patient On (Oxygen Delivery Method): T-piece            Mode: standby  FiO2: 40    ABG - ( 26 Dec 2023 04:30 )  pH, Arterial: 7.47  pH, Blood: x     /  pCO2: 29    /  pO2: 142   / HCO3: 21    / Base Excess: -1.5  /  SaO2: 99.3                I&O's Summary    25 Dec 2023 07:01  -  26 Dec 2023 07:00  --------------------------------------------------------  IN: 1380 mL / OUT: 3565 mL / NET: -2185 mL    26 Dec 2023 07:01  -  26 Dec 2023 10:57  --------------------------------------------------------  IN: 0 mL / OUT: 775 mL / NET: -775 mL      I&O's Detail    25 Dec 2023 07:01  -  26 Dec 2023 07:00  --------------------------------------------------------  IN:    Enteral Tube Flush: 50 mL    Glucerna: 1180 mL    IV PiggyBack: 150 mL  Total IN: 1380 mL    OUT:    Rectal Tube (mL): 600 mL    Voided (mL): 2965 mL  Total OUT: 3565 mL    Total NET: -2185 mL      26 Dec 2023 07:01  -  26 Dec 2023 10:57  --------------------------------------------------------  IN:  Total IN: 0 mL    OUT:    Rectal Tube (mL): 300 mL    Voided (mL): 475 mL  Total OUT: 775 mL    Total NET: -775 mL          PHYSICAL EXAM:    General/Neuro: Pt awake, lying in bed. Appears comfortable. GCS: 9 E4/V1/M4. Pt did not follow commands with , no verbal/nonverbal communication per family. CNs grossly intact. Unable to assess strength d/t mental status.     Lungs: CTAB, currently on T piece  Cardiovascular : S1, S2.  Regular rate and rhythm. 2+ pitting edema throughout.  Cardiac Rhythm: Normal Sinus Rhythm  GI: Abdomen soft, Non-tender, mildly distended.    Gastrostomy tube in place.     Extremities: Extremities warm, pink, well-perfused. DP/PT 2+ bilaterally.    Derm: Skin dry, edematous.     : Voiding freely, using condom cath.     CXR: n/a    LABS:  CAPILLARY BLOOD GLUCOSE      POCT Blood Glucose.: 137 mg/dL (26 Dec 2023 10:05)  POCT Blood Glucose.: 241 mg/dL (26 Dec 2023 05:23)  POCT Blood Glucose.: 230 mg/dL (25 Dec 2023 22:47)  POCT Blood Glucose.: 188 mg/dL (25 Dec 2023 17:27)  POCT Blood Glucose.: 211 mg/dL (25 Dec 2023 14:20)                          7.6    13.63 )-----------( 391      ( 25 Dec 2023 20:11 )             23.6       12-25    149<H>  |  116<H>  |  69<HH>  ----------------------------<  171<H>  3.9   |  20  |  3.0<H>    Ca    7.5<L>      25 Dec 2023 20:11  Phos  3.8     12-25  Mg     2.9     12-25    TPro  x   /  Alb  2.4<L>  /  TBili  x   /  DBili  x   /  AST  x   /  ALT  x   /  AlkPhos  x   12-25            Urinalysis Basic - ( 25 Dec 2023 20:11 )    Color: x / Appearance: x / SG: x / pH: x  Gluc: 171 mg/dL / Ketone: x  / Bili: x / Urobili: x   Blood: x / Protein: x / Nitrite: x   Leuk Esterase: x / RBC: x / WBC x   Sq Epi: x / Non Sq Epi: x / Bacteria: x         JACQUELIN CAI   [id number]   [birth year]  78yM    Admit Date: 12-16-23  Indication for SDU/SICU: Q1 neuro checks        ============================  24 Hour Events  12/26  DAY  - Increased lantus to 18U for persistent hyPERglycemia  - metolazone 10mg via PEG x1 for hyPERnatremia      12/25  OVN:  - 8pm rounds: Abd mildly distended, soft, binder in place, G-tube in place 3cm at level of dressing, b/l LE +1 pitting edema, pulses palpable wwp. Comfortable in bed, HDS     AM   -started labetalol 100mg q8  -changed TF to 18hr (was on 24 hrs)   -Trach collar trials > T piece 4 hours   -nutrition consult tomorrow for possible formula change 2/2 persistent diarrhea requiring dignishield   -lasix 20 IVP & metalazone 5 if tolerates > -1.4/shift   -f/u sevelamer powder for NGT > pharmacy will put order in   -UA negative  -neuro: no longer trend valporic acid levels unless neuro clinical exam changes, keep 500 q12 PO valporic acid   -BMP 8PM       [X] A ten-point review of systems was otherwise negative except as noted above.  [  ] Due to altered mental status/intubation, subjective information was not attained from the patient. History was obtained, to the extent possible, from review of the chart and collateral sources of information.    =========================================================================================================================================    Daily     Daily     Diet, NPO with Tube Feed:   Tube Feeding Modality: Gastrostomy  Glucerna 1.2 Tonio  Total Volume for 24 Hours (mL): 1260  Continuous  Starting Tube Feed Rate mL per Hour: 55  Increase Tube Feed Rate by (mL): 10     Every 4 hours  Until Goal Tube Feed Rate (mL per Hour): 70  Tube Feed Duration (in Hours): 18  Tube Feed Start Time: 12:00  Tube Feed Stop Time: 06:00  Free Water Flush Instructions:  Please flush PEG with 50cc of sterile water before feeds start and 100cc of sterile water after feeds end (12-25-23 @ 11:14)      CURRENT MEDS:  Neurologic Medications  acetaminophen     Tablet .. 650 milliGRAM(s) Oral every 6 hours  valproic  acid Syrup 500 milliGRAM(s) Oral every 12 hours    Respiratory Medications  albuterol    90 MICROgram(s) HFA Inhaler 90 Puff(s) Inhalation every 4 hours  ipratropium 17 MICROgram(s) HFA Inhaler 1 Puff(s) Inhalation every 6 hours    Cardiovascular Medications  amLODIPine   Tablet 10 milliGRAM(s) Oral daily  doxazosin 4 milliGRAM(s) Oral at bedtime  labetalol 100 milliGRAM(s) Oral every 8 hours    Gastrointestinal Medications  pantoprazole  Injectable 40 milliGRAM(s) IV Push every 24 hours    Genitourinary Medications    Hematologic/Oncologic Medications  aspirin  chewable 81 milliGRAM(s) Enteral Tube daily  heparin   Injectable 5000 Unit(s) SubCutaneous every 8 hours    Antimicrobial/Immunologic Medications  ceFAZolin   IVPB 2000 milliGRAM(s) IV Intermittent every 8 hours    Endocrine/Metabolic Medications  atorvastatin 20 milliGRAM(s) Oral at bedtime  insulin glargine Injectable (LANTUS) 18 Unit(s) SubCutaneous at bedtime  insulin lispro (ADMELOG) corrective regimen sliding scale   SubCutaneous every 4 hours    Topical/Other Medications  bacitracin   Ointment 1 Application(s) Topical every 12 hours  chlorhexidine 0.12% Liquid 15 milliLiter(s) Oral Mucosa two times a day  chlorhexidine 2% Cloths 1 Application(s) Topical <User Schedule>  sevelamer carbonate Powder 800 milliGRAM(s) Oral three times a day with meals  vitamin A &amp; D Ointment 1 Application(s) Topical every 12 hours      ICU Vital Signs Last 24 Hrs  T(C): 37.7 (26 Dec 2023 08:00), Max: 37.7 (26 Dec 2023 08:00)  T(F): 99.8 (26 Dec 2023 08:00), Max: 99.8 (26 Dec 2023 08:00)  HR: 83 (26 Dec 2023 09:00) (78 - 99)  BP: 123/58 (26 Dec 2023 09:00) (110/53 - 150/68)  BP(mean): 84 (26 Dec 2023 09:00) (76 - 98)  ABP: 143/36 (26 Dec 2023 09:00) (116/33 - 169/44)  ABP(mean): 64 (26 Dec 2023 09:00) (57 - 78)  RR: 24 (26 Dec 2023 09:00) (15 - 27)  SpO2: 100% (26 Dec 2023 09:00) (96% - 100%)    O2 Parameters below as of 26 Dec 2023 08:00  Patient On (Oxygen Delivery Method): T-piece            Mode: standby  FiO2: 40    ABG - ( 26 Dec 2023 04:30 )  pH, Arterial: 7.47  pH, Blood: x     /  pCO2: 29    /  pO2: 142   / HCO3: 21    / Base Excess: -1.5  /  SaO2: 99.3                I&O's Summary    25 Dec 2023 07:01  -  26 Dec 2023 07:00  --------------------------------------------------------  IN: 1380 mL / OUT: 3565 mL / NET: -2185 mL    26 Dec 2023 07:01  -  26 Dec 2023 10:57  --------------------------------------------------------  IN: 0 mL / OUT: 775 mL / NET: -775 mL      I&O's Detail    25 Dec 2023 07:01  -  26 Dec 2023 07:00  --------------------------------------------------------  IN:    Enteral Tube Flush: 50 mL    Glucerna: 1180 mL    IV PiggyBack: 150 mL  Total IN: 1380 mL    OUT:    Rectal Tube (mL): 600 mL    Voided (mL): 2965 mL  Total OUT: 3565 mL    Total NET: -2185 mL      26 Dec 2023 07:01  -  26 Dec 2023 10:57  --------------------------------------------------------  IN:  Total IN: 0 mL    OUT:    Rectal Tube (mL): 300 mL    Voided (mL): 475 mL  Total OUT: 775 mL    Total NET: -775 mL          PHYSICAL EXAM:    General/Neuro: Pt awake, lying in bed. Appears comfortable. GCS: 9 E4/V1/M4. Pt did not follow commands with , no verbal/nonverbal communication per family. CNs grossly intact. Unable to assess strength d/t mental status.     Lungs: CTAB, currently on T piece  Cardiovascular : S1, S2.  Regular rate and rhythm. 2+ pitting edema throughout.  Cardiac Rhythm: Normal Sinus Rhythm  GI: Abdomen soft, Non-tender, mildly distended.    Gastrostomy tube in place.     Extremities: Extremities warm, pink, well-perfused. DP/PT 2+ bilaterally.    Derm: Skin dry, edematous.     : Voiding freely, using condom cath.     CXR: n/a    LABS:  CAPILLARY BLOOD GLUCOSE      POCT Blood Glucose.: 137 mg/dL (26 Dec 2023 10:05)  POCT Blood Glucose.: 241 mg/dL (26 Dec 2023 05:23)  POCT Blood Glucose.: 230 mg/dL (25 Dec 2023 22:47)  POCT Blood Glucose.: 188 mg/dL (25 Dec 2023 17:27)  POCT Blood Glucose.: 211 mg/dL (25 Dec 2023 14:20)                          7.6    13.63 )-----------( 391      ( 25 Dec 2023 20:11 )             23.6       12-25    149<H>  |  116<H>  |  69<HH>  ----------------------------<  171<H>  3.9   |  20  |  3.0<H>    Ca    7.5<L>      25 Dec 2023 20:11  Phos  3.8     12-25  Mg     2.9     12-25    TPro  x   /  Alb  2.4<L>  /  TBili  x   /  DBili  x   /  AST  x   /  ALT  x   /  AlkPhos  x   12-25            Urinalysis Basic - ( 25 Dec 2023 20:11 )    Color: x / Appearance: x / SG: x / pH: x  Gluc: 171 mg/dL / Ketone: x  / Bili: x / Urobili: x   Blood: x / Protein: x / Nitrite: x   Leuk Esterase: x / RBC: x / WBC x   Sq Epi: x / Non Sq Epi: x / Bacteria: x

## 2023-12-26 NOTE — PROGRESS NOTE ADULT - ATTENDING COMMENTS
This patient has a high probability of sudden, clinically significant deterioration, which requires the highest level of physician preparedness to intervene urgently. I managed/supervised life or organ supporting interventions that required frequent physician assessment. I devoted my full attention in the ICU to the direct care of this patient for the period of time indicated below. Time I spent with the family or surrogate(s) is included only if the patient was incapable of providing the necessary information or participating in medical decision making. Time devoted to teaching and to any procedures I billed separately is not included.     Patient is examined and evaluated at the bedside with SICU team. Treatment plan discussed with SICU team, nurses and primary team.   Chest X-ray and all relevant studies reviewed during rounds.  Will continue hemodynamic monitoring as per protocol in SICU.    24h events: increased blood sugar, 6 hours of pressure support and 4 hours of T piece    Neuro:  GCS [id number]  Medications - tylenol standing for pain/fevers, valproic acid for possible focal seizures after SDH  Studies - none recent  Q4H neurochecks, will discuss valproate level with neurology  Hx of CVA with residual R-sided deficit    Respiratory: currently on T piece 40% FiO2  Mode: standby, FiO2: 40  ABG - ( 26 Dec 2023 04:30 )  pH, Arterial: 7.47  pH, Blood: x     /  pCO2: 29    /  pO2: 142   / HCO3: 21    / Base Excess: -1.5  /  SaO2: 99.3    Medications - albuterol, ipratropium  CXR reviewed and interpreted by me - no acute process, improved congestion  Continue T piece weans with plan to transition to trach collar    CV: hemodynamic acceptable  Medications: labetalol, amlodipine, atorvastatin  Home medications - coreg, valsartan, atorvastatin  Studies - no recent studies    GI: abd s/nt/nd, PEG @ 2.5cm at skin edge  Last BM - very loose stools with rectal tube  Nutrition - TF @ 70cc/h for 18h/d  Medications - protonix  Ppx - protonix (prophylaxis)  Discuss TF regimen with nutrition (diarrhea + rising BUN)      Renal: Continue I&Os monitoring, replete electrolytes as needed  12-25    149<H>  |  116<H>  |  69<HH>  ----------------------------<  171<H>  3.9   |  20  |  3.0<H>    Ca    7.5<L>      25 Dec 2023 20:11  Phos  3.8     12-25  Mg     2.9     12-25    TPro  x   /  Alb  2.4<L>  /  TBili  x   /  DBili  x   /  AST  x   /  ALT  x   /  AlkPhos  x   12-25  Urinalysis negative    UOP - 3.0L over 24h  I/O - 1.4/3.6   Give metolazone 10mg and recheck BMP this afternoon  Sevelamer to prevent hyperphosphatemia  Doxazosin for hx of BPH    Heme: continue to evaluate for acute blood loss anemia- trend Hg/Hct                         7.6    13.63 )-----------( 391      ( 25 Dec 2023 20:11 )             23.6     Anticoagulation - SQH  ASA-81 home med    ID: trend luekocytosis, monitor fevers (afebrile for 24h)  Recent culture data - respiratory cx with MSSA  Antibiotics - Ancef to stop today  Endocrine: Prevent and treat hyperglycemia with insulin as needed, lantus 18 units QHS --> increase lantus to 20 units QHS, SSI    PV: follow pulse exam    MSK: PT/OT when able, mobilize when able    Skin: decub precautions    Lines: PIV, RUE Midline, R radial arterial line, trach, PEG  DVT Prophylaxis: SQH  Stress Gastritis Prophylaxis: protonix   Disposition: SICU    I saw and evaluated the patient personally. I have reviewed and agree with note above. Treatment plan discussed with SICU team, nurses and primary team at the time of the multidisciplinary rounds.     Isidro Montemayor MD  Trauma/ACS/SCC Attending This patient has a high probability of sudden, clinically significant deterioration, which requires the highest level of physician preparedness to intervene urgently. I managed/supervised life or organ supporting interventions that required frequent physician assessment. I devoted my full attention in the ICU to the direct care of this patient for the period of time indicated below. Time I spent with the family or surrogate(s) is included only if the patient was incapable of providing the necessary information or participating in medical decision making. Time devoted to teaching and to any procedures I billed separately is not included.     Patient is examined and evaluated at the bedside with SICU team. Treatment plan discussed with SICU team, nurses and primary team.   Chest X-ray and all relevant studies reviewed during rounds.  Will continue hemodynamic monitoring as per protocol in SICU.    24h events: increased blood sugar, 6 hours of pressure support and 4 hours of T piece    Neuro:  GCS [id number]  Medications - tylenol standing for pain/fevers, valproic acid for possible focal seizures after SDH  Studies - none recent  Q4H neurochecks, will discuss valproate level with neurology  Hx of CVA with residual R-sided deficit    Respiratory: currently on T piece 40% FiO2  Mode: standby, FiO2: 40  ABG - ( 26 Dec 2023 04:30 )  pH, Arterial: 7.47  pH, Blood: x     /  pCO2: 29    /  pO2: 142   / HCO3: 21    / Base Excess: -1.5  /  SaO2: 99.3    Medications - albuterol, ipratropium  CXR reviewed and interpreted by me - no acute process, improved congestion  Continue T piece weans with plan to transition to trach collar    CV: hemodynamic acceptable  Medications: labetalol, amlodipine, atorvastatin  Home medications - coreg, valsartan, atorvastatin  Studies - no recent studies    GI: abd s/nt/nd, PEG @ 2.5cm at skin edge  Last BM - very loose stools with rectal tube  Nutrition - TF @ 70cc/h for 18h/d  Medications - protonix  Ppx - protonix (prophylaxis)  Discuss TF regimen with nutrition (diarrhea + rising BUN)      Renal: Continue I&Os monitoring, replete electrolytes as needed  12-25    149<H>  |  116<H>  |  69<HH>  ----------------------------<  171<H>  3.9   |  20  |  3.0<H>    Ca    7.5<L>      25 Dec 2023 20:11  Phos  3.8     12-25  Mg     2.9     12-25    TPro  x   /  Alb  2.4<L>  /  TBili  x   /  DBili  x   /  AST  x   /  ALT  x   /  AlkPhos  x   12-25  Urinalysis negative    UOP - 3.0L over 24h  I/O - 1.4/3.6   Give metolazone 10mg and recheck BMP this afternoon  Sevelamer to prevent hyperphosphatemia  Doxazosin for hx of BPH    Heme: continue to evaluate for acute blood loss anemia- trend Hg/Hct                         7.6    13.63 )-----------( 391      ( 25 Dec 2023 20:11 )             23.6     Anticoagulation - SQH  ASA-81 home med    ID: trend luekocytosis, monitor fevers (afebrile for 24h)  Recent culture data - respiratory cx with MSSA  Antibiotics - Ancef to stop today  Endocrine: Prevent and treat hyperglycemia with insulin as needed, lantus 18 units QHS --> increase lantus to 20 units QHS, SSI    PV: follow pulse exam    MSK: PT/OT when able, mobilize when able    Skin: decub precautions    Lines: PIV, RUE Midline, R radial arterial line, trach, PEG  DVT Prophylaxis: SQH  Stress Gastritis Prophylaxis: protonix   Disposition: SICU    I saw and evaluated the patient personally. I have reviewed and agree with note above. Treatment plan discussed with SICU team, nurses and primary team at the time of the multidisciplinary rounds.     Isidro Montemayor MD  Trauma/ACS/SCC Attending This patient has a high probability of sudden, clinically significant deterioration, which requires the highest level of physician preparedness to intervene urgently. I managed/supervised life or organ supporting interventions that required frequent physician assessment. I devoted my full attention in the ICU to the direct care of this patient for the period of time indicated below. Time I spent with the family or surrogate(s) is included only if the patient was incapable of providing the necessary information or participating in medical decision making. Time devoted to teaching and to any procedures I billed separately is not included.     Patient is examined and evaluated at the bedside with SICU team. Treatment plan discussed with SICU team, nurses and primary team.   Chest X-ray and all relevant studies reviewed during rounds.  Will continue hemodynamic monitoring as per protocol in SICU.    24h events: increased blood sugar, 6 hours of pressure support and 4 hours of T piece    Neuro:  GCS [id number]  Medications - tylenol standing for pain/fevers, valproic acid for possible focal seizures after SDH  Studies - none recent  Q4H neurochecks, will discuss valproate level with neurology  Hx of CVA with residual R-sided deficit    Respiratory: currently on T piece 40% FiO2  Mode: standby, FiO2: 40  ABG - ( 26 Dec 2023 04:30 )  pH, Arterial: 7.47  pH, Blood: x     /  pCO2: 29    /  pO2: 142   / HCO3: 21    / Base Excess: -1.5  /  SaO2: 99.3    Medications - albuterol, ipratropium  CXR reviewed and interpreted by me - no acute process, improved congestion  Continue T piece weans with plan to transition to trach collar    CV: hemodynamic acceptable  Medications: labetalol, amlodipine, atorvastatin  Home medications - coreg, valsartan, atorvastatin  Studies - no recent studies    GI: abd s/nt/nd, PEG @ 2.5cm at skin edge  Last BM - very loose stools with rectal tube  Nutrition - TF @ 70cc/h for 18h/d  Medications - protonix  Ppx - protonix (prophylaxis)  Discuss TF regimen with nutrition (diarrhea + rising BUN)      Renal: Continue I&Os monitoring, replete electrolytes as needed  12-25    149<H>  |  116<H>  |  69<HH>  ----------------------------<  171<H>  3.9   |  20  |  3.0<H>    Ca    7.5<L>      25 Dec 2023 20:11  Phos  3.8     12-25  Mg     2.9     12-25    TPro  x   /  Alb  2.4<L>  /  TBili  x   /  DBili  x   /  AST  x   /  ALT  x   /  AlkPhos  x   12-25  Urinalysis negative    UOP - 3.0L over 24h  I/O - 1.4/3.6   Give metolazone 10mg and recheck BMP this afternoon  Sevelamer to prevent hyperphosphatemia  Doxazosin for hx of BPH    Heme: continue to evaluate for acute blood loss anemia- trend Hg/Hct                         7.6    13.63 )-----------( 391      ( 25 Dec 2023 20:11 )             23.6     Anticoagulation - SQH  ASA-81 home med    ID: trend luekocytosis, monitor fevers (afebrile for 24h)  Recent culture data - respiratory cx with MSSA  Antibiotics - Ancef to stop today  Endocrine: Prevent and treat hyperglycemia with insulin as needed, lantus 18 units QHS --> increase lantus to 20 units QHS, SSI    PV: follow pulse exam    MSK: PT/OT when able, mobilize when able    Skin: decub precautions    Lines: PIV, RUE Midline, R radial arterial line --> d/c arterial line, trach, PEG  DVT Prophylaxis: SQH  Stress Gastritis Prophylaxis: protonix   Disposition: SICU    I saw and evaluated the patient personally. I have reviewed and agree with note above. Treatment plan discussed with SICU team, nurses and primary team at the time of the multidisciplinary rounds.     Isidro Montemayor MD  Trauma/ACS/SCC Attending This patient has a high probability of sudden, clinically significant deterioration, which requires the highest level of physician preparedness to intervene urgently. I managed/supervised life or organ supporting interventions that required frequent physician assessment. I devoted my full attention in the ICU to the direct care of this patient for the period of time indicated below. Time I spent with the family or surrogate(s) is included only if the patient was incapable of providing the necessary information or participating in medical decision making. Time devoted to teaching and to any procedures I billed separately is not included.     Patient is examined and evaluated at the bedside with SICU team. Treatment plan discussed with SICU team, nurses and primary team.   Chest X-ray and all relevant studies reviewed during rounds.  Will continue hemodynamic monitoring as per protocol in SICU.    24h events: increased blood sugar, 6 hours of pressure support and 4 hours of T piece    Neuro:  GCS [id number]  Medications - tylenol standing for pain/fevers, valproic acid for possible focal seizures after SDH  Studies - none recent  Q4H neurochecks, will discuss valproate level with neurology  Hx of CVA with residual R-sided deficit    Respiratory: currently on T piece 40% FiO2  Mode: standby, FiO2: 40  ABG - ( 26 Dec 2023 04:30 )  pH, Arterial: 7.47  pH, Blood: x     /  pCO2: 29    /  pO2: 142   / HCO3: 21    / Base Excess: -1.5  /  SaO2: 99.3    Medications - albuterol, ipratropium  CXR reviewed and interpreted by me - no acute process, improved congestion  Continue T piece weans with plan to transition to trach collar    CV: hemodynamic acceptable  Medications: labetalol, amlodipine, atorvastatin  Home medications - coreg, valsartan, atorvastatin  Studies - no recent studies    GI: abd s/nt/nd, PEG @ 2.5cm at skin edge  Last BM - very loose stools with rectal tube  Nutrition - TF @ 70cc/h for 18h/d  Medications - protonix  Ppx - protonix (prophylaxis)  Discuss TF regimen with nutrition (diarrhea + rising BUN)      Renal: Continue I&Os monitoring, replete electrolytes as needed  12-25    149<H>  |  116<H>  |  69<HH>  ----------------------------<  171<H>  3.9   |  20  |  3.0<H>    Ca    7.5<L>      25 Dec 2023 20:11  Phos  3.8     12-25  Mg     2.9     12-25    TPro  x   /  Alb  2.4<L>  /  TBili  x   /  DBili  x   /  AST  x   /  ALT  x   /  AlkPhos  x   12-25  Urinalysis negative    UOP - 3.0L over 24h  I/O - 1.4/3.6   Give metolazone 10mg and recheck BMP this afternoon  Sevelamer to prevent hyperphosphatemia  Doxazosin for hx of BPH    Heme: continue to evaluate for acute blood loss anemia- trend Hg/Hct                         7.6    13.63 )-----------( 391      ( 25 Dec 2023 20:11 )             23.6     Anticoagulation - SQH  ASA-81 home med    ID: trend luekocytosis, monitor fevers (afebrile for 24h)  Recent culture data - respiratory cx with MSSA  Antibiotics - Ancef to stop today  Endocrine: Prevent and treat hyperglycemia with insulin as needed, lantus 18 units QHS --> increase lantus to 20 units QHS, SSI    PV: follow pulse exam    MSK: PT/OT when able, mobilize when able    Skin: decub precautions    Lines: PIV, RUE Midline, R radial arterial line --> d/c arterial line, trach, PEG  DVT Prophylaxis: SQH  Stress Gastritis Prophylaxis: protonix   Disposition: SICU    I saw and evaluated the patient personally. I have reviewed and agree with note above. Treatment plan discussed with SICU team, nurses and primary team at the time of the multidisciplinary rounds.     Isidro Montemayor MD  Trauma/ACS/SCC Attending

## 2023-12-26 NOTE — PROGRESS NOTE ADULT - ASSESSMENT
78yM who presented as a TRAUMA CONSULT s/p mechanical fall (+HT -LOC -AC). Found to have an acute subdural hemorrhage. Hospital course has been complicated by multiple seizures and uncontrolled hypertension. Intubated on 12/21 due to worsening respiratory status.     S/P tracheostomy and PEG placement on 12/22/23, (Portex cuffed 9 and 2cm at skin 20fr PEG). Now with T-piece, O2 at 40%.     PLAN:  - Monitor O2 requirements   - Continue chest PT, Duoneb treatments as needed   - Continue CefaZolin   - F/u nutrition consult for TF adjustments (given liquid stool)   - Monitor vitals, I&Os, labs   - Replace electrolytes as needed   - Continue home ASA  - Pain control PRN   - DVT ppx with HSQ     **Call neuro 2 days prior to discharge for spot EEG**    # Dispo: working on dispo to [facility]      Trauma Team Surgery  [phone]   78yM who presented as a TRAUMA CONSULT s/p mechanical fall (+HT -LOC -AC). Found to have an acute subdural hemorrhage. Hospital course has been complicated by multiple seizures and uncontrolled hypertension. Intubated on 12/21 due to worsening respiratory status.     S/P tracheostomy and PEG placement on 12/22/23, (Portex cuffed 9 and 2cm at skin 20fr PEG). Now with T-piece, O2 at 40%.     PLAN:  - Monitor O2 requirements   - Continue chest PT, Duoneb treatments as needed   - Continue CefaZolin   - F/u nutrition consult for TF adjustments (given liquid stool)   - Monitor vitals, I&Os, labs   - Replace electrolytes as needed   - Continue home ASA  - Pain control PRN   - DVT ppx with HSQ     **Call neuro 2 days prior to discharge for spot EEG**    # Dispo: working on dispo to On license of [facility] facility      Trauma Team Surgery  [phone]

## 2023-12-26 NOTE — PROGRESS NOTE ADULT - ASSESSMENT
78y Male s/p mechanical fall. +HT, +AC (Aspirin and Plavix), -LOC.    12/21-intubated for airway protection, accessory muscle use  12/22- s/p tracheostomy and PEG placement, Portex cuffed 9 and 2cm at skin 20fr PEG    NEURO:  #Acute SDH   -12/18 HCT#4 -stable SDH, no new acute findings  -Q4 neuro checks  #Acute pain    -APAP ATC  #h/o stroke (2/2023) w/residual right sided weakness  - RIGHT anterior thalamus infarct,  LEFT caudate head lacunar infarct  #focal seizure    -vEEG 12/19: No seizures; Discontinued 12/19    - Valproic acid 500q12; Level 23, albumin 2.1 > per neurology: no longer need to monitor valporic acid levels as patient neuro clinical exam has been consistent & keep current dose. F/u valporic acid levels if neurologic exam changes or s/s toxicity     -NCC: valproic acid current dose, q4 neurochecks    -Neurology cs- d/c EEG, 2 days prior to discharge call neurology so they can do a spot EEG    RESP:   #Respiratory Failure secondary to impaired secretion clearance, intubated 12/21    -s/p tracheostomy 12/22, size 9 cuffed portex  RR (machine): 14, TV (machine): 350, FiO2: 40, PEEP: 5  ABG  - 12/25: tolerated 4h tpiece  - chest PT q4 and duoneb treatments q 4   - deep tracheal aspirate cx sent (due to fevers)  Culture - Sputum . (12.21.23 @ 18:14)-  Numerous Staphylococcus aureus  #Activity    -increase as tolerated    CARDS:   #acute HYPOtension intraop, resolved  -off levophed since 12/22 PM  #hx of HTN  -Amlodipine 10 QD restarted  -Labetalol 100 q8 (Home Coreg 6.25mg q12 )   -Valsartan 320mg HOLDING   #HLD  -atorvastatin  #NSVT    -EP/Cards: No arrhythmias on tele only artifact. No further work up needed. Recommend ILR prior to discharge if patient/family agreeable  Imaging:   Echo: 12/2023: EF 66%, G1DD, trace MR, mild TR     GI/NUTR:   #Diet, NPO with Tube Feed via 20Fr PEG placed 12/22   Glucerna 1.2 at goal 70cc/hr, feed duration 18 hr   -F/U nutrition 12/26, persistent diarrhea, maybe formula change?    12/20- SLP unable to assess 2/2 lethargy    -aspiration precautions, HOB 30  #GI Prophylaxis  -Protonix 40mg   #Bowel regimen     -Multiple loose bowel movements, last dose senna 12/19 at 2200 > dignishield placed 12/23     -C diff negative     -no bowel regimen    -Last bowel movement  12/25    /RENAL:   #urine output in critically ill  -Condom catheter   -UA neg 12/25 (r/o UTI in setting of persistent fevers)   -Sodium goal 140-150  #Diuresis  -12/24, lasix 20mg x1; 12/25: Lasix IVP 20, metolazone  5mg x 1  #Hypernatremia  -D/C'd FWF 200q4; continue to trend  -12/25: metolazone to lower Na   #hyperphosphatemia  -Started sevelmaer powder, non-formulary so doesn't clog PEG   #CKD   -baseline Cr 2.1  -holding home sodium bicarb 650mg BID  #hx BPH    -cardura 4mg (flomax at home, non crushable)      Labs:          BUN/Cr- 65/2.7 >  69/ 3.0          Electrolytes-Na 149 // K 3.9 // Mg 2.9 //  Phos 3.8 12-25 @ 20:11    HEME/ONC:   #DVT prophylaxis     -SCDs, HSQ  #hx of CVA    -ASA cleared by NSGY    -Plavix HOLDING, will require repeat HCT in 2 wks prior to restart, approx 1/1/24    Labs: Hb/Hct:  7.4/22.5  -->,  7.6/23.6  -->,  7.6/23.6  -->  Plts:  313  -->,  335  -->,  391  -->              PTT/INR:        ID:  #recurrent fevers  -MRSA PCR neg   Cultures    Bcx 12/18- final neg     Tracheal aspirate 12/21: numerous staph aureus     Tracheal aspirate 12/22: staph aureus    C.Diff PCR neg    Blood cx 12/21- prelim neg    UA 12/25 negative   Current antibiotics:  -IV Ancef 2g q8 (stop date 12/26)   #ID consult  -D/C'd zosyn & start IV Ancef  #multiple loose bowel movements    -WBC trending up    -no bowel regiment  WBC- 9.69  --->>,  13.71  --->>,  13.21  --->>13 > 13.6  afebrile      ENDO:  #DM     -ISS     -Off insulin gtt 12/20      -Lantus 18 units HS     -FSG q4 while on TF    MSK:     Activity - Increase As Tolerated    -B/L knee X ray 12/19- no fractures, b/l effusions     LINES/DRAINS:  PIV, right basilic midline (placed 12/16), right radial arterial line (placed 12/19), 20Fr PEG (12/22), Tracheostomy (12/22)    ADVANCED DIRECTIVES:  Full Code    HCP/Emergency Contact-Sarah Gamino (Wife) [phone], Cantonese speaking    DISPO:  Social work, case management consult for dispo to vent facility - spoke with social work 12/23, starting to work on D/C. 78y Male s/p mechanical fall. +HT, +AC (Aspirin and Plavix), -LOC.    12/21-intubated for airway protection, accessory muscle use  12/22- s/p tracheostomy and PEG placement, Portex cuffed 9 and 2cm at skin 20fr PEG    NEURO:  #Acute SDH   -12/18 HCT#4 -stable SDH, no new acute findings  -Q4 neuro checks  #Acute pain    -APAP ATC  #h/o stroke (2/2023) w/residual right sided weakness  - RIGHT anterior thalamus infarct,  LEFT caudate head lacunar infarct  #focal seizure    -vEEG 12/19: No seizures; Discontinued 12/19    - Valproic acid 500q12; Level 23, albumin 2.1 > per neurology: no longer need to monitor valporic acid levels as patient neuro clinical exam has been consistent & keep current dose. F/u valporic acid levels if neurologic exam changes or s/s toxicity     -NCC: valproic acid current dose, q4 neurochecks    -Neurology cs- d/c EEG, 2 days prior to discharge call neurology so they can do a spot EEG    RESP:   #Respiratory Failure secondary to impaired secretion clearance, intubated 12/21    -s/p tracheostomy 12/22, size 9 cuffed portex  RR (machine): 14, TV (machine): 350, FiO2: 40, PEEP: 5  ABG  - 12/25: tolerated 4h tpiece  - chest PT q4 and duoneb treatments q 4   - deep tracheal aspirate cx sent (due to fevers)  Culture - Sputum . (12.21.23 @ 18:14)-  Numerous Staphylococcus aureus  #Activity    -increase as tolerated    CARDS:   #acute HYPOtension intraop, resolved  -off levophed since 12/22 PM  #hx of HTN  -Amlodipine 10 QD restarted  -Labetalol 100 q8 (Home Coreg 6.25mg q12 )   -Valsartan 320mg HOLDING   #HLD  -atorvastatin  #NSVT    -EP/Cards: No arrhythmias on tele only artifact. No further work up needed. Recommend ILR prior to discharge if patient/family agreeable  Imaging:   Echo: 12/2023: EF 66%, G1DD, trace MR, mild TR     GI/NUTR:   #Diet, NPO with Tube Feed via 20Fr PEG placed 12/22   Glucerna 1.2 at goal 70cc/hr, feed duration 18 hr   -F/U nutrition 12/26, persistent diarrhea, maybe formula change?    12/20- SLP unable to assess 2/2 lethargy    -aspiration precautions, HOB 30  #GI Prophylaxis  -Protonix 40mg   #Bowel regimen     -Multiple loose bowel movements, last dose senna 12/19 at 2200 > dignishield placed 12/23     -C diff negative     -no bowel regimen    -Last bowel movement  12/25    /RENAL:   #urine output in critically ill  -Condom catheter   -UA neg 12/25 (r/o UTI in setting of persistent fevers)   -Sodium goal 140-150  #Diuresis  -12/24, lasix 20mg x1; 12/25: Lasix IVP 20, metolazone  5mg x 1  #Hypernatremia  -D/C'd FWF 200q4; continue to trend  -12/25: metolazone to lower Na   #hyperphosphatemia  -Started sevelmaer powder, non-formulary so doesn't clog PEG   #CKD   -baseline Cr 2.1  -holding home sodium bicarb 650mg BID  #hx BPH    -cardura 4mg (flomax at home, non crushable)      Labs:          BUN/Cr- 65/2.7 >  69/ 3.0          Electrolytes-Na 149 // K 3.9 // Mg 2.9 //  Phos 3.8 12-25 @ 20:11    HEME/ONC:   #DVT prophylaxis     -SCDs, HSQ  #hx of CVA    -ASA cleared by NSGY    -Plavix HOLDING, will require repeat HCT in 2 wks prior to restart, approx 1/1/24    Labs: Hb/Hct:  7.4/22.5  -->,  7.6/23.6  -->,  7.6/23.6  -->  Plts:  313  -->,  335  -->,  391  -->              PTT/INR:        ID:  #recurrent fevers  -MRSA PCR neg   Cultures    Bcx 12/18- final neg     Tracheal aspirate 12/21: numerous staph aureus     Tracheal aspirate 12/22: staph aureus    C.Diff PCR neg    Blood cx 12/21- prelim neg    UA 12/25 negative   Current antibiotics:  -IV Ancef 2g q8 (stop date 12/26)   #ID consult  -D/C'd zosyn & start IV Ancef  #multiple loose bowel movements    -WBC trending up    -no bowel regiment  WBC- 9.69  --->>,  13.71  --->>,  13.21  --->>13 > 13.6  afebrile      ENDO:  #DM     -ISS     -Off insulin gtt 12/20      -Lantus 18 units HS     -FSG q4 while on TF    MSK:     Activity - Increase As Tolerated    -B/L knee X ray 12/19- no fractures, b/l effusions     LINES/DRAINS:  PIV, right basilic midline (placed 12/16), right radial arterial line (placed 12/19), 20Fr PEG (12/22), Tracheostomy (12/22)    ADVANCED DIRECTIVES:  Full Code    HCP/Emergency Contact-Sarah Gamino (Wife) [phone], Cantonese speaking    DISPO:  Social work, case management consult for dispo to vent facility - spoke with social work 12/23, starting to work on D/C.

## 2023-12-27 LAB
ALBUMIN SERPL ELPH-MCNC: 2.5 G/DL — LOW (ref 3.5–5.2)
ALBUMIN SERPL ELPH-MCNC: 2.5 G/DL — LOW (ref 3.5–5.2)
ALP SERPL-CCNC: 86 U/L — SIGNIFICANT CHANGE UP (ref 30–115)
ALP SERPL-CCNC: 86 U/L — SIGNIFICANT CHANGE UP (ref 30–115)
ALT FLD-CCNC: 5 U/L — SIGNIFICANT CHANGE UP (ref 0–41)
ALT FLD-CCNC: 5 U/L — SIGNIFICANT CHANGE UP (ref 0–41)
AMMONIA BLD-MCNC: 32 UMOL/L — SIGNIFICANT CHANGE UP (ref 11–55)
AMMONIA BLD-MCNC: 32 UMOL/L — SIGNIFICANT CHANGE UP (ref 11–55)
ANION GAP SERPL CALC-SCNC: 15 MMOL/L — HIGH (ref 7–14)
ANION GAP SERPL CALC-SCNC: 15 MMOL/L — HIGH (ref 7–14)
ANION GAP SERPL CALC-SCNC: 16 MMOL/L — HIGH (ref 7–14)
ANION GAP SERPL CALC-SCNC: 16 MMOL/L — HIGH (ref 7–14)
AST SERPL-CCNC: 28 U/L — SIGNIFICANT CHANGE UP (ref 0–41)
AST SERPL-CCNC: 28 U/L — SIGNIFICANT CHANGE UP (ref 0–41)
BASOPHILS # BLD AUTO: 0.05 K/UL — SIGNIFICANT CHANGE UP (ref 0–0.2)
BASOPHILS # BLD AUTO: 0.05 K/UL — SIGNIFICANT CHANGE UP (ref 0–0.2)
BASOPHILS NFR BLD AUTO: 0.3 % — SIGNIFICANT CHANGE UP (ref 0–1)
BASOPHILS NFR BLD AUTO: 0.3 % — SIGNIFICANT CHANGE UP (ref 0–1)
BILIRUB DIRECT SERPL-MCNC: <0.2 MG/DL — SIGNIFICANT CHANGE UP (ref 0–0.3)
BILIRUB DIRECT SERPL-MCNC: <0.2 MG/DL — SIGNIFICANT CHANGE UP (ref 0–0.3)
BILIRUB INDIRECT FLD-MCNC: SIGNIFICANT CHANGE UP MG/DL (ref 0.2–1.2)
BILIRUB INDIRECT FLD-MCNC: SIGNIFICANT CHANGE UP MG/DL (ref 0.2–1.2)
BILIRUB SERPL-MCNC: 0.2 MG/DL — SIGNIFICANT CHANGE UP (ref 0.2–1.2)
BILIRUB SERPL-MCNC: 0.2 MG/DL — SIGNIFICANT CHANGE UP (ref 0.2–1.2)
BUN SERPL-MCNC: 93 MG/DL — CRITICAL HIGH (ref 10–20)
BUN SERPL-MCNC: 93 MG/DL — CRITICAL HIGH (ref 10–20)
BUN SERPL-MCNC: 97 MG/DL — CRITICAL HIGH (ref 10–20)
BUN SERPL-MCNC: 97 MG/DL — CRITICAL HIGH (ref 10–20)
CALCIUM SERPL-MCNC: 7.7 MG/DL — LOW (ref 8.4–10.5)
CALCIUM SERPL-MCNC: 7.7 MG/DL — LOW (ref 8.4–10.5)
CALCIUM SERPL-MCNC: 8 MG/DL — LOW (ref 8.4–10.5)
CALCIUM SERPL-MCNC: 8 MG/DL — LOW (ref 8.4–10.5)
CHLORIDE SERPL-SCNC: 113 MMOL/L — HIGH (ref 98–110)
CHLORIDE SERPL-SCNC: 113 MMOL/L — HIGH (ref 98–110)
CHLORIDE SERPL-SCNC: 116 MMOL/L — HIGH (ref 98–110)
CHLORIDE SERPL-SCNC: 116 MMOL/L — HIGH (ref 98–110)
CO2 SERPL-SCNC: 20 MMOL/L — SIGNIFICANT CHANGE UP (ref 17–32)
CREAT ?TM UR-MCNC: 42 MG/DL — SIGNIFICANT CHANGE UP
CREAT ?TM UR-MCNC: 42 MG/DL — SIGNIFICANT CHANGE UP
CREAT SERPL-MCNC: 3.7 MG/DL — HIGH (ref 0.7–1.5)
CREAT SERPL-MCNC: 3.7 MG/DL — HIGH (ref 0.7–1.5)
CREAT SERPL-MCNC: 4 MG/DL — HIGH (ref 0.7–1.5)
CREAT SERPL-MCNC: 4 MG/DL — HIGH (ref 0.7–1.5)
CULTURE RESULTS: SIGNIFICANT CHANGE UP
EGFR: 15 ML/MIN/1.73M2 — LOW
EGFR: 15 ML/MIN/1.73M2 — LOW
EGFR: 16 ML/MIN/1.73M2 — LOW
EGFR: 16 ML/MIN/1.73M2 — LOW
EOSINOPHIL # BLD AUTO: 0.2 K/UL — SIGNIFICANT CHANGE UP (ref 0–0.7)
EOSINOPHIL # BLD AUTO: 0.2 K/UL — SIGNIFICANT CHANGE UP (ref 0–0.7)
EOSINOPHIL NFR BLD AUTO: 1 % — SIGNIFICANT CHANGE UP (ref 0–8)
EOSINOPHIL NFR BLD AUTO: 1 % — SIGNIFICANT CHANGE UP (ref 0–8)
GAS PNL BLDA: SIGNIFICANT CHANGE UP
GAS PNL BLDA: SIGNIFICANT CHANGE UP
GLUCOSE SERPL-MCNC: 190 MG/DL — HIGH (ref 70–99)
GLUCOSE SERPL-MCNC: 190 MG/DL — HIGH (ref 70–99)
GLUCOSE SERPL-MCNC: 212 MG/DL — HIGH (ref 70–99)
GLUCOSE SERPL-MCNC: 212 MG/DL — HIGH (ref 70–99)
HCT VFR BLD CALC: 22.7 % — LOW (ref 42–52)
HCT VFR BLD CALC: 22.7 % — LOW (ref 42–52)
HGB BLD-MCNC: 7.3 G/DL — LOW (ref 14–18)
HGB BLD-MCNC: 7.3 G/DL — LOW (ref 14–18)
IMM GRANULOCYTES NFR BLD AUTO: 12.2 % — HIGH (ref 0.1–0.3)
IMM GRANULOCYTES NFR BLD AUTO: 12.2 % — HIGH (ref 0.1–0.3)
LYMPHOCYTES # BLD AUTO: 1.19 K/UL — LOW (ref 1.2–3.4)
LYMPHOCYTES # BLD AUTO: 1.19 K/UL — LOW (ref 1.2–3.4)
LYMPHOCYTES # BLD AUTO: 6.1 % — LOW (ref 20.5–51.1)
LYMPHOCYTES # BLD AUTO: 6.1 % — LOW (ref 20.5–51.1)
MAGNESIUM SERPL-MCNC: 3.2 MG/DL — CRITICAL HIGH (ref 1.8–2.4)
MAGNESIUM SERPL-MCNC: 3.2 MG/DL — CRITICAL HIGH (ref 1.8–2.4)
MCHC RBC-ENTMCNC: 29.8 PG — SIGNIFICANT CHANGE UP (ref 27–31)
MCHC RBC-ENTMCNC: 29.8 PG — SIGNIFICANT CHANGE UP (ref 27–31)
MCHC RBC-ENTMCNC: 32.2 G/DL — SIGNIFICANT CHANGE UP (ref 32–37)
MCHC RBC-ENTMCNC: 32.2 G/DL — SIGNIFICANT CHANGE UP (ref 32–37)
MCV RBC AUTO: 92.7 FL — SIGNIFICANT CHANGE UP (ref 80–94)
MCV RBC AUTO: 92.7 FL — SIGNIFICANT CHANGE UP (ref 80–94)
MONOCYTES # BLD AUTO: 2 K/UL — HIGH (ref 0.1–0.6)
MONOCYTES # BLD AUTO: 2 K/UL — HIGH (ref 0.1–0.6)
MONOCYTES NFR BLD AUTO: 10.2 % — HIGH (ref 1.7–9.3)
MONOCYTES NFR BLD AUTO: 10.2 % — HIGH (ref 1.7–9.3)
NEUTROPHILS # BLD AUTO: 13.72 K/UL — HIGH (ref 1.4–6.5)
NEUTROPHILS # BLD AUTO: 13.72 K/UL — HIGH (ref 1.4–6.5)
NEUTROPHILS NFR BLD AUTO: 70.2 % — SIGNIFICANT CHANGE UP (ref 42.2–75.2)
NEUTROPHILS NFR BLD AUTO: 70.2 % — SIGNIFICANT CHANGE UP (ref 42.2–75.2)
NRBC # BLD: 0 /100 WBCS — SIGNIFICANT CHANGE UP (ref 0–0)
NRBC # BLD: 0 /100 WBCS — SIGNIFICANT CHANGE UP (ref 0–0)
PHOSPHATE SERPL-MCNC: 5.3 MG/DL — HIGH (ref 2.1–4.9)
PHOSPHATE SERPL-MCNC: 5.3 MG/DL — HIGH (ref 2.1–4.9)
PLATELET # BLD AUTO: 449 K/UL — HIGH (ref 130–400)
PLATELET # BLD AUTO: 449 K/UL — HIGH (ref 130–400)
PMV BLD: 8.8 FL — SIGNIFICANT CHANGE UP (ref 7.4–10.4)
PMV BLD: 8.8 FL — SIGNIFICANT CHANGE UP (ref 7.4–10.4)
POTASSIUM SERPL-MCNC: 4.7 MMOL/L — SIGNIFICANT CHANGE UP (ref 3.5–5)
POTASSIUM SERPL-MCNC: 4.7 MMOL/L — SIGNIFICANT CHANGE UP (ref 3.5–5)
POTASSIUM SERPL-MCNC: 4.8 MMOL/L — SIGNIFICANT CHANGE UP (ref 3.5–5)
POTASSIUM SERPL-MCNC: 4.8 MMOL/L — SIGNIFICANT CHANGE UP (ref 3.5–5)
POTASSIUM SERPL-SCNC: 4.7 MMOL/L — SIGNIFICANT CHANGE UP (ref 3.5–5)
POTASSIUM SERPL-SCNC: 4.7 MMOL/L — SIGNIFICANT CHANGE UP (ref 3.5–5)
POTASSIUM SERPL-SCNC: 4.8 MMOL/L — SIGNIFICANT CHANGE UP (ref 3.5–5)
POTASSIUM SERPL-SCNC: 4.8 MMOL/L — SIGNIFICANT CHANGE UP (ref 3.5–5)
PROT SERPL-MCNC: 5.1 G/DL — LOW (ref 6–8)
PROT SERPL-MCNC: 5.1 G/DL — LOW (ref 6–8)
RBC # BLD: 2.45 M/UL — LOW (ref 4.7–6.1)
RBC # BLD: 2.45 M/UL — LOW (ref 4.7–6.1)
RBC # FLD: 14.6 % — HIGH (ref 11.5–14.5)
RBC # FLD: 14.6 % — HIGH (ref 11.5–14.5)
SODIUM SERPL-SCNC: 149 MMOL/L — HIGH (ref 135–146)
SODIUM SERPL-SCNC: 149 MMOL/L — HIGH (ref 135–146)
SODIUM SERPL-SCNC: 151 MMOL/L — HIGH (ref 135–146)
SODIUM SERPL-SCNC: 151 MMOL/L — HIGH (ref 135–146)
SODIUM UR-SCNC: 73 MMOL/L — SIGNIFICANT CHANGE UP
SODIUM UR-SCNC: 73 MMOL/L — SIGNIFICANT CHANGE UP
SPECIMEN SOURCE: SIGNIFICANT CHANGE UP
VALPROATE SERPL-MCNC: 25 UG/ML — LOW (ref 50–100)
VALPROATE SERPL-MCNC: 25 UG/ML — LOW (ref 50–100)
WBC # BLD: 19.55 K/UL — HIGH (ref 4.8–10.8)
WBC # BLD: 19.55 K/UL — HIGH (ref 4.8–10.8)
WBC # FLD AUTO: 19.55 K/UL — HIGH (ref 4.8–10.8)
WBC # FLD AUTO: 19.55 K/UL — HIGH (ref 4.8–10.8)

## 2023-12-27 PROCEDURE — 70450 CT HEAD/BRAIN W/O DYE: CPT | Mod: 26

## 2023-12-27 PROCEDURE — 71045 X-RAY EXAM CHEST 1 VIEW: CPT | Mod: 26

## 2023-12-27 PROCEDURE — 99291 CRITICAL CARE FIRST HOUR: CPT | Mod: 24

## 2023-12-27 RX ORDER — VALPROIC ACID (AS SODIUM SALT) 250 MG/5ML
250 SOLUTION, ORAL ORAL ONCE
Refills: 0 | Status: COMPLETED | OUTPATIENT
Start: 2023-12-27 | End: 2023-12-27

## 2023-12-27 RX ORDER — VALPROIC ACID (AS SODIUM SALT) 250 MG/5ML
750 SOLUTION, ORAL ORAL EVERY 12 HOURS
Refills: 0 | Status: DISCONTINUED | OUTPATIENT
Start: 2023-12-28 | End: 2023-12-28

## 2023-12-27 RX ORDER — VALPROIC ACID (AS SODIUM SALT) 250 MG/5ML
250 SOLUTION, ORAL ORAL ONCE
Refills: 0 | Status: DISCONTINUED | OUTPATIENT
Start: 2023-12-27 | End: 2023-12-27

## 2023-12-27 RX ORDER — AMANTADINE HCL 100 MG
200 CAPSULE ORAL ONCE
Refills: 0 | Status: COMPLETED | OUTPATIENT
Start: 2023-12-27 | End: 2023-12-27

## 2023-12-27 RX ADMIN — ATORVASTATIN CALCIUM 20 MILLIGRAM(S): 80 TABLET, FILM COATED ORAL at 22:11

## 2023-12-27 RX ADMIN — Medication 100 MILLIGRAM(S): at 05:08

## 2023-12-27 RX ADMIN — ALBUTEROL 90 PUFF(S): 90 AEROSOL, METERED ORAL at 09:15

## 2023-12-27 RX ADMIN — Medication 1 PUFF(S): at 09:14

## 2023-12-27 RX ADMIN — CHLORHEXIDINE GLUCONATE 15 MILLILITER(S): 213 SOLUTION TOPICAL at 17:34

## 2023-12-27 RX ADMIN — Medication 1 APPLICATION(S): at 05:20

## 2023-12-27 RX ADMIN — AMLODIPINE BESYLATE 10 MILLIGRAM(S): 2.5 TABLET ORAL at 05:08

## 2023-12-27 RX ADMIN — HEPARIN SODIUM 5000 UNIT(S): 5000 INJECTION INTRAVENOUS; SUBCUTANEOUS at 05:08

## 2023-12-27 RX ADMIN — Medication 250 MILLIGRAM(S): at 22:10

## 2023-12-27 RX ADMIN — Medication 7: at 06:32

## 2023-12-27 RX ADMIN — Medication 5: at 22:13

## 2023-12-27 RX ADMIN — Medication 1 PUFF(S): at 14:57

## 2023-12-27 RX ADMIN — Medication 650 MILLIGRAM(S): at 05:38

## 2023-12-27 RX ADMIN — HEPARIN SODIUM 5000 UNIT(S): 5000 INJECTION INTRAVENOUS; SUBCUTANEOUS at 22:11

## 2023-12-27 RX ADMIN — Medication 650 MILLIGRAM(S): at 01:32

## 2023-12-27 RX ADMIN — SEVELAMER CARBONATE 800 MILLIGRAM(S): 2400 POWDER, FOR SUSPENSION ORAL at 12:15

## 2023-12-27 RX ADMIN — Medication 650 MILLIGRAM(S): at 12:15

## 2023-12-27 RX ADMIN — Medication 200 MILLIGRAM(S): at 12:15

## 2023-12-27 RX ADMIN — Medication 1 PUFF(S): at 02:14

## 2023-12-27 RX ADMIN — Medication 5: at 17:32

## 2023-12-27 RX ADMIN — Medication 650 MILLIGRAM(S): at 17:34

## 2023-12-27 RX ADMIN — PANTOPRAZOLE SODIUM 40 MILLIGRAM(S): 20 TABLET, DELAYED RELEASE ORAL at 05:19

## 2023-12-27 RX ADMIN — Medication 1 APPLICATION(S): at 06:30

## 2023-12-27 RX ADMIN — Medication 650 MILLIGRAM(S): at 17:47

## 2023-12-27 RX ADMIN — Medication 1 APPLICATION(S): at 17:36

## 2023-12-27 RX ADMIN — Medication 650 MILLIGRAM(S): at 05:08

## 2023-12-27 RX ADMIN — Medication 500 MILLIGRAM(S): at 17:34

## 2023-12-27 RX ADMIN — Medication 650 MILLIGRAM(S): at 00:52

## 2023-12-27 RX ADMIN — Medication 650 MILLIGRAM(S): at 12:26

## 2023-12-27 RX ADMIN — SEVELAMER CARBONATE 800 MILLIGRAM(S): 2400 POWDER, FOR SUSPENSION ORAL at 07:49

## 2023-12-27 RX ADMIN — HEPARIN SODIUM 5000 UNIT(S): 5000 INJECTION INTRAVENOUS; SUBCUTANEOUS at 13:14

## 2023-12-27 RX ADMIN — Medication 100 MILLIGRAM(S): at 13:14

## 2023-12-27 RX ADMIN — Medication 81 MILLIGRAM(S): at 12:14

## 2023-12-27 RX ADMIN — INSULIN GLARGINE 20 UNIT(S): 100 INJECTION, SOLUTION SUBCUTANEOUS at 22:11

## 2023-12-27 RX ADMIN — Medication 100 MILLIGRAM(S): at 22:11

## 2023-12-27 RX ADMIN — CHLORHEXIDINE GLUCONATE 1 APPLICATION(S): 213 SOLUTION TOPICAL at 05:08

## 2023-12-27 RX ADMIN — ALBUTEROL 90 PUFF(S): 90 AEROSOL, METERED ORAL at 04:40

## 2023-12-27 RX ADMIN — Medication 5: at 02:31

## 2023-12-27 RX ADMIN — CHLORHEXIDINE GLUCONATE 15 MILLILITER(S): 213 SOLUTION TOPICAL at 05:08

## 2023-12-27 RX ADMIN — SEVELAMER CARBONATE 800 MILLIGRAM(S): 2400 POWDER, FOR SUSPENSION ORAL at 17:32

## 2023-12-27 RX ADMIN — ALBUTEROL 90 PUFF(S): 90 AEROSOL, METERED ORAL at 02:15

## 2023-12-27 RX ADMIN — ALBUTEROL 90 PUFF(S): 90 AEROSOL, METERED ORAL at 21:13

## 2023-12-27 RX ADMIN — Medication 500 MILLIGRAM(S): at 05:17

## 2023-12-27 NOTE — CHART NOTE - NSCHARTNOTEFT_GEN_A_CORE
GI NUTRITION SUPPORT TEAM  -  CONSULT NOTE     TRAUMA ACTIVATION LEVEL:  CODE / ALERT  / CONSULT  ACTIVATED BY: EMS**  /  ED**  INTUBATED: YES** / NO**    MECHANISM OF INJURY:   [] Blunt     [] MVC	  [x] Fall	  [] Pedestrian Struck	  [] Motorcycle     [] Assault     [] Bicycle collision    [] Sports injury    [] Penetrating    [] Gun Shot Wound      [] Stab Wound    GCS: 14 	E: 4	V: 4	M: 6    HPI:"77-year-old male Cantonese speaking his presents with prior history of hypertension dyslipidemia diabetes BPH prior CVA on aspirin and Plavix who states he had a mechanical fall 2 days ago while getting out of the car fell backwards hit his head.  Afterwards he was able to ambulate.  But for the past 1 day family was unable to ambulate him.  He states that his lower extremities are painful to movement and weak.  Family denies any LOC.  They deny any confusion at home.  On exam oriented to person and place but just not to date.  He does follow simple commands.  Elevates his arms for 10 seconds.  Sensation intact in upper extremities.  Lower extremities with 2 out of 5 strength bilaterally."      GI NUTRITION SUPPORT NOTE:            REVIEW OF SYSTEMS:  Negative except as noted above.     PAST MEDICAL/SURGICAL HISTORY:   HTN (hypertension)  Seasonal allergies  History of BPH  Diabetes  No significant past surgical history      ALLERGIES:  [This allergen will not trigger allergy alert] pollen (Unknown)  No Known Drug Allergies    VITALS:  T(F): 100.5 (12-27 @ 08:00), Max: 100.9 (12-27 @ 00:00)  HR: 89 (12-27 @ 10:00) (86 - 104)  BP: 123/58 (12-27 @ 10:00) (118/56 - 164/73)  RR: 30 (12-27 @ 10:00) (20 - 32)  SpO2: 100% (12-27 @ 10:00) (100% - 100%)    HEIGHT/WEIGHT/BMI:   Height (cm): 156 (12-22), 157.5 (02-16)  Weight (kg): 61.7 (12-22), 61.2 (02-13)  BMI (kg/m2): 25.4 (12-22), 24.7 (02-16)      PHYSICAL EXAM:   GENERAL:    HEENT:    ABDOMEN:   EXTREMITIES:     SKIN:   IV ACCESS:   ENTERAL ACCESS:       I/Os:     12-26-23 @ 07:01  -  12-27-23 @ 07:00  --------------------------------------------------------  IN:    Enteral Tube Flush: 150 mL    Glucerna: 1170 mL    IV PiggyBack: 150 mL  Total IN: 1470 mL    OUT:    Rectal Tube (mL): 650 mL    Voided (mL): 2185 mL  Total OUT: 2835 mL    Total NET: -1365 mL      STANDING MEDICATIONS:   acetaminophen     Tablet .. 650 milliGRAM(s) Oral every 6 hours  albuterol    90 MICROgram(s) HFA Inhaler 90 Puff(s) Inhalation every 4 hours  amLODIPine   Tablet 10 milliGRAM(s) Oral daily  aspirin  chewable 81 milliGRAM(s) Enteral Tube daily  atorvastatin 20 milliGRAM(s) Oral at bedtime  bacitracin   Ointment 1 Application(s) Topical every 12 hours  chlorhexidine 0.12% Liquid 15 milliLiter(s) Oral Mucosa two times a day  chlorhexidine 2% Cloths 1 Application(s) Topical <User Schedule>  doxazosin 4 milliGRAM(s) Oral at bedtime  heparin   Injectable 5000 Unit(s) SubCutaneous every 8 hours  insulin glargine Injectable (LANTUS) 20 Unit(s) SubCutaneous at bedtime  insulin lispro (ADMELOG) corrective regimen sliding scale   SubCutaneous every 4 hours  ipratropium 17 MICROgram(s) HFA Inhaler 1 Puff(s) Inhalation every 6 hours  labetalol 100 milliGRAM(s) Oral every 8 hours  pantoprazole  Injectable 40 milliGRAM(s) IV Push every 24 hours  sevelamer carbonate Powder 800 milliGRAM(s) Oral three times a day with meals  valproic  acid Syrup 500 milliGRAM(s) Oral every 12 hours  vitamin A &amp; D Ointment 1 Application(s) Topical every 12 hours      LABS:                         7.7    14.92 )-----------( 459      ( 26 Dec 2023 20:00 )             24.3     147<H>  |  113<H>  |  74<HH>  ----------------------------<  204<H>          (12-26-23 @ 20:00)  4.1   |  22  |  2.9<H>    Ca    8.0<L>          (12-26-23 @ 20:00)  Phos  3.9         (12-26-23 @ 20:00)  Mg     3.0         (12-26-23 @ 20:00)      Blood Glucose (Past 24 hours):  120 mg/dL (12-27 @ 09:48)  225 mg/dL (12-27 @ 06:14)  200 mg/dL (12-27 @ 02:19)  231 mg/dL (12-26 @ 21:45)  179 mg/dL (12-26 @ 17:37)  197 mg/dL (12-26 @ 16:23)  160 mg/dL (12-26 @ 13:41)      DIET:   Diet, NPO with Tube Feed:   Tube Feeding Modality: Gastrostomy  Glucerna 1.2 Tonio  Total Volume for 24 Hours (mL): 1260  Continuous  Starting Tube Feed Rate {mL per Hour}: 55  Increase Tube Feed Rate by (mL): 10     Every 4 hours  Until Goal Tube Feed Rate (mL per Hour): 70  Tube Feed Duration (in Hours): 18  Tube Feed Start Time: 12:00  Tube Feed Stop Time: 06:00  Free Water Flush  Bolus   Total Volume per Flush (mL): 200   Frequency: Every 6 Hours  Free Water Flush Instructions:  Please flush PEG with 50cc of sterile water before feeds start and 100cc of sterile water after feeds end (12-27-23 @ 10:47) [Active]      ASSESSMENT              PLAN GI NUTRITION SUPPORT TEAM  -  CONSULT NOTE   HPI:  "77-year-old male Cantonese speaking his presents with prior history of hypertension dyslipidemia diabetes BPH prior CVA on aspirin and Plavix who states he had a mechanical fall 2 days ago while getting out of the car fell backwards hit his head.  Afterwards he was able to ambulate.  But for the past 1 day family was unable to ambulate him.  He states that his lower extremities are painful to movement and weak.  Family denies any LOC.  They deny any confusion at home.  On exam oriented to person and place but just not to date.  He does follow simple commands.  Elevates his arms for 10 seconds.  Sensation intact in upper extremities.  Lower extremities with 2 out of 5 strength bilaterally."      GI NUTRITION SUPPORT NOTE:    Consulted by SICU team to assess TF's as pt with diarrhea.   On admission found to have subdural hemorrhage with hospitalization complicated by multiple seizures and uncontrolled hypertension. s/p trach and peg placement 12/22  Has received Glucerna 1.2 since 12/16 when receiving TF's. Dignishield placed 12/21, output 650ml/24 hrs. Output brown, watery  d/w sicu team and family at beside ( phone)      REVIEW OF SYSTEMS:  Negative except as noted above.     PAST MEDICAL/SURGICAL HISTORY:   HTN (hypertension)  Seasonal allergies  History of BPH  Diabetes  No significant past surgical history      ALLERGIES:  [This allergen will not trigger allergy alert] pollen (Unknown)  No Known Drug Allergies    VITALS:  T(F): 100.5 (12-27 @ 08:00), Max: 100.9 (12-27 @ 00:00)  HR: 89 (12-27 @ 10:00) (86 - 104)  BP: 123/58 (12-27 @ 10:00) (118/56 - 164/73)  RR: 30 (12-27 @ 10:00) (20 - 32)  SpO2: 100% (12-27 @ 10:00) (100% - 100%)    HEIGHT/WEIGHT/BMI:   Height (cm): 156 (12-22), 157.5 (02-16)  Weight (kg): 61.7 (12-22), 61.2 (02-13)  BMI (kg/m2): 25.4 (12-22), 24.7 (02-16)      PHYSICAL EXAM:   GENERAL: +trach, lethargic   ABDOMEN: soft, nontender, distented, +PEG LUE  EXTREMITIES: no clubbing, cyanosis, +anasarca     SKIN: warm and dry, skin tear L buttocks and Left arm  IV ACCESS: midline, peripheral  ENTERAL ACCESS: +PEG       I/Os:     12-26-23 @ 07:01  -  12-27-23 @ 07:00  --------------------------------------------------------  IN:    Enteral Tube Flush: 150 mL    Glucerna: 1170 mL    IV PiggyBack: 150 mL  Total IN: 1470 mL    OUT:    Rectal Tube (mL): 650 mL    Voided (mL): 2185 mL  Total OUT: 2835 mL    Total NET: -1365 mL      STANDING MEDICATIONS:   acetaminophen     Tablet .. 650 milliGRAM(s) Oral every 6 hours  albuterol    90 MICROgram(s) HFA Inhaler 90 Puff(s) Inhalation every 4 hours  amLODIPine   Tablet 10 milliGRAM(s) Oral daily  aspirin  chewable 81 milliGRAM(s) Enteral Tube daily  atorvastatin 20 milliGRAM(s) Oral at bedtime  bacitracin   Ointment 1 Application(s) Topical every 12 hours  chlorhexidine 0.12% Liquid 15 milliLiter(s) Oral Mucosa two times a day  chlorhexidine 2% Cloths 1 Application(s) Topical <User Schedule>  doxazosin 4 milliGRAM(s) Oral at bedtime  heparin   Injectable 5000 Unit(s) SubCutaneous every 8 hours  insulin glargine Injectable (LANTUS) 20 Unit(s) SubCutaneous at bedtime  insulin lispro (ADMELOG) corrective regimen sliding scale   SubCutaneous every 4 hours  ipratropium 17 MICROgram(s) HFA Inhaler 1 Puff(s) Inhalation every 6 hours  labetalol 100 milliGRAM(s) Oral every 8 hours  pantoprazole  Injectable 40 milliGRAM(s) IV Push every 24 hours  sevelamer carbonate Powder 800 milliGRAM(s) Oral three times a day with meals  valproic  acid Syrup 500 milliGRAM(s) Oral every 12 hours  vitamin A &amp; D Ointment 1 Application(s) Topical every 12 hours      LABS:                         7.7    14.92 )-----------( 459      ( 26 Dec 2023 20:00 )             24.3     147<H>  |  113<H>  |  74<HH>  ----------------------------<  204<H>          (12-26-23 @ 20:00)  4.1   |  22  |  2.9<H>    Ca    8.0<L>          (12-26-23 @ 20:00)  Phos  3.9         (12-26-23 @ 20:00)  Mg     3.0         (12-26-23 @ 20:00)      Blood Glucose (Past 24 hours):  120 mg/dL (12-27 @ 09:48)  225 mg/dL (12-27 @ 06:14)  200 mg/dL (12-27 @ 02:19)  231 mg/dL (12-26 @ 21:45)  179 mg/dL (12-26 @ 17:37)  197 mg/dL (12-26 @ 16:23)  160 mg/dL (12-26 @ 13:41)      DIET:   Diet, NPO with Tube Feed:   Tube Feeding Modality: Gastrostomy  Glucerna 1.2 Tonio  Total Volume for 24 Hours (mL): 1260  Continuous  Starting Tube Feed Rate {mL per Hour}: 55  Increase Tube Feed Rate by (mL): 10     Every 4 hours  Until Goal Tube Feed Rate (mL per Hour): 70  Tube Feed Duration (in Hours): 18  Tube Feed Start Time: 12:00  Tube Feed Stop Time: 06:00  Free Water Flush  Bolus   Total Volume per Flush (mL): 200   Frequency: Every 6 Hours  Free Water Flush Instructions:  Please flush PEG with 50cc of sterile water before feeds start and 100cc of sterile water after feeds end (12-27-23 @ 10:47) [Active]      ASSESSMENT              PLAN GI NUTRITION SUPPORT TEAM  -  CONSULT NOTE   HPI:  "77-year-old male Cantonese speaking his presents with prior history of hypertension dyslipidemia diabetes BPH prior CVA on aspirin and Plavix who states he had a mechanical fall 2 days ago while getting out of the car fell backwards hit his head.  Afterwards he was able to ambulate.  But for the past 1 day family was unable to ambulate him.  He states that his lower extremities are painful to movement and weak.  Family denies any LOC.  They deny any confusion at home.  On exam oriented to person and place but just not to date.  He does follow simple commands.  Elevates his arms for 10 seconds.  Sensation intact in upper extremities.  Lower extremities with 2 out of 5 strength bilaterally."      GI NUTRITION SUPPORT NOTE:    Consulted by SICU team to assess TF's as pt with diarrhea.   On admission found to have subdural hemorrhage with hospitalization complicated by multiple seizures and uncontrolled hypertension. s/p trach and peg placement 12/22  Has received Glucerna 1.2 since 12/16 when receiving TF's. Dignishield placed 12/21, output 650ml/24 hrs (Output brown, watery. Of note +loose BM's per RD note 12/18.   d/w sicu team and family at beside ( phone)      REVIEW OF SYSTEMS:  Negative except as noted above.     PAST MEDICAL/SURGICAL HISTORY:   HTN (hypertension)  Seasonal allergies  History of BPH  Diabetes  No significant past surgical history      ALLERGIES:  [This allergen will not trigger allergy alert] pollen (Unknown)  No Known Drug Allergies    VITALS:  T(F): 100.5 (12-27 @ 08:00), Max: 100.9 (12-27 @ 00:00)  HR: 89 (12-27 @ 10:00) (86 - 104)  BP: 123/58 (12-27 @ 10:00) (118/56 - 164/73)  RR: 30 (12-27 @ 10:00) (20 - 32)  SpO2: 100% (12-27 @ 10:00) (100% - 100%)    HEIGHT/WEIGHT/BMI:   Height (cm): 156 (12-22), 157.5 (02-16)  Weight (kg): 61.7 (12-22), 61.2 (02-13)  BMI (kg/m2): 25.4 (12-22), 24.7 (02-16)      PHYSICAL EXAM:   GENERAL: +trach, lethargic   ABDOMEN: soft, nontender, distended, +PEG LUE  EXTREMITIES: no clubbing, cyanosis, +anasarca     SKIN: warm and dry, skin tear L buttocks and Left arm  IV ACCESS: midline, peripheral  ENTERAL ACCESS: +PEG       I/Os:     12-26-23 @ 07:01  -  12-27-23 @ 07:00  --------------------------------------------------------  IN:    Enteral Tube Flush: 150 mL    Glucerna: 1170 mL    IV PiggyBack: 150 mL  Total IN: 1470 mL    OUT:    Rectal Tube (mL): 650 mL    Voided (mL): 2185 mL  Total OUT: 2835 mL    Total NET: -1365 mL      STANDING MEDICATIONS:   acetaminophen     Tablet .. 650 milliGRAM(s) Oral every 6 hours  albuterol    90 MICROgram(s) HFA Inhaler 90 Puff(s) Inhalation every 4 hours  amLODIPine   Tablet 10 milliGRAM(s) Oral daily  aspirin  chewable 81 milliGRAM(s) Enteral Tube daily  atorvastatin 20 milliGRAM(s) Oral at bedtime  bacitracin   Ointment 1 Application(s) Topical every 12 hours  chlorhexidine 0.12% Liquid 15 milliLiter(s) Oral Mucosa two times a day  chlorhexidine 2% Cloths 1 Application(s) Topical <User Schedule>  doxazosin 4 milliGRAM(s) Oral at bedtime  heparin   Injectable 5000 Unit(s) SubCutaneous every 8 hours  insulin glargine Injectable (LANTUS) 20 Unit(s) SubCutaneous at bedtime  insulin lispro (ADMELOG) corrective regimen sliding scale   SubCutaneous every 4 hours  ipratropium 17 MICROgram(s) HFA Inhaler 1 Puff(s) Inhalation every 6 hours  labetalol 100 milliGRAM(s) Oral every 8 hours  pantoprazole  Injectable 40 milliGRAM(s) IV Push every 24 hours  sevelamer carbonate Powder 800 milliGRAM(s) Oral three times a day with meals  valproic  acid Syrup 500 milliGRAM(s) Oral every 12 hours  vitamin A &amp; D Ointment 1 Application(s) Topical every 12 hours      LABS:                         7.7    14.92 )-----------( 459      ( 26 Dec 2023 20:00 )             24.3     147<H>  |  113<H>  |  74<HH>  ----------------------------<  204<H>          (12-26-23 @ 20:00)  4.1   |  22  |  2.9<H>    Ca    8.0<L>          (12-26-23 @ 20:00)  Phos  3.9         (12-26-23 @ 20:00)  Mg     3.0         (12-26-23 @ 20:00)    C Diff by PCR Result: NotDete: Method: CDPCR  TOXIGENIC CLOST.DIFFICILE NEGATIVE;  027-NAP1-B1 PRESUMPTIVE NEGATIVE.    Blood Glucose (Past 24 hours):  120 mg/dL (12-27 @ 09:48)  225 mg/dL (12-27 @ 06:14)  200 mg/dL (12-27 @ 02:19)  231 mg/dL (12-26 @ 21:45)  179 mg/dL (12-26 @ 17:37)  197 mg/dL (12-26 @ 16:23)  160 mg/dL (12-26 @ 13:41)      DIET:   Diet, NPO with Tube Feed:   Tube Feeding Modality: Gastrostomy  Glucerna 1.2 Tonio  Total Volume for 24 Hours (mL): 1260  Continuous  Starting Tube Feed Rate {mL per Hour}: 55  Increase Tube Feed Rate by (mL): 10     Every 4 hours  Until Goal Tube Feed Rate (mL per Hour): 70  Tube Feed Duration (in Hours): 18  Tube Feed Start Time: 12:00  Tube Feed Stop Time: 06:00  Free Water Flush  Bolus   Total Volume per Flush (mL): 200   Frequency: Every 6 Hours  Free Water Flush Instructions:  Please flush PEG with 50cc of sterile water before feeds start and 100cc of sterile water after feeds end (12-27-23 @ 10:47) [Active]      ASSESSMENT            Est nutrient needs: 1542 kcals (25 kcals/kg), 74-92 g protein (1.2-1.5g/kg)        PLAN  - change feeds to Peptamen AF @ 70ml/hr X 18 hrs via PEG ([facility] policy of continuous feeds X 18 hrs for critical care)     *1512 kcals, 96g protein, hydrolyzed low CHO formula with low n6:n3  - re-evaluate IV pantoprazole GI NUTRITION SUPPORT TEAM  -  CONSULT NOTE   HPI:  "77-year-old male Cantonese speaking his presents with prior history of hypertension dyslipidemia diabetes BPH prior CVA on aspirin and Plavix who states he had a mechanical fall 2 days ago while getting out of the car fell backwards hit his head.  Afterwards he was able to ambulate.  But for the past 1 day family was unable to ambulate him.  He states that his lower extremities are painful to movement and weak.  Family denies any LOC.  They deny any confusion at home.  On exam oriented to person and place but just not to date.  He does follow simple commands.  Elevates his arms for 10 seconds.  Sensation intact in upper extremities.  Lower extremities with 2 out of 5 strength bilaterally."      GI NUTRITION SUPPORT NOTE:    Consulted by SICU team to assess TF's as pt with diarrhea.   On admission found to have subdural hemorrhage with hospitalization complicated by multiple seizures and uncontrolled hypertension. s/p trach and peg placement 12/22  Has received Glucerna 1.2 since 12/16 when receiving TF's. Dignishield placed 12/21, output 650ml/24 hrs (Output brown, watery. Of note +loose BM's per RD note 12/18.   d/w sicu team and family at beside ( phone)      REVIEW OF SYSTEMS:  Negative except as noted above.     PAST MEDICAL/SURGICAL HISTORY:   HTN (hypertension)  Seasonal allergies  History of BPH  Diabetes  No significant past surgical history      ALLERGIES:  [This allergen will not trigger allergy alert] pollen (Unknown)  No Known Drug Allergies    VITALS:  T(F): 100.5 (12-27 @ 08:00), Max: 100.9 (12-27 @ 00:00)  HR: 89 (12-27 @ 10:00) (86 - 104)  BP: 123/58 (12-27 @ 10:00) (118/56 - 164/73)  RR: 30 (12-27 @ 10:00) (20 - 32)  SpO2: 100% (12-27 @ 10:00) (100% - 100%)    HEIGHT/WEIGHT/BMI:   Height (cm): 156 (12-22), 157.5 (02-16)  Weight (kg): 61.7 (12-22), 61.2 (02-13)  BMI (kg/m2): 25.4 (12-22), 24.7 (02-16)      PHYSICAL EXAM:   GENERAL: +trach, lethargic   ABDOMEN: soft, nontender, distended, +PEG LUE  EXTREMITIES: no clubbing, cyanosis, +anasarca     SKIN: warm and dry, skin tear L buttocks and Left arm  IV ACCESS: midline, peripheral  ENTERAL ACCESS: +PEG       I/Os:     12-26-23 @ 07:01  -  12-27-23 @ 07:00  --------------------------------------------------------  IN:    Enteral Tube Flush: 150 mL    Glucerna: 1170 mL    IV PiggyBack: 150 mL  Total IN: 1470 mL    OUT:    Rectal Tube (mL): 650 mL    Voided (mL): 2185 mL  Total OUT: 2835 mL    Total NET: -1365 mL      STANDING MEDICATIONS:   acetaminophen     Tablet .. 650 milliGRAM(s) Oral every 6 hours  albuterol    90 MICROgram(s) HFA Inhaler 90 Puff(s) Inhalation every 4 hours  amLODIPine   Tablet 10 milliGRAM(s) Oral daily  aspirin  chewable 81 milliGRAM(s) Enteral Tube daily  atorvastatin 20 milliGRAM(s) Oral at bedtime  bacitracin   Ointment 1 Application(s) Topical every 12 hours  chlorhexidine 0.12% Liquid 15 milliLiter(s) Oral Mucosa two times a day  chlorhexidine 2% Cloths 1 Application(s) Topical <User Schedule>  doxazosin 4 milliGRAM(s) Oral at bedtime  heparin   Injectable 5000 Unit(s) SubCutaneous every 8 hours  insulin glargine Injectable (LANTUS) 20 Unit(s) SubCutaneous at bedtime  insulin lispro (ADMELOG) corrective regimen sliding scale   SubCutaneous every 4 hours  ipratropium 17 MICROgram(s) HFA Inhaler 1 Puff(s) Inhalation every 6 hours  labetalol 100 milliGRAM(s) Oral every 8 hours  pantoprazole  Injectable 40 milliGRAM(s) IV Push every 24 hours  sevelamer carbonate Powder 800 milliGRAM(s) Oral three times a day with meals  valproic  acid Syrup 500 milliGRAM(s) Oral every 12 hours  vitamin A &amp; D Ointment 1 Application(s) Topical every 12 hours      LABS:                         7.7    14.92 )-----------( 459      ( 26 Dec 2023 20:00 )             24.3     147<H>  |  113<H>  |  74<HH>  ----------------------------<  204<H>          (12-26-23 @ 20:00)  4.1   |  22  |  2.9<H>    Ca    8.0<L>          (12-26-23 @ 20:00)  Phos  3.9         (12-26-23 @ 20:00)  Mg     3.0         (12-26-23 @ 20:00)    C Diff by PCR Result: NotDete: Method: CDPCR  TOXIGENIC CLOST.DIFFICILE NEGATIVE;  027-NAP1-B1 PRESUMPTIVE NEGATIVE.    Blood Glucose (Past 24 hours):  120 mg/dL (12-27 @ 09:48)  225 mg/dL (12-27 @ 06:14)  200 mg/dL (12-27 @ 02:19)  231 mg/dL (12-26 @ 21:45)  179 mg/dL (12-26 @ 17:37)  197 mg/dL (12-26 @ 16:23)  160 mg/dL (12-26 @ 13:41)      DIET:   Diet, NPO with Tube Feed:   Tube Feeding Modality: Gastrostomy  Glucerna 1.2 Tonio  Total Volume for 24 Hours (mL): 1260  Continuous  Starting Tube Feed Rate {mL per Hour}: 55  Increase Tube Feed Rate by (mL): 10     Every 4 hours  Until Goal Tube Feed Rate (mL per Hour): 70  Tube Feed Duration (in Hours): 18  Tube Feed Start Time: 12:00  Tube Feed Stop Time: 06:00  Free Water Flush  Bolus   Total Volume per Flush (mL): 200   Frequency: Every 6 Hours  Free Water Flush Instructions:  Please flush PEG with 50cc of sterile water before feeds start and 100cc of sterile water after feeds end (12-27-23 @ 10:47) [Active]      ASSESSMENT            Est nutrient needs: 1542 kcals (25 kcals/kg), 74-92 g protein (1.2-1.5g/kg)        PLAN  - change feeds to Peptamen AF @ 70ml/hr X 18 hrs via PEG ([facility] policy of continuous feeds X 18 hrs for critical care)     *1512 kcals, 96g protein, hydrolyzed low CHO formula with low n6:n3  - re-evaluate IV pantoprazole GI NUTRITION SUPPORT TEAM  -  CONSULT NOTE   HPI:  "77-year-old male Cantonese speaking his presents with prior history of hypertension dyslipidemia diabetes BPH prior CVA on aspirin and Plavix who states he had a mechanical fall 2 days ago while getting out of the car fell backwards hit his head.  Afterwards he was able to ambulate.  But for the past 1 day family was unable to ambulate him.  He states that his lower extremities are painful to movement and weak.  Family denies any LOC.  They deny any confusion at home.  On exam oriented to person and place but just not to date.  He does follow simple commands.  Elevates his arms for 10 seconds.  Sensation intact in upper extremities.  Lower extremities with 2 out of 5 strength bilaterally."      GI NUTRITION SUPPORT NOTE:    Consulted by SICU team to assess TF's as pt with diarrhea.   On admission found to have subdural hemorrhage with hospitalization complicated by multiple seizures and uncontrolled hypertension. s/p trach and peg placement 12/22  Has received Glucerna 1.2 since 12/16 when receiving TF's. Dignishield placed 12/21, output 650ml/24 hrs (Output brown, watery. Of note +loose BM's per RD note 12/18.   d/w sicu team and family at beside ( phone)      REVIEW OF SYSTEMS:  Negative except as noted above.       PAST MEDICAL/SURGICAL HISTORY:   HTN (hypertension)  Seasonal allergies  History of BPH  Diabetes  No significant past surgical history      ALLERGIES:  [This allergen will not trigger allergy alert] pollen (Unknown)  No Known Drug Allergies    VITALS:  T(F): 100.5 (12-27 @ 08:00), Max: 100.9 (12-27 @ 00:00)  HR: 89 (12-27 @ 10:00) (86 - 104)  BP: 123/58 (12-27 @ 10:00) (118/56 - 164/73)  RR: 30 (12-27 @ 10:00) (20 - 32)  SpO2: 100% (12-27 @ 10:00) (100% - 100%)    HEIGHT/WEIGHT/BMI:   Height (cm): 156 (12-22), 157.5 (02-16)  Weight (kg): 61.7 (12-22), 61.2 (02-13)  BMI (kg/m2): 25.4 (12-22), 24.7 (02-16)      PHYSICAL EXAM:   GENERAL: +trach, lethargic   ABDOMEN: soft, nontender, distended, +PEG LUE  EXTREMITIES: no clubbing, cyanosis, +anasarca     SKIN: warm and dry, skin tear L buttocks and Left arm  IV ACCESS: midline, peripheral  ENTERAL ACCESS: +PEG       I/Os:     12-26-23 @ 07:01  -  12-27-23 @ 07:00  --------------------------------------------------------  IN:    Enteral Tube Flush: 150 mL    Glucerna: 1170 mL    IV PiggyBack: 150 mL  Total IN: 1470 mL    OUT:    Rectal Tube (mL): 650 mL    Voided (mL): 2185 mL  Total OUT: 2835 mL    Total NET: -1365 mL      STANDING MEDICATIONS:   acetaminophen     Tablet .. 650 milliGRAM(s) Oral every 6 hours  albuterol    90 MICROgram(s) HFA Inhaler 90 Puff(s) Inhalation every 4 hours  amLODIPine   Tablet 10 milliGRAM(s) Oral daily  aspirin  chewable 81 milliGRAM(s) Enteral Tube daily  atorvastatin 20 milliGRAM(s) Oral at bedtime  bacitracin   Ointment 1 Application(s) Topical every 12 hours  chlorhexidine 0.12% Liquid 15 milliLiter(s) Oral Mucosa two times a day  chlorhexidine 2% Cloths 1 Application(s) Topical <User Schedule>  doxazosin 4 milliGRAM(s) Oral at bedtime  heparin   Injectable 5000 Unit(s) SubCutaneous every 8 hours  insulin glargine Injectable (LANTUS) 20 Unit(s) SubCutaneous at bedtime  insulin lispro (ADMELOG) corrective regimen sliding scale   SubCutaneous every 4 hours  ipratropium 17 MICROgram(s) HFA Inhaler 1 Puff(s) Inhalation every 6 hours  labetalol 100 milliGRAM(s) Oral every 8 hours  pantoprazole  Injectable 40 milliGRAM(s) IV Push every 24 hours  sevelamer carbonate Powder 800 milliGRAM(s) Oral three times a day with meals  valproic  acid Syrup 500 milliGRAM(s) Oral every 12 hours  vitamin A &amp; D Ointment 1 Application(s) Topical every 12 hours      LABS:                         7.7    14.92 )-----------( 459      ( 26 Dec 2023 20:00 )             24.3     Mean Cell Volume: 91.7 fL (12.26.23 @ 20:00)  Red Cell Distrib Width: 14.6 % (12.26.23 @ 20:00)      147<H>  |  113<H>  |  74<HH>  ----------------------------<  204<H>          (12-26-23 @ 20:00)  4.1   |  22  |  2.9<H>    Ca    8.0<L>          (12-26-23 @ 20:00)  Phos  3.9         (12-26-23 @ 20:00)  Mg     3.0         (12-26-23 @ 20:00)    C Diff by PCR Result: NotDetec: Method: CDPCR  TOXIGENIC CLOST.DIFFICILE NEGATIVE;  027-NAP1-B1 PRESUMPTIVE NEGATIVE.    Blood Glucose (Past 24 hours):  120 mg/dL (12-27 @ 09:48)  225 mg/dL (12-27 @ 06:14)  200 mg/dL (12-27 @ 02:19)  231 mg/dL (12-26 @ 21:45)  179 mg/dL (12-26 @ 17:37)  197 mg/dL (12-26 @ 16:23)  160 mg/dL (12-26 @ 13:41)      DIET:   Diet, NPO with Tube Feed:   Tube Feeding Modality: Gastrostomy  Glucerna 1.2 Tonio  Total Volume for 24 Hours (mL): 1260  Continuous  Starting Tube Feed Rate {mL per Hour}: 55  Increase Tube Feed Rate by (mL): 10     Every 4 hours  Until Goal Tube Feed Rate (mL per Hour): 70  Tube Feed Duration (in Hours): 18  Tube Feed Start Time: 12:00  Tube Feed Stop Time: 06:00  Free Water Flush  Bolus   Total Volume per Flush (mL): 200   Frequency: Every 6 Hours  Free Water Flush Instructions:  Please flush PEG with 50cc of sterile water before feeds start and 100cc of sterile water after feeds end (12-27-23 @ 10:47) [Active]      ASSESSMENT  78 year old male who presented s/p mechanical fall (+HT -LOC -AC). Found to have an acute subdural hemorrhage. Hospital course has been complicated by multiple seizures and uncontrolled hypertension. Intubated on 12/21 due to worsening respiratory status    - s/p trach, peg 12/22/23  - loose BM's   - anemia     Est nutrient needs: 1542 kcals (25 kcals/kg), 74-92 g protein (1.2-1.5g/kg)        PLAN  - change feeds to Peptamen AF @ 70ml/hr X 18 hrs via PEG ([facility] policy of continuous feeds X 18 hrs for critical care)     *1512 kcals, 96g protein, hydrolyzed low CHO formula with low n6:n3  - re-evaluate IV pantoprazole  - re-evaluate timing of lantus based on when feeds are given/off  - monitor rectal output   - if loose BM's continue on Peptamen AF then can trial Banatrol  - glycemic control GI NUTRITION SUPPORT TEAM  -  CONSULT NOTE   HPI:  "77-year-old male Cantonese speaking his presents with prior history of hypertension dyslipidemia diabetes BPH prior CVA on aspirin and Plavix who states he had a mechanical fall 2 days ago while getting out of the car fell backwards hit his head.  Afterwards he was able to ambulate.  But for the past 1 day family was unable to ambulate him.  He states that his lower extremities are painful to movement and weak.  Family denies any LOC.  They deny any confusion at home.  On exam oriented to person and place but just not to date.  He does follow simple commands.  Elevates his arms for 10 seconds.  Sensation intact in upper extremities.  Lower extremities with 2 out of 5 strength bilaterally."      GI NUTRITION SUPPORT NOTE:    Consulted by SICU team to assess TF's as pt with diarrhea.   On admission found to have subdural hemorrhage with hospitalization complicated by multiple seizures and uncontrolled hypertension. s/p trach and peg placement 12/22  Has received Glucerna 1.2 since 12/16 when receiving TF's. Dignishield placed 12/21, output 650ml/24 hrs (Output brown, watery. Of note +loose BM's per RD note 12/18.   d/w sicu team and family at beside ( phone)      REVIEW OF SYSTEMS:  Negative except as noted above.       PAST MEDICAL/SURGICAL HISTORY:   HTN (hypertension)  Seasonal allergies  History of BPH  Diabetes  No significant past surgical history      ALLERGIES:  [This allergen will not trigger allergy alert] pollen (Unknown)  No Known Drug Allergies    VITALS:  T(F): 100.5 (12-27 @ 08:00), Max: 100.9 (12-27 @ 00:00)  HR: 89 (12-27 @ 10:00) (86 - 104)  BP: 123/58 (12-27 @ 10:00) (118/56 - 164/73)  RR: 30 (12-27 @ 10:00) (20 - 32)  SpO2: 100% (12-27 @ 10:00) (100% - 100%)    HEIGHT/WEIGHT/BMI:   Height (cm): 156 (12-22), 157.5 (02-16)  Weight (kg): 61.7 (12-22), 61.2 (02-13)  BMI (kg/m2): 25.4 (12-22), 24.7 (02-16)      PHYSICAL EXAM:   GENERAL: +trach, lethargic   ABDOMEN: soft, nontender, distended, +PEG LUE  EXTREMITIES: no clubbing, cyanosis, +anasarca     SKIN: warm and dry, skin tear L buttocks and Left arm  IV ACCESS: midline, peripheral  ENTERAL ACCESS: +PEG       I/Os:     12-26-23 @ 07:01  -  12-27-23 @ 07:00  --------------------------------------------------------  IN:    Enteral Tube Flush: 150 mL    Glucerna: 1170 mL    IV PiggyBack: 150 mL  Total IN: 1470 mL    OUT:    Rectal Tube (mL): 650 mL    Voided (mL): 2185 mL  Total OUT: 2835 mL    Total NET: -1365 mL      STANDING MEDICATIONS:   acetaminophen     Tablet .. 650 milliGRAM(s) Oral every 6 hours  albuterol    90 MICROgram(s) HFA Inhaler 90 Puff(s) Inhalation every 4 hours  amLODIPine   Tablet 10 milliGRAM(s) Oral daily  aspirin  chewable 81 milliGRAM(s) Enteral Tube daily  atorvastatin 20 milliGRAM(s) Oral at bedtime  bacitracin   Ointment 1 Application(s) Topical every 12 hours  chlorhexidine 0.12% Liquid 15 milliLiter(s) Oral Mucosa two times a day  chlorhexidine 2% Cloths 1 Application(s) Topical <User Schedule>  doxazosin 4 milliGRAM(s) Oral at bedtime  heparin   Injectable 5000 Unit(s) SubCutaneous every 8 hours  insulin glargine Injectable (LANTUS) 20 Unit(s) SubCutaneous at bedtime  insulin lispro (ADMELOG) corrective regimen sliding scale   SubCutaneous every 4 hours  ipratropium 17 MICROgram(s) HFA Inhaler 1 Puff(s) Inhalation every 6 hours  labetalol 100 milliGRAM(s) Oral every 8 hours  pantoprazole  Injectable 40 milliGRAM(s) IV Push every 24 hours  sevelamer carbonate Powder 800 milliGRAM(s) Oral three times a day with meals  valproic  acid Syrup 500 milliGRAM(s) Oral every 12 hours  vitamin A &amp; D Ointment 1 Application(s) Topical every 12 hours      LABS:                         7.7    14.92 )-----------( 459      ( 26 Dec 2023 20:00 )             24.3     Mean Cell Volume: 91.7 fL (12.26.23 @ 20:00)  Red Cell Distrib Width: 14.6 % (12.26.23 @ 20:00)      147<H>  |  113<H>  |  74<HH>  ----------------------------<  204<H>          (12-26-23 @ 20:00)  4.1   |  22  |  2.9<H>    Ca    8.0<L>          (12-26-23 @ 20:00)  Phos  3.9         (12-26-23 @ 20:00)  Mg     3.0         (12-26-23 @ 20:00)    C Diff by PCR Result: NotDetec: Method: CDPCR  TOXIGENIC CLOST.DIFFICILE NEGATIVE;  027-NAP1-B1 PRESUMPTIVE NEGATIVE.    Blood Glucose (Past 24 hours):  120 mg/dL (12-27 @ 09:48)  225 mg/dL (12-27 @ 06:14)  200 mg/dL (12-27 @ 02:19)  231 mg/dL (12-26 @ 21:45)  179 mg/dL (12-26 @ 17:37)  197 mg/dL (12-26 @ 16:23)  160 mg/dL (12-26 @ 13:41)      DIET:   Diet, NPO with Tube Feed:   Tube Feeding Modality: Gastrostomy  Glucerna 1.2 Tonio  Total Volume for 24 Hours (mL): 1260  Continuous  Starting Tube Feed Rate {mL per Hour}: 55  Increase Tube Feed Rate by (mL): 10     Every 4 hours  Until Goal Tube Feed Rate (mL per Hour): 70  Tube Feed Duration (in Hours): 18  Tube Feed Start Time: 12:00  Tube Feed Stop Time: 06:00  Free Water Flush  Bolus   Total Volume per Flush (mL): 200   Frequency: Every 6 Hours  Free Water Flush Instructions:  Please flush PEG with 50cc of sterile water before feeds start and 100cc of sterile water after feeds end (12-27-23 @ 10:47) [Active]      ASSESSMENT  78 year old male who presented s/p mechanical fall (+HT -LOC -AC). Found to have an acute subdural hemorrhage. Hospital course has been complicated by multiple seizures and uncontrolled hypertension. Intubated on 12/21 due to worsening respiratory status    - s/p trach, peg 12/22/23  - loose BM's   - anemia     Est nutrient needs: 1542 kcals (25 kcals/kg), 74-92 g protein (1.2-1.5g/kg)        PLAN  - change feeds to Peptamen AF @ 70ml/hr X 18 hrs via PEG ([facility] policy of continuous feeds X 18 hrs for critical care)     *1512 kcals, 96g protein, hydrolyzed low CHO formula with low n6:n3  - re-evaluate IV pantoprazole  - re-evaluate timing of lantus based on when feeds are given/off  - monitor rectal output   - if loose BM's continue on Peptamen AF then can trial Banatrol  - glycemic control

## 2023-12-27 NOTE — PROGRESS NOTE ADULT - ASSESSMENT
78y Male s/p mechanical fall. +HT, +AC (Aspirin and Plavix), -LOC.    12/21-intubated for airway protection, accessory muscle use  12/22- s/p tracheostomy and PEG placement, Portex cuffed 9 and 20fr PEG 2cm at skin     NEURO:  #Acute SDH   -12/18 HCT#4 -stable SDH, no new acute findings  -Q4 neuro checks  #h/o stroke (2/2023) w/residual right sided weakness  - RIGHT anterior thalamus infarct,  LEFT caudate head lacunar infarct  #focal seizure    -vEEG 12/19: No seizures; Discontinued 12/19    - Valproic acid 500q12; Level 23, albumin 2.1 > per neurology: no longer need to monitor valporic acid levels as patient neuro clinical exam has been consistent & keep current dose. F/u valporic acid levels if neurologic exam changes or s/s toxicity     -NCC: valproic acid current dose, q4 neurochecks    -Neurology cs- d/c EEG, 2 days prior to discharge call neurology so they can do a spot EEG    RESP:   #Respiratory Failure secondary to impaired secretion clearance    -s/p tracheostomy 12/22, size 9 cuffed portex  RR (machine): 14, TV (machine): 350, FiO2: 30, PEEP: 5  ABG, 12/27:  - 12/25: tolerated 4h tpiece  - 12/26: t-piece from 8am-6pm  - chest PT q4 and duoneb treatments q 4   - deep tracheal aspirate cx sent (due to fevers)  Culture - Sputum . (12.21.23 @ 18:14)-  Numerous Staphylococcus aureus  #Activity    -increase as tolerated    CARDS:   #acute HYPOtension intraop, resolved  -off levophed since 12/22 PM  #hx of HTN  -Amlodipine 10 QD restarted  -Labetalol 100 q8 (Home Coreg 6.25mg q12 )   -Valsartan 320mg HOLDING   #hx HLD  -atorvastatin  #NSVT    -EP/Cards: No arrhythmias on tele only artifact. No further work up needed. Recommend ILR prior to discharge if patient/family agreeable  Imaging:   Echo: 12/2023: EF 66%, G1DD, trace MR, mild TR     GI/NUTR:   #Diet, NPO with Tube Feed via 20Fr PEG placed 12/22   Glucerna 1.2 at goal 70cc/hr, feed duration 18 hr   -F/U nutrition 12/26, persistent diarrhea - pending    -aspiration precautions, HOB 30  #GI Prophylaxis  -Protonix 40mg   #Bowel regimen     -Multiple loose bowel movements, last dose senna 12/19 at 2200 > dignishield placed 12/23     -C diff negative     -no bowel regimen    /RENAL:   #urine output   -Condom catheter -voiding  -UA neg 12/25 (r/o UTI in setting of persistent fevers)   -Sodium goal 140-150  #Diuresis  -last diuresis 12/26 with 10mg metazolone x 1   #Hypernatremia  - restarted FWF 200q4; continue to trend  #hyperphosphatemia  -Started sevelmaer powder, non-formulary so as not to clog PEG   #CKD   -baseline Cr 2.1  -holding home sodium bicarb 650mg BID  #hx BPH    -cardura 4mg (flomax at home, non crushable)      Labs:          BUN/Cr- 72/2.7  -->,  74/2.9  -->          Electrolytes-Na 147 // K 4.1 // Mg 3.0 //  Phos 3.9 (12-26 @ 20:00)    HEME/ONC:   #DVT prophylaxis     -SCDs, HSQ  #hx of CVA    -ASA cleared by NSGY    -Plavix HOLDING, will require repeat HCT in 2 wks prior to restart, approx 1/1/24    Labs: Hb/Hct:  7.6/23.6  -->,  7.7/24.3  -->                      Plts:  391  -->,  459  -->       ID:  #recurrent fevers- improving, now low grade  -MRSA PCR neg   Cultures    Bcx 12/18- final neg     Tracheal aspirate 12/21: numerous staph aureus -methicillian sensitive    Tracheal aspirate 12/22: staph aureus    C.Diff PCR neg    Blood cx 12/21- no growth at 4 days    UA 12/25 negative   Current antibiotics:  -IV Ancef 2g q8 (12/24-12/27 @0600)  #ID consult  -D/C'd zosyn & start IV Ancef  #multiple loose bowel movements    -WBC trending up    -no bowel regiment  WBC- 13.22  --->>,  13.63  --->>,  14.92  --->>  Temp trend- 24hrs T(F): 99.5 (12-26 @ 20:00), Max: 100.1 (12-26 @ 12:00)  Antibiotics-ceFAZolin   IVPB 2000 every 8 hours      ENDO:  #DM     -ISS     -Off insulin gtt 12/20      -Lantus 20 units HS     -FSG q4 while on TF    MSK:     Activity - Increase As Tolerated    -B/L knee X ray 12/19- no fractures, b/l effusions     LINES/DRAINS:  PIV, right basilic midline (placed 12/16), 20Fr PEG (12/22), Tracheostomy (12/22)    ADVANCED DIRECTIVES:  Full Code    HCP/Emergency Contact-Sarah Gamino (Wife) [phone], Cantonese speaking    DISPO:  Social work, case management consult for dispo to vent facility - spoke with social work 12/23, starting to work on D/C.   78y Male s/p mechanical fall. +HT, +AC (Aspirin and Plavix), -LOC.    12/21-intubated for airway protection, accessory muscle use  12/22- s/p tracheostomy and PEG placement, Portex cuffed 9 and 20fr PEG 2cm at skin     NEURO:  #Acute SDH   -12/18 HCT#4 -stable SDH, no new acute findings  -Q4 neuro checks  #h/o stroke (2/2023) w/residual right sided weakness  - RIGHT anterior thalamus infarct,  LEFT caudate head lacunar infarct  #focal seizure    -vEEG 12/19: No seizures; Discontinued 12/19    - Valproic acid 500q12; Level 23, albumin 2.1 > per neurology: no longer need to monitor valporic acid levels as patient neuro clinical exam has been consistent & keep current dose. F/u valporic acid levels if neurologic exam changes or s/s toxicity     -NCC: valproic acid current dose, q4 neurochecks    -Neurology cs- d/c EEG, 2 days prior to discharge call neurology so they can do a spot EEG    RESP:   #Respiratory Failure secondary to impaired secretion clearance    -s/p tracheostomy 12/22, size 9 cuffed portex  RR (machine): 14, TV (machine): 350, FiO2: 30, PEEP: 5  ABG, 12/27:  - 12/25: tolerated 4h tpiece  - 12/26: t-piece from 8am-6pm  - chest PT q4 and duoneb treatments q 4   - deep tracheal aspirate cx sent (due to fevers)  Culture - Sputum . (12.21.23 @ 18:14)-  Numerous Staphylococcus aureus  #Activity    -increase as tolerated    CARDS:   #acute HYPOtension intraop, resolved  -off levophed since 12/22 PM  #hx of HTN  -Amlodipine 10 QD restarted  -Labetalol 100 q8 (Home Coreg 6.25mg q12 )   -Valsartan 320mg HOLDING   #hx HLD  -atorvastatin  #NSVT    -EP/Cards: No arrhythmias on tele only artifact. No further work up needed. Recommend ILR prior to discharge if patient/family agreeable  Imaging:   Echo: 12/2023: EF 66%, G1DD, trace MR, mild TR     GI/NUTR:   #Diet, NPO with Tube Feed via 20Fr PEG placed 12/22   Glucerna 1.2 at goal 70cc/hr, feed duration 18 hr   -F/U nutrition 12/26, persistent diarrhea - pending    -aspiration precautions, HOB 30  #GI Prophylaxis  -Protonix 40mg   #Bowel regimen     -Multiple loose bowel movements, last dose senna 12/19 at 2200 > dignishield placed 12/23     -C diff negative     -no bowel regimen    /RENAL:   #urine output   -Condom catheter -voiding  -UA neg 12/25 (r/o UTI in setting of persistent fevers)   -Sodium goal 140-150  #Diuresis  -last diuresis 12/26 with 10mg metazolone x 1   #Hypernatremia  - restarted FWF 200q4; continue to trend  #hyperphosphatemia  -Started sevelmaer powder, non-formulary so as not to clog PEG   #CKD   -baseline Cr 2.1  -holding home sodium bicarb 650mg BID  #hx BPH    -cardura 4mg (flomax at home, non crushable)      Labs:          BUN/Cr- 72/2.7  -->,  74/2.9  -->          Electrolytes-Na 147 // K 4.1 // Mg 3.0 //  Phos 3.9 (12-26 @ 20:00)    HEME/ONC:   #DVT prophylaxis     -SCDs, HSQ  #hx of CVA    -ASA cleared by NSGY    -Plavix HOLDING, will require repeat HCT in 2 wks prior to restart, approx 1/1/24    Labs: Hb/Hct:  7.6/23.6  -->,  7.7/24.3  -->                      Plts:  391  -->,  459  -->       ID:  #recurrent fevers- improving, now low grade  -MRSA PCR neg   Cultures    Bcx 12/18- final neg     Tracheal aspirate 12/21: numerous staph aureus -methicillian sensitive    Tracheal aspirate 12/22: staph aureus    C.Diff PCR neg    Blood cx 12/21- no growth at 4 days    UA 12/25 negative   Current antibiotics:  -IV Ancef 2g q8 (12/24-12/27 @0600)  #ID consult  -D/C'd zosyn & start IV Ancef  #multiple loose bowel movements    -WBC trending up    -no bowel regiment  WBC- 13.22  --->>,  13.63  --->>,  14.92  --->>  Temp trend- 24hrs T(F): 99.5 (12-26 @ 20:00), Max: 100.1 (12-26 @ 12:00)  Antibiotics-ceFAZolin   IVPB 2000 every 8 hours      ENDO:  #DM     -ISS     -Off insulin gtt 12/20      -Lantus 20 units HS     -FSG q4 while on TF    MSK:     Activity - Increase As Tolerated    -B/L knee X ray 12/19- no fractures, b/l effusions     LINES/DRAINS:  PIV, right basilic midline (placed 12/16), 20Fr PEG (12/22), Tracheostomy (12/22)    ADVANCED DIRECTIVES:  Full Code    HCP/Emergency Contact-Sarah Gamino (Wife) [phone], Cantonese speaking    DISPO:  Social work, case management consult for dispo to vent facility - spoke with social work 12/23, starting to work on D/C.   78y Male s/p mechanical fall. +HT, +AC (Aspirin and Plavix), -LOC.    12/21-intubated for airway protection, accessory muscle use  12/22- s/p tracheostomy and PEG placement, Portex cuffed 9 and 20fr PEG 2cm at skin     NEURO:  #Acute SDH   -12/18 HCT#4 -stable SDH, no new acute findings  -Q4 neuro checks  #h/o stroke (2/2023) w/residual right sided weakness  - RIGHT anterior thalamus infarct,  LEFT caudate head lacunar infarct  #focal seizure    -vEEG 12/19: No seizures; Discontinued 12/19    - Valproic acid 500q12; Level 23, albumin 2.1 > per neurology: no longer need to monitor valporic acid levels as patient neuro clinical exam has been consistent & keep current dose. F/u valporic acid levels if neurologic exam changes or s/s toxicity     -NCC: valproic acid current dose, q4 neurochecks    -Neurology cs- d/c EEG, 2 days prior to discharge call neurology so they can do a spot EEG    RESP:   #Respiratory Failure secondary to impaired secretion clearance    -s/p tracheostomy 12/22, size 9 cuffed portex  RR (machine): 14, TV (machine): 350, FiO2: 30, PEEP: 5  ABG, 12/27: 7.47/29/154/21  - 12/25: tolerated 4h tpiece  - 12/26: t-piece from 8am-6pm  - chest PT q4 and duoneb treatments q 4   - deep tracheal aspirate cx sent (due to fevers)  Culture - Sputum . (12.21.23 @ 18:14)-  Numerous Staphylococcus aureus  #Activity    -increase as tolerated    CARDS:   #acute HYPOtension intraop, resolved  -off levophed since 12/22 PM  #hx of HTN  -Amlodipine 10 QD restarted  -Labetalol 100 q8 (Home Coreg 6.25mg q12 )   -Valsartan 320mg HOLDING   #hx HLD  -atorvastatin  #NSVT    -EP/Cards: No arrhythmias on tele only artifact. No further work up needed. Recommend ILR prior to discharge if patient/family agreeable  Imaging:   Echo: 12/2023: EF 66%, G1DD, trace MR, mild TR     GI/NUTR:   #Diet, NPO with Tube Feed via 20Fr PEG placed 12/22   Glucerna 1.2 at goal 70cc/hr, feed duration 18 hr   -F/U nutrition 12/26, persistent diarrhea - pending    -aspiration precautions, HOB 30  #GI Prophylaxis  -Protonix 40mg   #Bowel regimen     -Multiple loose bowel movements, last dose senna 12/19 at 2200 > dignishield placed 12/23     -C diff negative     -no bowel regimen    /RENAL:   #urine output   -Condom catheter -voiding  -UA neg 12/25 (r/o UTI in setting of persistent fevers)   -Sodium goal 140-150  #Diuresis  -last diuresis 12/26 with 10mg metazolone x 1   #Hypernatremia  - restarted FWF 200q4; continue to trend  #hyperphosphatemia  -Started sevelmaer powder, non-formulary so as not to clog PEG   #CKD   -baseline Cr 2.1  -holding home sodium bicarb 650mg BID  #hx BPH    -cardura 4mg (flomax at home, non crushable)      Labs:          BUN/Cr- 72/2.7  -->,  74/2.9  -->          Electrolytes-Na 147 // K 4.1 // Mg 3.0 //  Phos 3.9 (12-26 @ 20:00)    HEME/ONC:   #DVT prophylaxis     -SCDs, HSQ  #hx of CVA    -ASA cleared by NSGY    -Plavix HOLDING, will require repeat HCT in 2 wks prior to restart, approx 1/1/24    Labs: Hb/Hct:  7.6/23.6  -->,  7.7/24.3  -->                      Plts:  391  -->,  459  -->       ID:  #recurrent fevers- improving, now low grade  -MRSA PCR neg   Cultures    Bcx 12/18- final neg     Tracheal aspirate 12/21: numerous staph aureus -methicillian sensitive    Tracheal aspirate 12/22: staph aureus    C.Diff PCR neg    Blood cx 12/21- no growth at 4 days    UA 12/25 negative   Current antibiotics:  -IV Ancef 2g q8 (12/24-12/27 @0600)  #ID consult  -D/C'd zosyn & start IV Ancef  #multiple loose bowel movements    -WBC trending up    -no bowel regiment  WBC- 13.22  --->>,  13.63  --->>,  14.92  --->>  Temp trend- 24hrs T(F): 99.5 (12-26 @ 20:00), Max: 100.1 (12-26 @ 12:00)  Antibiotics-ceFAZolin   IVPB 2000 every 8 hours      ENDO:  #DM     -ISS     -Off insulin gtt 12/20      -Lantus 20 units HS     -FSG q4 while on TF    MSK:     Activity - Increase As Tolerated    -B/L knee X ray 12/19- no fractures, b/l effusions     LINES/DRAINS:  PIV, right basilic midline (placed 12/16), 20Fr PEG (12/22), Tracheostomy (12/22)    ADVANCED DIRECTIVES:  Full Code    HCP/Emergency Contact-Sarah Gamino (Wife) [phone], Cantonese speaking    DISPO:  Social work, case management consult for dispo to vent facility - spoke with social work 12/23, starting to work on D/C.   78y Male s/p mechanical fall. +HT, +AC (Aspirin and Plavix), -LOC.    12/21-intubated for airway protection, accessory muscle use  12/22- s/p tracheostomy and PEG placement, Portex cuffed 9 and 20fr PEG 2cm at skin     NEURO:  #Acute SDH   -12/18 HCT#4 -stable SDH, no new acute findings  -Q4 neuro checks  #h/o stroke (2/2023) w/residual right sided weakness  - RIGHT anterior thalamus infarct,  LEFT caudate head lacunar infarct  #focal seizure    -vEEG 12/19: No seizures; Discontinued 12/19    - Valproic acid 500q12; Level 23, albumin 2.1 > per neurology: no longer need to monitor valporic acid levels as patient neuro clinical exam has been consistent & keep current dose. F/u valporic acid levels if neurologic exam changes or s/s toxicity     -NCC: valproic acid current dose, q4 neurochecks    -Neurology cs- d/c EEG, 2 days prior to discharge call neurology so they can do a spot EEG    RESP:   #Respiratory Failure secondary to impaired secretion clearance    -s/p tracheostomy 12/22, size 9 cuffed portex  RR (machine): 14, TV (machine): 350, FiO2: 30, PEEP: 5  ABG, 12/27: 7.47/29/154/21  - 12/25: tolerated 4h tpiece  - 12/26: t-piece from 8am-6pm  - chest PT q4 and duoneb treatments q 4   - deep tracheal aspirate cx sent (due to fevers)  Culture - Sputum . (12.21.23 @ 18:14)-  Numerous Staphylococcus aureus  #Activity    -increase as tolerated    CARDS:   #acute HYPOtension intraop, resolved  -off levophed since 12/22 PM  #hx of HTN  -Amlodipine 10 QD restarted  -Labetalol 100 q8 (Home Coreg 6.25mg q12 )   -Valsartan 320mg HOLDING   #hx HLD  -atorvastatin  #NSVT    -EP/Cards: No arrhythmias on tele only artifact. No further work up needed. Recommend ILR prior to discharge if patient/family agreeable  Imaging:   Echo: 12/2023: EF 66%, G1DD, trace MR, mild TR     GI/NUTR:   #Diet, NPO with Tube Feed via 20Fr PEG placed 12/22   Glucerna 1.2 at goal 70cc/hr, feed duration 18 hr   -F/U nutrition 12/26, persistent diarrhea - pending    -aspiration precautions, HOB 30  #GI Prophylaxis  -Protonix 40mg   #Bowel regimen     -Multiple loose bowel movements, last dose senna 12/19 at 2200 > dignishield placed 12/23     -C diff negative     -no bowel regimen    /RENAL:   #urine output   -Condom catheter -voiding  -UA neg 12/25 (r/o UTI in setting of persistent fevers)   -Sodium goal 140-150  #Diuresis  -last diuresis 12/26 with 10mg metazolone x 1   #Hypernatremia  - restarted FWF 200q4; continue to trend  #hyperphosphatemia  -Started sevelmaer powder, non-formulary so as not to clog PEG   #CKD   -baseline Cr 2.1  -holding home sodium bicarb 650mg BID  #hx BPH    -cardura 4mg (flomax at home, non crushable)      Labs:          BUN/Cr- 72/2.7  -->,  74/2.9  -->          Electrolytes-Na 147 // K 4.1 // Mg 3.0 //  Phos 3.9 (12-26 @ 20:00)    HEME/ONC:   #DVT prophylaxis     -SCDs, HSQ  #hx of CVA    -ASA cleared by NSGY    -Plavix HOLDING, will require repeat HCT in 2 wks prior to restart, approx 1/1/24    Labs: Hb/Hct:  7.6/23.6  -->,  7.7/24.3  -->                      Plts:  391  -->,  459  -->       ID:  #recurrent fevers- improving, now low grade  -MRSA PCR neg   Cultures    Bcx 12/18- final neg     Tracheal aspirate 12/21: numerous staph aureus -methicillian sensitive    Tracheal aspirate 12/22: staph aureus    C.Diff PCR neg    Blood cx 12/21- no growth at 4 days    UA 12/25 negative   Current antibiotics:  -IV Ancef 2g q8 (12/24-12/27 @0600)  #ID consult  -D/C'd zosyn & start IV Ancef  #multiple loose bowel movements    -WBC trending up    -no bowel regiment  WBC- 13.22  --->>,  13.63  --->>,  14.92  --->>  Temp trend- 24hrs T(F): 99.5 (12-26 @ 20:00), Max: 100.1 (12-26 @ 12:00)  Antibiotics-ceFAZolin   IVPB 2000 every 8 hours      ENDO:  #DM     -ISS     -Off insulin gtt 12/20      -Lantus 20 units HS     -FSG q4 while on TF    MSK:     Activity - Increase As Tolerated    -B/L knee X ray 12/19- no fractures, b/l effusions     LINES/DRAINS:  PIV, right basilic midline (placed 12/16), 20Fr PEG (12/22), Tracheostomy (12/22)    ADVANCED DIRECTIVES:  Full Code    HCP/Emergency Contact-Sarah Gamino (Wife) [phone], Cantonese speaking    DISPO:  Social work, case management consult for dispo to vent facility - spoke with social work 12/23, starting to work on D/C.   78y Male s/p mechanical fall. +HT, +AC (Aspirin and Plavix), -LOC.    12/21-intubated for airway protection, accessory muscle use  12/22- s/p tracheostomy and PEG placement, Portex cuffed 9 and 20fr PEG 2cm at skin     NEURO:  #Acute SDH   -12/18 HCT#4 -stable SDH, no new acute findings  -Q4 neuro checks  #h/o stroke (2/2023) w/residual right sided weakness  - RIGHT anterior thalamus infarct,  LEFT caudate head lacunar infarct  #focal seizure    -vEEG 12/19: No seizures; Discontinued 12/19    - Valproic acid 500q12; Level 23, albumin 2.1 > per neurology: no longer need to monitor valporic acid levels as patient neuro clinical exam has been consistent & keep current dose. F/u valporic acid levels if neurologic exam changes or s/s toxicity     -NCC: valproic acid current dose, q4 neurochecks    -Neurology cs- d/c EEG, 2 days prior to discharge call neurology so they can do a spot EEG    RESP:   #Respiratory Failure secondary to impaired secretion clearance    -s/p tracheostomy 12/22, size 9 cuffed portex    - Currently on CPAP  ABG, 12/27: 7.47/29/154/21  - 12/25: tolerated 4h tpiece  - 12/26: t-piece from 8am-6pm  - chest PT q4 and duoneb treatments q 4   - deep tracheal aspirate cx sent (due to fevers)  Culture - Sputum . (12.21.23 @ 18:14)-  Numerous Staphylococcus aureus  #Activity    -increase as tolerated    CARDS:   #acute HYPOtension intraop, resolved  -off levophed since 12/22 PM  #hx of HTN  -Amlodipine 10 QD restarted  -Labetalol 100 q8 (Home Coreg 6.25mg q12 )   -Valsartan 320mg HOLDING   #hx HLD  -atorvastatin  #NSVT    -EP/Cards: No arrhythmias on tele only artifact. No further work up needed. Recommend ILR prior to discharge if patient/family agreeable  Imaging:   Echo: 12/2023: EF 66%, G1DD, trace MR, mild TR     GI/NUTR:   #Diet, NPO with Tube Feed via 20Fr PEG placed 12/22   Glucerna 1.2 at goal 70cc/hr, feed duration 18 hr   -F/U nutrition 12/26, persistent diarrhea - pending    -aspiration precautions, HOB 30  #GI Prophylaxis  -Protonix 40mg   #Bowel regimen     -Multiple loose bowel movements, last dose senna 12/19 at 2200 > dignishield placed 12/23     -C diff negative     -no bowel regimen    /RENAL:   #urine output   -Condom catheter -voiding  -UA neg 12/25 (r/o UTI in setting of persistent fevers)   -Sodium goal 140-150  #Diuresis  -last diuresis 12/26 with 10mg metazolone x 1   #Hypernatremia  - restarted FWF 200q4; continue to trend  #hyperphosphatemia  -Started sevelmaer powder, non-formulary so as not to clog PEG   #CKD   -baseline Cr 2.1  -holding home sodium bicarb 650mg BID  #hx BPH    -cardura 4mg (flomax at home, non crushable)      Labs:          BUN/Cr- 72/2.7  -->,  74/2.9  -->          Electrolytes-Na 147 // K 4.1 // Mg 3.0 //  Phos 3.9 (12-26 @ 20:00)    HEME/ONC:   #DVT prophylaxis     -SCDs, HSQ  #hx of CVA    -ASA cleared by NSGY    -Plavix HOLDING, will require repeat HCT in 2 wks prior to restart, approx 1/1/24    Labs: Hb/Hct:  7.6/23.6  -->,  7.7/24.3  -->                      Plts:  391  -->,  459  -->       ID:  #recurrent fevers- improving, now low grade  -MRSA PCR neg   Cultures    Bcx 12/18- final neg     Tracheal aspirate 12/21: numerous staph aureus -methicillian sensitive    Tracheal aspirate 12/22: staph aureus    C.Diff PCR neg    Blood cx 12/21- no growth at 4 days    UA 12/25 negative   Current antibiotics:  -IV Ancef 2g q8 (12/24-12/27 @0600)  #ID consult  -D/C'd zosyn & start IV Ancef  #multiple loose bowel movements    -WBC trending up    -no bowel regiment  WBC- 13.22  --->>,  13.63  --->>,  14.92  --->>  Temp trend- 24hrs T(F): 99.5 (12-26 @ 20:00), Max: 100.1 (12-26 @ 12:00)  Antibiotics-ceFAZolin   IVPB 2000 every 8 hours      ENDO:  #DM     -ISS     -Off insulin gtt 12/20      -Lantus 20 units HS     -FSG q4 while on TF    MSK:     Activity - Increase As Tolerated    -B/L knee X ray 12/19- no fractures, b/l effusions     LINES/DRAINS:  PIV, right basilic midline (placed 12/16), 20Fr PEG (12/22), Tracheostomy (12/22)    ADVANCED DIRECTIVES:  Full Code    HCP/Emergency Contact-Sarah Gamino (Wife) [phone], Cantonese speaking    DISPO:  Social work, case management consult for dispo to vent facility - spoke with social work 12/23, starting to work on D/C.   78y Male s/p mechanical fall. +HT, +AC (Aspirin and Plavix), -LOC.    12/21-intubated for airway protection, accessory muscle use  12/22- s/p tracheostomy and PEG placement, Portex cuffed 9 and 20fr PEG 2cm at skin     NEURO:  #Acute SDH   -12/18 HCT#4 -stable SDH, no new acute findings  -Q4 neuro checks  #h/o stroke (2/2023) w/residual right sided weakness  - RIGHT anterior thalamus infarct,  LEFT caudate head lacunar infarct  #focal seizure    -vEEG 12/19: No seizures; Discontinued 12/19    - Valproic acid 500q12; Level 23, albumin 2.1 > per neurology: no longer need to monitor valporic acid levels as patient neuro clinical exam has been consistent & keep current dose. F/u valporic acid levels if neurologic exam changes or s/s toxicity     -NCC: valproic acid current dose, q4 neurochecks    -Neurology cs- d/c EEG, 2 days prior to discharge call neurology so they can do a spot EEG    RESP:   #Respiratory Failure secondary to impaired secretion clearance    -s/p tracheostomy 12/22, size 9 cuffed portex    - Currently on CPAP  ABG, 12/27: 7.47/29/154/21  - 12/25: tolerated 4h tpiece  - 12/26: t-piece from 8am-6pm  - chest PT q4 and duoneb treatments q 4   - deep tracheal aspirate cx sent (due to fevers)  Culture - Sputum . (12.21.23 @ 18:14)-  Numerous Staphylococcus aureus  #Activity    -increase as tolerated    CARDS:   #acute HYPOtension intraop, resolved  -off levophed since 12/22 PM  #hx of HTN  -Amlodipine 10 QD restarted  -Labetalol 100 q8 (Home Coreg 6.25mg q12 )   -Valsartan 320mg HOLDING   #hx HLD  -atorvastatin  #NSVT    -EP/Cards: No arrhythmias on tele only artifact. No further work up needed. Recommend ILR prior to discharge if patient/family agreeable  Imaging:   Echo: 12/2023: EF 66%, G1DD, trace MR, mild TR     GI/NUTR:   #Diet, NPO with Tube Feed via 20Fr PEG placed 12/22   Glucerna 1.2 at goal 70cc/hr, feed duration 18 hr   -F/U nutrition 12/26, persistent diarrhea - pending    -aspiration precautions, HOB 30  #GI Prophylaxis  -Protonix 40mg   #Bowel regimen     -Multiple loose bowel movements, last dose senna 12/19 at 2200 > dignishield placed 12/23     -C diff negative     -no bowel regimen    /RENAL:   #urine output   -Condom catheter -voiding  -UA neg 12/25 (r/o UTI in setting of persistent fevers)   -Sodium goal 140-150  #Diuresis  -last diuresis 12/26 with 10mg metazolone x 1   #Hypernatremia  - restarted FWF 200q4; continue to trend  #hyperphosphatemia  -Started sevelmaer powder, non-formulary so as not to clog PEG   #CKD   -baseline Cr 2.1  -holding home sodium bicarb 650mg BID  #hx BPH    -cardura 4mg (flomax at home, non crushable)      Labs:          BUN/Cr- 72/2.7  -->,  74/2.9  -->          Electrolytes-Na 147 // K 4.1 // Mg 3.0 //  Phos 3.9 (12-26 @ 20:00)    HEME/ONC:   #DVT prophylaxis     -SCDs, HSQ  #hx of CVA    -ASA cleared by NSGY    -Plavix HOLDING, will require repeat HCT in 2 wks prior to restart, approx 1/1/24    Labs: Hb/Hct:  7.6/23.6  -->,  7.7/24.3  -->                      Plts:  391  -->,  459  -->       ID:  #recurrent fevers- improving, now low grade  -MRSA PCR neg   Cultures    Bcx 12/18- final neg     Tracheal aspirate 12/21: numerous staph aureus -methicillian sensitive    Tracheal aspirate 12/22: staph aureus    C.Diff PCR neg    Blood cx 12/21- no growth at 4 days    UA 12/25 negative   Current antibiotics:  -IV Ancef 2g q8 (12/24-12/27 @0600)  #ID consult  -D/C'd zosyn & start IV Ancef  #multiple loose bowel movements    -WBC trending up    -no bowel regiment  WBC- 13.22  --->>,  13.63  --->>,  14.92  --->>  Temp trend- 24hrs T(F): 99.5 (12-26 @ 20:00), Max: 100.1 (12-26 @ 12:00)  Antibiotics-ceFAZolin   IVPB 2000 every 8 hours      ENDO:  #DM     -ISS     -Off insulin gtt 12/20      -Lantus 20 units HS     -FSG q4 while on TF    MSK:     Activity - Increase As Tolerated    -B/L knee X ray 12/19- no fractures, b/l effusions     LINES/DRAINS:  PIV, right basilic midline (placed 12/16), 20Fr PEG (12/22), Tracheostomy (12/22)    ADVANCED DIRECTIVES:  Full Code    HCP/Emergency Contact-Sarah Gamino (Wife) [phone], Cantonese speaking    DISPO:  Social work, case management consult for dispo to vent facility - spoke with social work 12/23, starting to work on D/C.

## 2023-12-27 NOTE — PROGRESS NOTE ADULT - SUBJECTIVE AND OBJECTIVE BOX
JACQUELIN CAI   [id number]   [birth year]  78yM    Admit Date: 12-16-23  Indication for SDU/SICU: Q1 neuro checks        ============================  24 Hour Events    12/26  PM  -on /14/30/5, opening eyes spontaneously, withdrawing from pain, facial twitches, +corneal, + gag, G-tube 2cm at skin, clean, pitting edema noted   -  Repeat Na at 8pm 147      AM  - T piece from 8am - 6pm  - Increased lantus to 20 at night   - F/U finger sticks: 240 > 137 when off TF > 160-190. received 15u/shift  - 10mg metolazone given at 1500, f/u 16:00 BMP - Na 150 at 4PM, started FWF 200q4  - voided -735mL s/p metolazone    - F/u neurology for VPA, need chart note  - CXR: near resolution of b/l opacities. next CXR 12/28  - D/c A line  - Nutrition contacted about liquid stool, waiting for tube feed recs        [X] A ten-point review of systems was otherwise negative except as noted above.  [  ] Due to altered mental status/intubation, subjective information was not attained from the patient. History was obtained, to the extent possible, from review of the chart and collateral sources of information.    =========================================================================================================================================   JACQUELIN CAI   [id number]   [birth year]  78yM    Admit Date: 12-16-23  Indication for SDU/SICU: Q1 neuro checks        ============================  24 Hour Events    12/26  PM  -on /14/30/5, opening eyes spontaneously, withdrawing from pain, facial twitches, +corneal, + gag, G-tube 2cm at skin, clean, pitting edema noted   -  Repeat Na at 8pm 147      AM  - T piece from 8am - 6pm  - Increased lantus to 20 at night   - F/U finger sticks: 240 > 137 when off TF > 160-190. received 15u/shift  - 10mg metolazone given at 1500, f/u 16:00 BMP - Na 150 at 4PM, started FWF 200q4  - voided -735mL s/p metolazone    - F/u neurology for VPA, need chart note  - CXR: near resolution of b/l opacities. next CXR 12/28  - D/c A line  - Nutrition contacted about liquid stool, waiting for tube feed recs        [X] A ten-point review of systems was otherwise negative except as noted above.  [  ] Due to altered mental status/intubation, subjective information was not attained from the patient. History was obtained, to the extent possible, from review of the chart and collateral sources of information.    =========================================================================================================================================   JACQUELIN CAI   [id number]   [birth year]  78yM    Admit Date: 12-16-23  Indication for SDU/SICU: Q1 neuro checks        ============================  24 Hour Events    12/26  PM  -on /14/30/5, opening eyes spontaneously, withdrawing from pain, facial twitches, +corneal, + gag, G-tube 2cm at skin, clean, pitting edema noted   -  Repeat Na at 8pm 147      AM  - T piece from 8am - 6pm  - Increased lantus to 20 at night   - F/U finger sticks: 240 > 137 when off TF > 160-190. received 15u/shift  - 10mg metolazone given at 1500, f/u 16:00 BMP - Na 150 at 4PM, started FWF 200q4  - voided -735mL s/p metolazone    - F/u neurology for VPA, need chart note  - CXR: near resolution of b/l opacities. next CXR 12/28  - D/c A line  - Nutrition contacted about liquid stool, waiting for tube feed recs        [X] A ten-point review of systems was otherwise negative except as noted above.  [  ] Due to altered mental status/intubation, subjective information was not attained from the patient. History was obtained, to the extent possible, from review of the chart and collateral sources of information.    =========================================================================================================================================    Daily     Daily     Diet, NPO with Tube Feed:   Tube Feeding Modality: Gastrostomy  Glucerna 1.2 Tonio  Total Volume for 24 Hours (mL): 1260  Continuous  Starting Tube Feed Rate mL per Hour: 55  Increase Tube Feed Rate by (mL): 10     Every 4 hours  Until Goal Tube Feed Rate (mL per Hour): 70  Tube Feed Duration (in Hours): 18  Tube Feed Start Time: 12:00  Tube Feed Stop Time: 06:00  Free Water Flush  Bolus   Total Volume per Flush (mL): 200   Frequency: Every 4 Hours  Free Water Flush Instructions:  Please flush PEG with 50cc of sterile water before feeds start and 100cc of sterile water after feeds end (12-26-23 @ 18:12)      CURRENT MEDS:  Neurologic Medications  acetaminophen     Tablet .. 650 milliGRAM(s) Oral every 6 hours  valproic  acid Syrup 500 milliGRAM(s) Oral every 12 hours    Respiratory Medications  albuterol    90 MICROgram(s) HFA Inhaler 90 Puff(s) Inhalation every 4 hours  ipratropium 17 MICROgram(s) HFA Inhaler 1 Puff(s) Inhalation every 6 hours    Cardiovascular Medications  amLODIPine   Tablet 10 milliGRAM(s) Oral daily  doxazosin 4 milliGRAM(s) Oral at bedtime  labetalol 100 milliGRAM(s) Oral every 8 hours    Gastrointestinal Medications  pantoprazole  Injectable 40 milliGRAM(s) IV Push every 24 hours    Genitourinary Medications    Hematologic/Oncologic Medications  aspirin  chewable 81 milliGRAM(s) Enteral Tube daily  heparin   Injectable 5000 Unit(s) SubCutaneous every 8 hours    Antimicrobial/Immunologic Medications    Endocrine/Metabolic Medications  atorvastatin 20 milliGRAM(s) Oral at bedtime  insulin glargine Injectable (LANTUS) 20 Unit(s) SubCutaneous at bedtime  insulin lispro (ADMELOG) corrective regimen sliding scale   SubCutaneous every 4 hours    Topical/Other Medications  bacitracin   Ointment 1 Application(s) Topical every 12 hours  chlorhexidine 0.12% Liquid 15 milliLiter(s) Oral Mucosa two times a day  chlorhexidine 2% Cloths 1 Application(s) Topical <User Schedule>  sevelamer carbonate Powder 800 milliGRAM(s) Oral three times a day with meals  vitamin A &amp; D Ointment 1 Application(s) Topical every 12 hours      ICU Vital Signs Last 24 Hrs  T(C): 38.1 (27 Dec 2023 08:00), Max: 38.3 (27 Dec 2023 00:00)  T(F): 100.5 (27 Dec 2023 08:00), Max: 100.9 (27 Dec 2023 00:00)  HR: 86 (27 Dec 2023 07:00) (80 - 104)  BP: 126/60 (27 Dec 2023 07:00) (113/54 - 164/73)  BP(mean): 87 (27 Dec 2023 07:00) (78 - 105)  ABP: 136/36 (26 Dec 2023 14:00) (125/35 - 162/40)  ABP(mean): 63 (26 Dec 2023 14:00) (59 - 72)  RR: 30 (27 Dec 2023 07:00) (20 - 32)  SpO2: 100% (27 Dec 2023 07:00) (100% - 100%)    O2 Parameters below as of 27 Dec 2023 07:00  Patient On (Oxygen Delivery Method): ventilator            Mode: AC/ CMV (Assist Control/ Continuous Mandatory Ventilation)  RR (machine): 14  TV (machine): 350  FiO2: 40  ITime: 1  MAP: 7  PIP: 10    ABG - ( 27 Dec 2023 04:11 )  pH, Arterial: 7.47  pH, Blood: x     /  pCO2: 29    /  pO2: 154   / HCO3: 21    / Base Excess: -2.1  /  SaO2: 100.0               I&O's Summary    26 Dec 2023 07:01  -  27 Dec 2023 07:00  --------------------------------------------------------  IN: 1470 mL / OUT: 2835 mL / NET: -1365 mL    27 Dec 2023 07:01  -  27 Dec 2023 08:02  --------------------------------------------------------  IN: 0 mL / OUT: 30 mL / NET: -30 mL      I&O's Detail    26 Dec 2023 07:01  -  27 Dec 2023 07:00  --------------------------------------------------------  IN:    Enteral Tube Flush: 150 mL    Glucerna: 1170 mL    IV PiggyBack: 150 mL  Total IN: 1470 mL    OUT:    Rectal Tube (mL): 650 mL    Voided (mL): 2185 mL  Total OUT: 2835 mL    Total NET: -1365 mL      27 Dec 2023 07:01  -  27 Dec 2023 08:02  --------------------------------------------------------  IN:  Total IN: 0 mL    OUT:    Voided (mL): 30 mL  Total OUT: 30 mL    Total NET: -30 mL          PHYSICAL EXAM:    General/Neuro  RASS: 0     GCS: 6T     = E1 / V1 / M4      Exam: Pt is obtunded, unarousable to noxious stim. Unable to assess strength d/t AMS.   Pupils: PERRL    Lungs: clear to auscultation, Normal expansion/effort.     Cardiovascular : S1, S2.  Regular rate and rhythm.  Peripheral edema present in all extremities.   Cardiac Rhythm: Normal Sinus Rhythm    GI: Abdomen soft, Non-tender, distended. PEG and dressing clean, dry, intact.    Extremities: UEs: 2+ pitting edema. LEs: 1+ pitting edema DP/Pt 2+ B/l.    Derm: Edematous, no skin breakdown.      : Voiding freely, condom catheter in place      CXR:     LABS:  CAPILLARY BLOOD GLUCOSE      POCT Blood Glucose.: 225 mg/dL (27 Dec 2023 06:14)  POCT Blood Glucose.: 200 mg/dL (27 Dec 2023 02:19)  POCT Blood Glucose.: 231 mg/dL (26 Dec 2023 21:45)  POCT Blood Glucose.: 179 mg/dL (26 Dec 2023 17:37)  POCT Blood Glucose.: 197 mg/dL (26 Dec 2023 16:23)  POCT Blood Glucose.: 160 mg/dL (26 Dec 2023 13:41)  POCT Blood Glucose.: 137 mg/dL (26 Dec 2023 10:05)                          7.7    14.92 )-----------( 459      ( 26 Dec 2023 20:00 )             24.3       12-26    147<H>  |  113<H>  |  74<HH>  ----------------------------<  204<H>  4.1   |  22  |  2.9<H>    Ca    8.0<L>      26 Dec 2023 20:00  Phos  3.9     12-26  Mg     3.0     12-26              Urinalysis Basic - ( 26 Dec 2023 20:00 )    Color: x / Appearance: x / SG: x / pH: x  Gluc: 204 mg/dL / Ketone: x  / Bili: x / Urobili: x   Blood: x / Protein: x / Nitrite: x   Leuk Esterase: x / RBC: x / WBC x   Sq Epi: x / Non Sq Epi: x / Bacteria: x     JACQUELIN CAI   [id number]   [birth year]  78yM    Admit Date: 12-16-23  Indication for SDU/SICU: Q1 neuro checks        ============================  24 Hour Events    12/26  PM  -on /14/30/5, opening eyes spontaneously, withdrawing from pain, facial twitches, +corneal, + gag, G-tube 2cm at skin, clean, pitting edema noted   -  Repeat Na at 8pm 147      AM  - T piece from 8am - 6pm  - Increased lantus to 20 at night   - F/U finger sticks: 240 > 137 when off TF > 160-190. received 15u/shift  - 10mg metolazone given at 1500, f/u 16:00 BMP - Na 150 at 4PM, started FWF 200q4  - voided -735mL s/p metolazone    - F/u neurology for VPA, need chart note  - CXR: near resolution of b/l opacities. next CXR 12/28  - D/c A line  - Nutrition contacted about liquid stool, waiting for tube feed recs        [X] A ten-point review of systems was otherwise negative except as noted above.  [  ] Due to altered mental status/intubation, subjective information was not attained from the patient. History was obtained, to the extent possible, from review of the chart and collateral sources of information.    =========================================================================================================================================    Daily     Daily     Diet, NPO with Tube Feed:   Tube Feeding Modality: Gastrostomy  Glucerna 1.2 Tonio  Total Volume for 24 Hours (mL): 1260  Continuous  Starting Tube Feed Rate mL per Hour: 55  Increase Tube Feed Rate by (mL): 10     Every 4 hours  Until Goal Tube Feed Rate (mL per Hour): 70  Tube Feed Duration (in Hours): 18  Tube Feed Start Time: 12:00  Tube Feed Stop Time: 06:00  Free Water Flush  Bolus   Total Volume per Flush (mL): 200   Frequency: Every 4 Hours  Free Water Flush Instructions:  Please flush PEG with 50cc of sterile water before feeds start and 100cc of sterile water after feeds end (12-26-23 @ 18:12)      CURRENT MEDS:  Neurologic Medications  acetaminophen     Tablet .. 650 milliGRAM(s) Oral every 6 hours  valproic  acid Syrup 500 milliGRAM(s) Oral every 12 hours    Respiratory Medications  albuterol    90 MICROgram(s) HFA Inhaler 90 Puff(s) Inhalation every 4 hours  ipratropium 17 MICROgram(s) HFA Inhaler 1 Puff(s) Inhalation every 6 hours    Cardiovascular Medications  amLODIPine   Tablet 10 milliGRAM(s) Oral daily  doxazosin 4 milliGRAM(s) Oral at bedtime  labetalol 100 milliGRAM(s) Oral every 8 hours    Gastrointestinal Medications  pantoprazole  Injectable 40 milliGRAM(s) IV Push every 24 hours    Genitourinary Medications    Hematologic/Oncologic Medications  aspirin  chewable 81 milliGRAM(s) Enteral Tube daily  heparin   Injectable 5000 Unit(s) SubCutaneous every 8 hours    Antimicrobial/Immunologic Medications    Endocrine/Metabolic Medications  atorvastatin 20 milliGRAM(s) Oral at bedtime  insulin glargine Injectable (LANTUS) 20 Unit(s) SubCutaneous at bedtime  insulin lispro (ADMELOG) corrective regimen sliding scale   SubCutaneous every 4 hours    Topical/Other Medications  bacitracin   Ointment 1 Application(s) Topical every 12 hours  chlorhexidine 0.12% Liquid 15 milliLiter(s) Oral Mucosa two times a day  chlorhexidine 2% Cloths 1 Application(s) Topical <User Schedule>  sevelamer carbonate Powder 800 milliGRAM(s) Oral three times a day with meals  vitamin A &amp; D Ointment 1 Application(s) Topical every 12 hours      ICU Vital Signs Last 24 Hrs  T(C): 38.1 (27 Dec 2023 08:00), Max: 38.3 (27 Dec 2023 00:00)  T(F): 100.5 (27 Dec 2023 08:00), Max: 100.9 (27 Dec 2023 00:00)  HR: 86 (27 Dec 2023 07:00) (80 - 104)  BP: 126/60 (27 Dec 2023 07:00) (113/54 - 164/73)  BP(mean): 87 (27 Dec 2023 07:00) (78 - 105)  ABP: 136/36 (26 Dec 2023 14:00) (125/35 - 162/40)  ABP(mean): 63 (26 Dec 2023 14:00) (59 - 72)  RR: 30 (27 Dec 2023 07:00) (20 - 32)  SpO2: 100% (27 Dec 2023 07:00) (100% - 100%)    O2 Parameters below as of 27 Dec 2023 07:00  Patient On (Oxygen Delivery Method): ventilator            Mode: AC/ CMV (Assist Control/ Continuous Mandatory Ventilation)  RR (machine): 14  TV (machine): 350  FiO2: 40  ITime: 1  MAP: 7  PIP: 10    ABG - ( 27 Dec 2023 04:11 )  pH, Arterial: 7.47  pH, Blood: x     /  pCO2: 29    /  pO2: 154   / HCO3: 21    / Base Excess: -2.1  /  SaO2: 100.0               I&O's Summary    26 Dec 2023 07:01  -  27 Dec 2023 07:00  --------------------------------------------------------  IN: 1470 mL / OUT: 2835 mL / NET: -1365 mL    27 Dec 2023 07:01  -  27 Dec 2023 08:02  --------------------------------------------------------  IN: 0 mL / OUT: 30 mL / NET: -30 mL      I&O's Detail    26 Dec 2023 07:01  -  27 Dec 2023 07:00  --------------------------------------------------------  IN:    Enteral Tube Flush: 150 mL    Glucerna: 1170 mL    IV PiggyBack: 150 mL  Total IN: 1470 mL    OUT:    Rectal Tube (mL): 650 mL    Voided (mL): 2185 mL  Total OUT: 2835 mL    Total NET: -1365 mL      27 Dec 2023 07:01  -  27 Dec 2023 08:02  --------------------------------------------------------  IN:  Total IN: 0 mL    OUT:    Voided (mL): 30 mL  Total OUT: 30 mL    Total NET: -30 mL          PHYSICAL EXAM:    General/Neuro  RASS: 0     GCS: 6T     = E1 / V1 / M4      Exam: Pt is obtunded, unarousable to noxious stim. Unable to assess strength d/t AMS.   Pupils: PERRL    Lungs: clear to auscultation, Normal expansion/effort.     Cardiovascular : S1, S2.  Regular rate and rhythm.  Peripheral edema present in all extremities.   Cardiac Rhythm: Normal Sinus Rhythm    GI: Abdomen soft, Non-tender, distended. PEG and dressing clean, dry, intact.    Extremities: UEs: 2+ pitting edema. LEs: 1+ pitting edema DP/Pt 2+ B/l.    Derm: Edematous, no skin breakdown.      : Voiding freely, condom catheter in place      CXR:     LABS:  CAPILLARY BLOOD GLUCOSE      POCT Blood Glucose.: 225 mg/dL (27 Dec 2023 06:14)  POCT Blood Glucose.: 200 mg/dL (27 Dec 2023 02:19)  POCT Blood Glucose.: 231 mg/dL (26 Dec 2023 21:45)  POCT Blood Glucose.: 179 mg/dL (26 Dec 2023 17:37)  POCT Blood Glucose.: 197 mg/dL (26 Dec 2023 16:23)  POCT Blood Glucose.: 160 mg/dL (26 Dec 2023 13:41)  POCT Blood Glucose.: 137 mg/dL (26 Dec 2023 10:05)                          7.7    14.92 )-----------( 459      ( 26 Dec 2023 20:00 )             24.3       12-26    147<H>  |  113<H>  |  74<HH>  ----------------------------<  204<H>  4.1   |  22  |  2.9<H>    Ca    8.0<L>      26 Dec 2023 20:00  Phos  3.9     12-26  Mg     3.0     12-26              Urinalysis Basic - ( 26 Dec 2023 20:00 )    Color: x / Appearance: x / SG: x / pH: x  Gluc: 204 mg/dL / Ketone: x  / Bili: x / Urobili: x   Blood: x / Protein: x / Nitrite: x   Leuk Esterase: x / RBC: x / WBC x   Sq Epi: x / Non Sq Epi: x / Bacteria: x     JACQUELIN CAI   [id number]   [birth year]  78yM    Admit Date: 12-16-23  Indication for SDU/SICU: Q1 neuro checks        ============================  24 Hour Events    12/26  PM  -on /14/30/5, opening eyes spontaneously, withdrawing from pain, facial twitches, +corneal, + gag, G-tube 2cm at skin, clean, pitting edema noted   -  Repeat Na at 8pm 147      AM  - T piece from 8am - 6pm  - Increased lantus to 20 at night   - F/U finger sticks: 240 > 137 when off TF > 160-190. received 15u/shift  - 10mg metolazone given at 1500, f/u 16:00 BMP - Na 150 at 4PM, started FWF 200q4  - voided -735mL s/p metolazone    - F/u neurology for VPA, need chart note  - CXR: near resolution of b/l opacities. next CXR 12/28  - D/c A line  - Nutrition contacted about liquid stool, waiting for tube feed recs        [X] A ten-point review of systems was otherwise negative except as noted above.  [  ] Due to altered mental status/intubation, subjective information was not attained from the patient. History was obtained, to the extent possible, from review of the chart and collateral sources of information.    =========================================================================================================================================    Daily     Daily     Diet, NPO with Tube Feed:   Tube Feeding Modality: Gastrostomy  Glucerna 1.2 Tonio  Total Volume for 24 Hours (mL): 1260  Continuous  Starting Tube Feed Rate mL per Hour: 55  Increase Tube Feed Rate by (mL): 10     Every 4 hours  Until Goal Tube Feed Rate (mL per Hour): 70  Tube Feed Duration (in Hours): 18  Tube Feed Start Time: 12:00  Tube Feed Stop Time: 06:00  Free Water Flush  Bolus   Total Volume per Flush (mL): 200   Frequency: Every 4 Hours  Free Water Flush Instructions:  Please flush PEG with 50cc of sterile water before feeds start and 100cc of sterile water after feeds end (12-26-23 @ 18:12)      CURRENT MEDS:  Neurologic Medications  acetaminophen     Tablet .. 650 milliGRAM(s) Oral every 6 hours  valproic  acid Syrup 500 milliGRAM(s) Oral every 12 hours    Respiratory Medications  albuterol    90 MICROgram(s) HFA Inhaler 90 Puff(s) Inhalation every 4 hours  ipratropium 17 MICROgram(s) HFA Inhaler 1 Puff(s) Inhalation every 6 hours    Cardiovascular Medications  amLODIPine   Tablet 10 milliGRAM(s) Oral daily  doxazosin 4 milliGRAM(s) Oral at bedtime  labetalol 100 milliGRAM(s) Oral every 8 hours    Gastrointestinal Medications  pantoprazole  Injectable 40 milliGRAM(s) IV Push every 24 hours    Genitourinary Medications    Hematologic/Oncologic Medications  aspirin  chewable 81 milliGRAM(s) Enteral Tube daily  heparin   Injectable 5000 Unit(s) SubCutaneous every 8 hours    Antimicrobial/Immunologic Medications    Endocrine/Metabolic Medications  atorvastatin 20 milliGRAM(s) Oral at bedtime  insulin glargine Injectable (LANTUS) 20 Unit(s) SubCutaneous at bedtime  insulin lispro (ADMELOG) corrective regimen sliding scale   SubCutaneous every 4 hours    Topical/Other Medications  bacitracin   Ointment 1 Application(s) Topical every 12 hours  chlorhexidine 0.12% Liquid 15 milliLiter(s) Oral Mucosa two times a day  chlorhexidine 2% Cloths 1 Application(s) Topical <User Schedule>  sevelamer carbonate Powder 800 milliGRAM(s) Oral three times a day with meals  vitamin A &amp; D Ointment 1 Application(s) Topical every 12 hours      ICU Vital Signs Last 24 Hrs  T(C): 38.1 (27 Dec 2023 08:00), Max: 38.3 (27 Dec 2023 00:00)  T(F): 100.5 (27 Dec 2023 08:00), Max: 100.9 (27 Dec 2023 00:00)  HR: 86 (27 Dec 2023 07:00) (80 - 104)  BP: 126/60 (27 Dec 2023 07:00) (113/54 - 164/73)  BP(mean): 87 (27 Dec 2023 07:00) (78 - 105)  ABP: 136/36 (26 Dec 2023 14:00) (125/35 - 162/40)  ABP(mean): 63 (26 Dec 2023 14:00) (59 - 72)  RR: 30 (27 Dec 2023 07:00) (20 - 32)  SpO2: 100% (27 Dec 2023 07:00) (100% - 100%)    O2 Parameters below as of 27 Dec 2023 07:00  Patient On (Oxygen Delivery Method): ventilator            Mode: AC/ CMV (Assist Control/ Continuous Mandatory Ventilation)  RR (machine): 14  TV (machine): 350  FiO2: 40  ITime: 1  MAP: 7  PIP: 10    ABG - ( 27 Dec 2023 04:11 )  pH, Arterial: 7.47  pH, Blood: x     /  pCO2: 29    /  pO2: 154   / HCO3: 21    / Base Excess: -2.1  /  SaO2: 100.0               I&O's Summary    26 Dec 2023 07:01  -  27 Dec 2023 07:00  --------------------------------------------------------  IN: 1470 mL / OUT: 2835 mL / NET: -1365 mL    27 Dec 2023 07:01  -  27 Dec 2023 08:02  --------------------------------------------------------  IN: 0 mL / OUT: 30 mL / NET: -30 mL      I&O's Detail    26 Dec 2023 07:01  -  27 Dec 2023 07:00  --------------------------------------------------------  IN:    Enteral Tube Flush: 150 mL    Glucerna: 1170 mL    IV PiggyBack: 150 mL  Total IN: 1470 mL    OUT:    Rectal Tube (mL): 650 mL    Voided (mL): 2185 mL  Total OUT: 2835 mL    Total NET: -1365 mL      27 Dec 2023 07:01  -  27 Dec 2023 08:02  --------------------------------------------------------  IN:  Total IN: 0 mL    OUT:    Voided (mL): 30 mL  Total OUT: 30 mL    Total NET: -30 mL          PHYSICAL EXAM:    General/Neuro  RASS: 0     GCS: 6T     = E1 / V1 / M4      Exam: Pt is obtunded, unarousable to noxious stim. Cough/gag present. Unable to assess strength d/t AMS.   Pupils: PERRL, unable to assess EOMs    Lungs: clear to auscultation, tachypneic to high 20s/low 30s    Cardiovascular : S1, S2.  Regular rate and rhythm.  Peripheral edema present in all extremities.   Cardiac Rhythm: Normal Sinus Rhythm    GI: Abdomen soft, Non-tender, distended. PEG and dressing clean, dry, intact.    Extremities: UEs: 2+ pitting edema. LEs: 1+ pitting edema DP/Pt 2+ B/l.    Derm: Edematous, no skin breakdown.      : Voiding freely, condom catheter in place      CXR: < from: Xray Chest 1 View- PORTABLE-Routine (12.26.23 @ 06:26) >  IMPRESSION:    Near resolution of bilateral opacities/effusions.    < end of copied text >      LABS:  CAPILLARY BLOOD GLUCOSE      POCT Blood Glucose.: 225 mg/dL (27 Dec 2023 06:14)  POCT Blood Glucose.: 200 mg/dL (27 Dec 2023 02:19)  POCT Blood Glucose.: 231 mg/dL (26 Dec 2023 21:45)  POCT Blood Glucose.: 179 mg/dL (26 Dec 2023 17:37)  POCT Blood Glucose.: 197 mg/dL (26 Dec 2023 16:23)  POCT Blood Glucose.: 160 mg/dL (26 Dec 2023 13:41)  POCT Blood Glucose.: 137 mg/dL (26 Dec 2023 10:05)                          7.7    14.92 )-----------( 459      ( 26 Dec 2023 20:00 )             24.3       12-26    147<H>  |  113<H>  |  74<HH>  ----------------------------<  204<H>  4.1   |  22  |  2.9<H>    Ca    8.0<L>      26 Dec 2023 20:00  Phos  3.9     12-26  Mg     3.0     12-26              Urinalysis Basic - ( 26 Dec 2023 20:00 )    Color: x / Appearance: x / SG: x / pH: x  Gluc: 204 mg/dL / Ketone: x  / Bili: x / Urobili: x   Blood: x / Protein: x / Nitrite: x   Leuk Esterase: x / RBC: x / WBC x   Sq Epi: x / Non Sq Epi: x / Bacteria: x     JACQUELIN CAI   [id number]   [birth year]  78yM    Admit Date: 12-16-23  Indication for SDU/SICU: Q1 neuro checks        ============================  24 Hour Events    12/26  PM  -on /14/30/5, opening eyes spontaneously, withdrawing from pain, facial twitches, +corneal, + gag, G-tube 2cm at skin, clean, pitting edema noted   -  Repeat Na at 8pm 147      AM  - T piece from 8am - 6pm  - Increased lantus to 20 at night   - F/U finger sticks: 240 > 137 when off TF > 160-190. received 15u/shift  - 10mg metolazone given at 1500, f/u 16:00 BMP - Na 150 at 4PM, started FWF 200q4  - voided -735mL s/p metolazone    - F/u neurology for VPA, need chart note  - CXR: near resolution of b/l opacities. next CXR 12/28  - D/c A line  - Nutrition contacted about liquid stool, waiting for tube feed recs        [X] A ten-point review of systems was otherwise negative except as noted above.  [  ] Due to altered mental status/intubation, subjective information was not attained from the patient. History was obtained, to the extent possible, from review of the chart and collateral sources of information.    =========================================================================================================================================    Daily     Daily     Diet, NPO with Tube Feed:   Tube Feeding Modality: Gastrostomy  Glucerna 1.2 Tonio  Total Volume for 24 Hours (mL): 1260  Continuous  Starting Tube Feed Rate mL per Hour: 55  Increase Tube Feed Rate by (mL): 10     Every 4 hours  Until Goal Tube Feed Rate (mL per Hour): 70  Tube Feed Duration (in Hours): 18  Tube Feed Start Time: 12:00  Tube Feed Stop Time: 06:00  Free Water Flush  Bolus   Total Volume per Flush (mL): 200   Frequency: Every 4 Hours  Free Water Flush Instructions:  Please flush PEG with 50cc of sterile water before feeds start and 100cc of sterile water after feeds end (12-26-23 @ 18:12)      CURRENT MEDS:  Neurologic Medications  acetaminophen     Tablet .. 650 milliGRAM(s) Oral every 6 hours  valproic  acid Syrup 500 milliGRAM(s) Oral every 12 hours    Respiratory Medications  albuterol    90 MICROgram(s) HFA Inhaler 90 Puff(s) Inhalation every 4 hours  ipratropium 17 MICROgram(s) HFA Inhaler 1 Puff(s) Inhalation every 6 hours    Cardiovascular Medications  amLODIPine   Tablet 10 milliGRAM(s) Oral daily  doxazosin 4 milliGRAM(s) Oral at bedtime  labetalol 100 milliGRAM(s) Oral every 8 hours    Gastrointestinal Medications  pantoprazole  Injectable 40 milliGRAM(s) IV Push every 24 hours    Genitourinary Medications    Hematologic/Oncologic Medications  aspirin  chewable 81 milliGRAM(s) Enteral Tube daily  heparin   Injectable 5000 Unit(s) SubCutaneous every 8 hours    Antimicrobial/Immunologic Medications    Endocrine/Metabolic Medications  atorvastatin 20 milliGRAM(s) Oral at bedtime  insulin glargine Injectable (LANTUS) 20 Unit(s) SubCutaneous at bedtime  insulin lispro (ADMELOG) corrective regimen sliding scale   SubCutaneous every 4 hours    Topical/Other Medications  bacitracin   Ointment 1 Application(s) Topical every 12 hours  chlorhexidine 0.12% Liquid 15 milliLiter(s) Oral Mucosa two times a day  chlorhexidine 2% Cloths 1 Application(s) Topical <User Schedule>  sevelamer carbonate Powder 800 milliGRAM(s) Oral three times a day with meals  vitamin A &amp; D Ointment 1 Application(s) Topical every 12 hours      ICU Vital Signs Last 24 Hrs  T(C): 38.1 (27 Dec 2023 08:00), Max: 38.3 (27 Dec 2023 00:00)  T(F): 100.5 (27 Dec 2023 08:00), Max: 100.9 (27 Dec 2023 00:00)  HR: 86 (27 Dec 2023 07:00) (80 - 104)  BP: 126/60 (27 Dec 2023 07:00) (113/54 - 164/73)  BP(mean): 87 (27 Dec 2023 07:00) (78 - 105)  ABP: 136/36 (26 Dec 2023 14:00) (125/35 - 162/40)  ABP(mean): 63 (26 Dec 2023 14:00) (59 - 72)  RR: 30 (27 Dec 2023 07:00) (20 - 32)  SpO2: 100% (27 Dec 2023 07:00) (100% - 100%)    O2 Parameters below as of 27 Dec 2023 07:00  Patient On (Oxygen Delivery Method): ventilator            Mode: AC/ CMV (Assist Control/ Continuous Mandatory Ventilation)  RR (machine): 14  TV (machine): 350  FiO2: 40  ITime: 1  MAP: 7  PIP: 10    ABG - ( 27 Dec 2023 04:11 )  pH, Arterial: 7.47  pH, Blood: x     /  pCO2: 29    /  pO2: 154   / HCO3: 21    / Base Excess: -2.1  /  SaO2: 100.0               I&O's Summary    26 Dec 2023 07:01  -  27 Dec 2023 07:00  --------------------------------------------------------  IN: 1470 mL / OUT: 2835 mL / NET: -1365 mL    27 Dec 2023 07:01  -  27 Dec 2023 08:02  --------------------------------------------------------  IN: 0 mL / OUT: 30 mL / NET: -30 mL      I&O's Detail    26 Dec 2023 07:01  -  27 Dec 2023 07:00  --------------------------------------------------------  IN:    Enteral Tube Flush: 150 mL    Glucerna: 1170 mL    IV PiggyBack: 150 mL  Total IN: 1470 mL    OUT:    Rectal Tube (mL): 650 mL    Voided (mL): 2185 mL  Total OUT: 2835 mL    Total NET: -1365 mL      27 Dec 2023 07:01  -  27 Dec 2023 08:02  --------------------------------------------------------  IN:  Total IN: 0 mL    OUT:    Voided (mL): 30 mL  Total OUT: 30 mL    Total NET: -30 mL          PHYSICAL EXAM:    General/Neuro  RASS: 0     GCS: 6T     = E1 / V1 / M4      Exam: Pt is obtunded, unarousable to noxious stim. Cough/gag present. Unable to assess strength d/t AMS.   Pupils: PERRL, unable to assess EOMs    Lungs: clear to auscultation, tachypneic to high 20s/low 30s    Cardiovascular : S1, S2.  Regular rate and rhythm.  Peripheral edema present in all extremities.   Cardiac Rhythm: Normal Sinus Rhythm    GI: Abdomen soft, Non-tender, distended. PEG and dressing clean, dry, intact.    Extremities: UEs: 2+ pitting edema. LEs: 1+ pitting edema DP/Pt 2+ B/l.    Derm: Edematous, no skin breakdown.      : Voiding freely, condom catheter in place      CXR: < from: Xray Chest 1 View- PORTABLE-Routine (12.26.23 @ 06:26) >  IMPRESSION:    Near resolution of bilateral opacities/effusions.    < end of copied text >      LABS:  CAPILLARY BLOOD GLUCOSE      POCT Blood Glucose.: 225 mg/dL (27 Dec 2023 06:14)  POCT Blood Glucose.: 200 mg/dL (27 Dec 2023 02:19)  POCT Blood Glucose.: 231 mg/dL (26 Dec 2023 21:45)  POCT Blood Glucose.: 179 mg/dL (26 Dec 2023 17:37)  POCT Blood Glucose.: 197 mg/dL (26 Dec 2023 16:23)  POCT Blood Glucose.: 160 mg/dL (26 Dec 2023 13:41)  POCT Blood Glucose.: 137 mg/dL (26 Dec 2023 10:05)                          7.7    14.92 )-----------( 459      ( 26 Dec 2023 20:00 )             24.3       12-26    147<H>  |  113<H>  |  74<HH>  ----------------------------<  204<H>  4.1   |  22  |  2.9<H>    Ca    8.0<L>      26 Dec 2023 20:00  Phos  3.9     12-26  Mg     3.0     12-26              Urinalysis Basic - ( 26 Dec 2023 20:00 )    Color: x / Appearance: x / SG: x / pH: x  Gluc: 204 mg/dL / Ketone: x  / Bili: x / Urobili: x   Blood: x / Protein: x / Nitrite: x   Leuk Esterase: x / RBC: x / WBC x   Sq Epi: x / Non Sq Epi: x / Bacteria: x

## 2023-12-27 NOTE — PROGRESS NOTE ADULT - SUBJECTIVE AND OBJECTIVE BOX
GENERAL SURGERY PROGRESS NOTE    Patient: JACQUELIN CAI , 78y ([birth year])Male   MRN: [id number]  Location: [address]  Visit: 12-16-23 Inpatient  Date: 12-27-23 @ 12:08      PAST MEDICAL & SURGICAL HISTORY:  HTN (hypertension)      Seasonal allergies      History of BPH      Diabetes      No significant past surgical history          Vitals:   T(F): 100.6 (12-27-23 @ 11:00), Max: 100.9 (12-27-23 @ 00:00)  HR: 93 (12-27-23 @ 12:00)  BP: 122/59 (12-27-23 @ 11:00)  RR: 30 (12-27-23 @ 12:00)  SpO2: 100% (12-27-23 @ 12:00)  Mode: CPAP with PS, FiO2: 30, PEEP: 5, MAP: 9, PIP: 12    Diet, NPO with Tube Feed:   Tube Feeding Modality: Gastrostomy  Glucerna 1.2 Tonio  Total Volume for 24 Hours (mL): 1260  Continuous  Starting Tube Feed Rate mL per Hour: 55  Increase Tube Feed Rate by (mL): 10     Every 4 hours  Until Goal Tube Feed Rate (mL per Hour): 70  Tube Feed Duration (in Hours): 18  Tube Feed Start Time: 12:00  Tube Feed Stop Time: 06:00  Free Water Flush  Bolus   Total Volume per Flush (mL): 200   Frequency: Every 6 Hours  Free Water Flush Instructions:  Please flush PEG with 50cc of sterile water before feeds start and 100cc of sterile water after feeds end      Fluids:     I & O's:    12-26-23 @ 07:01  -  12-27-23 @ 07:00  --------------------------------------------------------  IN:    Enteral Tube Flush: 150 mL    Glucerna: 1170 mL    IV PiggyBack: 150 mL  Total IN: 1470 mL    OUT:    Rectal Tube (mL): 650 mL    Voided (mL): 2185 mL  Total OUT: 2835 mL    Total NET: -1365 mL      PHYSICAL EXAM:  General Appearance: NAD  HEENT: EOMI, sclera non-icteric; T-piece in place  Heart: regular rate   Lungs: Equal chest rise bilateral  Abdomen:  Soft, nontender, nondistended. PEG in place  MSK/Extremities: Warm & well-perfused.   Skin: Warm, dry. No jaundice.   Neuro: GCS 9 (E4/V1/M4), patient non-verbal, CNs grossly intact, unable to follow commands       MEDICATIONS  (STANDING):  acetaminophen     Tablet .. 650 milliGRAM(s) Oral every 6 hours  albuterol    90 MICROgram(s) HFA Inhaler 90 Puff(s) Inhalation every 4 hours  amantadine 200 milliGRAM(s) Oral once  amLODIPine   Tablet 10 milliGRAM(s) Oral daily  aspirin  chewable 81 milliGRAM(s) Enteral Tube daily  atorvastatin 20 milliGRAM(s) Oral at bedtime  bacitracin   Ointment 1 Application(s) Topical every 12 hours  chlorhexidine 0.12% Liquid 15 milliLiter(s) Oral Mucosa two times a day  chlorhexidine 2% Cloths 1 Application(s) Topical <User Schedule>  doxazosin 4 milliGRAM(s) Oral at bedtime  heparin   Injectable 5000 Unit(s) SubCutaneous every 8 hours  insulin glargine Injectable (LANTUS) 20 Unit(s) SubCutaneous at bedtime  insulin lispro (ADMELOG) corrective regimen sliding scale   SubCutaneous every 4 hours  ipratropium 17 MICROgram(s) HFA Inhaler 1 Puff(s) Inhalation every 6 hours  labetalol 100 milliGRAM(s) Oral every 8 hours  pantoprazole  Injectable 40 milliGRAM(s) IV Push every 24 hours  sevelamer carbonate Powder 800 milliGRAM(s) Oral three times a day with meals  valproic  acid Syrup 500 milliGRAM(s) Oral every 12 hours  vitamin A &amp; D Ointment 1 Application(s) Topical every 12 hours    MEDICATIONS  (PRN):      DVT PROPHYLAXIS: heparin   Injectable 5000 Unit(s) SubCutaneous every 8 hours    GI PROPHYLAXIS: pantoprazole  Injectable 40 milliGRAM(s) IV Push every 24 hours    ANTICOAGULATION:   ANTIBIOTICS:            LAB/STUDIES:  Labs:  CAPILLARY BLOOD GLUCOSE      POCT Blood Glucose.: 120 mg/dL (27 Dec 2023 09:48)  POCT Blood Glucose.: 225 mg/dL (27 Dec 2023 06:14)  POCT Blood Glucose.: 200 mg/dL (27 Dec 2023 02:19)  POCT Blood Glucose.: 231 mg/dL (26 Dec 2023 21:45)  POCT Blood Glucose.: 179 mg/dL (26 Dec 2023 17:37)  POCT Blood Glucose.: 197 mg/dL (26 Dec 2023 16:23)  POCT Blood Glucose.: 160 mg/dL (26 Dec 2023 13:41)                          7.7    14.92 )-----------( 459      ( 26 Dec 2023 20:00 )             24.3       Auto Neutrophil %: 62.5 % (12-26-23 @ 20:00)  Auto Immature Granulocyte %: 17.8 % (12-26-23 @ 20:00)    12-26    147<H>  |  113<H>  |  74<HH>  ----------------------------<  204<H>  4.1   |  22  |  2.9<H>      Calcium: 8.0 mg/dL (12-26-23 @ 20:00)      LFTs:     Blood Gas Arterial, Lactate: 1.6 mmol/L (12-27-23 @ 04:11)  Blood Gas Arterial, Lactate: 1.3 mmol/L (12-26-23 @ 04:30)  Blood Gas Arterial, Lactate: 1.2 mmol/L (12-25-23 @ 07:19)    ABG - ( 27 Dec 2023 04:11 )  pH: 7.47  /  pCO2: 29    /  pO2: 154   / HCO3: 21    / Base Excess: -2.1  /  SaO2: 100.0           ABG - ( 26 Dec 2023 04:30 )  pH: 7.47  /  pCO2: 29    /  pO2: 142   / HCO3: 21    / Base Excess: -1.5  /  SaO2: 99.3            ABG - ( 25 Dec 2023 07:19 )  pH: 7.42  /  pCO2: 30    /  pO2: 123   / HCO3: 20    / Base Excess: -3.9  /  SaO2: 100.0           Urinalysis Basic - ( 26 Dec 2023 20:00 )    Color: x / Appearance: x / SG: x / pH: x  Gluc: 204 mg/dL / Ketone: x  / Bili: x / Urobili: x   Blood: x / Protein: x / Nitrite: x   Leuk Esterase: x / RBC: x / WBC x   Sq Epi: x / Non Sq Epi: x / Bacteria: x      ASSESSMENT:  Patient is a 78 year old male who presented as a TRAUMA CONSULT s/p mechanical fall (+HT -LOC -AC). Found to have an acute subdural hemorrhage. Hospital course has been complicated by multiple seizures and uncontrolled hypertension. Intubated on 12/21 due to worsening respiratory status.     S/P tracheostomy and PEG placement on 12/22/23, (Portex cuffed 9 and 2cm at skin 20fr PEG). Now with T-piece, O2 at 40%.     PLAN:  - S/p trach/PEG  - F/U neuro for EEG   - F/u EP for loop recorder placement for hx of bouts of vtach  - Monitor O2 requirements   - Continue chest PT, Duoneb treatments as needed   - Continue CefaZolin   - F/u nutrition consult for TF adjustments (given liquid stool)   - Monitor vitals, I&Os, labs   - Replace electrolytes as needed   - Continue home ASA  - Pain control PRN   - DVT ppx with HSQ     **Call neuro 2 days prior to discharge for spot EEG**    # Dispo: working on dispo to [facility]      Trauma Team Surgery  [phone]   GENERAL SURGERY PROGRESS NOTE    Patient: JACQUELIN CAI , 78y ([birth year])Male   MRN: [id number]  Location: [address]  Visit: 12-16-23 Inpatient  Date: 12-27-23 @ 12:08      PAST MEDICAL & SURGICAL HISTORY:  HTN (hypertension)      Seasonal allergies      History of BPH      Diabetes      No significant past surgical history          Vitals:   T(F): 100.6 (12-27-23 @ 11:00), Max: 100.9 (12-27-23 @ 00:00)  HR: 93 (12-27-23 @ 12:00)  BP: 122/59 (12-27-23 @ 11:00)  RR: 30 (12-27-23 @ 12:00)  SpO2: 100% (12-27-23 @ 12:00)  Mode: CPAP with PS, FiO2: 30, PEEP: 5, MAP: 9, PIP: 12    Diet, NPO with Tube Feed:   Tube Feeding Modality: Gastrostomy  Glucerna 1.2 Tonio  Total Volume for 24 Hours (mL): 1260  Continuous  Starting Tube Feed Rate mL per Hour: 55  Increase Tube Feed Rate by (mL): 10     Every 4 hours  Until Goal Tube Feed Rate (mL per Hour): 70  Tube Feed Duration (in Hours): 18  Tube Feed Start Time: 12:00  Tube Feed Stop Time: 06:00  Free Water Flush  Bolus   Total Volume per Flush (mL): 200   Frequency: Every 6 Hours  Free Water Flush Instructions:  Please flush PEG with 50cc of sterile water before feeds start and 100cc of sterile water after feeds end      Fluids:     I & O's:    12-26-23 @ 07:01  -  12-27-23 @ 07:00  --------------------------------------------------------  IN:    Enteral Tube Flush: 150 mL    Glucerna: 1170 mL    IV PiggyBack: 150 mL  Total IN: 1470 mL    OUT:    Rectal Tube (mL): 650 mL    Voided (mL): 2185 mL  Total OUT: 2835 mL    Total NET: -1365 mL      PHYSICAL EXAM:  General Appearance: NAD  HEENT: EOMI, sclera non-icteric; T-piece in place  Heart: regular rate   Lungs: Equal chest rise bilateral  Abdomen:  Soft, nontender, nondistended. PEG in place  MSK/Extremities: Warm & well-perfused.   Skin: Warm, dry. No jaundice.   Neuro: GCS 9 (E4/V1/M4), patient non-verbal, CNs grossly intact, unable to follow commands       MEDICATIONS  (STANDING):  acetaminophen     Tablet .. 650 milliGRAM(s) Oral every 6 hours  albuterol    90 MICROgram(s) HFA Inhaler 90 Puff(s) Inhalation every 4 hours  amantadine 200 milliGRAM(s) Oral once  amLODIPine   Tablet 10 milliGRAM(s) Oral daily  aspirin  chewable 81 milliGRAM(s) Enteral Tube daily  atorvastatin 20 milliGRAM(s) Oral at bedtime  bacitracin   Ointment 1 Application(s) Topical every 12 hours  chlorhexidine 0.12% Liquid 15 milliLiter(s) Oral Mucosa two times a day  chlorhexidine 2% Cloths 1 Application(s) Topical <User Schedule>  doxazosin 4 milliGRAM(s) Oral at bedtime  heparin   Injectable 5000 Unit(s) SubCutaneous every 8 hours  insulin glargine Injectable (LANTUS) 20 Unit(s) SubCutaneous at bedtime  insulin lispro (ADMELOG) corrective regimen sliding scale   SubCutaneous every 4 hours  ipratropium 17 MICROgram(s) HFA Inhaler 1 Puff(s) Inhalation every 6 hours  labetalol 100 milliGRAM(s) Oral every 8 hours  pantoprazole  Injectable 40 milliGRAM(s) IV Push every 24 hours  sevelamer carbonate Powder 800 milliGRAM(s) Oral three times a day with meals  valproic  acid Syrup 500 milliGRAM(s) Oral every 12 hours  vitamin A &amp; D Ointment 1 Application(s) Topical every 12 hours    MEDICATIONS  (PRN):      DVT PROPHYLAXIS: heparin   Injectable 5000 Unit(s) SubCutaneous every 8 hours    GI PROPHYLAXIS: pantoprazole  Injectable 40 milliGRAM(s) IV Push every 24 hours    ANTICOAGULATION:   ANTIBIOTICS:            LAB/STUDIES:  Labs:  CAPILLARY BLOOD GLUCOSE      POCT Blood Glucose.: 120 mg/dL (27 Dec 2023 09:48)  POCT Blood Glucose.: 225 mg/dL (27 Dec 2023 06:14)  POCT Blood Glucose.: 200 mg/dL (27 Dec 2023 02:19)  POCT Blood Glucose.: 231 mg/dL (26 Dec 2023 21:45)  POCT Blood Glucose.: 179 mg/dL (26 Dec 2023 17:37)  POCT Blood Glucose.: 197 mg/dL (26 Dec 2023 16:23)  POCT Blood Glucose.: 160 mg/dL (26 Dec 2023 13:41)                          7.7    14.92 )-----------( 459      ( 26 Dec 2023 20:00 )             24.3       Auto Neutrophil %: 62.5 % (12-26-23 @ 20:00)  Auto Immature Granulocyte %: 17.8 % (12-26-23 @ 20:00)    12-26    147<H>  |  113<H>  |  74<HH>  ----------------------------<  204<H>  4.1   |  22  |  2.9<H>      Calcium: 8.0 mg/dL (12-26-23 @ 20:00)      LFTs:     Blood Gas Arterial, Lactate: 1.6 mmol/L (12-27-23 @ 04:11)  Blood Gas Arterial, Lactate: 1.3 mmol/L (12-26-23 @ 04:30)  Blood Gas Arterial, Lactate: 1.2 mmol/L (12-25-23 @ 07:19)    ABG - ( 27 Dec 2023 04:11 )  pH: 7.47  /  pCO2: 29    /  pO2: 154   / HCO3: 21    / Base Excess: -2.1  /  SaO2: 100.0           ABG - ( 26 Dec 2023 04:30 )  pH: 7.47  /  pCO2: 29    /  pO2: 142   / HCO3: 21    / Base Excess: -1.5  /  SaO2: 99.3            ABG - ( 25 Dec 2023 07:19 )  pH: 7.42  /  pCO2: 30    /  pO2: 123   / HCO3: 20    / Base Excess: -3.9  /  SaO2: 100.0           Urinalysis Basic - ( 26 Dec 2023 20:00 )    Color: x / Appearance: x / SG: x / pH: x  Gluc: 204 mg/dL / Ketone: x  / Bili: x / Urobili: x   Blood: x / Protein: x / Nitrite: x   Leuk Esterase: x / RBC: x / WBC x   Sq Epi: x / Non Sq Epi: x / Bacteria: x      ASSESSMENT:  Patient is a 78 year old male who presented as a TRAUMA CONSULT s/p mechanical fall (+HT -LOC -AC). Found to have an acute subdural hemorrhage. Hospital course has been complicated by multiple seizures and uncontrolled hypertension. Intubated on 12/21 due to worsening respiratory status.     S/P tracheostomy and PEG placement on 12/22/23, (Portex cuffed 9 and 2cm at skin 20fr PEG). Now with T-piece, O2 at 40%.     PLAN:  - S/p trach/PEG  - F/U neuro for EEG   - F/u EP for loop recorder placement for hx of bouts of vtach  - Monitor O2 requirements   - Continue chest PT, Duoneb treatments as needed   - Continue CefaZolin   - F/u nutrition consult for TF adjustments (given liquid stool)   - Monitor vitals, I&Os, labs   - Replace electrolytes as needed   - Continue home ASA  - Pain control PRN   - DVT ppx with HSQ     **Call neuro 2 days prior to discharge for spot EEG**    # Dispo: working on dispo to [facility]      Trauma Team Surgery  [phone]   GENERAL SURGERY PROGRESS NOTE    Patient: JACQUELIN CAI , 78y ([birth year])Male   MRN: [id number]  Location: [address]  Visit: 12-16-23 Inpatient  Date: 12-27-23 @ 12:08      PAST MEDICAL & SURGICAL HISTORY:  HTN (hypertension)      Seasonal allergies      History of BPH      Diabetes      No significant past surgical history          Vitals:   T(F): 100.6 (12-27-23 @ 11:00), Max: 100.9 (12-27-23 @ 00:00)  HR: 93 (12-27-23 @ 12:00)  BP: 122/59 (12-27-23 @ 11:00)  RR: 30 (12-27-23 @ 12:00)  SpO2: 100% (12-27-23 @ 12:00)  Mode: CPAP with PS, FiO2: 30, PEEP: 5, MAP: 9, PIP: 12    Diet, NPO with Tube Feed:   Tube Feeding Modality: Gastrostomy  Glucerna 1.2 Tonio  Total Volume for 24 Hours (mL): 1260  Continuous  Starting Tube Feed Rate mL per Hour: 55  Increase Tube Feed Rate by (mL): 10     Every 4 hours  Until Goal Tube Feed Rate (mL per Hour): 70  Tube Feed Duration (in Hours): 18  Tube Feed Start Time: 12:00  Tube Feed Stop Time: 06:00  Free Water Flush  Bolus   Total Volume per Flush (mL): 200   Frequency: Every 6 Hours  Free Water Flush Instructions:  Please flush PEG with 50cc of sterile water before feeds start and 100cc of sterile water after feeds end      Fluids:     I & O's:    12-26-23 @ 07:01  -  12-27-23 @ 07:00  --------------------------------------------------------  IN:    Enteral Tube Flush: 150 mL    Glucerna: 1170 mL    IV PiggyBack: 150 mL  Total IN: 1470 mL    OUT:    Rectal Tube (mL): 650 mL    Voided (mL): 2185 mL  Total OUT: 2835 mL    Total NET: -1365 mL      PHYSICAL EXAM:  General Appearance: NAD  HEENT: EOMI, sclera non-icteric; T-piece in place  Heart: regular rate   Lungs: Equal chest rise bilateral  Abdomen:  Soft, nontender, nondistended. PEG in place  MSK/Extremities: Warm & well-perfused.   Skin: Warm, dry. No jaundice.   Neuro: GCS 9 (E4/V1/M4), patient non-verbal, CNs grossly intact, unable to follow commands       MEDICATIONS  (STANDING):  acetaminophen     Tablet .. 650 milliGRAM(s) Oral every 6 hours  albuterol    90 MICROgram(s) HFA Inhaler 90 Puff(s) Inhalation every 4 hours  amantadine 200 milliGRAM(s) Oral once  amLODIPine   Tablet 10 milliGRAM(s) Oral daily  aspirin  chewable 81 milliGRAM(s) Enteral Tube daily  atorvastatin 20 milliGRAM(s) Oral at bedtime  bacitracin   Ointment 1 Application(s) Topical every 12 hours  chlorhexidine 0.12% Liquid 15 milliLiter(s) Oral Mucosa two times a day  chlorhexidine 2% Cloths 1 Application(s) Topical <User Schedule>  doxazosin 4 milliGRAM(s) Oral at bedtime  heparin   Injectable 5000 Unit(s) SubCutaneous every 8 hours  insulin glargine Injectable (LANTUS) 20 Unit(s) SubCutaneous at bedtime  insulin lispro (ADMELOG) corrective regimen sliding scale   SubCutaneous every 4 hours  ipratropium 17 MICROgram(s) HFA Inhaler 1 Puff(s) Inhalation every 6 hours  labetalol 100 milliGRAM(s) Oral every 8 hours  pantoprazole  Injectable 40 milliGRAM(s) IV Push every 24 hours  sevelamer carbonate Powder 800 milliGRAM(s) Oral three times a day with meals  valproic  acid Syrup 500 milliGRAM(s) Oral every 12 hours  vitamin A &amp; D Ointment 1 Application(s) Topical every 12 hours    MEDICATIONS  (PRN):      DVT PROPHYLAXIS: heparin   Injectable 5000 Unit(s) SubCutaneous every 8 hours    GI PROPHYLAXIS: pantoprazole  Injectable 40 milliGRAM(s) IV Push every 24 hours    ANTICOAGULATION:   ANTIBIOTICS:            LAB/STUDIES:  Labs:  CAPILLARY BLOOD GLUCOSE      POCT Blood Glucose.: 120 mg/dL (27 Dec 2023 09:48)  POCT Blood Glucose.: 225 mg/dL (27 Dec 2023 06:14)  POCT Blood Glucose.: 200 mg/dL (27 Dec 2023 02:19)  POCT Blood Glucose.: 231 mg/dL (26 Dec 2023 21:45)  POCT Blood Glucose.: 179 mg/dL (26 Dec 2023 17:37)  POCT Blood Glucose.: 197 mg/dL (26 Dec 2023 16:23)  POCT Blood Glucose.: 160 mg/dL (26 Dec 2023 13:41)                          7.7    14.92 )-----------( 459      ( 26 Dec 2023 20:00 )             24.3       Auto Neutrophil %: 62.5 % (12-26-23 @ 20:00)  Auto Immature Granulocyte %: 17.8 % (12-26-23 @ 20:00)    12-26    147<H>  |  113<H>  |  74<HH>  ----------------------------<  204<H>  4.1   |  22  |  2.9<H>      Calcium: 8.0 mg/dL (12-26-23 @ 20:00)      LFTs:     Blood Gas Arterial, Lactate: 1.6 mmol/L (12-27-23 @ 04:11)  Blood Gas Arterial, Lactate: 1.3 mmol/L (12-26-23 @ 04:30)  Blood Gas Arterial, Lactate: 1.2 mmol/L (12-25-23 @ 07:19)    ABG - ( 27 Dec 2023 04:11 )  pH: 7.47  /  pCO2: 29    /  pO2: 154   / HCO3: 21    / Base Excess: -2.1  /  SaO2: 100.0           ABG - ( 26 Dec 2023 04:30 )  pH: 7.47  /  pCO2: 29    /  pO2: 142   / HCO3: 21    / Base Excess: -1.5  /  SaO2: 99.3            ABG - ( 25 Dec 2023 07:19 )  pH: 7.42  /  pCO2: 30    /  pO2: 123   / HCO3: 20    / Base Excess: -3.9  /  SaO2: 100.0           Urinalysis Basic - ( 26 Dec 2023 20:00 )    Color: x / Appearance: x / SG: x / pH: x  Gluc: 204 mg/dL / Ketone: x  / Bili: x / Urobili: x   Blood: x / Protein: x / Nitrite: x   Leuk Esterase: x / RBC: x / WBC x   Sq Epi: x / Non Sq Epi: x / Bacteria: x      ASSESSMENT:  Patient is a 78 year old male who presented as a TRAUMA CONSULT s/p mechanical fall (+HT -LOC -AC). Found to have an acute subdural hemorrhage. Hospital course has been complicated by multiple seizures and uncontrolled hypertension. Intubated on 12/21 due to worsening respiratory status.     S/P tracheostomy and PEG placement on 12/22/23, (Portex cuffed 9 and 2cm at skin 20fr PEG). Now with T-piece, O2 at 40%.     PLAN:  - S/p trach/PEG  - F/U neuro for EEG   - F/u EP for loop recorder placement  - Monitor O2 requirements   - Continue chest PT, Duoneb treatments as needed   - Continue CefaZolin   - F/u nutrition consult for TF adjustments (given liquid stool)   - Monitor vitals, I&Os, labs   - Replace electrolytes as needed   - Continue home ASA  - Pain control PRN   - DVT ppx with HSQ     **Call neuro 2 days prior to discharge for spot EEG**    # Dispo: working on dispo to [facility]      Trauma Team Surgery  [phone]   GENERAL SURGERY PROGRESS NOTE    Patient: JACQUELIN CAI , 78y ([birth year])Male   MRN: [id number]  Location: [address]  Visit: 12-16-23 Inpatient  Date: 12-27-23 @ 12:08      PAST MEDICAL & SURGICAL HISTORY:  HTN (hypertension)      Seasonal allergies      History of BPH      Diabetes      No significant past surgical history          Vitals:   T(F): 100.6 (12-27-23 @ 11:00), Max: 100.9 (12-27-23 @ 00:00)  HR: 93 (12-27-23 @ 12:00)  BP: 122/59 (12-27-23 @ 11:00)  RR: 30 (12-27-23 @ 12:00)  SpO2: 100% (12-27-23 @ 12:00)  Mode: CPAP with PS, FiO2: 30, PEEP: 5, MAP: 9, PIP: 12    Diet, NPO with Tube Feed:   Tube Feeding Modality: Gastrostomy  Glucerna 1.2 Tonio  Total Volume for 24 Hours (mL): 1260  Continuous  Starting Tube Feed Rate mL per Hour: 55  Increase Tube Feed Rate by (mL): 10     Every 4 hours  Until Goal Tube Feed Rate (mL per Hour): 70  Tube Feed Duration (in Hours): 18  Tube Feed Start Time: 12:00  Tube Feed Stop Time: 06:00  Free Water Flush  Bolus   Total Volume per Flush (mL): 200   Frequency: Every 6 Hours  Free Water Flush Instructions:  Please flush PEG with 50cc of sterile water before feeds start and 100cc of sterile water after feeds end      Fluids:     I & O's:    12-26-23 @ 07:01  -  12-27-23 @ 07:00  --------------------------------------------------------  IN:    Enteral Tube Flush: 150 mL    Glucerna: 1170 mL    IV PiggyBack: 150 mL  Total IN: 1470 mL    OUT:    Rectal Tube (mL): 650 mL    Voided (mL): 2185 mL  Total OUT: 2835 mL    Total NET: -1365 mL      PHYSICAL EXAM:  General Appearance: NAD  HEENT: EOMI, sclera non-icteric; T-piece in place  Heart: regular rate   Lungs: Equal chest rise bilateral  Abdomen:  Soft, nontender, nondistended. PEG in place  MSK/Extremities: Warm & well-perfused.   Skin: Warm, dry. No jaundice.   Neuro: GCS 9 (E4/V1/M4), patient non-verbal, CNs grossly intact, unable to follow commands       MEDICATIONS  (STANDING):  acetaminophen     Tablet .. 650 milliGRAM(s) Oral every 6 hours  albuterol    90 MICROgram(s) HFA Inhaler 90 Puff(s) Inhalation every 4 hours  amantadine 200 milliGRAM(s) Oral once  amLODIPine   Tablet 10 milliGRAM(s) Oral daily  aspirin  chewable 81 milliGRAM(s) Enteral Tube daily  atorvastatin 20 milliGRAM(s) Oral at bedtime  bacitracin   Ointment 1 Application(s) Topical every 12 hours  chlorhexidine 0.12% Liquid 15 milliLiter(s) Oral Mucosa two times a day  chlorhexidine 2% Cloths 1 Application(s) Topical <User Schedule>  doxazosin 4 milliGRAM(s) Oral at bedtime  heparin   Injectable 5000 Unit(s) SubCutaneous every 8 hours  insulin glargine Injectable (LANTUS) 20 Unit(s) SubCutaneous at bedtime  insulin lispro (ADMELOG) corrective regimen sliding scale   SubCutaneous every 4 hours  ipratropium 17 MICROgram(s) HFA Inhaler 1 Puff(s) Inhalation every 6 hours  labetalol 100 milliGRAM(s) Oral every 8 hours  pantoprazole  Injectable 40 milliGRAM(s) IV Push every 24 hours  sevelamer carbonate Powder 800 milliGRAM(s) Oral three times a day with meals  valproic  acid Syrup 500 milliGRAM(s) Oral every 12 hours  vitamin A &amp; D Ointment 1 Application(s) Topical every 12 hours    MEDICATIONS  (PRN):      DVT PROPHYLAXIS: heparin   Injectable 5000 Unit(s) SubCutaneous every 8 hours    GI PROPHYLAXIS: pantoprazole  Injectable 40 milliGRAM(s) IV Push every 24 hours    ANTICOAGULATION:   ANTIBIOTICS:            LAB/STUDIES:  Labs:  CAPILLARY BLOOD GLUCOSE      POCT Blood Glucose.: 120 mg/dL (27 Dec 2023 09:48)  POCT Blood Glucose.: 225 mg/dL (27 Dec 2023 06:14)  POCT Blood Glucose.: 200 mg/dL (27 Dec 2023 02:19)  POCT Blood Glucose.: 231 mg/dL (26 Dec 2023 21:45)  POCT Blood Glucose.: 179 mg/dL (26 Dec 2023 17:37)  POCT Blood Glucose.: 197 mg/dL (26 Dec 2023 16:23)  POCT Blood Glucose.: 160 mg/dL (26 Dec 2023 13:41)                          7.7    14.92 )-----------( 459      ( 26 Dec 2023 20:00 )             24.3       Auto Neutrophil %: 62.5 % (12-26-23 @ 20:00)  Auto Immature Granulocyte %: 17.8 % (12-26-23 @ 20:00)    12-26    147<H>  |  113<H>  |  74<HH>  ----------------------------<  204<H>  4.1   |  22  |  2.9<H>      Calcium: 8.0 mg/dL (12-26-23 @ 20:00)      LFTs:     Blood Gas Arterial, Lactate: 1.6 mmol/L (12-27-23 @ 04:11)  Blood Gas Arterial, Lactate: 1.3 mmol/L (12-26-23 @ 04:30)  Blood Gas Arterial, Lactate: 1.2 mmol/L (12-25-23 @ 07:19)    ABG - ( 27 Dec 2023 04:11 )  pH: 7.47  /  pCO2: 29    /  pO2: 154   / HCO3: 21    / Base Excess: -2.1  /  SaO2: 100.0           ABG - ( 26 Dec 2023 04:30 )  pH: 7.47  /  pCO2: 29    /  pO2: 142   / HCO3: 21    / Base Excess: -1.5  /  SaO2: 99.3            ABG - ( 25 Dec 2023 07:19 )  pH: 7.42  /  pCO2: 30    /  pO2: 123   / HCO3: 20    / Base Excess: -3.9  /  SaO2: 100.0           Urinalysis Basic - ( 26 Dec 2023 20:00 )    Color: x / Appearance: x / SG: x / pH: x  Gluc: 204 mg/dL / Ketone: x  / Bili: x / Urobili: x   Blood: x / Protein: x / Nitrite: x   Leuk Esterase: x / RBC: x / WBC x   Sq Epi: x / Non Sq Epi: x / Bacteria: x      ASSESSMENT:  Patient is a 78 year old male who presented as a TRAUMA CONSULT s/p mechanical fall (+HT -LOC -AC). Found to have an acute subdural hemorrhage. Hospital course has been complicated by multiple seizures and uncontrolled hypertension. Intubated on 12/21 due to worsening respiratory status.     S/P tracheostomy and PEG placement on 12/22/23, (Portex cuffed 9 and 2cm at skin 20fr PEG). Now with T-piece, O2 at 40%.     PLAN:  - S/p trach/PEG  - F/U neuro for EEG   - F/u EP for loop recorder placement  - Monitor O2 requirements   - Continue chest PT, Duoneb treatments as needed   - Continue CefaZolin   - F/u nutrition consult for TF adjustments (given liquid stool)   - Monitor vitals, I&Os, labs   - Replace electrolytes as needed   - Continue home ASA  - Pain control PRN   - DVT ppx with HSQ     **Call neuro 2 days prior to discharge for spot EEG**    # Dispo: working on dispo to [facility]      Trauma Team Surgery  [phone]

## 2023-12-27 NOTE — PROGRESS NOTE ADULT - ATTENDING COMMENTS
This patient has a high probability of sudden, clinically significant deterioration, which requires the highest level of physician preparedness to intervene urgently. I managed/supervised life or organ supporting interventions that required frequent physician assessment. I devoted my full attention in the ICU to the direct care of this patient for the period of time indicated below. Time I spent with the family or surrogate(s) is included only if the patient was incapable of providing the necessary information or participating in medical decision making. Time devoted to teaching and to any procedures I billed separately is not included.     Patient is examined and evaluated at the bedside with SICU team. Treatment plan discussed with SICU team, nurses and primary team.   Vital signs, laboratory work, and imaging reviewed during rounds.  Will continue hemodynamic monitoring as per protocol in SICU.    24h events: T piece 8am-8PM, FWF 200Q4 started, arterial line removed    Neuro:  GCS [id number]  Medications - tylenol standing for pain/fevers, valproic acid for possible focal seizures after SDH  Studies - none recent  Q4H neurochecks  Hx of CVA with residual R-sided deficit    Respiratory: respirations even and unlabored on mechanical ventilation  Mode: CPAP with PS, FiO2: 30, PEEP: 5, MAP: 9, PIP: 12  ABG - ( 27 Dec 2023 04:11 )  pH, Arterial: 7.47  pH, Blood: x     /  pCO2: 29    /  pO2: 154   / HCO3: 21    / Base Excess: -2.1  /  SaO2: 100.0   Medications - albuterol, ipratropium  Continue T piece weans as able for vent liberation  Obtain CXR for new tachypnea    CV: hemodynamics acceptable  Medications: labetalol, amlodipine, atorvastatin  Home medications - coreg, valsartan, atorvastatin  Studies - no recent studies    GI: abd s/nt/nd, PEG @ 2.5cm at skin edge  Last BM - continued loose stools through rectal tube  Nutrition - TF @ 70 cc/h for 18 h/d  Medications - protonix  Ppx - protonix  Follow up nutrition recs - rising BUN and continued diarrhea    Renal: Continue I&Os monitoring, replete electrolytes as needed  12-26    147<H>  |  113<H>  |  74<HH>  ----------------------------<  204<H>  4.1   |  22  |  2.9<H>    Ca    8.0<L>      26 Dec 2023 20:00  Phos  3.9     12-26  Mg     3.0     12-26    UOP - 2185 cc/24h  I/O - 1470/2835 24h  Aguirre catheter - condom catheter   Q4H --> decrease to 200 Q6H  Sevelamer to prevent hyperphosphatemia  Doxazosin for hx of BPH    Heme: continue to evaluate for acute blood loss anemia- trend Hg/Hct                         7.7    14.92 )-----------( 459      ( 26 Dec 2023 20:00 )             24.3     Anticoagulation - SQH  ASA-81 home med, holding plavix    ID: tmax 100.9, trend leukocytosis, monitor fevers   Recent culture data - respiratory cx with MSSA  Antibiotics - Ancef stopped 12/26 for MSSA pneumonia    Endocrine: Prevent and treat hyperglycemia with insulin as needed, lantus 20 units QHS, SSI  PV: follow pulse exam    MSK: OOB and mobilize as able, PT eval    Skin: decub precautions    Lines: PIV, RUE midline, trach, PEG  DVT Prophylaxis: SQH  Stress Gastritis Prophylaxis: protonix   Disposition: SICU - at risk for respiratory compromise    I saw and evaluated the patient personally. I have reviewed and agree with note above. Treatment plan discussed with SICU team, nurses and primary team at the time of the multidisciplinary rounds.     Isidro Montemayor MD  Trauma/ACS/SCC Attending This patient has a high probability of sudden, clinically significant deterioration, which requires the highest level of physician preparedness to intervene urgently. I managed/supervised life or organ supporting interventions that required frequent physician assessment. I devoted my full attention in the ICU to the direct care of this patient for the period of time indicated below. Time I spent with the family or surrogate(s) is included only if the patient was incapable of providing the necessary information or participating in medical decision making. Time devoted to teaching and to any procedures I billed separately is not included.     Patient is examined and evaluated at the bedside with SICU team. Treatment plan discussed with SICU team, nurses and primary team.   Vital signs, laboratory work, and imaging reviewed during rounds.  Will continue hemodynamic monitoring as per protocol in SICU.    24h events: T piece 8am-8PM, FWF 200Q4 started, arterial line removed    Neuro:  GCS [id number]  Medications - tylenol standing for pain/fevers, valproic acid for possible focal seizures after SDH  Studies - none recent  Q4H neurochecks  Hx of CVA with residual R-sided deficit    Respiratory: respirations even and unlabored on mechanical ventilation  Mode: CPAP with PS, FiO2: 30, PEEP: 5, MAP: 9, PIP: 12  ABG - ( 27 Dec 2023 04:11 )  pH, Arterial: 7.47  pH, Blood: x     /  pCO2: 29    /  pO2: 154   / HCO3: 21    / Base Excess: -2.1  /  SaO2: 100.0   Medications - albuterol, ipratropium  Continue T piece weans as able for vent liberation  Obtain CXR for new tachypnea    CV: hemodynamics acceptable  Medications: labetalol, amlodipine, atorvastatin  Home medications - coreg, valsartan, atorvastatin  Studies - no recent studies    GI: abd s/nt/nd, PEG @ 2.5cm at skin edge  Last BM - continued loose stools through rectal tube  Nutrition - TF @ 70 cc/h for 18 h/d  Medications - protonix  Ppx - protonix  Follow up nutrition recs - rising BUN and continued diarrhea    Renal: Continue I&Os monitoring, replete electrolytes as needed  12-26    147<H>  |  113<H>  |  74<HH>  ----------------------------<  204<H>  4.1   |  22  |  2.9<H>    Ca    8.0<L>      26 Dec 2023 20:00  Phos  3.9     12-26  Mg     3.0     12-26    UOP - 2185 cc/24h  I/O - 1470/2835 24h  Aguirre catheter - condom catheter   Q4H --> decrease to 200 Q6H  Sevelamer to prevent hyperphosphatemia  Doxazosin for hx of BPH    Heme: continue to evaluate for acute blood loss anemia- trend Hg/Hct                         7.7    14.92 )-----------( 459      ( 26 Dec 2023 20:00 )             24.3     Anticoagulation - SQH  ASA-81 home med, holding plavix    ID: tmax 100.9, trend leukocytosis, monitor fevers   Recent culture data - respiratory cx with MSSA  Antibiotics - Ancef stopped 12/26 for MSSA pneumonia    Endocrine: Prevent and treat hyperglycemia with insulin as needed, lantus 20 units QHS, SSI  PV: follow pulse exam    MSK: OOB and mobilize as able, PT eval    Skin: decub precautions    Lines: PIV, RUE midline, trach, PEG  DVT Prophylaxis: SQH  Stress Gastritis Prophylaxis: protonix   Disposition: SICU - at risk for respiratory compromise    I saw and evaluated the patient personally. I have reviewed and agree with note above. Treatment plan discussed with SICU team, nurses and primary team at the time of the multidisciplinary rounds.     Isidro Montemayor MD  Trauma/ACS/SCC Attending This patient has a high probability of sudden, clinically significant deterioration, which requires the highest level of physician preparedness to intervene urgently. I managed/supervised life or organ supporting interventions that required frequent physician assessment. I devoted my full attention in the ICU to the direct care of this patient for the period of time indicated below. Time I spent with the family or surrogate(s) is included only if the patient was incapable of providing the necessary information or participating in medical decision making. Time devoted to teaching and to any procedures I billed separately is not included.     Patient is examined and evaluated at the bedside with SICU team. Treatment plan discussed with SICU team, nurses and primary team.   Vital signs, laboratory work, and imaging reviewed during rounds.  Will continue hemodynamic monitoring as per protocol in SICU.    24h events: T piece 8am-8PM, FWF 200Q4 started, arterial line removed    Neuro:  GCS [id number]  Medications - tylenol standing for pain/fevers, valproic acid for possible focal seizures after SDH, will start amantadine to promote wakefulness  Studies - none recent  Q4H neurochecks  Hx of CVA with residual R-sided deficit    Respiratory: respirations even and unlabored on mechanical ventilation  Mode: CPAP with PS, FiO2: 30, PEEP: 5, MAP: 9, PIP: 12  ABG - ( 27 Dec 2023 04:11 )  pH, Arterial: 7.47  pH, Blood: x     /  pCO2: 29    /  pO2: 154   / HCO3: 21    / Base Excess: -2.1  /  SaO2: 100.0   Medications - albuterol, ipratropium  Continue T piece weans as able for vent liberation  Obtain CXR for new tachypnea    CV: hemodynamics acceptable  Medications: labetalol, amlodipine, atorvastatin  Home medications - coreg, valsartan, atorvastatin  Studies - no recent studies    GI: abd s/nt/nd, PEG @ 2.5cm at skin edge  Last BM - continued loose stools through rectal tube  Nutrition - TF @ 70 cc/h for 18 h/d  Medications - protonix  Ppx - protonix  Follow up nutrition recs - rising BUN and continued diarrhea    Renal: Continue I&Os monitoring, replete electrolytes as needed  12-26    147<H>  |  113<H>  |  74<HH>  ----------------------------<  204<H>  4.1   |  22  |  2.9<H>    Ca    8.0<L>      26 Dec 2023 20:00  Phos  3.9     12-26  Mg     3.0     12-26    UOP - 2185 cc/24h  I/O - 1470/2835 24h  Aguirre catheter - condom catheter   Q4H --> decrease to 200 Q6H  Sevelamer to prevent hyperphosphatemia  Doxazosin for hx of BPH    Heme: continue to evaluate for acute blood loss anemia- trend Hg/Hct                         7.7    14.92 )-----------( 459      ( 26 Dec 2023 20:00 )             24.3     Anticoagulation - SQH  ASA-81 home med, holding plavix    ID: tmax 100.9, trend leukocytosis, monitor fevers   Recent culture data - respiratory cx with MSSA  Antibiotics - Ancef stopped 12/26 for MSSA pneumonia    Endocrine: Prevent and treat hyperglycemia with insulin as needed, lantus 20 units QHS, SSI  PV: follow pulse exam    MSK: OOB and mobilize as able, PT eval    Skin: decub precautions    Lines: PIV, RUE midline, trach, PEG  DVT Prophylaxis: SQH  Stress Gastritis Prophylaxis: protonix   Disposition: SICU - at risk for respiratory compromise    I saw and evaluated the patient personally. I have reviewed and agree with note above. Treatment plan discussed with SICU team, nurses and primary team at the time of the multidisciplinary rounds.     Isidro Montemayor MD  Trauma/ACS/SCC Attending This patient has a high probability of sudden, clinically significant deterioration, which requires the highest level of physician preparedness to intervene urgently. I managed/supervised life or organ supporting interventions that required frequent physician assessment. I devoted my full attention in the ICU to the direct care of this patient for the period of time indicated below. Time I spent with the family or surrogate(s) is included only if the patient was incapable of providing the necessary information or participating in medical decision making. Time devoted to teaching and to any procedures I billed separately is not included.     Patient is examined and evaluated at the bedside with SICU team. Treatment plan discussed with SICU team, nurses and primary team.   Vital signs, laboratory work, and imaging reviewed during rounds.  Will continue hemodynamic monitoring as per protocol in SICU.    24h events: T piece 8am-8PM, FWF 200Q4 started, arterial line removed    Neuro:  GCS [id number]  Medications - tylenol standing for pain/fevers, valproic acid for possible focal seizures after SDH, will start amantadine to promote wakefulness  Studies - none recent  Q4H neurochecks  Hx of CVA with residual R-sided deficit    Respiratory: respirations even and unlabored on mechanical ventilation  Mode: CPAP with PS, FiO2: 30, PEEP: 5, MAP: 9, PIP: 12  ABG - ( 27 Dec 2023 04:11 )  pH, Arterial: 7.47  pH, Blood: x     /  pCO2: 29    /  pO2: 154   / HCO3: 21    / Base Excess: -2.1  /  SaO2: 100.0   Medications - albuterol, ipratropium  Continue T piece weans as able for vent liberation  Obtain CXR for new tachypnea    CV: hemodynamics acceptable  Medications: labetalol, amlodipine, atorvastatin  Home medications - coreg, valsartan, atorvastatin  Studies - no recent studies    GI: abd s/nt/nd, PEG @ 2.5cm at skin edge  Last BM - continued loose stools through rectal tube  Nutrition - TF @ 70 cc/h for 18 h/d  Medications - protonix  Ppx - protonix  Follow up nutrition recs - rising BUN and continued diarrhea    Renal: Continue I&Os monitoring, replete electrolytes as needed  12-26    147<H>  |  113<H>  |  74<HH>  ----------------------------<  204<H>  4.1   |  22  |  2.9<H>    Ca    8.0<L>      26 Dec 2023 20:00  Phos  3.9     12-26  Mg     3.0     12-26    UOP - 2185 cc/24h  I/O - 1470/2835 24h  Aguirre catheter - condom catheter   Q4H --> decrease to 200 Q6H  Sevelamer to prevent hyperphosphatemia  Doxazosin for hx of BPH    Heme: continue to evaluate for acute blood loss anemia- trend Hg/Hct                         7.7    14.92 )-----------( 459      ( 26 Dec 2023 20:00 )             24.3     Anticoagulation - SQH  ASA-81 home med, holding plavix    ID: tmax 100.9, trend leukocytosis, monitor fevers   Recent culture data - respiratory cx with MSSA  Antibiotics - Ancef stopped 12/26 for MSSA pneumonia    Endocrine: Prevent and treat hyperglycemia with insulin as needed, lantus 20 units QHS, SSI  PV: follow pulse exam    MSK: OOB and mobilize as able, PT eval    Skin: decub precautions    Lines: PIV, RUE midline, trach, PEG  DVT Prophylaxis: SQH  Stress Gastritis Prophylaxis: protonix   Disposition: SICU - at risk for respiratory compromise    I saw and evaluated the patient personally. I have reviewed and agree with note above. Treatment plan discussed with SICU team, nurses and primary team at the time of the multidisciplinary rounds.     Isidro Montemayor MD  Trauma/ACS/SCC Attending

## 2023-12-28 LAB
ANION GAP SERPL CALC-SCNC: 14 MMOL/L — SIGNIFICANT CHANGE UP (ref 7–14)
ANION GAP SERPL CALC-SCNC: 14 MMOL/L — SIGNIFICANT CHANGE UP (ref 7–14)
ANION GAP SERPL CALC-SCNC: 16 MMOL/L — HIGH (ref 7–14)
ANION GAP SERPL CALC-SCNC: 16 MMOL/L — HIGH (ref 7–14)
ANION GAP SERPL CALC-SCNC: 21 MMOL/L — HIGH (ref 7–14)
ANION GAP SERPL CALC-SCNC: 21 MMOL/L — HIGH (ref 7–14)
ANISOCYTOSIS BLD QL: SLIGHT — SIGNIFICANT CHANGE UP
ANISOCYTOSIS BLD QL: SLIGHT — SIGNIFICANT CHANGE UP
APPEARANCE UR: ABNORMAL
APPEARANCE UR: ABNORMAL
BACTERIA # UR AUTO: NEGATIVE /HPF — SIGNIFICANT CHANGE UP
BACTERIA # UR AUTO: NEGATIVE /HPF — SIGNIFICANT CHANGE UP
BASOPHILS # BLD AUTO: 0 K/UL — SIGNIFICANT CHANGE UP (ref 0–0.2)
BASOPHILS # BLD AUTO: 0 K/UL — SIGNIFICANT CHANGE UP (ref 0–0.2)
BASOPHILS # BLD AUTO: 0.05 K/UL — SIGNIFICANT CHANGE UP (ref 0–0.2)
BASOPHILS # BLD AUTO: 0.05 K/UL — SIGNIFICANT CHANGE UP (ref 0–0.2)
BASOPHILS NFR BLD AUTO: 0 % — SIGNIFICANT CHANGE UP (ref 0–1)
BASOPHILS NFR BLD AUTO: 0 % — SIGNIFICANT CHANGE UP (ref 0–1)
BASOPHILS NFR BLD AUTO: 0.3 % — SIGNIFICANT CHANGE UP (ref 0–1)
BASOPHILS NFR BLD AUTO: 0.3 % — SIGNIFICANT CHANGE UP (ref 0–1)
BILIRUB UR-MCNC: NEGATIVE — SIGNIFICANT CHANGE UP
BILIRUB UR-MCNC: NEGATIVE — SIGNIFICANT CHANGE UP
BLD GP AB SCN SERPL QL: SIGNIFICANT CHANGE UP
BLD GP AB SCN SERPL QL: SIGNIFICANT CHANGE UP
BUN SERPL-MCNC: 103 MG/DL — CRITICAL HIGH (ref 10–20)
BUN SERPL-MCNC: 103 MG/DL — CRITICAL HIGH (ref 10–20)
BUN SERPL-MCNC: 104 MG/DL — CRITICAL HIGH (ref 10–20)
BUN SERPL-MCNC: 104 MG/DL — CRITICAL HIGH (ref 10–20)
BUN SERPL-MCNC: 107 MG/DL — CRITICAL HIGH (ref 10–20)
BUN SERPL-MCNC: 107 MG/DL — CRITICAL HIGH (ref 10–20)
CALCIUM SERPL-MCNC: 7.6 MG/DL — LOW (ref 8.4–10.5)
CALCIUM SERPL-MCNC: 7.6 MG/DL — LOW (ref 8.4–10.5)
CALCIUM SERPL-MCNC: 8.1 MG/DL — LOW (ref 8.4–10.4)
CALCIUM SERPL-MCNC: 8.1 MG/DL — LOW (ref 8.4–10.4)
CALCIUM SERPL-MCNC: 8.3 MG/DL — LOW (ref 8.4–10.5)
CALCIUM SERPL-MCNC: 8.3 MG/DL — LOW (ref 8.4–10.5)
CAST: 3 /LPF — SIGNIFICANT CHANGE UP (ref 0–4)
CAST: 3 /LPF — SIGNIFICANT CHANGE UP (ref 0–4)
CHLORIDE SERPL-SCNC: 114 MMOL/L — HIGH (ref 98–110)
CHLORIDE SERPL-SCNC: 115 MMOL/L — HIGH (ref 98–110)
CHLORIDE SERPL-SCNC: 115 MMOL/L — HIGH (ref 98–110)
CO2 SERPL-SCNC: 13 MMOL/L — LOW (ref 17–32)
CO2 SERPL-SCNC: 13 MMOL/L — LOW (ref 17–32)
CO2 SERPL-SCNC: 19 MMOL/L — SIGNIFICANT CHANGE UP (ref 17–32)
CO2 SERPL-SCNC: 19 MMOL/L — SIGNIFICANT CHANGE UP (ref 17–32)
CO2 SERPL-SCNC: 20 MMOL/L — SIGNIFICANT CHANGE UP (ref 17–32)
CO2 SERPL-SCNC: 20 MMOL/L — SIGNIFICANT CHANGE UP (ref 17–32)
COLOR SPEC: ABNORMAL
COLOR SPEC: ABNORMAL
CREAT SERPL-MCNC: 4 MG/DL — HIGH (ref 0.7–1.5)
CREAT SERPL-MCNC: 4 MG/DL — HIGH (ref 0.7–1.5)
CREAT SERPL-MCNC: 4.4 MG/DL — CRITICAL HIGH (ref 0.7–1.5)
CREAT SERPL-MCNC: 4.4 MG/DL — CRITICAL HIGH (ref 0.7–1.5)
CREAT SERPL-MCNC: 4.6 MG/DL — CRITICAL HIGH (ref 0.7–1.5)
CREAT SERPL-MCNC: 4.6 MG/DL — CRITICAL HIGH (ref 0.7–1.5)
DIFF PNL FLD: ABNORMAL
DIFF PNL FLD: ABNORMAL
EGFR: 12 ML/MIN/1.73M2 — LOW
EGFR: 12 ML/MIN/1.73M2 — LOW
EGFR: 13 ML/MIN/1.73M2 — LOW
EGFR: 13 ML/MIN/1.73M2 — LOW
EGFR: 15 ML/MIN/1.73M2 — LOW
EGFR: 15 ML/MIN/1.73M2 — LOW
EOSINOPHIL # BLD AUTO: 0 K/UL — SIGNIFICANT CHANGE UP (ref 0–0.7)
EOSINOPHIL # BLD AUTO: 0 K/UL — SIGNIFICANT CHANGE UP (ref 0–0.7)
EOSINOPHIL # BLD AUTO: 0.19 K/UL — SIGNIFICANT CHANGE UP (ref 0–0.7)
EOSINOPHIL # BLD AUTO: 0.19 K/UL — SIGNIFICANT CHANGE UP (ref 0–0.7)
EOSINOPHIL NFR BLD AUTO: 0 % — SIGNIFICANT CHANGE UP (ref 0–8)
EOSINOPHIL NFR BLD AUTO: 0 % — SIGNIFICANT CHANGE UP (ref 0–8)
EOSINOPHIL NFR BLD AUTO: 1 % — SIGNIFICANT CHANGE UP (ref 0–8)
EOSINOPHIL NFR BLD AUTO: 1 % — SIGNIFICANT CHANGE UP (ref 0–8)
GAS PNL BLDA: SIGNIFICANT CHANGE UP
GAS PNL BLDA: SIGNIFICANT CHANGE UP
GIANT PLATELETS BLD QL SMEAR: PRESENT — SIGNIFICANT CHANGE UP
GIANT PLATELETS BLD QL SMEAR: PRESENT — SIGNIFICANT CHANGE UP
GLUCOSE SERPL-MCNC: 200 MG/DL — HIGH (ref 70–99)
GLUCOSE SERPL-MCNC: 200 MG/DL — HIGH (ref 70–99)
GLUCOSE SERPL-MCNC: 214 MG/DL — HIGH (ref 70–99)
GLUCOSE SERPL-MCNC: 214 MG/DL — HIGH (ref 70–99)
GLUCOSE SERPL-MCNC: 299 MG/DL — HIGH (ref 70–99)
GLUCOSE SERPL-MCNC: 299 MG/DL — HIGH (ref 70–99)
GLUCOSE UR QL: NEGATIVE MG/DL — SIGNIFICANT CHANGE UP
GLUCOSE UR QL: NEGATIVE MG/DL — SIGNIFICANT CHANGE UP
HCT VFR BLD CALC: 21.8 % — LOW (ref 42–52)
HCT VFR BLD CALC: 21.8 % — LOW (ref 42–52)
HCT VFR BLD CALC: 22.7 % — LOW (ref 42–52)
HCT VFR BLD CALC: 22.7 % — LOW (ref 42–52)
HCT VFR BLD CALC: 27 % — LOW (ref 42–52)
HCT VFR BLD CALC: 27 % — LOW (ref 42–52)
HGB BLD-MCNC: 6.9 G/DL — CRITICAL LOW (ref 14–18)
HGB BLD-MCNC: 6.9 G/DL — CRITICAL LOW (ref 14–18)
HGB BLD-MCNC: 7.1 G/DL — LOW (ref 14–18)
HGB BLD-MCNC: 7.1 G/DL — LOW (ref 14–18)
HGB BLD-MCNC: 8.9 G/DL — LOW (ref 14–18)
HGB BLD-MCNC: 8.9 G/DL — LOW (ref 14–18)
IMM GRANULOCYTES NFR BLD AUTO: 11.6 % — HIGH (ref 0.1–0.3)
IMM GRANULOCYTES NFR BLD AUTO: 11.6 % — HIGH (ref 0.1–0.3)
KETONES UR-MCNC: NEGATIVE MG/DL — SIGNIFICANT CHANGE UP
KETONES UR-MCNC: NEGATIVE MG/DL — SIGNIFICANT CHANGE UP
LEUKOCYTE ESTERASE UR-ACNC: ABNORMAL
LEUKOCYTE ESTERASE UR-ACNC: ABNORMAL
LYMPHOCYTES # BLD AUTO: 0.79 K/UL — LOW (ref 1.2–3.4)
LYMPHOCYTES # BLD AUTO: 0.79 K/UL — LOW (ref 1.2–3.4)
LYMPHOCYTES # BLD AUTO: 1.22 K/UL — SIGNIFICANT CHANGE UP (ref 1.2–3.4)
LYMPHOCYTES # BLD AUTO: 1.22 K/UL — SIGNIFICANT CHANGE UP (ref 1.2–3.4)
LYMPHOCYTES # BLD AUTO: 4.3 % — LOW (ref 20.5–51.1)
LYMPHOCYTES # BLD AUTO: 4.3 % — LOW (ref 20.5–51.1)
LYMPHOCYTES # BLD AUTO: 6.7 % — LOW (ref 20.5–51.1)
LYMPHOCYTES # BLD AUTO: 6.7 % — LOW (ref 20.5–51.1)
MAGNESIUM SERPL-MCNC: 3.2 MG/DL — CRITICAL HIGH (ref 1.8–2.4)
MAGNESIUM SERPL-MCNC: 3.2 MG/DL — CRITICAL HIGH (ref 1.8–2.4)
MANUAL SMEAR VERIFICATION: SIGNIFICANT CHANGE UP
MANUAL SMEAR VERIFICATION: SIGNIFICANT CHANGE UP
MCHC RBC-ENTMCNC: 29 PG — SIGNIFICANT CHANGE UP (ref 27–31)
MCHC RBC-ENTMCNC: 29 PG — SIGNIFICANT CHANGE UP (ref 27–31)
MCHC RBC-ENTMCNC: 29.2 PG — SIGNIFICANT CHANGE UP (ref 27–31)
MCHC RBC-ENTMCNC: 29.2 PG — SIGNIFICANT CHANGE UP (ref 27–31)
MCHC RBC-ENTMCNC: 29.4 PG — SIGNIFICANT CHANGE UP (ref 27–31)
MCHC RBC-ENTMCNC: 29.4 PG — SIGNIFICANT CHANGE UP (ref 27–31)
MCHC RBC-ENTMCNC: 31.3 G/DL — LOW (ref 32–37)
MCHC RBC-ENTMCNC: 31.3 G/DL — LOW (ref 32–37)
MCHC RBC-ENTMCNC: 31.7 G/DL — LOW (ref 32–37)
MCHC RBC-ENTMCNC: 31.7 G/DL — LOW (ref 32–37)
MCHC RBC-ENTMCNC: 33 G/DL — SIGNIFICANT CHANGE UP (ref 32–37)
MCHC RBC-ENTMCNC: 33 G/DL — SIGNIFICANT CHANGE UP (ref 32–37)
MCV RBC AUTO: 89.1 FL — SIGNIFICANT CHANGE UP (ref 80–94)
MCV RBC AUTO: 89.1 FL — SIGNIFICANT CHANGE UP (ref 80–94)
MCV RBC AUTO: 92.4 FL — SIGNIFICANT CHANGE UP (ref 80–94)
MCV RBC AUTO: 92.4 FL — SIGNIFICANT CHANGE UP (ref 80–94)
MCV RBC AUTO: 92.7 FL — SIGNIFICANT CHANGE UP (ref 80–94)
MCV RBC AUTO: 92.7 FL — SIGNIFICANT CHANGE UP (ref 80–94)
MONOCYTES # BLD AUTO: 1.29 K/UL — HIGH (ref 0.1–0.6)
MONOCYTES # BLD AUTO: 1.29 K/UL — HIGH (ref 0.1–0.6)
MONOCYTES # BLD AUTO: 1.84 K/UL — HIGH (ref 0.1–0.6)
MONOCYTES # BLD AUTO: 1.84 K/UL — HIGH (ref 0.1–0.6)
MONOCYTES NFR BLD AUTO: 10.1 % — HIGH (ref 1.7–9.3)
MONOCYTES NFR BLD AUTO: 10.1 % — HIGH (ref 1.7–9.3)
MONOCYTES NFR BLD AUTO: 7 % — SIGNIFICANT CHANGE UP (ref 1.7–9.3)
MONOCYTES NFR BLD AUTO: 7 % — SIGNIFICANT CHANGE UP (ref 1.7–9.3)
MYELOCYTES NFR BLD: 4.3 % — HIGH (ref 0–0)
MYELOCYTES NFR BLD: 4.3 % — HIGH (ref 0–0)
NEUTROPHILS # BLD AUTO: 12.78 K/UL — HIGH (ref 1.4–6.5)
NEUTROPHILS # BLD AUTO: 12.78 K/UL — HIGH (ref 1.4–6.5)
NEUTROPHILS # BLD AUTO: 15.5 K/UL — HIGH (ref 1.4–6.5)
NEUTROPHILS # BLD AUTO: 15.5 K/UL — HIGH (ref 1.4–6.5)
NEUTROPHILS NFR BLD AUTO: 70.3 % — SIGNIFICANT CHANGE UP (ref 42.2–75.2)
NEUTROPHILS NFR BLD AUTO: 70.3 % — SIGNIFICANT CHANGE UP (ref 42.2–75.2)
NEUTROPHILS NFR BLD AUTO: 83.5 % — HIGH (ref 42.2–75.2)
NEUTROPHILS NFR BLD AUTO: 83.5 % — HIGH (ref 42.2–75.2)
NEUTS BAND # BLD: 0.9 % — SIGNIFICANT CHANGE UP (ref 0–6)
NEUTS BAND # BLD: 0.9 % — SIGNIFICANT CHANGE UP (ref 0–6)
NITRITE UR-MCNC: NEGATIVE — SIGNIFICANT CHANGE UP
NITRITE UR-MCNC: NEGATIVE — SIGNIFICANT CHANGE UP
NRBC # BLD: 0 /100 WBCS — SIGNIFICANT CHANGE UP (ref 0–0)
NRBC # BLD: 1 /100 WBCS — HIGH (ref 0–0)
NRBC # BLD: 1 /100 WBCS — HIGH (ref 0–0)
NRBC # BLD: SIGNIFICANT CHANGE UP /100 WBCS (ref 0–0)
NRBC # BLD: SIGNIFICANT CHANGE UP /100 WBCS (ref 0–0)
PH UR: 5.5 — SIGNIFICANT CHANGE UP (ref 5–8)
PH UR: 5.5 — SIGNIFICANT CHANGE UP (ref 5–8)
PHOSPHATE SERPL-MCNC: 5.2 MG/DL — HIGH (ref 2.1–4.9)
PHOSPHATE SERPL-MCNC: 5.2 MG/DL — HIGH (ref 2.1–4.9)
PLAT MORPH BLD: ABNORMAL
PLAT MORPH BLD: ABNORMAL
PLATELET # BLD AUTO: 435 K/UL — HIGH (ref 130–400)
PLATELET # BLD AUTO: 435 K/UL — HIGH (ref 130–400)
PLATELET # BLD AUTO: 438 K/UL — HIGH (ref 130–400)
PLATELET # BLD AUTO: 438 K/UL — HIGH (ref 130–400)
PLATELET # BLD AUTO: 444 K/UL — HIGH (ref 130–400)
PLATELET # BLD AUTO: 444 K/UL — HIGH (ref 130–400)
PMV BLD: 9 FL — SIGNIFICANT CHANGE UP (ref 7.4–10.4)
PMV BLD: 9.1 FL — SIGNIFICANT CHANGE UP (ref 7.4–10.4)
PMV BLD: 9.1 FL — SIGNIFICANT CHANGE UP (ref 7.4–10.4)
POLYCHROMASIA BLD QL SMEAR: SIGNIFICANT CHANGE UP
POLYCHROMASIA BLD QL SMEAR: SIGNIFICANT CHANGE UP
POTASSIUM SERPL-MCNC: 4.2 MMOL/L — SIGNIFICANT CHANGE UP (ref 3.5–5)
POTASSIUM SERPL-MCNC: 4.2 MMOL/L — SIGNIFICANT CHANGE UP (ref 3.5–5)
POTASSIUM SERPL-MCNC: 4.8 MMOL/L — SIGNIFICANT CHANGE UP (ref 3.5–5)
POTASSIUM SERPL-MCNC: 4.8 MMOL/L — SIGNIFICANT CHANGE UP (ref 3.5–5)
POTASSIUM SERPL-MCNC: 5.6 MMOL/L — HIGH (ref 3.5–5)
POTASSIUM SERPL-MCNC: 5.6 MMOL/L — HIGH (ref 3.5–5)
POTASSIUM SERPL-SCNC: 4.2 MMOL/L — SIGNIFICANT CHANGE UP (ref 3.5–5)
POTASSIUM SERPL-SCNC: 4.2 MMOL/L — SIGNIFICANT CHANGE UP (ref 3.5–5)
POTASSIUM SERPL-SCNC: 4.8 MMOL/L — SIGNIFICANT CHANGE UP (ref 3.5–5)
POTASSIUM SERPL-SCNC: 4.8 MMOL/L — SIGNIFICANT CHANGE UP (ref 3.5–5)
POTASSIUM SERPL-SCNC: 5.6 MMOL/L — HIGH (ref 3.5–5)
POTASSIUM SERPL-SCNC: 5.6 MMOL/L — HIGH (ref 3.5–5)
PROT UR-MCNC: 100 MG/DL
PROT UR-MCNC: 100 MG/DL
RAPID RVP RESULT: SIGNIFICANT CHANGE UP
RAPID RVP RESULT: SIGNIFICANT CHANGE UP
RBC # BLD: 2.36 M/UL — LOW (ref 4.7–6.1)
RBC # BLD: 2.36 M/UL — LOW (ref 4.7–6.1)
RBC # BLD: 2.45 M/UL — LOW (ref 4.7–6.1)
RBC # BLD: 2.45 M/UL — LOW (ref 4.7–6.1)
RBC # BLD: 3.03 M/UL — LOW (ref 4.7–6.1)
RBC # BLD: 3.03 M/UL — LOW (ref 4.7–6.1)
RBC # FLD: 14.6 % — HIGH (ref 11.5–14.5)
RBC # FLD: 14.6 % — HIGH (ref 11.5–14.5)
RBC # FLD: 14.8 % — HIGH (ref 11.5–14.5)
RBC # FLD: 14.8 % — HIGH (ref 11.5–14.5)
RBC # FLD: 15.1 % — HIGH (ref 11.5–14.5)
RBC # FLD: 15.1 % — HIGH (ref 11.5–14.5)
RBC BLD AUTO: ABNORMAL
RBC BLD AUTO: ABNORMAL
RBC CASTS # UR COMP ASSIST: 1047 /HPF — HIGH (ref 0–4)
RBC CASTS # UR COMP ASSIST: 1047 /HPF — HIGH (ref 0–4)
SARS-COV-2 RNA SPEC QL NAA+PROBE: SIGNIFICANT CHANGE UP
SARS-COV-2 RNA SPEC QL NAA+PROBE: SIGNIFICANT CHANGE UP
SODIUM SERPL-SCNC: 148 MMOL/L — HIGH (ref 135–146)
SODIUM SERPL-SCNC: 150 MMOL/L — HIGH (ref 135–146)
SODIUM SERPL-SCNC: 150 MMOL/L — HIGH (ref 135–146)
SP GR SPEC: 1.02 — SIGNIFICANT CHANGE UP (ref 1–1.03)
SP GR SPEC: 1.02 — SIGNIFICANT CHANGE UP (ref 1–1.03)
SQUAMOUS # UR AUTO: 1 /HPF — SIGNIFICANT CHANGE UP (ref 0–5)
SQUAMOUS # UR AUTO: 1 /HPF — SIGNIFICANT CHANGE UP (ref 0–5)
UROBILINOGEN FLD QL: 0.2 MG/DL — SIGNIFICANT CHANGE UP (ref 0.2–1)
UROBILINOGEN FLD QL: 0.2 MG/DL — SIGNIFICANT CHANGE UP (ref 0.2–1)
WBC # BLD: 18.18 K/UL — HIGH (ref 4.8–10.8)
WBC # BLD: 18.18 K/UL — HIGH (ref 4.8–10.8)
WBC # BLD: 18.36 K/UL — HIGH (ref 4.8–10.8)
WBC # BLD: 18.36 K/UL — HIGH (ref 4.8–10.8)
WBC # BLD: 24.52 K/UL — HIGH (ref 4.8–10.8)
WBC # BLD: 24.52 K/UL — HIGH (ref 4.8–10.8)
WBC # FLD AUTO: 18.18 K/UL — HIGH (ref 4.8–10.8)
WBC # FLD AUTO: 18.18 K/UL — HIGH (ref 4.8–10.8)
WBC # FLD AUTO: 18.36 K/UL — HIGH (ref 4.8–10.8)
WBC # FLD AUTO: 18.36 K/UL — HIGH (ref 4.8–10.8)
WBC # FLD AUTO: 24.52 K/UL — HIGH (ref 4.8–10.8)
WBC # FLD AUTO: 24.52 K/UL — HIGH (ref 4.8–10.8)
WBC UR QL: 12 /HPF — HIGH (ref 0–5)
WBC UR QL: 12 /HPF — HIGH (ref 0–5)

## 2023-12-28 PROCEDURE — 99497 ADVNCD CARE PLAN 30 MIN: CPT

## 2023-12-28 PROCEDURE — 71045 X-RAY EXAM CHEST 1 VIEW: CPT | Mod: 26

## 2023-12-28 PROCEDURE — 99291 CRITICAL CARE FIRST HOUR: CPT | Mod: 24

## 2023-12-28 PROCEDURE — 99233 SBSQ HOSP IP/OBS HIGH 50: CPT

## 2023-12-28 RX ORDER — INSULIN HUMAN 100 [IU]/ML
10 INJECTION, SOLUTION SUBCUTANEOUS ONCE
Refills: 0 | Status: COMPLETED | OUTPATIENT
Start: 2023-12-28 | End: 2023-12-28

## 2023-12-28 RX ORDER — VANCOMYCIN HCL 1 G
500 VIAL (EA) INTRAVENOUS ONCE
Refills: 0 | Status: COMPLETED | OUTPATIENT
Start: 2023-12-28 | End: 2023-12-28

## 2023-12-28 RX ORDER — CALCIUM GLUCONATE 100 MG/ML
2 VIAL (ML) INTRAVENOUS ONCE
Refills: 0 | Status: COMPLETED | OUTPATIENT
Start: 2023-12-28 | End: 2023-12-28

## 2023-12-28 RX ORDER — PIPERACILLIN AND TAZOBACTAM 4; .5 G/20ML; G/20ML
3.38 INJECTION, POWDER, LYOPHILIZED, FOR SOLUTION INTRAVENOUS ONCE
Refills: 0 | Status: COMPLETED | OUTPATIENT
Start: 2023-12-28 | End: 2023-12-28

## 2023-12-28 RX ORDER — DEXTROSE 50 % IN WATER 50 %
50 SYRINGE (ML) INTRAVENOUS ONCE
Refills: 0 | Status: COMPLETED | OUTPATIENT
Start: 2023-12-28 | End: 2023-12-28

## 2023-12-28 RX ORDER — PIPERACILLIN AND TAZOBACTAM 4; .5 G/20ML; G/20ML
3.38 INJECTION, POWDER, LYOPHILIZED, FOR SOLUTION INTRAVENOUS EVERY 12 HOURS
Refills: 0 | Status: COMPLETED | OUTPATIENT
Start: 2023-12-29 | End: 2024-01-04

## 2023-12-28 RX ORDER — VALPROIC ACID (AS SODIUM SALT) 250 MG/5ML
500 SOLUTION, ORAL ORAL EVERY 12 HOURS
Refills: 0 | Status: DISCONTINUED | OUTPATIENT
Start: 2023-12-28 | End: 2023-12-31

## 2023-12-28 RX ADMIN — Medication 4 MILLIGRAM(S): at 21:50

## 2023-12-28 RX ADMIN — Medication 650 MILLIGRAM(S): at 00:57

## 2023-12-28 RX ADMIN — SEVELAMER CARBONATE 800 MILLIGRAM(S): 2400 POWDER, FOR SUSPENSION ORAL at 17:41

## 2023-12-28 RX ADMIN — Medication 1 APPLICATION(S): at 05:35

## 2023-12-28 RX ADMIN — Medication 13: at 05:38

## 2023-12-28 RX ADMIN — AMLODIPINE BESYLATE 10 MILLIGRAM(S): 2.5 TABLET ORAL at 05:37

## 2023-12-28 RX ADMIN — INSULIN GLARGINE 20 UNIT(S): 100 INJECTION, SOLUTION SUBCUTANEOUS at 21:50

## 2023-12-28 RX ADMIN — ALBUTEROL 90 PUFF(S): 90 AEROSOL, METERED ORAL at 21:03

## 2023-12-28 RX ADMIN — Medication 500 MILLIGRAM(S): at 17:43

## 2023-12-28 RX ADMIN — SEVELAMER CARBONATE 800 MILLIGRAM(S): 2400 POWDER, FOR SUSPENSION ORAL at 08:52

## 2023-12-28 RX ADMIN — ALBUTEROL 90 PUFF(S): 90 AEROSOL, METERED ORAL at 07:41

## 2023-12-28 RX ADMIN — HEPARIN SODIUM 5000 UNIT(S): 5000 INJECTION INTRAVENOUS; SUBCUTANEOUS at 21:49

## 2023-12-28 RX ADMIN — ALBUTEROL 90 PUFF(S): 90 AEROSOL, METERED ORAL at 12:51

## 2023-12-28 RX ADMIN — Medication 11: at 17:42

## 2023-12-28 RX ADMIN — Medication 650 MILLIGRAM(S): at 05:38

## 2023-12-28 RX ADMIN — PIPERACILLIN AND TAZOBACTAM 200 GRAM(S): 4; .5 INJECTION, POWDER, LYOPHILIZED, FOR SOLUTION INTRAVENOUS at 10:39

## 2023-12-28 RX ADMIN — Medication 1 APPLICATION(S): at 17:43

## 2023-12-28 RX ADMIN — Medication 1 PUFF(S): at 07:41

## 2023-12-28 RX ADMIN — ATORVASTATIN CALCIUM 20 MILLIGRAM(S): 80 TABLET, FILM COATED ORAL at 21:50

## 2023-12-28 RX ADMIN — Medication 9: at 21:49

## 2023-12-28 RX ADMIN — Medication 200 GRAM(S): at 04:50

## 2023-12-28 RX ADMIN — ALBUTEROL 90 PUFF(S): 90 AEROSOL, METERED ORAL at 17:03

## 2023-12-28 RX ADMIN — Medication 650 MILLIGRAM(S): at 01:15

## 2023-12-28 RX ADMIN — HEPARIN SODIUM 5000 UNIT(S): 5000 INJECTION INTRAVENOUS; SUBCUTANEOUS at 14:02

## 2023-12-28 RX ADMIN — Medication 1 PUFF(S): at 21:04

## 2023-12-28 RX ADMIN — PANTOPRAZOLE SODIUM 40 MILLIGRAM(S): 20 TABLET, DELAYED RELEASE ORAL at 05:37

## 2023-12-28 RX ADMIN — CHLORHEXIDINE GLUCONATE 15 MILLILITER(S): 213 SOLUTION TOPICAL at 17:43

## 2023-12-28 RX ADMIN — Medication 750 MILLIGRAM(S): at 05:34

## 2023-12-28 RX ADMIN — Medication 650 MILLIGRAM(S): at 17:45

## 2023-12-28 RX ADMIN — HEPARIN SODIUM 5000 UNIT(S): 5000 INJECTION INTRAVENOUS; SUBCUTANEOUS at 05:37

## 2023-12-28 RX ADMIN — CHLORHEXIDINE GLUCONATE 15 MILLILITER(S): 213 SOLUTION TOPICAL at 05:37

## 2023-12-28 RX ADMIN — Medication 100 MILLIGRAM(S): at 05:37

## 2023-12-28 RX ADMIN — Medication 650 MILLIGRAM(S): at 12:02

## 2023-12-28 RX ADMIN — Medication 650 MILLIGRAM(S): at 12:32

## 2023-12-28 RX ADMIN — Medication 9: at 04:00

## 2023-12-28 RX ADMIN — Medication 100 MILLIGRAM(S): at 14:02

## 2023-12-28 RX ADMIN — Medication 50 MILLILITER(S): at 04:50

## 2023-12-28 RX ADMIN — SEVELAMER CARBONATE 800 MILLIGRAM(S): 2400 POWDER, FOR SUSPENSION ORAL at 12:01

## 2023-12-28 RX ADMIN — Medication 1 APPLICATION(S): at 05:44

## 2023-12-28 RX ADMIN — Medication 650 MILLIGRAM(S): at 06:00

## 2023-12-28 RX ADMIN — INSULIN HUMAN 10 UNIT(S): 100 INJECTION, SOLUTION SUBCUTANEOUS at 04:51

## 2023-12-28 RX ADMIN — Medication 81 MILLIGRAM(S): at 12:03

## 2023-12-28 RX ADMIN — CHLORHEXIDINE GLUCONATE 1 APPLICATION(S): 213 SOLUTION TOPICAL at 05:44

## 2023-12-28 RX ADMIN — PIPERACILLIN AND TAZOBACTAM 25 GRAM(S): 4; .5 INJECTION, POWDER, LYOPHILIZED, FOR SOLUTION INTRAVENOUS at 17:41

## 2023-12-28 NOTE — PROGRESS NOTE ADULT - SUBJECTIVE AND OBJECTIVE BOX
GENERAL SURGERY PROGRESS NOTE    Patient: JACQUELIN CAI , 78y ([birth year])Male   MRN: [id number]  Location: [address]  Visit: 12-16-23 Inpatient  Date: 12-28-23 @ 10:45    Hospital Day #: 14  Post-Op Day #: 6    Procedure/Dx/Injuries: subdural hematoma s/p trach and peg     Events of past 24 hours: urinary retention s/p martinez     PAST MEDICAL & SURGICAL HISTORY:  HTN (hypertension)      Seasonal allergies      History of BPH      Diabetes      No significant past surgical history          Vitals:   T(F): 100.4 (12-28-23 @ 08:00), Max: 101.5 (12-28-23 @ 07:00)  HR: 78 (12-28-23 @ 09:00)  BP: 94/52 (12-28-23 @ 09:00)  RR: 31 (12-28-23 @ 09:00)  SpO2: 99% (12-28-23 @ 09:00)  Mode: CPAP with PS, FiO2: 30, PEEP: 5, PS: 5    Diet, NPO with Tube Feed:   Tube Feeding Modality: Gastrostomy  Peptamen A.F. Formula  Total Volume for 24 Hours (mL): 1260  Continuous  Starting Tube Feed Rate mL per Hour: 20  Increase Tube Feed Rate by (mL): 10     Every hour  Until Goal Tube Feed Rate (mL per Hour): 70  Tube Feed Duration (in Hours): 18  Tube Feed Start Time: 12:00  Tube Feed Stop Time: 06:00  Free Water Flush  Bolus   Total Volume per Flush (mL): 200   Frequency: Every 4 Hours      Fluids:     I & O's:    12-27-23 @ 07:01  -  12-28-23 @ 07:00  --------------------------------------------------------  IN:    Enteral Tube Flush: 300 mL    Free Water: 180 mL    Glucerna: 490 mL    IV PiggyBack: 100 mL    Peptamen A.F.: 770 mL  Total IN: 1840 mL    OUT:    Indwelling Catheter - Urethral (mL): 1580 mL    Rectal Tube (mL): 700 mL    Voided (mL): 85 mL  Total OUT: 2365 mL    Total NET: -525 mL          PHYSICAL EXAM:  General: NAD,  HEENT: NCAT, KELVIN, EOMI, Trachea ML, Neck supple  Cardiac: RRR S1, S2, no Murmurs, rubs or gallops  Respiratory: CTAB, normal respiratory effort,   Abdomen: Soft, non-distended, non-tender, no rebound, no guarding. +BS.  Vascular: Pulses 2+ throughout, extremities well perfused  Skin: Warm/dry, normal color, no jaundice      MEDICATIONS  (STANDING):  acetaminophen     Tablet .. 650 milliGRAM(s) Oral every 6 hours  albuterol    90 MICROgram(s) HFA Inhaler 90 Puff(s) Inhalation every 4 hours  aspirin  chewable 81 milliGRAM(s) Enteral Tube daily  atorvastatin 20 milliGRAM(s) Oral at bedtime  bacitracin   Ointment 1 Application(s) Topical every 12 hours  chlorhexidine 0.12% Liquid 15 milliLiter(s) Oral Mucosa two times a day  chlorhexidine 2% Cloths 1 Application(s) Topical <User Schedule>  doxazosin 4 milliGRAM(s) Oral at bedtime  heparin   Injectable 5000 Unit(s) SubCutaneous every 8 hours  insulin glargine Injectable (LANTUS) 20 Unit(s) SubCutaneous at bedtime  insulin lispro (ADMELOG) corrective regimen sliding scale   SubCutaneous every 4 hours  ipratropium 17 MICROgram(s) HFA Inhaler 1 Puff(s) Inhalation every 6 hours  pantoprazole  Injectable 40 milliGRAM(s) IV Push every 24 hours  piperacillin/tazobactam IVPB.- 3.375 Gram(s) IV Intermittent once  sevelamer carbonate Powder 800 milliGRAM(s) Oral three times a day with meals  valproic  acid Syrup 500 milliGRAM(s) Oral every 12 hours  vancomycin  IVPB 500 milliGRAM(s) IV Intermittent once  vitamin A &amp; D Ointment 1 Application(s) Topical every 12 hours    MEDICATIONS  (PRN):      DVT PROPHYLAXIS: heparin   Injectable 5000 Unit(s) SubCutaneous every 8 hours    GI PROPHYLAXIS: pantoprazole  Injectable 40 milliGRAM(s) IV Push every 24 hours    ANTICOAGULATION:   ANTIBIOTICS:  piperacillin/tazobactam IVPB.- 3.375 Gram(s)  vancomycin  IVPB 500 milliGRAM(s)            LAB/STUDIES:  Labs:  CAPILLARY BLOOD GLUCOSE      POCT Blood Glucose.: 216 mg/dL (28 Dec 2023 07:44)  POCT Blood Glucose.: 342 mg/dL (28 Dec 2023 05:31)  POCT Blood Glucose.: 248 mg/dL (28 Dec 2023 04:43)  POCT Blood Glucose.: 190 mg/dL (27 Dec 2023 22:05)  POCT Blood Glucose.: 205 mg/dL (27 Dec 2023 17:22)  POCT Blood Glucose.: 121 mg/dL (27 Dec 2023 13:12)                          6.9    18.18 )-----------( 444      ( 28 Dec 2023 07:56 )             21.8       Auto Neutrophil %: 70.3 % (12-28-23 @ 07:56)  Auto Immature Granulocyte %: 11.6 % (12-28-23 @ 07:56)  Auto Neutrophil %: 83.5 % (12-28-23 @ 01:14)  Band Neutrophils %: 0.9 % (12-28-23 @ 01:14)  Auto Neutrophil %: 70.2 % (12-27-23 @ 22:13)  Auto Immature Granulocyte %: 12.2 % (12-27-23 @ 22:13)    12-28    150<H>  |  115<H>  |  104<HH>  ----------------------------<  214<H>  4.2   |  19  |  4.6<HH>      Calcium: 8.3 mg/dL (12-28-23 @ 07:56)      LFTs:             5.1  | 0.2  | 28       ------------------[86      ( 27 Dec 2023 17:31 )  2.5  | <0.2 | 5           Lipase:x      Amylase:x         Blood Gas Arterial, Lactate: 1.6 mmol/L (12-28-23 @ 03:44)  Blood Gas Arterial, Lactate: 1.6 mmol/L (12-27-23 @ 04:11)  Blood Gas Arterial, Lactate: 1.3 mmol/L (12-26-23 @ 04:30)    ABG - ( 28 Dec 2023 03:44 )  pH: 7.44  /  pCO2: 29    /  pO2: 95    / HCO3: 20    / Base Excess: -3.7  /  SaO2: 98.7            ABG - ( 27 Dec 2023 04:11 )  pH: 7.47  /  pCO2: 29    /  pO2: 154   / HCO3: 21    / Base Excess: -2.1  /  SaO2: 100.0           ABG - ( 26 Dec 2023 04:30 )  pH: 7.47  /  pCO2: 29    /  pO2: 142   / HCO3: 21    / Base Excess: -1.5  /  SaO2: 99.3        Coags:      Urinalysis Basic - ( 28 Dec 2023 07:56 )    Color: x / Appearance: x / SG: x / pH: x  Gluc: 214 mg/dL / Ketone: x  / Bili: x / Urobili: x   Blood: x / Protein: x / Nitrite: x   Leuk Esterase: x / RBC: x / WBC x   Sq Epi: x / Non Sq Epi: x / Bacteria: x        IMAGING:      ACCESS/ DEVICES:  [X ] Peripheral IV  [ ] Central Venous Line	[ ] R	[ ] L	[ ] IJ	[ ] Fem	[ ] SC	Placed:   [ ] Arterial Line		[ ] R	[ ] L	[ ] Fem	[ ] Rad	[ ] Ax	Placed:   [ ] PICC:					[ ] Mediport  [X ] Urinary Catheter,  Date Placed:   [ ] Chest tube: [ ] Right, [ ] Left  [ ] MARIA LUISA/Ronald Drains         GENERAL SURGERY PROGRESS NOTE    Patient: JACQUELIN CAI , 78y ([birth year])Male   MRN: [id number]  Location: [address]  Visit: 12-16-23 Inpatient  Date: 12-28-23 @ 10:45    Hospital Day #: 14  Post-Op Day #: 6    Procedure/Dx/Injuries: subdural hematoma s/p trach and peg     Events of past 24 hours: urinary retention s/p martinez     PAST MEDICAL & SURGICAL HISTORY:  HTN (hypertension)      Seasonal allergies      History of BPH      Diabetes      No significant past surgical history          Vitals:   T(F): 100.4 (12-28-23 @ 08:00), Max: 101.5 (12-28-23 @ 07:00)  HR: 78 (12-28-23 @ 09:00)  BP: 94/52 (12-28-23 @ 09:00)  RR: 31 (12-28-23 @ 09:00)  SpO2: 99% (12-28-23 @ 09:00)  Mode: CPAP with PS, FiO2: 30, PEEP: 5, PS: 5    Diet, NPO with Tube Feed:   Tube Feeding Modality: Gastrostomy  Peptamen A.F. Formula  Total Volume for 24 Hours (mL): 1260  Continuous  Starting Tube Feed Rate mL per Hour: 20  Increase Tube Feed Rate by (mL): 10     Every hour  Until Goal Tube Feed Rate (mL per Hour): 70  Tube Feed Duration (in Hours): 18  Tube Feed Start Time: 12:00  Tube Feed Stop Time: 06:00  Free Water Flush  Bolus   Total Volume per Flush (mL): 200   Frequency: Every 4 Hours      Fluids:     I & O's:    12-27-23 @ 07:01  -  12-28-23 @ 07:00  --------------------------------------------------------  IN:    Enteral Tube Flush: 300 mL    Free Water: 180 mL    Glucerna: 490 mL    IV PiggyBack: 100 mL    Peptamen A.F.: 770 mL  Total IN: 1840 mL    OUT:    Indwelling Catheter - Urethral (mL): 1580 mL    Rectal Tube (mL): 700 mL    Voided (mL): 85 mL  Total OUT: 2365 mL    Total NET: -525 mL          PHYSICAL EXAM:  General: NAD,  HEENT: NCAT, KELVIN, EOMI, Trachea ML, Neck supple  Cardiac: RRR S1, S2, no Murmurs, rubs or gallops  Respiratory: CTAB, normal respiratory effort,   Abdomen: Soft, non-distended, non-tender, no rebound, no guarding. +BS.  Vascular: Pulses 2+ throughout, extremities well perfused  Skin: Warm/dry, normal color, no jaundice      MEDICATIONS  (STANDING):  acetaminophen     Tablet .. 650 milliGRAM(s) Oral every 6 hours  albuterol    90 MICROgram(s) HFA Inhaler 90 Puff(s) Inhalation every 4 hours  aspirin  chewable 81 milliGRAM(s) Enteral Tube daily  atorvastatin 20 milliGRAM(s) Oral at bedtime  bacitracin   Ointment 1 Application(s) Topical every 12 hours  chlorhexidine 0.12% Liquid 15 milliLiter(s) Oral Mucosa two times a day  chlorhexidine 2% Cloths 1 Application(s) Topical <User Schedule>  doxazosin 4 milliGRAM(s) Oral at bedtime  heparin   Injectable 5000 Unit(s) SubCutaneous every 8 hours  insulin glargine Injectable (LANTUS) 20 Unit(s) SubCutaneous at bedtime  insulin lispro (ADMELOG) corrective regimen sliding scale   SubCutaneous every 4 hours  ipratropium 17 MICROgram(s) HFA Inhaler 1 Puff(s) Inhalation every 6 hours  pantoprazole  Injectable 40 milliGRAM(s) IV Push every 24 hours  piperacillin/tazobactam IVPB.- 3.375 Gram(s) IV Intermittent once  sevelamer carbonate Powder 800 milliGRAM(s) Oral three times a day with meals  valproic  acid Syrup 500 milliGRAM(s) Oral every 12 hours  vancomycin  IVPB 500 milliGRAM(s) IV Intermittent once  vitamin A &amp; D Ointment 1 Application(s) Topical every 12 hours    MEDICATIONS  (PRN):      DVT PROPHYLAXIS: heparin   Injectable 5000 Unit(s) SubCutaneous every 8 hours    GI PROPHYLAXIS: pantoprazole  Injectable 40 milliGRAM(s) IV Push every 24 hours    ANTICOAGULATION:   ANTIBIOTICS:  piperacillin/tazobactam IVPB.- 3.375 Gram(s)  vancomycin  IVPB 500 milliGRAM(s)            LAB/STUDIES:  Labs:  CAPILLARY BLOOD GLUCOSE      POCT Blood Glucose.: 216 mg/dL (28 Dec 2023 07:44)  POCT Blood Glucose.: 342 mg/dL (28 Dec 2023 05:31)  POCT Blood Glucose.: 248 mg/dL (28 Dec 2023 04:43)  POCT Blood Glucose.: 190 mg/dL (27 Dec 2023 22:05)  POCT Blood Glucose.: 205 mg/dL (27 Dec 2023 17:22)  POCT Blood Glucose.: 121 mg/dL (27 Dec 2023 13:12)                          6.9    18.18 )-----------( 444      ( 28 Dec 2023 07:56 )             21.8       Auto Neutrophil %: 70.3 % (12-28-23 @ 07:56)  Auto Immature Granulocyte %: 11.6 % (12-28-23 @ 07:56)  Auto Neutrophil %: 83.5 % (12-28-23 @ 01:14)  Band Neutrophils %: 0.9 % (12-28-23 @ 01:14)  Auto Neutrophil %: 70.2 % (12-27-23 @ 22:13)  Auto Immature Granulocyte %: 12.2 % (12-27-23 @ 22:13)    12-28    150<H>  |  115<H>  |  104<HH>  ----------------------------<  214<H>  4.2   |  19  |  4.6<HH>      Calcium: 8.3 mg/dL (12-28-23 @ 07:56)      LFTs:             5.1  | 0.2  | 28       ------------------[86      ( 27 Dec 2023 17:31 )  2.5  | <0.2 | 5           Lipase:x      Amylase:x         Blood Gas Arterial, Lactate: 1.6 mmol/L (12-28-23 @ 03:44)  Blood Gas Arterial, Lactate: 1.6 mmol/L (12-27-23 @ 04:11)  Blood Gas Arterial, Lactate: 1.3 mmol/L (12-26-23 @ 04:30)    ABG - ( 28 Dec 2023 03:44 )  pH: 7.44  /  pCO2: 29    /  pO2: 95    / HCO3: 20    / Base Excess: -3.7  /  SaO2: 98.7            ABG - ( 27 Dec 2023 04:11 )  pH: 7.47  /  pCO2: 29    /  pO2: 154   / HCO3: 21    / Base Excess: -2.1  /  SaO2: 100.0           ABG - ( 26 Dec 2023 04:30 )  pH: 7.47  /  pCO2: 29    /  pO2: 142   / HCO3: 21    / Base Excess: -1.5  /  SaO2: 99.3        Coags:      Urinalysis Basic - ( 28 Dec 2023 07:56 )    Color: x / Appearance: x / SG: x / pH: x  Gluc: 214 mg/dL / Ketone: x  / Bili: x / Urobili: x   Blood: x / Protein: x / Nitrite: x   Leuk Esterase: x / RBC: x / WBC x   Sq Epi: x / Non Sq Epi: x / Bacteria: x        IMAGING:      ACCESS/ DEVICES:  [X ] Peripheral IV  [ ] Central Venous Line	[ ] R	[ ] L	[ ] IJ	[ ] Fem	[ ] SC	Placed:   [ ] Arterial Line		[ ] R	[ ] L	[ ] Fem	[ ] Rad	[ ] Ax	Placed:   [ ] PICC:					[ ] Mediport  [X ] Urinary Catheter,  Date Placed:   [ ] Chest tube: [ ] Right, [ ] Left  [ ] MARIA LUISA/Ronald Drains

## 2023-12-28 NOTE — PROGRESS NOTE ADULT - SUBJECTIVE AND OBJECTIVE BOX
HPI:"77-year-old male Cantonese speaking his presents with prior history of hypertension dyslipidemia diabetes BPH prior CVA on aspirin and Plavix who states he had a mechanical fall 2 days ago while getting out of the car fell backwards hit his head.  Afterwards he was able to ambulate.  But for the past 1 day family was unable to ambulate him.  He states that his lower extremities are painful to movement and weak.  Family denies any LOC.  They deny any confusion at home.  On exam oriented to person and place but just not to date.  He does follow simple commands.  Elevates his arms for 10 seconds.  Sensation intact in upper extremities.  Lower extremities with 2 out of 5 strength bilaterally."      Interval history:      ITEMS NOT CHECKED ARE NOT PRESENT       ADVANCE DIRECTIVES:     [ x] Full Code [ ] DNR  MOLST  [ ]  Living Will  [ ]   DECISION MAKER(s):  [ ] Health Care Proxy(s)  [ ] Surrogate(s)  [ ] Guardian           Name(s): Phone Number(s):   Wife Negro Fair [phone]  Dtr Rashi Bryantang - [phone]   BASELINE (I)ADL(s) (prior to admission):    [place]: [x ]Total  [ ] Moderate [ ]Dependent  Palliative Performance Status Version 2:         %    http://npcrc.org/files/news/palliative_performance_scale_ppsv2.pdf    Allergies    [This allergen will not trigger allergy alert] pollen (Unknown)  No Known Drug Allergies    Intolerances    MEDICATIONS  (STANDING):  acetaminophen     Tablet .. 650 milliGRAM(s) Oral every 6 hours  albuterol    90 MICROgram(s) HFA Inhaler 90 Puff(s) Inhalation every 4 hours  aspirin  chewable 81 milliGRAM(s) Enteral Tube daily  atorvastatin 20 milliGRAM(s) Oral at bedtime  bacitracin   Ointment 1 Application(s) Topical every 12 hours  chlorhexidine 0.12% Liquid 15 milliLiter(s) Oral Mucosa two times a day  chlorhexidine 2% Cloths 1 Application(s) Topical <User Schedule>  doxazosin 4 milliGRAM(s) Oral at bedtime  heparin   Injectable 5000 Unit(s) SubCutaneous every 8 hours  insulin glargine Injectable (LANTUS) 20 Unit(s) SubCutaneous at bedtime  insulin lispro (ADMELOG) corrective regimen sliding scale   SubCutaneous every 4 hours  ipratropium 17 MICROgram(s) HFA Inhaler 1 Puff(s) Inhalation every 6 hours  pantoprazole  Injectable 40 milliGRAM(s) IV Push every 24 hours  piperacillin/tazobactam IVPB.- 3.375 Gram(s) IV Intermittent once  sevelamer carbonate Powder 800 milliGRAM(s) Oral three times a day with meals  valproic  acid Syrup 500 milliGRAM(s) Oral every 12 hours  vancomycin  IVPB 500 milliGRAM(s) IV Intermittent once  vitamin A &amp; D Ointment 1 Application(s) Topical every 12 hours    MEDICATIONS  (PRN):        PRESENT SYMPTOMS: [x ]Unable to obtain due to poor mentation   Source if other than patient:  [ ]Family   [ ]Team     Pain: [ ]yes [ ]no  QOL impact -   Location -                    Aggravating factors -  Quality -  Radiation -  Timing-  Severity (0-10 scale):  Minimal acceptable level (0-10 scale):     CPOT:  2  https://www.[facility].org/getattachment/[email or web address]/Critical-Care-Pain-Observation-Tool-(CPOT)    PAIN AD Score:   http://geriatrictoolkit.[email or web address]/cog/painad.pdf (press ctrl +  left click to view)    Dyspnea:                           [ ]None[ ]Mild [ ]Moderate [ ]Severe     Respiratory Distress Observation Scale (RDOS): 1  A score of 0 to 2 signifies little or no respiratory distress, 3 signifies mild distress, scores 4 to 6 indicate moderate distress, and scores greater than 7 signify severe distress  [email or web address]://[email or web address]/sites/default/files/PDFS/485862-mkvdwbfxfmj-mcatdwpa-[email or web address].pdf    Anxiety:                             [ ]None[ ]Mild [ ]Moderate [ ]Severe   Fatigue:                             [ ]None[ ]Mild [ ]Moderate [ ]Severe   Nausea:                             [ ]None[ ]Mild [ ]Moderate [ ]Severe   Loss of appetite:              [ ]None[ ]Mild [ ]Moderate [ ]Severe   Constipation:                    [ ]None[ ]Mild [ ]Moderate [ ]Severe    Other Symptoms:  [x ]All other review of systems negative     Palliative Performance Status Version 2:         %    http://[facility].org/files/news/palliative_performance_scale_ppsv2.pdf    PHYSICAL EXAM:    Vital Signs Last 24 Hrs  T(C): 37.8 (28 Dec 2023 12:00), Max: 38.6 (28 Dec 2023 07:00)  T(F): 100 (28 Dec 2023 12:00), Max: 101.5 (28 Dec 2023 07:00)  HR: 80 (28 Dec 2023 13:00) (78 - 100)  BP: 108/55 (28 Dec 2023 13:00) (94/52 - 130/58)  BP(mean): 79 (28 Dec 2023 13:00) (69 - 88)  RR: 26 (28 Dec 2023 13:00) (25 - 38)  SpO2: 98% (28 Dec 2023 13:00) (98% - 100%)    Parameters below as of 28 Dec 2023 08:00  Patient On (Oxygen Delivery Method): tracheostomy collar          GENERAL:  [x ] No acute distress [ ]Lethargic  [ ]Unarousable  [ ]Verbal  [x ]Non-Verbal [ ]Cachexia    BEHAVIORAL/PSYCH:  [ ]Alert and Oriented x  [ ] Anxiety [ ] Delirium [ ] Agitation [x ] Calm   EYES: [ ] No scleral icterus [ ] Scleral icterus [x ] Closed  ENMT:  [ ]Dry mouth  [ ]No external oral lesions [ ] No external ear or nose lesions  CARDIOVASCULAR:  [ ]Regular [ ]Irregular [ ]Tachy [x ]Not Tachy  [ ]Nikita [ ] Edema [ ] No edema  PULMONARY:  [ ]Tachypnea  [ ]Audible excessive secretions [ x] No labored breathing [ ] labored breathing  GASTROINTESTINAL: [ ]Soft  [ ]Distended  [x ]Not distended [ ]Non tender [ ]Tender  MUSCULOSKELETAL: [ ]No clubbing [ ] clubbing  [ x] No cyanosis [ ] cyanosis  NEUROLOGIC: [ ]No focal deficits  [ ]Follows commands  [x ]Does not follow commands  [ ]Cognitive impairment  [ ]Dysphagia  [ ]Dysarthria  [ ]Paresis   SKIN: [ ] Jaundiced [ ] Non-jaundiced [ ]Rash [ ]No Rash [ ] Warm [ ] Dry  MISC/LINES: [ ] ET tube [x ] Trach [ ]NGT/OGT [ ]PEG [x ]Aguirre    LABS: reviewed by me                            6.9    18.18 )-----------( 444      ( 28 Dec 2023 07:56 )             21.8     12-28    150<H>  |  115<H>  |  104<HH>  ----------------------------<  214<H>  4.2   |  19  |  4.6<HH>    Ca    8.3<L>      28 Dec 2023 07:56  Phos  5.2       Mg     3.2         TPro  5.1<L>  /  Alb  2.5<L>  /  TBili  0.2  /  DBili  <0.2  /  AST  28  /  ALT  5   /  AlkPhos  86        Urinalysis Basic - ( 28 Dec 2023 10:33 )    Color: Orange / Appearance: Cloudy / S.016 / pH: x  Gluc: x / Ketone: Negative mg/dL  / Bili: Negative / Urobili: 0.2 mg/dL   Blood: x / Protein: 100 mg/dL / Nitrite: Negative   Leuk Esterase: Small / RBC: 1047 /HPF / WBC 12 /HPF   Sq Epi: x / Non Sq Epi: 1 /HPF / Bacteria: Negative /HPF            RADIOLOGY & ADDITIONAL STUDIES: reviewed by me    < from: CT Head No Cont (23 @ 20:49) >  Technique: CT of the head was performed without contrast.    Multiple contiguous axial images were acquired from the skullbase to the   vertex without the administration of intravenous contrast.  Coronal and   sagittal reformations were made.    COMPARISON: CT head 2023.    FINDINGS:    Decreased density of the previously noted subdural hemorrhages along the   left falx and left tentorium, measuring up to 0.9 cm in maximum thickness   (previously 1.3 cm). Decreased density of the previously noted subdural   hemorrhage along the left cerebral hemisphere measuring up to 0.8 cm   (previously up to 1 cm). The previously noted subdural hemorrhage along   the right frontoparietal convexity measures up to 0.3 cm in thickness   (previously 0.5 cm).    Stable chronic lacunar infarct in the right basal ganglia.    There is prominence of the sulci, sylvian fissures, and ventricles,   reflecting stable moderate diffuse parenchymal volume loss. There is   superimposed ventricular prominence.    There are scattered patchy low attenuations in the bilateral   periventricular cerebral white matter consistent with stable mild chronic   microvascular ischemic changes.    There is no mass effect or midline shift.    The calvarium is intact. Trace fluid in the bilateral mastoid air cells.   Mild mucosal thickening of ethmoid and visualized maxillary sinuses. The   visualized intraorbital compartments appear free of acute disease.    IMPRESSION:    Improving subdural hemorrhage as described.    Stable chronic findings as above.    --- End of Report ---    < end of copied text >        EKG: reviewed by me      < from: 12 Lead ECG (23 @ 05:20) >  Ventricular Rate 101 BPM    Atrial Rate 101 BPM    P-R Interval 204 ms    QRS Duration 62 ms    Q-T Interval 338 ms    QTC Calculation(Bazett) 438 ms    P Axis 58 degrees    R Axis 28 degrees    T Axis 24 degrees    Diagnosis Line Sinus tachycardia  Otherwise normal ECG    < end of copied text >    Palliative Care Spiritual/Emotional Screening Tool Question  Severity (0-4):      0              OR                    [x ] Unable to determine/NA  Score of 2 or greater indicates recommendation of Chaplaincy referral  Chaplaincy Referral: [ ] Yes [ ] Refused [ ] Following     Caregiver [place]:  [ ] Yes [ ] No [ x] Deferred  Social Work Referral [ ]  Patient and Family Centered Care Referral [ ]    Anticipatory Grief Present: [ ] Yes [ ] No [x ] Deferred  Social Work Referral [ ]  Patient and Family Centered Care Referral [ ]    Patient discussed with primary medical team MD  Palliative care education provided to patient and/or family   HPI:"77-year-old male Cantonese speaking his presents with prior history of hypertension dyslipidemia diabetes BPH prior CVA on aspirin and Plavix who states he had a mechanical fall 2 days ago while getting out of the car fell backwards hit his head.  Afterwards he was able to ambulate.  But for the past 1 day family was unable to ambulate him.  He states that his lower extremities are painful to movement and weak.  Family denies any LOC.  They deny any confusion at home.  On exam oriented to person and place but just not to date.  He does follow simple commands.  Elevates his arms for 10 seconds.  Sensation intact in upper extremities.  Lower extremities with 2 out of 5 strength bilaterally."      Interval history:      ITEMS NOT CHECKED ARE NOT PRESENT       ADVANCE DIRECTIVES:     [ x] Full Code [ ] DNR  MOLST  [ ]  Living Will  [ ]   DECISION MAKER(s):  [ ] Health Care Proxy(s)  [ ] Surrogate(s)  [ ] Guardian           Name(s): Phone Number(s):   Wife Negro Fair [phone]  Dtr Rashi Bryantang - [phone]   BASELINE (I)ADL(s) (prior to admission):    [place]: [x ]Total  [ ] Moderate [ ]Dependent  Palliative Performance Status Version 2:         %    http://npcrc.org/files/news/palliative_performance_scale_[email or web address].pdf    Allergies    [This allergen will not trigger allergy alert] pollen (Unknown)  No Known Drug Allergies    Intolerances    MEDICATIONS  (STANDING):  acetaminophen     Tablet .. 650 milliGRAM(s) Oral every 6 hours  albuterol    90 MICROgram(s) HFA Inhaler 90 Puff(s) Inhalation every 4 hours  aspirin  chewable 81 milliGRAM(s) Enteral Tube daily  atorvastatin 20 milliGRAM(s) Oral at bedtime  bacitracin   Ointment 1 Application(s) Topical every 12 hours  chlorhexidine 0.12% Liquid 15 milliLiter(s) Oral Mucosa two times a day  chlorhexidine 2% Cloths 1 Application(s) Topical <User Schedule>  doxazosin 4 milliGRAM(s) Oral at bedtime  heparin   Injectable 5000 Unit(s) SubCutaneous every 8 hours  insulin glargine Injectable (LANTUS) 20 Unit(s) SubCutaneous at bedtime  insulin lispro (ADMELOG) corrective regimen sliding scale   SubCutaneous every 4 hours  ipratropium 17 MICROgram(s) HFA Inhaler 1 Puff(s) Inhalation every 6 hours  pantoprazole  Injectable 40 milliGRAM(s) IV Push every 24 hours  piperacillin/tazobactam IVPB.- 3.375 Gram(s) IV Intermittent once  sevelamer carbonate Powder 800 milliGRAM(s) Oral three times a day with meals  valproic  acid Syrup 500 milliGRAM(s) Oral every 12 hours  vancomycin  IVPB 500 milliGRAM(s) IV Intermittent once  vitamin A &amp; D Ointment 1 Application(s) Topical every 12 hours    MEDICATIONS  (PRN):        PRESENT SYMPTOMS: [x ]Unable to obtain due to poor mentation   Source if other than patient:  [ ]Family   [ ]Team     Pain: [ ]yes [ ]no  QOL impact -   Location -                    Aggravating factors -  Quality -  Radiation -  Timing-  Severity (0-10 scale):  Minimal acceptable level (0-10 scale):     CPOT:  2  https://www.[facility].org/getattachment/[email or web address]/Critical-Care-Pain-Observation-Tool-(CPOT)    PAIN AD Score:   http://geriatrictoolkit.[facility]/cog/painad.pdf (press ctrl +  left click to view)    Dyspnea:                           [ ]None[ ]Mild [ ]Moderate [ ]Severe     Respiratory Distress Observation Scale (RDOS): 1  A score of 0 to 2 signifies little or no respiratory distress, 3 signifies mild distress, scores 4 to 6 indicate moderate distress, and scores greater than 7 signify severe distress  https://www.[facility].ca/sites/default/files/PDFS/470778-ptnrguexcxr-zmfyennj-ilxvvaokfoj-trrfg.pdf    Anxiety:                             [ ]None[ ]Mild [ ]Moderate [ ]Severe   Fatigue:                             [ ]None[ ]Mild [ ]Moderate [ ]Severe   Nausea:                             [ ]None[ ]Mild [ ]Moderate [ ]Severe   Loss of appetite:              [ ]None[ ]Mild [ ]Moderate [ ]Severe   Constipation:                    [ ]None[ ]Mild [ ]Moderate [ ]Severe    Other Symptoms:  [x ]All other review of systems negative     Palliative Performance Status Version 2:         %    http://[facility].org/files/news/palliative_performance_scale_ppsv2.pdf    PHYSICAL EXAM:    Vital Signs Last 24 Hrs  T(C): 37.8 (28 Dec 2023 12:00), Max: 38.6 (28 Dec 2023 07:00)  T(F): 100 (28 Dec 2023 12:00), Max: 101.5 (28 Dec 2023 07:00)  HR: 80 (28 Dec 2023 13:00) (78 - 100)  BP: 108/55 (28 Dec 2023 13:00) (94/52 - 130/58)  BP(mean): 79 (28 Dec 2023 13:00) (69 - 88)  RR: 26 (28 Dec 2023 13:00) (25 - 38)  SpO2: 98% (28 Dec 2023 13:00) (98% - 100%)    Parameters below as of 28 Dec 2023 08:00  Patient On (Oxygen Delivery Method): tracheostomy collar          GENERAL:  [x ] No acute distress [ ]Lethargic  [ ]Unarousable  [ ]Verbal  [x ]Non-Verbal [ ]Cachexia    BEHAVIORAL/PSYCH:  [ ]Alert and Oriented x  [ ] Anxiety [ ] Delirium [ ] Agitation [x ] Calm   EYES: [ ] No scleral icterus [ ] Scleral icterus [x ] Closed  ENMT:  [ ]Dry mouth  [ ]No external oral lesions [ ] No external ear or nose lesions  CARDIOVASCULAR:  [ ]Regular [ ]Irregular [ ]Tachy [x ]Not Tachy  [ ]Nikita [ ] Edema [ ] No edema  PULMONARY:  [ ]Tachypnea  [ ]Audible excessive secretions [ x] No labored breathing [ ] labored breathing  GASTROINTESTINAL: [ ]Soft  [ ]Distended  [x ]Not distended [ ]Non tender [ ]Tender  MUSCULOSKELETAL: [ ]No clubbing [ ] clubbing  [ x] No cyanosis [ ] cyanosis  NEUROLOGIC: [ ]No focal deficits  [ ]Follows commands  [x ]Does not follow commands  [ ]Cognitive impairment  [ ]Dysphagia  [ ]Dysarthria  [ ]Paresis   SKIN: [ ] Jaundiced [ ] Non-jaundiced [ ]Rash [ ]No Rash [ ] Warm [ ] Dry  MISC/LINES: [ ] ET tube [x ] Trach [ ]NGT/OGT [ ]PEG [x ]Aguirre    LABS: reviewed by me                            6.9    18.18 )-----------( 444      ( 28 Dec 2023 07:56 )             21.8     12-28    150<H>  |  115<H>  |  104<HH>  ----------------------------<  214<H>  4.2   |  19  |  4.6<HH>    Ca    8.3<L>      28 Dec 2023 07:56  Phos  5.2       Mg     3.2         TPro  5.1<L>  /  Alb  2.5<L>  /  TBili  0.2  /  DBili  <0.2  /  AST  28  /  ALT  5   /  AlkPhos  86        Urinalysis Basic - ( 28 Dec 2023 10:33 )    Color: Orange / Appearance: Cloudy / S.016 / pH: x  Gluc: x / Ketone: Negative mg/dL  / Bili: Negative / Urobili: 0.2 mg/dL   Blood: x / Protein: 100 mg/dL / Nitrite: Negative   Leuk Esterase: Small / RBC: 1047 /HPF / WBC 12 /HPF   Sq Epi: x / Non Sq Epi: 1 /HPF / Bacteria: Negative /HPF            RADIOLOGY & ADDITIONAL STUDIES: reviewed by me    < from: CT Head No Cont (23 @ 20:49) >  Technique: CT of the head was performed without contrast.    Multiple contiguous axial images were acquired from the skullbase to the   vertex without the administration of intravenous contrast.  Coronal and   sagittal reformations were made.    COMPARISON: CT head 2023.    FINDINGS:    Decreased density of the previously noted subdural hemorrhages along the   left falx and left tentorium, measuring up to 0.9 cm in maximum thickness   (previously 1.3 cm). Decreased density of the previously noted subdural   hemorrhage along the left cerebral hemisphere measuring up to 0.8 cm   (previously up to 1 cm). The previously noted subdural hemorrhage along   the right frontoparietal convexity measures up to 0.3 cm in thickness   (previously 0.5 cm).    Stable chronic lacunar infarct in the right basal ganglia.    There is prominence of the sulci, sylvian fissures, and ventricles,   reflecting stable moderate diffuse parenchymal volume loss. There is   superimposed ventricular prominence.    There are scattered patchy low attenuations in the bilateral   periventricular cerebral white matter consistent with stable mild chronic   microvascular ischemic changes.    There is no mass effect or midline shift.    The calvarium is intact. Trace fluid in the bilateral mastoid air cells.   Mild mucosal thickening of ethmoid and visualized maxillary sinuses. The   visualized intraorbital compartments appear free of acute disease.    IMPRESSION:    Improving subdural hemorrhage as described.    Stable chronic findings as above.    --- End of Report ---    < end of copied text >        EKG: reviewed by me      < from: 12 Lead ECG (23 @ 05:20) >  Ventricular Rate 101 BPM    Atrial Rate 101 BPM    P-R Interval 204 ms    QRS Duration 62 ms    Q-T Interval 338 ms    QTC Calculation(Bazett) 438 ms    P Axis 58 degrees    R Axis 28 degrees    T Axis 24 degrees    Diagnosis Line Sinus tachycardia  Otherwise normal ECG    < end of copied text >    Palliative Care Spiritual/Emotional Screening Tool Question  Severity (0-4):      0              OR                    [x ] Unable to determine/NA  Score of 2 or greater indicates recommendation of Chaplaincy referral  Chaplaincy Referral: [ ] Yes [ ] Refused [ ] Following     Caregiver [place]:  [ ] Yes [ ] No [ x] Deferred  Social Work Referral [ ]  Patient and Family Centered Care Referral [ ]    Anticipatory Grief Present: [ ] Yes [ ] No [x ] Deferred  Social Work Referral [ ]  Patient and Family Centered Care Referral [ ]    Patient discussed with primary medical team MD  Palliative care education provided to patient and/or family

## 2023-12-28 NOTE — PROGRESS NOTE ADULT - ASSESSMENT
78y Male s/p mechanical fall. +HT, +AC (Aspirin and Plavix), -LOC.    12/21-intubated for airway protection, accessory muscle use  12/22- s/p tracheostomy and PEG placement, Portex cuffed 9 and 20fr PEG 2cm at skin     NEURO:  #Acute SDH   -12/18 HCT#4 -stable SDH, no new acute findings  - 12/27: Pt obtunded, not responding to noxious stim - VPA/LFTs/BMP/ammonia ordered, CTH pending  -Q4 neuro checks  - amantadine (200mg x 1 loading, 100mg q48h maintenance) for neuro stim. renal dosing cleared with pharmacy  - NSGY: f/up with CTH on 12/31, then assess restarting plavix  -CTH on 12/27 performed for AMS-->pending final read  #ho stroke (2/2023) w/residual right sided weakness  - RIGHT anterior thalamus infarct,  LEFT caudate head lacunar infarct  #focal seizure    - vEEG 12/19: No seizures; Discontinued 12/19    - Valproic acid 500q12; Level 23, albumin 2.1 > per neurology: no longer need to monitor valporic acid levels as patient neuro clinical exam has been consistent & keep current dose. F/u valporic acid levels if neurologic exam changes or s/s toxicity -->12/28 patient valproic acid level is subtherapeutic, given 250mg oral one time stat and increased dosage to 750 mg Q12H    -NCC: valproic acid current dose, q4 neurochecks    -Neurology cs- d/c EEG, 2 days prior to discharge call neurology so they can do a spot EEG      RESP:   #Respiratory Failure secondary to impaired secretion clearance    -s/p tracheostomy 12/22, size 9 cuffed portex  Vent settings, 12/27: AC//14/30/5  ABG, 12/27: 7.47/29/154/21   - 12/25: tolerated 4h tpiece  - 12/26: t-piece from 8am-6pm  - 12/27: unable to trial t-piece due to persistent tachypnea  - chest PT q4 and duoneb treatments q 4   - deep tracheal aspirate cx sent (due to fevers)  Culture - Sputum . (12.21.23 @ 18:14)-  Numerous Staphylococcus aureus  #Activity    -increase as tolerated    CARDS:   #acute HYPOtension intraop, resolved  -off levophed since 12/22 PM  #hx of HTN  -Amlodipine 10 QD restarted  -Labetalol 100 q8 (Home Coreg 6.25mg q12 )   -Valsartan 320mg HOLDING   #hx HLD  -atorvastatin  #NSVT    -EP/Cards: No arrhythmias on tele only artifact. No further work up needed. Recommend ILR prior to discharge if patient/family agreeable  Imaging:   Echo: 12/2023: EF 66%, G1DD, trace MR, mild TR     GI/NUTR:   #Diet, NPO with Tube Feed via 20Fr PEG placed 12/22   Peptamen AF at 70cc x 18hr    - nutrition, 12/27 ; change feeds from glucerna to Peptamen AF at 70cc x 18hr. Re-evaluate IV protonix, re-evaluate lantus timing/glycemic ctrl, monitor rectal output. Trial banatrol if loose stools persist    -aspiration precautions, HOB 30  #GI Prophylaxis  -Protonix 40mg   #Bowel regimen     -Multiple loose bowel movements, last dose senna 12/19 at 2200 > dignishield placed 12/23     -C diff negative     -no bowel regimen    /RENAL:   #urine output   -Condom catheter -voiding  -UA neg 12/25 (r/o UTI in setting of persistent fevers)   -Sodium goal 140-150  #Diuresis  -last diuresis 12/26 with 10mg metazolone x 1   #Hypernatremia  - Na+ 12/26 2000 - 147. FWF dropped from 200q4 to 200q6  #hyperphosphatemia  -Started sevelmaer powder, non-formulary so as not to clog PEG   #CKD   -baseline Cr 2.1  -holding home sodium bicarb 650mg BID  #hx BPH    -cardura 4mg (flomax at home, non crushable)    Monitor UO-martinez in place  Volume Status:  12-26-23 @ 07:01  -  12-27-23 @ 07:00  --------------------------------------------------------  IN: 1470 mL / OUT: 2835 mL / NET: -1365 mL    12-27-23 @ 07:01  -  12-28-23 @ 00:44  --------------------------------------------------------  IN: 750 mL / OUT: 365 mL / NET: 385 mL  Labs:          BUN/Cr- 74/2.9  -->,  93/3.7  -->,  97/4.0  -->          Electrolytes-Na 151 // K 4.7 // Mg 3.2 //  Phos 5.3 (12-27 @ 22:13)    HEME/ONC:   #DVT prophylaxis     -SCDs, HSQ  #hx of CVA    -ASA cleared by [employer], will require repeat HCT in 2 wks prior to restart, approx 1/1/24  DVT propylaxis-heparin   Injectable  Hb/Hct:  7.6/23.6  -->,  7.7/24.3  -->,  7.3/22.7  -->  Plts:  391  -->,  459  -->,  449  -->    PTT/INR:   T&S:     ID:  #recurrent fevers- improving, now low grade  -MRSA PCR neg   Cultures    Bcx 12/18- final neg     Tracheal aspirate 12/21: numerous staph aureus -methicillian sensitive    Tracheal aspirate 12/22: staph aureus    C.Diff PCR neg    Blood cx 12/21- no growth at 4 days    UA 12/25 negative   Current antibiotics:  -IV Ancef 2g q8 (12/24-12/27 @0600)  #ID consult  -D/C'd zosyn & start IV Ancef  #multiple loose bowel movements    -WBC trending up    -no bowel regiment  WBC- 13.63  --->>,  14.92  --->>,  19.55  --->>  Temp trend- 24hrs T(F): 99.9 (12-27 @ 22:00), Max: 100.8 (12-27 @ 04:00)    ENDO:  #DM     -ISS     -Off insulin gtt 12/20      -Lantus 20 units HS     -FSG q4 while on TF    MSK:     Activity - Increase As Tolerated    -B/L knee X ray 12/19- no fractures, b/l effusions     LINES/DRAINS:  PIV, right basilic midline (placed 12/16), 20Fr PEG (12/22), Tracheostomy (12/22)    ADVANCED DIRECTIVES:  Full Code    HCP/Emergency Contact-Sarah Gamino (Wife) [phone], Cantonese speaking    DISPO:  Social work, case management consult for dispo to vent facility - spoke with social work 12/23, starting to work on D/C. 78y Male s/p mechanical fall. +HT, +AC (Aspirin and Plavix), -LOC.    12/21-intubated for airway protection, accessory muscle use  12/22- s/p tracheostomy and PEG placement, Portex cuffed 9 and 20fr PEG 2cm at skin     NEURO:  #Acute SDH   -12/18 HCT#4 -stable SDH, no new acute findings  - 12/27: Pt obtunded, not responding to noxious stim - VPA/LFTs/BMP/ammonia ordered, CTH pending  -Q4 neuro checks  - amantadine (200mg x 1 loading, 100mg q48h maintenance) for neuro stim. renal dosing cleared with pharmacy  - NSGY: f/up with CTH on 12/31, then assess restarting plavix  -CTH on 12/27 performed for AMS-->pending final read  #ho stroke (2/2023) w/residual right sided weakness  - RIGHT anterior thalamus infarct,  LEFT caudate head lacunar infarct  #focal seizure    - vEEG 12/19: No seizures; Discontinued 12/19    - Valproic acid 500q12; Level 23, albumin 2.1 > per neurology: no longer need to monitor valporic acid levels as patient neuro clinical exam has been consistent & keep current dose. F/u valporic acid levels if neurologic exam changes or s/s toxicity -->12/28 patient valproic acid level is subtherapeutic, given 250mg oral one time stat and increased dosage to 750 mg Q12H    -NCC: valproic acid current dose, q4 neurochecks    -Neurology cs- d/c EEG, 2 days prior to discharge call neurology so they can do a spot EEG      RESP:   #Respiratory Failure secondary to impaired secretion clearance    -s/p tracheostomy 12/22, size 9 cuffed portex  Vent settings, 12/27: AC//14/30/5  ABG, 12/27: 7.47/29/154/21   - 12/25: tolerated 4h tpiece  - 12/26: t-piece from 8am-6pm  - 12/27: unable to trial t-piece due to persistent tachypnea  - chest PT q4 and duoneb treatments q 4   - deep tracheal aspirate cx sent (due to fevers)  Culture - Sputum . (12.21.23 @ 18:14)-  Numerous Staphylococcus aureus  #Activity    -increase as tolerated    CARDS:   #acute HYPOtension intraop, resolved  -off levophed since 12/22 PM  #hx of HTN  -Amlodipine 10 QD restarted  -Labetalol 100 q8 (Home Coreg 6.25mg q12 )   -Valsartan 320mg HOLDING   #hx HLD  -atorvastatin  #NSVT    -EP/Cards: No arrhythmias on tele only artifact. No further work up needed. Recommend ILR prior to discharge if patient/family agreeable  Imaging:   Echo: 12/2023: EF 66%, G1DD, trace MR, mild TR     GI/NUTR:   #Diet, NPO with Tube Feed via 20Fr PEG placed 12/22   Peptamen AF at 70cc x 18hr    - nutrition, 12/27 ; change feeds from glucerna to Peptamen AF at 70cc x 18hr. Re-evaluate IV protonix, re-evaluate lantus timing/glycemic ctrl, monitor rectal output. Trial banatrol if loose stools persist    -aspiration precautions, HOB 30  #GI Prophylaxis  -Protonix 40mg   #Bowel regimen     -Multiple loose bowel movements, last dose senna 12/19 at 2200 > dignishield placed 12/23     -C diff negative     -no bowel regimen    /RENAL:   #urine output   -Condom catheter -voiding  -UA neg 12/25 (r/o UTI in setting of persistent fevers)   -Sodium goal 140-150  #Diuresis  -last diuresis 12/26 with 10mg metazolone x 1   #Hypernatremia  - Na+ 12/26 2000 - 147. FWF dropped from 200q4 to 200q6  #hyperphosphatemia  -Started sevelmaer powder, non-formulary so as not to clog PEG   #CKD   -baseline Cr 2.1  -holding home sodium bicarb 650mg BID  #hx BPH    -cardura 4mg (flomax at home, non crushable)    Monitor UO-martinez in place  Volume Status:  12-26-23 @ 07:01  -  12-27-23 @ 07:00  --------------------------------------------------------  IN: 1470 mL / OUT: 2835 mL / NET: -1365 mL    12-27-23 @ 07:01  -  12-28-23 @ 00:44  --------------------------------------------------------  IN: 750 mL / OUT: 365 mL / NET: 385 mL  Labs:          BUN/Cr- 74/2.9  -->,  93/3.7  -->,  97/4.0  -->          Electrolytes-Na 151 // K 4.7 // Mg 3.2 //  Phos 5.3 (12-27 @ 22:13)    HEME/ONC:   #DVT prophylaxis     -SCDs, HSQ  #hx of CVA    -ASA cleared by [employer], will require repeat HCT in 2 wks prior to restart, approx 1/1/24  DVT propylaxis-heparin   Injectable  Hb/Hct:  7.6/23.6  -->,  7.7/24.3  -->,  7.3/22.7  -->  Plts:  391  -->,  459  -->,  449  -->    PTT/INR:   T&S:     ID:  #recurrent fevers- improving, now low grade  -MRSA PCR neg   Cultures    Bcx 12/18- final neg     Tracheal aspirate 12/21: numerous staph aureus -methicillian sensitive    Tracheal aspirate 12/22: staph aureus    C.Diff PCR neg    Blood cx 12/21- no growth at 4 days    UA 12/25 negative   Current antibiotics:  -IV Ancef 2g q8 (12/24-12/27 @0600)  #ID consult  -D/C'd zosyn & start IV Ancef  #multiple loose bowel movements    -WBC trending up    -no bowel regiment  WBC- 13.63  --->>,  14.92  --->>,  19.55  --->>  Temp trend- 24hrs T(F): 99.9 (12-27 @ 22:00), Max: 100.8 (12-27 @ 04:00)    ENDO:  #DM     -ISS     -Off insulin gtt 12/20      -Lantus 20 units HS     -FSG q4 while on TF    MSK:     Activity - Increase As Tolerated    -B/L knee X ray 12/19- no fractures, b/l effusions     LINES/DRAINS:  PIV, right basilic midline (placed 12/16), 20Fr PEG (12/22), Tracheostomy (12/22)    ADVANCED DIRECTIVES:  Full Code    HCP/Emergency Contact-Sarah Gamino (Wife) [phone], Cantonese speaking    DISPO:  Social work, case management consult for dispo to vent facility - spoke with social work 12/23, starting to work on D/C. 78y Male s/p mechanical fall. +HT, +AC (Aspirin and Plavix), -LOC.    12/21-intubated for airway protection, accessory muscle use  12/22- s/p tracheostomy and PEG placement, Portex cuffed 9 and 20fr PEG 2cm at skin     NEURO:  #Acute SDH   -12/18 HCT#4 -stable SDH, no new acute findings  - 12/27: Pt obtunded, not responding to noxious stim - VPA/LFTs/BMP/ammonia ordered, CTH pending  -Q4 neuro checks  - amantadine (200mg x 1 loading, 100mg q48h maintenance) for neuro stim. renal dosing cleared with pharmacy  - NSGY: f/up with CTH on 12/31, then assess restarting plavix  -CTH on 12/27 performed for AMS-->pending final read  #ho stroke (2/2023) w/residual right sided weakness  - RIGHT anterior thalamus infarct,  LEFT caudate head lacunar infarct  #focal seizure    - vEEG 12/19: No seizures; Discontinued 12/19    - Valproic acid 500q12; Level 23, albumin 2.1 > per neurology: no longer need to monitor valporic acid levels as patient neuro clinical exam has been consistent & keep current dose. F/u valporic acid levels if neurologic exam changes or s/s toxicity -->12/28 patient valproic acid level is subtherapeutic, given 250mg oral one time stat and increased dosage to 750 mg Q12H    -NCC: valproic acid current dose, q4 neurochecks    -Neurology cs- d/c EEG, 2 days prior to discharge call neurology so they can do a spot EEG      RESP:   #Respiratory Failure secondary to impaired secretion clearance    -s/p tracheostomy 12/22, size 9 cuffed portex  RR (machine): 14, TV (machine): 350, FiO2: 30, PEEP: 5  12-28 @ 03:44--7.44 / 29 / 95 / 20 / 98.7  O2 98.7  Lac 1.6    12-27 @ 04:11--7.47 / 29 / 154 / 21 / 100.0  O2 100.0  Lac 1.6    - 12/25: tolerated 4h tpiece  - 12/26: t-piece from 8am-6pm  - 12/27: unable to trial t-piece due to persistent tachypnea  - chest PT q4 and duoneb treatments q 4   - deep tracheal aspirate cx sent (due to fevers)  Culture - Sputum . (12.21.23 @ 18:14)-  Numerous Staphylococcus aureus  #Activity    -increase as tolerated    CARDS:   #acute HYPOtension intraop, resolved  -off levophed since 12/22 PM  #hx of HTN  -Amlodipine 10 QD restarted  -Labetalol 100 q8 (Home Coreg 6.25mg q12 )   -Valsartan 320mg HOLDING   #hx HLD  -atorvastatin  #NSVT    -EP/Cards: No arrhythmias on tele only artifact. No further work up needed. Recommend ILR prior to discharge if patient/family agreeable  Imaging:   Echo: 12/2023: EF 66%, G1DD, trace MR, mild TR     GI/NUTR:   #Diet, NPO with Tube Feed via 20Fr PEG placed 12/22   Peptamen AF at 70cc x 18hr    - nutrition, 12/27 ; change feeds from glucerna to Peptamen AF at 70cc x 18hr. Re-evaluate IV protonix, re-evaluate lantus timing/glycemic ctrl, monitor rectal output. Trial banatrol if loose stools persist    -aspiration precautions, HOB 30  #GI Prophylaxis  -Protonix 40mg   #Bowel regimen     -Multiple loose bowel movements, last dose senna 12/19 at 2200 > dignishield placed 12/23     -C diff negative     -no bowel regimen    /RENAL:   #urine output   -martinez reinserted 12/28 due to retention  -UA neg 12/25 (r/o UTI in setting of persistent fevers)   -Sodium goal 140-150  #Diuresis  -last diuresis 12/26 with 10mg metazolone x 1   #Hypernatremia  - Na+ 12/26 2000 - 147. FWF dropped from 200q4 to 200q6  #hyperphosphatemia  -Started sevelmaer powder, non-formulary so as not to clog PEG   #CKD   -baseline Cr 2.1, now 4.4--> nephro c/s placed  -urine lytes 4.6%, post-renal  -holding home sodium bicarb 650mg BID  #hx BPH    -cardura 4mg (flomax at home, non crushable)    Monitor UO-martinez in place  Volume Status:  12-26-23 @ 07:01  -  12-27-23 @ 07:00  --------------------------------------------------------  IN: 1470 mL / OUT: 2835 mL / NET: -1365 mL    12-27-23 @ 07:01  -  12-28-23 @ 00:44  --------------------------------------------------------  IN: 750 mL / OUT: 365 mL / NET: 385 mL      Labs:          BUN/Cr- 97/4.0  -->,  103/4.4  -->          Electrolytes-Na 148 // K 5.6 // Mg 3.2 //  Phos 5.2 (12-28 @ 01:14)    HEME/ONC:   #DVT prophylaxis     -SCDs, HSQ  #hx of CVA    -ASA cleared by NSGY    -[facility] [employer], will require repeat HCT in 2 wks prior to restart, approx 1/1/24  DVT propylaxis-heparin   Injectable    Labs: Hb/Hct:  7.3/22.7  -->,  7.1/22.7  -->                      Plts:  449  -->,  438  -->                 PTT/INR:        Home Rx: clopidogrel 75 mg oral tablet: 1 tab(s) orally once a day    ID:  #recurrent fevers- improving, now low grade  -MRSA PCR neg   Cultures    Bcx 12/18- final neg     Tracheal aspirate 12/21: numerous staph aureus -methicillian sensitive    Tracheal aspirate 12/22: staph aureus    C.Diff PCR neg    Blood cx 12/21- no growth at 4 days    UA 12/25 negative   Current antibiotics:  -IV Ancef 2g q8 (12/24-12/27 @0600)  #ID consult  -D/C'd zosyn & start IV Ancef  #multiple loose bowel movements    -WBC trending up    -no bowel regiment  WBC- 14.92  --->>,  19.55  --->>,  18.36  --->>  Temp trend- 24hrs T(F): 99.9 (12-27 @ 22:00), Max: 100.8 (12-27 @ 04:00)          ENDO:  #DM     -ISS     -Off insulin gtt 12/20      -Lantus 20 units HS     -FSG q4 while on TF    MSK:     Activity - Increase As Tolerated    -B/L knee X ray 12/19- no fractures, b/l effusions     LINES/DRAINS:  PIV, right basilic midline (placed 12/16), 20Fr PEG (12/22), Tracheostomy (12/22)    ADVANCED DIRECTIVES:  Full Code    HCP/Emergency Contact-Sarah Gamino (Wife) [phone], Cantonese speaking    DISPO:  Social work, case management consult for dispo to vent facility - spoke with social work 12/23, starting to work on D/C. 78y Male s/p mechanical fall. +HT, +AC (Aspirin and Plavix), -LOC.    12/21-intubated for airway protection, accessory muscle use  12/22- s/p tracheostomy and PEG placement, Portex cuffed 9 and 20fr PEG 2cm at skin     NEURO:  #Acute SDH   -12/18 HCT#4 -stable SDH, no new acute findings  - 12/27: Pt obtunded, not responding to noxious stim - VPA/LFTs/BMP/ammonia ordered, CTH pending  -Q4 neuro checks  - amantadine (200mg x 1 loading, 100mg q48h maintenance) for neuro stim. renal dosing cleared with pharmacy  - NSGY: f/up with CTH on 12/31, then assess restarting plavix  -CTH on 12/27 performed for AMS-->pending final read  #ho stroke (2/2023) w/residual right sided weakness  - RIGHT anterior thalamus infarct,  LEFT caudate head lacunar infarct  #focal seizure    - vEEG 12/19: No seizures; Discontinued 12/19    - Valproic acid 500q12; Level 23, albumin 2.1 > per neurology: no longer need to monitor valporic acid levels as patient neuro clinical exam has been consistent & keep current dose. F/u valporic acid levels if neurologic exam changes or s/s toxicity -->12/28 patient valproic acid level is subtherapeutic, given 250mg oral one time stat and increased dosage to 750 mg Q12H    -NCC: valproic acid current dose, q4 neurochecks    -Neurology cs- d/c EEG, 2 days prior to discharge call neurology so they can do a spot EEG      RESP:   #Respiratory Failure secondary to impaired secretion clearance    -s/p tracheostomy 12/22, size 9 cuffed portex  RR (machine): 14, TV (machine): 350, FiO2: 30, PEEP: 5  12-28 @ 03:44--7.44 / 29 / 95 / 20 / 98.7  O2 98.7  Lac 1.6    12-27 @ 04:11--7.47 / 29 / 154 / 21 / 100.0  O2 100.0  Lac 1.6    - 12/25: tolerated 4h tpiece  - 12/26: t-piece from 8am-6pm  - 12/27: unable to trial t-piece due to persistent tachypnea  - chest PT q4 and duoneb treatments q 4   - deep tracheal aspirate cx sent (due to fevers)  Culture - Sputum . (12.21.23 @ 18:14)-  Numerous Staphylococcus aureus  #Activity    -increase as tolerated    CARDS:   #acute HYPOtension intraop, resolved  -off levophed since 12/22 PM  #hx of HTN  -Amlodipine 10 QD restarted  -Labetalol 100 q8 (Home Coreg 6.25mg q12 )   -Valsartan 320mg HOLDING   #hx HLD  -atorvastatin  #NSVT    -EP/Cards: No arrhythmias on tele only artifact. No further work up needed. Recommend ILR prior to discharge if patient/family agreeable  Imaging:   Echo: 12/2023: EF 66%, G1DD, trace MR, mild TR     GI/NUTR:   #Diet, NPO with Tube Feed via 20Fr PEG placed 12/22   Peptamen AF at 70cc x 18hr    - nutrition, 12/27 ; change feeds from glucerna to Peptamen AF at 70cc x 18hr. Re-evaluate IV protonix, re-evaluate lantus timing/glycemic ctrl, monitor rectal output. Trial banatrol if loose stools persist    -aspiration precautions, HOB 30  #GI Prophylaxis  -Protonix 40mg   #Bowel regimen     -Multiple loose bowel movements, last dose senna 12/19 at 2200 > dignishield placed 12/23     -C diff negative     -no bowel regimen    /RENAL:   #urine output   -martinez reinserted 12/28 due to retention  -UA neg 12/25 (r/o UTI in setting of persistent fevers)   -Sodium goal 140-150  #Diuresis  -last diuresis 12/26 with 10mg metazolone x 1   #Hypernatremia  - Na+ 12/26 2000 - 147. FWF dropped from 200q4 to 200q6  #hyperphosphatemia  -Started sevelmaer powder, non-formulary so as not to clog PEG   #CKD   -baseline Cr 2.1, now 4.4--> nephro c/s placed  -urine lytes 4.6%, post-renal  -holding home sodium bicarb 650mg BID  #hx BPH    -cardura 4mg (flomax at home, non crushable)    Monitor UO-martinez in place  Volume Status:  12-26-23 @ 07:01  -  12-27-23 @ 07:00  --------------------------------------------------------  IN: 1470 mL / OUT: 2835 mL / NET: -1365 mL    12-27-23 @ 07:01  -  12-28-23 @ 00:44  --------------------------------------------------------  IN: 750 mL / OUT: 365 mL / NET: 385 mL      Labs:          BUN/Cr- 97/4.0  -->,  103/4.4  -->          Electrolytes-Na 148 // K 5.6 // Mg 3.2 //  Phos 5.2 (12-28 @ 01:14)    HEME/ONC:   #DVT prophylaxis     -SCDs, HSQ  #hx of CVA    -ASA cleared by NSGY    -[facility] [employer], will require repeat HCT in 2 wks prior to restart, approx 1/1/24  DVT propylaxis-heparin   Injectable    Labs: Hb/Hct:  7.3/22.7  -->,  7.1/22.7  -->                      Plts:  449  -->,  438  -->                 PTT/INR:        Home Rx: clopidogrel 75 mg oral tablet: 1 tab(s) orally once a day    ID:  #recurrent fevers- improving, now low grade  -MRSA PCR neg   Cultures    Bcx 12/18- final neg     Tracheal aspirate 12/21: numerous staph aureus -methicillian sensitive    Tracheal aspirate 12/22: staph aureus    C.Diff PCR neg    Blood cx 12/21- no growth at 4 days    UA 12/25 negative   Current antibiotics:  -IV Ancef 2g q8 (12/24-12/27 @0600)  #ID consult  -D/C'd zosyn & start IV Ancef  #multiple loose bowel movements    -WBC trending up    -no bowel regiment  WBC- 14.92  --->>,  19.55  --->>,  18.36  --->>  Temp trend- 24hrs T(F): 99.9 (12-27 @ 22:00), Max: 100.8 (12-27 @ 04:00)          ENDO:  #DM     -ISS     -Off insulin gtt 12/20      -Lantus 20 units HS     -FSG q4 while on TF    MSK:     Activity - Increase As Tolerated    -B/L knee X ray 12/19- no fractures, b/l effusions     LINES/DRAINS:  PIV, right basilic midline (placed 12/16), 20Fr PEG (12/22), Tracheostomy (12/22)    ADVANCED DIRECTIVES:  Full Code    HCP/Emergency Contact-Sarah Gamino (Wife) [phone], Cantonese speaking    DISPO:  Social work, case management consult for dispo to vent facility - spoke with social work 12/23, starting to work on D/C.

## 2023-12-28 NOTE — PROGRESS NOTE ADULT - ATTENDING COMMENTS
This patient has a high probability of sudden, clinically significant deterioration, which requires the highest level of physician preparedness to intervene urgently. I managed/supervised life or organ supporting interventions that required frequent physician assessment. I devoted my full attention in the ICU to the direct care of this patient for the period of time indicated below. Time I spent with the family or surrogate(s) is included only if the patient was incapable of providing the necessary information or participating in medical decision making. Time devoted to teaching and to any procedures I billed separately is not included.     Patient is examined and evaluated at the bedside with SICU team. Treatment plan discussed with SICU team, nurses and primary team.   Vital signs, laboratory work, and imaging reviewed during rounds.  Will continue hemodynamic monitoring as per protocol in SICU.    24h events: PS for 5 hours yesterday, CTH done yesterday for decreased mental status and ongoing facial twitching, Aguirre placed for increasing creatinine, found to be obstructed    Neuro: GCS 7T [id number]    Medications -  amantadine to promote wakefulness, valproic acid for possible focal seizures  Studies - CTH overnight reviewed and interpreted by me - decreasing SDH along falx, radiology read pending  Will ask neurology to re-evaluate given decreased mental status    Respiratory: respirations even and unlabored on mechanical ventilation, Q2H suctioning  Mode: CPAP with PS, FiO2: 30, PEEP: 5, PS: 5  ABG - ( 28 Dec 2023 03:44 )  pH, Arterial: 7.44  pH, Blood: x     /  pCO2: 29    /  pO2: 95    / HCO3: 20    / Base Excess: -3.7  /  SaO2: 98.7    Medications - proventil, atrovent  CXR reviewed and interpreted by me - improved congestion of L side    CV: monitor hemodynamics, MAP goal > 65  Medications - amlodipine/labetalol on hold for decreased SBP in setting of anemia, atorvastatin  Home medications - coreg, valsartan, atorvastatin  Studies - no recent studies    GI: abd s/nt/nd, PEG @ 2.5cm at skin edge  Last BM overnight  Nutrition - Peptamin AF @ 70 cc/h for 18h/d  Medications - protonix  Ppx - protonix  Appreciate dietician recs    Renal: Continue I&Os monitoring, replete electrolytes as needed, acute on chronic kidney injury likely pre-renal based on urine lytes and obstructive uropathy  12-28    150<H>  |  115<H>  |  104<HH>  ----------------------------<  214<H>  4.2   |  19  |  4.6<HH>    Ca    8.3<L>      28 Dec 2023 07:56  Phos  5.2     12-28  Mg     3.2     12-28    TPro  5.1<L>  /  Alb  2.5<L>  /  TBili  0.2  /  DBili  <0.2  /  AST  28  /  ALT  5   /  AlkPhos  86  12-27    UOP - 1580  I/O - 1840/2365  Aguirre catheter - placed for increased creatinine and obstruction  FWF 200mL Q6H --> increase 200mL Q4H    Heme: continue to evaluate for acute blood loss anemia- trend Hg/Hct, hemoglobin goal 7.0, transfuse 1 unit pRBC                        6.9    18.18 )-----------( 444      ( 28 Dec 2023 07:56 )             21.8     Anticoagulation - SQH  ASA-81    ID: tmax 101.5 this AM, trend leukocytosis, monitor for fevers  Recent culture data - obtain blood cx, urinalysis, and sputum cx  Antibiotics - start vanc/zosyn after cultures are drawn    Endocrine: Prevent and treat hyperglycemia with insulin as needed    PV: follow pulse exam    MSK: OOB and mobilize as able, PT eval    Skin: decub precautions    Lines: PIV, RUE midline, trach, PEG  DVT Prophylaxis: SQH  Stress Gastritis Prophylaxis: Protonix   Disposition: SICU  Code status: Full, will ask palliative service to help facilitate goals of care discussion    I saw and evaluated the patient personally. I have reviewed and agree with note above. Treatment plan discussed with SICU team, nurses and primary team at the time of the multidisciplinary rounds.     Isidro Montemayor MD  Trauma/ACS/SCC Attending This patient has a high probability of sudden, clinically significant deterioration, which requires the highest level of physician preparedness to intervene urgently. I managed/supervised life or organ supporting interventions that required frequent physician assessment. I devoted my full attention in the ICU to the direct care of this patient for the period of time indicated below. Time I spent with the family or surrogate(s) is included only if the patient was incapable of providing the necessary information or participating in medical decision making. Time devoted to teaching and to any procedures I billed separately is not included.     Patient is examined and evaluated at the bedside with SICU team. Treatment plan discussed with SICU team, nurses and primary team.   Vital signs, laboratory work, and imaging reviewed during rounds.  Will continue hemodynamic monitoring as per protocol in SICU.    24h events: PS for 5 hours yesterday, CTH done yesterday for decreased mental status and ongoing facial twitching, Aguirre placed for increasing creatinine, found to be obstructed    Neuro: GCS 7T [id number]    Medications -  amantadine to promote wakefulness, valproic acid for possible focal seizures  Studies - CTH overnight reviewed and interpreted by me - decreasing SDH along falx, radiology read pending  Will ask neurology to re-evaluate given decreased mental status    Respiratory: respirations even and unlabored on mechanical ventilation, Q2H suctioning  Mode: CPAP with PS, FiO2: 30, PEEP: 5, PS: 5  ABG - ( 28 Dec 2023 03:44 )  pH, Arterial: 7.44  pH, Blood: x     /  pCO2: 29    /  pO2: 95    / HCO3: 20    / Base Excess: -3.7  /  SaO2: 98.7    Medications - proventil, atrovent  CXR reviewed and interpreted by me - improved congestion of L side    CV: monitor hemodynamics, MAP goal > 65  Medications - amlodipine/labetalol on hold for decreased SBP in setting of anemia, atorvastatin  Home medications - coreg, valsartan, atorvastatin  Studies - no recent studies    GI: abd s/nt/nd, PEG @ 2.5cm at skin edge  Last BM overnight  Nutrition - Peptamin AF @ 70 cc/h for 18h/d  Medications - protonix  Ppx - protonix  Appreciate dietician recs    Renal: Continue I&Os monitoring, replete electrolytes as needed, acute on chronic kidney injury likely pre-renal based on urine lytes and obstructive uropathy  12-28    150<H>  |  115<H>  |  104<HH>  ----------------------------<  214<H>  4.2   |  19  |  4.6<HH>    Ca    8.3<L>      28 Dec 2023 07:56  Phos  5.2     12-28  Mg     3.2     12-28    TPro  5.1<L>  /  Alb  2.5<L>  /  TBili  0.2  /  DBili  <0.2  /  AST  28  /  ALT  5   /  AlkPhos  86  12-27    UOP - 1580  I/O - 1840/2365  Aguirre catheter - placed for increased creatinine and obstruction  FWF 200mL Q6H --> increase 200mL Q4H    Heme: continue to evaluate for acute blood loss anemia- trend Hg/Hct, hemoglobin goal 7.0, transfuse 1 unit pRBC                        6.9    18.18 )-----------( 444      ( 28 Dec 2023 07:56 )             21.8     Anticoagulation - SQH  ASA-81    ID: tmax 101.5 this AM, trend leukocytosis, monitor for fevers  Recent culture data - obtain blood cx, urinalysis, and sputum cx  Antibiotics - start vanc/zosyn after cultures are drawn    Endocrine: Prevent and treat hyperglycemia with insulin as needed    PV: follow pulse exam    MSK: OOB and mobilize as able, PT eval    Skin: decub precautions    Lines: PIV, RUE midline, trach, PEG  DVT Prophylaxis: SQH  Stress Gastritis Prophylaxis: Protonix   Disposition: SICU  Code status: Full, will ask palliative service to help facilitate goals of care discussion    I saw and evaluated the patient personally. I have reviewed and agree with note above. Treatment plan discussed with SICU team, nurses and primary team at the time of the multidisciplinary rounds.     Isidro Montemayor MD  Trauma/ACS/SCC Attending

## 2023-12-28 NOTE — PROGRESS NOTE ADULT - ASSESSMENT
Patient is a 78 year old male who presented as a TRAUMA CONSULT s/p mechanical fall (+HT -LOC -AC). Found to have an acute subdural hemorrhage. Hospital course has been complicated by multiple seizures and uncontrolled hypertension. Intubated on 12/21 due to worsening respiratory status.     S/P tracheostomy and PEG placement on 12/22/23, (Portex cuffed 9 and 2cm at skin 20fr PEG). Now with T-piece, O2 at 40%.     PLAN:  - S/p trach/PEG  - F/U neuro for EEG   - F/u EP for loop recorder placement  - Monitor O2 requirements   - Continue chest PT, Duoneb treatments as needed   - F/u nutrition consult for TF adjustments (given liquid stool)   - Continue home ASA  - Pain control PRN   - DVT ppx with HSQ

## 2023-12-28 NOTE — PHARMACOTHERAPY INTERVENTION NOTE - INTERVENTION TYPE RECOOMEND
Pharmacokinetics Evaluation
Pharmacokinetics Evaluation
Dose Adjustment - Renal Dose
Dose Adjustment - Renal Dose

## 2023-12-28 NOTE — PHARMACOTHERAPY INTERVENTION NOTE - COMMENTS
CrCl 12 ml/min, recommend 1x dose of 500 mg of vancomycin (500 mg x48 hrs provide predicted AUC/TERRI of 462.63), and obtain MRSA swab to possibly discontinue MRSA coverage if negative, consider other agents if MRSA coverage is still needed.
CrCl is 12 ml/min, recommend adjusting dose of amantadine to 200 mg every week. Pt got last dose of 200 mg on 12/27 based on 12/27 creatinine clearance. Therefore will start 200 mg on 1/4
CrCl 12 ml/min, patient to be started in zosyn, recommend starting at 3.375 g IV q12h
Recommend d/cing 12/21 20:00 level of valproic acid and consider stopping checking levels once 12/21 16:00 level becomes therapeutic as patient already has one prior that is therapeutic
Valproic acid level from 12/27 17:31 is 25.0, albumin 2.5, corrected VPA level is 83, which is within range of , dose decreased back to 500 BID from 750 BID

## 2023-12-28 NOTE — PROGRESS NOTE ADULT - ASSESSMENT
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  NCCTH w/ Acute traumatic SDH along the central falx cerebri. GCS 14. s/p reversal w/ DDVAP and nuerocritical care management. He was started on Levetriacetam 500mg for seizure ppx. 12/17/23 EEG reported myogenic correlation to right facial and RUE twitching, however, no clear seizure or epileptiform activity. s/p Valproate 500mg w/ no improvement in clinical activity. When last seen by neurology 12/18, patient was clinically not at baseline, but was awake, aphasic (with ), and had persistent right face and arm twitching likely cortical irritation. Today, patient is obtunded, responds to some noxious stimuli, waxing and waning level of alertness since 12/25. Recent CTH shows improving SDH.    NH3: 32  Valproate: 25 (subtherapeutic); calculated by pharmacy 12/27 for Albumin 83 (therapeutic)       Impression  Concussion  TBI  SDH    Recommendation  - f/u EEG results  - c/w Valproate 500mg BID   - continue to monitor clinically for seizure   - remaining management per surgery team This is a 79yo M w/ h/o Cerebral ischemia (right anterior thalami and left caudate lacunar) admitted s/p mechanical fall w/ symptoms of concussion (confusion, HA and ataxia).  NCCTH w/ Acute traumatic SDH along the central falx cerebri. GCS 14. s/p reversal w/ DDVAP and nuerocritical care management. He was started on Levetriacetam 500mg for seizure ppx. 12/17/23 EEG reported myogenic correlation to right facial and RUE twitching, however, no clear seizure or epileptiform activity. s/p Valproate 500mg w/ no improvement in clinical activity. When last seen by neurology 12/18, patient was clinically not at baseline, but was awake, aphasic (with ), and had persistent right face and arm twitching likely cortical irritation. Today, patient is obtunded, responds to some noxious stimuli, waxing and waning level of alertness since 12/25. Recent CTH shows improving SDH.    NH3: 32  Valproate: 25 (subtherapeutic); calculated by pharmacy 12/27 for Albumin 83 (therapeutic)       Impression  Concussion  TBI  SDH    Recommendation  - f/u EEG results  - c/w Valproate 500mg BID   - continue to monitor clinically for seizure   - remaining management per surgery team

## 2023-12-28 NOTE — PROGRESS NOTE ADULT - SUBJECTIVE AND OBJECTIVE BOX
Neurology Progress Note    Interval History:  Patient has been reported to have been alert with eyes open as recently as . Per primary team, patient has been having a decline to alertness and is currently obtunded but does respond to noxious stimuli such as when moving patient's L leg. Patient was reported by nursing staff to open eyes briefly this morning as well, but otherwise remains obtunded. Patient observed to have slight R side facial twitching similar to prior presentation, but does not open R eye fully even when responding to painful stimuli.         PAST MEDICAL & SURGICAL HISTORY:  HTN (hypertension)      Seasonal allergies      History of BPH      Diabetes      No significant past surgical history        Allergies    [This allergen will not trigger allergy alert] pollen (Unknown)  No Known Drug Allergies    Intolerances      Home Medications:  amLODIPine 5 mg oral tablet: 1 tab(s) orally once a day (2023 02:08)  aspirin 81 mg oral tablet: 1 tab(s) orally once a day (2023 02:08)  carvedilol 6.25 mg oral tablet: 1 tab(s) orally 2 times a day (2023 02:08)  Jardiance 25 mg oral tablet: 1 tab(s) orally once a day (in the morning) (2023 02:08)  Nephplex Rx oral tablet: 1 tab(s) orally once a day (2023 02:08)  tamsulosin 0.4 mg oral capsule: 1 cap(s) orally once a day (2023 02:08)  valsartan 320 mg oral tablet: 1 tab(s) orally once a day (2023 02:08)      ROS: 10-system review is otherwise negative except HPI above.      Primary Survey:    A - airway intact  B - bilateral breath sounds and good chest rise  C - palpable pulses in all extremities  D - GCS 15 on arrival, MACIEL  Exposure obtained       (16 Dec 2023 00:01)      PAST MEDICAL & SURGICAL HISTORY:  HTN (hypertension)    Seasonal allergies    History of BPH    Diabetes    No significant past surgical history            Medications:  acetaminophen     Tablet .. 650 milliGRAM(s) Oral every 6 hours  albuterol    90 MICROgram(s) HFA Inhaler 90 Puff(s) Inhalation every 4 hours  aspirin  chewable 81 milliGRAM(s) Enteral Tube daily  atorvastatin 20 milliGRAM(s) Oral at bedtime  bacitracin   Ointment 1 Application(s) Topical every 12 hours  chlorhexidine 0.12% Liquid 15 milliLiter(s) Oral Mucosa two times a day  chlorhexidine 2% Cloths 1 Application(s) Topical <User Schedule>  doxazosin 4 milliGRAM(s) Oral at bedtime  heparin   Injectable 5000 Unit(s) SubCutaneous every 8 hours  insulin glargine Injectable (LANTUS) 20 Unit(s) SubCutaneous at bedtime  insulin lispro (ADMELOG) corrective regimen sliding scale   SubCutaneous every 4 hours  ipratropium 17 MICROgram(s) HFA Inhaler 1 Puff(s) Inhalation every 6 hours  pantoprazole  Injectable 40 milliGRAM(s) IV Push every 24 hours  piperacillin/tazobactam IVPB.- 3.375 Gram(s) IV Intermittent once  sevelamer carbonate Powder 800 milliGRAM(s) Oral three times a day with meals  valproic  acid Syrup 500 milliGRAM(s) Oral every 12 hours  vitamin A &amp; D Ointment 1 Application(s) Topical every 12 hours      Vital Signs Last 24 Hrs  T(C): 37.8 (28 Dec 2023 12:00), Max: 38.6 (28 Dec 2023 07:00)  T(F): 100 (28 Dec 2023 12:00), Max: 101.5 (28 Dec 2023 07:00)  HR: 81 (28 Dec 2023 14:00) (78 - 100)  BP: 114/57 (28 Dec 2023 14:00) (94/52 - 130/58)  BP(mean): 82 (28 Dec 2023 14:00) (69 - 88)  RR: 27 (28 Dec 2023 14:00) (25 - 38)  SpO2: 98% (28 Dec 2023 14:00) (98% - 100%)    Parameters below as of 28 Dec 2023 08:00  Patient On (Oxygen Delivery Method): tracheostomy collar        Neurological Exam:   Mental status: obtunded    Cranial nerves: eyes closed  Motor:  Normal tone b/l UE.  Mild R sided facial twitching observed when patient responded to noxious stimuli  Sensation: unable to assess due to patient's alertness  Coordination: unable to assess      Labs:  CBC Full  -  ( 28 Dec 2023 07:56 )  WBC Count : 18.18 K/uL  RBC Count : 2.36 M/uL  Hemoglobin : 6.9 g/dL  Hematocrit : 21.8 %  Platelet Count - Automated : 444 K/uL  Mean Cell Volume : 92.4 fL  Mean Cell Hemoglobin : 29.2 pg  Mean Cell Hemoglobin Concentration : 31.7 g/dL  Auto Neutrophil # : 12.78 K/uL  Auto Lymphocyte # : 1.22 K/uL  Auto Monocyte # : 1.84 K/uL  Auto Eosinophil # : 0.19 K/uL  Auto Basophil # : 0.05 K/uL  Auto Neutrophil % : 70.3 %  Auto Lymphocyte % : 6.7 %  Auto Monocyte % : 10.1 %  Auto Eosinophil % : 1.0 %  Auto Basophil % : 0.3 %        150<H>  |  115<H>  |  104<HH>  ----------------------------<  214<H>  4.2   |  19  |  4.6<HH>    Ca    8.3<L>      28 Dec 2023 07:56  Phos  5.2       Mg     3.2         TPro  5.1<L>  /  Alb  2.5<L>  /  TBili  0.2  /  DBili  <0.2  /  AST  28  /  ALT  5   /  AlkPhos  86      LIVER FUNCTIONS - ( 27 Dec 2023 17:31 )  Alb: 2.5 g/dL / Pro: 5.1 g/dL / ALK PHOS: 86 U/L / ALT: 5 U/L / AST: 28 U/L / GGT: x             Urinalysis Basic - ( 28 Dec 2023 10:33 )    Color: Orange / Appearance: Cloudy / S.016 / pH: x  Gluc: x / Ketone: Negative mg/dL  / Bili: Negative / Urobili: 0.2 mg/dL   Blood: x / Protein: 100 mg/dL / Nitrite: Negative   Leuk Esterase: Small / RBC: 1047 /HPF / WBC 12 /HPF   Sq Epi: x / Non Sq Epi: 1 /HPF / Bacteria: Negative /HPF        ACC: [id number] EXAM:  CT BRAIN   ORDERED BY: CATHIE LAWRENCE     PROCEDURE DATE:  2023          INTERPRETATION:  CLINICAL INDICATION: Follow-up evaluation subdural   hemorrhage. Status post mechanical fall. Altered mental status.    Technique: CT of the head was performed without contrast.    Multiple contiguous axial images were acquired from the skullbase to the   vertex without the administration of intravenous contrast.  Coronal and   sagittal reformations were made.    COMPARISON: CT head 2023.    FINDINGS:    Decreased density of the previously noted subdural hemorrhages along the   left falx and left tentorium, measuring up to 0.9 cm in maximum thickness   (previously 1.3 cm). Decreased density of the previously noted subdural   hemorrhage along the left cerebral hemisphere measuring up to 0.8 cm   (previously up to 1 cm). The previously noted subdural hemorrhage along   the right frontoparietal convexity measures up to 0.3 cm in thickness   (previously 0.5 cm).    Stable chronic lacunar infarct in the right basal ganglia.    There is prominence of the sulci, sylvian fissures, and ventricles,   reflecting stable moderate diffuse parenchymal volume loss. There is   superimposed ventricular prominence.    There are scattered patchy low attenuations in the bilateral   periventricular cerebral white matter consistent with stable mild chronic   microvascular ischemic changes.    There is no mass effect or midline shift.    The calvarium is intact. Trace fluid in the bilateral mastoid air cells.   Mild mucosal thickening of ethmoid and visualized maxillary sinuses. The   visualized intraorbital compartments appear free of acute disease.    IMPRESSION:    Improving subdural hemorrhage as described.    Stable chronic findings as above.    --- End of Report ---          NELLY DUMONT MD; Resident Radiologist  This document has been electronically signed.  EDY ALVARADO MD; Attending Interventional Radiologist  This document has been electronically signed. Dec 28 2023 10:26AM     Neurology Progress Note    Interval History:  Patient has been reported to have been alert with eyes open as recently as . Per primary team, patient has been having a decline to alertness and is currently obtunded but does respond to noxious stimuli such as when moving patient's L leg. Patient was reported by nursing staff to open eyes briefly this morning as well, but otherwise remains obtunded. Patient observed to have slight R side facial twitching similar to prior presentation, but does not open R eye fully even when responding to painful stimuli.         PAST MEDICAL & SURGICAL HISTORY:  HTN (hypertension)      Seasonal allergies      History of BPH      Diabetes      No significant past surgical history        Allergies    [This allergen will not trigger allergy alert] pollen (Unknown)  No Known Drug Allergies    Intolerances      Home Medications:  amLODIPine 5 mg oral tablet: 1 tab(s) orally once a day (2023 02:08)  aspirin 81 mg oral tablet: 1 tab(s) orally once a day (2023 02:08)  carvedilol 6.25 mg oral tablet: 1 tab(s) orally 2 times a day (2023 02:08)  Jardiance 25 mg oral tablet: 1 tab(s) orally once a day (in the morning) (2023 02:08)  Nephplex Rx oral tablet: 1 tab(s) orally once a day (2023 02:08)  tamsulosin 0.4 mg oral capsule: 1 cap(s) orally once a day (2023 02:08)  valsartan 320 mg oral tablet: 1 tab(s) orally once a day (2023 02:08)      ROS: 10-system review is otherwise negative except HPI above.      Primary Survey:    A - airway intact  B - bilateral breath sounds and good chest rise  C - palpable pulses in all extremities  D - GCS 15 on arrival, MACIEL  Exposure obtained       (16 Dec 2023 00:01)      PAST MEDICAL & SURGICAL HISTORY:  HTN (hypertension)    Seasonal allergies    History of BPH    Diabetes    No significant past surgical history            Medications:  acetaminophen     Tablet .. 650 milliGRAM(s) Oral every 6 hours  albuterol    90 MICROgram(s) HFA Inhaler 90 Puff(s) Inhalation every 4 hours  aspirin  chewable 81 milliGRAM(s) Enteral Tube daily  atorvastatin 20 milliGRAM(s) Oral at bedtime  bacitracin   Ointment 1 Application(s) Topical every 12 hours  chlorhexidine 0.12% Liquid 15 milliLiter(s) Oral Mucosa two times a day  chlorhexidine 2% Cloths 1 Application(s) Topical <User Schedule>  doxazosin 4 milliGRAM(s) Oral at bedtime  heparin   Injectable 5000 Unit(s) SubCutaneous every 8 hours  insulin glargine Injectable (LANTUS) 20 Unit(s) SubCutaneous at bedtime  insulin lispro (ADMELOG) corrective regimen sliding scale   SubCutaneous every 4 hours  ipratropium 17 MICROgram(s) HFA Inhaler 1 Puff(s) Inhalation every 6 hours  pantoprazole  Injectable 40 milliGRAM(s) IV Push every 24 hours  piperacillin/tazobactam IVPB.- 3.375 Gram(s) IV Intermittent once  sevelamer carbonate Powder 800 milliGRAM(s) Oral three times a day with meals  valproic  acid Syrup 500 milliGRAM(s) Oral every 12 hours  vitamin A &amp; D Ointment 1 Application(s) Topical every 12 hours      Vital Signs Last 24 Hrs  T(C): 37.8 (28 Dec 2023 12:00), Max: 38.6 (28 Dec 2023 07:00)  T(F): 100 (28 Dec 2023 12:00), Max: 101.5 (28 Dec 2023 07:00)  HR: 81 (28 Dec 2023 14:00) (78 - 100)  BP: 114/57 (28 Dec 2023 14:00) (94/52 - 130/58)  BP(mean): 82 (28 Dec 2023 14:00) (69 - 88)  RR: 27 (28 Dec 2023 14:00) (25 - 38)  SpO2: 98% (28 Dec 2023 14:00) (98% - 100%)    Parameters below as of 28 Dec 2023 08:00  Patient On (Oxygen Delivery Method): tracheostomy collar        Neurological Exam:   Mental status: obtunded    Cranial nerves: eyes closed  Motor:  Normal tone b/l UE.  Mild R sided facial twitching observed when patient responded to noxious stimuli  Sensation: unable to assess due to patient's alertness  Coordination: unable to assess      Labs:  CBC Full  -  ( 28 Dec 2023 07:56 )  WBC Count : 18.18 K/uL  RBC Count : 2.36 M/uL  Hemoglobin : 6.9 g/dL  Hematocrit : 21.8 %  Platelet Count - Automated : 444 K/uL  Mean Cell Volume : 92.4 fL  Mean Cell Hemoglobin : 29.2 pg  Mean Cell Hemoglobin Concentration : 31.7 g/dL  Auto Neutrophil # : 12.78 K/uL  Auto Lymphocyte # : 1.22 K/uL  Auto Monocyte # : 1.84 K/uL  Auto Eosinophil # : 0.19 K/uL  Auto Basophil # : 0.05 K/uL  Auto Neutrophil % : 70.3 %  Auto Lymphocyte % : 6.7 %  Auto Monocyte % : 10.1 %  Auto Eosinophil % : 1.0 %  Auto Basophil % : 0.3 %        150<H>  |  115<H>  |  104<HH>  ----------------------------<  214<H>  4.2   |  19  |  4.6<HH>    Ca    8.3<L>      28 Dec 2023 07:56  Phos  5.2       Mg     3.2         TPro  5.1<L>  /  Alb  2.5<L>  /  TBili  0.2  /  DBili  <0.2  /  AST  28  /  ALT  5   /  AlkPhos  86      LIVER FUNCTIONS - ( 27 Dec 2023 17:31 )  Alb: 2.5 g/dL / Pro: 5.1 g/dL / ALK PHOS: 86 U/L / ALT: 5 U/L / AST: 28 U/L / GGT: x             Urinalysis Basic - ( 28 Dec 2023 10:33 )    Color: Orange / Appearance: Cloudy / S.016 / pH: x  Gluc: x / Ketone: Negative mg/dL  / Bili: Negative / Urobili: 0.2 mg/dL   Blood: x / Protein: 100 mg/dL / Nitrite: Negative   Leuk Esterase: Small / RBC: 1047 /HPF / WBC 12 /HPF   Sq Epi: x / Non Sq Epi: 1 /HPF / Bacteria: Negative /HPF        ACC: [id number] EXAM:  CT BRAIN   ORDERED BY: CATHIE LAWRENCE     PROCEDURE DATE:  2023          INTERPRETATION:  CLINICAL INDICATION: Follow-up evaluation subdural   hemorrhage. Status post mechanical fall. Altered mental status.    Technique: CT of the head was performed without contrast.    Multiple contiguous axial images were acquired from the skullbase to the   vertex without the administration of intravenous contrast.  Coronal and   sagittal reformations were made.    COMPARISON: CT head 2023.    FINDINGS:    Decreased density of the previously noted subdural hemorrhages along the   left falx and left tentorium, measuring up to 0.9 cm in maximum thickness   (previously 1.3 cm). Decreased density of the previously noted subdural   hemorrhage along the left cerebral hemisphere measuring up to 0.8 cm   (previously up to 1 cm). The previously noted subdural hemorrhage along   the right frontoparietal convexity measures up to 0.3 cm in thickness   (previously 0.5 cm).    Stable chronic lacunar infarct in the right basal ganglia.    There is prominence of the sulci, sylvian fissures, and ventricles,   reflecting stable moderate diffuse parenchymal volume loss. There is   superimposed ventricular prominence.    There are scattered patchy low attenuations in the bilateral   periventricular cerebral white matter consistent with stable mild chronic   microvascular ischemic changes.    There is no mass effect or midline shift.    The calvarium is intact. Trace fluid in the bilateral mastoid air cells.   Mild mucosal thickening of ethmoid and visualized maxillary sinuses. The   visualized intraorbital compartments appear free of acute disease.    IMPRESSION:    Improving subdural hemorrhage as described.    Stable chronic findings as above.    --- End of Report ---          NELLY DUMONT MD; Resident Radiologist  This document has been electronically signed.  EDY ALVARADO MD; Attending Interventional Radiologist  This document has been electronically signed. Dec 28 2023 10:26AM

## 2023-12-28 NOTE — PROGRESS NOTE ADULT - ATTENDING COMMENTS
Patient seen and examined and agree with above except as noted.  Patients history, notes, labs, imaging, vitals and meds reviewed personally.  CTH repeat on 12/27/23 reviewed and shows improving SDH.  LAbs show multiple toxic/metabolic derrangements.  Mental status likely secondary to toxic/metabolic etiology   Will rule out epileptic etiologies    Plan as above

## 2023-12-28 NOTE — PROGRESS NOTE ADULT - CONVERSATION DETAILS
Met with patient's wife at bedside. She directed me to speak with her daughter and son-in-law instead. They have discussed the issue with SICU and surgical team. They would like to proceed with trach/PEG and other interventions to help the patient live. They understand that neurologic prognosis is uncertain, but they want to give him every chance to recover.
Met with patient's wife and children at bedside. They understand that neurologic prognosis is uncertain, but they want to give him every chance to recover. Did not want to discuss in depth advanced directives and wanted to wait until tomorrow for patient's other daughter to discuss goals of care again.
Discussed with patient's wife and daughter. At this time, they are still hoping for patient's mental/functional status to improve. They hope that he will be able to eat on his own. They would want PEG placement only if there is significant chance that patient's neurologic status will improve.
Discussed with patient's daughter using Cantonese . She reports being updated by ICU team. She hopes that patient's mental status will continue to improve and that he will be able to eat on his own. Discussed what we should do if patient ever becomes more sick. She has had discussions with her mother (patient's spouse). They had decided on full code. She was not aware of risks of CPR (eg. broken ribs, limited success rate). She will discuss this more with her mother. They would want him to be intubated if his life depended on it to see if he would be able to recover.

## 2023-12-28 NOTE — PROGRESS NOTE ADULT - SUBJECTIVE AND OBJECTIVE BOX
JACQUELIN CAI   [id number]   [birth year]  78yM    Admit Date: 12-16-23  Indication for SDU/SICU: Q1 neuro checks      77-year-old male Cantonese speaking presents with prior history of HTN, HLD, Diabetes, BPH, prior CVA on aspirin and Plavix who states he had a mechanical fall 3 days ago while getting out of the car fell backwards hit his head.  Afterwards he was able to ambulate.  But for the past 1 day family was unable to ambulate him.  He states that his lower extremities are painful to movement and weak.  Family denies any LOC. Per daughter patient he often forgets things and cannot recall year or place. Patient following commands bedside.   Initial CT head remarkable for 3 Acute subdural hemorrhages: 0.7 cm along the left hemisphere, 0.3 cm along the right frontal area, and 1.1 cm along the left falx. No midline shift. Repeat CT head was obtained prior to 6 hour tacos because patient was appearing lethargic vs sundowning. CT head #2 with stable findings. CT c-spine and chest/abd/pelvis unremarkable for acute pathology.     SICU Consult for q1 neuro checks    ============================  24 Hour Events    12/27  NIGHT  -unable to follow commands, lethargic, grimaces with pain, not tracking   -per family facial switching has occured since stroke but now worsened  -5cc of urine, urine lytes sent, bladder scan  -per pharmacy, given extra 250 x 1 valproic acid, then increased to 750 BID and obtain another level 12/29 before AM dose  -martinez 2/2 low urine output, urine lytes sent      AM  - trial T-piece today --> unable to trial d/t tachypnea, placed on CPAP --> placed back on AC//14/30/5 at ~1pm d/t persistent tachypnea  - CXR, 12/27 - Minimal residual right lung opacities. Stable tracheostomy.  -  q4>250q6  - monitor fever curve off ancef  - amantadine (200mg x 1 loading, 100mg q48h maintenance) for neuro stim. renal dosing cleared with pharmacy  - nutrition recs: change feeds from glucerna to Peptamen AF at 70cc x 18hr. Re-evaluate IV protonix, re-evaluate lantus timing/glycemic ctrl, monitor rectal output. Trial banatrol if loose stools persist  - monitor FSG  - Per SW: pt's status is labile, will touch base friday (12/29) to assess dispo needs (home/vent unit/LTAC)  - Pt obtunded, not responding to noxious stim - VPA/LFTs/BMP/ammonia ordered stat  - ammonia 32, WNL  - AST/ALT - 28/5        [X] A ten-point review of systems was otherwise negative except as noted above.  [  ] Due to altered mental status/intubation, subjective information was not attained from the patient. History was obtained, to the extent possible, from review of the chart and collateral sources of information.     JACQUELIN CAI   [id number]   [birth year]  78yM    Admit Date: 12-16-23  Indication for SDU/SICU: Q1 neuro checks      77-year-old male Cantonese speaking presents with prior history of HTN, HLD, Diabetes, BPH, prior CVA on aspirin and Plavix who states he had a mechanical fall 3 days ago while getting out of the car fell backwards hit his head.  Afterwards he was able to ambulate.  But for the past 1 day family was unable to ambulate him.  He states that his lower extremities are painful to movement and weak.  Family denies any LOC. Per daughter patient he often forgets things and cannot recall year or place. Patient following commands bedside.   Initial CT head remarkable for 3 Acute subdural hemorrhages: 0.7 cm along the left hemisphere, 0.3 cm along the right frontal area, and 1.1 cm along the left falx. No midline shift. Repeat CT head was obtained prior to 6 hour tacos because patient was appearing lethargic vs sundowning. CT head #2 with stable findings. CT c-spine and chest/abd/pelvis unremarkable for acute pathology.     SICU Consult for q1 neuro checks    ============================  24 Hour Events    12/27  NIGHT  -unable to follow commands, lethargic, grimaces with pain, not tracking   -per family facial switching has occured since stroke but now worsened  -5cc of urine, urine lytes sent, bladder scan  -per pharmacy, given extra 250 x 1 valproic acid, then increased to 750 BID and obtain another level 12/29 before AM dose  -martinez 2/2 low urine output, urine lytes sent      AM  - trial T-piece today --> unable to trial d/t tachypnea, placed on CPAP --> placed back on AC//14/30/5 at ~1pm d/t persistent tachypnea  - CXR, 12/27 - Minimal residual right lung opacities. Stable tracheostomy.  -  q4>250q6  - monitor fever curve off ancef  - amantadine (200mg x 1 loading, 100mg q48h maintenance) for neuro stim. renal dosing cleared with pharmacy  - nutrition recs: change feeds from glucerna to Peptamen AF at 70cc x 18hr. Re-evaluate IV protonix, re-evaluate lantus timing/glycemic ctrl, monitor rectal output. Trial banatrol if loose stools persist  - monitor FSG  - Per SW: pt's status is labile, will touch base friday (12/29) to assess dispo needs (home/vent unit/LTAC)  - Pt obtunded, not responding to noxious stim - VPA/LFTs/BMP/ammonia ordered stat  - ammonia 32, WNL  - AST/ALT - 28/5        [X] A ten-point review of systems was otherwise negative except as noted above.  [  ] Due to altered mental status/intubation, subjective information was not attained from the patient. History was obtained, to the extent possible, from review of the chart and collateral sources of information.     JACQUELIN CAI   [id number]   [birth year]  78yM    Admit Date: 12-16-23  Indication for SDU/SICU: Q1 neuro checks      77-year-old male Cantonese speaking presents with prior history of HTN, HLD, Diabetes, BPH, prior CVA on aspirin and Plavix who states he had a mechanical fall 3 days ago while getting out of the car fell backwards hit his head.  Afterwards he was able to ambulate.  But for the past 1 day family was unable to ambulate him.  He states that his lower extremities are painful to movement and weak.  Family denies any LOC. Per daughter patient he often forgets things and cannot recall year or place. Patient following commands bedside.   Initial CT head remarkable for 3 Acute subdural hemorrhages: 0.7 cm along the left hemisphere, 0.3 cm along the right frontal area, and 1.1 cm along the left falx. No midline shift. Repeat CT head was obtained prior to 6 hour tacos because patient was appearing lethargic vs sundowning. CT head #2 with stable findings. CT c-spine and chest/abd/pelvis unremarkable for acute pathology.     SICU Consult for q1 neuro checks    ============================  24 Hour Events  12/27  NIGHT  -unable to follow commands, lethargic, grimaces with pain, not tracking   -per family facial switching has occured since stroke but now worsened  -5cc of urine, urine lytes sent, bladder scan  -per pharmacy, given extra 250 x 1 valproic acid, then increased to 750 BID and obtain another level 12/29 before AM dose  -martinez inserted 2/2 low urine output, urine lytes sent; 4.6% post-renal; 1L urine on insertion  -hgb 7.7-->7.3-->7.1   -uptrending Cr and electrolyte abnormalities, nephro consult placed         AM  - trial T-piece today --> unable to trial d/t tachypnea, placed on CPAP --> placed back on AC//14/30/5 at ~1pm d/t persistent tachypnea  - CXR, 12/27 - Minimal residual right lung opacities. Stable tracheostomy.  -  q4>250q6  - monitor fever curve off ancef  - amantadine (200mg x 1 loading, 100mg q48h maintenance) for neuro stim. renal dosing cleared with pharmacy  - nutrition recs: change feeds from glucerna to Peptamen AF at 70cc x 18hr. Re-evaluate IV protonix, re-evaluate lantus timing/glycemic ctrl, monitor rectal output. Trial banatrol if loose stools persist  - monitor FSG  - Per SW: pt's status is labile, will touch base friday (12/29) to assess dispo needs (home/vent unit/LTAC)  - Pt obtunded, not responding to noxious stim - VPA/LFTs/BMP/ammonia ordered stat  - ammonia 32, WNL  - AST/ALT - 28/5        [X] A ten-point review of systems was otherwise negative except as noted above.  [  ] Due to altered mental status/intubation, subjective information was not attained from the patient. History was obtained, to the extent possible, from review of the chart and collateral sources of information.     JACQUELIN CAI   [id number]   [birth year]  78yM    Admit Date: 12-16-23  Indication for SDU/SICU: Q1 neuro checks      77-year-old male Cantonese speaking presents with prior history of HTN, HLD, Diabetes, BPH, prior CVA on aspirin and Plavix who states he had a mechanical fall 3 days ago while getting out of the car fell backwards hit his head.  Afterwards he was able to ambulate.  But for the past 1 day family was unable to ambulate him.  He states that his lower extremities are painful to movement and weak.  Family denies any LOC. Per daughter patient he often forgets things and cannot recall year or place. Patient following commands bedside.   Initial CT head remarkable for 3 Acute subdural hemorrhages: 0.7 cm along the left hemisphere, 0.3 cm along the right frontal area, and 1.1 cm along the left falx. No midline shift. Repeat CT head was obtained prior to 6 hour tacos because patient was appearing lethargic vs sundowning. CT head #2 with stable findings. CT c-spine and chest/abd/pelvis unremarkable for acute pathology.     SICU Consult for q1 neuro checks    ============================  24 Hour Events  12/27  NIGHT  -unable to follow commands, lethargic, grimaces with pain, not tracking   -per family facial switching has occured since stroke but now worsened  -5cc of urine, urine lytes sent, bladder scan  -per pharmacy, given extra 250 x 1 valproic acid, then increased to 750 BID and obtain another level 12/29 before AM dose  -martinez inserted 2/2 low urine output, urine lytes sent; 4.6% post-renal; 1L urine on insertion  -hgb 7.7-->7.3-->7.1   -uptrending Cr and electrolyte abnormalities, nephro consult placed         AM  - trial T-piece today --> unable to trial d/t tachypnea, placed on CPAP --> placed back on AC//14/30/5 at ~1pm d/t persistent tachypnea  - CXR, 12/27 - Minimal residual right lung opacities. Stable tracheostomy.  -  q4>250q6  - monitor fever curve off ancef  - amantadine (200mg x 1 loading, 100mg q48h maintenance) for neuro stim. renal dosing cleared with pharmacy  - nutrition recs: change feeds from glucerna to Peptamen AF at 70cc x 18hr. Re-evaluate IV protonix, re-evaluate lantus timing/glycemic ctrl, monitor rectal output. Trial banatrol if loose stools persist  - monitor FSG  - Per SW: pt's status is labile, will touch base friday (12/29) to assess dispo needs (home/vent unit/LTAC)  - Pt obtunded, not responding to noxious stim - VPA/LFTs/BMP/ammonia ordered stat  - ammonia 32, WNL  - AST/ALT - 28/5        [X] A ten-point review of systems was otherwise negative except as noted above.  [  ] Due to altered mental status/intubation, subjective information was not attained from the patient. History was obtained, to the extent possible, from review of the chart and collateral sources of information.     JACQUELIN CAI   [id number]   [birth year]  78yM    Admit Date: 12-16-23  Indication for SDU/SICU: Q1 neuro checks      77-year-old male Cantonese speaking presents with prior history of HTN, HLD, Diabetes, BPH, prior CVA on aspirin and Plavix who states he had a mechanical fall 3 days ago while getting out of the car fell backwards hit his head.  Afterwards he was able to ambulate.  But for the past 1 day family was unable to ambulate him.  He states that his lower extremities are painful to movement and weak.  Family denies any LOC. Per daughter patient he often forgets things and cannot recall year or place. Patient following commands bedside.   Initial CT head remarkable for 3 Acute subdural hemorrhages: 0.7 cm along the left hemisphere, 0.3 cm along the right frontal area, and 1.1 cm along the left falx. No midline shift. Repeat CT head was obtained prior to 6 hour tacos because patient was appearing lethargic vs sundowning. CT head #2 with stable findings. CT c-spine and chest/abd/pelvis unremarkable for acute pathology.     SICU Consult for q1 neuro checks    ============================  24 Hour Events  12/27  NIGHT  -unable to follow commands, lethargic, grimaces with pain, not tracking   -per family facial switching has occured since stroke but now worsened  -5cc of urine, urine lytes sent, bladder scan  -per pharmacy, given extra 250 x 1 valproic acid, then increased to 750 BID and obtain another level 12/29 before AM dose  -martinez inserted 2/2 low urine output, urine lytes sent; 4.6% post-renal; 1L urine on insertion  -hgb 7.7-->7.3-->7.1   -uptrending Cr and electrolyte abnormalities, nephro consult placed         AM  - trial T-piece today --> unable to trial d/t tachypnea, placed on CPAP --> placed back on AC//14/30/5 at ~1pm d/t persistent tachypnea  - CXR, 12/27 - Minimal residual right lung opacities. Stable tracheostomy.  -  q4>250q6  - monitor fever curve off ancef  - amantadine (200mg x 1 loading, 100mg q48h maintenance) for neuro stim. renal dosing cleared with pharmacy  - nutrition recs: change feeds from glucerna to Peptamen AF at 70cc x 18hr. Re-evaluate IV protonix, re-evaluate lantus timing/glycemic ctrl, monitor rectal output. Trial banatrol if loose stools persist  - monitor FSG  - Per SW: pt's status is labile, will touch base friday (12/29) to assess dispo needs (home/vent unit/LTAC)  - Pt obtunded, not responding to noxious stim - VPA/LFTs/BMP/ammonia ordered stat  - ammonia 32, WNL  - AST/ALT - 28/5        [X] A ten-point review of systems was otherwise negative except as noted above.  [  ] Due to altered mental status/intubation, subjective information was not attained from the patient. History was obtained, to the extent possible, from review of the chart and collateral sources of information.    Daily     Daily     Diet, NPO with Tube Feed:   Tube Feeding Modality: Gastrostomy  Peptamen A.F. Formula  Total Volume for 24 Hours (mL): 1260  Continuous  Starting Tube Feed Rate mL per Hour: 20  Increase Tube Feed Rate by (mL): 10     Every hour  Until Goal Tube Feed Rate (mL per Hour): 70  Tube Feed Duration (in Hours): 18  Tube Feed Start Time: 12:00  Free Water Flush  Bolus   Total Volume per Flush (mL): 250   Frequency: Every 6 Hours (12-27-23 @ 21:40)      CURRENT MEDS:  Neurologic Medications  acetaminophen     Tablet .. 650 milliGRAM(s) Oral every 6 hours  valproic  acid Syrup 750 milliGRAM(s) Oral every 12 hours    Respiratory Medications  albuterol    90 MICROgram(s) HFA Inhaler 90 Puff(s) Inhalation every 4 hours  ipratropium 17 MICROgram(s) HFA Inhaler 1 Puff(s) Inhalation every 6 hours    Cardiovascular Medications  amLODIPine   Tablet 10 milliGRAM(s) Oral daily  doxazosin 4 milliGRAM(s) Oral at bedtime    Gastrointestinal Medications  pantoprazole  Injectable 40 milliGRAM(s) IV Push every 24 hours    Genitourinary Medications    Hematologic/Oncologic Medications  aspirin  chewable 81 milliGRAM(s) Enteral Tube daily  heparin   Injectable 5000 Unit(s) SubCutaneous every 8 hours    Antimicrobial/Immunologic Medications    Endocrine/Metabolic Medications  atorvastatin 20 milliGRAM(s) Oral at bedtime  insulin glargine Injectable (LANTUS) 20 Unit(s) SubCutaneous at bedtime  insulin lispro (ADMELOG) corrective regimen sliding scale   SubCutaneous every 4 hours    Topical/Other Medications  bacitracin   Ointment 1 Application(s) Topical every 12 hours  chlorhexidine 0.12% Liquid 15 milliLiter(s) Oral Mucosa two times a day  chlorhexidine 2% Cloths 1 Application(s) Topical <User Schedule>  sevelamer carbonate Powder 800 milliGRAM(s) Oral three times a day with meals  vitamin A &amp; D Ointment 1 Application(s) Topical every 12 hours      ICU Vital Signs Last 24 Hrs  T(C): 37.7 (27 Dec 2023 22:00), Max: 38.2 (27 Dec 2023 16:00)  T(F): 99.9 (27 Dec 2023 22:00), Max: 100.8 (27 Dec 2023 16:00)  HR: 86 (28 Dec 2023 07:00) (80 - 100)  BP: 114/56 (28 Dec 2023 07:00) (111/54 - 139/62)  BP(mean): 80 (28 Dec 2023 07:00) (77 - 89)  ABP: --  ABP(mean): --  RR: 38 (28 Dec 2023 07:00) (29 - 38)  SpO2: 100% (28 Dec 2023 07:00) (100% - 100%)    O2 Parameters below as of 28 Dec 2023 03:00  Patient On (Oxygen Delivery Method): ventilator    O2 Concentration (%): 40        Adult Advanced Hemodynamics Last 24 Hrs  CVP(mm Hg): --  CVP(cm H2O): --  CO: --  CI: --  PA: --  PA(mean): --  PCWP: --  SVR: --  SVRI: --  PVR: --  PVRI: --    Mode: AC/ CMV (Assist Control/ Continuous Mandatory Ventilation)  RR (machine): 14  TV (machine): 350  FiO2: 30  PEEP: 5  ITime: 1  MAP: 6  PIP: 13    ABG - ( 28 Dec 2023 03:44 )  pH, Arterial: 7.44  pH, Blood: x     /  pCO2: 29    /  pO2: 95    / HCO3: 20    / Base Excess: -3.7  /  SaO2: 98.7                I&O's Summary    27 Dec 2023 07:01  -  28 Dec 2023 07:00  --------------------------------------------------------  IN: 1840 mL / OUT: 2365 mL / NET: -525 mL      I&O's Detail    27 Dec 2023 07:01  -  28 Dec 2023 07:00  --------------------------------------------------------  IN:    Enteral Tube Flush: 300 mL    Free Water: 180 mL    Glucerna: 490 mL    IV PiggyBack: 100 mL    Peptamen A.F.: 770 mL  Total IN: 1840 mL    OUT:    Indwelling Catheter - Urethral (mL): 1580 mL    Rectal Tube (mL): 700 mL    Voided (mL): 85 mL  Total OUT: 2365 mL    Total NET: -525 mL          PHYSICAL EXAM:    General/Neuro  GCS:  6T   = E 1  / V 1T  / M 4     [facility] ([place], ID# [id number]). Does not open eyes to voice or pain, grimaces/withdraws to pain applied to extremities, overbreathing ventilator, cough and corneal reflexes present  Pupils: PERRLA bilaterally, 3mm and briskly reactive to light bilaterally    Lungs:      clear to auscultation w/ lung sounds bilaterally, Normal expansion/effort.     Cardiovascular : S1, S2.  Regular rate and rhythm. Trace peripheral edema bilaterally  Cardiac Rhythm: Normal Sinus Rhythm    GI: Abdomen soft, Non-tender, Non-distended.    Gastrostomy tube in place, secured at the skin    Extremities: Extremities warm, pink, well-perfused.     Derm: Good skin turgor, no skin breakdown.      :       Martinez catheter in place.      CXR:     LABS:  CAPILLARY BLOOD GLUCOSE      POCT Blood Glucose.: 216 mg/dL (28 Dec 2023 07:44)  POCT Blood Glucose.: 342 mg/dL (28 Dec 2023 05:31)  POCT Blood Glucose.: 248 mg/dL (28 Dec 2023 04:43)  POCT Blood Glucose.: 190 mg/dL (27 Dec 2023 22:05)  POCT Blood Glucose.: 205 mg/dL (27 Dec 2023 17:22)  POCT Blood Glucose.: 121 mg/dL (27 Dec 2023 13:12)  POCT Blood Glucose.: 120 mg/dL (27 Dec 2023 09:48)                          7.1    18.36 )-----------( 438      ( 28 Dec 2023 01:14 )             22.7       12-28    148<H>  |  114<H>  |  103<HH>  ----------------------------<  200<H>  5.6<H>   |  13<L>  |  4.4<HH>    Ca    7.6<L>      28 Dec 2023 01:14  Phos  5.2     12-28  Mg     3.2     12-28    TPro  5.1<L>  /  Alb  2.5<L>  /  TBili  0.2  /  DBili  <0.2  /  AST  28  /  ALT  5   /  AlkPhos  86  12-27            Urinalysis Basic - ( 28 Dec 2023 01:14 )    Color: x / Appearance: x / SG: x / pH: x  Gluc: 200 mg/dL / Ketone: x  / Bili: x / Urobili: x   Blood: x / Protein: x / Nitrite: x   Leuk Esterase: x / RBC: x / WBC x   Sq Epi: x / Non Sq Epi: x / Bacteria: x         JACQUELIN CAI   [id number]   [birth year]  78yM    Admit Date: 12-16-23  Indication for SDU/SICU: Q1 neuro checks      77-year-old male Cantonese speaking presents with prior history of HTN, HLD, Diabetes, BPH, prior CVA on aspirin and Plavix who states he had a mechanical fall 3 days ago while getting out of the car fell backwards hit his head.  Afterwards he was able to ambulate.  But for the past 1 day family was unable to ambulate him.  He states that his lower extremities are painful to movement and weak.  Family denies any LOC. Per daughter patient he often forgets things and cannot recall year or place. Patient following commands bedside.   Initial CT head remarkable for 3 Acute subdural hemorrhages: 0.7 cm along the left hemisphere, 0.3 cm along the right frontal area, and 1.1 cm along the left falx. No midline shift. Repeat CT head was obtained prior to 6 hour tacos because patient was appearing lethargic vs sundowning. CT head #2 with stable findings. CT c-spine and chest/abd/pelvis unremarkable for acute pathology.     SICU Consult for q1 neuro checks    ============================  24 Hour Events  12/27  NIGHT  -unable to follow commands, lethargic, grimaces with pain, not tracking   -per family facial switching has occured since stroke but now worsened  -5cc of urine, urine lytes sent, bladder scan  -per pharmacy, given extra 250 x 1 valproic acid, then increased to 750 BID and obtain another level 12/29 before AM dose  -martinez inserted 2/2 low urine output, urine lytes sent; 4.6% post-renal; 1L urine on insertion  -hgb 7.7-->7.3-->7.1   -uptrending Cr and electrolyte abnormalities, nephro consult placed         AM  - trial T-piece today --> unable to trial d/t tachypnea, placed on CPAP --> placed back on AC//14/30/5 at ~1pm d/t persistent tachypnea  - CXR, 12/27 - Minimal residual right lung opacities. Stable tracheostomy.  -  q4>250q6  - monitor fever curve off ancef  - amantadine (200mg x 1 loading, 100mg q48h maintenance) for neuro stim. renal dosing cleared with pharmacy  - nutrition recs: change feeds from glucerna to Peptamen AF at 70cc x 18hr. Re-evaluate IV protonix, re-evaluate lantus timing/glycemic ctrl, monitor rectal output. Trial banatrol if loose stools persist  - monitor FSG  - Per SW: pt's status is labile, will touch base friday (12/29) to assess dispo needs (home/vent unit/LTAC)  - Pt obtunded, not responding to noxious stim - VPA/LFTs/BMP/ammonia ordered stat  - ammonia 32, WNL  - AST/ALT - 28/5        [X] A ten-point review of systems was otherwise negative except as noted above.  [  ] Due to altered mental status/intubation, subjective information was not attained from the patient. History was obtained, to the extent possible, from review of the chart and collateral sources of information.    Daily     Daily     Diet, NPO with Tube Feed:   Tube Feeding Modality: Gastrostomy  Peptamen A.F. Formula  Total Volume for 24 Hours (mL): 1260  Continuous  Starting Tube Feed Rate mL per Hour: 20  Increase Tube Feed Rate by (mL): 10     Every hour  Until Goal Tube Feed Rate (mL per Hour): 70  Tube Feed Duration (in Hours): 18  Tube Feed Start Time: 12:00  Free Water Flush  Bolus   Total Volume per Flush (mL): 250   Frequency: Every 6 Hours (12-27-23 @ 21:40)      CURRENT MEDS:  Neurologic Medications  acetaminophen     Tablet .. 650 milliGRAM(s) Oral every 6 hours  valproic  acid Syrup 750 milliGRAM(s) Oral every 12 hours    Respiratory Medications  albuterol    90 MICROgram(s) HFA Inhaler 90 Puff(s) Inhalation every 4 hours  ipratropium 17 MICROgram(s) HFA Inhaler 1 Puff(s) Inhalation every 6 hours    Cardiovascular Medications  amLODIPine   Tablet 10 milliGRAM(s) Oral daily  doxazosin 4 milliGRAM(s) Oral at bedtime    Gastrointestinal Medications  pantoprazole  Injectable 40 milliGRAM(s) IV Push every 24 hours    Genitourinary Medications    Hematologic/Oncologic Medications  aspirin  chewable 81 milliGRAM(s) Enteral Tube daily  heparin   Injectable 5000 Unit(s) SubCutaneous every 8 hours    Antimicrobial/Immunologic Medications    Endocrine/Metabolic Medications  atorvastatin 20 milliGRAM(s) Oral at bedtime  insulin glargine Injectable (LANTUS) 20 Unit(s) SubCutaneous at bedtime  insulin lispro (ADMELOG) corrective regimen sliding scale   SubCutaneous every 4 hours    Topical/Other Medications  bacitracin   Ointment 1 Application(s) Topical every 12 hours  chlorhexidine 0.12% Liquid 15 milliLiter(s) Oral Mucosa two times a day  chlorhexidine 2% Cloths 1 Application(s) Topical <User Schedule>  sevelamer carbonate Powder 800 milliGRAM(s) Oral three times a day with meals  vitamin A &amp; D Ointment 1 Application(s) Topical every 12 hours      ICU Vital Signs Last 24 Hrs  T(C): 37.7 (27 Dec 2023 22:00), Max: 38.2 (27 Dec 2023 16:00)  T(F): 99.9 (27 Dec 2023 22:00), Max: 100.8 (27 Dec 2023 16:00)  HR: 86 (28 Dec 2023 07:00) (80 - 100)  BP: 114/56 (28 Dec 2023 07:00) (111/54 - 139/62)  BP(mean): 80 (28 Dec 2023 07:00) (77 - 89)  ABP: --  ABP(mean): --  RR: 38 (28 Dec 2023 07:00) (29 - 38)  SpO2: 100% (28 Dec 2023 07:00) (100% - 100%)    O2 Parameters below as of 28 Dec 2023 03:00  Patient On (Oxygen Delivery Method): ventilator    O2 Concentration (%): 40        Adult Advanced Hemodynamics Last 24 Hrs  CVP(mm Hg): --  CVP(cm H2O): --  CO: --  CI: --  PA: --  PA(mean): --  PCWP: --  SVR: --  SVRI: --  PVR: --  PVRI: --    Mode: AC/ CMV (Assist Control/ Continuous Mandatory Ventilation)  RR (machine): 14  TV (machine): 350  FiO2: 30  PEEP: 5  ITime: 1  MAP: 6  PIP: 13    ABG - ( 28 Dec 2023 03:44 )  pH, Arterial: 7.44  pH, Blood: x     /  pCO2: 29    /  pO2: 95    / HCO3: 20    / Base Excess: -3.7  /  SaO2: 98.7                I&O's Summary    27 Dec 2023 07:01  -  28 Dec 2023 07:00  --------------------------------------------------------  IN: 1840 mL / OUT: 2365 mL / NET: -525 mL      I&O's Detail    27 Dec 2023 07:01  -  28 Dec 2023 07:00  --------------------------------------------------------  IN:    Enteral Tube Flush: 300 mL    Free Water: 180 mL    Glucerna: 490 mL    IV PiggyBack: 100 mL    Peptamen A.F.: 770 mL  Total IN: 1840 mL    OUT:    Indwelling Catheter - Urethral (mL): 1580 mL    Rectal Tube (mL): 700 mL    Voided (mL): 85 mL  Total OUT: 2365 mL    Total NET: -525 mL          PHYSICAL EXAM:    General/Neuro  GCS:  6T   = E 1  / V 1T  / M 4     Deficits: [facility] ([place], ID# [id number]). Does not open eyes to voice or pain, grimaces/withdraws to pain applied to extremities, overbreathing ventilator, cough and corneal reflexes present  Pupils: PERRLA bilaterally, 3mm and briskly reactive to light bilaterally    Lungs:      clear to auscultation w/ lung sounds bilaterally, Normal expansion/effort.     Cardiovascular : S1, S2.  Regular rate and rhythm. Trace peripheral edema bilaterally  Cardiac Rhythm: Normal Sinus Rhythm    GI: Abdomen soft, Non-tender, Non-distended.    Gastrostomy tube in place, secured at the skin    Extremities: Extremities warm, pink, well-perfused.     Derm: Good skin turgor, no skin breakdown.      :       Martinez catheter in place.      CXR:     LABS:  CAPILLARY BLOOD GLUCOSE      POCT Blood Glucose.: 216 mg/dL (28 Dec 2023 07:44)  POCT Blood Glucose.: 342 mg/dL (28 Dec 2023 05:31)  POCT Blood Glucose.: 248 mg/dL (28 Dec 2023 04:43)  POCT Blood Glucose.: 190 mg/dL (27 Dec 2023 22:05)  POCT Blood Glucose.: 205 mg/dL (27 Dec 2023 17:22)  POCT Blood Glucose.: 121 mg/dL (27 Dec 2023 13:12)  POCT Blood Glucose.: 120 mg/dL (27 Dec 2023 09:48)                          7.1    18.36 )-----------( 438      ( 28 Dec 2023 01:14 )             22.7       12-28    148<H>  |  114<H>  |  103<HH>  ----------------------------<  200<H>  5.6<H>   |  13<L>  |  4.4<HH>    Ca    7.6<L>      28 Dec 2023 01:14  Phos  5.2     12-28  Mg     3.2     12-28    TPro  5.1<L>  /  Alb  2.5<L>  /  TBili  0.2  /  DBili  <0.2  /  AST  28  /  ALT  5   /  AlkPhos  86  12-27            Urinalysis Basic - ( 28 Dec 2023 01:14 )    Color: x / Appearance: x / SG: x / pH: x  Gluc: 200 mg/dL / Ketone: x  / Bili: x / Urobili: x   Blood: x / Protein: x / Nitrite: x   Leuk Esterase: x / RBC: x / WBC x   Sq Epi: x / Non Sq Epi: x / Bacteria: x

## 2023-12-29 LAB
ANION GAP SERPL CALC-SCNC: 18 MMOL/L — HIGH (ref 7–14)
BUN SERPL-MCNC: 109 MG/DL — CRITICAL HIGH (ref 10–20)
BUN SERPL-MCNC: 109 MG/DL — CRITICAL HIGH (ref 10–20)
BUN SERPL-MCNC: 110 MG/DL — CRITICAL HIGH (ref 10–20)
BUN SERPL-MCNC: 110 MG/DL — CRITICAL HIGH (ref 10–20)
CALCIUM SERPL-MCNC: 8 MG/DL — LOW (ref 8.4–10.5)
CALCIUM SERPL-MCNC: 8 MG/DL — LOW (ref 8.4–10.5)
CALCIUM SERPL-MCNC: 8.1 MG/DL — LOW (ref 8.4–10.5)
CALCIUM SERPL-MCNC: 8.1 MG/DL — LOW (ref 8.4–10.5)
CHLORIDE SERPL-SCNC: 116 MMOL/L — HIGH (ref 98–110)
CHLORIDE SERPL-SCNC: 116 MMOL/L — HIGH (ref 98–110)
CHLORIDE SERPL-SCNC: 117 MMOL/L — HIGH (ref 98–110)
CHLORIDE SERPL-SCNC: 117 MMOL/L — HIGH (ref 98–110)
CO2 SERPL-SCNC: 17 MMOL/L — SIGNIFICANT CHANGE UP (ref 17–32)
CO2 SERPL-SCNC: 17 MMOL/L — SIGNIFICANT CHANGE UP (ref 17–32)
CO2 SERPL-SCNC: 18 MMOL/L — SIGNIFICANT CHANGE UP (ref 17–32)
CO2 SERPL-SCNC: 18 MMOL/L — SIGNIFICANT CHANGE UP (ref 17–32)
CREAT ?TM UR-MCNC: 54 MG/DL — SIGNIFICANT CHANGE UP
CREAT ?TM UR-MCNC: 54 MG/DL — SIGNIFICANT CHANGE UP
CREAT SERPL-MCNC: 3.6 MG/DL — HIGH (ref 0.7–1.5)
CREAT SERPL-MCNC: 3.6 MG/DL — HIGH (ref 0.7–1.5)
CREAT SERPL-MCNC: 4 MG/DL — HIGH (ref 0.7–1.5)
CREAT SERPL-MCNC: 4 MG/DL — HIGH (ref 0.7–1.5)
EGFR: 15 ML/MIN/1.73M2 — LOW
EGFR: 15 ML/MIN/1.73M2 — LOW
EGFR: 17 ML/MIN/1.73M2 — LOW
EGFR: 17 ML/MIN/1.73M2 — LOW
GAS PNL BLDA: SIGNIFICANT CHANGE UP
GAS PNL BLDA: SIGNIFICANT CHANGE UP
GLUCOSE SERPL-MCNC: 116 MG/DL — HIGH (ref 70–99)
GLUCOSE SERPL-MCNC: 116 MG/DL — HIGH (ref 70–99)
GLUCOSE SERPL-MCNC: 191 MG/DL — HIGH (ref 70–99)
GLUCOSE SERPL-MCNC: 191 MG/DL — HIGH (ref 70–99)
HCT VFR BLD CALC: 28.1 % — LOW (ref 42–52)
HCT VFR BLD CALC: 28.1 % — LOW (ref 42–52)
HGB BLD-MCNC: 9.1 G/DL — LOW (ref 14–18)
HGB BLD-MCNC: 9.1 G/DL — LOW (ref 14–18)
MAGNESIUM SERPL-MCNC: 3.4 MG/DL — CRITICAL HIGH (ref 1.8–2.4)
MAGNESIUM SERPL-MCNC: 3.4 MG/DL — CRITICAL HIGH (ref 1.8–2.4)
MCHC RBC-ENTMCNC: 29.1 PG — SIGNIFICANT CHANGE UP (ref 27–31)
MCHC RBC-ENTMCNC: 29.1 PG — SIGNIFICANT CHANGE UP (ref 27–31)
MCHC RBC-ENTMCNC: 32.4 G/DL — SIGNIFICANT CHANGE UP (ref 32–37)
MCHC RBC-ENTMCNC: 32.4 G/DL — SIGNIFICANT CHANGE UP (ref 32–37)
MCV RBC AUTO: 89.8 FL — SIGNIFICANT CHANGE UP (ref 80–94)
MCV RBC AUTO: 89.8 FL — SIGNIFICANT CHANGE UP (ref 80–94)
MRSA PCR RESULT.: NEGATIVE — SIGNIFICANT CHANGE UP
MRSA PCR RESULT.: NEGATIVE — SIGNIFICANT CHANGE UP
NRBC # BLD: 0 /100 WBCS — SIGNIFICANT CHANGE UP (ref 0–0)
NRBC # BLD: 0 /100 WBCS — SIGNIFICANT CHANGE UP (ref 0–0)
OSMOLALITY UR: 437 MOS/KG — SIGNIFICANT CHANGE UP (ref 50–1200)
OSMOLALITY UR: 437 MOS/KG — SIGNIFICANT CHANGE UP (ref 50–1200)
PHOSPHATE SERPL-MCNC: 5 MG/DL — HIGH (ref 2.1–4.9)
PHOSPHATE SERPL-MCNC: 5 MG/DL — HIGH (ref 2.1–4.9)
PLATELET # BLD AUTO: 458 K/UL — HIGH (ref 130–400)
PLATELET # BLD AUTO: 458 K/UL — HIGH (ref 130–400)
PMV BLD: 9.2 FL — SIGNIFICANT CHANGE UP (ref 7.4–10.4)
PMV BLD: 9.2 FL — SIGNIFICANT CHANGE UP (ref 7.4–10.4)
POTASSIUM SERPL-MCNC: 4.7 MMOL/L — SIGNIFICANT CHANGE UP (ref 3.5–5)
POTASSIUM SERPL-MCNC: 4.7 MMOL/L — SIGNIFICANT CHANGE UP (ref 3.5–5)
POTASSIUM SERPL-MCNC: 4.9 MMOL/L — SIGNIFICANT CHANGE UP (ref 3.5–5)
POTASSIUM SERPL-MCNC: 4.9 MMOL/L — SIGNIFICANT CHANGE UP (ref 3.5–5)
POTASSIUM SERPL-SCNC: 4.7 MMOL/L — SIGNIFICANT CHANGE UP (ref 3.5–5)
POTASSIUM SERPL-SCNC: 4.7 MMOL/L — SIGNIFICANT CHANGE UP (ref 3.5–5)
POTASSIUM SERPL-SCNC: 4.9 MMOL/L — SIGNIFICANT CHANGE UP (ref 3.5–5)
POTASSIUM SERPL-SCNC: 4.9 MMOL/L — SIGNIFICANT CHANGE UP (ref 3.5–5)
RBC # BLD: 3.13 M/UL — LOW (ref 4.7–6.1)
RBC # BLD: 3.13 M/UL — LOW (ref 4.7–6.1)
RBC # FLD: 15.4 % — HIGH (ref 11.5–14.5)
RBC # FLD: 15.4 % — HIGH (ref 11.5–14.5)
SODIUM SERPL-SCNC: 151 MMOL/L — HIGH (ref 135–146)
SODIUM SERPL-SCNC: 151 MMOL/L — HIGH (ref 135–146)
SODIUM SERPL-SCNC: 153 MMOL/L — HIGH (ref 135–146)
SODIUM SERPL-SCNC: 153 MMOL/L — HIGH (ref 135–146)
SODIUM UR-SCNC: 47 MMOL/L — SIGNIFICANT CHANGE UP
SODIUM UR-SCNC: 47 MMOL/L — SIGNIFICANT CHANGE UP
WBC # BLD: 19.6 K/UL — HIGH (ref 4.8–10.8)
WBC # BLD: 19.6 K/UL — HIGH (ref 4.8–10.8)
WBC # FLD AUTO: 19.6 K/UL — HIGH (ref 4.8–10.8)
WBC # FLD AUTO: 19.6 K/UL — HIGH (ref 4.8–10.8)

## 2023-12-29 PROCEDURE — 95819 EEG AWAKE AND ASLEEP: CPT | Mod: 26

## 2023-12-29 PROCEDURE — 99233 SBSQ HOSP IP/OBS HIGH 50: CPT

## 2023-12-29 PROCEDURE — 99291 CRITICAL CARE FIRST HOUR: CPT | Mod: 24

## 2023-12-29 PROCEDURE — 71045 X-RAY EXAM CHEST 1 VIEW: CPT | Mod: 26

## 2023-12-29 RX ORDER — INSULIN GLARGINE 100 [IU]/ML
10 INJECTION, SOLUTION SUBCUTANEOUS
Refills: 0 | Status: DISCONTINUED | OUTPATIENT
Start: 2023-12-29 | End: 2023-12-29

## 2023-12-29 RX ORDER — SODIUM CHLORIDE 9 MG/ML
1000 INJECTION, SOLUTION INTRAVENOUS
Refills: 0 | Status: DISCONTINUED | OUTPATIENT
Start: 2023-12-29 | End: 2023-12-30

## 2023-12-29 RX ORDER — INSULIN GLARGINE 100 [IU]/ML
20 INJECTION, SOLUTION SUBCUTANEOUS AT BEDTIME
Refills: 0 | Status: DISCONTINUED | OUTPATIENT
Start: 2023-12-29 | End: 2024-01-02

## 2023-12-29 RX ADMIN — Medication 650 MILLIGRAM(S): at 00:05

## 2023-12-29 RX ADMIN — Medication 1 APPLICATION(S): at 05:02

## 2023-12-29 RX ADMIN — HEPARIN SODIUM 5000 UNIT(S): 5000 INJECTION INTRAVENOUS; SUBCUTANEOUS at 12:38

## 2023-12-29 RX ADMIN — Medication 81 MILLIGRAM(S): at 12:38

## 2023-12-29 RX ADMIN — Medication 650 MILLIGRAM(S): at 18:08

## 2023-12-29 RX ADMIN — ALBUTEROL 90 PUFF(S): 90 AEROSOL, METERED ORAL at 07:43

## 2023-12-29 RX ADMIN — CHLORHEXIDINE GLUCONATE 15 MILLILITER(S): 213 SOLUTION TOPICAL at 18:08

## 2023-12-29 RX ADMIN — PANTOPRAZOLE SODIUM 40 MILLIGRAM(S): 20 TABLET, DELAYED RELEASE ORAL at 05:02

## 2023-12-29 RX ADMIN — INSULIN GLARGINE 10 UNIT(S): 100 INJECTION, SOLUTION SUBCUTANEOUS at 05:39

## 2023-12-29 RX ADMIN — ALBUTEROL 90 PUFF(S): 90 AEROSOL, METERED ORAL at 12:36

## 2023-12-29 RX ADMIN — SEVELAMER CARBONATE 800 MILLIGRAM(S): 2400 POWDER, FOR SUSPENSION ORAL at 10:39

## 2023-12-29 RX ADMIN — Medication 1 APPLICATION(S): at 18:10

## 2023-12-29 RX ADMIN — Medication 650 MILLIGRAM(S): at 23:20

## 2023-12-29 RX ADMIN — Medication 3: at 22:08

## 2023-12-29 RX ADMIN — Medication 650 MILLIGRAM(S): at 12:38

## 2023-12-29 RX ADMIN — ALBUTEROL 90 PUFF(S): 90 AEROSOL, METERED ORAL at 04:07

## 2023-12-29 RX ADMIN — Medication 650 MILLIGRAM(S): at 13:08

## 2023-12-29 RX ADMIN — Medication 500 MILLIGRAM(S): at 05:02

## 2023-12-29 RX ADMIN — CHLORHEXIDINE GLUCONATE 15 MILLILITER(S): 213 SOLUTION TOPICAL at 05:01

## 2023-12-29 RX ADMIN — INSULIN GLARGINE 20 UNIT(S): 100 INJECTION, SOLUTION SUBCUTANEOUS at 22:08

## 2023-12-29 RX ADMIN — Medication 1 PUFF(S): at 14:00

## 2023-12-29 RX ADMIN — Medication 1 APPLICATION(S): at 05:03

## 2023-12-29 RX ADMIN — Medication 1 PUFF(S): at 04:06

## 2023-12-29 RX ADMIN — Medication 1 PUFF(S): at 07:43

## 2023-12-29 RX ADMIN — ALBUTEROL 90 PUFF(S): 90 AEROSOL, METERED ORAL at 16:04

## 2023-12-29 RX ADMIN — Medication 5: at 18:07

## 2023-12-29 RX ADMIN — SEVELAMER CARBONATE 800 MILLIGRAM(S): 2400 POWDER, FOR SUSPENSION ORAL at 18:10

## 2023-12-29 RX ADMIN — PIPERACILLIN AND TAZOBACTAM 25 GRAM(S): 4; .5 INJECTION, POWDER, LYOPHILIZED, FOR SOLUTION INTRAVENOUS at 18:08

## 2023-12-29 RX ADMIN — HEPARIN SODIUM 5000 UNIT(S): 5000 INJECTION INTRAVENOUS; SUBCUTANEOUS at 05:02

## 2023-12-29 RX ADMIN — HEPARIN SODIUM 5000 UNIT(S): 5000 INJECTION INTRAVENOUS; SUBCUTANEOUS at 21:29

## 2023-12-29 RX ADMIN — CHLORHEXIDINE GLUCONATE 1 APPLICATION(S): 213 SOLUTION TOPICAL at 05:03

## 2023-12-29 RX ADMIN — Medication 650 MILLIGRAM(S): at 05:14

## 2023-12-29 RX ADMIN — SEVELAMER CARBONATE 800 MILLIGRAM(S): 2400 POWDER, FOR SUSPENSION ORAL at 12:38

## 2023-12-29 RX ADMIN — Medication 5: at 04:43

## 2023-12-29 RX ADMIN — Medication 1 APPLICATION(S): at 18:08

## 2023-12-29 RX ADMIN — Medication 650 MILLIGRAM(S): at 00:09

## 2023-12-29 RX ADMIN — ALBUTEROL 90 PUFF(S): 90 AEROSOL, METERED ORAL at 00:46

## 2023-12-29 RX ADMIN — Medication 650 MILLIGRAM(S): at 05:03

## 2023-12-29 RX ADMIN — PIPERACILLIN AND TAZOBACTAM 25 GRAM(S): 4; .5 INJECTION, POWDER, LYOPHILIZED, FOR SOLUTION INTRAVENOUS at 05:04

## 2023-12-29 RX ADMIN — ATORVASTATIN CALCIUM 20 MILLIGRAM(S): 80 TABLET, FILM COATED ORAL at 21:29

## 2023-12-29 RX ADMIN — Medication 4 MILLIGRAM(S): at 21:29

## 2023-12-29 RX ADMIN — Medication 7: at 00:05

## 2023-12-29 RX ADMIN — SODIUM CHLORIDE 75 MILLILITER(S): 9 INJECTION, SOLUTION INTRAVENOUS at 23:15

## 2023-12-29 RX ADMIN — Medication 500 MILLIGRAM(S): at 18:08

## 2023-12-29 NOTE — CONSULT NOTE ADULT - PROVIDER SPECIALTY LIST ADULT
Cardiology
NSICU
Neurology
Palliative Care
Infectious Disease
SICU
Electrophysiology
Neurosurgery
Trauma Surgery
Hospitalist
Nephrology

## 2023-12-29 NOTE — PROGRESS NOTE ADULT - SUBJECTIVE AND OBJECTIVE BOX
JACQUELIN CAI   [id number]   [birth year]  78yM    Admit Date: 12-16-23  Indication for SDU/SICU: Q1 neuro checks      77-year-old male Cantonese speaking presents with prior history of HTN, HLD, Diabetes, BPH, prior CVA on aspirin and Plavix who states he had a mechanical fall 3 days ago while getting out of the car fell backwards hit his head.  Afterwards he was able to ambulate.  But for the past 1 day family was unable to ambulate him.  He states that his lower extremities are painful to movement and weak.  Family denies any LOC. Per daughter patient he often forgets things and cannot recall year or place. Patient following commands bedside.   Initial CT head remarkable for 3 Acute subdural hemorrhages: 0.7 cm along the left hemisphere, 0.3 cm along the right frontal area, and 1.1 cm along the left falx. No midline shift. Repeat CT head was obtained prior to 6 hour tacos because patient was appearing lethargic vs sundowning. CT head #2 with stable findings. CT c-spine and chest/abd/pelvis unremarkable for acute pathology.     SICU Consult for q1 neuro checks    ============================  24 Hour Events  12/28  NIGHT:  - Trach in place, PEG in place 3cm at dressing level, +1 pitting edema to extremities, airborne precautions  - covid negative  - Lantus 20@bedtime changed to 10BID      [X] A ten-point review of systems was otherwise negative except as noted above.  [  ] Due to altered mental status/intubation, subjective information was not attained from the patient. History was obtained, to the extent possible, from review of the chart and collateral sources of information.   JACQUELIN CAI   [id number]   [birth year]  78yM    Admit Date: 12-16-23  Indication for SDU/SICU: Q1 neuro checks      77-year-old male Cantonese speaking presents with prior history of HTN, HLD, Diabetes, BPH, prior CVA on aspirin and Plavix who states he had a mechanical fall 3 days ago while getting out of the car fell backwards hit his head.  Afterwards he was able to ambulate.  But for the past 1 day family was unable to ambulate him.  He states that his lower extremities are painful to movement and weak.  Family denies any LOC. Per daughter patient he often forgets things and cannot recall year or place. Patient following commands bedside.   Initial CT head remarkable for 3 Acute subdural hemorrhages: 0.7 cm along the left hemisphere, 0.3 cm along the right frontal area, and 1.1 cm along the left falx. No midline shift. Repeat CT head was obtained prior to 6 hour tacos because patient was appearing lethargic vs sundowning. CT head #2 with stable findings. CT c-spine and chest/abd/pelvis unremarkable for acute pathology.     SICU Consult for q1 neuro checks    ============================  24 Hour Events  12/28  NIGHT:  - Trach in place, PEG in place 3cm at dressing level, +1 pitting edema to extremities, airborne precautions  - covid negative  - Lantus 20@bedtime changed to 10BID      [X] A ten-point review of systems was otherwise negative except as noted above.  [  ] Due to altered mental status/intubation, subjective information was not attained from the patient. History was obtained, to the extent possible, from review of the chart and collateral sources of information.   JACQUELIN CAI   [id number]   [birth year]  78yM    Admit Date: 12-16-23  Indication for SDU/SICU: Q1 neuro checks      77-year-old male Cantonese speaking presents with prior history of HTN, HLD, Diabetes, BPH, prior CVA on aspirin and Plavix who states he had a mechanical fall 3 days ago while getting out of the car fell backwards hit his head.  Afterwards he was able to ambulate.  But for the past 1 day family was unable to ambulate him.  He states that his lower extremities are painful to movement and weak.  Family denies any LOC. Per daughter patient he often forgets things and cannot recall year or place. Patient following commands bedside.   Initial CT head remarkable for 3 Acute subdural hemorrhages: 0.7 cm along the left hemisphere, 0.3 cm along the right frontal area, and 1.1 cm along the left falx. No midline shift. Repeat CT head was obtained prior to 6 hour tacos because patient was appearing lethargic vs sundowning. CT head #2 with stable findings. CT c-spine and chest/abd/pelvis unremarkable for acute pathology.     SICU Consult for q1 neuro checks    ============================  24 Hour Events  12/28  NIGHT:  - Trach in place, PEG in place 3cm at dressing level, +1 pitting edema to extremities, airborne precautions  - covid negative  - Lantus 20@bedtime changed to 10BID      [X] A ten-point review of systems was otherwise negative except as noted above.  [  ] Due to altered mental status/intubation, subjective information was not attained from the patient. History was obtained, to the extent possible, from review of the chart and collateral sources of information.    Daily     Daily     Diet, NPO with Tube Feed:   Tube Feeding Modality: Gastrostomy  Glucerna 1.2 Tonio  Total Volume for 24 Hours (mL): 1260  Continuous  Starting Tube Feed Rate mL per Hour: 55  Increase Tube Feed Rate by (mL): 10     Every 4 hours  Until Goal Tube Feed Rate (mL per Hour): 70  Tube Feed Duration (in Hours): 18  Tube Feed Start Time: 12:00  Tube Feed Stop Time: 06:00  Free Water Flush  Bolus   Total Volume per Flush (mL): 200   Frequency: Every 4 Hours  Free Water Flush Instructions:  Please flush PEG with 50cc of sterile water before feeds start and 100cc of sterile water after feeds end (12-28-23 @ 17:41)      CURRENT MEDS:  Neurologic Medications  acetaminophen     Tablet .. 650 milliGRAM(s) Oral every 6 hours  valproic  acid Syrup 500 milliGRAM(s) Oral every 12 hours    Respiratory Medications  albuterol    90 MICROgram(s) HFA Inhaler 90 Puff(s) Inhalation every 4 hours  ipratropium 17 MICROgram(s) HFA Inhaler 1 Puff(s) Inhalation every 6 hours    Cardiovascular Medications  doxazosin 4 milliGRAM(s) Oral at bedtime    Gastrointestinal Medications  pantoprazole  Injectable 40 milliGRAM(s) IV Push every 24 hours    Genitourinary Medications    Hematologic/Oncologic Medications  aspirin  chewable 81 milliGRAM(s) Enteral Tube daily  heparin   Injectable 5000 Unit(s) SubCutaneous every 8 hours    Antimicrobial/Immunologic Medications  piperacillin/tazobactam IVPB.. 3.375 Gram(s) IV Intermittent every 12 hours    Endocrine/Metabolic Medications  atorvastatin 20 milliGRAM(s) Oral at bedtime  insulin glargine Injectable (LANTUS) 10 Unit(s) SubCutaneous two times a day  insulin lispro (ADMELOG) corrective regimen sliding scale   SubCutaneous every 4 hours    Topical/Other Medications  bacitracin   Ointment 1 Application(s) Topical every 12 hours  chlorhexidine 0.12% Liquid 15 milliLiter(s) Oral Mucosa two times a day  chlorhexidine 2% Cloths 1 Application(s) Topical <User Schedule>  sevelamer carbonate Powder 800 milliGRAM(s) Oral three times a day with meals  vitamin A &amp; D Ointment 1 Application(s) Topical every 12 hours      ICU Vital Signs Last 24 Hrs  T(C): 37.5 (29 Dec 2023 08:00), Max: 38 (28 Dec 2023 10:00)  T(F): 99.5 (29 Dec 2023 08:00), Max: 100.4 (28 Dec 2023 10:00)  HR: 92 (29 Dec 2023 07:00) (78 - 94)  BP: 113/56 (29 Dec 2023 07:00) (93/50 - 126/61)  BP(mean): 80 (29 Dec 2023 07:00) (68 - 88)  ABP: --  ABP(mean): --  RR: 33 (29 Dec 2023 07:00) (20 - 35)  SpO2: 99% (29 Dec 2023 07:00) (95% - 100%)    O2 Parameters below as of 29 Dec 2023 06:00  Patient On (Oxygen Delivery Method): ventilator    O2 Concentration (%): 30        Mode: AC/ CMV (Assist Control/ Continuous Mandatory Ventilation)  RR (machine): 14  TV (machine): 350  FiO2: 30  PEEP: 5  ITime: 1  MAP: 9  PIP: 14    ABG - ( 29 Dec 2023 04:15 )  pH, Arterial: 7.48  pH, Blood: x     /  pCO2: 27    /  pO2: 173   / HCO3: 20    / Base Excess: -2.0  /  SaO2: 99.7                I&O's Summary    28 Dec 2023 07:01  -  29 Dec 2023 07:00  --------------------------------------------------------  IN: 2460 mL / OUT: 2544 mL / NET: -84 mL      I&O's Detail    28 Dec 2023 07:01  -  29 Dec 2023 07:00  --------------------------------------------------------  IN:    Enteral Tube Flush: 1000 mL    Glucerna: 630 mL    IV PiggyBack: 100 mL    IV PiggyBack: 100 mL    Peptamen A.F.: 630 mL  Total IN: 2460 mL    OUT:    Indwelling Catheter - Urethral (mL): 2244 mL    Rectal Tube (mL): 300 mL  Total OUT: 2544 mL    Total NET: -84 mL          PHYSICAL EXAM: performed using  #[id number]    General/Neuro  GCS: 9T    = E 4  / V 1  / M 4     Exam: AxOx0, arousable to noxious stim (leg movements). + cough/+gag. Rt. sided facial twitching noted. Spontaneous movements of LUE noted. Unable to assess sensory/strength.   Pupils: PERRL, unable to assess EOMs d/t AMS. No tracking.    Lungs: Intubated via tracheostomy. 350/14/30/5. Tachypneic to low 30s. Lungs clear to auscultation, bilaterally.    Cardiovascular : S1, S2.  NSR, regular rate and rhythm.     GI: BS x 4. Abdomen soft, Non-tender, Non-distended. PEG c/d/i.    Extremities: Warm, well perfused. Peripheral edema noted in RUE, 1+ pitting edema. DP/PT 2+ bilaterally.    Derm: Good skin turgor, minor tears noted on LUE.      : Aguirre catheter in place.      CXR: < from: Xray Chest 1 View- PORTABLE-Routine (Xray Chest 1 View- PORTABLE-Routine in AM.) (12.28.23 @ 06:28) >  Low lung volume    No focal consolidation, pleural effusion, or pneumothorax.    < end of copied text >      LABS:  CAPILLARY BLOOD GLUCOSE      POCT Blood Glucose.: 201 mg/dL (29 Dec 2023 04:41)  POCT Blood Glucose.: 222 mg/dL (29 Dec 2023 00:03)  POCT Blood Glucose.: 262 mg/dL (28 Dec 2023 21:41)  POCT Blood Glucose.: 292 mg/dL (28 Dec 2023 17:23)  POCT Blood Glucose.: 115 mg/dL (28 Dec 2023 11:28)                          9.1    19.60 )-----------( 458      ( 29 Dec 2023 05:43 )             28.1       12-28    148<H>  |  114<H>  |  107<HH>  ----------------------------<  299<H>  4.8   |  20  |  4.0<H>    Ca    8.1<L>      28 Dec 2023 17:40  Phos  5.0     12-29  Mg     3.2     12-28    TPro  5.1<L>  /  Alb  2.5<L>  /  TBili  0.2  /  DBili  <0.2  /  AST  28  /  ALT  5   /  AlkPhos  86  12-27            Urinalysis Basic - ( 28 Dec 2023 17:40 )    Color: x / Appearance: x / SG: x / pH: x  Gluc: 299 mg/dL / Ketone: x  / Bili: x / Urobili: x   Blood: x / Protein: x / Nitrite: x   Leuk Esterase: x / RBC: x / WBC x   Sq Epi: x / Non Sq Epi: x / Bacteria: x         JACQUELIN CAI   [id number]   [birth year]  78yM    Admit Date: 12-16-23  Indication for SDU/SICU: Q1 neuro checks      77-year-old male Cantonese speaking presents with prior history of HTN, HLD, Diabetes, BPH, prior CVA on aspirin and Plavix who states he had a mechanical fall 3 days ago while getting out of the car fell backwards hit his head.  Afterwards he was able to ambulate.  But for the past 1 day family was unable to ambulate him.  He states that his lower extremities are painful to movement and weak.  Family denies any LOC. Per daughter patient he often forgets things and cannot recall year or place. Patient following commands bedside.   Initial CT head remarkable for 3 Acute subdural hemorrhages: 0.7 cm along the left hemisphere, 0.3 cm along the right frontal area, and 1.1 cm along the left falx. No midline shift. Repeat CT head was obtained prior to 6 hour tacos because patient was appearing lethargic vs sundowning. CT head #2 with stable findings. CT c-spine and chest/abd/pelvis unremarkable for acute pathology.     SICU Consult for q1 neuro checks    ============================  24 Hour Events  12/28  NIGHT:  - Trach in place, PEG in place 3cm at dressing level, +1 pitting edema to extremities, airborne precautions  - covid negative  - Lantus 20@bedtime changed to 10BID      [X] A ten-point review of systems was otherwise negative except as noted above.  [  ] Due to altered mental status/intubation, subjective information was not attained from the patient. History was obtained, to the extent possible, from review of the chart and collateral sources of information.    Daily     Daily     Diet, NPO with Tube Feed:   Tube Feeding Modality: Gastrostomy  Glucerna 1.2 Tonio  Total Volume for 24 Hours (mL): 1260  Continuous  Starting Tube Feed Rate mL per Hour: 55  Increase Tube Feed Rate by (mL): 10     Every 4 hours  Until Goal Tube Feed Rate (mL per Hour): 70  Tube Feed Duration (in Hours): 18  Tube Feed Start Time: 12:00  Tube Feed Stop Time: 06:00  Free Water Flush  Bolus   Total Volume per Flush (mL): 200   Frequency: Every 4 Hours  Free Water Flush Instructions:  Please flush PEG with 50cc of sterile water before feeds start and 100cc of sterile water after feeds end (12-28-23 @ 17:41)      CURRENT MEDS:  Neurologic Medications  acetaminophen     Tablet .. 650 milliGRAM(s) Oral every 6 hours  valproic  acid Syrup 500 milliGRAM(s) Oral every 12 hours    Respiratory Medications  albuterol    90 MICROgram(s) HFA Inhaler 90 Puff(s) Inhalation every 4 hours  ipratropium 17 MICROgram(s) HFA Inhaler 1 Puff(s) Inhalation every 6 hours    Cardiovascular Medications  doxazosin 4 milliGRAM(s) Oral at bedtime    Gastrointestinal Medications  pantoprazole  Injectable 40 milliGRAM(s) IV Push every 24 hours    Genitourinary Medications    Hematologic/Oncologic Medications  aspirin  chewable 81 milliGRAM(s) Enteral Tube daily  heparin   Injectable 5000 Unit(s) SubCutaneous every 8 hours    Antimicrobial/Immunologic Medications  piperacillin/tazobactam IVPB.. 3.375 Gram(s) IV Intermittent every 12 hours    Endocrine/Metabolic Medications  atorvastatin 20 milliGRAM(s) Oral at bedtime  insulin glargine Injectable (LANTUS) 10 Unit(s) SubCutaneous two times a day  insulin lispro (ADMELOG) corrective regimen sliding scale   SubCutaneous every 4 hours    Topical/Other Medications  bacitracin   Ointment 1 Application(s) Topical every 12 hours  chlorhexidine 0.12% Liquid 15 milliLiter(s) Oral Mucosa two times a day  chlorhexidine 2% Cloths 1 Application(s) Topical <User Schedule>  sevelamer carbonate Powder 800 milliGRAM(s) Oral three times a day with meals  vitamin A &amp; D Ointment 1 Application(s) Topical every 12 hours      ICU Vital Signs Last 24 Hrs  T(C): 37.5 (29 Dec 2023 08:00), Max: 38 (28 Dec 2023 10:00)  T(F): 99.5 (29 Dec 2023 08:00), Max: 100.4 (28 Dec 2023 10:00)  HR: 92 (29 Dec 2023 07:00) (78 - 94)  BP: 113/56 (29 Dec 2023 07:00) (93/50 - 126/61)  BP(mean): 80 (29 Dec 2023 07:00) (68 - 88)  ABP: --  ABP(mean): --  RR: 33 (29 Dec 2023 07:00) (20 - 35)  SpO2: 99% (29 Dec 2023 07:00) (95% - 100%)    O2 Parameters below as of 29 Dec 2023 06:00  Patient On (Oxygen Delivery Method): ventilator    O2 Concentration (%): 30        Mode: AC/ CMV (Assist Control/ Continuous Mandatory Ventilation)  RR (machine): 14  TV (machine): 350  FiO2: 30  PEEP: 5  ITime: 1  MAP: 9  PIP: 14    ABG - ( 29 Dec 2023 04:15 )  pH, Arterial: 7.48  pH, Blood: x     /  pCO2: 27    /  pO2: 173   / HCO3: 20    / Base Excess: -2.0  /  SaO2: 99.7                I&O's Summary    28 Dec 2023 07:01  -  29 Dec 2023 07:00  --------------------------------------------------------  IN: 2460 mL / OUT: 2544 mL / NET: -84 mL      I&O's Detail    28 Dec 2023 07:01  -  29 Dec 2023 07:00  --------------------------------------------------------  IN:    Enteral Tube Flush: 1000 mL    Glucerna: 630 mL    IV PiggyBack: 100 mL    IV PiggyBack: 100 mL    Peptamen A.F.: 630 mL  Total IN: 2460 mL    OUT:    Indwelling Catheter - Urethral (mL): 2244 mL    Rectal Tube (mL): 300 mL  Total OUT: 2544 mL    Total NET: -84 mL          PHYSICAL EXAM: performed using  #[id number]    General/Neuro  GCS: 9T    = E 4  / V 1  / M 4     Exam: AxOx0, arousable to noxious stim (leg movements). + cough/+gag. Rt. sided facial twitching noted. Spontaneous movements of LUE noted. Unable to assess sensory/strength.   Pupils: PERRL, unable to assess EOMs d/t AMS. No tracking.    Lungs: Intubated via tracheostomy. 350/14/30/5. Tachypneic to low 30s. Lungs clear to auscultation, bilaterally.    Cardiovascular : S1, S2.  NSR, regular rate and rhythm.     GI: BS x 4. Abdomen soft, Non-tender, Non-distended. PEG c/d/i.    Extremities: Warm, well perfused. Peripheral edema noted in RUE, 1+ pitting edema. DP/PT 2+ bilaterally.    Derm: Good skin turgor, minor tears noted on LUE.      : Aguirre catheter in place.      CXR: < from: Xray Chest 1 View- PORTABLE-Routine (Xray Chest 1 View- PORTABLE-Routine in AM.) (12.28.23 @ 06:28) >  Low lung volume    No focal consolidation, pleural effusion, or pneumothorax.    < end of copied text >      LABS:  CAPILLARY BLOOD GLUCOSE      POCT Blood Glucose.: 201 mg/dL (29 Dec 2023 04:41)  POCT Blood Glucose.: 222 mg/dL (29 Dec 2023 00:03)  POCT Blood Glucose.: 262 mg/dL (28 Dec 2023 21:41)  POCT Blood Glucose.: 292 mg/dL (28 Dec 2023 17:23)  POCT Blood Glucose.: 115 mg/dL (28 Dec 2023 11:28)                          9.1    19.60 )-----------( 458      ( 29 Dec 2023 05:43 )             28.1       12-28    148<H>  |  114<H>  |  107<HH>  ----------------------------<  299<H>  4.8   |  20  |  4.0<H>    Ca    8.1<L>      28 Dec 2023 17:40  Phos  5.0     12-29  Mg     3.2     12-28    TPro  5.1<L>  /  Alb  2.5<L>  /  TBili  0.2  /  DBili  <0.2  /  AST  28  /  ALT  5   /  AlkPhos  86  12-27            Urinalysis Basic - ( 28 Dec 2023 17:40 )    Color: x / Appearance: x / SG: x / pH: x  Gluc: 299 mg/dL / Ketone: x  / Bili: x / Urobili: x   Blood: x / Protein: x / Nitrite: x   Leuk Esterase: x / RBC: x / WBC x   Sq Epi: x / Non Sq Epi: x / Bacteria: x         JACQUELIN CAI   [id number]   [birth year]  78yM    Admit Date: 12-16-23  Indication for SDU/SICU: Q1 neuro checks      77-year-old male Cantonese speaking presents with prior history of HTN, HLD, Diabetes, BPH, prior CVA on aspirin and Plavix who states he had a mechanical fall 3 days ago while getting out of the car fell backwards hit his head.  Afterwards he was able to ambulate.  But for the past 1 day family was unable to ambulate him.  He states that his lower extremities are painful to movement and weak.  Family denies any LOC. Per daughter patient he often forgets things and cannot recall year or place. Patient following commands bedside.   Initial CT head remarkable for 3 Acute subdural hemorrhages: 0.7 cm along the left hemisphere, 0.3 cm along the right frontal area, and 1.1 cm along the left falx. No midline shift. Repeat CT head was obtained prior to 6 hour tacos because patient was appearing lethargic vs sundowning. CT head #2 with stable findings. CT c-spine and chest/abd/pelvis unremarkable for acute pathology.     SICU Consult for q1 neuro checks    ============================  24 Hour Events  12/28  NIGHT:  - Trach in place, PEG in place 3cm at dressing level, +1 pitting edema to extremities, airborne precautions  - covid negative  - Lantus 20@bedtime changed to 10BID      [X] A ten-point review of systems was otherwise negative except as noted above.  [  ] Due to altered mental status/intubation, subjective information was not attained from the patient. History was obtained, to the extent possible, from review of the chart and collateral sources of information.    Daily     Daily     Diet, NPO with Tube Feed:   Tube Feeding Modality: Gastrostomy  Glucerna 1.2 Tonio  Total Volume for 24 Hours (mL): 1260  Continuous  Starting Tube Feed Rate mL per Hour: 55  Increase Tube Feed Rate by (mL): 10     Every 4 hours  Until Goal Tube Feed Rate (mL per Hour): 70  Tube Feed Duration (in Hours): 18  Tube Feed Start Time: 12:00  Tube Feed Stop Time: 06:00  Free Water Flush  Bolus   Total Volume per Flush (mL): 200   Frequency: Every 4 Hours  Free Water Flush Instructions:  Please flush PEG with 50cc of sterile water before feeds start and 100cc of sterile water after feeds end (12-28-23 @ 17:41)      CURRENT MEDS:  Neurologic Medications  acetaminophen     Tablet .. 650 milliGRAM(s) Oral every 6 hours  valproic  acid Syrup 500 milliGRAM(s) Oral every 12 hours    Respiratory Medications  albuterol    90 MICROgram(s) HFA Inhaler 90 Puff(s) Inhalation every 4 hours  ipratropium 17 MICROgram(s) HFA Inhaler 1 Puff(s) Inhalation every 6 hours    Cardiovascular Medications  doxazosin 4 milliGRAM(s) Oral at bedtime    Gastrointestinal Medications  pantoprazole  Injectable 40 milliGRAM(s) IV Push every 24 hours    Genitourinary Medications    Hematologic/Oncologic Medications  aspirin  chewable 81 milliGRAM(s) Enteral Tube daily  heparin   Injectable 5000 Unit(s) SubCutaneous every 8 hours    Antimicrobial/Immunologic Medications  piperacillin/tazobactam IVPB.. 3.375 Gram(s) IV Intermittent every 12 hours    Endocrine/Metabolic Medications  atorvastatin 20 milliGRAM(s) Oral at bedtime  insulin glargine Injectable (LANTUS) 10 Unit(s) SubCutaneous two times a day  insulin lispro (ADMELOG) corrective regimen sliding scale   SubCutaneous every 4 hours    Topical/Other Medications  bacitracin   Ointment 1 Application(s) Topical every 12 hours  chlorhexidine 0.12% Liquid 15 milliLiter(s) Oral Mucosa two times a day  chlorhexidine 2% Cloths 1 Application(s) Topical <User Schedule>  sevelamer carbonate Powder 800 milliGRAM(s) Oral three times a day with meals  vitamin A &amp; D Ointment 1 Application(s) Topical every 12 hours      ICU Vital Signs Last 24 Hrs  T(C): 37.5 (29 Dec 2023 08:00), Max: 38 (28 Dec 2023 10:00)  T(F): 99.5 (29 Dec 2023 08:00), Max: 100.4 (28 Dec 2023 10:00)  HR: 92 (29 Dec 2023 07:00) (78 - 94)  BP: 113/56 (29 Dec 2023 07:00) (93/50 - 126/61)  BP(mean): 80 (29 Dec 2023 07:00) (68 - 88)  ABP: --  ABP(mean): --  RR: 33 (29 Dec 2023 07:00) (20 - 35)  SpO2: 99% (29 Dec 2023 07:00) (95% - 100%)    O2 Parameters below as of 29 Dec 2023 06:00  Patient On (Oxygen Delivery Method): ventilator    O2 Concentration (%): 30        Mode: AC/ CMV (Assist Control/ Continuous Mandatory Ventilation)  RR (machine): 14  TV (machine): 350  FiO2: 30  PEEP: 5  ITime: 1  MAP: 9  PIP: 14    ABG - ( 29 Dec 2023 04:15 )  pH, Arterial: 7.48  pH, Blood: x     /  pCO2: 27    /  pO2: 173   / HCO3: 20    / Base Excess: -2.0  /  SaO2: 99.7                I&O's Summary    28 Dec 2023 07:01  -  29 Dec 2023 07:00  --------------------------------------------------------  IN: 2460 mL / OUT: 2544 mL / NET: -84 mL      I&O's Detail    28 Dec 2023 07:01  -  29 Dec 2023 07:00  --------------------------------------------------------  IN:    Enteral Tube Flush: 1000 mL    Glucerna: 630 mL    IV PiggyBack: 100 mL    IV PiggyBack: 100 mL    Peptamen A.F.: 630 mL  Total IN: 2460 mL    OUT:    Indwelling Catheter - Urethral (mL): 2244 mL    Rectal Tube (mL): 300 mL  Total OUT: 2544 mL    Total NET: -84 mL          PHYSICAL EXAM: performed using  #[id number]    General/Neuro  GCS: 7T    = E 2  / V 1  / M 4     Exam: AxOx0, arousable to noxious stim (leg movements). + cough/+gag. Rt. sided facial twitching noted. Spontaneous movements of LUE noted. Unable to assess sensory/strength.   Pupils: PERRL, unable to assess EOMs d/t AMS. No tracking.    Lungs: Intubated via tracheostomy. 350/14/30/5. Tachypneic to low 30s. Lungs clear to auscultation, bilaterally.    Cardiovascular : S1, S2.  NSR, regular rate and rhythm.     GI: BS x 4. Abdomen soft, Non-tender, Non-distended. PEG c/d/i.    Extremities: Warm, well perfused. Peripheral edema noted in RUE, 1+ pitting edema. DP/PT 2+ bilaterally.    Derm: Good skin turgor, minor tears noted on LUE.      : Aguirre catheter in place.      CXR: < from: Xray Chest 1 View- PORTABLE-Routine (Xray Chest 1 View- PORTABLE-Routine in AM.) (12.28.23 @ 06:28) >  Low lung volume    No focal consolidation, pleural effusion, or pneumothorax.    < end of copied text >      LABS:  CAPILLARY BLOOD GLUCOSE      POCT Blood Glucose.: 201 mg/dL (29 Dec 2023 04:41)  POCT Blood Glucose.: 222 mg/dL (29 Dec 2023 00:03)  POCT Blood Glucose.: 262 mg/dL (28 Dec 2023 21:41)  POCT Blood Glucose.: 292 mg/dL (28 Dec 2023 17:23)  POCT Blood Glucose.: 115 mg/dL (28 Dec 2023 11:28)                          9.1    19.60 )-----------( 458      ( 29 Dec 2023 05:43 )             28.1       12-28    148<H>  |  114<H>  |  107<HH>  ----------------------------<  299<H>  4.8   |  20  |  4.0<H>    Ca    8.1<L>      28 Dec 2023 17:40  Phos  5.0     12-29  Mg     3.2     12-28    TPro  5.1<L>  /  Alb  2.5<L>  /  TBili  0.2  /  DBili  <0.2  /  AST  28  /  ALT  5   /  AlkPhos  86  12-27            Urinalysis Basic - ( 28 Dec 2023 17:40 )    Color: x / Appearance: x / SG: x / pH: x  Gluc: 299 mg/dL / Ketone: x  / Bili: x / Urobili: x   Blood: x / Protein: x / Nitrite: x   Leuk Esterase: x / RBC: x / WBC x   Sq Epi: x / Non Sq Epi: x / Bacteria: x         JACQUELIN CAI   [id number]   [birth year]  78yM    Admit Date: 12-16-23  Indication for SDU/SICU: Q1 neuro checks      77-year-old male Cantonese speaking presents with prior history of HTN, HLD, Diabetes, BPH, prior CVA on aspirin and Plavix who states he had a mechanical fall 3 days ago while getting out of the car fell backwards hit his head.  Afterwards he was able to ambulate.  But for the past 1 day family was unable to ambulate him.  He states that his lower extremities are painful to movement and weak.  Family denies any LOC. Per daughter patient he often forgets things and cannot recall year or place. Patient following commands bedside.   Initial CT head remarkable for 3 Acute subdural hemorrhages: 0.7 cm along the left hemisphere, 0.3 cm along the right frontal area, and 1.1 cm along the left falx. No midline shift. Repeat CT head was obtained prior to 6 hour tacos because patient was appearing lethargic vs sundowning. CT head #2 with stable findings. CT c-spine and chest/abd/pelvis unremarkable for acute pathology.     SICU Consult for q1 neuro checks    ============================  24 Hour Events  12/28  NIGHT:  - Trach in place, PEG in place 3cm at dressing level, +1 pitting edema to extremities, airborne precautions  - covid negative  - Lantus 20@bedtime changed to 10BID      [X] A ten-point review of systems was otherwise negative except as noted above.  [  ] Due to altered mental status/intubation, subjective information was not attained from the patient. History was obtained, to the extent possible, from review of the chart and collateral sources of information.    Daily     Daily     Diet, NPO with Tube Feed:   Tube Feeding Modality: Gastrostomy  Glucerna 1.2 Tonio  Total Volume for 24 Hours (mL): 1260  Continuous  Starting Tube Feed Rate mL per Hour: 55  Increase Tube Feed Rate by (mL): 10     Every 4 hours  Until Goal Tube Feed Rate (mL per Hour): 70  Tube Feed Duration (in Hours): 18  Tube Feed Start Time: 12:00  Tube Feed Stop Time: 06:00  Free Water Flush  Bolus   Total Volume per Flush (mL): 200   Frequency: Every 4 Hours  Free Water Flush Instructions:  Please flush PEG with 50cc of sterile water before feeds start and 100cc of sterile water after feeds end (12-28-23 @ 17:41)      CURRENT MEDS:  Neurologic Medications  acetaminophen     Tablet .. 650 milliGRAM(s) Oral every 6 hours  valproic  acid Syrup 500 milliGRAM(s) Oral every 12 hours    Respiratory Medications  albuterol    90 MICROgram(s) HFA Inhaler 90 Puff(s) Inhalation every 4 hours  ipratropium 17 MICROgram(s) HFA Inhaler 1 Puff(s) Inhalation every 6 hours    Cardiovascular Medications  doxazosin 4 milliGRAM(s) Oral at bedtime    Gastrointestinal Medications  pantoprazole  Injectable 40 milliGRAM(s) IV Push every 24 hours    Genitourinary Medications    Hematologic/Oncologic Medications  aspirin  chewable 81 milliGRAM(s) Enteral Tube daily  heparin   Injectable 5000 Unit(s) SubCutaneous every 8 hours    Antimicrobial/Immunologic Medications  piperacillin/tazobactam IVPB.. 3.375 Gram(s) IV Intermittent every 12 hours    Endocrine/Metabolic Medications  atorvastatin 20 milliGRAM(s) Oral at bedtime  insulin glargine Injectable (LANTUS) 10 Unit(s) SubCutaneous two times a day  insulin lispro (ADMELOG) corrective regimen sliding scale   SubCutaneous every 4 hours    Topical/Other Medications  bacitracin   Ointment 1 Application(s) Topical every 12 hours  chlorhexidine 0.12% Liquid 15 milliLiter(s) Oral Mucosa two times a day  chlorhexidine 2% Cloths 1 Application(s) Topical <User Schedule>  sevelamer carbonate Powder 800 milliGRAM(s) Oral three times a day with meals  vitamin A &amp; D Ointment 1 Application(s) Topical every 12 hours      ICU Vital Signs Last 24 Hrs  T(C): 37.5 (29 Dec 2023 08:00), Max: 38 (28 Dec 2023 10:00)  T(F): 99.5 (29 Dec 2023 08:00), Max: 100.4 (28 Dec 2023 10:00)  HR: 92 (29 Dec 2023 07:00) (78 - 94)  BP: 113/56 (29 Dec 2023 07:00) (93/50 - 126/61)  BP(mean): 80 (29 Dec 2023 07:00) (68 - 88)  ABP: --  ABP(mean): --  RR: 33 (29 Dec 2023 07:00) (20 - 35)  SpO2: 99% (29 Dec 2023 07:00) (95% - 100%)    O2 Parameters below as of 29 Dec 2023 06:00  Patient On (Oxygen Delivery Method): ventilator    O2 Concentration (%): 30        Mode: AC/ CMV (Assist Control/ Continuous Mandatory Ventilation)  RR (machine): 14  TV (machine): 350  FiO2: 30  PEEP: 5  ITime: 1  MAP: 9  PIP: 14    ABG - ( 29 Dec 2023 04:15 )  pH, Arterial: 7.48  pH, Blood: x     /  pCO2: 27    /  pO2: 173   / HCO3: 20    / Base Excess: -2.0  /  SaO2: 99.7                I&O's Summary    28 Dec 2023 07:01  -  29 Dec 2023 07:00  --------------------------------------------------------  IN: 2460 mL / OUT: 2544 mL / NET: -84 mL      I&O's Detail    28 Dec 2023 07:01  -  29 Dec 2023 07:00  --------------------------------------------------------  IN:    Enteral Tube Flush: 1000 mL    Glucerna: 630 mL    IV PiggyBack: 100 mL    IV PiggyBack: 100 mL    Peptamen A.F.: 630 mL  Total IN: 2460 mL    OUT:    Indwelling Catheter - Urethral (mL): 2244 mL    Rectal Tube (mL): 300 mL  Total OUT: 2544 mL    Total NET: -84 mL          PHYSICAL EXAM: performed using  #[id number]    General/Neuro  GCS: 7T    = E 2  / V 1  / M 4     Exam: AxOx0, arousable to noxious stim (leg movements). + cough/+gag. Rt. sided facial twitching noted. Spontaneous movements of LUE noted. Unable to assess sensory/strength.   Pupils: PERRL, unable to assess EOMs d/t AMS. No tracking.    Lungs: Intubated via tracheostomy. 350/14/30/5. Tachypneic to low 30s. Lungs clear to auscultation, bilaterally.    Cardiovascular : S1, S2.  NSR, regular rate and rhythm.     GI: BS x 4. Abdomen soft, Non-tender, Non-distended. PEG c/d/i.    Extremities: Warm, well perfused. Peripheral edema noted in RUE, 1+ pitting edema. DP/PT 2+ bilaterally.    Derm: Good skin turgor, minor tears noted on LUE.      : Aguirre catheter in place.      CXR: < from: Xray Chest 1 View- PORTABLE-Routine (Xray Chest 1 View- PORTABLE-Routine in AM.) (12.28.23 @ 06:28) >  Low lung volume    No focal consolidation, pleural effusion, or pneumothorax.    < end of copied text >      LABS:  CAPILLARY BLOOD GLUCOSE      POCT Blood Glucose.: 201 mg/dL (29 Dec 2023 04:41)  POCT Blood Glucose.: 222 mg/dL (29 Dec 2023 00:03)  POCT Blood Glucose.: 262 mg/dL (28 Dec 2023 21:41)  POCT Blood Glucose.: 292 mg/dL (28 Dec 2023 17:23)  POCT Blood Glucose.: 115 mg/dL (28 Dec 2023 11:28)                          9.1    19.60 )-----------( 458      ( 29 Dec 2023 05:43 )             28.1       12-28    148<H>  |  114<H>  |  107<HH>  ----------------------------<  299<H>  4.8   |  20  |  4.0<H>    Ca    8.1<L>      28 Dec 2023 17:40  Phos  5.0     12-29  Mg     3.2     12-28    TPro  5.1<L>  /  Alb  2.5<L>  /  TBili  0.2  /  DBili  <0.2  /  AST  28  /  ALT  5   /  AlkPhos  86  12-27            Urinalysis Basic - ( 28 Dec 2023 17:40 )    Color: x / Appearance: x / SG: x / pH: x  Gluc: 299 mg/dL / Ketone: x  / Bili: x / Urobili: x   Blood: x / Protein: x / Nitrite: x   Leuk Esterase: x / RBC: x / WBC x   Sq Epi: x / Non Sq Epi: x / Bacteria: x

## 2023-12-29 NOTE — CONSULT NOTE ADULT - CONSULT REQUESTED BY NAME
Dr. Roman
Service
Surgery
Primary team
SICU team
Emergency Medicine
ED
Trauma team
NISCU
Neuro sx
primary team

## 2023-12-29 NOTE — PROGRESS NOTE ADULT - PROBLEM SELECTOR PLAN 2
- full code  - ongoing GOC, will try to meet with family at bedside tomorrow
- full code  - ongoing GOC

## 2023-12-29 NOTE — PROGRESS NOTE ADULT - PROBLEM SELECTOR PROBLEM 1
Subdural hematoma without coma with loss of consciousness, initial encounter

## 2023-12-29 NOTE — PROGRESS NOTE ADULT - ASSESSMENT
78y Male s/p mechanical fall. +HT, +AC (Aspirin and Plavix), -LOC.    12/21-intubated for airway protection, accessory muscle use  12/22- s/p tracheostomy and PEG placement, Portex cuffed 9 and 20fr PEG 2cm at skin     NEURO:  #Acute SDH   -12/18 HCT#4 -stable SDH, no new acute findings  - 12/27: Pt obtunded, not responding to noxious stim - VPA/LFTs/BMP/ammonia ordered, CTH pending  -Q4 neuro checks  - amantadine (200mg x 1 loading, 100mg q48h maintenance) for neuro stim. renal dosing cleared with pharmacy  - NSGY: f/up with CTH on 12/31, then assess restarting plavix  -CTH on 12/27 performed for AMS-->pending final read  #ho stroke (2/2023) w/residual right sided weakness  - RIGHT anterior thalamus infarct,  LEFT caudate head lacunar infarct  #focal seizure    - vEEG 12/19: No seizures; Discontinued 12/19    - Valproic acid 500q12 --> f/u repeat    - NCC: valproic acid current dose, q4 neurochecks    - Neurology cs- d/c EEG, 2 days prior to discharge call neurology so they can do a spot EEG --> recalled on 12/28 to evaluate diminished mental status, recommend repeat EEG and continue depakote 500 q12      RESP:   #Respiratory Failure secondary to impaired secretion clearance    -s/p tracheostomy 12/22, size 9 cuffed portex    -daily PS trials  RR (machine): 14, TV (machine): 350, FiO2: 30, PEEP: 5  12-29 @ 04:15--7.48 / 27 / 173 / 20 / [id number] 99.7 / Lactate 1.5 / iCal 1.11   - chest PT q4 and duoneb treatments q 4   - deep tracheal aspirate cx sent (due to fevers)  Culture - Sputum . (12.21.23 @ 18:14)-  Numerous Staphylococcus aureus --> 12/28 repeat pending  #Activity    -increase as tolerated    CARDS:   #acute HYPOtension intraop, resolved  -off levophed since 12/22 PM  #hx of HTN  -Amlodpine 10mg HOLDING   -Labetalol 200mg q8 HOLDING  -Valsartan 320mg HOLDING   #hx HLD  -atorvastatin  #NSVT    -EP/Cards: No arrhythmias on tele only artifact. No further work up needed. Recommend ILR prior to discharge if patient/family agreeable  Imaging:   Echo: 12/2023: EF 66%, G1DD, trace MR, mild TR     GI/NUTR:   #Diet, NPO with Tube Feed via 20Fr PEG placed 12/22   Glucerna @ 70 cc/hr x18 hours (switched back to glucerna from peptamen AF to optimize glycemic control)    - nutrition, 12/27 ; change feeds from glucerna to Peptamen AF at 70cc x 18hr. Re-evaluate IV protonix, re-evaluate lantus timing/glycemic ctrl, monitor rectal output. Trial banatrol if loose stools persist    -aspiration precautions, HOB 30  #GI Prophylaxis  -Protonix 40mg   #Bowel regimen     -Multiple loose bowel movements, last dose senna 12/19 at 2200 > dignishield placed 12/23     -C diff negative     -no bowel regimen    /RENAL:   #urine output   -martinez reinserted 12/28 due to retention  -UA neg 12/25 (r/o UTI in setting of persistent fevers)   -Sodium goal 140-150  #Hypernatremia  - FWD 2.2 L --> increased free water flushes to 200cc q4 hours  #hyperphosphatemia  -Started sevelmaer powder, non-formulary so as not to clog PEG   #CKD   -baseline Cr 2.1, now 4.4--> nephro c/s placed (likely post-obstructive worsening of creatinine in the setting of urinary retention)  -holding home sodium bicarb 650mg BID  #hx BPH    -cardura 4mg (flomax at home, non crushable)    Monitor UO-martinez in place      Labs:          BUN/Cr- 104/4.6  -->,  107/4.0  -->          Electrolytes-Na 148 // K 4.8 // Mg -- //  Phos -- (12-28 @ 17:40)    HEME/ONC:   #DVT prophylaxis     -SCDs, HSQ  #hx of CVA    -ASA cleared by NSGY    -Plavix HOLDING, will require repeat HCT in 2 wks prior to restart, approx 1/1/24  DVT propylaxis-heparin   Injectable    Labs: Hb/Hct:  6.9/21.8  (12-28 @ 07:56)  -->,  8.9/27.0  (12-28 @ 17:40)  -->                      Plts:  444  -->,  435  -->         ID:  WBC- 18.36  --->>,  18.18  --->>,  24.52  --->>  Temp trend- 24hrs T(F): 99.8 (12-29 @ 04:00), Max: 101.5 (12-28 @ 07:00)  Current antibiotics-piperacillin/tazobactam IVPB.. 3.375 every 12 hours  #recurrent fevers- improving, now low grade  -MRSA PCR neg (12/22)  -MRSA PCR repeat 12/28 pending  - RVP pending 12/28  Cultures    Bcx 12/18- final neg     Tracheal aspirate 12/21: numerous staph aureus -methicillian sensitive    Tracheal aspirate 12/22: staph aureus    C.Diff PCR neg    Blood cx 12/21- no growth at 4 days    Blood cx 12/28 - pending    UA 12/25 negative     UA 12/28 negative  Current antibiotics:  - Vancomycin 500mg x1 dose (12/28)  - Zosyn 3.375 q12 (started 12/28)  #ID consult  -D/C'd zosyn & start IV Ancef  #multiple loose bowel movements    -WBC trending up    -no bowel regiment      ENDO:  #DM     -ISS     -Off insulin gtt 12/20      -Lantus 10units BID     -FSG q4 while on TF    MSK:     Activity - Increase As Tolerated    -B/L knee X ray 12/19- no fractures, b/l effusions     LINES/DRAINS:  PIV, right basilic midline (placed 12/16), 20Fr PEG (12/22), Tracheostomy (12/22)    ADVANCED DIRECTIVES:  Full Code    HCP/Emergency Contact-Sarah Gamino (Wife) [phone], Cantonese speaking    DISPO:  Social work, case management consult for dispo to vent facility - spoke with social work 12/23, starting to work on D/C.     78y Male s/p mechanical fall. +HT, +AC (Aspirin and Plavix), -LOC.    12/21-intubated for airway protection, accessory muscle use  12/22- s/p tracheostomy and PEG placement, Portex cuffed 9 and 20fr PEG 2cm at skin     NEURO:  #Acute SDH   -12/18 HCT#4 -stable SDH, no new acute findings  - 12/27: Pt obtunded, not responding to noxious stim - VPA/LFTs/BMP/ammonia ordered, CTH pending  -Q4 neuro checks  - amantadine (200mg x 1 loading, 100mg q48h maintenance) for neuro stim. renal dosing cleared with pharmacy  - NSGY: f/up with CTH on 12/31, then assess restarting plavix  -CTH on 12/27 performed for AMS-->pending final read  #ho stroke (2/2023) w/residual right sided weakness  - RIGHT anterior thalamus infarct,  LEFT caudate head lacunar infarct  #focal seizure    - vEEG 12/19: No seizures; Discontinued 12/19    - Valproic acid 500q12 --> f/u repeat    - NCC: valproic acid current dose, q4 neurochecks    - Neurology cs- d/c EEG, 2 days prior to discharge call neurology so they can do a spot EEG --> recalled on 12/28 to evaluate diminished mental status, recommend repeat EEG and continue depakote 500 q12      RESP:   #Respiratory Failure secondary to impaired secretion clearance    -s/p tracheostomy 12/22, size 9 cuffed portex    -daily PS trials  RR (machine): 14, TV (machine): 350, FiO2: 30, PEEP: 5  12-29 @ 04:15--7.48 / 27 / 173 / 20 / [id number] 99.7 / Lactate 1.5 / iCal 1.11   - chest PT q4 and duoneb treatments q 4   - deep tracheal aspirate cx sent (due to fevers)  Culture - Sputum . (12.21.23 @ 18:14)-  Numerous Staphylococcus aureus --> 12/28 repeat pending  #Activity    -increase as tolerated    CARDS:   #acute HYPOtension intraop, resolved  -off levophed since 12/22 PM  #hx of HTN  -Amlodpine 10mg HOLDING   -Labetalol 200mg q8 HOLDING  -Valsartan 320mg HOLDING   #hx HLD  -atorvastatin  #NSVT    -EP/Cards: No arrhythmias on tele only artifact. No further work up needed. Recommend ILR prior to discharge if patient/family agreeable  Imaging:   Echo: 12/2023: EF 66%, G1DD, trace MR, mild TR     GI/NUTR:   #Diet, NPO with Tube Feed via 20Fr PEG placed 12/22   Glucerna @ 70 cc/hr x18 hours (switched back to glucerna from peptamen AF to optimize glycemic control)    - nutrition, 12/27 ; change feeds from glucerna to Peptamen AF at 70cc x 18hr. Re-evaluate IV protonix, re-evaluate lantus timing/glycemic ctrl, monitor rectal output. Trial banatrol if loose stools persist    -aspiration precautions, HOB 30  #GI Prophylaxis  -Protonix 40mg   #Bowel regimen     -Multiple loose bowel movements, last dose senna 12/19 at 2200 > dignishield placed 12/23     -C diff negative     -no bowel regimen    /RENAL:   #urine output   -martinez reinserted 12/28 due to retention  -UA neg 12/25 (r/o UTI in setting of persistent fevers)   -Sodium goal 140-150  #Hypernatremia  - FWD 2.2 L --> increased free water flushes to 200cc q4 hours  #hyperphosphatemia  -Started sevelmaer powder, non-formulary so as not to clog PEG   #CKD   -baseline Cr 2.1, now 4.4--> nephro c/s placed (likely post-obstructive worsening of creatinine in the setting of urinary retention)  -holding home sodium bicarb 650mg BID  #hx BPH    -cardura 4mg (flomax at home, non crushable)    Monitor UO-martinez in place      Labs:          BUN/Cr- 104/4.6  -->,  107/4.0  -->          Electrolytes-Na 148 // K 4.8 // Mg -- //  Phos -- (12-28 @ 17:40)    HEME/ONC:   #DVT prophylaxis     -SCDs, HSQ  #hx of CVA    -ASA cleared by NSGY    -Plavix HOLDING, will require repeat HCT in 2 wks prior to restart, approx 1/1/24  DVT propylaxis-heparin   Injectable    Labs: Hb/Hct:  6.9/21.8  (12-28 @ 07:56)  -->,  8.9/27.0  (12-28 @ 17:40)  -->                      Plts:  444  -->,  435  -->         ID:  WBC- 18.36  --->>,  18.18  --->>,  24.52  --->>  Temp trend- 24hrs T(F): 99.8 (12-29 @ 04:00), Max: 101.5 (12-28 @ 07:00)  Current antibiotics-piperacillin/tazobactam IVPB.. 3.375 every 12 hours  #recurrent fevers- improving, now low grade  -MRSA PCR neg (12/22)  -MRSA PCR repeat 12/28 pending  - RVP pending 12/28  Cultures    Bcx 12/18- final neg     Tracheal aspirate 12/21: numerous staph aureus -methicillian sensitive    Tracheal aspirate 12/22: staph aureus    C.Diff PCR neg    Blood cx 12/21- no growth at 4 days    Blood cx 12/28 - pending    UA 12/25 negative     UA 12/28 negative  Current antibiotics:  - Vancomycin 500mg x1 dose (12/28)  - Zosyn 3.375 q12 (started 12/28)  #ID consult  -D/C'd zosyn & start IV Ancef  #multiple loose bowel movements    -WBC trending up    -no bowel regiment      ENDO:  #DM     -ISS     -Off insulin gtt 12/20      -Lantus 10units BID     -FSG q4 while on TF    MSK:     Activity - Increase As Tolerated    -B/L knee X ray 12/19- no fractures, b/l effusions     LINES/DRAINS:  PIV, right basilic midline (placed 12/16), 20Fr PEG (12/22), Tracheostomy (12/22)    ADVANCED DIRECTIVES:  Full Code    HCP/Emergency Contact-Sarah Gamino (Wife) [phone], Cantonese speaking    DISPO:  Social work, case management consult for dispo to vent facility - spoke with social work 12/23, starting to work on D/C.     78y Male s/p mechanical fall. +HT, +AC (Aspirin and Plavix), -LOC.    12/21-intubated for airway protection, accessory muscle use  12/22- s/p tracheostomy and PEG placement, Portex cuffed 9 and 20fr PEG 2cm at skin     NEURO:  #Acute SDH   -12/18 HCT#4 -stable SDH, no new acute findings  -Q4 neuro checks  - NSGY: f/up with CTH on 12/31, then assess restarting plavix  - < from: CT Head No Cont (12.27.23 @ 20:49): SDH improving, stable.   #ho stroke (2/2023) w/residual right sided weakness  - RIGHT anterior thalamus infarct,  LEFT caudate head lacunar infarct  #focal seizure    - vEEG 12/19: No seizures; Discontinued 12/19    - Valproic acid 500q12 --> VPA level 12/27 ***    - NCC: valproic acid current dose, q4 neurochecks    - Neurology cs- d/c EEG, 2 days prior to discharge call neurology so they can do a spot EEG --> recalled on 12/28 to evaluate diminished mental status, recommend repeat EEG and continue depakote 500 q12  #pain  - acetaminophen     Tablet .. 650 milliGRAM(s) Oral every 6 hours      RESP:   #Respiratory Failure secondary to impaired secretion clearance    -s/p tracheostomy 12/22, size 9 cuffed portex    -daily PS trials  RR (machine): 14, TV (machine): 350, FiO2: 30, PEEP: 5  12-29 @ 04:15--7.48 / 27 / 173 / 20 / [id number] 99.7 / Lactate 1.5 / iCal 1.11   - chest PT q4 and duoneb treatments q 4   - deep tracheal aspirate cx sent (due to fevers)  Culture - Sputum . (12.21.23 @ 18:14)-  Numerous Staphylococcus aureus --> 12/28 repeat pending  #Activity    -increase as tolerated    CARDS:   #acute HYPOtension intraop, resolved  -off levophed since 12/22 PM  #hx of HTN  -Amlodpine 10mg HOLDING   -Labetalol 200mg q8 HOLDING  -Valsartan 320mg HOLDING   #hx HLD  -atorvastatin  #NSVT    -EP/Cards: No arrhythmias on tele only artifact. No further work up needed. Recommend ILR prior to discharge if patient/family agreeable  Imaging:   Echo: 12/2023: EF 66%, G1DD, trace MR, mild TR     GI/NUTR:   #Diet, NPO with Tube Feed via 20Fr PEG placed 12/22   Glucerna @ 70 cc/hr x18 hours (switched back to glucerna from peptamen AF to optimize glycemic control)    - nutrition, 12/27 ; change feeds from glucerna to Peptamen AF at 70cc x 18hr. Re-evaluate IV protonix, re-evaluate lantus timing/glycemic ctrl, monitor rectal output. Trial banatrol if loose stools persist    -aspiration precautions, HOB 30  #GI Prophylaxis  -Protonix 40mg   #Bowel regimen     -Multiple loose bowel movements, last dose senna 12/19 at 2200 > dignishield placed 12/23     -C diff negative     -no bowel regimen    /RENAL:   #urine output   -martinez reinserted 12/28 due to retention  -UA neg 12/25 (r/o UTI in setting of persistent fevers)   -Sodium goal 140-150  #Hypernatremia  - FWD 2.2 L --> increased free water flushes to 200cc q4 hours  #hyperphosphatemia  -Started sevelmaer powder, non-formulary so as not to clog PEG   #CKD   -baseline Cr 2.1, now 4.4--> nephro c/s placed (likely post-obstructive worsening of creatinine in the setting of urinary retention)  -holding home sodium bicarb 650mg BID  #hx BPH    -cardura 4mg (flomax at home, non crushable)    Monitor UO-martinez in place      Labs:          BUN/Cr- 104/4.6  -->,  107/4.0  -->          Electrolytes-Na 148 // K 4.8 // Mg -- //  Phos -- (12-28 @ 17:40)    HEME/ONC:   #DVT prophylaxis     -SCDs, HSQ  #hx of CVA    -ASA cleared by NSGY    -Plavix HOLDING, will require repeat HCT in 2 wks prior to restart, approx 1/1/24  DVT propylaxis-heparin   Injectable    Labs: Hb/Hct:  6.9/21.8  (12-28 @ 07:56)  -->,  8.9/27.0  (12-28 @ 17:40)  -->                      Plts:  444  -->,  435  -->         ID:  WBC- 18.36  --->>,  18.18  --->>,  24.52  --->>  Temp trend- T(F): 99.5 (29 Dec 2023 08:00), Max: 100.4 (28 Dec 2023 10:00)    #recurrent fevers- improving, now low grade  -MRSA PCR neg (12/22)  -MRSA PCR repeat 12/28 pending  - RVP pending 12/28  Cultures    Bcx 12/18- final neg     Tracheal aspirate 12/21: numerous staph aureus methicillin sensitive    Tracheal aspirate 12/22: staph aureus    C.Diff PCR neg    Blood cx 12/21- no growth at 4 days    Blood cx 12/28 - pending    UA 12/25 negative     UA 12/28 negative  Current antibiotics:  - Vancomycin 500mg x1 dose (12/28)  - Zosyn 3.375 q12 (started 12/28)  #ID consult  -D/C'd zosyn & start IV Ancef  #multiple loose bowel movements    -WBC trending up    -no bowel regimen      ENDO:  #DM     -ISS     -Off insulin gtt 12/20      -Lantus 10units BID     -FSG q4 while on TF    MSK:     Activity - Increase As Tolerated    -B/L knee X ray 12/19- no fractures, b/l effusions     LINES/DRAINS:  PIV, right basilic midline (placed 12/16), 20Fr PEG (12/22), Tracheostomy (12/22)    ADVANCED DIRECTIVES:  Full Code    HCP/Emergency Contact-Sarah Gamino (Wife) [phone], Cantonese speaking    DISPO:  Social work, case management consult for dispo to vent facility - spoke with social work 12/23, starting to work on D/C.     78y Male s/p mechanical fall. +HT, +AC (Aspirin and Plavix), -LOC.    12/21-intubated for airway protection, accessory muscle use  12/22- s/p tracheostomy and PEG placement, Portex cuffed 9 and 20fr PEG 2cm at skin     NEURO:  #Acute SDH   -12/18 HCT#4 -stable SDH, no new acute findings  -Q4 neuro checks  - NSGY: f/up with CTH on 12/31, then assess restarting plavix  - < from: CT Head No Cont (12.27.23 @ 20:49): SDH improving, stable.   #ho stroke (2/2023) w/residual right sided weakness  - RIGHT anterior thalamus infarct,  LEFT caudate head lacunar infarct  #focal seizure    - vEEG 12/19: No seizures; Discontinued 12/19    - Valproic acid 500q12 --> VPA level 12/27 ***    - NCC: valproic acid current dose, q4 neurochecks    - Neurology cs- d/c EEG, 2 days prior to discharge call neurology so they can do a spot EEG --> recalled on 12/28 to evaluate diminished mental status, recommend repeat EEG and continue depakote 500 q12  #pain  - acetaminophen     Tablet .. 650 milliGRAM(s) Oral every 6 hours      RESP:   #Respiratory Failure secondary to impaired secretion clearance    -s/p tracheostomy 12/22, size 9 cuffed portex    -daily PS trials  RR (machine): 14, TV (machine): 350, FiO2: 30, PEEP: 5  12-29 @ 04:15--7.48 / 27 / 173 / 20 / [id number] 99.7 / Lactate 1.5 / iCal 1.11   - chest PT q4 and duoneb treatments q 4   - deep tracheal aspirate cx sent (due to fevers)  Culture - Sputum . (12.21.23 @ 18:14)-  Numerous Staphylococcus aureus --> 12/28 repeat pending  #Activity    -increase as tolerated    CARDS:   #acute HYPOtension intraop, resolved  -off levophed since 12/22 PM  #hx of HTN  -Amlodpine 10mg HOLDING   -Labetalol 200mg q8 HOLDING  -Valsartan 320mg HOLDING   #hx HLD  -atorvastatin  #NSVT    -EP/Cards: No arrhythmias on tele only artifact. No further work up needed. Recommend ILR prior to discharge if patient/family agreeable  Imaging:   Echo: 12/2023: EF 66%, G1DD, trace MR, mild TR     GI/NUTR:   #Diet, NPO with Tube Feed via 20Fr PEG placed 12/22   Glucerna @ 70 cc/hr x18 hours (switched back to glucerna from peptamen AF to optimize glycemic control)    - nutrition, 12/27 ; change feeds from glucerna to Peptamen AF at 70cc x 18hr. Re-evaluate IV protonix, re-evaluate lantus timing/glycemic ctrl, monitor rectal output. Trial banatrol if loose stools persist    -aspiration precautions, HOB 30  #GI Prophylaxis  -Protonix 40mg   #Bowel regimen     -Multiple loose bowel movements, last dose senna 12/19 at 2200 > dignishield placed 12/23     -C diff negative     -no bowel regimen    /RENAL:   #urine output   -martinez reinserted 12/28 due to retention  -UA neg 12/25 (r/o UTI in setting of persistent fevers)   -Sodium goal 140-150  #Hypernatremia  - FWD 2.2 L --> increased free water flushes to 200cc q4 hours  #hyperphosphatemia  -Started sevelmaer powder, non-formulary so as not to clog PEG   #CKD   -baseline Cr 2.1, now 4.4--> nephro c/s placed (likely post-obstructive worsening of creatinine in the setting of urinary retention)  -holding home sodium bicarb 650mg BID  #hx BPH    -cardura 4mg (flomax at home, non crushable)    Monitor UO-martinez in place      Labs:          BUN/Cr- 104/4.6  -->,  107/4.0  -->          Electrolytes-Na 148 // K 4.8 // Mg -- //  Phos -- (12-28 @ 17:40)    HEME/ONC:   #DVT prophylaxis     -SCDs, HSQ  #hx of CVA    -ASA cleared by NSGY    -Plavix HOLDING, will require repeat HCT in 2 wks prior to restart, approx 1/1/24  DVT propylaxis-heparin   Injectable    Labs: Hb/Hct:  6.9/21.8  (12-28 @ 07:56)  -->,  8.9/27.0  (12-28 @ 17:40)  -->                      Plts:  444  -->,  435  -->         ID:  WBC- 18.36  --->>,  18.18  --->>,  24.52  --->>  Temp trend- T(F): 99.5 (29 Dec 2023 08:00), Max: 100.4 (28 Dec 2023 10:00)    #recurrent fevers- improving, now low grade  -MRSA PCR neg (12/22)  -MRSA PCR repeat 12/28 pending  - RVP pending 12/28  Cultures    Bcx 12/18- final neg     Tracheal aspirate 12/21: numerous staph aureus methicillin sensitive    Tracheal aspirate 12/22: staph aureus    C.Diff PCR neg    Blood cx 12/21- no growth at 4 days    Blood cx 12/28 - pending    UA 12/25 negative     UA 12/28 negative  Current antibiotics:  - Vancomycin 500mg x1 dose (12/28)  - Zosyn 3.375 q12 (started 12/28)  #ID consult  -D/C'd zosyn & start IV Ancef  #multiple loose bowel movements    -WBC trending up    -no bowel regimen      ENDO:  #DM     -ISS     -Off insulin gtt 12/20      -Lantus 10units BID     -FSG q4 while on TF    MSK:     Activity - Increase As Tolerated    -B/L knee X ray 12/19- no fractures, b/l effusions     LINES/DRAINS:  PIV, right basilic midline (placed 12/16), 20Fr PEG (12/22), Tracheostomy (12/22)    ADVANCED DIRECTIVES:  Full Code    HCP/Emergency Contact-Sarah Gamino (Wife) [phone], Cantonese speaking    DISPO:  Social work, case management consult for dispo to vent facility - spoke with social work 12/23, starting to work on D/C.     78y Male s/p mechanical fall. +HT, +AC (Aspirin and Plavix), -LOC.    12/21-intubated for airway protection, accessory muscle use  12/22- s/p tracheostomy and PEG placement, Portex cuffed 9 and 20fr PEG 2cm at skin     NEURO:  #Acute SDH   -12/18 HCT#4 -stable SDH, no new acute findings  -Q4 neuro checks  - NSGY: f/up with CTH on 12/31, then assess restarting plavix  - < from: CT Head No Cont (12.27.23 @ 20:49): SDH improving, stable.   #ho stroke (2/2023) w/residual right sided weakness  - RIGHT anterior thalamus infarct,  LEFT caudate head lacunar infarct  #focal seizure    - vEEG 12/19: No seizures; Discontinued 12/19    - Valproic acid 500q12 --> VPA level 12/27 (albumin adj) - 83.0     - NCC: valproic acid current dose, q4 neurochecks    - Neurology cs- d/c EEG, 2 days prior to discharge call neurology so they can do a spot EEG --> recalled on 12/28 to evaluate diminished mental status, recommend repeat EEG and continue depakote 500 q12  #pain  - acetaminophen     Tablet .. 650 milliGRAM(s) Oral every 6 hours      RESP:   #Respiratory Failure secondary to impaired secretion clearance    -s/p tracheostomy 12/22, size 9 cuffed portex    -daily PS trials  RR (machine): 14, TV (machine): 350, FiO2: 30, PEEP: 5  12-29 @ 04:15--7.48 / 27 / 173 / 20 / [id number] 99.7 / Lactate 1.5 / iCal 1.11   - chest PT q4 and duoneb treatments q 4   - deep tracheal aspirate cx sent (due to fevers)  Culture - Sputum . (12.21.23 @ 18:14)-  Numerous Staphylococcus aureus --> 12/28 repeat pending  #Activity    -increase as tolerated    CARDS:   #acute HYPOtension intraop, resolved  -off levophed since 12/22 PM  #hx of HTN  -Amlodpine 10mg HOLDING   -Labetalol 200mg q8 HOLDING  -Valsartan 320mg HOLDING   #hx HLD  -atorvastatin  #NSVT    -EP/Cards: No arrhythmias on tele only artifact. No further work up needed. Recommend ILR prior to discharge if patient/family agreeable  Imaging:   Echo: 12/2023: EF 66%, G1DD, trace MR, mild TR     GI/NUTR:   #Diet, NPO with Tube Feed via 20Fr PEG placed 12/22   Glucerna @ 70 cc/hr x18 hours (switched back to glucerna from peptamen AF to optimize glycemic control)    - nutrition, 12/27 ; change feeds from glucerna to Peptamen AF at 70cc x 18hr. Re-evaluate IV protonix, re-evaluate lantus timing/glycemic ctrl, monitor rectal output. Trial banatrol if loose stools persist    -aspiration precautions, HOB 30  #GI Prophylaxis  -Protonix 40mg   #Bowel regimen     -Multiple loose bowel movements, last dose senna 12/19 at 2200 > dignishield placed 12/23     -C diff negative     -no bowel regimen    /RENAL:   #urine output   -martinez reinserted 12/28 due to retention  -UA neg 12/25 (r/o UTI in setting of persistent fevers)   -Sodium goal 140-150  #Hypernatremia  - FWD 2.2 L --> increased free water flushes to 200cc q4 hours  #hyperphosphatemia  -Started sevelmaer powder, non-formulary so as not to clog PEG   #CKD   -baseline Cr 2.1, now 4.4--> nephro c/s placed (likely post-obstructive worsening of creatinine in the setting of urinary retention)  -holding home sodium bicarb 650mg BID  #hx BPH    -cardura 4mg (flomax at home, non crushable)    Monitor UO-martinez in place      Labs:          BUN/Cr- 104/4.6  -->,  107/4.0  -->          Electrolytes-Na 148 // K 4.8 // Mg -- //  Phos -- (12-28 @ 17:40)    HEME/ONC:   #DVT prophylaxis     -SCDs, HSQ  #hx of CVA    -ASA cleared by NSGY    -Plavix HOLDING, will require repeat HCT in 2 wks prior to restart, approx 1/1/24  DVT propylaxis-heparin   Injectable    Labs: Hb/Hct:  6.9/21.8  (12-28 @ 07:56)  -->,  8.9/27.0  (12-28 @ 17:40)  -->                      Plts:  444  -->,  435  -->         ID:  WBC- 18.36  --->>,  18.18  --->>,  24.52  --->>  Temp trend- T(F): 99.5 (29 Dec 2023 08:00), Max: 100.4 (28 Dec 2023 10:00)    #recurrent fevers- improving, now low grade  -MRSA PCR neg (12/22)  -MRSA PCR repeat 12/28 pending  - RVP pending 12/28  Cultures    Bcx 12/18- final neg     Tracheal aspirate 12/21: numerous staph aureus methicillin sensitive    Tracheal aspirate 12/22: staph aureus    C.Diff PCR neg    Blood cx 12/21- no growth at 4 days    Blood cx 12/28 - pending    UA 12/25 negative     UA 12/28 negative  Current antibiotics:  - Vancomycin 500mg x1 dose (12/28)  - Zosyn 3.375 q12 (started 12/28)  #ID consult  -D/C'd zosyn & start IV Ancef  #multiple loose bowel movements    -WBC trending up    -no bowel regimen      ENDO:  #DM     -ISS     -Off insulin gtt 12/20      -Lantus 10units BID     -FSG q4 while on TF    MSK:     Activity - Increase As Tolerated    -B/L knee X ray 12/19- no fractures, b/l effusions     LINES/DRAINS:  PIV, right basilic midline (placed 12/16), 20Fr PEG (12/22), Tracheostomy (12/22)    ADVANCED DIRECTIVES:  Full Code    HCP/Emergency Contact-Sarah Gamino (Wife) [phone], Cantonese speaking    DISPO:  Social work, case management consult for dispo to vent facility - spoke with social work 12/23, starting to work on D/C.     78y Male s/p mechanical fall. +HT, +AC (Aspirin and Plavix), -LOC.    12/21-intubated for airway protection, accessory muscle use  12/22- s/p tracheostomy and PEG placement, Portex cuffed 9 and 20fr PEG 2cm at skin     NEURO:  #Acute SDH   -12/18 HCT#4 -stable SDH, no new acute findings  -Q4 neuro checks  - NSGY: f/up with [facility] on 12/31, then assess restarting plavix  - < from: CT Head No Cont (12.27.23 @ 20:49): SDH improving, stable.   #ho stroke (2/2023) w/residual right sided weakness  - RIGHT anterior thalamus infarct,  LEFT caudate head lacunar infarct  #focal seizure    - vEEG 12/19: No seizures; Discontinued 12/19    - Valproic acid 500q12 --> VPA level 12/27 (albumin adj) - 83.0     - NCC: valproic acid current dose, q4 neurochecks    - Neurology cs- d/c EEG, 2 days prior to discharge call neurology so they can do a spot EEG --> recalled on 12/28 to evaluate diminished mental status, recommend repeat EEG and continue depakote 500 q12  #pain  - acetaminophen     Tablet .. 650 milliGRAM(s) Oral every 6 hours      RESP:   #Respiratory Failure secondary to impaired secretion clearance    -s/p tracheostomy 12/22, size 9 cuffed portex    -daily PS trials  RR (machine): 14, TV (machine): 350, FiO2: 30, PEEP: 5  12-29 @ 04:15--7.48 / 27 / 173 / 20 / [id number] 99.7 / Lactate 1.5 / iCal 1.11   - chest PT q4 and duoneb treatments q 4   - deep tracheal aspirate cx sent (due to fevers)  Culture - Sputum . (12.21.23 @ 18:14)-  Numerous Staphylococcus aureus --> 12/28 repeat pending  #Activity    -increase as tolerated    CARDS:   #acute HYPOtension intraop, resolved  -off levophed since 12/22 PM  #hx of HTN  -Amlodpine 10mg HOLDING   -Labetalol 200mg q8 HOLDING  -Valsartan 320mg HOLDING   #hx HLD  -atorvastatin  #NSVT    -EP/Cards: No arrhythmias on tele only artifact. No further work up needed. Recommend ILR prior to discharge if patient/family agreeable  Imaging:   Echo: 12/2023: EF 66%, G1DD, trace MR, mild TR     GI/NUTR:   #Diet, NPO with Tube Feed via 20Fr PEG placed 12/22   Glucerna @ 70 cc/hr x18 hours (switched back to glucerna from peptamen AF to optimize glycemic control)    - nutrition, 12/27 ; change feeds from glucerna to Peptamen AF at 70cc x 18hr. Re-evaluate IV protonix, re-evaluate lantus timing/glycemic ctrl, monitor rectal output. Trial banatrol if loose stools persist    -aspiration precautions, HOB 30  #GI Prophylaxis  -Protonix 40mg   #Bowel regimen     -Multiple loose bowel movements, last dose senna 12/19 at 2200 > dignishield placed 12/23     -C diff negative     -no bowel regimen    /RENAL:   #urine output   -martinez reinserted 12/28 due to retention  -UA neg 12/25 (r/o UTI in setting of persistent fevers)   -Sodium goal 140-150  #Hypernatremia  - FWD 2.2 L --> increased free water flushes to 200cc q4 hours  #hyperphosphatemia  -Started sevelmaer powder, non-formulary so as not to clog PEG   #CKD   -baseline Cr 2.1, now 4.4--> nephro c/s placed (likely post-obstructive worsening of creatinine in the setting of urinary retention)  -holding home sodium bicarb 650mg BID  #hx BPH    -cardura 4mg (flomax at home, non crushable)    Monitor UO-martinez in place      Labs:          BUN/Cr- 104/4.6  -->,  107/4.0  -->          Electrolytes-Na 148 // K 4.8 // Mg -- //  Phos -- (12-28 @ 17:40)    HEME/ONC:   #DVT prophylaxis     -SCDs, HSQ  #hx of CVA    -ASA cleared by NSGY    -Plavix HOLDING, will require repeat HCT in 2 wks prior to restart, approx 1/1/24  DVT propylaxis-heparin   Injectable    Labs: Hb/Hct:  6.9/21.8  (12-28 @ 07:56)  -->,  8.9/27.0  (12-28 @ 17:40)  -->                      Plts:  444  -->,  435  -->         ID:  WBC- 18.36  --->>,  18.18  --->>,  24.52  --->>  Temp trend- T(F): 99.5 (29 Dec 2023 08:00), Max: 100.4 (28 Dec 2023 10:00)    #recurrent fevers- improving, now low grade  -MRSA PCR neg (12/22)  -MRSA PCR repeat 12/28 pending  - RVP pending 12/28  Cultures    Bcx 12/18- final neg     Tracheal aspirate 12/21: numerous staph aureus methicillin sensitive    Tracheal aspirate 12/22: staph aureus    C.Diff PCR neg    Blood cx 12/21- no growth at 4 days    Blood cx 12/28 - pending    UA 12/25 negative     UA 12/28 negative  Current antibiotics:  - Vancomycin 500mg x1 dose (12/28)  - Zosyn 3.375 q12 (started 12/28)  #ID consult  -D/C'd zosyn & start IV Ancef  #multiple loose bowel movements    -WBC trending up    -no bowel regimen      ENDO:  #DM     -ISS     -Off insulin gtt 12/20      -Lantus 10units BID     -FSG q4 while on TF    MSK:     Activity - Increase As Tolerated    -B/L knee X ray 12/19- no fractures, b/l effusions     LINES/DRAINS:  PIV, right basilic midline (placed 12/16), 20Fr PEG (12/22), Tracheostomy (12/22)    ADVANCED DIRECTIVES:  Full Code    HCP/Emergency Contact-Sarah Gamino (Wife) [phone], Cantonese speaking    DISPO:  Social work, case management consult for dispo to vent facility - spoke with social work 12/23, starting to work on D/C.     78y Male s/p mechanical fall. +HT, +AC (Aspirin and Plavix), -LOC.    12/21-intubated for airway protection, accessory muscle use  12/22- s/p tracheostomy and PEG placement, Portex cuffed 9 and 20fr PEG 2cm at skin     NEURO:  #Acute SDH   -12/18 HCT#4 -stable SDH, no new acute findings  -Q4 neuro checks  - NSGY: f/up with [facility] on 12/31, then assess restarting plavix  - < from: CT Head No Cont (12.27.23 @ 20:49): SDH improving, stable.   #ho stroke (2/2023) w/residual right sided weakness  - RIGHT anterior thalamus infarct,  LEFT caudate head lacunar infarct  #focal seizure    - vEEG 12/19: No seizures; Discontinued 12/19    - Valproic acid 500q12 --> VPA level 12/27 (albumin adj) - 83.0     - NCC: valproic acid current dose, q4 neurochecks    - Neurology cs- d/c EEG, 2 days prior to discharge call neurology so they can do a spot EEG --> recalled on 12/28 to evaluate diminished mental status, recommend repeat EEG and continue depakote 500 q12    - Repeat EEG, 12/28: No electrographic seizures or significant clinical events. Abnormal due to the presence of focal and generalized slowing. Findings consistent with focal and diffuse electrocerebral dysfunction secondary to structural/metabolic/nonspecific etiology.  #pain  - acetaminophen     Tablet .. 650 milliGRAM(s) Oral every 6 hours      RESP:   #Respiratory Failure secondary to impaired secretion clearance    -s/p tracheostomy 12/22, size 9 cuffed portex    -daily PS/t-piece trials  RR (machine): 14, TV (machine): 350, FiO2: 30, PEEP: 5  12-29 @ 04:15--7.48 / 27 / 173 / 20 / [id number] 99.7 / Lactate 1.5 / iCal 1.11   - chest PT q4 and duoneb treatments q 4   - deep tracheal aspirate cx sent (due to fevers)  Culture - Sputum . (12.21.23 @ 18:14)-  Numerous Staphylococcus aureus --> 12/28 repeat pending  #Activity    -increase as tolerated    CARDS:   #acute HYPOtension intraop, resolved  -off levophed since 12/22 PM  #hx of HTN  -Amlodpine 10mg HOLDING   -Labetalol 200mg q8 HOLDING  -Valsartan 320mg HOLDING   #hx HLD  -atorvastatin  #NSVT    -EP/Cards: No arrhythmias on tele only artifact. No further work up needed. Recommend ILR prior to discharge if patient/family agreeable  Imaging:   Echo: 12/2023: EF 66%, G1DD, trace MR, mild TR     GI/NUTR:   #Diet, NPO with Tube Feed via 20Fr PEG placed 12/22   Glucerna 1.2 @ 70 cc/hr x18 hours (switched back to glucerna from peptamen AF to optimize glycemic control)    - nutrition, 12/27 ; change feeds from glucerna to Peptamen AF at 70cc x 18hr. Re-evaluate IV protonix, re-evaluate lantus timing/glycemic ctrl, monitor rectal output. Trial banatrol if loose stools persist    -aspiration precautions, HOB 30  #GI Prophylaxis  -Protonix 40mg   #Bowel regimen     -Multiple loose bowel movements, last dose senna 12/19 at 2200 > dignishield placed 12/23     -C diff negative     -no bowel regimen    /RENAL:   #urine output   -martinez reinserted 12/28 due to retention  -UA neg 12/25 (r/o UTI in setting of persistent fevers)   -Sodium goal 140-150  #Hypernatremia  - FWD 2.2 L --> increased free water flushes to 200cc q4 hours  #hyperphosphatemia  -Started sevelmaer powder, non-formulary so as not to clog PEG   #CKD   -baseline Cr 2.1, now 4.4--> nephro c/s placed (likely post-obstructive worsening of creatinine in the setting of urinary retention)  -holding home sodium bicarb 650mg BID  #hx BPH    -cardura 4mg (flomax at home, non crushable)    Monitor UO-martinez in place      Labs:          BUN/Cr- 104/4.6  -->,  107/4.0  -->          Electrolytes-Na 148 // K 4.8 // Mg -- //  Phos -- (12-28 @ 17:40)    HEME/ONC:   #DVT prophylaxis     -SCDs, HSQ  #hx of CVA    -ASA cleared by NSGY    -Plavix HOLDING, will require repeat HCT in 2 wks prior to restart, approx 1/1/24  DVT propylaxis-heparin   Injectable    Labs: Hb/Hct:  6.9/21.8  (12-28 @ 07:56)  -->,  8.9/27.0  (12-28 @ 17:40)  -->                      Plts:  444  -->,  435  -->         ID:  WBC- 18.36  --->>,  18.18  --->>,  24.52  --->>  Temp trend- T(F): 99.5 (29 Dec 2023 08:00), Max: 100.4 (28 Dec 2023 10:00)    #recurrent fevers- improving, now low grade  -MRSA PCR neg (12/22)  -MRSA PCR repeat 12/28 pending  - RVP pending 12/28  Cultures    Bcx 12/18- final neg     Tracheal aspirate 12/21: numerous staph aureus methicillin sensitive    Tracheal aspirate 12/22: staph aureus    C.Diff PCR neg    Blood cx 12/21- no growth at 4 days    Blood cx 12/28 - pending    UA 12/25 negative     UA 12/28 negative  Current antibiotics:  - Vancomycin 500mg x1 dose (12/28)  - Zosyn 3.375 q12 (started 12/28)  #ID consult  -D/C'd zosyn & start IV Ancef  #multiple loose bowel movements    -WBC trending up    -no bowel regimen      ENDO:  #DM     -ISS     -Off insulin gtt 12/20      -Lantus 20 HS     -FSG q4 while on TF    MSK:     Activity - Increase As Tolerated    -B/L knee X ray 12/19- no fractures, b/l effusions     LINES/DRAINS:  PIV, right basilic midline (placed 12/16), 20Fr PEG (12/22), Tracheostomy (12/22)    ADVANCED DIRECTIVES:  Full Code    HCP/Emergency Contact-Sarah Gamino (Wife) [phone], Cantonese speaking    DISPO:  Social work, case management consult for dispo to vent facility - spoke with social work 12/23, starting to work on D/C.     78y Male s/p mechanical fall. +HT, +AC (Aspirin and Plavix), -LOC.    12/21-intubated for airway protection, accessory muscle use  12/22- s/p tracheostomy and PEG placement, Portex cuffed 9 and 20fr PEG 2cm at skin     NEURO:  #Acute SDH   -12/18 HCT#4 -stable SDH, no new acute findings  -Q4 neuro checks  - NSGY: f/up with [facility] on 12/31, then assess restarting plavix  - < from: CT Head No Cont (12.27.23 @ 20:49): SDH improving, stable.   #ho stroke (2/2023) w/residual right sided weakness  - RIGHT anterior thalamus infarct,  LEFT caudate head lacunar infarct  #focal seizure    - vEEG 12/19: No seizures; Discontinued 12/19    - Valproic acid 500q12 --> VPA level 12/27 (albumin adj) - 83.0     - NCC: valproic acid current dose, q4 neurochecks    - Neurology cs- d/c EEG, 2 days prior to discharge call neurology so they can do a spot EEG --> recalled on 12/28 to evaluate diminished mental status, recommend repeat EEG and continue depakote 500 q12    - Repeat EEG, 12/28: No electrographic seizures or significant clinical events. Abnormal due to the presence of focal and generalized slowing. Findings consistent with focal and diffuse electrocerebral dysfunction secondary to structural/metabolic/nonspecific etiology.  #pain  - acetaminophen     Tablet .. 650 milliGRAM(s) Oral every 6 hours      RESP:   #Respiratory Failure secondary to impaired secretion clearance    -s/p tracheostomy 12/22, size 9 cuffed portex    -daily PS/t-piece trials  RR (machine): 14, TV (machine): 350, FiO2: 30, PEEP: 5  12-29 @ 04:15--7.48 / 27 / 173 / 20 / [id number] 99.7 / Lactate 1.5 / iCal 1.11   - chest PT q4 and duoneb treatments q 4   - deep tracheal aspirate cx sent (due to fevers)  Culture - Sputum . (12.21.23 @ 18:14)-  Numerous Staphylococcus aureus --> 12/28 repeat pending  #Activity    -increase as tolerated    CARDS:   #acute HYPOtension intraop, resolved  -off levophed since 12/22 PM  #hx of HTN  -Amlodpine 10mg HOLDING   -Labetalol 200mg q8 HOLDING  -Valsartan 320mg HOLDING   #hx HLD  -atorvastatin  #NSVT    -EP/Cards: No arrhythmias on tele only artifact. No further work up needed. Recommend ILR prior to discharge if patient/family agreeable  Imaging:   Echo: 12/2023: EF 66%, G1DD, trace MR, mild TR     GI/NUTR:   #Diet, NPO with Tube Feed via 20Fr PEG placed 12/22   Glucerna 1.2 @ 70 cc/hr x18 hours (switched back to glucerna from peptamen AF to optimize glycemic control)    - nutrition, 12/27 ; change feeds from glucerna to Peptamen AF at 70cc x 18hr. Re-evaluate IV protonix, re-evaluate lantus timing/glycemic ctrl, monitor rectal output. Trial banatrol if loose stools persist    -aspiration precautions, HOB 30  #GI Prophylaxis  -Protonix 40mg   #Bowel regimen     -Multiple loose bowel movements, last dose senna 12/19 at 2200 > dignishield placed 12/23     -C diff negative     -no bowel regimen    /RENAL:   #urine output   -martinez reinserted 12/28 due to retention  -UA neg 12/25 (r/o UTI in setting of persistent fevers)   -Sodium goal 140-150  #Hypernatremia  - FWD 2.2 L --> increased free water flushes to 200cc q4 hours  #hyperphosphatemia  -Started sevelmaer powder, non-formulary so as not to clog PEG   #CKD   -baseline Cr 2.1, now 4.4--> nephro c/s placed (likely post-obstructive worsening of creatinine in the setting of urinary retention)  -holding home sodium bicarb 650mg BID  #hx BPH    -cardura 4mg (flomax at home, non crushable)    Monitor UO-martinez in place      Labs:          BUN/Cr- 104/4.6  -->,  107/4.0  -->          Electrolytes-Na 148 // K 4.8 // Mg -- //  Phos -- (12-28 @ 17:40)    HEME/ONC:   #DVT prophylaxis     -SCDs, HSQ  #hx of CVA    -ASA cleared by NSGY    -Plavix HOLDING, will require repeat HCT in 2 wks prior to restart, approx 1/1/24  DVT propylaxis-heparin   Injectable    Labs: Hb/Hct:  6.9/21.8  (12-28 @ 07:56)  -->,  8.9/27.0  (12-28 @ 17:40)  -->                      Plts:  444  -->,  435  -->         ID:  WBC- 18.36  --->>,  18.18  --->>,  24.52  --->>  Temp trend- T(F): 99.5 (29 Dec 2023 08:00), Max: 100.4 (28 Dec 2023 10:00)    #recurrent fevers- improving, now low grade  -MRSA PCR neg (12/22)  -MRSA PCR repeat 12/28 pending  - RVP pending 12/28  Cultures    Bcx 12/18- final neg     Tracheal aspirate 12/21: numerous staph aureus methicillin sensitive    Tracheal aspirate 12/22: staph aureus    C.Diff PCR neg    Blood cx 12/21- no growth at 4 days    Blood cx 12/28 - pending    UA 12/25 negative     UA 12/28 negative  Current antibiotics:  - Vancomycin 500mg x1 dose (12/28)  - Zosyn 3.375 q12 (started 12/28)  #ID consult  -D/C'd zosyn & start IV Ancef  #multiple loose bowel movements    -WBC trending up    -no bowel regimen      ENDO:  #DM     -ISS     -Off insulin gtt 12/20      -Lantus 20 HS     -FSG q4 while on TF    MSK:     Activity - Increase As Tolerated    -B/L knee X ray 12/19- no fractures, b/l effusions     LINES/DRAINS:  PIV, right basilic midline (placed 12/16), 20Fr PEG (12/22), Tracheostomy (12/22)    ADVANCED DIRECTIVES:  Full Code    HCP/Emergency Contact-Sarah Gamino (Wife) [phone], Cantonese speaking    DISPO:  Social work, case management consult for dispo to vent facility - spoke with social work 12/23, starting to work on D/C.

## 2023-12-29 NOTE — PROGRESS NOTE ADULT - ASSESSMENT
78y Male being evaluated for goals of care and symptom management r/t fall w SDH. Pt in SICU and on EEG monitoring. Pt overall prognosis is guarded, ongoing acute management with concern that he might need PEG.     Previously discussed GOC with family. Primary team spoke with family and gave a medical update, family still wanted to pursue everything at this time and wanting to see how he does before making any further decisions. Will follow up with family after the weekend.    Discussed with SICU team.     Please call [phone] with questions or concerns 24/7.   We will continue to follow.

## 2023-12-29 NOTE — CONSULT NOTE ADULT - CONSULT REASON
ICH/SDH
s/p mechanical fall
Fever, staph aureus in sputum
SDH
Q1 Neuro checks
Co-management
SAGRARIO
NSVT evaluation
VT on tele
Goals of care and Symptom Management
Seizure

## 2023-12-29 NOTE — PROGRESS NOTE ADULT - PROBLEM SELECTOR PROBLEM 3
Cholesterol 166 triglyceride increased from 171-243, HDL 44, LDL 73 advised to continue simvastatin 40 mg daily  Advised to continue low-cholesterol, low saturated, low carbs diet  He has been taking also fish well as well 
Altered mental status

## 2023-12-29 NOTE — PROGRESS NOTE ADULT - ATTENDING COMMENTS
This patient has a high probability of sudden, clinically significant deterioration, which requires the highest level of physician preparedness to intervene urgently. I managed/supervised life or organ supporting interventions that required frequent physician assessment. I devoted my full attention in the ICU to the direct care of this patient for the period of time indicated below. Time I spent with the family or surrogate(s) is included only if the patient was incapable of providing the necessary information or participating in medical decision making. Time devoted to teaching and to any procedures I billed separately is not included.     Patient is examined and evaluated at the bedside with SICU team. Treatment plan discussed with SICU team, nurses and primary team.   Vital signs, laboratory work, and imaging reviewed during rounds.  Will continue hemodynamic monitoring as per protocol in SICU.    24h events: received one unit PRBC overnight, 6 hours of pressure support followed by 6 hours of t piece, started antibiotics, obtained cultures    Neuro: GCS 7T [id number]    Medications -  tylenol ATC for fevers, amantadine to promote wakefulness, valproic acid for possible focal seizures  Studies - EEG showed focal and generalized slowing    Respiratory: respirations even and unlabored on mechanical ventilation, Q2H suctioning  Mode: AC/ CMV (Assist Control/ Continuous Mandatory Ventilation), RR (machine): 14, TV (machine): 350, FiO2: 30, PEEP: 5, ITime: 1, MAP: 9, PIP: 14  ABG - ( 29 Dec 2023 04:15 )  pH, Arterial: 7.48  pH, Blood: x     /  pCO2: 27    /  pO2: 173   / HCO3: 20    / Base Excess: -2.0  /  SaO2: 99.7    Currently on CPAP vent wean 5/5 breathing 29 with tidal volumes of 350-400  T piece trials as able  Medications - duonebs q4h  Obtain CXR tomorrow    CV: monitor hemodynamics, MAP goal > 65  Medications - amlodipine/labetalol on hold for decreased SBP, atorvastatin  Home medications - coreg, valsartan, atorvastatin  Studies - no recent studies    GI: abd s/nt/nd, PEG @ 2.5 at skin edge, loose stools (300 since placement yesterday)  Nutrition - Glucerna @ 70cc/h for 18h/d   Medications - protonix  Ppx - protonix      Renal: Continue I&Os monitoring, replete electrolytes as needed  12-29    151<H>  |  116<H>  |  109<HH>  ----------------------------<  191<H>  4.9   |  17  |  3.6<H>    Ca    8.0<L>      29 Dec 2023 05:43  Phos  5.0     12-29  Mg     3.4     12-29    TPro  5.1<L>  /  Alb  2.5<L>  /  TBili  0.2  /  DBili  <0.2  /  AST  28  /  ALT  5   /  AlkPhos  86  12-27    UOP - 2344  I/O - 2460/2644  Aguirre catheter - in place for retention/obstructive uropathy   Q4H, give 10 of metolazone today for hypernatremia    Heme: continue to evaluate for acute blood loss anemia- trend Hg/Hct                         9.1    19.60 )-----------( 458      ( 29 Dec 2023 05:43 )             28.1     Anticoagulation - SQH    ID: trend WBC, monitor for fevers  Recent culture data - U/A negative, awaiting MRSA swab, sputum culture, and blood cultures  Antibiotics - vanc/zosyn    Endocrine: Prevent and treat hyperglycemia with insulin as needed, change Lantus to 20 units at night from 10 BID    PV: follow pulse exam    MSK: OOB and mobilize as able, PT eval    Skin: decub precautions    Lines: PIV, RUE midline, trach, PEG  DVT Prophylaxis: SQH  Stress Gastritis Prophylaxis: Protonix   Disposition: SICU  Ongoing GOC discussions with family    I saw and evaluated the patient personally. I have reviewed and agree with note above. Treatment plan discussed with SICU team, nurses and primary team at the time of the multidisciplinary rounds.     Isidro Montemayor MD  Trauma/ACS/SCC Attending This patient has a high probability of sudden, clinically significant deterioration, which requires the highest level of physician preparedness to intervene urgently. I managed/supervised life or organ supporting interventions that required frequent physician assessment. I devoted my full attention in the ICU to the direct care of this patient for the period of time indicated below. Time I spent with the family or surrogate(s) is included only if the patient was incapable of providing the necessary information or participating in medical decision making. Time devoted to teaching and to any procedures I billed separately is not included.     Patient is examined and evaluated at the bedside with SICU team. Treatment plan discussed with SICU team, nurses and primary team.   Vital signs, laboratory work, and imaging reviewed during rounds.  Will continue hemodynamic monitoring as per protocol in SICU.    24h events: received one unit PRBC overnight, 6 hours of pressure support followed by 6 hours of t piece, started antibiotics, obtained cultures    Neuro: GCS 7T [id number]    Medications -  tylenol ATC for fevers, amantadine to promote wakefulness, valproic acid for possible focal seizures  Studies - EEG showed focal and generalized slowing    Respiratory: respirations even and unlabored on mechanical ventilation, Q2H suctioning  Mode: AC/ CMV (Assist Control/ Continuous Mandatory Ventilation), RR (machine): 14, TV (machine): 350, FiO2: 30, PEEP: 5, ITime: 1, MAP: 9, PIP: 14  ABG - ( 29 Dec 2023 04:15 )  pH, Arterial: 7.48  pH, Blood: x     /  pCO2: 27    /  pO2: 173   / HCO3: 20    / Base Excess: -2.0  /  SaO2: 99.7    Currently on CPAP vent wean 5/5 breathing 29 with tidal volumes of 350-400  T piece trials as able  Medications - duonebs q4h  Obtain CXR tomorrow    CV: monitor hemodynamics, MAP goal > 65  Medications - amlodipine/labetalol on hold for decreased SBP, atorvastatin  Home medications - coreg, valsartan, atorvastatin  Studies - no recent studies    GI: abd s/nt/nd, PEG @ 2.5 at skin edge, loose stools (300 since placement yesterday)  Nutrition - Glucerna @ 70cc/h for 18h/d   Medications - protonix  Ppx - protonix      Renal: Continue I&Os monitoring, replete electrolytes as needed  12-29    151<H>  |  116<H>  |  109<HH>  ----------------------------<  191<H>  4.9   |  17  |  3.6<H>    Ca    8.0<L>      29 Dec 2023 05:43  Phos  5.0     12-29  Mg     3.4     12-29    TPro  5.1<L>  /  Alb  2.5<L>  /  TBili  0.2  /  DBili  <0.2  /  AST  28  /  ALT  5   /  AlkPhos  86  12-27    UOP - 2344  I/O - 2460/2644  Aguirre catheter - in place for retention/obstructive uropathy   Q4H, give 10 of metolazone today for hypernatremia    Heme: continue to evaluate for acute blood loss anemia- trend Hg/Hct                         9.1    19.60 )-----------( 458      ( 29 Dec 2023 05:43 )             28.1     Anticoagulation - SQH    ID: trend WBC, monitor for fevers  Recent culture data - U/A negative, awaiting MRSA swab, sputum culture, and blood cultures  Antibiotics - vanc/zosyn    Endocrine: Prevent and treat hyperglycemia with insulin as needed, change Lantus to 20 units at night from 10 BID    PV: follow pulse exam    MSK: OOB and mobilize as able, PT eval    Skin: decub precautions    Lines: PIV, RUE midline, trach, PEG  DVT Prophylaxis: SQH  Stress Gastritis Prophylaxis: Protonix   Disposition: SICU  Ongoing GOC discussions with family    I saw and evaluated the patient personally. I have reviewed and agree with note above. Treatment plan discussed with SICU team, nurses and primary team at the time of the multidisciplinary rounds.     Isidro Montemayor MD  Trauma/ACS/SCC Attending This patient has a high probability of sudden, clinically significant deterioration, which requires the highest level of physician preparedness to intervene urgently. I managed/supervised life or organ supporting interventions that required frequent physician assessment. I devoted my full attention in the ICU to the direct care of this patient for the period of time indicated below. Time I spent with the family or surrogate(s) is included only if the patient was incapable of providing the necessary information or participating in medical decision making. Time devoted to teaching and to any procedures I billed separately is not included.     Patient is examined and evaluated at the bedside with SICU team. Treatment plan discussed with SICU team, nurses and primary team.   Vital signs, laboratory work, and imaging reviewed during rounds.  Will continue hemodynamic monitoring as per protocol in SICU.    24h events: received one unit PRBC overnight, 6 hours of pressure support followed by 6 hours of t piece, started antibiotics, obtained cultures    Neuro: GCS 7T [id number]    Medications -  tylenol ATC for fevers, amantadine to promote wakefulness, valproic acid for possible focal seizures  Studies - EEG showed focal and generalized slowing    Respiratory: respirations even and unlabored on mechanical ventilation, Q2H suctioning  Mode: AC/ CMV (Assist Control/ Continuous Mandatory Ventilation), RR (machine): 14, TV (machine): 350, FiO2: 30, PEEP: 5, ITime: 1, MAP: 9, PIP: 14  ABG - ( 29 Dec 2023 04:15 )  pH, Arterial: 7.48  pH, Blood: x     /  pCO2: 27    /  pO2: 173   / HCO3: 20    / Base Excess: -2.0  /  SaO2: 99.7    Currently on CPAP vent wean 5/5 breathing 29 with tidal volumes of 350-400  T piece trials as able  Medications - duonebs q4h  CXR reviewed and interpreted by me: no acute pulmonary process    CV: monitor hemodynamics, MAP goal > 65  Medications - amlodipine/labetalol on hold for decreased SBP, atorvastatin  Home medications - coreg, valsartan, atorvastatin  Studies - no recent studies    GI: abd s/nt/nd, PEG @ 2.5 at skin edge, loose stools (300 since placement yesterday)  Nutrition - Glucerna @ 70cc/h for 18h/d   Medications - protonix  Ppx - protonix      Renal: Continue I&Os monitoring, replete electrolytes as needed  12-29    151<H>  |  116<H>  |  109<HH>  ----------------------------<  191<H>  4.9   |  17  |  3.6<H>    Ca    8.0<L>      29 Dec 2023 05:43  Phos  5.0     12-29  Mg     3.4     12-29    TPro  5.1<L>  /  Alb  2.5<L>  /  TBili  0.2  /  DBili  <0.2  /  AST  28  /  ALT  5   /  AlkPhos  86  12-27    UOP - 2344  I/O - 2460/2644  Aguirre catheter - in place for retention/obstructive uropathy   Q4H, give 10 of metolazone today for hypernatremia    Heme: continue to evaluate for acute blood loss anemia- trend Hg/Hct                         9.1    19.60 )-----------( 458      ( 29 Dec 2023 05:43 )             28.1     Anticoagulation - SQH    ID: trend WBC, monitor for fevers  Recent culture data - U/A negative, awaiting MRSA swab, sputum culture, and blood cultures  Antibiotics - vanc/zosyn    Endocrine: Prevent and treat hyperglycemia with insulin as needed, change Lantus to 20 units at night from 10 BID    PV: follow pulse exam    MSK: OOB and mobilize as able, PT eval    Skin: decub precautions    Lines: PIV, RUE midline, trach, PEG  DVT Prophylaxis: SQH  Stress Gastritis Prophylaxis: Protonix   Disposition: SICU  Ongoing GOC discussions with family    I saw and evaluated the patient personally. I have reviewed and agree with note above. Treatment plan discussed with SICU team, nurses and primary team at the time of the multidisciplinary rounds.     Isidro Montemayor MD  Trauma/ACS/SCC Attending This patient has a high probability of sudden, clinically significant deterioration, which requires the highest level of physician preparedness to intervene urgently. I managed/supervised life or organ supporting interventions that required frequent physician assessment. I devoted my full attention in the ICU to the direct care of this patient for the period of time indicated below. Time I spent with the family or surrogate(s) is included only if the patient was incapable of providing the necessary information or participating in medical decision making. Time devoted to teaching and to any procedures I billed separately is not included.     Patient is examined and evaluated at the bedside with SICU team. Treatment plan discussed with SICU team, nurses and primary team.   Vital signs, laboratory work, and imaging reviewed during rounds.  Will continue hemodynamic monitoring as per protocol in SICU.    24h events: received one unit PRBC overnight, 6 hours of pressure support followed by 6 hours of t piece, started antibiotics, obtained cultures    Neuro: GCS 7T [id number]    Medications -  tylenol ATC for fevers, amantadine to promote wakefulness, valproic acid for possible focal seizures  Studies - EEG showed focal and generalized slowing    Respiratory: respirations even and unlabored on mechanical ventilation, Q2H suctioning  Mode: AC/ CMV (Assist Control/ Continuous Mandatory Ventilation), RR (machine): 14, TV (machine): 350, FiO2: 30, PEEP: 5, ITime: 1, MAP: 9, PIP: 14  ABG - ( 29 Dec 2023 04:15 )  pH, Arterial: 7.48  pH, Blood: x     /  pCO2: 27    /  pO2: 173   / HCO3: 20    / Base Excess: -2.0  /  SaO2: 99.7    Currently on CPAP vent wean 5/5 breathing 29 with tidal volumes of 350-400  T piece trials as able  Medications - duonebs q4h  CXR reviewed and interpreted by me: no acute pulmonary process    CV: monitor hemodynamics, MAP goal > 65  Medications - amlodipine/labetalol on hold for decreased SBP, atorvastatin  Home medications - coreg, valsartan, atorvastatin  Studies - no recent studies    GI: abd s/nt/nd, PEG @ 2.5 at skin edge, loose stools (300 since placement yesterday)  Nutrition - Glucerna @ 70cc/h for 18h/d   Medications - protonix  Ppx - protonix      Renal: Continue I&Os monitoring, replete electrolytes as needed  12-29    151<H>  |  116<H>  |  109<HH>  ----------------------------<  191<H>  4.9   |  17  |  3.6<H>    Ca    8.0<L>      29 Dec 2023 05:43  Phos  5.0     12-29  Mg     3.4     12-29    TPro  5.1<L>  /  Alb  2.5<L>  /  TBili  0.2  /  DBili  <0.2  /  AST  28  /  ALT  5   /  AlkPhos  86  12-27    UOP - 2344  I/O - 2460/2644  Aguirre catheter - in place for retention/obstructive uropathy   Q4H, give 10 of metolazone today for hypernatremia    Heme: continue to evaluate for acute blood loss anemia- trend Hg/Hct                         9.1    19.60 )-----------( 458      ( 29 Dec 2023 05:43 )             28.1     Anticoagulation - SQH    ID: trend WBC, monitor for fevers  Recent culture data - U/A negative, awaiting MRSA swab, sputum culture, and blood cultures  Antibiotics - vanc/zosyn    Endocrine: Prevent and treat hyperglycemia with insulin as needed, change Lantus to 20 units at night from 10 BID    PV: follow pulse exam    MSK: OOB and mobilize as able, PT eval    Skin: decub precautions    Lines: PIV, RUE midline, trach, PEG  DVT Prophylaxis: SQH  Stress Gastritis Prophylaxis: Protonix   Disposition: SICU  Ongoing GOC discussions with family    I saw and evaluated the patient personally. I have reviewed and agree with note above. Treatment plan discussed with SICU team, nurses and primary team at the time of the multidisciplinary rounds.     Isidro Montemayor MD  Trauma/ACS/SCC Attending This patient has a high probability of sudden, clinically significant deterioration, which requires the highest level of physician preparedness to intervene urgently. I managed/supervised life or organ supporting interventions that required frequent physician assessment. I devoted my full attention in the ICU to the direct care of this patient for the period of time indicated below. Time I spent with the family or surrogate(s) is included only if the patient was incapable of providing the necessary information or participating in medical decision making. Time devoted to teaching and to any procedures I billed separately is not included.     Patient is examined and evaluated at the bedside with SICU team. Treatment plan discussed with SICU team, nurses and primary team.   Vital signs, laboratory work, and imaging reviewed during rounds.  Will continue hemodynamic monitoring as per protocol in SICU.    24h events: received one unit PRBC overnight, 6 hours of pressure support followed by 6 hours of t piece, started antibiotics, obtained cultures    Neuro: GCS 7T [id number]    Medications -  tylenol ATC for fevers, amantadine to promote wakefulness, valproic acid for possible focal seizures  Studies - EEG showed focal and generalized slowing    Respiratory: respirations even and unlabored on mechanical ventilation, Q2H suctioning  Mode: AC/ CMV (Assist Control/ Continuous Mandatory Ventilation), RR (machine): 14, TV (machine): 350, FiO2: 30, PEEP: 5, ITime: 1, MAP: 9, PIP: 14  ABG - ( 29 Dec 2023 04:15 )  pH, Arterial: 7.48  pH, Blood: x     /  pCO2: 27    /  pO2: 173   / HCO3: 20    / Base Excess: -2.0  /  SaO2: 99.7    Currently on CPAP vent wean 5/5 breathing 29 with tidal volumes of 350-400  T piece trials as able  Medications - duonebs q4h  CXR reviewed and interpreted by me: no acute pulmonary process    CV: monitor hemodynamics, MAP goal > 65  Medications - amlodipine/labetalol on hold for decreased SBP, atorvastatin  Home medications - coreg, valsartan, atorvastatin  Studies - no recent studies    GI: abd s/nt/nd, PEG @ 2.5 at skin edge, loose stools (300 since placement yesterday)  Nutrition - Glucerna @ 70cc/h for 18h/d   Medications - protonix  Ppx - protonix      Renal: Continue I&Os monitoring, replete electrolytes as needed, monitor BUN - if still increasing on glucerna, will need to discuss with nutrition again as this may be contributing to his mental status   12-29    151<H>  |  116<H>  |  109<HH>  ----------------------------<  191<H>  4.9   |  17  |  3.6<H>    Ca    8.0<L>      29 Dec 2023 05:43  Phos  5.0     12-29  Mg     3.4     12-29    TPro  5.1<L>  /  Alb  2.5<L>  /  TBili  0.2  /  DBili  <0.2  /  AST  28  /  ALT  5   /  AlkPhos  86  12-27    UOP - 2344  I/O - 2460/2644  Aguirre catheter - in place for retention/obstructive uropathy   Q4H, give 10 of metolazone today for hypernatremia    Heme: continue to evaluate for acute blood loss anemia- trend Hg/Hct                         9.1    19.60 )-----------( 458      ( 29 Dec 2023 05:43 )             28.1     Anticoagulation - SQH    ID: trend WBC, monitor for fevers  Recent culture data - U/A negative, awaiting MRSA swab, sputum culture, and blood cultures  Antibiotics - vanc/zosyn    Endocrine: Prevent and treat hyperglycemia with insulin as needed, change Lantus to 20 units at night from 10 BID    PV: follow pulse exam    MSK: OOB and mobilize as able, PT eval    Skin: decub precautions    Lines: PIV, RUE midline, trach, PEG  DVT Prophylaxis: SQH  Stress Gastritis Prophylaxis: Protonix   Disposition: SICU  Ongoing GOC discussions with family. I have discussed the patient's current clinical status with the family.    I saw and evaluated the patient personally. I have reviewed and agree with note above. Treatment plan discussed with SICU team, nurses and primary team at the time of the multidisciplinary rounds.     Isidro Montemayor MD  Trauma/ACS/SCC Attending This patient has a high probability of sudden, clinically significant deterioration, which requires the highest level of physician preparedness to intervene urgently. I managed/supervised life or organ supporting interventions that required frequent physician assessment. I devoted my full attention in the ICU to the direct care of this patient for the period of time indicated below. Time I spent with the family or surrogate(s) is included only if the patient was incapable of providing the necessary information or participating in medical decision making. Time devoted to teaching and to any procedures I billed separately is not included.     Patient is examined and evaluated at the bedside with SICU team. Treatment plan discussed with SICU team, nurses and primary team.   Vital signs, laboratory work, and imaging reviewed during rounds.  Will continue hemodynamic monitoring as per protocol in SICU.    24h events: received one unit PRBC overnight, 6 hours of pressure support followed by 6 hours of t piece, started antibiotics, obtained cultures    Neuro: GCS 7T [id number]    Medications -  tylenol ATC for fevers, amantadine to promote wakefulness, valproic acid for possible focal seizures  Studies - EEG showed focal and generalized slowing    Respiratory: respirations even and unlabored on mechanical ventilation, Q2H suctioning  Mode: AC/ CMV (Assist Control/ Continuous Mandatory Ventilation), RR (machine): 14, TV (machine): 350, FiO2: 30, PEEP: 5, ITime: 1, MAP: 9, PIP: 14  ABG - ( 29 Dec 2023 04:15 )  pH, Arterial: 7.48  pH, Blood: x     /  pCO2: 27    /  pO2: 173   / HCO3: 20    / Base Excess: -2.0  /  SaO2: 99.7    Currently on CPAP vent wean 5/5 breathing 29 with tidal volumes of 350-400  T piece trials as able  Medications - duonebs q4h  CXR reviewed and interpreted by me: no acute pulmonary process    CV: monitor hemodynamics, MAP goal > 65  Medications - amlodipine/labetalol on hold for decreased SBP, atorvastatin  Home medications - coreg, valsartan, atorvastatin  Studies - no recent studies    GI: abd s/nt/nd, PEG @ 2.5 at skin edge, loose stools (300 since placement yesterday)  Nutrition - Glucerna @ 70cc/h for 18h/d   Medications - protonix  Ppx - protonix      Renal: Continue I&Os monitoring, replete electrolytes as needed, monitor BUN - if still increasing on glucerna, will need to discuss with nutrition again as this may be contributing to his mental status   12-29    151<H>  |  116<H>  |  109<HH>  ----------------------------<  191<H>  4.9   |  17  |  3.6<H>    Ca    8.0<L>      29 Dec 2023 05:43  Phos  5.0     12-29  Mg     3.4     12-29    TPro  5.1<L>  /  Alb  2.5<L>  /  TBili  0.2  /  DBili  <0.2  /  AST  28  /  ALT  5   /  AlkPhos  86  12-27    UOP - 2344  I/O - 2460/2644  Aguirre catheter - in place for retention/obstructive uropathy   Q4H, give 10 of metolazone today for hypernatremia    Heme: continue to evaluate for acute blood loss anemia- trend Hg/Hct                         9.1    19.60 )-----------( 458      ( 29 Dec 2023 05:43 )             28.1     Anticoagulation - SQH    ID: trend WBC, monitor for fevers  Recent culture data - U/A negative, awaiting MRSA swab, sputum culture, and blood cultures  Antibiotics - vanc/zosyn    Endocrine: Prevent and treat hyperglycemia with insulin as needed, change Lantus to 20 units at night from 10 BID    PV: follow pulse exam    MSK: OOB and mobilize as able, PT eval    Skin: decub precautions    Lines: PIV, RUE midline, trach, PEG  DVT Prophylaxis: SQH  Stress Gastritis Prophylaxis: Protonix   Disposition: SICU  Ongoing GOC discussions with family. I have discussed the patient's current clinical status with the family.    I saw and evaluated the patient personally. I have reviewed and agree with note above. Treatment plan discussed with SICU team, nurses and primary team at the time of the multidisciplinary rounds.     Isidro Montemayor MD  Trauma/ACS/SCC Attending This patient has a high probability of sudden, clinically significant deterioration, which requires the highest level of physician preparedness to intervene urgently. I managed/supervised life or organ supporting interventions that required frequent physician assessment. I devoted my full attention in the ICU to the direct care of this patient for the period of time indicated below. Time I spent with the family or surrogate(s) is included only if the patient was incapable of providing the necessary information or participating in medical decision making. Time devoted to teaching and to any procedures I billed separately is not included.     Patient is examined and evaluated at the bedside with SICU team. Treatment plan discussed with SICU team, nurses and primary team.   Vital signs, laboratory work, and imaging reviewed during rounds.  Will continue hemodynamic monitoring as per protocol in SICU.    24h events: received one unit PRBC overnight, 6 hours of pressure support followed by 6 hours of t piece, started antibiotics, obtained cultures    Neuro: GCS 7T [id number]  Medications -  tylenol ATC for fevers, amantadine to promote wakefulness, valproic acid for possible focal seizures  Studies - EEG showed focal and generalized slowing  Mental status may be related to infection and uremia    Respiratory: respirations even and unlabored on mechanical ventilation, Q2H suctioning  Mode: AC/ CMV (Assist Control/ Continuous Mandatory Ventilation), RR (machine): 14, TV (machine): 350, FiO2: 30, PEEP: 5, ITime: 1, MAP: 9, PIP: 14  ABG - ( 29 Dec 2023 04:15 )  pH, Arterial: 7.48  pH, Blood: x     /  pCO2: 27    /  pO2: 173   / HCO3: 20    / Base Excess: -2.0  /  SaO2: 99.7    Currently on CPAP vent wean 5/5 breathing 29 with tidal volumes of 350-400  T piece trials as able  Medications - duonebs q4h  CXR reviewed and interpreted by me: no acute pulmonary process    CV: monitor hemodynamics, MAP goal > 65  Medications - amlodipine/labetalol on hold for decreased SBP, atorvastatin  Home medications - coreg, valsartan, atorvastatin  Studies - no recent studies    GI: abd s/nt/nd, PEG @ 2.5 at skin edge, loose stools (300 since placement yesterday)  Nutrition - Glucerna @ 70cc/h for 18h/d   Medications - protonix  Ppx - protonix      Renal: Continue I&Os monitoring, replete electrolytes as needed, monitor BUN - if still increasing on glucerna, will need to discuss with nutrition again as this may be contributing to his mental status   12-29    151<H>  |  116<H>  |  109<HH>  ----------------------------<  191<H>  4.9   |  17  |  3.6<H>    Ca    8.0<L>      29 Dec 2023 05:43  Phos  5.0     12-29  Mg     3.4     12-29    TPro  5.1<L>  /  Alb  2.5<L>  /  TBili  0.2  /  DBili  <0.2  /  AST  28  /  ALT  5   /  AlkPhos  86  12-27    UOP - 2344  I/O - 2460/2644  Aguirre catheter - in place for retention/obstructive uropathy   Q4H, give 10 of metolazone today for hypernatremia    Heme: continue to evaluate for acute blood loss anemia- trend Hg/Hct                         9.1    19.60 )-----------( 458      ( 29 Dec 2023 05:43 )             28.1     Anticoagulation - SQH    ID: trend WBC, monitor for fevers  Recent culture data - U/A negative, awaiting MRSA swab, sputum culture, and blood cultures  Antibiotics - vanc/zosyn    Endocrine: Prevent and treat hyperglycemia with insulin as needed, change Lantus to 20 units at night from 10 BID    PV: follow pulse exam    MSK: OOB and mobilize as able, PT eval    Skin: decub precautions    Lines: PIV, RUE midline, trach, PEG  DVT Prophylaxis: SQH  Stress Gastritis Prophylaxis: Protonix   Disposition: SICU  Ongoing GOC discussions with family. I have discussed the patient's current clinical status with the family.    I saw and evaluated the patient personally. I have reviewed and agree with note above. Treatment plan discussed with SICU team, nurses and primary team at the time of the multidisciplinary rounds.     Isidro Montemayor MD  Trauma/ACS/SCC Attending This patient has a high probability of sudden, clinically significant deterioration, which requires the highest level of physician preparedness to intervene urgently. I managed/supervised life or organ supporting interventions that required frequent physician assessment. I devoted my full attention in the ICU to the direct care of this patient for the period of time indicated below. Time I spent with the family or surrogate(s) is included only if the patient was incapable of providing the necessary information or participating in medical decision making. Time devoted to teaching and to any procedures I billed separately is not included.     Patient is examined and evaluated at the bedside with SICU team. Treatment plan discussed with SICU team, nurses and primary team.   Vital signs, laboratory work, and imaging reviewed during rounds.  Will continue hemodynamic monitoring as per protocol in SICU.    24h events: received one unit PRBC overnight, 6 hours of pressure support followed by 6 hours of t piece, started antibiotics, obtained cultures    Neuro: GCS 7T [id number]  Medications -  tylenol ATC for fevers, amantadine to promote wakefulness, valproic acid for possible focal seizures  Studies - EEG showed focal and generalized slowing  Mental status may be related to infection and uremia    Respiratory: respirations even and unlabored on mechanical ventilation, Q2H suctioning  Mode: AC/ CMV (Assist Control/ Continuous Mandatory Ventilation), RR (machine): 14, TV (machine): 350, FiO2: 30, PEEP: 5, ITime: 1, MAP: 9, PIP: 14  ABG - ( 29 Dec 2023 04:15 )  pH, Arterial: 7.48  pH, Blood: x     /  pCO2: 27    /  pO2: 173   / HCO3: 20    / Base Excess: -2.0  /  SaO2: 99.7    Currently on CPAP vent wean 5/5 breathing 29 with tidal volumes of 350-400  T piece trials as able  Medications - duonebs q4h  CXR reviewed and interpreted by me: no acute pulmonary process    CV: monitor hemodynamics, MAP goal > 65  Medications - amlodipine/labetalol on hold for decreased SBP, atorvastatin  Home medications - coreg, valsartan, atorvastatin  Studies - no recent studies    GI: abd s/nt/nd, PEG @ 2.5 at skin edge, loose stools (300 since placement yesterday)  Nutrition - Glucerna @ 70cc/h for 18h/d   Medications - protonix  Ppx - protonix      Renal: Continue I&Os monitoring, replete electrolytes as needed, monitor BUN - if still increasing on glucerna, will need to discuss with nutrition again as this may be contributing to his mental status   12-29    151<H>  |  116<H>  |  109<HH>  ----------------------------<  191<H>  4.9   |  17  |  3.6<H>    Ca    8.0<L>      29 Dec 2023 05:43  Phos  5.0     12-29  Mg     3.4     12-29    TPro  5.1<L>  /  Alb  2.5<L>  /  TBili  0.2  /  DBili  <0.2  /  AST  28  /  ALT  5   /  AlkPhos  86  12-27    UOP - 2344  I/O - 2460/2644  Aguirre catheter - in place for retention/obstructive uropathy   Q4H, give 10 of metolazone today for hypernatremia    Heme: continue to evaluate for acute blood loss anemia- trend Hg/Hct                         9.1    19.60 )-----------( 458      ( 29 Dec 2023 05:43 )             28.1     Anticoagulation - SQH    ID: trend WBC, monitor for fevers  Recent culture data - U/A negative, awaiting MRSA swab, sputum culture, and blood cultures  Antibiotics - vanc/zosyn    Endocrine: Prevent and treat hyperglycemia with insulin as needed, change Lantus to 20 units at night from 10 BID    PV: follow pulse exam    MSK: OOB and mobilize as able, PT eval    Skin: decub precautions    Lines: PIV, RUE midline, trach, PEG  DVT Prophylaxis: SQH  Stress Gastritis Prophylaxis: Protonix   Disposition: SICU  Ongoing GOC discussions with family. I have discussed the patient's current clinical status with the family.    I saw and evaluated the patient personally. I have reviewed and agree with note above. Treatment plan discussed with SICU team, nurses and primary team at the time of the multidisciplinary rounds.     Isidro Montemayor MD  Trauma/ACS/SCC Attending

## 2023-12-29 NOTE — PROGRESS NOTE ADULT - SUBJECTIVE AND OBJECTIVE BOX
TRAUMA SURGERY PROGRESS NOTE    Patient: JACQUELIN CAI , 78y ([birth year])Male   MRN: [id number]  Location: [address]  Visit: 12-16-23 Inpatient  Date: 12-29-23 @ 09:46    Hospital Day #: 15  Post-Op Day #: 7    Procedure/Dx/Injuries: acute subdural hemorrhage s/p trach and peg    Events of past 24 hours: Tmax 101.5, started on zosyn, fever w/u done, UA negative;  Hgb 6.9, received 1u PRBC -> Hgb 8.9;  vent 30/5, martinez 2L, dignishield 200cc out, C. diff negative    PAST MEDICAL & SURGICAL HISTORY:  HTN (hypertension)  Seasonal allergies  History of BPH  Diabetes  No significant past surgical history      Vitals:   T(F): 99.5 (12-29-23 @ 08:00), Max: 100.4 (12-28-23 @ 10:00)  HR: 93 (12-29-23 @ 08:00)  BP: 118/54 (12-29-23 @ 08:00)  RR: 33 (12-29-23 @ 08:00)  SpO2: 99% (12-29-23 @ 08:00)  Mode: AC/ CMV (Assist Control/ Continuous Mandatory Ventilation), RR (machine): 14, TV (machine): 350, FiO2: 30, PEEP: 5, ITime: 1, MAP: 8, PIP: 14    Diet, NPO with Tube Feed:   Tube Feeding Modality: Gastrostomy  Glucerna 1.2 Tonio  Total Volume for 24 Hours (mL): 1260  Continuous  Starting Tube Feed Rate mL per Hour: 55  Increase Tube Feed Rate by (mL): 10     Every 4 hours  Until Goal Tube Feed Rate (mL per Hour): 70  Tube Feed Duration (in Hours): 18  Tube Feed Start Time: 12:00  Tube Feed Stop Time: 06:00  Free Water Flush  Bolus   Total Volume per Flush (mL): 200   Frequency: Every 4 Hours  Free Water Flush Instructions:  Please flush PEG with 50cc of sterile water before feeds start and 100cc of sterile water after feeds end      Fluids:     I & O's:    12-28-23 @ 07:01  -  12-29-23 @ 07:00  --------------------------------------------------------  IN:    Enteral Tube Flush: 1000 mL    Glucerna: 630 mL    IV PiggyBack: 100 mL    IV PiggyBack: 100 mL    Peptamen A.F.: 630 mL  Total IN: 2460 mL    OUT:    Indwelling Catheter - Urethral (mL): 2344 mL    Rectal Tube (mL): 300 mL  Total OUT: 2644 mL    Total NET: -184 mL      PHYSICAL EXAM:  General: NAD,  HEENT: Trachea ML, Neck supple  Cardiac: RRR  Respiratory: trach in place, vent 30/5, chest rise equal bilaterally  Abdomen: Soft, non-distended, non-tender.  Extremities: soft, well-perfused  Skin: Warm/dry, normal color, no jaundice    MEDICATIONS  (STANDING):  acetaminophen     Tablet .. 650 milliGRAM(s) Oral every 6 hours  albuterol    90 MICROgram(s) HFA Inhaler 90 Puff(s) Inhalation every 4 hours  aspirin  chewable 81 milliGRAM(s) Enteral Tube daily  atorvastatin 20 milliGRAM(s) Oral at bedtime  bacitracin   Ointment 1 Application(s) Topical every 12 hours  chlorhexidine 0.12% Liquid 15 milliLiter(s) Oral Mucosa two times a day  chlorhexidine 2% Cloths 1 Application(s) Topical <User Schedule>  doxazosin 4 milliGRAM(s) Oral at bedtime  heparin   Injectable 5000 Unit(s) SubCutaneous every 8 hours  insulin glargine Injectable (LANTUS) 20 Unit(s) SubCutaneous at bedtime  insulin lispro (ADMELOG) corrective regimen sliding scale   SubCutaneous every 4 hours  ipratropium 17 MICROgram(s) HFA Inhaler 1 Puff(s) Inhalation every 6 hours  metolazone 10 milliGRAM(s) Oral once  pantoprazole  Injectable 40 milliGRAM(s) IV Push every 24 hours  piperacillin/tazobactam IVPB.. 3.375 Gram(s) IV Intermittent every 12 hours  sevelamer carbonate Powder 800 milliGRAM(s) Oral three times a day with meals  valproic  acid Syrup 500 milliGRAM(s) Oral every 12 hours  vitamin A &amp; D Ointment 1 Application(s) Topical every 12 hours    DVT PROPHYLAXIS: SCDs, heparin   Injectable 5000 Unit(s) SubCutaneous every 8 hours    GI PROPHYLAXIS: pantoprazole  Injectable 40 milliGRAM(s) IV Push every 24 hours    ANTIBIOTICS: piperacillin/tazobactam IVPB.. 3.375 Gram(s)      LAB/STUDIES:  Labs:  POCT Blood Glucose.: 201 mg/dL (29 Dec 2023 04:41)  POCT Blood Glucose.: 222 mg/dL (29 Dec 2023 00:03)  POCT Blood Glucose.: 262 mg/dL (28 Dec 2023 21:41)  POCT Blood Glucose.: 292 mg/dL (28 Dec 2023 17:23)  POCT Blood Glucose.: 115 mg/dL (28 Dec 2023 11:28)                          9.1    19.60 )-----------( 458      ( 29 Dec 2023 05:43 )             28.1         12-29    151<H>  |  116<H>  |  109<HH>  ----------------------------<  191<H>  4.9   |  17  |  3.6<H>      Calcium: 8.0 mg/dL (12-29-23 @ 05:43)      LFTs:             5.1  | 0.2  | 28       ------------------[86      ( 27 Dec 2023 17:31 )  2.5  | <0.2 | 5           Lipase:x      Amylase:x         Blood Gas Arterial, Lactate: 1.5 mmol/L (12-29-23 @ 04:15)  Blood Gas Arterial, Lactate: 1.6 mmol/L (12-28-23 @ 03:44)  Blood Gas Arterial, Lactate: 1.6 mmol/L (12-27-23 @ 04:11)    ABG - ( 29 Dec 2023 04:15 )  pH: 7.48  /  pCO2: 27    /  pO2: 173   / HCO3: 20    / Base Excess: -2.0  /  SaO2: 99.7      ABG - ( 28 Dec 2023 03:44 )  pH: 7.44  /  pCO2: 29    /  pO2: 95    / HCO3: 20    / Base Excess: -3.7  /  SaO2: 98.7      ABG - ( 27 Dec 2023 04:11 )  pH: 7.47  /  pCO2: 29    /  pO2: 154   / HCO3: 21    / Base Excess: -2.1  /  SaO2: 100.0     Urinalysis Basic - ( 29 Dec 2023 05:43 )    Color: x / Appearance: x / SG: x / pH: x  Gluc: 191 mg/dL / Ketone: x  / Bili: x / Urobili: x   Blood: x / Protein: x / Nitrite: x   Leuk Esterase: x / RBC: x / WBC x   Sq Epi: x / Non Sq Epi: x / Bacteria: x      ASSESSMENT:  78y Male  w/ PMHx of *** seen as (Code Trauma / Trauma Alert / Trauma Consult) s/p ****** with complaint of *** , external signs of trauma include *** . Trauma assessment in ED with the following injuries identifed: *******     PLAN:   -   -  -  -    Lines/Tubes: PIV, Midline, Central Line, A-Line, Chest tubes, Ronald/MARIA LUISA drains, Martinez Catheter.    TRAUMA SURGERY SPECTRA: [id number] TRAUMA SURGERY PROGRESS NOTE    Patient: JACQUELIN CAI , 78y ([birth year])Male   MRN: [id number]  Location: [address]  Visit: 12-16-23 Inpatient  Date: 12-29-23 @ 09:46    Hospital Day #: 15  Post-Op Day #: 7    Procedure/Dx/Injuries: acute subdural hemorrhage s/p trach and peg    Events of past 24 hours: Tmax 101.5, started on zosyn, fever w/u done, UA negative;  Hgb 6.9, received 1u PRBC -> Hgb 8.9;  vent 30/5, martinez 2L, dignishield 200cc out, C. diff negative    PAST MEDICAL & SURGICAL HISTORY:  HTN (hypertension)  Seasonal allergies  History of BPH  Diabetes  No significant past surgical history      Vitals:   T(F): 99.5 (12-29-23 @ 08:00), Max: 100.4 (12-28-23 @ 10:00)  HR: 93 (12-29-23 @ 08:00)  BP: 118/54 (12-29-23 @ 08:00)  RR: 33 (12-29-23 @ 08:00)  SpO2: 99% (12-29-23 @ 08:00)  Mode: AC/ CMV (Assist Control/ Continuous Mandatory Ventilation), RR (machine): 14, TV (machine): 350, FiO2: 30, PEEP: 5, ITime: 1, MAP: 8, PIP: 14    Diet, NPO with Tube Feed:   Tube Feeding Modality: Gastrostomy  Glucerna 1.2 Tonio  Total Volume for 24 Hours (mL): 1260  Continuous  Starting Tube Feed Rate mL per Hour: 55  Increase Tube Feed Rate by (mL): 10     Every 4 hours  Until Goal Tube Feed Rate (mL per Hour): 70  Tube Feed Duration (in Hours): 18  Tube Feed Start Time: 12:00  Tube Feed Stop Time: 06:00  Free Water Flush  Bolus   Total Volume per Flush (mL): 200   Frequency: Every 4 Hours  Free Water Flush Instructions:  Please flush PEG with 50cc of sterile water before feeds start and 100cc of sterile water after feeds end      Fluids:     I & O's:    12-28-23 @ 07:01  -  12-29-23 @ 07:00  --------------------------------------------------------  IN:    Enteral Tube Flush: 1000 mL    Glucerna: 630 mL    IV PiggyBack: 100 mL    IV PiggyBack: 100 mL    Peptamen A.F.: 630 mL  Total IN: 2460 mL    OUT:    Indwelling Catheter - Urethral (mL): 2344 mL    Rectal Tube (mL): 300 mL  Total OUT: 2644 mL    Total NET: -184 mL      PHYSICAL EXAM:  General: NAD,  HEENT: Trachea ML, Neck supple  Cardiac: RRR  Respiratory: trach in place, vent 30/5, chest rise equal bilaterally  Abdomen: Soft, non-distended, non-tender.  Extremities: soft, well-perfused  Skin: Warm/dry, normal color, no jaundice    MEDICATIONS  (STANDING):  acetaminophen     Tablet .. 650 milliGRAM(s) Oral every 6 hours  albuterol    90 MICROgram(s) HFA Inhaler 90 Puff(s) Inhalation every 4 hours  aspirin  chewable 81 milliGRAM(s) Enteral Tube daily  atorvastatin 20 milliGRAM(s) Oral at bedtime  bacitracin   Ointment 1 Application(s) Topical every 12 hours  chlorhexidine 0.12% Liquid 15 milliLiter(s) Oral Mucosa two times a day  chlorhexidine 2% Cloths 1 Application(s) Topical <User Schedule>  doxazosin 4 milliGRAM(s) Oral at bedtime  heparin   Injectable 5000 Unit(s) SubCutaneous every 8 hours  insulin glargine Injectable (LANTUS) 20 Unit(s) SubCutaneous at bedtime  insulin lispro (ADMELOG) corrective regimen sliding scale   SubCutaneous every 4 hours  ipratropium 17 MICROgram(s) HFA Inhaler 1 Puff(s) Inhalation every 6 hours  metolazone 10 milliGRAM(s) Oral once  pantoprazole  Injectable 40 milliGRAM(s) IV Push every 24 hours  piperacillin/tazobactam IVPB.. 3.375 Gram(s) IV Intermittent every 12 hours  sevelamer carbonate Powder 800 milliGRAM(s) Oral three times a day with meals  valproic  acid Syrup 500 milliGRAM(s) Oral every 12 hours  vitamin A &amp; D Ointment 1 Application(s) Topical every 12 hours    DVT PROPHYLAXIS: SCDs, heparin   Injectable 5000 Unit(s) SubCutaneous every 8 hours    GI PROPHYLAXIS: pantoprazole  Injectable 40 milliGRAM(s) IV Push every 24 hours    ANTIBIOTICS: piperacillin/tazobactam IVPB.. 3.375 Gram(s)      LAB/STUDIES:  Labs:  POCT Blood Glucose.: 201 mg/dL (29 Dec 2023 04:41)  POCT Blood Glucose.: 222 mg/dL (29 Dec 2023 00:03)  POCT Blood Glucose.: 262 mg/dL (28 Dec 2023 21:41)  POCT Blood Glucose.: 292 mg/dL (28 Dec 2023 17:23)  POCT Blood Glucose.: 115 mg/dL (28 Dec 2023 11:28)                          9.1    19.60 )-----------( 458      ( 29 Dec 2023 05:43 )             28.1         12-29    151<H>  |  116<H>  |  109<HH>  ----------------------------<  191<H>  4.9   |  17  |  3.6<H>      Calcium: 8.0 mg/dL (12-29-23 @ 05:43)      LFTs:             5.1  | 0.2  | 28       ------------------[86      ( 27 Dec 2023 17:31 )  2.5  | <0.2 | 5           Lipase:x      Amylase:x         Blood Gas Arterial, Lactate: 1.5 mmol/L (12-29-23 @ 04:15)  Blood Gas Arterial, Lactate: 1.6 mmol/L (12-28-23 @ 03:44)  Blood Gas Arterial, Lactate: 1.6 mmol/L (12-27-23 @ 04:11)    ABG - ( 29 Dec 2023 04:15 )  pH: 7.48  /  pCO2: 27    /  pO2: 173   / HCO3: 20    / Base Excess: -2.0  /  SaO2: 99.7      ABG - ( 28 Dec 2023 03:44 )  pH: 7.44  /  pCO2: 29    /  pO2: 95    / HCO3: 20    / Base Excess: -3.7  /  SaO2: 98.7      ABG - ( 27 Dec 2023 04:11 )  pH: 7.47  /  pCO2: 29    /  pO2: 154   / HCO3: 21    / Base Excess: -2.1  /  SaO2: 100.0     Urinalysis Basic - ( 29 Dec 2023 05:43 )    Color: x / Appearance: x / SG: x / pH: x  Gluc: 191 mg/dL / Ketone: x  / Bili: x / Urobili: x   Blood: x / Protein: x / Nitrite: x   Leuk Esterase: x / RBC: x / WBC x   Sq Epi: x / Non Sq Epi: x / Bacteria: x      ASSESSMENT:  78y Male  w/ PMHx of *** seen as (Code Trauma / Trauma Alert / Trauma Consult) s/p ****** with complaint of *** , external signs of trauma include *** . Trauma assessment in ED with the following injuries identifed: *******     PLAN:   -   -  -  -    Lines/Tubes: PIV, Midline, Central Line, A-Line, Chest tubes, Ronald/MARIA LUISA drains, Martinez Catheter.    TRAUMA SURGERY SPECTRA: [id number]

## 2023-12-29 NOTE — CHART NOTE - NSCHARTNOTEFT_GEN_A_CORE
NO seizure or epileptiform activity on repeated EEG  Please continue to treat infection and w/u other metabolic etiology to current cognitive status   No further neurologic w/u at this time  Please call us back for any neurologic concerns or worsening cognitive status

## 2023-12-29 NOTE — PROGRESS NOTE ADULT - SUBJECTIVE AND OBJECTIVE BOX
HPI:"77-year-old male Cantonese speaking his presents with prior history of hypertension dyslipidemia diabetes BPH prior CVA on aspirin and Plavix who states he had a mechanical fall 2 days ago while getting out of the car fell backwards hit his head.  Afterwards he was able to ambulate.  But for the past 1 day family was unable to ambulate him.  He states that his lower extremities are painful to movement and weak.  Family denies any LOC.  They deny any confusion at home.  On exam oriented to person and place but just not to date.  He does follow simple commands.  Elevates his arms for 10 seconds.  Sensation intact in upper extremities.  Lower extremities with 2 out of 5 strength bilaterally."      Interval history:      ITEMS NOT CHECKED ARE NOT PRESENT       ADVANCE DIRECTIVES:     [ x] Full Code [ ] DNR  MOLST  [ ]  Living Will  [ ]   DECISION MAKER(s):  [ ] Health Care Proxy(s)  [ ] Surrogate(s)  [ ] Guardian           Name(s): Phone Number(s):   Wife Negro Fair [phone]  Dtr Rashi Bryantang - [phone]   BASELINE (I)ADL(s) (prior to admission):    [place]: [x ]Total  [ ] Moderate [ ]Dependent  Palliative Performance Status Version 2:         %    http://npcrc.org/files/news/palliative_performance_scale_ppsv2.pdf    Allergies    [This allergen will not trigger allergy alert] pollen (Unknown)  No Known Drug Allergies    Intolerances    MEDICATIONS  (STANDING):  acetaminophen     Tablet .. 650 milliGRAM(s) Oral every 6 hours  albuterol    90 MICROgram(s) HFA Inhaler 90 Puff(s) Inhalation every 4 hours  aspirin  chewable 81 milliGRAM(s) Enteral Tube daily  atorvastatin 20 milliGRAM(s) Oral at bedtime  bacitracin   Ointment 1 Application(s) Topical every 12 hours  chlorhexidine 0.12% Liquid 15 milliLiter(s) Oral Mucosa two times a day  chlorhexidine 2% Cloths 1 Application(s) Topical <User Schedule>  doxazosin 4 milliGRAM(s) Oral at bedtime  heparin   Injectable 5000 Unit(s) SubCutaneous every 8 hours  insulin glargine Injectable (LANTUS) 20 Unit(s) SubCutaneous at bedtime  insulin lispro (ADMELOG) corrective regimen sliding scale   SubCutaneous every 4 hours  ipratropium 17 MICROgram(s) HFA Inhaler 1 Puff(s) Inhalation every 6 hours  pantoprazole  Injectable 40 milliGRAM(s) IV Push every 24 hours  piperacillin/tazobactam IVPB.. 3.375 Gram(s) IV Intermittent every 12 hours  sevelamer carbonate Powder 800 milliGRAM(s) Oral three times a day with meals  valproic  acid Syrup 500 milliGRAM(s) Oral every 12 hours  vitamin A &amp; D Ointment 1 Application(s) Topical every 12 hours    MEDICATIONS  (PRN):          PRESENT SYMPTOMS: [x ]Unable to obtain due to poor mentation   Source if other than patient:  [ ]Family   [ ]Team     Pain: [ ]yes [ ]no  QOL impact -   Location -                    Aggravating factors -  Quality -  Radiation -  Timing-  Severity (0-10 scale):  Minimal acceptable level (0-10 scale):     CPOT:  0  https://www.[facility].org/getattachment/[email or web address]/Critical-Care-Pain-Observation-Tool-(CPOT)    PAIN AD Score:   http://geriatrictoolkit.[facility]/cog/painad.pdf (press ctrl +  left click to view)    Dyspnea:                           [ ]None[ ]Mild [ ]Moderate [ ]Severe     Respiratory Distress Observation Scale (RDOS): 0  A score of 0 to 2 signifies little or no respiratory distress, 3 signifies mild distress, scores 4 to 6 indicate moderate distress, and scores greater than 7 signify severe distress  [email or web address]/sites/default/[email or web address]/[email or web address]-slvcqafoeez-idgvfans-ybffdkghtiq-[email or web address].pdf    Anxiety:                             [ ]None[ ]Mild [ ]Moderate [ ]Severe   Fatigue:                             [ ]None[ ]Mild [ ]Moderate [ ]Severe   Nausea:                             [ ]None[ ]Mild [ ]Moderate [ ]Severe   Loss of appetite:              [ ]None[ ]Mild [ ]Moderate [ ]Severe   Constipation:                    [ ]None[ ]Mild [ ]Moderate [ ]Severe    Other Symptoms:  [x ]All other review of systems negative     Palliative Performance Status Version 2:         %    http://npcrc.org/files/news/palliative_performance_scale_ppsv2.pdf    PHYSICAL EXAM:    Vital Signs Last 24 Hrs  T(C): 37.7 (29 Dec 2023 12:00), Max: 37.7 (28 Dec 2023 20:00)  T(F): 99.8 (29 Dec 2023 12:00), Max: 99.8 (28 Dec 2023 20:00)  HR: 91 (29 Dec 2023 12:00) (81 - 94)  BP: 112/54 (29 Dec 2023 12:00) (93/50 - 126/61)  BP(mean): 78 (29 Dec 2023 12:00) (68 - 88)  RR: 30 (29 Dec 2023 12:00) (20 - 35)  SpO2: 98% (29 Dec 2023 12:00) (95% - 100%)    Parameters below as of 29 Dec 2023 10:00  Patient On (Oxygen Delivery Method): BiPAP/CPAP          GENERAL:  [x ] No acute distress [ ]Lethargic  [ ]Unarousable  [ ]Verbal  [x ]Non-Verbal [ ]Cachexia    BEHAVIORAL/PSYCH:  [ ]Alert and Oriented x  [ ] Anxiety [ ] Delirium [ ] Agitation [x ] Calm   EYES: [ ] No scleral icterus [ ] Scleral icterus [x ] Closed  ENMT:  [ ]Dry mouth  [ ]No external oral lesions [ ] No external ear or nose lesions  CARDIOVASCULAR:  [ ]Regular [ ]Irregular [ ]Tachy [x ]Not Tachy  [ ]Nikita [ ] Edema [ ] No edema  PULMONARY:  [ ]Tachypnea  [ ]Audible excessive secretions [ x] No labored breathing [ ] labored breathing  GASTROINTESTINAL: [ ]Soft  [ ]Distended  [x ]Not distended [ ]Non tender [ ]Tender  MUSCULOSKELETAL: [ ]No clubbing [ ] clubbing  [ x] No cyanosis [ ] cyanosis  NEUROLOGIC: [ ]No focal deficits  [ ]Follows commands  [x ]Does not follow commands  [ ]Cognitive impairment  [ ]Dysphagia  [ ]Dysarthria  [ ]Paresis   SKIN: [ ] Jaundiced [ ] Non-jaundiced [ ]Rash [ ]No Rash [ ] Warm [ ] Dry  MISC/LINES: [ ] ET tube [x ] Trach [ ]NGT/OGT [ ]PEG [x ]Aguirre    LABS: reviewed by me                            9.1    19.60 )-----------( 458      ( 29 Dec 2023 05:43 )             28.1     12-29    151<H>  |  116<H>  |  109<HH>  ----------------------------<  191<H>  4.9   |  17  |  3.6<H>    Ca    8.0<L>      29 Dec 2023 05:43  Phos  5.0     12-29  Mg     3.4     12-29    TPro  5.1<L>  /  Alb  2.5<L>  /  TBili  0.2  /  DBili  <0.2  /  AST  28  /  ALT  5   /  AlkPhos  86  12-27      Urinalysis Basic - ( 29 Dec 2023 05:43 )    Color: x / Appearance: x / SG: x / pH: x  Gluc: 191 mg/dL / Ketone: x  / Bili: x / Urobili: x   Blood: x / Protein: x / Nitrite: x   Leuk Esterase: x / RBC: x / WBC x   Sq Epi: x / Non Sq Epi: x / Bacteria: x          RADIOLOGY & ADDITIONAL STUDIES: reviewed by me    < from: CT Head No Cont (12.27.23 @ 20:49) >  Technique: CT of the head was performed without contrast.    Multiple contiguous axial images were acquired from the skullbase to the   vertex without the administration of intravenous contrast.  Coronal and   sagittal reformations were made.    COMPARISON: CT head 12/18/2023.    FINDINGS:    Decreased density of the previously noted subdural hemorrhages along the   left falx and left tentorium, measuring up to 0.9 cm in maximum thickness   (previously 1.3 cm). Decreased density of the previously noted subdural   hemorrhage along the left cerebral hemisphere measuring up to 0.8 cm   (previously up to 1 cm). The previously noted subdural hemorrhage along   the right frontoparietal convexity measures up to 0.3 cm in thickness   (previously 0.5 cm).    Stable chronic lacunar infarct in the right basal ganglia.    There is prominence of the sulci, sylvian fissures, and ventricles,   reflecting stable moderate diffuse parenchymal volume loss. There is   superimposed ventricular prominence.    There are scattered patchy low attenuations in the bilateral   periventricular cerebral white matter consistent with stable mild chronic   microvascular ischemic changes.    There is no mass effect or midline shift.    The calvarium is intact. Trace fluid in the bilateral mastoid air cells.   Mild mucosal thickening of ethmoid and visualized maxillary sinuses. The   visualized intraorbital compartments appear free of acute disease.    IMPRESSION:    Improving subdural hemorrhage as described.    Stable chronic findings as above.    --- End of Report ---    < end of copied text >        EKG: reviewed by me      < from: 12 Lead ECG (12.16.23 @ 05:20) >  Ventricular Rate 101 BPM    Atrial Rate 101 BPM    P-R Interval 204 ms    QRS Duration 62 ms    Q-T Interval 338 ms    QTC Calculation(Bazett) 438 ms    P Axis 58 degrees    R Axis 28 degrees    T Axis 24 degrees    Diagnosis Line Sinus tachycardia  Otherwise normal ECG    < end of copied text >    Palliative Care Spiritual/Emotional Screening Tool Question  Severity (0-4):      0              OR                    [x ] Unable to determine/NA  Score of 2 or greater indicates recommendation of Chaplaincy referral  Chaplaincy Referral: [ ] Yes [ ] Refused [ ] Following     Caregiver [place]:  [ ] Yes [ ] No [ x] Deferred  Social Work Referral [ ]  Patient and Family Centered Care Referral [ ]    Anticipatory Grief Present: [ ] Yes [ ] No [x ] Deferred  Social Work Referral [ ]  Patient and Family Centered Care Referral [ ]    Patient discussed with primary medical team MD  Palliative care education provided to patient and/or family   HPI:"77-year-old male Cantonese speaking his presents with prior history of hypertension dyslipidemia diabetes BPH prior CVA on aspirin and Plavix who states he had a mechanical fall 2 days ago while getting out of the car fell backwards hit his head.  Afterwards he was able to ambulate.  But for the past 1 day family was unable to ambulate him.  He states that his lower extremities are painful to movement and weak.  Family denies any LOC.  They deny any confusion at home.  On exam oriented to person and place but just not to date.  He does follow simple commands.  Elevates his arms for 10 seconds.  Sensation intact in upper extremities.  Lower extremities with 2 out of 5 strength bilaterally."      Interval history:      ITEMS NOT CHECKED ARE NOT PRESENT       ADVANCE DIRECTIVES:     [ x] Full Code [ ] DNR  MOLST  [ ]  Living Will  [ ]   DECISION MAKER(s):  [ ] Health Care Proxy(s)  [ ] Surrogate(s)  [ ] Guardian           Name(s): Phone Number(s):   Wife Negro Fair [phone]  Dtr Rashi Bryantang - [phone]   BASELINE (I)ADL(s) (prior to admission):    [place]: [x ]Total  [ ] Moderate [ ]Dependent  Palliative Performance Status Version 2:         %    http://npcrc.org/files/news/palliative_performance_scale_ppsv2.pdf    Allergies    [This allergen will not trigger allergy alert] pollen (Unknown)  No Known Drug Allergies    Intolerances    MEDICATIONS  (STANDING):  acetaminophen     Tablet .. 650 milliGRAM(s) Oral every 6 hours  albuterol    90 MICROgram(s) HFA Inhaler 90 Puff(s) Inhalation every 4 hours  aspirin  chewable 81 milliGRAM(s) Enteral Tube daily  atorvastatin 20 milliGRAM(s) Oral at bedtime  bacitracin   Ointment 1 Application(s) Topical every 12 hours  chlorhexidine 0.12% Liquid 15 milliLiter(s) Oral Mucosa two times a day  chlorhexidine 2% Cloths 1 Application(s) Topical <User Schedule>  doxazosin 4 milliGRAM(s) Oral at bedtime  heparin   Injectable 5000 Unit(s) SubCutaneous every 8 hours  insulin glargine Injectable (LANTUS) 20 Unit(s) SubCutaneous at bedtime  insulin lispro (ADMELOG) corrective regimen sliding scale   SubCutaneous every 4 hours  ipratropium 17 MICROgram(s) HFA Inhaler 1 Puff(s) Inhalation every 6 hours  pantoprazole  Injectable 40 milliGRAM(s) IV Push every 24 hours  piperacillin/tazobactam IVPB.. 3.375 Gram(s) IV Intermittent every 12 hours  sevelamer carbonate Powder 800 milliGRAM(s) Oral three times a day with meals  valproic  acid Syrup 500 milliGRAM(s) Oral every 12 hours  vitamin A &amp; D Ointment 1 Application(s) Topical every 12 hours    MEDICATIONS  (PRN):          PRESENT SYMPTOMS: [x ]Unable to obtain due to poor mentation   Source if other than patient:  [ ]Family   [ ]Team     Pain: [ ]yes [ ]no  QOL impact -   Location -                    Aggravating factors -  Quality -  Radiation -  Timing-  Severity (0-10 scale):  Minimal acceptable level (0-10 scale):     CPOT:  0  https://www.[facility].org/getattachment/[email or web address]/Critical-Care-Pain-Observation-Tool-(CPOT)    PAIN AD Score:   http://geriatrictoolkit.[facility]/cog/painad.pdf (press ctrl +  left click to view)    Dyspnea:                           [ ]None[ ]Mild [ ]Moderate [ ]Severe     Respiratory Distress Observation Scale (RDOS): 0  A score of 0 to 2 signifies little or no respiratory distress, 3 signifies mild distress, scores 4 to 6 indicate moderate distress, and scores greater than 7 signify severe distress  [email or web address]/sites/default/[email or web address]-fazbdcgakhl-wgltdvtt-[email or web address].pdf    Anxiety:                             [ ]None[ ]Mild [ ]Moderate [ ]Severe   Fatigue:                             [ ]None[ ]Mild [ ]Moderate [ ]Severe   Nausea:                             [ ]None[ ]Mild [ ]Moderate [ ]Severe   Loss of appetite:              [ ]None[ ]Mild [ ]Moderate [ ]Severe   Constipation:                    [ ]None[ ]Mild [ ]Moderate [ ]Severe    Other Symptoms:  [x ]All other review of systems negative     Palliative Performance Status Version 2:         %    http://npcrc.org/files/news/palliative_performance_scale_ppsv2.pdf    PHYSICAL EXAM:    Vital Signs Last 24 Hrs  T(C): 37.7 (29 Dec 2023 12:00), Max: 37.7 (28 Dec 2023 20:00)  T(F): 99.8 (29 Dec 2023 12:00), Max: 99.8 (28 Dec 2023 20:00)  HR: 91 (29 Dec 2023 12:00) (81 - 94)  BP: 112/54 (29 Dec 2023 12:00) (93/50 - 126/61)  BP(mean): 78 (29 Dec 2023 12:00) (68 - 88)  RR: 30 (29 Dec 2023 12:00) (20 - 35)  SpO2: 98% (29 Dec 2023 12:00) (95% - 100%)    Parameters below as of 29 Dec 2023 10:00  Patient On (Oxygen Delivery Method): BiPAP/CPAP          GENERAL:  [x ] No acute distress [ ]Lethargic  [ ]Unarousable  [ ]Verbal  [x ]Non-Verbal [ ]Cachexia    BEHAVIORAL/PSYCH:  [ ]Alert and Oriented x  [ ] Anxiety [ ] Delirium [ ] Agitation [x ] Calm   EYES: [ ] No scleral icterus [ ] Scleral icterus [x ] Closed  ENMT:  [ ]Dry mouth  [ ]No external oral lesions [ ] No external ear or nose lesions  CARDIOVASCULAR:  [ ]Regular [ ]Irregular [ ]Tachy [x ]Not Tachy  [ ]Nikita [ ] Edema [ ] No edema  PULMONARY:  [ ]Tachypnea  [ ]Audible excessive secretions [ x] No labored breathing [ ] labored breathing  GASTROINTESTINAL: [ ]Soft  [ ]Distended  [x ]Not distended [ ]Non tender [ ]Tender  MUSCULOSKELETAL: [ ]No clubbing [ ] clubbing  [ x] No cyanosis [ ] cyanosis  NEUROLOGIC: [ ]No focal deficits  [ ]Follows commands  [x ]Does not follow commands  [ ]Cognitive impairment  [ ]Dysphagia  [ ]Dysarthria  [ ]Paresis   SKIN: [ ] Jaundiced [ ] Non-jaundiced [ ]Rash [ ]No Rash [ ] Warm [ ] Dry  MISC/LINES: [ ] ET tube [x ] Trach [ ]NGT/OGT [ ]PEG [x ]Aguirre    LABS: reviewed by me                            9.1    19.60 )-----------( 458      ( 29 Dec 2023 05:43 )             28.1     12-29    151<H>  |  116<H>  |  109<HH>  ----------------------------<  191<H>  4.9   |  17  |  3.6<H>    Ca    8.0<L>      29 Dec 2023 05:43  Phos  5.0     12-29  Mg     3.4     12-29    TPro  5.1<L>  /  Alb  2.5<L>  /  TBili  0.2  /  DBili  <0.2  /  AST  28  /  ALT  5   /  AlkPhos  86  12-27      Urinalysis Basic - ( 29 Dec 2023 05:43 )    Color: x / Appearance: x / SG: x / pH: x  Gluc: 191 mg/dL / Ketone: x  / Bili: x / Urobili: x   Blood: x / Protein: x / Nitrite: x   Leuk Esterase: x / RBC: x / WBC x   Sq Epi: x / Non Sq Epi: x / Bacteria: x          RADIOLOGY & ADDITIONAL STUDIES: reviewed by me    < from: CT Head No Cont (12.27.23 @ 20:49) >  Technique: CT of the head was performed without contrast.    Multiple contiguous axial images were acquired from the skullbase to the   vertex without the administration of intravenous contrast.  Coronal and   sagittal reformations were made.    COMPARISON: CT head 12/18/2023.    FINDINGS:    Decreased density of the previously noted subdural hemorrhages along the   left falx and left tentorium, measuring up to 0.9 cm in maximum thickness   (previously 1.3 cm). Decreased density of the previously noted subdural   hemorrhage along the left cerebral hemisphere measuring up to 0.8 cm   (previously up to 1 cm). The previously noted subdural hemorrhage along   the right frontoparietal convexity measures up to 0.3 cm in thickness   (previously 0.5 cm).    Stable chronic lacunar infarct in the right basal ganglia.    There is prominence of the sulci, sylvian fissures, and ventricles,   reflecting stable moderate diffuse parenchymal volume loss. There is   superimposed ventricular prominence.    There are scattered patchy low attenuations in the bilateral   periventricular cerebral white matter consistent with stable mild chronic   microvascular ischemic changes.    There is no mass effect or midline shift.    The calvarium is intact. Trace fluid in the bilateral mastoid air cells.   Mild mucosal thickening of ethmoid and visualized maxillary sinuses. The   visualized intraorbital compartments appear free of acute disease.    IMPRESSION:    Improving subdural hemorrhage as described.    Stable chronic findings as above.    --- End of Report ---    < end of copied text >        EKG: reviewed by me      < from: 12 Lead ECG (12.16.23 @ 05:20) >  Ventricular Rate 101 BPM    Atrial Rate 101 BPM    P-R Interval 204 ms    QRS Duration 62 ms    Q-T Interval 338 ms    QTC Calculation(Bazett) 438 ms    P Axis 58 degrees    R Axis 28 degrees    T Axis 24 degrees    Diagnosis Line Sinus tachycardia  Otherwise normal ECG    < end of copied text >    Palliative Care Spiritual/Emotional Screening Tool Question  Severity (0-4):      0              OR                    [x ] Unable to determine/NA  Score of 2 or greater indicates recommendation of Chaplaincy referral  Chaplaincy Referral: [ ] Yes [ ] Refused [ ] Following     Caregiver [place]:  [ ] Yes [ ] No [ x] Deferred  Social Work Referral [ ]  Patient and Family Centered Care Referral [ ]    Anticipatory Grief Present: [ ] Yes [ ] No [x ] Deferred  Social Work Referral [ ]  Patient and Family Centered Care Referral [ ]    Patient discussed with primary medical team MD  Palliative care education provided to patient and/or family

## 2023-12-29 NOTE — PROGRESS NOTE ADULT - ASSESSMENT
78 year old male who presented as a TRAUMA CONSULT s/p mechanical fall (+HT -LOC -AC). Found to have an acute subdural hemorrhage. Hospital course has been complicated by multiple seizures and uncontrolled hypertension. Intubated on 12/21 due to worsening respiratory status.     S/P tracheostomy and PEG placement on 12/22/23, (Portex cuffed 9 and 2cm at skin 20fr PEG). Now with T-piece, O2 at 40%.     PLAN:  - S/p trach/PEG  - F/U neuro for EEG   - F/u EP for loop recorder placement  - Monitor O2 requirements   - Continue chest PT, Duoneb treatments as needed   - F/u nutrition consult for TF adjustments (given liquid stool)   - Continue home ASA  - Pain control PRN   - DVT ppx with HSQ     TRAUMA SPECTRA: [id number] 78 year old male who presented as a TRAUMA CONSULT s/p mechanical fall (+HT -LOC -AC). Found to have an acute subdural hemorrhage. Hospital course has been complicated by multiple seizures and uncontrolled hypertension. Intubated on 12/21 due to worsening respiratory status.     S/P tracheostomy and PEG placement on 12/22/23, (Portex cuffed 9 and 2cm at skin 20fr PEG). Now with T-piece, O2 at 40%.     PLAN:  - S/p trach/PEG  - F/U neuro for EEG   - F/u EP for loop recorder placement  - Monitor O2 requirements   - Continue chest PT, Duoneb treatments as needed   - F/u nutrition consult for TF adjustments (given liquid stool)   - Continue home ASA  - Pain control PRN   - DVT ppx with HSQ     TRAUMA SPECTRA: [id number]

## 2023-12-29 NOTE — CONSULT NOTE ADULT - ASSESSMENT
Incomplete note  77-year-old male Cantonese speaking his presents with prior history of hypertension dyslipidemia diabetes BPH prior CVA on aspirin and Plavix who states he had a mechanical fall few days ago while getting out of the car fell backwards hit his head.  Afterwards he was able to ambulate.  But for the past  day before admission the  family was unable to ambulate him.  He states that his lower extremities are painful to movement and weak.  Family denies any LOC.  They deny any confusion at home.  On exam oriented to person and place but just not to date.    Elevates his arms for 10 seconds.  Sensation intact in upper extremities.  Lower extremities with 2 out of 5 strength bilaterally. Nephrology was consulted for SAGRARIO               Incomplete note  77-year-old male  presents with prior history of hypertension dyslipidemia diabetes BPH prior CVA on aspirin and Plavix who states he had a mechanical fall few days ago while getting out of the car fell backwards hit his head.  Afterwards he was able to ambulate.  But for the past  day before admission the  family was unable to ambulate him.  He states that his lower extremities are painful to movement and weak.  Family denies any LOC.  They deny any confusion at home.  On exam oriented to person and place but just not to date.    Elevates his arms for 10 seconds.  Sensation intact in upper extremities.  Lower extremities with 2 out of 5 strength bilaterally. Nephrology was consulted for SAGRARIO, hypernatremia.       SAGRARIO on CKD 3b - creat was 1.8 (Feb 2023) now 4.6 ->3.6  -likely prerenal   - pt noted to have large UO 1.3 L by 10 am, diarrhea  Hypernatremia due to free water losses (polyuria 2.4 L yesterday ? diarrhea)   Hyperchloremic met acidosis  Hypermagnesemia    - please send Ua Urine OSm Urine Na and creat  - use Nepro enteral feeds (less magnesium)  - add 1/2 NS 75 cc/hr to correct hypernatremia  in addition to free water 200 cc q4h flushes  - avoid diuretics,   -would not use Metolazone to cause volume depletion, and has min effect in advanced kidney dysfunction   Sepsis - on Zosyn renal dose  SDH - on Depakote, monitor levels   strict Is and OS

## 2023-12-29 NOTE — CONSULT NOTE ADULT - SUBJECTIVE AND OBJECTIVE BOX
NEPHROLOGY CONSULTATION NOTE    77-year-old male Cantonese speaking his presents with prior history of hypertension dyslipidemia diabetes BPH prior CVA on aspirin and Plavix who states he had a mechanical fall few days ago while getting out of the car fell backwards hit his head.  Afterwards he was able to ambulate.  But for the past  day before admission the  family was unable to ambulate him.  He states that his lower extremities are painful to movement and weak.  Family denies any LOC.  They deny any confusion at home.  On exam oriented to person and place but just not to date.    Elevates his arms for 10 seconds.  Sensation intact in upper extremities.  Lower extremities with 2 out of 5 strength bilaterally. Nephrology was consulted for SAGRARIO     PAST MEDICAL & SURGICAL HISTORY:  HTN (hypertension)  Seasonal allergies  History of BPH  Diabetes  No significant past surgical history    Allergies:  [This allergen will not trigger allergy alert] pollen (Unknown)  No Known Drug Allergies    Home Medications Reviewed  Hospital Medications:   MEDICATIONS  (STANDING):  acetaminophen     Tablet .. 650 milliGRAM(s) Oral every 6 hours  albuterol    90 MICROgram(s) HFA Inhaler 90 Puff(s) Inhalation every 4 hours  aspirin  chewable 81 milliGRAM(s) Enteral Tube daily  atorvastatin 20 milliGRAM(s) Oral at bedtime  bacitracin   Ointment 1 Application(s) Topical every 12 hours  chlorhexidine 0.12% Liquid 15 milliLiter(s) Oral Mucosa two times a day  chlorhexidine 2% Cloths 1 Application(s) Topical <User Schedule>  doxazosin 4 milliGRAM(s) Oral at bedtime  heparin   Injectable 5000 Unit(s) SubCutaneous every 8 hours  insulin glargine Injectable (LANTUS) 20 Unit(s) SubCutaneous at bedtime  insulin lispro (ADMELOG) corrective regimen sliding scale   SubCutaneous every 4 hours  ipratropium 17 MICROgram(s) HFA Inhaler 1 Puff(s) Inhalation every 6 hours  metolazone 10 milliGRAM(s) Oral once  pantoprazole  Injectable 40 milliGRAM(s) IV Push every 24 hours  piperacillin/tazobactam IVPB.. 3.375 Gram(s) IV Intermittent every 12 hours  sevelamer carbonate Powder 800 milliGRAM(s) Oral three times a day with meals  valproic  acid Syrup 500 milliGRAM(s) Oral every 12 hours  vitamin A &amp; D Ointment 1 Application(s) Topical every 12 hours    SOCIAL HISTORY:  Denies ETOH,Smoking,   FAMILY HISTORY:  No pertinent family history in first degree relatives        REVIEW OF SYSTEMS:    RESPIRATORY: No cough, wheezing, hemoptysis; No shortness of breath  CARDIOVASCULAR: No chest pain or palpitations.  GASTROINTESTINAL: No abdominal or epigastric pain. No nausea, vomiting, or hematemesis; No diarrhea or constipation. No melena or hematochezia.  GENITOURINARY: No dysuria, frequency, foamy urine, urinary urgency, incontinence or hematuria  NEUROLOGICAL: No numbness or weakness  SKIN: No itching, burning, rashes, or lesions   VASCULAR: No bilateral lower extremity edema.   All other review of systems is negative unless indicated above.    VITALS:  Vital Signs Last 24 Hrs  T(C): 37.5 (29 Dec 2023 08:00), Max: 38 (28 Dec 2023 10:00)  T(F): 99.5 (29 Dec 2023 08:00), Max: 100.4 (28 Dec 2023 10:00)  HR: 93 (29 Dec 2023 08:00) (79 - 94)  BP: 118/54 (29 Dec 2023 08:00) (93/50 - 126/61)  BP(mean): 78 (29 Dec 2023 08:00) (68 - 88)  RR: 33 (29 Dec 2023 08:00) (20 - 35)  SpO2: 99% (29 Dec 2023 08:00) (95% - 100%)    Parameters below as of 29 Dec 2023 08:00  Patient On (Oxygen Delivery Method): ventilator    O2 Concentration (%): 30    12-28 @ 07:01  -  12-29 @ 07:00  --------------------------------------------------------  IN: 2460 mL / OUT: 2644 mL / NET: -184 mL    12-29 @ 07:01  -  12-29 @ 09:50  --------------------------------------------------------  IN: 0 mL / OUT: 100 mL / NET: -100 mL        PHYSICAL EXAM:  Constitutional: NAD  HEENT: anicteric sclera, oropharynx clear, MMM  Neck: No JVD  Respiratory: CTAB, no wheezes, rales or rhonchi  Cardiovascular: S1, S2, RRR  Gastrointestinal: BS+, soft, NT/ND  Extremities: No cyanosis or clubbing. No peripheral edema  Neurological: A/O x 3, no focal deficits  Psychiatric: Normal mood, normal affect  : No CVA tenderness. No martinez.   Skin: No rashes  Vascular Access:    LABS:  12-29    151<H>  |  116<H>  |  109<HH>  ----------------------------<  191<H>  4.9   |  17  |  3.6<H>    Ca    8.0<L>      29 Dec 2023 05:43  Phos  5.0     12-29  Mg     3.4     12-29    TPro  5.1<L>  /  Alb  2.5<L>  /  TBili  0.2  /  DBili  <0.2  /  AST  28  /  ALT  5   /  AlkPhos  86  12-27    Creatinine Trend: 3.6 <--, 4.0 <--, 4.6 <--, 4.4 <--, 4.0 <--, 3.7 <--, 2.9 <--, 2.7 <--, 3.0 <--, 2.7 <--, 3.0 <--, 2.3 <--, 2.6 <--, 2.7 <--, 2.8 <--, 2.8 <--, 2.7 <--, 2.8 <--, 2.5 <--, 2.6 <--, 2.8 <--, 2.8 <--, 2.5 <--, 3.0 <--, 3.0 <--, 2.6 <--, 2.6 <--, 2.8 <--                        9.1    19.60 )-----------( 458      ( 29 Dec 2023 05:43 )             28.1     Urine Studies:  Urinalysis Basic - ( 29 Dec 2023 05:43 )    Color:  / Appearance:  / SG:  / pH:   Gluc: 191 mg/dL / Ketone:   / Bili:  / Urobili:    Blood:  / Protein:  / Nitrite:    Leuk Esterase:  / RBC:  / WBC    Sq Epi:  / Non Sq Epi:  / Bacteria:       Sodium, Random Urine: 73.0 mmoL/L (12-27 @ 22:15)  Creatinine, Random Urine: 42 mg/dL (12-27 @ 22:15)            RADIOLOGY & ADDITIONAL STUDIES:                 NEPHROLOGY CONSULTATION NOTE    77-year-old male Cantonese speaking his presents with prior history of hypertension dyslipidemia diabetes BPH prior CVA on aspirin and Plavix who states he had a mechanical fall few days ago while getting out of the car fell backwards hit his head.  Afterwards he was able to ambulate.  But for the past  day before admission the  family was unable to ambulate him.  He states that his lower extremities are painful to movement and weak.  Family denies any LOC.  They deny any confusion at home.  On exam oriented to person and place but just not to date.    Elevates his arms for 10 seconds.  Sensation intact in upper extremities.  Lower extremities with 2 out of 5 strength bilaterally. Nephrology was consulted for SAGRARIO     PAST MEDICAL & SURGICAL HISTORY:  HTN (hypertension)  Seasonal allergies  History of BPH  Diabetes  No significant past surgical history    Allergies:  [This allergen will not trigger allergy alert] pollen (Unknown)  No Known Drug Allergies    Home Medications Reviewed  Hospital Medications:   MEDICATIONS  (STANDING):  acetaminophen     Tablet .. 650 milliGRAM(s) Oral every 6 hours  albuterol    90 MICROgram(s) HFA Inhaler 90 Puff(s) Inhalation every 4 hours  aspirin  chewable 81 milliGRAM(s) Enteral Tube daily  atorvastatin 20 milliGRAM(s) Oral at bedtime  bacitracin   Ointment 1 Application(s) Topical every 12 hours  chlorhexidine 0.12% Liquid 15 milliLiter(s) Oral Mucosa two times a day  chlorhexidine 2% Cloths 1 Application(s) Topical <User Schedule>  doxazosin 4 milliGRAM(s) Oral at bedtime  heparin   Injectable 5000 Unit(s) SubCutaneous every 8 hours  insulin glargine Injectable (LANTUS) 20 Unit(s) SubCutaneous at bedtime  insulin lispro (ADMELOG) corrective regimen sliding scale   SubCutaneous every 4 hours  ipratropium 17 MICROgram(s) HFA Inhaler 1 Puff(s) Inhalation every 6 hours  metolazone 10 milliGRAM(s) Oral once  pantoprazole  Injectable 40 milliGRAM(s) IV Push every 24 hours  piperacillin/tazobactam IVPB.. 3.375 Gram(s) IV Intermittent every 12 hours  sevelamer carbonate Powder 800 milliGRAM(s) Oral three times a day with meals  valproic  acid Syrup 500 milliGRAM(s) Oral every 12 hours  vitamin A &amp; D Ointment 1 Application(s) Topical every 12 hours    SOCIAL HISTORY:  Denies ETOH,Smoking,   FAMILY HISTORY:  No pertinent family history in first degree relatives        REVIEW OF SYSTEMS:    RESPIRATORY: No cough, wheezing, hemoptysis; No shortness of breath  CARDIOVASCULAR: No chest pain or palpitations.  GASTROINTESTINAL: No abdominal or epigastric pain. No nausea, vomiting, or hematemesis; No diarrhea or constipation. No melena or hematochezia.  GENITOURINARY: No dysuria, frequency, foamy urine, urinary urgency, incontinence or hematuria  NEUROLOGICAL: No numbness or weakness  SKIN: No itching, burning, rashes, or lesions   VASCULAR: No bilateral lower extremity edema.   All other review of systems is negative unless indicated above.    VITALS:  Vital Signs Last 24 Hrs  T(C): 37.5 (29 Dec 2023 08:00), Max: 38 (28 Dec 2023 10:00)  T(F): 99.5 (29 Dec 2023 08:00), Max: 100.4 (28 Dec 2023 10:00)  HR: 93 (29 Dec 2023 08:00) (79 - 94)  BP: 118/54 (29 Dec 2023 08:00) (93/50 - 126/61)  BP(mean): 78 (29 Dec 2023 08:00) (68 - 88)  RR: 33 (29 Dec 2023 08:00) (20 - 35)  SpO2: 99% (29 Dec 2023 08:00) (95% - 100%)    Parameters below as of 29 Dec 2023 08:00  Patient On (Oxygen Delivery Method): ventilator    O2 Concentration (%): 30    12-28 @ 07:01  -  12-29 @ 07:00  --------------------------------------------------------  IN: 2460 mL / OUT: 2644 mL / NET: -184 mL    12-29 @ 07:01  -  12-29 @ 09:50  --------------------------------------------------------  IN: 0 mL / OUT: 100 mL / NET: -100 mL        PHYSICAL EXAM:  Constitutional: NAD  HEENT: anicteric sclera, oropharynx clear, MMM  Neck: No JVD  Respiratory: CTAB, no wheezes, rales or rhonchi  Cardiovascular: S1, S2, RRR  Gastrointestinal: BS+, soft, NT/ND  Extremities: No cyanosis or clubbing. No peripheral edema  Neurological: A/O x 3, no focal deficits  Psychiatric: Normal mood, normal affect  : No CVA tenderness. No martinez.   Skin: No rashes  Vascular Access:    LABS:  12-29    151<H>  |  116<H>  |  109<HH>  ----------------------------<  191<H>  4.9   |  17  |  3.6<H>    Ca    8.0<L>      29 Dec 2023 05:43  Phos  5.0     12-29  Mg     3.4     12-29    TPro  5.1<L>  /  Alb  2.5<L>  /  TBili  0.2  /  DBili  <0.2  /  AST  28  /  ALT  5   /  AlkPhos  86  12-27    Creatinine Trend: 3.6 <--, 4.0 <--, 4.6 <--, 4.4 <--, 4.0 <--, 3.7 <--, 2.9 <--, 2.7 <--, 3.0 <--, 2.7 <--, 3.0 <--, 2.3 <--, 2.6 <--, 2.7 <--, 2.8 <--, 2.8 <--, 2.7 <--, 2.8 <--, 2.5 <--, 2.6 <--, 2.8 <--, 2.8 <--, 2.5 <--, 3.0 <--, 3.0 <--, 2.6 <--, 2.6 <--, 2.8 <--                        9.1    19.60 )-----------( 458      ( 29 Dec 2023 05:43 )             28.1     Urine Studies:  Urinalysis Basic - ( 29 Dec 2023 05:43 )    Color:  / Appearance:  / SG:  / pH:   Gluc: 191 mg/dL / Ketone:   / Bili:  / Urobili:    Blood:  / Protein:  / Nitrite:    Leuk Esterase:  / RBC:  / WBC    Sq Epi:  / Non Sq Epi:  / Bacteria:       Sodium, Random Urine: 73.0 mmoL/L (12-27 @ 22:15)  Creatinine, Random Urine: 42 mg/dL (12-27 @ 22:15)            RADIOLOGY & ADDITIONAL STUDIES:      FINDINGS:    Support devices: Tracheostomy tube in stable position.    Cardiac/mediastinum/hilum: Unchanged.    Lung parenchyma/Pleura: No focal consolidation, pleural effusion, or pneumothorax.    Skeleton/soft tissues: Unchanged.    IMPRESSION:  Low lung volume    No focal consolidation, pleural effusion, or pneumothorax.           NEPHROLOGY CONSULTATION NOTE    77-year-old male [place] speaking his presents with prior history of hypertension dyslipidemia diabetes BPH prior CVA on aspirin and Plavix who states he had a mechanical fall few days ago while getting out of the car fell backwards hit his head.  Afterwards he was able to ambulate.  But for the past  day before admission the  family was unable to ambulate him.  He states that his lower extremities are painful to movement and weak.  Family denies any LOC.  They deny any confusion at home.  On exam oriented to person and place but just not to date.    Elevates his arms for 10 seconds.  Sensation intact in upper extremities.  Lower extremities with 2 out of 5 strength bilaterally. Nephrology was consulted for SAGRARIO     PAST MEDICAL & SURGICAL HISTORY:  HTN (hypertension)  Seasonal allergies  History of BPH  Diabetes  No significant past surgical history    Allergies:  [This allergen will not trigger allergy alert] pollen (Unknown)  No Known Drug Allergies    Home Medications Reviewed  Hospital Medications:   MEDICATIONS  (STANDING):  acetaminophen     Tablet .. 650 milliGRAM(s) Oral every 6 hours  albuterol    90 MICROgram(s) HFA Inhaler 90 Puff(s) Inhalation every 4 hours  aspirin  chewable 81 milliGRAM(s) Enteral Tube daily  atorvastatin 20 milliGRAM(s) Oral at bedtime  bacitracin   Ointment 1 Application(s) Topical every 12 hours  chlorhexidine 0.12% Liquid 15 milliLiter(s) Oral Mucosa two times a day  chlorhexidine 2% Cloths 1 Application(s) Topical <User Schedule>  doxazosin 4 milliGRAM(s) Oral at bedtime  heparin   Injectable 5000 Unit(s) SubCutaneous every 8 hours  insulin glargine Injectable (LANTUS) 20 Unit(s) SubCutaneous at bedtime  insulin lispro (ADMELOG) corrective regimen sliding scale   SubCutaneous every 4 hours  ipratropium 17 MICROgram(s) HFA Inhaler 1 Puff(s) Inhalation every 6 hours  metolazone 10 milliGRAM(s) Oral once  pantoprazole  Injectable 40 milliGRAM(s) IV Push every 24 hours  piperacillin/tazobactam IVPB.. 3.375 Gram(s) IV Intermittent every 12 hours  sevelamer carbonate Powder 800 milliGRAM(s) Oral three times a day with meals  valproic  acid Syrup 500 milliGRAM(s) Oral every 12 hours  vitamin A &amp; D Ointment 1 Application(s) Topical every 12 hours    SOCIAL HISTORY:  Denies ETOH,Smoking,   FAMILY HISTORY:  No pertinent family history in first degree relatives        REVIEW OF SYSTEMS:    RESPIRATORY: No cough, wheezing, hemoptysis; No shortness of breath  CARDIOVASCULAR: No chest pain or palpitations.  GASTROINTESTINAL: No abdominal or epigastric pain. No nausea, vomiting, or hematemesis; No diarrhea or constipation. No melena or hematochezia.  GENITOURINARY: No dysuria, frequency, foamy urine, urinary urgency, incontinence or hematuria  NEUROLOGICAL: No numbness or weakness  SKIN: No itching, burning, rashes, or lesions   VASCULAR: No bilateral lower extremity edema.   All other review of systems is negative unless indicated above.    VITALS:  Vital Signs Last 24 Hrs  T(C): 37.5 (29 Dec 2023 08:00), Max: 38 (28 Dec 2023 10:00)  T(F): 99.5 (29 Dec 2023 08:00), Max: 100.4 (28 Dec 2023 10:00)  HR: 93 (29 Dec 2023 08:00) (79 - 94)  BP: 118/54 (29 Dec 2023 08:00) (93/50 - 126/61)  BP(mean): 78 (29 Dec 2023 08:00) (68 - 88)  RR: 33 (29 Dec 2023 08:00) (20 - 35)  SpO2: 99% (29 Dec 2023 08:00) (95% - 100%)    Parameters below as of 29 Dec 2023 08:00  Patient On (Oxygen Delivery Method): ventilator    O2 Concentration (%): 30    12-28 @ 07:01  -  12-29 @ 07:00  --------------------------------------------------------  IN: 2460 mL / OUT: 2644 mL / NET: -184 mL    12-29 @ 07:01  -  12-29 @ 09:50  --------------------------------------------------------  IN: 0 mL / OUT: 100 mL / NET: -100 mL        PHYSICAL EXAM:  Constitutional: NAD  HEENT: anicteric sclera  Neck: Trach  Respiratory: CTA  Cardiovascular: S1, S2, RRR  Gastrointestinal: BS+, soft, NT/ND, digishield  Extremities: mild  peripheral edema  Neurological: responds to pain only  : Has martinez.   Skin: No rashes  Vascular Access:    LABS:  12-29    151<H>  |  116<H>  |  109<HH>  ----------------------------<  191<H>  4.9   |  17  |  3.6<H>    Ca    8.0<L>      29 Dec 2023 05:43  Phos  5.0     12-29  Mg     3.4     12-29    TPro  5.1<L>  /  Alb  2.5<L>  /  TBili  0.2  /  DBili  <0.2  /  AST  28  /  ALT  5   /  AlkPhos  86  12-27    Creatinine Trend: 3.6 <--, 4.0 <--, 4.6 <--, 4.4 <--, 4.0 <--, 3.7 <--, 2.9 <--, 2.7 <--, 3.0 <--, 2.7 <--, 3.0 <--, 2.3 <--, 2.6 <--, 2.7 <--, 2.8 <--, 2.8 <--, 2.7 <--, 2.8 <--, 2.5 <--, 2.6 <--, 2.8 <--, 2.8 <--, 2.5 <--, 3.0 <--, 3.0 <--, 2.6 <--, 2.6 <--, 2.8 <--                        9.1    19.60 )-----------( 458      ( 29 Dec 2023 05:43 )             28.1     Urine Studies:  Urinalysis Basic - ( 29 Dec 2023 05:43 )    Color:  / Appearance:  / SG:  / pH:   Gluc: 191 mg/dL / Ketone:   / Bili:  / Urobili:    Blood:  / Protein:  / Nitrite:    Leuk Esterase:  / RBC:  / WBC    Sq Epi:  / Non Sq Epi:  / Bacteria:       Sodium, Random Urine: 73.0 mmoL/L (12-27 @ 22:15)  Creatinine, Random Urine: 42 mg/dL (12-27 @ 22:15)            RADIOLOGY & ADDITIONAL STUDIES:  < from: Xray Chest 1 View- PORTABLE-Routine (Xray Chest 1 View- PORTABLE-Routine in AM.) (12.29.23 @ 06:56) >    No focal consolidation, pleural effusion, or pneumothorax.    < end of copied text >      FINDINGS:    Support devices: Tracheostomy tube in stable position.    Cardiac/mediastinum/hilum: Unchanged.    Lung parenchyma/Pleura: No focal consolidation, pleural effusion, or pneumothorax.    Skeleton/soft tissues: Unchanged.    IMPRESSION:  Low lung volume    No focal consolidation, pleural effusion, or pneumothorax.

## 2023-12-29 NOTE — PROGRESS NOTE ADULT - PROBLEM SELECTOR PROBLEM 5
SAGRARIO (acute kidney injury)

## 2023-12-29 NOTE — EEG REPORT - NS EEG TEXT BOX
Brief Clinical History:  JACQUELIN CAI is a 78 year old Male; study performed to investigate for seizures or markers of epilepsy.   Diagnosis Code: R40.4 Transient alteration of awareness  CPT:  37961 (awake/drowsy)     Patient Medication:  OFILMED    LIPITOR    ZOSYN    DEPAKENE    PROVETIL    -      Acquisition Details:  Electroencephalography was acquired using a minimum of 21 channels on an [employer] Neurology system v 9.3.1 with electrode placement according to the standard International 10-20 system following ACNS (American Clinical Neurophysiology Society) guidelines.  Anterior temporal T1 and T2 electrodes were utilized whenever possible.   The [employer] automated spike & seizure detections were all reviewed in detail, in addition to the entire raw EEG.    Findings:  Background:  continuous.  Voltage:  Low (most <20uV LBP Peak-Trough)  Organization:  Rudimentary  Posterior Dominant Rhythm:  <7 Hz symmetrical  Variability:  Yes	Reactivity:  Yes  Sleep:  Absent.  Focal abnormalities:    Interictal Activity:  None  Location:    Focal Slowing:  Left parasagittal  Generalized Slowing:  Moderate  Events:  1)	No electrographic seizures or significant clinical events.  Provocations:  1)	Hyperventilation: was not performed.  2)	Photic stimulation: was not performed.    Impression:  Abnormal due to the presence of focal and generalized slowing    Clinical Correlation:  Findings consistent with focal and diffuse electrocerebral dysfunction secondary to structural/metabolic/nonspecific etiology    Darren Johnston MD  Attending Neurologist, Division of Epilepsy   Brief Clinical History:  JACQUELIN CAI is a 78 year old Male; study performed to investigate for seizures or markers of epilepsy.   Diagnosis Code: R40.4 Transient alteration of awareness  CPT:  38684 (awake/drowsy)     Patient Medication:  OFILMED    LIPITOR    ZOSYN    DEPAKENE    PROVETIL    -      Acquisition Details:  Electroencephalography was acquired using a minimum of 21 channels on an [employer] Neurology system v 9.3.1 with electrode placement according to the standard International 10-20 system following ACNS (American Clinical Neurophysiology Society) guidelines.  Anterior temporal T1 and T2 electrodes were utilized whenever possible.   The [employer] automated spike & seizure detections were all reviewed in detail, in addition to the entire raw EEG.    Findings:  Background:  continuous.  Voltage:  Low (most <20uV LBP Peak-Trough)  Organization:  Rudimentary  Posterior Dominant Rhythm:  <7 Hz symmetrical  Variability:  Yes	Reactivity:  Yes  Sleep:  Absent.  Focal abnormalities:    Interictal Activity:  None  Location:    Focal Slowing:  Left parasagittal  Generalized Slowing:  Moderate  Events:  1)	No electrographic seizures or significant clinical events.  Provocations:  1)	Hyperventilation: was not performed.  2)	Photic stimulation: was not performed.    Impression:  Abnormal due to the presence of focal and generalized slowing    Clinical Correlation:  Findings consistent with focal and diffuse electrocerebral dysfunction secondary to structural/metabolic/nonspecific etiology    Darren Johnston MD  Attending Neurologist, Division of Epilepsy

## 2023-12-30 LAB
ALBUMIN SERPL ELPH-MCNC: 2.5 G/DL — LOW (ref 3.5–5.2)
ALBUMIN SERPL ELPH-MCNC: 2.5 G/DL — LOW (ref 3.5–5.2)
ANION GAP SERPL CALC-SCNC: 12 MMOL/L — SIGNIFICANT CHANGE UP (ref 7–14)
ANION GAP SERPL CALC-SCNC: 12 MMOL/L — SIGNIFICANT CHANGE UP (ref 7–14)
ANION GAP SERPL CALC-SCNC: 14 MMOL/L — SIGNIFICANT CHANGE UP (ref 7–14)
ANION GAP SERPL CALC-SCNC: 14 MMOL/L — SIGNIFICANT CHANGE UP (ref 7–14)
BUN SERPL-MCNC: 107 MG/DL — CRITICAL HIGH (ref 10–20)
BUN SERPL-MCNC: 107 MG/DL — CRITICAL HIGH (ref 10–20)
BUN SERPL-MCNC: 98 MG/DL — CRITICAL HIGH (ref 10–20)
BUN SERPL-MCNC: 98 MG/DL — CRITICAL HIGH (ref 10–20)
CALCIUM SERPL-MCNC: 7.6 MG/DL — LOW (ref 8.4–10.5)
CALCIUM SERPL-MCNC: 7.6 MG/DL — LOW (ref 8.4–10.5)
CALCIUM SERPL-MCNC: 8.1 MG/DL — LOW (ref 8.4–10.5)
CALCIUM SERPL-MCNC: 8.1 MG/DL — LOW (ref 8.4–10.5)
CHLORIDE SERPL-SCNC: 116 MMOL/L — HIGH (ref 98–110)
CHLORIDE SERPL-SCNC: 116 MMOL/L — HIGH (ref 98–110)
CHLORIDE SERPL-SCNC: 118 MMOL/L — HIGH (ref 98–110)
CHLORIDE SERPL-SCNC: 118 MMOL/L — HIGH (ref 98–110)
CO2 SERPL-SCNC: 19 MMOL/L — SIGNIFICANT CHANGE UP (ref 17–32)
CO2 SERPL-SCNC: 19 MMOL/L — SIGNIFICANT CHANGE UP (ref 17–32)
CO2 SERPL-SCNC: 21 MMOL/L — SIGNIFICANT CHANGE UP (ref 17–32)
CO2 SERPL-SCNC: 21 MMOL/L — SIGNIFICANT CHANGE UP (ref 17–32)
CREAT SERPL-MCNC: 3.1 MG/DL — HIGH (ref 0.7–1.5)
CREAT SERPL-MCNC: 3.1 MG/DL — HIGH (ref 0.7–1.5)
CREAT SERPL-MCNC: 3.8 MG/DL — HIGH (ref 0.7–1.5)
CREAT SERPL-MCNC: 3.8 MG/DL — HIGH (ref 0.7–1.5)
CULTURE RESULTS: NO GROWTH — SIGNIFICANT CHANGE UP
CULTURE RESULTS: NO GROWTH — SIGNIFICANT CHANGE UP
EGFR: 16 ML/MIN/1.73M2 — LOW
EGFR: 16 ML/MIN/1.73M2 — LOW
EGFR: 20 ML/MIN/1.73M2 — LOW
EGFR: 20 ML/MIN/1.73M2 — LOW
GAS PNL BLDA: SIGNIFICANT CHANGE UP
GAS PNL BLDA: SIGNIFICANT CHANGE UP
GLUCOSE SERPL-MCNC: 136 MG/DL — HIGH (ref 70–99)
GLUCOSE SERPL-MCNC: 136 MG/DL — HIGH (ref 70–99)
GLUCOSE SERPL-MCNC: 184 MG/DL — HIGH (ref 70–99)
GLUCOSE SERPL-MCNC: 184 MG/DL — HIGH (ref 70–99)
HCT VFR BLD CALC: 27.2 % — LOW (ref 42–52)
HCT VFR BLD CALC: 27.2 % — LOW (ref 42–52)
HCT VFR BLD CALC: 27.5 % — LOW (ref 42–52)
HCT VFR BLD CALC: 27.5 % — LOW (ref 42–52)
HGB BLD-MCNC: 8.7 G/DL — LOW (ref 14–18)
HGB BLD-MCNC: 8.7 G/DL — LOW (ref 14–18)
HGB BLD-MCNC: 8.8 G/DL — LOW (ref 14–18)
HGB BLD-MCNC: 8.8 G/DL — LOW (ref 14–18)
MAGNESIUM SERPL-MCNC: 3.4 MG/DL — CRITICAL HIGH (ref 1.8–2.4)
MAGNESIUM SERPL-MCNC: 3.4 MG/DL — CRITICAL HIGH (ref 1.8–2.4)
MCHC RBC-ENTMCNC: 29.1 PG — SIGNIFICANT CHANGE UP (ref 27–31)
MCHC RBC-ENTMCNC: 29.1 PG — SIGNIFICANT CHANGE UP (ref 27–31)
MCHC RBC-ENTMCNC: 29.7 PG — SIGNIFICANT CHANGE UP (ref 27–31)
MCHC RBC-ENTMCNC: 29.7 PG — SIGNIFICANT CHANGE UP (ref 27–31)
MCHC RBC-ENTMCNC: 32 G/DL — SIGNIFICANT CHANGE UP (ref 32–37)
MCV RBC AUTO: 91.1 FL — SIGNIFICANT CHANGE UP (ref 80–94)
MCV RBC AUTO: 91.1 FL — SIGNIFICANT CHANGE UP (ref 80–94)
MCV RBC AUTO: 92.8 FL — SIGNIFICANT CHANGE UP (ref 80–94)
MCV RBC AUTO: 92.8 FL — SIGNIFICANT CHANGE UP (ref 80–94)
NRBC # BLD: 0 /100 WBCS — SIGNIFICANT CHANGE UP (ref 0–0)
PHOSPHATE SERPL-MCNC: 6 MG/DL — HIGH (ref 2.1–4.9)
PHOSPHATE SERPL-MCNC: 6 MG/DL — HIGH (ref 2.1–4.9)
PLATELET # BLD AUTO: 412 K/UL — HIGH (ref 130–400)
PLATELET # BLD AUTO: 412 K/UL — HIGH (ref 130–400)
PLATELET # BLD AUTO: 442 K/UL — HIGH (ref 130–400)
PLATELET # BLD AUTO: 442 K/UL — HIGH (ref 130–400)
PMV BLD: 8.8 FL — SIGNIFICANT CHANGE UP (ref 7.4–10.4)
PMV BLD: 8.8 FL — SIGNIFICANT CHANGE UP (ref 7.4–10.4)
PMV BLD: 8.9 FL — SIGNIFICANT CHANGE UP (ref 7.4–10.4)
PMV BLD: 8.9 FL — SIGNIFICANT CHANGE UP (ref 7.4–10.4)
POTASSIUM SERPL-MCNC: 4.4 MMOL/L — SIGNIFICANT CHANGE UP (ref 3.5–5)
POTASSIUM SERPL-MCNC: 4.4 MMOL/L — SIGNIFICANT CHANGE UP (ref 3.5–5)
POTASSIUM SERPL-MCNC: 4.8 MMOL/L — SIGNIFICANT CHANGE UP (ref 3.5–5)
POTASSIUM SERPL-MCNC: 4.8 MMOL/L — SIGNIFICANT CHANGE UP (ref 3.5–5)
POTASSIUM SERPL-SCNC: 4.4 MMOL/L — SIGNIFICANT CHANGE UP (ref 3.5–5)
POTASSIUM SERPL-SCNC: 4.4 MMOL/L — SIGNIFICANT CHANGE UP (ref 3.5–5)
POTASSIUM SERPL-SCNC: 4.8 MMOL/L — SIGNIFICANT CHANGE UP (ref 3.5–5)
POTASSIUM SERPL-SCNC: 4.8 MMOL/L — SIGNIFICANT CHANGE UP (ref 3.5–5)
RBC # BLD: 2.93 M/UL — LOW (ref 4.7–6.1)
RBC # BLD: 2.93 M/UL — LOW (ref 4.7–6.1)
RBC # BLD: 3.02 M/UL — LOW (ref 4.7–6.1)
RBC # BLD: 3.02 M/UL — LOW (ref 4.7–6.1)
RBC # FLD: 14.6 % — HIGH (ref 11.5–14.5)
RBC # FLD: 14.6 % — HIGH (ref 11.5–14.5)
RBC # FLD: 14.9 % — HIGH (ref 11.5–14.5)
RBC # FLD: 14.9 % — HIGH (ref 11.5–14.5)
SODIUM SERPL-SCNC: 149 MMOL/L — HIGH (ref 135–146)
SODIUM SERPL-SCNC: 149 MMOL/L — HIGH (ref 135–146)
SODIUM SERPL-SCNC: 151 MMOL/L — HIGH (ref 135–146)
SODIUM SERPL-SCNC: 151 MMOL/L — HIGH (ref 135–146)
SPECIMEN SOURCE: SIGNIFICANT CHANGE UP
SPECIMEN SOURCE: SIGNIFICANT CHANGE UP
VALPROATE SERPL-MCNC: 28 UG/ML — LOW (ref 50–100)
VALPROATE SERPL-MCNC: 28 UG/ML — LOW (ref 50–100)
WBC # BLD: 12.48 K/UL — HIGH (ref 4.8–10.8)
WBC # BLD: 12.48 K/UL — HIGH (ref 4.8–10.8)
WBC # BLD: 16.14 K/UL — HIGH (ref 4.8–10.8)
WBC # BLD: 16.14 K/UL — HIGH (ref 4.8–10.8)
WBC # FLD AUTO: 12.48 K/UL — HIGH (ref 4.8–10.8)
WBC # FLD AUTO: 12.48 K/UL — HIGH (ref 4.8–10.8)
WBC # FLD AUTO: 16.14 K/UL — HIGH (ref 4.8–10.8)
WBC # FLD AUTO: 16.14 K/UL — HIGH (ref 4.8–10.8)

## 2023-12-30 PROCEDURE — 99291 CRITICAL CARE FIRST HOUR: CPT | Mod: 24

## 2023-12-30 PROCEDURE — 71045 X-RAY EXAM CHEST 1 VIEW: CPT | Mod: 26

## 2023-12-30 RX ORDER — CARVEDILOL PHOSPHATE 80 MG/1
6.25 CAPSULE, EXTENDED RELEASE ORAL EVERY 12 HOURS
Refills: 0 | Status: DISCONTINUED | OUTPATIENT
Start: 2023-12-30 | End: 2024-01-12

## 2023-12-30 RX ORDER — SODIUM CHLORIDE 9 MG/ML
1000 INJECTION, SOLUTION INTRAVENOUS
Refills: 0 | Status: DISCONTINUED | OUTPATIENT
Start: 2023-12-30 | End: 2023-12-31

## 2023-12-30 RX ORDER — AMLODIPINE BESYLATE 2.5 MG/1
5 TABLET ORAL DAILY
Refills: 0 | Status: DISCONTINUED | OUTPATIENT
Start: 2023-12-30 | End: 2024-01-12

## 2023-12-30 RX ORDER — INSULIN LISPRO 100/ML
VIAL (ML) SUBCUTANEOUS EVERY 6 HOURS
Refills: 0 | Status: DISCONTINUED | OUTPATIENT
Start: 2023-12-30 | End: 2024-01-12

## 2023-12-30 RX ADMIN — CHLORHEXIDINE GLUCONATE 15 MILLILITER(S): 213 SOLUTION TOPICAL at 17:22

## 2023-12-30 RX ADMIN — Medication 1 PUFF(S): at 07:57

## 2023-12-30 RX ADMIN — SEVELAMER CARBONATE 800 MILLIGRAM(S): 2400 POWDER, FOR SUSPENSION ORAL at 17:23

## 2023-12-30 RX ADMIN — Medication 500 MILLIGRAM(S): at 17:22

## 2023-12-30 RX ADMIN — Medication 650 MILLIGRAM(S): at 17:23

## 2023-12-30 RX ADMIN — Medication 1 APPLICATION(S): at 05:11

## 2023-12-30 RX ADMIN — Medication 650 MILLIGRAM(S): at 12:34

## 2023-12-30 RX ADMIN — ALBUTEROL 90 PUFF(S): 90 AEROSOL, METERED ORAL at 20:52

## 2023-12-30 RX ADMIN — HEPARIN SODIUM 5000 UNIT(S): 5000 INJECTION INTRAVENOUS; SUBCUTANEOUS at 21:54

## 2023-12-30 RX ADMIN — Medication 1 PUFF(S): at 13:41

## 2023-12-30 RX ADMIN — Medication 1 APPLICATION(S): at 17:24

## 2023-12-30 RX ADMIN — PANTOPRAZOLE SODIUM 40 MILLIGRAM(S): 20 TABLET, DELAYED RELEASE ORAL at 05:13

## 2023-12-30 RX ADMIN — Medication 81 MILLIGRAM(S): at 12:35

## 2023-12-30 RX ADMIN — Medication 1 PUFF(S): at 01:11

## 2023-12-30 RX ADMIN — INSULIN GLARGINE 20 UNIT(S): 100 INJECTION, SOLUTION SUBCUTANEOUS at 22:02

## 2023-12-30 RX ADMIN — Medication 500 MILLIGRAM(S): at 05:11

## 2023-12-30 RX ADMIN — PIPERACILLIN AND TAZOBACTAM 25 GRAM(S): 4; .5 INJECTION, POWDER, LYOPHILIZED, FOR SOLUTION INTRAVENOUS at 17:24

## 2023-12-30 RX ADMIN — ALBUTEROL 90 PUFF(S): 90 AEROSOL, METERED ORAL at 17:26

## 2023-12-30 RX ADMIN — ALBUTEROL 90 PUFF(S): 90 AEROSOL, METERED ORAL at 07:56

## 2023-12-30 RX ADMIN — HEPARIN SODIUM 5000 UNIT(S): 5000 INJECTION INTRAVENOUS; SUBCUTANEOUS at 05:10

## 2023-12-30 RX ADMIN — PIPERACILLIN AND TAZOBACTAM 25 GRAM(S): 4; .5 INJECTION, POWDER, LYOPHILIZED, FOR SOLUTION INTRAVENOUS at 05:10

## 2023-12-30 RX ADMIN — SODIUM CHLORIDE 75 MILLILITER(S): 9 INJECTION, SOLUTION INTRAVENOUS at 12:35

## 2023-12-30 RX ADMIN — Medication 3: at 02:33

## 2023-12-30 RX ADMIN — ATORVASTATIN CALCIUM 20 MILLIGRAM(S): 80 TABLET, FILM COATED ORAL at 21:56

## 2023-12-30 RX ADMIN — Medication 5: at 17:26

## 2023-12-30 RX ADMIN — SEVELAMER CARBONATE 800 MILLIGRAM(S): 2400 POWDER, FOR SUSPENSION ORAL at 09:17

## 2023-12-30 RX ADMIN — CHLORHEXIDINE GLUCONATE 15 MILLILITER(S): 213 SOLUTION TOPICAL at 05:10

## 2023-12-30 RX ADMIN — Medication 1 PUFF(S): at 21:56

## 2023-12-30 RX ADMIN — CARVEDILOL PHOSPHATE 6.25 MILLIGRAM(S): 80 CAPSULE, EXTENDED RELEASE ORAL at 17:23

## 2023-12-30 RX ADMIN — CHLORHEXIDINE GLUCONATE 1 APPLICATION(S): 213 SOLUTION TOPICAL at 05:13

## 2023-12-30 RX ADMIN — Medication 1 APPLICATION(S): at 17:23

## 2023-12-30 RX ADMIN — Medication 4 MILLIGRAM(S): at 21:55

## 2023-12-30 RX ADMIN — ALBUTEROL 90 PUFF(S): 90 AEROSOL, METERED ORAL at 01:12

## 2023-12-30 RX ADMIN — SEVELAMER CARBONATE 800 MILLIGRAM(S): 2400 POWDER, FOR SUSPENSION ORAL at 12:35

## 2023-12-30 RX ADMIN — Medication 650 MILLIGRAM(S): at 05:13

## 2023-12-30 RX ADMIN — HEPARIN SODIUM 5000 UNIT(S): 5000 INJECTION INTRAVENOUS; SUBCUTANEOUS at 12:34

## 2023-12-30 RX ADMIN — ALBUTEROL 90 PUFF(S): 90 AEROSOL, METERED ORAL at 13:40

## 2023-12-30 NOTE — PROGRESS NOTE ADULT - SUBJECTIVE AND OBJECTIVE BOX
Nephrology progress note    Patient is seen and examined, events over the last 24 h noted .  Remains unresponsive  On 1/2 NS 75   Allergies:  [This allergen will not trigger allergy alert] pollen (Unknown)  No Known Drug Allergies    Hospital Medications:   MEDICATIONS  (STANDING):  acetaminophen     Tablet .. 650 milliGRAM(s) Oral every 6 hours  albuterol    90 MICROgram(s) HFA Inhaler 90 Puff(s) Inhalation every 4 hours  amLODIPine   Tablet 5 milliGRAM(s) Oral daily  aspirin  chewable 81 milliGRAM(s) Enteral Tube daily  atorvastatin 20 milliGRAM(s) Oral at bedtime  bacitracin   Ointment 1 Application(s) Topical every 12 hours  carvedilol 6.25 milliGRAM(s) Oral every 12 hours  chlorhexidine 0.12% Liquid 15 milliLiter(s) Oral Mucosa two times a day  chlorhexidine 2% Cloths 1 Application(s) Topical <User Schedule>  doxazosin 4 milliGRAM(s) Oral at bedtime  heparin   Injectable 5000 Unit(s) SubCutaneous every 8 hours  insulin glargine Injectable (LANTUS) 20 Unit(s) SubCutaneous at bedtime  insulin lispro (ADMELOG) corrective regimen sliding scale   SubCutaneous every 4 hours  ipratropium 17 MICROgram(s) HFA Inhaler 1 Puff(s) Inhalation every 6 hours  pantoprazole  Injectable 40 milliGRAM(s) IV Push every 24 hours  piperacillin/tazobactam IVPB.. 3.375 Gram(s) IV Intermittent every 12 hours  sevelamer carbonate Powder 800 milliGRAM(s) Oral three times a day with meals  sodium chloride 0.45%. 1000 milliLiter(s) (75 mL/Hr) IV Continuous <Continuous>  valproic  acid Syrup 500 milliGRAM(s) Oral every 12 hours  vitamin A &amp; D Ointment 1 Application(s) Topical every 12 hours        VITALS:  T(F): 97.3 (12-30-23 @ 13:00), Max: 99.3 (12-30-23 @ 00:00)  HR: 91 (12-30-23 @ 13:00)  BP: 139/63 (12-30-23 @ 13:00)  RR: 26 (12-30-23 @ 13:00)  SpO2: 99% (12-30-23 @ 13:00)  Wt(kg): --    12-28 @ 07:01  -  12-29 @ 07:00  --------------------------------------------------------  IN: 2460 mL / OUT: 2644 mL / NET: -184 mL    12-29 @ 07:01  -  12-30 @ 07:00  --------------------------------------------------------  IN: 2595 mL / OUT: 4705 mL / NET: -2110 mL    12-30 @ 07:01  -  12-30 @ 15:15  --------------------------------------------------------  IN: 475 mL / OUT: 975 mL / NET: -500 mL          PHYSICAL EXAM:  Constitutional: NAD  Neck: No JVD  Respiratory: CTA  Cardiovascular: S1, S2, RRR  Gastrointestinal: BS+, soft, NT/ND  Extremities: No peripheral edema  Neurological: Unresponsive  : has juan.   Skin: No rashes  Vascular Access:    LABS:  12-30    151<H>  |  118<H>  |  107<HH>  ----------------------------<  136<H>  4.4   |  21  |  3.8<H>  Creatinine Trend: 3.8<--, 4.0<--, 3.6<--, 4.0<--, 4.6<--, 4.4<--  SODIUM TREND:  Sodium 151 [12-30 @ 04:30]  Sodium 153 [12-29 @ 11:29]  Sodium 151 [12-29 @ 05:43]  Sodium 148 [12-28 @ 17:40]  Sodium 150 [12-28 @ 07:56]  Sodium 148 [12-28 @ 01:14]  Sodium 151 [12-27 @ 22:13]  Sodium 149 [12-27 @ 17:31]  Sodium 147 [12-26 @ 20:00]  Sodium 150 [12-26 @ 16:00]    Ca    8.1<L>      30 Dec 2023 04:30  Phos  6.0     12-30  Mg     3.4     12-30    TPro      /  Alb  2.5<L>  /  TBili      /  DBili      /  AST      /  ALT      /  AlkPhos      12-30                          8.8    16.14 )-----------( 442      ( 30 Dec 2023 04:30 )             27.5       Urine Studies:  Urinalysis Basic - ( 30 Dec 2023 04:30 )    Color:  / Appearance:  / SG:  / pH:   Gluc: 136 mg/dL / Ketone:   / Bili:  / Urobili:    Blood:  / Protein:  / Nitrite:    Leuk Esterase:  / RBC:  / WBC    Sq Epi:  / Non Sq Epi:  / Bacteria:       Sodium, Random Urine: 47.0 mmoL/L (12-29 @ 15:04)  Creatinine, Random Urine: 54 mg/dL (12-29 @ 15:04)  Osmolality, Random Urine: 437 mos/kg (12-29 @ 14:30)  Sodium, Random Urine: 73.0 mmoL/L (12-27 @ 22:15)  Creatinine, Random Urine: 42 mg/dL (12-27 @ 22:15)    RADIOLOGY & ADDITIONAL STUDIES:  < from: Xray Chest 1 View- PORTABLE-Routine (Xray Chest 1 View- PORTABLE-Routine in AM.) (12.30.23 @ 08:14) >  New left basilar opacity/atelectasis.    < end of copied text >

## 2023-12-30 NOTE — PROGRESS NOTE ADULT - ASSESSMENT
78y Male s/p mechanical fall. +HT, +AC (Aspirin and Plavix), -LOC.    12/21-intubated for airway protection, accessory muscle use  12/22- s/p tracheostomy and PEG placement, Portex cuffed 9 and 20fr PEG 2cm at skin     NEURO:  #Acute SDH   -12/18 HCT#4 -stable SDH, no new acute findings  -12/27: Pt obtunded, not responding to noxious stim - VPA/LFTs/BMP/ammonia ordered, CTH f/up: stable  - Q4 neuro checks  - amantadine for neuro stim. renal dosing cleared with pharmacy, next dose 1/4/24  - NSGY: f/up with CTH on 12/31, then assess restarting plavix  #h/o stroke (2/2023) w/residual right sided weakness  - RIGHT anterior thalamus infarct,  LEFT caudate head lacunar infarct    #focal seizure    - vEEG 12/19: No seizures; Discontinued 12/19    - Valproic acid 500q12 --> f/u repeat    - NCC: valproic acid current dose, q4 neurochecks    - Neurology cs- d/c EEG, 2 days prior to discharge call neurology so they can do a spot EEG --> recalled on 12/28 to evaluate diminished mental status, recommend repeat EEG and continue depakote 500 q12  - EEG,12/28: No electrographic seizures or significant clinical events. Abnormal due to the presence of focal and generalized slowing. Findings consistent with focal and diffuse electrocerebral dysfunction secondary to structural/metabolic/nonspecific etiology.    RESP:   #Respiratory Failure secondary to impaired secretion clearance  - s/p tracheostomy 12/22, size 9 cuffed portex  -12/29: tolerated t-piece 6+ hrs  - ABG, 12/29: 7.48/27/173/20  BE: -2.0 mmol/L  - chest PT q4 and duoneb treatments q 4   - deep tracheal aspirate cx sent (due to fevers)  Culture - Sputum . (12.21.23 @ 18:14)-  Numerous Staphylococcus aureus --> 12/28 repeat pending  #Activity    -increase as tolerated    CARDS:   #acute HYPOtension intraop, resolved  -off levophed since 12/22 PM  #hx of HTN  - holding amlodipine and labetalol in the setting of soft BP  -Valsartan 320mg HOLDING   #hx HLD  -atorvastatin  #NSVT    -EP/Cards: No arrhythmias on tele only artifact. No further work up needed. Recommend ILR prior to discharge if patient/family agreeable  Imaging:   Echo: 12/2023: EF 66%, G1DD, trace MR, mild TR     GI/NUTR:   #Diet, NPO with Tube Feed via 20Fr PEG placed 12/22   - TF: Nepro @ 36cc/hr x 18hrs    - nutrition, 12/27 ; change feeds from glucerna to Peptamen AF at 70cc x 18hr. Re-evaluate IV protonix, re-evaluate lantus timing/glycemic ctrl, monitor rectal output. Trial banatrol if loose stools persist    -aspiration precautions, HOB 30  #GI Prophylaxis  -Protonix 40mg   #Bowel regimen     -Multiple loose bowel movements, last dose senna 12/19 at 2200 > dignishield placed 12/23     -C diff negative     -no bowel regimen    /RENAL:   #urine output   - martinez reinserted 12/28 due to retention  - UA neg 12/25 (r/o UTI in setting of persistent fevers)   - Sodium goal 140-150  #Diuresis  - 12/29: metolazone 10 x1 for hypernatremia  - last diuresis 12/26 with 10mg metazolone x 1   #Hypernatremia  - Free water deficit (Na+ goal: 145): 1.3L  - increased FWF from 200 to 250 q4  #urine output in critically ill    -indwelling martinez (placed)    Labs:          BUN/Cr- 109/3.6  -->,  110/4.0  -->          Electrolytes-Na 153 // K 4.7 // Mg -- //  Phos -- (12-29 @ 11:29)  - IVF: 1/2NS @ 75cc  #hyperphosphatemia  - Started sevelamer powder, non-formulary so as not to clog PEG   #CKD   - baseline Cr 2.1, now 4.4--> nephro c/s placed (likely post-obstructive worsening of creatinine in the setting of urinary retention)  - nephro recs appreciated: urine osm 437, Urine na+: 47, urine Cr: 54. consider switching TF to nepro, add on maintanence fluids  -urine lytes 4.6%, post-renal  -holding home sodium bicarb 650mg BID  #hx BPH  - cardura 4mg (flomax at home, non crushable)    Monitor UO-martinez in place      HEME/ONC:   #DVT prophylaxis     -SCDs, HSQ  #hx of CVA    -MLQ36hq cleared by NS    -Plavix [employer], will require repeat HCT in 2 wks prior to restart, approx 1/1/24  DVT propylaxis-heparin Injectable    Labs: Hb/Hct:  8.9/27.0  -->,  9.1/28.1  -->                      Plts:  435  -->,  458  -->                 PTT/INR:        Home Rx: clopidogrel 75 mg oral tablet: 1 tab(s) orally once a day    ID:  WBC- 18.18  --->>,  24.52  --->>,  19.60  --->>  Temp trend- 24hrs T(F): 99.1 (12-29 @ 20:00), Max: 99.8 (12-29 @ 04:00)  Current antibiotics-piperacillin/tazobactam IVPB.. 3.375 every 12 hours  #recurrent fevers- improving, now low grade  -MRSA PCR neg (12/22)  -MRSA nares negative (12/28)  - RVP pending 12/28  Cultures    Bcx 12/18- final neg     Blood cx 12/21- no growth at 4 days    BCx, 12/28 - NG @ 24h    Tracheal aspirate 12/21: numerous staph aureus -methicillian sensitive    Tracheal aspirate 12/22: staph aureus    C.Diff PCR neg    UA 12/25 negative     UA 12/28 negative  Current antibiotics:  - Vancomycin 500mg x1 dose (12/28)  - Zosyn 3.375 q12 (started 12/28)  #multiple loose bowel movements    -WBC trending up    -no bowel regimen    ENDO:  #DM     -ISS     -Off insulin gtt 12/20      -Lantus 20 units HS     -FSG q4 while on TF    MSK:     Activity - Increase As Tolerated    -B/L knee X ray 12/19- no fractures, b/l effusions    WOUND:  - L sacral stage 2 ulcer, wound care consult placed, follow-up    LINES/DRAINS:  PIV, right basilic midline (placed 12/16), 20Fr PEG (12/22), Tracheostomy (12/22)    ADVANCED DIRECTIVES:  Full Code  -  HCP/Emergency Contact-Sarah Gamino (Wife) [phone], Cantonese speaking    DISPO:  Social work, case management consult for dispo to vent facility - spoke with social work 12/23, starting to work on D/C.  -  update, 12/29: pt remains acute. Will reassess 24-48h prior to d/c   78y Male s/p mechanical fall. +HT, +AC (Aspirin and Plavix), -LOC.    12/21-intubated for airway protection, accessory muscle use  12/22- s/p tracheostomy and PEG placement, Portex cuffed 9 and 20fr PEG 2cm at skin     NEURO:  #Acute SDH   -12/18 HCT#4 -stable SDH, no new acute findings  -12/27: Pt obtunded, not responding to noxious stim - VPA/LFTs/BMP/ammonia ordered, CTH f/up: stable  - Q4 neuro checks  - amantadine for neuro stim. renal dosing cleared with pharmacy, next dose 1/4/24  - NSGY: f/up with CTH on 12/31, then assess restarting plavix  #h/o stroke (2/2023) w/residual right sided weakness  - RIGHT anterior thalamus infarct,  LEFT caudate head lacunar infarct    #focal seizure    - vEEG 12/19: No seizures; Discontinued 12/19    - Valproic acid 500q12 --> f/u repeat    - NCC: valproic acid current dose, q4 neurochecks    - Neurology cs- d/c EEG, 2 days prior to discharge call neurology so they can do a spot EEG --> recalled on 12/28 to evaluate diminished mental status, recommend repeat EEG and continue depakote 500 q12  - EEG,12/28: No electrographic seizures or significant clinical events. Abnormal due to the presence of focal and generalized slowing. Findings consistent with focal and diffuse electrocerebral dysfunction secondary to structural/metabolic/nonspecific etiology.    RESP:   #Respiratory Failure secondary to impaired secretion clearance  - s/p tracheostomy 12/22, size 9 cuffed portex  -12/29: tolerated t-piece 6+ hrs  - ABG, 12/29: 7.48/27/173/20  BE: -2.0 mmol/L  - chest PT q4 and duoneb treatments q 4   - deep tracheal aspirate cx sent (due to fevers)  Culture - Sputum . (12.21.23 @ 18:14)-  Numerous Staphylococcus aureus --> 12/28 repeat pending  #Activity    -increase as tolerated    CARDS:   #acute HYPOtension intraop, resolved  -off levophed since 12/22 PM  #hx of HTN  - holding amlodipine and labetalol in the setting of soft BP  -Valsartan 320mg HOLDING   #hx HLD  -atorvastatin  #NSVT    -EP/Cards: No arrhythmias on tele only artifact. No further work up needed. Recommend ILR prior to discharge if patient/family agreeable  Imaging:   Echo: 12/2023: EF 66%, G1DD, trace MR, mild TR     GI/NUTR:   #Diet, NPO with Tube Feed via 20Fr PEG placed 12/22   - TF: Nepro @ 36cc/hr x 18hrs    - nutrition, 12/27 ; change feeds from glucerna to Peptamen AF at 70cc x 18hr. Re-evaluate IV protonix, re-evaluate lantus timing/glycemic ctrl, monitor rectal output. Trial banatrol if loose stools persist    -aspiration precautions, HOB 30  #GI Prophylaxis  -Protonix 40mg   #Bowel regimen     -Multiple loose bowel movements, last dose senna 12/19 at 2200 > dignishield placed 12/23     -C diff negative     -no bowel regimen    /RENAL:   #urine output   - martinez reinserted 12/28 due to retention  - UA neg 12/25 (r/o UTI in setting of persistent fevers)   - Sodium goal 140-150  #Diuresis  - 12/29: metolazone 10 x1 for hypernatremia  - last diuresis 12/26 with 10mg metazolone x 1   #Hypernatremia  - Free water deficit (Na+ goal: 145): 1.3L  - increased FWF from 200 to 250 q4  #urine output in critically ill    -indwelling martinez (placed)    Labs:          BUN/Cr- 109/3.6  -->,  110/4.0  -->          Electrolytes-Na 153 // K 4.7 // Mg -- //  Phos -- (12-29 @ 11:29)  - IVF: 1/2NS @ 75cc  #hyperphosphatemia  - Started sevelamer powder, non-formulary so as not to clog PEG   #CKD   - baseline Cr 2.1, now 4.4--> nephro c/s placed (likely post-obstructive worsening of creatinine in the setting of urinary retention)  - nephro recs appreciated: urine osm 437, Urine na+: 47, urine Cr: 54. consider switching TF to nepro, add on maintanence fluids  -urine lytes 4.6%, post-renal  -holding home sodium bicarb 650mg BID  #hx BPH  - cardura 4mg (flomax at home, non crushable)    Monitor UO-martinez in place      HEME/ONC:   #DVT prophylaxis     -SCDs, HSQ  #hx of CVA    -TOG17ac cleared by NS    -Plavix [employer], will require repeat HCT in 2 wks prior to restart, approx 1/1/24  DVT propylaxis-heparin Injectable    Labs: Hb/Hct:  8.9/27.0  -->,  9.1/28.1  -->                      Plts:  435  -->,  458  -->                 PTT/INR:        Home Rx: clopidogrel 75 mg oral tablet: 1 tab(s) orally once a day    ID:  WBC- 18.18  --->>,  24.52  --->>,  19.60  --->>  Temp trend- 24hrs T(F): 99.1 (12-29 @ 20:00), Max: 99.8 (12-29 @ 04:00)  Current antibiotics-piperacillin/tazobactam IVPB.. 3.375 every 12 hours  #recurrent fevers- improving, now low grade  -MRSA PCR neg (12/22)  -MRSA nares negative (12/28)  - RVP pending 12/28  Cultures    Bcx 12/18- final neg     Blood cx 12/21- no growth at 4 days    BCx, 12/28 - NG @ 24h    Tracheal aspirate 12/21: numerous staph aureus -methicillian sensitive    Tracheal aspirate 12/22: staph aureus    C.Diff PCR neg    UA 12/25 negative     UA 12/28 negative  Current antibiotics:  - Vancomycin 500mg x1 dose (12/28)  - Zosyn 3.375 q12 (started 12/28)  #multiple loose bowel movements    -WBC trending up    -no bowel regimen    ENDO:  #DM     -ISS     -Off insulin gtt 12/20      -Lantus 20 units HS     -FSG q4 while on TF    MSK:     Activity - Increase As Tolerated    -B/L knee X ray 12/19- no fractures, b/l effusions    WOUND:  - L sacral stage 2 ulcer, wound care consult placed, follow-up    LINES/DRAINS:  PIV, right basilic midline (placed 12/16), 20Fr PEG (12/22), Tracheostomy (12/22)    ADVANCED DIRECTIVES:  Full Code  -  HCP/Emergency Contact-Sarah Gamino (Wife) [phone], Cantonese speaking    DISPO:  Social work, case management consult for dispo to vent facility - spoke with social work 12/23, starting to work on D/C.  -  update, 12/29: pt remains acute. Will reassess 24-48h prior to d/c   78y Male s/p mechanical fall. +HT, +AC (Aspirin and Plavix), -LOC.    12/21-intubated for airway protection, accessory muscle use  12/22- s/p tracheostomy and PEG placement, Portex cuffed 9 and 20fr PEG 2cm at skin     NEURO:  #Acute SDH   -12/18 HCT#4 -stable SDH, no new acute findings  -12/27: Pt obtunded, not responding to noxious stim - VPA/LFTs/BMP/ammonia ordered, CTH f/up: stable  - Q4 neuro checks  - amantadine for neuro stim. renal dosing cleared with pharmacy, next dose 1/4/24  - NSGY: f/up with CTH on 12/31, then assess restarting plavix  #h/o stroke (2/2023) w/residual right sided weakness  - RIGHT anterior thalamus infarct,  LEFT caudate head lacunar infarct    #focal seizure    - vEEG 12/19: No seizures; Discontinued 12/19    - Valproic acid 500q12 --> f/u repeat    - NCC: valproic acid current dose, q4 neurochecks    - Neurology cs- d/c EEG, 2 days prior to discharge call neurology so they can do a spot EEG --> recalled on 12/28 to evaluate diminished mental status, recommend repeat EEG and continue depakote 500 q12  - EEG,12/28: No electrographic seizures or significant clinical events. Abnormal due to the presence of focal and generalized slowing. Findings consistent with focal and diffuse electrocerebral dysfunction secondary to structural/metabolic/nonspecific etiology.    RESP:   #Respiratory Failure secondary to impaired secretion clearance  - s/p tracheostomy 12/22, size 9 cuffed portex  -12/29: tolerated t-piece 6+ hrs  - ABG, 12/29: 7.48/27/173/20  BE: -2.0 mmol/L  - chest PT q4 and duoneb treatments q 4   - deep tracheal aspirate cx sent (due to fevers)  Culture - Sputum . (12.21.23 @ 18:14)-  Numerous Staphylococcus aureus --> 12/28 repeat pending  #Activity    -increase as tolerated    -aspiration precautions, HOB 30    CARDS:   #acute HYPOtension intraop, resolved  -off levophed since 12/22 PM  #hx of HTN  - holding amlodipine and labetalol in the setting of soft BP  -Valsartan 320mg HOLDING   #hx HLD  -atorvastatin  #NSVT    -EP/Cards: No arrhythmias on tele only artifact. No further work up needed. Recommend ILR prior to discharge if patient/family agreeable  Imaging:   Echo: 12/2023: EF 66%, G1DD, trace MR, mild TR     GI/NUTR:   #Diet, NPO with Tube Feed via 20Fr PEG placed 12/22   - TF: Nepro @ 36cc/hr x 18hrs    - nutrition, 12/27 ; change feeds from glucerna to Peptamen AF at 70cc x 18hr. Re-evaluate IV protonix, re-evaluate lantus timing/glycemic ctrl, monitor rectal output. Trial banatrol if loose stools persist  - Nephro recs: use Nepro enteral feeds (less magnesium);  add 1/2 NS 75 cc/hr to correct hypernatremia  in addition to free water 200 cc q4h flushes  - avoid diuretics, would not use Metolazone to cause volume depletion, and has min effect in advanced kidney dysfunction   #GI Prophylaxis  -Protonix 40mg   #Bowel regimen     -Multiple loose bowel movements, last dose senna 12/19 at 2200 > dignishield placed 12/23     -C diff negative     -no bowel regimen    /RENAL:   #urine output   - martinez reinserted 12/28 due to retention  - UA neg 12/25 (r/o UTI in setting of persistent fevers)   - Sodium goal 140-150  #Diuresis  - 12/29: metolazone 10 x1 for hypernatremia  - last diuresis 12/26 with 10mg metazolone x 1   #Hypernatremia  - Free water deficit (Na+ goal: 145): 1.3L  - increased FWF from 200 to 250 q4  #urine output in critically ill    -indwelling martinez (placed)    Labs:          BUN/Cr- 109/3.6  -->,  110/4.0  -->          Electrolytes-Na 153 // K 4.7 // Mg -- //  Phos -- (12-29 @ 11:29)  - IVF: 1/2NS @ 75cc  #hyperphosphatemia  - Started sevelamer powder, non-formulary so as not to clog PEG   #CKD   - baseline Cr 2.1, now 4.4--> nephro c/s placed (likely post-obstructive worsening of creatinine in the setting of urinary retention)  - nephro recs appreciated: urine osm 437, Urine na+: 47, urine Cr: 54. consider switching TF to nepro, add on maintanence fluids  -urine lytes 4.6%, post-renal  -holding home sodium bicarb 650mg BID  #hx BPH  - cardura 4mg (flomax at home, non crushable)    Monitor UO-martinez in place      HEME/ONC:   #DVT prophylaxis     -SCDs, HSQ  #hx of CVA    -WIN01cg cleared by NSGY    -Plavix HOLD[employer], will require repeat HCT in 2 wks prior to restart, approx 1/1/24  DVT propylaxis-heparin Injectable    Labs: Hb/Hct:  8.9/27.0  -->,  9.1/28.1  -->                      Plts:  435  -->,  458  -->                 PTT/INR:        Home Rx: clopidogrel 75 mg oral tablet: 1 tab(s) orally once a day    ID:  WBC- 18.18  --->>,  24.52  --->>,  19.60  --->>  Temp trend- 24hrs T(F): 99.1 (12-29 @ 20:00), Max: 99.8 (12-29 @ 04:00)  Current antibiotics-piperacillin/tazobactam IVPB.. 3.375 every 12 hours  #recurrent fevers- improving, now low grade  -MRSA PCR neg (12/22)  -MRSA nares negative (12/28)  - RVP pending 12/28  Cultures    Bcx 12/18- final neg     Blood cx 12/21- no growth at 4 days    BCx, 12/28 - NG @ 24h    Tracheal aspirate 12/21: numerous staph aureus -methicillian sensitive    Tracheal aspirate 12/22: staph aureus    C.Diff PCR neg    UA 12/25 negative     UA 12/28 negative  Current antibiotics:  - Vancomycin 500mg x1 dose (12/28)  - Zosyn 3.375 q12 (started 12/28)  #multiple loose bowel movements    -WBC trending up    -no bowel regimen    ENDO:  #DM     -ISS     -Off insulin gtt 12/20      -Lantus 20 units HS     -FSG q4 while on TF    MSK:     Activity - Increase As Tolerated    -B/L knee X ray 12/19- no fractures, b/l effusions    WOUND:  - L sacral stage 2 ulcer, wound care consult placed, follow-up    LINES/DRAINS:  PIV, right basilic midline (placed 12/16), 20Fr PEG (12/22), Tracheostomy (12/22)    ADVANCED DIRECTIVES:  Full Code  -  HCP/Emergency Contact-Sarah Gamino (Wife) [phone], Cantonese speaking    DISPO:  Social work, case management consult for dispo to vent facility - spoke with social work 12/23, starting to work on D/C.  -  update, 12/29: pt remains acute. Will reassess 24-48h prior to d/c   78y Male s/p mechanical fall. +HT, +AC (Aspirin and Plavix), -LOC.    12/21-intubated for airway protection, accessory muscle use  12/22- s/p tracheostomy and PEG placement, Portex cuffed 9 and 20fr PEG 2cm at skin     NEURO:  #Acute SDH   -12/18 HCT#4 -stable SDH, no new acute findings  -12/27: Pt obtunded, not responding to noxious stim - VPA/LFTs/BMP/ammonia ordered, CTH f/up: stable  - Q4 neuro checks  - amantadine for neuro stim. renal dosing cleared with pharmacy, next dose 1/4/24  - NSGY: f/up with CTH on 12/31, then assess restarting plavix  #h/o stroke (2/2023) w/residual right sided weakness  - RIGHT anterior thalamus infarct,  LEFT caudate head lacunar infarct    #focal seizure    - vEEG 12/19: No seizures; Discontinued 12/19    - Valproic acid 500q12 --> f/u repeat    - NCC: valproic acid current dose, q4 neurochecks    - Neurology cs- d/c EEG, 2 days prior to discharge call neurology so they can do a spot EEG --> recalled on 12/28 to evaluate diminished mental status, recommend repeat EEG and continue depakote 500 q12  - EEG,12/28: No electrographic seizures or significant clinical events. Abnormal due to the presence of focal and generalized slowing. Findings consistent with focal and diffuse electrocerebral dysfunction secondary to structural/metabolic/nonspecific etiology.    RESP:   #Respiratory Failure secondary to impaired secretion clearance  - s/p tracheostomy 12/22, size 9 cuffed portex  -12/29: tolerated t-piece 6+ hrs  - ABG, 12/29: 7.48/27/173/20  BE: -2.0 mmol/L  - chest PT q4 and duoneb treatments q 4   - deep tracheal aspirate cx sent (due to fevers)  Culture - Sputum . (12.21.23 @ 18:14)-  Numerous Staphylococcus aureus --> 12/28 repeat pending  #Activity    -increase as tolerated    -aspiration precautions, HOB 30    CARDS:   #acute HYPOtension intraop, resolved  -off levophed since 12/22 PM  #hx of HTN  - holding amlodipine and labetalol in the setting of soft BP  -Valsartan 320mg HOLDING   #hx HLD  -atorvastatin  #NSVT    -EP/Cards: No arrhythmias on tele only artifact. No further work up needed. Recommend ILR prior to discharge if patient/family agreeable  Imaging:   Echo: 12/2023: EF 66%, G1DD, trace MR, mild TR     GI/NUTR:   #Diet, NPO with Tube Feed via 20Fr PEG placed 12/22   - TF: Nepro @ 36cc/hr x 18hrs    - nutrition, 12/27 ; change feeds from glucerna to Peptamen AF at 70cc x 18hr. Re-evaluate IV protonix, re-evaluate lantus timing/glycemic ctrl, monitor rectal output. Trial banatrol if loose stools persist  - Nephro recs: use Nepro enteral feeds (less magnesium);  add 1/2 NS 75 cc/hr to correct hypernatremia  in addition to free water 200 cc q4h flushes  - avoid diuretics, would not use Metolazone to cause volume depletion, and has min effect in advanced kidney dysfunction   #GI Prophylaxis  -Protonix 40mg   #Bowel regimen     -Multiple loose bowel movements, last dose senna 12/19 at 2200 > dignishield placed 12/23     -C diff negative     -no bowel regimen    /RENAL:   #urine output   - martinez reinserted 12/28 due to retention  - UA neg 12/25 (r/o UTI in setting of persistent fevers)   - Sodium goal 140-150  #Diuresis  - 12/29: metolazone 10 x1 for hypernatremia  - last diuresis 12/26 with 10mg metazolone x 1   #Hypernatremia  - Free water deficit (Na+ goal: 145): 1.3L  - increased FWF from 200 to 250 q4  #urine output in critically ill    -indwelling martinez (placed)    Labs:          BUN/Cr- 109/3.6  -->,  110/4.0  -->          Electrolytes-Na 153 // K 4.7 // Mg -- //  Phos -- (12-29 @ 11:29)  - IVF: 1/2NS @ 75cc  #hyperphosphatemia  - Started sevelamer powder, non-formulary so as not to clog PEG   #CKD   - baseline Cr 2.1, now 4.4--> nephro c/s placed (likely post-obstructive worsening of creatinine in the setting of urinary retention)  - nephro recs appreciated: urine osm 437, Urine na+: 47, urine Cr: 54. consider switching TF to nepro, add on maintanence fluids  -urine lytes 4.6%, post-renal  -holding home sodium bicarb 650mg BID  #hx BPH  - cardura 4mg (flomax at home, non crushable)    Monitor UO-martinez in place      HEME/ONC:   #DVT prophylaxis     -SCDs, HSQ  #hx of CVA    -[id number] cleared by NSGY    -Plavix HOLDING, will require repeat HCT in 2 wks prior to restart, approx 1/1/24  DVT propylaxis-heparin Injectable    Labs: Hb/Hct:  8.9/27.0  -->,  9.1/28.1  -->                      Plts:  435  -->,  458  -->                 PTT/INR:        Home Rx: clopidogrel 75 mg oral tablet: 1 tab(s) orally once a day    ID:  WBC- 18.18  --->>,  24.52  --->>,  19.60  --->>  Temp trend- 24hrs T(F): 99.1 (12-29 @ 20:00), Max: 99.8 (12-29 @ 04:00)  Current antibiotics-piperacillin/tazobactam IVPB.. 3.375 every 12 hours  #recurrent fevers- improving, now low grade  -MRSA PCR neg (12/22)  -MRSA nares negative (12/28)  - RVP pending 12/28  Cultures    Bcx 12/18- final neg     Blood cx 12/21- no growth at 4 days    BCx, 12/28 - NG @ 24h    Tracheal aspirate 12/21: numerous staph aureus -methicillian sensitive    Tracheal aspirate 12/22: staph aureus    C.Diff PCR neg    UA 12/25 negative     UA 12/28 negative  Current antibiotics:  - Vancomycin 500mg x1 dose (12/28)  - Zosyn 3.375 q12 (started 12/28)  #multiple loose bowel movements    -WBC trending up    -no bowel regimen    ENDO:  #DM     -ISS     -Off insulin gtt 12/20      -Lantus 20 units HS     -FSG q4 while on TF    MSK:     Activity - Increase As Tolerated    -B/L knee X ray 12/19- no fractures, b/l effusions    WOUND:  - L sacral stage 2 ulcer, wound care consult placed, follow-up    LINES/DRAINS:  PIV, right basilic midline (placed 12/16), 20Fr PEG (12/22), Tracheostomy (12/22)    ADVANCED DIRECTIVES:  Full Code  -  HCP/Emergency Contact-Sarah Gamino (Wife) [phone], Cantonese speaking    DISPO:  Social work, case management consult for dispo to vent facility - spoke with social work 12/23, starting to work on D/C.  -  update, 12/29: pt remains acute. Will reassess 24-48h prior to d/c   78y Male s/p mechanical fall. +HT, +AC (Aspirin and Plavix), -LOC.    12/21-intubated for airway protection, accessory muscle use  12/22- s/p tracheostomy and PEG placement, Portex cuffed 9 and 20fr PEG 2cm at skin     NEURO:  #Acute SDH   -12/18 HCT#4 -stable SDH, no new acute findings  -12/27: Pt obtunded, not responding to noxious stim - VPA/LFTs/BMP/ammonia ordered, CTH f/up: stable  - Q4 neuro checks  - amantadine for neuro stim. renal dosing cleared with pharmacy, next dose 1/4/24  - NSGY: f/up with CTH on 12/31, then assess restarting plavix  #h/o stroke (2/2023) w/residual right sided weakness  - RIGHT anterior thalamus infarct,  LEFT caudate head lacunar infarct  #focal seizure    - vEEG 12/19: No seizures; Discontinued 12/19    - Valproic acid 500q12 --> f/u repeat    - NCC: valproic acid current dose, q4 neurochecks    - Neurology cs- d/c EEG, 2 days prior to discharge call neurology so they can do a spot EEG --> recalled on 12/28 to evaluate diminished mental status, recommend repeat EEG and continue depakote 500 q12  - EEG,12/28: No electrographic seizures or significant clinical events. Abnormal due to the presence of focal and generalized slowing. Findings consistent with focal and diffuse electrocerebral dysfunction secondary to structural/metabolic/nonspecific etiology.    RESP:   #Respiratory Failure secondary to impaired secretion clearance  - s/p tracheostomy 12/22, size 9 cuffed portex  -12/29: tolerated t-piece 6+ hrs  - ABG, 12/30: 7.47/28/177/20  BE: -2.0 mmol/L  - chest PT q4 and duoneb treatments q 4   - deep tracheal aspirate cx sent (due to fevers)  Culture - Sputum . (12.21.23 @ 18:14)-  Numerous Staphylococcus aureus --> 12/28 repeat NGTD  #Activity    -increase as tolerated    -aspiration precautions, HOB 30    CARDS:   #acute HYPOtension intraop, resolved  -off levophed since 12/22 PM  #hx of HTN  -restarting home coreg and amlodipine  -Valsartan 320mg HOLDING   #hx HLD  -atorvastatin  #NSVT    -EP/Cards: No arrhythmias on tele only artifact. No further work up needed. Recommend ILR prior to discharge if patient/family agreeable  Imaging:   Echo: 12/2023: EF 66%, G1DD, trace MR, mild TR     GI/NUTR:   #Diet, NPO with Tube Feed via 20Fr PEG placed 12/22   - TF: Nepro @ 27cc/hr x 24hrs, 250cc FWF q4h    - nutrition, 12/27 ; change feeds from glucerna to Peptamen AF at 70cc x 18hr. Re-evaluate IV protonix, re-evaluate lantus timing/glycemic ctrl, monitor rectal output. Trial banatrol if loose stools persist  - Nephro recs: use Nepro enteral feeds (less magnesium);  add 1/2 NS 75 cc/hr to correct hypernatremia  in addition to free water 200 cc q4h flushes  - avoid diuretics, would not use Metolazone to cause volume depletion, and has min effect in advanced kidney dysfunction   #GI Prophylaxis  -Protonix 40mg   #Bowel regimen     -Multiple loose bowel movements, last dose senna 12/19 at 2200 > dignishield placed 12/23     -C diff negative     -no bowel regimen    /RENAL:   #urine output   - martinez reinserted 12/28 due to retention  - UA neg 12/25 (r/o UTI in setting of persistent fevers)   - Sodium goal 140-150  - f/u nephro recs  #Diuresis  - 12/29: metolazone 10 x1 for hypernatremia  - last diuresis 12/26 with 10mg metazolone x 1   #Hypernatremia  - Free water deficit (Na+ goal: 145): 1.3L  - increased FWF from 200 to 250 q4  #urine output in critically ill    -indwelling martinez (placed)    Labs:          BUN/Cr- 109/3.6  -->,  110/4.0  -->          Electrolytes-Na 151 // K 4.4 // Mg 3.4 //  Phos 6.0 (12-30 @ 04:30)  - IVF: 1/2NS @ 75cc  #hyperphosphatemia  - Started sevelamer powder, non-formulary so as not to clog PEG   #CKD   - baseline Cr 2.1, now 4.4--> nephro c/s placed (likely post-obstructive worsening of creatinine in the setting of urinary retention)  - nephro recs appreciated: urine osm 437, Urine na+: 47, urine Cr: 54. consider switching TF to nepro, add on maintanence fluids  -urine lytes 4.6%, post-renal  -holding home sodium bicarb 650mg BID  #hx BPH  - cardura 4mg (flomax at home, non crushable)    Monitor UO-martinez in place    HEME/ONC:   #DVT prophylaxis     -SCDs, HSQ  #hx of CVA    -[id number] cleared by NSGY    -Plavix HOLDING, will require repeat HCT in 2 wks prior to restart, approx 12/31/23  DVT propylaxis-heparin Injectable    Labs: Hb/Hct:  8.9/27.0  -->,  9.1/28.1  -->   8.8/27.5              Plts:  435  -->,  458  -->   442              PTT/INR:        Home Rx: clopidogrel 75 mg oral tablet daily (HOLDING)    ID:  WBC- 18.18  --->>,  24.52  --->>,  19.60  --->>  16.14  Temp trend- 24hrs T(F): 99.1 (12-29 @ 20:00), Max: 99.8 (12-29 @ 04:00)  Current antibiotics-piperacillin/tazobactam IVPB.. 3.375 every 12 hours (stop 1/5)  #recurrent fevers- improving, now low grade  -MRSA PCR negative (12/22)  -MRSA nares negative (12/28)  -RVP negative (12/28)  Cultures    Bcx 12/18- final neg     Blood cx 12/21- no growth at 4 days    BCx, 12/28 - NG @ 24h    Tracheal aspirate 12/21: numerous staph aureus -methicillian sensitive    Tracheal aspirate 12/22: staph aureus    C.Diff PCR neg    UA 12/25 negative     UA 12/28 negative  Current antibiotics:  - Vancomycin 500mg x1 dose (12/28)  - Zosyn 3.375 q12 (started 12/28, stop 1/5)  #multiple loose bowel movements    -WBC trending up    -no bowel regimen    ENDO:  #DM     -ISS     -Off insulin gtt 12/20      -Lantus 20 units HS     -FSG q4 while on TF    MSK:     Activity - Increase As Tolerated    -B/L knee X ray 12/19- no fractures, b/l effusions    WOUND:  - L sacral stage 2 ulcer, wound care consult placed, follow-up    LINES/DRAINS:  PIV, right basilic midline (placed 12/16), 20Fr PEG (12/22), Tracheostomy (12/22)    ADVANCED DIRECTIVES:  Full Code  -  HCP/Emergency Contact-Sarah Gamino (Wife) [phone], Cantonese speaking    DISPO:  Social work, case management consult for dispo to vent facility - spoke with social work 12/23, starting to work on D/C.  -  update, 12/29: pt remains acute. Will reassess 24-48h prior to d/c   78y Male s/p mechanical fall. +HT, +AC (Aspirin and Plavix), -LOC.    12/21-intubated for airway protection, accessory muscle use  12/22- s/p tracheostomy and PEG placement, Portex cuffed 9 and 20fr PEG 2cm at skin     NEURO:  #Acute SDH   -12/18 HCT#4 -stable SDH, no new acute findings  -12/27: Pt obtunded, not responding to noxious stim - VPA/LFTs/BMP/ammonia ordered, CTH f/up: stable  - Q4 neuro checks  - amantadine for neuro stim. renal dosing cleared with pharmacy, next dose 1/4/24  - NSGY: f/up with CTH on 12/31, then assess restarting plavix  #h/o stroke (2/2023) w/residual right sided weakness  - RIGHT anterior thalamus infarct,  LEFT caudate head lacunar infarct  #focal seizure    - vEEG 12/19: No seizures; Discontinued 12/19    - Valproic acid 500q12 --> f/u repeat    - NCC: valproic acid current dose, q4 neurochecks    - Neurology cs- d/c EEG, 2 days prior to discharge call neurology so they can do a spot EEG --> recalled on 12/28 to evaluate diminished mental status, recommend repeat EEG and continue depakote 500 q12  - EEG,12/28: No electrographic seizures or significant clinical events. Abnormal due to the presence of focal and generalized slowing. Findings consistent with focal and diffuse electrocerebral dysfunction secondary to structural/metabolic/nonspecific etiology.    RESP:   #Respiratory Failure secondary to impaired secretion clearance  - s/p tracheostomy 12/22, size 9 cuffed portex  -12/29: tolerated t-piece 6+ hrs  - ABG, 12/30: 7.47/28/177/20  BE: -2.0 mmol/L  - chest PT q4 and duoneb treatments q 4   - deep tracheal aspirate cx sent (due to fevers)  Culture - Sputum . (12.21.23 @ 18:14)-  Numerous Staphylococcus aureus --> 12/28 repeat NGTD  #Activity    -increase as tolerated    -aspiration precautions, HOB 30    CARDS:   #acute HYPOtension intraop, resolved  -off levophed since 12/22 PM  #hx of HTN  -restarting home coreg and amlodipine  -Valsartan 320mg HOLDING   #hx HLD  -atorvastatin  #NSVT    -EP/Cards: No arrhythmias on tele only artifact. No further work up needed. Recommend ILR prior to discharge if patient/family agreeable  Imaging:   Echo: 12/2023: EF 66%, G1DD, trace MR, mild TR     GI/NUTR:   #Diet, NPO with Tube Feed via 20Fr PEG placed 12/22   - TF: Nepro @ 27cc/hr x 24hrs, 250cc FWF q4h    - nutrition, 12/27 ; change feeds from glucerna to Peptamen AF at 70cc x 18hr. Re-evaluate IV protonix, re-evaluate lantus timing/glycemic ctrl, monitor rectal output. Trial banatrol if loose stools persist  - Nephro recs: use Nepro enteral feeds (less magnesium);  add 1/2 NS 75 cc/hr to correct hypernatremia  in addition to free water 200 cc q4h flushes  - avoid diuretics, would not use Metolazone to cause volume depletion, and has min effect in advanced kidney dysfunction   #GI Prophylaxis  -Protonix 40mg   #Bowel regimen     -Multiple loose bowel movements, last dose senna 12/19 at 2200 > dignishield placed 12/23     -C diff negative     -no bowel regimen    /RENAL:   #urine output   - martinez reinserted 12/28 due to retention  - UA neg 12/25 (r/o UTI in setting of persistent fevers)   - Sodium goal 140-150  - f/u nephro recs  #Diuresis  - 12/29: metolazone 10 x1 for hypernatremia  - last diuresis 12/26 with 10mg metazolone x 1   #Hypernatremia  - Free water deficit (Na+ goal: 145): 1.3L  - increased FWF from 200 to 250 q4  #urine output in critically ill    -indwelling martinez (placed)    Labs:          BUN/Cr- 109/3.6  -->,  110/4.0  -->          Electrolytes-Na 151 // K 4.4 // Mg 3.4 //  Phos 6.0 (12-30 @ 04:30)  - IVF: 1/2NS @ 75cc  #hyperphosphatemia  - Started sevelamer powder, non-formulary so as not to clog PEG   #CKD   - baseline Cr 2.1, now 4.4--> nephro c/s placed (likely post-obstructive worsening of creatinine in the setting of urinary retention)  - nephro recs appreciated: urine osm 437, Urine na+: 47, urine Cr: 54. consider switching TF to nepro, add on maintanence fluids  -urine lytes 4.6%, post-renal  -holding home sodium bicarb 650mg BID  #hx BPH  - cardura 4mg (flomax at home, non crushable)    Monitor UO-martinez in place    HEME/ONC:   #DVT prophylaxis     -SCDs, HSQ  #hx of CVA    -APO88gg cleared by NSGY    -Plavix HOLDING, will require repeat HCT in 2 wks prior to restart, approx 12/31/23  DVT propylaxis-heparin Injectable    Labs: Hb/Hct:  8.9/27.0  -->,  9.1/28.1  -->   8.8/27.5              Plts:  435  -->,  458  -->   442              PTT/INR:        Home Rx: clopidogrel 75 mg oral tablet daily (HOLDING)    ID:  WBC- 18.18  --->>,  24.52  --->>,  19.60  --->>  16.14  Temp trend- 24hrs T(F): 99.1 (12-29 @ 20:00), Max: 99.8 (12-29 @ 04:00)  Current antibiotics-piperacillin/tazobactam IVPB.. 3.375 every 12 hours (stop 1/5)  #recurrent fevers- improving, now low grade  -MRSA PCR negative (12/22)  -MRSA nares negative (12/28)  -RVP negative (12/28)  Cultures    Bcx 12/18- final neg     Blood cx 12/21- no growth at 4 days    BCx, 12/28 - NG @ 24h    Tracheal aspirate 12/21: numerous staph aureus -methicillian sensitive    Tracheal aspirate 12/22: staph aureus    C.Diff PCR neg    UA 12/25 negative     UA 12/28 negative  Current antibiotics:  - Vancomycin 500mg x1 dose (12/28)  - Zosyn 3.375 q12 (started 12/28, stop 1/5)  #multiple loose bowel movements    -WBC trending up    -no bowel regimen    ENDO:  #DM     -ISS     -Off insulin gtt 12/20      -Lantus 20 units HS     -FSG q4 while on TF    MSK:     Activity - Increase As Tolerated    -B/L knee X ray 12/19- no fractures, b/l effusions    WOUND:  - L sacral stage 2 ulcer, wound care consult placed, follow-up    LINES/DRAINS:  PIV, right basilic midline (placed 12/16), 20Fr PEG (12/22), Tracheostomy (12/22)    ADVANCED DIRECTIVES:  Full Code  -  HCP/Emergency Contact-Sarah Gamino (Wife) [phone], Cantonese speaking    DISPO:  Social work, case management consult for dispo to vent facility - spoke with social work 12/23, starting to work on D/C.  -  update, 12/29: pt remains acute. Will reassess 24-48h prior to d/c

## 2023-12-30 NOTE — PROGRESS NOTE ADULT - ATTENDING COMMENTS
This patient has a high probability of sudden, clinically significant deterioration, which requires the highest level of physician preparedness to intervene urgently. I managed/supervised life or organ supporting interventions that required frequent physician assessment. I devoted my full attention in the ICU to the direct care of this patient for the period of time indicated below. Time I spent with the family or surrogate(s) is included only if the patient was incapable of providing the necessary information or participating in medical decision making. Time devoted to teaching and to any procedures I billed separately is not included.     Patient is examined and evaluated at the bedside with SICU team. Treatment plan discussed with SICU team, nurses and primary team.   Vital signs, laboratory work, and imaging reviewed during rounds.  Will continue hemodynamic monitoring as per protocol in SICU.    24h events: no acute events overnight, T piece 40% FiO2 since yesterday morning    Neuro: GCS 11T [id number], mental status improved, answering questions appropriately with head nodding but not moving extremities  Medications - tylenol ATC for fevers, amantadine to promote wakefulness, valproic acid for possible focal seizures  Studies - EEG showed focal and generalized slowing  Due for CTH tomorrow. If stable or improved, can restart plavix    Respiratory: respirations even and unlabored on 40% FiO2 T piece, Q2H suctioning  Mode: standby, FiO2: 40  ABG - ( 30 Dec 2023 05:45 )  pH, Arterial: 7.47  pH, Blood: x     /  pCO2: 28    /  pO2: 177   / HCO3: 20    / Base Excess: -2.0  /  SaO2: 99.3    Medications - atrovent/proventil  CXR reviewed and interpreted by me - lower lung ventilation, likely due to being off ventilation    CV: monitor hemodynamics, MAP goal > 65  Medications - restart coreg and amlodipine  Home medications - coreg, valsartan, amlodipine, atorvastatin  Studies - no recent studies    GI: abd s/nt/nd, PEG @ 2.5 at skin edge, loose stools (300 since placement yesterday)  Nutrition - nepro @ 36cc/h for 18h/d --> change to 27cc/h for 24h feeds   570 cc loose stools  Medications - protonix  Ppx - protonix    Renal: Continue I&Os monitoring, replete electrolytes as needed  12-30    151<H>  |  118<H>  |  107<HH>  ----------------------------<  136<H>  4.4   |  21  |  3.8<H>    Ca    8.1<L>      30 Dec 2023 04:30  Phos  6.0     12-30  Mg     3.4     12-30    TPro  x   /  Alb  2.5<L>  /  TBili  x   /  DBili  x   /  AST  x   /  ALT  x   /  AlkPhos  x   12-30    UOP - 4135  I/O - 2595/4705  Aguirre catheter - in place for retention  IVF - 1/2NS @ 75 cc/h, FWF 250Q4H  Uremic with hypernatremia, increased UOP, no metalozone today, repeat BMP this afternoon    Heme: continue to evaluate for acute blood loss anemia- trend Hg/Hct                         8.8    16.14 )-----------( 442      ( 30 Dec 2023 04:30 )             27.5     Anticoagulation - SQH    ID: trend WBC, monitor for fevers  Recent culture data - U/A negative, sputum and blood cx no growth so far  Antibiotics - vanc/zosyn, plan for 7 day course unless cultures have growth and then re-evaluate    Endocrine: Prevent and treat hyperglycemia with insulin as needed, lantus 20 units QHS    PV: follow pulse exam    MSK: OOB and mobilize as able, PT eval    Skin: decub precautions    Lines: PIV, RUE midline, trach, PEG  DVT Prophylaxis: SQH  Stress Gastritis Prophylaxis: Protonix   Disposition: SICU    I saw and evaluated the patient personally. I have reviewed and agree with note above. Treatment plan discussed with SICU team, nurses and primary team at the time of the multidisciplinary rounds.     Isidro Montemayor MD  Trauma/ACS/SCC Attending This patient has a high probability of sudden, clinically significant deterioration, which requires the highest level of physician preparedness to intervene urgently. I managed/supervised life or organ supporting interventions that required frequent physician assessment. I devoted my full attention in the ICU to the direct care of this patient for the period of time indicated below. Time I spent with the family or surrogate(s) is included only if the patient was incapable of providing the necessary information or participating in medical decision making. Time devoted to teaching and to any procedures I billed separately is not included.     Patient is examined and evaluated at the bedside with SICU team. Treatment plan discussed with SICU team, nurses and primary team.   Vital signs, laboratory work, and imaging reviewed during rounds.  Will continue hemodynamic monitoring as per protocol in SICU.    24h events: no acute events overnight, T piece 40% FiO2 since yesterday morning    Neuro: GCS 11T [id number], mental status improved, answering questions appropriately with head nodding but not moving extremities  Medications - tylenol ATC for fevers, amantadine to promote wakefulness, valproic acid for possible focal seizures  Studies - EEG showed focal and generalized slowing  Due for CTH tomorrow. If stable or improved, can restart plavix    Respiratory: respirations even and unlabored on 40% FiO2 T piece, Q2H suctioning  Mode: standby, FiO2: 40  ABG - ( 30 Dec 2023 05:45 )  pH, Arterial: 7.47  pH, Blood: x     /  pCO2: 28    /  pO2: 177   / HCO3: 20    / Base Excess: -2.0  /  SaO2: 99.3    Medications - atrovent/proventil  CXR reviewed and interpreted by me - lower lung ventilation, likely due to being off ventilation    CV: monitor hemodynamics, MAP goal > 65  Medications - restart coreg and amlodipine  Home medications - coreg, valsartan, amlodipine, atorvastatin  Studies - no recent studies    GI: abd s/nt/nd, PEG @ 2.5 at skin edge, loose stools (300 since placement yesterday)  Nutrition - nepro @ 36cc/h for 18h/d --> change to 27cc/h for 24h feeds   570 cc loose stools  Medications - protonix  Ppx - protonix    Renal: Continue I&Os monitoring, replete electrolytes as needed  12-30    151<H>  |  118<H>  |  107<HH>  ----------------------------<  136<H>  4.4   |  21  |  3.8<H>    Ca    8.1<L>      30 Dec 2023 04:30  Phos  6.0     12-30  Mg     3.4     12-30    TPro  x   /  Alb  2.5<L>  /  TBili  x   /  DBili  x   /  AST  x   /  ALT  x   /  AlkPhos  x   12-30    UOP - 4135  I/O - 2595/4705  Aguirre catheter - in place for retention  IVF - 1/2NS @ 75 cc/h, FWF 250Q4H  Uremic with hypernatremia, increased UOP, no metalozone today, repeat BMP this afternoon    Heme: continue to evaluate for acute blood loss anemia- trend Hg/Hct                         8.8    16.14 )-----------( 442      ( 30 Dec 2023 04:30 )             27.5     Anticoagulation - SQH    ID: trend WBC, monitor for fevers  Recent culture data - U/A negative, sputum and blood cx no growth so far  Antibiotics - vanc/zosyn, plan for 7 day course unless cultures have growth and then re-evaluate    Endocrine: Prevent and treat hyperglycemia with insulin as needed, lantus 20 units QHS    PV: follow pulse exam    MSK: OOB and mobilize as able, PT eval    Skin: decub precautions    Lines: PIV, RUE midline, trach, PEG  DVT Prophylaxis: SQH  Stress Gastritis Prophylaxis: Protonix   Disposition: SICU    I saw and evaluated the patient personally. I have reviewed and agree with note above. Treatment plan discussed with SICU team, nurses and primary team at the time of the multidisciplinary rounds.     Isidro Montemayor MD  Trauma/ACS/SCC Attending

## 2023-12-30 NOTE — PROGRESS NOTE ADULT - ATTENDING COMMENTS
Wean Vent support  Tube feeds  Follow serum electrolytes and UOP  DVT prophylaxis  SS for placement  SICU care today

## 2023-12-30 NOTE — PROGRESS NOTE ADULT - SUBJECTIVE AND OBJECTIVE BOX
GENERAL SURGERY PROGRESS NOTE     JACQUELIN CAI  78y  Male  Hospital day :14d     Procedure: Insertion of midline catheter    Insertion of arterial line with imaging guidance    Tracheostomy, with PEG tube insertion      OVERNIGHT EVENTS:    - MAL  - HDS and afebrile  - Abdomen soft NTND  - Aguirre remains in place  - Trach in place  - Vented     T(F): 99.3 (12-30-23 @ 00:00), Max: 99.8 (12-29-23 @ 04:00)  HR: 84 (12-30-23 @ 01:00) (83 - 100)  BP: 110/55 (12-30-23 @ 01:00) (93/50 - 141/63)  ABP: --  ABP(mean): --  RR: 22 (12-30-23 @ 01:00) (21 - 34)  SpO2: 97% (12-30-23 @ 01:00) (95% - 100%)    DIET/FLUIDS: sodium chloride 0.45%. 1000 milliLiter(s) IV Continuous <Continuous>    NG:                                                                                DRAINS:     BM:   12-28-23 @ 07:01  -  12-29-23 @ 07:00  --------------------------------------------------------  OUT: 300 mL      EMESIS:     URINE:   12-28-23 @ 07:01  -  12-29-23 @ 07:00  --------------------------------------------------------  OUT: 2344 mL     Indwelling Urethral Catheter:     Connect To:  Straight Drainage/Gravity    Indication:  Urine Output Monitoring in Critically Ill (12-29-23 @ 05:05)    GI proph:  pantoprazole  Injectable 40 milliGRAM(s) IV Push every 24 hours    AC/ proph: aspirin  chewable 81 milliGRAM(s) Enteral Tube daily  heparin   Injectable 5000 Unit(s) SubCutaneous every 8 hours    ABx: piperacillin/tazobactam IVPB.. 3.375 Gram(s) IV Intermittent every 12 hours      PHYSICAL EXAM:  GENERAL: NAD, well-appearing  CHEST/LUNG: Clear to auscultation bilaterally  HEART: Regular rate and rhythm  ABDOMEN: Soft, Nontender, Nondistended;   EXTREMITIES:  No clubbing, cyanosis, or edema      LABS  Labs:  CAPILLARY BLOOD GLUCOSE      POCT Blood Glucose.: 164 mg/dL (29 Dec 2023 22:06)  POCT Blood Glucose.: 187 mg/dL (29 Dec 2023 17:53)  POCT Blood Glucose.: 120 mg/dL (29 Dec 2023 12:10)  POCT Blood Glucose.: 124 mg/dL (29 Dec 2023 10:34)  POCT Blood Glucose.: 201 mg/dL (29 Dec 2023 04:41)                          9.1    19.60 )-----------( 458      ( 29 Dec 2023 05:43 )             28.1         12-29    153<H>  |  117<H>  |  110<HH>  ----------------------------<  116<H>  4.7   |  18  |  4.0<H>      Calcium: 8.1 mg/dL (12-29-23 @ 11:29)      LFTs:     Blood Gas Arterial, Lactate: 1.5 mmol/L (12-29-23 @ 04:15)  Blood Gas Arterial, Lactate: 1.6 mmol/L (12-28-23 @ 03:44)  Blood Gas Arterial, Lactate: 1.6 mmol/L (12-27-23 @ 04:11)    ABG - ( 29 Dec 2023 04:15 )  pH: 7.48  /  pCO2: 27    /  pO2: 173   / HCO3: 20    / Base Excess: -2.0  /  SaO2: 99.7            ABG - ( 28 Dec 2023 03:44 )  pH: 7.44  /  pCO2: 29    /  pO2: 95    / HCO3: 20    / Base Excess: -3.7  /  SaO2: 98.7            ABG - ( 27 Dec 2023 04:11 )  pH: 7.47  /  pCO2: 29    /  pO2: 154   / HCO3: 21    / Base Excess: -2.1  /  SaO2: 100.0        Urinalysis Basic - ( 29 Dec 2023 11:29 )    Color: x / Appearance: x / SG: x / pH: x  Gluc: 116 mg/dL / Ketone: x  / Bili: x / Urobili: x   Blood: x / Protein: x / Nitrite: x   Leuk Esterase: x / RBC: x / WBC x   Sq Epi: x / Non Sq Epi: x / Bacteria: x        Culture - Blood (collected 28 Dec 2023 10:15)  Source: .Blood Blood  Preliminary Report (29 Dec 2023 18:01):    No growth at 24 hours          RADIOLOGY & ADDITIONAL TESTS:      A/P    78 year old male who presented as a TRAUMA CONSULT s/p mechanical fall (+HT -LOC -AC). Found to have an acute subdural hemorrhage. Hospital course has been complicated by multiple seizures and uncontrolled hypertension. Intubated on 12/21 due to worsening respiratory status.     S/P tracheostomy and PEG placement on 12/22/23, (Portex cuffed 9 and 2cm at skin 20fr PEG). Now with T-piece, O2 at 40%.     PLAN:  - S/p trach/PEG  - F/U neuro for EEG   - F/u EP for loop recorder placement  - Monitor O2 requirements   - Continue chest PT, Duoneb treatments as needed   - F/u nutrition consult for TF adjustments (given liquid stool)   - Continue home ASA  - Pain control PRN   - DVT ppx with HSQ     TRAUMA SPECTRA: [id number]       GENERAL SURGERY PROGRESS NOTE     JACQUELIN CAI  78y  Male  Hospital day :14d     Procedure: Insertion of midline catheter    Insertion of arterial line with imaging guidance    Tracheostomy, with PEG tube insertion      OVERNIGHT EVENTS:    - MAL  - HDS and afebrile  - Abdomen soft NTND  - Aguirre remains in place  - Trach in place  - Vented     T(F): 99.3 (12-30-23 @ 00:00), Max: 99.8 (12-29-23 @ 04:00)  HR: 84 (12-30-23 @ 01:00) (83 - 100)  BP: 110/55 (12-30-23 @ 01:00) (93/50 - 141/63)  ABP: --  ABP(mean): --  RR: 22 (12-30-23 @ 01:00) (21 - 34)  SpO2: 97% (12-30-23 @ 01:00) (95% - 100%)    DIET/FLUIDS: sodium chloride 0.45%. 1000 milliLiter(s) IV Continuous <Continuous>    NG:                                                                                DRAINS:     BM:   12-28-23 @ 07:01  -  12-29-23 @ 07:00  --------------------------------------------------------  OUT: 300 mL      EMESIS:     URINE:   12-28-23 @ 07:01  -  12-29-23 @ 07:00  --------------------------------------------------------  OUT: 2344 mL     Indwelling Urethral Catheter:     Connect To:  Straight Drainage/Gravity    Indication:  Urine Output Monitoring in Critically Ill (12-29-23 @ 05:05)    GI proph:  pantoprazole  Injectable 40 milliGRAM(s) IV Push every 24 hours    AC/ proph: aspirin  chewable 81 milliGRAM(s) Enteral Tube daily  heparin   Injectable 5000 Unit(s) SubCutaneous every 8 hours    ABx: piperacillin/tazobactam IVPB.. 3.375 Gram(s) IV Intermittent every 12 hours      PHYSICAL EXAM:  GENERAL: NAD, well-appearing  CHEST/LUNG: Clear to auscultation bilaterally  HEART: Regular rate and rhythm  ABDOMEN: Soft, Nontender, Nondistended;   EXTREMITIES:  No clubbing, cyanosis, or edema      LABS  Labs:  CAPILLARY BLOOD GLUCOSE      POCT Blood Glucose.: 164 mg/dL (29 Dec 2023 22:06)  POCT Blood Glucose.: 187 mg/dL (29 Dec 2023 17:53)  POCT Blood Glucose.: 120 mg/dL (29 Dec 2023 12:10)  POCT Blood Glucose.: 124 mg/dL (29 Dec 2023 10:34)  POCT Blood Glucose.: 201 mg/dL (29 Dec 2023 04:41)                          9.1    19.60 )-----------( 458      ( 29 Dec 2023 05:43 )             28.1         12-29    153<H>  |  117<H>  |  110<HH>  ----------------------------<  116<H>  4.7   |  18  |  4.0<H>      Calcium: 8.1 mg/dL (12-29-23 @ 11:29)      LFTs:     Blood Gas Arterial, Lactate: 1.5 mmol/L (12-29-23 @ 04:15)  Blood Gas Arterial, Lactate: 1.6 mmol/L (12-28-23 @ 03:44)  Blood Gas Arterial, Lactate: 1.6 mmol/L (12-27-23 @ 04:11)    ABG - ( 29 Dec 2023 04:15 )  pH: 7.48  /  pCO2: 27    /  pO2: 173   / HCO3: 20    / Base Excess: -2.0  /  SaO2: 99.7            ABG - ( 28 Dec 2023 03:44 )  pH: 7.44  /  pCO2: 29    /  pO2: 95    / HCO3: 20    / Base Excess: -3.7  /  SaO2: 98.7            ABG - ( 27 Dec 2023 04:11 )  pH: 7.47  /  pCO2: 29    /  pO2: 154   / HCO3: 21    / Base Excess: -2.1  /  SaO2: 100.0        Urinalysis Basic - ( 29 Dec 2023 11:29 )    Color: x / Appearance: x / SG: x / pH: x  Gluc: 116 mg/dL / Ketone: x  / Bili: x / Urobili: x   Blood: x / Protein: x / Nitrite: x   Leuk Esterase: x / RBC: x / WBC x   Sq Epi: x / Non Sq Epi: x / Bacteria: x        Culture - Blood (collected 28 Dec 2023 10:15)  Source: .Blood Blood  Preliminary Report (29 Dec 2023 18:01):    No growth at 24 hours          RADIOLOGY & ADDITIONAL TESTS:      A/P    78 year old male who presented as a TRAUMA CONSULT s/p mechanical fall (+HT -LOC -AC). Found to have an acute subdural hemorrhage. Hospital course has been complicated by multiple seizures and uncontrolled hypertension. Intubated on 12/21 due to worsening respiratory status.     S/P tracheostomy and PEG placement on 12/22/23, (Portex cuffed 9 and 2cm at skin 20fr PEG). Now with T-piece, O2 at 40%.     PLAN:  - S/p trach/PEG  - F/U neuro for EEG   - F/u EP for loop recorder placement  - Monitor O2 requirements   - Continue chest PT, Duoneb treatments as needed   - F/u nutrition consult for TF adjustments (given liquid stool)   - Continue home ASA  - Pain control PRN   - DVT ppx with HSQ     TRAUMA SPECTRA: [id number]

## 2023-12-30 NOTE — PROGRESS NOTE ADULT - ASSESSMENT
77-year-old male  presents with prior history of hypertension dyslipidemia diabetes BPH prior CVA on aspirin and Plavix who states he had a mechanical fall few days ago while getting out of the car fell backwards hit his head.  Afterwards he was able to ambulate.  But for the past  day before admission the  family was unable to ambulate him.  He states that his lower extremities are painful to movement and weak.  Family denies any LOC.  They deny any confusion at home.  On exam oriented to person and place but just not to date.    Elevates his arms for 10 seconds.  Sensation intact in upper extremities.  Lower extremities with 2 out of 5 strength bilaterally. Nephrology was consulted for SAGRARIO, hypernatremia.       SAGRARIO on CKD 3b - creat was 1.8 (Feb 2023) now 4.6 ->3.6  -likely prerenal   - pt noted to have large UO 1.3 L by 10 am, diarrhea  Hypernatremia due to free water losses (polyuria 4.7 L and diarrhea)  Urine OSm 437 Na 73  - 24 hr Urine Osm > 2000 consistent with solute diuresis   Hyperchloremic met acidosis  Hypermagnesemia    Recommend:  - change 1/2 NS to D5W at 75 cc/hr, increase free water to 250 cc q4 hrs to correct free water deficit  - use Nepro enteral feeds (less magnesium)  - glucose better controlled  - kidney function - creat is trending down  Sepsis - on Zosyn renal dose  SDH - on Depakote,  levels ok   strict Is and OS

## 2023-12-30 NOTE — PROGRESS NOTE ADULT - SUBJECTIVE AND OBJECTIVE BOX
JACQUELIN CAI   [id number]   [birth year]  78yM    Admit Date: 12-16-23  Indication for SDU/SICU: Q1 neuro checks        ============================  HPI   77-year-old male Cantonese speaking presents with prior history of HTN, HLD, Diabetes, BPH, prior CVA on aspirin and Plavix who states he had a mechanical fall 3 days ago while getting out of the car fell backwards hit his head.  Afterwards he was able to ambulate.  But for the past 1 day family was unable to ambulate him.  He states that his lower extremities are painful to movement and weak.  Family denies any LOC. Per daughter patient he often forgets things and cannot recall year or place. Patient following commands bedside.   Initial CT head remarkable for 3 Acute subdural hemorrhages: 0.7 cm along the left hemisphere, 0.3 cm along the right frontal area, and 1.1 cm along the left falx. No midline shift. Repeat CT head was obtained prior to 6 hour tacos because patient was appearing lethargic vs sundowning. CT head #2 with stable findings. CT c-spine and chest/abd/pelvis unremarkable for acute pathology.     SICU Consult for q1 neuro checks.         24 Hour Events  12/29  NIGH:  - 8pm rounds: Abd soft, less distended than yesterday, Aguirre in place, Trach in place, vented, HDS      DAY  - Free water deficit (Na+ goal: 145): 1.3L. increased FWF from 200 to 250 q4  - metolazone 10 x1 for hypernatremia  - switched to Lantus 20 HS  - T-piece for 6+ hrs  - EEG, 12/28: No electrographic seizures or significant clinical events. Abnormal due to the presence of focal and generalized slowing. Findings consistent with focal and diffuse electrocerebral dysfunction secondary to structural/metabolic/nonspecific etiology.  - MRSA nares negative   - spoke with family abt current status. No GoC discussed.  - nephro recs: urine osm 437, pending lytes, consider switching TF to nepro, add on maintanence fluids, 1/2NS @ 75  - SW update: pt remains acute. Will reassess 24-48h prior to d/c  - changed TF to Nepro @ 36 per nephro  - L sacral stage 2 ulcer, wound care consult placed.   - BCx, 12/28 - NG @ 24h    [X] A ten-point review of systems was otherwise negative except as noted above.  [  ] Due to altered mental status/intubation, subjective information was not attained from the patient. History was obtained, to the extent possible, from review of the chart and collateral sources of information.    ====================================================================================================================================================================================================    PMH  PAST MEDICAL & SURGICAL HISTORY:  HTN (hypertension)  Seasonal allergies  History of BPH  Diabetes  No significant past surgical history      Home Meds:  Home Medications:  amLODIPine 5 mg oral tablet: 1 tab(s) orally once a day (14 Feb 2023 02:08)  aspirin 81 mg oral tablet: 1 tab(s) orally once a day (14 Feb 2023 02:08)  atorvastatin 20 mg oral tablet: 1 tab(s) orally once a day (16 Dec 2023 03:34)  carvedilol 6.25 mg oral tablet: 1 tab(s) orally 2 times a day (14 Feb 2023 02:08)  Jardiance 25 mg oral tablet: 1 tab(s) orally once a day (in the morning) (14 Feb 2023 02:08)  Nephplex Rx oral tablet: 1 tab(s) orally once a day (14 Feb 2023 02:08)  SITagliptin 50 mg oral tablet: 1 tab(s) orally once a day (16 Dec 2023 03:34)  tamsulosin 0.4 mg oral capsule: 2 cap(s) orally once a day (at bedtime) (16 Dec 2023 03:29)  valsartan 320 mg oral tablet: 1 tab(s) orally once a day (14 Feb 2023 02:08)    Allergies  Allergies  [This allergen will not trigger allergy alert] pollen (Unknown)  No Known Drug Allergies    Intolerances       Current Medications:  acetaminophen     Tablet .. 650 milliGRAM(s) Oral every 6 hours  albuterol    90 MICROgram(s) HFA Inhaler 90 Puff(s) Inhalation every 4 hours  aspirin  chewable 81 milliGRAM(s) Enteral Tube daily  atorvastatin 20 milliGRAM(s) Oral at bedtime  bacitracin   Ointment 1 Application(s) Topical every 12 hours  chlorhexidine 0.12% Liquid 15 milliLiter(s) Oral Mucosa two times a day  chlorhexidine 2% Cloths 1 Application(s) Topical <User Schedule>  doxazosin 4 milliGRAM(s) Oral at bedtime  heparin   Injectable 5000 Unit(s) SubCutaneous every 8 hours  insulin glargine Injectable (LANTUS) 20 Unit(s) SubCutaneous at bedtime  insulin lispro (ADMELOG) corrective regimen sliding scale   SubCutaneous every 4 hours  ipratropium 17 MICROgram(s) HFA Inhaler 1 Puff(s) Inhalation every 6 hours  pantoprazole  Injectable 40 milliGRAM(s) IV Push every 24 hours  piperacillin/tazobactam IVPB.. 3.375 Gram(s) IV Intermittent every 12 hours  sevelamer carbonate Powder 800 milliGRAM(s) Oral three times a day with meals  sodium chloride 0.45%. 1000 milliLiter(s) (75 mL/Hr) IV Continuous <Continuous>  valproic  acid Syrup 500 milliGRAM(s) Oral every 12 hours  vitamin A &amp; D Ointment 1 Application(s) Topical every 12 hours      Vital Sings, Intake/Output (Last 24Hours)  ICU Vital Signs Last 24 Hrs  T(C): 37.3 (29 Dec 2023 20:00), Max: 37.7 (29 Dec 2023 04:00)  T(F): 99.1 (29 Dec 2023 20:00), Max: 99.8 (29 Dec 2023 04:00)  HR: 83 (29 Dec 2023 23:00) (83 - 100)  BP: 100/49 (29 Dec 2023 23:00) (93/50 - 141/63)  BP(mean): 71 (29 Dec 2023 23:00) (68 - 92)  ABP: --  ABP(mean): --  RR: 25 (29 Dec 2023 21:00) (25 - 34)  SpO2: 99% (29 Dec 2023 23:00) (95% - 100%)    O2 Parameters below as of 29 Dec 2023 10:00  Patient On (Oxygen Delivery Method): BiPAP/CPAP          I&O's Summary    28 Dec 2023 07:01  -  29 Dec 2023 07:00  --------------------------------------------------------  IN: 2460 mL / OUT: 2644 mL / NET: -184 mL    29 Dec 2023 07:01  -  30 Dec 2023 00:15  --------------------------------------------------------  IN: 1350 mL / OUT: 3640 mL / NET: -2290 mL        Physical Exam:  --------------------------------------------------------------------------------------------***    Tubes/Lines/Drains   ----------------------------------------------------------------------------------------------------------  [x] Peripheral IV  [X] Right basilic midline (placed 12/16)  [X] Urinary Catheter Aguirre                                               [X] 20Fr PEG (12/22)  [X]Tracheostomy (12/22)	       JACQUELIN CAI   [id number]   [birth year]  78yM    Admit Date: 12-16-23  Indication for SDU/SICU: Q1 neuro checks        ============================  HPI   77-year-old male Cantonese speaking presents with prior history of HTN, HLD, Diabetes, BPH, prior CVA on aspirin and Plavix who states he had a mechanical fall 3 days ago while getting out of the car fell backwards hit his head.  Afterwards he was able to ambulate.  But for the past 1 day family was unable to ambulate him.  He states that his lower extremities are painful to movement and weak.  Family denies any LOC. Per daughter patient he often forgets things and cannot recall year or place. Patient following commands bedside.   Initial CT head remarkable for 3 Acute subdural hemorrhages: 0.7 cm along the left hemisphere, 0.3 cm along the right frontal area, and 1.1 cm along the left falx. No midline shift. Repeat CT head was obtained prior to 6 hour tacos because patient was appearing lethargic vs sundowning. CT head #2 with stable findings. CT c-spine and chest/abd/pelvis unremarkable for acute pathology.     SICU Consult for q1 neuro checks.         24 Hour Events  12/29  NIGH:  - 8pm rounds: Abd soft, less distended than yesterday, Aguirre in place, Trach in place, vented, HDS      DAY  - Free water deficit (Na+ goal: 145): 1.3L. increased FWF from 200 to 250 q4  - metolazone 10 x1 for hypernatremia  - switched to Lantus 20 HS  - T-piece for 6+ hrs  - EEG, 12/28: No electrographic seizures or significant clinical events. Abnormal due to the presence of focal and generalized slowing. Findings consistent with focal and diffuse electrocerebral dysfunction secondary to structural/metabolic/nonspecific etiology.  - MRSA nares negative   - spoke with family abt current status. No GoC discussed.  - nephro recs: urine osm 437, pending lytes, consider switching TF to nepro, add on maintanence fluids, 1/2NS @ 75  - SW update: pt remains acute. Will reassess 24-48h prior to d/c  - changed TF to Nepro @ 36 per nephro  - L sacral stage 2 ulcer, wound care consult placed.   - BCx, 12/28 - NG @ 24h    [X] A ten-point review of systems was otherwise negative except as noted above.  [  ] Due to altered mental status/intubation, subjective information was not attained from the patient. History was obtained, to the extent possible, from review of the chart and collateral sources of information.    ====================================================================================================================================================================================================    PMH  PAST MEDICAL & SURGICAL HISTORY:  HTN (hypertension)  Seasonal allergies  History of BPH  Diabetes  No significant past surgical history      Home Meds:  Home Medications:  amLODIPine 5 mg oral tablet: 1 tab(s) orally once a day (14 Feb 2023 02:08)  aspirin 81 mg oral tablet: 1 tab(s) orally once a day (14 Feb 2023 02:08)  atorvastatin 20 mg oral tablet: 1 tab(s) orally once a day (16 Dec 2023 03:34)  carvedilol 6.25 mg oral tablet: 1 tab(s) orally 2 times a day (14 Feb 2023 02:08)  Jardiance 25 mg oral tablet: 1 tab(s) orally once a day (in the morning) (14 Feb 2023 02:08)  Nephplex Rx oral tablet: 1 tab(s) orally once a day (14 Feb 2023 02:08)  SITagliptin 50 mg oral tablet: 1 tab(s) orally once a day (16 Dec 2023 03:34)  tamsulosin 0.4 mg oral capsule: 2 cap(s) orally once a day (at bedtime) (16 Dec 2023 03:29)  valsartan 320 mg oral tablet: 1 tab(s) orally once a day (14 Feb 2023 02:08)    Allergies  Allergies  [This allergen will not trigger allergy alert] pollen (Unknown)  No Known Drug Allergies    Intolerances       Current Medications:  acetaminophen     Tablet .. 650 milliGRAM(s) Oral every 6 hours  albuterol    90 MICROgram(s) HFA Inhaler 90 Puff(s) Inhalation every 4 hours  aspirin  chewable 81 milliGRAM(s) Enteral Tube daily  atorvastatin 20 milliGRAM(s) Oral at bedtime  bacitracin   Ointment 1 Application(s) Topical every 12 hours  chlorhexidine 0.12% Liquid 15 milliLiter(s) Oral Mucosa two times a day  chlorhexidine 2% Cloths 1 Application(s) Topical <User Schedule>  doxazosin 4 milliGRAM(s) Oral at bedtime  heparin   Injectable 5000 Unit(s) SubCutaneous every 8 hours  insulin glargine Injectable (LANTUS) 20 Unit(s) SubCutaneous at bedtime  insulin lispro (ADMELOG) corrective regimen sliding scale   SubCutaneous every 4 hours  ipratropium 17 MICROgram(s) HFA Inhaler 1 Puff(s) Inhalation every 6 hours  pantoprazole  Injectable 40 milliGRAM(s) IV Push every 24 hours  piperacillin/tazobactam IVPB.. 3.375 Gram(s) IV Intermittent every 12 hours  sevelamer carbonate Powder 800 milliGRAM(s) Oral three times a day with meals  sodium chloride 0.45%. 1000 milliLiter(s) (75 mL/Hr) IV Continuous <Continuous>  valproic  acid Syrup 500 milliGRAM(s) Oral every 12 hours  vitamin A &amp; D Ointment 1 Application(s) Topical every 12 hours      Vital Sings, Intake/Output (Last 24Hours)  ICU Vital Signs Last 24 Hrs  T(C): 37.3 (29 Dec 2023 20:00), Max: 37.7 (29 Dec 2023 04:00)  T(F): 99.1 (29 Dec 2023 20:00), Max: 99.8 (29 Dec 2023 04:00)  HR: 83 (29 Dec 2023 23:00) (83 - 100)  BP: 100/49 (29 Dec 2023 23:00) (93/50 - 141/63)  BP(mean): 71 (29 Dec 2023 23:00) (68 - 92)  ABP: --  ABP(mean): --  RR: 25 (29 Dec 2023 21:00) (25 - 34)  SpO2: 99% (29 Dec 2023 23:00) (95% - 100%)    O2 Parameters below as of 29 Dec 2023 10:00  Patient On (Oxygen Delivery Method): BiPAP/CPAP          I&O's Summary    28 Dec 2023 07:01  -  29 Dec 2023 07:00  --------------------------------------------------------  IN: 2460 mL / OUT: 2644 mL / NET: -184 mL    29 Dec 2023 07:01  -  30 Dec 2023 00:15  --------------------------------------------------------  IN: 1350 mL / OUT: 3640 mL / NET: -2290 mL        Physical Exam:  --------------------------------------------------------------------------------------------***    Tubes/Lines/Drains   ----------------------------------------------------------------------------------------------------------  [x] Peripheral IV  [X] Right basilic midline (placed 12/16)  [X] Urinary Catheter Aguirre                                               [X] 20Fr PEG (12/22)  [X]Tracheostomy (12/22)	       JACQUELIN CAI   [id number]   [birth year]  78yM    Admit Date: 12-16-23  Indication for SDU/SICU: Q1 neuro checks        ============================  HPI   77-year-old male Cantonese speaking presents with prior history of HTN, HLD, Diabetes, BPH, prior CVA on aspirin and Plavix who states he had a mechanical fall 3 days ago while getting out of the car fell backwards hit his head.  Afterwards he was able to ambulate.  But for the past 1 day family was unable to ambulate him.  He states that his lower extremities are painful to movement and weak.  Family denies any LOC. Per daughter patient he often forgets things and cannot recall year or place. Patient following commands bedside.   Initial CT head remarkable for 3 Acute subdural hemorrhages: 0.7 cm along the left hemisphere, 0.3 cm along the right frontal area, and 1.1 cm along the left falx. No midline shift. Repeat CT head was obtained prior to 6 hour tacos because patient was appearing lethargic vs sundowning. CT head #2 with stable findings. CT c-spine and chest/abd/pelvis unremarkable for acute pathology.     SICU Consult for q1 neuro checks.         24 Hour Events  12/29  NIGH:  - 8pm rounds: Abd soft, less distended than yesterday, Aguirre in place, Trach in place, vented, HDS      DAY  - Free water deficit (Na+ goal: 145): 1.3L. increased FWF from 200 to 250 q4  - Nephro recs: use Nepro enteral feeds (less magnesium);  add 1/2 NS 75 cc/hr to correct hypernatremia  in addition to free water 200 cc q4h flushes  - avoid diuretics, would not use Metolazone to cause volume depletion, and has min effect in advanced kidney dysfunction   - switched to Lantus 20 HS  - T-piece for 6+ hrs  - EEG, 12/28: No electrographic seizures or significant clinical events. Abnormal due to the presence of focal and generalized slowing. Findings consistent with focal and diffuse electrocerebral dysfunction secondary to structural/metabolic/nonspecific etiology.  - MRSA nares negative   - spoke with family abt current status. No GoC discussed.  - nephro recs: urine osm 437, pending lytes, consider switching TF to nepro, add on maintanence fluids, 1/2NS @ 75  - SW update: pt remains acute. Will reassess 24-48h prior to d/c  - changed TF to Nepro @ 36 per nephro  - L sacral stage 2 ulcer, wound care consult placed.   - BCx, 12/28 - NG @ 24h    [X] A ten-point review of systems was otherwise negative except as noted above.  [  ] Due to altered mental status/intubation, subjective information was not attained from the patient. History was obtained, to the extent possible, from review of the chart and collateral sources of information.    ====================================================================================================================================================================================================    PMH  PAST MEDICAL & SURGICAL HISTORY:  HTN (hypertension)  Seasonal allergies  History of BPH  Diabetes  No significant past surgical history      Home Meds:  Home Medications:  amLODIPine 5 mg oral tablet: 1 tab(s) orally once a day (14 Feb 2023 02:08)  aspirin 81 mg oral tablet: 1 tab(s) orally once a day (14 Feb 2023 02:08)  atorvastatin 20 mg oral tablet: 1 tab(s) orally once a day (16 Dec 2023 03:34)  carvedilol 6.25 mg oral tablet: 1 tab(s) orally 2 times a day (14 Feb 2023 02:08)  Jardiance 25 mg oral tablet: 1 tab(s) orally once a day (in the morning) (14 Feb 2023 02:08)  Nephplex Rx oral tablet: 1 tab(s) orally once a day (14 Feb 2023 02:08)  SITagliptin 50 mg oral tablet: 1 tab(s) orally once a day (16 Dec 2023 03:34)  tamsulosin 0.4 mg oral capsule: 2 cap(s) orally once a day (at bedtime) (16 Dec 2023 03:29)  valsartan 320 mg oral tablet: 1 tab(s) orally once a day (14 Feb 2023 02:08)    Allergies  Allergies  [This allergen will not trigger allergy alert] pollen (Unknown)  No Known Drug Allergies    Intolerances       Current Medications:  acetaminophen     Tablet .. 650 milliGRAM(s) Oral every 6 hours  albuterol    90 MICROgram(s) HFA Inhaler 90 Puff(s) Inhalation every 4 hours  aspirin  chewable 81 milliGRAM(s) Enteral Tube daily  atorvastatin 20 milliGRAM(s) Oral at bedtime  bacitracin   Ointment 1 Application(s) Topical every 12 hours  chlorhexidine 0.12% Liquid 15 milliLiter(s) Oral Mucosa two times a day  chlorhexidine 2% Cloths 1 Application(s) Topical <User Schedule>  doxazosin 4 milliGRAM(s) Oral at bedtime  heparin   Injectable 5000 Unit(s) SubCutaneous every 8 hours  insulin glargine Injectable (LANTUS) 20 Unit(s) SubCutaneous at bedtime  insulin lispro (ADMELOG) corrective regimen sliding scale   SubCutaneous every 4 hours  ipratropium 17 MICROgram(s) HFA Inhaler 1 Puff(s) Inhalation every 6 hours  pantoprazole  Injectable 40 milliGRAM(s) IV Push every 24 hours  piperacillin/tazobactam IVPB.. 3.375 Gram(s) IV Intermittent every 12 hours  sevelamer carbonate Powder 800 milliGRAM(s) Oral three times a day with meals  sodium chloride 0.45%. 1000 milliLiter(s) (75 mL/Hr) IV Continuous <Continuous>  valproic  acid Syrup 500 milliGRAM(s) Oral every 12 hours  vitamin A &amp; D Ointment 1 Application(s) Topical every 12 hours      Vital Sings, Intake/Output (Last 24Hours)  ICU Vital Signs Last 24 Hrs  T(C): 37.3 (29 Dec 2023 20:00), Max: 37.7 (29 Dec 2023 04:00)  T(F): 99.1 (29 Dec 2023 20:00), Max: 99.8 (29 Dec 2023 04:00)  HR: 83 (29 Dec 2023 23:00) (83 - 100)  BP: 100/49 (29 Dec 2023 23:00) (93/50 - 141/63)  BP(mean): 71 (29 Dec 2023 23:00) (68 - 92)  ABP: --  ABP(mean): --  RR: 25 (29 Dec 2023 21:00) (25 - 34)  SpO2: 99% (29 Dec 2023 23:00) (95% - 100%)    O2 Parameters below as of 29 Dec 2023 10:00  Patient On (Oxygen Delivery Method): BiPAP/CPAP          I&O's Summary    28 Dec 2023 07:01  -  29 Dec 2023 07:00  --------------------------------------------------------  IN: 2460 mL / OUT: 2644 mL / NET: -184 mL    29 Dec 2023 07:01  -  30 Dec 2023 00:15  --------------------------------------------------------  IN: 1350 mL / OUT: 3640 mL / NET: -2290 mL        Physical Exam:  --------------------------------------------------------------------------------------------***    Tubes/Lines/Drains   ----------------------------------------------------------------------------------------------------------  [x] Peripheral IV  [X] Right basilic midline (placed 12/16)  [X] Urinary Catheter Aguirre                                               [X] 20Fr PEG (12/22)  [X]Tracheostomy (12/22)	       JACQUELIN CAI   [id number]   [birth year]  78yM    Admit Date: 12-16-23  Indication for SDU/SICU: Q1 neuro checks        ============================  HPI   77-year-old male Cantonese speaking presents with prior history of HTN, HLD, Diabetes, BPH, prior CVA on aspirin and Plavix who states he had a mechanical fall 3 days ago while getting out of the car fell backwards hit his head.  Afterwards he was able to ambulate.  But for the past 1 day family was unable to ambulate him.  He states that his lower extremities are painful to movement and weak.  Family denies any LOC. Per daughter patient he often forgets things and cannot recall year or place. Patient following commands bedside.   Initial CT head remarkable for 3 Acute subdural hemorrhages: 0.7 cm along the left hemisphere, 0.3 cm along the right frontal area, and 1.1 cm along the left falx. No midline shift. Repeat CT head was obtained prior to 6 hour tacos because patient was appearing lethargic vs sundowning. CT head #2 with stable findings. CT c-spine and chest/abd/pelvis unremarkable for acute pathology.     SICU Consult for q1 neuro checks.         24 Hour Events  12/29  NIGH:  - 8pm rounds: Abd soft, less distended than yesterday, Aguirre in place, Trach in place, vented, HDS      DAY  - Free water deficit (Na+ goal: 145): 1.3L. increased FWF from 200 to 250 q4  - Nephro recs: use Nepro enteral feeds (less magnesium);  add 1/2 NS 75 cc/hr to correct hypernatremia  in addition to free water 200 cc q4h flushes  - avoid diuretics, would not use Metolazone to cause volume depletion, and has min effect in advanced kidney dysfunction   - switched to Lantus 20 HS  - T-piece for 6+ hrs  - EEG, 12/28: No electrographic seizures or significant clinical events. Abnormal due to the presence of focal and generalized slowing. Findings consistent with focal and diffuse electrocerebral dysfunction secondary to structural/metabolic/nonspecific etiology.  - MRSA nares negative   - spoke with family abt current status. No GoC discussed.  - nephro recs: urine osm 437, pending lytes, consider switching TF to nepro, add on maintanence fluids, 1/2NS @ 75  - SW update: pt remains acute. Will reassess 24-48h prior to d/c  - changed TF to Nepro @ 36 per nephro  - L sacral stage 2 ulcer, wound care consult placed.   - BCx, 12/28 - NG @ 24h    [X] A ten-point review of systems was otherwise negative except as noted above.  [  ] Due to altered mental status/intubation, subjective information was not attained from the patient. History was obtained, to the extent possible, from review of the chart and collateral sources of information.    ====================================================================================================================================================================================================    PMH  PAST MEDICAL & SURGICAL HISTORY:  HTN (hypertension)  Seasonal allergies  History of BPH  Diabetes  No significant past surgical history      Home Meds:  Home Medications:  amLODIPine 5 mg oral tablet: 1 tab(s) orally once a day (14 Feb 2023 02:08)  aspirin 81 mg oral tablet: 1 tab(s) orally once a day (14 Feb 2023 02:08)  atorvastatin 20 mg oral tablet: 1 tab(s) orally once a day (16 Dec 2023 03:34)  carvedilol 6.25 mg oral tablet: 1 tab(s) orally 2 times a day (14 Feb 2023 02:08)  Jardiance 25 mg oral tablet: 1 tab(s) orally once a day (in the morning) (14 Feb 2023 02:08)  Nephplex Rx oral tablet: 1 tab(s) orally once a day (14 Feb 2023 02:08)  SITagliptin 50 mg oral tablet: 1 tab(s) orally once a day (16 Dec 2023 03:34)  tamsulosin 0.4 mg oral capsule: 2 cap(s) orally once a day (at bedtime) (16 Dec 2023 03:29)  valsartan 320 mg oral tablet: 1 tab(s) orally once a day (14 Feb 2023 02:08)    Allergies  Allergies  [This allergen will not trigger allergy alert] pollen (Unknown)  No Known Drug Allergies    Intolerances       Current Medications:  acetaminophen     Tablet .. 650 milliGRAM(s) Oral every 6 hours  albuterol    90 MICROgram(s) HFA Inhaler 90 Puff(s) Inhalation every 4 hours  aspirin  chewable 81 milliGRAM(s) Enteral Tube daily  atorvastatin 20 milliGRAM(s) Oral at bedtime  bacitracin   Ointment 1 Application(s) Topical every 12 hours  chlorhexidine 0.12% Liquid 15 milliLiter(s) Oral Mucosa two times a day  chlorhexidine 2% Cloths 1 Application(s) Topical <User Schedule>  doxazosin 4 milliGRAM(s) Oral at bedtime  heparin   Injectable 5000 Unit(s) SubCutaneous every 8 hours  insulin glargine Injectable (LANTUS) 20 Unit(s) SubCutaneous at bedtime  insulin lispro (ADMELOG) corrective regimen sliding scale   SubCutaneous every 4 hours  ipratropium 17 MICROgram(s) HFA Inhaler 1 Puff(s) Inhalation every 6 hours  pantoprazole  Injectable 40 milliGRAM(s) IV Push every 24 hours  piperacillin/tazobactam IVPB.. 3.375 Gram(s) IV Intermittent every 12 hours  sevelamer carbonate Powder 800 milliGRAM(s) Oral three times a day with meals  sodium chloride 0.45%. 1000 milliLiter(s) (75 mL/Hr) IV Continuous <Continuous>  valproic  acid Syrup 500 milliGRAM(s) Oral every 12 hours  vitamin A &amp; D Ointment 1 Application(s) Topical every 12 hours      Vital Sings, Intake/Output (Last 24Hours)  ICU Vital Signs Last 24 Hrs  T(C): 37.3 (29 Dec 2023 20:00), Max: 37.7 (29 Dec 2023 04:00)  T(F): 99.1 (29 Dec 2023 20:00), Max: 99.8 (29 Dec 2023 04:00)  HR: 83 (29 Dec 2023 23:00) (83 - 100)  BP: 100/49 (29 Dec 2023 23:00) (93/50 - 141/63)  BP(mean): 71 (29 Dec 2023 23:00) (68 - 92)  ABP: --  ABP(mean): --  RR: 25 (29 Dec 2023 21:00) (25 - 34)  SpO2: 99% (29 Dec 2023 23:00) (95% - 100%)    O2 Parameters below as of 29 Dec 2023 10:00  Patient On (Oxygen Delivery Method): BiPAP/CPAP          I&O's Summary    28 Dec 2023 07:01  -  29 Dec 2023 07:00  --------------------------------------------------------  IN: 2460 mL / OUT: 2644 mL / NET: -184 mL    29 Dec 2023 07:01  -  30 Dec 2023 00:15  --------------------------------------------------------  IN: 1350 mL / OUT: 3640 mL / NET: -2290 mL        Physical Exam:  --------------------------------------------------------------------------------------------***    Tubes/Lines/Drains   ----------------------------------------------------------------------------------------------------------  [x] Peripheral IV  [X] Right basilic midline (placed 12/16)  [X] Urinary Catheter Aguirre                                               [X] 20Fr PEG (12/22)  [X]Tracheostomy (12/22)	       JACQUELIN CAI   [id number]   [birth year]  78yM    Admit Date: 12-16-23  Indication for SDU/SICU: Q1 neuro checks        ============================  HPI   77-year-old male Cantonese speaking presents with prior history of HTN, HLD, Diabetes, BPH, prior CVA on aspirin and Plavix who states he had a mechanical fall 3 days ago while getting out of the car fell backwards hit his head.  Afterwards he was able to ambulate.  But for the past 1 day family was unable to ambulate him.  He states that his lower extremities are painful to movement and weak.  Family denies any LOC. Per daughter patient he often forgets things and cannot recall year or place. Patient following commands bedside.   Initial CT head remarkable for 3 Acute subdural hemorrhages: 0.7 cm along the left hemisphere, 0.3 cm along the right frontal area, and 1.1 cm along the left falx. No midline shift. Repeat CT head was obtained prior to 6 hour tacos because patient was appearing lethargic vs sundowning. CT head #2 with stable findings. CT c-spine and chest/abd/pelvis unremarkable for acute pathology.     SICU Consult for q1 neuro checks.         24 Hour Events  12/29  NIGH:  - 8pm rounds: Abd soft, less distended than yesterday, Aguirre in place, Trach in place, vented, HDS      DAY  - Free water deficit (Na+ goal: 145): 1.3L. increased FWF from 200 to 250 q4  - Nephro recs: use Nepro enteral feeds (less magnesium);  add 1/2 NS 75 cc/hr to correct hypernatremia  in addition to free water 200 cc q4h flushes  - avoid diuretics, would not use Metolazone to cause volume depletion, and has min effect in advanced kidney dysfunction   - switched to Lantus 20 HS  - T-piece for 6+ hrs  - EEG, 12/28: No electrographic seizures or significant clinical events. Abnormal due to the presence of focal and generalized slowing. Findings consistent with focal and diffuse electrocerebral dysfunction secondary to structural/metabolic/nonspecific etiology.  - MRSA nares negative   - spoke with family abt current status. No GoC discussed.  - nephro recs: urine osm 437, pending lytes, consider switching TF to nepro, add on maintanence fluids, 1/2NS @ 75  - SW update: pt remains acute. Will reassess 24-48h prior to d/c  - changed TF to Nepro @ 36 per nephro  - L sacral stage 2 ulcer, wound care consult placed.   - BCx, 12/28 - NG @ 24h    [X] A ten-point review of systems was otherwise negative except as noted above.  [  ] Due to altered mental status/intubation, subjective information was not attained from the patient. History was obtained, to the extent possible, from review of the chart and collateral sources of information.    ====================================================================================================================================================================================================    PMH  PAST MEDICAL & SURGICAL HISTORY:  HTN (hypertension)  Seasonal allergies  History of BPH  Diabetes  No significant past surgical history      Home Meds:  Home Medications:  amLODIPine 5 mg oral tablet: 1 tab(s) orally once a day (14 Feb 2023 02:08)  aspirin 81 mg oral tablet: 1 tab(s) orally once a day (14 Feb 2023 02:08)  atorvastatin 20 mg oral tablet: 1 tab(s) orally once a day (16 Dec 2023 03:34)  carvedilol 6.25 mg oral tablet: 1 tab(s) orally 2 times a day (14 Feb 2023 02:08)  Jardiance 25 mg oral tablet: 1 tab(s) orally once a day (in the morning) (14 Feb 2023 02:08)  Nephplex Rx oral tablet: 1 tab(s) orally once a day (14 Feb 2023 02:08)  SITagliptin 50 mg oral tablet: 1 tab(s) orally once a day (16 Dec 2023 03:34)  tamsulosin 0.4 mg oral capsule: 2 cap(s) orally once a day (at bedtime) (16 Dec 2023 03:29)  valsartan 320 mg oral tablet: 1 tab(s) orally once a day (14 Feb 2023 02:08)    Allergies  Allergies  [This allergen will not trigger allergy alert] pollen (Unknown)  No Known Drug Allergies    Intolerances       Current Medications:  acetaminophen     Tablet .. 650 milliGRAM(s) Oral every 6 hours  albuterol    90 MICROgram(s) HFA Inhaler 90 Puff(s) Inhalation every 4 hours  aspirin  chewable 81 milliGRAM(s) Enteral Tube daily  atorvastatin 20 milliGRAM(s) Oral at bedtime  bacitracin   Ointment 1 Application(s) Topical every 12 hours  chlorhexidine 0.12% Liquid 15 milliLiter(s) Oral Mucosa two times a day  chlorhexidine 2% Cloths 1 Application(s) Topical <User Schedule>  doxazosin 4 milliGRAM(s) Oral at bedtime  heparin   Injectable 5000 Unit(s) SubCutaneous every 8 hours  insulin glargine Injectable (LANTUS) 20 Unit(s) SubCutaneous at bedtime  insulin lispro (ADMELOG) corrective regimen sliding scale   SubCutaneous every 4 hours  ipratropium 17 MICROgram(s) HFA Inhaler 1 Puff(s) Inhalation every 6 hours  pantoprazole  Injectable 40 milliGRAM(s) IV Push every 24 hours  piperacillin/tazobactam IVPB.. 3.375 Gram(s) IV Intermittent every 12 hours  sevelamer carbonate Powder 800 milliGRAM(s) Oral three times a day with meals  sodium chloride 0.45%. 1000 milliLiter(s) (75 mL/Hr) IV Continuous <Continuous>  valproic  acid Syrup 500 milliGRAM(s) Oral every 12 hours  vitamin A &amp; D Ointment 1 Application(s) Topical every 12 hours      Vital Sings, Intake/Output (Last 24Hours)  ICU Vital Signs Last 24 Hrs  T(C): 37.3 (29 Dec 2023 20:00), Max: 37.7 (29 Dec 2023 04:00)  T(F): 99.1 (29 Dec 2023 20:00), Max: 99.8 (29 Dec 2023 04:00)  HR: 83 (29 Dec 2023 23:00) (83 - 100)  BP: 100/49 (29 Dec 2023 23:00) (93/50 - 141/63)  BP(mean): 71 (29 Dec 2023 23:00) (68 - 92)  ABP: --  ABP(mean): --  RR: 25 (29 Dec 2023 21:00) (25 - 34)  SpO2: 99% (29 Dec 2023 23:00) (95% - 100%)    O2 Parameters below as of 29 Dec 2023 10:00  Patient On (Oxygen Delivery Method): BiPAP/CPAP      I&O's Summary    28 Dec 2023 07:01  -  29 Dec 2023 07:00  --------------------------------------------------------  IN: 2460 mL / OUT: 2644 mL / NET: -184 mL    29 Dec 2023 07:01  -  30 Dec 2023 00:15  --------------------------------------------------------  IN: 1350 mL / OUT: 3640 mL / NET: -2290 mL      Daily     Daily   Diet, NPO with Tube Feed:   Tube Feeding Modality: Gastrostomy  Nepro with Carb Steady  Total Volume for 24 Hours (mL): 648  Continuous  Starting Tube Feed Rate mL per Hour: 27  Increase Tube Feed Rate by (mL): 0     Every 4 hours  Until Goal Tube Feed Rate (mL per Hour): 27  Tube Feed Duration (in Hours): 24  Tube Feed Start Time: 12:00  Tube Feed Stop Time: 06:00  Free Water Flush  Bolus   Total Volume per Flush (mL): 250   Frequency: Every 4 Hours  Free Water Flush Instructions:  Please flush PEG with 50cc of sterile water before feeds start and 100cc of sterile water after feeds end (12-30-23 @ 11:42)    CURRENT MEDS:  Neurologic Medications  acetaminophen     Tablet .. 650 milliGRAM(s) Oral every 6 hours  valproic  acid Syrup 500 milliGRAM(s) Oral every 12 hours    Respiratory Medications  albuterol    90 MICROgram(s) HFA Inhaler 90 Puff(s) Inhalation every 4 hours  ipratropium 17 MICROgram(s) HFA Inhaler 1 Puff(s) Inhalation every 6 hours    Cardiovascular Medications  amLODIPine   Tablet 5 milliGRAM(s) Oral daily  carvedilol 6.25 milliGRAM(s) Oral every 12 hours  doxazosin 4 milliGRAM(s) Oral at bedtime    Gastrointestinal Medications  pantoprazole  Injectable 40 milliGRAM(s) IV Push every 24 hours  sodium chloride 0.45%. 1000 milliLiter(s) IV Continuous <Continuous>    Genitourinary Medications    Hematologic/Oncologic Medications  aspirin  chewable 81 milliGRAM(s) Enteral Tube daily  heparin   Injectable 5000 Unit(s) SubCutaneous every 8 hours    Antimicrobial/Immunologic Medications  piperacillin/tazobactam IVPB.. 3.375 Gram(s) IV Intermittent every 12 hours    Endocrine/Metabolic Medications  atorvastatin 20 milliGRAM(s) Oral at bedtime  insulin glargine Injectable (LANTUS) 20 Unit(s) SubCutaneous at bedtime  insulin lispro (ADMELOG) corrective regimen sliding scale   SubCutaneous every 4 hours    Topical/Other Medications  bacitracin   Ointment 1 Application(s) Topical every 12 hours  chlorhexidine 0.12% Liquid 15 milliLiter(s) Oral Mucosa two times a day  chlorhexidine 2% Cloths 1 Application(s) Topical <User Schedule>  sevelamer carbonate Powder 800 milliGRAM(s) Oral three times a day with meals  vitamin A &amp; D Ointment 1 Application(s) Topical every 12 hours    ICU Vital Signs Last 24 Hrs  T(C): 36.3 (30 Dec 2023 13:00), Max: 37.4 (30 Dec 2023 00:00)  T(F): 97.3 (30 Dec 2023 13:00), Max: 99.3 (30 Dec 2023 00:00)  HR: 91 (30 Dec 2023 13:00) (81 - 100)  BP: 139/63 (30 Dec 2023 13:00) (100/49 - 152/69)  BP(mean): 91 (30 Dec 2023 13:00) (71 - 99)  ABP: --  ABP(mean): --  RR: 26 (30 Dec 2023 13:00) (18 - 34)  SpO2: 99% (30 Dec 2023 13:00) (95% - 100%)    O2 Parameters below as of 30 Dec 2023 13:00  Patient On (Oxygen Delivery Method): T-piece  O2 Flow (L/min): 6  O2 Concentration (%): 40      Mode: standby  FiO2: 40    ABG - ( 30 Dec 2023 05:45 )  pH, Arterial: 7.47  pH, Blood: x     /  pCO2: 28    /  pO2: 177   / HCO3: 20    / Base Excess: -2.0  /  SaO2: 99.3          I&O's Summary    29 Dec 2023 07:01  -  30 Dec 2023 07:00  --------------------------------------------------------  IN: 2595 mL / OUT: 4705 mL / NET: -2110 mL    30 Dec 2023 07:01  -  30 Dec 2023 13:52  --------------------------------------------------------  IN: 325 mL / OUT: 700 mL / NET: -375 mL      I&O's Detail    29 Dec 2023 07:01  -  30 Dec 2023 07:00  --------------------------------------------------------  IN:    Enteral Tube Flush: 1150 mL    Glucerna: 490 mL    IV PiggyBack: 100 mL    Nepro with Carb Steady: 180 mL    sodium chloride 0.45%: 675 mL  Total IN: 2595 mL    OUT:    Indwelling Catheter - Urethral (mL): 4135 mL    Rectal Tube (mL): 570 mL  Total OUT: 4705 mL    Total NET: -2110 mL      30 Dec 2023 07:01  -  30 Dec 2023 13:52  --------------------------------------------------------  IN:    IV PiggyBack: 25 mL    sodium chloride 0.45%: 300 mL  Total IN: 325 mL    OUT:    Indwelling Catheter - Urethral (mL): 650 mL    Rectal Tube (mL): 50 mL  Total OUT: 700 mL    Total NET: -375 mL      PHYSICAL EXAM:     opening eyes and responding to questions with family   no acute distress  equal chest rise b/l  abdomen soft, mildly distended but improved  extremities soft  urinary cath in place, dignishield in place        Tubes/Lines/Drains   ----------------------------------------------------------------------------------------------------------  [x] Peripheral IV  [X] Right basilic midline (placed 12/16)  [X] Urinary Catheter Aguirre                                               [X] 20Fr PEG (12/22)  [X]Tracheostomy (12/22)	       JACQUELIN CAI   [id number]   [birth year]  78yM    Admit Date: 12-16-23  Indication for SDU/SICU: Q1 neuro checks        ============================  HPI   77-year-old male Cantonese speaking presents with prior history of HTN, HLD, Diabetes, BPH, prior CVA on aspirin and Plavix who states he had a mechanical fall 3 days ago while getting out of the car fell backwards hit his head.  Afterwards he was able to ambulate.  But for the past 1 day family was unable to ambulate him.  He states that his lower extremities are painful to movement and weak.  Family denies any LOC. Per daughter patient he often forgets things and cannot recall year or place. Patient following commands bedside.   Initial CT head remarkable for 3 Acute subdural hemorrhages: 0.7 cm along the left hemisphere, 0.3 cm along the right frontal area, and 1.1 cm along the left falx. No midline shift. Repeat CT head was obtained prior to 6 hour tacos because patient was appearing lethargic vs sundowning. CT head #2 with stable findings. CT c-spine and chest/abd/pelvis unremarkable for acute pathology.     SICU Consult for q1 neuro checks.         24 Hour Events  12/29  NIGH:  - 8pm rounds: Abd soft, less distended than yesterday, Aguirre in place, Trach in place, vented, HDS      DAY  - Free water deficit (Na+ goal: 145): 1.3L. increased FWF from 200 to 250 q4  - Nephro recs: use Nepro enteral feeds (less magnesium);  add 1/2 NS 75 cc/hr to correct hypernatremia  in addition to free water 200 cc q4h flushes  - avoid diuretics, would not use Metolazone to cause volume depletion, and has min effect in advanced kidney dysfunction   - switched to Lantus 20 HS  - T-piece for 6+ hrs  - EEG, 12/28: No electrographic seizures or significant clinical events. Abnormal due to the presence of focal and generalized slowing. Findings consistent with focal and diffuse electrocerebral dysfunction secondary to structural/metabolic/nonspecific etiology.  - MRSA nares negative   - spoke with family abt current status. No GoC discussed.  - nephro recs: urine osm 437, pending lytes, consider switching TF to nepro, add on maintanence fluids, 1/2NS @ 75  - SW update: pt remains acute. Will reassess 24-48h prior to d/c  - changed TF to Nepro @ 36 per nephro  - L sacral stage 2 ulcer, wound care consult placed.   - BCx, 12/28 - NG @ 24h    [X] A ten-point review of systems was otherwise negative except as noted above.  [  ] Due to altered mental status/intubation, subjective information was not attained from the patient. History was obtained, to the extent possible, from review of the chart and collateral sources of information.    ====================================================================================================================================================================================================    PMH  PAST MEDICAL & SURGICAL HISTORY:  HTN (hypertension)  Seasonal allergies  History of BPH  Diabetes  No significant past surgical history      Home Meds:  Home Medications:  amLODIPine 5 mg oral tablet: 1 tab(s) orally once a day (14 Feb 2023 02:08)  aspirin 81 mg oral tablet: 1 tab(s) orally once a day (14 Feb 2023 02:08)  atorvastatin 20 mg oral tablet: 1 tab(s) orally once a day (16 Dec 2023 03:34)  carvedilol 6.25 mg oral tablet: 1 tab(s) orally 2 times a day (14 Feb 2023 02:08)  Jardiance 25 mg oral tablet: 1 tab(s) orally once a day (in the morning) (14 Feb 2023 02:08)  Nephplex Rx oral tablet: 1 tab(s) orally once a day (14 Feb 2023 02:08)  SITagliptin 50 mg oral tablet: 1 tab(s) orally once a day (16 Dec 2023 03:34)  tamsulosin 0.4 mg oral capsule: 2 cap(s) orally once a day (at bedtime) (16 Dec 2023 03:29)  valsartan 320 mg oral tablet: 1 tab(s) orally once a day (14 Feb 2023 02:08)    Allergies  Allergies  [This allergen will not trigger allergy alert] pollen (Unknown)  No Known Drug Allergies    Intolerances       Current Medications:  acetaminophen     Tablet .. 650 milliGRAM(s) Oral every 6 hours  albuterol    90 MICROgram(s) HFA Inhaler 90 Puff(s) Inhalation every 4 hours  aspirin  chewable 81 milliGRAM(s) Enteral Tube daily  atorvastatin 20 milliGRAM(s) Oral at bedtime  bacitracin   Ointment 1 Application(s) Topical every 12 hours  chlorhexidine 0.12% Liquid 15 milliLiter(s) Oral Mucosa two times a day  chlorhexidine 2% Cloths 1 Application(s) Topical <User Schedule>  doxazosin 4 milliGRAM(s) Oral at bedtime  heparin   Injectable 5000 Unit(s) SubCutaneous every 8 hours  insulin glargine Injectable (LANTUS) 20 Unit(s) SubCutaneous at bedtime  insulin lispro (ADMELOG) corrective regimen sliding scale   SubCutaneous every 4 hours  ipratropium 17 MICROgram(s) HFA Inhaler 1 Puff(s) Inhalation every 6 hours  pantoprazole  Injectable 40 milliGRAM(s) IV Push every 24 hours  piperacillin/tazobactam IVPB.. 3.375 Gram(s) IV Intermittent every 12 hours  sevelamer carbonate Powder 800 milliGRAM(s) Oral three times a day with meals  sodium chloride 0.45%. 1000 milliLiter(s) (75 mL/Hr) IV Continuous <Continuous>  valproic  acid Syrup 500 milliGRAM(s) Oral every 12 hours  vitamin A &amp; D Ointment 1 Application(s) Topical every 12 hours      Vital Sings, Intake/Output (Last 24Hours)  ICU Vital Signs Last 24 Hrs  T(C): 37.3 (29 Dec 2023 20:00), Max: 37.7 (29 Dec 2023 04:00)  T(F): 99.1 (29 Dec 2023 20:00), Max: 99.8 (29 Dec 2023 04:00)  HR: 83 (29 Dec 2023 23:00) (83 - 100)  BP: 100/49 (29 Dec 2023 23:00) (93/50 - 141/63)  BP(mean): 71 (29 Dec 2023 23:00) (68 - 92)  ABP: --  ABP(mean): --  RR: 25 (29 Dec 2023 21:00) (25 - 34)  SpO2: 99% (29 Dec 2023 23:00) (95% - 100%)    O2 Parameters below as of 29 Dec 2023 10:00  Patient On (Oxygen Delivery Method): BiPAP/CPAP      I&O's Summary    28 Dec 2023 07:01  -  29 Dec 2023 07:00  --------------------------------------------------------  IN: 2460 mL / OUT: 2644 mL / NET: -184 mL    29 Dec 2023 07:01  -  30 Dec 2023 00:15  --------------------------------------------------------  IN: 1350 mL / OUT: 3640 mL / NET: -2290 mL      Daily     Daily   Diet, NPO with Tube Feed:   Tube Feeding Modality: Gastrostomy  Nepro with Carb Steady  Total Volume for 24 Hours (mL): 648  Continuous  Starting Tube Feed Rate mL per Hour: 27  Increase Tube Feed Rate by (mL): 0     Every 4 hours  Until Goal Tube Feed Rate (mL per Hour): 27  Tube Feed Duration (in Hours): 24  Tube Feed Start Time: 12:00  Tube Feed Stop Time: 06:00  Free Water Flush  Bolus   Total Volume per Flush (mL): 250   Frequency: Every 4 Hours  Free Water Flush Instructions:  Please flush PEG with 50cc of sterile water before feeds start and 100cc of sterile water after feeds end (12-30-23 @ 11:42)    CURRENT MEDS:  Neurologic Medications  acetaminophen     Tablet .. 650 milliGRAM(s) Oral every 6 hours  valproic  acid Syrup 500 milliGRAM(s) Oral every 12 hours    Respiratory Medications  albuterol    90 MICROgram(s) HFA Inhaler 90 Puff(s) Inhalation every 4 hours  ipratropium 17 MICROgram(s) HFA Inhaler 1 Puff(s) Inhalation every 6 hours    Cardiovascular Medications  amLODIPine   Tablet 5 milliGRAM(s) Oral daily  carvedilol 6.25 milliGRAM(s) Oral every 12 hours  doxazosin 4 milliGRAM(s) Oral at bedtime    Gastrointestinal Medications  pantoprazole  Injectable 40 milliGRAM(s) IV Push every 24 hours  sodium chloride 0.45%. 1000 milliLiter(s) IV Continuous <Continuous>    Genitourinary Medications    Hematologic/Oncologic Medications  aspirin  chewable 81 milliGRAM(s) Enteral Tube daily  heparin   Injectable 5000 Unit(s) SubCutaneous every 8 hours    Antimicrobial/Immunologic Medications  piperacillin/tazobactam IVPB.. 3.375 Gram(s) IV Intermittent every 12 hours    Endocrine/Metabolic Medications  atorvastatin 20 milliGRAM(s) Oral at bedtime  insulin glargine Injectable (LANTUS) 20 Unit(s) SubCutaneous at bedtime  insulin lispro (ADMELOG) corrective regimen sliding scale   SubCutaneous every 4 hours    Topical/Other Medications  bacitracin   Ointment 1 Application(s) Topical every 12 hours  chlorhexidine 0.12% Liquid 15 milliLiter(s) Oral Mucosa two times a day  chlorhexidine 2% Cloths 1 Application(s) Topical <User Schedule>  sevelamer carbonate Powder 800 milliGRAM(s) Oral three times a day with meals  vitamin A &amp; D Ointment 1 Application(s) Topical every 12 hours    ICU Vital Signs Last 24 Hrs  T(C): 36.3 (30 Dec 2023 13:00), Max: 37.4 (30 Dec 2023 00:00)  T(F): 97.3 (30 Dec 2023 13:00), Max: 99.3 (30 Dec 2023 00:00)  HR: 91 (30 Dec 2023 13:00) (81 - 100)  BP: 139/63 (30 Dec 2023 13:00) (100/49 - 152/69)  BP(mean): 91 (30 Dec 2023 13:00) (71 - 99)  ABP: --  ABP(mean): --  RR: 26 (30 Dec 2023 13:00) (18 - 34)  SpO2: 99% (30 Dec 2023 13:00) (95% - 100%)    O2 Parameters below as of 30 Dec 2023 13:00  Patient On (Oxygen Delivery Method): T-piece  O2 Flow (L/min): 6  O2 Concentration (%): 40      Mode: standby  FiO2: 40    ABG - ( 30 Dec 2023 05:45 )  pH, Arterial: 7.47  pH, Blood: x     /  pCO2: 28    /  pO2: 177   / HCO3: 20    / Base Excess: -2.0  /  SaO2: 99.3          I&O's Summary    29 Dec 2023 07:01  -  30 Dec 2023 07:00  --------------------------------------------------------  IN: 2595 mL / OUT: 4705 mL / NET: -2110 mL    30 Dec 2023 07:01  -  30 Dec 2023 13:52  --------------------------------------------------------  IN: 325 mL / OUT: 700 mL / NET: -375 mL      I&O's Detail    29 Dec 2023 07:01  -  30 Dec 2023 07:00  --------------------------------------------------------  IN:    Enteral Tube Flush: 1150 mL    Glucerna: 490 mL    IV PiggyBack: 100 mL    Nepro with Carb Steady: 180 mL    sodium chloride 0.45%: 675 mL  Total IN: 2595 mL    OUT:    Indwelling Catheter - Urethral (mL): 4135 mL    Rectal Tube (mL): 570 mL  Total OUT: 4705 mL    Total NET: -2110 mL      30 Dec 2023 07:01  -  30 Dec 2023 13:52  --------------------------------------------------------  IN:    IV PiggyBack: 25 mL    sodium chloride 0.45%: 300 mL  Total IN: 325 mL    OUT:    Indwelling Catheter - Urethral (mL): 650 mL    Rectal Tube (mL): 50 mL  Total OUT: 700 mL    Total NET: -375 mL      PHYSICAL EXAM:     opening eyes and responding to questions with family   no acute distress  equal chest rise b/l  abdomen soft, mildly distended but improved  extremities soft  urinary cath in place, dignishield in place        Tubes/Lines/Drains   ----------------------------------------------------------------------------------------------------------  [x] Peripheral IV  [X] Right basilic midline (placed 12/16)  [X] Urinary Catheter Aguirre                                               [X] 20Fr PEG (12/22)  [X]Tracheostomy (12/22)

## 2023-12-31 LAB
ALBUMIN SERPL ELPH-MCNC: 2.4 G/DL — LOW (ref 3.5–5.2)
ALBUMIN SERPL ELPH-MCNC: 2.4 G/DL — LOW (ref 3.5–5.2)
ANION GAP SERPL CALC-SCNC: 13 MMOL/L — SIGNIFICANT CHANGE UP (ref 7–14)
ANION GAP SERPL CALC-SCNC: 14 MMOL/L — SIGNIFICANT CHANGE UP (ref 7–14)
BASOPHILS # BLD AUTO: 0.03 K/UL — SIGNIFICANT CHANGE UP (ref 0–0.2)
BASOPHILS # BLD AUTO: 0.03 K/UL — SIGNIFICANT CHANGE UP (ref 0–0.2)
BASOPHILS NFR BLD AUTO: 0.3 % — SIGNIFICANT CHANGE UP (ref 0–1)
BASOPHILS NFR BLD AUTO: 0.3 % — SIGNIFICANT CHANGE UP (ref 0–1)
BUN SERPL-MCNC: 80 MG/DL — CRITICAL HIGH (ref 10–20)
BUN SERPL-MCNC: 80 MG/DL — CRITICAL HIGH (ref 10–20)
BUN SERPL-MCNC: 81 MG/DL — CRITICAL HIGH (ref 10–20)
BUN SERPL-MCNC: 81 MG/DL — CRITICAL HIGH (ref 10–20)
BUN SERPL-MCNC: 87 MG/DL — CRITICAL HIGH (ref 10–20)
BUN SERPL-MCNC: 87 MG/DL — CRITICAL HIGH (ref 10–20)
BUN SERPL-MCNC: 96 MG/DL — CRITICAL HIGH (ref 10–20)
BUN SERPL-MCNC: 96 MG/DL — CRITICAL HIGH (ref 10–20)
CALCIUM SERPL-MCNC: 7.5 MG/DL — LOW (ref 8.4–10.5)
CALCIUM SERPL-MCNC: 7.5 MG/DL — LOW (ref 8.4–10.5)
CALCIUM SERPL-MCNC: 7.8 MG/DL — LOW (ref 8.4–10.5)
CHLORIDE SERPL-SCNC: 109 MMOL/L — SIGNIFICANT CHANGE UP (ref 98–110)
CHLORIDE SERPL-SCNC: 110 MMOL/L — SIGNIFICANT CHANGE UP (ref 98–110)
CHLORIDE SERPL-SCNC: 110 MMOL/L — SIGNIFICANT CHANGE UP (ref 98–110)
CHLORIDE SERPL-SCNC: 113 MMOL/L — HIGH (ref 98–110)
CHLORIDE SERPL-SCNC: 113 MMOL/L — HIGH (ref 98–110)
CO2 SERPL-SCNC: 19 MMOL/L — SIGNIFICANT CHANGE UP (ref 17–32)
CO2 SERPL-SCNC: 21 MMOL/L — SIGNIFICANT CHANGE UP (ref 17–32)
CO2 SERPL-SCNC: 21 MMOL/L — SIGNIFICANT CHANGE UP (ref 17–32)
CO2 SERPL-SCNC: 22 MMOL/L — SIGNIFICANT CHANGE UP (ref 17–32)
CO2 SERPL-SCNC: 22 MMOL/L — SIGNIFICANT CHANGE UP (ref 17–32)
CREAT SERPL-MCNC: 2.6 MG/DL — HIGH (ref 0.7–1.5)
CREAT SERPL-MCNC: 2.7 MG/DL — HIGH (ref 0.7–1.5)
CREAT SERPL-MCNC: 2.7 MG/DL — HIGH (ref 0.7–1.5)
CREAT SERPL-MCNC: 2.9 MG/DL — HIGH (ref 0.7–1.5)
CREAT SERPL-MCNC: 2.9 MG/DL — HIGH (ref 0.7–1.5)
EGFR: 21 ML/MIN/1.73M2 — LOW
EGFR: 21 ML/MIN/1.73M2 — LOW
EGFR: 23 ML/MIN/1.73M2 — LOW
EGFR: 23 ML/MIN/1.73M2 — LOW
EGFR: 24 ML/MIN/1.73M2 — LOW
EOSINOPHIL # BLD AUTO: 0.18 K/UL — SIGNIFICANT CHANGE UP (ref 0–0.7)
EOSINOPHIL # BLD AUTO: 0.18 K/UL — SIGNIFICANT CHANGE UP (ref 0–0.7)
EOSINOPHIL NFR BLD AUTO: 1.6 % — SIGNIFICANT CHANGE UP (ref 0–8)
EOSINOPHIL NFR BLD AUTO: 1.6 % — SIGNIFICANT CHANGE UP (ref 0–8)
GLUCOSE SERPL-MCNC: 114 MG/DL — HIGH (ref 70–99)
GLUCOSE SERPL-MCNC: 114 MG/DL — HIGH (ref 70–99)
GLUCOSE SERPL-MCNC: 140 MG/DL — HIGH (ref 70–99)
GLUCOSE SERPL-MCNC: 140 MG/DL — HIGH (ref 70–99)
GLUCOSE SERPL-MCNC: 180 MG/DL — HIGH (ref 70–99)
GLUCOSE SERPL-MCNC: 180 MG/DL — HIGH (ref 70–99)
GLUCOSE SERPL-MCNC: 190 MG/DL — HIGH (ref 70–99)
GLUCOSE SERPL-MCNC: 190 MG/DL — HIGH (ref 70–99)
HCT VFR BLD CALC: 27.2 % — LOW (ref 42–52)
HCT VFR BLD CALC: 27.2 % — LOW (ref 42–52)
HGB BLD-MCNC: 8.6 G/DL — LOW (ref 14–18)
HGB BLD-MCNC: 8.6 G/DL — LOW (ref 14–18)
IMM GRANULOCYTES NFR BLD AUTO: 7.3 % — HIGH (ref 0.1–0.3)
IMM GRANULOCYTES NFR BLD AUTO: 7.3 % — HIGH (ref 0.1–0.3)
LYMPHOCYTES # BLD AUTO: 0.7 K/UL — LOW (ref 1.2–3.4)
LYMPHOCYTES # BLD AUTO: 0.7 K/UL — LOW (ref 1.2–3.4)
LYMPHOCYTES # BLD AUTO: 6 % — LOW (ref 20.5–51.1)
LYMPHOCYTES # BLD AUTO: 6 % — LOW (ref 20.5–51.1)
MAGNESIUM SERPL-MCNC: 2.8 MG/DL — HIGH (ref 1.8–2.4)
MAGNESIUM SERPL-MCNC: 2.8 MG/DL — HIGH (ref 1.8–2.4)
MAGNESIUM SERPL-MCNC: 2.9 MG/DL — HIGH (ref 1.8–2.4)
MAGNESIUM SERPL-MCNC: 2.9 MG/DL — HIGH (ref 1.8–2.4)
MCHC RBC-ENTMCNC: 29.1 PG — SIGNIFICANT CHANGE UP (ref 27–31)
MCHC RBC-ENTMCNC: 29.1 PG — SIGNIFICANT CHANGE UP (ref 27–31)
MCHC RBC-ENTMCNC: 31.6 G/DL — LOW (ref 32–37)
MCHC RBC-ENTMCNC: 31.6 G/DL — LOW (ref 32–37)
MCV RBC AUTO: 91.9 FL — SIGNIFICANT CHANGE UP (ref 80–94)
MCV RBC AUTO: 91.9 FL — SIGNIFICANT CHANGE UP (ref 80–94)
MONOCYTES # BLD AUTO: 0.81 K/UL — HIGH (ref 0.1–0.6)
MONOCYTES # BLD AUTO: 0.81 K/UL — HIGH (ref 0.1–0.6)
MONOCYTES NFR BLD AUTO: 7 % — SIGNIFICANT CHANGE UP (ref 1.7–9.3)
MONOCYTES NFR BLD AUTO: 7 % — SIGNIFICANT CHANGE UP (ref 1.7–9.3)
NEUTROPHILS # BLD AUTO: 9.01 K/UL — HIGH (ref 1.4–6.5)
NEUTROPHILS # BLD AUTO: 9.01 K/UL — HIGH (ref 1.4–6.5)
NEUTROPHILS NFR BLD AUTO: 77.8 % — HIGH (ref 42.2–75.2)
NEUTROPHILS NFR BLD AUTO: 77.8 % — HIGH (ref 42.2–75.2)
NRBC # BLD: 0 /100 WBCS — SIGNIFICANT CHANGE UP (ref 0–0)
NRBC # BLD: 0 /100 WBCS — SIGNIFICANT CHANGE UP (ref 0–0)
PHOSPHATE SERPL-MCNC: 5.1 MG/DL — HIGH (ref 2.1–4.9)
PHOSPHATE SERPL-MCNC: 5.1 MG/DL — HIGH (ref 2.1–4.9)
PHOSPHATE SERPL-MCNC: 5.6 MG/DL — HIGH (ref 2.1–4.9)
PHOSPHATE SERPL-MCNC: 5.6 MG/DL — HIGH (ref 2.1–4.9)
PLATELET # BLD AUTO: 388 K/UL — SIGNIFICANT CHANGE UP (ref 130–400)
PLATELET # BLD AUTO: 388 K/UL — SIGNIFICANT CHANGE UP (ref 130–400)
PMV BLD: 8.9 FL — SIGNIFICANT CHANGE UP (ref 7.4–10.4)
PMV BLD: 8.9 FL — SIGNIFICANT CHANGE UP (ref 7.4–10.4)
POTASSIUM SERPL-MCNC: 4 MMOL/L — SIGNIFICANT CHANGE UP (ref 3.5–5)
POTASSIUM SERPL-MCNC: 4.2 MMOL/L — SIGNIFICANT CHANGE UP (ref 3.5–5)
POTASSIUM SERPL-MCNC: 4.2 MMOL/L — SIGNIFICANT CHANGE UP (ref 3.5–5)
POTASSIUM SERPL-SCNC: 4 MMOL/L — SIGNIFICANT CHANGE UP (ref 3.5–5)
POTASSIUM SERPL-SCNC: 4.2 MMOL/L — SIGNIFICANT CHANGE UP (ref 3.5–5)
POTASSIUM SERPL-SCNC: 4.2 MMOL/L — SIGNIFICANT CHANGE UP (ref 3.5–5)
RBC # BLD: 2.96 M/UL — LOW (ref 4.7–6.1)
RBC # BLD: 2.96 M/UL — LOW (ref 4.7–6.1)
RBC # FLD: 14 % — SIGNIFICANT CHANGE UP (ref 11.5–14.5)
RBC # FLD: 14 % — SIGNIFICANT CHANGE UP (ref 11.5–14.5)
SODIUM SERPL-SCNC: 142 MMOL/L — SIGNIFICANT CHANGE UP (ref 135–146)
SODIUM SERPL-SCNC: 142 MMOL/L — SIGNIFICANT CHANGE UP (ref 135–146)
SODIUM SERPL-SCNC: 143 MMOL/L — SIGNIFICANT CHANGE UP (ref 135–146)
SODIUM SERPL-SCNC: 143 MMOL/L — SIGNIFICANT CHANGE UP (ref 135–146)
SODIUM SERPL-SCNC: 144 MMOL/L — SIGNIFICANT CHANGE UP (ref 135–146)
SODIUM SERPL-SCNC: 144 MMOL/L — SIGNIFICANT CHANGE UP (ref 135–146)
SODIUM SERPL-SCNC: 147 MMOL/L — HIGH (ref 135–146)
SODIUM SERPL-SCNC: 147 MMOL/L — HIGH (ref 135–146)
VALPROATE SERPL-MCNC: 23 UG/ML — LOW (ref 50–100)
VALPROATE SERPL-MCNC: 23 UG/ML — LOW (ref 50–100)
WBC # BLD: 11.58 K/UL — HIGH (ref 4.8–10.8)
WBC # BLD: 11.58 K/UL — HIGH (ref 4.8–10.8)
WBC # FLD AUTO: 11.58 K/UL — HIGH (ref 4.8–10.8)
WBC # FLD AUTO: 11.58 K/UL — HIGH (ref 4.8–10.8)

## 2023-12-31 PROCEDURE — 70450 CT HEAD/BRAIN W/O DYE: CPT | Mod: 26

## 2023-12-31 PROCEDURE — 99233 SBSQ HOSP IP/OBS HIGH 50: CPT | Mod: 24

## 2023-12-31 RX ORDER — VALPROIC ACID (AS SODIUM SALT) 250 MG/5ML
750 SOLUTION, ORAL ORAL EVERY 12 HOURS
Refills: 0 | Status: COMPLETED | OUTPATIENT
Start: 2024-01-01 | End: 2024-01-07

## 2023-12-31 RX ADMIN — Medication 4 MILLIGRAM(S): at 21:26

## 2023-12-31 RX ADMIN — PIPERACILLIN AND TAZOBACTAM 25 GRAM(S): 4; .5 INJECTION, POWDER, LYOPHILIZED, FOR SOLUTION INTRAVENOUS at 17:54

## 2023-12-31 RX ADMIN — Medication 1 APPLICATION(S): at 06:22

## 2023-12-31 RX ADMIN — ATORVASTATIN CALCIUM 20 MILLIGRAM(S): 80 TABLET, FILM COATED ORAL at 21:25

## 2023-12-31 RX ADMIN — Medication 650 MILLIGRAM(S): at 00:02

## 2023-12-31 RX ADMIN — Medication 1 APPLICATION(S): at 17:53

## 2023-12-31 RX ADMIN — INSULIN GLARGINE 20 UNIT(S): 100 INJECTION, SOLUTION SUBCUTANEOUS at 22:16

## 2023-12-31 RX ADMIN — SEVELAMER CARBONATE 800 MILLIGRAM(S): 2400 POWDER, FOR SUSPENSION ORAL at 09:50

## 2023-12-31 RX ADMIN — Medication 5: at 13:02

## 2023-12-31 RX ADMIN — Medication 1 PUFF(S): at 02:44

## 2023-12-31 RX ADMIN — Medication 1 PUFF(S): at 07:50

## 2023-12-31 RX ADMIN — HEPARIN SODIUM 5000 UNIT(S): 5000 INJECTION INTRAVENOUS; SUBCUTANEOUS at 13:02

## 2023-12-31 RX ADMIN — CHLORHEXIDINE GLUCONATE 15 MILLILITER(S): 213 SOLUTION TOPICAL at 06:27

## 2023-12-31 RX ADMIN — SEVELAMER CARBONATE 800 MILLIGRAM(S): 2400 POWDER, FOR SUSPENSION ORAL at 17:52

## 2023-12-31 RX ADMIN — Medication 5: at 00:00

## 2023-12-31 RX ADMIN — HEPARIN SODIUM 5000 UNIT(S): 5000 INJECTION INTRAVENOUS; SUBCUTANEOUS at 04:25

## 2023-12-31 RX ADMIN — HEPARIN SODIUM 5000 UNIT(S): 5000 INJECTION INTRAVENOUS; SUBCUTANEOUS at 21:27

## 2023-12-31 RX ADMIN — SEVELAMER CARBONATE 800 MILLIGRAM(S): 2400 POWDER, FOR SUSPENSION ORAL at 13:03

## 2023-12-31 RX ADMIN — CARVEDILOL PHOSPHATE 6.25 MILLIGRAM(S): 80 CAPSULE, EXTENDED RELEASE ORAL at 06:27

## 2023-12-31 RX ADMIN — Medication 1 PUFF(S): at 13:34

## 2023-12-31 RX ADMIN — CARVEDILOL PHOSPHATE 6.25 MILLIGRAM(S): 80 CAPSULE, EXTENDED RELEASE ORAL at 17:53

## 2023-12-31 RX ADMIN — Medication 3: at 06:36

## 2023-12-31 RX ADMIN — CHLORHEXIDINE GLUCONATE 15 MILLILITER(S): 213 SOLUTION TOPICAL at 17:52

## 2023-12-31 RX ADMIN — ALBUTEROL 90 PUFF(S): 90 AEROSOL, METERED ORAL at 13:34

## 2023-12-31 RX ADMIN — ALBUTEROL 90 PUFF(S): 90 AEROSOL, METERED ORAL at 16:16

## 2023-12-31 RX ADMIN — Medication 650 MILLIGRAM(S): at 06:22

## 2023-12-31 RX ADMIN — AMLODIPINE BESYLATE 5 MILLIGRAM(S): 2.5 TABLET ORAL at 06:27

## 2023-12-31 RX ADMIN — PIPERACILLIN AND TAZOBACTAM 25 GRAM(S): 4; .5 INJECTION, POWDER, LYOPHILIZED, FOR SOLUTION INTRAVENOUS at 06:27

## 2023-12-31 RX ADMIN — Medication 81 MILLIGRAM(S): at 13:03

## 2023-12-31 RX ADMIN — Medication 650 MILLIGRAM(S): at 13:03

## 2023-12-31 RX ADMIN — ALBUTEROL 90 PUFF(S): 90 AEROSOL, METERED ORAL at 07:50

## 2023-12-31 RX ADMIN — PANTOPRAZOLE SODIUM 40 MILLIGRAM(S): 20 TABLET, DELAYED RELEASE ORAL at 06:27

## 2023-12-31 RX ADMIN — Medication 500 MILLIGRAM(S): at 17:53

## 2023-12-31 RX ADMIN — ALBUTEROL 90 PUFF(S): 90 AEROSOL, METERED ORAL at 03:46

## 2023-12-31 RX ADMIN — Medication 500 MILLIGRAM(S): at 06:27

## 2023-12-31 RX ADMIN — CHLORHEXIDINE GLUCONATE 1 APPLICATION(S): 213 SOLUTION TOPICAL at 06:28

## 2023-12-31 RX ADMIN — Medication 650 MILLIGRAM(S): at 17:53

## 2023-12-31 NOTE — PROGRESS NOTE ADULT - SUBJECTIVE AND OBJECTIVE BOX
JACQUELIN CAI   [id number]   [birth year]  78yM    Admit Date: 12-16-23  Indication for SDU/SICU: Q1 neuro checks      77-year-old male Cantonese speaking presents with prior history of HTN, HLD, Diabetes, BPH, prior CVA on aspirin and Plavix who states he had a mechanical fall 3 days ago while getting out of the car fell backwards hit his head.  Afterwards he was able to ambulate.  But for the past 1 day family was unable to ambulate him.  He states that his lower extremities are painful to movement and weak.  Family denies any LOC. Per daughter patient he often forgets things and cannot recall year or place. Patient following commands bedside.   Initial CT head remarkable for 3 Acute subdural hemorrhages: 0.7 cm along the left hemisphere, 0.3 cm along the right frontal area, and 1.1 cm along the left falx. No midline shift. Repeat CT head was obtained prior to 6 hour tacos because patient was appearing lethargic vs sundowning. CT head #2 with stable findings. CT c-spine and chest/abd/pelvis unremarkable for acute pathology.     SICU Consult for q1 neuro checks    ============================  24 Hour Events  12/31  Night  Exam- remains on T piece, TF @27 cc goal, HDS HR 82, /55, abd soft, mildly distended  Na and Wbc improving     12/31  DAY  -head CT for tomorrow prior to restarting plavix (prior rec per [facility]Y)  -restarting home coreg and amlodipine  -swithed TF back to 24hrs  -4pm BMP  -per nephro - change 1/2 NS to D5W at 75 cc/hr      [X] A ten-point review of systems was otherwise negative except as noted above.  [  ] Due to altered mental status/intubation, subjective information was not attained from the patient. History was obtained, to the extent possible, from review of the chart and collateral sources of information.   JACQUELIN CAI   [id number]   [birth year]  78yM    Admit Date: 12-16-23  Indication for SDU/SICU: Q1 neuro checks      77-year-old male Cantonese speaking presents with prior history of HTN, HLD, Diabetes, BPH, prior CVA on aspirin and Plavix who states he had a mechanical fall 3 days ago while getting out of the car fell backwards hit his head.  Afterwards he was able to ambulate.  But for the past 1 day family was unable to ambulate him.  He states that his lower extremities are painful to movement and weak.  Family denies any LOC. Per daughter patient he often forgets things and cannot recall year or place. Patient following commands bedside.   Initial CT head remarkable for 3 Acute subdural hemorrhages: 0.7 cm along the left hemisphere, 0.3 cm along the right frontal area, and 1.1 cm along the left falx. No midline shift. Repeat CT head was obtained prior to 6 hour tacos because patient was appearing lethargic vs sundowning. CT head #2 with stable findings. CT c-spine and chest/abd/pelvis unremarkable for acute pathology.     SICU Consult for q1 neuro checks    ============================  24 Hour Events  12/31  Night  Exam- remains on T piece, TF @27 cc goal, HDS HR 82, /55, abd soft, mildly distended  Na and Wbc improving     12/31  DAY  -head CT for tomorrow prior to restarting plavix (prior rec per NSY)  -restarting home coreg and amlodipine  -swithed TF back to 24hrs  -4pm BMP  -per nephro - change 1/2 NS to D5W at 75 cc/hr      [X] A ten-point review of systems was otherwise negative except as noted above.  [  ] Due to altered mental status/intubation, subjective information was not attained from the patient. History was obtained, to the extent possible, from review of the chart and collateral sources of information.   JACQUELIN CAI   [id number]   [birth year]  78yM    Admit Date: 12-16-23  Indication for SDU/SICU: Q1 neuro checks      77-year-old male Cantonese speaking presents with prior history of HTN, HLD, Diabetes, BPH, prior CVA on aspirin and Plavix who states he had a mechanical fall 3 days ago while getting out of the car fell backwards hit his head.  Afterwards he was able to ambulate.  But for the past 1 day family was unable to ambulate him.  He states that his lower extremities are painful to movement and weak.  Family denies any LOC. Per daughter patient he often forgets things and cannot recall year or place. Patient following commands bedside.   Initial CT head remarkable for 3 Acute subdural hemorrhages: 0.7 cm along the left hemisphere, 0.3 cm along the right frontal area, and 1.1 cm along the left falx. No midline shift. Repeat CT head was obtained prior to 6 hour tacos because patient was appearing lethargic vs sundowning. CT head #2 with stable findings. CT c-spine and chest/abd/pelvis unremarkable for acute pathology.     SICU Consult for q1 neuro checks    ============================  24 Hour Events  12/31  Night  Exam- remains on T piece, TF @27 cc goal, HDS HR 82, /55, abd soft, mildly distended  Na and Wbc improving     12/31  DAY  -head CT for tomorrow prior to restarting plavix (prior rec per NSY)  -restarting home coreg and amlodipine  -swithed TF back to 24hrs  -4pm BMP  -per nephro - change 1/2 NS to D5W at 75 cc/hr      [X] A ten-point review of systems was otherwise negative except as noted above.  [  ] Due to altered mental status/intubation, subjective information was not attained from the patient. History was obtained, to the extent possible, from review of the chart and collateral sources of information.      Daily     Daily     Diet, NPO with Tube Feed:   Tube Feeding Modality: Gastrostomy  Nepro with Carb Steady  Total Volume for 24 Hours (mL): 648  Continuous  Starting Tube Feed Rate mL per Hour: 27  Increase Tube Feed Rate by (mL): 0     Every 4 hours  Until Goal Tube Feed Rate (mL per Hour): 27  Tube Feed Duration (in Hours): 24  Tube Feed Start Time: 12:00  Tube Feed Stop Time: 06:00  Free Water Flush  Bolus   Total Volume per Flush (mL): 250   Frequency: Every 4 Hours  Free Water Flush Instructions:  Please flush PEG with 50cc of sterile water before feeds start and 100cc of sterile water after feeds end (12-30-23 @ 11:42)      CURRENT MEDS:  Neurologic Medications  acetaminophen     Tablet .. 650 milliGRAM(s) Oral every 6 hours  valproic  acid Syrup 500 milliGRAM(s) Oral every 12 hours    Respiratory Medications  albuterol    90 MICROgram(s) HFA Inhaler 90 Puff(s) Inhalation every 4 hours  ipratropium 17 MICROgram(s) HFA Inhaler 1 Puff(s) Inhalation every 6 hours    Cardiovascular Medications  amLODIPine   Tablet 5 milliGRAM(s) Oral daily  carvedilol 6.25 milliGRAM(s) Oral every 12 hours  doxazosin 4 milliGRAM(s) Oral at bedtime    Gastrointestinal Medications  dextrose 5%. 1000 milliLiter(s) IV Continuous <Continuous>  pantoprazole  Injectable 40 milliGRAM(s) IV Push every 24 hours    Genitourinary Medications    Hematologic/Oncologic Medications  aspirin  chewable 81 milliGRAM(s) Enteral Tube daily  heparin   Injectable 5000 Unit(s) SubCutaneous every 8 hours    Antimicrobial/Immunologic Medications  piperacillin/tazobactam IVPB.. 3.375 Gram(s) IV Intermittent every 12 hours    Endocrine/Metabolic Medications  atorvastatin 20 milliGRAM(s) Oral at bedtime  insulin glargine Injectable (LANTUS) 20 Unit(s) SubCutaneous at bedtime  insulin lispro (ADMELOG) corrective regimen sliding scale   SubCutaneous every 6 hours    Topical/Other Medications  bacitracin   Ointment 1 Application(s) Topical every 12 hours  chlorhexidine 0.12% Liquid 15 milliLiter(s) Oral Mucosa two times a day  chlorhexidine 2% Cloths 1 Application(s) Topical <User Schedule>  sevelamer carbonate Powder 800 milliGRAM(s) Oral three times a day with meals  vitamin A &amp; D Ointment 1 Application(s) Topical every 12 hours      ICU Vital Signs Last 24 Hrs  T(C): 36.6 (31 Dec 2023 08:00), Max: 36.6 (30 Dec 2023 16:00)  T(F): 97.8 (31 Dec 2023 08:00), Max: 97.9 (30 Dec 2023 16:00)  HR: 82 (31 Dec 2023 08:00) (76 - 95)  BP: 133/61 (31 Dec 2023 08:00) (107/52 - 176/72)  BP(mean): 88 (31 Dec 2023 08:00) (75 - 103)  ABP: --  ABP(mean): --  RR: 23 (31 Dec 2023 08:00) (21 - 33)  SpO2: 100% (31 Dec 2023 08:00) (94% - 100%)    O2 Parameters below as of 31 Dec 2023 08:00  Patient On (Oxygen Delivery Method): T-piece  O2 Flow (L/min): 6  O2 Concentration (%): 40        Adult Advanced Hemodynamics Last 24 Hrs  CVP(mm Hg): --  CVP(cm H2O): --  CO: --  CI: --  PA: --  PA(mean): --  PCWP: --  SVR: --  SVRI: --  PVR: --  PVRI: --    Mode: standby  FiO2: 40    ABG - ( 30 Dec 2023 05:45 )  pH, Arterial: 7.47  pH, Blood: x     /  pCO2: 28    /  pO2: 177   / HCO3: 20    / Base Excess: -2.0  /  SaO2: 99.3                I&O's Summary    30 Dec 2023 07:01  -  31 Dec 2023 07:00  --------------------------------------------------------  IN: 3488 mL / OUT: 3265 mL / NET: 223 mL    31 Dec 2023 07:01  -  31 Dec 2023 09:05  --------------------------------------------------------  IN: 127 mL / OUT: 175 mL / NET: -48 mL      I&O's Detail    30 Dec 2023 07:01  -  31 Dec 2023 07:00  --------------------------------------------------------  IN:    dextrose 5%: 900 mL    Enteral Tube Flush: 1000 mL    IV PiggyBack: 175 mL    Nepro with Carb Steady: 513 mL    sodium chloride 0.45%: 900 mL  Total IN: 3488 mL    OUT:    Indwelling Catheter - Urethral (mL): 2965 mL    Rectal Tube (mL): 300 mL  Total OUT: 3265 mL    Total NET: 223 mL      31 Dec 2023 07:01  -  31 Dec 2023 09:05  --------------------------------------------------------  IN:    dextrose 5%: 75 mL    IV PiggyBack: 25 mL    Nepro with Carb Steady: 27 mL  Total IN: 127 mL    OUT:    Indwelling Catheter - Urethral (mL): 175 mL  Total OUT: 175 mL    Total NET: -48 mL          PHYSICAL EXAM:    General/Neuro  FOUR Score: 12 (E1, M3, B4, R4)  Deficits:  Opens eyes only to pain, does not track, localizes pain bilaterally in all extremities  Pupils: 3mm and briskly reactive to light bilaterally.    Lungs:      clear to auscultation, Normal expansion/effort.     Cardiovascular : S1, S2.  Regular rate and rhythm.  Peripheral edema   Cardiac Rhythm: Normal Sinus Rhythm    GI: Abdomen soft, Non-tender, Non-distended.    Gastrostomy / Jejunostomy tube in place.  Nasogastric tube in place.  Colostomy / Ileostomy.    Wound:    Extremities: Extremities warm, pink, well-perfused. Pulses:Rt     Lt    Derm: Good skin turgor, no skin breakdown.      :       Aguirre catheter in place.      CXR:     LABS:  CAPILLARY BLOOD GLUCOSE      POCT Blood Glucose.: 165 mg/dL (31 Dec 2023 06:26)  POCT Blood Glucose.: 183 mg/dL (30 Dec 2023 23:54)  POCT Blood Glucose.: 181 mg/dL (30 Dec 2023 22:02)  POCT Blood Glucose.: 189 mg/dL (30 Dec 2023 17:21)  POCT Blood Glucose.: 144 mg/dL (30 Dec 2023 12:47)  POCT Blood Glucose.: 131 mg/dL (30 Dec 2023 09:09)                          8.7    12.48 )-----------( 412      ( 30 Dec 2023 23:20 )             27.2       12-30    147<H>  |  113<H>  |  96<HH>  ----------------------------<  180<H>  4.0   |  21  |  2.9<H>    Ca    7.8<L>      30 Dec 2023 23:20  Phos  5.6     12-30  Mg     2.9     12-30    TPro  x   /  Alb  2.5<L>  /  TBili  x   /  DBili  x   /  AST  x   /  ALT  x   /  AlkPhos  x   12-30            Urinalysis Basic - ( 30 Dec 2023 23:20 )    Color: x / Appearance: x / SG: x / pH: x  Gluc: 180 mg/dL / Ketone: x  / Bili: x / Urobili: x   Blood: x / Protein: x / Nitrite: x   Leuk Esterase: x / RBC: x / WBC x   Sq Epi: x / Non Sq Epi: x / Bacteria: x        Culture - Sputum (collected 28 Dec 2023 12:00)  Source: Trach Asp Tracheal Aspirate  Final Report (30 Dec 2023 15:26):    No growth    Culture - Blood (collected 28 Dec 2023 10:15)  Source: .Blood Blood  Preliminary Report (30 Dec 2023 18:02):    No growth at 48 Hours       JACQUELIN CAI   [id number]   [birth year]  78yM    Admit Date: 12-16-23  Indication for SDU/SICU: Q1 neuro checks      77-year-old male Cantonese speaking presents with prior history of HTN, HLD, Diabetes, BPH, prior CVA on aspirin and Plavix who states he had a mechanical fall 3 days ago while getting out of the car fell backwards hit his head.  Afterwards he was able to ambulate.  But for the past 1 day family was unable to ambulate him.  He states that his lower extremities are painful to movement and weak.  Family denies any LOC. Per daughter patient he often forgets things and cannot recall year or place. Patient following commands bedside.   Initial CT head remarkable for 3 Acute subdural hemorrhages: 0.7 cm along the left hemisphere, 0.3 cm along the right frontal area, and 1.1 cm along the left falx. No midline shift. Repeat CT head was obtained prior to 6 hour tacos because patient was appearing lethargic vs sundowning. CT head #2 with stable findings. CT c-spine and chest/abd/pelvis unremarkable for acute pathology.     SICU Consult for q1 neuro checks    ============================  24 Hour Events  12/31  Night  Exam- remains on T piece, TF @27 cc goal, HDS HR 82, /55, abd soft, mildly distended  Na and Wbc improving     12/31  DAY  -head CT for tomorrow prior to restarting plavix (prior rec per NSY)  -restarting home coreg and amlodipine  -swithed TF back to 24hrs  -4pm BMP  -per nephro - change 1/2 NS to D5W at 75 cc/hr      [X] A ten-point review of systems was otherwise negative except as noted above.  [  ] Due to altered mental status/intubation, subjective information was not attained from the patient. History was obtained, to the extent possible, from review of the chart and collateral sources of information.      Daily     Daily     Diet, NPO with Tube Feed:   Tube Feeding Modality: Gastrostomy  Nepro with Carb Steady  Total Volume for 24 Hours (mL): 648  Continuous  Starting Tube Feed Rate mL per Hour: 27  Increase Tube Feed Rate by (mL): 0     Every 4 hours  Until Goal Tube Feed Rate (mL per Hour): 27  Tube Feed Duration (in Hours): 24  Tube Feed Start Time: 12:00  Tube Feed Stop Time: 06:00  Free Water Flush  Bolus   Total Volume per Flush (mL): 250   Frequency: Every 4 Hours  Free Water Flush Instructions:  Please flush PEG with 50cc of sterile water before feeds start and 100cc of sterile water after feeds end (12-30-23 @ 11:42)      CURRENT MEDS:  Neurologic Medications  acetaminophen     Tablet .. 650 milliGRAM(s) Oral every 6 hours  valproic  acid Syrup 500 milliGRAM(s) Oral every 12 hours    Respiratory Medications  albuterol    90 MICROgram(s) HFA Inhaler 90 Puff(s) Inhalation every 4 hours  ipratropium 17 MICROgram(s) HFA Inhaler 1 Puff(s) Inhalation every 6 hours    Cardiovascular Medications  amLODIPine   Tablet 5 milliGRAM(s) Oral daily  carvedilol 6.25 milliGRAM(s) Oral every 12 hours  doxazosin 4 milliGRAM(s) Oral at bedtime    Gastrointestinal Medications  dextrose 5%. 1000 milliLiter(s) IV Continuous <Continuous>  pantoprazole  Injectable 40 milliGRAM(s) IV Push every 24 hours    Genitourinary Medications    Hematologic/Oncologic Medications  aspirin  chewable 81 milliGRAM(s) Enteral Tube daily  heparin   Injectable 5000 Unit(s) SubCutaneous every 8 hours    Antimicrobial/Immunologic Medications  piperacillin/tazobactam IVPB.. 3.375 Gram(s) IV Intermittent every 12 hours    Endocrine/Metabolic Medications  atorvastatin 20 milliGRAM(s) Oral at bedtime  insulin glargine Injectable (LANTUS) 20 Unit(s) SubCutaneous at bedtime  insulin lispro (ADMELOG) corrective regimen sliding scale   SubCutaneous every 6 hours    Topical/Other Medications  bacitracin   Ointment 1 Application(s) Topical every 12 hours  chlorhexidine 0.12% Liquid 15 milliLiter(s) Oral Mucosa two times a day  chlorhexidine 2% Cloths 1 Application(s) Topical <User Schedule>  sevelamer carbonate Powder 800 milliGRAM(s) Oral three times a day with meals  vitamin A &amp; D Ointment 1 Application(s) Topical every 12 hours      ICU Vital Signs Last 24 Hrs  T(C): 36.6 (31 Dec 2023 08:00), Max: 36.6 (30 Dec 2023 16:00)  T(F): 97.8 (31 Dec 2023 08:00), Max: 97.9 (30 Dec 2023 16:00)  HR: 82 (31 Dec 2023 08:00) (76 - 95)  BP: 133/61 (31 Dec 2023 08:00) (107/52 - 176/72)  BP(mean): 88 (31 Dec 2023 08:00) (75 - 103)  ABP: --  ABP(mean): --  RR: 23 (31 Dec 2023 08:00) (21 - 33)  SpO2: 100% (31 Dec 2023 08:00) (94% - 100%)    O2 Parameters below as of 31 Dec 2023 08:00  Patient On (Oxygen Delivery Method): T-piece  O2 Flow (L/min): 6  O2 Concentration (%): 40        Adult Advanced Hemodynamics Last 24 Hrs  CVP(mm Hg): --  CVP(cm H2O): --  CO: --  CI: --  PA: --  PA(mean): --  PCWP: --  SVR: --  SVRI: --  PVR: --  PVRI: --    Mode: standby  FiO2: 40    ABG - ( 30 Dec 2023 05:45 )  pH, Arterial: 7.47  pH, Blood: x     /  pCO2: 28    /  pO2: 177   / HCO3: 20    / Base Excess: -2.0  /  SaO2: 99.3                I&O's Summary    30 Dec 2023 07:01  -  31 Dec 2023 07:00  --------------------------------------------------------  IN: 3488 mL / OUT: 3265 mL / NET: 223 mL    31 Dec 2023 07:01  -  31 Dec 2023 09:05  --------------------------------------------------------  IN: 127 mL / OUT: 175 mL / NET: -48 mL      I&O's Detail    30 Dec 2023 07:01  -  31 Dec 2023 07:00  --------------------------------------------------------  IN:    dextrose 5%: 900 mL    Enteral Tube Flush: 1000 mL    IV PiggyBack: 175 mL    Nepro with Carb Steady: 513 mL    sodium chloride 0.45%: 900 mL  Total IN: 3488 mL    OUT:    Indwelling Catheter - Urethral (mL): 2965 mL    Rectal Tube (mL): 300 mL  Total OUT: 3265 mL    Total NET: 223 mL      31 Dec 2023 07:01  -  31 Dec 2023 09:05  --------------------------------------------------------  IN:    dextrose 5%: 75 mL    IV PiggyBack: 25 mL    Nepro with Carb Steady: 27 mL  Total IN: 127 mL    OUT:    Indwelling Catheter - Urethral (mL): 175 mL  Total OUT: 175 mL    Total NET: -48 mL          PHYSICAL EXAM:    General/Neuro  FOUR Score: 12 (E1, M3, B4, R4)  Deficits:  Opens eyes only to pain, does not track, localizes pain bilaterally in all extremities  Pupils: 3mm and briskly reactive to light bilaterally.    Lungs:      clear to auscultation, Normal expansion/effort.     Cardiovascular : S1, S2.  Regular rate and rhythm.  Peripheral edema   Cardiac Rhythm: Normal Sinus Rhythm    GI: Abdomen soft, Non-tender, Non-distended.    Gastrostomy / Jejunostomy tube in place.  Nasogastric tube in place.  Colostomy / Ileostomy.    Wound:    Extremities: Extremities warm, pink, well-perfused. Pulses:Rt     Lt    Derm: Good skin turgor, no skin breakdown.      :       Aguirre catheter in place.      CXR:     LABS:  CAPILLARY BLOOD GLUCOSE      POCT Blood Glucose.: 165 mg/dL (31 Dec 2023 06:26)  POCT Blood Glucose.: 183 mg/dL (30 Dec 2023 23:54)  POCT Blood Glucose.: 181 mg/dL (30 Dec 2023 22:02)  POCT Blood Glucose.: 189 mg/dL (30 Dec 2023 17:21)  POCT Blood Glucose.: 144 mg/dL (30 Dec 2023 12:47)  POCT Blood Glucose.: 131 mg/dL (30 Dec 2023 09:09)                          8.7    12.48 )-----------( 412      ( 30 Dec 2023 23:20 )             27.2       12-30    147<H>  |  113<H>  |  96<HH>  ----------------------------<  180<H>  4.0   |  21  |  2.9<H>    Ca    7.8<L>      30 Dec 2023 23:20  Phos  5.6     12-30  Mg     2.9     12-30    TPro  x   /  Alb  2.5<L>  /  TBili  x   /  DBili  x   /  AST  x   /  ALT  x   /  AlkPhos  x   12-30            Urinalysis Basic - ( 30 Dec 2023 23:20 )    Color: x / Appearance: x / SG: x / pH: x  Gluc: 180 mg/dL / Ketone: x  / Bili: x / Urobili: x   Blood: x / Protein: x / Nitrite: x   Leuk Esterase: x / RBC: x / WBC x   Sq Epi: x / Non Sq Epi: x / Bacteria: x        Culture - Sputum (collected 28 Dec 2023 12:00)  Source: Trach Asp Tracheal Aspirate  Final Report (30 Dec 2023 15:26):    No growth    Culture - Blood (collected 28 Dec 2023 10:15)  Source: .Blood Blood  Preliminary Report (30 Dec 2023 18:02):    No growth at 48 Hours       JACQUELIN CAI   [id number]   [birth year]  78yM    Admit Date: 12-16-23  Indication for SDU/SICU: Q1 neuro checks      77-year-old male Cantonese speaking presents with prior history of HTN, HLD, Diabetes, BPH, prior CVA on aspirin and Plavix who states he had a mechanical fall 3 days ago while getting out of the car fell backwards hit his head.  Afterwards he was able to ambulate.  But for the past 1 day family was unable to ambulate him.  He states that his lower extremities are painful to movement and weak.  Family denies any LOC. Per daughter patient he often forgets things and cannot recall year or place. Patient following commands bedside.   Initial CT head remarkable for 3 Acute subdural hemorrhages: 0.7 cm along the left hemisphere, 0.3 cm along the right frontal area, and 1.1 cm along the left falx. No midline shift. Repeat CT head was obtained prior to 6 hour tacos because patient was appearing lethargic vs sundowning. CT head #2 with stable findings. CT c-spine and chest/abd/pelvis unremarkable for acute pathology.     SICU Consult for q1 neuro checks    ============================  24 Hour Events  12/31  Night  Exam- remains on T piece, TF @27 cc goal, HDS HR 82, /55, abd soft, mildly distended  Na and Wbc improving     12/31  DAY  -head CT for tomorrow prior to restarting plavix (prior rec per NSY)  -restarting home coreg and amlodipine  -switched TF back to 24hrs  -4pm BMP  -per nephro - change 1/2 NS to D5W at 75 cc/hr      [X] A ten-point review of systems was otherwise negative except as noted above.  [  ] Due to altered mental status/intubation, subjective information was not attained from the patient. History was obtained, to the extent possible, from review of the chart and collateral sources of information.      Daily     Daily     Diet, NPO with Tube Feed:   Tube Feeding Modality: Gastrostomy  Nepro with Carb Steady  Total Volume for 24 Hours (mL): 648  Continuous  Starting Tube Feed Rate mL per Hour: 27  Increase Tube Feed Rate by (mL): 0     Every 4 hours  Until Goal Tube Feed Rate (mL per Hour): 27  Tube Feed Duration (in Hours): 24  Tube Feed Start Time: 12:00  Tube Feed Stop Time: 06:00  Free Water Flush  Bolus   Total Volume per Flush (mL): 250   Frequency: Every 4 Hours  Free Water Flush Instructions:  Please flush PEG with 50cc of sterile water before feeds start and 100cc of sterile water after feeds end (12-30-23 @ 11:42)      CURRENT MEDS:  Neurologic Medications  acetaminophen     Tablet .. 650 milliGRAM(s) Oral every 6 hours  valproic  acid Syrup 500 milliGRAM(s) Oral every 12 hours    Respiratory Medications  albuterol    90 MICROgram(s) HFA Inhaler 90 Puff(s) Inhalation every 4 hours  ipratropium 17 MICROgram(s) HFA Inhaler 1 Puff(s) Inhalation every 6 hours    Cardiovascular Medications  amLODIPine   Tablet 5 milliGRAM(s) Oral daily  carvedilol 6.25 milliGRAM(s) Oral every 12 hours  doxazosin 4 milliGRAM(s) Oral at bedtime    Gastrointestinal Medications  dextrose 5%. 1000 milliLiter(s) IV Continuous <Continuous>  pantoprazole  Injectable 40 milliGRAM(s) IV Push every 24 hours    Genitourinary Medications    Hematologic/Oncologic Medications  aspirin  chewable 81 milliGRAM(s) Enteral Tube daily  heparin   Injectable 5000 Unit(s) SubCutaneous every 8 hours    Antimicrobial/Immunologic Medications  piperacillin/tazobactam IVPB.. 3.375 Gram(s) IV Intermittent every 12 hours    Endocrine/Metabolic Medications  atorvastatin 20 milliGRAM(s) Oral at bedtime  insulin glargine Injectable (LANTUS) 20 Unit(s) SubCutaneous at bedtime  insulin lispro (ADMELOG) corrective regimen sliding scale   SubCutaneous every 6 hours    Topical/Other Medications  bacitracin   Ointment 1 Application(s) Topical every 12 hours  chlorhexidine 0.12% Liquid 15 milliLiter(s) Oral Mucosa two times a day  chlorhexidine 2% Cloths 1 Application(s) Topical <User Schedule>  sevelamer carbonate Powder 800 milliGRAM(s) Oral three times a day with meals  vitamin A &amp; D Ointment 1 Application(s) Topical every 12 hours      ICU Vital Signs Last 24 Hrs  T(C): 36.6 (31 Dec 2023 08:00), Max: 36.6 (30 Dec 2023 16:00)  T(F): 97.8 (31 Dec 2023 08:00), Max: 97.9 (30 Dec 2023 16:00)  HR: 82 (31 Dec 2023 08:00) (76 - 95)  BP: 133/61 (31 Dec 2023 08:00) (107/52 - 176/72)  BP(mean): 88 (31 Dec 2023 08:00) (75 - 103)  ABP: --  ABP(mean): --  RR: 23 (31 Dec 2023 08:00) (21 - 33)  SpO2: 100% (31 Dec 2023 08:00) (94% - 100%)    O2 Parameters below as of 31 Dec 2023 08:00  Patient On (Oxygen Delivery Method): T-piece  O2 Flow (L/min): 6  O2 Concentration (%): 40        Adult Advanced Hemodynamics Last 24 Hrs  CVP(mm Hg): --  CVP(cm H2O): --  CO: --  CI: --  PA: --  PA(mean): --  PCWP: --  SVR: --  SVRI: --  PVR: --  PVRI: --    Mode: standby  FiO2: 40    ABG - ( 30 Dec 2023 05:45 )  pH, Arterial: 7.47  pH, Blood: x     /  pCO2: 28    /  pO2: 177   / HCO3: 20    / Base Excess: -2.0  /  SaO2: 99.3                I&O's Summary    30 Dec 2023 07:01  -  31 Dec 2023 07:00  --------------------------------------------------------  IN: 3488 mL / OUT: 3265 mL / NET: 223 mL    31 Dec 2023 07:01  -  31 Dec 2023 09:05  --------------------------------------------------------  IN: 127 mL / OUT: 175 mL / NET: -48 mL      I&O's Detail    30 Dec 2023 07:01  -  31 Dec 2023 07:00  --------------------------------------------------------  IN:    dextrose 5%: 900 mL    Enteral Tube Flush: 1000 mL    IV PiggyBack: 175 mL    Nepro with Carb Steady: 513 mL    sodium chloride 0.45%: 900 mL  Total IN: 3488 mL    OUT:    Indwelling Catheter - Urethral (mL): 2965 mL    Rectal Tube (mL): 300 mL  Total OUT: 3265 mL    Total NET: 223 mL      31 Dec 2023 07:01  -  31 Dec 2023 09:05  --------------------------------------------------------  IN:    dextrose 5%: 75 mL    IV PiggyBack: 25 mL    Nepro with Carb Steady: 27 mL  Total IN: 127 mL    OUT:    Indwelling Catheter - Urethral (mL): 175 mL  Total OUT: 175 mL    Total NET: -48 mL          PHYSICAL EXAM:    General/Neuro  FOUR Score: 12 (E1, M3, B4, R4)  Deficits:  Opens eyes only to pain, does not track, localizes pain bilaterally in all extremities  Pupils: 3mm and briskly reactive to light bilaterally.    Lungs:      clear to auscultation with lung sounds present bilaterally, SpO2 98% on T-piece, off mechanical ventilator, Normal expansion/effort.     Cardiovascular : S1, S2.  Regular rate and rhythm.  Cardiac Rhythm: Normal Sinus Rhythm    GI: Abdomen soft, Non-tender, Non-distended.    PEG 2cm at the skin.    Extremities: Extremities warm, pink, well-perfused.    Derm: Good skin turgor, no skin breakdown.      :       Aguirre catheter in place.      CXR:     LABS:  CAPILLARY BLOOD GLUCOSE      POCT Blood Glucose.: 165 mg/dL (31 Dec 2023 06:26)  POCT Blood Glucose.: 183 mg/dL (30 Dec 2023 23:54)  POCT Blood Glucose.: 181 mg/dL (30 Dec 2023 22:02)  POCT Blood Glucose.: 189 mg/dL (30 Dec 2023 17:21)  POCT Blood Glucose.: 144 mg/dL (30 Dec 2023 12:47)  POCT Blood Glucose.: 131 mg/dL (30 Dec 2023 09:09)                          8.7    12.48 )-----------( 412      ( 30 Dec 2023 23:20 )             27.2       12-30    147<H>  |  113<H>  |  96<HH>  ----------------------------<  180<H>  4.0   |  21  |  2.9<H>    Ca    7.8<L>      30 Dec 2023 23:20  Phos  5.6     12-30  Mg     2.9     12-30    TPro  x   /  Alb  2.5<L>  /  TBili  x   /  DBili  x   /  AST  x   /  ALT  x   /  AlkPhos  x   12-30            Urinalysis Basic - ( 30 Dec 2023 23:20 )    Color: x / Appearance: x / SG: x / pH: x  Gluc: 180 mg/dL / Ketone: x  / Bili: x / Urobili: x   Blood: x / Protein: x / Nitrite: x   Leuk Esterase: x / RBC: x / WBC x   Sq Epi: x / Non Sq Epi: x / Bacteria: x        Culture - Sputum (collected 28 Dec 2023 12:00)  Source: Trach Asp Tracheal Aspirate  Final Report (30 Dec 2023 15:26):    No growth    Culture - Blood (collected 28 Dec 2023 10:15)  Source: .Blood Blood  Preliminary Report (30 Dec 2023 18:02):    No growth at 48 Hours       JACQUELIN CAI   [id number]   [birth year]  78yM    Admit Date: 12-16-23  Indication for SDU/SICU: Q1 neuro checks      77-year-old male Cantonese speaking presents with prior history of HTN, HLD, Diabetes, BPH, prior CVA on aspirin and Plavix who states he had a mechanical fall 3 days ago while getting out of the car fell backwards hit his head.  Afterwards he was able to ambulate.  But for the past 1 day family was unable to ambulate him.  He states that his lower extremities are painful to movement and weak.  Family denies any LOC. Per daughter patient he often forgets things and cannot recall year or place. Patient following commands bedside.   Initial CT head remarkable for 3 Acute subdural hemorrhages: 0.7 cm along the left hemisphere, 0.3 cm along the right frontal area, and 1.1 cm along the left falx. No midline shift. Repeat CT head was obtained prior to 6 hour tacos because patient was appearing lethargic vs sundowning. CT head #2 with stable findings. CT c-spine and chest/abd/pelvis unremarkable for acute pathology.     SICU Consult for q1 neuro checks    ============================  24 Hour Events  12/31  Night  Exam- remains on T piece, TF @27 cc goal, HDS HR 82, /55, abd soft, mildly distended  Na and Wbc improving     12/31  DAY  -head CT for tomorrow prior to restarting plavix (prior rec per NSY)  -restarting home coreg and amlodipine  -switched TF back to 24hrs  -4pm BMP  -per nephro - change 1/2 NS to D5W at 75 cc/hr      [X] A ten-point review of systems was otherwise negative except as noted above.  [  ] Due to altered mental status/intubation, subjective information was not attained from the patient. History was obtained, to the extent possible, from review of the chart and collateral sources of information.      Daily     Daily     Diet, NPO with Tube Feed:   Tube Feeding Modality: Gastrostomy  Nepro with Carb Steady  Total Volume for 24 Hours (mL): 648  Continuous  Starting Tube Feed Rate mL per Hour: 27  Increase Tube Feed Rate by (mL): 0     Every 4 hours  Until Goal Tube Feed Rate (mL per Hour): 27  Tube Feed Duration (in Hours): 24  Tube Feed Start Time: 12:00  Tube Feed Stop Time: 06:00  Free Water Flush  Bolus   Total Volume per Flush (mL): 250   Frequency: Every 4 Hours  Free Water Flush Instructions:  Please flush PEG with 50cc of sterile water before feeds start and 100cc of sterile water after feeds end (12-30-23 @ 11:42)      CURRENT MEDS:  Neurologic Medications  acetaminophen     Tablet .. 650 milliGRAM(s) Oral every 6 hours  valproic  acid Syrup 500 milliGRAM(s) Oral every 12 hours    Respiratory Medications  albuterol    90 MICROgram(s) HFA Inhaler 90 Puff(s) Inhalation every 4 hours  ipratropium 17 MICROgram(s) HFA Inhaler 1 Puff(s) Inhalation every 6 hours    Cardiovascular Medications  amLODIPine   Tablet 5 milliGRAM(s) Oral daily  carvedilol 6.25 milliGRAM(s) Oral every 12 hours  doxazosin 4 milliGRAM(s) Oral at bedtime    Gastrointestinal Medications  dextrose 5%. 1000 milliLiter(s) IV Continuous <Continuous>  pantoprazole  Injectable 40 milliGRAM(s) IV Push every 24 hours    Genitourinary Medications    Hematologic/Oncologic Medications  aspirin  chewable 81 milliGRAM(s) Enteral Tube daily  heparin   Injectable 5000 Unit(s) SubCutaneous every 8 hours    Antimicrobial/Immunologic Medications  piperacillin/tazobactam IVPB.. 3.375 Gram(s) IV Intermittent every 12 hours    Endocrine/Metabolic Medications  atorvastatin 20 milliGRAM(s) Oral at bedtime  insulin glargine Injectable (LANTUS) 20 Unit(s) SubCutaneous at bedtime  insulin lispro (ADMELOG) corrective regimen sliding scale   SubCutaneous every 6 hours    Topical/Other Medications  bacitracin   Ointment 1 Application(s) Topical every 12 hours  chlorhexidine 0.12% Liquid 15 milliLiter(s) Oral Mucosa two times a day  chlorhexidine 2% Cloths 1 Application(s) Topical <User Schedule>  sevelamer carbonate Powder 800 milliGRAM(s) Oral three times a day with meals  vitamin A &amp; D Ointment 1 Application(s) Topical every 12 hours      ICU Vital Signs Last 24 Hrs  T(C): 36.6 (31 Dec 2023 08:00), Max: 36.6 (30 Dec 2023 16:00)  T(F): 97.8 (31 Dec 2023 08:00), Max: 97.9 (30 Dec 2023 16:00)  HR: 82 (31 Dec 2023 08:00) (76 - 95)  BP: 133/61 (31 Dec 2023 08:00) (107/52 - 176/72)  BP(mean): 88 (31 Dec 2023 08:00) (75 - 103)  ABP: --  ABP(mean): --  RR: 23 (31 Dec 2023 08:00) (21 - 33)  SpO2: 100% (31 Dec 2023 08:00) (94% - 100%)    O2 Parameters below as of 31 Dec 2023 08:00  Patient On (Oxygen Delivery Method): T-piece  O2 Flow (L/min): 6  O2 Concentration (%): 40        Adult Advanced Hemodynamics Last 24 Hrs  CVP(mm Hg): --  CVP(cm H2O): --  CO: --  CI: --  PA: --  PA(mean): --  PCWP: --  SVR: --  SVRI: --  PVR: --  PVRI: --    Mode: standby  FiO2: 40    ABG - ( 30 Dec 2023 05:45 )  pH, Arterial: 7.47  pH, Blood: x     /  pCO2: 28    /  pO2: 177   / HCO3: 20    / Base Excess: -2.0  /  SaO2: 99.3                I&O's Summary    30 Dec 2023 07:01  -  31 Dec 2023 07:00  --------------------------------------------------------  IN: 3488 mL / OUT: 3265 mL / NET: 223 mL    31 Dec 2023 07:01  -  31 Dec 2023 09:05  --------------------------------------------------------  IN: 127 mL / OUT: 175 mL / NET: -48 mL      I&O's Detail    30 Dec 2023 07:01  -  31 Dec 2023 07:00  --------------------------------------------------------  IN:    dextrose 5%: 900 mL    Enteral Tube Flush: 1000 mL    IV PiggyBack: 175 mL    Nepro with Carb Steady: 513 mL    sodium chloride 0.45%: 900 mL  Total IN: 3488 mL    OUT:    Indwelling Catheter - Urethral (mL): 2965 mL    Rectal Tube (mL): 300 mL  Total OUT: 3265 mL    Total NET: 223 mL      31 Dec 2023 07:01  -  31 Dec 2023 09:05  --------------------------------------------------------  IN:    dextrose 5%: 75 mL    IV PiggyBack: 25 mL    Nepro with Carb Steady: 27 mL  Total IN: 127 mL    OUT:    Indwelling Catheter - Urethral (mL): 175 mL  Total OUT: 175 mL    Total NET: -48 mL          PHYSICAL EXAM:    General/Neuro  FOUR Score: 12 (E1, M3, B4, R4)  Deficits:  Opens eyes only to pain, does not track, localizes pain bilaterally in all extremities  Pupils: 3mm and briskly reactive to light bilaterally.    Lungs:      clear to auscultation with lung sounds present bilaterally, SpO2 98% on T-piece, off mechanical ventilator, Normal expansion/effort.     Cardiovascular : S1, S2.  Regular rate and rhythm.  Cardiac Rhythm: Normal Sinus Rhythm    GI: Abdomen soft, Non-tender, Non-distended.    PEG 2cm at the skin.    Extremities: Extremities warm, pink, well-perfused.    Derm: Good skin turgor, no skin breakdown.      :       Aguirre catheter in place.      CXR:     LABS:  CAPILLARY BLOOD GLUCOSE      POCT Blood Glucose.: 165 mg/dL (31 Dec 2023 06:26)  POCT Blood Glucose.: 183 mg/dL (30 Dec 2023 23:54)  POCT Blood Glucose.: 181 mg/dL (30 Dec 2023 22:02)  POCT Blood Glucose.: 189 mg/dL (30 Dec 2023 17:21)  POCT Blood Glucose.: 144 mg/dL (30 Dec 2023 12:47)  POCT Blood Glucose.: 131 mg/dL (30 Dec 2023 09:09)                          8.7    12.48 )-----------( 412      ( 30 Dec 2023 23:20 )             27.2       12-30    147<H>  |  113<H>  |  96<HH>  ----------------------------<  180<H>  4.0   |  21  |  2.9<H>    Ca    7.8<L>      30 Dec 2023 23:20  Phos  5.6     12-30  Mg     2.9     12-30    TPro  x   /  Alb  2.5<L>  /  TBili  x   /  DBili  x   /  AST  x   /  ALT  x   /  AlkPhos  x   12-30            Urinalysis Basic - ( 30 Dec 2023 23:20 )    Color: x / Appearance: x / SG: x / pH: x  Gluc: 180 mg/dL / Ketone: x  / Bili: x / Urobili: x   Blood: x / Protein: x / Nitrite: x   Leuk Esterase: x / RBC: x / WBC x   Sq Epi: x / Non Sq Epi: x / Bacteria: x        Culture - Sputum (collected 28 Dec 2023 12:00)  Source: Trach Asp Tracheal Aspirate  Final Report (30 Dec 2023 15:26):    No growth    Culture - Blood (collected 28 Dec 2023 10:15)  Source: .Blood Blood  Preliminary Report (30 Dec 2023 18:02):    No growth at 48 Hours

## 2023-12-31 NOTE — PROGRESS NOTE ADULT - SUBJECTIVE AND OBJECTIVE BOX
Nephrology Progress Note    JACQUELIN CAI  [id number]  78y  Male    S:  Patient is seen and examined, events over the last 24h noted.    O:  Allergies:  [This allergen will not trigger allergy alert] pollen (Unknown)  No Known Drug Allergies    Hospital Medications:   MEDICATIONS  (STANDING):  acetaminophen     Tablet .. 650 milliGRAM(s) Oral every 6 hours  albuterol    90 MICROgram(s) HFA Inhaler 90 Puff(s) Inhalation every 4 hours  amLODIPine   Tablet 5 milliGRAM(s) Oral daily  aspirin  chewable 81 milliGRAM(s) Enteral Tube daily  atorvastatin 20 milliGRAM(s) Oral at bedtime  bacitracin   Ointment 1 Application(s) Topical every 12 hours  carvedilol 6.25 milliGRAM(s) Oral every 12 hours  chlorhexidine 0.12% Liquid 15 milliLiter(s) Oral Mucosa two times a day  chlorhexidine 2% Cloths 1 Application(s) Topical <User Schedule>  dextrose 5%. 1000 milliLiter(s) (75 mL/Hr) IV Continuous <Continuous>  doxazosin 4 milliGRAM(s) Oral at bedtime  heparin   Injectable 5000 Unit(s) SubCutaneous every 8 hours  insulin glargine Injectable (LANTUS) 20 Unit(s) SubCutaneous at bedtime  insulin lispro (ADMELOG) corrective regimen sliding scale   SubCutaneous every 6 hours  ipratropium 17 MICROgram(s) HFA Inhaler 1 Puff(s) Inhalation every 6 hours  pantoprazole  Injectable 40 milliGRAM(s) IV Push every 24 hours  piperacillin/tazobactam IVPB.. 3.375 Gram(s) IV Intermittent every 12 hours  sevelamer carbonate Powder 800 milliGRAM(s) Oral three times a day with meals  valproic  acid Syrup 500 milliGRAM(s) Oral every 12 hours  vitamin A &amp; D Ointment 1 Application(s) Topical every 12 hours    MEDICATIONS  (PRN):    Home Medications:  amLODIPine 5 mg oral tablet: 1 tab(s) orally once a day (14 Feb 2023 02:08)  aspirin 81 mg oral tablet: 1 tab(s) orally once a day (14 Feb 2023 02:08)  atorvastatin 20 mg oral tablet: 1 tab(s) orally once a day (16 Dec 2023 03:34)  carvedilol 6.25 mg oral tablet: 1 tab(s) orally 2 times a day (14 Feb 2023 02:08)  Jardiance 25 mg oral tablet: 1 tab(s) orally once a day (in the morning) (14 Feb 2023 02:08)  Nephplex Rx oral tablet: 1 tab(s) orally once a day (14 Feb 2023 02:08)  SITagliptin 50 mg oral tablet: 1 tab(s) orally once a day (16 Dec 2023 03:34)  tamsulosin 0.4 mg oral capsule: 2 cap(s) orally once a day (at bedtime) (16 Dec 2023 03:29)  valsartan 320 mg oral tablet: 1 tab(s) orally once a day (14 Feb 2023 02:08)      VITALS:  Daily     Daily   T(F): 97.8 (12-31-23 @ 08:00), Max: 97.9 (12-30-23 @ 16:00)  HR: 82 (12-31-23 @ 08:00)  BP: 133/61 (12-31-23 @ 08:00)  RR: 23 (12-31-23 @ 08:00)  SpO2: 100% (12-31-23 @ 08:00)  Wt(kg): --  I&O's Detail    30 Dec 2023 07:01  -  31 Dec 2023 07:00  --------------------------------------------------------  IN:    dextrose 5%: 900 mL    Enteral Tube Flush: 1000 mL    IV PiggyBack: 175 mL    Nepro with Carb Steady: 513 mL    sodium chloride 0.45%: 900 mL  Total IN: 3488 mL    OUT:    Indwelling Catheter - Urethral (mL): 2965 mL    Rectal Tube (mL): 300 mL  Total OUT: 3265 mL    Total NET: 223 mL      31 Dec 2023 07:01  -  31 Dec 2023 10:44  --------------------------------------------------------  IN:    dextrose 5%: 75 mL    IV PiggyBack: 25 mL    Nepro with Carb Steady: 27 mL  Total IN: 127 mL    OUT:    Indwelling Catheter - Urethral (mL): 175 mL  Total OUT: 175 mL    Total NET: -48 mL        I&O's Summary    30 Dec 2023 07:01  -  31 Dec 2023 07:00  --------------------------------------------------------  IN: 3488 mL / OUT: 3265 mL / NET: 223 mL    31 Dec 2023 07:01  -  31 Dec 2023 10:44  --------------------------------------------------------  IN: 127 mL / OUT: 175 mL / NET: -48 mL          PHYSICAL EXAM:  Gen: NAD  Chest: b/l breath sounds  Abd: soft  Extremities: no edema      LABS:  ABG - ( 30 Dec 2023 05:45 )  pH, Arterial: 7.47  pH, Blood: x     /  pCO2: 28    /  pO2: 177   / HCO3: 20    / Base Excess: -2.0  /  SaO2: 99.3        12-30    147<H>  |  113<H>  |  96<HH>  ----------------------------<  180<H>  4.0   |  21  |  2.9<H>    Ca    7.8<L>      30 Dec 2023 23:20  Phos  5.6     12-30  Mg     2.9     12-30    TPro      /  Alb  2.5<L>  /  TBili      /  DBili      /  AST      /  ALT      /  AlkPhos      12-30    Phosphorus: 5.6 mg/dL (12-30-23 @ 23:20)  Phosphorus: 6.0 mg/dL (12-30-23 @ 04:30)    Osmolality, Serum: 327 mosmol/kg (12-17-23 @ 05:37)                          8.7    12.48 )-----------( 412      ( 30 Dec 2023 23:20 )             27.2     Mean Cell Volume: 92.8 fL (12-30-23 @ 23:20)    Urine Studies:  Protein, Urine: 100 mg/dL (12-28-23 @ 10:33)  White Blood Cell - Urine: 12 /HPF (12-28-23 @ 10:33)  Red Blood Cell - Urine: 1047 /HPF (12-28-23 @ 10:33)      Creatinine trend:  Creatinine: 2.9 mg/dL (12-30-23 @ 23:20)  Creatinine: 3.1 mg/dL (12-30-23 @ 16:00)  Creatinine: 3.8 mg/dL (12-30-23 @ 04:30)  Creatinine: 4.0 mg/dL (12-29-23 @ 11:29)  Creatinine: 3.6 mg/dL (12-29-23 @ 05:43)  Creatinine: 4.0 mg/dL (12-28-23 @ 17:40)  Creatinine: 4.6 mg/dL (12-28-23 @ 07:56) Nephrology Progress Note    JACQUELIN CAI  [id number]  78y  Male    S:  Patient is seen and examined, events over the last 24h noted.    O:  Allergies:  [This allergen will not trigger allergy alert] pollen (Unknown)  No Known Drug Allergies    Hospital Medications:   MEDICATIONS  (STANDING):  acetaminophen     Tablet .. 650 milliGRAM(s) Oral every 6 hours  albuterol    90 MICROgram(s) HFA Inhaler 90 Puff(s) Inhalation every 4 hours  amLODIPine   Tablet 5 milliGRAM(s) Oral daily  aspirin  chewable 81 milliGRAM(s) Enteral Tube daily  atorvastatin 20 milliGRAM(s) Oral at bedtime  bacitracin   Ointment 1 Application(s) Topical every 12 hours  carvedilol 6.25 milliGRAM(s) Oral every 12 hours  chlorhexidine 0.12% Liquid 15 milliLiter(s) Oral Mucosa two times a day  chlorhexidine 2% Cloths 1 Application(s) Topical <User Schedule>  dextrose 5%. 1000 milliLiter(s) (75 mL/Hr) IV Continuous <Continuous>  doxazosin 4 milliGRAM(s) Oral at bedtime  heparin   Injectable 5000 Unit(s) SubCutaneous every 8 hours  insulin glargine Injectable (LANTUS) 20 Unit(s) SubCutaneous at bedtime  insulin lispro (ADMELOG) corrective regimen sliding scale   SubCutaneous every 6 hours  ipratropium 17 MICROgram(s) HFA Inhaler 1 Puff(s) Inhalation every 6 hours  pantoprazole  Injectable 40 milliGRAM(s) IV Push every 24 hours  piperacillin/tazobactam IVPB.. 3.375 Gram(s) IV Intermittent every 12 hours  sevelamer carbonate Powder 800 milliGRAM(s) Oral three times a day with meals  valproic  acid Syrup 500 milliGRAM(s) Oral every 12 hours  vitamin A &amp; D Ointment 1 Application(s) Topical every 12 hours    MEDICATIONS  (PRN):    Home Medications:  amLODIPine 5 mg oral tablet: 1 tab(s) orally once a day (14 Feb 2023 02:08)  aspirin 81 mg oral tablet: 1 tab(s) orally once a day (14 Feb 2023 02:08)  atorvastatin 20 mg oral tablet: 1 tab(s) orally once a day (16 Dec 2023 03:34)  carvedilol 6.25 mg oral tablet: 1 tab(s) orally 2 times a day (14 Feb 2023 02:08)  Jardiance 25 mg oral tablet: 1 tab(s) orally once a day (in the morning) (14 Feb 2023 02:08)  Nephplex Rx oral tablet: 1 tab(s) orally once a day (14 Feb 2023 02:08)  SITagliptin 50 mg oral tablet: 1 tab(s) orally once a day (16 Dec 2023 03:34)  tamsulosin 0.4 mg oral capsule: 2 cap(s) orally once a day (at bedtime) (16 Dec 2023 03:29)  valsartan 320 mg oral tablet: 1 tab(s) orally once a day (14 Feb 2023 02:08)      VITALS:  Daily     Daily   T(F): 97.8 (12-31-23 @ 08:00), Max: 97.9 (12-30-23 @ 16:00)  HR: 82 (12-31-23 @ 08:00)  BP: 133/61 (12-31-23 @ 08:00)  RR: 23 (12-31-23 @ 08:00)  SpO2: 100% (12-31-23 @ 08:00)  Wt(kg): --  I&O's Detail    30 Dec 2023 07:01  -  31 Dec 2023 07:00  --------------------------------------------------------  IN:    dextrose 5%: 900 mL    Enteral Tube Flush: 1000 mL    IV PiggyBack: 175 mL    Nepro with Carb Steady: 513 mL    sodium chloride 0.45%: 900 mL  Total IN: 3488 mL    OUT:    Indwelling Catheter - Urethral (mL): 2965 mL    Rectal Tube (mL): 300 mL  Total OUT: 3265 mL    Total NET: 223 mL      31 Dec 2023 07:01  -  31 Dec 2023 10:44  --------------------------------------------------------  IN:    dextrose 5%: 75 mL    IV PiggyBack: 25 mL    Nepro with Carb Steady: 27 mL  Total IN: 127 mL    OUT:    Indwelling Catheter - Urethral (mL): 175 mL  Total OUT: 175 mL    Total NET: -48 mL        I&O's Summary    30 Dec 2023 07:01  -  31 Dec 2023 07:00  --------------------------------------------------------  IN: 3488 mL / OUT: 3265 mL / NET: 223 mL    31 Dec 2023 07:01  -  31 Dec 2023 10:44  --------------------------------------------------------  IN: 127 mL / OUT: 175 mL / NET: -48 mL          PHYSICAL EXAM:  Gen: NAD  Chest: b/l breath sounds  Abd: soft  Extremities: no edema      LABS:  ABG - ( 30 Dec 2023 05:45 )  pH, Arterial: 7.47  pH, Blood: x     /  pCO2: 28    /  pO2: 177   / HCO3: 20    / Base Excess: -2.0  /  SaO2: 99.3        12-30    147<H>  |  113<H>  |  96<HH>  ----------------------------<  180<H>  4.0   |  21  |  2.9<H>    Ca    7.8<L>      30 Dec 2023 23:20  Phos  5.6     12-30  Mg     2.9     12-30    TPro      /  Alb  2.5<L>  /  TBili      /  DBili      /  AST      /  ALT      /  AlkPhos      12-30    Phosphorus: 5.6 mg/dL (12-30-23 @ 23:20)  Phosphorus: 6.0 mg/dL (12-30-23 @ 04:30)    Osmolality, Serum: 327 mosmol/kg (12-17-23 @ 05:37)                          8.7    12.48 )-----------( 412      ( 30 Dec 2023 23:20 )             27.2     Mean Cell Volume: 92.8 fL (12-30-23 @ 23:20)    Urine Studies:  Protein, Urine: 100 mg/dL (12-28-23 @ 10:33)  White Blood Cell - Urine: 12 /HPF (12-28-23 @ 10:33)  Red Blood Cell - Urine: 1047 /HPF (12-28-23 @ 10:33)      Creatinine trend:  Creatinine: 2.9 mg/dL (12-30-23 @ 23:20)  Creatinine: 3.1 mg/dL (12-30-23 @ 16:00)  Creatinine: 3.8 mg/dL (12-30-23 @ 04:30)  Creatinine: 4.0 mg/dL (12-29-23 @ 11:29)  Creatinine: 3.6 mg/dL (12-29-23 @ 05:43)  Creatinine: 4.0 mg/dL (12-28-23 @ 17:40)  Creatinine: 4.6 mg/dL (12-28-23 @ 07:56)

## 2023-12-31 NOTE — PROGRESS NOTE ADULT - ATTENDING COMMENTS
This patient has a high probability of sudden, clinically significant deterioration, which requires the highest level of physician preparedness to intervene urgently. I managed/supervised life or organ supporting interventions that required frequent physician assessment. I devoted my full attention in the ICU to the direct care of this patient for the period of time indicated below. Time I spent with the family or surrogate(s) is included only if the patient was incapable of providing the necessary information or participating in medical decision making. Time devoted to teaching and to any procedures I billed separately is not included.     Patient is examined and evaluated at the bedside with SICU team. Treatment plan discussed with SICU team, nurses and primary team.   Vital signs, laboratory work, and imaging reviewed during rounds.  Will continue hemodynamic monitoring as per protocol in SICU.    24h events:     Neuro:    Medications -    Studies -     Respiratory: respirations even and unlabored on ***  Mode: standby, FiO2: 40  ABG - ( 30 Dec 2023 05:45 )  pH, Arterial: 7.47  pH, Blood: x     /  pCO2: 28    /  pO2: 177   / HCO3: 20    / Base Excess: -2.0  /  SaO2: 99.3              Medications -   CXR reviewed and interpreted by me -     CV: monitor hemodynamics ***, MAP goal > 65  Medications -   Home medications -   Studies -     GI: abd s/nt/nd  Last BM  Nutrition -   Medications -   Ppx -       Renal: Continue I&Os monitoring, replete electrolytes as needed  12-30    147<H>  |  113<H>  |  96<HH>  ----------------------------<  180<H>  4.0   |  21  |  2.9<H>    Ca    7.8<L>      30 Dec 2023 23:20  Phos  5.6     12-30  Mg     2.9     12-30    TPro  x   /  Alb  2.5<L>  /  TBili  x   /  DBili  x   /  AST  x   /  ALT  x   /  AlkPhos  x   12-30    UOP -   I/O -   Aguirre catheter -   IVF -     Heme: continue to evaluate for acute blood loss anemia- trend Hg/Hct                         8.7    12.48 )-----------( 412      ( 30 Dec 2023 23:20 )             27.2     Anticoagulation -     ID: trend WBC, monitor for fevers  Recent culture data -   Antibiotics -     Endocrine: Prevent and treat hyperglycemia with insulin as needed    PV: follow pulse exam    MSK: OOB and mobilize as able, PT eval    Skin: decub precautions    Lines:   DVT Prophylaxis:   Stress Gastritis Prophylaxis:   Disposition:    I saw and evaluated the patient personally. I have reviewed and agree with note above. Treatment plan discussed with SICU team, nurses and primary team at the time of the multidisciplinary rounds.     Isidro Montemayor MD  Trauma/ACS/SCC Attending This patient has a high probability of sudden, clinically significant deterioration, which requires the highest level of physician preparedness to intervene urgently. I managed/supervised life or organ supporting interventions that required frequent physician assessment. I devoted my full attention in the ICU to the direct care of this patient for the period of time indicated below. Time I spent with the family or surrogate(s) is included only if the patient was incapable of providing the necessary information or participating in medical decision making. Time devoted to teaching and to any procedures I billed separately is not included.     Patient is examined and evaluated at the bedside with SICU team. Treatment plan discussed with SICU team, nurses and primary team.   Vital signs, laboratory work, and imaging reviewed during rounds.  Will continue hemodynamic monitoring as per protocol in SICU.    24h events: CTH done yesterday, remained on T piece    Neuro: GCS [id number]  Medications - tylenol ATC for fevers, amantadine to promote wakefulness, valproic acid for possible focal seizures  Studies - EEG showed focal and generalized slowing, CTH done shows resolving subdural hemorrhages pending final read    Respiratory: respirations even and unlabored on T piece 40% FiO2 9 LPM  Medications - atrovent/proventil    CV: monitor hemodynamics, MAP goal > 65  Medications - amlodipine, coreg  Home medications - coreg, valsartan, amlodipine, atorvastatin  Studies - no recent studies-     GI: abd s/nt/nd, PEG @ 2.5 at skin edge  Last BM - 300cc/24h   Nutrition - nepro 27cc/24h  Medications - protonix  Ppx - protonix      Renal: Continue I&Os monitoring, replete electrolytes as needed  12-30    147<H>  |  113<H>  |  96<HH>  ----------------------------<  180<H>  4.0   |  21  |  2.9<H>    Ca    7.8<L>      30 Dec 2023 23:20  Phos  5.6     12-30  Mg     2.9     12-30    TPro  x   /  Alb  2.5<L>  /  TBili  x   /  DBili  x   /  AST  x   /  ALT  x   /  AlkPhos  x   12-30    UOP - 2965cc/24h  I/O - 3488/3265  Aguirre catheter - for urinary retention  IVF - D5W @ 75 cc/h, FWF 250cc Q4H  Repeat BMP to monitory hypernatremia, stop D5W but continue FWF  Sevelamer for hyperphosphatemia    Heme: continue to evaluate for acute blood loss anemia- trend Hg/Hct                         8.7    12.48 )-----------( 412      ( 30 Dec 2023 23:20 )             27.2     Anticoagulation - SQH  ASA-81, follow up with NSGY re: starting plavix after CTH    ID: trend leukocytosis, monitor for fevers  Recent culture data - U/A negative, sputum and blood cx no growth so far  Antibiotics - zosyn until 1/4, received one dose of vanco     Endocrine: Prevent and treat hyperglycemia with insulin as needed, lantus 20 units QHS    PV: follow pulse exam    MSK: OOB and mobilize as able, PT eval    Skin: decub precautions    Lines: PIV, RUE midline, trach, PEG  DVT Prophylaxis: SQH  Stress Gastritis Prophylaxis: Protonix   Disposition: transfer to floor    I saw and evaluated the patient personally. I have reviewed and agree with note above. Treatment plan discussed with SICU team, nurses and primary team at the time of the multidisciplinary rounds.     Isidro Montemayor MD  Trauma/ACS/SCC Attending This patient has a high probability of sudden, clinically significant deterioration, which requires the highest level of physician preparedness to intervene urgently. I managed/supervised life or organ supporting interventions that required frequent physician assessment. I devoted my full attention in the ICU to the direct care of this patient for the period of time indicated below. Time I spent with the family or surrogate(s) is included only if the patient was incapable of providing the necessary information or participating in medical decision making. Time devoted to teaching and to any procedures I billed separately is not included.     Patient is examined and evaluated at the bedside with SICU team. Treatment plan discussed with SICU team, nurses and primary team.   Vital signs, laboratory work, and imaging reviewed during rounds.  Will continue hemodynamic monitoring as per protocol in SICU.    24h events: CTH done yesterday, remained on T piece    Neuro: GCS [id number]  Medications - tylenol ATC for fevers, amantadine to promote wakefulness, valproic acid for possible focal seizures  Studies - EEG showed focal and generalized slowing, CTH done shows resolving subdural hemorrhages pending final read    Respiratory: respirations even and unlabored on T piece 40% FiO2 9 LPM  Medications - atrovent/proventil    CV: monitor hemodynamics, MAP goal > 65  Medications - amlodipine, coreg  Home medications - coreg, valsartan, amlodipine, atorvastatin  Studies - no recent studies-     GI: abd s/nt/nd, PEG @ 2.5 at skin edge  Last BM - 300cc/24h   Nutrition - nepro 27cc/24h  Medications - protonix  Ppx - protonix      Renal: Continue I&Os monitoring, replete electrolytes as needed  12-30    147<H>  |  113<H>  |  96<HH>  ----------------------------<  180<H>  4.0   |  21  |  2.9<H>    Ca    7.8<L>      30 Dec 2023 23:20  Phos  5.6     12-30  Mg     2.9     12-30    TPro  x   /  Alb  2.5<L>  /  TBili  x   /  DBili  x   /  AST  x   /  ALT  x   /  AlkPhos  x   12-30    UOP - 2965cc/24h  I/O - 3488/3265  Aguirre catheter - for urinary retention  IVF - D5W @ 75 cc/h, FWF 250cc Q4H  Repeat BMP to monitory hypernatremia, stop D5W but continue FWF  Sevelamer for hyperphosphatemia    Heme: continue to evaluate for acute blood loss anemia- trend Hg/Hct                         8.7    12.48 )-----------( 412      ( 30 Dec 2023 23:20 )             27.2     Anticoagulation - SQH  ASA-81, follow up with NSGY re: starting plavix after CTH    ID: trend leukocytosis, monitor for fevers  Recent culture data - U/A negative, sputum and blood cx no growth so far  Antibiotics - zosyn until 1/4, received one dose of vanco     Endocrine: Prevent and treat hyperglycemia with insulin as needed, lantus 20 units QHS    PV: follow pulse exam    MSK: OOB and mobilize as able, PT eval    Skin: decub precautions    Lines: PIV, RUE midline, trach, PEG  DVT Prophylaxis: SQH  Stress Gastritis Prophylaxis: Protonix   Disposition: transfer to floor    I saw and evaluated the patient personally. I have reviewed and agree with note above. Treatment plan discussed with SICU team, nurses and primary team at the time of the multidisciplinary rounds.     Isidro Montemayor MD  Trauma/ACS/SCC Attending

## 2023-12-31 NOTE — PROGRESS NOTE ADULT - ASSESSMENT
77-year-old male  presents with prior history of hypertension dyslipidemia diabetes BPH prior CVA on aspirin and Plavix who states he had a mechanical fall few days ago while getting out of the car fell backwards hit his head.  Afterwards he was able to ambulate.  But for the past  day before admission the  family was unable to ambulate him.  He states that his lower extremities are painful to movement and weak.  Family denies any LOC.  They deny any confusion at home.  On exam oriented to person and place but just not to date.    Elevates his arms for 10 seconds.  Sensation intact in upper extremities.  Lower extremities with 2 out of 5 strength bilaterally. Nephrology was consulted for SAGRARIO, hypernatremia.      SAGRARIO on CKD 3b - creat was 1.8 (Feb 2023) / likely pre-renal  non oliguric  creat level down trending  Hypernatremia due to free water losses (polyuria and diarrhea), U osm c/w solute diuresis  Hypermagnesemia  Sepsis - on Zosyn renal dose    - cont d5w and increase free water enterally  - cont Nepro enteral feeds (less magnesium)  - phos level noted;  cont sevelamer  - strict i/o

## 2023-12-31 NOTE — PROGRESS NOTE ADULT - ASSESSMENT
Assessment	  78y Male s/p mechanical fall. +HT, +AC (Aspirin and Plavix), -LOC.    12/21-intubated for airway protection, accessory muscle use  12/22- s/p tracheostomy and PEG placement, Portex cuffed 9 and 20fr PEG 2cm at skin     NEURO:  #Acute SDH   -12/18 HCT#4 -stable SDH, no new acute findings  -12/27: Pt obtunded, not responding to noxious stim - VPA/LFTs/BMP/ammonia ordered, CTH f/up: stable  - Q4 neuro checks  - amantadine for neuro stim. renal dosing cleared with pharmacy, next dose 1/4/24  - NSGY: f/up with CTH today 12/31, then assess restarting plavix  #h/o stroke (2/2023) w/residual right sided weakness  - RIGHT anterior thalamus infarct,  LEFT caudate head lacunar infarct  #focal seizure    - vEEG 12/19: No seizures; Discontinued 12/19    - Valproic acid 500q12 , next level 12/31 16:00     - NCC: valproic acid current dose, q4 neurochecks    - Neurology cs- d/c EEG, 2 days prior to discharge call neurology so they can do a spot EEG --> recalled on 12/28 to evaluate diminished mental status, recommend repeat EEG and continue depakote 500 q12  - EEG,12/28: No electrographic seizures or significant clinical events. Abnormal due to the presence of focal and generalized slowing. Findings consistent with focal and diffuse electrocerebral dysfunction secondary to structural/metabolic/nonspecific etiology.    RESP:   #Respiratory Failure secondary to impaired secretion clearance  - s/p tracheostomy 12/22, size 9 cuffed portex  - Tolerating t-piece >24 hours   - chest PT q4 and duoneb treatments q 4   - deep tracheal aspirate cx sent (due to fevers)  - Culture - Sputum . (12.21.23 @ 18:14)-  Numerous Staphylococcus aureus --> 12/28 repeat NGTD  #Activity    -increase as tolerated    -aspiration precautions, HOB 30    CARDS:   #acute HYPOtension intraop, resolved  -off levophed since 12/22 PM  #hx of HTN  -restarting home coreg and amlodipine  -Valsartan 320mg HOLDING   #hx HLD  -atorvastatin  #NSVT    -EP/Cards: No arrhythmias on tele only artifact. No further work up needed. Recommend ILR prior to discharge if patient/family agreeable  Imaging:   Echo: 12/2023: EF 66%, G1DD, trace MR, mild TR     GI/NUTR:   #Diet, NPO with Tube Feed via 20Fr PEG placed 12/22   - TF: Nepro @ 27cc/hr x 24hrs, 250cc FWF q4h  - Nephro recs: change 1/2 NS to D5W at 75 cc/hr, increase free water to 250 cc q4 hrs to correct free water deficit, use Nepro enteral feeds (less magnesium)  #GI Prophylaxis  -Protonix 40mg   #Bowel regimen     -Multiple loose bowel movements, last dose senna 12/19 at 2200 > dignishield placed 12/23     -C diff negative     -No bowel regimen    /RENAL:   #urine output   - martinez reinserted 12/28 due to retention  - UA neg 12/25 (r/o UTI in setting of persistent fevers)   - Sodium goal 140-150  - f/u nephro recs- Hypernatremia due to free water losses. Avoid diuretics, would not use Metolazone to cause volume depletion, and has min effect in advanced kidney dysfunction   #Diuresis  - Last diuresed 12/29: metolazone 10 x1 for hypernatremia  #Hypernatremia  -  q4    Labs:          BUN/Cr- 98/3.1  -->,  96/2.9  -->          Electrolytes-Na 147 // K 4.0 // Mg 2.9 //  Phos 5.6 (12-30 @ 23:20)  - IVF: D5W @75 cc  #hyperphosphatemia  - Started sevelamer powder, non-formulary so as not to clog PEG   #CKD   - baseline Cr 2.1  - holding home sodium bicarb 650mg BID  #hx BPH  - cardura 4mg (flomax at home, non crushable)        HEME/ONC:   #DVT prophylaxis     -SCDs, HSQ  #hx of CVA    -ASA81 mg cleared by NSGY    - NSGY: f/up with CTH today 12/31, then assess restarting plavix    Labs: Hb/Hct:  8.8/27.5  -->,  8.7/27.2  -->                      Plts:  442  -->,  412  -->                 PTT/INR:        Home Rx: clopidogrel 75 mg oral tablet daily (HOLDING)    ID:  WBC- 19.60  --->>,  16.14  --->>,  12.48  --->>  Temp trend- 24hrs T(F): 97.5 (12-31 @ 00:00), Max: 99.1 (12-30 @ 04:00)  Antibiotics-piperacillin/tazobactam IVPB.. 3.375 every 12 hours (stop 1/5)  #recurrent fevers- improving, now low grade  -MRSA PCR negative (12/22)  -MRSA nares negative (12/28)  -RVP negative (12/28)  Cultures    Bcx 12/18- final neg     Blood cx 12/21- no growth at 4 days    BCx, 12/28 - NG @ 24h    Tracheal aspirate 12/21: numerous staph aureus -methicillian sensitive    Tracheal aspirate 12/22: staph aureus    C.Diff PCR neg    UA 12/25 negative     UA 12/28 negative  Current antibiotics:  - Vancomycin 500mg x1 dose (12/28)  - Zosyn 3.375 q12 (started 12/28, stop 1/5)  #multiple loose bowel movements    -no bowel regimen    ENDO:  #DM     -ISS     -Off insulin gtt 12/20      -Lantus 20 units HS     -FSG q6 while on TF    MSK:     Activity - Increase As Tolerated    -B/L knee X ray 12/19- no fractures, b/l effusions    WOUND:  - L sacral stage 2 ulcer, wound care consult placed, follow-up    LINES/DRAINS:  PIV, right basilic midline (placed 12/16), 20Fr PEG (12/22), Tracheostomy (12/22)    ADVANCED DIRECTIVES:  Full Code  -  HCP/Emergency Contact-Sarah Gamino (Wife) [phone], Cantonese speaking    DISPO:  - Social work, case management consult for dispo to vent facility - spoke with social work 12/23, starting to work on D/C.  -  update, 12/29: pt remains acute. Will reassess 24-48h prior to d/c     Assessment	  78y Male s/p mechanical fall. +HT, +AC (Aspirin and Plavix), -LOC.    12/21-intubated for airway protection, accessory muscle use  12/22- s/p tracheostomy and PEG placement, Portex cuffed 9 and 20fr PEG 2cm at skin     NEURO:  #Acute SDH   -12/18 HCT#4 -stable SDH, no new acute findings  -12/27: Pt obtunded, not responding to noxious stim - VPA/LFTs/BMP/ammonia ordered, CTH f/up: stable  - Q4 neuro checks  - amantadine for neuro stim. renal dosing cleared with pharmacy, next dose 1/4/24  - NSGY: f/up with CTH today 12/31, then assess restarting plavix  #h/o stroke (2/2023) w/residual right sided weakness  - RIGHT anterior thalamus infarct,  LEFT caudate head lacunar infarct  #focal seizure    - vEEG 12/19: No seizures; Discontinued 12/19    - Valproic acid 500q12 , next level 12/31 16:00     - NCC: valproic acid current dose, q4 neurochecks    - Neurology cs- d/c EEG, 2 days prior to discharge call neurology so they can do a spot EEG --> recalled on 12/28 to evaluate diminished mental status, recommend repeat EEG and continue depakote 500 q12  - EEG,12/28: No electrographic seizures or significant clinical events. Abnormal due to the presence of focal and generalized slowing. Findings consistent with focal and diffuse electrocerebral dysfunction secondary to structural/metabolic/nonspecific etiology.    RESP:   #Respiratory Failure secondary to impaired secretion clearance  - s/p tracheostomy 12/22, size 9 cuffed portex  - Tolerating t-piece >24 hours   - chest PT q4 and duoneb treatments q 4   - deep tracheal aspirate cx sent (due to fevers)  - Culture - Sputum . (12.21.23 @ 18:14)-  Numerous Staphylococcus aureus --> 12/28 repeat NGTD  #Activity    -increase as tolerated    -aspiration precautions, HOB 30    CARDS:   #acute HYPOtension intraop, resolved  -off levophed since 12/22 PM  #hx of HTN  -restarting home coreg and amlodipine  -Valsartan 320mg HOLDING   #hx HLD  -atorvastatin  #NSVT    -EP/Cards: No arrhythmias on tele only artifact. No further work up needed. Recommend ILR prior to discharge if patient/family agreeable  Imaging:   Echo: 12/2023: EF 66%, G1DD, trace MR, mild TR     GI/NUTR:   #Diet, NPO with Tube Feed via 20Fr PEG placed 12/22   - TF: Nepro @ 27cc/hr x 24hrs, 250cc FWF q4h  - Nephro recs: change 1/2 NS to D5W at 75 cc/hr, increase free water to 250 cc q4 hrs to correct free water deficit, use Nepro enteral feeds (less magnesium)  #GI Prophylaxis  -Protonix 40mg   #Bowel regimen     -Multiple loose bowel movements, last dose senna 12/19 at 2200 > dignishield placed 12/23     -C diff negative     -No bowel regimen    /RENAL:   #urine output   - martinez reinserted 12/28 due to retention  - UA neg 12/25 (r/o UTI in setting of persistent fevers)   - Sodium goal 140-150  - f/u nephro recs- Hypernatremia due to free water losses. Avoid diuretics, would not use Metolazone to cause volume depletion, and has min effect in advanced kidney dysfunction   #Diuresis  - Last diuresed 12/29: metolazone 10 x1 for hypernatremia  #Hypernatremia  -  q4    Labs:          BUN/Cr- 98/3.1  -->,  96/2.9  -->          Electrolytes-Na 147 // K 4.0 // Mg 2.9 //  Phos 5.6 (12-30 @ 23:20)  - IVF: D5W @75 cc  #hyperphosphatemia  - Started sevelamer powder, non-formulary so as not to clog PEG   #CKD   - baseline Cr 2.1  - holding home sodium bicarb 650mg BID  #hx BPH  - cardura 4mg (flomax at home, non crushable)        HEME/ONC:   #DVT prophylaxis     -SCDs, HSQ  #hx of CVA    -ASA81 mg cleared by NSGY    - NSGY: f/up with CTH today 12/31, then assess restarting plavix    Labs: Hb/Hct:  8.8/27.5  -->,  8.7/27.2  -->                      Plts:  442  -->,  412  -->                 PTT/INR:        Home Rx: clopidogrel 75 mg oral tablet daily (HOLDING)    ID:  WBC- 19.60  --->>,  16.14  --->>,  12.48  --->>  Temp trend- 24hrs T(F): 97.5 (12-31 @ 00:00), Max: 99.1 (12-30 @ 04:00)  Antibiotics-piperacillin/tazobactam IVPB.. 3.375 every 12 hours (stop 1/5)  #recurrent fevers- improving, now low grade  -MRSA PCR negative (12/22)  -MRSA nares negative (12/28)  -RVP negative (12/28)  Cultures    Bcx 12/18- final neg     Blood cx 12/21- no growth at 4 days    BCx, 12/28 - NG @ 24h    Tracheal aspirate 12/21: numerous staph aureus -methicillian sensitive    Tracheal aspirate 12/22: staph aureus    C.Diff PCR neg    UA 12/25 negative     UA 12/28 negative  Current antibiotics:  - Vancomycin 500mg x1 dose (12/28)  - Zosyn 3.375 q12 (started 12/28, stop 1/5)  #multiple loose bowel movements    -no bowel regimen    ENDO:  #DM     -ISS     -Off insulin gtt 12/20      -Lantus 20 units HS     -FSG q6 while on TF    MSK:     Activity - Increase As Tolerated    -B/L knee X ray 12/19- no fractures, b/l effusions    WOUND:  - L sacral stage 2 ulcer, wound care consult placed, follow-up    LINES/DRAINS:  PIV, right basilic midline (placed 12/16), 20Fr PEG (12/22), Tracheostomy (12/22)    ADVANCED DIRECTIVES:  Full Code  -  HCP/Emergency Contact-Sarah Gamino (Wife) [phone], Cantonese speaking    DISPO:  - Social work, case management consult for dispo to vent facility - spoke with social work 12/23, starting to work on D/C.  -  update, 12/29: pt remains acute. Will reassess 24-48h prior to d/c

## 2024-01-01 LAB
ANION GAP SERPL CALC-SCNC: 16 MMOL/L — HIGH (ref 7–14)
ANION GAP SERPL CALC-SCNC: 16 MMOL/L — HIGH (ref 7–14)
APTT BLD: 36.5 SEC — SIGNIFICANT CHANGE UP (ref 27–39.2)
APTT BLD: 36.5 SEC — SIGNIFICANT CHANGE UP (ref 27–39.2)
BASOPHILS # BLD AUTO: 0 K/UL — SIGNIFICANT CHANGE UP (ref 0–0.2)
BASOPHILS # BLD AUTO: 0 K/UL — SIGNIFICANT CHANGE UP (ref 0–0.2)
BASOPHILS NFR BLD AUTO: 0 % — SIGNIFICANT CHANGE UP (ref 0–1)
BASOPHILS NFR BLD AUTO: 0 % — SIGNIFICANT CHANGE UP (ref 0–1)
BUN SERPL-MCNC: 77 MG/DL — CRITICAL HIGH (ref 10–20)
BUN SERPL-MCNC: 77 MG/DL — CRITICAL HIGH (ref 10–20)
CALCIUM SERPL-MCNC: 8 MG/DL — LOW (ref 8.4–10.5)
CALCIUM SERPL-MCNC: 8 MG/DL — LOW (ref 8.4–10.5)
CHLORIDE SERPL-SCNC: 113 MMOL/L — HIGH (ref 98–110)
CHLORIDE SERPL-SCNC: 113 MMOL/L — HIGH (ref 98–110)
CO2 SERPL-SCNC: 22 MMOL/L — SIGNIFICANT CHANGE UP (ref 17–32)
CO2 SERPL-SCNC: 22 MMOL/L — SIGNIFICANT CHANGE UP (ref 17–32)
CREAT SERPL-MCNC: 2.4 MG/DL — HIGH (ref 0.7–1.5)
CREAT SERPL-MCNC: 2.4 MG/DL — HIGH (ref 0.7–1.5)
EGFR: 27 ML/MIN/1.73M2 — LOW
EGFR: 27 ML/MIN/1.73M2 — LOW
EOSINOPHIL # BLD AUTO: 0.21 K/UL — SIGNIFICANT CHANGE UP (ref 0–0.7)
EOSINOPHIL # BLD AUTO: 0.21 K/UL — SIGNIFICANT CHANGE UP (ref 0–0.7)
EOSINOPHIL NFR BLD AUTO: 1.7 % — SIGNIFICANT CHANGE UP (ref 0–8)
EOSINOPHIL NFR BLD AUTO: 1.7 % — SIGNIFICANT CHANGE UP (ref 0–8)
GLUCOSE SERPL-MCNC: 150 MG/DL — HIGH (ref 70–99)
GLUCOSE SERPL-MCNC: 150 MG/DL — HIGH (ref 70–99)
HCT VFR BLD CALC: 29.8 % — LOW (ref 42–52)
HCT VFR BLD CALC: 29.8 % — LOW (ref 42–52)
HGB BLD-MCNC: 9.5 G/DL — LOW (ref 14–18)
HGB BLD-MCNC: 9.5 G/DL — LOW (ref 14–18)
INR BLD: 1.03 RATIO — SIGNIFICANT CHANGE UP (ref 0.65–1.3)
INR BLD: 1.03 RATIO — SIGNIFICANT CHANGE UP (ref 0.65–1.3)
LYMPHOCYTES # BLD AUTO: 0.88 K/UL — LOW (ref 1.2–3.4)
LYMPHOCYTES # BLD AUTO: 0.88 K/UL — LOW (ref 1.2–3.4)
LYMPHOCYTES # BLD AUTO: 7 % — LOW (ref 20.5–51.1)
LYMPHOCYTES # BLD AUTO: 7 % — LOW (ref 20.5–51.1)
MAGNESIUM SERPL-MCNC: 2.9 MG/DL — HIGH (ref 1.8–2.4)
MAGNESIUM SERPL-MCNC: 2.9 MG/DL — HIGH (ref 1.8–2.4)
MCHC RBC-ENTMCNC: 29 PG — SIGNIFICANT CHANGE UP (ref 27–31)
MCHC RBC-ENTMCNC: 29 PG — SIGNIFICANT CHANGE UP (ref 27–31)
MCHC RBC-ENTMCNC: 31.9 G/DL — LOW (ref 32–37)
MCHC RBC-ENTMCNC: 31.9 G/DL — LOW (ref 32–37)
MCV RBC AUTO: 90.9 FL — SIGNIFICANT CHANGE UP (ref 80–94)
MCV RBC AUTO: 90.9 FL — SIGNIFICANT CHANGE UP (ref 80–94)
MONOCYTES # BLD AUTO: 0.65 K/UL — HIGH (ref 0.1–0.6)
MONOCYTES # BLD AUTO: 0.65 K/UL — HIGH (ref 0.1–0.6)
MONOCYTES NFR BLD AUTO: 5.2 % — SIGNIFICANT CHANGE UP (ref 1.7–9.3)
MONOCYTES NFR BLD AUTO: 5.2 % — SIGNIFICANT CHANGE UP (ref 1.7–9.3)
NEUTROPHILS # BLD AUTO: 10.18 K/UL — HIGH (ref 1.4–6.5)
NEUTROPHILS # BLD AUTO: 10.18 K/UL — HIGH (ref 1.4–6.5)
NEUTROPHILS NFR BLD AUTO: 80 % — HIGH (ref 42.2–75.2)
NEUTROPHILS NFR BLD AUTO: 80 % — HIGH (ref 42.2–75.2)
PHOSPHATE SERPL-MCNC: 4.8 MG/DL — SIGNIFICANT CHANGE UP (ref 2.1–4.9)
PHOSPHATE SERPL-MCNC: 4.8 MG/DL — SIGNIFICANT CHANGE UP (ref 2.1–4.9)
PLATELET # BLD AUTO: 440 K/UL — HIGH (ref 130–400)
PLATELET # BLD AUTO: 440 K/UL — HIGH (ref 130–400)
PMV BLD: 9 FL — SIGNIFICANT CHANGE UP (ref 7.4–10.4)
PMV BLD: 9 FL — SIGNIFICANT CHANGE UP (ref 7.4–10.4)
POTASSIUM SERPL-MCNC: 4.2 MMOL/L — SIGNIFICANT CHANGE UP (ref 3.5–5)
POTASSIUM SERPL-MCNC: 4.2 MMOL/L — SIGNIFICANT CHANGE UP (ref 3.5–5)
POTASSIUM SERPL-SCNC: 4.2 MMOL/L — SIGNIFICANT CHANGE UP (ref 3.5–5)
POTASSIUM SERPL-SCNC: 4.2 MMOL/L — SIGNIFICANT CHANGE UP (ref 3.5–5)
PROTHROM AB SERPL-ACNC: 11.7 SEC — SIGNIFICANT CHANGE UP (ref 9.95–12.87)
PROTHROM AB SERPL-ACNC: 11.7 SEC — SIGNIFICANT CHANGE UP (ref 9.95–12.87)
RBC # BLD: 3.28 M/UL — LOW (ref 4.7–6.1)
RBC # BLD: 3.28 M/UL — LOW (ref 4.7–6.1)
RBC # FLD: 13.4 % — SIGNIFICANT CHANGE UP (ref 11.5–14.5)
RBC # FLD: 13.4 % — SIGNIFICANT CHANGE UP (ref 11.5–14.5)
SODIUM SERPL-SCNC: 151 MMOL/L — HIGH (ref 135–146)
SODIUM SERPL-SCNC: 151 MMOL/L — HIGH (ref 135–146)
WBC # BLD: 12.58 K/UL — HIGH (ref 4.8–10.8)
WBC # BLD: 12.58 K/UL — HIGH (ref 4.8–10.8)
WBC # FLD AUTO: 12.58 K/UL — HIGH (ref 4.8–10.8)
WBC # FLD AUTO: 12.58 K/UL — HIGH (ref 4.8–10.8)

## 2024-01-01 PROCEDURE — 99291 CRITICAL CARE FIRST HOUR: CPT | Mod: 24

## 2024-01-01 RX ORDER — FAMOTIDINE 10 MG/ML
10 INJECTION INTRAVENOUS
Refills: 0 | Status: DISCONTINUED | OUTPATIENT
Start: 2024-01-01 | End: 2024-01-12

## 2024-01-01 RX ORDER — AMANTADINE HCL 100 MG
100 CAPSULE ORAL
Refills: 0 | Status: DISCONTINUED | OUTPATIENT
Start: 2024-01-01 | End: 2024-01-03

## 2024-01-01 RX ADMIN — Medication 100 MILLIGRAM(S): at 12:25

## 2024-01-01 RX ADMIN — Medication 750 MILLIGRAM(S): at 06:04

## 2024-01-01 RX ADMIN — CHLORHEXIDINE GLUCONATE 15 MILLILITER(S): 213 SOLUTION TOPICAL at 17:21

## 2024-01-01 RX ADMIN — Medication 650 MILLIGRAM(S): at 01:30

## 2024-01-01 RX ADMIN — ALBUTEROL 90 PUFF(S): 90 AEROSOL, METERED ORAL at 17:43

## 2024-01-01 RX ADMIN — Medication 3: at 06:00

## 2024-01-01 RX ADMIN — SEVELAMER CARBONATE 800 MILLIGRAM(S): 2400 POWDER, FOR SUSPENSION ORAL at 12:26

## 2024-01-01 RX ADMIN — Medication 1 APPLICATION(S): at 17:22

## 2024-01-01 RX ADMIN — SEVELAMER CARBONATE 800 MILLIGRAM(S): 2400 POWDER, FOR SUSPENSION ORAL at 08:44

## 2024-01-01 RX ADMIN — Medication 4 MILLIGRAM(S): at 22:29

## 2024-01-01 RX ADMIN — AMLODIPINE BESYLATE 5 MILLIGRAM(S): 2.5 TABLET ORAL at 06:04

## 2024-01-01 RX ADMIN — CARVEDILOL PHOSPHATE 6.25 MILLIGRAM(S): 80 CAPSULE, EXTENDED RELEASE ORAL at 17:21

## 2024-01-01 RX ADMIN — CHLORHEXIDINE GLUCONATE 1 APPLICATION(S): 213 SOLUTION TOPICAL at 06:03

## 2024-01-01 RX ADMIN — ALBUTEROL 90 PUFF(S): 90 AEROSOL, METERED ORAL at 14:55

## 2024-01-01 RX ADMIN — Medication 1 APPLICATION(S): at 06:08

## 2024-01-01 RX ADMIN — PIPERACILLIN AND TAZOBACTAM 25 GRAM(S): 4; .5 INJECTION, POWDER, LYOPHILIZED, FOR SOLUTION INTRAVENOUS at 06:05

## 2024-01-01 RX ADMIN — CHLORHEXIDINE GLUCONATE 15 MILLILITER(S): 213 SOLUTION TOPICAL at 06:02

## 2024-01-01 RX ADMIN — Medication 650 MILLIGRAM(S): at 06:03

## 2024-01-01 RX ADMIN — Medication 1 APPLICATION(S): at 06:07

## 2024-01-01 RX ADMIN — Medication 1 PUFF(S): at 14:56

## 2024-01-01 RX ADMIN — SEVELAMER CARBONATE 800 MILLIGRAM(S): 2400 POWDER, FOR SUSPENSION ORAL at 17:22

## 2024-01-01 RX ADMIN — PANTOPRAZOLE SODIUM 40 MILLIGRAM(S): 20 TABLET, DELAYED RELEASE ORAL at 06:04

## 2024-01-01 RX ADMIN — Medication 81 MILLIGRAM(S): at 12:24

## 2024-01-01 RX ADMIN — Medication 650 MILLIGRAM(S): at 07:29

## 2024-01-01 RX ADMIN — HEPARIN SODIUM 5000 UNIT(S): 5000 INJECTION INTRAVENOUS; SUBCUTANEOUS at 22:27

## 2024-01-01 RX ADMIN — ALBUTEROL 90 PUFF(S): 90 AEROSOL, METERED ORAL at 08:30

## 2024-01-01 RX ADMIN — Medication 750 MILLIGRAM(S): at 17:21

## 2024-01-01 RX ADMIN — FAMOTIDINE 10 MILLIGRAM(S): 10 INJECTION INTRAVENOUS at 17:28

## 2024-01-01 RX ADMIN — PIPERACILLIN AND TAZOBACTAM 25 GRAM(S): 4; .5 INJECTION, POWDER, LYOPHILIZED, FOR SOLUTION INTRAVENOUS at 17:28

## 2024-01-01 RX ADMIN — HEPARIN SODIUM 5000 UNIT(S): 5000 INJECTION INTRAVENOUS; SUBCUTANEOUS at 04:32

## 2024-01-01 RX ADMIN — ATORVASTATIN CALCIUM 20 MILLIGRAM(S): 80 TABLET, FILM COATED ORAL at 22:27

## 2024-01-01 RX ADMIN — Medication 5: at 12:26

## 2024-01-01 RX ADMIN — CARVEDILOL PHOSPHATE 6.25 MILLIGRAM(S): 80 CAPSULE, EXTENDED RELEASE ORAL at 06:04

## 2024-01-01 RX ADMIN — Medication 3: at 17:23

## 2024-01-01 RX ADMIN — Medication 1 PUFF(S): at 19:40

## 2024-01-01 RX ADMIN — Medication 1 PUFF(S): at 08:30

## 2024-01-01 RX ADMIN — Medication 650 MILLIGRAM(S): at 00:07

## 2024-01-01 RX ADMIN — INSULIN GLARGINE 20 UNIT(S): 100 INJECTION, SOLUTION SUBCUTANEOUS at 22:28

## 2024-01-01 RX ADMIN — HEPARIN SODIUM 5000 UNIT(S): 5000 INJECTION INTRAVENOUS; SUBCUTANEOUS at 12:25

## 2024-01-01 NOTE — PROGRESS NOTE ADULT - ASSESSMENT
Assessment		  78y Male s/p mechanical fall. +HT, +AC (Aspirin and Plavix), -LOC.    12/21-intubated for airway protection, accessory muscle use  12/22- s/p tracheostomy and PEG placement, Portex cuffed 9 and 20fr PEG 2cm at skin     NEURO:  #Acute SDH   -12/18 HCT#4 -stable SDH, no new acute findings  -12/27: Pt obtunded, not responding to noxious stim - VPA/LFTs/BMP/ammonia ordered, CTH f/up: stable  - Q4 neuro checks  - amantadine for neuro stim. renal dosing cleared with pharmacy, next dose 1/4/24  - NSGY: f/up with CTH today 12/31, then assess restarting plavix  #h/o stroke (2/2023) w/residual right sided weakness  - RIGHT anterior thalamus infarct,  LEFT caudate head lacunar infarct  #focal seizure    - vEEG 12/19: No seizures; Discontinued 12/19    - Valproic acid 500q12 , next level 12/31 16:00     - NCC: valproic acid current dose, q4 neurochecks    - Neurology cs- d/c EEG, 2 days prior to discharge call neurology so they can do a spot EEG --> recalled on 12/28 to evaluate diminished mental status, recommend repeat EEG and continue depakote 500 q12  - EEG,12/28: No electrographic seizures or significant clinical events. Abnormal due to the presence of focal and generalized slowing. Findings consistent with focal and diffuse electrocerebral dysfunction secondary to structural/metabolic/nonspecific etiology.    RESP:   #Respiratory Failure secondary to impaired secretion clearance  - s/p tracheostomy 12/22, size 9 cuffed portex  - Tolerating t-piece >24 hours   - chest PT q4 and duoneb treatments q 4   - deep tracheal aspirate cx sent (due to fevers)  - Culture - Sputum . (12.21.23 @ 18:14)-  Numerous Staphylococcus aureus --> 12/28 repeat NGTD  #Activity    -increase as tolerated    -aspiration precautions, HOB 30    CARDS:   #acute HYPOtension intraop, resolved  -off levophed since 12/22 PM  #hx of HTN  -restarting home coreg and amlodipine  -Valsartan 320mg HOLDING   #hx HLD  -atorvastatin  #NSVT    -EP/Cards: No arrhythmias on tele only artifact. No further work up needed. Recommend ILR prior to discharge if patient/family agreeable  Imaging:   Echo: 12/2023: EF 66%, G1DD, trace MR, mild TR     GI/NUTR:   #Diet, NPO with Tube Feed via 20Fr PEG placed 12/22   - TF: Nepro @ 27cc/hr x 24hrs, 250cc FWF q4h  - Nephro recs: change 1/2 NS to D5W at 75 cc/hr, increase free water to 250 cc q4 hrs to correct free water deficit, use Nepro enteral feeds (less magnesium)  #GI Prophylaxis  -Protonix 40mg   #Bowel regimen     -Multiple loose bowel movements, last dose senna 12/19 at 2200 > dignishield placed 12/23     -C diff negative     -No bowel regimen    /RENAL:   #urine output   - martinez reinserted 12/28 due to retention  - UA neg 12/25 (r/o UTI in setting of persistent fevers)   - Sodium goal 140-150  - f/u nephro recs- Hypernatremia due to free water losses. Avoid diuretics, would not use Metolazone to cause volume depletion, and has min effect in advanced kidney dysfunction   #Diuresis  - Last diuresed 12/29: metolazone 10 x1 for hypernatremia  #Hypernatremia  -  q4    Labs:          BUN/Cr- 98/3.1  -->,  96/2.9  -->          Electrolytes-Na 147 // K 4.0 // Mg 2.9 //  Phos 5.6 (12-30 @ 23:20)  - IVF: D5W @75 cc  #hyperphosphatemia  - Started sevelamer powder, non-formulary so as not to clog PEG   #CKD   - baseline Cr 2.1  - holding home sodium bicarb 650mg BID  #hx BPH  - cardura 4mg (flomax at home, non crushable)        HEME/ONC:   #DVT prophylaxis     -SCDs, HSQ  #hx of CVA    -ASA81 mg cleared by NSGY    - NSGY: f/up with CTH today 12/31, then assess restarting plavix    Labs: Hb/Hct:  8.8/27.5  -->,  8.7/27.2  -->                      Plts:  442  -->,  412  -->                 PTT/INR:        Home Rx: clopidogrel 75 mg oral tablet daily (HOLDING)    ID:  WBC- 19.60  --->>,  16.14  --->>,  12.48  --->>  Temp trend- 24hrs T(F): 97.5 (12-31 @ 00:00), Max: 99.1 (12-30 @ 04:00)  Antibiotics-piperacillin/tazobactam IVPB.. 3.375 every 12 hours (stop 1/5)  #recurrent fevers- improving, now low grade  -MRSA PCR negative (12/22)  -MRSA nares negative (12/28)  -RVP negative (12/28)  Cultures    Bcx 12/18- final neg     Blood cx 12/21- no growth at 4 days    BCx, 12/28 - NG @ 24h    Tracheal aspirate 12/21: numerous staph aureus -methicillian sensitive    Tracheal aspirate 12/22: staph aureus    C.Diff PCR neg    UA 12/25 negative     UA 12/28 negative  Current antibiotics:  - Vancomycin 500mg x1 dose (12/28)  - Zosyn 3.375 q12 (started 12/28, stop 1/5)  #multiple loose bowel movements    -no bowel regimen    ENDO:  #DM     -ISS     -Off insulin gtt 12/20      -Lantus 20 units HS     -FSG q6 while on TF    MSK:     Activity - Increase As Tolerated    -B/L knee X ray 12/19- no fractures, b/l effusions    WOUND:  - L sacral stage 2 ulcer, wound care consult placed, follow-up    LINES/DRAINS:  PIV, right basilic midline (placed 12/16), 20Fr PEG (12/22), Tracheostomy (12/22)    ADVANCED DIRECTIVES:  Full Code  -  HCP/Emergency Contact-Sarah Gamino (Wife) [phone], Cantonese speaking    DISPO:  - Social work, case management consult for dispo to vent facility - spoke with social work 12/23, starting to work on D/C.  -  update, 12/29: pt remains acute. Will reassess 24-48h prior to d/c     Assessment		  78y Male s/p mechanical fall. +HT, +AC (Aspirin and Plavix), -LOC.    12/21-intubated for airway protection, accessory muscle use  12/22- s/p tracheostomy and PEG placement, Portex cuffed 9 and 20fr PEG 2cm at skin     NEURO:  #Acute SDH   -12/18 HCT#4 -stable SDH, no new acute findings  -12/27: Pt obtunded, not responding to noxious stim - VPA/LFTs/BMP/ammonia ordered, CTH f/up: stable  - Q4 neuro checks  - amantadine for neuro stim. renal dosing cleared with pharmacy, next dose 1/4/24  - NSGY: f/up with C[facility] today 12/31, then assess restarting plavix  #h/o stroke (2/2023) w/residual right sided weakness  - RIGHT anterior thalamus infarct,  LEFT caudate head lacunar infarct  #focal seizure    - vEEG 12/19: No seizures; Discontinued 12/19    - Valproic acid 500q12 , next level 12/31 16:00     - NCC: valproic acid current dose, q4 neurochecks    - Neurology cs- d/c EEG, 2 days prior to discharge call neurology so they can do a spot EEG --> recalled on 12/28 to evaluate diminished mental status, recommend repeat EEG and continue depakote 500 q12  - EEG,12/28: No electrographic seizures or significant clinical events. Abnormal due to the presence of focal and generalized slowing. Findings consistent with focal and diffuse electrocerebral dysfunction secondary to structural/metabolic/nonspecific etiology.    RESP:   #Respiratory Failure secondary to impaired secretion clearance  - s/p tracheostomy 12/22, size 9 cuffed portex  - Tolerating t-piece >24 hours   - chest PT q4 and duoneb treatments q 4   - deep tracheal aspirate cx sent (due to fevers)  - Culture - Sputum . (12.21.23 @ 18:14)-  Numerous Staphylococcus aureus --> 12/28 repeat NGTD  #Activity    -increase as tolerated    -aspiration precautions, HOB 30    CARDS:   #acute HYPOtension intraop, resolved  -off levophed since 12/22 PM  #hx of HTN  -restarting home coreg and amlodipine  -Valsartan 320mg HOLDING   #hx HLD  -atorvastatin  #NSVT    -EP/Cards: No arrhythmias on tele only artifact. No further work up needed. Recommend ILR prior to discharge if patient/family agreeable  Imaging:   Echo: 12/2023: EF 66%, G1DD, trace MR, mild TR     GI/NUTR:   #Diet, NPO with Tube Feed via 20Fr PEG placed 12/22   - TF: Nepro @ 27cc/hr x 24hrs, 250cc FWF q4h  - Nephro recs: change 1/2 NS to D5W at 75 cc/hr, increase free water to 250 cc q4 hrs to correct free water deficit, use Nepro enteral feeds (less magnesium)  #GI Prophylaxis  -Protonix 40mg   #Bowel regimen     -Multiple loose bowel movements, last dose senna 12/19 at 2200 > dignishield placed 12/23     -C diff negative     -No bowel regimen    /RENAL:   #urine output   - martinez reinserted 12/28 due to retention  - UA neg 12/25 (r/o UTI in setting of persistent fevers)   - Sodium goal 140-150  - f/u nephro recs- Hypernatremia due to free water losses. Avoid diuretics, would not use Metolazone to cause volume depletion, and has min effect in advanced kidney dysfunction   #Diuresis  - Last diuresed 12/29: metolazone 10 x1 for hypernatremia  #Hypernatremia  -  q4    Labs:          BUN/Cr- 98/3.1  -->,  96/2.9  -->          Electrolytes-Na 147 // K 4.0 // Mg 2.9 //  Phos 5.6 (12-30 @ 23:20)  - IVF: D5W @75 cc  #hyperphosphatemia  - Started sevelamer powder, non-formulary so as not to clog PEG   #CKD   - baseline Cr 2.1  - holding home sodium bicarb 650mg BID  #hx BPH  - cardura 4mg (flomax at home, non crushable)        HEME/ONC:   #DVT prophylaxis     -SCDs, HSQ  #hx of CVA    -ASA81 mg cleared by NSGY    - NSGY: f/up with CTH today 12/31, then assess restarting plavix    Labs: Hb/Hct:  8.8/27.5  -->,  8.7/27.2  -->                      Plts:  442  -->,  412  -->                 PTT/INR:        Home Rx: clopidogrel 75 mg oral tablet daily (HOLDING)    ID:  WBC- 19.60  --->>,  16.14  --->>,  12.48  --->>  Temp trend- 24hrs T(F): 97.5 (12-31 @ 00:00), Max: 99.1 (12-30 @ 04:00)  Antibiotics-piperacillin/tazobactam IVPB.. 3.375 every 12 hours (stop 1/5)  #recurrent fevers- improving, now low grade  -MRSA PCR negative (12/22)  -MRSA nares negative (12/28)  -RVP negative (12/28)  Cultures    Bcx 12/18- final neg     Blood cx 12/21- no growth at 4 days    BCx, 12/28 - NG @ 24h    Tracheal aspirate 12/21: numerous staph aureus -methicillian sensitive    Tracheal aspirate 12/22: staph aureus    C.Diff PCR neg    UA 12/25 negative     UA 12/28 negative  Current antibiotics:  - Vancomycin 500mg x1 dose (12/28)  - Zosyn 3.375 q12 (started 12/28, stop 1/5)  #multiple loose bowel movements    -no bowel regimen    ENDO:  #DM     -ISS     -Off insulin gtt 12/20      -Lantus 20 units HS     -FSG q6 while on TF    MSK:     Activity - Increase As Tolerated    -B/L knee X ray 12/19- no fractures, b/l effusions    WOUND:  - L sacral stage 2 ulcer, wound care consult placed, follow-up    LINES/DRAINS:  PIV, right basilic midline (placed 12/16), 20Fr PEG (12/22), Tracheostomy (12/22)    ADVANCED DIRECTIVES:  Full Code  -  HCP/Emergency Contact-Sarah Gamino (Wife) [phone], Cantonese speaking    DISPO:  - Social work, case management consult for dispo to vent facility - spoke with social work 12/23, starting to work on D/C.  -  update, 12/29: pt remains acute. Will reassess 24-48h prior to d/c     Assessment		  78y Male s/p mechanical fall. +HT, +AC (Aspirin and Plavix), -LOC.    12/21-intubated for airway protection, accessory muscle use  12/22- s/p tracheostomy and PEG placement, Portex cuffed 9 and 20fr PEG 2cm at skin     NEURO:  #Acute SDH   -12/18 HCT#4 -stable SDH, no new acute findings  -12/27: Pt obtunded, not responding to noxious stim - VPA/LFTs/BMP/ammonia ordered, CTH f/up: stable  - Q4 neuro checks  - amantadine for neuro stim. renal dosing cleared with pharmacy, next dose 1/4/24  - NSGY: f/up with CTH today 12/31, then assess restarting plavix  #h/o stroke (2/2023) w/residual right sided weakness  - RIGHT anterior thalamus infarct,  LEFT caudate head lacunar infarct  #focal seizure    - vEEG 12/19: No seizures; Discontinued 12/19    - NCC: valproic acid current dose, q4 neurochecks    - Neurology cs- d/c EEG, 2 days prior to discharge call neurology so they can do a spot EEG --> recalled on 12/28 to evaluate diminished mental status, recommend repeat EEG and continue depakote 500 q12  - EEG,12/28: No electrographic seizures or significant clinical events. Abnormal due to the presence of focal and generalized slowing. Findings consistent with focal and diffuse electrocerebral dysfunction secondary to structural/metabolic/nonspecific etiology.    - Valproic acid level 12/31 23, albumin 2.5; Valproic acid dose adjusted to 750mg q12h for 1 week, per pharmacy    RESP:   #Respiratory Failure secondary to impaired secretion clearance  - s/p tracheostomy 12/22, size 9 cuffed portex  - Tolerating t-piece since 12/29   - chest PT q4 and duoneb treatments q 4   - deep tracheal aspirate cx sent (due to fevers)  - Culture - Sputum . (12.21.23 @ 18:14)-  Numerous Staphylococcus aureus --> 12/28 repeat NGTD  #Activity    -increase as tolerated    -aspiration precautions, HOB 30    CARDS:   #acute HYPOtension intraop, resolved  -off levophed since 12/22 PM  #hx of HTN  -restarting home coreg and amlodipine  -Valsartan 320mg HOLDING   #hx HLD  -atorvastatin  #NSVT    -EP/Cards: No arrhythmias on tele only artifact. No further work up needed. Recommend ILR prior to discharge if patient/family agreeable  Imaging:   Echo: 12/2023: EF 66%, G1DD, trace MR, mild TR     GI/NUTR:   #Diet, NPO with Tube Feed via 20Fr PEG placed 12/22   - TF: Nepro @ 27cc/hr x 24hrs, 200cc FWF q8h  #GI Prophylaxis  -Protonix 40mg   #Bowel regimen     -Multiple loose bowel movements, last dose senna 12/19 at 2200 > dignishield placed 12/23     -C diff negative     -No bowel regimen    /RENAL:   #urine output   - martinez reinserted 12/28 due to retention  - UA neg 12/25 (r/o UTI in setting of persistent fevers)   - Sodium goal 140-150  - f/u nephro recs- Hypernatremia due to free water losses. Avoid diuretics, would not use Metolazone to cause volume depletion, and has min effect in advanced kidney dysfunction   #Diuresis  - Last diuresed 12/29: metolazone 10 x1 for hypernatremia  #Hypernatremia - improved  -  q8     Labs:          BUN/Cr- 81/2.6  -->,  80/2.6  -->          Electrolytes-Na 144 // K 4.0 // Mg 2.8 //  Phos 5.1 (12-31 @ 20:29)  #hyperphosphatemia  - Started sevelamer powder, non-formulary so as not to clog PEG   #CKD   - baseline Cr 2.1  - holding home sodium bicarb 650mg BID  #hx BPH  - cardura 4mg (flomax at home, non crushable)      HEME/ONC:   #DVT prophylaxis     -SCDs, HSQ  #hx of CVA    -ASA81 mg cleared by NSGY    - NSGY: f/up with CTH today 12/31, then assess restarting plavix    Labs: Hb/Hct:  8.7/27.2  (12-30 @ 23:20)  -->,  8.6/27.2  (12-31 @ 20:29)  -->                      Plts:  412  -->,  388  -->                 PTT/INR:          Home Rx: clopidogrel 75 mg oral tablet daily (HOLDING)    ID:  WBC- 16.14  --->>,  12.48  --->>,  11.58  --->>  Temp trend- 24hrs T(F): 98.8 (12-31 @ 20:00), Max: 98.8 (12-31 @ 20:00)  Current antibiotics-piperacillin/tazobactam IVPB.. 3.375 every 12 hours  #recurrent fevers- improving, now low grade  -MRSA PCR negative (12/22)  -MRSA nares negative (12/28)  -RVP negative (12/28)  Cultures    Bcx 12/18- final neg     Blood cx 12/21- no growth at 4 days    BCx, 12/28 - NG @ 24h    Tracheal aspirate 12/21: numerous staph aureus -methicillian sensitive    Tracheal aspirate 12/22: staph aureus    C.Diff PCR neg    UA 12/25 negative     UA 12/28 negative  Current antibiotics:  - Vancomycin 500mg x1 dose (12/28)  - Zosyn 3.375 q12 (started 12/28, stop 1/5)  #multiple loose bowel movements    -no bowel regimen    ENDO:  #DM     -ISS     -Off insulin gtt 12/20      -Lantus 20 units HS     -FSG q6 while on TF    MSK:     Activity - Increase As Tolerated    -B/L knee X ray 12/19- no fractures, b/l effusions    WOUND:  - L sacral stage 2 ulcer, wound care consult placed, follow-up    LINES/DRAINS:  PIV, right basilic midline (placed 12/16), 20Fr PEG (12/22), Tracheostomy (12/22)    ADVANCED DIRECTIVES:  Full Code  -  HCP/Emergency Contact-Sarah Gamino (Wife) [phone], Cantonese speaking    DISPO:  - Social work, case management consult for dispo to vent facility - spoke with social work 12/23, starting to work on D/C.  -  update, 12/29: pt remains acute. Will reassess 24-48h prior to d/c     Assessment		  78y Male s/p mechanical fall. +HT, +AC (Aspirin and Plavix), -LOC.    12/21-intubated for airway protection, accessory muscle use  12/22- s/p tracheostomy and PEG placement, Portex cuffed 9 and 20fr PEG 2cm at skin     NEURO:  #Acute SDH   -12/18 HCT#4 -stable SDH, no new acute findings  -12/27: Pt obtunded, not responding to noxious stim - VPA/LFTs/BMP/ammonia ordered, CTH f/up: stable  - Q4 neuro checks  - amantadine for neuro stim. renal dosing cleared with pharmacy, next dose 1/4/24  - NSGY: f/up with CTH today 12/31, then assess restarting plavix  #h/o stroke (2/2023) w/residual right sided weakness  - RIGHT anterior thalamus infarct,  LEFT caudate head lacunar infarct  #focal seizure    - vEEG 12/19: No seizures; Discontinued 12/19    - NCC: valproic acid current dose, q4 neurochecks    - Neurology cs- d/c EEG, 2 days prior to discharge call neurology so they can do a spot EEG --> recalled on 12/28 to evaluate diminished mental status, recommend repeat EEG and continue depakote 500 q12  - EEG,12/28: No electrographic seizures or significant clinical events. Abnormal due to the presence of focal and generalized slowing. Findings consistent with focal and diffuse electrocerebral dysfunction secondary to structural/metabolic/nonspecific etiology.    - Valproic acid level 12/31 23, albumin 2.5; Valproic acid dose adjusted to 750mg q12h for 1 week, per pharmacy    RESP:   #Respiratory Failure secondary to impaired secretion clearance  - s/p tracheostomy 12/22, size 9 cuffed portex  - Tolerating t-piece since 12/29   - chest PT q4 and duoneb treatments q 4   - deep tracheal aspirate cx sent (due to fevers)  - Culture - Sputum . (12.21.23 @ 18:14)-  Numerous Staphylococcus aureus --> 12/28 repeat NGTD  #Activity    -increase as tolerated    -aspiration precautions, HOB 30    CARDS:   #acute HYPOtension intraop, resolved  -off levophed since 12/22 PM  #hx of HTN  -restarting home coreg and amlodipine  -Valsartan 320mg HOLDING   #hx HLD  -atorvastatin  #NSVT    -EP/Cards: No arrhythmias on tele only artifact. No further work up needed. Recommend ILR prior to discharge if patient/family agreeable  Imaging:   Echo: 12/2023: EF 66%, G1DD, trace MR, mild TR     GI/NUTR:   #Diet, NPO with Tube Feed via 20Fr PEG placed 12/22   - TF: Nepro @ 27cc/hr x 24hrs, 200cc FWF q8h  #GI Prophylaxis  -Protonix 40mg   #Bowel regimen     -Multiple loose bowel movements, last dose senna 12/19 at 2200 > dignishield placed 12/23     -C diff negative     -No bowel regimen    /RENAL:   #urine output   - martinez reinserted 12/28 due to retention  - UA neg 12/25 (r/o UTI in setting of persistent fevers)   - Sodium goal 140-150  - f/u nephro recs- Hypernatremia due to free water losses. Avoid diuretics, would not use Metolazone to cause volume depletion, and has min effect in advanced kidney dysfunction   #Diuresis  - Last diuresed 12/29: metolazone 10 x1 for hypernatremia  #Hypernatremia - improved  -  q8     Labs:          BUN/Cr- 81/2.6  -->,  80/2.6  -->          Electrolytes-Na 144 // K 4.0 // Mg 2.8 //  Phos 5.1 (12-31 @ 20:29)  #hyperphosphatemia  - Started sevelamer powder, non-formulary so as not to clog PEG   #CKD   - baseline Cr 2.1  - holding home sodium bicarb 650mg BID  #hx BPH  - cardura 4mg (flomax at home, non crushable)      HEME/ONC:   #DVT prophylaxis     -SCDs, HSQ  #hx of CVA    -ASA81 mg cleared by NSGY    - NSGY: f/up with CTH today 12/31, then assess restarting plavix    Labs: Hb/Hct:  8.7/27.2  (12-30 @ 23:20)  -->,  8.6/27.2  (12-31 @ 20:29)  -->                      Plts:  412  -->,  388  -->                 PTT/INR:          Home Rx: clopidogrel 75 mg oral tablet daily (HOLDING)    ID:  WBC- 16.14  --->>,  12.48  --->>,  11.58  --->>  Temp trend- 24hrs T(F): 98.8 (12-31 @ 20:00), Max: 98.8 (12-31 @ 20:00)  Current antibiotics-piperacillin/tazobactam IVPB.. 3.375 every 12 hours  #recurrent fevers- improving, now low grade  -MRSA PCR negative (12/22)  -MRSA nares negative (12/28)  -RVP negative (12/28)  Cultures    Bcx 12/18- final neg     Blood cx 12/21- no growth at 4 days    BCx, 12/28 - NG @ 24h    Tracheal aspirate 12/21: numerous staph aureus -methicillian sensitive    Tracheal aspirate 12/22: staph aureus    C.Diff PCR neg    UA 12/25 negative     UA 12/28 negative  Current antibiotics:  - Vancomycin 500mg x1 dose (12/28)  - Zosyn 3.375 q12 (started 12/28, stop 1/5)  #multiple loose bowel movements    -no bowel regimen    ENDO:  #DM     -ISS     -Off insulin gtt 12/20      -Lantus 20 units HS     -FSG q6 while on TF    MSK:     Activity - Increase As Tolerated    -B/L knee X ray 12/19- no fractures, b/l effusions    WOUND:  - L sacral stage 2 ulcer, wound care consult placed, follow-up    LINES/DRAINS:  PIV, right basilic midline (placed 12/16), 20Fr PEG (12/22), Tracheostomy (12/22)    ADVANCED DIRECTIVES:  Full Code  -  HCP/Emergency Contact-Sarah Gamino (Wife) [phone], Cantonese speaking    DISPO:  - Social work, case management consult for dispo to vent facility - spoke with social work 12/23, starting to work on D/C.  -  update, 12/29: pt remains acute. Will reassess 24-48h prior to d/c     Assessment		  78y Male s/p mechanical fall. +HT, +AC (Aspirin and Plavix), -LOC.    12/21-intubated for airway protection, accessory muscle use  12/22- s/p tracheostomy and PEG placement, Portex cuffed 9 and 20fr PEG 2cm at skin     NEURO:  #Acute SDH   -12/18 HCT#4 -stable SDH, no new acute findings  -12/27: Pt obtunded, not responding to noxious stim - VPA/LFTs/BMP/ammonia ordered, CTH f/up: stable  - Q4 neuro checks  - amantadine for neuro stim. renal dosing cleared with pharmacy, next dose 1/4/24  - NSGY: f/up with CTH 12/31, f/up with NSGY then assess restarting plavix  #h/o stroke (2/2023) w/residual right sided weakness  - RIGHT anterior thalamus infarct,  LEFT caudate head lacunar infarct  #focal seizure    - vEEG 12/19: No seizures; Discontinued 12/19    - NCC: valproic acid current dose, q4 neurochecks    - Neurology cs- d/c EEG, 2 days prior to discharge call neurology so they can do a spot EEG --> recalled on 12/28 to evaluate diminished mental status, recommend repeat EEG and continue depakote 500 q12  - EEG,12/28: No electrographic seizures or significant clinical events. Abnormal due to the presence of focal and generalized slowing. Findings consistent with focal and diffuse electrocerebral dysfunction secondary to structural/metabolic/nonspecific etiology.    - Valproic acid level 12/31 23, albumin 2.5; Valproic acid dose adjusted to 750mg q12h for 1 week, per pharmacy  #acute pain  - acetaminophen Tablet .. 650 milliGRAM(s) Oral every 6 hours    RESP:   #Respiratory Failure secondary to impaired secretion clearance  - s/p tracheostomy 12/22, size 9 cuffed portex  - Tolerating t-piece since 12/29   - chest PT q4 and duoneb treatments q 4   - deep tracheal aspirate cx sent (due to fevers)  - Culture - Sputum . (12.21.23 @ 18:14)-  Numerous Staphylococcus aureus --> 12/28 repeat NGTD  #Activity    -increase as tolerated    -aspiration precautions, HOB 30    CARDS:   #acute HYPOtension intraop, resolved  -off levophed since 12/22 PM  #hx of HTN  -restarting home coreg and amlodipine  -Valsartan 320mg HOLDING   #hx HLD  -atorvastatin  #NSVT    -EP/Cards: No arrhythmias on tele only artifact. No further work up needed. Recommend ILR prior to discharge if patient/family agreeable  Imaging:   Echo: 12/2023: EF 66%, G1DD, trace MR, mild TR     GI/NUTR:   #Diet, NPO with Tube Feed via 20Fr PEG placed 12/22   - TF: Nepro @ 27cc/hr x 24hrs, 200cc FWF q8h  #GI Prophylaxis  -Protonix 40mg   #Bowel regimen     -Multiple loose bowel movements, last dose senna 12/19 at 2200 > dignishield placed 12/23     -C diff negative     -No bowel regimen    /RENAL:   #urine output   - martinez reinserted 12/28 due to retention  - UA neg 12/25 (r/o UTI in setting of persistent fevers)   - Sodium goal 140-150  - f/u nephro recs- Hypernatremia due to free water losses. Avoid diuretics, would not use Metolazone to cause volume depletion, and has min effect in advanced kidney dysfunction   #Diuresis  - Last diuresed 12/29: metolazone 10 x1 for hypernatremia  #Hypernatremia - improved  -  q8     Labs:          BUN/Cr- 81/2.6  -->,  80/2.6  -->          Electrolytes-Na 144 // K 4.0 // Mg 2.8 //  Phos 5.1 (12-31 @ 20:29)  #hyperphosphatemia  - Started sevelamer powder, non-formulary so as not to clog PEG   #CKD   - baseline Cr 2.1  - holding home sodium bicarb 650mg BID  #hx BPH  - cardura 4mg (flomax at home, non crushable)      HEME/ONC:   #DVT prophylaxis     -SCDs, HSQ  #hx of CVA    -ASA81 mg cleared by NSGY    - NSGY: f/up with CT today 12/31, then assess restarting plavix    Labs: Hb/Hct:  8.7/27.2  (12-30 @ 23:20)  -->,  8.6/27.2  (12-31 @ 20:29)  -->                      Plts:  412  -->,  388  -->                 PTT/INR:          Home Rx: clopidogrel 75 mg oral tablet daily (HOLDING)    ID:  WBC- 16.14  --->>,  12.48  --->>,  11.58  --->>  Temp trend- 24hrs T(F): 98.8 (12-31 @ 20:00), Max: 98.8 (12-31 @ 20:00)  Current antibiotics-piperacillin/tazobactam IVPB.. 3.375 every 12 hours  #recurrent fevers- improving, now low grade  -MRSA PCR negative (12/22)  -MRSA nares negative (12/28)  -RVP negative (12/28)  Cultures    Bcx 12/18- final neg     Blood cx 12/21- no growth at 4 days    BCx, 12/28 - NG @ 24h    Tracheal aspirate 12/21: numerous staph aureus -methicillian sensitive    Tracheal aspirate 12/22: staph aureus    C.Diff PCR neg    UA 12/25 negative     UA 12/28 negative  Current antibiotics:  - Vancomycin 500mg x1 dose (12/28)  - Zosyn 3.375 q12 (started 12/28, stop 1/5)  #multiple loose bowel movements    -no bowel regimen    ENDO:  #DM     -ISS     -Off insulin gtt 12/20      -Lantus 20 units HS     -FSG q6 while on TF    MSK:     Activity - Increase As Tolerated    -B/L knee X ray 12/19- no fractures, b/l effusions    WOUND:  - L sacral stage 2 ulcer, wound care consult placed, follow-up    LINES/DRAINS:  PIV, right basilic midline (placed 12/16), 20Fr PEG (12/22), Tracheostomy (12/22)    ADVANCED DIRECTIVES:  Full Code  -  HCP/Emergency Contact-Sarah Gamino (Wife) [phone], Cantonese speaking    DISPO:  - Social work, case management consult for dispo to vent facility - spoke with social work 12/23, starting to work on D/C.  - SW update, 12/29: pt remains acute. Will reassess 24-48h prior to d/c     Assessment		  78y Male s/p mechanical fall. +HT, +AC (Aspirin and Plavix), -LOC.    12/21-intubated for airway protection, accessory muscle use  12/22- s/p tracheostomy and PEG placement, Portex cuffed 9 and 20fr PEG 2cm at skin     NEURO:  #Acute SDH   -12/18 HCT#4 -stable SDH, no new acute findings  -12/27: Pt obtunded, not responding to noxious stim - VPA/LFTs/BMP/ammonia ordered, CTH f/up: stable  - Q4 neuro checks  - amantadine for neuro stim. renal dosing cleared with pharmacy, next dose 1/4/24  - NSGY: f/up with CTH 12/31, f/up with NSGY then assess restarting plavix  #h/o stroke (2/2023) w/residual right sided weakness  - RIGHT anterior thalamus infarct,  LEFT caudate head lacunar infarct  #focal seizure    - vEEG 12/19: No seizures; Discontinued 12/19    - NCC: valproic acid current dose, q4 neurochecks    - Neurology cs- d/c EEG, 2 days prior to discharge call neurology so they can do a spot EEG --> recalled on 12/28 to evaluate diminished mental status, recommend repeat EEG and continue depakote 500 q12  - EEG,12/28: No electrographic seizures or significant clinical events. Abnormal due to the presence of focal and generalized slowing. Findings consistent with focal and diffuse electrocerebral dysfunction secondary to structural/metabolic/nonspecific etiology.    - Valproic acid level 12/31 23, albumin 2.5; Valproic acid dose adjusted to 750mg q12h for 1 week, per pharmacy  #acute pain  - acetaminophen Tablet .. 650 milliGRAM(s) Oral every 6 hours    RESP:   #Respiratory Failure secondary to impaired secretion clearance  - s/p tracheostomy 12/22, size 9 cuffed portex  - Tolerating t-piece since 12/29   - chest PT q4 and duoneb treatments q 4   - deep tracheal aspirate cx sent (due to fevers)  - Culture - Sputum . (12.21.23 @ 18:14)-  Numerous Staphylococcus aureus --> 12/28 repeat NGTD  #Activity    -increase as tolerated    -aspiration precautions, HOB 30    CARDS:   #acute HYPOtension intraop, resolved  -off levophed since 12/22 PM  #hx of HTN  -restarting home coreg and amlodipine  -Valsartan 320mg HOLDING   #hx HLD  -atorvastatin  #NSVT    -EP/Cards: No arrhythmias on tele only artifact. No further work up needed. Recommend ILR prior to discharge if patient/family agreeable  Imaging:   Echo: 12/2023: EF 66%, G1DD, trace MR, mild TR     GI/NUTR:   #Diet, NPO with Tube Feed via 20Fr PEG placed 12/22   - TF: Nepro @ 27cc/hr x 24hrs, 200cc FWF q8h  #GI Prophylaxis  -Protonix 40mg   #Bowel regimen     -Multiple loose bowel movements, last dose senna 12/19 at 2200 > dignishield placed 12/23     -C diff negative     -No bowel regimen    /RENAL:   #urine output   - martinez reinserted 12/28 due to retention  - UA neg 12/25 (r/o UTI in setting of persistent fevers)   - Sodium goal 140-150  - f/u nephro recs- Hypernatremia due to free water losses. Avoid diuretics, would not use Metolazone to cause volume depletion, and has min effect in advanced kidney dysfunction   #Diuresis  - Last diuresed 12/29: metolazone 10 x1 for hypernatremia  #Hypernatremia - improved  -  q8     Labs:          BUN/Cr- 81/2.6  -->,  80/2.6  -->          Electrolytes-Na 144 // K 4.0 // Mg 2.8 //  Phos 5.1 (12-31 @ 20:29)  #hyperphosphatemia  - Started sevelamer powder, non-formulary so as not to clog PEG   #CKD   - baseline Cr 2.1  - holding home sodium bicarb 650mg BID  #hx BPH  - cardura 4mg (flomax at home, non crushable)      HEME/ONC:   #DVT prophylaxis     -SCDs, HSQ  #hx of CVA    -ASA81 mg cleared by NSGY    - NSGY: f/up with CT today 12/31, then assess restarting plavix    Labs: Hb/Hct:  8.7/27.2  (12-30 @ 23:20)  -->,  8.6/27.2  (12-31 @ 20:29)  -->                      Plts:  412  -->,  388  -->                 PTT/INR:          Home Rx: clopidogrel 75 mg oral tablet daily (HOLDING)    ID:  WBC- 16.14  --->>,  12.48  --->>,  11.58  --->>  Temp trend- 24hrs T(F): 98.8 (12-31 @ 20:00), Max: 98.8 (12-31 @ 20:00)  Current antibiotics-piperacillin/tazobactam IVPB.. 3.375 every 12 hours  #recurrent fevers- improving, now low grade  -MRSA PCR negative (12/22)  -MRSA nares negative (12/28)  -RVP negative (12/28)  Cultures    Bcx 12/18- final neg     Blood cx 12/21- no growth at 4 days    BCx, 12/28 - NG @ 24h    Tracheal aspirate 12/21: numerous staph aureus -methicillian sensitive    Tracheal aspirate 12/22: staph aureus    C.Diff PCR neg    UA 12/25 negative     UA 12/28 negative  Current antibiotics:  - Vancomycin 500mg x1 dose (12/28)  - Zosyn 3.375 q12 (started 12/28, stop 1/5)  #multiple loose bowel movements    -no bowel regimen    ENDO:  #DM     -ISS     -Off insulin gtt 12/20      -Lantus 20 units HS     -FSG q6 while on TF    MSK:     Activity - Increase As Tolerated    -B/L knee X ray 12/19- no fractures, b/l effusions    WOUND:  - L sacral stage 2 ulcer, wound care consult placed, follow-up    LINES/DRAINS:  PIV, right basilic midline (placed 12/16), 20Fr PEG (12/22), Tracheostomy (12/22)    ADVANCED DIRECTIVES:  Full Code  -  HCP/Emergency Contact-Sarah Gamino (Wife) [phone], Cantonese speaking    DISPO:  - Social work, case management consult for dispo to vent facility - spoke with social work 12/23, starting to work on D/C.  - SW update, 12/29: pt remains acute. Will reassess 24-48h prior to d/c     Assessment		  78y Male s/p mechanical fall. +HT, +AC (Aspirin and Plavix), -LOC.    12/21-intubated for airway protection, accessory muscle use  12/22- s/p tracheostomy and PEG placement, Portex cuffed 9 and 20fr PEG 2cm at skin     NEURO:  #Acute SDH   - 12/18 HCT#4 -stable SDH, no new acute findings  - 12/27: Pt obtunded, not responding to noxious stim - VPA/LFTs/BMP/ammonia ordered, CTH f/up: stable  - Q4 neuro checks  - amantadine for neuro stim, 100mg q48  - NSGY: f/up with CTH 12/31, f/up with NSGY then assess restarting plavix    #h/o stroke (2/2023) w/residual right sided weakness  - RIGHT anterior thalamus infarct,  LEFT caudate head lacunar infarct    #focal seizure    - vEEG 12/19: No seizures; Discontinued 12/19. Repeat EEG,12/28: No electrographic seizures or significant clinical events. Abnormal due to the presence of focal and generalized slowing. Findings consistent with focal and diffuse electrocerebral dysfunction secondary to structural/metabolic/nonspecific etiology.    - NCC: valproic acid current dose, q4 neurochecks    - Neurology cs- d/c EEG, 2 days prior to discharge call neurology so they can do a spot EEG --> recalled on 12/28 to evaluate diminished mental status, recommend repeat EEG and continue depakote 500 q12  -  Valproic acid level 12/31: 23, albumin 2.5; Valproic acid dose adjusted to 750mg q12h for 1 week, per pharmacy  - f/up w/ neurology for long term VPA plan    RESP:   #Respiratory Failure secondary to impaired secretion clearance  - s/p tracheostomy 12/22, size 9 cuffed portex  - Tolerating t-piece since 12/29   - chest PT q4 and duoneb treatments q 4   - deep tracheal aspirate cx sent (due to fevers)  - Culture - Sputum . (12.21.23 @ 18:14)-  Numerous Staphylococcus aureus --> 12/28 repeat NGTD  #Activity    -increase as tolerated    -aspiration precautions, HOB 30    CARDS:   #acute HYPOtension intraop, resolved  -off levophed since 12/22 PM  #hx of HTN  - restarting home carvedilol 6.25 milliGRAM(s) Oral every 12 hours  - restarting home amLODIPine   Tablet 5 milliGRAM(s) Oral daily  - HOLDING home valsartan 320 mg oral tablet: 1 tab(s) orally once a day   #hx HLD  - atorvastatin 20 milliGRAM(s) Oral at bedtime  #NSVT    -EP/Cards: No arrhythmias on tele only artifact. No further work up needed. Recommend ILR prior to discharge if patient/family agreeable  Imaging:   Echo: 12/2023: EF 66%, G1DD, trace MR, mild TR     GI/NUTR:   #Diet, NPO with Tube Feed via 20Fr PEG placed 12/22   - TF: Nepro @ 27cc/hr x 24hrs, 200cc FWF q8h  #GI Prophylaxis  - famotidine Tablet 10 milliGRAM(s) Oral every 48 hours   #Bowel regimen     - Multiple loose bowel movements, last dose senna 12/19 at 2200 > dignishield placed 12/23     - C diff negative     - No bowel regimen    - adding banatrol for diarrhea ctrl, continue to assess need for dignishield.    /RENAL:   #urine output   - d/c Aguirre 1/1, TOV pending  - UA neg 12/25 (r/o UTI in setting of persistent fevers)   - Sodium goal 140-150  - f/u nephro recs- Hypernatremia due to free water losses. Avoid diuretics, would not use Metolazone to cause volume depletion, and has min effect in advanced kidney dysfunction   #Diuresis  - Last diuresed 12/29: metolazone 10 x1 for hypernatremia  #Hypernatremia - improved  -  q8     Labs:          BUN/Cr- 81/2.6  -->,  80/2.6  -->          Electrolytes-Na 144 // K 4.0 // Mg 2.8 //  Phos 5.1 (12-31 @ 20:29)  #hyperphosphatemia  - Started sevelamer powder, non-formulary so as not to clog PEG   #CKD   - baseline Cr 2.1  - holding home sodium bicarb 650mg BID  #hx BPH  - cardura 4mg (flomax at home, non crushable)      HEME/ONC:   #DVT prophylaxis     -SCDs, HSQ  #hx of CVA    - ASA81 mg cleared by NSGY    - NSGY: f/up with CTH today 12/31, then assess restarting plavix    Labs: Hb/Hct:  8.7/27.2  (12-30 @ 23:20)  -->,  8.6/27.2  (12-31 @ 20:29)  -->                  Plts:  412  -->,  388  -->             PTT/INR: Prothrombin Time, Plasma: 11.70 sec/INR: 1.03/Activated Partial Thromboplastin Time: 36.5     Home Rx: clopidogrel 75 mg oral tablet daily (HOLDING)    ID:  WBC- 16.14  --->>,  12.48  --->>,  11.58  --->>  Temp trend- 24hrs T(F): 98.8 (12-31 @ 20:00), Max: 98.8 (12-31 @ 20:00)  Current antibiotics-piperacillin/tazobactam IVPB. 3.375 every 12 hours    #recurrent fevers- improving, now low grade  - 1/1: d/c tylenol     PCRs  -MRSA PCR negative (12/22)  -MRSA nares negative (12/28)  -RVP negative (12/28)    Cultures    Bcx 12/18- final neg     Blood cx 12/21- no growth at 4 days    BCx, 12/28 - NG @ 24h    Tracheal aspirate 12/21: numerous staph aureus -methicillian sensitive    Tracheal aspirate 12/22: staph aureus    C.Diff PCR neg    UA 12/25 negative     UA 12/28 negative    Current antibiotics:  - Vancomycin 500mg x1 dose (12/28)  - Zosyn 3.375 q12 (started 12/28, stop 1/5)    #multiple loose bowel movements    -holding bowel regimen    ENDO:  #DM     - ISS     - Off insulin gtt 12/20      - Lantus 20 units HS     - FSG q6 while on TF     - holding home jardiance     - repeat A1c pending      MSK:     Activity - Increase As Tolerated    - B/L knee X ray 12/19- no fractures, b/l effusions    - Reconsult PT    WOUND:  - L sacral stage 2 ulcer, wound care consult placed, follow-up    LINES/DRAINS:  PIV, right basilic midline (placed 12/16), 20Fr PEG (12/22), Tracheostomy (12/22)    ADVANCED DIRECTIVES:  Full Code  -  HCP/Emergency Contact-Sarah Gamino (Wife) [phone], Cantonese speaking    DISPO:  - Social work, case management consult for dispo to vent facility - spoke with social work 12/23, starting to work on D/C.  -  update, 12/29: pt remains acute. Will reassess 24-48h prior to d/c     Assessment		  78y Male s/p mechanical fall. +HT, +AC (Aspirin and Plavix), -LOC.    12/21-intubated for airway protection, accessory muscle use  12/22- s/p tracheostomy and PEG placement, Portex cuffed 9 and 20fr PEG 2cm at skin     NEURO:  #Acute SDH   - 12/18 HCT#4 -stable SDH, no new acute findings  - 12/27: Pt obtunded, not responding to noxious stim - VPA/LFTs/BMP/ammonia ordered, CTH f/up: stable  - Q4 neuro checks  - amantadine for neuro stim, 100mg q48  - NSGY: f/up with CTH 12/31, f/up with NSGY then assess restarting plavix    #h/o stroke (2/2023) w/residual right sided weakness  - RIGHT anterior thalamus infarct,  LEFT caudate head lacunar infarct    #focal seizure    - vEEG 12/19: No seizures; Discontinued 12/19. Repeat EEG,12/28: No electrographic seizures or significant clinical events. Abnormal due to the presence of focal and generalized slowing. Findings consistent with focal and diffuse electrocerebral dysfunction secondary to structural/metabolic/nonspecific etiology.    - NCC: valproic acid current dose, q4 neurochecks    - Neurology cs- d/c EEG, 2 days prior to discharge call neurology so they can do a spot EEG --> recalled on 12/28 to evaluate diminished mental status, recommend repeat EEG and continue depakote 500 q12  -  Valproic acid level 12/31: 23, albumin 2.5; Valproic acid dose adjusted to 750mg q12h for 1 week, per pharmacy  - f/up w/ neurology for long term VPA plan    RESP:   #Respiratory Failure secondary to impaired secretion clearance  - s/p tracheostomy 12/22, size 9 cuffed portex  - Tolerating t-piece since 12/29   - chest PT q4 and duoneb treatments q 4   - deep tracheal aspirate cx sent (due to fevers)  - Culture - Sputum . (12.21.23 @ 18:14)-  Numerous Staphylococcus aureus --> 12/28 repeat NGTD  #Activity    -increase as tolerated    -aspiration precautions, HOB 30    CARDS:   #acute HYPOtension intraop, resolved  -off levophed since 12/22 PM  #hx of HTN  - restarting home carvedilol 6.25 milliGRAM(s) Oral every 12 hours  - restarting home amLODIPine   Tablet 5 milliGRAM(s) Oral daily  - HOLDING home valsartan 320 mg oral tablet: 1 tab(s) orally once a day   #hx HLD  - atorvastatin 20 milliGRAM(s) Oral at bedtime  #NSVT    -EP/Cards: No arrhythmias on tele only artifact. No further work up needed. Recommend ILR prior to discharge if patient/family agreeable  Imaging:   Echo: 12/2023: EF 66%, G1DD, trace MR, mild TR     GI/NUTR:   #Diet, NPO with Tube Feed via 20Fr PEG placed 12/22   - TF: Nepro @ 27cc/hr x 24hrs, 200cc FWF q8h  #GI Prophylaxis  - famotidine Tablet 10 milliGRAM(s) Oral every 48 hours   #Bowel regimen     - Multiple loose bowel movements, last dose senna 12/19 at 2200 > dignishield placed 12/23     - C diff negative     - No bowel regimen    - adding banatrol for diarrhea ctrl, continue to assess need for dignishield.    /RENAL:   #urine output   - d/c Aguirre 1/1, TOV pending  - UA neg 12/25 (r/o UTI in setting of persistent fevers)   - Sodium goal 140-150  - f/u nephro recs- Hypernatremia due to free water losses. Avoid diuretics, would not use Metolazone to cause volume depletion, and has min effect in advanced kidney dysfunction   #Diuresis  - Last diuresed 12/29: metolazone 10 x1 for hypernatremia  #Hypernatremia - improved  -  q8     Labs:          BUN/Cr- 81/2.6  -->,  80/2.6  -->          Electrolytes-Na 144 // K 4.0 // Mg 2.8 //  Phos 5.1 (12-31 @ 20:29)  #hyperphosphatemia  - Started sevelamer powder, non-formulary so as not to clog PEG   #CKD   - baseline Cr 2.1  - holding home sodium bicarb 650mg BID  #hx BPH  - cardura 4mg (flomax at home, non crushable)      HEME/ONC:   #DVT prophylaxis     -SCDs, HSQ  #hx of CVA    - ASA81 mg cleared by NSGY    - NSGY: f/up with CTH today 12/31, then assess restarting plavix    Labs: Hb/Hct:  8.7/27.2  (12-30 @ 23:20)  -->,  8.6/27.2  (12-31 @ 20:29)  -->                  Plts:  412  -->,  388  -->             PTT/INR: Prothrombin Time, Plasma: 11.70 sec/INR: 1.03/Activated Partial Thromboplastin Time: 36.5     Home Rx: clopidogrel 75 mg oral tablet daily (HOLDING)    ID:  WBC- 16.14  --->>,  12.48  --->>,  11.58  --->>  Temp trend- 24hrs T(F): 98.8 (12-31 @ 20:00), Max: 98.8 (12-31 @ 20:00)  Current antibiotics-piperacillin/tazobactam IVPB. 3.375 every 12 hours    #recurrent fevers- improving, now low grade  - 1/1: d/c tylenol     PCRs  -MRSA PCR negative (12/22)  -MRSA nares negative (12/28)  -RVP negative (12/28)    Cultures    Bcx 12/18- final neg     Blood cx 12/21- no growth at 4 days    BCx, 12/28 - NG @ 24h    Tracheal aspirate 12/21: numerous staph aureus -methicillian sensitive    Tracheal aspirate 12/22: staph aureus    C.Diff PCR neg    UA 12/25 negative     UA 12/28 negative    Current antibiotics:  - Vancomycin 500mg x1 dose (12/28)  - Zosyn 3.375 q12 (started 12/28, stop 1/5)    #multiple loose bowel movements    -holding bowel regimen    ENDO:  #DM     - ISS     - Off insulin gtt 12/20      - Lantus 20 units HS     - FSG q6 while on TF     - holding home jardiance     - repeat A1c pending      MSK:     Activity - Increase As Tolerated    - B/L knee X ray 12/19- no fractures, b/l effusions    - Reconsult PT    WOUND:  - L sacral stage 2 ulcer, wound care consult placed, follow-up    LINES/DRAINS:  PIV, right basilic midline (placed 12/16), 20Fr PEG (12/22), Tracheostomy (12/22)    ADVANCED DIRECTIVES:  Full Code  -  HCP/Emergency Contact-Sarah Gamino (Wife) [phone], Cantonese speaking    DISPO:  - Social work, case management consult for dispo to vent facility - spoke with social work 12/23, starting to work on D/C.  -  update, 12/29: pt remains acute. Will reassess 24-48h prior to d/c

## 2024-01-01 NOTE — PROGRESS NOTE ADULT - ASSESSMENT
77-year-old male  presents with prior history of hypertension dyslipidemia diabetes BPH prior CVA on aspirin and Plavix who states he had a mechanical fall few days ago while getting out of the car fell backwards hit his head.  Afterwards he was able to ambulate.  But for the past  day before admission the  family was unable to ambulate him.  He states that his lower extremities are painful to movement and weak.  Family denies any LOC.  They deny any confusion at home.  On exam oriented to person and place but just not to date.    Elevates his arms for 10 seconds.  Sensation intact in upper extremities.  Lower extremities with 2 out of 5 strength bilaterally. Nephrology was consulted for SAGRARIO, hypernatremia.      SAGRARIO on CKD 3b - creat was 1.8 (Feb 2023) / likely pre-renal  non oliguric  creat level down trending  Hypernatremia due to free water losses (polyuria and diarrhea), U osm c/w solute diuresis  Hypermagnesemia  Sepsis - on Zosyn renal dose    - cont d5w current  and increase free water enterally  - cont Nepro enteral feeds (less magnesium)  - phos level noted;  cont sevelamer  -need repeat BMP   - strict i/o    will follow

## 2024-01-01 NOTE — PROGRESS NOTE ADULT - SUBJECTIVE AND OBJECTIVE BOX
GENERAL SURGERY PROGRESS NOTE     JACQUELIN CAI  [address] [place] [address] day :16d    POD:  Procedure: Insertion of midline catheter    Insertion of arterial line with imaging guidance    Tracheostomy, with PEG tube insertion      Surgical Attending: Isidro Montemayor  Overnight events:    tolerating T piece  restarted continuous tube feeds @27cc/hr    24 Hour Events  12/31  Night  - Holding off restarting plavix per   - Change valproic acid to 750 q12h for 1 week, per pharmacy  - Exam: vitals WNL, eye opening to physical stimulation, small amount of blood tinged sputum   - Bloody secretion from trach x1    DAY  -dg to floor  -4pm valproic and albumin level  -IVL  -f/u NSY restarting plavix, final head CT stable --> moderate risk of re-bleed, restart if benefits outweigh risk  - Na 143 --> off D5W, decreased free water flushes to 200 q8, f/u 4PM BMP  -bloody secretions from trache x2 episodes      T(F): 98.8 (12-31-23 @ 20:00), Max: 98.8 (12-31-23 @ 20:00)  HR: 75 (01-01-24 @ 04:00) (72 - 82)  BP: 122/59 (01-01-24 @ 04:00) (122/59 - 138/65)  ABP: --  ABP(mean): --  RR: 27 (01-01-24 @ 04:00) (25 - 37)  SpO2: 99% (01-01-24 @ 04:00) (97% - 100%)    IN'S / OUT's:    12-31-23 @ 07:01  -  01-01-24 @ 07:00  --------------------------------------------------------  IN:    dextrose 5%: 375 mL    Enteral Tube Flush: 250 mL    Enteral Tube Flush: 200 mL    Enteral Tube Flush: 200 mL    IV PiggyBack: 125 mL    Nepro with Carb Steady: 540 mL  Total IN: 1690 mL    OUT:    Indwelling Catheter - Urethral (mL): 1810 mL    Rectal Tube (mL): 50 mL  Total OUT: 1860 mL    Total NET: -170 mL          PHYSICAL EXAM:  GENERAL: NAD,   CHEST/LUNG: Clear to auscultation bilaterally vent 30/5 s/p Trach   HEART: Regular rate and rhythm  ABDOMEN: Soft, Nontender, Nondistended;   Rectum: dignishield in place  EXTREMITIES:  No clubbing, cyanosis, or edema      LABS  Labs:  CAPILLARY BLOOD GLUCOSE      POCT Blood Glucose.: 171 mg/dL (01 Jan 2024 06:39)  POCT Blood Glucose.: 147 mg/dL (31 Dec 2023 21:43)  POCT Blood Glucose.: 123 mg/dL (31 Dec 2023 17:48)  POCT Blood Glucose.: 196 mg/dL (31 Dec 2023 12:26)                          8.6    11.58 )-----------( 388      ( 31 Dec 2023 20:29 )             27.2       Auto Neutrophil %: 77.8 % (12-31-23 @ 20:29)  Auto Immature Granulocyte %: 7.3 % (12-31-23 @ 20:29)    12-31    144  |  109  |  80<HH>  ----------------------------<  140<H>  4.0   |  22  |  2.6<H>      Calcium: 7.8 mg/dL (12-31-23 @ 20:29)      LFTs:             x    | x    | x        ------------------[x       ( 31 Dec 2023 16:00 )  2.4  | x    | x           Lipase:x      Amylase:x         Blood Gas Arterial, Lactate: 1.8 mmol/L (12-30-23 @ 05:45)    ABG - ( 30 Dec 2023 05:45 )  pH: 7.47  /  pCO2: 28    /  pO2: 177   / HCO3: 20    / Base Excess: -2.0  /  SaO2: 99.3            ABG - ( 29 Dec 2023 04:15 )  pH: 7.48  /  pCO2: 27    /  pO2: 173   / HCO3: 20    / Base Excess: -2.0  /  SaO2: 99.7            ABG - ( 28 Dec 2023 03:44 )  pH: 7.44  /  pCO2: 29    /  pO2: 95    / HCO3: 20    / Base Excess: -3.7  /  SaO2: 98.7              Coags:     11.70  ----< 1.03    ( 01 Jan 2024 05:25 )     36.5                Urinalysis Basic - ( 31 Dec 2023 20:29 )    Color: x / Appearance: x / SG: x / pH: x  Gluc: 140 mg/dL / Ketone: x  / Bili: x / Urobili: x   Blood: x / Protein: x / Nitrite: x   Leuk Esterase: x / RBC: x / WBC x   Sq Epi: x / Non Sq Epi: x / Bacteria: x            RADIOLOGY & ADDITIONAL TESTS:      A/P:  JACQUELIN CAI is a 78 year old male who presented as a TRAUMA CONSULT s/p mechanical fall (+HT -LOC -AC). Found to have an acute subdural hemorrhage. Hospital course has been complicated by multiple seizures and uncontrolled hypertension. Intubated on 12/21 due to worsening respiratory status.     S/P tracheostomy and PEG placement on 12/22/23, (Portex cuffed 9 and 2cm at skin 20fr PEG). Now with T-piece, O2 at 40%.     PLAN:  - Valproate increased to 750 Q12x7 days  - holding Plavix for now until stable. moderate risk for re-bleed  - F/U neuro for EEG   - F/u EP for loop recorder placement  - Monitor O2 requirements   - Continue chest PT, Duoneb treatments as needed   - F/u nutrition consult for TF adjustments (given liquid stool)   - Continue home ASA  - Pain control PRN   - DVT ppx with HSQ   - monitor dignishield outputs       Plan:   DG to floor      Disposition:      Above plan discussed with Attending Surgeon Dr. Degroot , patient, patient family, and Primary team      TAP (Trauma, Acute care, Pediatrics) Spectra [phone]   GENERAL SURGERY PROGRESS NOTE     JACQUELIN CAI  [address] [place] [address] day :16d    POD:  Procedure: Insertion of midline catheter    Insertion of arterial line with imaging guidance    Tracheostomy, with PEG tube insertion      Surgical Attending: Isidro Montemayor  Overnight events:    tolerating T piece  restarted continuous tube feeds @27cc/hr    24 Hour Events  12/31  Night  - Holding off restarting plavix per   - Change valproic acid to 750 q12h for 1 week, per pharmacy  - Exam: vitals WNL, eye opening to physical stimulation, small amount of blood tinged sputum   - Bloody secretion from trach x1    DAY  -dg to floor  -4pm valproic and albumin level  -IVL  -f/u NSY restarting plavix, final head CT stable --> moderate risk of re-bleed, restart if benefits outweigh risk  - Na 143 --> off D5W, decreased free water flushes to 200 q8, f/u 4PM BMP  -bloody secretions from trache x2 episodes      T(F): 98.8 (12-31-23 @ 20:00), Max: 98.8 (12-31-23 @ 20:00)  HR: 75 (01-01-24 @ 04:00) (72 - 82)  BP: 122/59 (01-01-24 @ 04:00) (122/59 - 138/65)  ABP: --  ABP(mean): --  RR: 27 (01-01-24 @ 04:00) (25 - 37)  SpO2: 99% (01-01-24 @ 04:00) (97% - 100%)    IN'S / OUT's:    12-31-23 @ 07:01  -  01-01-24 @ 07:00  --------------------------------------------------------  IN:    dextrose 5%: 375 mL    Enteral Tube Flush: 250 mL    Enteral Tube Flush: 200 mL    Enteral Tube Flush: 200 mL    IV PiggyBack: 125 mL    Nepro with Carb Steady: 540 mL  Total IN: 1690 mL    OUT:    Indwelling Catheter - Urethral (mL): 1810 mL    Rectal Tube (mL): 50 mL  Total OUT: 1860 mL    Total NET: -170 mL          PHYSICAL EXAM:  GENERAL: NAD,   CHEST/LUNG: Clear to auscultation bilaterally vent 30/5 s/p Trach   HEART: Regular rate and rhythm  ABDOMEN: Soft, Nontender, Nondistended;   Rectum: dignishield in place  EXTREMITIES:  No clubbing, cyanosis, or edema      LABS  Labs:  CAPILLARY BLOOD GLUCOSE      POCT Blood Glucose.: 171 mg/dL (01 Jan 2024 06:39)  POCT Blood Glucose.: 147 mg/dL (31 Dec 2023 21:43)  POCT Blood Glucose.: 123 mg/dL (31 Dec 2023 17:48)  POCT Blood Glucose.: 196 mg/dL (31 Dec 2023 12:26)                          8.6    11.58 )-----------( 388      ( 31 Dec 2023 20:29 )             27.2       Auto Neutrophil %: 77.8 % (12-31-23 @ 20:29)  Auto Immature Granulocyte %: 7.3 % (12-31-23 @ 20:29)    12-31    144  |  109  |  80<HH>  ----------------------------<  140<H>  4.0   |  22  |  2.6<H>      Calcium: 7.8 mg/dL (12-31-23 @ 20:29)      LFTs:             x    | x    | x        ------------------[x       ( 31 Dec 2023 16:00 )  2.4  | x    | x           Lipase:x      Amylase:x         Blood Gas Arterial, Lactate: 1.8 mmol/L (12-30-23 @ 05:45)    ABG - ( 30 Dec 2023 05:45 )  pH: 7.47  /  pCO2: 28    /  pO2: 177   / HCO3: 20    / Base Excess: -2.0  /  SaO2: 99.3            ABG - ( 29 Dec 2023 04:15 )  pH: 7.48  /  pCO2: 27    /  pO2: 173   / HCO3: 20    / Base Excess: -2.0  /  SaO2: 99.7            ABG - ( 28 Dec 2023 03:44 )  pH: 7.44  /  pCO2: 29    /  pO2: 95    / HCO3: 20    / Base Excess: -3.7  /  SaO2: 98.7              Coags:     11.70  ----< 1.03    ( 01 Jan 2024 05:25 )     36.5                Urinalysis Basic - ( 31 Dec 2023 20:29 )    Color: x / Appearance: x / SG: x / pH: x  Gluc: 140 mg/dL / Ketone: x  / Bili: x / Urobili: x   Blood: x / Protein: x / Nitrite: x   Leuk Esterase: x / RBC: x / WBC x   Sq Epi: x / Non Sq Epi: x / Bacteria: x            RADIOLOGY & ADDITIONAL TESTS:      A/P:  JACQUELIN CAI is a 78 year old male who presented as a TRAUMA CONSULT s/p mechanical fall (+HT -LOC -AC). Found to have an acute subdural hemorrhage. Hospital course has been complicated by multiple seizures and uncontrolled hypertension. Intubated on 12/21 due to worsening respiratory status.     S/P tracheostomy and PEG placement on 12/22/23, (Portex cuffed 9 and 2cm at skin 20fr PEG). Now with T-piece, O2 at 40%.     PLAN:  - Valproate increased to 750 Q12x7 days  - holding Plavix for now until stable. moderate risk for re-bleed  - F/U neuro for EEG   - F/u EP for loop recorder placement  - Monitor O2 requirements   - Continue chest PT, Duoneb treatments as needed   - F/u nutrition consult for TF adjustments (given liquid stool)   - Continue home ASA  - Pain control PRN   - DVT ppx with HSQ   - monitor dignishield outputs       Plan:   DG to floor      Disposition:      Above plan discussed with Attending Surgeon Dr. Degroot , patient, patient family, and Primary team      TAP (Trauma, Acute care, Pediatrics) Spectra [phone]   GENERAL SURGERY PROGRESS NOTE     JACQUELIN CAI  [address] [place] [address] day :16d    POD:  Procedure: Insertion of midline catheter  Insertion of arterial line with imaging guidance  Tracheostomy, with PEG tube insertion    Surgical Attending: Isidro Montemayor  Overnight events:  tolerating T piece  restarted continuous tube feeds @27cc/hr    24 Hour Events  12/31  Night  - Holding off restarting plavix per   - Change valproic acid to 750 q12h for 1 week, per pharmacy  - Exam: vitals WNL, eye opening to physical stimulation, small amount of blood tinged sputum   - Bloody secretion from trach x1    DAY  -dg to floor  -4pm valproic and albumin level  -IVL  -f/u NSY restarting plavix, final head CT stable --> moderate risk of re-bleed, restart if benefits outweigh risk  - Na 143 --> off D5W, decreased free water flushes to 200 q8, f/u 4PM BMP  -bloody secretions from trache x2 episodes      T(F): 98.8 (12-31-23 @ 20:00), Max: 98.8 (12-31-23 @ 20:00)  HR: 75 (01-01-24 @ 04:00) (72 - 82)  BP: 122/59 (01-01-24 @ 04:00) (122/59 - 138/65)  ABP: --  ABP(mean): --  RR: 27 (01-01-24 @ 04:00) (25 - 37)  SpO2: 99% (01-01-24 @ 04:00) (97% - 100%)    IN'S / OUT's:    12-31-23 @ 07:01  -  01-01-24 @ 07:00  --------------------------------------------------------  IN:    dextrose 5%: 375 mL    Enteral Tube Flush: 250 mL    Enteral Tube Flush: 200 mL    Enteral Tube Flush: 200 mL    IV PiggyBack: 125 mL    Nepro with Carb Steady: 540 mL  Total IN: 1690 mL    OUT:    Indwelling Catheter - Urethral (mL): 1810 mL    Rectal Tube (mL): 50 mL  Total OUT: 1860 mL    Total NET: -170 mL      PHYSICAL EXAM:  GENERAL: NAD,   CHEST/LUNG: Clear to auscultation bilaterally vent 30/5 s/p Trach   HEART: Regular rate and rhythm  ABDOMEN: Soft, Nontender, Nondistended;   Rectum: dignishield in place  EXTREMITIES:  No clubbing, cyanosis, or edema      LABS  CAPILLARY BLOOD GLUCOSE  POCT Blood Glucose.: 171 mg/dL (01 Jan 2024 06:39)  POCT Blood Glucose.: 147 mg/dL (31 Dec 2023 21:43)  POCT Blood Glucose.: 123 mg/dL (31 Dec 2023 17:48)  POCT Blood Glucose.: 196 mg/dL (31 Dec 2023 12:26)                        8.6    11.58 )-----------( 388      ( 31 Dec 2023 20:29 )             27.2       Auto Neutrophil %: 77.8 % (12-31-23 @ 20:29)  Auto Immature Granulocyte %: 7.3 % (12-31-23 @ 20:29)    12-31    144  |  109  |  80<HH>  ----------------------------<  140<H>  4.0   |  22  |  2.6<H>      Calcium: 7.8 mg/dL (12-31-23 @ 20:29)      LFTs:             x    | x    | x        ------------------[x       ( 31 Dec 2023 16:00 )  2.4  | x    | x             Blood Gas Arterial, Lactate: 1.8 mmol/L (12-30-23 @ 05:45)    ABG - ( 30 Dec 2023 05:45 )  pH: 7.47  /  pCO2: 28    /  pO2: 177   / HCO3: 20    / Base Excess: -2.0  /  SaO2: 99.3      ABG - ( 29 Dec 2023 04:15 )  pH: 7.48  /  pCO2: 27    /  pO2: 173   / HCO3: 20    / Base Excess: -2.0  /  SaO2: 99.7      ABG - ( 28 Dec 2023 03:44 )  pH: 7.44  /  pCO2: 29    /  pO2: 95    / HCO3: 20    / Base Excess: -3.7  /  SaO2: 98.7      Coags:  11.70  ----< 1.03    ( 01 Jan 2024 05:25 )     36.5      Urinalysis Basic - ( 31 Dec 2023 20:29 )  Color: x / Appearance: x / SG: x / pH: x  Gluc: 140 mg/dL / Ketone: x  / Bili: x / Urobili: x   Blood: x / Protein: x / Nitrite: x   Leuk Esterase: x / RBC: x / WBC x   Sq Epi: x / Non Sq Epi: x / Bacteria: x    RADIOLOGY & ADDITIONAL TESTS:      A/P:  JACQUELIN CAI is a 78 year old male who presented as a TRAUMA CONSULT s/p mechanical fall (+HT -LOC -AC). Found to have an acute subdural hemorrhage. Hospital course has been complicated by multiple seizures and uncontrolled hypertension. Intubated on 12/21 due to worsening respiratory status.     S/P tracheostomy and PEG placement on 12/22/23, (Portex cuffed 9 and 2cm at skin 20fr PEG). Now with T-piece, O2 at 40%.     PLAN:  - Valproate increased to 750 Q12x7 days  - holding Plavix for now until stable. moderate risk for re-bleed  - F/U neuro for EEG   - F/u EP for loop recorder placement  - Monitor O2 requirements   - Continue chest PT, Duoneb treatments as needed   - F/u nutrition consult for TF adjustments (given liquid stool)   - Continue home ASA  - Pain control PRN   - DVT ppx with HSQ   - monitor dignishield outputs       Plan:   DG to floor      Disposition:      Above plan discussed with Attending Surgeon Dr. Degroot , patient, patient family, and Primary team    TAP (Trauma, Acute care, Pediatrics) Spectra [phone] GENERAL SURGERY PROGRESS NOTE     JACQUELIN CAI  [address] [place] [address] day :16d    POD:  Procedure: Insertion of midline catheter  Insertion of arterial line with imaging guidance  Tracheostomy, with PEG tube insertion    Surgical Attending: Isidro Montemayor  Overnight events:  tolerating T piece  restarted continuous tube feeds @27cc/hr    24 Hour Events  12/31  Night  - Holding off restarting plavix per   - Change valproic acid to 750 q12h for 1 week, per pharmacy  - Exam: vitals WNL, eye opening to physical stimulation, small amount of blood tinged sputum   - Bloody secretion from trach x1    DAY  -dg to floor  -4pm valproic and albumin level  -IVL  -f/u NSY restarting plavix, final head CT stable --> moderate risk of re-bleed, restart if benefits outweigh risk  - Na 143 --> off D5W, decreased free water flushes to 200 q8, f/u 4PM BMP  -bloody secretions from trache x2 episodes      T(F): 98.8 (12-31-23 @ 20:00), Max: 98.8 (12-31-23 @ 20:00)  HR: 75 (01-01-24 @ 04:00) (72 - 82)  BP: 122/59 (01-01-24 @ 04:00) (122/59 - 138/65)  ABP: --  ABP(mean): --  RR: 27 (01-01-24 @ 04:00) (25 - 37)  SpO2: 99% (01-01-24 @ 04:00) (97% - 100%)    IN'S / OUT's:    12-31-23 @ 07:01  -  01-01-24 @ 07:00  --------------------------------------------------------  IN:    dextrose 5%: 375 mL    Enteral Tube Flush: 250 mL    Enteral Tube Flush: 200 mL    Enteral Tube Flush: 200 mL    IV PiggyBack: 125 mL    Nepro with Carb Steady: 540 mL  Total IN: 1690 mL    OUT:    Indwelling Catheter - Urethral (mL): 1810 mL    Rectal Tube (mL): 50 mL  Total OUT: 1860 mL    Total NET: -170 mL      PHYSICAL EXAM:  GENERAL: NAD,   CHEST/LUNG: Clear to auscultation bilaterally vent 30/5 s/p Trach   HEART: Regular rate and rhythm  ABDOMEN: Soft, Nontender, Nondistended;   Rectum: dignishield in place  EXTREMITIES:  No clubbing, cyanosis, or edema      LABS  CAPILLARY BLOOD GLUCOSE  POCT Blood Glucose.: 171 mg/dL (01 Jan 2024 06:39)  POCT Blood Glucose.: 147 mg/dL (31 Dec 2023 21:43)  POCT Blood Glucose.: 123 mg/dL (31 Dec 2023 17:48)  POCT Blood Glucose.: 196 mg/dL (31 Dec 2023 12:26)                        8.6    11.58 )-----------( 388      ( 31 Dec 2023 20:29 )             27.2       Auto Neutrophil %: 77.8 % (12-31-23 @ 20:29)  Auto Immature Granulocyte %: 7.3 % (12-31-23 @ 20:29)    12-31    144  |  109  |  80<HH>  ----------------------------<  140<H>  4.0   |  22  |  2.6<H>      Calcium: 7.8 mg/dL (12-31-23 @ 20:29)      LFTs:             x    | x    | x        ------------------[x       ( 31 Dec 2023 16:00 )  2.4  | x    | x             Blood Gas Arterial, Lactate: 1.8 mmol/L (12-30-23 @ 05:45)    ABG - ( 30 Dec 2023 05:45 )  pH: 7.47  /  pCO2: 28    /  pO2: 177   / HCO3: 20    / Base Excess: -2.0  /  SaO2: 99.3      ABG - ( 29 Dec 2023 04:15 )  pH: 7.48  /  pCO2: 27    /  pO2: 173   / HCO3: 20    / Base Excess: -2.0  /  SaO2: 99.7      ABG - ( 28 Dec 2023 03:44 )  pH: 7.44  /  pCO2: 29    /  pO2: 95    / HCO3: 20    / Base Excess: -3.7  /  SaO2: 98.7      Coags:  11.70  ----< 1.03    ( 01 Jan 2024 05:25 )     36.5      Urinalysis Basic - ( 31 Dec 2023 20:29 )  Color: x / Appearance: x / SG: x / pH: x  Gluc: 140 mg/dL / Ketone: x  / Bili: x / Urobili: x   Blood: x / Protein: x / Nitrite: x   Leuk Esterase: x / RBC: x / WBC x   Sq Epi: x / Non Sq Epi: x / Bacteria: x    RADIOLOGY & ADDITIONAL TESTS:      A/P:  JACQUELIN CAI is a 78 year old male who presented as a TRAUMA CONSULT s/p mechanical fall (+HT -LOC -AC). Found to have an acute subdural hemorrhage. Hospital course has been complicated by multiple seizures and uncontrolled hypertension. Intubated on 12/21 due to worsening respiratory status.     S/P tracheostomy and PEG placement on 12/22/23, (Portex cuffed 9 and 2cm at skin 20fr PEG). Now with T-piece, O2 at 40%.     PLAN:  - Valproate increased to 750 Q12x7 days  - holding Plavix for now until stable. moderate risk for re-bleed  - F/U neuro for EEG   - F/u EP for loop recorder placement  - Monitor O2 requirements   - Continue chest PT, Duoneb treatments as needed   - F/u nutrition consult for TF adjustments (given liquid stool)   - Continue home ASA  - Pain control PRN   - DVT ppx with HSQ   - monitor dignishield outputs       Plan:   DG to floor      Disposition:      Above plan discussed with Attending Surgeon Dr. Degroot , patient, patient family, and Primary team    TAP (Trauma, Acute care, Pediatrics) Spectra [phone]

## 2024-01-01 NOTE — PROGRESS NOTE ADULT - SUBJECTIVE AND OBJECTIVE BOX
Nephrology progress note    THIS IS AN INCOMPLETE NOTE . FULL NOTE TO FOLLOW SHORTLY    Patient is seen and examined, events over the last 24 h noted .    Allergies:  [This allergen will not trigger allergy alert] pollen (Unknown)  No Known Drug Allergies    Hospital Medications:   MEDICATIONS  (STANDING):  acetaminophen     Tablet .. 650 milliGRAM(s) Oral every 6 hours  albuterol    90 MICROgram(s) HFA Inhaler 90 Puff(s) Inhalation every 4 hours  amLODIPine   Tablet 5 milliGRAM(s) Oral daily  aspirin  chewable 81 milliGRAM(s) Enteral Tube daily  atorvastatin 20 milliGRAM(s) Oral at bedtime  bacitracin   Ointment 1 Application(s) Topical every 12 hours  carvedilol 6.25 milliGRAM(s) Oral every 12 hours  chlorhexidine 0.12% Liquid 15 milliLiter(s) Oral Mucosa two times a day  chlorhexidine 2% Cloths 1 Application(s) Topical <User Schedule>  doxazosin 4 milliGRAM(s) Oral at bedtime  heparin   Injectable 5000 Unit(s) SubCutaneous every 8 hours  insulin glargine Injectable (LANTUS) 20 Unit(s) SubCutaneous at bedtime  insulin lispro (ADMELOG) corrective regimen sliding scale   SubCutaneous every 6 hours  ipratropium 17 MICROgram(s) HFA Inhaler 1 Puff(s) Inhalation every 6 hours  pantoprazole  Injectable 40 milliGRAM(s) IV Push every 24 hours  piperacillin/tazobactam IVPB.. 3.375 Gram(s) IV Intermittent every 12 hours  sevelamer carbonate Powder 800 milliGRAM(s) Oral three times a day with meals  valproic  acid Syrup 750 milliGRAM(s) Oral every 12 hours  vitamin A &amp; D Ointment 1 Application(s) Topical every 12 hours        VITALS:  T(F): 97.9 (01-01-24 @ 08:00), Max: 98.8 (12-31-23 @ 20:00)  HR: 74 (01-01-24 @ 08:00)  BP: 119/57 (01-01-24 @ 08:00)  RR: 20 (01-01-24 @ 08:00)  SpO2: 99% (01-01-24 @ 08:00)  Wt(kg): --    12-30 @ 07:01  -  12-31 @ 07:00  --------------------------------------------------------  IN: 3488 mL / OUT: 3265 mL / NET: 223 mL    12-31 @ 07:01 - 01-01 @ 07:00  --------------------------------------------------------  IN: 1690 mL / OUT: 1860 mL / NET: -170 mL    01-01 @ 07:01 - 01-01 @ 09:02  --------------------------------------------------------  IN: 254 mL / OUT: 395 mL / NET: -141 mL          PHYSICAL EXAM:  Constitutional: NAD  HEENT: anicteric sclera, oropharynx clear, MMM  Neck: No JVD  Respiratory: CTAB, no wheezes, rales or rhonchi  Cardiovascular: S1, S2, RRR  Gastrointestinal: BS+, soft, NT/ND  Extremities: No cyanosis or clubbing. No peripheral edema  :  No martinez.   Skin: No rashes    LABS:  12-31    144  |  109  |  80<HH>  ----------------------------<  140<H>  4.0   |  22  |  2.6<H>    Ca    7.8<L>      31 Dec 2023 20:29  Phos  5.1     12-31  Mg     2.8     12-31    TPro      /  Alb  2.4<L>  /  TBili      /  DBili      /  AST      /  ALT      /  AlkPhos      12-31                          8.6    11.58 )-----------( 388      ( 31 Dec 2023 20:29 )             27.2       Urine Studies:  Urinalysis Basic - ( 31 Dec 2023 20:29 )    Color:  / Appearance:  / SG:  / pH:   Gluc: 140 mg/dL / Ketone:   / Bili:  / Urobili:    Blood:  / Protein:  / Nitrite:    Leuk Esterase:  / RBC:  / WBC    Sq Epi:  / Non Sq Epi:  / Bacteria:       Sodium, Random Urine: 47.0 mmoL/L (12-29 @ 15:04)  Creatinine, Random Urine: 54 mg/dL (12-29 @ 15:04)  Osmolality, Random Urine: 437 mos/kg (12-29 @ 14:30)  Sodium, Random Urine: 73.0 mmoL/L (12-27 @ 22:15)  Creatinine, Random Urine: 42 mg/dL (12-27 @ 22:15)      Iron 70, TIBC 214, %sat 33      [02-15-23 @ 09:32]  Ferritin 111      [02-15-23 @ 09:32]  TSH 2.47      [02-15-23 @ 09:32]  Lipid: chol 245, , HDL 49, LDL --      [02-17-23 @ 07:55]          RADIOLOGY & ADDITIONAL STUDIES:   Nephrology progress note    Patient is seen and examined, events over the last 24 h noted .  Trached on MV      Allergies:  [This allergen will not trigger allergy alert] pollen (Unknown)  No Known Drug Allergies    Hospital Medications:   MEDICATIONS  (STANDING):  acetaminophen     Tablet .. 650 milliGRAM(s) Oral every 6 hours  albuterol    90 MICROgram(s) HFA Inhaler 90 Puff(s) Inhalation every 4 hours  amLODIPine   Tablet 5 milliGRAM(s) Oral daily  aspirin  chewable 81 milliGRAM(s) Enteral Tube daily  atorvastatin 20 milliGRAM(s) Oral at bedtime  bacitracin   Ointment 1 Application(s) Topical every 12 hours  carvedilol 6.25 milliGRAM(s) Oral every 12 hours  doxazosin 4 milliGRAM(s) Oral at bedtime  heparin   Injectable 5000 Unit(s) SubCutaneous every 8 hours  insulin glargine Injectable (LANTUS) 20 Unit(s) SubCutaneous at bedtime  insulin lispro (ADMELOG) corrective regimen sliding scale   SubCutaneous every 6 hours  ipratropium 17 MICROgram(s) HFA Inhaler 1 Puff(s) Inhalation every 6 hours  pantoprazole  Injectable 40 milliGRAM(s) IV Push every 24 hours  piperacillin/tazobactam IVPB.. 3.375 Gram(s) IV Intermittent every 12 hours  sevelamer carbonate Powder 800 milliGRAM(s) Oral three times a day with meals  valproic  acid Syrup 750 milliGRAM(s) Oral every 12 hours  vitamin A &amp; D Ointment 1 Application(s) Topical every 12 hours        VITALS:  T(F): 97.9 (01-01-24 @ 08:00), Max: 98.8 (12-31-23 @ 20:00)  HR: 74 (01-01-24 @ 08:00)  BP: 119/57 (01-01-24 @ 08:00)  RR: 20 (01-01-24 @ 08:00)  SpO2: 99% (01-01-24 @ 08:00)      12-30 @ 07:01  -  12-31 @ 07:00  --------------------------------------------------------  IN: 3488 mL / OUT: 3265 mL / NET: 223 mL    12-31 @ 07:01 - 01-01 @ 07:00  --------------------------------------------------------  IN: 1690 mL / OUT: 1860 mL / NET: -170 mL    01-01 @ 07:01 - 01-01 @ 09:02  --------------------------------------------------------  IN: 254 mL / OUT: 395 mL / NET: -141 mL          PHYSICAL EXAM:  Constitutional: NAD/ trached on MV   Respiratory: CTAB,  Cardiovascular: S1, S2, RRR  Gastrointestinal: BS+, soft, NT/ND  Extremities: No cyanosis or clubbing. No peripheral edema  :  No martinez.   Skin: No rashes    LABS:      12-31    144  |  109  |  80<HH>  ----------------------------<  140<H>  4.0   |  22  |  2.6<H>  Creatinine Trend: 2.6<--, 2.6<--, 2.7<--, 2.9<--, 3.1<--, 3.8<--  Ca    7.8<L>      31 Dec 2023 20:29  Phos  5.1     12-31  Mg     2.8     12-31    TPro      /  Alb  2.4<L>  /  TBili      /  DBili      /  AST      /  ALT      /  AlkPhos      12-31                          8.6    11.58 )-----------( 388      ( 31 Dec 2023 20:29 )             27.2       Urine Studies:  Urinalysis Basic - ( 31 Dec 2023 20:29 )    Color:  / Appearance:  / SG:  / pH:   Gluc: 140 mg/dL / Ketone:   / Bili:  / Urobili:    Blood:  / Protein:  / Nitrite:    Leuk Esterase:  / RBC:  / WBC    Sq Epi:  / Non Sq Epi:  / Bacteria:       Sodium, Random Urine: 47.0 mmoL/L (12-29 @ 15:04)  Creatinine, Random Urine: 54 mg/dL (12-29 @ 15:04)  Osmolality, Random Urine: 437 mos/kg (12-29 @ 14:30)  Sodium, Random Urine: 73.0 mmoL/L (12-27 @ 22:15)  Creatinine, Random Urine: 42 mg/dL (12-27 @ 22:15)      Iron 70, TIBC 214, %sat 33      [02-15-23 @ 09:32]  Ferritin 111      [02-15-23 @ 09:32]  TSH 2.47      [02-15-23 @ 09:32]  Lipid: chol 245, , HDL 49, LDL --      [02-17-23 @ 07:55]          RADIOLOGY & ADDITIONAL STUDIES:

## 2024-01-01 NOTE — CHART NOTE - NSCHARTNOTEFT_GEN_A_CORE
Case discussed with Dr Thomas.  Pt with history of B/L SDH and falcine SDH.  Pt had repeat CTH < from: CT Head No Cont (12.31.23 @ 05:34) >    Decreased density of subdural hemorrhage along the left site of the falx,   now measuring 0.7 cm in maximum thickness (previously 0.9 cm). Resolved   subdural hemorrhage along the right frontoparietal convexity. Decreased   size of hypodense extra-axial subdural collection, corresponding to   previously seen subdural hemorrhage, along the left cerebral hemisphere   measuring up to 0.7 cm (previously 0.9 cm). There is mild mass effect on   the left occipital lobe. No midline shift.    < end of copied text >    SICU requesting risk stratification for resuming pts home Plavix for CVA in past.  Pt has a moderate risk for ICH expansion given previous bleed.  Risk vs benefits should be weighed and discussed with patient prior to initiation of Plavix. Above d/w Dr Thomas.

## 2024-01-01 NOTE — PROGRESS NOTE ADULT - CRITICAL CARE ATTENDING COMMENT
Critical Care: [id number]   This patient has a high probability of sudden, clinically significant deterioration, which requires the highest level of physician preparedness to intervene urgently. I managed/supervised life or organ supporting interventions that required frequent physician assessment. I have reviewed and agree with note above. I devoted my full attention to the direct care of this patient for the period of time indicated, including reviewing relevant labs and imaging, discussing treatment plan with the SICU team, nurses, and primary team at the time of multidisciplinary rounds, and reviewing findings with patient and family. This does not include time devoted to teaching any trainees and to performing any procedures.    JACQUELNI CAI 78y Male admitted to SICU with traumatic SDH now with acute on chronic renal failure, possible seizure, history of CVA    Issues we are addressing today:    Neuro: minimizing sedation, amantadine for neurostim, consider d/c valproate, delirium precautions, sleep meds as needed at night  CV: optimize fluid status, home antihypertension meds  Respiratory: continue to wean support as possible, trach collar as tolerated  Nutrition: tube feeds running, banatrol for diarrhea  Renal: monitor Is &Os  ID: abx ongoing/completed  Lines: d/c as tolerated  Heme: continue to evaluate for acute blood loss anemia, ASA, plavix once ok by nsgy. CHADs VASC score 10-14%, HAS-BLED score 9%. would favor starting antiplt med  Endocrine: Prevent and treat hyperglycemia with insulin as needed    PV: follow pulse exam  Skin: decub precautions, wound care consulted  DVT Prophylaxis   Stress Gastritis Prophylaxis   Mobility: PT/OT as tolerated    The above note is NOT written at the time of rounds and will reflect all changes throughout management of the patient for the day note is written for.    Warner Edwards MD, D.BRENDAN Critical Care: [id number]   This patient has a high probability of sudden, clinically significant deterioration, which requires the highest level of physician preparedness to intervene urgently. I managed/supervised life or organ supporting interventions that required frequent physician assessment. I have reviewed and agree with note above. I devoted my full attention to the direct care of this patient for the period of time indicated, including reviewing relevant labs and imaging, discussing treatment plan with the SICU team, nurses, and primary team at the time of multidisciplinary rounds, and reviewing findings with patient and family. This does not include time devoted to teaching any trainees and to performing any procedures.    JACQUELIN CAI 78y Male admitted to SICU with traumatic SDH now with acute on chronic renal failure, possible seizure, history of CVA    Issues we are addressing today:    Neuro: minimizing sedation, amantadine for neurostim, consider d/c valproate, delirium precautions, sleep meds as needed at night  CV: optimize fluid status, home antihypertension meds  Respiratory: continue to wean support as possible, trach collar as tolerated  Nutrition: tube feeds running, banatrol for diarrhea  Renal: monitor Is &Os  ID: abx ongoing/completed  Lines: d/c as tolerated  Heme: continue to evaluate for acute blood loss anemia, ASA, plavix once ok by nsgy. CHADs VASC score 10-14%, HAS-BLED score 9%. would favor starting antiplt med  Endocrine: Prevent and treat hyperglycemia with insulin as needed    PV: follow pulse exam  Skin: decub precautions, wound care consulted  DVT Prophylaxis   Stress Gastritis Prophylaxis   Mobility: PT/OT as tolerated    The above note is NOT written at the time of rounds and will reflect all changes throughout management of the patient for the day note is written for.    Warner Edwards MD, D.BRENDAN

## 2024-01-01 NOTE — PROGRESS NOTE ADULT - ATTENDING COMMENTS
Trauma/ACS Attending  Note Attestation    Patient is examined and evaluated at the bedside with the residents/PAs. Treatment plan discussed with the team, nurses, and consulting physicians and consulting teams. Medications, radiological studies and all other relevant studies reviewed.     JACQUELIN CAI Patient is a 78y old  Male who presents with a chief complaint of fall and sustained  SDH, seizures, altered mentals status requring intubation followed by tracheostomy and PEG     Vital Signs Last 24 Hrs  T(C): 36.7 (01 Jan 2024 12:00), Max: 37.1 (31 Dec 2023 20:00)  T(F): 98 (01 Jan 2024 12:00), Max: 98.8 (31 Dec 2023 20:00)  HR: 74 (01 Jan 2024 12:00) (72 - 82)  BP: 114/55 (01 Jan 2024 12:00) (114/55 - 138/65)  BP(mean): 79 (01 Jan 2024 12:00) (79 - 94)  RR: 24 (01 Jan 2024 12:00) (20 - 29)  SpO2: 99% (01 Jan 2024 12:00) (97% - 100%)    Parameters below as of 01 Jan 2024 12:00  Patient On (Oxygen Delivery Method): T-piece    O2 Concentration (%): 40                        8.6    11.58 )-----------( 388      ( 31 Dec 2023 20:29 )             27.2     12-31    144  |  109  |  80<HH>  ----------------------------<  140<H>  4.0   |  22  |  2.6<H>    Ca    7.8<L>      31 Dec 2023 20:29  Phos  5.1     12-31  Mg     2.8     12-31    TPro  x   /  Alb  2.4<L>  /  TBili  x   /  DBili  x   /  AST  x   /  ALT  x   /  AlkPhos  x   12-31    Diagnosis: Fall with SDH                  S/p tracheostomy/PEG    Plan:	  - supportive care  - pain management  - incentive spirometer   - DVT prophylaxis       [x ] SCDs [x ] Lovenox SQ [ ] Heparins SQ  [ ] None  - GI prophylaxis  - follow up consults  - continue PEG feeds  - repeat studies as needed  - PT evaluation and follow up  - replace electrolytes  - case management evaluation for SNF placement     Shawnee Degroot MD, FACS  Trauma/ACS/Surgical Critical Care Attending

## 2024-01-01 NOTE — PROGRESS NOTE ADULT - SUBJECTIVE AND OBJECTIVE BOX
JACQUELIN CAI   [id number]   [birth year]  78yM    Admit Date: 12-16-23  Indication for SDU/SICU: Q1 neuro checks      77-year-old male Cantonese speaking presents with prior history of HTN, HLD, Diabetes, BPH, prior CVA on aspirin and Plavix who states he had a mechanical fall 3 days ago while getting out of the car fell backwards hit his head.  Afterwards he was able to ambulate.  But for the past 1 day family was unable to ambulate him.  He states that his lower extremities are painful to movement and weak.  Family denies any LOC. Per daughter patient he often forgets things and cannot recall year or place. Patient following commands bedside.   Initial CT head remarkable for 3 Acute subdural hemorrhages: 0.7 cm along the left hemisphere, 0.3 cm along the right frontal area, and 1.1 cm along the left falx. No midline shift. Repeat CT head was obtained prior to 6 hour tacos because patient was appearing lethargic vs sundowning. CT head #2 with stable findings. CT c-spine and chest/abd/pelvis unremarkable for acute pathology.     SICU Consult for q1 neuro checks    ============================  24 Hour Events  12/31  Night  - Holding off restarting plavix per   - Change valproic acid to 750 q12h for 1 week, per pharmacy  - Exam: vitals WNL, eye opening to physical stimulation, small amount of blood tinged sputum   - Bloody secretion from trach x1    DAY  -dg to floor  -4pm valproic and albumin level  -IVL  -f/u NSY restarting plavix, final head CT stable --> moderate risk of re-bleed, restart if benefits outweigh risk  - Na 143 --> off D5W, decreased free water flushes to 200 q8, f/u 4PM BMP  -bloody secretions from trache x2 episodes      [X] A ten-point review of systems was otherwise negative except as noted above.  [  ] Due to altered mental status/intubation, subjective information was not attained from the patient. History was obtained, to the extent possible, from review of the chart and collateral sources of information.   JACQUELIN CAI   [id number]   [birth year]  78yM    Admit Date: 12-16-23  Indication for SDU/SICU: Q1 neuro checks      77-year-old male Cantonese speaking presents with prior history of HTN, HLD, Diabetes, BPH, prior CVA on aspirin and Plavix who states he had a mechanical fall 3 days ago while getting out of the car fell backwards hit his head.  Afterwards he was able to ambulate.  But for the past 1 day family was unable to ambulate him.  He states that his lower extremities are painful to movement and weak.  Family denies any LOC. Per daughter patient he often forgets things and cannot recall year or place. Patient following commands bedside.   Initial CT head remarkable for 3 Acute subdural hemorrhages: 0.7 cm along the left hemisphere, 0.3 cm along the right frontal area, and 1.1 cm along the left falx. No midline shift. Repeat CT head was obtained prior to 6 hour tacos because patient was appearing lethargic vs sundowning. CT head #2 with stable findings. CT c-spine and chest/abd/pelvis unremarkable for acute pathology.     SICU Consult for q1 neuro checks    ============================  24 Hour Events  12/31  Night  - Holding off restarting plavix per   - Change valproic acid to 750 q12h for 1 week, per pharmacy  - Exam: vitals WNL, eye opening to physical stimulation, small amount of blood tinged sputum   - Bloody secretion from trach x1    DAY  -dg to floor  -4pm valproic and albumin level  -IVL  -f/u NSY restarting plavix, final head CT stable --> moderate risk of re-bleed, restart if benefits outweigh risk  - Na 143 --> off D5W, decreased free water flushes to 200 q8, f/u 4PM BMP  -bloody secretions from trache x2 episodes      [X] A ten-point review of systems was otherwise negative except as noted above.  [  ] Due to altered mental status/intubation, subjective information was not attained from the patient. History was obtained, to the extent possible, from review of the chart and collateral sources of information.   JACQUELIN CAI   [id number]   [birth year]  78yM    Admit Date: 12-16-23  Indication for SDU/SICU: Q1 neuro checks      77-year-old male Cantonese speaking presents with prior history of HTN, HLD, Diabetes, BPH, prior CVA on aspirin and Plavix who states he had a mechanical fall 3 days ago while getting out of the car fell backwards hit his head.  Afterwards he was able to ambulate.  But for the past 1 day family was unable to ambulate him.  He states that his lower extremities are painful to movement and weak.  Family denies any LOC. Per daughter patient he often forgets things and cannot recall year or place. Patient following commands bedside.   Initial CT head remarkable for 3 Acute subdural hemorrhages: 0.7 cm along the left hemisphere, 0.3 cm along the right frontal area, and 1.1 cm along the left falx. No midline shift. Repeat CT head was obtained prior to 6 hour tacos because patient was appearing lethargic vs sundowning. CT head #2 with stable findings. CT c-spine and chest/abd/pelvis unremarkable for acute pathology.     SICU Consult for q1 neuro checks    ============================  24 Hour Events  12/31  Night  - Holding off restarting plavix per   - Change valproic acid to 750 q12h for 1 week, per pharmacy  - Exam: vitals WNL, eye opening to physical stimulation, small amount of blood tinged sputum   - Bloody secretion from trach x1    DAY  -dg to floor  -4pm valproic and albumin level  -IVL  -f/u NSY restarting plavix, final head CT stable --> moderate risk of re-bleed, restart if benefits outweigh risk  - Na 143 --> off D5W, decreased free water flushes to 200 q8, f/u 4PM BMP  -bloody secretions from trache x2 episodes      [X] A ten-point review of systems was otherwise negative except as noted above.  [  ] Due to altered mental status/intubation, subjective information was not attained from the patient. History was obtained, to the extent possible, from review of the chart and collateral sources of information.    Daily     Daily     Diet, NPO with Tube Feed:   Tube Feeding Modality: Gastrostomy  Nepro with Carb Steady  Total Volume for 24 Hours (mL): 648  Continuous  Starting Tube Feed Rate mL per Hour: 27  Increase Tube Feed Rate by (mL): 0     Every 4 hours  Until Goal Tube Feed Rate (mL per Hour): 27  Tube Feed Duration (in Hours): 24  Free Water Flush  Bolus   Total Volume per Flush (mL): 200   Frequency: Every 8 Hours  Free Water Flush Instructions:  Please flush PEG with 50cc of sterile water before feeds start and 100cc of sterile water after feeds end (12-31-23 @ 13:22)      CURRENT MEDS:  Neurologic Medications  acetaminophen     Tablet .. 650 milliGRAM(s) Oral every 6 hours  valproic  acid Syrup 750 milliGRAM(s) Oral every 12 hours    Respiratory Medications  albuterol    90 MICROgram(s) HFA Inhaler 90 Puff(s) Inhalation every 4 hours  ipratropium 17 MICROgram(s) HFA Inhaler 1 Puff(s) Inhalation every 6 hours    Cardiovascular Medications  amLODIPine   Tablet 5 milliGRAM(s) Oral daily  carvedilol 6.25 milliGRAM(s) Oral every 12 hours  doxazosin 4 milliGRAM(s) Oral at bedtime    Gastrointestinal Medications  pantoprazole  Injectable 40 milliGRAM(s) IV Push every 24 hours    Genitourinary Medications    Hematologic/Oncologic Medications  aspirin  chewable 81 milliGRAM(s) Enteral Tube daily  heparin   Injectable 5000 Unit(s) SubCutaneous every 8 hours    Antimicrobial/Immunologic Medications  piperacillin/tazobactam IVPB.. 3.375 Gram(s) IV Intermittent every 12 hours    Endocrine/Metabolic Medications  atorvastatin 20 milliGRAM(s) Oral at bedtime  insulin glargine Injectable (LANTUS) 20 Unit(s) SubCutaneous at bedtime  insulin lispro (ADMELOG) corrective regimen sliding scale   SubCutaneous every 6 hours    Topical/Other Medications  bacitracin   Ointment 1 Application(s) Topical every 12 hours  chlorhexidine 0.12% Liquid 15 milliLiter(s) Oral Mucosa two times a day  chlorhexidine 2% Cloths 1 Application(s) Topical <User Schedule>  sevelamer carbonate Powder 800 milliGRAM(s) Oral three times a day with meals  vitamin A &amp; D Ointment 1 Application(s) Topical every 12 hours      ICU Vital Signs Last 24 Hrs  T(C): 37.1 (31 Dec 2023 20:00), Max: 37.1 (31 Dec 2023 20:00)  T(F): 98.8 (31 Dec 2023 20:00), Max: 98.8 (31 Dec 2023 20:00)  HR: 75 (01 Jan 2024 04:00) (72 - 82)  BP: 122/59 (01 Jan 2024 04:00) (122/59 - 138/65)  BP(mean): 85 (01 Jan 2024 04:00) (84 - 94)  ABP: --  ABP(mean): --  RR: 27 (01 Jan 2024 04:00) (25 - 37)  SpO2: 99% (01 Jan 2024 04:00) (97% - 100%)    O2 Parameters below as of 01 Jan 2024 00:00  Patient On (Oxygen Delivery Method): T-piece    O2 Concentration (%): 40        Mode: standby  FiO2: 40        I&O's Summary    31 Dec 2023 07:01  -  01 Jan 2024 07:00  --------------------------------------------------------  IN: 1690 mL / OUT: 1860 mL / NET: -170 mL      I&O's Detail    31 Dec 2023 07:01  -  01 Jan 2024 07:00  --------------------------------------------------------  IN:    dextrose 5%: 375 mL    Enteral Tube Flush: 250 mL    Enteral Tube Flush: 200 mL    Enteral Tube Flush: 200 mL    IV PiggyBack: 125 mL    Nepro with Carb Steady: 540 mL  Total IN: 1690 mL    OUT:    Indwelling Catheter - Urethral (mL): 1810 mL    Rectal Tube (mL): 50 mL  Total OUT: 1860 mL    Total NET: -170 mL          PHYSICAL EXAM: performed using  #[id number]    General/Neuro  GCS: 7T = E 2  / V 1T / M 4     Neuro: grimaces to pain, does not follow commands. +cough/+gag. PERRL, EOMs noted.  Unable to assess strength/sensory    Lungs: clear to auscultation, Normal expansion/effort.     Cardiovascular : S1, S2.  Regular rate and rhythm.  no peripheral edema   Cardiac Rhythm: Normal Sinus Rhythm    GI: Abdomen soft, Non-tender, Non-distended. PEG in place, 3cm at skin's edge.  Wound: stage 2 sacral decubitus    Extremities: Extremities warm, pink, well-perfused. Pulses: DP/PT 2+ B/L.    : martinez catheter in place.    CXR: < from: Xray Chest 1 View- PORTABLE-Routine (Xray Chest 1 View- PORTABLE-Routine in AM.) (12.30.23 @ 08:14) >  New left basilar opacity/atelectasis.    < from: CT Head No Cont (12.31.23 @ 05:34) >    Decreased density of subdural hemorrhage along the left site of the falx,   now measuring 0.7 cm in maximum thickness (previously 0.9 cm). Resolved   subdural hemorrhage along the right frontoparietal convexity. Decreased   size of hypodense extra-axial subdural collection, corresponding to   previously seen subdural hemorrhage, along the left cerebral hemisphere   measuring up to 0.7 cm (previously 0.9 cm). There is mild mass effect on   the left occipital lobe. No midline shift.    < end of copied text >      LABS:  CAPILLARY BLOOD GLUCOSE      POCT Blood Glucose.: 171 mg/dL (01 Jan 2024 06:39)  POCT Blood Glucose.: 147 mg/dL (31 Dec 2023 21:43)  POCT Blood Glucose.: 123 mg/dL (31 Dec 2023 17:48)  POCT Blood Glucose.: 196 mg/dL (31 Dec 2023 12:26)                          8.6    11.58 )-----------( 388      ( 31 Dec 2023 20:29 )             27.2       12-31    144  |  109  |  80<HH>  ----------------------------<  140<H>  4.0   |  22  |  2.6<H>    Ca    7.8<L>      31 Dec 2023 20:29  Phos  5.1     12-31  Mg     2.8     12-31    TPro  x   /  Alb  2.4<L>  /  TBili  x   /  DBili  x   /  AST  x   /  ALT  x   /  AlkPhos  x   12-31      PT/INR - ( 01 Jan 2024 05:25 )   PT: 11.70 sec;   INR: 1.03 ratio         PTT - ( 01 Jan 2024 05:25 )  PTT:36.5 sec      Urinalysis Basic - ( 31 Dec 2023 20:29 )    Color: x / Appearance: x / SG: x / pH: x  Gluc: 140 mg/dL / Ketone: x  / Bili: x / Urobili: x   Blood: x / Protein: x / Nitrite: x   Leuk Esterase: x / RBC: x / WBC x   Sq Epi: x / Non Sq Epi: x / Bacteria: x JACQUELIN CAI   [id number]   [birth year]  78yM    Admit Date: 12-16-23  Indication for SDU/SICU: Q1 neuro checks      77-year-old male Cantonese speaking presents with prior history of HTN, HLD, Diabetes, BPH, prior CVA on aspirin and Plavix who states he had a mechanical fall 3 days ago while getting out of the car fell backwards hit his head.  Afterwards he was able to ambulate.  But for the past 1 day family was unable to ambulate him.  He states that his lower extremities are painful to movement and weak.  Family denies any LOC. Per daughter patient he often forgets things and cannot recall year or place. Patient following commands bedside.   Initial CT head remarkable for 3 Acute subdural hemorrhages: 0.7 cm along the left hemisphere, 0.3 cm along the right frontal area, and 1.1 cm along the left falx. No midline shift. Repeat CT head was obtained prior to 6 hour tacos because patient was appearing lethargic vs sundowning. CT head #2 with stable findings. CT c-spine and chest/abd/pelvis unremarkable for acute pathology.     SICU Consult for q1 neuro checks    ============================  24 Hour Events  12/31  Night  - Holding off restarting plavix per   - Change valproic acid to 750 q12h for 1 week, per pharmacy  - Exam: vitals WNL, eye opening to physical stimulation, small amount of blood tinged sputum   - Bloody secretion from trach x1    DAY  -dg to floor  -4pm valproic and albumin level  -IVL  -f/u NSY restarting plavix, final head CT stable --> moderate risk of re-bleed, restart if benefits outweigh risk  - Na 143 --> off D5W, decreased free water flushes to 200 q8, f/u 4PM BMP  -bloody secretions from trache x2 episodes      [X] A ten-point review of systems was otherwise negative except as noted above.  [  ] Due to altered mental status/intubation, subjective information was not attained from the patient. History was obtained, to the extent possible, from review of the chart and collateral sources of information.    Daily     Daily     Diet, NPO with Tube Feed:   Tube Feeding Modality: Gastrostomy  Nepro with Carb Steady  Total Volume for 24 Hours (mL): 648  Continuous  Starting Tube Feed Rate mL per Hour: 27  Increase Tube Feed Rate by (mL): 0     Every 4 hours  Until Goal Tube Feed Rate (mL per Hour): 27  Tube Feed Duration (in Hours): 24  Free Water Flush  Bolus   Total Volume per Flush (mL): 200   Frequency: Every 8 Hours  Free Water Flush Instructions:  Please flush PEG with 50cc of sterile water before feeds start and 100cc of sterile water after feeds end (12-31-23 @ 13:22)      CURRENT MEDS:  Neurologic Medications  acetaminophen     Tablet .. 650 milliGRAM(s) Oral every 6 hours  valproic  acid Syrup 750 milliGRAM(s) Oral every 12 hours    Respiratory Medications  albuterol    90 MICROgram(s) HFA Inhaler 90 Puff(s) Inhalation every 4 hours  ipratropium 17 MICROgram(s) HFA Inhaler 1 Puff(s) Inhalation every 6 hours    Cardiovascular Medications  amLODIPine   Tablet 5 milliGRAM(s) Oral daily  carvedilol 6.25 milliGRAM(s) Oral every 12 hours  doxazosin 4 milliGRAM(s) Oral at bedtime    Gastrointestinal Medications  pantoprazole  Injectable 40 milliGRAM(s) IV Push every 24 hours    Genitourinary Medications    Hematologic/Oncologic Medications  aspirin  chewable 81 milliGRAM(s) Enteral Tube daily  heparin   Injectable 5000 Unit(s) SubCutaneous every 8 hours    Antimicrobial/Immunologic Medications  piperacillin/tazobactam IVPB.. 3.375 Gram(s) IV Intermittent every 12 hours    Endocrine/Metabolic Medications  atorvastatin 20 milliGRAM(s) Oral at bedtime  insulin glargine Injectable (LANTUS) 20 Unit(s) SubCutaneous at bedtime  insulin lispro (ADMELOG) corrective regimen sliding scale   SubCutaneous every 6 hours    Topical/Other Medications  bacitracin   Ointment 1 Application(s) Topical every 12 hours  chlorhexidine 0.12% Liquid 15 milliLiter(s) Oral Mucosa two times a day  chlorhexidine 2% Cloths 1 Application(s) Topical <User Schedule>  sevelamer carbonate Powder 800 milliGRAM(s) Oral three times a day with meals  vitamin A &amp; D Ointment 1 Application(s) Topical every 12 hours      ICU Vital Signs Last 24 Hrs  T(C): 37.1 (31 Dec 2023 20:00), Max: 37.1 (31 Dec 2023 20:00)  T(F): 98.8 (31 Dec 2023 20:00), Max: 98.8 (31 Dec 2023 20:00)  HR: 75 (01 Jan 2024 04:00) (72 - 82)  BP: 122/59 (01 Jan 2024 04:00) (122/59 - 138/65)  BP(mean): 85 (01 Jan 2024 04:00) (84 - 94)  ABP: --  ABP(mean): --  RR: 27 (01 Jan 2024 04:00) (25 - 37)  SpO2: 99% (01 Jan 2024 04:00) (97% - 100%)    O2 Parameters below as of 01 Jan 2024 00:00  Patient On (Oxygen Delivery Method): T-piece    O2 Concentration (%): 40        Mode: standby  FiO2: 40        I&O's Summary    31 Dec 2023 07:01  -  01 Jan 2024 07:00  --------------------------------------------------------  IN: 1690 mL / OUT: 1860 mL / NET: -170 mL      I&O's Detail    31 Dec 2023 07:01  -  01 Jan 2024 07:00  --------------------------------------------------------  IN:    dextrose 5%: 375 mL    Enteral Tube Flush: 250 mL    Enteral Tube Flush: 200 mL    Enteral Tube Flush: 200 mL    IV PiggyBack: 125 mL    Nepro with Carb Steady: 540 mL  Total IN: 1690 mL    OUT:    Indwelling Catheter - Urethral (mL): 1810 mL    Rectal Tube (mL): 50 mL  Total OUT: 1860 mL    Total NET: -170 mL          PHYSICAL EXAM: performed using  #[id number]    General/Neuro  GCS: 7T = E 2  / V 1T / M 4     Neuro: grimaces to pain, does not follow commands. +cough/+gag. PERRL, EOMs noted.  Unable to assess strength/sensory    Lungs: clear to auscultation, Normal expansion/effort.     Cardiovascular : S1, S2.  Regular rate and rhythm.  no peripheral edema   Cardiac Rhythm: Normal Sinus Rhythm    GI: Abdomen soft, Non-tender, Non-distended. PEG in place, 3cm at skin's edge.  Wound: stage 2 sacral decubitus    Extremities: Extremities warm, pink, well-perfused. Pulses: DP/PT 2+ B/L.    : martinez catheter in place.    CXR: < from: Xray Chest 1 View- PORTABLE-Routine (Xray Chest 1 View- PORTABLE-Routine in AM.) (12.30.23 @ 08:14) >  New left basilar opacity/atelectasis.    < from: CT Head No Cont (12.31.23 @ 05:34) >    Decreased density of subdural hemorrhage along the left site of the falx,   now measuring 0.7 cm in maximum thickness (previously 0.9 cm). Resolved   subdural hemorrhage along the right frontoparietal convexity. Decreased   size of hypodense extra-axial subdural collection, corresponding to   previously seen subdural hemorrhage, along the left cerebral hemisphere   measuring up to 0.7 cm (previously 0.9 cm). There is mild mass effect on   the left occipital lobe. No midline shift.    < end of copied text >      LABS:  CAPILLARY BLOOD GLUCOSE      POCT Blood Glucose.: 171 mg/dL (01 Jan 2024 06:39)  POCT Blood Glucose.: 147 mg/dL (31 Dec 2023 21:43)  POCT Blood Glucose.: 123 mg/dL (31 Dec 2023 17:48)  POCT Blood Glucose.: 196 mg/dL (31 Dec 2023 12:26)                          8.6    11.58 )-----------( 388      ( 31 Dec 2023 20:29 )             27.2       12-31    144  |  109  |  80<HH>  ----------------------------<  140<H>  4.0   |  22  |  2.6<H>    Ca    7.8<L>      31 Dec 2023 20:29  Phos  5.1     12-31  Mg     2.8     12-31    TPro  x   /  Alb  2.4<L>  /  TBili  x   /  DBili  x   /  AST  x   /  ALT  x   /  AlkPhos  x   12-31      PT/INR - ( 01 Jan 2024 05:25 )   PT: 11.70 sec;   INR: 1.03 ratio         PTT - ( 01 Jan 2024 05:25 )  PTT:36.5 sec      Urinalysis Basic - ( 31 Dec 2023 20:29 )    Color: x / Appearance: x / SG: x / pH: x  Gluc: 140 mg/dL / Ketone: x  / Bili: x / Urobili: x   Blood: x / Protein: x / Nitrite: x   Leuk Esterase: x / RBC: x / WBC x   Sq Epi: x / Non Sq Epi: x / Bacteria: x

## 2024-01-02 LAB
A1C WITH ESTIMATED AVERAGE GLUCOSE RESULT: 7 % — HIGH (ref 4–5.6)
A1C WITH ESTIMATED AVERAGE GLUCOSE RESULT: 7 % — HIGH (ref 4–5.6)
ANION GAP SERPL CALC-SCNC: 15 MMOL/L — HIGH (ref 7–14)
ANION GAP SERPL CALC-SCNC: 15 MMOL/L — HIGH (ref 7–14)
BUN SERPL-MCNC: 79 MG/DL — CRITICAL HIGH (ref 10–20)
BUN SERPL-MCNC: 79 MG/DL — CRITICAL HIGH (ref 10–20)
CALCIUM SERPL-MCNC: 8.2 MG/DL — LOW (ref 8.4–10.5)
CALCIUM SERPL-MCNC: 8.2 MG/DL — LOW (ref 8.4–10.5)
CHLORIDE SERPL-SCNC: 117 MMOL/L — HIGH (ref 98–110)
CHLORIDE SERPL-SCNC: 117 MMOL/L — HIGH (ref 98–110)
CO2 SERPL-SCNC: 21 MMOL/L — SIGNIFICANT CHANGE UP (ref 17–32)
CO2 SERPL-SCNC: 21 MMOL/L — SIGNIFICANT CHANGE UP (ref 17–32)
CREAT SERPL-MCNC: 2.2 MG/DL — HIGH (ref 0.7–1.5)
CREAT SERPL-MCNC: 2.2 MG/DL — HIGH (ref 0.7–1.5)
CULTURE RESULTS: SIGNIFICANT CHANGE UP
CULTURE RESULTS: SIGNIFICANT CHANGE UP
EGFR: 30 ML/MIN/1.73M2 — LOW
EGFR: 30 ML/MIN/1.73M2 — LOW
ESTIMATED AVERAGE GLUCOSE: 154 MG/DL — HIGH (ref 68–114)
ESTIMATED AVERAGE GLUCOSE: 154 MG/DL — HIGH (ref 68–114)
GLUCOSE SERPL-MCNC: 130 MG/DL — HIGH (ref 70–99)
GLUCOSE SERPL-MCNC: 130 MG/DL — HIGH (ref 70–99)
POTASSIUM SERPL-MCNC: 4.1 MMOL/L — SIGNIFICANT CHANGE UP (ref 3.5–5)
POTASSIUM SERPL-MCNC: 4.1 MMOL/L — SIGNIFICANT CHANGE UP (ref 3.5–5)
POTASSIUM SERPL-SCNC: 4.1 MMOL/L — SIGNIFICANT CHANGE UP (ref 3.5–5)
POTASSIUM SERPL-SCNC: 4.1 MMOL/L — SIGNIFICANT CHANGE UP (ref 3.5–5)
SODIUM SERPL-SCNC: 153 MMOL/L — HIGH (ref 135–146)
SODIUM SERPL-SCNC: 153 MMOL/L — HIGH (ref 135–146)
SPECIMEN SOURCE: SIGNIFICANT CHANGE UP
SPECIMEN SOURCE: SIGNIFICANT CHANGE UP
T3 SERPL-MCNC: 79 NG/DL — LOW (ref 80–200)
T3 SERPL-MCNC: 79 NG/DL — LOW (ref 80–200)
T4 AB SER-ACNC: 5.9 UG/DL — SIGNIFICANT CHANGE UP (ref 4.6–12)
T4 AB SER-ACNC: 5.9 UG/DL — SIGNIFICANT CHANGE UP (ref 4.6–12)
TSH SERPL-MCNC: 4.16 UIU/ML — SIGNIFICANT CHANGE UP (ref 0.27–4.2)
TSH SERPL-MCNC: 4.16 UIU/ML — SIGNIFICANT CHANGE UP (ref 0.27–4.2)

## 2024-01-02 PROCEDURE — 99291 CRITICAL CARE FIRST HOUR: CPT | Mod: 25

## 2024-01-02 RX ORDER — INSULIN GLARGINE 100 [IU]/ML
5 INJECTION, SOLUTION SUBCUTANEOUS ONCE
Refills: 0 | Status: COMPLETED | OUTPATIENT
Start: 2024-01-02 | End: 2024-01-02

## 2024-01-02 RX ORDER — INSULIN GLARGINE 100 [IU]/ML
25 INJECTION, SOLUTION SUBCUTANEOUS AT BEDTIME
Refills: 0 | Status: DISCONTINUED | OUTPATIENT
Start: 2024-01-02 | End: 2024-01-12

## 2024-01-02 RX ORDER — CHLORHEXIDINE GLUCONATE 213 G/1000ML
1 SOLUTION TOPICAL
Refills: 0 | Status: DISCONTINUED | OUTPATIENT
Start: 2024-01-02 | End: 2024-01-12

## 2024-01-02 RX ORDER — FINASTERIDE 5 MG/1
5 TABLET, FILM COATED ORAL DAILY
Refills: 0 | Status: DISCONTINUED | OUTPATIENT
Start: 2024-01-02 | End: 2024-01-12

## 2024-01-02 RX ADMIN — HEPARIN SODIUM 5000 UNIT(S): 5000 INJECTION INTRAVENOUS; SUBCUTANEOUS at 11:10

## 2024-01-02 RX ADMIN — CHLORHEXIDINE GLUCONATE 15 MILLILITER(S): 213 SOLUTION TOPICAL at 05:08

## 2024-01-02 RX ADMIN — Medication 81 MILLIGRAM(S): at 11:09

## 2024-01-02 RX ADMIN — SEVELAMER CARBONATE 800 MILLIGRAM(S): 2400 POWDER, FOR SUSPENSION ORAL at 17:46

## 2024-01-02 RX ADMIN — Medication 1 PUFF(S): at 13:28

## 2024-01-02 RX ADMIN — ATORVASTATIN CALCIUM 20 MILLIGRAM(S): 80 TABLET, FILM COATED ORAL at 22:00

## 2024-01-02 RX ADMIN — Medication 750 MILLIGRAM(S): at 05:03

## 2024-01-02 RX ADMIN — Medication 1 PUFF(S): at 08:29

## 2024-01-02 RX ADMIN — Medication 1 TABLET(S): at 11:11

## 2024-01-02 RX ADMIN — Medication 1 APPLICATION(S): at 05:06

## 2024-01-02 RX ADMIN — INSULIN GLARGINE 5 UNIT(S): 100 INJECTION, SOLUTION SUBCUTANEOUS at 11:30

## 2024-01-02 RX ADMIN — HEPARIN SODIUM 5000 UNIT(S): 5000 INJECTION INTRAVENOUS; SUBCUTANEOUS at 22:02

## 2024-01-02 RX ADMIN — Medication 4 MILLIGRAM(S): at 21:59

## 2024-01-02 RX ADMIN — SEVELAMER CARBONATE 800 MILLIGRAM(S): 2400 POWDER, FOR SUSPENSION ORAL at 08:50

## 2024-01-02 RX ADMIN — FINASTERIDE 5 MILLIGRAM(S): 5 TABLET, FILM COATED ORAL at 22:34

## 2024-01-02 RX ADMIN — CHLORHEXIDINE GLUCONATE 1 APPLICATION(S): 213 SOLUTION TOPICAL at 06:31

## 2024-01-02 RX ADMIN — SEVELAMER CARBONATE 800 MILLIGRAM(S): 2400 POWDER, FOR SUSPENSION ORAL at 13:25

## 2024-01-02 RX ADMIN — HEPARIN SODIUM 5000 UNIT(S): 5000 INJECTION INTRAVENOUS; SUBCUTANEOUS at 04:57

## 2024-01-02 RX ADMIN — PIPERACILLIN AND TAZOBACTAM 25 GRAM(S): 4; .5 INJECTION, POWDER, LYOPHILIZED, FOR SOLUTION INTRAVENOUS at 17:40

## 2024-01-02 RX ADMIN — CARVEDILOL PHOSPHATE 6.25 MILLIGRAM(S): 80 CAPSULE, EXTENDED RELEASE ORAL at 17:46

## 2024-01-02 RX ADMIN — INSULIN GLARGINE 25 UNIT(S): 100 INJECTION, SOLUTION SUBCUTANEOUS at 21:59

## 2024-01-02 RX ADMIN — CARVEDILOL PHOSPHATE 6.25 MILLIGRAM(S): 80 CAPSULE, EXTENDED RELEASE ORAL at 05:07

## 2024-01-02 RX ADMIN — CHLORHEXIDINE GLUCONATE 15 MILLILITER(S): 213 SOLUTION TOPICAL at 17:45

## 2024-01-02 RX ADMIN — Medication 1 APPLICATION(S): at 17:47

## 2024-01-02 RX ADMIN — CHLORHEXIDINE GLUCONATE 1 APPLICATION(S): 213 SOLUTION TOPICAL at 17:47

## 2024-01-02 RX ADMIN — PIPERACILLIN AND TAZOBACTAM 25 GRAM(S): 4; .5 INJECTION, POWDER, LYOPHILIZED, FOR SOLUTION INTRAVENOUS at 05:04

## 2024-01-02 RX ADMIN — AMLODIPINE BESYLATE 5 MILLIGRAM(S): 2.5 TABLET ORAL at 05:04

## 2024-01-02 RX ADMIN — Medication 3: at 23:34

## 2024-01-02 RX ADMIN — Medication 1 PUFF(S): at 02:49

## 2024-01-02 RX ADMIN — Medication 1 APPLICATION(S): at 17:42

## 2024-01-02 RX ADMIN — Medication 750 MILLIGRAM(S): at 17:48

## 2024-01-02 RX ADMIN — Medication 5: at 06:27

## 2024-01-02 NOTE — PROGRESS NOTE ADULT - SUBJECTIVE AND OBJECTIVE BOX
JACQUELIN CAI   [id number]   [birth year]  78yM    Admit Date: 12-16-23  Indication for SDU/SICU: Q1 neuro checks      77-year-old male Cantonese speaking presents with prior history of HTN, HLD, Diabetes, BPH, prior CVA on aspirin and Plavix who states he had a mechanical fall 3 days ago while getting out of the car fell backwards hit his head.  Afterwards he was able to ambulate.  But for the past 1 day family was unable to ambulate him.  He states that his lower extremities are painful to movement and weak.  Family denies any LOC. Per daughter patient he often forgets things and cannot recall year or place. Patient following commands bedside.   Initial CT head remarkable for 3 Acute subdural hemorrhages: 0.7 cm along the left hemisphere, 0.3 cm along the right frontal area, and 1.1 cm along the left falx. No midline shift. Repeat CT head was obtained prior to 6 hour tacos because patient was appearing lethargic vs sundowning. CT head #2 with stable findings. CT c-spine and chest/abd/pelvis unremarkable for acute pathology.     SICU Consult for q1 neuro checks    ============================  24 Hour Events  1/1  NIGHT  - Exam: Interpretor [id number], opens eyes spontaneously, not following commands, clear lungs, HDS  - TOV: voided 115cc at 22:30, post residual bladder scan: 465, straigth cath 800, next TOV by 7 AM   - Na 151 from 144, FWD 1.3 L, increase FWF to 250q6     DAY:  - redosed amantadine, 100mg q48h  - recall neurology for reasoning to c/w valproic (patient on for 2 weeks, causes depressed mental status, no more seizures, need long term plan)  	- no answer  - recall NSY for plavix --> chadvasc 13%, has-bled score 8.9%  	- "Risk vs benefits should be weighed and discussed with patient prior to 	initiation of Plavix. Above d/w Dr Thomas."  	- d/c plavix per dr. carpenter  - d/c tylenol, no more fevers  - switched protonix to pepcid  - added banatrol to TF  - added multivitamin  - d/c martinez, pending TOV 9pm. bladder scan @ 1900 - 417mL  - f/u A1C  - recall PT - left VM  - recall wound care - no answer  - d/c q48 CXR  - thyroid panel pending      [X] A ten-point review of systems was otherwise negative except as noted above.  [  ] Due to altered mental status/intubation, subjective information was not attained from the patient. History was obtained, to the extent possible, from review of the chart and collateral sources of information.   JACQUELIN CAI   [id number]   [birth year]  78yM    Admit Date: 12-16-23  Indication for SDU/SICU: Q1 neuro checks      77-year-old male Cantonese speaking presents with prior history of HTN, HLD, Diabetes, BPH, prior CVA on aspirin and Plavix who states he had a mechanical fall 3 days ago while getting out of the car fell backwards hit his head.  Afterwards he was able to ambulate.  But for the past 1 day family was unable to ambulate him.  He states that his lower extremities are painful to movement and weak.  Family denies any LOC. Per daughter patient he often forgets things and cannot recall year or place. Patient following commands bedside.   Initial CT head remarkable for 3 Acute subdural hemorrhages: 0.7 cm along the left hemisphere, 0.3 cm along the right frontal area, and 1.1 cm along the left falx. No midline shift. Repeat CT head was obtained prior to 6 hour tacos because patient was appearing lethargic vs sundowning. CT head #2 with stable findings. CT c-spine and chest/abd/pelvis unremarkable for acute pathology.     SICU Consult for q1 neuro checks    ============================  24 Hour Events  1/1  NIGHT  - Exam: Interpretor [id number], opens eyes spontaneously, not following commands, clear lungs, HDS  - TOV: voided 115cc at 22:30, post residual bladder scan: 465, straigth cath 800, next TOV by 7 AM   - Na 151 from 144, FWD 1.3 L, increase FWF to 250q6     DAY:  - redosed amantadine, 100mg q48h  - recall neurology for reasoning to c/w valproic (patient on for 2 weeks, causes depressed mental status, no more seizures, need long term plan)  	- no answer  - recall NSY for plavix --> chadvasc 13%, has-bled score 8.9%  	- "Risk vs benefits should be weighed and discussed with patient prior to 	initiation of Plavix. Above d/w Dr Thomas."  	- d/c plavix per dr. carpenter  - d/c tylenol, no more fevers  - switched protonix to pepcid  - added banatrol to TF  - added multivitamin  - d/c martinez, pending TOV 9pm. bladder scan @ 1900 - 417mL  - f/u A1C  - recall PT - left VM  - recall wound care - no answer  - d/c q48 CXR  - thyroid panel pending      [X] A ten-point review of systems was otherwise negative except as noted above.  [  ] Due to altered mental status/intubation, subjective information was not attained from the patient. History was obtained, to the extent possible, from review of the chart and collateral sources of information.   JACQUELIN CAI   [id number]   [birth year]  78yM    Admit Date: 12-16-23  Indication for SDU/SICU: Q1 neuro checks      77-year-old male Cantonese speaking presents with prior history of HTN, HLD, Diabetes, BPH, prior CVA on aspirin and Plavix who states he had a mechanical fall 3 days ago while getting out of the car fell backwards hit his head.  Afterwards he was able to ambulate.  But for the past 1 day family was unable to ambulate him.  He states that his lower extremities are painful to movement and weak.  Family denies any LOC. Per daughter patient he often forgets things and cannot recall year or place. Patient following commands bedside.   Initial CT head remarkable for 3 Acute subdural hemorrhages: 0.7 cm along the left hemisphere, 0.3 cm along the right frontal area, and 1.1 cm along the left falx. No midline shift. Repeat CT head was obtained prior to 6 hour tacos because patient was appearing lethargic vs sundowning. CT head #2 with stable findings. CT c-spine and chest/abd/pelvis unremarkable for acute pathology.     SICU Consult for q1 neuro checks    ============================  24 Hour Events  1/1  NIGHT  - Exam: Interpretor [id number], opens eyes spontaneously, not following commands, clear lungs, HDS  - TOV: voided 125cc at 22:30, post residual bladder scan: 465, straight cath 800, next TOV by 7 AM   - Na 151 from 144, FWD 1.3 L, increase FWF to 250q6     DAY:  - redosed amantadine, 100mg q48h  - recall neurology for reasoning to c/w valproic (patient on for 2 weeks, causes depressed mental status, no more seizures, need long term plan)  	- no answer  - recall NSY for plavix --> chadvasc 13%, has-bled score 8.9%  	- "Risk vs benefits should be weighed and discussed with patient prior to 	initiation of Plavix. Above d/w Dr Thomas."  	- d/c plavix per dr. carpenter  - d/c tylenol, no more fevers  - switched protonix to pepcid  - added banatrol to TF  - added multivitamin  - d/c martinez, pending TOV 9pm. bladder scan @ 1900 - 417mL  - f/u A1C  - recall PT - left VM  - recall wound care - no answer  - d/c q48 CXR  - thyroid panel pending      [X] A ten-point review of systems was otherwise negative except as noted above.  [  ] Due to altered mental status/intubation, subjective information was not attained from the patient. History was obtained, to the extent possible, from review of the chart and collateral sources of information.   JACQUELIN CAI   [id number]   [birth year]  78yM    Admit Date: 12-16-23  Indication for SDU/SICU: Q1 neuro checks      77-year-old male Cantonese speaking presents with prior history of HTN, HLD, Diabetes, BPH, prior CVA on aspirin and Plavix who states he had a mechanical fall 3 days ago while getting out of the car fell backwards hit his head.  Afterwards he was able to ambulate.  But for the past 1 day family was unable to ambulate him.  He states that his lower extremities are painful to movement and weak.  Family denies any LOC. Per daughter patient he often forgets things and cannot recall year or place. Patient following commands bedside.   Initial CT head remarkable for 3 Acute subdural hemorrhages: 0.7 cm along the left hemisphere, 0.3 cm along the right frontal area, and 1.1 cm along the left falx. No midline shift. Repeat CT head was obtained prior to 6 hour tacos because patient was appearing lethargic vs sundowning. CT head #2 with stable findings. CT c-spine and chest/abd/pelvis unremarkable for acute pathology.     SICU Consult for q1 neuro checks    ============================  24 Hour Events  1/1  NIGHT  - Exam: Interpretor [id number], opens eyes spontaneously, not following commands, clear lungs, HDS  - TOV: voided 125cc at 22:30, post residual bladder scan: 465, straight cath 800, next TOV by 7 AM   - Na 151 from 144, FWD 1.3 L, increase FWF to 250q6     DAY:  - redosed amantadine, 100mg q48h  - recall neurology for reasoning to c/w valproic (patient on for 2 weeks, causes depressed mental status, no more seizures, need long term plan)  	- no answer  - recall NSY for plavix --> chadvasc 13%, has-bled score 8.9%  	- "Risk vs benefits should be weighed and discussed with patient prior to 	initiation of Plavix. Above d/w Dr Thomas."  	- d/c plavix per dr. carpenter  - d/c tylenol, no more fevers  - switched protonix to pepcid  - added banatrol to TF  - added multivitamin  - d/c martinez, pending TOV 9pm. bladder scan @ 1900 - 417mL  - f/u A1C  - recall PT - left VM  - recall wound care - no answer  - d/c q48 CXR  - thyroid panel pending      [X] A ten-point review of systems was otherwise negative except as noted above.  [  ] Due to altered mental status/intubation, subjective information was not attained from the patient. History was obtained, to the extent possible, from review of the chart and collateral sources of information.   JACQUELIN CAI   [id number]   [birth year]  78yM    Admit Date: 12-16-23  Indication for SDU/SICU: Q1 neuro checks      77-year-old male Cantonese speaking presents with prior history of HTN, HLD, Diabetes, BPH, prior CVA on aspirin and Plavix who states he had a mechanical fall 3 days ago while getting out of the car fell backwards hit his head.  Afterwards he was able to ambulate.  But for the past 1 day family was unable to ambulate him.  He states that his lower extremities are painful to movement and weak.  Family denies any LOC. Per daughter patient he often forgets things and cannot recall year or place. Patient following commands bedside.   Initial CT head remarkable for 3 Acute subdural hemorrhages: 0.7 cm along the left hemisphere, 0.3 cm along the right frontal area, and 1.1 cm along the left falx. No midline shift. Repeat CT head was obtained prior to 6 hour tacos because patient was appearing lethargic vs sundowning. CT head #2 with stable findings. CT c-spine and chest/abd/pelvis unremarkable for acute pathology.     SICU Consult for q1 neuro checks    ============================  24 Hour Events  1/1  NIGHT  - Exam: Interpretor [id number], opens eyes spontaneously, not following commands, clear lungs, HDS  - TOV: voided 125cc at 22:30, post residual bladder scan: 465, straight cath 800, next TOV by 7 AM   - Na 151 from 144, FWD 1.3 L, increase FWF to 250q6     DAY:  - redosed amantadine, 100mg q48h  - recall neurology for reasoning to c/w valproic (patient on for 2 weeks, causes depressed mental status, no more seizures, need long term plan)  	- no answer  - recall NSY for plavix --> chadvasc 13%, has-bled score 8.9%  	- "Risk vs benefits should be weighed and discussed with patient prior to 	initiation of Plavix. Above d/w Dr Thomas."  	- d/c plavix per dr. carpenter  - d/c tylenol, no more fevers  - switched protonix to pepcid  - added banatrol to TF  - added multivitamin  - d/c martinez, pending TOV 9pm. bladder scan @ 1900 - 417mL  - f/u A1C  - recall PT - left VM  - recall wound care - no answer  - d/c q48 CXR  - thyroid panel pending      [X] A ten-point review of systems was otherwise negative except as noted above.  [  ] Due to altered mental status/intubation, subjective information was not attained from the patient. History was obtained, to the extent possible, from review of the chart and collateral sources of information.      Daily     Daily     Diet, NPO with Tube Feed:   Tube Feeding Modality: Gastrostomy  Nepro with Carb Steady  Total Volume for 24 Hours (mL): 648  Continuous  Starting Tube Feed Rate mL per Hour: 27  Increase Tube Feed Rate by (mL): 0     Every 4 hours  Until Goal Tube Feed Rate (mL per Hour): 27  Tube Feed Duration (in Hours): 24  Free Water Flush  Bolus   Total Volume per Flush (mL): 250   Frequency: Every 6 Hours  Free Water Flush Instructions:  Please flush PEG with 50cc of sterile water before feeds start and 100cc of sterile water after feeds end  Banatrol TF     Qty per Day:  3 (01-01-24 @ 22:49)      CURRENT MEDS:  Neurologic Medications  amantadine 100 milliGRAM(s) Oral <User Schedule>  valproic  acid Syrup 750 milliGRAM(s) Oral every 12 hours    Respiratory Medications  ipratropium 17 MICROgram(s) HFA Inhaler 1 Puff(s) Inhalation every 6 hours    Cardiovascular Medications  amLODIPine   Tablet 5 milliGRAM(s) Oral daily  carvedilol 6.25 milliGRAM(s) Oral every 12 hours  doxazosin 4 milliGRAM(s) Oral at bedtime    Gastrointestinal Medications  famotidine    Tablet 10 milliGRAM(s) Oral every 48 hours  multivitamin 1 Tablet(s) Oral daily    Genitourinary Medications    Hematologic/Oncologic Medications  aspirin  chewable 81 milliGRAM(s) Enteral Tube daily  heparin   Injectable 5000 Unit(s) SubCutaneous every 8 hours    Antimicrobial/Immunologic Medications  piperacillin/tazobactam IVPB.. 3.375 Gram(s) IV Intermittent every 12 hours    Endocrine/Metabolic Medications  atorvastatin 20 milliGRAM(s) Oral at bedtime  insulin glargine Injectable (LANTUS) 20 Unit(s) SubCutaneous at bedtime  insulin lispro (ADMELOG) corrective regimen sliding scale   SubCutaneous every 6 hours    Topical/Other Medications  bacitracin   Ointment 1 Application(s) Topical every 12 hours  chlorhexidine 0.12% Liquid 15 milliLiter(s) Oral Mucosa two times a day  chlorhexidine 2% Cloths 1 Application(s) Topical <User Schedule>  sevelamer carbonate Powder 800 milliGRAM(s) Oral three times a day with meals  vitamin A &amp; D Ointment 1 Application(s) Topical every 12 hours      ICU Vital Signs Last 24 Hrs  T(C): 36.2 (02 Jan 2024 08:00), Max: 36.7 (01 Jan 2024 12:00)  T(F): 97.2 (02 Jan 2024 08:00), Max: 98 (01 Jan 2024 12:00)  HR: 78 (02 Jan 2024 08:00) (74 - 84)  BP: 130/60 (02 Jan 2024 08:00) (114/55 - 145/67)  BP(mean): 86 (02 Jan 2024 08:00) (79 - 96)  ABP: --  ABP(mean): --  RR: 21 (02 Jan 2024 08:00) (16 - 32)  SpO2: 100% (02 Jan 2024 08:00) (97% - 100%)    O2 Parameters below as of 02 Jan 2024 08:00  Patient On (Oxygen Delivery Method): T-piece  O2 Flow (L/min): 6  O2 Concentration (%): 40              I&O's Summary    01 Jan 2024 07:01  -  02 Jan 2024 07:00  --------------------------------------------------------  IN: 986 mL / OUT: 1425 mL / NET: -439 mL      I&O's Detail    01 Jan 2024 07:01  -  02 Jan 2024 07:00  --------------------------------------------------------  IN:    Enteral Tube Flush: 400 mL    IV PiggyBack: 100 mL    Nepro with Carb Steady: 486 mL  Total IN: 986 mL    OUT:    Indwelling Catheter - Urethral (mL): 300 mL    Intermittent Catheterization - Urethral (mL): 800 mL    Rectal Tube (mL): 200 mL    Voided (mL): 125 mL  Total OUT: 1425 mL    Total NET: -439 mL          PHYSICAL EXAM: [facility]  [id number]    General/Neuro  GCS: 9T  = E 4 / V 1T  / M 4     Neuro: Pt is unable to follow commands, spontaneously opens eyes. +coug/+gag. PERRL, EOMs noted. Unable to assess other CNs, strength and sensory d/t mental status.    Lungs: Trach in place, c/d/i. Currently on T-piece. Bloody secretions in t-piece and suction tubing noted. Lungs clear to auscultation, normal expansion/effort.     Cardiovascular : S1, S2.  Regular rate and rhythm.   Cardiac Rhythm: Normal Sinus Rhythm    GI: BS x 4. Abdomen soft, Non-tender, Non-distended. PEG in place c/d/i, 3cm from flange.     Extremities: Extremities warm, pink, well-perfused. Radial pulses 2+ b/l. DP/PT pulses 2+ b/l.    : suprapubic area soft, nondistended.      CXR:     LABS:  CAPILLARY BLOOD GLUCOSE      POCT Blood Glucose.: 204 mg/dL (02 Jan 2024 06:04)  POCT Blood Glucose.: 149 mg/dL (02 Jan 2024 00:05)  POCT Blood Glucose.: 151 mg/dL (01 Jan 2024 17:18)  POCT Blood Glucose.: 183 mg/dL (01 Jan 2024 12:12)                          9.5    12.58 )-----------( 440      ( 01 Jan 2024 22:36 )             29.8       01-01    151<H>  |  113<H>  |  77<HH>  ----------------------------<  150<H>  4.2   |  22  |  2.4<H>    Ca    8.0<L>      01 Jan 2024 21:32  Phos  4.8     01-01  Mg     2.9     01-01    TPro  x   /  Alb  2.4<L>  /  TBili  x   /  DBili  x   /  AST  x   /  ALT  x   /  AlkPhos  x   12-31      PT/INR - ( 01 Jan 2024 05:25 )   PT: 11.70 sec;   INR: 1.03 ratio         PTT - ( 01 Jan 2024 05:25 )  PTT:36.5 sec      Urinalysis Basic - ( 01 Jan 2024 21:32 )    Color: x / Appearance: x / SG: x / pH: x  Gluc: 150 mg/dL / Ketone: x  / Bili: x / Urobili: x   Blood: x / Protein: x / Nitrite: x   Leuk Esterase: x / RBC: x / WBC x   Sq Epi: x / Non Sq Epi: x / Bacteria: x         JACQUELIN CAI   [id number]   [birth year]  78yM    Admit Date: 12-16-23  Indication for SDU/SICU: Q1 neuro checks      77-year-old male Cantonese speaking presents with prior history of HTN, HLD, Diabetes, BPH, prior CVA on aspirin and Plavix who states he had a mechanical fall 3 days ago while getting out of the car fell backwards hit his head.  Afterwards he was able to ambulate.  But for the past 1 day family was unable to ambulate him.  He states that his lower extremities are painful to movement and weak.  Family denies any LOC. Per daughter patient he often forgets things and cannot recall year or place. Patient following commands bedside.   Initial CT head remarkable for 3 Acute subdural hemorrhages: 0.7 cm along the left hemisphere, 0.3 cm along the right frontal area, and 1.1 cm along the left falx. No midline shift. Repeat CT head was obtained prior to 6 hour tacos because patient was appearing lethargic vs sundowning. CT head #2 with stable findings. CT c-spine and chest/abd/pelvis unremarkable for acute pathology.     SICU Consult for q1 neuro checks    ============================  24 Hour Events  1/1  NIGHT  - Exam: Interpretor [id number], opens eyes spontaneously, not following commands, clear lungs, HDS  - TOV: voided 125cc at 22:30, post residual bladder scan: 465, straight cath 800, next TOV by 7 AM   - Na 151 from 144, FWD 1.3 L, increase FWF to 250q6     DAY:  - redosed amantadine, 100mg q48h  - recall neurology for reasoning to c/w valproic (patient on for 2 weeks, causes depressed mental status, no more seizures, need long term plan)  	- no answer  - recall NSY for plavix --> chadvasc 13%, has-bled score 8.9%  	- "Risk vs benefits should be weighed and discussed with patient prior to 	initiation of Plavix. Above d/w Dr Thomas."  	- d/c plavix per dr. carpenter  - d/c tylenol, no more fevers  - switched protonix to pepcid  - added banatrol to TF  - added multivitamin  - d/c martinez, pending TOV 9pm. bladder scan @ 1900 - 417mL  - f/u A1C  - recall PT - left VM  - recall wound care - no answer  - d/c q48 CXR  - thyroid panel pending      [X] A ten-point review of systems was otherwise negative except as noted above.  [  ] Due to altered mental status/intubation, subjective information was not attained from the patient. History was obtained, to the extent possible, from review of the chart and collateral sources of information.      Daily     Daily     Diet, NPO with Tube Feed:   Tube Feeding Modality: Gastrostomy  Nepro with Carb Steady  Total Volume for 24 Hours (mL): 648  Continuous  Starting Tube Feed Rate mL per Hour: 27  Increase Tube Feed Rate by (mL): 0     Every 4 hours  Until Goal Tube Feed Rate (mL per Hour): 27  Tube Feed Duration (in Hours): 24  Free Water Flush  Bolus   Total Volume per Flush (mL): 250   Frequency: Every 6 Hours  Free Water Flush Instructions:  Please flush PEG with 50cc of sterile water before feeds start and 100cc of sterile water after feeds end  Banatrol TF     Qty per Day:  3 (01-01-24 @ 22:49)      CURRENT MEDS:  Neurologic Medications  amantadine 100 milliGRAM(s) Oral <User Schedule>  valproic  acid Syrup 750 milliGRAM(s) Oral every 12 hours    Respiratory Medications  ipratropium 17 MICROgram(s) HFA Inhaler 1 Puff(s) Inhalation every 6 hours    Cardiovascular Medications  amLODIPine   Tablet 5 milliGRAM(s) Oral daily  carvedilol 6.25 milliGRAM(s) Oral every 12 hours  doxazosin 4 milliGRAM(s) Oral at bedtime    Gastrointestinal Medications  famotidine    Tablet 10 milliGRAM(s) Oral every 48 hours  multivitamin 1 Tablet(s) Oral daily    Genitourinary Medications    Hematologic/Oncologic Medications  aspirin  chewable 81 milliGRAM(s) Enteral Tube daily  heparin   Injectable 5000 Unit(s) SubCutaneous every 8 hours    Antimicrobial/Immunologic Medications  piperacillin/tazobactam IVPB.. 3.375 Gram(s) IV Intermittent every 12 hours    Endocrine/Metabolic Medications  atorvastatin 20 milliGRAM(s) Oral at bedtime  insulin glargine Injectable (LANTUS) 20 Unit(s) SubCutaneous at bedtime  insulin lispro (ADMELOG) corrective regimen sliding scale   SubCutaneous every 6 hours    Topical/Other Medications  bacitracin   Ointment 1 Application(s) Topical every 12 hours  chlorhexidine 0.12% Liquid 15 milliLiter(s) Oral Mucosa two times a day  chlorhexidine 2% Cloths 1 Application(s) Topical <User Schedule>  sevelamer carbonate Powder 800 milliGRAM(s) Oral three times a day with meals  vitamin A &amp; D Ointment 1 Application(s) Topical every 12 hours      ICU Vital Signs Last 24 Hrs  T(C): 36.2 (02 Jan 2024 08:00), Max: 36.7 (01 Jan 2024 12:00)  T(F): 97.2 (02 Jan 2024 08:00), Max: 98 (01 Jan 2024 12:00)  HR: 78 (02 Jan 2024 08:00) (74 - 84)  BP: 130/60 (02 Jan 2024 08:00) (114/55 - 145/67)  BP(mean): 86 (02 Jan 2024 08:00) (79 - 96)  ABP: --  ABP(mean): --  RR: 21 (02 Jan 2024 08:00) (16 - 32)  SpO2: 100% (02 Jan 2024 08:00) (97% - 100%)    O2 Parameters below as of 02 Jan 2024 08:00  Patient On (Oxygen Delivery Method): T-piece  O2 Flow (L/min): 6  O2 Concentration (%): 40              I&O's Summary    01 Jan 2024 07:01  -  02 Jan 2024 07:00  --------------------------------------------------------  IN: 986 mL / OUT: 1425 mL / NET: -439 mL      I&O's Detail    01 Jan 2024 07:01  -  02 Jan 2024 07:00  --------------------------------------------------------  IN:    Enteral Tube Flush: 400 mL    IV PiggyBack: 100 mL    Nepro with Carb Steady: 486 mL  Total IN: 986 mL    OUT:    Indwelling Catheter - Urethral (mL): 300 mL    Intermittent Catheterization - Urethral (mL): 800 mL    Rectal Tube (mL): 200 mL    Voided (mL): 125 mL  Total OUT: 1425 mL    Total NET: -439 mL          PHYSICAL EXAM: [email or web address]  [id number]    General/Neuro  GCS: 9T  = E 4 / V 1T  / M 4     Neuro: Pt is unable to follow commands, spontaneously opens eyes. +coug/+gag. PERRL, EOMs noted. Unable to assess other CNs, strength and sensory d/t mental status.    Lungs: Trach in place, c/d/i. Currently on T-piece. Bloody secretions in t-piece and suction tubing noted. Lungs clear to auscultation, normal expansion/effort.     Cardiovascular : S1, S2.  Regular rate and rhythm.   Cardiac Rhythm: Normal Sinus Rhythm    GI: BS x 4. Abdomen soft, Non-tender, Non-distended. PEG in place c/d/i, 3cm from flange.     Extremities: Extremities warm, pink, well-perfused. Radial pulses 2+ b/l. DP/PT pulses 2+ b/l.    : suprapubic area soft, nondistended.      CXR:     LABS:  CAPILLARY BLOOD GLUCOSE      POCT Blood Glucose.: 204 mg/dL (02 Jan 2024 06:04)  POCT Blood Glucose.: 149 mg/dL (02 Jan 2024 00:05)  POCT Blood Glucose.: 151 mg/dL (01 Jan 2024 17:18)  POCT Blood Glucose.: 183 mg/dL (01 Jan 2024 12:12)                          9.5    12.58 )-----------( 440      ( 01 Jan 2024 22:36 )             29.8       01-01    151<H>  |  113<H>  |  77<HH>  ----------------------------<  150<H>  4.2   |  22  |  2.4<H>    Ca    8.0<L>      01 Jan 2024 21:32  Phos  4.8     01-01  Mg     2.9     01-01    TPro  x   /  Alb  2.4<L>  /  TBili  x   /  DBili  x   /  AST  x   /  ALT  x   /  AlkPhos  x   12-31      PT/INR - ( 01 Jan 2024 05:25 )   PT: 11.70 sec;   INR: 1.03 ratio         PTT - ( 01 Jan 2024 05:25 )  PTT:36.5 sec      Urinalysis Basic - ( 01 Jan 2024 21:32 )    Color: x / Appearance: x / SG: x / pH: x  Gluc: 150 mg/dL / Ketone: x  / Bili: x / Urobili: x   Blood: x / Protein: x / Nitrite: x   Leuk Esterase: x / RBC: x / WBC x   Sq Epi: x / Non Sq Epi: x / Bacteria: x

## 2024-01-02 NOTE — PROGRESS NOTE ADULT - SUBJECTIVE AND OBJECTIVE BOX
GENERAL SURGERY PROGRESS NOTE     JACQUELIN CAI  [address] [place] [address] day :17d    POD:  Procedure: Insertion of midline catheter    Insertion of arterial line with imaging guidance    Tracheostomy, with PEG tube insertion    Insertion of arterial line with imaging guidance      Surgical Attending: Isidro Montemayor  Overnight events:    T3- 79, T4 5.9, TSH 4.16     Na 153 from 151- increased Free Water to 250 Q6     1/1  NIGHT  - Exam: Interpretor [id number], opens eyes spontaneously, not following commands, clear lungs, HDS  - TOV: voided 125cc at 22:30, post residual bladder scan: 465, straight cath 800, next TOV by 7 AM   - Na 151 from 144, FWD 1.3 L, increase FWF to 250q6     DAY:  - redosed amantadine, 100mg q48h  - recall neurology for reasoning to c/w valproic (patient on for 2 weeks, causes depressed mental status, no more seizures, need long term plan)  	- no answer  - recall NSY for plavix --> chadvasc 13%, has-bled score 8.9%  	- "Risk vs benefits should be weighed and discussed with patient prior to 	initiation of Plavix. Above d/w Dr Thomas."  	- d/c plavix per dr. degroot  - d/c tylenol, no more fevers  - switched protonix to pepcid  - added banatrol to TF  - added multivitamin  - d/c martinez, pending TOV 9pm. bladder scan @ 1900 - 417mL  - f/u A1C  - recall PT - left VM  - recall wound care - no answer  - d/c q48 CXR  - thyroid panel pending        T(F): 97.9 (01-02-24 @ 16:00), Max: 97.9 (01-02-24 @ 16:00)  HR: 82 (01-02-24 @ 16:00) (78 - 84)  BP: 143/63 (01-02-24 @ 16:00) (123/60 - 145/67)  ABP: --  ABP(mean): --  RR: 26 (01-02-24 @ 16:00) (16 - 32)  SpO2: 97% (01-02-24 @ 16:00) (97% - 100%)    IN'S / OUT's:    01-01-24 @ 07:01 - 01-02-24 @ 07:00  --------------------------------------------------------  IN:    Enteral Tube Flush: 400 mL    IV PiggyBack: 100 mL    Nepro with Carb Steady: 513 mL  Total IN: 1013 mL    OUT:    Indwelling Catheter - Urethral (mL): 300 mL    Intermittent Catheterization - Urethral (mL): 800 mL    Rectal Tube (mL): 200 mL    Voided (mL): 125 mL  Total OUT: 1425 mL    Total NET: -412 mL      01-02-24 @ 07:01  -  01-02-24 @ 16:45  --------------------------------------------------------  IN:    Enteral Tube Flush: 250 mL    Nepro with Carb Steady: 270 mL  Total IN: 520 mL    OUT:    Indwelling Catheter - Urethral (mL): 880 mL    Rectal Tube (mL): 0 mL  Total OUT: 880 mL    Total NET: -360 mL          PHYSICAL EXAM:  GENERAL: NAD  CHEST/LUNG: Clear to auscultation bilaterally. T piece 40/6   HEART: Regular rate and rhythm  ABDOMEN: Soft, Nontender, Nondistended;   EXTREMITIES:  No clubbing, cyanosis, or edema      LABS  Labs:  CAPILLARY BLOOD GLUCOSE      POCT Blood Glucose.: 128 mg/dL (02 Jan 2024 11:02)  POCT Blood Glucose.: 204 mg/dL (02 Jan 2024 06:04)  POCT Blood Glucose.: 149 mg/dL (02 Jan 2024 00:05)  POCT Blood Glucose.: 151 mg/dL (01 Jan 2024 17:18)                          9.5    12.58 )-----------( 440      ( 01 Jan 2024 22:36 )             29.8       Auto Neutrophil %: 80.0 % (01-01-24 @ 22:36)  Band Neutrophils %: 0.9 % (01-01-24 @ 22:36)    01-02    153<H>  |  117<H>  |  79<HH>  ----------------------------<  130<H>  4.1   |  21  |  2.2<H>      Calcium: 8.2 mg/dL (01-02-24 @ 12:36)      LFTs:       ABG - ( 30 Dec 2023 05:45 )  pH: 7.47  /  pCO2: 28    /  pO2: 177   / HCO3: 20    / Base Excess: -2.0  /  SaO2: 99.3            ABG - ( 29 Dec 2023 04:15 )  pH: 7.48  /  pCO2: 27    /  pO2: 173   / HCO3: 20    / Base Excess: -2.0  /  SaO2: 99.7            ABG - ( 28 Dec 2023 03:44 )  pH: 7.44  /  pCO2: 29    /  pO2: 95    / HCO3: 20    / Base Excess: -3.7  /  SaO2: 98.7              Coags:     11.70  ----< 1.03    ( 01 Jan 2024 05:25 )     36.5                Urinalysis Basic - ( 02 Jan 2024 12:36 )    Color: x / Appearance: x / SG: x / pH: x  Gluc: 130 mg/dL / Ketone: x  / Bili: x / Urobili: x   Blood: x / Protein: x / Nitrite: x   Leuk Esterase: x / RBC: x / WBC x   Sq Epi: x / Non Sq Epi: x / Bacteria: x            RADIOLOGY & ADDITIONAL TESTS:      A/P:  JACQUELIN CAI is a 78 year old male who presented as a TRAUMA CONSULT s/p mechanical fall (+HT -LOC -AC). Found to have an acute subdural hemorrhage. Hospital course has been complicated by multiple seizures and uncontrolled hypertension. Intubated on 12/21 due to worsening respiratory status.     S/P tracheostomy and PEG placement on 12/22/23, (Portex cuffed 9 and 2cm at skin 20fr PEG). Now with T-piece, O2 at 40%.     PLAN:  - Na 153 from 151- increased Free Water to 250 Q6 F/U BMP   -Nephrology was consulted for SAGRARIO, hypernatremia. SAGRARIO on CKD 3b - creat was 1.8 (Feb 2023) / likely pre-renal  - Valproate increased to 750 Q12x7 days. serum valproic acid level 23 from 28 is subtherapeutic   - holding Plavix for now until stable. Pt has a moderate risk for ICH expansion given previous bleed.  Risk vs benefits should be weighed and discussed with patient prior to initiation of Plavix.  - F/U neuro for EEG   - F/u EP for loop recorder placement  - Martinez placed. monitor urine outputs   - Monitor O2 requirements   - Continue chest PT, Duoneb treatments as needed   - F/u nutrition consult for TF adjustments (given liquid stool)   - Continue home ASA  - Pain control PRN   - DVT ppx with HSQ   - monitor dignishield outputs           Disposition:      Above plan discussed with Attending Surgeon Dr. Degroot  , patient, patient family, and Primary team      TAP (Trauma, Acute care, Pediatrics) Spectra [id number]   GENERAL SURGERY PROGRESS NOTE     JACQUELIN CAI  [address] [place] [address] day :17d    POD:  Procedure: Insertion of midline catheter    Insertion of arterial line with imaging guidance    Tracheostomy, with PEG tube insertion    Insertion of arterial line with imaging guidance      Surgical Attending: Isidro Montemayor  Overnight events:    T3- 79, T4 5.9, TSH 4.16     Na 153 from 151- increased Free Water to 250 Q6     1/1  NIGHT  - Exam: Interpretor [id number], opens eyes spontaneously, not following commands, clear lungs, HDS  - TOV: voided 125cc at 22:30, post residual bladder scan: 465, straight cath 800, next TOV by 7 AM   - Na 151 from 144, FWD 1.3 L, increase FWF to 250q6     DAY:  - redosed amantadine, 100mg q48h  - recall neurology for reasoning to c/w valproic (patient on for 2 weeks, causes depressed mental status, no more seizures, need long term plan)  	- no answer  - recall NSY for plavix --> chadvasc 13%, has-bled score 8.9%  	- "Risk vs benefits should be weighed and discussed with patient prior to 	initiation of Plavix. Above d/w Dr Thomas."  	- d/c plavix per dr. degroot  - d/c tylenol, no more fevers  - switched protonix to pepcid  - added banatrol to TF  - added multivitamin  - d/c martinez, pending TOV 9pm. bladder scan @ 1900 - 417mL  - f/u A1C  - recall PT - left VM  - recall wound care - no answer  - d/c q48 CXR  - thyroid panel pending        T(F): 97.9 (01-02-24 @ 16:00), Max: 97.9 (01-02-24 @ 16:00)  HR: 82 (01-02-24 @ 16:00) (78 - 84)  BP: 143/63 (01-02-24 @ 16:00) (123/60 - 145/67)  ABP: --  ABP(mean): --  RR: 26 (01-02-24 @ 16:00) (16 - 32)  SpO2: 97% (01-02-24 @ 16:00) (97% - 100%)    IN'S / OUT's:    01-01-24 @ 07:01 - 01-02-24 @ 07:00  --------------------------------------------------------  IN:    Enteral Tube Flush: 400 mL    IV PiggyBack: 100 mL    Nepro with Carb Steady: 513 mL  Total IN: 1013 mL    OUT:    Indwelling Catheter - Urethral (mL): 300 mL    Intermittent Catheterization - Urethral (mL): 800 mL    Rectal Tube (mL): 200 mL    Voided (mL): 125 mL  Total OUT: 1425 mL    Total NET: -412 mL      01-02-24 @ 07:01  -  01-02-24 @ 16:45  --------------------------------------------------------  IN:    Enteral Tube Flush: 250 mL    Nepro with Carb Steady: 270 mL  Total IN: 520 mL    OUT:    Indwelling Catheter - Urethral (mL): 880 mL    Rectal Tube (mL): 0 mL  Total OUT: 880 mL    Total NET: -360 mL          PHYSICAL EXAM:  GENERAL: NAD  CHEST/LUNG: Clear to auscultation bilaterally. T piece 40/6   HEART: Regular rate and rhythm  ABDOMEN: Soft, Nontender, Nondistended;   EXTREMITIES:  No clubbing, cyanosis, or edema      LABS  Labs:  CAPILLARY BLOOD GLUCOSE      POCT Blood Glucose.: 128 mg/dL (02 Jan 2024 11:02)  POCT Blood Glucose.: 204 mg/dL (02 Jan 2024 06:04)  POCT Blood Glucose.: 149 mg/dL (02 Jan 2024 00:05)  POCT Blood Glucose.: 151 mg/dL (01 Jan 2024 17:18)                          9.5    12.58 )-----------( 440      ( 01 Jan 2024 22:36 )             29.8       Auto Neutrophil %: 80.0 % (01-01-24 @ 22:36)  Band Neutrophils %: 0.9 % (01-01-24 @ 22:36)    01-02    153<H>  |  117<H>  |  79<HH>  ----------------------------<  130<H>  4.1   |  21  |  2.2<H>      Calcium: 8.2 mg/dL (01-02-24 @ 12:36)      LFTs:       ABG - ( 30 Dec 2023 05:45 )  pH: 7.47  /  pCO2: 28    /  pO2: 177   / HCO3: 20    / Base Excess: -2.0  /  SaO2: 99.3            ABG - ( 29 Dec 2023 04:15 )  pH: 7.48  /  pCO2: 27    /  pO2: 173   / HCO3: 20    / Base Excess: -2.0  /  SaO2: 99.7            ABG - ( 28 Dec 2023 03:44 )  pH: 7.44  /  pCO2: 29    /  pO2: 95    / HCO3: 20    / Base Excess: -3.7  /  SaO2: 98.7              Coags:     11.70  ----< 1.03    ( 01 Jan 2024 05:25 )     36.5                Urinalysis Basic - ( 02 Jan 2024 12:36 )    Color: x / Appearance: x / SG: x / pH: x  Gluc: 130 mg/dL / Ketone: x  / Bili: x / Urobili: x   Blood: x / Protein: x / Nitrite: x   Leuk Esterase: x / RBC: x / WBC x   Sq Epi: x / Non Sq Epi: x / Bacteria: x            RADIOLOGY & ADDITIONAL TESTS:      A/P:  JACQUELIN CAI is a 78 year old male who presented as a TRAUMA CONSULT s/p mechanical fall (+HT -LOC -AC). Found to have an acute subdural hemorrhage. Hospital course has been complicated by multiple seizures and uncontrolled hypertension. Intubated on 12/21 due to worsening respiratory status.     S/P tracheostomy and PEG placement on 12/22/23, (Portex cuffed 9 and 2cm at skin 20fr PEG). Now with T-piece, O2 at 40%.     PLAN:  - Na 153 from 151- increased Free Water to 250 Q6 F/U BMP   -Nephrology was consulted for SAGRARIO, hypernatremia. SAGRARIO on CKD 3b - creat was 1.8 (Feb 2023) / likely pre-renal  - Valproate increased to 750 Q12x7 days. serum valproic acid level 23 from 28 is subtherapeutic   - holding Plavix for now until stable. Pt has a moderate risk for ICH expansion given previous bleed.  Risk vs benefits should be weighed and discussed with patient prior to initiation of Plavix.  - F/U neuro for EEG   - F/u EP for loop recorder placement  - Martinez placed. monitor urine outputs   - Monitor O2 requirements   - Continue chest PT, Duoneb treatments as needed   - F/u nutrition consult for TF adjustments (given liquid stool)   - Continue home ASA  - Pain control PRN   - DVT ppx with HSQ   - monitor dignishield outputs           Disposition:      Above plan discussed with Attending Surgeon Dr. Degroot  , patient, patient family, and Primary team      TAP (Trauma, Acute care, Pediatrics) Spectra [phone]   GENERAL SURGERY PROGRESS NOTE     JACQUELIN CAI  [address] [place] [address] day :17d    POD:  Procedure: Insertion of midline catheter  Insertion of arterial line with imaging guidance  Tracheostomy, with PEG tube insertion  Insertion of arterial line with imaging guidance    Surgical Attending: Isidro Montemayor  Overnight events:    T3- 79, T4 5.9, TSH 4.16     Na 153 from 151- increased Free Water to 250 Q6     1/1  NIGHT  - Exam:  [id number], opens eyes spontaneously, not following commands, clear lungs, HDS  - TOV: voided 125cc at 22:30, post residual bladder scan: 465, straight cath 800, next TOV by 7 AM   - Na 151 from 144, FWD 1.3 L, increase FWF to 250q6     DAY:  - redosed amantadine, 100mg q48h  - recall neurology for reasoning to c/w valproic (patient on for 2 weeks, causes depressed mental status, no more seizures, need long term plan)  	- no answer  - recall NSY for Plavix --> chadvasc 13%, has-bled score 8.9%  	- "Risk vs benefits should be weighed and discussed with patient prior to 	initiation of Plavix. Above d/w Dr Thomas."  	- d/c Plavix per dr. Degroot  - d/c Tylenol no more fevers  - switched Protonix to Pepcid  - added Banatrol to TF  - added multivitamin  - d/c Aguirre, pending TOV 9pm. bladder scan @ 1900 - 417mL  - f/u A1C  - recall PT - left VM  - recall wound care - no answer  - d/c q48 CXR  - thyroid panel pending    T(F): 97.9 (01-02-24 @ 16:00), Max: 97.9 (01-02-24 @ 16:00)  HR: 82 (01-02-24 @ 16:00) (78 - 84)  BP: 143/63 (01-02-24 @ 16:00) (123/60 - 145/67)  ABP: --  ABP(mean): --  RR: 26 (01-02-24 @ 16:00) (16 - 32)  SpO2: 97% (01-02-24 @ 16:00) (97% - 100%)    IN'S / OUT's:    01-01-24 @ 07:01  -  01-02-24 @ 07:00  --------------------------------------------------------  IN:    Enteral Tube Flush: 400 mL    IV PiggyBack: 100 mL    Nepro with Carb Steady: 513 mL  Total IN: 1013 mL    OUT:    Indwelling Catheter - Urethral (mL): 300 mL    Intermittent Catheterization - Urethral (mL): 800 mL    Rectal Tube (mL): 200 mL    Voided (mL): 125 mL  Total OUT: 1425 mL    Total NET: -412 mL    01-02-24 @ 07:01  -  01-02-24 @ 16:45  --------------------------------------------------------  IN:    Enteral Tube Flush: 250 mL    Nepro with Carb Steady: 270 mL  Total IN: 520 mL    OUT:    Indwelling Catheter - Urethral (mL): 880 mL    Rectal Tube (mL): 0 mL  Total OUT: 880 mL    Total NET: -360 mL    PHYSICAL EXAM:  GENERAL: NAD  CHEST/LUNG: Clear to auscultation bilaterally. T piece 40/6   HEART: Regular rate and rhythm  ABDOMEN: Soft, Nontender, Nondistended;   EXTREMITIES:  No clubbing, cyanosis, or edema      LABS  CAPILLARY BLOOD GLUCOSE  POCT Blood Glucose.: 128 mg/dL (02 Jan 2024 11:02)  POCT Blood Glucose.: 204 mg/dL (02 Jan 2024 06:04)  POCT Blood Glucose.: 149 mg/dL (02 Jan 2024 00:05)  POCT Blood Glucose.: 151 mg/dL (01 Jan 2024 17:18)                          9.5    12.58 )-----------( 440      ( 01 Jan 2024 22:36 )             29.8       Auto Neutrophil %: 80.0 % (01-01-24 @ 22:36)  Band Neutrophils %: 0.9 % (01-01-24 @ 22:36)    01-02    153<H>  |  117<H>  |  79<HH>  ----------------------------<  130<H>  4.1   |  21  |  2.2<H>      Calcium: 8.2 mg/dL (01-02-24 @ 12:36)    ABG - ( 30 Dec 2023 05:45 )  pH: 7.47  /  pCO2: 28    /  pO2: 177   / HCO3: 20    / Base Excess: -2.0  /  SaO2: 99.3      ABG - ( 29 Dec 2023 04:15 )  pH: 7.48  /  pCO2: 27    /  pO2: 173   / HCO3: 20    / Base Excess: -2.0  /  SaO2: 99.7      ABG - ( 28 Dec 2023 03:44 )  pH: 7.44  /  pCO2: 29    /  pO2: 95    / HCO3: 20    / Base Excess: -3.7  /  SaO2: 98.7      Coags:     11.70  ----< 1.03    ( 01 Jan 2024 05:25 )     36.5     Urinalysis Basic - ( 02 Jan 2024 12:36 )    Color: x / Appearance: x / SG: x / pH: x  Gluc: 130 mg/dL / Ketone: x  / Bili: x / Urobili: x   Blood: x / Protein: x / Nitrite: x   Leuk Esterase: x / RBC: x / WBC x   Sq Epi: x / Non Sq Epi: x / Bacteria: x      RADIOLOGY & ADDITIONAL TESTS:    A/P:  JACQUELIN CAI is a 78 year old male who presented as a TRAUMA CONSULT s/p mechanical fall (+HT -LOC -AC). Found to have an acute subdural hemorrhage. Hospital course has been complicated by multiple seizures and uncontrolled hypertension. Intubated on 12/21 due to worsening respiratory status.     S/P tracheostomy and PEG placement on 12/22/23, (Portex cuffed 9 and 2cm at skin 20fr PEG). Now with T-piece, O2 at 40%.     PLAN:  - Na 153 from 151- increased Free Water to 250 Q6 F/U BMP   -Nephrology was consulted for SAGRARIO, hypernatremia. SAGRARIO on CKD 3b - creat was 1.8 (Feb 2023) / likely pre-renal  - Valproate increased to 750 Q12x7 days. serum valproic acid level 23 from 28 is subtherapeutic   - holding Plavix for now until stable. Pt has a moderate risk for ICH expansion given previous bleed.  Risk vs benefits should be weighed and discussed with patient prior to initiation of Plavix.  - F/U neuro for EEG   - F/u EP for loop recorder placement  - Aguirre placed. monitor urine outputs   - Monitor O2 requirements   - Continue chest PT, Duoneb treatments as needed   - F/u nutrition consult for TF adjustments (given liquid stool)   - Continue home ASA  - Pain control PRN   - DVT ppx with HSQ   - monitor dignishield outputs     Disposition:      Above plan discussed with Attending Surgeon Dr. Degroot  , patient, patient family, and Primary team    TAP (Trauma, Acute care, Pediatrics) Spectra [phone] GENERAL SURGERY PROGRESS NOTE     JACQUELIN CAI  [address] [place] [address] day :17d    POD:  Procedure: Insertion of midline catheter  Insertion of arterial line with imaging guidance  Tracheostomy, with PEG tube insertion  Insertion of arterial line with imaging guidance    Surgical Attending: Isidro Montemayor  Overnight events:    T3- 79, T4 5.9, TSH 4.16     Na 153 from 151- increased Free Water to 250 Q6     1/1  NIGHT  - Exam:  [id number], opens eyes spontaneously, not following commands, clear lungs, HDS  - TOV: voided 125cc at 22:30, post residual bladder scan: 465, straight cath 800, next TOV by 7 AM   - Na 151 from 144, FWD 1.3 L, increase FWF to 250q6     DAY:  - redosed amantadine, 100mg q48h  - recall neurology for reasoning to c/w valproic (patient on for 2 weeks, causes depressed mental status, no more seizures, need long term plan)  	- no answer  - recall NSY for Plavix --> chadvasc 13%, has-bled score 8.9%  	- "Risk vs benefits should be weighed and discussed with patient prior to 	initiation of Plavix. Above d/w Dr Thomas."  	- d/c Plavix per dr. Degroot  - d/c Tylenol no more fevers  - switched Protonix to Pepcid  - added Banatrol to TF  - added multivitamin  - d/c Aguirre, pending TOV 9pm. bladder scan @ 1900 - 417mL  - f/u A1C  - recall PT - left VM  - recall wound care - no answer  - d/c q48 CXR  - thyroid panel pending    T(F): 97.9 (01-02-24 @ 16:00), Max: 97.9 (01-02-24 @ 16:00)  HR: 82 (01-02-24 @ 16:00) (78 - 84)  BP: 143/63 (01-02-24 @ 16:00) (123/60 - 145/67)  ABP: --  ABP(mean): --  RR: 26 (01-02-24 @ 16:00) (16 - 32)  SpO2: 97% (01-02-24 @ 16:00) (97% - 100%)    IN'S / OUT's:    01-01-24 @ 07:01  -  01-02-24 @ 07:00  --------------------------------------------------------  IN:    Enteral Tube Flush: 400 mL    IV PiggyBack: 100 mL    Nepro with Carb Steady: 513 mL  Total IN: 1013 mL    OUT:    Indwelling Catheter - Urethral (mL): 300 mL    Intermittent Catheterization - Urethral (mL): 800 mL    Rectal Tube (mL): 200 mL    Voided (mL): 125 mL  Total OUT: 1425 mL    Total NET: -412 mL    01-02-24 @ 07:01  -  01-02-24 @ 16:45  --------------------------------------------------------  IN:    Enteral Tube Flush: 250 mL    Nepro with Carb Steady: 270 mL  Total IN: 520 mL    OUT:    Indwelling Catheter - Urethral (mL): 880 mL    Rectal Tube (mL): 0 mL  Total OUT: 880 mL    Total NET: -360 mL    PHYSICAL EXAM:  GENERAL: NAD  CHEST/LUNG: Clear to auscultation bilaterally. T piece 40/6   HEART: Regular rate and rhythm  ABDOMEN: Soft, Nontender, Nondistended;   EXTREMITIES:  No clubbing, cyanosis, or edema      LABS  CAPILLARY BLOOD GLUCOSE  POCT Blood Glucose.: 128 mg/dL (02 Jan 2024 11:02)  POCT Blood Glucose.: 204 mg/dL (02 Jan 2024 06:04)  POCT Blood Glucose.: 149 mg/dL (02 Jan 2024 00:05)  POCT Blood Glucose.: 151 mg/dL (01 Jan 2024 17:18)                          9.5    12.58 )-----------( 440      ( 01 Jan 2024 22:36 )             29.8       Auto Neutrophil %: 80.0 % (01-01-24 @ 22:36)  Band Neutrophils %: 0.9 % (01-01-24 @ 22:36)    01-02    153<H>  |  117<H>  |  79<HH>  ----------------------------<  130<H>  4.1   |  21  |  2.2<H>      Calcium: 8.2 mg/dL (01-02-24 @ 12:36)    ABG - ( 30 Dec 2023 05:45 )  pH: 7.47  /  pCO2: 28    /  pO2: 177   / HCO3: 20    / Base Excess: -2.0  /  SaO2: 99.3      ABG - ( 29 Dec 2023 04:15 )  pH: 7.48  /  pCO2: 27    /  pO2: 173   / HCO3: 20    / Base Excess: -2.0  /  SaO2: 99.7      ABG - ( 28 Dec 2023 03:44 )  pH: 7.44  /  pCO2: 29    /  pO2: 95    / HCO3: 20    / Base Excess: -3.7  /  SaO2: 98.7      Coags:     11.70  ----< 1.03    ( 01 Jan 2024 05:25 )     36.5     Urinalysis Basic - ( 02 Jan 2024 12:36 )    Color: x / Appearance: x / SG: x / pH: x  Gluc: 130 mg/dL / Ketone: x  / Bili: x / Urobili: x   Blood: x / Protein: x / Nitrite: x   Leuk Esterase: x / RBC: x / WBC x   Sq Epi: x / Non Sq Epi: x / Bacteria: x      RADIOLOGY & ADDITIONAL TESTS:    A/P:  JACQUELIN CAI is a 78 year old male who presented as a TRAUMA CONSULT s/p mechanical fall (+HT -LOC -AC). Found to have an acute subdural hemorrhage. Hospital course has been complicated by multiple seizures and uncontrolled hypertension. Intubated on 12/21 due to worsening respiratory status.     S/P tracheostomy and PEG placement on 12/22/23, (Portex cuffed 9 and 2cm at skin 20fr PEG). Now with T-piece, O2 at 40%.     PLAN:  - Na 153 from 151- increased Free Water to 250 Q6 F/U BMP   -Nephrology was consulted for SAGRARIO, hypernatremia. SAGRARIO on CKD 3b - creat was 1.8 (Feb 2023) / likely pre-renal  - Valproate increased to 750 Q12x7 days. serum valproic acid level 23 from 28 is subtherapeutic   - holding Plavix for now until stable. Pt has a moderate risk for ICH expansion given previous bleed.  Risk vs benefits should be weighed and discussed with patient prior to initiation of Plavix.  - F/U neuro for EEG   - F/u EP for loop recorder placement  - Aguirre placed. monitor urine outputs   - Monitor O2 requirements   - Continue chest PT, Duoneb treatments as needed   - F/u nutrition consult for TF adjustments (given liquid stool)   - Continue home ASA  - Pain control PRN   - DVT ppx with HSQ   - monitor dignishield outputs     Disposition:      Above plan discussed with Attending Surgeon Dr. Degroot  , patient, patient family, and Primary team    TAP (Trauma, Acute care, Pediatrics) Spectra [phone]

## 2024-01-02 NOTE — PROGRESS NOTE ADULT - ATTENDING COMMENTS
Trauma/ACS Attending  Note Attestation    Patient is examined and evaluated at the bedside with the residents/PAs. Treatment plan discussed with the team, nurses, and consulting physicians and consulting teams. Medications, radiological studies and all other relevant studies reviewed.     AJCQUELIN CAI Patient is a 78y old  Male who presents with a chief complaint of fall and sustained  SDH, seizures, altered mentals status requiring intubation followed by tracheostomy and PEG     Vital Signs Last 24 Hrs  T(C): 36.6 (02 Jan 2024 16:00), Max: 36.6 (02 Jan 2024 16:00)  T(F): 97.9 (02 Jan 2024 16:00), Max: 97.9 (02 Jan 2024 16:00)  HR: 84 (02 Jan 2024 18:00) (78 - 86)  BP: 146/65 (02 Jan 2024 18:00) (123/60 - 163/103)  BP(mean): 93 (02 Jan 2024 18:00) (86 - 127)  RR: 26 (02 Jan 2024 16:00) (16 - 32)  SpO2: 97% (02 Jan 2024 16:00) (97% - 100%)    Parameters below as of 02 Jan 2024 16:00  Patient On (Oxygen Delivery Method): T-piece                            9.5    12.58 )-----------( 440      ( 01 Jan 2024 22:36 )             29.8   01-02    153<H>  |  117<H>  |  79<HH>  ----------------------------<  130<H>  4.1   |  21  |  2.2<H>    Ca    8.2<L>      02 Jan 2024 12:36  Phos  4.8     01-01  Mg     2.9     01-01      Diagnosis: Fall with SDH                  S/p tracheostomy/PEG    Plan:	  - supportive care  - pain management  - incentive spirometer   - DVT prophylaxis       [x ] SCDs [x ] Lovenox SQ [ ] Heparins SQ  [ ] None  - GI prophylaxis  - follow up consults  - continue PEG feeds  - repeat studies as needed  - PT evaluation and follow up  - replace electrolytes  - case management evaluation for SNF placement     Shawnee Degroot MD, FACS  Trauma/ACS/Surgical Critical Care Attending Trauma/ACS Attending  Note Attestation    Patient is examined and evaluated at the bedside with the residents/PAs. Treatment plan discussed with the team, nurses, and consulting physicians and consulting teams. Medications, radiological studies and all other relevant studies reviewed.     JACQUELIN CAI Patient is a 78y old  Male who presents with a chief complaint of fall and sustained  SDH, seizures, altered mentals status requiring intubation followed by tracheostomy and PEG     Vital Signs Last 24 Hrs  T(C): 36.6 (02 Jan 2024 16:00), Max: 36.6 (02 Jan 2024 16:00)  T(F): 97.9 (02 Jan 2024 16:00), Max: 97.9 (02 Jan 2024 16:00)  HR: 84 (02 Jan 2024 18:00) (78 - 86)  BP: 146/65 (02 Jan 2024 18:00) (123/60 - 163/103)  BP(mean): 93 (02 Jan 2024 18:00) (86 - 127)  RR: 26 (02 Jan 2024 16:00) (16 - 32)  SpO2: 97% (02 Jan 2024 16:00) (97% - 100%)    Parameters below as of 02 Jan 2024 16:00  Patient On (Oxygen Delivery Method): T-piece                            9.5    12.58 )-----------( 440      ( 01 Jan 2024 22:36 )             29.8   01-02    153<H>  |  117<H>  |  79<HH>  ----------------------------<  130<H>  4.1   |  21  |  2.2<H>    Ca    8.2<L>      02 Jan 2024 12:36  Phos  4.8     01-01  Mg     2.9     01-01      Diagnosis: Fall with SDH                  S/p tracheostomy/PEG    Plan:	  - supportive care  - pain management  - incentive spirometer   - DVT prophylaxis       [x ] SCDs [x ] Lovenox SQ [ ] Heparins SQ  [ ] None  - GI prophylaxis  - follow up consults  - continue PEG feeds  - repeat studies as needed  - PT evaluation and follow up  - replace electrolytes  - case management evaluation for SNF placement     Shawnee Degroot MD, FACS  Trauma/ACS/Surgical Critical Care Attending

## 2024-01-02 NOTE — PROGRESS NOTE ADULT - SUBJECTIVE AND OBJECTIVE BOX
seen and examined  24 h events noted   trach        PAST HISTORY  --------------------------------------------------------------------------------  No significant changes to PMH, PSH, FHx, SHx, unless otherwise noted    ALLERGIES & MEDICATIONS  --------------------------------------------------------------------------------  Allergies    [This allergen will not trigger allergy alert] pollen (Unknown)  No Known Drug Allergies    Intolerances      Standing Inpatient Medications  amantadine 100 milliGRAM(s) Oral <User Schedule>  amLODIPine   Tablet 5 milliGRAM(s) Oral daily  aspirin  chewable 81 milliGRAM(s) Enteral Tube daily  atorvastatin 20 milliGRAM(s) Oral at bedtime  bacitracin   Ointment 1 Application(s) Topical every 12 hours  carvedilol 6.25 milliGRAM(s) Oral every 12 hours  chlorhexidine 0.12% Liquid 15 milliLiter(s) Oral Mucosa two times a day  chlorhexidine 2% Cloths 1 Application(s) Topical <User Schedule>  doxazosin 4 milliGRAM(s) Oral at bedtime  famotidine    Tablet 10 milliGRAM(s) Oral every 48 hours  heparin   Injectable 5000 Unit(s) SubCutaneous every 8 hours  insulin glargine Injectable (LANTUS) 20 Unit(s) SubCutaneous at bedtime  insulin lispro (ADMELOG) corrective regimen sliding scale   SubCutaneous every 6 hours  ipratropium 17 MICROgram(s) HFA Inhaler 1 Puff(s) Inhalation every 6 hours  multivitamin 1 Tablet(s) Oral daily  piperacillin/tazobactam IVPB.. 3.375 Gram(s) IV Intermittent every 12 hours  sevelamer carbonate Powder 800 milliGRAM(s) Oral three times a day with meals  valproic  acid Syrup 750 milliGRAM(s) Oral every 12 hours  vitamin A &amp; D Ointment 1 Application(s) Topical every 12 hours          VITALS/PHYSICAL EXAM  --------------------------------------------------------------------------------  T(C): 36.3 (01-02-24 @ 04:00), Max: 36.7 (01-01-24 @ 12:00)  HR: 84 (01-02-24 @ 04:00) (74 - 84)  BP: 141/64 (01-02-24 @ 04:00) (114/55 - 145/67)  RR: 16 (01-02-24 @ 04:00) (16 - 32)  SpO2: 98% (01-02-24 @ 04:00) (97% - 100%)  Wt(kg): --        01-01-24 @ 07:01  -  01-02-24 @ 07:00  --------------------------------------------------------  IN: 986 mL / OUT: 1425 mL / NET: -439 mL      Physical Exam:  	Gen: trach  	Pulm:  B/L britt   	CV:  S1S2; no rub  	Abd: +distended    LABS/STUDIES  --------------------------------------------------------------------------------              9.5    12.58 >-----------<  440      [01-01-24 @ 22:36]              29.8     151  |  113  |  77  ----------------------------<  150      [01-01-24 @ 21:32]  4.2   |  22  |  2.4        Ca     8.0     [01-01-24 @ 21:32]      Mg     2.9     [01-01-24 @ 21:32]      Phos  4.8     [01-01-24 @ 21:32]    TPro  x   /  Alb  2.4  /  TBili  x   /  DBili  x   /  AST  x   /  ALT  x   /  AlkPhos  x   [12-31-23 @ 16:00]    PT/INR: PT 11.70, INR 1.03       [01-01-24 @ 05:25]  PTT: 36.5       [01-01-24 @ 05:25]      Creatinine Trend:  SCr 2.4 [01-01 @ 21:32]  SCr 2.6 [12-31 @ 20:29]  SCr 2.6 [12-31 @ 16:00]  SCr 2.7 [12-31 @ 12:09]  SCr 2.9 [12-30 @ 23:20]    Urinalysis - [01-01-24 @ 21:32]      Color  / Appearance  / SG  / pH       Gluc 150 / Ketone   / Bili  / Urobili        Blood  / Protein  / Leuk Est  / Nitrite       RBC  / WBC  / Hyaline  / Gran  / Sq Epi  / Non Sq Epi  / Bacteria     Urine Creatinine 54      [12-29-23 @ 15:04]  Urine Sodium 47.0      [12-29-23 @ 15:04]  Urine Osmolality 437      [12-29-23 @ 14:30]    Iron 70, TIBC 214, %sat 33      [02-15-23 @ 09:32]  Ferritin 111      [02-15-23 @ 09:32]  TSH 2.47      [02-15-23 @ 09:32]  Lipid: chol 245, , HDL 49, LDL --      [02-17-23 @ 07:55]

## 2024-01-02 NOTE — PROGRESS NOTE ADULT - CRITICAL CARE ATTENDING COMMENT
Critical Care: [id number]   This patient has a high probability of sudden, clinically significant deterioration, which requires the highest level of physician preparedness to intervene urgently. I managed/supervised life or organ supporting interventions that required frequent physician assessment. I have reviewed and agree with note above. I devoted my full attention to the direct care of this patient for the period of time indicated, including reviewing relevant labs and imaging, discussing treatment plan with the SICU team, nurses, and primary team at the time of multidisciplinary rounds, and reviewing findings with patient and family. This does not include time devoted to teaching any trainees and to performing any procedures.    JACQUELIN CAI 78y Male admitted to SICU with traumatic SDH now with acute on chronic renal failure, possible seizure, history of CVA    Issues we are addressing today:    Neuro: minimizing sedation, amantadine for neurostim, valproate, delirium precautions, sleep meds as needed at night  CV: optimize fluid status, home antihypertension meds  Respiratory: trach collar as tolerated  Nutrition: tube feeds running, banatrol for diarrhea  Renal: monitor Is &Os  ID: abx ongoing  Heme: continue to evaluate for acute blood loss anemia, ASA  Endocrine: Prevent and treat hyperglycemia with insulin as needed    PV: follow pulse exam  Skin: decub precautions, wound care consulted  DVT Prophylaxis   Stress Gastritis Prophylaxis   Mobility: PT/OT as tolerated    safe for transfer out of ICU    The above note is NOT written at the time of rounds and will reflect all changes throughout management of the patient for the day note is written for.    Warner Edwards MD, D.BRENDAN. Critical Care: [id number]   This patient has a high probability of sudden, clinically significant deterioration, which requires the highest level of physician preparedness to intervene urgently. I managed/supervised life or organ supporting interventions that required frequent physician assessment. I have reviewed and agree with note above. I devoted my full attention to the direct care of this patient for the period of time indicated, including reviewing relevant labs and imaging, discussing treatment plan with the SICU team, nurses, and primary team at the time of multidisciplinary rounds, and reviewing findings with patient and family. This does not include time devoted to teaching any trainees and to performing any procedures.    JACQUELIN CAI 78y Male admitted to SICU with traumatic SDH now with acute on chronic renal failure, possible seizure, history of CVA    Issues we are addressing today:    Neuro: minimizing sedation, amantadine for neurostim, valproate, delirium precautions, sleep meds as needed at night  CV: optimize fluid status, home antihypertension meds  Respiratory: trach collar as tolerated  Nutrition: tube feeds running, banatrol for diarrhea  Renal: monitor Is &Os  ID: abx ongoing  Heme: continue to evaluate for acute blood loss anemia, ASA  Endocrine: Prevent and treat hyperglycemia with insulin as needed    PV: follow pulse exam  Skin: decub precautions, wound care consulted  DVT Prophylaxis   Stress Gastritis Prophylaxis   Mobility: PT/OT as tolerated    safe for transfer out of ICU    The above note is NOT written at the time of rounds and will reflect all changes throughout management of the patient for the day note is written for.    Warner Edwards MD, D.BRENDAN.

## 2024-01-02 NOTE — PROGRESS NOTE ADULT - ASSESSMENT
Assessment		  78y Male s/p mechanical fall. +HT, +AC (Aspirin and Plavix), -LOC.    12/21-intubated for airway protection, accessory muscle use  12/22- s/p tracheostomy and PEG placement, Portex cuffed 9 and 20fr PEG 2cm at skin     NEURO:  #Acute SDH   -12/18 HCT#4 -stable SDH, no new acute findings  -12/27: Pt obtunded, not responding to noxious stim - VPA/LFTs/BMP/ammonia ordered, CTH f/up: stable  - Q4 neuro checks  - amantadine, 100mg q48h. renal dosing  - NSGY: 1/1: "Risk vs benefits should be weighed and discussed with patient prior to initiation of Plavix". --> will not be restarting plavix per Dr. Degroot  #h/o stroke (2/2023) w/residual right sided weakness  - RIGHT anterior thalamus infarct,  LEFT caudate head lacunar infarct  #focal seizure    - vEEG 12/19: No seizures; Discontinued 12/19    - NCC: valproic acid current dose, q4 neurochecks    - Neurology cs- d/c EEG, 2 days prior to discharge call neurology so they can do a spot EEG --> recalled on 12/28 to evaluate diminished mental status, recommend repeat EEG and continue depakote 500 q12  	- f/up neuro recs  - EEG,12/28: No electrographic seizures or significant clinical events. Abnormal due to the presence of focal and generalized slowing. Findings consistent with focal and diffuse electrocerebral dysfunction secondary to structural/metabolic/nonspecific etiology.    - Valproic acid level 12/31 23, albumin 2.5; Valproic acid dose adjusted to 750mg q12h for 1 week, per pharmacy    RESP:   #Respiratory Failure secondary to impaired secretion clearance  - s/p tracheostomy 12/22, size 9 cuffed portex  - Tolerating t-piece since 12/29   - chest PT q4 and duoneb treatments q 4   - deep tracheal aspirate cx sent (due to fevers)  - Culture - Sputum . (12.21.23 @ 18:14)-  Numerous Staphylococcus aureus --> 12/28 repeat NGTD  #Activity    -increase as tolerated    -aspiration precautions, HOB 30    CARDS:   #acute HYPOtension intraop, resolved  -off levophed since 12/22 PM  #hx of HTN  -restarting home coreg and amlodipine  -Valsartan 320mg HOLDING   #hx HLD  -atorvastatin  #NSVT    -EP/Cards: No arrhythmias on tele only artifact. No further work up needed. Recommend ILR prior to discharge if patient/family agreeable  Imaging:   Echo: 12/2023: EF 66%, G1DD, trace MR, mild TR     GI/NUTR:   #Diet, NPO with Tube Feed via 20Fr PEG placed 12/22   - TF: Nepro @ 27cc/hr x 24hrs, 200cc FWF q8h + Banatrol  - multivitamin QD  #GI Prophylaxis  - famotidine 10mg PO q48  #Bowel regimen     -Multiple loose bowel movements, last dose senna 12/19 at 2200 > dignishield placed 12/23     - C diff negative     - No bowel regimen    /RENAL:   #urine output   - martinez d/c 1/1 at 1pm, TOV until 9pm  - UA neg 12/25 (r/o UTI in setting of persistent fevers)   - Sodium goal 140-150  - f/u nephro recs- Hypernatremia due to free water losses. Avoid diuretics, would not use Metolazone to cause volume depletion, and has min effect in advanced kidney dysfunction   #Diuresis  - Last diuresed 12/29: metolazone 10 x1 for hypernatremia  #Hypernatremia - improved  -  q8     Labs:          BUN/Cr- 81/2.6  -->,  80/2.6  -->          Electrolytes-Na 144 // K 4.0 // Mg 2.8 //  Phos 5.1 (12-31 @ 20:29)  #hyperphosphatemia  - Started sevelamer powder, non-formulary so as not to clog PEG   #CKD   - baseline Cr 2.1  - holding home sodium bicarb 650mg BID  #hx BPH  - cardura 4mg (flomax at home, non crushable)      HEME/ONC:   #DVT prophylaxis     -SCDs, HSQ  #hx of CVA    -ASA81 mg cleared by NSGY    - NSGY: "Risk vs benefits should be weighed and discussed with patient prior to initiation of Plavix. Above d/w Dr Thomas."   - d/c plavix per Dr. Degroot    Labs: Hb/Hct:  8.7/27.2  (12-30 @ 23:20)  -->,  8.6/27.2  (12-31 @ 20:29)  -->                      Plts:  412  -->,  388  -->                 PTT/INR:          ID:  WBC- 16.14  --->>,  12.48  --->>,  11.58  --->>  Temp trend- 24hrs T(F): 98.8 (12-31 @ 20:00), Max: 98.8 (12-31 @ 20:00)  Current antibiotics-piperacillin/tazobactam IVPB.. 3.375 every 12 hours  #recurrent fevers- improving, now low grade  - d/c'd tylenol    PCRs  -MRSA PCR negative (12/22)  -MRSA nares negative (12/28)  -RVP negative (12/28)    Cultures    Bcx 12/18- final neg     Blood cx 12/21- no growth at 4 days    BCx, 12/28 - NG @ 24h    Tracheal aspirate 12/21: numerous staph aureus -methicillian sensitive    Tracheal aspirate 12/22: staph aureus    C.Diff PCR neg    UA 12/25 negative     UA 12/28 negative    Current antibiotics:  - Vancomycin 500mg x1 dose (12/28)  - Zosyn 3.375 q12 (started 12/28, stop 1/5)  #multiple loose bowel movements    -no bowel regimen, adding banatrol    ENDO:  #DM     -ISS     -Off insulin gtt 12/20      -Lantus 20 units HS     -FSG q6 while on TF     - A1c pending  #AMS possibly due to hyPOthyroidism      - thyroid panel pending    #MSK:     Activity - Increase As Tolerated    - B/L knee X ray 12/19- no fractures, b/l effusions    - recall PT for reassessment    WOUND:  - L sacral stage 2 ulcer, wound care consult placed, follow-up tmrw    LINES/DRAINS:  PIV, right basilic midline (placed 12/16), 20Fr PEG (12/22), Tracheostomy (12/22)    ADVANCED DIRECTIVES:  Full Code  -  HCP/Emergency Contact-Sarah Gamino (Wife) [phone], Cantonese speaking    DISPO:  - Social work, case management consult for dispo to vent facility - spoke with social work 12/23, starting to work on D/C.  - SW update, 12/29: pt remains acute. Will reassess 24-48h prior to d/c Assessment		  78y Male s/p mechanical fall. +HT, +AC (Aspirin and Plavix), -LOC.    12/21-intubated for airway protection, accessory muscle use  12/22- s/p tracheostomy and PEG placement, Portex cuffed 9 and 20fr PEG 2cm at skin     NEURO:  #Acute SDH   -12/18 HCT#4 -stable SDH, no new acute findings  -12/27: Pt obtunded, not responding to noxious stim - VPA/LFTs/BMP/ammonia ordered, CTH f/up: stable  - Q4 neuro checks  - amantadine, 100mg q48h. renal dosing  - NSGY: 1/1: "Risk vs benefits should be weighed and discussed with patient prior to initiation of Plavix". --> will not be restarting plavix per Dr. Degroot  #h/o stroke (2/2023) w/residual right sided weakness  - RIGHT anterior thalamus infarct,  LEFT caudate head lacunar infarct  #focal seizure    - vEEG 12/19: No seizures; Discontinued 12/19    - NCC: valproic acid current dose, q4 neurochecks    - Neurology cs- d/c EEG, 2 days prior to discharge call neurology so they can do a spot EEG --> recalled on 12/28 to evaluate diminished mental status, recommend repeat EEG and continue depakote 500 q12  	- f/up neuro recs  - EEG,12/28: No electrographic seizures or significant clinical events. Abnormal due to the presence of focal and generalized slowing. Findings consistent with focal and diffuse electrocerebral dysfunction secondary to structural/metabolic/nonspecific etiology.    - Valproic acid level 12/31 23, albumin 2.5; Valproic acid dose adjusted to 750mg q12h for 1 week, per pharmacy    RESP:   #Respiratory Failure secondary to impaired secretion clearance  - s/p tracheostomy 12/22, size 9 cuffed portex  - Tolerating t-piece since 12/29   - chest PT q4 and duoneb treatments q 4   - deep tracheal aspirate cx sent (due to fevers)  - Culture - Sputum . (12.21.23 @ 18:14)-  Numerous Staphylococcus aureus --> 12/28 repeat NGTD  #Activity    -increase as tolerated    -aspiration precautions, HOB 30    CARDS:   #acute HYPOtension intraop, resolved  -off levophed since 12/22 PM  #hx of HTN  -restarting home coreg and amlodipine  -Valsartan 320mg HOLDING   #hx HLD  -atorvastatin  #NSVT    -EP/Cards: No arrhythmias on tele only artifact. No further work up needed. Recommend ILR prior to discharge if patient/family agreeable  Imaging:   Echo: 12/2023: EF 66%, G1DD, trace MR, mild TR     GI/NUTR:   #Diet, NPO with Tube Feed via 20Fr PEG placed 12/22   - TF: Nepro @ 27cc/hr x 24hrs, 200cc FWF q8h + Banatrol  - multivitamin QD  #GI Prophylaxis  - famotidine 10mg PO q48  #Bowel regimen     -Multiple loose bowel movements, last dose senna 12/19 at 2200 > dignishield placed 12/23     - C diff negative     - No bowel regimen    /RENAL:   #urine output   - martinez d/c 1/1 at 1pm, TOV until 9pm  - UA neg 12/25 (r/o UTI in setting of persistent fevers)   - Sodium goal 140-150  - f/u nephro recs- Hypernatremia due to free water losses. Avoid diuretics, would not use Metolazone to cause volume depletion, and has min effect in advanced kidney dysfunction   #Diuresis  - Last diuresed 12/29: metolazone 10 x1 for hypernatremia  #Hypernatremia - improved  -  q8     Labs:          BUN/Cr- 81/2.6  -->,  80/2.6  -->          Electrolytes-Na 144 // K 4.0 // Mg 2.8 //  Phos 5.1 (12-31 @ 20:29)  #hyperphosphatemia  - Started sevelamer powder, non-formulary so as not to clog PEG   #CKD   - baseline Cr 2.1  - holding home sodium bicarb 650mg BID  #hx BPH  - cardura 4mg (flomax at home, non crushable)      HEME/ONC:   #DVT prophylaxis     -SCDs, HSQ  #hx of CVA    -ASA81 mg cleared by NSGY    - NSGY: "Risk vs benefits should be weighed and discussed with patient prior to initiation of Plavix. Above d/w Dr Thomas."   - d/c plavix per Dr. Degroot    Labs: Hb/Hct:  8.7/27.2  (12-30 @ 23:20)  -->,  8.6/27.2  (12-31 @ 20:29)  -->                      Plts:  412  -->,  388  -->                 PTT/INR:          ID:  WBC- 16.14  --->>,  12.48  --->>,  11.58  --->>  Temp trend- 24hrs T(F): 98.8 (12-31 @ 20:00), Max: 98.8 (12-31 @ 20:00)  Current antibiotics-piperacillin/tazobactam IVPB.. 3.375 every 12 hours  #recurrent fevers- improving, now low grade  - d/c'd tylenol    PCRs  -MRSA PCR negative (12/22)  -MRSA nares negative (12/28)  -RVP negative (12/28)    Cultures    Bcx 12/18- final neg     Blood cx 12/21- no growth at 4 days    BCx, 12/28 - NG @ 24h    Tracheal aspirate 12/21: numerous staph aureus -methicillian sensitive    Tracheal aspirate 12/22: staph aureus    C.Diff PCR neg    UA 12/25 negative     UA 12/28 negative    Current antibiotics:  - Vancomycin 500mg x1 dose (12/28)  - Zosyn 3.375 q12 (started 12/28, stop 1/5)  #multiple loose bowel movements    -no bowel regimen, adding banatrol    ENDO:  #DM     -ISS     -Off insulin gtt 12/20      -Lantus 20 units HS     -FSG q6 while on TF     - A1c pending  #AMS possibly due to hyPOthyroidism      - thyroid panel pending    #MSK:     Activity - Increase As Tolerated    - B/L knee X ray 12/19- no fractures, b/l effusions    - recall PT for reassessment    WOUND:  - L sacral stage 2 ulcer, wound care consult placed, follow-up tmrw    LINES/DRAINS:  PIV, right basilic midline (placed 12/16), 20Fr PEG (12/22), Tracheostomy (12/22)    ADVANCED DIRECTIVES:  Full Code  -  HCP/Emergency Contact-Sarah Gamino (Wife) [phone], Cantonese speaking    DISPO:  - Social work, case management consult for dispo to vent facility - spoke with social work 12/23, starting to work on D/C.  - SW update, 12/29: pt remains acute. Will reassess 24-48h prior to d/c Assessment		  78y Male s/p mechanical fall. +HT, +AC (Aspirin and Plavix), -LOC.    12/21-intubated for airway protection, accessory muscle use  12/22- s/p tracheostomy and PEG placement, Portex cuffed 9 and 20fr PEG 2cm at skin     NEURO:  #Acute SDH   -12/18 HCT#4 -stable SDH, no new acute findings  -12/27: Pt obtunded, not responding to noxious stim - VPA/LFTs/BMP/ammonia ordered, CTH f/up: stable  - Q4 neuro checks  - amantadine, 100mg q48h. renal dosing  - NSGY: 1/1: "Risk vs benefits should be weighed and discussed with patient prior to initiation of Plavix". --> will not be restarting plavix per Dr. Degroot  #h/o stroke (2/2023) w/residual right sided weakness  - RIGHT anterior thalamus infarct,  LEFT caudate head lacunar infarct  #focal seizure    - vEEG 12/19: No seizures; Discontinued 12/19    - NCC: valproic acid current dose, q4 neurochecks    - Neurology cs- d/c EEG, 2 days prior to discharge call neurology so they can do a spot EEG --> recalled on 12/28 to evaluate diminished mental status, recommend repeat EEG and continue depakote 500 q12  	- f/up neuro recs  - EEG,12/28: No electrographic seizures or significant clinical events. Abnormal due to the presence of focal and generalized slowing. Findings consistent with focal and diffuse electrocerebral dysfunction secondary to structural/metabolic/nonspecific etiology.    - Valproic acid level 12/31 23, albumin 2.5; Valproic acid dose adjusted to 750mg q12h for 1 week, per pharmacy    RESP:   #Respiratory Failure secondary to impaired secretion clearance  - s/p tracheostomy 12/22, size 9 cuffed portex  - Tolerating t-piece since 12/29   - chest PT q4 and duoneb treatments q 4   - deep tracheal aspirate cx sent (due to fevers)  - Culture - Sputum . (12.21.23 @ 18:14)-  Numerous Staphylococcus aureus --> 12/28 repeat NGTD  #Activity    -increase as tolerated    -aspiration precautions, HOB 30    CARDS:   #acute HYPOtension intraop, resolved  -off levophed since 12/22 PM  #hx of HTN  -restarting home coreg and amlodipine  -Valsartan 320mg HOLDING   #hx HLD  -atorvastatin  #NSVT    -EP/Cards: No arrhythmias on tele only artifact. No further work up needed. Recommend ILR prior to discharge if patient/family agreeable  Imaging:   Echo: 12/2023: EF 66%, G1DD, trace MR, mild TR     GI/NUTR:   #Diet, NPO with Tube Feed via 20Fr PEG placed 12/22   - TF: Nepro @ 27cc/hr x 24hrs, 250cc FWF q6h + Banatrol  - multivitamin QD  #GI Prophylaxis  - famotidine 10mg PO q48  #Bowel regimen     -Multiple loose bowel movements, last dose senna 12/19 at 2200 > dignishield placed 12/23     - C diff negative     - No bowel regimen    /RENAL:   #urine output   - Aguirre d/c 1/1, voided 125cc, post residual bladder scan: 465cc, straight cath 800cc, next TOV by 7 AM   - UA neg 12/25 (r/o UTI in setting of persistent fevers)   - Sodium goal 140-150  - f/u nephro recs- Hypernatremia due to free water losses. Avoid diuretics, would not use Metolazone to cause volume depletion, and has min effect in advanced kidney dysfunction   #Diuresis  - Last diuresed 12/29: metolazone 10 x1 for hypernatremia  #Hypernatremia - improved  - FWD 1.3 L,  q8    Labs:          BUN/Cr- 80/2.6  -->,  77/2.4  -->          Electrolytes-Na 151 // K 4.2 // Mg 2.9 //  Phos 4.8 (01-01 @ 21:32)  #hyperphosphatemia  - Started sevelamer powder, non-formulary so as not to clog PEG   #CKD   - baseline Cr 2.1  - holding home sodium bicarb 650mg BID  #hx BPH  - cardura 4mg (flomax at home, non crushable)      HEME/ONC:   #DVT prophylaxis     -SCDs, HSQ  #hx of CVA    -ASA81 mg cleared by NSGY    - NSGY: "Risk vs benefits should be weighed and discussed with patient prior to initiation of Plavix. Above d/w Dr Thomas."   - d/c plavix per Dr. Degroot    Labs: Hb/Hct:  8.6/27.2  -->,  9.5/29.8  -->                      Plts:  388  -->,  440  -->                 PTT/INR:  36.5/1.03  --->       ID:  WBC- 12.48  --->>,  11.58  --->>,  12.58  --->>  Temp trend- 24hrs T(F): 97.3 (01-02 @ 00:00), Max: 98 (01-01 @ 12:00)  Antibiotics-piperacillin/tazobactam IVPB.. 3.375 every 12 hours  #recurrent fevers- improving, now low grade  - d/c'd Tylenol    PCRs  -MRSA PCR negative (12/22)  -MRSA nares negative (12/28)  -RVP negative (12/28)    Cultures    Bcx 12/18- final neg     Blood cx 12/21- no growth at 4 days    BCx, 12/28 - NG @ 24h    Tracheal aspirate 12/21: numerous staph aureus methicillin sensitive    Tracheal aspirate 12/22: staph aureus    C.Diff PCR neg    UA 12/25 negative     UA 12/28 negative    Current antibiotics:  - Vancomycin 500mg x1 dose (12/28)  - Zosyn 3.375 q12 (started 12/28, stop 1/5)  #multiple loose bowel movements    -no bowel regimen, adding banatrol    ENDO:  #DM     -ISS     -Off insulin gtt 12/20      -Lantus 20 units HS     -FSG q6 while on TF     -A1c pending  #AMS possibly due to hypothyroidism      - thyroid panel pending    #MSK:     Activity - Increase As Tolerated    - B/L knee X ray 12/19- no fractures, b/l effusions    - recall PT for reassessment    WOUND:  - L sacral stage 2 ulcer, wound care consult placed    LINES/DRAINS:  PIV, right basilic midline (placed 12/16), 20Fr PEG (12/22), Tracheostomy (12/22)    ADVANCED DIRECTIVES:  Full Code  -  HCP/Emergency Contact-Sarah Gamino (Wife) [phone], Cantonese speaking    DISPO:  - Social work, [facility] management [facility] - spoke with social work 12/23, starting to work on D/C.  - SW update, 12/29: pt remains acute. Will reassess 24-48h prior to d/c Assessment		  78y Male s/p mechanical fall. +HT, +AC (Aspirin and Plavix), -LOC.    12/21-intubated for airway protection, accessory muscle use  12/22- s/p tracheostomy and PEG placement, Portex cuffed 9 and 20fr PEG 2cm at skin     NEURO:  #Acute SDH   -12/18 HCT#4 -stable SDH, no new acute findings  -12/27: Pt obtunded, not responding to noxious stim - VPA/LFTs/BMP/ammonia ordered, CTH f/up: stable  - Q4 neuro checks  - amantadine, 100mg q48h. renal dosing  - NSGY: 1/1: "Risk vs benefits should be weighed and discussed with patient prior to initiation of Plavix". --> will not be restarting plavix per Dr. Degroot  #h/o stroke (2/2023) w/residual right sided weakness  - RIGHT anterior thalamus infarct,  LEFT caudate head lacunar infarct  #focal seizure    - vEEG 12/19: No seizures; Discontinued 12/19    - NCC: valproic acid current dose, q4 neurochecks    - Neurology cs- d/c EEG, 2 days prior to discharge call neurology so they can do a spot EEG --> recalled on 12/28 to evaluate diminished mental status, recommend repeat EEG and continue depakote 500 q12  	- f/up neuro recs  - EEG,12/28: No electrographic seizures or significant clinical events. Abnormal due to the presence of focal and generalized slowing. Findings consistent with focal and diffuse electrocerebral dysfunction secondary to structural/metabolic/nonspecific etiology.    - Valproic acid level 12/31 23, albumin 2.5; Valproic acid dose adjusted to 750mg q12h for 1 week, per pharmacy    RESP:   #Respiratory Failure secondary to impaired secretion clearance  - s/p tracheostomy 12/22, size 9 cuffed portex  - Tolerating t-piece since 12/29   - chest PT q4 and duoneb treatments q 4   - deep tracheal aspirate cx sent (due to fevers)  - Culture - Sputum . (12.21.23 @ 18:14)-  Numerous Staphylococcus aureus --> 12/28 repeat NGTD  #Activity    -increase as tolerated    -aspiration precautions, HOB 30    CARDS:   #acute HYPOtension intraop, resolved  -off levophed since 12/22 PM  #hx of HTN  -restarting home coreg and amlodipine  -Valsartan 320mg HOLDING   #hx HLD  -atorvastatin  #NSVT    -EP/Cards: No arrhythmias on tele only artifact. No further work up needed. Recommend ILR prior to discharge if patient/family agreeable  Imaging:   Echo: 12/2023: EF 66%, G1DD, trace MR, mild TR     GI/NUTR:   #Diet, NPO with Tube Feed via 20Fr PEG placed 12/22   - TF: Nepro @ 27cc/hr x 24hrs, 250cc FWF q6h + Banatrol  - multivitamin QD  #GI Prophylaxis  - famotidine 10mg PO q48  #Bowel regimen     -Multiple loose bowel movements, last dose senna 12/19 at 2200 > dignishield placed 12/23     - C diff negative     - No bowel regimen    /RENAL:   #urine output   - Aguirre d/c 1/1, voided 125cc, post residual bladder scan: 465cc, straight cath 800cc, next TOV by 7 AM   - UA neg 12/25 (r/o UTI in setting of persistent fevers)   - Sodium goal 140-150  - f/u nephro recs- Hypernatremia due to free water losses. Avoid diuretics, would not use Metolazone to cause volume depletion, and has min effect in advanced kidney dysfunction   #Diuresis  - Last diuresed 12/29: metolazone 10 x1 for hypernatremia  #Hypernatremia - improved  - FWD 1.3 L,  q8    Labs:          BUN/Cr- 80/2.6  -->,  77/2.4  -->          Electrolytes-Na 151 // K 4.2 // Mg 2.9 //  Phos 4.8 (01-01 @ 21:32)  #hyperphosphatemia  - Started sevelamer powder, non-formulary so as not to clog PEG   #CKD   - baseline Cr 2.1  - holding home sodium bicarb 650mg BID  #hx BPH  - cardura 4mg (flomax at home, non crushable)      HEME/ONC:   #DVT prophylaxis     -SCDs, HSQ  #hx of CVA    -ASA81 mg cleared by NSGY    - NSGY: "Risk vs benefits should be weighed and discussed with patient prior to initiation of Plavix. Above d/w Dr Thomas."   - d/c plavix per Dr. Degroot    Labs: Hb/Hct:  8.6/27.2  -->,  9.5/29.8  -->                      Plts:  388  -->,  440  -->                 PTT/INR:  36.5/1.03  --->       ID:  WBC- 12.48  --->>,  11.58  --->>,  12.58  --->>  Temp trend- 24hrs T(F): 97.3 (01-02 @ 00:00), Max: 98 (01-01 @ 12:00)  Antibiotics-piperacillin/tazobactam IVPB.. 3.375 every 12 hours  #recurrent fevers- improving, now low grade  - d/c'd Tylenol    PCRs  -MRSA PCR negative (12/22)  -MRSA nares negative (12/28)  -RVP negative (12/28)    Cultures    Bcx 12/18- final neg     Blood cx 12/21- no growth at 4 days    BCx, 12/28 - NG @ 24h    Tracheal aspirate 12/21: numerous staph aureus methicillin sensitive    Tracheal aspirate 12/22: staph aureus    C.Diff PCR neg    UA 12/25 negative     UA 12/28 negative    Current antibiotics:  - Vancomycin 500mg x1 dose (12/28)  - Zosyn 3.375 q12 (started 12/28, stop 1/5)  #multiple loose bowel movements    -no bowel regimen, adding banatrol    ENDO:  #DM     -ISS     -Off insulin gtt 12/20      -Lantus 20 units HS     -FSG q6 while on TF     -A1c pending  #AMS possibly due to hypothyroidism      - thyroid panel pending    #MSK:     Activity - Increase As Tolerated    - B/L knee X ray 12/19- no fractures, b/l effusions    - recall PT for reassessment    WOUND:  - L sacral stage 2 ulcer, wound care consult placed    LINES/DRAINS:  PIV, right basilic midline (placed 12/16), 20Fr PEG (12/22), Tracheostomy (12/22)    ADVANCED DIRECTIVES:  Full Code  -  HCP/Emergency Contact-Sarah Gamino (Wife) [phone], Cantonese speaking    DISPO:  - Social work, case management consult for dispo to [facility] facility - spoke with social work 12/23, starting to work on D/C.  - SW update, 12/29: pt remains acute. Will reassess 24-48h prior to d/c Assessment		  78y Male s/p mechanical fall. +HT, +AC (Aspirin and Plavix), -LOC.    12/21-intubated for airway protection, accessory muscle use  12/22- s/p tracheostomy and PEG placement, Portex cuffed 9 and 20fr PEG 2cm at skin     NEURO:  #Acute SDH   -12/18 HCT#4 -stable SDH, no new acute findings  -12/27: Pt obtunded, not responding to noxious stim - VPA/LFTs/BMP/ammonia ordered, CTH f/up: stable  - Q4 neuro checks  - amantadine, 100mg q48h. renal dosing  - NSGY: 1/1: "Risk vs benefits should be weighed and discussed with patient prior to initiation of Plavix". --> will not be restarting plavix per Dr. Degroot  #h/o stroke (2/2023) w/residual right sided weakness  - RIGHT anterior thalamus infarct,  LEFT caudate head lacunar infarct  #focal seizure    - vEEG 12/19: No seizures; Discontinued 12/19    - NCC: valproic acid current dose, q4 neurochecks    - Neurology cs- d/c EEG, 2 days prior to discharge call neurology so they can do a spot EEG --> recalled on 12/28 to evaluate diminished mental status, recommend repeat EEG and continue depakote 500 q12  	- f/up neuro recs  - EEG,12/28: No electrographic seizures or significant clinical events. Abnormal due to the presence of focal and generalized slowing. Findings consistent with focal and diffuse electrocerebral dysfunction secondary to structural/metabolic/nonspecific etiology.    - Valproic acid level 12/31 23, albumin 2.5; Valproic acid dose adjusted to 750mg q12h for 1 week, per pharmacy    RESP:   #Respiratory Failure secondary to impaired secretion clearance  - s/p tracheostomy 12/22, size 9 cuffed portex  - Tolerating t-piece since 12/29  - chest PT q4 and duoneb treatments q 4   - deep tracheal aspirate cx sent (due to fevers)  - Culture - Sputum . (12.21.23 @ 18:14)-  Numerous Staphylococcus aureus --> 12/28 repeat NGTD  #Activity    -increase as tolerated    -aspiration precautions, HOB 30    CARDS:   #acute HYPOtension intraop, resolved  -off levophed since 12/22 PM  #hx of HTN  -restarting home coreg and amlodipine  -Valsartan 320mg HOLDING   #hx HLD  -atorvastatin  #NSVT    -EP/Cards: No arrhythmias on tele only artifact. No further work up needed. Recommend ILR prior to discharge if patient/family agreeable  Imaging:   Echo: 12/2023: EF 66%, G1DD, trace MR, mild TR     GI/NUTR:   #Diet, NPO with Tube Feed via 20Fr PEG placed 12/22   - TF: Nepro @ 27cc/hr x 24hrs, 250cc FWF q6h + Banatrol  - multivitamin QD  #GI Prophylaxis  - famotidine 10mg PO q48  #Bowel regimen     -Multiple loose bowel movements, last dose senna 12/19 at 2200 > dignishield placed 12/23     - C diff negative     - No bowel regimen    /RENAL:   #urine output   - Aguirre d/c 1/1, voided 125cc, post residual bladder scan: 465cc, straight cath 800cc. Failed 7am TOV,   - UA neg 12/25 (r/o UTI in setting of persistent fevers)   - Sodium goal 140-150  - f/u nephro recs- Hypernatremia due to free water losses. Avoid diuretics, would not use Metolazone to cause volume depletion, and has min effect in advanced kidney dysfunction   #Diuresis  - Last diuresed 12/29: metolazone 10 x1 for hypernatremia  #Hypernatremia - improved  - FWD 1.3 L,  q8    Labs:          BUN/Cr- 80/2.6  -->,  77/2.4  -->          Electrolytes-Na 151 // K 4.2 // Mg 2.9 //  Phos 4.8 (01-01 @ 21:32)  #hyperphosphatemia  - Started sevelamer powder, non-formulary so as not to clog PEG   #CKD   - baseline Cr 2.1  - holding home sodium bicarb 650mg BID  #hx BPH  - cardura 4mg (flomax at home, non crushable)      HEME/ONC:   #DVT prophylaxis     -SCDs, HSQ  #hx of CVA    -ASA81 mg cleared by NSGY    - NSGY: "Risk vs benefits should be weighed and discussed with patient prior to initiation of Plavix. Above d/w Dr Thomas."   - d/c plavix per Dr. Degroot    Labs: Hb/Hct:  8.6/27.2  -->,  9.5/29.8  -->                      Plts:  388  -->,  440  -->                 PTT/INR:  36.5/1.03  --->       ID:  WBC- 12.48  --->>,  11.58  --->>,  12.58  --->>  Temp trend- 24hrs T(F): 97.3 (01-02 @ 00:00), Max: 98 (01-01 @ 12:00)  Antibiotics-piperacillin/tazobactam IVPB.. 3.375 every 12 hours  #recurrent fevers- improving, now low grade  - d/c'd Tylenol    PCRs  -MRSA PCR negative (12/22)  -MRSA nares negative (12/28)  -RVP negative (12/28)    Cultures    Bcx 12/18- final neg     Blood cx 12/21- no growth at 4 days    BCx, 12/28 - NG @ 24h    Tracheal aspirate 12/21: numerous staph aureus methicillin sensitive    Tracheal aspirate 12/22: staph aureus    C.Diff PCR neg    UA 12/25 negative     UA 12/28 negative    Current antibiotics:  - Vancomycin 500mg x1 dose (12/28)  - Zosyn 3.375 q12 (started 12/28, stop 1/5)  #multiple loose bowel movements    -no bowel regimen, adding banatrol    ENDO:  #DM     -ISS     -Off insulin gtt 12/20      -Lantus 20 units HS     -FSG q6 while on TF     -A1c pending  #AMS possibly due to hypothyroidism      - thyroid panel pending    #MSK:     Activity - Increase As Tolerated    - B/L knee X ray 12/19- no fractures, b/l effusions    - recall PT for reassessment    WOUND:  - L sacral stage 2 ulcer, wound care consult placed    LINES/DRAINS:  PIV, right basilic midline (placed 12/16), 20Fr PEG (12/22), Tracheostomy (12/22)    ADVANCED DIRECTIVES:  Full Code  -  HCP/Emergency Contact-Sarah Gamino (Wife) [phone], Cantonese speaking    DISPO:  - Social work, case management consult for dispo to [facility] facility - spoke with social work 12/23, starting to work on D/C.  - SW update, 12/29: pt remains acute. Will reassess 24-48h prior to d/c Assessment		  78y Male s/p mechanical fall. +HT, +AC (Aspirin and Plavix), -LOC.    12/21-intubated for airway protection, accessory muscle use  12/22- s/p tracheostomy and PEG placement, Portex cuffed 9 and 20fr PEG 2cm at skin     NEURO:  #Acute SDH   -12/18 HCT#4 -stable SDH, no new acute findings  -12/27: Pt obtunded, not responding to noxious stim - VPA/LFTs/BMP/ammonia ordered, CTH f/up: stable  - Q4 neuro checks  - amantadine, 100mg q48h. renal dosing  - NSGY: 1/1: "Risk vs benefits should be weighed and discussed with patient prior to initiation of Plavix". --> will not be restarting plavix per Dr. Degroot  #h/o stroke (2/2023) w/residual right sided weakness  - RIGHT anterior thalamus infarct,  LEFT caudate head lacunar infarct  #focal seizure    - vEEG 12/19: No seizures; Discontinued 12/19    - NCC: valproic acid current dose, q4 neurochecks    - Neurology cs- d/c EEG, 2 days prior to discharge call neurology so they can do a spot EEG --> recalled on 12/28 to evaluate diminished mental status, recommend repeat EEG and continue depakote 500 q12  	- f/up neuro recs  - EEG,12/28: No electrographic seizures or significant clinical events. Abnormal due to the presence of focal and generalized slowing. Findings consistent with focal and diffuse electrocerebral dysfunction secondary to structural/metabolic/nonspecific etiology.    - Valproic acid level 12/31 23, albumin 2.5; Valproic acid dose adjusted to 750mg q12h for 1 week, per pharmacy    RESP:   #Respiratory Failure secondary to impaired secretion clearance  - s/p tracheostomy 12/22, size 9 cuffed portex  - Tolerating t-piece since 12/29  - chest PT q4 and duoneb treatments q 4   - deep tracheal aspirate cx sent (due to fevers)  - Culture - Sputum . (12.21.23 @ 18:14)-  Numerous Staphylococcus aureus --> 12/28 repeat NGTD  #Activity    -increase as tolerated    -aspiration precautions, HOB 30    CARDS:   #acute HYPOtension intraop, resolved  -off levophed since 12/22 PM  #hx of HTN  -restarting home coreg and amlodipine  -Valsartan 320mg HOLDING   #hx HLD  -atorvastatin  #NSVT    -EP/Cards: No arrhythmias on tele only artifact. No further work up needed. Recommend ILR prior to discharge if patient/family agreeable  Imaging:   Echo: 12/2023: EF 66%, G1DD, trace MR, mild TR     GI/NUTR:   #Diet, NPO with Tube Feed via 20Fr PEG placed 12/22   - TF: Nepro @ 27cc/hr x 24hrs, 250cc FWF q6h + Banatrol  - multivitamin QD  #GI Prophylaxis  - famotidine 10mg PO q48  #Bowel regimen     -Multiple loose bowel movements, last dose senna 12/19 at 2200 > dignishield placed 12/23     - C diff negative     - No bowel regimen    /RENAL:   #urine output   - Aguirre d/c 1/1, voided 125cc, post residual bladder scan: 465cc, straight cath 800cc. Failed 7am TOV,   - UA neg 12/25 (r/o UTI in setting of persistent fevers)   - Sodium goal 140-150  - f/u nephro recs- Hypernatremia due to free water losses. Avoid diuretics, would not use Metolazone to cause volume depletion, and has min effect in advanced kidney dysfunction   #Diuresis  - Last diuresed 12/29: metolazone 10 x1 for hypernatremia  #Hypernatremia - improved  - FWD 1.3 L,  q8    Labs:          BUN/Cr- 80/2.6  -->,  77/2.4  -->          Electrolytes-Na 151 // K 4.2 // Mg 2.9 //  Phos 4.8 (01-01 @ 21:32)  #hyperphosphatemia  - Started sevelamer powder, non-formulary so as not to clog PEG   #CKD   - baseline Cr 2.1  - holding home sodium bicarb 650mg BID  #hx BPH  - cardura 4mg (flomax at home, non crushable)      HEME/ONC:   #DVT prophylaxis     -SCDs, HSQ  #hx of CVA    -ASA81 mg cleared by NSGY    - NSGY: "Risk vs benefits should be weighed and discussed with patient prior to initiation of Plavix. Above d/w Dr Thomas."   - d/c plavix per Dr. Degroot    Labs: Hb/Hct:  8.6/27.2  -->,  9.5/29.8  -->                      Plts:  388  -->,  440  -->                 PTT/INR:  36.5/1.03  --->       ID:  WBC- 12.48  --->>,  11.58  --->>,  12.58  --->>  Temp trend- 24hrs T(F): 97.3 (01-02 @ 00:00), Max: 98 (01-01 @ 12:00)  Antibiotics-piperacillin/tazobactam IVPB.. 3.375 every 12 hours  #recurrent fevers- improving, now low grade  - d/c'd Tylenol    PCRs  -MRSA PCR negative (12/22)  -MRSA nares negative (12/28)  -RVP negative (12/28)    Cultures    Bcx 12/18- final neg     Blood cx 12/21- no growth at 4 days    BCx, 12/28 - NG @ 24h    Tracheal aspirate 12/21: numerous staph aureus methicillin sensitive    Tracheal aspirate 12/22: staph aureus    C.Diff PCR neg    UA 12/25 negative     UA 12/28 negative    Current antibiotics:  - Vancomycin 500mg x1 dose (12/28)  - Zosyn 3.375 q12 (started 12/28, stop 1/5)  #multiple loose bowel movements    -no bowel regimen, adding banatrol    ENDO:  #DM     -ISS     -Off insulin gtt 12/20      -Lantus 20 units HS     -FSG q6 while on TF     -A1c pending  #AMS possibly due to hypothyroidism      - thyroid panel pending    #MSK:     Activity - Increase As Tolerated    - B/L knee X ray 12/19- no fractures, b/l effusions    - recall PT for reassessment    WOUND:  - L sacral stage 2 ulcer, wound care consult placed    LINES/DRAINS:  PIV, right basilic midline (placed 12/16), 20Fr PEG (12/22), Tracheostomy (12/22)    ADVANCED DIRECTIVES:  Full Code  -  HCP/Emergency Contact-Sarah Gamino (Wife) [phone], Cantonese speaking    DISPO:  - Social work, case management consult for dispo to [facility] facility - spoke with social work 12/23, starting to work on D/C.  - SW update, 12/29: pt remains acute. Will reassess 24-48h prior to d/c Assessment		  78y Male s/p mechanical fall. +HT, +AC (Aspirin and Plavix), -LOC.    12/21-intubated for airway protection, accessory muscle use  12/22- s/p tracheostomy and PEG placement, Portex cuffed 9 and 20fr PEG 2cm at skin     NEURO:  #Acute SDH   -12/18 HCT#4 -stable SDH, no new acute findings  -12/27: Pt obtunded, not responding to noxious stim - VPA/LFTs/BMP/ammonia ordered, CTH f/up: stable  - Q4 neuro checks  - amantadine, 100mg q48h. renal dosing  - NSGY, 1/1: "Risk vs benefits should be weighed and discussed with patient prior to initiation of Plavix". --> will not be restarting plavix per Dr. Degroot  #h/o stroke (2/2023) w/residual right sided weakness  - RIGHT anterior thalamus infarct,  LEFT caudate head lacunar infarct  #focal seizure    - vEEG 12/19: No seizures; Discontinued 12/19    - NCC: valproic acid current dose, q4 neurochecks    - Neurology cs- d/c EEG, 2 days prior to discharge call neurology so they can do a spot EEG --> recalled on 12/28 to evaluate diminished mental status, recommend repeat EEG and continue depakote 500 q12  	- f/up neuro recs  - EEG,12/28: No electrographic seizures or significant clinical events. Abnormal due to the presence of focal and generalized slowing. Findings consistent with focal and diffuse electrocerebral dysfunction secondary to structural/metabolic/nonspecific etiology.    - Valproic acid level 12/31 23, albumin 2.5; Valproic acid dose adjusted to 750mg q12h for 1 week, per pharmacy    RESP:   #Respiratory Failure secondary to impaired secretion clearance  - s/p tracheostomy 12/22, size 9 cuffed portex  - Tolerating t-piece since 12/29  - chest PT q4 and duoneb treatments q 4   - deep tracheal aspirate cx sent (due to fevers)  - Culture - Sputum . (12.21.23 @ 18:14)-  Numerous Staphylococcus aureus --> 12/28 repeat NGTD  #Activity    -increase as tolerated    -aspiration precautions, HOB 30    CARDS:   #acute HYPOtension intraop, resolved  -off levophed since 12/22 PM  #hx of HTN  -restarting home coreg and amlodipine  -Valsartan 320mg HOLDING   #hx HLD  -atorvastatin  #NSVT    -EP/Cards: No arrhythmias on tele only artifact. No further work up needed. Recommend ILR prior to discharge if patient/family agreeable  Imaging:   Echo: 12/2023: EF 66%, G1DD, trace MR, mild TR     GI/NUTR:   #Diet, NPO with Tube Feed via 20Fr PEG placed 12/22   - TF: Nepro @ 27cc/hr x 24hrs, 250cc FWF q6h + Banatrol  - multivitamin QD  #GI Prophylaxis  - famotidine 10mg PO q48  #Bowel regimen     - Multiple loose bowel movements, last dose senna 12/19 at 2200 > dignishield placed 12/23     - C diff negative     - No bowel regimen    /RENAL:   #urine output   - Aguirre d/c 1/1, voided 125cc, post residual bladder scan: 465cc, straight cath 800cc. Failed 7am TOV,  , 2nd SC not recorded.           - TOV pending AT 3PM  - UA neg 12/25 (r/o UTI in setting of persistent fevers)   - Sodium goal 140-150  - f/u nephro recs- Hypernatremia due to free water losses. Avoid diuretics, would not use Metolazone to cause volume depletion, and has min effect in advanced kidney dysfunction   #Diuresis  - Last diuresed 12/29: metolazone 10 x1 for hypernatremia  #Hypernatremia - improved  - FWD 1.3 L, FWF 250Q6    Labs:          BUN/Cr- 80/2.6  -->,  77/2.4  -->          Electrolytes-Na 151 // K 4.2 // Mg 2.9 //  Phos 4.8 (01-01 @ 21:32)  #hyperphosphatemia  - Started sevelamer powder, non-formulary so as not to clog PEG   #CKD   - baseline Cr 2.1  - holding home sodium bicarb 650mg BID  #hx BPH  - cardura 4mg (flomax at home, non crushable)      HEME/ONC:   #DVT prophylaxis     -SCDs, HSQ  #hx of CVA    -ASA81 mg cleared by NSGY    - NSGY: "Risk vs benefits should be weighed and discussed with patient prior to initiation of Plavix. Above d/w Dr Thomas."    - d/c plavix per Dr. Degroot    Labs: Hb/Hct:  8.6/27.2  -->,  9.5/29.8  -->                      Plts:  388  -->,  440  -->                 PTT/INR:  36.5/1.03  --->       ID:  WBC- 12.48  --->>,  11.58  --->>,  12.58  --->>  Temp trend- 24hrs T(F): 97.3 (01-02 @ 00:00), Max: 98 (01-01 @ 12:00)  Antibiotics-piperacillin/tazobactam IVPB.. 3.375 every 12 hours  #recurrent fevers- improving, now low grade  - d/c'd Tylenol    PCRs  -MRSA PCR negative (12/22)  -MRSA nares negative (12/28)  -RVP negative (12/28)    Cultures    Bcx 12/18- final neg     Blood cx 12/21- no growth at 4 days    BCx, 12/28 - NG @ 24h    Tracheal aspirate 12/21: numerous staph aureus methicillin sensitive    Tracheal aspirate 12/22: staph aureus    C.Diff PCR neg    UA 12/25 negative     UA 12/28 negative    Current antibiotics:  - Vancomycin 500mg x1 dose (12/28)  - Zosyn 3.375 q12 (started 12/28, stop 1/5)  #multiple loose bowel movements    -no bowel regimen, adding banatrol    ENDO:  #DM     -ISS     -Off insulin gtt 12/20      -Lantus 20 units HS     -FSG q6 while on TF     -A1c pending  #AMS possibly due to hypothyroidism      - thyroid panel pending    #MSK:     Activity - Increase As Tolerated    - B/L knee X ray 12/19- no fractures, b/l effusions    - recall PT for reassessment    WOUND:  - L sacral stage 2 ulcer, wound care consult placed    LINES/DRAINS:  PIV, right basilic midline (placed 12/16), 20Fr PEG (12/22), Tracheostomy (12/22)    ADVANCED DIRECTIVES:  Full Code  -  HCP/Emergency Contact-Sarah Gamino (Wife) [phone], Cantonese speaking    DISPO:  - Social work, case management consult for dispo to [facility] facility - spoke with social work 12/23, starting to work on D/C.  - SW update, 12/29: pt remains acute. Will reassess 24-48h prior to d/c Assessment		  78y Male s/p mechanical fall. +HT, +AC (Aspirin and Plavix), -LOC.    12/21-intubated for airway protection, accessory muscle use  12/22- s/p tracheostomy and PEG placement, Portex cuffed 9 and 20fr PEG 2cm at skin     NEURO:  #Acute SDH   -12/18 HCT#4 -stable SDH, no new acute findings  -12/27: Pt obtunded, not responding to noxious stim - VPA/LFTs/BMP/ammonia ordered, CTH f/up: stable  - Q4 neuro checks  - amantadine, 100mg q48h. renal dosing  - NSGY, 1/1: "Risk vs benefits should be weighed and discussed with patient prior to initiation of Plavix". --> will not be restarting plavix per Dr. Degroot  #h/o stroke (2/2023) w/residual right sided weakness  - RIGHT anterior thalamus infarct,  LEFT caudate head lacunar infarct  #focal seizure    - vEEG 12/19: No seizures; Discontinued 12/19    - NCC: valproic acid current dose, q4 neurochecks    - Neurology cs- d/c EEG, 2 days prior to discharge call neurology so they can do a spot EEG --> recalled on 12/28 to evaluate diminished mental status, recommend repeat EEG and continue depakote 500 q12  	- f/up neuro recs  - EEG,12/28: No electrographic seizures or significant clinical events. Abnormal due to the presence of focal and generalized slowing. Findings consistent with focal and diffuse electrocerebral dysfunction secondary to structural/metabolic/nonspecific etiology.    - Valproic acid level 12/31 23, albumin 2.5; Valproic acid dose adjusted to 750mg q12h for 1 week, per pharmacy    RESP:   #Respiratory Failure secondary to impaired secretion clearance  - s/p tracheostomy 12/22, size 9 cuffed portex  - Tolerating t-piece since 12/29  - chest PT q4 and duoneb treatments q 4   - deep tracheal aspirate cx sent (due to fevers)  - Culture - Sputum . (12.21.23 @ 18:14)-  Numerous Staphylococcus aureus --> 12/28 repeat NGTD  #Activity    -increase as tolerated    -aspiration precautions, HOB 30    CARDS:   #acute HYPOtension intraop, resolved  -off levophed since 12/22 PM  #hx of HTN  -restarting home coreg and amlodipine  -Valsartan 320mg HOLDING   #hx HLD  -atorvastatin  #NSVT    -EP/Cards: No arrhythmias on tele only artifact. No further work up needed. Recommend ILR prior to discharge if patient/family agreeable  Imaging:   Echo: 12/2023: EF 66%, G1DD, trace MR, mild TR     GI/NUTR:   #Diet, NPO with Tube Feed via 20Fr PEG placed 12/22   - TF: Nepro @ 27cc/hr x 24hrs, 250cc FWF q6h + Banatrol  - multivitamin QD  #GI Prophylaxis  - famotidine 10mg PO q48  #Bowel regimen     - Multiple loose bowel movements, last dose senna 12/19 at 2200 > dignishield placed 12/23     - C diff negative     - No bowel regimen    /RENAL:   #urine output   - Aguirre d/c 1/1, voided 125cc, post residual bladder scan: 465cc, straight cath 800cc. Failed 7am TOV,  , 2nd SC not recorded.           - TOV pending AT 3PM  - UA neg 12/25 (r/o UTI in setting of persistent fevers)   - Sodium goal 140-150  - f/u nephro recs- Hypernatremia due to free water losses. Avoid diuretics, would not use Metolazone to cause volume depletion, and has min effect in advanced kidney dysfunction   #Diuresis  - Last diuresed 12/29: metolazone 10 x1 for hypernatremia  #Hypernatremia - improved  - FWD 1.3 L, FWF 250Q6    Labs:          BUN/Cr- 80/2.6  -->,  77/2.4  -->          Electrolytes-Na 151 // K 4.2 // Mg 2.9 //  Phos 4.8 (01-01 @ 21:32)  #hyperphosphatemia  - Started sevelamer powder, non-formulary so as not to clog PEG   #CKD   - baseline Cr 2.1  - holding home sodium bicarb 650mg BID  #hx BPH  - cardura 4mg (flomax at home, non crushable)      HEME/ONC:   #DVT prophylaxis     -SCDs, HSQ  #hx of CVA    -ASA81 mg cleared by NSGY    - NSGY: "Risk vs benefits should be weighed and discussed with patient prior to initiation of Plavix. Above d/w Dr Thomas."    - d/c plavix per Dr. Degroot    Labs: Hb/Hct:  8.6/27.2  -->,  9.5/29.8  -->                      Plts:  388  -->,  440  -->                 PTT/INR:  36.5/1.03  --->       ID:  WBC- 12.48  --->>,  11.58  --->>,  12.58  --->>  Temp trend- 24hrs T(F): 97.3 (01-02 @ 00:00), Max: 98 (01-01 @ 12:00)  Antibiotics-piperacillin/tazobactam IVPB.. 3.375 every 12 hours  #recurrent fevers- improving, now low grade  - d/c'd Tylenol    PCRs  -MRSA PCR negative (12/22)  -MRSA nares negative (12/28)  -RVP negative (12/28)    Cultures    Bcx 12/18- final neg     Blood cx 12/21- no growth at 4 days    BCx, 12/28 - NG @ 24h    Tracheal aspirate 12/21: numerous staph aureus methicillin sensitive    Tracheal aspirate 12/22: staph aureus    C.Diff PCR neg    UA 12/25 negative     UA 12/28 negative    Current antibiotics:  - Vancomycin 500mg x1 dose (12/28)  - Zosyn 3.375 q12 (started 12/28, stop 1/5)  #multiple loose bowel movements    -no bowel regimen, adding banatrol    ENDO:  #DM     -ISS     -Off insulin gtt 12/20      -Lantus 20 units HS     -FSG q6 while on TF     -A1c pending  #AMS possibly due to hypothyroidism      - thyroid panel pending    #MSK:     Activity - Increase As Tolerated    - B/L knee X ray 12/19- no fractures, b/l effusions    - recall PT for reassessment    WOUND:  - L sacral stage 2 ulcer, wound care consult placed    LINES/DRAINS:  PIV, right basilic midline (placed 12/16), 20Fr PEG (12/22), Tracheostomy (12/22)    ADVANCED DIRECTIVES:  Full Code  -  HCP/Emergency Contact-Sarah Gamino (Wife) [phone], Cantonese speaking    DISPO:  - Social work, case management consult for dispo to [facility] facility - spoke with social work 12/23, starting to work on D/C.  - SW update, 12/29: pt remains acute. Will reassess 24-48h prior to d/c Assessment		  78y Male s/p mechanical fall. +HT, +AC (Aspirin and Plavix), -LOC.    12/21-intubated for airway protection, accessory muscle use  12/22- s/p tracheostomy and PEG placement, Portex cuffed 9 and 20fr PEG 2cm at skin     NEURO:  #Acute SDH   -12/18 HCT#4 -stable SDH, no new acute findings  -12/27: Pt obtunded, not responding to noxious stim - VPA/LFTs/BMP/ammonia ordered, CTH f/up: stable  - Q4 neuro checks  - amantadine, 100mg q48h. renal dosing  - NSGY, 1/1: "Risk vs benefits should be weighed and discussed with patient prior to initiation of Plavix". --> will not be restarting plavix per Dr. Degroot  #h/o stroke (2/2023) w/residual right sided weakness  - RIGHT anterior thalamus infarct,  LEFT caudate head lacunar infarct  #focal seizure    - vEEG 12/19: No seizures; Discontinued 12/19    - NCC: valproic acid current dose, q4 neurochecks    - Neurology cs- d/c EEG, 2 days prior to discharge call neurology so they can do a spot EEG --> recalled on 12/28 to evaluate diminished mental status, recommend repeat EEG and continue depakote 500 q12  	- continue VPA 750mg BID x 1 week per Dr. Degroot  - EEG,12/28: No electrographic seizures or significant clinical events. Abnormal due to the presence of focal and generalized slowing. Findings consistent with focal and diffuse electrocerebral dysfunction secondary to structural/metabolic/nonspecific etiology.    - Valproic acid level 12/31 23, albumin 2.5; Valproic acid dose adjusted to 750mg q12h for 1 week, per pharmacy    RESP:   #Respiratory Failure secondary to impaired secretion clearance  - s/p tracheostomy 12/22, size 9 cuffed portex  - Tolerating t-piece since 12/29  - chest PT q4 and duoneb treatments q 4   - deep tracheal aspirate cx sent (due to fevers)  - Culture - Sputum . (12.21.23 @ 18:14)-  Numerous Staphylococcus aureus --> 12/28 repeat NGTD  #Activity    -increase as tolerated    -aspiration precautions, HOB 30    CARDS:   #acute HYPOtension intraop, resolved  -off levophed since 12/22 PM  #hx of HTN  -restarting home coreg and amlodipine  -Valsartan 320mg HOLDING   #hx HLD  -atorvastatin  #NSVT    -EP/Cards: No arrhythmias on tele only artifact. No further work up needed. Recommend ILR prior to discharge if patient/family agreeable  Imaging:   Echo: 12/2023: EF 66%, G1DD, trace MR, mild TR     GI/NUTR:   #Diet, NPO with Tube Feed via 20Fr PEG placed 12/22   - TF: Nepro @ 27cc/hr x 24hrs, 250cc FWF q6h + Banatrol  - multivitamin QD  #GI Prophylaxis  - famotidine 10mg PO q48  #Bowel regimen     - Multiple loose bowel movements, last dose senna 12/19 at 2200 > dignishield placed 12/23     - C diff negative     - + metamucil    /RENAL:   #urine output   - Aguirre d/c 1/1, voided 125cc, post residual bladder scan: 465cc, straight cath 800cc. Failed 7am TOV,  , 2nd SC not recorded.           - Next TOV at 11am  - UA neg 12/25 (r/o UTI in setting of persistent fevers)   - Sodium goal 140-150  - f/u nephro recs- Hypernatremia due to free water losses. Avoid diuretics, would not use Metolazone to cause volume depletion, and has min effect in advanced kidney dysfunction   #Diuresis  - Last diuresed 12/29: metolazone 10 x1 for hypernatremia  #Hypernatremia - improved  - FWD 1.3 L, FWF 250Q6    Labs:          BUN/Cr- 80/2.6  -->,  77/2.4  -->          Electrolytes-Na 151 // K 4.2 // Mg 2.9 //  Phos 4.8 (01-01 @ 21:32)  #hyperphosphatemia  - Started sevelamer powder, non-formulary so as not to clog PEG   #CKD   - baseline Cr 2.1  - holding home sodium bicarb 650mg BID  #hx BPH  - cardura 4mg (flomax at home, non crushable)      HEME/ONC:   #DVT prophylaxis     -SCDs, HSQ  #hx of CVA    -ASA81 mg cleared by NSGY    - NSGY: "Risk vs benefits should be weighed and discussed with patient prior to initiation of Plavix. Above d/w Dr Thomas."    - d/c plavix per Dr. Degroot    Labs: Hb/Hct:  8.6/27.2  -->,  9.5/29.8  -->                      Plts:  388  -->,  440  -->                 PTT/INR:  36.5/1.03  --->       ID:  WBC- 12.48  --->>,  11.58  --->>,  12.58  --->>  Temp trend- 24hrs T(F): 97.3 (01-02 @ 00:00), Max: 98 (01-01 @ 12:00)  Antibiotics-piperacillin/tazobactam IVPB.. 3.375 every 12 hours  #recurrent fevers- improving, now low grade  - d/c'd Tylenol    PCRs  -MRSA PCR negative (12/22)  -MRSA nares negative (12/28)  -RVP negative (12/28)    Cultures    Bcx 12/18- final neg     Blood cx 12/21- no growth at 4 days    BCx, 12/28 - NG @ 24h    Tracheal aspirate 12/21: numerous staph aureus methicillin sensitive    Tracheal aspirate 12/22: staph aureus    C.Diff PCR neg    UA 12/25 negative     UA 12/28 negative    Current antibiotics:  - Vancomycin 500mg x1 dose (12/28)  - Zosyn 3.375 q12 (started 12/28, stop 1/5)  #multiple loose bowel movements    -no bowel regimen, adding banatrol    ENDO:  #DM     -ISS     -Off insulin gtt 12/20      -Lantus 20 units HS --> 5 lantus stat in AM 1/2, start Lantus 25u HS night 1/2     -FSG q6 while on TF     -A1c pending  #AMS possibly due to hypothyroidism      - thyroid panel pending    #MSK:     Activity - Increase As Tolerated    - B/L knee X ray 12/19- no fractures, b/l effusions    - recall PT for reassessment    WOUND:  - L sacral stage 2 ulcer, wound care consult placed    LINES/DRAINS:  PIV, right basilic midline (placed 12/16), 20Fr PEG (12/22), Tracheostomy (12/22)    ADVANCED DIRECTIVES:  Full Code  -  HCP/Emergency Contact-Sarah Gamino (Wife) [phone], Cantonese speaking    DISPO:  - Social work, case management consult for dispo to vent facility - spoke with social work 12/23, starting to work on D/C.  -  update, 12/29: pt remains acute. Will reassess 24-48h prior to d/c Assessment		  78y Male s/p mechanical fall. +HT, +AC (Aspirin and Plavix), -LOC.    12/21-intubated for airway protection, accessory muscle use  12/22- s/p tracheostomy and PEG placement, Portex cuffed 9 and 20fr PEG 2cm at skin     NEURO:  #Acute SDH   -12/18 HCT#4 -stable SDH, no new acute findings  -12/27: Pt obtunded, not responding to noxious stim - VPA/LFTs/BMP/ammonia ordered, CTH f/up: stable  - Q4 neuro checks  - amantadine, 100mg q48h. renal dosing  - NSGY, 1/1: "Risk vs benefits should be weighed and discussed with patient prior to initiation of Plavix". --> will not be restarting plavix per Dr. Degroot  #h/o stroke (2/2023) w/residual right sided weakness  - RIGHT anterior thalamus infarct,  LEFT caudate head lacunar infarct  #focal seizure    - vEEG 12/19: No seizures; Discontinued 12/19    - NCC: valproic acid current dose, q4 neurochecks    - Neurology cs- d/c EEG, 2 days prior to discharge call neurology so they can do a spot EEG --> recalled on 12/28 to evaluate diminished mental status, recommend repeat EEG and continue depakote 500 q12  	- continue VPA 750mg BID x 1 week per Dr. Degroot  - EEG,12/28: No electrographic seizures or significant clinical events. Abnormal due to the presence of focal and generalized slowing. Findings consistent with focal and diffuse electrocerebral dysfunction secondary to structural/metabolic/nonspecific etiology.    - Valproic acid level 12/31 23, albumin 2.5; Valproic acid dose adjusted to 750mg q12h for 1 week, per pharmacy    RESP:   #Respiratory Failure secondary to impaired secretion clearance  - s/p tracheostomy 12/22, size 9 cuffed portex  - Tolerating t-piece since 12/29  - chest PT q4 and duoneb treatments q 4   - deep tracheal aspirate cx sent (due to fevers)  - Culture - Sputum . (12.21.23 @ 18:14)-  Numerous Staphylococcus aureus --> 12/28 repeat NGTD  #Activity    -increase as tolerated    -aspiration precautions, HOB 30    CARDS:   #acute HYPOtension intraop, resolved  -off levophed since 12/22 PM  #hx of HTN  -restarting home coreg and amlodipine  -Valsartan 320mg HOLDING   #hx HLD  -atorvastatin  #NSVT    -EP/Cards: No arrhythmias on tele only artifact. No further work up needed. Recommend ILR prior to discharge if patient/family agreeable  Imaging:   Echo: 12/2023: EF 66%, G1DD, trace MR, mild TR     GI/NUTR:   #Diet, NPO with Tube Feed via 20Fr PEG placed 12/22   - TF: Nepro @ 27cc/hr x 24hrs, 250cc FWF q6h + Banatrol  - multivitamin QD  #GI Prophylaxis  - famotidine 10mg PO q48  #Bowel regimen     - Multiple loose bowel movements, last dose senna 12/19 at 2200 > dignishield placed 12/23     - C diff negative     - + metamucil    /RENAL:   #urine output   - Aguirre d/c 1/1, voided 125cc, post residual bladder scan: 465cc, straight cath 800cc. Failed 7am TOV,  , 2nd SC not recorded.           - Next TOV at 11am  - UA neg 12/25 (r/o UTI in setting of persistent fevers)   - Sodium goal 140-150  - f/u nephro recs- Hypernatremia due to free water losses. Avoid diuretics, would not use Metolazone to cause volume depletion, and has min effect in advanced kidney dysfunction   #Diuresis  - Last diuresed 12/29: metolazone 10 x1 for hypernatremia  #Hypernatremia - improved  - FWD 1.3 L, FWF 250Q6    Labs:          BUN/Cr- 80/2.6  -->,  77/2.4  -->          Electrolytes-Na 151 // K 4.2 // Mg 2.9 //  Phos 4.8 (01-01 @ 21:32)  #hyperphosphatemia  - Started sevelamer powder, non-formulary so as not to clog PEG   #CKD   - baseline Cr 2.1  - holding home sodium bicarb 650mg BID  #hx BPH  - cardura 4mg (flomax at home, non crushable)      HEME/ONC:   #DVT prophylaxis     -SCDs, HSQ  #hx of CVA    -ASA81 mg cleared by NSGY    - NSGY: "Risk vs benefits should be weighed and discussed with patient prior to initiation of Plavix. Above d/w Dr Thomas."    - d/c plavix per Dr. Degroot    Labs: Hb/Hct:  8.6/27.2  -->,  9.5/29.8  -->                      Plts:  388  -->,  440  -->                 PTT/INR:  36.5/1.03  --->       ID:  WBC- 12.48  --->>,  11.58  --->>,  12.58  --->>  Temp trend- 24hrs T(F): 97.3 (01-02 @ 00:00), Max: 98 (01-01 @ 12:00)  Antibiotics-piperacillin/tazobactam IVPB.. 3.375 every 12 hours  #recurrent fevers- improving, now low grade  - d/c'd Tylenol    PCRs  -MRSA PCR negative (12/22)  -MRSA nares negative (12/28)  -RVP negative (12/28)    Cultures    Bcx 12/18- final neg     Blood cx 12/21- no growth at 4 days    BCx, 12/28 - NG @ 24h    Tracheal aspirate 12/21: numerous staph aureus methicillin sensitive    Tracheal aspirate 12/22: staph aureus    C.Diff PCR neg    UA 12/25 negative     UA 12/28 negative    Current antibiotics:  - Vancomycin 500mg x1 dose (12/28)  - Zosyn 3.375 q12 (started 12/28, stop 1/5)  #multiple loose bowel movements    -no bowel regimen, adding banatrol    ENDO:  #DM     -ISS     -Off insulin gtt 12/20      -Lantus 20 units HS --> 5 lantus stat in AM 1/2, start Lantus 25u HS night 1/2     -FSG q6 while on TF     -A1c pending  #AMS possibly due to hypothyroidism      - thyroid panel pending    #MSK:     Activity - Increase As Tolerated    - B/L knee X ray 12/19- no fractures, b/l effusions    - recall PT for reassessment    WOUND:  - L sacral stage 2 ulcer, wound care consult placed    LINES/DRAINS:  PIV, right basilic midline (placed 12/16), 20Fr PEG (12/22), Tracheostomy (12/22)    ADVANCED DIRECTIVES:  Full Code  -  HCP/Emergency Contact-Sarah Gamino (Wife) [phone], Cantonese speaking    DISPO:  - Social work, case management consult for dispo to vent facility - spoke with social work 12/23, starting to work on D/C.  -  update, 12/29: pt remains acute. Will reassess 24-48h prior to d/c Assessment		  78y Male s/p mechanical fall. +HT, +AC (Aspirin and Plavix), -LOC.    12/21-intubated for airway protection, accessory muscle use  12/22- s/p tracheostomy and PEG placement, Portex cuffed 9 and 20fr PEG 2cm at skin     NEURO:  #Acute SDH   -12/18 HCT#4 -stable SDH, no new acute findings  -12/27: Pt obtunded, not responding to noxious stim - VPA/LFTs/BMP/ammonia ordered, CTH f/up: stable  - Q4 neuro checks  - amantadine, 100mg q48h. renal dosing  - NSGY, 1/1: "Risk vs benefits should be weighed and discussed with patient prior to initiation of Plavix". --> will not be restarting plavix  #h/o stroke (2/2023) w/residual right sided weakness  - RIGHT anterior thalamus infarct,  LEFT caudate head lacunar infarct  #focal seizure    - vEEG 12/19: No seizures; Discontinued 12/19    - NCC: valproic acid current dose, q4 neurochecks    - Neurology cs- d/c EEG, 2 days prior to discharge call neurology so they can do a spot EEG --> recalled on 12/28 to evaluate diminished mental status, recommend repeat EEG and continue depakote 500 q12  - EEG,12/28: No electrographic seizures or significant clinical events. Abnormal due to the presence of focal and generalized slowing. Findings consistent with focal and diffuse electrocerebral dysfunction secondary to structural/metabolic/nonspecific etiology.    - Valproic acid level 12/31 23, albumin 2.5; Valproic acid dose adjusted to 750mg q12h for 1 week, per pharmacy    RESP:   #Respiratory Failure secondary to impaired secretion clearance  - s/p tracheostomy 12/22, size 9 cuffed portex  - Tolerating t-piece since 12/29  - chest PT q4 and duoneb treatments q 4   - deep tracheal aspirate cx sent (due to fevers)  - Culture - Sputum . (12.21.23 @ 18:14)-  Numerous Staphylococcus aureus --> 12/28 repeat NGTD  #Activity    -increase as tolerated    -aspiration precautions, HOB 30    CARDS:   #acute HYPOtension intraop, resolved  -off levophed since 12/22 PM  #hx of HTN  -restarting home coreg and amlodipine  -Valsartan 320mg HOLDING   #hx HLD  -atorvastatin  #NSVT    -EP/Cards: No arrhythmias on tele only artifact. No further work up needed. Recommend ILR prior to discharge if patient/family agreeable  Imaging:   Echo: 12/2023: EF 66%, G1DD, trace MR, mild TR     GI/NUTR:   #Diet, NPO with Tube Feed via 20Fr PEG placed 12/22   - TF: Nepro @ 27cc/hr x 24hrs, 250cc FWF q6h + Banatrol  - multivitamin QD  #GI Prophylaxis  - famotidine 10mg PO q48  #Bowel regimen     - Multiple loose bowel movements, last dose senna 12/19 at 2200 > dignishield placed 12/23     - C diff negative     - + metamucil    /RENAL:   #urine output   - Aguirre d/c 1/1, voided 125cc, post residual bladder scan: 465cc, straight cath 800cc. Failed 7am TOV,  , 2nd SC not recorded.           - Next TOV at 11am  - UA neg 12/25 (r/o UTI in setting of persistent fevers)   - Sodium goal 140-150  - f/u nephro recs- Hypernatremia due to free water losses. Avoid diuretics, would not use Metolazone to cause volume depletion, and has min effect in advanced kidney dysfunction   #Diuresis  - Last diuresed 12/29: metolazone 10 x1 for hypernatremia  #Hypernatremia - improved  - FWD 1.3 L, FWF 250Q6    Labs:          BUN/Cr- 80/2.6  -->,  77/2.4  -->          Electrolytes-Na 151 // K 4.2 // Mg 2.9 //  Phos 4.8 (01-01 @ 21:32)  #hyperphosphatemia  - Started sevelamer powder, non-formulary so as not to clog PEG   #CKD   - baseline Cr 2.1  - holding home sodium bicarb 650mg BID  #hx BPH  - cardura 4mg (flomax at home, non crushable)      HEME/ONC:   #DVT prophylaxis     -SCDs, HSQ  #hx of CVA    -ASA81 mg cleared by NSGY    - NSGY: "Risk vs benefits should be weighed and discussed with patient prior to initiation of Plavix."    - continue to hold home plavix    Labs: Hb/Hct:  8.6/27.2  -->,  9.5/29.8  -->                      Plts:  388  -->,  440  -->                 PTT/INR:  36.5/1.03  --->       ID:  WBC- 12.48  --->>,  11.58  --->>,  12.58  --->>  Temp trend- 24hrs T(F): 97.3 (01-02 @ 00:00), Max: 98 (01-01 @ 12:00)  Antibiotics-piperacillin/tazobactam IVPB.. 3.375 every 12 hours  #recurrent fevers- improving, now low grade  - d/c'd Tylenol    PCRs  -MRSA PCR negative (12/22)  -MRSA nares negative (12/28)  -RVP negative (12/28)    Cultures    Bcx 12/18- final neg     Blood cx 12/21- no growth at 4 days    BCx, 12/28 - NG @ 24h    Tracheal aspirate 12/21: numerous staph aureus methicillin sensitive    Tracheal aspirate 12/22: staph aureus    C.Diff PCR neg    UA 12/25 negative     UA 12/28 negative    Current antibiotics:  - Vancomycin 500mg x1 dose (12/28)  - Zosyn 3.375 q12 (started 12/28, stop 1/5)  #multiple loose bowel movements    -no bowel regimen, adding banatrol    ENDO:  #DM     -ISS     -Off insulin gtt 12/20      -Lantus 20 units HS --> 5 lantus stat in AM 1/2, start Lantus 25u HS night 1/2     -FSG q6 while on TF     -A1c pending  #AMS possibly due to hypothyroidism      - thyroid panel pending    #MSK:     Activity - Increase As Tolerated    - B/L knee X ray 12/19- no fractures, b/l effusions    - recall PT for reassessment    WOUND:  - L sacral stage 2 ulcer, wound care consult placed    LINES/DRAINS:  PIV, right basilic midline (placed 12/16), 20Fr PEG (12/22), Tracheostomy (12/22)    ADVANCED DIRECTIVES:  Full Code  -  HCP/Emergency Contact-Sarah Gamino (Wife) [phone], Cantonese speaking    DISPO:  - Social work, case management consult for dispo to [facility] facility - spoke with social work 12/23, starting to work on D/C.  - SW update, 12/29: pt remains acute. Will reassess 24-48h prior to d/c Assessment		  78y Male s/p mechanical fall. +HT, +AC (Aspirin and Plavix), -LOC.    12/21-intubated for airway protection, accessory muscle use  12/22- s/p tracheostomy and PEG placement, Portex cuffed 9 and 20fr PEG 2cm at skin     NEURO:  #Acute SDH   -12/18 HCT#4 -stable SDH, no new acute findings  -12/27: Pt obtunded, not responding to noxious stim - VPA/LFTs/BMP/ammonia ordered, CTH f/up: stable  - Q4 neuro checks  - amantadine, 100mg q48h. renal dosing  - NSGY, 1/1: "Risk vs benefits should be weighed and discussed with patient prior to initiation of Plavix". --> will not be restarting plavix  #h/o stroke (2/2023) w/residual right sided weakness  - RIGHT anterior thalamus infarct,  LEFT caudate head lacunar infarct  #focal seizure    - vEEG 12/19: No seizures; Discontinued 12/19    - NCC: valproic acid current dose, q4 neurochecks    - Neurology cs- d/c EEG, 2 days prior to discharge call neurology so they can do a spot EEG --> recalled on 12/28 to evaluate diminished mental status, recommend repeat EEG and continue depakote 500 q12  - EEG,12/28: No electrographic seizures or significant clinical events. Abnormal due to the presence of focal and generalized slowing. Findings consistent with focal and diffuse electrocerebral dysfunction secondary to structural/metabolic/nonspecific etiology.    - Valproic acid level 12/31 23, albumin 2.5; Valproic acid dose adjusted to 750mg q12h for 1 week, per pharmacy    RESP:   #Respiratory Failure secondary to impaired secretion clearance  - s/p tracheostomy 12/22, size 9 cuffed portex  - Tolerating t-piece since 12/29  - chest PT q4 and duoneb treatments q 4   - deep tracheal aspirate cx sent (due to fevers)  - Culture - Sputum . (12.21.23 @ 18:14)-  Numerous Staphylococcus aureus --> 12/28 repeat NGTD  #Activity    -increase as tolerated    -aspiration precautions, HOB 30    CARDS:   #acute HYPOtension intraop, resolved  -off levophed since 12/22 PM  #hx of HTN  -restarting home coreg and amlodipine  -Valsartan 320mg HOLDING   #hx HLD  -atorvastatin  #NSVT    -EP/Cards: No arrhythmias on tele only artifact. No further work up needed. Recommend ILR prior to discharge if patient/family agreeable  Imaging:   Echo: 12/2023: EF 66%, G1DD, trace MR, mild TR     GI/NUTR:   #Diet, NPO with Tube Feed via 20Fr PEG placed 12/22   - TF: Nepro @ 27cc/hr x 24hrs, 250cc FWF q6h + Banatrol  - multivitamin QD  #GI Prophylaxis  - famotidine 10mg PO q48  #Bowel regimen     - Multiple loose bowel movements, last dose senna 12/19 at 2200 > dignishield placed 12/23     - C diff negative     - + metamucil    /RENAL:   #urine output   - Aguirre d/c 1/1, voided 125cc, post residual bladder scan: 465cc, straight cath 800cc. Failed 7am TOV,  , 2nd SC not recorded.           - Next TOV at 11am  - UA neg 12/25 (r/o UTI in setting of persistent fevers)   - Sodium goal 140-150  - f/u nephro recs- Hypernatremia due to free water losses. Avoid diuretics, would not use Metolazone to cause volume depletion, and has min effect in advanced kidney dysfunction   #Diuresis  - Last diuresed 12/29: metolazone 10 x1 for hypernatremia  #Hypernatremia - improved  - FWD 1.3 L, FWF 250Q6    Labs:          BUN/Cr- 80/2.6  -->,  77/2.4  -->          Electrolytes-Na 151 // K 4.2 // Mg 2.9 //  Phos 4.8 (01-01 @ 21:32)  #hyperphosphatemia  - Started sevelamer powder, non-formulary so as not to clog PEG   #CKD   - baseline Cr 2.1  - holding home sodium bicarb 650mg BID  #hx BPH  - cardura 4mg (flomax at home, non crushable)      HEME/ONC:   #DVT prophylaxis     -SCDs, HSQ  #hx of CVA    -ASA81 mg cleared by NSGY    - NSGY: "Risk vs benefits should be weighed and discussed with patient prior to initiation of Plavix."    - continue to hold home plavix    Labs: Hb/Hct:  8.6/27.2  -->,  9.5/29.8  -->                      Plts:  388  -->,  440  -->                 PTT/INR:  36.5/1.03  --->       ID:  WBC- 12.48  --->>,  11.58  --->>,  12.58  --->>  Temp trend- 24hrs T(F): 97.3 (01-02 @ 00:00), Max: 98 (01-01 @ 12:00)  Antibiotics-piperacillin/tazobactam IVPB.. 3.375 every 12 hours  #recurrent fevers- improving, now low grade  - d/c'd Tylenol    PCRs  -MRSA PCR negative (12/22)  -MRSA nares negative (12/28)  -RVP negative (12/28)    Cultures    Bcx 12/18- final neg     Blood cx 12/21- no growth at 4 days    BCx, 12/28 - NG @ 24h    Tracheal aspirate 12/21: numerous staph aureus methicillin sensitive    Tracheal aspirate 12/22: staph aureus    C.Diff PCR neg    UA 12/25 negative     UA 12/28 negative    Current antibiotics:  - Vancomycin 500mg x1 dose (12/28)  - Zosyn 3.375 q12 (started 12/28, stop 1/5)  #multiple loose bowel movements    -no bowel regimen, adding banatrol    ENDO:  #DM     -ISS     -Off insulin gtt 12/20      -Lantus 20 units HS --> 5 lantus stat in AM 1/2, start Lantus 25u HS night 1/2     -FSG q6 while on TF     -A1c pending  #AMS possibly due to hypothyroidism      - thyroid panel pending    #MSK:     Activity - Increase As Tolerated    - B/L knee X ray 12/19- no fractures, b/l effusions    - recall PT for reassessment    WOUND:  - L sacral stage 2 ulcer, wound care consult placed    LINES/DRAINS:  PIV, right basilic midline (placed 12/16), 20Fr PEG (12/22), Tracheostomy (12/22)    ADVANCED DIRECTIVES:  Full Code  -  HCP/Emergency Contact-Sarah Gamino (Wife) [phone], Cantonese speaking    DISPO:  - Social work, case management consult for dispo to [facility] facility - spoke with social work 12/23, starting to work on D/C.  - SW update, 12/29: pt remains acute. Will reassess 24-48h prior to d/c

## 2024-01-02 NOTE — PROGRESS NOTE ADULT - ASSESSMENT
77-year-old male  presents with prior history of hypertension dyslipidemia diabetes BPH prior CVA on aspirin and Plavix s/p mechanical fall     Nephrology was consulted for SAGRARIO, hypernatremia.  SAGRARIO on CKD 3b - creat was 1.8 (Feb 2023) / likely pre-renal  non oliguric  creat level down trending  Hypernatremia   Sepsis    cr trending down  sodium noted / d5 at  75 cc/h / free water with feed   ph at goal bp controlled   will follow

## 2024-01-03 LAB
ALBUMIN SERPL ELPH-MCNC: 2.6 G/DL — LOW (ref 3.5–5.2)
ALBUMIN SERPL ELPH-MCNC: 2.6 G/DL — LOW (ref 3.5–5.2)
ANION GAP SERPL CALC-SCNC: 11 MMOL/L — SIGNIFICANT CHANGE UP (ref 7–14)
ANION GAP SERPL CALC-SCNC: 11 MMOL/L — SIGNIFICANT CHANGE UP (ref 7–14)
ANION GAP SERPL CALC-SCNC: 12 MMOL/L — SIGNIFICANT CHANGE UP (ref 7–14)
ANION GAP SERPL CALC-SCNC: 12 MMOL/L — SIGNIFICANT CHANGE UP (ref 7–14)
BASOPHILS # BLD AUTO: 0.03 K/UL — SIGNIFICANT CHANGE UP (ref 0–0.2)
BASOPHILS # BLD AUTO: 0.03 K/UL — SIGNIFICANT CHANGE UP (ref 0–0.2)
BASOPHILS NFR BLD AUTO: 0.3 % — SIGNIFICANT CHANGE UP (ref 0–1)
BASOPHILS NFR BLD AUTO: 0.3 % — SIGNIFICANT CHANGE UP (ref 0–1)
BUN SERPL-MCNC: 69 MG/DL — CRITICAL HIGH (ref 10–20)
BUN SERPL-MCNC: 69 MG/DL — CRITICAL HIGH (ref 10–20)
BUN SERPL-MCNC: 70 MG/DL — CRITICAL HIGH (ref 10–20)
BUN SERPL-MCNC: 70 MG/DL — CRITICAL HIGH (ref 10–20)
CALCIUM SERPL-MCNC: 7.7 MG/DL — LOW (ref 8.4–10.5)
CALCIUM SERPL-MCNC: 7.7 MG/DL — LOW (ref 8.4–10.5)
CALCIUM SERPL-MCNC: 8.1 MG/DL — LOW (ref 8.4–10.4)
CALCIUM SERPL-MCNC: 8.1 MG/DL — LOW (ref 8.4–10.4)
CHLORIDE SERPL-SCNC: 109 MMOL/L — SIGNIFICANT CHANGE UP (ref 98–110)
CHLORIDE SERPL-SCNC: 109 MMOL/L — SIGNIFICANT CHANGE UP (ref 98–110)
CHLORIDE SERPL-SCNC: 110 MMOL/L — SIGNIFICANT CHANGE UP (ref 98–110)
CHLORIDE SERPL-SCNC: 110 MMOL/L — SIGNIFICANT CHANGE UP (ref 98–110)
CO2 SERPL-SCNC: 22 MMOL/L — SIGNIFICANT CHANGE UP (ref 17–32)
CO2 SERPL-SCNC: 22 MMOL/L — SIGNIFICANT CHANGE UP (ref 17–32)
CO2 SERPL-SCNC: 24 MMOL/L — SIGNIFICANT CHANGE UP (ref 17–32)
CO2 SERPL-SCNC: 24 MMOL/L — SIGNIFICANT CHANGE UP (ref 17–32)
CREAT SERPL-MCNC: 2 MG/DL — HIGH (ref 0.7–1.5)
CREAT SERPL-MCNC: 2 MG/DL — HIGH (ref 0.7–1.5)
CREAT SERPL-MCNC: 2.1 MG/DL — HIGH (ref 0.7–1.5)
CREAT SERPL-MCNC: 2.1 MG/DL — HIGH (ref 0.7–1.5)
EGFR: 32 ML/MIN/1.73M2 — LOW
EGFR: 32 ML/MIN/1.73M2 — LOW
EGFR: 34 ML/MIN/1.73M2 — LOW
EGFR: 34 ML/MIN/1.73M2 — LOW
EOSINOPHIL # BLD AUTO: 0.23 K/UL — SIGNIFICANT CHANGE UP (ref 0–0.7)
EOSINOPHIL # BLD AUTO: 0.23 K/UL — SIGNIFICANT CHANGE UP (ref 0–0.7)
EOSINOPHIL NFR BLD AUTO: 2 % — SIGNIFICANT CHANGE UP (ref 0–8)
EOSINOPHIL NFR BLD AUTO: 2 % — SIGNIFICANT CHANGE UP (ref 0–8)
GLUCOSE SERPL-MCNC: 128 MG/DL — HIGH (ref 70–99)
GLUCOSE SERPL-MCNC: 128 MG/DL — HIGH (ref 70–99)
GLUCOSE SERPL-MCNC: 98 MG/DL — SIGNIFICANT CHANGE UP (ref 70–99)
GLUCOSE SERPL-MCNC: 98 MG/DL — SIGNIFICANT CHANGE UP (ref 70–99)
HCT VFR BLD CALC: 25.5 % — LOW (ref 42–52)
HCT VFR BLD CALC: 25.5 % — LOW (ref 42–52)
HCT VFR BLD CALC: 25.9 % — LOW (ref 42–52)
HCT VFR BLD CALC: 25.9 % — LOW (ref 42–52)
HGB BLD-MCNC: 8.2 G/DL — LOW (ref 14–18)
HGB BLD-MCNC: 8.2 G/DL — LOW (ref 14–18)
HGB BLD-MCNC: 8.3 G/DL — LOW (ref 14–18)
HGB BLD-MCNC: 8.3 G/DL — LOW (ref 14–18)
IMM GRANULOCYTES NFR BLD AUTO: 6.5 % — HIGH (ref 0.1–0.3)
IMM GRANULOCYTES NFR BLD AUTO: 6.5 % — HIGH (ref 0.1–0.3)
LYMPHOCYTES # BLD AUTO: 0.81 K/UL — LOW (ref 1.2–3.4)
LYMPHOCYTES # BLD AUTO: 0.81 K/UL — LOW (ref 1.2–3.4)
LYMPHOCYTES # BLD AUTO: 6.9 % — LOW (ref 20.5–51.1)
LYMPHOCYTES # BLD AUTO: 6.9 % — LOW (ref 20.5–51.1)
MAGNESIUM SERPL-MCNC: 2.6 MG/DL — HIGH (ref 1.8–2.4)
MCHC RBC-ENTMCNC: 29.3 PG — SIGNIFICANT CHANGE UP (ref 27–31)
MCHC RBC-ENTMCNC: 29.3 PG — SIGNIFICANT CHANGE UP (ref 27–31)
MCHC RBC-ENTMCNC: 29.4 PG — SIGNIFICANT CHANGE UP (ref 27–31)
MCHC RBC-ENTMCNC: 29.4 PG — SIGNIFICANT CHANGE UP (ref 27–31)
MCHC RBC-ENTMCNC: 32 G/DL — SIGNIFICANT CHANGE UP (ref 32–37)
MCHC RBC-ENTMCNC: 32 G/DL — SIGNIFICANT CHANGE UP (ref 32–37)
MCHC RBC-ENTMCNC: 32.2 G/DL — SIGNIFICANT CHANGE UP (ref 32–37)
MCHC RBC-ENTMCNC: 32.2 G/DL — SIGNIFICANT CHANGE UP (ref 32–37)
MCV RBC AUTO: 91.4 FL — SIGNIFICANT CHANGE UP (ref 80–94)
MCV RBC AUTO: 91.4 FL — SIGNIFICANT CHANGE UP (ref 80–94)
MCV RBC AUTO: 91.5 FL — SIGNIFICANT CHANGE UP (ref 80–94)
MCV RBC AUTO: 91.5 FL — SIGNIFICANT CHANGE UP (ref 80–94)
MONOCYTES # BLD AUTO: 0.73 K/UL — HIGH (ref 0.1–0.6)
MONOCYTES # BLD AUTO: 0.73 K/UL — HIGH (ref 0.1–0.6)
MONOCYTES NFR BLD AUTO: 6.2 % — SIGNIFICANT CHANGE UP (ref 1.7–9.3)
MONOCYTES NFR BLD AUTO: 6.2 % — SIGNIFICANT CHANGE UP (ref 1.7–9.3)
NEUTROPHILS # BLD AUTO: 9.21 K/UL — HIGH (ref 1.4–6.5)
NEUTROPHILS # BLD AUTO: 9.21 K/UL — HIGH (ref 1.4–6.5)
NEUTROPHILS NFR BLD AUTO: 78.1 % — HIGH (ref 42.2–75.2)
NEUTROPHILS NFR BLD AUTO: 78.1 % — HIGH (ref 42.2–75.2)
NRBC # BLD: 0 /100 WBCS — SIGNIFICANT CHANGE UP (ref 0–0)
PHOSPHATE SERPL-MCNC: 4.8 MG/DL — SIGNIFICANT CHANGE UP (ref 2.1–4.9)
PHOSPHATE SERPL-MCNC: 4.8 MG/DL — SIGNIFICANT CHANGE UP (ref 2.1–4.9)
PHOSPHATE SERPL-MCNC: 5 MG/DL — HIGH (ref 2.1–4.9)
PHOSPHATE SERPL-MCNC: 5 MG/DL — HIGH (ref 2.1–4.9)
PLATELET # BLD AUTO: 320 K/UL — SIGNIFICANT CHANGE UP (ref 130–400)
PLATELET # BLD AUTO: 320 K/UL — SIGNIFICANT CHANGE UP (ref 130–400)
PLATELET # BLD AUTO: 371 K/UL — SIGNIFICANT CHANGE UP (ref 130–400)
PLATELET # BLD AUTO: 371 K/UL — SIGNIFICANT CHANGE UP (ref 130–400)
PMV BLD: 8.5 FL — SIGNIFICANT CHANGE UP (ref 7.4–10.4)
PMV BLD: 8.5 FL — SIGNIFICANT CHANGE UP (ref 7.4–10.4)
PMV BLD: 8.7 FL — SIGNIFICANT CHANGE UP (ref 7.4–10.4)
PMV BLD: 8.7 FL — SIGNIFICANT CHANGE UP (ref 7.4–10.4)
POTASSIUM SERPL-MCNC: 4.2 MMOL/L — SIGNIFICANT CHANGE UP (ref 3.5–5)
POTASSIUM SERPL-MCNC: 4.2 MMOL/L — SIGNIFICANT CHANGE UP (ref 3.5–5)
POTASSIUM SERPL-MCNC: 4.4 MMOL/L — SIGNIFICANT CHANGE UP (ref 3.5–5)
POTASSIUM SERPL-MCNC: 4.4 MMOL/L — SIGNIFICANT CHANGE UP (ref 3.5–5)
POTASSIUM SERPL-SCNC: 4.2 MMOL/L — SIGNIFICANT CHANGE UP (ref 3.5–5)
POTASSIUM SERPL-SCNC: 4.2 MMOL/L — SIGNIFICANT CHANGE UP (ref 3.5–5)
POTASSIUM SERPL-SCNC: 4.4 MMOL/L — SIGNIFICANT CHANGE UP (ref 3.5–5)
POTASSIUM SERPL-SCNC: 4.4 MMOL/L — SIGNIFICANT CHANGE UP (ref 3.5–5)
RBC # BLD: 2.79 M/UL — LOW (ref 4.7–6.1)
RBC # BLD: 2.79 M/UL — LOW (ref 4.7–6.1)
RBC # BLD: 2.83 M/UL — LOW (ref 4.7–6.1)
RBC # BLD: 2.83 M/UL — LOW (ref 4.7–6.1)
RBC # FLD: 13 % — SIGNIFICANT CHANGE UP (ref 11.5–14.5)
RBC # FLD: 13 % — SIGNIFICANT CHANGE UP (ref 11.5–14.5)
RBC # FLD: 13.1 % — SIGNIFICANT CHANGE UP (ref 11.5–14.5)
RBC # FLD: 13.1 % — SIGNIFICANT CHANGE UP (ref 11.5–14.5)
SODIUM SERPL-SCNC: 143 MMOL/L — SIGNIFICANT CHANGE UP (ref 135–146)
SODIUM SERPL-SCNC: 143 MMOL/L — SIGNIFICANT CHANGE UP (ref 135–146)
SODIUM SERPL-SCNC: 145 MMOL/L — SIGNIFICANT CHANGE UP (ref 135–146)
SODIUM SERPL-SCNC: 145 MMOL/L — SIGNIFICANT CHANGE UP (ref 135–146)
VALPROATE SERPL-MCNC: 30 UG/ML — LOW (ref 50–100)
VALPROATE SERPL-MCNC: 30 UG/ML — LOW (ref 50–100)
WBC # BLD: 11.18 K/UL — HIGH (ref 4.8–10.8)
WBC # BLD: 11.18 K/UL — HIGH (ref 4.8–10.8)
WBC # BLD: 11.78 K/UL — HIGH (ref 4.8–10.8)
WBC # BLD: 11.78 K/UL — HIGH (ref 4.8–10.8)
WBC # FLD AUTO: 11.18 K/UL — HIGH (ref 4.8–10.8)
WBC # FLD AUTO: 11.18 K/UL — HIGH (ref 4.8–10.8)
WBC # FLD AUTO: 11.78 K/UL — HIGH (ref 4.8–10.8)
WBC # FLD AUTO: 11.78 K/UL — HIGH (ref 4.8–10.8)

## 2024-01-03 PROCEDURE — 99291 CRITICAL CARE FIRST HOUR: CPT | Mod: 25

## 2024-01-03 RX ORDER — AMANTADINE HCL 100 MG
100 CAPSULE ORAL
Refills: 0 | Status: DISCONTINUED | OUTPATIENT
Start: 2024-01-03 | End: 2024-01-12

## 2024-01-03 RX ORDER — AMANTADINE HCL 100 MG
100 CAPSULE ORAL DAILY
Refills: 0 | Status: DISCONTINUED | OUTPATIENT
Start: 2024-01-03 | End: 2024-01-03

## 2024-01-03 RX ADMIN — Medication 1 TABLET(S): at 13:10

## 2024-01-03 RX ADMIN — Medication 1 PUFF(S): at 08:12

## 2024-01-03 RX ADMIN — CHLORHEXIDINE GLUCONATE 1 APPLICATION(S): 213 SOLUTION TOPICAL at 05:45

## 2024-01-03 RX ADMIN — Medication 750 MILLIGRAM(S): at 09:17

## 2024-01-03 RX ADMIN — INSULIN GLARGINE 25 UNIT(S): 100 INJECTION, SOLUTION SUBCUTANEOUS at 21:27

## 2024-01-03 RX ADMIN — SEVELAMER CARBONATE 800 MILLIGRAM(S): 2400 POWDER, FOR SUSPENSION ORAL at 18:31

## 2024-01-03 RX ADMIN — CHLORHEXIDINE GLUCONATE 15 MILLILITER(S): 213 SOLUTION TOPICAL at 05:36

## 2024-01-03 RX ADMIN — CARVEDILOL PHOSPHATE 6.25 MILLIGRAM(S): 80 CAPSULE, EXTENDED RELEASE ORAL at 18:31

## 2024-01-03 RX ADMIN — FINASTERIDE 5 MILLIGRAM(S): 5 TABLET, FILM COATED ORAL at 13:11

## 2024-01-03 RX ADMIN — ATORVASTATIN CALCIUM 20 MILLIGRAM(S): 80 TABLET, FILM COATED ORAL at 21:25

## 2024-01-03 RX ADMIN — Medication 1 APPLICATION(S): at 05:35

## 2024-01-03 RX ADMIN — Medication 1 APPLICATION(S): at 18:30

## 2024-01-03 RX ADMIN — CARVEDILOL PHOSPHATE 6.25 MILLIGRAM(S): 80 CAPSULE, EXTENDED RELEASE ORAL at 05:41

## 2024-01-03 RX ADMIN — SEVELAMER CARBONATE 800 MILLIGRAM(S): 2400 POWDER, FOR SUSPENSION ORAL at 09:17

## 2024-01-03 RX ADMIN — HEPARIN SODIUM 5000 UNIT(S): 5000 INJECTION INTRAVENOUS; SUBCUTANEOUS at 13:10

## 2024-01-03 RX ADMIN — Medication 81 MILLIGRAM(S): at 13:11

## 2024-01-03 RX ADMIN — AMLODIPINE BESYLATE 5 MILLIGRAM(S): 2.5 TABLET ORAL at 05:41

## 2024-01-03 RX ADMIN — PIPERACILLIN AND TAZOBACTAM 25 GRAM(S): 4; .5 INJECTION, POWDER, LYOPHILIZED, FOR SOLUTION INTRAVENOUS at 18:32

## 2024-01-03 RX ADMIN — Medication 1 PUFF(S): at 20:19

## 2024-01-03 RX ADMIN — PIPERACILLIN AND TAZOBACTAM 25 GRAM(S): 4; .5 INJECTION, POWDER, LYOPHILIZED, FOR SOLUTION INTRAVENOUS at 05:37

## 2024-01-03 RX ADMIN — CHLORHEXIDINE GLUCONATE 15 MILLILITER(S): 213 SOLUTION TOPICAL at 18:36

## 2024-01-03 RX ADMIN — Medication 600 MILLIGRAM(S): at 18:30

## 2024-01-03 RX ADMIN — SEVELAMER CARBONATE 800 MILLIGRAM(S): 2400 POWDER, FOR SUSPENSION ORAL at 13:10

## 2024-01-03 RX ADMIN — Medication 1 APPLICATION(S): at 05:44

## 2024-01-03 RX ADMIN — HEPARIN SODIUM 5000 UNIT(S): 5000 INJECTION INTRAVENOUS; SUBCUTANEOUS at 21:27

## 2024-01-03 RX ADMIN — Medication 750 MILLIGRAM(S): at 18:32

## 2024-01-03 RX ADMIN — FAMOTIDINE 10 MILLIGRAM(S): 10 INJECTION INTRAVENOUS at 18:31

## 2024-01-03 RX ADMIN — Medication 4 MILLIGRAM(S): at 21:26

## 2024-01-03 RX ADMIN — Medication 1 PUFF(S): at 13:35

## 2024-01-03 RX ADMIN — CHLORHEXIDINE GLUCONATE 1 APPLICATION(S): 213 SOLUTION TOPICAL at 05:36

## 2024-01-03 RX ADMIN — HEPARIN SODIUM 5000 UNIT(S): 5000 INJECTION INTRAVENOUS; SUBCUTANEOUS at 05:42

## 2024-01-03 RX ADMIN — Medication 1 PUFF(S): at 05:44

## 2024-01-03 NOTE — PROGRESS NOTE ADULT - ASSESSMENT
77-year-old male  presents with prior history of hypertension dyslipidemia diabetes BPH prior CVA on aspirin and Plavix s/p mechanical fall     Nephrology was consulted for SAGRARIO, hypernatremia.  SAGRARIO on CKD 3b - creat was 1.8 (Feb 2023) / likely pre-renal  non oliguric  creat level down trending almost at baseline   Hypernatremia better   Sepsis    cr trending down  sodium noted /  free water with feed   ph at goal bp controlled       will sign off recall PRN / call using TEAMS or on [phone]  77-year-old male  presents with prior history of hypertension dyslipidemia diabetes BPH prior CVA on aspirin and Plavix s/p mechanical fall     Nephrology was consulted for SAGRARIO, hypernatremia.  SAGRARIO on CKD 3b - creat was 1.8 (Feb 2023) / likely pre-renal  non oliguric  creat level down trending almost at baseline   Hypernatremia better   Sepsis    cr trending down  sodium noted /  free water with feed   ph at goal bp controlled       will sign off recall PRN / call using TEAMS or on [phone]

## 2024-01-03 NOTE — PROGRESS NOTE ADULT - ATTENDING COMMENTS
Trauma/ACS Attending  Note Attestation    Patient is examined and evaluated at the bedside with the residents/PAs. Treatment plan discussed with the team, nurses, and consulting physicians and consulting teams. Medications, radiological studies and all other relevant studies reviewed.     JACQUELIN CAI Patient is a 78y old  Male who presents with a chief complaint of fall and sustained  SDH, seizures, altered mentals status requiring intubation followed by tracheostomy and PEG     Vital Signs Last 24 Hrs  T(C): 36.7 (03 Jan 2024 08:00), Max: 37.3 (03 Jan 2024 04:00)  T(F): 98 (03 Jan 2024 08:00), Max: 99.1 (03 Jan 2024 04:00)  HR: 78 (03 Jan 2024 08:15) (77 - 86)  BP: 125/62 (03 Jan 2024 08:00) (125/62 - 163/103)  BP(mean): 89 (03 Jan 2024 08:00) (87 - 127)  RR: 24 (03 Jan 2024 08:15) (16 - 28)  SpO2: 100% (03 Jan 2024 08:15) (97% - 100%)    Parameters below as of 03 Jan 2024 08:15  Patient On (Oxygen Delivery Method): T-piece  O2 Flow (L/min): 10  O2 Concentration (%): 40                        8.3    11.78 )-----------( 371      ( 03 Jan 2024 05:35 )             25.9   01-03    145  |  110  |  70<HH>  ----------------------------<  98  4.4   |  24  |  2.0<H>    Ca    7.7<L>      03 Jan 2024 05:35  Phos  4.8     01-03  Mg     2.6     01-03    TPro  x   /  Alb  2.6<L>  /  TBili  x   /  DBili  x   /  AST  x   /  ALT  x   /  AlkPhos  x   01-03      Diagnosis: Fall with SDH                  S/p tracheostomy/PEG    Plan:	  - supportive care  - pain management  - trach caller  - DVT prophylaxis       [x ] SCDs [x ] Lovenox SQ [ ] Heparins SQ  [ ] None  - GI prophylaxis  - follow up consults  - continue PEG feeds  - repeat studies as needed  - PT evaluation and follow up  - replace electrolytes  - case management evaluation for SNF placement     Shawnee Degroot MD, FACS  Trauma/ACS/Surgical Critical Care Attending

## 2024-01-03 NOTE — PROGRESS NOTE ADULT - SUBJECTIVE AND OBJECTIVE BOX
Nephrology progress note    THIS IS AN INCOMPLETE NOTE . FULL NOTE TO FOLLOW SHORTLY    Patient is seen and examined, events over the last 24 h noted .    Allergies:  [This allergen will not trigger allergy alert] pollen (Unknown)  No Known Drug Allergies    Hospital Medications:   MEDICATIONS  (STANDING):  amantadine 100 milliGRAM(s) Oral <User Schedule>  amLODIPine   Tablet 5 milliGRAM(s) Oral daily  aspirin  chewable 81 milliGRAM(s) Enteral Tube daily  atorvastatin 20 milliGRAM(s) Oral at bedtime  bacitracin   Ointment 1 Application(s) Topical every 12 hours  carvedilol 6.25 milliGRAM(s) Oral every 12 hours  chlorhexidine 0.12% Liquid 15 milliLiter(s) Oral Mucosa two times a day  chlorhexidine 2% Cloths 1 Application(s) Topical <User Schedule>  chlorhexidine 2% Cloths 1 Application(s) Topical <User Schedule>  doxazosin 4 milliGRAM(s) Oral at bedtime  famotidine    Tablet 10 milliGRAM(s) Oral every 48 hours  finasteride 5 milliGRAM(s) Oral daily  heparin   Injectable 5000 Unit(s) SubCutaneous every 8 hours  insulin glargine Injectable (LANTUS) 25 Unit(s) SubCutaneous at bedtime  insulin lispro (ADMELOG) corrective regimen sliding scale   SubCutaneous every 6 hours  ipratropium 17 MICROgram(s) HFA Inhaler 1 Puff(s) Inhalation every 6 hours  multivitamin 1 Tablet(s) Oral daily  piperacillin/tazobactam IVPB.. 3.375 Gram(s) IV Intermittent every 12 hours  sevelamer carbonate Powder 800 milliGRAM(s) Oral three times a day with meals  valproic  acid Syrup 750 milliGRAM(s) Oral every 12 hours  vitamin A &amp; D Ointment 1 Application(s) Topical every 12 hours        VITALS:  T(F): 99.1 (01-03-24 @ 04:00), Max: 99.1 (01-03-24 @ 04:00)  HR: 82 (01-03-24 @ 04:00)  BP: 134/62 (01-03-24 @ 04:00)  RR: 25 (01-03-24 @ 04:00)  SpO2: 99% (01-03-24 @ 04:00)  Wt(kg): --    01-01 @ 07:01 - 01-02 @ 07:00  --------------------------------------------------------  IN: 1148 mL / OUT: 2475 mL / NET: -1327 mL    01-02 @ 07:01 - 01-03 @ 07:00  --------------------------------------------------------  IN: 1848 mL / OUT: 3160 mL / NET: -1312 mL          PHYSICAL EXAM:  Constitutional: NAD  HEENT: anicteric sclera, oropharynx clear, MMM  Neck: No JVD  Respiratory: CTAB, no wheezes, rales or rhonchi  Cardiovascular: S1, S2, RRR  Gastrointestinal: BS+, soft, NT/ND  Extremities: No cyanosis or clubbing. No peripheral edema  :  No martinez.   Skin: No rashes    LABS:  01-03    145  |  110  |  70<HH>  ----------------------------<  98  4.4   |  24  |  2.0<H>    Ca    7.7<L>      03 Jan 2024 05:35  Phos  4.8     01-03  Mg     2.6     01-03    TPro      /  Alb  2.6<L>  /  TBili      /  DBili      /  AST      /  ALT      /  AlkPhos      01-03                          8.3    11.78 )-----------( 371      ( 03 Jan 2024 05:35 )             25.9       Urine Studies:  Urinalysis Basic - ( 03 Jan 2024 05:35 )    Color:  / Appearance:  / SG:  / pH:   Gluc: 98 mg/dL / Ketone:   / Bili:  / Urobili:    Blood:  / Protein:  / Nitrite:    Leuk Esterase:  / RBC:  / WBC    Sq Epi:  / Non Sq Epi:  / Bacteria:       Sodium, Random Urine: 47.0 mmoL/L (12-29 @ 15:04)  Creatinine, Random Urine: 54 mg/dL (12-29 @ 15:04)  Osmolality, Random Urine: 437 mos/kg (12-29 @ 14:30)  Sodium, Random Urine: 73.0 mmoL/L (12-27 @ 22:15)  Creatinine, Random Urine: 42 mg/dL (12-27 @ 22:15)      Iron 70, TIBC 214, %sat 33      [02-15-23 @ 09:32]  Ferritin 111      [02-15-23 @ 09:32]  TSH 4.16      [01-01-24 @ 21:32]  Lipid: chol 245, , HDL 49, LDL --      [02-17-23 @ 07:55]          RADIOLOGY & ADDITIONAL STUDIES:   Nephrology progress note    Patient is seen and examined, events over the last 24 h noted .  Trached on MV     Allergies:  [This allergen will not trigger allergy alert] pollen (Unknown)  No Known Drug Allergies    Hospital Medications:   MEDICATIONS  (STANDING):  amantadine 100 milliGRAM(s) Oral <User Schedule>  amLODIPine   Tablet 5 milliGRAM(s) Oral daily  aspirin  chewable 81 milliGRAM(s) Enteral Tube daily  atorvastatin 20 milliGRAM(s) Oral at bedtime  bacitracin   Ointment 1 Application(s) Topical every 12 hours  carvedilol 6.25 milliGRAM(s) Oral every 12 hours  doxazosin 4 milliGRAM(s) Oral at bedtime  famotidine    Tablet 10 milliGRAM(s) Oral every 48 hours  finasteride 5 milliGRAM(s) Oral daily  heparin   Injectable 5000 Unit(s) SubCutaneous every 8 hours  insulin glargine Injectable (LANTUS) 25 Unit(s) SubCutaneous at bedtime  insulin lispro (ADMELOG) corrective regimen sliding scale   SubCutaneous every 6 hours  ipratropium 17 MICROgram(s) HFA Inhaler 1 Puff(s) Inhalation every 6 hours  multivitamin 1 Tablet(s) Oral daily  piperacillin/tazobactam IVPB.. 3.375 Gram(s) IV Intermittent every 12 hours  sevelamer carbonate Powder 800 milliGRAM(s) Oral three times a day with meals  valproic  acid Syrup 750 milliGRAM(s) Oral every 12 hours  vitamin A &amp; D Ointment 1 Application(s) Topical every 12 hours        VITALS:  T(F): 99.1 (01-03-24 @ 04:00), Max: 99.1 (01-03-24 @ 04:00)  HR: 82 (01-03-24 @ 04:00)  BP: 134/62 (01-03-24 @ 04:00)  RR: 25 (01-03-24 @ 04:00)  SpO2: 99% (01-03-24 @ 04:00)      01-01 @ 07:01  -  01-02 @ 07:00  --------------------------------------------------------  IN: 1148 mL / OUT: 2475 mL / NET: -1327 mL    01-02 @ 07:01  -  01-03 @ 07:00  --------------------------------------------------------  IN: 1848 mL / OUT: 3160 mL / NET: -1312 mL          PHYSICAL EXAM:  Constitutional: trached   Neck: No JVD  Respiratory: CTAB  Cardiovascular: S1, S2, RRR  Gastrointestinal: BS+, soft, NT/ND  Extremities: No cyanosis or clubbing. No peripheral edema  Skin: No rashes    LABS:  01-03    145  |  110  |  70<HH>  ----------------------------<  98  4.4   |  24  |  2.0<H>    Ca    7.7<L>      03 Jan 2024 05:35  Phos  4.8     01-03  Mg     2.6     01-03    TPro      /  Alb  2.6<L>  /  TBili      /  DBili      /  AST      /  ALT      /  AlkPhos      01-03                          8.3    11.78 )-----------( 371      ( 03 Jan 2024 05:35 )             25.9       Urine Studies:  Urinalysis Basic - ( 03 Jan 2024 05:35 )    Color:  / Appearance:  / SG:  / pH:   Gluc: 98 mg/dL / Ketone:   / Bili:  / Urobili:    Blood:  / Protein:  / Nitrite:    Leuk Esterase:  / RBC:  / WBC    Sq Epi:  / Non Sq Epi:  / Bacteria:       Sodium, Random Urine: 47.0 mmoL/L (12-29 @ 15:04)  Creatinine, Random Urine: 54 mg/dL (12-29 @ 15:04)  Osmolality, Random Urine: 437 mos/kg (12-29 @ 14:30)  Sodium, Random Urine: 73.0 mmoL/L (12-27 @ 22:15)  Creatinine, Random Urine: 42 mg/dL (12-27 @ 22:15)      Iron 70, TIBC 214, %sat 33      [02-15-23 @ 09:32]  Ferritin 111      [02-15-23 @ 09:32]  TSH 4.16      [01-01-24 @ 21:32]  Lipid: chol 245, , HDL 49, LDL --      [02-17-23 @ 07:55]          RADIOLOGY & ADDITIONAL STUDIES:

## 2024-01-03 NOTE — PROGRESS NOTE ADULT - ASSESSMENT
A/P:  78y Male s/p mechanical fall. +HT, +AC (Aspirin and Plavix), -LOC.    12/21-intubated for airway protection, accessory muscle use  12/22- s/p tracheostomy and PEG placement, Portex cuffed 9 and 20fr PEG 2cm at skin     PLAN:   f/u DG floor     NEURO:  #Acute SDH   -12/18 HCT#4 -stable SDH, no new acute findings  -12/27: Pt obtunded, not responding to noxious stim - VPA/LFTs/BMP/ammonia ordered, CTH f/up: stable  - Q4 neuro checks  - amantadine, 100mg q48h. renal dosing  - NSGY: 1/1: "Risk vs benefits should be weighed and discussed with patient prior to initiation of Plavix". --> will not be restarting plavix per Dr. Degroot  #h/o stroke (2/2023) w/residual right sided weakness  - RIGHT anterior thalamus infarct,  LEFT caudate head lacunar infarct  #focal seizure    - vEEG 12/19: No seizures; Discontinued 12/19    - q4 neurochecks    - Neurology cs- d/c EEG, 2 days prior to discharge call neurology so they can do a spot EEG --> recalled on 12/28 to evaluate diminished mental status, recommend repeat EEG and continue depakote 500 q12  - EEG,12/28: No electrographic seizures or significant clinical events. Abnormal due to the presence of focal and generalized slowing. Findings consistent with focal and diffuse electrocerebral dysfunction secondary to structural/metabolic/nonspecific etiology.    - Valproic acid level 12/31 23, albumin 2.5; Valproic acid dose adjusted to 750mg q12h for 1 week, per pharmacy    - F/U AM valproic avid level and albumin level; adjust valproic acid dose as needed     RESP:   #Respiratory Failure secondary to impaired secretion clearance  - s/p tracheostomy 12/22, size 9 cuffed portex  - Tolerating t-piece since 12/29   - chest PT q4 and duoneb treatments q 4   - deep tracheal aspirate cx sent (due to fevers)  - Culture - Sputum . (12.21.23 @ 18:14)-  Numerous Staphylococcus aureus --> 12/28 repeat NGTD  #Activity    -increase as tolerated    -aspiration precautions, HOB 30    CARDS:   #acute HYPOtension intraop, resolved  -off levophed since 12/22 PM  #hx of HTN  -restarting home coreg and amlodipine  -Valsartan 320mg HOLDING   #hx HLD  -atorvastatin  #NSVT    -EP/Cards: No arrhythmias on tele only artifact. No further work up needed. Recommend ILR prior to discharge if patient/family agreeable  Imaging:   Echo: 12/2023: EF 66%, G1DD, trace MR, mild TR     GI/NUTR:   #Diet, NPO with Tube Feed via 20Fr PEG placed 12/22   - TF: Nepro @ 27cc/hr x 24hrs, 250cc FWF q6h + Banatrol  - multivitamin QD  #GI Prophylaxis  - famotidine 10mg PO q48  #Bowel regimen     -Multiple loose bowel movements, last dose senna 12/19 at 2200 > dignishield placed 12/23. Consider d/c rectal tube     - C diff negative     - No bowel regimen    /RENAL:   #urine output   - Failed TOV, martinez left in place 1/2  - Started finasteride    - UA neg 12/25 (r/o UTI in setting of persistent fevers)   - Sodium goal 140-150  - f/u nephro recs- Hypernatremia due to free water losses. Avoid diuretics, would not use Metolazone to cause volume depletion, and has min effect in advanced kidney dysfunction   #Diuresis  - Last diuresed 12/29: metolazone 10 x1 for hypernatremia  #Hypernatremia - improved  - FWD 1.3 L, FWF 250q6    Labs:          BUN/Cr- 77/2.4  -->,  79/2.2  -->          Electrolytes- 01-03    145  |  110  |  70<HH>  ----------------------------<  98  4.4   |  24  |  2.0<H>    Ca    7.7<L>      03 Jan 2024 05:35  Phos  4.8     01-03  Mg     2.6     01-03    TPro  x   /  Alb  2.6<L>  /  TBili  x   /  DBili  x   /  AST  x   /  ALT  x   /  AlkPhos  x   01-03    #hyperphosphatemia  - Started sevelamer powder, non-formulary so as not to clog PEG   #CKD   - baseline Cr 2.1  - holding home sodium bicarb 650mg BID  #hx BPH  - cardura 4mg (flomax at home, non crushable)      HEME/ONC:   #DVT prophylaxis     -SCDs, HSQ  #hx of CVA    -ASA81 mg cleared by NSGY    - NSGY: "Risk vs benefits should be weighed and discussed with patient prior to initiation of Plavix."   - continue to hold home plavix    Labs: Hb/Hct:  8.6/27.2  -->,  9.5/29.8  -->                      Plts:  388  -->,  440  -->                 PTT/INR:  36.5/1.03  --->       ID:  WBC- 11.58  --->>,  12.58  --->>  Temp trend- 24hrs T(F): 98.9 (01-03 @ 00:00), Max: 98.9 (01-03 @ 00:00)  Antibiotics-piperacillin/tazobactam IVPB.. 3.375 every 12 hours (to end 1/5)  #recurrent fevers- resolved   - d/c'd Tylenol    PCRs  -MRSA PCR negative (12/22)  -MRSA nares negative (12/28)  -RVP negative (12/28)    Cultures    Bcx 12/18- final neg     Blood cx 12/21- no growth     BCx, 12/28 - NG     Tracheal aspirate 12/21: numerous staph aureus methicillin sensitive    Tracheal aspirate 12/22: staph aureus    Tracheal aspirate 12/28: neg     C.Diff PCR neg    UA 12/25 negative     UA 12/28 negative    Current antibiotics:  - Vancomycin 500mg x1 dose (12/28)  - Zosyn 3.375 q12 (started 12/28, stop 1/5)  #multiple loose bowel movements    -no bowel regimen, adding banatrol    ENDO:  #DM     -ISS     - hgb A1C 7.0     -Off insulin gtt 12/20      -Lantus to 25 units HS     -FSG q6 while on TF  #AMS possibly due to hypothyroidism      - TSH - 4.16, T4 - 5.9, T3 - 79    #MSK:     Activity - Increase As Tolerated    - B/L knee X ray 12/19- no fractures, b/l effusions    - recall PT for reassessment    WOUND:  - L sacral stage 2 ulcer, wound care consult placed.     LINES/DRAINS:  PIV, right basilic midline (placed 12/16), 20Fr PEG (12/22), Tracheostomy (12/22)    ADVANCED DIRECTIVES:  Full Code  -  HCP/Emergency Contact-Sarah Gamino (Wife) [phone], Cantonese speaking    DISPO:  - Social work, case management consult for dispo to vent facility - spoke with social work 12/23, starting to work on D/C.  -  update, 12/29: pt remains acute. Will reassess 24-48h prior to d/c      TAP (Trauma, Acute care, Pediatrics) Spectra 8232   A/P:  78y Male s/p mechanical fall. +HT, +AC (Aspirin and Plavix), -LOC.    12/21-intubated for airway protection, accessory muscle use  12/22- s/p tracheostomy and PEG placement, Portex cuffed 9 and 20fr PEG 2cm at skin     PLAN:   f/u DG floor     NEURO:  #Acute SDH   -12/18 HCT#4 -stable SDH, no new acute findings  -12/27: Pt obtunded, not responding to noxious stim - VPA/LFTs/BMP/ammonia ordered, CTH f/up: stable  - Q4 neuro checks  - amantadine, 100mg q48h. renal dosing  - NSGY: 1/1: "Risk vs benefits should be weighed and discussed with patient prior to initiation of Plavix". --> will not be restarting plavix per Dr. Degroot  #h/o stroke (2/2023) w/residual right sided weakness  - RIGHT anterior thalamus infarct,  LEFT caudate head lacunar infarct  #focal seizure    - vEEG 12/19: No seizures; Discontinued 12/19    - q4 neurochecks    - Neurology cs- d/c EEG, 2 days prior to discharge call neurology so they can do a spot EEG --> recalled on 12/28 to evaluate diminished mental status, recommend repeat EEG and continue depakote 500 q12  - EEG,12/28: No electrographic seizures or significant clinical events. Abnormal due to the presence of focal and generalized slowing. Findings consistent with focal and diffuse electrocerebral dysfunction secondary to structural/metabolic/nonspecific etiology.    - Valproic acid level 12/31 23, albumin 2.5; Valproic acid dose adjusted to 750mg q12h for 1 week, per pharmacy    - F/U AM valproic avid level and albumin level; adjust valproic acid dose as needed     RESP:   #Respiratory Failure secondary to impaired secretion clearance  - s/p tracheostomy 12/22, size 9 cuffed portex  - Tolerating t-piece since 12/29   - chest PT q4 and duoneb treatments q 4   - deep tracheal aspirate cx sent (due to fevers)  - Culture - Sputum . (12.21.23 @ 18:14)-  Numerous Staphylococcus aureus --> 12/28 repeat NGTD  #Activity    -increase as tolerated    -aspiration precautions, HOB 30    CARDS:   #acute HYPOtension intraop, resolved  -off levophed since 12/22 PM  #hx of HTN  -restarting home coreg and amlodipine  -Valsartan 320mg HOLDING   #hx HLD  -atorvastatin  #NSVT    -EP/Cards: No arrhythmias on tele only artifact. No further work up needed. Recommend ILR prior to discharge if patient/family agreeable  Imaging:   Echo: 12/2023: EF 66%, G1DD, trace MR, mild TR     GI/NUTR:   #Diet, NPO with Tube Feed via 20Fr PEG placed 12/22   - TF: Nepro @ 27cc/hr x 24hrs, 250cc FWF q6h + Banatrol  - multivitamin QD  #GI Prophylaxis  - famotidine 10mg PO q48  #Bowel regimen     -Multiple loose bowel movements, last dose senna 12/19 at 2200 > dignishield placed 12/23. Consider d/c rectal tube     - C diff negative     - No bowel regimen    /RENAL:   #urine output   - Failed TOV, martinez left in place 1/2  - Started finasteride    - UA neg 12/25 (r/o UTI in setting of persistent fevers)   - Sodium goal 140-150  - f/u nephro recs- Hypernatremia due to free water losses. Avoid diuretics, would not use Metolazone to cause volume depletion, and has min effect in advanced kidney dysfunction   #Diuresis  - Last diuresed 12/29: metolazone 10 x1 for hypernatremia  #Hypernatremia - improved  - FWD 1.3 L, FWF 250q6    Labs:          BUN/Cr- 77/2.4  -->,  79/2.2  -->          Electrolytes- 01-03    145  |  110  |  70<HH>  ----------------------------<  98  4.4   |  24  |  2.0<H>    Ca    7.7<L>      03 Jan 2024 05:35  Phos  4.8     01-03  Mg     2.6     01-03    TPro  x   /  Alb  2.6<L>  /  TBili  x   /  DBili  x   /  AST  x   /  ALT  x   /  AlkPhos  x   01-03    #hyperphosphatemia  - Started sevelamer powder, non-formulary so as not to clog PEG   #CKD   - baseline Cr 2.1  - holding home sodium bicarb 650mg BID  #hx BPH  - cardura 4mg (flomax at home, non crushable)      HEME/ONC:   #DVT prophylaxis     -SCDs, HSQ  #hx of CVA    -ASA81 mg cleared by NSGY    - NSGY: "Risk vs benefits should be weighed and discussed with patient prior to initiation of Plavix."   - continue to hold home plavix    Labs: Hb/Hct:  8.6/27.2  -->,  9.5/29.8  -->                      Plts:  388  -->,  440  -->                 PTT/INR:  36.5/1.03  --->       ID:  WBC- 11.58  --->>,  12.58  --->>  Temp trend- 24hrs T(F): 98.9 (01-03 @ 00:00), Max: 98.9 (01-03 @ 00:00)  Antibiotics-piperacillin/tazobactam IVPB.. 3.375 every 12 hours (to end 1/5)  #recurrent fevers- resolved   - d/c'd Tylenol    PCRs  -MRSA PCR negative (12/22)  -MRSA nares negative (12/28)  -RVP negative (12/28)    Cultures    Bcx 12/18- final neg     Blood cx 12/21- no growth     BCx, 12/28 - NG     Tracheal aspirate 12/21: numerous staph aureus methicillin sensitive    Tracheal aspirate 12/22: staph aureus    Tracheal aspirate 12/28: neg     C.Diff PCR neg    UA 12/25 negative     UA 12/28 negative    Current antibiotics:  - Vancomycin 500mg x1 dose (12/28)  - Zosyn 3.375 q12 (started 12/28, stop 1/5)  #multiple loose bowel movements    -no bowel regimen, adding banatrol    ENDO:  #DM     -ISS     - hgb A1C 7.0     -Off insulin gtt 12/20      -Lantus to 25 units HS     -FSG q6 while on TF  #AMS possibly due to hypothyroidism      - TSH - 4.16, T4 - 5.9, T3 - 79    #MSK:     Activity - Increase As Tolerated    - B/L knee X ray 12/19- no fractures, b/l effusions    - recall PT for reassessment    WOUND:  - L sacral stage 2 ulcer, wound care consult placed.     LINES/DRAINS:  PIV, right basilic midline (placed 12/16), 20Fr PEG (12/22), Tracheostomy (12/22)    ADVANCED DIRECTIVES:  Full Code  -  HCP/Emergency Contact-Sarah Gamino (Wife) [phone], Cantonese speaking    DISPO:  - Social work, case management consult for dispo to vent facility - spoke with social work 12/23, starting to work on D/C.  -  update, 12/29: pt remains acute. Will reassess 24-48h prior to d/c      TAP (Trauma, Acute care, Pediatrics) Spectra 8221

## 2024-01-03 NOTE — PROGRESS NOTE ADULT - SUBJECTIVE AND OBJECTIVE BOX
JACQUELIN CAI   [id number]   [birth year]  78yM    Admit Date: 12-16-23  Indication for SDU/SICU: Q1 neuro checks      77-year-old male Cantonese speaking presents with prior history of HTN, HLD, Diabetes, BPH, prior CVA on aspirin and Plavix who states he had a mechanical fall 3 days ago while getting out of the car fell backwards hit his head.  Afterwards he was able to ambulate.  But for the past 1 day family was unable to ambulate him.  He states that his lower extremities are painful to movement and weak.  Family denies any LOC. Per daughter patient he often forgets things and cannot recall year or place. Patient following commands bedside.   Initial CT head remarkable for 3 Acute subdural hemorrhages: 0.7 cm along the left hemisphere, 0.3 cm along the right frontal area, and 1.1 cm along the left falx. No midline shift. Repeat CT head was obtained prior to 6 hour tacos because patient was appearing lethargic vs sundowning. CT head #2 with stable findings. CT c-spine and chest/abd/pelvis unremarkable for acute pathology.     SICU Consult for q1 neuro checks    ============================  24 Hour Events  1/2  Night   -Exam: -  #: [id number] Jona: Eyes open spontaneously, not following commands, CTABL  -AM labs   -Calculate VPA dosing in AM   -Start finasteride     1/2  DAY  - gave Lantus 5 stat, inc. lantus to 25u HS tonight  - + metamucil  - BS at 11am - 300. SC w/ martinez, 500mL. martinez maintained.  - Na+ 153.   - TSH - 4.16, T4 - 5.9, T3 - 79  - keep dignishield until tmrw morning      [X] A ten-point review of systems was otherwise negative except as noted above.  [  ] Due to altered mental status/intubation, subjective information was not attained from the patient. History was obtained, to the extent possible, from review of the chart and collateral sources of information.   JACQUELIN CAI   [id number]   [birth year]  78yM    Admit Date: 12-16-23  Indication for SDU/SICU: Q1 neuro checks      77-year-old male Cantonese speaking presents with prior history of HTN, HLD, Diabetes, BPH, prior CVA on aspirin and Plavix who states he had a mechanical fall 3 days ago while getting out of the car fell backwards hit his head.  Afterwards he was able to ambulate.  But for the past 1 day family was unable to ambulate him.  He states that his lower extremities are painful to movement and weak.  Family denies any LOC. Per daughter patient he often forgets things and cannot recall year or place. Patient following commands bedside.   Initial CT head remarkable for 3 Acute subdural hemorrhages: 0.7 cm along the left hemisphere, 0.3 cm along the right frontal area, and 1.1 cm along the left falx. No midline shift. Repeat CT head was obtained prior to 6 hour tacos because patient was appearing lethargic vs sundowning. CT head #2 with stable findings. CT c-spine and chest/abd/pelvis unremarkable for acute pathology.     SICU Consult for q1 neuro checks    ============================  24 Hour Events  1/2  Night   -Exam: -  #: [id number] Jona: Eyes open spontaneously, not following commands, CTABL  -AM labs   -Calculate VPA dosing in AM   -Start finasteride     1/2  DAY  - gave Lantus 5 stat, inc. lantus to 25u HS tonight  - + metamucil  - BS at 11am - 300. SC w/ martinez, 500mL. martinez maintained.  - Na+ 153.   - TSH - 4.16, T4 - 5.9, T3 - 79  - keep dignishield until tmrw morning      [X] A ten-point review of systems was otherwise negative except as noted above.  [  ] Due to altered mental status/intubation, subjective information was not attained from the patient. History was obtained, to the extent possible, from review of the chart and collateral sources of information.   JACQUELIN CAI   [id number]   [birth year]  78yM    Admit Date: 12-16-23  Indication for SDU/SICU: Q1 neuro checks      77-year-old male Cantonese speaking presents with prior history of HTN, HLD, Diabetes, BPH, prior CVA on aspirin and Plavix who states he had a mechanical fall 3 days ago while getting out of the car fell backwards hit his head.  Afterwards he was able to ambulate.  But for the past 1 day family was unable to ambulate him.  He states that his lower extremities are painful to movement and weak.  Family denies any LOC. Per daughter patient he often forgets things and cannot recall year or place. Patient following commands bedside.   Initial CT head remarkable for 3 Acute subdural hemorrhages: 0.7 cm along the left hemisphere, 0.3 cm along the right frontal area, and 1.1 cm along the left falx. No midline shift. Repeat CT head was obtained prior to 6 hour tacos because patient was appearing lethargic vs sundowning. CT head #2 with stable findings. CT c-spine and chest/abd/pelvis unremarkable for acute pathology.     SICU Consult for q1 neuro checks    ============================  24 Hour Events  1/2  Night   -Exam: -  #: [id number] Tenorio: Eyes open spontaneously, not following commands, CTABL  -AM labs   -Calculate VPA dosing in AM   -Start finasteride     1/2  DAY  - gave Lantus 5 stat, inc. lantus to 25u HS tonight  - + metamucil  - BS at 11am - 300. SC w/ martinez, 500mL. martinez maintained.  - Na+ 153.   - TSH - 4.16, T4 - 5.9, T3 - 79  - keep dignishield until tmrw morning      [X] A ten-point review of systems was otherwise negative except as noted above.  [  ] Due to altered mental status/intubation, subjective information was not attained from the patient. History was obtained, to the extent possible, from review of the chart and collateral sources of information.    Daily     Daily     Diet, NPO with Tube Feed:   Tube Feeding Modality: Gastrostomy  Nepro with Carb Steady  Total Volume for 24 Hours (mL): 648  Continuous  Starting Tube Feed Rate mL per Hour: 27  Increase Tube Feed Rate by (mL): 0     Every 4 hours  Until Goal Tube Feed Rate (mL per Hour): 27  Tube Feed Duration (in Hours): 24  Free Water Flush  Bolus   Total Volume per Flush (mL): 250   Frequency: Every 6 Hours  Free Water Flush Instructions:  Please flush PEG with 50cc of sterile water before feeds start and 100cc of sterile water after feeds end  Banatrol TF     Qty per Day:  3 (01-01-24 @ 22:49)      CURRENT MEDS:  Neurologic Medications  amantadine 100 milliGRAM(s) Oral <User Schedule>  valproic  acid Syrup 750 milliGRAM(s) Oral every 12 hours    Respiratory Medications  ipratropium 17 MICROgram(s) HFA Inhaler 1 Puff(s) Inhalation every 6 hours    Cardiovascular Medications  amLODIPine   Tablet 5 milliGRAM(s) Oral daily  carvedilol 6.25 milliGRAM(s) Oral every 12 hours  doxazosin 4 milliGRAM(s) Oral at bedtime    Gastrointestinal Medications  famotidine    Tablet 10 milliGRAM(s) Oral every 48 hours  multivitamin 1 Tablet(s) Oral daily    Genitourinary Medications    Hematologic/Oncologic Medications  aspirin  chewable 81 milliGRAM(s) Enteral Tube daily  heparin   Injectable 5000 Unit(s) SubCutaneous every 8 hours    Antimicrobial/Immunologic Medications  piperacillin/tazobactam IVPB.. 3.375 Gram(s) IV Intermittent every 12 hours    Endocrine/Metabolic Medications  atorvastatin 20 milliGRAM(s) Oral at bedtime  finasteride 5 milliGRAM(s) Oral daily  insulin glargine Injectable (LANTUS) 25 Unit(s) SubCutaneous at bedtime  insulin lispro (ADMELOG) corrective regimen sliding scale   SubCutaneous every 6 hours    Topical/Other Medications  bacitracin   Ointment 1 Application(s) Topical every 12 hours  chlorhexidine 0.12% Liquid 15 milliLiter(s) Oral Mucosa two times a day  chlorhexidine 2% Cloths 1 Application(s) Topical <User Schedule>  chlorhexidine 2% Cloths 1 Application(s) Topical <User Schedule>  sevelamer carbonate Powder 800 milliGRAM(s) Oral three times a day with meals  vitamin A &amp; D Ointment 1 Application(s) Topical every 12 hours      ICU Vital Signs Last 24 Hrs  T(C): 37.3 (03 Jan 2024 04:00), Max: 37.3 (03 Jan 2024 04:00)  T(F): 99.1 (03 Jan 2024 04:00), Max: 99.1 (03 Jan 2024 04:00)  HR: 82 (03 Jan 2024 04:00) (77 - 86)  BP: 134/62 (03 Jan 2024 04:00) (133/62 - 163/103)  BP(mean): 89 (03 Jan 2024 04:00) (87 - 127)  ABP: --  ABP(mean): --  RR: 25 (03 Jan 2024 04:00) (23 - 28)  SpO2: 99% (03 Jan 2024 04:00) (97% - 99%)    O2 Parameters below as of 03 Jan 2024 04:00  Patient On (Oxygen Delivery Method): T-piece                  I&O's Summary    02 Jan 2024 07:01  -  03 Jan 2024 07:00  --------------------------------------------------------  IN: 1848 mL / OUT: 3160 mL / NET: -1312 mL      I&O's Detail    02 Jan 2024 07:01  -  03 Jan 2024 07:00  --------------------------------------------------------  IN:    Enteral Tube Flush: 1000 mL    IV PiggyBack: 200 mL    Nepro with Carb Steady: 648 mL  Total IN: 1848 mL    OUT:    Indwelling Catheter - Urethral (mL): 1860 mL    Intermittent Catheterization - Urethral (mL): 400 mL    Other (mL): 500 mL    Rectal Tube (mL): 400 mL  Total OUT: 3160 mL    Total NET: -1312 mL          PHYSICAL EXAM:    General/Neuro  GCS: 7T = E  2 / V 1T  / M  4    Deficits: Grimaces to noxious stim, +cough/+gag.     Lungs: Trached to t-piece. Rhonchi noted bilaterally. Normal expansion/effort.     Cardiovascular : S1, S2.  Regular rate and rhythm.  Cardiac Rhythm: Normal Sinus Rhythm    GI: Abdomen soft, Non-tender, Non-distended. PEG c/d/i, 3cm from flange.    Extremities: Extremities warm, pink, well-perfused. Radial 2+, DP/PT 2+ palp b/l    Derm: Good skin turgor, no skin breakdown.      :       Martinez catheter in place.      CXR:     LABS:  CAPILLARY BLOOD GLUCOSE      POCT Blood Glucose.: 108 mg/dL (03 Jan 2024 05:40)  POCT Blood Glucose.: 166 mg/dL (02 Jan 2024 23:23)  POCT Blood Glucose.: 155 mg/dL (02 Jan 2024 21:44)  POCT Blood Glucose.: 134 mg/dL (02 Jan 2024 17:19)  POCT Blood Glucose.: 128 mg/dL (02 Jan 2024 11:02)                          8.3    11.78 )-----------( 371      ( 03 Jan 2024 05:35 )             25.9       01-03    145  |  110  |  70<HH>  ----------------------------<  98  4.4   |  24  |  2.0<H>    Ca    7.7<L>      03 Jan 2024 05:35  Phos  4.8     01-03  Mg     2.6     01-03    TPro  x   /  Alb  2.6<L>  /  TBili  x   /  DBili  x   /  AST  x   /  ALT  x   /  AlkPhos  x   01-03            Urinalysis Basic - ( 03 Jan 2024 05:35 )    Color: x / Appearance: x / SG: x / pH: x  Gluc: 98 mg/dL / Ketone: x  / Bili: x / Urobili: x   Blood: x / Protein: x / Nitrite: x   Leuk Esterase: x / RBC: x / WBC x   Sq Epi: x / Non Sq Epi: x / Bacteria: x           JACQUELIN CAI   [id number]   [birth year]  78yM    Admit Date: 12-16-23  Indication for SDU/SICU: Q1 neuro checks      77-year-old male Cantonese speaking presents with prior history of HTN, HLD, Diabetes, BPH, prior CVA on aspirin and Plavix who states he had a mechanical fall 3 days ago while getting out of the car fell backwards hit his head.  Afterwards he was able to ambulate.  But for the past 1 day family was unable to ambulate him.  He states that his lower extremities are painful to movement and weak.  Family denies any LOC. Per daughter patient he often forgets things and cannot recall year or place. Patient following commands bedside.   Initial CT head remarkable for 3 Acute subdural hemorrhages: 0.7 cm along the left hemisphere, 0.3 cm along the right frontal area, and 1.1 cm along the left falx. No midline shift. Repeat CT head was obtained prior to 6 hour tacos because patient was appearing lethargic vs sundowning. CT head #2 with stable findings. CT c-spine and chest/abd/pelvis unremarkable for acute pathology.     SICU Consult for q1 neuro checks    ============================  24 Hour Events  1/2  Night   -Exam: -  #: [id number] Tenorio: Eyes open spontaneously, not following commands, CTABL  -AM labs   -Calculate VPA dosing in AM   -Start finasteride     1/2  DAY  - gave Lantus 5 stat, inc. lantus to 25u HS tonight  - + metamucil  - BS at 11am - 300. SC w/ martinez, 500mL. martinez maintained.  - Na+ 153.   - TSH - 4.16, T4 - 5.9, T3 - 79  - keep dignishield until tmrw morning      [X] A ten-point review of systems was otherwise negative except as noted above.  [  ] Due to altered mental status/intubation, subjective information was not attained from the patient. History was obtained, to the extent possible, from review of the chart and collateral sources of information.    Daily     Daily     Diet, NPO with Tube Feed:   Tube Feeding Modality: Gastrostomy  Nepro with Carb Steady  Total Volume for 24 Hours (mL): 648  Continuous  Starting Tube Feed Rate mL per Hour: 27  Increase Tube Feed Rate by (mL): 0     Every 4 hours  Until Goal Tube Feed Rate (mL per Hour): 27  Tube Feed Duration (in Hours): 24  Free Water Flush  Bolus   Total Volume per Flush (mL): 250   Frequency: Every 6 Hours  Free Water Flush Instructions:  Please flush PEG with 50cc of sterile water before feeds start and 100cc of sterile water after feeds end  Banatrol TF     Qty per Day:  3 (01-01-24 @ 22:49)      CURRENT MEDS:  Neurologic Medications  amantadine 100 milliGRAM(s) Oral <User Schedule>  valproic  acid Syrup 750 milliGRAM(s) Oral every 12 hours    Respiratory Medications  ipratropium 17 MICROgram(s) HFA Inhaler 1 Puff(s) Inhalation every 6 hours    Cardiovascular Medications  amLODIPine   Tablet 5 milliGRAM(s) Oral daily  carvedilol 6.25 milliGRAM(s) Oral every 12 hours  doxazosin 4 milliGRAM(s) Oral at bedtime    Gastrointestinal Medications  famotidine    Tablet 10 milliGRAM(s) Oral every 48 hours  multivitamin 1 Tablet(s) Oral daily    Genitourinary Medications    Hematologic/Oncologic Medications  aspirin  chewable 81 milliGRAM(s) Enteral Tube daily  heparin   Injectable 5000 Unit(s) SubCutaneous every 8 hours    Antimicrobial/Immunologic Medications  piperacillin/tazobactam IVPB.. 3.375 Gram(s) IV Intermittent every 12 hours    Endocrine/Metabolic Medications  atorvastatin 20 milliGRAM(s) Oral at bedtime  finasteride 5 milliGRAM(s) Oral daily  insulin glargine Injectable (LANTUS) 25 Unit(s) SubCutaneous at bedtime  insulin lispro (ADMELOG) corrective regimen sliding scale   SubCutaneous every 6 hours    Topical/Other Medications  bacitracin   Ointment 1 Application(s) Topical every 12 hours  chlorhexidine 0.12% Liquid 15 milliLiter(s) Oral Mucosa two times a day  chlorhexidine 2% Cloths 1 Application(s) Topical <User Schedule>  chlorhexidine 2% Cloths 1 Application(s) Topical <User Schedule>  sevelamer carbonate Powder 800 milliGRAM(s) Oral three times a day with meals  vitamin A &amp; D Ointment 1 Application(s) Topical every 12 hours      ICU Vital Signs Last 24 Hrs  T(C): 37.3 (03 Jan 2024 04:00), Max: 37.3 (03 Jan 2024 04:00)  T(F): 99.1 (03 Jan 2024 04:00), Max: 99.1 (03 Jan 2024 04:00)  HR: 82 (03 Jan 2024 04:00) (77 - 86)  BP: 134/62 (03 Jan 2024 04:00) (133/62 - 163/103)  BP(mean): 89 (03 Jan 2024 04:00) (87 - 127)  ABP: --  ABP(mean): --  RR: 25 (03 Jan 2024 04:00) (23 - 28)  SpO2: 99% (03 Jan 2024 04:00) (97% - 99%)    O2 Parameters below as of 03 Jan 2024 04:00  Patient On (Oxygen Delivery Method): T-piece                  I&O's Summary    02 Jan 2024 07:01  -  03 Jan 2024 07:00  --------------------------------------------------------  IN: 1848 mL / OUT: 3160 mL / NET: -1312 mL      I&O's Detail    02 Jan 2024 07:01  -  03 Jan 2024 07:00  --------------------------------------------------------  IN:    Enteral Tube Flush: 1000 mL    IV PiggyBack: 200 mL    Nepro with Carb Steady: 648 mL  Total IN: 1848 mL    OUT:    Indwelling Catheter - Urethral (mL): 1860 mL    Intermittent Catheterization - Urethral (mL): 400 mL    Other (mL): 500 mL    Rectal Tube (mL): 400 mL  Total OUT: 3160 mL    Total NET: -1312 mL          PHYSICAL EXAM:    General/Neuro  GCS: 7T = E  2 / V 1T  / M  4    Deficits: Grimaces to noxious stim, +cough/+gag.     Lungs: Trached to t-piece. Rhonchi noted bilaterally. Normal expansion/effort.     Cardiovascular : S1, S2.  Regular rate and rhythm.  Cardiac Rhythm: Normal Sinus Rhythm    GI: Abdomen soft, Non-tender, Non-distended. PEG c/d/i, 3cm from flange.    Extremities: Extremities warm, pink, well-perfused. Radial 2+, DP/PT 2+ palp b/l    Derm: Good skin turgor, no skin breakdown.      :       Martinez catheter in place.      CXR:     LABS:  CAPILLARY BLOOD GLUCOSE      POCT Blood Glucose.: 108 mg/dL (03 Jan 2024 05:40)  POCT Blood Glucose.: 166 mg/dL (02 Jan 2024 23:23)  POCT Blood Glucose.: 155 mg/dL (02 Jan 2024 21:44)  POCT Blood Glucose.: 134 mg/dL (02 Jan 2024 17:19)  POCT Blood Glucose.: 128 mg/dL (02 Jan 2024 11:02)                          8.3    11.78 )-----------( 371      ( 03 Jan 2024 05:35 )             25.9       01-03    145  |  110  |  70<HH>  ----------------------------<  98  4.4   |  24  |  2.0<H>    Ca    7.7<L>      03 Jan 2024 05:35  Phos  4.8     01-03  Mg     2.6     01-03    TPro  x   /  Alb  2.6<L>  /  TBili  x   /  DBili  x   /  AST  x   /  ALT  x   /  AlkPhos  x   01-03            Urinalysis Basic - ( 03 Jan 2024 05:35 )    Color: x / Appearance: x / SG: x / pH: x  Gluc: 98 mg/dL / Ketone: x  / Bili: x / Urobili: x   Blood: x / Protein: x / Nitrite: x   Leuk Esterase: x / RBC: x / WBC x   Sq Epi: x / Non Sq Epi: x / Bacteria: x           JACQUELIN CAI   [id number]   [birth year]  78yM    Admit Date: 12-16-23  Indication for SDU/SICU: Q1 neuro checks      77-year-old male Cantonese speaking presents with prior history of HTN, HLD, Diabetes, BPH, prior CVA on aspirin and Plavix who states he had a mechanical fall 3 days ago while getting out of the car fell backwards hit his head.  Afterwards he was able to ambulate.  But for the past 1 day family was unable to ambulate him.  He states that his lower extremities are painful to movement and weak.  Family denies any LOC. Per daughter patient he often forgets things and cannot recall year or place. Patient following commands bedside.   Initial CT head remarkable for 3 Acute subdural hemorrhages: 0.7 cm along the left hemisphere, 0.3 cm along the right frontal area, and 1.1 cm along the left falx. No midline shift. Repeat CT head was obtained prior to 6 hour tacos because patient was appearing lethargic vs sundowning. CT head #2 with stable findings. CT c-spine and chest/abd/pelvis unremarkable for acute pathology.     SICU Consult for q1 neuro checks    ============================  24 Hour Events  1/2  Night   -Exam: -  #: [id number] Tenorio: Eyes open spontaneously, not following commands, CTABL  -AM labs   -Calculate VPA dosing in AM   -Start finasteride     1/2  DAY  - gave Lantus 5 stat, inc. lantus to 25u HS tonight  - + metamucil  - BS at 11am - 300. SC w/ martinez, 500mL. martinez maintained.  - Na+ 153.   - TSH - 4.16, T4 - 5.9, T3 - 79  - keep dignishield until tmrw morning      [X] A ten-point review of systems was otherwise negative except as noted above.  [  ] Due to altered mental status/intubation, subjective information was not attained from the patient. History was obtained, to the extent possible, from review of the chart and collateral sources of information.    Daily     Daily     Diet, NPO with Tube Feed:   Tube Feeding Modality: Gastrostomy  Nepro with Carb Steady  Total Volume for 24 Hours (mL): 648  Continuous  Starting Tube Feed Rate mL per Hour: 27  Increase Tube Feed Rate by (mL): 0     Every 4 hours  Until Goal Tube Feed Rate (mL per Hour): 27  Tube Feed Duration (in Hours): 24  Free Water Flush  Bolus   Total Volume per Flush (mL): 250   Frequency: Every 6 Hours  Free Water Flush Instructions:  Please flush PEG with 50cc of sterile water before feeds start and 100cc of sterile water after feeds end  Banatrol TF     Qty per Day:  3 (01-01-24 @ 22:49)      CURRENT MEDS:  Neurologic Medications  amantadine 100 milliGRAM(s) Oral <User Schedule>  valproic  acid Syrup 750 milliGRAM(s) Oral every 12 hours    Respiratory Medications  ipratropium 17 MICROgram(s) HFA Inhaler 1 Puff(s) Inhalation every 6 hours    Cardiovascular Medications  amLODIPine   Tablet 5 milliGRAM(s) Oral daily  carvedilol 6.25 milliGRAM(s) Oral every 12 hours  doxazosin 4 milliGRAM(s) Oral at bedtime    Gastrointestinal Medications  famotidine    Tablet 10 milliGRAM(s) Oral every 48 hours  multivitamin 1 Tablet(s) Oral daily    Genitourinary Medications    Hematologic/Oncologic Medications  aspirin  chewable 81 milliGRAM(s) Enteral Tube daily  heparin   Injectable 5000 Unit(s) SubCutaneous every 8 hours    Antimicrobial/Immunologic Medications  piperacillin/tazobactam IVPB.. 3.375 Gram(s) IV Intermittent every 12 hours    Endocrine/Metabolic Medications  atorvastatin 20 milliGRAM(s) Oral at bedtime  finasteride 5 milliGRAM(s) Oral daily  insulin glargine Injectable (LANTUS) 25 Unit(s) SubCutaneous at bedtime  insulin lispro (ADMELOG) corrective regimen sliding scale   SubCutaneous every 6 hours    Topical/Other Medications  bacitracin   Ointment 1 Application(s) Topical every 12 hours  chlorhexidine 0.12% Liquid 15 milliLiter(s) Oral Mucosa two times a day  chlorhexidine 2% Cloths 1 Application(s) Topical <User Schedule>  chlorhexidine 2% Cloths 1 Application(s) Topical <User Schedule>  sevelamer carbonate Powder 800 milliGRAM(s) Oral three times a day with meals  vitamin A &amp; D Ointment 1 Application(s) Topical every 12 hours      ICU Vital Signs Last 24 Hrs  T(C): 37.3 (03 Jan 2024 04:00), Max: 37.3 (03 Jan 2024 04:00)  T(F): 99.1 (03 Jan 2024 04:00), Max: 99.1 (03 Jan 2024 04:00)  HR: 82 (03 Jan 2024 04:00) (77 - 86)  BP: 134/62 (03 Jan 2024 04:00) (133/62 - 163/103)  BP(mean): 89 (03 Jan 2024 04:00) (87 - 127)  ABP: --  ABP(mean): --  RR: 25 (03 Jan 2024 04:00) (23 - 28)  SpO2: 99% (03 Jan 2024 04:00) (97% - 99%)    O2 Parameters below as of 03 Jan 2024 04:00  Patient On (Oxygen Delivery Method): T-piece                  I&O's Summary    02 Jan 2024 07:01  -  03 Jan 2024 07:00  --------------------------------------------------------  IN: 1848 mL / OUT: 3160 mL / NET: -1312 mL      I&O's Detail    02 Jan 2024 07:01  -  03 Jan 2024 07:00  --------------------------------------------------------  IN:    Enteral Tube Flush: 1000 mL    IV PiggyBack: 200 mL    Nepro with Carb Steady: 648 mL  Total IN: 1848 mL    OUT:    Indwelling Catheter - Urethral (mL): 1860 mL    Intermittent Catheterization - Urethral (mL): 400 mL    Other (mL): 500 mL    Rectal Tube (mL): 400 mL  Total OUT: 3160 mL    Total NET: -1312 mL          PHYSICAL EXAM:    General/Neuro  GCS: 7T = E  2 / V 1T  / M  4    Deficits: Does not follow commands. Grimaces to noxious stim, +cough/+gag. PERRL. Unable to assess EOMs, strength, and motor d/t decreased mental status.    Lungs: Trached to t-piece. Rhonchi noted bilaterally. Normal expansion/effort.     Cardiovascular : S1, S2.  Regular rate and rhythm.  Cardiac Rhythm: Normal Sinus Rhythm    GI: Abdomen soft, Non-tender, Non-distended. PEG c/d/i, 3cm from flange.    Extremities: Extremities warm, pink, well-perfused. Radial 2+, DP/PT 2+ palp b/l    Derm: Good skin turgor, no skin breakdown.      :   Martinez catheter in place.      CXR:     LABS:  CAPILLARY BLOOD GLUCOSE      POCT Blood Glucose.: 108 mg/dL (03 Jan 2024 05:40)  POCT Blood Glucose.: 166 mg/dL (02 Jan 2024 23:23)  POCT Blood Glucose.: 155 mg/dL (02 Jan 2024 21:44)  POCT Blood Glucose.: 134 mg/dL (02 Jan 2024 17:19)  POCT Blood Glucose.: 128 mg/dL (02 Jan 2024 11:02)                          8.3    11.78 )-----------( 371      ( 03 Jan 2024 05:35 )             25.9       01-03    145  |  110  |  70<HH>  ----------------------------<  98  4.4   |  24  |  2.0<H>    Ca    7.7<L>      03 Jan 2024 05:35  Phos  4.8     01-03  Mg     2.6     01-03    TPro  x   /  Alb  2.6<L>  /  TBili  x   /  DBili  x   /  AST  x   /  ALT  x   /  AlkPhos  x   01-03            Urinalysis Basic - ( 03 Jan 2024 05:35 )    Color: x / Appearance: x / SG: x / pH: x  Gluc: 98 mg/dL / Ketone: x  / Bili: x / Urobili: x   Blood: x / Protein: x / Nitrite: x   Leuk Esterase: x / RBC: x / WBC x   Sq Epi: x / Non Sq Epi: x / Bacteria: x           JACQUELIN CAI   [id number]   [birth year]  78yM    Admit Date: 12-16-23  Indication for SDU/SICU: Q1 neuro checks      77-year-old male Cantonese speaking presents with prior history of HTN, HLD, Diabetes, BPH, prior CVA on aspirin and Plavix who states he had a mechanical fall 3 days ago while getting out of the car fell backwards hit his head.  Afterwards he was able to ambulate.  But for the past 1 day family was unable to ambulate him.  He states that his lower extremities are painful to movement and weak.  Family denies any LOC. Per daughter patient he often forgets things and cannot recall year or place. Patient following commands bedside.   Initial CT head remarkable for 3 Acute subdural hemorrhages: 0.7 cm along the left hemisphere, 0.3 cm along the right frontal area, and 1.1 cm along the left falx. No midline shift. Repeat CT head was obtained prior to 6 hour tacos because patient was appearing lethargic vs sundowning. CT head #2 with stable findings. CT c-spine and chest/abd/pelvis unremarkable for acute pathology.     SICU Consult for q1 neuro checks    ============================  24 Hour Events  1/2  Night   -Exam: -  #: [id number] Tenorio: Eyes open spontaneously, not following commands, CTABL  -AM labs   -Calculate VPA dosing in AM   -Start finasteride     1/2  DAY  - gave Lantus 5 stat, inc. lantus to 25u HS tonight  - + metamucil  - BS at 11am - 300. SC w/ martinez, 500mL. martinez maintained.  - Na+ 153.   - TSH - 4.16, T4 - 5.9, T3 - 79  - keep dignishield until tmrw morning      [X] A ten-point review of systems was otherwise negative except as noted above.  [  ] Due to altered mental status/intubation, subjective information was not attained from the patient. History was obtained, to the extent possible, from review of the chart and collateral sources of information.    Daily     Daily     Diet, NPO with Tube Feed:   Tube Feeding Modality: Gastrostomy  Nepro with Carb Steady  Total Volume for 24 Hours (mL): 648  Continuous  Starting Tube Feed Rate mL per Hour: 27  Increase Tube Feed Rate by (mL): 0     Every 4 hours  Until Goal Tube Feed Rate (mL per Hour): 27  Tube Feed Duration (in Hours): 24  Free Water Flush  Bolus   Total Volume per Flush (mL): 250   Frequency: Every 6 Hours  Free Water Flush Instructions:  Please flush PEG with 50cc of sterile water before feeds start and 100cc of sterile water after feeds end  Banatrol TF     Qty per Day:  3 (01-01-24 @ 22:49)      CURRENT MEDS:  Neurologic Medications  amantadine 100 milliGRAM(s) Oral <User Schedule>  valproic  acid Syrup 750 milliGRAM(s) Oral every 12 hours    Respiratory Medications  ipratropium 17 MICROgram(s) HFA Inhaler 1 Puff(s) Inhalation every 6 hours    Cardiovascular Medications  amLODIPine   Tablet 5 milliGRAM(s) Oral daily  carvedilol 6.25 milliGRAM(s) Oral every 12 hours  doxazosin 4 milliGRAM(s) Oral at bedtime    Gastrointestinal Medications  famotidine    Tablet 10 milliGRAM(s) Oral every 48 hours  multivitamin 1 Tablet(s) Oral daily    Genitourinary Medications    Hematologic/Oncologic Medications  aspirin  chewable 81 milliGRAM(s) Enteral Tube daily  heparin   Injectable 5000 Unit(s) SubCutaneous every 8 hours    Antimicrobial/Immunologic Medications  piperacillin/tazobactam IVPB.. 3.375 Gram(s) IV Intermittent every 12 hours    Endocrine/Metabolic Medications  atorvastatin 20 milliGRAM(s) Oral at bedtime  finasteride 5 milliGRAM(s) Oral daily  insulin glargine Injectable (LANTUS) 25 Unit(s) SubCutaneous at bedtime  insulin lispro (ADMELOG) corrective regimen sliding scale   SubCutaneous every 6 hours    Topical/Other Medications  bacitracin   Ointment 1 Application(s) Topical every 12 hours  chlorhexidine 0.12% Liquid 15 milliLiter(s) Oral Mucosa two times a day  chlorhexidine 2% Cloths 1 Application(s) Topical <User Schedule>  chlorhexidine 2% Cloths 1 Application(s) Topical <User Schedule>  sevelamer carbonate Powder 800 milliGRAM(s) Oral three times a day with meals  vitamin A &amp; D Ointment 1 Application(s) Topical every 12 hours      ICU Vital Signs Last 24 Hrs  T(C): 37.3 (03 Jan 2024 04:00), Max: 37.3 (03 Jan 2024 04:00)  T(F): 99.1 (03 Jan 2024 04:00), Max: 99.1 (03 Jan 2024 04:00)  HR: 82 (03 Jan 2024 04:00) (77 - 86)  BP: 134/62 (03 Jan 2024 04:00) (133/62 - 163/103)  BP(mean): 89 (03 Jan 2024 04:00) (87 - 127)  ABP: --  ABP(mean): --  RR: 25 (03 Jan 2024 04:00) (23 - 28)  SpO2: 99% (03 Jan 2024 04:00) (97% - 99%)    O2 Parameters below as of 03 Jan 2024 04:00  Patient On (Oxygen Delivery Method): T-piece                  I&O's Summary    02 Jan 2024 07:01  -  03 Jan 2024 07:00  --------------------------------------------------------  IN: 1848 mL / OUT: 3160 mL / NET: -1312 mL      I&O's Detail    02 Jan 2024 07:01  -  03 Jan 2024 07:00  --------------------------------------------------------  IN:    Enteral Tube Flush: 1000 mL    IV PiggyBack: 200 mL    Nepro with Carb Steady: 648 mL  Total IN: 1848 mL    OUT:    Indwelling Catheter - Urethral (mL): 1860 mL    Intermittent Catheterization - Urethral (mL): 400 mL    Other (mL): 500 mL    Rectal Tube (mL): 400 mL  Total OUT: 3160 mL    Total NET: -1312 mL          PHYSICAL EXAM:    General/Neuro  GCS: 7T = E  2 / V 1T  / M  4    Deficits: Does not follow commands. Grimaces to noxious stim, +cough/+gag. PERRL. Unable to assess EOMs, strength, and motor d/t decreased mental status.    Lungs: Trached to t-piece. Rhonchi noted bilaterally. Normal expansion/effort.     Cardiovascular : S1, S2.  Regular rate and rhythm.  Cardiac Rhythm: Normal Sinus Rhythm    GI: Abdomen soft, Non-tender, Non-distended. PEG c/d/i, 3cm from flange.    Extremities: Extremities warm, pink, well-perfused. Radial 2+, DP/PT 2+ palp b/l    Derm: Good skin turgor, no skin breakdown.      :   Martinez catheter in place.      CXR:     LABS:  CAPILLARY BLOOD GLUCOSE      POCT Blood Glucose.: 108 mg/dL (03 Jan 2024 05:40)  POCT Blood Glucose.: 166 mg/dL (02 Jan 2024 23:23)  POCT Blood Glucose.: 155 mg/dL (02 Jan 2024 21:44)  POCT Blood Glucose.: 134 mg/dL (02 Jan 2024 17:19)  POCT Blood Glucose.: 128 mg/dL (02 Jan 2024 11:02)                          8.3    11.78 )-----------( 371      ( 03 Jan 2024 05:35 )             25.9       01-03    145  |  110  |  70<HH>  ----------------------------<  98  4.4   |  24  |  2.0<H>    Ca    7.7<L>      03 Jan 2024 05:35  Phos  4.8     01-03  Mg     2.6     01-03    TPro  x   /  Alb  2.6<L>  /  TBili  x   /  DBili  x   /  AST  x   /  ALT  x   /  AlkPhos  x   01-03            Urinalysis Basic - ( 03 Jan 2024 05:35 )    Color: x / Appearance: x / SG: x / pH: x  Gluc: 98 mg/dL / Ketone: x  / Bili: x / Urobili: x   Blood: x / Protein: x / Nitrite: x   Leuk Esterase: x / RBC: x / WBC x   Sq Epi: x / Non Sq Epi: x / Bacteria: x

## 2024-01-03 NOTE — PROGRESS NOTE ADULT - SUBJECTIVE AND OBJECTIVE BOX
GENERAL SURGERY PROGRESS NOTE     JACQUELIN CAI  [address] [place] [address] day :18d      24 Hour Events  1/2  Night   -Exam: -  #: [id number] Jona: Eyes open spontaneously, not following commands, CTABL  -AM labs   -Calculate VPA dosing in AM   -Start finasteride     1/2  DAY  - gave Lantus 5 stat, inc. lantus to 25u HS tonight  - + metamucil  - BS at 11am - 300. SC w/ martinez, 500mL. martinez maintained.  - Na+ 153.   - TSH - 4.16, T4 - 5.9, T3 - 79  - keep dignishield until tmrw     PHYSICAL EXAM:    General/Neuro  GCS: 7T = E  2 / V 1T  / M  4    Deficits: Does not follow commands. Grimaces to noxious stim, +cough/+gag. PERRL. Unable to assess EOMs, strength, and motor d/t decreased mental status.    Lungs: Trached to t-piece. Rhonchi noted bilaterally. Normal expansion/effort.     Cardiovascular : S1, S2.  Regular rate and rhythm.  Cardiac Rhythm: Normal Sinus Rhythm    GI: Abdomen soft, Non-tender, Non-distended. PEG c/d/i, 3cm from flange.    Extremities: Extremities warm, pink, well-perfused. Radial 2+, DP/PT 2+ palp b/l    Derm: Good skin turgor, no skin breakdown.      :   Martinez catheter in place.          T(F): 98 (01-03-24 @ 08:00), Max: 99.1 (01-03-24 @ 04:00)  HR: 78 (01-03-24 @ 08:15) (77 - 86)  BP: 125/62 (01-03-24 @ 08:00) (125/62 - 163/103)  ABP: --  ABP(mean): --  RR: 24 (01-03-24 @ 08:15) (16 - 28)  SpO2: 100% (01-03-24 @ 08:15) (97% - 100%)    IN'S / OUT's:    01-02-24 @ 07:01  -  01-03-24 @ 07:00  --------------------------------------------------------  IN:    Enteral Tube Flush: 1000 mL    IV PiggyBack: 200 mL    Nepro with Carb Steady: 648 mL  Total IN: 1848 mL    OUT:    Indwelling Catheter - Urethral (mL): 1860 mL    Intermittent Catheterization - Urethral (mL): 400 mL    Other (mL): 500 mL    Rectal Tube (mL): 400 mL  Total OUT: 3160 mL    Total NET: -1312 mL      01-03-24 @ 07:01  -  01-03-24 @ 12:30  --------------------------------------------------------  IN:    Nepro with Carb Steady: 108 mL  Total IN: 108 mL    OUT:  Total OUT: 0 mL    Total NET: 108 mL          LABS  Labs:  CAPILLARY BLOOD GLUCOSE      POCT Blood Glucose.: 150 mg/dL (03 Jan 2024 11:23)  POCT Blood Glucose.: 108 mg/dL (03 Jan 2024 05:40)  POCT Blood Glucose.: 166 mg/dL (02 Jan 2024 23:23)  POCT Blood Glucose.: 155 mg/dL (02 Jan 2024 21:44)  POCT Blood Glucose.: 134 mg/dL (02 Jan 2024 17:19)                          8.3    11.78 )-----------( 371      ( 03 Jan 2024 05:35 )             25.9       Auto Neutrophil %: 78.1 % (01-03-24 @ 05:35)  Auto Immature Granulocyte %: 6.5 % (01-03-24 @ 05:35)    01-03    145  |  110  |  70<HH>  ----------------------------<  98  4.4   |  24  |  2.0<H>      Calcium: 7.7 mg/dL (01-03-24 @ 05:35)      LFTs:             x    | x    | x        ------------------[x       ( 03 Jan 2024 05:35 )  2.6  | x    | x           Lipase:x      Amylase:x           ABG - ( 30 Dec 2023 05:45 )  pH: 7.47  /  pCO2: 28    /  pO2: 177   / HCO3: 20    / Base Excess: -2.0  /  SaO2: 99.3            ABG - ( 29 Dec 2023 04:15 )  pH: 7.48  /  pCO2: 27    /  pO2: 173   / HCO3: 20    / Base Excess: -2.0  /  SaO2: 99.7            ABG - ( 28 Dec 2023 03:44 )  pH: 7.44  /  pCO2: 29    /  pO2: 95    / HCO3: 20    / Base Excess: -3.7  /  SaO2: 98.7              Coags:            Urinalysis Basic - ( 03 Jan 2024 05:35 )    Color: x / Appearance: x / SG: x / pH: x  Gluc: 98 mg/dL / Ketone: x  / Bili: x / Urobili: x   Blood: x / Protein: x / Nitrite: x   Leuk Esterase: x / RBC: x / WBC x   Sq Epi: x / Non Sq Epi: x / Bacteria: x     GENERAL SURGERY PROGRESS NOTE     JACQUELIN CAI  [address] [place] [address] day :18d      24 Hour Events  1/2  Night   -Exam: -  #: [id number] Jona: Eyes open spontaneously, not following commands, CTABL  -AM labs   -Calculate VPA dosing in AM   -Start finasteride     1/2  DAY  - gave Lantus 5 stat, inc. lantus to 25u HS tonight  - + metamucil  - BS at 11am - 300. SC w/ martinez, 500mL. martinez maintained.  - Na+ 153.   - TSH - 4.16, T4 - 5.9, T3 - 79  - keep dignishield until tmrw     PHYSICAL EXAM:    General/Neuro  GCS: 7T = E  2 / V 1T  / M  4    Deficits: Does not follow commands. Grimaces to noxious stim, +cough/+gag. PERRL. Unable to assess EOMs, strength, and motor d/t decreased mental status.    Lungs: Trached to t-piece. Rhonchi noted bilaterally. Normal expansion/effort.     Cardiovascular : S1, S2.  Regular rate and rhythm.  Cardiac Rhythm: Normal Sinus Rhythm    GI: Abdomen soft, Non-tender, Non-distended. PEG c/d/i, 3cm from flange.    Extremities: Extremities warm, pink, well-perfused. Radial 2+, DP/PT 2+ palp b/l    Derm: Good skin turgor, no skin breakdown.      :   Martinez catheter in place.          T(F): 98 (01-03-24 @ 08:00), Max: 99.1 (01-03-24 @ 04:00)  HR: 78 (01-03-24 @ 08:15) (77 - 86)  BP: 125/62 (01-03-24 @ 08:00) (125/62 - 163/103)  ABP: --  ABP(mean): --  RR: 24 (01-03-24 @ 08:15) (16 - 28)  SpO2: 100% (01-03-24 @ 08:15) (97% - 100%)    IN'S / OUT's:    01-02-24 @ 07:01  -  01-03-24 @ 07:00  --------------------------------------------------------  IN:    Enteral Tube Flush: 1000 mL    IV PiggyBack: 200 mL    Nepro with Carb Steady: 648 mL  Total IN: 1848 mL    OUT:    Indwelling Catheter - Urethral (mL): 1860 mL    Intermittent Catheterization - Urethral (mL): 400 mL    Other (mL): 500 mL    Rectal Tube (mL): 400 mL  Total OUT: 3160 mL    Total NET: -1312 mL      01-03-24 @ 07:01  -  01-03-24 @ 12:30  --------------------------------------------------------  IN:    Nepro with Carb Steady: 108 mL  Total IN: 108 mL    OUT:  Total OUT: 0 mL    Total NET: 108 mL          LABS  Labs:  CAPILLARY BLOOD GLUCOSE      POCT Blood Glucose.: 150 mg/dL (03 Jan 2024 11:23)  POCT Blood Glucose.: 108 mg/dL (03 Jan 2024 05:40)  POCT Blood Glucose.: 166 mg/dL (02 Jan 2024 23:23)  POCT Blood Glucose.: 155 mg/dL (02 Jan 2024 21:44)  POCT Blood Glucose.: 134 mg/dL (02 Jan 2024 17:19)                          8.3    11.78 )-----------( 371      ( 03 Jan 2024 05:35 )             25.9       Auto Neutrophil %: 78.1 % (01-03-24 @ 05:35)  Auto Immature Granulocyte %: 6.5 % (01-03-24 @ 05:35)    01-03    145  |  110  |  70<HH>  ----------------------------<  98  4.4   |  24  |  2.0<H>      Calcium: 7.7 mg/dL (01-03-24 @ 05:35)      LFTs:             x    | x    | x        ------------------[x       ( 03 Jan 2024 05:35 )  2.6  | x    | x           Lipase:x      Amylase:x           ABG - ( 30 Dec 2023 05:45 )  pH: 7.47  /  pCO2: 28    /  pO2: 177   / HCO3: 20    / Base Excess: -2.0  /  SaO2: 99.3            ABG - ( 29 Dec 2023 04:15 )  pH: 7.48  /  pCO2: 27    /  pO2: 173   / HCO3: 20    / Base Excess: -2.0  /  SaO2: 99.7            ABG - ( 28 Dec 2023 03:44 )  pH: 7.44  /  pCO2: 29    /  pO2: 95    / HCO3: 20    / Base Excess: -3.7  /  SaO2: 98.7              Coags:            Urinalysis Basic - ( 03 Jan 2024 05:35 )    Color: x / Appearance: x / SG: x / pH: x  Gluc: 98 mg/dL / Ketone: x  / Bili: x / Urobili: x   Blood: x / Protein: x / Nitrite: x   Leuk Esterase: x / RBC: x / WBC x   Sq Epi: x / Non Sq Epi: x / Bacteria: x

## 2024-01-03 NOTE — PROGRESS NOTE ADULT - CRITICAL CARE ATTENDING COMMENT
Critical Care: [id number]   This patient has a high probability of sudden, clinically significant deterioration, which requires the highest level of physician preparedness to intervene urgently. I managed/supervised life or organ supporting interventions that required frequent physician assessment. I have reviewed and agree with note above. I devoted my full attention to the direct care of this patient for the period of time indicated, including reviewing relevant labs and imaging, discussing treatment plan with the SICU team, nurses, and primary team at the time of multidisciplinary rounds, and reviewing findings with patient and family. This does not include time devoted to teaching any trainees and to performing any procedures.    JACQUELIN CAI 78y Male admitted to SICU with traumatic SDH now with acute on chronic renal failure, possible seizure, history of CVA    Issues we are addressing today:    Neuro: minimizing sedation, amantadine for neurostim, valproate, delirium precautions, sleep meds as needed at night  CV: optimize fluid status, home antihypertension meds  Respiratory: trach collar around the clock  Nutrition: tube feeds running, banatrol for diarrhea, d/c dignishield  Renal: monitor Is &Os, finasteride for urinary retention, TOV tomorrow  ID: abx ongoing  Heme: continue to evaluate for acute blood loss anemia, ASA  Endocrine: Prevent and treat hyperglycemia with insulin as needed    PV: follow pulse exam  Skin: decub precautions, wound care consulted  DVT Prophylaxis   Stress Gastritis Prophylaxis   Mobility: PT/OT as tolerated    safe for transfer out of ICU    The above note is NOT written at the time of rounds and will reflect all changes throughout management of the patient for the day note is written for.    Warner Edwards MD, D.BRENDAN. Critical Care: [id number]   This patient has a high probability of sudden, clinically significant deterioration, which requires the highest level of physician preparedness to intervene urgently. I managed/supervised life or organ supporting interventions that required frequent physician assessment. I have reviewed and agree with note above. I devoted my full attention to the direct care of this patient for the period of time indicated, including reviewing relevant labs and imaging, discussing treatment plan with the SICU team, nurses, and primary team at the time of multidisciplinary rounds, and reviewing findings with patient and family. This does not include time devoted to teaching any trainees and to performing any procedures.    JACQUELIN CAI 78y Male admitted to SICU with traumatic SDH now with acute on chronic renal failure, possible seizure, history of CVA    Issues we are addressing today:    Neuro: minimizing sedation, amantadine for neurostim, valproate, delirium precautions, sleep meds as needed at night  CV: optimize fluid status, home antihypertension meds  Respiratory: trach collar around the clock  Nutrition: tube feeds running, banatrol for diarrhea, d/c dignishield  Renal: monitor Is &Os, finasteride for urinary retention, TOV tomorrow  ID: abx ongoing  Heme: continue to evaluate for acute blood loss anemia, ASA  Endocrine: Prevent and treat hyperglycemia with insulin as needed    PV: follow pulse exam  Skin: decub precautions, wound care consulted  DVT Prophylaxis   Stress Gastritis Prophylaxis   Mobility: PT/OT as tolerated    safe for transfer out of ICU    The above note is NOT written at the time of rounds and will reflect all changes throughout management of the patient for the day note is written for.    Warner Edwards MD, D.BRENDAN.

## 2024-01-03 NOTE — PROGRESS NOTE ADULT - ASSESSMENT
Assessment		  78y Male s/p mechanical fall. +HT, +AC (Aspirin and Plavix), -LOC.    12/21-intubated for airway protection, accessory muscle use  12/22- s/p tracheostomy and PEG placement, Portex cuffed 9 and 20fr PEG 2cm at skin     NEURO:  #Acute SDH   -12/18 HCT#4 -stable SDH, no new acute findings  -12/27: Pt obtunded, not responding to noxious stim - VPA/LFTs/BMP/ammonia ordered, CTH f/up: stable  - Q4 neuro checks  - amantadine, 100mg q48h. renal dosing  - NSGY: 1/1: "Risk vs benefits should be weighed and discussed with patient prior to initiation of Plavix". --> will not be restarting plavix per Dr. Degroot  #h/o stroke (2/2023) w/residual right sided weakness  - RIGHT anterior thalamus infarct,  LEFT caudate head lacunar infarct  #focal seizure    - vEEG 12/19: No seizures; Discontinued 12/19    - NCC: valproic acid current dose, q4 neurochecks    - Neurology cs- d/c EEG, 2 days prior to discharge call neurology so they can do a spot EEG --> recalled on 12/28 to evaluate diminished mental status, recommend repeat EEG and continue depakote 500 q12  	- f/up neuro recs  - EEG,12/28: No electrographic seizures or significant clinical events. Abnormal due to the presence of focal and generalized slowing. Findings consistent with focal and diffuse electrocerebral dysfunction secondary to structural/metabolic/nonspecific etiology.    - Valproic acid level 12/31 23, albumin 2.5; Valproic acid dose adjusted to 750mg q12h for 1 week, per pharmacy    - F/U AM valproic avid level and albumin level and adjust as needed     RESP:   #Respiratory Failure secondary to impaired secretion clearance  - s/p tracheostomy 12/22, size 9 cuffed portex  - Tolerating t-piece since 12/29   - chest PT q4 and duoneb treatments q 4   - deep tracheal aspirate cx sent (due to fevers)  - Culture - Sputum . (12.21.23 @ 18:14)-  Numerous Staphylococcus aureus --> 12/28 repeat NGTD  #Activity    -increase as tolerated    -aspiration precautions, HOB 30    CARDS:   #acute HYPOtension intraop, resolved  -off levophed since 12/22 PM  #hx of HTN  -restarting home coreg and amlodipine  -Valsartan 320mg HOLDING   #hx HLD  -atorvastatin  #NSVT    -EP/Cards: No arrhythmias on tele only artifact. No further work up needed. Recommend ILR prior to discharge if patient/family agreeable  Imaging:   Echo: 12/2023: EF 66%, G1DD, trace MR, mild TR     GI/NUTR:   #Diet, NPO with Tube Feed via 20Fr PEG placed 12/22   - TF: Nepro @ 27cc/hr x 24hrs, 250cc FWF q6h + Banatrol  - multivitamin QD  #GI Prophylaxis  - famotidine 10mg PO q48  #Bowel regimen     -Multiple loose bowel movements, last dose senna 12/19 at 2200 > dignishield placed 12/23     - C diff negative     - No bowel regimen    /RENAL:   #urine output   - Failed TOV, SC'd 500cc, martinez left in place  - UA neg 12/25 (r/o UTI in setting of persistent fevers)   - Sodium goal 140-150  - f/u nephro recs- Hypernatremia due to free water losses. Avoid diuretics, would not use Metolazone to cause volume depletion, and has min effect in advanced kidney dysfunction   #Diuresis  - Last diuresed 12/29: metolazone 10 x1 for hypernatremia  #Hypernatremia - improved  - FWD 1.3 L,  q8 --> 250q6    Labs:          BUN/Cr- 80/2.6  -->,  77/2.4  -->          Electrolytes-Na 151 // K 4.2 // Mg 2.9 //  Phos 4.8 (01-01 @ 21:32)  #hyperphosphatemia  - Started sevelamer powder, non-formulary so as not to clog PEG   #CKD   - baseline Cr 2.1  - holding home sodium bicarb 650mg BID  #hx BPH  - cardura 4mg (flomax at home, non crushable)      HEME/ONC:   #DVT prophylaxis     -SCDs, HSQ  #hx of CVA    -ASA81 mg cleared by NSGY    - NSGY: "Risk vs benefits should be weighed and discussed with patient prior to initiation of Plavix."   - continue to hold home plavix    Labs: Hb/Hct:  8.6/27.2  -->,  9.5/29.8  -->                      Plts:  388  -->,  440  -->                 PTT/INR:  36.5/1.03  --->       ID:  WBC- 12.48  --->>,  11.58  --->>,  12.58  --->>  Temp trend- 24hrs T(F): 97.3 (01-02 @ 00:00), Max: 98 (01-01 @ 12:00)  Antibiotics-piperacillin/tazobactam IVPB.. 3.375 every 12 hours  #recurrent fevers- improving, now low grade  - d/c'd Tylenol    PCRs  -MRSA PCR negative (12/22)  -MRSA nares negative (12/28)  -RVP negative (12/28)    Cultures    Bcx 12/18- final neg     Blood cx 12/21- no growth at 4 days    BCx, 12/28 - NG @ 24h    Tracheal aspirate 12/21: numerous staph aureus methicillin sensitive    Tracheal aspirate 12/22: staph aureus    C.Diff PCR neg    UA 12/25 negative     UA 12/28 negative    Current antibiotics:  - Vancomycin 500mg x1 dose (12/28)  - Zosyn 3.375 q12 (started 12/28, stop 1/5)  #multiple loose bowel movements    -no bowel regimen, adding banatrol    ENDO:  #DM     -ISS     -Off insulin gtt 12/20      -gave lantus 5u stat at 10am, increased Lantus to 25 units HS     -FSG q6 while on TF  #AMS possibly due to hypothyroidism      - TSH - 4.16, T4 - 5.9, T3 - 79  #DM - new diagnosis  - hgb A1C 7.0    #MSK:     Activity - Increase As Tolerated    - B/L knee X ray 12/19- no fractures, b/l effusions    - recall PT for reassessment    WOUND:  - L sacral stage 2 ulcer, wound care consult placed.     LINES/DRAINS:  PIV, right basilic midline (placed 12/16), 20Fr PEG (12/22), Tracheostomy (12/22)    ADVANCED DIRECTIVES:  Full Code  -  HCP/Emergency Contact-Sarah Gamino (Wife) [phone], Cantonese speaking    DISPO:  - Social work, case management consult for dispo to vent facility - spoke with social work 12/23, starting to work on D/C.  -  update, 12/29: pt remains acute. Will reassess 24-48h prior to d/c Assessment		  78y Male s/p mechanical fall. +HT, +AC (Aspirin and Plavix), -LOC.    12/21-intubated for airway protection, accessory muscle use  12/22- s/p tracheostomy and PEG placement, Portex cuffed 9 and 20fr PEG 2cm at skin     NEURO:  #Acute SDH   -12/18 HCT#4 -stable SDH, no new acute findings  -12/27: Pt obtunded, not responding to noxious stim - VPA/LFTs/BMP/ammonia ordered, CTH f/up: stable  - Q4 neuro checks  - amantadine, 100mg q48h. renal dosing  - NSGY: 1/1: "Risk vs benefits should be weighed and discussed with patient prior to initiation of Plavix". --> will not be restarting plavix per Dr. Degroot  #h/o stroke (2/2023) w/residual right sided weakness  - RIGHT anterior thalamus infarct,  LEFT caudate head lacunar infarct  #focal seizure    - vEEG 12/19: No seizures; Discontinued 12/19    - NCC: valproic acid current dose, q4 neurochecks    - Neurology cs- d/c EEG, 2 days prior to discharge call neurology so they can do a spot EEG --> recalled on 12/28 to evaluate diminished mental status, recommend repeat EEG and continue depakote 500 q12  	- f/up neuro recs  - EEG,12/28: No electrographic seizures or significant clinical events. Abnormal due to the presence of focal and generalized slowing. Findings consistent with focal and diffuse electrocerebral dysfunction secondary to structural/metabolic/nonspecific etiology.    - Valproic acid level 12/31 23, albumin 2.5; Valproic acid dose adjusted to 750mg q12h for 1 week, per pharmacy    - F/U AM valproic avid level and albumin level and adjust as needed     RESP:   #Respiratory Failure secondary to impaired secretion clearance  - s/p tracheostomy 12/22, size 9 cuffed portex  - Tolerating t-piece since 12/29   - chest PT q4 and duoneb treatments q 4   - deep tracheal aspirate cx sent (due to fevers)  - Culture - Sputum . (12.21.23 @ 18:14)-  Numerous Staphylococcus aureus --> 12/28 repeat NGTD  #Activity    -increase as tolerated    -aspiration precautions, HOB 30    CARDS:   #acute HYPOtension intraop, resolved  -off levophed since 12/22 PM  #hx of HTN  -restarting home coreg and amlodipine  -Valsartan 320mg HOLDING   #hx HLD  -atorvastatin  #NSVT    -EP/Cards: No arrhythmias on tele only artifact. No further work up needed. Recommend ILR prior to discharge if patient/family agreeable  Imaging:   Echo: 12/2023: EF 66%, G1DD, trace MR, mild TR     GI/NUTR:   #Diet, NPO with Tube Feed via 20Fr PEG placed 12/22   - TF: Nepro @ 27cc/hr x 24hrs, 250cc FWF q6h + Banatrol  - multivitamin QD  #GI Prophylaxis  - famotidine 10mg PO q48  #Bowel regimen     -Multiple loose bowel movements, last dose senna 12/19 at 2200 > dignishield placed 12/23     - C diff negative     - No bowel regimen    /RENAL:   #urine output   - Failed TOV, SC'd 500cc, martinez left in place  - UA neg 12/25 (r/o UTI in setting of persistent fevers)   - Sodium goal 140-150  - f/u nephro recs- Hypernatremia due to free water losses. Avoid diuretics, would not use Metolazone to cause volume depletion, and has min effect in advanced kidney dysfunction   #Diuresis  - Last diuresed 12/29: metolazone 10 x1 for hypernatremia  #Hypernatremia - improved  - FWD 1.3 L,  q8 --> 250q6    Labs:          BUN/Cr- 80/2.6  -->,  77/2.4  -->          Electrolytes-Na 151 // K 4.2 // Mg 2.9 //  Phos 4.8 (01-01 @ 21:32)  #hyperphosphatemia  - Started sevelamer powder, non-formulary so as not to clog PEG   #CKD   - baseline Cr 2.1  - holding home sodium bicarb 650mg BID  #hx BPH  - cardura 4mg (flomax at home, non crushable)      HEME/ONC:   #DVT prophylaxis     -SCDs, HSQ  #hx of CVA    -ASA81 mg cleared by NSGY    - NSGY: "Risk vs benefits should be weighed and discussed with patient prior to initiation of Plavix."   - continue to hold home plavix    Labs: Hb/Hct:  8.6/27.2  -->,  9.5/29.8  -->                      Plts:  388  -->,  440  -->                 PTT/INR:  36.5/1.03  --->       ID:  WBC- 12.48  --->>,  11.58  --->>,  12.58  --->>  Temp trend- 24hrs T(F): 97.3 (01-02 @ 00:00), Max: 98 (01-01 @ 12:00)  Antibiotics-piperacillin/tazobactam IVPB.. 3.375 every 12 hours  #recurrent fevers- improving, now low grade  - d/c'd Tylenol    PCRs  -MRSA PCR negative (12/22)  -MRSA nares negative (12/28)  -RVP negative (12/28)    Cultures    Bcx 12/18- final neg     Blood cx 12/21- no growth at 4 days    BCx, 12/28 - NG @ 24h    Tracheal aspirate 12/21: numerous staph aureus methicillin sensitive    Tracheal aspirate 12/22: staph aureus    C.Diff PCR neg    UA 12/25 negative     UA 12/28 negative    Current antibiotics:  - Vancomycin 500mg x1 dose (12/28)  - Zosyn 3.375 q12 (started 12/28, stop 1/5)  #multiple loose bowel movements    -no bowel regimen, adding banatrol    ENDO:  #DM     -ISS     -Off insulin gtt 12/20      -gave lantus 5u stat at 10am, increased Lantus to 25 units HS     -FSG q6 while on TF  #AMS possibly due to hypothyroidism      - TSH - 4.16, T4 - 5.9, T3 - 79  #DM - new diagnosis  - hgb A1C 7.0    #MSK:     Activity - Increase As Tolerated    - B/L knee X ray 12/19- no fractures, b/l effusions    - recall PT for reassessment    WOUND:  - L sacral stage 2 ulcer, wound care consult placed.     LINES/DRAINS:  PIV, right basilic midline (placed 12/16), 20Fr PEG (12/22), Tracheostomy (12/22)    ADVANCED DIRECTIVES:  Full Code  -  HCP/Emergency Contact-Sarah Gamino (Wife) [phone], Cantonese speaking    DISPO:  - Social work, case management consult for dispo to vent facility - spoke with social work 12/23, starting to work on D/C.  -  update, 12/29: pt remains acute. Will reassess 24-48h prior to d/c Assessment		  78y Male s/p mechanical fall. +HT, +AC (Aspirin and Plavix), -LOC.    12/21-intubated for airway protection, accessory muscle use  12/22- s/p tracheostomy and PEG placement, Portex cuffed 9 and 20fr PEG 2cm at skin     NEURO:  #Acute SDH   -12/18 HCT#4 -stable SDH, no new acute findings  -12/27: Pt obtunded, not responding to noxious stim - VPA/LFTs/BMP/ammonia ordered, CTH f/up: stable  - Q4 neuro checks  - amantadine, 100mg q48h. renal dosing  - NSGY: 1/1: "Risk vs benefits should be weighed and discussed with patient prior to initiation of Plavix". --> will not be restarting plavix per Dr. Degroot  #h/o stroke (2/2023) w/residual right sided weakness  - RIGHT anterior thalamus infarct,  LEFT caudate head lacunar infarct  #focal seizure    - vEEG 12/19: No seizures; Discontinued 12/19    - NCC: q4 neurochecks    - Neurology cs- d/c EEG, 2 days prior to discharge call neurology so they can do a spot EEG --> recalled on 12/28 to evaluate diminished mental status, recommend repeat EEG and continue depakote 500 q12  	- f/up neuro recs  - EEG,12/28: No electrographic seizures or significant clinical events. Abnormal due to the presence of focal and generalized slowing. Findings consistent with focal and diffuse electrocerebral dysfunction secondary to structural/metabolic/nonspecific etiology.    - Valproic acid level 12/31 23, albumin 2.5; Valproic acid dose adjusted to 750mg q12h for 1 week, per pharmacy    - F/U AM valproic avid level and albumin level; adjust valproic acid dose as needed     RESP:   #Respiratory Failure secondary to impaired secretion clearance  - s/p tracheostomy 12/22, size 9 cuffed portex  - Tolerating t-piece since 12/29   - chest PT q4 and duoneb treatments q 4   - deep tracheal aspirate cx sent (due to fevers)  - Culture - Sputum . (12.21.23 @ 18:14)-  Numerous Staphylococcus aureus --> 12/28 repeat NGTD  #Activity    -increase as tolerated    -aspiration precautions, HOB 30    CARDS:   #acute HYPOtension intraop, resolved  -off levophed since 12/22 PM  #hx of HTN  -restarting home coreg and amlodipine  -Valsartan 320mg HOLDING   #hx HLD  -atorvastatin  #NSVT    -EP/Cards: No arrhythmias on tele only artifact. No further work up needed. Recommend ILR prior to discharge if patient/family agreeable  Imaging:   Echo: 12/2023: EF 66%, G1DD, trace MR, mild TR     GI/NUTR:   #Diet, NPO with Tube Feed via 20Fr PEG placed 12/22   - TF: Nepro @ 27cc/hr x 24hrs, 250cc FWF q6h + Banatrol  - multivitamin QD  #GI Prophylaxis  - famotidine 10mg PO q48  #Bowel regimen     -Multiple loose bowel movements, last dose senna 12/19 at 2200 > dignishield placed 12/23     - C diff negative     - No bowel regimen    /RENAL:   #urine output   - Failed TOV, SC'd 500cc, martinez left in place  - UA neg 12/25 (r/o UTI in setting of persistent fevers)   - Sodium goal 140-150  - f/u nephro recs- Hypernatremia due to free water losses. Avoid diuretics, would not use Metolazone to cause volume depletion, and has min effect in advanced kidney dysfunction   #Diuresis  - Last diuresed 12/29: metolazone 10 x1 for hypernatremia  #Hypernatremia - improved  - FWD 1.3 L,  q8 --> 250q6    Labs:          BUN/Cr- 80/2.6  -->,  77/2.4  -->          Electrolytes-Na 151 // K 4.2 // Mg 2.9 //  Phos 4.8 (01-01 @ 21:32)  #hyperphosphatemia  - Started sevelamer powder, non-formulary so as not to clog PEG   #CKD   - baseline Cr 2.1  - holding home sodium bicarb 650mg BID  #hx BPH  - cardura 4mg (flomax at home, non crushable)      HEME/ONC:   #DVT prophylaxis     -SCDs, HSQ  #hx of CVA    -ASA81 mg cleared by NSGY    - NSGY: "Risk vs benefits should be weighed and discussed with patient prior to initiation of Plavix."   - continue to hold home plavix    Labs: Hb/Hct:  8.6/27.2  -->,  9.5/29.8  -->                      Plts:  388  -->,  440  -->                 PTT/INR:  36.5/1.03  --->       ID:  WBC- 12.48  --->>,  11.58  --->>,  12.58  --->>  Temp trend- 24hrs T(F): 97.3 (01-02 @ 00:00), Max: 98 (01-01 @ 12:00)  Antibiotics-piperacillin/tazobactam IVPB.. 3.375 every 12 hours  #recurrent fevers- improving, now low grade  - d/c'd Tylenol    PCRs  -MRSA PCR negative (12/22)  -MRSA nares negative (12/28)  -RVP negative (12/28)    Cultures    Bcx 12/18- final neg     Blood cx 12/21- no growth at 4 days    BCx, 12/28 - NG @ 24h    Tracheal aspirate 12/21: numerous staph aureus methicillin sensitive    Tracheal aspirate 12/22: staph aureus    C.Diff PCR neg    UA 12/25 negative     UA 12/28 negative    Current antibiotics:  - Vancomycin 500mg x1 dose (12/28)  - Zosyn 3.375 q12 (started 12/28, stop 1/5)  #multiple loose bowel movements    -no bowel regimen, adding banatrol    ENDO:  #DM     -ISS     -Off insulin gtt 12/20      -gave lantus 5u stat at 10am, increased Lantus to 25 units HS     -FSG q6 while on TF  #AMS possibly due to hypothyroidism      - TSH - 4.16, T4 - 5.9, T3 - 79  #DM - new diagnosis  - hgb A1C 7.0    #MSK:     Activity - Increase As Tolerated    - B/L knee X ray 12/19- no fractures, b/l effusions    - recall PT for reassessment    WOUND:  - L sacral stage 2 ulcer, wound care consult placed.     LINES/DRAINS:  PIV, right basilic midline (placed 12/16), 20Fr PEG (12/22), Tracheostomy (12/22)    ADVANCED DIRECTIVES:  Full Code  -  HCP/Emergency Contact-Sarah Gamino (Wife) [phone], Cantonese speaking    DISPO:  - Social work, case management consult for dispo to vent facility - spoke with social work 12/23, starting to work on D/C.  - SW update, 12/29: pt remains acute. Will reassess 24-48h prior to d/c Assessment		  78y Male s/p mechanical fall. +HT, +AC (Aspirin and Plavix), -LOC.    12/21-intubated for airway protection, accessory muscle use  12/22- s/p tracheostomy and PEG placement, Portex cuffed 9 and 20fr PEG 2cm at skin     NEURO:  #Acute SDH   -12/18 HCT#4 -stable SDH, no new acute findings  -12/27: Pt obtunded, not responding to noxious stim - VPA/LFTs/BMP/ammonia ordered, CTH f/up: stable  - Q4 neuro checks  - amantadine, 100mg q48h. renal dosing  - NSGY: 1/1: "Risk vs benefits should be weighed and discussed with patient prior to initiation of Plavix". --> will not be restarting plavix per Dr. Degroot  #h/o stroke (2/2023) w/residual right sided weakness  - RIGHT anterior thalamus infarct,  LEFT caudate head lacunar infarct  #focal seizure    - vEEG 12/19: No seizures; Discontinued 12/19    - NCC: q4 neurochecks    - Neurology cs- d/c EEG, 2 days prior to discharge call neurology so they can do a spot EEG --> recalled on 12/28 to evaluate diminished mental status, recommend repeat EEG and continue depakote 500 q12  	- f/up neuro recs  - EEG,12/28: No electrographic seizures or significant clinical events. Abnormal due to the presence of focal and generalized slowing. Findings consistent with focal and diffuse electrocerebral dysfunction secondary to structural/metabolic/nonspecific etiology.    - Valproic acid level 12/31 23, albumin 2.5; Valproic acid dose adjusted to 750mg q12h for 1 week, per pharmacy    - F/U AM valproic avid level and albumin level; adjust valproic acid dose as needed     RESP:   #Respiratory Failure secondary to impaired secretion clearance  - s/p tracheostomy 12/22, size 9 cuffed portex  - Tolerating t-piece since 12/29   - chest PT q4 and duoneb treatments q 4   - deep tracheal aspirate cx sent (due to fevers)  - Culture - Sputum . (12.21.23 @ 18:14)-  Numerous Staphylococcus aureus --> 12/28 repeat NGTD  #Activity    -increase as tolerated    -aspiration precautions, HOB 30    CARDS:   #acute HYPOtension intraop, resolved  -off levophed since 12/22 PM  #hx of HTN  -restarting home coreg and amlodipine  -Valsartan 320mg HOLDING   #hx HLD  -atorvastatin  #NSVT    -EP/Cards: No arrhythmias on tele only artifact. No further work up needed. Recommend ILR prior to discharge if patient/family agreeable  Imaging:   Echo: 12/2023: EF 66%, G1DD, trace MR, mild TR     GI/NUTR:   #Diet, NPO with Tube Feed via 20Fr PEG placed 12/22   - TF: Nepro @ 27cc/hr x 24hrs, 250cc FWF q6h + Banatrol  - multivitamin QD  #GI Prophylaxis  - famotidine 10mg PO q48  #Bowel regimen     -Multiple loose bowel movements, last dose senna 12/19 at 2200 > dignishield placed 12/23     - C diff negative     - No bowel regimen    /RENAL:   #urine output   - Failed TOV, SC'd 500cc, martinez left in place  - UA neg 12/25 (r/o UTI in setting of persistent fevers)   - Sodium goal 140-150  - f/u nephro recs- Hypernatremia due to free water losses. Avoid diuretics, would not use Metolazone to cause volume depletion, and has min effect in advanced kidney dysfunction   #Diuresis  - Last diuresed 12/29: metolazone 10 x1 for hypernatremia  #Hypernatremia - improved  - FWD 1.3 L,  q8 --> 250q6    Labs:          BUN/Cr- 80/2.6  -->,  77/2.4  -->          Electrolytes-Na 151 // K 4.2 // Mg 2.9 //  Phos 4.8 (01-01 @ 21:32)  #hyperphosphatemia  - Started sevelamer powder, non-formulary so as not to clog PEG   #CKD   - baseline Cr 2.1  - holding home sodium bicarb 650mg BID  #hx BPH  - cardura 4mg (flomax at home, non crushable)      HEME/ONC:   #DVT prophylaxis     -SCDs, HSQ  #hx of CVA    -ASA81 mg cleared by NSGY    - NSGY: "Risk vs benefits should be weighed and discussed with patient prior to initiation of Plavix."   - continue to hold home plavix    Labs: Hb/Hct:  8.6/27.2  -->,  9.5/29.8  -->                      Plts:  388  -->,  440  -->                 PTT/INR:  36.5/1.03  --->       ID:  WBC- 12.48  --->>,  11.58  --->>,  12.58  --->>  Temp trend- 24hrs T(F): 97.3 (01-02 @ 00:00), Max: 98 (01-01 @ 12:00)  Antibiotics-piperacillin/tazobactam IVPB.. 3.375 every 12 hours  #recurrent fevers- improving, now low grade  - d/c'd Tylenol    PCRs  -MRSA PCR negative (12/22)  -MRSA nares negative (12/28)  -RVP negative (12/28)    Cultures    Bcx 12/18- final neg     Blood cx 12/21- no growth at 4 days    BCx, 12/28 - NG @ 24h    Tracheal aspirate 12/21: numerous staph aureus methicillin sensitive    Tracheal aspirate 12/22: staph aureus    C.Diff PCR neg    UA 12/25 negative     UA 12/28 negative    Current antibiotics:  - Vancomycin 500mg x1 dose (12/28)  - Zosyn 3.375 q12 (started 12/28, stop 1/5)  #multiple loose bowel movements    -no bowel regimen, adding banatrol    ENDO:  #DM     -ISS     -Off insulin gtt 12/20      -gave lantus 5u stat at 10am, increased Lantus to 25 units HS     -FSG q6 while on TF  #AMS possibly due to hypothyroidism      - TSH - 4.16, T4 - 5.9, T3 - 79  #DM - new diagnosis  - hgb A1C 7.0    #MSK:     Activity - Increase As Tolerated    - B/L knee X ray 12/19- no fractures, b/l effusions    - recall PT for reassessment    WOUND:  - L sacral stage 2 ulcer, wound care consult placed.     LINES/DRAINS:  PIV, right basilic midline (placed 12/16), 20Fr PEG (12/22), Tracheostomy (12/22)    ADVANCED DIRECTIVES:  Full Code  -  HCP/Emergency Contact-Sarah Gamino (Wife) [phone], Cantonese speaking    DISPO:  - Social work, case management consult for dispo to vent facility - spoke with social work 12/23, starting to work on D/C.  - SW update, 12/29: pt remains acute. Will reassess 24-48h prior to d/c Assessment		  78y Male s/p mechanical fall. +HT, +AC (Aspirin and Plavix), -LOC.    12/21-intubated for airway protection, accessory muscle use  12/22- s/p tracheostomy and PEG placement, Portex cuffed 9 and 20fr PEG 2cm at skin     NEURO:  #Acute SDH   -12/18 HCT#4 -stable SDH, no new acute findings  -12/27: Pt obtunded, not responding to noxious stim - VPA/LFTs/BMP/ammonia ordered, CTH f/up: stable  - Q4 neuro checks  - amantadine, 100mg q48h. renal dosing  - NSGY: 1/1: "Risk vs benefits should be weighed and discussed with patient prior to initiation of Plavix". --> will not be restarting plavix per Dr. Degroot  #h/o stroke (2/2023) w/residual right sided weakness  - RIGHT anterior thalamus infarct,  LEFT caudate head lacunar infarct  #focal seizure    - vEEG 12/19: No seizures; Discontinued 12/19    - q4 neurochecks    - Neurology cs- d/c EEG, 2 days prior to discharge call neurology so they can do a spot EEG --> recalled on 12/28 to evaluate diminished mental status, recommend repeat EEG and continue depakote 500 q12  - EEG,12/28: No electrographic seizures or significant clinical events. Abnormal due to the presence of focal and generalized slowing. Findings consistent with focal and diffuse electrocerebral dysfunction secondary to structural/metabolic/nonspecific etiology.    - Valproic acid level 12/31 23, albumin 2.5; Valproic acid dose adjusted to 750mg q12h for 1 week, per pharmacy    - F/U AM valproic avid level and albumin level; adjust valproic acid dose as needed     RESP:   #Respiratory Failure secondary to impaired secretion clearance  - s/p tracheostomy 12/22, size 9 cuffed portex  - Tolerating t-piece since 12/29   - chest PT q4 and duoneb treatments q 4   - deep tracheal aspirate cx sent (due to fevers)  - Culture - Sputum . (12.21.23 @ 18:14)-  Numerous Staphylococcus aureus --> 12/28 repeat NGTD  #Activity    -increase as tolerated    -aspiration precautions, HOB 30    CARDS:   #acute HYPOtension intraop, resolved  -off levophed since 12/22 PM  #hx of HTN  -restarting home coreg and amlodipine  -Valsartan 320mg HOLDING   #hx HLD  -atorvastatin  #NSVT    -EP/Cards: No arrhythmias on tele only artifact. No further work up needed. Recommend ILR prior to discharge if patient/family agreeable  Imaging:   Echo: 12/2023: EF 66%, G1DD, trace MR, mild TR     GI/NUTR:   #Diet, NPO with Tube Feed via 20Fr PEG placed 12/22   - TF: Nepro @ 27cc/hr x 24hrs, 250cc FWF q6h + Banatrol  - multivitamin QD  #GI Prophylaxis  - famotidine 10mg PO q48  #Bowel regimen     -Multiple loose bowel movements, last dose senna 12/19 at 2200 > dignishield placed 12/23. Consider d/c rectal tube     - C diff negative     - No bowel regimen    /RENAL:   #urine output   - Failed TOV, martinez left in place 1/2  - Started finasteride    - UA neg 12/25 (r/o UTI in setting of persistent fevers)   - Sodium goal 140-150  - f/u nephro recs- Hypernatremia due to free water losses. Avoid diuretics, would not use Metolazone to cause volume depletion, and has min effect in advanced kidney dysfunction   #Diuresis  - Last diuresed 12/29: metolazone 10 x1 for hypernatremia  #Hypernatremia - improved  - FWD 1.3 L,  q8 --> 250q6    Labs:          BUN/Cr- 80/2.6  -->,  77/2.4  -->          Electrolytes-Na 151 // K 4.2 // Mg 2.9 //  Phos 4.8 (01-01 @ 21:32)  #hyperphosphatemia  - Started sevelamer powder, non-formulary so as not to clog PEG   #CKD   - baseline Cr 2.1  - holding home sodium bicarb 650mg BID  #hx BPH  - cardura 4mg (flomax at home, non crushable)      HEME/ONC:   #DVT prophylaxis     -SCDs, HSQ  #hx of CVA    -ASA81 mg cleared by NSGY    - NSGY: "Risk vs benefits should be weighed and discussed with patient prior to initiation of Plavix."   - continue to hold home plavix    Labs: Hb/Hct:  8.6/27.2  -->,  9.5/29.8  -->                      Plts:  388  -->,  440  -->                 PTT/INR:  36.5/1.03  --->       ID:  WBC- 12.48  --->>,  11.58  --->>,  12.58  --->>  Temp trend- 24hrs T(F): 97.3 (01-02 @ 00:00), Max: 98 (01-01 @ 12:00)  Antibiotics-piperacillin/tazobactam IVPB.. 3.375 every 12 hours  #recurrent fevers- improving, now low grade  - d/c'd Tylenol    PCRs  -MRSA PCR negative (12/22)  -MRSA nares negative (12/28)  -RVP negative (12/28)    Cultures    Bcx 12/18- final neg     Blood cx 12/21- no growth at 4 days    BCx, 12/28 - NG @ 24h    Tracheal aspirate 12/21: numerous staph aureus methicillin sensitive    Tracheal aspirate 12/22: staph aureus    C.Diff PCR neg    UA 12/25 negative     UA 12/28 negative    Current antibiotics:  - Vancomycin 500mg x1 dose (12/28)  - Zosyn 3.375 q12 (started 12/28, stop 1/5)  #multiple loose bowel movements    -no bowel regimen, adding banatrol    ENDO:  #DM     -ISS     -Off insulin gtt 12/20      -gave lantus 5u stat at 10am, increased Lantus to 25 units HS     -FSG q6 while on TF  #AMS possibly due to hypothyroidism      - TSH - 4.16, T4 - 5.9, T3 - 79  #DM - new diagnosis  - hgb A1C 7.0    #MSK:     Activity - Increase As Tolerated    - B/L knee X ray 12/19- no fractures, b/l effusions    - recall PT for reassessment    WOUND:  - L sacral stage 2 ulcer, wound care consult placed.     LINES/DRAINS:  PIV, right basilic midline (placed 12/16), 20Fr PEG (12/22), Tracheostomy (12/22)    ADVANCED DIRECTIVES:  Full Code  -  HCP/Emergency Contact-Sarah Gamino (Wife) [phone], Cantonese speaking    DISPO:  - Social work, case management consult for dispo to vent facility - spoke with social work 12/23, starting to work on D/C.  - SW update, 12/29: pt remains acute. Will reassess 24-48h prior to d/c Assessment		  78y Male s/p mechanical fall. +HT, +AC (Aspirin and Plavix), -LOC.    12/21-intubated for airway protection, accessory muscle use  12/22- s/p tracheostomy and PEG placement, Portex cuffed 9 and 20fr PEG 2cm at skin     NEURO:  #Acute SDH   -12/18 HCT#4 -stable SDH, no new acute findings  -12/27: Pt obtunded, not responding to noxious stim - VPA/LFTs/BMP/ammonia ordered, CTH f/up: stable  - Q4 neuro checks  - amantadine, 100mg q48h. renal dosing  - NSGY: 1/1: "Risk vs benefits should be weighed and discussed with patient prior to initiation of Plavix". --> will not be restarting plavix per Dr. Degroot  #h/o stroke (2/2023) w/residual right sided weakness  - RIGHT anterior thalamus infarct,  LEFT caudate head lacunar infarct  #focal seizure    - vEEG 12/19: No seizures; Discontinued 12/19    - q4 neurochecks    - Neurology cs- d/c EEG, 2 days prior to discharge call neurology so they can do a spot EEG --> recalled on 12/28 to evaluate diminished mental status, recommend repeat EEG and continue depakote 500 q12  - EEG,12/28: No electrographic seizures or significant clinical events. Abnormal due to the presence of focal and generalized slowing. Findings consistent with focal and diffuse electrocerebral dysfunction secondary to structural/metabolic/nonspecific etiology.    - Valproic acid level 12/31 23, albumin 2.5; Valproic acid dose adjusted to 750mg q12h for 1 week, per pharmacy    - F/U AM valproic avid level and albumin level; adjust valproic acid dose as needed     RESP:   #Respiratory Failure secondary to impaired secretion clearance  - s/p tracheostomy 12/22, size 9 cuffed portex  - Tolerating t-piece since 12/29   - chest PT q4 and duoneb treatments q 4   - deep tracheal aspirate cx sent (due to fevers)  - Culture - Sputum . (12.21.23 @ 18:14)-  Numerous Staphylococcus aureus --> 12/28 repeat NGTD  #Activity    -increase as tolerated    -aspiration precautions, HOB 30    CARDS:   #acute HYPOtension intraop, resolved  -off levophed since 12/22 PM  #hx of HTN  -restarting home coreg and amlodipine  -Valsartan 320mg HOLDING   #hx HLD  -atorvastatin  #NSVT    -EP/Cards: No arrhythmias on tele only artifact. No further work up needed. Recommend ILR prior to discharge if patient/family agreeable  Imaging:   Echo: 12/2023: EF 66%, G1DD, trace MR, mild TR     GI/NUTR:   #Diet, NPO with Tube Feed via 20Fr PEG placed 12/22   - TF: Nepro @ 27cc/hr x 24hrs, 250cc FWF q6h + Banatrol  - multivitamin QD  #GI Prophylaxis  - famotidine 10mg PO q48  #Bowel regimen     -Multiple loose bowel movements, last dose senna 12/19 at 2200 > dignishield placed 12/23. Consider d/c rectal tube     - C diff negative     - No bowel regimen    /RENAL:   #urine output   - Failed TOV, martinez left in place 1/2  - Started finasteride    - UA neg 12/25 (r/o UTI in setting of persistent fevers)   - Sodium goal 140-150  - f/u nephro recs- Hypernatremia due to free water losses. Avoid diuretics, would not use Metolazone to cause volume depletion, and has min effect in advanced kidney dysfunction   #Diuresis  - Last diuresed 12/29: metolazone 10 x1 for hypernatremia  #Hypernatremia - improved  - FWD 1.3 L,  q8 --> 250q6    Labs:          BUN/Cr- 80/2.6  -->,  77/2.4  -->          Electrolytes-Na 151 // K 4.2 // Mg 2.9 //  Phos 4.8 (01-01 @ 21:32)  #hyperphosphatemia  - Started sevelamer powder, non-formulary so as not to clog PEG   #CKD   - baseline Cr 2.1  - holding home sodium bicarb 650mg BID  #hx BPH  - cardura 4mg (flomax at home, non crushable)      HEME/ONC:   #DVT prophylaxis     -SCDs, HSQ  #hx of CVA    -ASA81 mg cleared by NSGY    - NSGY: "Risk vs benefits should be weighed and discussed with patient prior to initiation of Plavix."   - continue to hold home plavix    Labs: Hb/Hct:  8.6/27.2  -->,  9.5/29.8  -->                      Plts:  388  -->,  440  -->                 PTT/INR:  36.5/1.03  --->       ID:  WBC- 12.48  --->>,  11.58  --->>,  12.58  --->>  Temp trend- 24hrs T(F): 97.3 (01-02 @ 00:00), Max: 98 (01-01 @ 12:00)  Antibiotics-piperacillin/tazobactam IVPB.. 3.375 every 12 hours  #recurrent fevers- improving, now low grade  - d/c'd Tylenol    PCRs  -MRSA PCR negative (12/22)  -MRSA nares negative (12/28)  -RVP negative (12/28)    Cultures    Bcx 12/18- final neg     Blood cx 12/21- no growth at 4 days    BCx, 12/28 - NG @ 24h    Tracheal aspirate 12/21: numerous staph aureus methicillin sensitive    Tracheal aspirate 12/22: staph aureus    C.Diff PCR neg    UA 12/25 negative     UA 12/28 negative    Current antibiotics:  - Vancomycin 500mg x1 dose (12/28)  - Zosyn 3.375 q12 (started 12/28, stop 1/5)  #multiple loose bowel movements    -no bowel regimen, adding banatrol    ENDO:  #DM     -ISS     -Off insulin gtt 12/20      -gave lantus 5u stat at 10am, increased Lantus to 25 units HS     -FSG q6 while on TF  #AMS possibly due to hypothyroidism      - TSH - 4.16, T4 - 5.9, T3 - 79  #DM - new diagnosis  - hgb A1C 7.0    #MSK:     Activity - Increase As Tolerated    - B/L knee X ray 12/19- no fractures, b/l effusions    - recall PT for reassessment    WOUND:  - L sacral stage 2 ulcer, wound care consult placed.     LINES/DRAINS:  PIV, right basilic midline (placed 12/16), 20Fr PEG (12/22), Tracheostomy (12/22)    ADVANCED DIRECTIVES:  Full Code  -  HCP/Emergency Contact-Sarah Gamino (Wife) [phone], Cantonese speaking    DISPO:  - Social work, case management consult for dispo to vent facility - spoke with social work 12/23, starting to work on D/C.  - SW update, 12/29: pt remains acute. Will reassess 24-48h prior to d/c Assessment		  78y Male s/p mechanical fall. +HT, +AC (Aspirin and Plavix), -LOC.    12/21-intubated for airway protection, accessory muscle use  12/22- s/p tracheostomy and PEG placement, Portex cuffed 9 and 20fr PEG 2cm at skin     NEURO:  #Acute SDH   -12/18 HCT#4 -stable SDH, no new acute findings  -12/27: Pt obtunded, not responding to noxious stim - VPA/LFTs/BMP/ammonia ordered, CTH f/up: stable  - Q4 neuro checks  - amantadine, 100mg q48h. renal dosing  - NSGY: 1/1: "Risk vs benefits should be weighed and discussed with patient prior to initiation of Plavix". --> will not be restarting plavix per Dr. Degroot  #h/o stroke (2/2023) w/residual right sided weakness  - RIGHT anterior thalamus infarct,  LEFT caudate head lacunar infarct  #focal seizure    - vEEG 12/19: No seizures; Discontinued 12/19    - q4 neurochecks    - Neurology cs- d/c EEG, 2 days prior to discharge call neurology so they can do a spot EEG --> recalled on 12/28 to evaluate diminished mental status, recommend repeat EEG and continue depakote 500 q12  - EEG,12/28: No electrographic seizures or significant clinical events. Abnormal due to the presence of focal and generalized slowing. Findings consistent with focal and diffuse electrocerebral dysfunction secondary to structural/metabolic/nonspecific etiology.    - Valproic acid level 12/31 23, albumin 2.5; Valproic acid dose adjusted to 750mg q12h for 1 week, per pharmacy    - F/U AM valproic avid level and albumin level; adjust valproic acid dose as needed     RESP:   #Respiratory Failure secondary to impaired secretion clearance  - s/p tracheostomy 12/22, size 9 cuffed portex  - Tolerating t-piece since 12/29   - chest PT q4 and duoneb treatments q 4   - deep tracheal aspirate cx sent (due to fevers)  - Culture - Sputum . (12.21.23 @ 18:14)-  Numerous Staphylococcus aureus --> 12/28 repeat NGTD  #Activity    -increase as tolerated    -aspiration precautions, HOB 30    CARDS:   #acute HYPOtension intraop, resolved  -off levophed since 12/22 PM  #hx of HTN  -restarting home coreg and amlodipine  -Valsartan 320mg HOLDING   #hx HLD  -atorvastatin  #NSVT    -EP/Cards: No arrhythmias on tele only artifact. No further work up needed. Recommend ILR prior to discharge if patient/family agreeable  Imaging:   Echo: 12/2023: EF 66%, G1DD, trace MR, mild TR     GI/NUTR:   #Diet, NPO with Tube Feed via 20Fr PEG placed 12/22   - TF: Nepro @ 27cc/hr x 24hrs, 250cc FWF q6h + Banatrol  - multivitamin QD  #GI Prophylaxis  - famotidine 10mg PO q48  #Bowel regimen     -Multiple loose bowel movements, last dose senna 12/19 at 2200 > dignishield placed 12/23. Consider d/c rectal tube     - C diff negative     - No bowel regimen    /RENAL:   #urine output   - Failed TOV, martinez left in place 1/2  - Started finasteride    - UA neg 12/25 (r/o UTI in setting of persistent fevers)   - Sodium goal 140-150  - f/u nephro recs- Hypernatremia due to free water losses. Avoid diuretics, would not use Metolazone to cause volume depletion, and has min effect in advanced kidney dysfunction   #Diuresis  - Last diuresed 12/29: metolazone 10 x1 for hypernatremia  #Hypernatremia - improved  - FWD 1.3 L, FWF 250q6    Labs:          BUN/Cr- 77/2.4  -->,  79/2.2  -->          Electrolytes-Na 153 // K 4.1 // Mg -- //  Phos -- (01-02 @ 12:36)  #hyperphosphatemia  - Started sevelamer powder, non-formulary so as not to clog PEG   #CKD   - baseline Cr 2.1  - holding home sodium bicarb 650mg BID  #hx BPH  - cardura 4mg (flomax at home, non crushable)      HEME/ONC:   #DVT prophylaxis     -SCDs, HSQ  #hx of CVA    -ASA81 mg cleared by NSGY    - NSGY: "Risk vs benefits should be weighed and discussed with patient prior to initiation of Plavix."   - continue to hold home plavix    Labs: Hb/Hct:  8.6/27.2  -->,  9.5/29.8  -->                      Plts:  388  -->,  440  -->                 PTT/INR:  36.5/1.03  --->       ID:  WBC- 11.58  --->>,  12.58  --->>  Temp trend- 24hrs T(F): 98.9 (01-03 @ 00:00), Max: 98.9 (01-03 @ 00:00)  Antibiotics-piperacillin/tazobactam IVPB.. 3.375 every 12 hours    Cultures:        (collected 12-28)  Source: Trach Asp Tracheal Aspirate  Final Report:    No growth     (collected 12-28)  Source: .Blood Blood  Final Report:    No growth at 5 days        Antibiotics-piperacillin/tazobactam IVPB.. 3.375 every 12 hours  #recurrent fevers- improving, now low grade  - d/c'd Tylenol    PCRs  -MRSA PCR negative (12/22)  -MRSA nares negative (12/28)  -RVP negative (12/28)    Cultures    Bcx 12/18- final neg     Blood cx 12/21- no growth at 4 days    BCx, 12/28 - NG @ 24h    Tracheal aspirate 12/21: numerous staph aureus methicillin sensitive    Tracheal aspirate 12/22: staph aureus    C.Diff PCR neg    UA 12/25 negative     UA 12/28 negative    Current antibiotics:  - Vancomycin 500mg x1 dose (12/28)  - Zosyn 3.375 q12 (started 12/28, stop 1/5)  #multiple loose bowel movements    -no bowel regimen, adding banatrol    ENDO:  #DM     -ISS     -Off insulin gtt 12/20      -gave lantus 5u stat at 10am, increased Lantus to 25 units HS     -FSG q6 while on TF  #AMS possibly due to hypothyroidism      - TSH - 4.16, T4 - 5.9, T3 - 79  #DM - new diagnosis  - hgb A1C 7.0    #MSK:     Activity - Increase As Tolerated    - B/L knee X ray 12/19- no fractures, b/l effusions    - recall PT for reassessment    WOUND:  - L sacral stage 2 ulcer, wound care consult placed.     LINES/DRAINS:  PIV, right basilic midline (placed 12/16), 20Fr PEG (12/22), Tracheostomy (12/22)    ADVANCED DIRECTIVES:  Full Code  -  HCP/Emergency Contact-Sarah Gamino (Wife) [phone], Cantonese speaking    DISPO:  - Social work, case management consult for dispo to vent facility - spoke with social work 12/23, starting to work on D/C.  -  update, 12/29: pt remains acute. Will reassess 24-48h prior to d/c Assessment		  78y Male s/p mechanical fall. +HT, +AC (Aspirin and Plavix), -LOC.    12/21-intubated for airway protection, accessory muscle use  12/22- s/p tracheostomy and PEG placement, Portex cuffed 9 and 20fr PEG 2cm at skin     NEURO:  #Acute SDH   -12/18 HCT#4 -stable SDH, no new acute findings  -12/27: Pt obtunded, not responding to noxious stim - VPA/LFTs/BMP/ammonia ordered, CTH f/up: stable  - Q4 neuro checks  - amantadine, 100mg q48h. renal dosing  - NSGY: 1/1: "Risk vs benefits should be weighed and discussed with patient prior to initiation of Plavix". --> will not be restarting plavix per Dr. Degroot  #h/o stroke (2/2023) w/residual right sided weakness  - RIGHT anterior thalamus infarct,  LEFT caudate head lacunar infarct  #focal seizure    - vEEG 12/19: No seizures; Discontinued 12/19    - q4 neurochecks    - Neurology cs- d/c EEG, 2 days prior to discharge call neurology so they can do a spot EEG --> recalled on 12/28 to evaluate diminished mental status, recommend repeat EEG and continue depakote 500 q12  - EEG,12/28: No electrographic seizures or significant clinical events. Abnormal due to the presence of focal and generalized slowing. Findings consistent with focal and diffuse electrocerebral dysfunction secondary to structural/metabolic/nonspecific etiology.    - Valproic acid level 12/31 23, albumin 2.5; Valproic acid dose adjusted to 750mg q12h for 1 week, per pharmacy    - F/U AM valproic avid level and albumin level; adjust valproic acid dose as needed     RESP:   #Respiratory Failure secondary to impaired secretion clearance  - s/p tracheostomy 12/22, size 9 cuffed portex  - Tolerating t-piece since 12/29   - chest PT q4 and duoneb treatments q 4   - deep tracheal aspirate cx sent (due to fevers)  - Culture - Sputum . (12.21.23 @ 18:14)-  Numerous Staphylococcus aureus --> 12/28 repeat NGTD  #Activity    -increase as tolerated    -aspiration precautions, HOB 30    CARDS:   #acute HYPOtension intraop, resolved  -off levophed since 12/22 PM  #hx of HTN  -restarting home coreg and amlodipine  -Valsartan 320mg HOLDING   #hx HLD  -atorvastatin  #NSVT    -EP/Cards: No arrhythmias on tele only artifact. No further work up needed. Recommend ILR prior to discharge if patient/family agreeable  Imaging:   Echo: 12/2023: EF 66%, G1DD, trace MR, mild TR     GI/NUTR:   #Diet, NPO with Tube Feed via 20Fr PEG placed 12/22   - TF: Nepro @ 27cc/hr x 24hrs, 250cc FWF q6h + Banatrol  - multivitamin QD  #GI Prophylaxis  - famotidine 10mg PO q48  #Bowel regimen     -Multiple loose bowel movements, last dose senna 12/19 at 2200 > dignishield placed 12/23. Consider d/c rectal tube     - C diff negative     - No bowel regimen    /RENAL:   #urine output   - Failed TOV, martinez left in place 1/2  - Started finasteride    - UA neg 12/25 (r/o UTI in setting of persistent fevers)   - Sodium goal 140-150  - f/u nephro recs- Hypernatremia due to free water losses. Avoid diuretics, would not use Metolazone to cause volume depletion, and has min effect in advanced kidney dysfunction   #Diuresis  - Last diuresed 12/29: metolazone 10 x1 for hypernatremia  #Hypernatremia - improved  - FWD 1.3 L, FWF 250q6    Labs:          BUN/Cr- 77/2.4  -->,  79/2.2  -->          Electrolytes-Na 153 // K 4.1 // Mg -- //  Phos -- (01-02 @ 12:36)  #hyperphosphatemia  - Started sevelamer powder, non-formulary so as not to clog PEG   #CKD   - baseline Cr 2.1  - holding home sodium bicarb 650mg BID  #hx BPH  - cardura 4mg (flomax at home, non crushable)      HEME/ONC:   #DVT prophylaxis     -SCDs, HSQ  #hx of CVA    -ASA81 mg cleared by NSGY    - NSGY: "Risk vs benefits should be weighed and discussed with patient prior to initiation of Plavix."   - continue to hold home plavix    Labs: Hb/Hct:  8.6/27.2  -->,  9.5/29.8  -->                      Plts:  388  -->,  440  -->                 PTT/INR:  36.5/1.03  --->       ID:  WBC- 11.58  --->>,  12.58  --->>  Temp trend- 24hrs T(F): 98.9 (01-03 @ 00:00), Max: 98.9 (01-03 @ 00:00)  Antibiotics-piperacillin/tazobactam IVPB.. 3.375 every 12 hours    Cultures:        (collected 12-28)  Source: Trach Asp Tracheal Aspirate  Final Report:    No growth     (collected 12-28)  Source: .Blood Blood  Final Report:    No growth at 5 days        Antibiotics-piperacillin/tazobactam IVPB.. 3.375 every 12 hours  #recurrent fevers- improving, now low grade  - d/c'd Tylenol    PCRs  -MRSA PCR negative (12/22)  -MRSA nares negative (12/28)  -RVP negative (12/28)    Cultures    Bcx 12/18- final neg     Blood cx 12/21- no growth at 4 days    BCx, 12/28 - NG @ 24h    Tracheal aspirate 12/21: numerous staph aureus methicillin sensitive    Tracheal aspirate 12/22: staph aureus    C.Diff PCR neg    UA 12/25 negative     UA 12/28 negative    Current antibiotics:  - Vancomycin 500mg x1 dose (12/28)  - Zosyn 3.375 q12 (started 12/28, stop 1/5)  #multiple loose bowel movements    -no bowel regimen, adding banatrol    ENDO:  #DM     -ISS     -Off insulin gtt 12/20      -gave lantus 5u stat at 10am, increased Lantus to 25 units HS     -FSG q6 while on TF  #AMS possibly due to hypothyroidism      - TSH - 4.16, T4 - 5.9, T3 - 79  #DM - new diagnosis  - hgb A1C 7.0    #MSK:     Activity - Increase As Tolerated    - B/L knee X ray 12/19- no fractures, b/l effusions    - recall PT for reassessment    WOUND:  - L sacral stage 2 ulcer, wound care consult placed.     LINES/DRAINS:  PIV, right basilic midline (placed 12/16), 20Fr PEG (12/22), Tracheostomy (12/22)    ADVANCED DIRECTIVES:  Full Code  -  HCP/Emergency Contact-Sarah Gamino (Wife) [phone], Cantonese speaking    DISPO:  - Social work, case management consult for dispo to vent facility - spoke with social work 12/23, starting to work on D/C.  -  update, 12/29: pt remains acute. Will reassess 24-48h prior to d/c Assessment		  78y Male s/p mechanical fall. +HT, +AC (Aspirin and Plavix), -LOC.    12/21-intubated for airway protection, accessory muscle use  12/22- s/p tracheostomy and PEG placement, Portex cuffed 9 and 20fr PEG 2cm at skin     NEURO:  #Acute SDH   -12/18 HCT#4 -stable SDH, no new acute findings  -12/27: Pt obtunded, not responding to noxious stim - VPA/LFTs/BMP/ammonia ordered, CTH f/up: stable  - Q4 neuro checks  - amantadine, 100mg q48h. renal dosing  - NSGY: 1/1: "Risk vs benefits should be weighed and discussed with patient prior to initiation of Plavix". --> will not be restarting plavix per Dr. Degroot  #h/o stroke (2/2023) w/residual right sided weakness  - RIGHT anterior thalamus infarct,  LEFT caudate head lacunar infarct  #focal seizure    - vEEG 12/19: No seizures; Discontinued 12/19    - q4 neurochecks    - Neurology cs- d/c EEG, 2 days prior to discharge call neurology so they can do a spot EEG --> recalled on 12/28 to evaluate diminished mental status, recommend repeat EEG and continue depakote 500 q12  - EEG,12/28: No electrographic seizures or significant clinical events. Abnormal due to the presence of focal and generalized slowing. Findings consistent with focal and diffuse electrocerebral dysfunction secondary to structural/metabolic/nonspecific etiology.    - Valproic acid level 12/31 23, albumin 2.5; Valproic acid dose adjusted to 750mg q12h for 1 week, per pharmacy    - F/U AM valproic avid level and albumin level; adjust valproic acid dose as needed     RESP:   #Respiratory Failure secondary to impaired secretion clearance  - s/p tracheostomy 12/22, size 9 cuffed portex  - Tolerating t-piece since 12/29   - chest PT q4 and duoneb treatments q 4   - deep tracheal aspirate cx sent (due to fevers)  - Culture - Sputum . (12.21.23 @ 18:14)-  Numerous Staphylococcus aureus --> 12/28 repeat NGTD  #Activity    -increase as tolerated    -aspiration precautions, HOB 30    CARDS:   #acute HYPOtension intraop, resolved  -off levophed since 12/22 PM  #hx of HTN  -restarting home coreg and amlodipine  -Valsartan 320mg HOLDING   #hx HLD  -atorvastatin  #NSVT    -EP/Cards: No arrhythmias on tele only artifact. No further work up needed. Recommend ILR prior to discharge if patient/family agreeable  Imaging:   Echo: 12/2023: EF 66%, G1DD, trace MR, mild TR     GI/NUTR:   #Diet, NPO with Tube Feed via 20Fr PEG placed 12/22   - TF: Nepro @ 27cc/hr x 24hrs, 250cc FWF q6h + Banatrol  - multivitamin QD  #GI Prophylaxis  - famotidine 10mg PO q48  #Bowel regimen     -Multiple loose bowel movements, last dose senna 12/19 at 2200 > dignishield placed 12/23. Consider d/c rectal tube     - C diff negative     - No bowel regimen    /RENAL:   #urine output   - Failed TOV, martinez left in place 1/2  - Started finasteride    - UA neg 12/25 (r/o UTI in setting of persistent fevers)   - Sodium goal 140-150  - f/u nephro recs- Hypernatremia due to free water losses. Avoid diuretics, would not use Metolazone to cause volume depletion, and has min effect in advanced kidney dysfunction   #Diuresis  - Last diuresed 12/29: metolazone 10 x1 for hypernatremia  #Hypernatremia - improved  - FWD 1.3 L, FWF 250q6    Labs:          BUN/Cr- 77/2.4  -->,  79/2.2  -->          Electrolytes-Na 153 // K 4.1 // Mg -- //  Phos -- (01-02 @ 12:36)  #hyperphosphatemia  - Started sevelamer powder, non-formulary so as not to clog PEG   #CKD   - baseline Cr 2.1  - holding home sodium bicarb 650mg BID  #hx BPH  - cardura 4mg (flomax at home, non crushable)      HEME/ONC:   #DVT prophylaxis     -SCDs, HSQ  #hx of CVA    -ASA81 mg cleared by NSGY    - NSGY: "Risk vs benefits should be weighed and discussed with patient prior to initiation of Plavix."   - continue to hold home plavix    Labs: Hb/Hct:  8.6/27.2  -->,  9.5/29.8  -->                      Plts:  388  -->,  440  -->                 PTT/INR:  36.5/1.03  --->       ID:  WBC- 11.58  --->>,  12.58  --->>  Temp trend- 24hrs T(F): 98.9 (01-03 @ 00:00), Max: 98.9 (01-03 @ 00:00)  Antibiotics-piperacillin/tazobactam IVPB.. 3.375 every 12 hours (to end 1/5)  #recurrent fevers- resolved   - d/c'd Tylenol    PCRs  -MRSA PCR negative (12/22)  -MRSA nares negative (12/28)  -RVP negative (12/28)    Cultures    Bcx 12/18- final neg     Blood cx 12/21- no growth     BCx, 12/28 - NG     Tracheal aspirate 12/21: numerous staph aureus methicillin sensitive    Tracheal aspirate 12/22: staph aureus    Tracheal aspirate 12/28: neg     C.Diff PCR neg    UA 12/25 negative     UA 12/28 negative    Current antibiotics:  - Vancomycin 500mg x1 dose (12/28)  - Zosyn 3.375 q12 (started 12/28, stop 1/5)  #multiple loose bowel movements    -no bowel regimen, adding banatrol    ENDO:  #DM     -ISS     - hgb A1C 7.0     -Off insulin gtt 12/20      -Lantus to 25 units HS     -FSG q6 while on TF  #AMS possibly due to hypothyroidism      - TSH - 4.16, T4 - 5.9, T3 - 79    #MSK:     Activity - Increase As Tolerated    - B/L knee X ray 12/19- no fractures, b/l effusions    - recall PT for reassessment    WOUND:  - L sacral stage 2 ulcer, wound care consult placed.     LINES/DRAINS:  PIV, right basilic midline (placed 12/16), 20Fr PEG (12/22), Tracheostomy (12/22)    ADVANCED DIRECTIVES:  Full Code  -  HCP/Emergency Contact-Sarah Gamino (Wife) [phone], Cantonese speaking    DISPO:  - Social work, case management consult for dispo to vent facility - spoke with social work 12/23, starting to work on D/C.  - SW update, 12/29: pt remains acute. Will reassess 24-48h prior to d/c Assessment		  78y Male s/p mechanical fall. +HT, +AC (Aspirin and Plavix), -LOC.    12/21-intubated for airway protection, accessory muscle use  12/22- s/p tracheostomy and PEG placement, Portex cuffed 9 and 20fr PEG 2cm at skin     NEURO:  #Acute SDH   -12/18 HCT#4 -stable SDH, no new acute findings  -12/27: Pt obtunded, not responding to noxious stim - VPA/LFTs/BMP/ammonia ordered, CTH f/up: stable  - Q4 neuro checks  - amantadine, 100mg q48h. renal dosing  - NSGY: 1/1: "Risk vs benefits should be weighed and discussed with patient prior to initiation of Plavix". --> will not be restarting plavix per Dr. Degroot  #h/o stroke (2/2023) w/residual right sided weakness  - RIGHT anterior thalamus infarct,  LEFT caudate head lacunar infarct  #focal seizure    - vEEG 12/19: No seizures; Discontinued 12/19    - q4 neurochecks    - Neurology cs- d/c EEG, 2 days prior to discharge call neurology so they can do a spot EEG --> recalled on 12/28 to evaluate diminished mental status, recommend repeat EEG and continue depakote 500 q12  - EEG,12/28: No electrographic seizures or significant clinical events. Abnormal due to the presence of focal and generalized slowing. Findings consistent with focal and diffuse electrocerebral dysfunction secondary to structural/metabolic/nonspecific etiology.    - Valproic acid level 12/31 23, albumin 2.5; Valproic acid dose adjusted to 750mg q12h for 1 week, per pharmacy    - F/U AM valproic avid level and albumin level; adjust valproic acid dose as needed     RESP:   #Respiratory Failure secondary to impaired secretion clearance  - s/p tracheostomy 12/22, size 9 cuffed portex  - Tolerating t-piece since 12/29   - chest PT q4 and duoneb treatments q 4   - deep tracheal aspirate cx sent (due to fevers)  - Culture - Sputum . (12.21.23 @ 18:14)-  Numerous Staphylococcus aureus --> 12/28 repeat NGTD  #Activity    -increase as tolerated    -aspiration precautions, HOB 30    CARDS:   #acute HYPOtension intraop, resolved  -off levophed since 12/22 PM  #hx of HTN  -restarting home coreg and amlodipine  -Valsartan 320mg HOLDING   #hx HLD  -atorvastatin  #NSVT    -EP/Cards: No arrhythmias on tele only artifact. No further work up needed. Recommend ILR prior to discharge if patient/family agreeable  Imaging:   Echo: 12/2023: EF 66%, G1DD, trace MR, mild TR     GI/NUTR:   #Diet, NPO with Tube Feed via 20Fr PEG placed 12/22   - TF: Nepro @ 27cc/hr x 24hrs, 250cc FWF q6h + Banatrol  - multivitamin QD  #GI Prophylaxis  - famotidine 10mg PO q48  #Bowel regimen     -Multiple loose bowel movements, last dose senna 12/19 at 2200 > dignishield placed 12/23. Consider d/c rectal tube     - C diff negative     - No bowel regimen    /RENAL:   #urine output   - Failed TOV, martinez left in place 1/2  - Started finasteride    - UA neg 12/25 (r/o UTI in setting of persistent fevers)   - Sodium goal 140-150  - f/u nephro recs- Hypernatremia due to free water losses. Avoid diuretics, would not use Metolazone to cause volume depletion, and has min effect in advanced kidney dysfunction   #Diuresis  - Last diuresed 12/29: metolazone 10 x1 for hypernatremia  #Hypernatremia - improved  - FWD 1.3 L, FWF 250q6    Labs:          BUN/Cr- 77/2.4  -->,  79/2.2  -->          Electrolytes-Na 153 // K 4.1 // Mg -- //  Phos -- (01-02 @ 12:36)  #hyperphosphatemia  - Started sevelamer powder, non-formulary so as not to clog PEG   #CKD   - baseline Cr 2.1  - holding home sodium bicarb 650mg BID  #hx BPH  - cardura 4mg (flomax at home, non crushable)      HEME/ONC:   #DVT prophylaxis     -SCDs, HSQ  #hx of CVA    -ASA81 mg cleared by NSGY    - NSGY: "Risk vs benefits should be weighed and discussed with patient prior to initiation of Plavix."   - continue to hold home plavix    Labs: Hb/Hct:  8.6/27.2  -->,  9.5/29.8  -->                      Plts:  388  -->,  440  -->                 PTT/INR:  36.5/1.03  --->       ID:  WBC- 11.58  --->>,  12.58  --->>  Temp trend- 24hrs T(F): 98.9 (01-03 @ 00:00), Max: 98.9 (01-03 @ 00:00)  Antibiotics-piperacillin/tazobactam IVPB.. 3.375 every 12 hours (to end 1/5)  #recurrent fevers- resolved   - d/c'd Tylenol    PCRs  -MRSA PCR negative (12/22)  -MRSA nares negative (12/28)  -RVP negative (12/28)    Cultures    Bcx 12/18- final neg     Blood cx 12/21- no growth     BCx, 12/28 - NG     Tracheal aspirate 12/21: numerous staph aureus methicillin sensitive    Tracheal aspirate 12/22: staph aureus    Tracheal aspirate 12/28: neg     C.Diff PCR neg    UA 12/25 negative     UA 12/28 negative    Current antibiotics:  - Vancomycin 500mg x1 dose (12/28)  - Zosyn 3.375 q12 (started 12/28, stop 1/5)  #multiple loose bowel movements    -no bowel regimen, adding banatrol    ENDO:  #DM     -ISS     - hgb A1C 7.0     -Off insulin gtt 12/20      -Lantus to 25 units HS     -FSG q6 while on TF  #AMS possibly due to hypothyroidism      - TSH - 4.16, T4 - 5.9, T3 - 79    #MSK:     Activity - Increase As Tolerated    - B/L knee X ray 12/19- no fractures, b/l effusions    - recall PT for reassessment    WOUND:  - L sacral stage 2 ulcer, wound care consult placed.     LINES/DRAINS:  PIV, right basilic midline (placed 12/16), 20Fr PEG (12/22), Tracheostomy (12/22)    ADVANCED DIRECTIVES:  Full Code  -  HCP/Emergency Contact-Sarah Gamino (Wife) [phone], Cantonese speaking    DISPO:  - Social work, case management consult for dispo to vent facility - spoke with social work 12/23, starting to work on D/C.  - SW update, 12/29: pt remains acute. Will reassess 24-48h prior to d/c Assessment		  78y Male s/p mechanical fall. +HT, +AC (Aspirin and Plavix), -LOC.    12/21-intubated for airway protection, accessory muscle use  12/22- s/p tracheostomy and PEG placement, Portex cuffed 9 and 20fr PEG 2cm at skin     NEURO:  #Acute SDH   -12/18 HCT#4 -stable SDH, no new acute findings  -12/27: Pt obtunded, not responding to noxious stim - VPA/LFTs/BMP/ammonia ordered, CTH f/up: stable  - Q4 neuro checks  - amantadine, 100mg q48h. renal dosing  - NSGY: 1/1: "Risk vs benefits should be weighed and discussed with patient prior to initiation of Plavix". --> will not be restarting plavix per Dr. Degroot  #h/o stroke (2/2023) w/residual right sided weakness  - RIGHT anterior thalamus infarct,  LEFT caudate head lacunar infarct  #focal seizure    - vEEG 12/19: No seizures; Discontinued 12/19    - q4 neurochecks    - Neurology cs- d/c EEG, 2 days prior to discharge call neurology so they can do a spot EEG --> recalled on 12/28 to evaluate diminished mental status, recommend repeat EEG and continue depakote 500 q12  - EEG,12/28: No electrographic seizures or significant clinical events. Abnormal due to the presence of focal and generalized slowing. Findings consistent with focal and diffuse electrocerebral dysfunction secondary to structural/metabolic/nonspecific etiology.    - Valproic acid level 12/31 23, albumin 2.5; Valproic acid dose adjusted to 750mg q12h for 1 week, per pharmacy    - F/U AM valproic avid level and albumin level; adjust valproic acid dose as needed     RESP:   #Respiratory Failure secondary to impaired secretion clearance  - s/p tracheostomy 12/22, size 9 cuffed portex  - Tolerating t-piece since 12/29   - chest PT q4 and duoneb treatments q 4   - deep tracheal aspirate cx sent (due to fevers)  - Culture - Sputum . (12.21.23 @ 18:14)-  Numerous Staphylococcus aureus --> 12/28 repeat NGTD  #Activity    -increase as tolerated    -aspiration precautions, HOB 30    CARDS:   #acute HYPOtension intraop, resolved  -off levophed since 12/22 PM  #hx of HTN  -restarting home coreg and amlodipine  -Valsartan 320mg HOLDING   #hx HLD  -atorvastatin  #NSVT    -EP/Cards: No arrhythmias on tele only artifact. No further work up needed. Recommend ILR prior to discharge if patient/family agreeable  Imaging:   Echo: 12/2023: EF 66%, G1DD, trace MR, mild TR     GI/NUTR:   #Diet, NPO with Tube Feed via 20Fr PEG placed 12/22   - TF: Nepro @ 27cc/hr x 24hrs, 250cc FWF q6h + Banatrol  - multivitamin QD  #GI Prophylaxis  - famotidine 10mg PO q48  #Bowel regimen     -Multiple loose bowel movements, last dose senna 12/19 at 2200 > dignishield placed 12/23. Consider d/c rectal tube     - C diff negative     - No bowel regimen    /RENAL:   #urine output   - Failed TOV, martinez left in place 1/2  - Started finasteride    - UA neg 12/25 (r/o UTI in setting of persistent fevers)   - Sodium goal 140-150  - f/u nephro recs- Hypernatremia due to free water losses. Avoid diuretics, would not use Metolazone to cause volume depletion, and has min effect in advanced kidney dysfunction   #Diuresis  - Last diuresed 12/29: metolazone 10 x1 for hypernatremia  #Hypernatremia - improved  - FWD 1.3 L, FWF 250q6    Labs:          BUN/Cr- 77/2.4  -->,  79/2.2  -->          Electrolytes- 01-03    145  |  110  |  70<HH>  ----------------------------<  98  4.4   |  24  |  2.0<H>    Ca    7.7<L>      03 Jan 2024 05:35  Phos  4.8     01-03  Mg     2.6     01-03    TPro  x   /  Alb  2.6<L>  /  TBili  x   /  DBili  x   /  AST  x   /  ALT  x   /  AlkPhos  x   01-03    #hyperphosphatemia  - Started sevelamer powder, non-formulary so as not to clog PEG   #CKD   - baseline Cr 2.1  - holding home sodium bicarb 650mg BID  #hx BPH  - cardura 4mg (flomax at home, non crushable)      HEME/ONC:   #DVT prophylaxis     -SCDs, HSQ  #hx of CVA    -ASA81 mg cleared by NSGY    - NSGY: "Risk vs benefits should be weighed and discussed with patient prior to initiation of Plavix."   - continue to hold home plavix    Labs: Hb/Hct:  8.6/27.2  -->,  9.5/29.8  -->                      Plts:  388  -->,  440  -->                 PTT/INR:  36.5/1.03  --->       ID:  WBC- 11.58  --->>,  12.58  --->>  Temp trend- 24hrs T(F): 98.9 (01-03 @ 00:00), Max: 98.9 (01-03 @ 00:00)  Antibiotics-piperacillin/tazobactam IVPB.. 3.375 every 12 hours (to end 1/5)  #recurrent fevers- resolved   - d/c'd Tylenol    PCRs  -MRSA PCR negative (12/22)  -MRSA nares negative (12/28)  -RVP negative (12/28)    Cultures    Bcx 12/18- final neg     Blood cx 12/21- no growth     BCx, 12/28 - NG     Tracheal aspirate 12/21: numerous staph aureus methicillin sensitive    Tracheal aspirate 12/22: staph aureus    Tracheal aspirate 12/28: neg     C.Diff PCR neg    UA 12/25 negative     UA 12/28 negative    Current antibiotics:  - Vancomycin 500mg x1 dose (12/28)  - Zosyn 3.375 q12 (started 12/28, stop 1/5)  #multiple loose bowel movements    -no bowel regimen, adding banatrol    ENDO:  #DM     -ISS     - hgb A1C 7.0     -Off insulin gtt 12/20      -Lantus to 25 units HS     -FSG q6 while on TF  #AMS possibly due to hypothyroidism      - TSH - 4.16, T4 - 5.9, T3 - 79    #MSK:     Activity - Increase As Tolerated    - B/L knee X ray 12/19- no fractures, b/l effusions    - recall PT for reassessment    WOUND:  - L sacral stage 2 ulcer, wound care consult placed.     LINES/DRAINS:  PIV, right basilic midline (placed 12/16), 20Fr PEG (12/22), Tracheostomy (12/22)    ADVANCED DIRECTIVES:  Full Code  -  HCP/Emergency Contact-Sarah Gamino (Wife) [phone], Cantonese speaking    DISPO:  - Social work, case management consult for dispo to vent facility - spoke with social work 12/23, starting to work on D/C.  -  update, 12/29: pt remains acute. Will reassess 24-48h prior to d/c Assessment		  78y Male s/p mechanical fall. +HT, +AC (Aspirin and Plavix), -LOC.    12/21-intubated for airway protection, accessory muscle use  12/22- s/p tracheostomy and PEG placement, Portex cuffed 9 and 20fr PEG 2cm at skin     NEURO:  #Acute SDH   -12/18 HCT#4 -stable SDH, no new acute findings  -12/27: Pt obtunded, not responding to noxious stim - VPA/LFTs/BMP/ammonia ordered, CTH f/up: stable  - Q4 neuro checks  - amantadine, 100mg q48h. renal dosing  - NSGY: 1/1: "Risk vs benefits should be weighed and discussed with patient prior to initiation of Plavix". --> will not be restarting plavix per Dr. Degroot  #h/o stroke (2/2023) w/residual right sided weakness  - RIGHT anterior thalamus infarct,  LEFT caudate head lacunar infarct  #focal seizure    - vEEG 12/19: No seizures; Discontinued 12/19    - q4 neurochecks    - Neurology cs- d/c EEG, 2 days prior to discharge call neurology so they can do a spot EEG --> recalled on 12/28 to evaluate diminished mental status, recommend repeat EEG and continue depakote 500 q12  - EEG,12/28: No electrographic seizures or significant clinical events. Abnormal due to the presence of focal and generalized slowing. Findings consistent with focal and diffuse electrocerebral dysfunction secondary to structural/metabolic/nonspecific etiology.    - Valproic acid level 12/31 23, albumin 2.5; Valproic acid dose adjusted to 750mg q12h for 1 week, per pharmacy    - F/U AM valproic avid level and albumin level; adjust valproic acid dose as needed     RESP:   #Respiratory Failure secondary to impaired secretion clearance  - s/p tracheostomy 12/22, size 9 cuffed portex  - Tolerating t-piece since 12/29   - chest PT q4 and duoneb treatments q 4   - deep tracheal aspirate cx sent (due to fevers)  - Culture - Sputum . (12.21.23 @ 18:14)-  Numerous Staphylococcus aureus --> 12/28 repeat NGTD  #Activity    -increase as tolerated    -aspiration precautions, HOB 30    CARDS:   #acute HYPOtension intraop, resolved  -off levophed since 12/22 PM  #hx of HTN  -restarting home coreg and amlodipine  -Valsartan 320mg HOLDING   #hx HLD  -atorvastatin  #NSVT    -EP/Cards: No arrhythmias on tele only artifact. No further work up needed. Recommend ILR prior to discharge if patient/family agreeable  Imaging:   Echo: 12/2023: EF 66%, G1DD, trace MR, mild TR     GI/NUTR:   #Diet, NPO with Tube Feed via 20Fr PEG placed 12/22   - TF: Nepro @ 27cc/hr x 24hrs, 250cc FWF q6h + Banatrol  - multivitamin QD  #GI Prophylaxis  - famotidine 10mg PO q48  #Bowel regimen     -Multiple loose bowel movements, last dose senna 12/19 at 2200 > dignishield placed 12/23. Consider d/c rectal tube     - C diff negative     - No bowel regimen    /RENAL:   #urine output   - Failed TOV, martinez left in place 1/2  - Started finasteride    - UA neg 12/25 (r/o UTI in setting of persistent fevers)   - Sodium goal 140-150  - f/u nephro recs- Hypernatremia due to free water losses. Avoid diuretics, would not use Metolazone to cause volume depletion, and has min effect in advanced kidney dysfunction   #Diuresis  - Last diuresed 12/29: metolazone 10 x1 for hypernatremia  #Hypernatremia - improved  - FWD 1.3 L, FWF 250q6    Labs:          BUN/Cr- 77/2.4  -->,  79/2.2  -->          Electrolytes- 01-03    145  |  110  |  70<HH>  ----------------------------<  98  4.4   |  24  |  2.0<H>    Ca    7.7<L>      03 Jan 2024 05:35  Phos  4.8     01-03  Mg     2.6     01-03    TPro  x   /  Alb  2.6<L>  /  TBili  x   /  DBili  x   /  AST  x   /  ALT  x   /  AlkPhos  x   01-03    #hyperphosphatemia  - Started sevelamer powder, non-formulary so as not to clog PEG   #CKD   - baseline Cr 2.1  - holding home sodium bicarb 650mg BID  #hx BPH  - cardura 4mg (flomax at home, non crushable)      HEME/ONC:   #DVT prophylaxis     -SCDs, HSQ  #hx of CVA    -ASA81 mg cleared by NSGY    - NSGY: "Risk vs benefits should be weighed and discussed with patient prior to initiation of Plavix."   - continue to hold home plavix    Labs: Hb/Hct:  8.6/27.2  -->,  9.5/29.8  -->                      Plts:  388  -->,  440  -->                 PTT/INR:  36.5/1.03  --->       ID:  WBC- 11.58  --->>,  12.58  --->>  Temp trend- 24hrs T(F): 98.9 (01-03 @ 00:00), Max: 98.9 (01-03 @ 00:00)  Antibiotics-piperacillin/tazobactam IVPB.. 3.375 every 12 hours (to end 1/5)  #recurrent fevers- resolved   - d/c'd Tylenol    PCRs  -MRSA PCR negative (12/22)  -MRSA nares negative (12/28)  -RVP negative (12/28)    Cultures    Bcx 12/18- final neg     Blood cx 12/21- no growth     BCx, 12/28 - NG     Tracheal aspirate 12/21: numerous staph aureus methicillin sensitive    Tracheal aspirate 12/22: staph aureus    Tracheal aspirate 12/28: neg     C.Diff PCR neg    UA 12/25 negative     UA 12/28 negative    Current antibiotics:  - Vancomycin 500mg x1 dose (12/28)  - Zosyn 3.375 q12 (started 12/28, stop 1/5)  #multiple loose bowel movements    -no bowel regimen, adding banatrol    ENDO:  #DM     -ISS     - hgb A1C 7.0     -Off insulin gtt 12/20      -Lantus to 25 units HS     -FSG q6 while on TF  #AMS possibly due to hypothyroidism      - TSH - 4.16, T4 - 5.9, T3 - 79    #MSK:     Activity - Increase As Tolerated    - B/L knee X ray 12/19- no fractures, b/l effusions    - recall PT for reassessment    WOUND:  - L sacral stage 2 ulcer, wound care consult placed.     LINES/DRAINS:  PIV, right basilic midline (placed 12/16), 20Fr PEG (12/22), Tracheostomy (12/22)    ADVANCED DIRECTIVES:  Full Code  -  HCP/Emergency Contact-Sarah Gamino (Wife) [phone], Cantonese speaking    DISPO:  - Social work, case management consult for dispo to vent facility - spoke with social work 12/23, starting to work on D/C.  -  update, 12/29: pt remains acute. Will reassess 24-48h prior to d/c

## 2024-01-03 NOTE — ADVANCED PRACTICE NURSE CONSULT - ASSESSMENT
Admission diagnosis: Fall  77-year-old male Cantonese speaking his presents with prior history of hypertension dyslipidemia diabetes BPH prior CVA on aspirin and Plavix who states he had a mechanical fall 2 days ago while getting out of the car fell backwards hit his head.  Afterwards he was able to ambulate.  But for the past 1 day family was unable to ambulate him.  He states that his lower extremities are painful to movement and weak.  Family denies any LOC.  They deny any confusion at home.  On exam oriented to person and place but just not to date.  He does follow simple commands.  Elevates his arms for 10 seconds.  Sensation intact in upper extremities.  Lower extremities with 2 out of 5 strength bilaterally."  Allergies and Intolerances:        Allergies:  	No Known Drug Allergies:   Home Medications:   •?	sodium bicarbonate 650 mg oral tablet: 1 tab(s) orally 2 times a day   •?	Lipitor 80 mg oral tablet: 1 tab(s) orally once a day (at bedtime)  •?	clopidogrel 75 mg oral tablet: 1 tab(s) orally once a day  •?	aspirin 81 mg oral tablet: 1 tab(s) orally once a day  •?	amLODIPine 5 mg oral tablet: 1 tab(s) orally once a day  •?	Nephplex Rx oral tablet: 1 tab(s) orally once a day  •?	carvedilol 6.25 mg oral tablet: 1 tab(s) orally 2 times a day  •?	valsartan 320 mg oral tablet: 1 tab(s) orally once a day  •?	tamsulosin 0.4 mg oral capsule: 1 cap(s) orally once a day  •?	Jardiance 25 mg oral tablet: 1 tab(s) orally once a day (in the morning)    	  Patient received in bed, tracheostomy to humidified air, peg tube in place, limited mobility, high risk for pressure injury development or progression. Bilateral heels intact at time of assessment.     Wound - Left Buttock stage II entered in error  Type & Location: Left Buttock Deep Tissue Injury with progression   Size: ~[id number]  Tissue Description: Deep Purple tissue with multiple areas of denuded skin  Wound Exudate: Scant serous   Wound Edge: irregular  Periwound Condition: macerated Admission diagnosis: Fall  77-year-old male Cantonese speaking his presents with prior history of hypertension dyslipidemia diabetes BPH prior CVA on aspirin and Plavix who states he had a mechanical fall 2 days ago while getting out of the car fell backwards hit his head.  Afterwards he was able to ambulate.  But for the past 1 day family was unable to ambulate him.  He states that his lower extremities are painful to movement and weak.  Family denies any LOC.  They deny any confusion at home.  On exam oriented to person and place but just not to date.  He does follow simple commands.  Elevates his arms for 10 seconds.  Sensation intact in upper extremities.  Lower extremities with 2 out of 5 strength bilaterally."  Allergies and Intolerances:        Allergies:  	No Known Drug Allergies:   Home Medications:   •?	sodium bicarbonate 650 mg oral tablet: 1 tab(s) orally 2 times a day   •?	Lipitor 80 mg oral tablet: 1 tab(s) orally once a day (at bedtime)  •?	clopidogrel 75 mg oral tablet: 1 tab(s) orally once a day  •?	aspirin 81 mg oral tablet: 1 tab(s) orally once a day  •?	amLODIPine 5 mg oral tablet: 1 tab(s) orally once a day  •?	Nephplex Rx oral tablet: 1 tab(s) orally once a day  •?	carvedilol 6.25 mg oral tablet: 1 tab(s) orally 2 times a day  •?	valsartan 320 mg oral tablet: 1 tab(s) orally once a day  •?	tamsulosin 0.4 mg oral capsule: 1 cap(s) orally once a day  •?	Jardiance 25 mg oral tablet: 1 tab(s) orally once a day (in the morning)    	  Patient received in bed, tracheostomy to humidified air, peg tube in place, limited mobility, high risk for pressure injury development or progression. Bilateral heels intact at time of assessment.     Wound - Left Buttock stage II entered in error  Type & Location: Left Buttock Deep Tissue Injury with progression   Size: ~[id number]  Tissue Description: Deep Purple tissue with multiple areas of denuded skin  Wound Exudate: Scant serous   Wound Edge: irregular  Periwound Condition: macerated

## 2024-01-03 NOTE — ADVANCED PRACTICE NURSE CONSULT - RECOMMEDATIONS
Wound and Skin care recs.   Clean wound with soap and water, pat dry  then apply Triad cream twice daily and as needed for soiling, cover with Allevyn.  Skin and incontinence care.  Pressure Injury Prevention.  Assess wound and inform primary provider of any changes.   Case discussed with primary RN.  Wound/ ostomy specialist to f/u as needed.     Offloading: [x ] Frequent position changes [ ] Devices/Equipment  Cleansing: [ ] Saline [x ] Soap/Water [ ] Other: ______  Topicals: [x] Triad Cream [ ] Antimicrobial [ ] Enzymatic Wound Debridement.  Dressings: [ ] Dry, sterile [x] Allevyn  Foam [ ] Absorbant Pads [ ] Collagenase.  Other Recs. per Primary team.

## 2024-01-04 PROCEDURE — 99291 CRITICAL CARE FIRST HOUR: CPT | Mod: 25

## 2024-01-04 RX ADMIN — HEPARIN SODIUM 5000 UNIT(S): 5000 INJECTION INTRAVENOUS; SUBCUTANEOUS at 05:04

## 2024-01-04 RX ADMIN — CHLORHEXIDINE GLUCONATE 1 APPLICATION(S): 213 SOLUTION TOPICAL at 05:13

## 2024-01-04 RX ADMIN — CHLORHEXIDINE GLUCONATE 15 MILLILITER(S): 213 SOLUTION TOPICAL at 05:05

## 2024-01-04 RX ADMIN — Medication 1 APPLICATION(S): at 05:04

## 2024-01-04 RX ADMIN — SEVELAMER CARBONATE 800 MILLIGRAM(S): 2400 POWDER, FOR SUSPENSION ORAL at 11:57

## 2024-01-04 RX ADMIN — Medication 1 PUFF(S): at 13:12

## 2024-01-04 RX ADMIN — HEPARIN SODIUM 5000 UNIT(S): 5000 INJECTION INTRAVENOUS; SUBCUTANEOUS at 15:00

## 2024-01-04 RX ADMIN — Medication 600 MILLIGRAM(S): at 17:15

## 2024-01-04 RX ADMIN — CARVEDILOL PHOSPHATE 6.25 MILLIGRAM(S): 80 CAPSULE, EXTENDED RELEASE ORAL at 17:15

## 2024-01-04 RX ADMIN — AMLODIPINE BESYLATE 5 MILLIGRAM(S): 2.5 TABLET ORAL at 05:04

## 2024-01-04 RX ADMIN — ATORVASTATIN CALCIUM 20 MILLIGRAM(S): 80 TABLET, FILM COATED ORAL at 23:51

## 2024-01-04 RX ADMIN — SEVELAMER CARBONATE 800 MILLIGRAM(S): 2400 POWDER, FOR SUSPENSION ORAL at 09:33

## 2024-01-04 RX ADMIN — Medication 1 APPLICATION(S): at 05:56

## 2024-01-04 RX ADMIN — Medication 1 APPLICATION(S): at 17:14

## 2024-01-04 RX ADMIN — PIPERACILLIN AND TAZOBACTAM 25 GRAM(S): 4; .5 INJECTION, POWDER, LYOPHILIZED, FOR SOLUTION INTRAVENOUS at 17:16

## 2024-01-04 RX ADMIN — Medication 3: at 05:57

## 2024-01-04 RX ADMIN — Medication 1 PUFF(S): at 07:41

## 2024-01-04 RX ADMIN — Medication 1 TABLET(S): at 11:57

## 2024-01-04 RX ADMIN — CHLORHEXIDINE GLUCONATE 1 APPLICATION(S): 213 SOLUTION TOPICAL at 05:05

## 2024-01-04 RX ADMIN — Medication 600 MILLIGRAM(S): at 05:04

## 2024-01-04 RX ADMIN — INSULIN GLARGINE 25 UNIT(S): 100 INJECTION, SOLUTION SUBCUTANEOUS at 23:51

## 2024-01-04 RX ADMIN — Medication 750 MILLIGRAM(S): at 17:14

## 2024-01-04 RX ADMIN — Medication 81 MILLIGRAM(S): at 11:56

## 2024-01-04 RX ADMIN — FINASTERIDE 5 MILLIGRAM(S): 5 TABLET, FILM COATED ORAL at 11:56

## 2024-01-04 RX ADMIN — Medication 4 MILLIGRAM(S): at 23:53

## 2024-01-04 RX ADMIN — CARVEDILOL PHOSPHATE 6.25 MILLIGRAM(S): 80 CAPSULE, EXTENDED RELEASE ORAL at 05:13

## 2024-01-04 RX ADMIN — SEVELAMER CARBONATE 800 MILLIGRAM(S): 2400 POWDER, FOR SUSPENSION ORAL at 17:15

## 2024-01-04 RX ADMIN — Medication 100 MILLIGRAM(S): at 09:33

## 2024-01-04 RX ADMIN — HEPARIN SODIUM 5000 UNIT(S): 5000 INJECTION INTRAVENOUS; SUBCUTANEOUS at 23:54

## 2024-01-04 RX ADMIN — Medication 750 MILLIGRAM(S): at 05:04

## 2024-01-04 RX ADMIN — PIPERACILLIN AND TAZOBACTAM 25 GRAM(S): 4; .5 INJECTION, POWDER, LYOPHILIZED, FOR SOLUTION INTRAVENOUS at 05:13

## 2024-01-04 RX ADMIN — CHLORHEXIDINE GLUCONATE 15 MILLILITER(S): 213 SOLUTION TOPICAL at 17:15

## 2024-01-04 NOTE — CHART NOTE - NSCHARTNOTEFT_GEN_A_CORE
SICU Transfer Note    JACQUELIN CAI  78y (1945)  [id number]      Transfer from: SICU  Transfer to: Surgery-      SICU COURSE:  78y Male HD# 19d    s/p Insertion of midline catheter    Insertion of arterial line with imaging guidance    Tracheostomy, with PEG tube insertion    Insertion of arterial line with imaging guidance        PAST MEDICAL & SURGICAL HISTORY:  HTN (hypertension)      Seasonal allergies      History of BPH      Diabetes      No significant past surgical history        Allergies    [This allergen will not trigger allergy alert] pollen (Unknown)  No Known Drug Allergies    Intolerances      MEDICATIONS  (STANDING):  amantadine 100 milliGRAM(s) Oral <User Schedule>  amLODIPine   Tablet 5 milliGRAM(s) Oral daily  aspirin  chewable 81 milliGRAM(s) Enteral Tube daily  atorvastatin 20 milliGRAM(s) Oral at bedtime  bacitracin   Ointment 1 Application(s) Topical every 12 hours  carvedilol 6.25 milliGRAM(s) Oral every 12 hours  chlorhexidine 0.12% Liquid 15 milliLiter(s) Oral Mucosa two times a day  chlorhexidine 2% Cloths 1 Application(s) Topical <User Schedule>  chlorhexidine 2% Cloths 1 Application(s) Topical <User Schedule>  doxazosin 4 milliGRAM(s) Oral at bedtime  famotidine    Tablet 10 milliGRAM(s) Oral every 48 hours  finasteride 5 milliGRAM(s) Oral daily  guaiFENesin  milliGRAM(s) Oral every 12 hours  heparin   Injectable 5000 Unit(s) SubCutaneous every 8 hours  insulin glargine Injectable (LANTUS) 25 Unit(s) SubCutaneous at bedtime  insulin lispro (ADMELOG) corrective regimen sliding scale   SubCutaneous every 6 hours  ipratropium 17 MICROgram(s) HFA Inhaler 1 Puff(s) Inhalation every 6 hours  multivitamin 1 Tablet(s) Oral daily  sevelamer carbonate Powder 800 milliGRAM(s) Oral three times a day with meals  valproic  acid Syrup 750 milliGRAM(s) Oral every 12 hours  vitamin A &amp; D Ointment 1 Application(s) Topical every 12 hours    MEDICATIONS  (PRN):      Vital Signs Last 24 Hrs  T(C): 36.3 (04 Jan 2024 16:00), Max: 37.1 (04 Jan 2024 12:00)  T(F): 97.4 (04 Jan 2024 16:00), Max: 98.7 (04 Jan 2024 12:00)  HR: 76 (04 Jan 2024 16:00) (75 - 80)  BP: 109/52 (04 Jan 2024 16:00) (106/52 - 132/62)  BP(mean): 75 (04 Jan 2024 16:00) (75 - 89)  RR: 26 (04 Jan 2024 16:00) (20 - 26)  SpO2: 100% (04 Jan 2024 16:00) (100% - 100%)    Parameters below as of 04 Jan 2024 16:00    O2 Flow (L/min): 7  O2 Concentration (%): 40  I&O's Summary    03 Jan 2024 07:01  -  04 Jan 2024 07:00  --------------------------------------------------------  IN: 1891 mL / OUT: 2120 mL / NET: -229 mL    04 Jan 2024 07:01  -  04 Jan 2024 20:29  --------------------------------------------------------  IN: 824 mL / OUT: 825 mL / NET: -1 mL        LABS  LABS:                        8.2    11.18 )-----------( 320      ( 03 Jan 2024 20:10 )             25.5       01-03    143  |  109  |  69<HH>  ----------------------------<  128<H>  4.2   |  22  |  2.1<H>    Ca    8.1<L>      03 Jan 2024 20:10  Phos  5.0     01-03  Mg     2.6     01-03    TPro  x   /  Alb  2.6<L>  /  TBili  x   /  DBili  x   /  AST  x   /  ALT  x   /  AlkPhos  x   01-03              Thyroid Stimulating Hormone, Serum: 4.16 uIU/mL [0.27 - 4.20] (01-01-24 @ 21:32)                      Assessment & Plan:          Follow Up:  -2330 labs        Signed out to:  Date:  Time: SICU Transfer Note    JACQUELIN CAI  78y (1945)  [id number]      Transfer from: SICU  Transfer to: Surgery-      SICU COURSE:  78y Male HD# 19d    s/p Insertion of midline catheter    Insertion of arterial line with imaging guidance    Tracheostomy, with PEG tube insertion    Insertion of arterial line with imaging guidance        PAST MEDICAL & SURGICAL HISTORY:  HTN (hypertension)      Seasonal allergies      History of BPH      Diabetes      No significant past surgical history        Allergies    [This allergen will not trigger allergy alert] pollen (Unknown)  No Known Drug Allergies    Intolerances      MEDICATIONS  (STANDING):  amantadine 100 milliGRAM(s) Oral <User Schedule>  amLODIPine   Tablet 5 milliGRAM(s) Oral daily  aspirin  chewable 81 milliGRAM(s) Enteral Tube daily  atorvastatin 20 milliGRAM(s) Oral at bedtime  bacitracin   Ointment 1 Application(s) Topical every 12 hours  carvedilol 6.25 milliGRAM(s) Oral every 12 hours  chlorhexidine 0.12% Liquid 15 milliLiter(s) Oral Mucosa two times a day  chlorhexidine 2% Cloths 1 Application(s) Topical <User Schedule>  chlorhexidine 2% Cloths 1 Application(s) Topical <User Schedule>  doxazosin 4 milliGRAM(s) Oral at bedtime  famotidine    Tablet 10 milliGRAM(s) Oral every 48 hours  finasteride 5 milliGRAM(s) Oral daily  guaiFENesin  milliGRAM(s) Oral every 12 hours  heparin   Injectable 5000 Unit(s) SubCutaneous every 8 hours  insulin glargine Injectable (LANTUS) 25 Unit(s) SubCutaneous at bedtime  insulin lispro (ADMELOG) corrective regimen sliding scale   SubCutaneous every 6 hours  ipratropium 17 MICROgram(s) HFA Inhaler 1 Puff(s) Inhalation every 6 hours  multivitamin 1 Tablet(s) Oral daily  sevelamer carbonate Powder 800 milliGRAM(s) Oral three times a day with meals  valproic  acid Syrup 750 milliGRAM(s) Oral every 12 hours  vitamin A &amp; D Ointment 1 Application(s) Topical every 12 hours    MEDICATIONS  (PRN):      Vital Signs Last 24 Hrs  T(C): 36.3 (04 Jan 2024 16:00), Max: 37.1 (04 Jan 2024 12:00)  T(F): 97.4 (04 Jan 2024 16:00), Max: 98.7 (04 Jan 2024 12:00)  HR: 76 (04 Jan 2024 16:00) (75 - 80)  BP: 109/52 (04 Jan 2024 16:00) (106/52 - 132/62)  BP(mean): 75 (04 Jan 2024 16:00) (75 - 89)  RR: 26 (04 Jan 2024 16:00) (20 - 26)  SpO2: 100% (04 Jan 2024 16:00) (100% - 100%)    Parameters below as of 04 Jan 2024 16:00    O2 Flow (L/min): 7  O2 Concentration (%): 40  I&O's Summary    03 Jan 2024 07:01  -  04 Jan 2024 07:00  --------------------------------------------------------  IN: 1891 mL / OUT: 2120 mL / NET: -229 mL    04 Jan 2024 07:01  -  04 Jan 2024 20:29  --------------------------------------------------------  IN: 824 mL / OUT: 825 mL / NET: -1 mL        LABS  LABS:                        8.2    11.18 )-----------( 320      ( 03 Jan 2024 20:10 )             25.5       01-03    143  |  109  |  69<HH>  ----------------------------<  128<H>  4.2   |  22  |  2.1<H>    Ca    8.1<L>      03 Jan 2024 20:10  Phos  5.0     01-03  Mg     2.6     01-03    TPro  x   /  Alb  2.6<L>  /  TBili  x   /  DBili  x   /  AST  x   /  ALT  x   /  AlkPhos  x   01-03              Thyroid Stimulating Hormone, Serum: 4.16 uIU/mL [0.27 - 4.20] (01-01-24 @ 21:32)                      Assessment & Plan:          Follow Up:  -2330 labs        Signed out to:  Date:  Time: SICU Transfer Note    JACQUELIN CAI  78y (1945)  [id number]      Transfer from: SICU  Transfer to: Surgery-      SICU COURSE:  78y Male HD# 19d    s/p Insertion of midline catheter    Insertion of arterial line with imaging guidance    Tracheostomy, with PEG tube insertion    Insertion of arterial line with imaging guidance    77-year-old male Cantonese speaking presents with prior history of HTN, HLD, Diabetes, BPH, prior CVA on aspirin and Plavix who states he had a mechanical fall 3 days ago while getting out of the car fell backwards hit his head.  Afterwards he was able to ambulate.  But for the past 1 day family was unable to ambulate him.  He states that his lower extremities are painful to movement and weak.  Family denies any LOC. Per daughter patient he often forgets things and cannot recall year or place. Patient following commands bedside.   Initial CT head remarkable for 3 Acute subdural hemorrhages: 0.7 cm along the left hemisphere, 0.3 cm along the right frontal area, and 1.1 cm along the left falx. No midline shift. Repeat CT head was obtained prior to 6 hour tacos because patient was appearing lethargic vs sundowning. CT head #2 with stable findings. CT c-spine and chest/abd/pelvis unremarkable for acute pathology.     SICU Consult for q1 neuro checks    SICU course: Patient had trach and PEG placed, no complications. Patient has been tolerating T-piece without a vent for approximately 5 days. Initial concern for seizures due to facial twitching with definitively clear evidence of seizures on EEG; recommending to continue Valproic Acid. Mental status is best assessed with family at bedside; patient responds to yes/no questions asked by family and opens eyes to verbal and physical stimuli. Patient failed TOV 2 days ago and had Aguirre replaced; currently awaiting TOV tonight at 21:00. Social work is on board to help determine post-discharge placement. Patient has been stable otherwise and is clear for downgrade.     PAST MEDICAL & SURGICAL HISTORY:  HTN (hypertension)  Seasonal allergies  History of BPH  Diabetes  No significant past surgical history      Allergies    [This allergen will not trigger allergy alert] pollen (Unknown)  No Known Drug Allergies      MEDICATIONS  (STANDING):  amantadine 100 milliGRAM(s) Oral <User Schedule>  amLODIPine   Tablet 5 milliGRAM(s) Oral daily  aspirin  chewable 81 milliGRAM(s) Enteral Tube daily  atorvastatin 20 milliGRAM(s) Oral at bedtime  bacitracin   Ointment 1 Application(s) Topical every 12 hours  carvedilol 6.25 milliGRAM(s) Oral every 12 hours  chlorhexidine 0.12% Liquid 15 milliLiter(s) Oral Mucosa two times a day  chlorhexidine 2% Cloths 1 Application(s) Topical <User Schedule>  chlorhexidine 2% Cloths 1 Application(s) Topical <User Schedule>  doxazosin 4 milliGRAM(s) Oral at bedtime  famotidine    Tablet 10 milliGRAM(s) Oral every 48 hours  finasteride 5 milliGRAM(s) Oral daily  guaiFENesin  milliGRAM(s) Oral every 12 hours  heparin   Injectable 5000 Unit(s) SubCutaneous every 8 hours  insulin glargine Injectable (LANTUS) 25 Unit(s) SubCutaneous at bedtime  insulin lispro (ADMELOG) corrective regimen sliding scale   SubCutaneous every 6 hours  ipratropium 17 MICROgram(s) HFA Inhaler 1 Puff(s) Inhalation every 6 hours  multivitamin 1 Tablet(s) Oral daily  sevelamer carbonate Powder 800 milliGRAM(s) Oral three times a day with meals  valproic  acid Syrup 750 milliGRAM(s) Oral every 12 hours  vitamin A &amp; D Ointment 1 Application(s) Topical every 12 hours    MEDICATIONS  (PRN):      Vital Signs Last 24 Hrs  T(C): 36.3 (04 Jan 2024 16:00), Max: 37.1 (04 Jan 2024 12:00)  T(F): 97.4 (04 Jan 2024 16:00), Max: 98.7 (04 Jan 2024 12:00)  HR: 76 (04 Jan 2024 16:00) (75 - 80)  BP: 109/52 (04 Jan 2024 16:00) (106/52 - 132/62)  BP(mean): 75 (04 Jan 2024 16:00) (75 - 89)  RR: 26 (04 Jan 2024 16:00) (20 - 26)  SpO2: 100% (04 Jan 2024 16:00) (100% - 100%)    Parameters below as of 04 Jan 2024 16:00    O2 Flow (L/min): 7  O2 Concentration (%): 40  I&O's Summary    03 Jan 2024 07:01 - 04 Jan 2024 07:00  --------------------------------------------------------  IN: 1891 mL / OUT: 2120 mL / NET: -229 mL    04 Jan 2024 07:01  -  04 Jan 2024 20:29  --------------------------------------------------------  IN: 824 mL / OUT: 825 mL / NET: -1 mL        LABS  LABS:                        8.2    11.18 )-----------( 320      ( 03 Jan 2024 20:10 )             25.5       01-03    143  |  109  |  69<HH>  ----------------------------<  128<H>  4.2   |  22  |  2.1<H>    Ca    8.1<L>      03 Jan 2024 20:10  Phos  5.0     01-03  Mg     2.6     01-03    TPro  x   /  Alb  2.6<L>  /  TBili  x   /  DBili  x   /  AST  x   /  ALT  x   /  AlkPhos  x   01-03        Thyroid Stimulating Hormone, Serum: 4.16 uIU/mL [0.27 - 4.20] (01-01-24 @ 21:32)    ========================================================================================================================================    Assessment & Plan:  78y Male s/p mechanical fall. +HT, +AC (Aspirin and Plavix), -LOC.    12/21-intubated for airway protection, accessory muscle use  12/22- s/p tracheostomy and PEG placement, Portex cuffed 9 and 20fr PEG 2cm at skin     NEURO:  #Acute SDH   -12/18 HCT#4 -stable SDH, no new acute findings  -12/27: Pt obtunded, not responding to noxious stim - VPA/LFTs/BMP/ammonia ordered, CTH f/up: stable  - Q4 neuro checks  - amantadine, 100mg q48h. renal dosing  - NSGY: 1/1: "Risk vs benefits should be weighed and discussed with patient prior to initiation of Plavix". --> will not be restarting plavix per Dr. Degroot  #h/o stroke (2/2023) w/residual right sided weakness  - RIGHT anterior thalamus infarct,  LEFT caudate head lacunar infarct  #focal seizure    - vEEG No seizures; Discontinued 12/19    - Neurology cs- d/c EEG, 2 days prior to discharge call neurology so they can do a spot EEG --> recalled on 12/28 to evaluate diminished mental status, recommend repeat EEG and continue depakote 500 q12    - Valproic acid 750mg q12h x 1 week (end 1/8/24)    RESP:   #Respiratory Failure secondary to impaired secretion clearance  - s/p tracheostomy 12/22, size 9 cuffed portex  - Tolerating t-piece since 12/29   - chest PT q4  - guaifenisen q12 to thin trachael secretions  #Activity    -increase as tolerated    -aspiration precautions, HOB 30    CARDS:   #acute HYPOtension intraop, resolved  -off levophed since 12/22 PM  #hx of HTN  -restarting home coreg and amlodipine  -Valsartan 320mg HOLDING   #hx HLD  -atorvastatin  #NSVT    -EP/Cards: No arrhythmias on tele only artifact. No further work up needed. Recommend ILR prior to discharge if patient/family agreeable  Imaging:   Echo: 12/2023: EF 66%, G1DD, trace MR, mild TR     GI/NUTR:   #Diet, NPO with Tube Feed via 20Fr PEG placed 12/22   - TF: Nepro @ 27cc/hr x 24hrs, 250cc FWF q6h + Banatrol  - multivitamin QD  #GI Prophylaxis  - famotidine 10mg PO q48  #Bowel regimen     -not indicated    -d/c rectal tube 1/3    - C diff negative     /RENAL:   #urine output   - Aguirre D/C 1/4 > f/u 9PM  - Started finasteride    - UA neg 12/25 (r/o UTI in setting of persistent fevers)   - Sodium goal 140-150  - nephro recs appreciated  #Diuresis  - Last diuresed 12/29: metolazone 10 x1 for hypernatremia  #Hypernatremia - improved  - FWD 1.3 L, FWF 250q6    Labs:          BUN/Cr- 70/2.0  -->,  69/2.1  -->          Electrolytes-Na 143 // K 4.2 // Mg 2.6 //  Phos 5.0 (01-03 @ 20:10)    #hyperphosphatemia  - Started sevelamer powder   #CKD   - baseline Cr 2.1  - holding home sodium bicarb 650mg BID  #hx BPH  - cardura 4mg (flomax at home, non crushable)    HEME/ONC:   #hx of CVA    - ASA81 mg cleared by NSGY   - continue to hold home plavix  #DVT prophylaxis    -heparin   Injectable  , SCDs    Labs: Hb/Hct:  8.3/25.9  -->,  8.2/25.5  -->                      Plts:  371  -->,  320  -->                 PTT/INR:      T&S pending    ID:  WBC- 12.58  --->>,  11.78  --->>,  11.18  --->>  Temp trend- 24hrs T(F): 98.1 (01-04 @ 00:00), Max: 98.7 (01-03 @ 20:00)  Antibiotics-piperacillin/tazobactam IVPB.. 3.375 every 12 hours (to end 1/5)    PCRs  -MRSA PCR negative (12/22)  -MRSA nares negative (12/28)  -RVP negative (12/28)    Cultures    Bcx 12/18- final neg     Blood cx 12/21- no growth     BCx, 12/28 - NG     Tracheal aspirate 12/21: numerous staph aureus methicillin sensitive    Tracheal aspirate 12/22: staph aureus    Tracheal aspirate 12/28: neg     C.Diff PCR neg    UA 12/25 negative     UA 12/28 negative    ENDO:  #DM     -ISS     - hgb A1C 7.0     -Off insulin gtt 12/20      -Lantus 25 units HS     -FSG q6 while on TF  #AMS possibly due to hypothyroidism (improved)     - TSH - 4.16, T4 - 5.9, T3 - 79, WNL    #MSK:     Activity - Increase As Tolerated    - B/L knee X ray 12/19- no fractures, b/l effusions    - recalled PT for reassessment, pending    WOUND:  - L sacral stage 2 ulcer, wound care recs appreciated.    LINES/DRAINS:  PIV, right basilic midline (placed 12/16), 20Fr PEG (12/22), Tracheostomy (12/22)    ADVANCED DIRECTIVES:  Full Code  -  HCP/Emergency Contact-Sarah Cai (Wife) [phone], Cantonese speaking    DISPO:  - Social work, case management consult for dispo to vent facility - spoke with social work 12/23, starting to work on D/C.  - SW update, 12/29: pt remains acute. Will reassess 24-48h prior to d/c        Follow Up:  -social work/case management  -TOV tonight (21:00)        Signed out to: Trauma  Date: 1/4/23  Time: 20:30 SICU Transfer Note    JACQUELIN CAI  78y (1945)  [id number]      Transfer from: SICU  Transfer to: Surgery-      SICU COURSE:  78y Male HD# 19d    s/p Insertion of midline catheter    Insertion of arterial line with imaging guidance    Tracheostomy, with PEG tube insertion    Insertion of arterial line with imaging guidance    77-year-old male Cantonese speaking presents with prior history of HTN, HLD, Diabetes, BPH, prior CVA on aspirin and Plavix who states he had a mechanical fall 3 days ago while getting out of the car fell backwards hit his head.  Afterwards he was able to ambulate.  But for the past 1 day family was unable to ambulate him.  He states that his lower extremities are painful to movement and weak.  Family denies any LOC. Per daughter patient he often forgets things and cannot recall year or place. Patient following commands bedside.   Initial CT head remarkable for 3 Acute subdural hemorrhages: 0.7 cm along the left hemisphere, 0.3 cm along the right frontal area, and 1.1 cm along the left falx. No midline shift. Repeat CT head was obtained prior to 6 hour tacos because patient was appearing lethargic vs sundowning. CT head #2 with stable findings. CT c-spine and chest/abd/pelvis unremarkable for acute pathology.     SICU Consult for q1 neuro checks    SICU course: Patient had trach and PEG placed, no complications. Patient has been tolerating T-piece without a vent for approximately 5 days. Initial concern for seizures due to facial twitching with definitively clear evidence of seizures on EEG; recommending to continue Valproic Acid. Mental status is best assessed with family at bedside; patient responds to yes/no questions asked by family and opens eyes to verbal and physical stimuli. Patient failed TOV 2 days ago and had Aguirre replaced; currently awaiting TOV tonight at 21:00. Social work is on board to help determine post-discharge placement. Patient has been stable otherwise and is clear for downgrade.     PAST MEDICAL & SURGICAL HISTORY:  HTN (hypertension)  Seasonal allergies  History of BPH  Diabetes  No significant past surgical history      Allergies    [This allergen will not trigger allergy alert] pollen (Unknown)  No Known Drug Allergies      MEDICATIONS  (STANDING):  amantadine 100 milliGRAM(s) Oral <User Schedule>  amLODIPine   Tablet 5 milliGRAM(s) Oral daily  aspirin  chewable 81 milliGRAM(s) Enteral Tube daily  atorvastatin 20 milliGRAM(s) Oral at bedtime  bacitracin   Ointment 1 Application(s) Topical every 12 hours  carvedilol 6.25 milliGRAM(s) Oral every 12 hours  chlorhexidine 0.12% Liquid 15 milliLiter(s) Oral Mucosa two times a day  chlorhexidine 2% Cloths 1 Application(s) Topical <User Schedule>  chlorhexidine 2% Cloths 1 Application(s) Topical <User Schedule>  doxazosin 4 milliGRAM(s) Oral at bedtime  famotidine    Tablet 10 milliGRAM(s) Oral every 48 hours  finasteride 5 milliGRAM(s) Oral daily  guaiFENesin  milliGRAM(s) Oral every 12 hours  heparin   Injectable 5000 Unit(s) SubCutaneous every 8 hours  insulin glargine Injectable (LANTUS) 25 Unit(s) SubCutaneous at bedtime  insulin lispro (ADMELOG) corrective regimen sliding scale   SubCutaneous every 6 hours  ipratropium 17 MICROgram(s) HFA Inhaler 1 Puff(s) Inhalation every 6 hours  multivitamin 1 Tablet(s) Oral daily  sevelamer carbonate Powder 800 milliGRAM(s) Oral three times a day with meals  valproic  acid Syrup 750 milliGRAM(s) Oral every 12 hours  vitamin A &amp; D Ointment 1 Application(s) Topical every 12 hours    MEDICATIONS  (PRN):      Vital Signs Last 24 Hrs  T(C): 36.3 (04 Jan 2024 16:00), Max: 37.1 (04 Jan 2024 12:00)  T(F): 97.4 (04 Jan 2024 16:00), Max: 98.7 (04 Jan 2024 12:00)  HR: 76 (04 Jan 2024 16:00) (75 - 80)  BP: 109/52 (04 Jan 2024 16:00) (106/52 - 132/62)  BP(mean): 75 (04 Jan 2024 16:00) (75 - 89)  RR: 26 (04 Jan 2024 16:00) (20 - 26)  SpO2: 100% (04 Jan 2024 16:00) (100% - 100%)    Parameters below as of 04 Jan 2024 16:00    O2 Flow (L/min): 7  O2 Concentration (%): 40  I&O's Summary    03 Jan 2024 07:01 - 04 Jan 2024 07:00  --------------------------------------------------------  IN: 1891 mL / OUT: 2120 mL / NET: -229 mL    04 Jan 2024 07:01  -  04 Jan 2024 20:29  --------------------------------------------------------  IN: 824 mL / OUT: 825 mL / NET: -1 mL        LABS  LABS:                        8.2    11.18 )-----------( 320      ( 03 Jan 2024 20:10 )             25.5       01-03    143  |  109  |  69<HH>  ----------------------------<  128<H>  4.2   |  22  |  2.1<H>    Ca    8.1<L>      03 Jan 2024 20:10  Phos  5.0     01-03  Mg     2.6     01-03    TPro  x   /  Alb  2.6<L>  /  TBili  x   /  DBili  x   /  AST  x   /  ALT  x   /  AlkPhos  x   01-03        Thyroid Stimulating Hormone, Serum: 4.16 uIU/mL [0.27 - 4.20] (01-01-24 @ 21:32)    ========================================================================================================================================    Assessment & Plan:  78y Male s/p mechanical fall. +HT, +AC (Aspirin and Plavix), -LOC.    12/21-intubated for airway protection, accessory muscle use  12/22- s/p tracheostomy and PEG placement, Portex cuffed 9 and 20fr PEG 2cm at skin     NEURO:  #Acute SDH   -12/18 HCT#4 -stable SDH, no new acute findings  -12/27: Pt obtunded, not responding to noxious stim - VPA/LFTs/BMP/ammonia ordered, CTH f/up: stable  - Q4 neuro checks  - amantadine, 100mg q48h. renal dosing  - NSGY: 1/1: "Risk vs benefits should be weighed and discussed with patient prior to initiation of Plavix". --> will not be restarting plavix per Dr. Degroot  #h/o stroke (2/2023) w/residual right sided weakness  - RIGHT anterior thalamus infarct,  LEFT caudate head lacunar infarct  #focal seizure    - vEEG No seizures; Discontinued 12/19    - Neurology cs- d/c EEG, 2 days prior to discharge call neurology so they can do a spot EEG --> recalled on 12/28 to evaluate diminished mental status, recommend repeat EEG and continue depakote 500 q12    - Valproic acid 750mg q12h x 1 week (end 1/8/24)    RESP:   #Respiratory Failure secondary to impaired secretion clearance  - s/p tracheostomy 12/22, size 9 cuffed portex  - Tolerating t-piece since 12/29   - chest PT q4  - guaifenisen q12 to thin trachael secretions  #Activity    -increase as tolerated    -aspiration precautions, HOB 30    CARDS:   #acute HYPOtension intraop, resolved  -off levophed since 12/22 PM  #hx of HTN  -restarting home coreg and amlodipine  -Valsartan 320mg HOLDING   #hx HLD  -atorvastatin  #NSVT    -EP/Cards: No arrhythmias on tele only artifact. No further work up needed. Recommend ILR prior to discharge if patient/family agreeable  Imaging:   Echo: 12/2023: EF 66%, G1DD, trace MR, mild TR     GI/NUTR:   #Diet, NPO with Tube Feed via 20Fr PEG placed 12/22   - TF: Nepro @ 27cc/hr x 24hrs, 250cc FWF q6h + Banatrol  - multivitamin QD  #GI Prophylaxis  - famotidine 10mg PO q48  #Bowel regimen     -not indicated    -d/c rectal tube 1/3    - C diff negative     /RENAL:   #urine output   - Aguirre D/C 1/4 > f/u 9PM  - Started finasteride    - UA neg 12/25 (r/o UTI in setting of persistent fevers)   - Sodium goal 140-150  - nephro recs appreciated  #Diuresis  - Last diuresed 12/29: metolazone 10 x1 for hypernatremia  #Hypernatremia - improved  - FWD 1.3 L, FWF 250q6    Labs:          BUN/Cr- 70/2.0  -->,  69/2.1  -->          Electrolytes-Na 143 // K 4.2 // Mg 2.6 //  Phos 5.0 (01-03 @ 20:10)    #hyperphosphatemia  - Started sevelamer powder   #CKD   - baseline Cr 2.1  - holding home sodium bicarb 650mg BID  #hx BPH  - cardura 4mg (flomax at home, non crushable)    HEME/ONC:   #hx of CVA    - ASA81 mg cleared by NSGY   - continue to hold home plavix  #DVT prophylaxis    -heparin   Injectable  , SCDs    Labs: Hb/Hct:  8.3/25.9  -->,  8.2/25.5  -->                      Plts:  371  -->,  320  -->                 PTT/INR:      T&S pending    ID:  WBC- 12.58  --->>,  11.78  --->>,  11.18  --->>  Temp trend- 24hrs T(F): 98.1 (01-04 @ 00:00), Max: 98.7 (01-03 @ 20:00)  Antibiotics-piperacillin/tazobactam IVPB.. 3.375 every 12 hours (to end 1/5)    PCRs  -MRSA PCR negative (12/22)  -MRSA nares negative (12/28)  -RVP negative (12/28)    Cultures    Bcx 12/18- final neg     Blood cx 12/21- no growth     BCx, 12/28 - NG     Tracheal aspirate 12/21: numerous staph aureus methicillin sensitive    Tracheal aspirate 12/22: staph aureus    Tracheal aspirate 12/28: neg     C.Diff PCR neg    UA 12/25 negative     UA 12/28 negative    ENDO:  #DM     -ISS     - hgb A1C 7.0     -Off insulin gtt 12/20      -Lantus 25 units HS     -FSG q6 while on TF  #AMS possibly due to hypothyroidism (improved)     - TSH - 4.16, T4 - 5.9, T3 - 79, WNL    #MSK:     Activity - Increase As Tolerated    - B/L knee X ray 12/19- no fractures, b/l effusions    - recalled PT for reassessment, pending    WOUND:  - L sacral stage 2 ulcer, wound care recs appreciated.    LINES/DRAINS:  PIV, right basilic midline (placed 12/16), 20Fr PEG (12/22), Tracheostomy (12/22)    ADVANCED DIRECTIVES:  Full Code  -  HCP/Emergency Contact-Sarah Cai (Wife) [phone], Cantonese speaking    DISPO:  - Social work, case management consult for dispo to vent facility - spoke with social work 12/23, starting to work on D/C.  - SW update, 12/29: pt remains acute. Will reassess 24-48h prior to d/c        Follow Up:  -social work/case management  -TOV tonight (21:00)        Signed out to: Trauma  Date: 1/4/23  Time: 20:30

## 2024-01-04 NOTE — PROGRESS NOTE ADULT - ATTENDING COMMENTS
Trauma/ACS Attending  Note Attestation    Patient is examined and evaluated at the bedside with the residents/PAs. Treatment plan discussed with the team, nurses, and consulting physicians and consulting teams. Medications, radiological studies and all other relevant studies reviewed.     JACQUELIN CAI Patient is a 78y old  Male who presents with a chief complaint of fall and sustained  SDH, seizures, altered mentals status requiring intubation followed by tracheostomy and PEG     Vital Signs Last 24 Hrs  T(C): 37.1 (04 Jan 2024 12:00), Max: 37.1 (03 Jan 2024 20:00)  T(F): 98.7 (04 Jan 2024 12:00), Max: 98.7 (03 Jan 2024 20:00)  HR: 75 (04 Jan 2024 12:00) (75 - 80)  BP: 106/52 (04 Jan 2024 12:00) (106/52 - 137/65)  BP(mean): 75 (04 Jan 2024 12:00) (75 - 93)  RR: 25 (04 Jan 2024 12:00) (20 - 27)  SpO2: 100% (04 Jan 2024 12:00) (100% - 100%)    Parameters below as of 04 Jan 2024 12:00  Patient On (Oxygen Delivery Method): T-piece    O2 Concentration (%): 40                        8.2    11.18 )-----------( 320      ( 03 Jan 2024 20:10 )             25.5   01-03    143  |  109  |  69<HH>  ----------------------------<  128<H>  4.2   |  22  |  2.1<H>    Ca    8.1<L>      03 Jan 2024 20:10  Phos  5.0     01-03  Mg     2.6     01-03    TPro  x   /  Alb  2.6<L>  /  TBili  x   /  DBili  x   /  AST  x   /  ALT  x   /  AlkPhos  x   01-03      Diagnosis: Fall with SDH                  S/p tracheostomy/PEG    Plan:	  - supportive care  - pain management  - trach caller  - DVT prophylaxis       [x ] SCDs [x ] Lovenox SQ [ ] Heparins SQ  [ ] None  - GI prophylaxis  - follow up consults  - continue PEG feeds  - repeat studies as needed  - PT evaluation and follow up  - replace electrolytes  - case management evaluation for SNF placement     Shawnee Degroot MD, FACS  Trauma/ACS/Surgical Critical Care Attending

## 2024-01-04 NOTE — PROGRESS NOTE ADULT - SUBJECTIVE AND OBJECTIVE BOX
JACQUELIN CAI   [id number]   [birth year]  78yM    Admit Date: 12-16-23  Indication for SDU/SICU: Q1 neuro checks      77-year-old male Cantonese speaking presents with prior history of HTN, HLD, Diabetes, BPH, prior CVA on aspirin and Plavix who states he had a mechanical fall 3 days ago while getting out of the car fell backwards hit his head.  Afterwards he was able to ambulate.  But for the past 1 day family was unable to ambulate him.  He states that his lower extremities are painful to movement and weak.  Family denies any LOC. Per daughter patient he often forgets things and cannot recall year or place. Patient following commands bedside.   Initial CT head remarkable for 3 Acute subdural hemorrhages: 0.7 cm along the left hemisphere, 0.3 cm along the right frontal area, and 1.1 cm along the left falx. No midline shift. Repeat CT head was obtained prior to 6 hour tacos because patient was appearing lethargic vs sundowning. CT head #2 with stable findings. CT c-spine and chest/abd/pelvis unremarkable for acute pathology.     SICU Consult for q1 neuro checks    ============================  24 Hour Events    1/3  NIGHT  -on T-piece, responding to pain, PEG 2cm at skin     DAY  - Called PT - no answer  - Seen by wound, L sacral wound DTI w/ progression.   - VPA level adj - 92.7, confirmed by pharm  - hgb 8.3 f 9.5, f/up cbc  - changed amantadine for 8am  - guaifenisen to thin secretions  - d/c'd rectal tube    [X] A ten-point review of systems was otherwise negative except as noted above.  [  ] Due to altered mental status/intubation, subjective information was not attained from the patient. History was obtained, to the extent possible, from review of the chart and collateral sources of information.  ======================================================================================================================= JACQUELIN CAI   [id number]   [birth year]  78yM    Admit Date: 12-16-23  Indication for SDU/SICU: Q1 neuro checks      77-year-old male Cantonese speaking presents with prior history of HTN, HLD, Diabetes, BPH, prior CVA on aspirin and Plavix who states he had a mechanical fall 3 days ago while getting out of the car fell backwards hit his head.  Afterwards he was able to ambulate.  But for the past 1 day family was unable to ambulate him.  He states that his lower extremities are painful to movement and weak.  Family denies any LOC. Per daughter patient he often forgets things and cannot recall year or place. Patient following commands bedside.   Initial CT head remarkable for 3 Acute subdural hemorrhages: 0.7 cm along the left hemisphere, 0.3 cm along the right frontal area, and 1.1 cm along the left falx. No midline shift. Repeat CT head was obtained prior to 6 hour tacos because patient was appearing lethargic vs sundowning. CT head #2 with stable findings. CT c-spine and chest/abd/pelvis unremarkable for acute pathology.     SICU Consult for q1 neuro checks    ============================  24 Hour Events    1/3  NIGHT  -on T-piece, responding to pain, PEG 2cm at skin     DAY  - Called PT - no answer  - Seen by wound, L sacral wound DTI w/ progression.   - VPA level adj - 92.7, confirmed by pharm  - hgb 8.3 f 9.5, f/up cbc  - changed amantadine for 8am  - guaifenisen to thin secretions  - d/c'd rectal tube    [X] A ten-point review of systems was otherwise negative except as noted above.  [  ] Due to altered mental status/intubation, subjective information was not attained from the patient. History was obtained, to the extent possible, from review of the chart and collateral sources of information.  =======================================================================================================================

## 2024-01-04 NOTE — PROGRESS NOTE ADULT - ASSESSMENT
JACQUELIN CAI is a 78 year old male who presented as a TRAUMA CONSULT s/p mechanical fall (+HT -LOC -AC). Found to have an acute subdural hemorrhage. Hospital course has been complicated by multiple seizures and uncontrolled hypertension. Intubated on 12/21 due to worsening respiratory status.     S/P tracheostomy and PEG placement on 12/22/23, (Portex cuffed 9 and 2cm at skin 20fr PEG). Now with T-piece, O2 at 40%.     PLAN:  -SICU management  - DC Aguirre  - Monitor O2 requirements   - Continue chest PT, Duoneb treatments as needed   - Pain control PRN   - DVT ppx with HSQ   - monitor dignishield outputs   -Working with case management for placement      TAP (Trauma, Acute care, Pediatrics) Spectra [id number] JACQUELIN CAI is a 78 year old male who presented as a TRAUMA CONSULT s/p mechanical fall (+HT -LOC -AC). Found to have an acute subdural hemorrhage. Hospital course has been complicated by multiple seizures and uncontrolled hypertension. Intubated on 12/21 due to worsening respiratory status.     S/P tracheostomy and PEG placement on 12/22/23, (Portex cuffed 9 and 2cm at skin 20fr PEG). Now with T-piece, O2 at 40%.     PLAN:  -SICU management  - DC Aguirre  - Monitor O2 requirements   - Continue chest PT, Duoneb treatments as needed   - Pain control PRN   - DVT ppx with HSQ   - monitor dignishield outputs   -Working with case management for placement      TAP (Trauma, Acute care, Pediatrics) Spectra [phone]

## 2024-01-04 NOTE — PROGRESS NOTE ADULT - CRITICAL CARE ATTENDING COMMENT
Critical Care: [id number]   This patient has a high probability of sudden, clinically significant deterioration, which requires the highest level of physician preparedness to intervene urgently. I managed/supervised life or organ supporting interventions that required frequent physician assessment. I have reviewed and agree with note above. I devoted my full attention to the direct care of this patient for the period of time indicated, including reviewing relevant labs and imaging, discussing treatment plan with the SICU team, nurses, and primary team at the time of multidisciplinary rounds, and reviewing findings with patient and family. This does not include time devoted to teaching any trainees and to performing any procedures.    JACQUELIN CAI 78y Male admitted to SICU with traumatic SDH now with acute on chronic renal failure, possible seizure, history of CVA    Issues we are addressing today:    Neuro: minimizing sedation, amantadine for neurostim, valproate, delirium precautions, sleep meds as needed at night  CV: optimize fluid status, home antihypertension meds  Respiratory: trach collar around the clock  Nutrition: tube feeds running, banatrol for diarrhea, d/c dignishield  Renal: monitor Is &Os, finasteride for urinary retention, TOV today  ID: abx ongoing  Heme: continue to evaluate for acute blood loss anemia, ASA  Endocrine: Prevent and treat hyperglycemia with insulin as needed    PV: follow pulse exam  Skin: decub precautions, wound care consulted  DVT Prophylaxis   Stress Gastritis Prophylaxis   Mobility: PT/OT as tolerated    safe for transfer out of ICU    The above note is NOT written at the time of rounds and will reflect all changes throughout management of the patient for the day note is written for.    Warner Edwards MD, D.BRENDAN. Critical Care: [id number]   This patient has a high probability of sudden, clinically significant deterioration, which requires the highest level of physician preparedness to intervene urgently. I managed/supervised life or organ supporting interventions that required frequent physician assessment. I have reviewed and agree with note above. I devoted my full attention to the direct care of this patient for the period of time indicated, including reviewing relevant labs and imaging, discussing treatment plan with the SICU team, nurses, and primary team at the time of multidisciplinary rounds, and reviewing findings with patient and family. This does not include time devoted to teaching any trainees and to performing any procedures.    JACQUELIN CAI 78y Male admitted to SICU with traumatic SDH now with acute on chronic renal failure, possible seizure, history of CVA    Issues we are addressing today:    Neuro: minimizing sedation, amantadine for neurostim, valproate, delirium precautions, sleep meds as needed at night  CV: optimize fluid status, home antihypertension meds  Respiratory: trach collar around the clock  Nutrition: tube feeds running, banatrol for diarrhea, d/c dignishield  Renal: monitor Is &Os, finasteride for urinary retention, TOV today  ID: abx ongoing  Heme: continue to evaluate for acute blood loss anemia, ASA  Endocrine: Prevent and treat hyperglycemia with insulin as needed    PV: follow pulse exam  Skin: decub precautions, wound care consulted  DVT Prophylaxis   Stress Gastritis Prophylaxis   Mobility: PT/OT as tolerated    safe for transfer out of ICU    The above note is NOT written at the time of rounds and will reflect all changes throughout management of the patient for the day note is written for.    Warner Edwards MD, D.BRENDAN.

## 2024-01-04 NOTE — PROGRESS NOTE ADULT - SUBJECTIVE AND OBJECTIVE BOX
GENERAL SURGERY PROGRESS NOTE    Patient: JACQUELIN CAI , 78y ([birth year])Male   MRN: [id number]  Location: [address]  Visit: 12-16-23 Inpatient  Date: 01-04-24 @ 15:25    Hospital Day #: 21  Post-Op Day #: 13    Procedure/Dx/Injuries: SDH    Events of past 24 hours: Working with case management on disposition, on T piece.     PAST MEDICAL & SURGICAL HISTORY:  HTN (hypertension)  Seasonal allergies  History of BPH  Diabetes  No significant past surgical history      Vitals:   T(F): 98.7 (01-04-24 @ 12:00), Max: 98.7 (01-03-24 @ 20:00)  HR: 75 (01-04-24 @ 12:00)  BP: 106/52 (01-04-24 @ 12:00)  RR: 25 (01-04-24 @ 12:00)  SpO2: 100% (01-04-24 @ 12:00)      Diet, NPO with Tube Feed:   Tube Feeding Modality: Gastrostomy  Nepro with Carb Steady  Total Volume for 24 Hours (mL): 648  Continuous  Starting Tube Feed Rate mL per Hour: 27  Increase Tube Feed Rate by (mL): 0     Every 4 hours  Until Goal Tube Feed Rate (mL per Hour): 27  Tube Feed Duration (in Hours): 24  Free Water Flush  Bolus   Total Volume per Flush (mL): 250   Frequency: Every 6 Hours  Free Water Flush Instructions:  Please flush PEG with 50cc of sterile water before feeds start and 100cc of sterile water after feeds end  Banatrol TF     Qty per Day:  3      Fluids:     I & O's:    01-03-24 @ 07:01  -  01-04-24 @ 07:00  --------------------------------------------------------  IN:    Enteral Tube Flush: 1000 mL    IV PiggyBack: 200 mL    Nepro with Carb Steady: 621 mL    Other (mL): 70 mL  Total IN: 1891 mL    OUT:    Indwelling Catheter - Urethral (mL): 2045 mL    Rectal Tube (mL): 75 mL  Total OUT: 2120 mL    Total NET: -229 mL    PHYSICAL EXAM:  GENERAL: NAD  CHEST/LUNG: Equal chest rise bilaterally   HEART: Regular rate and rhythm  ABDOMEN: Soft, Nontender, Nondistended; peg tube in place.  EXTREMITIES:  No clubbing, cyanosis, or edema      MEDICATIONS  (STANDING):  amantadine 100 milliGRAM(s) Oral <User Schedule>  amLODIPine   Tablet 5 milliGRAM(s) Oral daily  aspirin  chewable 81 milliGRAM(s) Enteral Tube daily  atorvastatin 20 milliGRAM(s) Oral at bedtime  bacitracin   Ointment 1 Application(s) Topical every 12 hours  carvedilol 6.25 milliGRAM(s) Oral every 12 hours  chlorhexidine 0.12% Liquid 15 milliLiter(s) Oral Mucosa two times a day  chlorhexidine 2% Cloths 1 Application(s) Topical <User Schedule>  chlorhexidine 2% Cloths 1 Application(s) Topical <User Schedule>  doxazosin 4 milliGRAM(s) Oral at bedtime  famotidine    Tablet 10 milliGRAM(s) Oral every 48 hours  finasteride 5 milliGRAM(s) Oral daily  guaiFENesin  milliGRAM(s) Oral every 12 hours  heparin   Injectable 5000 Unit(s) SubCutaneous every 8 hours  insulin glargine Injectable (LANTUS) 25 Unit(s) SubCutaneous at bedtime  insulin lispro (ADMELOG) corrective regimen sliding scale   SubCutaneous every 6 hours  ipratropium 17 MICROgram(s) HFA Inhaler 1 Puff(s) Inhalation every 6 hours  multivitamin 1 Tablet(s) Oral daily  piperacillin/tazobactam IVPB.. 3.375 Gram(s) IV Intermittent every 12 hours  sevelamer carbonate Powder 800 milliGRAM(s) Oral three times a day with meals  valproic  acid Syrup 750 milliGRAM(s) Oral every 12 hours  vitamin A &amp; D Ointment 1 Application(s) Topical every 12 hours    MEDICATIONS  (PRN):    DVT PROPHYLAXIS: heparin   Injectable 5000 Unit(s) SubCutaneous every 8 hours    ANTICOAGULATION:   ANTIBIOTICS:  piperacillin/tazobactam IVPB.. 3.375 Gram(s)    LAB/STUDIES:  Labs:  CAPILLARY BLOOD GLUCOSE      POCT Blood Glucose.: 130 mg/dL (04 Jan 2024 11:02)  POCT Blood Glucose.: 156 mg/dL (04 Jan 2024 05:52)  POCT Blood Glucose.: 123 mg/dL (04 Jan 2024 02:21)  POCT Blood Glucose.: 144 mg/dL (03 Jan 2024 21:21)  POCT Blood Glucose.: 135 mg/dL (03 Jan 2024 17:09)                          8.2    11.18 )-----------( 320      ( 03 Jan 2024 20:10 )             25.5       01-03    143  |  109  |  69<HH>  ----------------------------<  128<H>  4.2   |  22  |  2.1<H>    Calcium: 8.1 mg/dL (01-03-24 @ 20:10)    LFTs:             x    | x    | x        ------------------[x       ( 03 Jan 2024 05:35 )  2.6  | x    | x             ABG - ( 30 Dec 2023 05:45 )  pH: 7.47  /  pCO2: 28    /  pO2: 177   / HCO3: 20    / Base Excess: -2.0  /  SaO2: 99.3      ABG - ( 29 Dec 2023 04:15 )  pH: 7.48  /  pCO2: 27    /  pO2: 173   / HCO3: 20    / Base Excess: -2.0  /  SaO2: 99.7      Urinalysis Basic - ( 03 Jan 2024 20:10 )    Color: x / Appearance: x / SG: x / pH: x  Gluc: 128 mg/dL / Ketone: x  / Bili: x / Urobili: x   Blood: x / Protein: x / Nitrite: x   Leuk Esterase: x / RBC: x / WBC x   Sq Epi: x / Non Sq Epi: x / Bacteria: x      IMAGING:  n/a    ACCESS/ DEVICES:  [x ] Peripheral IV   GENERAL SURGERY PROGRESS NOTE    Patient: JACQUELIN CAI , 78y ([birth year])Male   MRN: [id number]  Location: [address]  Visit: 12-16-23 Inpatient  Date: 01-04-24 @ 15:25    Hospital Day #: 21  Post-Op Day #: 13    Procedure/Dx/Injuries: SDH    Events of past 24 hours: Working with case management on disposition, on T piece.     PAST MEDICAL & SURGICAL HISTORY:  HTN (hypertension)  Seasonal allergies  History of BPH  Diabetes  No significant past surgical history      Vitals:   T(F): 98.7 (01-04-24 @ 12:00), Max: 98.7 (01-03-24 @ 20:00)  HR: 75 (01-04-24 @ 12:00)  BP: 106/52 (01-04-24 @ 12:00)  RR: 25 (01-04-24 @ 12:00)  SpO2: 100% (01-04-24 @ 12:00)      Diet, NPO with Tube Feed:   Tube Feeding Modality: Gastrostomy  Nepro with Carb Steady  Total Volume for 24 Hours (mL): 648  Continuous  Starting Tube Feed Rate mL per Hour: 27  Increase Tube Feed Rate by (mL): 0     Every 4 hours  Until Goal Tube Feed Rate (mL per Hour): 27  Tube Feed Duration (in Hours): 24  Free Water Flush  Bolus   Total Volume per Flush (mL): 250   Frequency: Every 6 Hours  Free Water Flush Instructions:  Please flush PEG with 50cc of sterile water before feeds start and 100cc of sterile water after feeds end  Banatrol TF     Qty per Day:  3      Fluids:     I & O's:    01-03-24 @ 07:01  -  01-04-24 @ 07:00  --------------------------------------------------------  IN:    Enteral Tube Flush: 1000 mL    IV PiggyBack: 200 mL    Nepro with Carb Steady: 621 mL    Other (mL): 70 mL  Total IN: 1891 mL    OUT:    Indwelling Catheter - Urethral (mL): 2045 mL    Rectal Tube (mL): 75 mL  Total OUT: 2120 mL    Total NET: -229 mL    PHYSICAL EXAM:  GENERAL: NAD  CHEST/LUNG: Equal chest rise bilaterally   HEART: Regular rate and rhythm  ABDOMEN: Soft, Nontender, Nondistended; peg tube in place.  EXTREMITIES:  No clubbing, cyanosis, or edema      MEDICATIONS  (STANDING):  amantadine 100 milliGRAM(s) Oral <User Schedule>  amLODIPine   Tablet 5 milliGRAM(s) Oral daily  aspirin  chewable 81 milliGRAM(s) Enteral Tube daily  atorvastatin 20 milliGRAM(s) Oral at bedtime  bacitracin   Ointment 1 Application(s) Topical every 12 hours  carvedilol 6.25 milliGRAM(s) Oral every 12 hours  chlorhexidine 0.12% Liquid 15 milliLiter(s) Oral Mucosa two times a day  chlorhexidine 2% Cloths 1 Application(s) Topical <User Schedule>  chlorhexidine 2% Cloths 1 Application(s) Topical <User Schedule>  doxazosin 4 milliGRAM(s) Oral at bedtime  famotidine    Tablet 10 milliGRAM(s) Oral every 48 hours  finasteride 5 milliGRAM(s) Oral daily  guaiFENesin  milliGRAM(s) Oral every 12 hours  heparin   Injectable 5000 Unit(s) SubCutaneous every 8 hours  insulin glargine Injectable (LANTUS) 25 Unit(s) SubCutaneous at bedtime  insulin lispro (ADMELOG) corrective regimen sliding scale   SubCutaneous every 6 hours  ipratropium 17 MICROgram(s) HFA Inhaler 1 Puff(s) Inhalation every 6 hours  multivitamin 1 Tablet(s) Oral daily  piperacillin/tazobactam IVPB.. 3.375 Gram(s) IV Intermittent every 12 hours  sevelamer carbonate Powder 800 milliGRAM(s) Oral three times a day with meals  valproic  acid Syrup 750 milliGRAM(s) Oral every 12 hours  vitamin A &amp; D Ointment 1 Application(s) Topical every 12 hours    MEDICATIONS  (PRN):    DVT PROPHYLAXIS: heparin   Injectable 5000 Unit(s) SubCutaneous every 8 hours    ANTICOAGULATION:   ANTIBIOTICS:  piperacillin/tazobactam IVPB.. 3.375 Gram(s)    LAB/STUDIES:  Labs:  CAPILLARY BLOOD GLUCOSE      POCT Blood Glucose.: 130 mg/dL (04 Jan 2024 11:02)  POCT Blood Glucose.: 156 mg/dL (04 Jan 2024 05:52)  POCT Blood Glucose.: 123 mg/dL (04 Jan 2024 02:21)  POCT Blood Glucose.: 144 mg/dL (03 Jan 2024 21:21)  POCT Blood Glucose.: 135 mg/dL (03 Jan 2024 17:09)                          8.2    11.18 )-----------( 320      ( 03 Jan 2024 20:10 )             25.5       01-03    143  |  109  |  69<HH>  ----------------------------<  128<H>  4.2   |  22  |  2.1<H>    Calcium: 8.1 mg/dL (01-03-24 @ 20:10)    LFTs:             x    | x    | x        ------------------[x       ( 03 Jan 2024 05:35 )  2.6  | x    | x             ABG - ( 30 Dec 2023 05:45 )  pH: 7.47  /  pCO2: 28    /  pO2: 177   / HCO3: 20    / Base Excess: -2.0  /  SaO2: 99.3      ABG - ( 29 Dec 2023 04:15 )  pH: 7.48  /  pCO2: 27    /  pO2: 173   / HCO3: 20    / Base Excess: -2.0  /  SaO2: 99.7      Urinalysis Basic - ( 03 Jan 2024 20:10 )    Color: x / Appearance: x / SG: x / pH: x  Gluc: 128 mg/dL / Ketone: x  / Bili: x / Urobili: x   Blood: x / Protein: x / Nitrite: x   Leuk Esterase: x / RBC: x / WBC x   Sq Epi: x / Non Sq Epi: x / Bacteria: x      IMAGING:  n/a    ACCESS/ DEVICES:  [x ] Peripheral IV

## 2024-01-04 NOTE — PROGRESS NOTE ADULT - ASSESSMENT
Assessment		  78y Male s/p mechanical fall. +HT, +AC (Aspirin and Plavix), -LOC.    12/21-intubated for airway protection, accessory muscle use  12/22- s/p tracheostomy and PEG placement, Portex cuffed 9 and 20fr PEG 2cm at skin     NEURO:  #Acute SDH   -12/18 HCT#4 -stable SDH, no new acute findings  -12/27: Pt obtunded, not responding to noxious stim - VPA/LFTs/BMP/ammonia ordered, CTH f/up: stable  - Q4 neuro checks  - amantadine, 100mg q48h. renal dosing  - NSGY: 1/1: "Risk vs benefits should be weighed and discussed with patient prior to initiation of Plavix". --> will not be restarting plavix per Dr. Degroot  #h/o stroke (2/2023) w/residual right sided weakness  - RIGHT anterior thalamus infarct,  LEFT caudate head lacunar infarct  #focal seizure    - vEEG No seizures; Discontinued 12/19    - Neurology cs- d/c EEG, 2 days prior to discharge call neurology so they can do a spot EEG --> recalled on 12/28 to evaluate diminished mental status, recommend repeat EEG and continue depakote 500 q12    - Valproic acid 750mg q12h x 1 week    RESP:   #Respiratory Failure secondary to impaired secretion clearance  - s/p tracheostomy 12/22, size 9 cuffed portex  - Tolerating t-piece since 12/29   - chest PT q4  - guaifenisen q12 to thin trachael secretions  - deep tracheal aspirate cx sent (due to fevers)  - Culture - Sputum . (12.21.23 @ 18:14)-  Numerous Staphylococcus aureus --> 12/28 repeat NGTD  #Activity    -increase as tolerated    -aspiration precautions, HOB 30    CARDS:   #acute HYPOtension intraop, resolved  -off levophed since 12/22 PM  #hx of HTN  -restarting home coreg and amlodipine  -Valsartan 320mg HOLDING   #hx HLD  -atorvastatin  #NSVT    -EP/Cards: No arrhythmias on tele only artifact. No further work up needed. Recommend ILR prior to discharge if patient/family agreeable  Imaging:   Echo: 12/2023: EF 66%, G1DD, trace MR, mild TR     GI/NUTR:   #Diet, NPO with Tube Feed via 20Fr PEG placed 12/22   - TF: Nepro @ 27cc/hr x 24hrs, 250cc FWF q6h + Banatrol  - multivitamin QD  #GI Prophylaxis  - famotidine 10mg PO q48  #Bowel regimen     -Multiple loose bowel movements, last dose senna 12/19 at 2200 > dignishield placed 12/23, d/c'd 1/3.    - C diff negative     - No bowel regimen    /RENAL:   #urine output   - Failed TOV, martinez left in place 1/2  - Started finasteride    - UA neg 12/25 (r/o UTI in setting of persistent fevers)   - Sodium goal 140-150  - f/u nephro recs- Hypernatremia due to free water losses. Avoid diuretics, would not use Metolazone to cause volume depletion, and has min effect in advanced kidney dysfunction   #Diuresis  - Last diuresed 12/29: metolazone 10 x1 for hypernatremia  #Hypernatremia - improved  - FWD 1.3 L, FWF 250q6    Labs:          BUN/Cr- 79/2.2  -->,  70/2.0  -->          Electrolytes-Na 145 // K 4.4 // Mg 2.6 //  Phos 4.8 (01-03 @ 05:35)  #hyperphosphatemia  - Started sevelamer powder, non-formulary so as not to clog PEG   #CKD   - baseline Cr 2.1  - holding home sodium bicarb 650mg BID  #hx BPH  - cardura 4mg (flomax at home, non crushable)  - finasteride 5mg PO QD    HEME/ONC:   #DVT prophylaxis     -SCDs, HSQ  #hx of CVA    - ASA81 mg cleared by NSGY    - NSGY: "Risk vs benefits should be weighed and discussed with patient prior to initiation of Plavix."   - continue to hold home plavix    Labs:   Labs: Hb/Hct:  9.5/29.8  -->,  8.3/25.9  -->                      Plts:  440  -->,  371  -->                 PTT/INR:        ID:  WBC- 11.58  --->>,  12.58  --->>,  11.78  --->>  Temp trend- 24hrs T(F): 99.1 (01-03 @ 04:00), Max: 99.1 (01-03 @ 04:00)  Antibiotics-piperacillin/tazobactam IVPB.. 3.375 every 12 hours (to end 1/5)  #recurrent fevers- resolved   - d/c'd Tylenol    PCRs  -MRSA PCR negative (12/22)  -MRSA nares negative (12/28)  -RVP negative (12/28)    Cultures    Bcx 12/18- final neg     Blood cx 12/21- no growth     BCx, 12/28 - NG     Tracheal aspirate 12/21: numerous staph aureus methicillin sensitive    Tracheal aspirate 12/22: staph aureus    Tracheal aspirate 12/28: neg     C.Diff PCR neg    UA 12/25 negative     UA 12/28 negative    Current antibiotics:  - Vancomycin 500mg x1 dose (12/28)  - Zosyn 3.375 q12 (started 12/28, stop 1/5)  #multiple loose bowel movements    -no bowel regimen, adding banatrol    ENDO:  #DM     -ISS     - hgb A1C 7.0     -Off insulin gtt 12/20      -Lantus 25 units HS     -FSG q6 while on TF  #AMS possibly due to hypothyroidism      - TSH - 4.16, T4 - 5.9, T3 - 79, WNL    #MSK:     Activity - Increase As Tolerated    - B/L knee X ray 12/19- no fractures, b/l effusions    - recalled PT for reassessment, pending    WOUND:  - L sacral stage 2 ulcer, wound care recs appreciated.    LINES/DRAINS:  PIV, right basilic midline (placed 12/16), 20Fr PEG (12/22), Tracheostomy (12/22)    ADVANCED DIRECTIVES:  Full Code  -  HCP/Emergency Contact-Sarah Gamino (Wife) [phone], Cantonese speaking    DISPO:  - Social work, case management consult for dispo to vent facility - spoke with social work 12/23, starting to work on D/C.  - SW update, 12/29: pt remains acute. Will reassess 24-48h prior to d/c     Assessment		  78y Male s/p mechanical fall. +HT, +AC (Aspirin and Plavix), -LOC.    12/21-intubated for airway protection, accessory muscle use  12/22- s/p tracheostomy and PEG placement, Portex cuffed 9 and 20fr PEG 2cm at skin     NEURO:  #Acute SDH   -12/18 HCT#4 -stable SDH, no new acute findings  -12/27: Pt obtunded, not responding to noxious stim - VPA/LFTs/BMP/ammonia ordered, CTH f/up: stable  - Q4 neuro checks  - amantadine, 100mg q48h. renal dosing  - NSGY: 1/1: "Risk vs benefits should be weighed and discussed with patient prior to initiation of Plavix". --> will not be restarting plavix per Dr. Degroot  #h/o stroke (2/2023) w/residual right sided weakness  - RIGHT anterior thalamus infarct,  LEFT caudate head lacunar infarct  #focal seizure    - vEEG No seizures; Discontinued 12/19    - Neurology cs- d/c EEG, 2 days prior to discharge call neurology so they can do a spot EEG --> recalled on 12/28 to evaluate diminished mental status, recommend repeat EEG and continue depakote 500 q12    - Valproic acid 750mg q12h x 1 week    RESP:   #Respiratory Failure secondary to impaired secretion clearance  - s/p tracheostomy 12/22, size 9 cuffed portex  - Tolerating t-piece since 12/29   - chest PT q4  - guaifenisen q12 to thin trachael secretions  - deep tracheal aspirate cx sent (due to fevers)  - Culture - Sputum . (12.21.23 @ 18:14)-  Numerous Staphylococcus aureus --> 12/28 repeat NGTD  #Activity    -increase as tolerated    -aspiration precautions, HOB 30    CARDS:   #acute HYPOtension intraop, resolved  -off levophed since 12/22 PM  #hx of HTN  -restarting home coreg and amlodipine  -Valsartan 320mg HOLDING   #hx HLD  -atorvastatin  #NSVT    -EP/Cards: No arrhythmias on tele only artifact. No further work up needed. Recommend ILR prior to discharge if patient/family agreeable  Imaging:   Echo: 12/2023: EF 66%, G1DD, trace MR, mild TR     GI/NUTR:   #Diet, NPO with Tube Feed via 20Fr PEG placed 12/22   - TF: Nepro @ 27cc/hr x 24hrs, 250cc FWF q6h + Banatrol  - multivitamin QD  #GI Prophylaxis  - famotidine 10mg PO q48  #Bowel regimen     -Multiple loose bowel movements, last dose senna 12/19 at 2200 > dignishield placed 12/23, d/c'd 1/3.    - C diff negative     - No bowel regimen    /RENAL:   #urine output   - Failed TOV, martinez left in place 1/2  - Started finasteride    - UA neg 12/25 (r/o UTI in setting of persistent fevers)   - Sodium goal 140-150  - f/u nephro recs- Hypernatremia due to free water losses. Avoid diuretics, would not use Metolazone to cause volume depletion, and has min effect in advanced kidney dysfunction   #Diuresis  - Last diuresed 12/29: metolazone 10 x1 for hypernatremia  #Hypernatremia - improved  - FWD 1.3 L, FWF 250q6    Labs:          BUN/Cr- 79/2.2  -->,  70/2.0  -->          Electrolytes-Na 145 // K 4.4 // Mg 2.6 //  Phos 4.8 (01-03 @ 05:35)  #hyperphosphatemia  - Started sevelamer powder, non-formulary so as not to clog PEG   #CKD   - baseline Cr 2.1  - holding home sodium bicarb 650mg BID  #hx BPH  - cardura 4mg (flomax at home, non crushable)  - finasteride 5mg PO QD    HEME/ONC:   #DVT prophylaxis     -SCDs, HSQ  #hx of CVA    - ASA81 mg cleared by NSGY    - NSGY: "Risk vs benefits should be weighed and discussed with patient prior to initiation of Plavix."   - continue to hold home plavix    Labs:   Labs: Hb/Hct:  9.5/29.8  -->,  8.3/25.9  -->                      Plts:  440  -->,  371  -->                 PTT/INR:        ID:  WBC- 11.58  --->>,  12.58  --->>,  11.78  --->>  Temp trend- 24hrs T(F): 99.1 (01-03 @ 04:00), Max: 99.1 (01-03 @ 04:00)  Antibiotics-piperacillin/tazobactam IVPB.. 3.375 every 12 hours (to end 1/5)  #recurrent fevers- resolved   - d/c'd Tylenol    PCRs  -MRSA PCR negative (12/22)  -MRSA nares negative (12/28)  -RVP negative (12/28)    Cultures    Bcx 12/18- final neg     Blood cx 12/21- no growth     BCx, 12/28 - NG     Tracheal aspirate 12/21: numerous staph aureus methicillin sensitive    Tracheal aspirate 12/22: staph aureus    Tracheal aspirate 12/28: neg     C.Diff PCR neg    UA 12/25 negative     UA 12/28 negative    Current antibiotics:  - Vancomycin 500mg x1 dose (12/28)  - Zosyn 3.375 q12 (started 12/28, stop 1/5)  #multiple loose bowel movements    -no bowel regimen, adding banatrol    ENDO:  #DM     -ISS     - hgb A1C 7.0     -Off insulin gtt 12/20      -Lantus 25 units HS     -FSG q6 while on TF  #AMS possibly due to hypothyroidism      - TSH - 4.16, T4 - 5.9, T3 - 79, WNL    #MSK:     Activity - Increase As Tolerated    - B/L knee X ray 12/19- no fractures, b/l effusions    - recalled PT for reassessment, pending    WOUND:  - L sacral stage 2 ulcer, wound care recs appreciated.    LINES/DRAINS:  PIV, right basilic midline (placed 12/16), 20Fr PEG (12/22), Tracheostomy (12/22)    ADVANCED DIRECTIVES:  Full Code  -  HCP/Emergency Contact-Sarah Gamino (Wife) [phone], Cantonese speaking    DISPO:  - Social work, case management consult for dispo to vent facility - spoke with social work 12/23, starting to work on D/C.  - SW update, 12/29: pt remains acute. Will reassess 24-48h prior to d/c     Assessment		  78y Male s/p mechanical fall. +HT, +AC (Aspirin and Plavix), -LOC.    12/21-intubated for airway protection, accessory muscle use  12/22- s/p tracheostomy and PEG placement, Portex cuffed 9 and 20fr PEG 2cm at skin     NEURO:  #Acute SDH   -12/18 HCT#4 -stable SDH, no new acute findings  -12/27: Pt obtunded, not responding to noxious stim - VPA/LFTs/BMP/ammonia ordered, CTH f/up: stable  - Q4 neuro checks  - amantadine, 100mg q48h. renal dosing  - NSGY: 1/1: "Risk vs benefits should be weighed and discussed with patient prior to initiation of Plavix". --> will not be restarting plavix per Dr. Degroot  #h/o stroke (2/2023) w/residual right sided weakness  - RIGHT anterior thalamus infarct,  LEFT caudate head lacunar infarct  #focal seizure    - vEEG No seizures; Discontinued 12/19    - Neurology cs- d/c EEG, 2 days prior to discharge call neurology so they can do a spot EEG --> recalled on 12/28 to evaluate diminished mental status, recommend repeat EEG and continue depakote 500 q12    - Valproic acid 750mg q12h x 1 week    RESP:   #Respiratory Failure secondary to impaired secretion clearance  - s/p tracheostomy 12/22, size 9 cuffed portex  - Tolerating t-piece since 12/29   - chest PT q4  - guaifenisen q12 to thin trachael secretions  #Activity    -increase as tolerated    -aspiration precautions, HOB 30    CARDS:   #acute HYPOtension intraop, resolved  -off levophed since 12/22 PM  #hx of HTN  -restarting home coreg and amlodipine  -Valsartan 320mg HOLDING   #hx HLD  -atorvastatin  #NSVT    -EP/Cards: No arrhythmias on tele only artifact. No further work up needed. Recommend ILR prior to discharge if patient/family agreeable  Imaging:   Echo: 12/2023: EF 66%, G1DD, trace MR, mild TR     GI/NUTR:   #Diet, NPO with Tube Feed via 20Fr PEG placed 12/22   - TF: Nepro @ 27cc/hr x 24hrs, 250cc FWF q6h + Banatrol  - multivitamin QD  #GI Prophylaxis  - famotidine 10mg PO q48  #Bowel regimen     -not indicated    -d/c rectal tube 1/3    - C diff negative     /RENAL:   #urine output   - Failed TOV, martinez left in place 1/2  - Started finasteride    - UA neg 12/25 (r/o UTI in setting of persistent fevers)   - Sodium goal 140-150  - nephro recs appreciated  #Diuresis  - Last diuresed 12/29: metolazone 10 x1 for hypernatremia  #Hypernatremia - improved  - FWD 1.3 L, FWF 250q6    Labs:          BUN/Cr- 70/2.0  -->,  69/2.1  -->          Electrolytes-Na 143 // K 4.2 // Mg 2.6 //  Phos 5.0 (01-03 @ 20:10)    #hyperphosphatemia  - Started sevelamer powder   #CKD   - baseline Cr 2.1  - holding home sodium bicarb 650mg BID  #hx BPH  - cardura 4mg (flomax at home, non crushable)    HEME/ONC:   #hx of CVA    - ASA81 mg cleared by NSGY   - continue to hold home plavix  #DVT prophylaxis    -heparin   Injectable  , SCDs    Labs: Hb/Hct:  8.3/25.9  -->,  8.2/25.5  -->                      Plts:  371  -->,  320  -->                 PTT/INR:      T&S pending    ID:  WBC- 12.58  --->>,  11.78  --->>,  11.18  --->>  Temp trend- 24hrs T(F): 98.1 (01-04 @ 00:00), Max: 98.7 (01-03 @ 20:00)  Antibiotics-piperacillin/tazobactam IVPB.. 3.375 every 12 hours (to end 1/5)    PCRs  -MRSA PCR negative (12/22)  -MRSA nares negative (12/28)  -RVP negative (12/28)    Cultures    Bcx 12/18- final neg     Blood cx 12/21- no growth     BCx, 12/28 - NG     Tracheal aspirate 12/21: numerous staph aureus methicillin sensitive    Tracheal aspirate 12/22: staph aureus    Tracheal aspirate 12/28: neg     C.Diff PCR neg    UA 12/25 negative     UA 12/28 negative    ENDO:  #DM     -ISS     - hgb A1C 7.0     -Off insulin gtt 12/20      -Lantus 25 units HS     -FSG q6 while on TF  #AMS possibly due to hypothyroidism      - TSH - 4.16, T4 - 5.9, T3 - 79, WNL    #MSK:     Activity - Increase As Tolerated    - B/L knee X ray 12/19- no fractures, b/l effusions    - recalled PT for reassessment, pending    WOUND:  - L sacral stage 2 ulcer, wound care recs appreciated.    LINES/DRAINS:  PIV, right basilic midline (placed 12/16), 20Fr PEG (12/22), Tracheostomy (12/22)    ADVANCED DIRECTIVES:  Full Code  -  HCP/Emergency Contact-Sarah Gamino (Wife) [phone], Cantonese speaking    DISPO:  - Social work, case management consult for dispo to vent facility - spoke with social work 12/23, starting to work on D/C.  - SW update, 12/29: pt remains acute. Will reassess 24-48h prior to d/c Assessment		  78y Male s/p mechanical fall. +HT, +AC (Aspirin and Plavix), -LOC.    12/21-intubated for airway protection, accessory muscle use  12/22- s/p tracheostomy and PEG placement, Portex cuffed 9 and 20fr PEG 2cm at skin     NEURO:  #Acute SDH   -12/18 HCT#4 -stable SDH, no new acute findings  -12/27: Pt obtunded, not responding to noxious stim - VPA/LFTs/BMP/ammonia ordered, CTH f/up: stable  - Q4 neuro checks  - amantadine, 100mg q48h. renal dosing  - NSGY: 1/1: "Risk vs benefits should be weighed and discussed with patient prior to initiation of Plavix". --> will not be restarting plavix per Dr. Degroot  #h/o stroke (2/2023) w/residual right sided weakness  - RIGHT anterior thalamus infarct,  LEFT caudate head lacunar infarct  #focal seizure    - vEEG No seizures; Discontinued 12/19    - Neurology cs- d/c EEG, 2 days prior to discharge call neurology so they can do a spot EEG --> recalled on 12/28 to evaluate diminished mental status, recommend repeat EEG and continue depakote 500 q12    - Valproic acid 750mg q12h x 1 week    RESP:   #Respiratory Failure secondary to impaired secretion clearance  - s/p tracheostomy 12/22, size 9 cuffed portex  - Tolerating t-piece since 12/29   - chest PT q4  - guaifenisen q12 to thin trachael secretions  #Activity    -increase as tolerated    -aspiration precautions, HOB 30    CARDS:   #acute HYPOtension intraop, resolved  -off levophed since 12/22 PM  #hx of HTN  -restarting home coreg and amlodipine  -Valsartan 320mg HOLDING   #hx HLD  -atorvastatin  #NSVT    -EP/Cards: No arrhythmias on tele only artifact. No further work up needed. Recommend ILR prior to discharge if patient/family agreeable  Imaging:   Echo: 12/2023: EF 66%, G1DD, trace MR, mild TR     GI/NUTR:   #Diet, NPO with Tube Feed via 20Fr PEG placed 12/22   - TF: Nepro @ 27cc/hr x 24hrs, 250cc FWF q6h + Banatrol  - multivitamin QD  #GI Prophylaxis  - famotidine 10mg PO q48  #Bowel regimen     -not indicated    -d/c rectal tube 1/3    - C diff negative     /RENAL:   #urine output   - Failed TOV, martinez left in place 1/2  - Started finasteride    - UA neg 12/25 (r/o UTI in setting of persistent fevers)   - Sodium goal 140-150  - nephro recs appreciated  #Diuresis  - Last diuresed 12/29: metolazone 10 x1 for hypernatremia  #Hypernatremia - improved  - FWD 1.3 L, FWF 250q6    Labs:          BUN/Cr- 70/2.0  -->,  69/2.1  -->          Electrolytes-Na 143 // K 4.2 // Mg 2.6 //  Phos 5.0 (01-03 @ 20:10)    #hyperphosphatemia  - Started sevelamer powder   #CKD   - baseline Cr 2.1  - holding home sodium bicarb 650mg BID  #hx BPH  - cardura 4mg (flomax at home, non crushable)    HEME/ONC:   #hx of CVA    - ASA81 mg cleared by NSGY   - continue to hold home plavix  #DVT prophylaxis    -heparin   Injectable  , SCDs    Labs: Hb/Hct:  8.3/25.9  -->,  8.2/25.5  -->                      Plts:  371  -->,  320  -->                 PTT/INR:      T&S pending    ID:  WBC- 12.58  --->>,  11.78  --->>,  11.18  --->>  Temp trend- 24hrs T(F): 98.1 (01-04 @ 00:00), Max: 98.7 (01-03 @ 20:00)  Antibiotics-piperacillin/tazobactam IVPB.. 3.375 every 12 hours (to end 1/5)    PCRs  -MRSA PCR negative (12/22)  -MRSA nares negative (12/28)  -RVP negative (12/28)    Cultures    Bcx 12/18- final neg     Blood cx 12/21- no growth     BCx, 12/28 - NG     Tracheal aspirate 12/21: numerous staph aureus methicillin sensitive    Tracheal aspirate 12/22: staph aureus    Tracheal aspirate 12/28: neg     C.Diff PCR neg    UA 12/25 negative     UA 12/28 negative    ENDO:  #DM     -ISS     - hgb A1C 7.0     -Off insulin gtt 12/20      -Lantus 25 units HS     -FSG q6 while on TF  #AMS possibly due to hypothyroidism      - TSH - 4.16, T4 - 5.9, T3 - 79, WNL    #MSK:     Activity - Increase As Tolerated    - B/L knee X ray 12/19- no fractures, b/l effusions    - recalled PT for reassessment, pending    WOUND:  - L sacral stage 2 ulcer, wound care recs appreciated.    LINES/DRAINS:  PIV, right basilic midline (placed 12/16), 20Fr PEG (12/22), Tracheostomy (12/22)    ADVANCED DIRECTIVES:  Full Code  -  HCP/Emergency Contact-Sarah Gamino (Wife) [phone], Cantonese speaking    DISPO:  - Social work, case management consult for dispo to vent facility - spoke with social work 12/23, starting to work on D/C.  - SW update, 12/29: pt remains acute. Will reassess 24-48h prior to d/c

## 2024-01-05 LAB
ANION GAP SERPL CALC-SCNC: 12 MMOL/L — SIGNIFICANT CHANGE UP (ref 7–14)
ANION GAP SERPL CALC-SCNC: 12 MMOL/L — SIGNIFICANT CHANGE UP (ref 7–14)
BUN SERPL-MCNC: 63 MG/DL — CRITICAL HIGH (ref 10–20)
BUN SERPL-MCNC: 63 MG/DL — CRITICAL HIGH (ref 10–20)
CALCIUM SERPL-MCNC: 8.2 MG/DL — LOW (ref 8.4–10.5)
CALCIUM SERPL-MCNC: 8.2 MG/DL — LOW (ref 8.4–10.5)
CHLORIDE SERPL-SCNC: 113 MMOL/L — HIGH (ref 98–110)
CHLORIDE SERPL-SCNC: 113 MMOL/L — HIGH (ref 98–110)
CO2 SERPL-SCNC: 22 MMOL/L — SIGNIFICANT CHANGE UP (ref 17–32)
CO2 SERPL-SCNC: 22 MMOL/L — SIGNIFICANT CHANGE UP (ref 17–32)
CREAT SERPL-MCNC: 1.8 MG/DL — HIGH (ref 0.7–1.5)
CREAT SERPL-MCNC: 1.8 MG/DL — HIGH (ref 0.7–1.5)
EGFR: 38 ML/MIN/1.73M2 — LOW
EGFR: 38 ML/MIN/1.73M2 — LOW
GLUCOSE SERPL-MCNC: 92 MG/DL — SIGNIFICANT CHANGE UP (ref 70–99)
GLUCOSE SERPL-MCNC: 92 MG/DL — SIGNIFICANT CHANGE UP (ref 70–99)
HCT VFR BLD CALC: 29.4 % — LOW (ref 42–52)
HCT VFR BLD CALC: 29.4 % — LOW (ref 42–52)
HGB BLD-MCNC: 9.4 G/DL — LOW (ref 14–18)
HGB BLD-MCNC: 9.4 G/DL — LOW (ref 14–18)
MAGNESIUM SERPL-MCNC: 2.5 MG/DL — HIGH (ref 1.8–2.4)
MAGNESIUM SERPL-MCNC: 2.5 MG/DL — HIGH (ref 1.8–2.4)
MCHC RBC-ENTMCNC: 29.3 PG — SIGNIFICANT CHANGE UP (ref 27–31)
MCHC RBC-ENTMCNC: 29.3 PG — SIGNIFICANT CHANGE UP (ref 27–31)
MCHC RBC-ENTMCNC: 32 G/DL — SIGNIFICANT CHANGE UP (ref 32–37)
MCHC RBC-ENTMCNC: 32 G/DL — SIGNIFICANT CHANGE UP (ref 32–37)
MCV RBC AUTO: 91.6 FL — SIGNIFICANT CHANGE UP (ref 80–94)
MCV RBC AUTO: 91.6 FL — SIGNIFICANT CHANGE UP (ref 80–94)
NRBC # BLD: 0 /100 WBCS — SIGNIFICANT CHANGE UP (ref 0–0)
NRBC # BLD: 0 /100 WBCS — SIGNIFICANT CHANGE UP (ref 0–0)
PHOSPHATE SERPL-MCNC: 5.1 MG/DL — HIGH (ref 2.1–4.9)
PHOSPHATE SERPL-MCNC: 5.1 MG/DL — HIGH (ref 2.1–4.9)
PLATELET # BLD AUTO: 338 K/UL — SIGNIFICANT CHANGE UP (ref 130–400)
PLATELET # BLD AUTO: 338 K/UL — SIGNIFICANT CHANGE UP (ref 130–400)
PMV BLD: 8.5 FL — SIGNIFICANT CHANGE UP (ref 7.4–10.4)
PMV BLD: 8.5 FL — SIGNIFICANT CHANGE UP (ref 7.4–10.4)
POTASSIUM SERPL-MCNC: 4.9 MMOL/L — SIGNIFICANT CHANGE UP (ref 3.5–5)
POTASSIUM SERPL-MCNC: 4.9 MMOL/L — SIGNIFICANT CHANGE UP (ref 3.5–5)
POTASSIUM SERPL-SCNC: 4.9 MMOL/L — SIGNIFICANT CHANGE UP (ref 3.5–5)
POTASSIUM SERPL-SCNC: 4.9 MMOL/L — SIGNIFICANT CHANGE UP (ref 3.5–5)
RBC # BLD: 3.21 M/UL — LOW (ref 4.7–6.1)
RBC # BLD: 3.21 M/UL — LOW (ref 4.7–6.1)
RBC # FLD: 12.7 % — SIGNIFICANT CHANGE UP (ref 11.5–14.5)
RBC # FLD: 12.7 % — SIGNIFICANT CHANGE UP (ref 11.5–14.5)
SODIUM SERPL-SCNC: 147 MMOL/L — HIGH (ref 135–146)
SODIUM SERPL-SCNC: 147 MMOL/L — HIGH (ref 135–146)
WBC # BLD: 11.15 K/UL — HIGH (ref 4.8–10.8)
WBC # BLD: 11.15 K/UL — HIGH (ref 4.8–10.8)
WBC # FLD AUTO: 11.15 K/UL — HIGH (ref 4.8–10.8)
WBC # FLD AUTO: 11.15 K/UL — HIGH (ref 4.8–10.8)

## 2024-01-05 PROCEDURE — 99232 SBSQ HOSP IP/OBS MODERATE 35: CPT

## 2024-01-05 PROCEDURE — 99233 SBSQ HOSP IP/OBS HIGH 50: CPT | Mod: 24,25

## 2024-01-05 RX ADMIN — CHLORHEXIDINE GLUCONATE 15 MILLILITER(S): 213 SOLUTION TOPICAL at 17:39

## 2024-01-05 RX ADMIN — SEVELAMER CARBONATE 800 MILLIGRAM(S): 2400 POWDER, FOR SUSPENSION ORAL at 17:38

## 2024-01-05 RX ADMIN — INSULIN GLARGINE 25 UNIT(S): 100 INJECTION, SOLUTION SUBCUTANEOUS at 23:30

## 2024-01-05 RX ADMIN — ATORVASTATIN CALCIUM 20 MILLIGRAM(S): 80 TABLET, FILM COATED ORAL at 22:58

## 2024-01-05 RX ADMIN — Medication 600 MILLIGRAM(S): at 06:45

## 2024-01-05 RX ADMIN — CARVEDILOL PHOSPHATE 6.25 MILLIGRAM(S): 80 CAPSULE, EXTENDED RELEASE ORAL at 06:42

## 2024-01-05 RX ADMIN — HEPARIN SODIUM 5000 UNIT(S): 5000 INJECTION INTRAVENOUS; SUBCUTANEOUS at 06:43

## 2024-01-05 RX ADMIN — FAMOTIDINE 10 MILLIGRAM(S): 10 INJECTION INTRAVENOUS at 17:38

## 2024-01-05 RX ADMIN — CHLORHEXIDINE GLUCONATE 15 MILLILITER(S): 213 SOLUTION TOPICAL at 06:42

## 2024-01-05 RX ADMIN — HEPARIN SODIUM 5000 UNIT(S): 5000 INJECTION INTRAVENOUS; SUBCUTANEOUS at 22:58

## 2024-01-05 RX ADMIN — SEVELAMER CARBONATE 800 MILLIGRAM(S): 2400 POWDER, FOR SUSPENSION ORAL at 08:27

## 2024-01-05 RX ADMIN — AMLODIPINE BESYLATE 5 MILLIGRAM(S): 2.5 TABLET ORAL at 06:40

## 2024-01-05 RX ADMIN — CHLORHEXIDINE GLUCONATE 1 APPLICATION(S): 213 SOLUTION TOPICAL at 06:45

## 2024-01-05 RX ADMIN — Medication 4 MILLIGRAM(S): at 22:58

## 2024-01-05 RX ADMIN — CHLORHEXIDINE GLUCONATE 1 APPLICATION(S): 213 SOLUTION TOPICAL at 06:43

## 2024-01-05 RX ADMIN — Medication 1 APPLICATION(S): at 06:44

## 2024-01-05 RX ADMIN — Medication 750 MILLIGRAM(S): at 17:39

## 2024-01-05 RX ADMIN — CARVEDILOL PHOSPHATE 6.25 MILLIGRAM(S): 80 CAPSULE, EXTENDED RELEASE ORAL at 17:38

## 2024-01-05 RX ADMIN — SEVELAMER CARBONATE 800 MILLIGRAM(S): 2400 POWDER, FOR SUSPENSION ORAL at 11:47

## 2024-01-05 RX ADMIN — Medication 81 MILLIGRAM(S): at 11:46

## 2024-01-05 RX ADMIN — Medication 750 MILLIGRAM(S): at 06:39

## 2024-01-05 RX ADMIN — Medication 1 APPLICATION(S): at 06:41

## 2024-01-05 RX ADMIN — Medication 1 PUFF(S): at 08:24

## 2024-01-05 RX ADMIN — Medication 1 APPLICATION(S): at 17:39

## 2024-01-05 RX ADMIN — HEPARIN SODIUM 5000 UNIT(S): 5000 INJECTION INTRAVENOUS; SUBCUTANEOUS at 13:49

## 2024-01-05 NOTE — PROGRESS NOTE ADULT - ASSESSMENT
78yM who presented as a TRAUMA CONSULT s/p mechanical fall (+HT -LOC -AC). Found to have an acute subdural hemorrhage. Hospital course has been complicated by multiple seizures and uncontrolled hypertension. Intubated on 12/21 due to worsening respiratory status.     S/P tracheostomy and PEG placement on 12/22/23, (Portex cuffed 9 and 2cm at skin 20fr PEG). Now with T-piece, O2 at 40%.     Downgraded from SICU to floor on 1/4/24.        PLAN:  - Continue Valproate 750mg q12h until 1/8/24   - Chest PT q4h, Duoneb treatments as needed   - Continue TF w/ Nepro @ 27 cc/hr x 24h  - Sevelamer powder for hyperphosphatemia   - ASA 81 QD, HSQ for DVT ppx   - Monitor vital signs, I&O's, O2 requirements  - Daily labs w/ repletion of electrolytes as needed   - Pain control PRN    # Dispo: patient medically cleared for discharge; pending placement to [facility]   ----------------------------  Trauma Team Surgery  [id number]      Date/Time: 01-05-24 @ 12:01 78yM who presented as a TRAUMA CONSULT s/p mechanical fall (+HT -LOC -AC). Found to have an acute subdural hemorrhage. Hospital course has been complicated by multiple seizures and uncontrolled hypertension. Intubated on 12/21 due to worsening respiratory status.     S/P tracheostomy and PEG placement on 12/22/23, (Portex cuffed 9 and 2cm at skin 20fr PEG). Now with T-piece, O2 at 40%.     Downgraded from SICU to floor on 1/4/24.        PLAN:  - Continue Valproate 750mg q12h until 1/8/24   - Chest PT q4h, Duoneb treatments as needed   - Continue TF w/ Nepro @ 27 cc/hr x 24h  - Sevelamer powder for hyperphosphatemia   - ASA 81 QD, HSQ for DVT ppx   - Monitor vital signs, I&O's, O2 requirements  - Daily labs w/ repletion of electrolytes as needed   - Pain control PRN    # Dispo: patient medically cleared for discharge; pending placement to [facility]   ----------------------------  Trauma Team Surgery  [id number]      Date/Time: 01-05-24 @ 12:01

## 2024-01-05 NOTE — PROGRESS NOTE ADULT - SUBJECTIVE AND OBJECTIVE BOX
JACQUELIN CAI  78y, Male  Allergy: [This allergen will not trigger allergy alert] pollen (Unknown)  No Known Drug Allergies    Hospital Day: 20d    Patient seen and examined earlier today.  No acute events overnight.     PMH/PSH:  PAST MEDICAL & SURGICAL HISTORY:  HTN (hypertension)      Seasonal allergies      History of BPH      Diabetes      No significant past surgical history          LAST 24-Hr EVENTS:    VITALS:  T(F): 98.5 (01-05-24 @ 16:00), Max: 98.5 (01-05-24 @ 16:00)  HR: 96 (01-05-24 @ 16:00)  BP: 119/69 (01-05-24 @ 16:00) (119/69 - 146/67)  RR: 18 (01-05-24 @ 16:00)  SpO2: 100% (01-05-24 @ 16:00)          TESTS & MEASUREMENTS:  Weight/BMI      01-03-24 @ 07:01  -  01-04-24 @ 07:00  --------------------------------------------------------  IN: 1891 mL / OUT: 2120 mL / NET: -229 mL    01-04-24 @ 07:01 - 01-05-24 @ 07:00  --------------------------------------------------------  IN: 824 mL / OUT: 1575 mL / NET: -751 mL    01-05-24 @ 07:01  -  01-05-24 @ 17:20  --------------------------------------------------------  IN: 0 mL / OUT: 600 mL / NET: -600 mL                            9.4    11.15 )-----------( 338 ( 05 Jan 2024 06:35 )             29.4       INR: 1.03 ratio (01-01-24 @ 05:25)    01-05    147<H>  |  113<H>  |  63<HH>  ----------------------------<  92  4.9   |  22  |  1.8<H>    Ca    8.2<L>      05 Jan 2024 06:35  Phos  5.1     01-05  Mg     2.5     01-05              Urinalysis Basic - ( 05 Jan 2024 06:35 )    Color: x / Appearance: x / SG: x / pH: x  Gluc: 92 mg/dL / Ketone: x  / Bili: x / Urobili: x   Blood: x / Protein: x / Nitrite: x   Leuk Esterase: x / RBC: x / WBC x   Sq Epi: x / Non Sq Epi: x / Bacteria: x                    A1C with Estimated Average Glucose Result: 7.0 % (01-01-24 @ 22:36)  A1C with Estimated Average Glucose Result: 6.3 % (02-14-23 @ 05:49)      Indwelling Urethral Catheter:     Connect To:  Straight Drainage/Stacy    Indication:  Urinary Retention / Obstruction (01-04-24 @ 03:17) (not performed)  Indwelling Urethral Catheter:     Connect To:  Straight Drainage/Stacy    Indication:  Urinary Retention / Obstruction (01-03-24 @ 02:04) (not performed)      RADIOLOGY, ECG, & ADDITIONAL TESTS:  12 Lead ECG:   Ventricular Rate 101 BPM    Atrial Rate 101 BPM    P-R Interval 204 ms    QRS Duration 62 ms    Q-T Interval 338 ms    QTC Calculation(Bazett) 438 ms    P Axis 58 degrees    R Axis 28 degrees    T Axis 24 degrees    Diagnosis Line Sinus tachycardia  Otherwise normal ECG    Confirmed by ALLEGRA SANABRIA, [facility] ([id number]) on 12/17/2023 12:48:36 AM (12-16-23 @ 05:20)    CT Head No Cont:   ACC: [id number] EXAM:  CT BRAIN   ORDERED BY: SIMONE UPTON     PROCEDURE DATE:  12/31/2023          INTERPRETATION:  CLINICAL INDICATION: Subdural hemorrhage, follow-up    TECHNIQUE: CT of the head was performed without the administration of   intravenous contrast. Coronal and sagittal reformats were obtained.    COMPARISON: CT head 12/27/2023    FINDINGS:  Decreased density of subdural hemorrhage along the left site of the falx,   now measuring 0.7 cm in maximum thickness (previously 0.9 cm). Resolved   subdural hemorrhage along the right frontoparietal convexity. Decreased   size of hypodense extra-axial subdural collection, corresponding to   previously seen subdural hemorrhage, along the left cerebral hemisphere   measuring up to 0.7 cm (previously 0.9 cm). There is mild mass effect on   the left occipital lobe. No midline shift.    Age-related involutional changes and chronic microvascular ischemic   changes. Stable chronic lacunar infarct in the right basal ganglia.   Stable chronic lacunar infarct in the left caudate head.    Redemonstrated ventriculomegaly out of proportion to parenchymal volume   loss, which can be seen with normal pressure hydrocephalus.    The visualized intraorbital contents are unremarkable. The imaged   portions of the paranasal sinuses are aerated. Moderate opacification of   the bilateral mastoid air cells.    No acute depressed calvarial fracture.    IMPRESSION:  Evolving subdural hematomas as detailed above.    --- End of Report ---          PINEDA MENDOZA MD; Resident Radiologist  This document has been electronically signed.  NIKOLE LUO MD; Attending Radiologist  This document has been electronically signed. Dec 31 2023 12:34PM (12-31-23 @ 05:34)  CT Head No Cont:   ACC: [id number] EXAM:  CT BRAIN   ORDERED BY: CATHIE LAWRENCE     PROCEDURE DATE:  12/27/2023          INTERPRETATION:  CLINICAL INDICATION: Follow-up evaluation subdural   hemorrhage. Status post mechanical fall. Altered mental status.    Technique: CT of the head was performed without contrast.    Multiple contiguous axial images were acquired from the skullbase to the   vertex without the administration of intravenous contrast.  Coronal and   sagittal reformations were made.    COMPARISON: CT head 12/18/2023.    FINDINGS:    Decreased density of the previously noted subdural hemorrhages along the   left falx and left tentorium, measuring up to 0.9 cm in maximum thickness   (previously 1.3 cm). Decreased density of the previously noted subdural   hemorrhage along the left cerebral hemisphere measuring up to 0.8 cm   (previously up to 1 cm). The previously noted subdural hemorrhage along   the right frontoparietal convexity measures up to 0.3 cm in thickness   (previously 0.5 cm).    Stable chronic lacunar infarct in the right basal ganglia.    There is prominence of the sulci, sylvian fissures, and ventricles,   reflecting stable moderate diffuse parenchymal volume loss. There is   superimposed ventricular prominence.    There are scattered patchy low attenuations in the bilateral   periventricular cerebral white matter consistent with stable mild chronic   microvascular ischemic changes.    There is no mass effect or midline shift.    The calvarium is intact. Trace fluid in the bilateral mastoid air cells.   Mild mucosal thickening of ethmoid and visualized maxillary sinuses. The   visualized intraorbital compartments appear free of acute disease.    IMPRESSION:    Improving subdural hemorrhage as described.    Stable chronic findings as above.    --- End of Report ---          NELLY DUMONT MD; Resident Radiologist  This document has been electronically signed.  EDY ALVARADO MD; Attending Interventional Radiologist  This document has been electronically signed. Dec 28 2023 10:26AM (12-27-23 @ 20:49)    RECENT DIAGNOSTIC ORDERS:      MEDICATIONS:  MEDICATIONS  (STANDING):  amantadine 100 milliGRAM(s) Oral <User Schedule>  amLODIPine   Tablet 5 milliGRAM(s) Oral daily  aspirin  chewable 81 milliGRAM(s) Enteral Tube daily  atorvastatin 20 milliGRAM(s) Oral at bedtime  bacitracin   Ointment 1 Application(s) Topical every 12 hours  carvedilol 6.25 milliGRAM(s) Oral every 12 hours  chlorhexidine 0.12% Liquid 15 milliLiter(s) Oral Mucosa two times a day  chlorhexidine 2% Cloths 1 Application(s) Topical <User Schedule>  chlorhexidine 2% Cloths 1 Application(s) Topical <User Schedule>  doxazosin 4 milliGRAM(s) Oral at bedtime  famotidine    Tablet 10 milliGRAM(s) Oral every 48 hours  finasteride 5 milliGRAM(s) Oral daily  guaiFENesin  milliGRAM(s) Oral every 12 hours  heparin   Injectable 5000 Unit(s) SubCutaneous every 8 hours  insulin glargine Injectable (LANTUS) 25 Unit(s) SubCutaneous at bedtime  insulin lispro (ADMELOG) corrective regimen sliding scale   SubCutaneous every 6 hours  ipratropium 17 MICROgram(s) HFA Inhaler 1 Puff(s) Inhalation every 6 hours  multivitamin 1 Tablet(s) Oral daily  sevelamer carbonate Powder 800 milliGRAM(s) Oral three times a day with meals  valproic  acid Syrup 750 milliGRAM(s) Oral every 12 hours  vitamin A &amp; D Ointment 1 Application(s) Topical every 12 hours    MEDICATIONS  (PRN):      HOME MEDICATIONS:  amLODIPine 5 mg oral tablet (02-14)  aspirin 81 mg oral tablet (02-14)  atorvastatin 20 mg oral tablet (12-16)  carvedilol 6.25 mg oral tablet (02-14)  clopidogrel 75 mg oral tablet (02-25)  Jardiance 25 mg oral tablet (02-14)  Nephplex Rx oral tablet (02-14)  SITagliptin 50 mg oral tablet (12-16)  sodium bicarbonate 650 mg oral tablet (02-25)  tamsulosin 0.4 mg oral capsule (12-16)  valsartan 320 mg oral tablet (02-14)      PHYSICAL EXAM:  General Appearance: NAD,   HEENT: EOMI, sclera non-icteric.  Heart: Regular rate   Lungs: T piece 40/7  Abdomen:  Soft, nontender, nondistended.   MSK/Extremities: Warm & well-perfused.   Skin: Warm, dry. No jaundice.       JACQUELIN CAI  78y, Male  Allergy: [This allergen will not trigger allergy alert] pollen (Unknown)  No Known Drug Allergies    Hospital Day: 20d    Patient seen and examined earlier today.  No acute events overnight.     PMH/PSH:  PAST MEDICAL & SURGICAL HISTORY:  HTN (hypertension)      Seasonal allergies      History of BPH      Diabetes      No significant past surgical history          LAST 24-Hr EVENTS:    VITALS:  T(F): 98.5 (01-05-24 @ 16:00), Max: 98.5 (01-05-24 @ 16:00)  HR: 96 (01-05-24 @ 16:00)  BP: 119/69 (01-05-24 @ 16:00) (119/69 - 146/67)  RR: 18 (01-05-24 @ 16:00)  SpO2: 100% (01-05-24 @ 16:00)          TESTS & MEASUREMENTS:  Weight/BMI      01-03-24 @ 07:01  -  01-04-24 @ 07:00  --------------------------------------------------------  IN: 1891 mL / OUT: 2120 mL / NET: -229 mL    01-04-24 @ 07:01 - 01-05-24 @ 07:00  --------------------------------------------------------  IN: 824 mL / OUT: 1575 mL / NET: -751 mL    01-05-24 @ 07:01  -  01-05-24 @ 17:20  --------------------------------------------------------  IN: 0 mL / OUT: 600 mL / NET: -600 mL                            9.4    11.15 )-----------( 338 ( 05 Jan 2024 06:35 )             29.4       INR: 1.03 ratio (01-01-24 @ 05:25)    01-05    147<H>  |  113<H>  |  63<HH>  ----------------------------<  92  4.9   |  22  |  1.8<H>    Ca    8.2<L>      05 Jan 2024 06:35  Phos  5.1     01-05  Mg     2.5     01-05              Urinalysis Basic - ( 05 Jan 2024 06:35 )    Color: x / Appearance: x / SG: x / pH: x  Gluc: 92 mg/dL / Ketone: x  / Bili: x / Urobili: x   Blood: x / Protein: x / Nitrite: x   Leuk Esterase: x / RBC: x / WBC x   Sq Epi: x / Non Sq Epi: x / Bacteria: x                    A1C with Estimated Average Glucose Result: 7.0 % (01-01-24 @ 22:36)  A1C with Estimated Average Glucose Result: 6.3 % (02-14-23 @ 05:49)      Indwelling Urethral Catheter:     Connect To:  Straight Drainage/Mamou    Indication:  Urinary Retention / Obstruction (01-04-24 @ 03:17) (not performed)  Indwelling Urethral Catheter:     Connect To:  Straight Drainage/Mamou    Indication:  Urinary Retention / Obstruction (01-03-24 @ 02:04) (not performed)      RADIOLOGY, ECG, & ADDITIONAL TESTS:  12 Lead ECG:   Ventricular Rate 101 BPM    Atrial Rate 101 BPM    P-R Interval 204 ms    QRS Duration 62 ms    Q-T Interval 338 ms    QTC Calculation(Bazett) 438 ms    P Axis 58 degrees    R Axis 28 degrees    T Axis 24 degrees    Diagnosis Line Sinus tachycardia  Otherwise normal ECG    Confirmed by ALLEGRA SANABRIA, [facility] ([id number]) on 12/17/2023 12:48:36 AM (12-16-23 @ 05:20)    CT Head No Cont:   ACC: [id number] EXAM:  CT BRAIN   ORDERED BY: SIMONE UPTON     PROCEDURE DATE:  12/31/2023          INTERPRETATION:  CLINICAL INDICATION: Subdural hemorrhage, follow-up    TECHNIQUE: CT of the head was performed without the administration of   intravenous contrast. Coronal and sagittal reformats were obtained.    COMPARISON: CT head 12/27/2023    FINDINGS:  Decreased density of subdural hemorrhage along the left site of the falx,   now measuring 0.7 cm in maximum thickness (previously 0.9 cm). Resolved   subdural hemorrhage along the right frontoparietal convexity. Decreased   size of hypodense extra-axial subdural collection, corresponding to   previously seen subdural hemorrhage, along the left cerebral hemisphere   measuring up to 0.7 cm (previously 0.9 cm). There is mild mass effect on   the left occipital lobe. No midline shift.    Age-related involutional changes and chronic microvascular ischemic   changes. Stable chronic lacunar infarct in the right basal ganglia.   Stable chronic lacunar infarct in the left caudate head.    Redemonstrated ventriculomegaly out of proportion to parenchymal volume   loss, which can be seen with normal pressure hydrocephalus.    The visualized intraorbital contents are unremarkable. The imaged   portions of the paranasal sinuses are aerated. Moderate opacification of   the bilateral mastoid air cells.    No acute depressed calvarial fracture.    IMPRESSION:  Evolving subdural hematomas as detailed above.    --- End of Report ---          PINEDA MENDOZA MD; Resident Radiologist  This document has been electronically signed.  NIKOLE LUO MD; Attending Radiologist  This document has been electronically signed. Dec 31 2023 12:34PM (12-31-23 @ 05:34)  CT Head No Cont:   ACC: [id number] EXAM:  CT BRAIN   ORDERED BY: CATHIE LAWRENCE     PROCEDURE DATE:  12/27/2023          INTERPRETATION:  CLINICAL INDICATION: Follow-up evaluation subdural   hemorrhage. Status post mechanical fall. Altered mental status.    Technique: CT of the head was performed without contrast.    Multiple contiguous axial images were acquired from the skullbase to the   vertex without the administration of intravenous contrast.  Coronal and   sagittal reformations were made.    COMPARISON: CT head 12/18/2023.    FINDINGS:    Decreased density of the previously noted subdural hemorrhages along the   left falx and left tentorium, measuring up to 0.9 cm in maximum thickness   (previously 1.3 cm). Decreased density of the previously noted subdural   hemorrhage along the left cerebral hemisphere measuring up to 0.8 cm   (previously up to 1 cm). The previously noted subdural hemorrhage along   the right frontoparietal convexity measures up to 0.3 cm in thickness   (previously 0.5 cm).    Stable chronic lacunar infarct in the right basal ganglia.    There is prominence of the sulci, sylvian fissures, and ventricles,   reflecting stable moderate diffuse parenchymal volume loss. There is   superimposed ventricular prominence.    There are scattered patchy low attenuations in the bilateral   periventricular cerebral white matter consistent with stable mild chronic   microvascular ischemic changes.    There is no mass effect or midline shift.    The calvarium is intact. Trace fluid in the bilateral mastoid air cells.   Mild mucosal thickening of ethmoid and visualized maxillary sinuses. The   visualized intraorbital compartments appear free of acute disease.    IMPRESSION:    Improving subdural hemorrhage as described.    Stable chronic findings as above.    --- End of Report ---          NELLY DUMONT MD; Resident Radiologist  This document has been electronically signed.  EDY ALVARADO MD; Attending Interventional Radiologist  This document has been electronically signed. Dec 28 2023 10:26AM (12-27-23 @ 20:49)    RECENT DIAGNOSTIC ORDERS:      MEDICATIONS:  MEDICATIONS  (STANDING):  amantadine 100 milliGRAM(s) Oral <User Schedule>  amLODIPine   Tablet 5 milliGRAM(s) Oral daily  aspirin  chewable 81 milliGRAM(s) Enteral Tube daily  atorvastatin 20 milliGRAM(s) Oral at bedtime  bacitracin   Ointment 1 Application(s) Topical every 12 hours  carvedilol 6.25 milliGRAM(s) Oral every 12 hours  chlorhexidine 0.12% Liquid 15 milliLiter(s) Oral Mucosa two times a day  chlorhexidine 2% Cloths 1 Application(s) Topical <User Schedule>  chlorhexidine 2% Cloths 1 Application(s) Topical <User Schedule>  doxazosin 4 milliGRAM(s) Oral at bedtime  famotidine    Tablet 10 milliGRAM(s) Oral every 48 hours  finasteride 5 milliGRAM(s) Oral daily  guaiFENesin  milliGRAM(s) Oral every 12 hours  heparin   Injectable 5000 Unit(s) SubCutaneous every 8 hours  insulin glargine Injectable (LANTUS) 25 Unit(s) SubCutaneous at bedtime  insulin lispro (ADMELOG) corrective regimen sliding scale   SubCutaneous every 6 hours  ipratropium 17 MICROgram(s) HFA Inhaler 1 Puff(s) Inhalation every 6 hours  multivitamin 1 Tablet(s) Oral daily  sevelamer carbonate Powder 800 milliGRAM(s) Oral three times a day with meals  valproic  acid Syrup 750 milliGRAM(s) Oral every 12 hours  vitamin A &amp; D Ointment 1 Application(s) Topical every 12 hours    MEDICATIONS  (PRN):      HOME MEDICATIONS:  amLODIPine 5 mg oral tablet (02-14)  aspirin 81 mg oral tablet (02-14)  atorvastatin 20 mg oral tablet (12-16)  carvedilol 6.25 mg oral tablet (02-14)  clopidogrel 75 mg oral tablet (02-25)  Jardiance 25 mg oral tablet (02-14)  Nephplex Rx oral tablet (02-14)  SITagliptin 50 mg oral tablet (12-16)  sodium bicarbonate 650 mg oral tablet (02-25)  tamsulosin 0.4 mg oral capsule (12-16)  valsartan 320 mg oral tablet (02-14)      PHYSICAL EXAM:  General Appearance: NAD,   HEENT: EOMI, sclera non-icteric.  Heart: Regular rate   Lungs: T piece 40/7  Abdomen:  Soft, nontender, nondistended.   MSK/Extremities: Warm & well-perfused.   Skin: Warm, dry. No jaundice.

## 2024-01-05 NOTE — PROGRESS NOTE ADULT - ATTENDING COMMENTS
Trauma/ACS Attending  Note Attestation    Patient is examined and evaluated at the bedside with the residents/PAs. Treatment plan discussed with the team, nurses, and consulting physicians and consulting teams. Medications, radiological studies and all other relevant studies reviewed.     JACQUELIN CAI Patient is a 78y old  Male who presents with a chief complaint of fall and sustained  SDH, seizures, altered mentals status requiring intubation followed by tracheostomy and PEG     Vital Signs Last 24 Hrs  T(C): 36.9 (05 Jan 2024 16:00), Max: 36.9 (05 Jan 2024 16:00)  T(F): 98.5 (05 Jan 2024 16:00), Max: 98.5 (05 Jan 2024 16:00)  HR: 96 (05 Jan 2024 16:00) (76 - 96)  BP: 119/69 (05 Jan 2024 16:00) (119/69 - 146/67)  BP(mean): --  RR: 18 (05 Jan 2024 16:00) (18 - 18)  SpO2: 100% (05 Jan 2024 16:00) (100% - 100%)    Parameters below as of 05 Jan 2024 16:00  Patient On (Oxygen Delivery Method): tracheostomy collar                            9.4    11.15 )-----------( 338      ( 05 Jan 2024 06:35 )             29.4   01-05    147<H>  |  113<H>  |  63<HH>  ----------------------------<  92  4.9   |  22  |  1.8<H>    Ca    8.2<L>      05 Jan 2024 06:35  Phos  5.1     01-05  Mg     2.5     01-05          Diagnosis: Fall with SDH                  S/p tracheostomy/PEG    Plan:	  - supportive care  - pain management  - trach caller  - DVT prophylaxis       [x ] SCDs [x ] Lovenox SQ [ ] Heparins SQ  [ ] None  - GI prophylaxis  - follow up consults  - continue PEG feeds  - repeat studies as needed  - patient state discussed with family at bedside   - PT evaluation and follow up  - replace electrolytes  - case management evaluation for SNF placement     Shawnee Degroot MD, FACS  Trauma/ACS/Surgical Critical Care Attending

## 2024-01-05 NOTE — PROGRESS NOTE ADULT - ASSESSMENT
78yM who presented as a TRAUMA CONSULT s/p mechanical fall (+HT -LOC -AC). Found to have an acute subdural hemorrhage. Hospital course has been complicated by multiple seizures and uncontrolled hypertension. Intubated on 12/21 due to worsening respiratory status.   S/P tracheostomy and PEG placement on 12/22/23, (Portex cuffed 9 and 2cm at skin 20fr PEG). Now with T-piece, O2 at 40%. Downgraded from SICU to floor on 1/4/24.      12/21-intubated for airway protection, accessory muscle use  12/22- s/p tracheostomy and PEG placement, Portex cuffed 9 and 20fr PEG 2cm at skin     #Acute SDH   #h/o stroke (2/2023) w/residual right sided weakness  #focal seizure  -12/18 HCT#4 -stable SDH, no new acute findings  -12/27: Pt obtunded, not responding to noxious stim - VPA/LFTs/BMP/ammonia ordered, CTH f/up: stable  - Q4 neuro checks  - amantadine, 100mg q48h. renal dosing  - NSGY: 1/1: "Risk vs benefits should be weighed and discussed with patient prior to initiation of Plavix". --> will not be restarting plavix per Dr. Degroot  - RIGHT anterior thalamus infarct,  LEFT caudate head lacunar infarct    - vEEG No seizures; Discontinued 12/19    - Neurology cs- d/c EEG, 2 days prior to discharge call neurology so they can do a spot EEG --> recalled on 12/28 to evaluate diminished mental status, recommend repeat EEG and continue depakote 500 q12    - Valproic acid 750mg q12h x 1 week (end 1/8/24)      #Respiratory Failure secondary to impaired secretion clearance  s/p tracheostomy 12/22, size 9 cuffed portex  Tolerating t-piece since 12/29   chest PT q4  guaifenisen q12 to thin trachael secretions      #acute HYPOtension intraop, resolved  -off levophed since 12/22 PM    #HTN  #hx HLD  c/w home coreg and amlodipine  -Valsartan 320mg HOLDING   c/w atorvastatin      #DM     -ISS     - hgb A1C 7.0     -Off insulin gtt 12/20      -Lantus 25 units HS     -FSG q6 while on TF    #NSVT    -EP/Cards: No arrhythmias on tele only artifact. No further work up needed. Recommend ILR prior to discharge if patient/family agreeable  Imaging:   Echo: 12/2023: EF 66%, G1DD, trace MR, mild TR     #CKD   #hyperphosphatemia  #Hypernatremia - improved  - baseline Cr 2.1  - holding home sodium bicarb 650mg BID  - c/w sevelamer powder     #hx BPH  - cardura 4mg (flomax at home, non crushable)      #hx of CVA    - ASA81 mg cleared by NSGY   - continue to hold home plavix      #AMS possibly due to hypothyroidism (improved)     - TSH - 4.16, T4 - 5.9, T3 - 79, WNL      DISPO:  DC planning

## 2024-01-05 NOTE — PROGRESS NOTE ADULT - SUBJECTIVE AND OBJECTIVE BOX
TRAUMA SURGERY PROGRESS NOTE    Patient: JACQUELIN CAI , 78y ([birth year])Male   MRN: [id number]  Location: [address] ([facility]) 016 B  Visit: 12-16-23 Inpatient  Date: 01-05-24 @ 12:01    Hospital Day #: 22   Post-Op Day #: 14     DIAGNOSIS / PROCEDURES:   * Acute SDH   S/P tracheostomy and PEG placement (12/22/23)     INTERVAL HX:  Patient downgraded from SICU to the floor yesterday. Remains on T-piece. No acute events overnight.     VITALS:   T(F): 96.4 (01-05-24 @ 08:30), Max: 98.3 (01-05-24 @ 04:00)  HR: 83 (01-05-24 @ 08:30)  BP: 143/65 (01-05-24 @ 08:30)  RR: 18 (01-05-24 @ 08:30)  SpO2: 100% (01-05-24 @ 08:42)    DIET:   Diet, NPO with Tube Feed:   Tube Feeding Modality: Gastrostomy  Nepro with Carb Steady  Total Volume for 24 Hours (mL): 648  Continuous  Starting Tube Feed Rate mL per Hour: 27  Increase Tube Feed Rate by (mL): 0     Every 4 hours  Until Goal Tube Feed Rate (mL per Hour): 27  Tube Feed Duration (in Hours): 24  Free Water Flush  Bolus   Total Volume per Flush (mL): 250   Frequency: Every 6 Hours  Free Water Flush Instructions:  Please flush PEG with 50cc of sterile water before feeds start and 100cc of sterile water after feeds end  Banatrol TF     Qty per Day:  3      01-04 @ 07:01  -  01-05 @ 07:00  --------------------------------------------------------  OUT:    Indwelling Catheter - Urethral (mL): 825 mL    Intermittent Catheterization - Urethral (mL): 750 mL  Total OUT: 1575 mL      AC/DVT PPX:  heparin   Injectable 5000 Unit(s) SubCutaneous every 8 hours      PHYSICAL EXAM:  General Appearance: NAD,   HEENT: EOMI, sclera non-icteric.  Heart: Regular rate   Lungs: T piece 40/7  Abdomen:  Soft, nontender, nondistended.   MSK/Extremities: Warm & well-perfused.   Skin: Warm, dry. No jaundice.    LABS:                        9.4    11.15 )-----------( 338       01-05 @ 06:35             29.4     147  |  113  |  63  ----------------------------<  92        01-05 @ 06:35  4.9   |  22  |  1.8    Calcium: 8.2 mg/dL *L* (01-05 @ 06:35)  Magnesium: 2.5 mg/dL *H* (01-05 @ 06:35)  Phosphorus: 5.1 mg/dL *H* (01-05 @ 06:35)      RADIOLOGY & OTHER STUDIES:   No new imaging obtained in the past 24h       ACCESS DEVICES:  [X] Peripheral IV  [ ] Central Venous Line	[ ] R	[ ] L	[ ] IJ	[ ] Fem	[ ] SC	Placed:   [ ] Arterial Line		[ ] R	[ ] L	[ ] Fem	[ ] Rad	[ ] Ax	Placed:   [ ] PICC:					[ ] Mediport  [ ] Urinary Catheter,  Date Placed:   [ ] Chest tube: [ ] Right, [ ] Left  [ ] MARIA LUISA/Ronald Drains  [X] 20Fr PEG (12/22)  [X] Tracheostomy (12/22)   TRAUMA SURGERY PROGRESS NOTE    Patient: JACQUELIN CAI , 78y ([birth year])Male   MRN: [id number]  Location: [address] ([facility]) 016 B  Visit: 12-16-23 Inpatient  Date: 01-05-24 @ 12:01    Hospital Day #: 22   Post-Op Day #: 14     DIAGNOSIS / PROCEDURES:   * Acute SDH   S/P tracheostomy and PEG placement (12/22/23)     INTERVAL HX:  Patient downgraded from SICU to the floor yesterday. Remains on T-piece. No acute events overnight.     VITALS:   T(F): 96.4 (01-05-24 @ 08:30), Max: 98.3 (01-05-24 @ 04:00)  HR: 83 (01-05-24 @ 08:30)  BP: 143/65 (01-05-24 @ 08:30)  RR: 18 (01-05-24 @ 08:30)  SpO2: 100% (01-05-24 @ 08:42)    DIET:   Diet, NPO with Tube Feed:   Tube Feeding Modality: Gastrostomy  Nepro with Carb Steady  Total Volume for 24 Hours (mL): 648  Continuous  Starting Tube Feed Rate mL per Hour: 27  Increase Tube Feed Rate by (mL): 0     Every 4 hours  Until Goal Tube Feed Rate (mL per Hour): 27  Tube Feed Duration (in Hours): 24  Free Water Flush  Bolus   Total Volume per Flush (mL): 250   Frequency: Every 6 Hours  Free Water Flush Instructions:  Please flush PEG with 50cc of sterile water before feeds start and 100cc of sterile water after feeds end  Banatrol TF     Qty per Day:  3      01-04 @ 07:01  -  01-05 @ 07:00  --------------------------------------------------------  OUT:    Indwelling Catheter - Urethral (mL): 825 mL    Intermittent Catheterization - Urethral (mL): 750 mL  Total OUT: 1575 mL      AC/DVT PPX:  heparin   Injectable 5000 Unit(s) SubCutaneous every 8 hours      PHYSICAL EXAM:  General Appearance: NAD,   HEENT: EOMI, sclera non-icteric.  Heart: Regular rate   Lungs: T piece 40/7  Abdomen:  Soft, nontender, nondistended.   MSK/Extremities: Warm & well-perfused.   Skin: Warm, dry. No jaundice.    LABS:                        9.4    11.15 )-----------( 338       01-05 @ 06:35             29.4     147  |  113  |  63  ----------------------------<  92        01-05 @ 06:35  4.9   |  22  |  1.8    Calcium: 8.2 mg/dL *L* (01-05 @ 06:35)  Magnesium: 2.5 mg/dL *H* (01-05 @ 06:35)  Phosphorus: 5.1 mg/dL *H* (01-05 @ 06:35)      RADIOLOGY & OTHER STUDIES:   No new imaging obtained in the past 24h       ACCESS DEVICES:  [X] Peripheral IV  [ ] Central Venous Line	[ ] R	[ ] L	[ ] IJ	[ ] Fem	[ ] SC	Placed:   [ ] Arterial Line		[ ] R	[ ] L	[ ] Fem	[ ] Rad	[ ] Ax	Placed:   [ ] PICC:					[ ] Mediport  [ ] Urinary Catheter,  Date Placed:   [ ] Chest tube: [ ] Right, [ ] Left  [ ] MARIA LUISA/Ronald Drains  [X] 20Fr PEG (12/22)  [X] Tracheostomy (12/22)

## 2024-01-06 PROCEDURE — 99232 SBSQ HOSP IP/OBS MODERATE 35: CPT | Mod: 24,25

## 2024-01-06 RX ADMIN — Medication 1 APPLICATION(S): at 05:56

## 2024-01-06 RX ADMIN — CHLORHEXIDINE GLUCONATE 15 MILLILITER(S): 213 SOLUTION TOPICAL at 05:57

## 2024-01-06 RX ADMIN — Medication 100 MILLIGRAM(S): at 08:07

## 2024-01-06 RX ADMIN — CARVEDILOL PHOSPHATE 6.25 MILLIGRAM(S): 80 CAPSULE, EXTENDED RELEASE ORAL at 05:56

## 2024-01-06 RX ADMIN — Medication 1 APPLICATION(S): at 05:58

## 2024-01-06 RX ADMIN — Medication 81 MILLIGRAM(S): at 11:08

## 2024-01-06 RX ADMIN — Medication 750 MILLIGRAM(S): at 17:22

## 2024-01-06 RX ADMIN — ATORVASTATIN CALCIUM 20 MILLIGRAM(S): 80 TABLET, FILM COATED ORAL at 22:03

## 2024-01-06 RX ADMIN — SEVELAMER CARBONATE 800 MILLIGRAM(S): 2400 POWDER, FOR SUSPENSION ORAL at 17:22

## 2024-01-06 RX ADMIN — Medication 750 MILLIGRAM(S): at 05:55

## 2024-01-06 RX ADMIN — Medication 1 APPLICATION(S): at 17:22

## 2024-01-06 RX ADMIN — SEVELAMER CARBONATE 800 MILLIGRAM(S): 2400 POWDER, FOR SUSPENSION ORAL at 11:40

## 2024-01-06 RX ADMIN — HEPARIN SODIUM 5000 UNIT(S): 5000 INJECTION INTRAVENOUS; SUBCUTANEOUS at 22:01

## 2024-01-06 RX ADMIN — CHLORHEXIDINE GLUCONATE 1 APPLICATION(S): 213 SOLUTION TOPICAL at 05:57

## 2024-01-06 RX ADMIN — Medication 4 MILLIGRAM(S): at 22:01

## 2024-01-06 RX ADMIN — CHLORHEXIDINE GLUCONATE 15 MILLILITER(S): 213 SOLUTION TOPICAL at 17:22

## 2024-01-06 RX ADMIN — CARVEDILOL PHOSPHATE 6.25 MILLIGRAM(S): 80 CAPSULE, EXTENDED RELEASE ORAL at 17:22

## 2024-01-06 RX ADMIN — SEVELAMER CARBONATE 800 MILLIGRAM(S): 2400 POWDER, FOR SUSPENSION ORAL at 08:07

## 2024-01-06 RX ADMIN — CHLORHEXIDINE GLUCONATE 1 APPLICATION(S): 213 SOLUTION TOPICAL at 08:12

## 2024-01-06 RX ADMIN — AMLODIPINE BESYLATE 5 MILLIGRAM(S): 2.5 TABLET ORAL at 05:56

## 2024-01-06 RX ADMIN — INSULIN GLARGINE 25 UNIT(S): 100 INJECTION, SOLUTION SUBCUTANEOUS at 22:03

## 2024-01-06 RX ADMIN — HEPARIN SODIUM 5000 UNIT(S): 5000 INJECTION INTRAVENOUS; SUBCUTANEOUS at 05:56

## 2024-01-06 RX ADMIN — HEPARIN SODIUM 5000 UNIT(S): 5000 INJECTION INTRAVENOUS; SUBCUTANEOUS at 13:07

## 2024-01-06 NOTE — PROGRESS NOTE ADULT - SUBJECTIVE AND OBJECTIVE BOX
GENERAL SURGERY PROGRESS NOTE    Patient: JACQUELIN CAI , 78y ([birth year])Male   MRN: [id number]  Location: [address] ([place]) 016 B  Visit: 12-16-23 Inpatient    PAST MEDICAL & SURGICAL HISTORY:  HTN (hypertension)  Seasonal allergie  History of BPH  Diabetes  No significant past surgical history    Vitals:   T(F): 99.5 (01-06-24 @ 00:40), Max: 100.1 (01-05-24 @ 22:28)  HR: 93 (01-06-24 @ 00:40)  BP: 129/60 (01-06-24 @ 00:40)  RR: 18 (01-06-24 @ 00:40)  SpO2: 96% (01-06-24 @ 00:40)    Diet, NPO with Tube Feed:   Tube Feeding Modality: Gastrostomy  Nepro with Carb Steady  Total Volume for 24 Hours (mL): 648  Continuous  Starting Tube Feed Rate mL per Hour: 27  Increase Tube Feed Rate by (mL): 0     Every 4 hours  Until Goal Tube Feed Rate (mL per Hour): 27  Tube Feed Duration (in Hours): 24  Free Water Flush  Bolus   Total Volume per Flush (mL): 250   Frequency: Every 6 Hours  Free Water Flush Instructions:  Please flush PEG with 50cc of sterile water before feeds start and 100cc of sterile water after feeds end  Banatrol TF     Qty per Day:  3    Fluids:     I & O's:    01-04-24 @ 07:01  -  01-05-24 @ 07:00  --------------------------------------------------------  IN:    Enteral Tube Flush: 500 mL    Nepro with Carb Steady: 324 mL  Total IN: 824 mL    OUT:    Indwelling Catheter - Urethral (mL): 825 mL    Intermittent Catheterization - Urethral (mL): 750 mL  Total OUT: 1575 mL    PHYSICAL EXAM:  General Appearance: NAD,   HEENT: EOMI, sclera non-icteric.  Heart: Regular rate   Lungs: T piece 40/7  Abdomen:  Soft, nontender, nondistended.   MSK/Extremities: Warm & well-perfused.   Skin: Warm, dry. No jaundice.    MEDICATIONS  (STANDING):  amantadine 100 milliGRAM(s) Oral <User Schedule>  amLODIPine   Tablet 5 milliGRAM(s) Oral daily  aspirin  chewable 81 milliGRAM(s) Enteral Tube daily  atorvastatin 20 milliGRAM(s) Oral at bedtime  bacitracin   Ointment 1 Application(s) Topical every 12 hours  carvedilol 6.25 milliGRAM(s) Oral every 12 hours  chlorhexidine 0.12% Liquid 15 milliLiter(s) Oral Mucosa two times a day  chlorhexidine 2% Cloths 1 Application(s) Topical <User Schedule>  chlorhexidine 2% Cloths 1 Application(s) Topical <User Schedule>  doxazosin 4 milliGRAM(s) Oral at bedtime  famotidine    Tablet 10 milliGRAM(s) Oral every 48 hours  finasteride 5 milliGRAM(s) Oral daily  guaiFENesin  milliGRAM(s) Oral every 12 hours  heparin   Injectable 5000 Unit(s) SubCutaneous every 8 hours  insulin glargine Injectable (LANTUS) 25 Unit(s) SubCutaneous at bedtime  insulin lispro (ADMELOG) corrective regimen sliding scale   SubCutaneous every 6 hours  ipratropium 17 MICROgram(s) HFA Inhaler 1 Puff(s) Inhalation every 6 hours  multivitamin 1 Tablet(s) Oral daily  sevelamer carbonate Powder 800 milliGRAM(s) Oral three times a day with meals  valproic  acid Syrup 750 milliGRAM(s) Oral every 12 hours  vitamin A &amp; D Ointment 1 Application(s) Topical every 12 hours    MEDICATIONS  (PRN):    DVT PROPHYLAXIS: heparin   Injectable 5000 Unit(s) SubCutaneous every 8 hours    GI PROPHYLAXIS:   ANTICOAGULATION:   ANTIBIOTICS:      LAB/STUDIES:  Labs:  CAPILLARY BLOOD GLUCOSE    POCT Blood Glucose.: 138 mg/dL (06 Jan 2024 00:34)  POCT Blood Glucose.: 144 mg/dL (05 Jan 2024 22:48)  POCT Blood Glucose.: 138 mg/dL (05 Jan 2024 17:29)  POCT Blood Glucose.: 125 mg/dL (05 Jan 2024 11:31)  POCT Blood Glucose.: 93 mg/dL (05 Jan 2024 06:04)                        9.4    11.15 )-----------( 338      ( 05 Jan 2024 06:35 )             29.4       01-05    147<H>  |  113<H>  |  63<HH>  ----------------------------<  92  4.9   |  22  |  1.8<H>    Calcium: 8.2 mg/dL (01-05-24 @ 06:35)    LFTs:    ABG - ( 30 Dec 2023 05:45 )  pH: 7.47  /  pCO2: 28    /  pO2: 177   / HCO3: 20    / Base Excess: -2.0  /  SaO2: 99.3      Urinalysis Basic - ( 05 Jan 2024 06:35 )    Color: x / Appearance: x / SG: x / pH: x  Gluc: 92 mg/dL / Ketone: x  / Bili: x / Urobili: x   Blood: x / Protein: x / Nitrite: x   Leuk Esterase: x / RBC: x / WBC x   Sq Epi: x / Non Sq Epi: x / Bacteria: x         GENERAL SURGERY PROGRESS NOTE    Patient: JACQUELIN CAI , 78y ([birth year])Male   MRN: [id number]  Location: [address] ([place]) 016 B  Visit: 12-16-23 Inpatient    PAST MEDICAL & SURGICAL HISTORY:  HTN (hypertension)  Seasonal allergie  History of BPH  Diabetes  No significant past surgical history    Vitals:   T(F): 99.5 (01-06-24 @ 00:40), Max: 100.1 (01-05-24 @ 22:28)  HR: 93 (01-06-24 @ 00:40)  BP: 129/60 (01-06-24 @ 00:40)  RR: 18 (01-06-24 @ 00:40)  SpO2: 96% (01-06-24 @ 00:40)    Diet, NPO with Tube Feed:   Tube Feeding Modality: Gastrostomy  Nepro with Carb Steady  Total Volume for 24 Hours (mL): 648  Continuous  Starting Tube Feed Rate mL per Hour: 27  Increase Tube Feed Rate by (mL): 0     Every 4 hours  Until Goal Tube Feed Rate (mL per Hour): 27  Tube Feed Duration (in Hours): 24  Free Water Flush  Bolus   Total Volume per Flush (mL): 250   Frequency: Every 6 Hours  Free Water Flush Instructions:  Please flush PEG with 50cc of sterile water before feeds start and 100cc of sterile water after feeds end  Banatrol TF     Qty per Day:  3    Fluids:     I & O's:    01-04-24 @ 07:01  -  01-05-24 @ 07:00  --------------------------------------------------------  IN:    Enteral Tube Flush: 500 mL    Nepro with Carb Steady: 324 mL  Total IN: 824 mL    OUT:    Indwelling Catheter - Urethral (mL): 825 mL    Intermittent Catheterization - Urethral (mL): 750 mL  Total OUT: 1575 mL    PHYSICAL EXAM:  General Appearance: NAD,   HEENT: EOMI, sclera non-icteric.  Heart: Regular rate   Lungs: T piece 40/7  Abdomen:  Soft, nontender, nondistended.   MSK/Extremities: Warm & well-perfused.   Skin: Warm, dry. No jaundice.    MEDICATIONS  (STANDING):  amantadine 100 milliGRAM(s) Oral <User Schedule>  amLODIPine   Tablet 5 milliGRAM(s) Oral daily  aspirin  chewable 81 milliGRAM(s) Enteral Tube daily  atorvastatin 20 milliGRAM(s) Oral at bedtime  bacitracin   Ointment 1 Application(s) Topical every 12 hours  carvedilol 6.25 milliGRAM(s) Oral every 12 hours  chlorhexidine 0.12% Liquid 15 milliLiter(s) Oral Mucosa two times a day  chlorhexidine 2% Cloths 1 Application(s) Topical <User Schedule>  chlorhexidine 2% Cloths 1 Application(s) Topical <User Schedule>  doxazosin 4 milliGRAM(s) Oral at bedtime  famotidine    Tablet 10 milliGRAM(s) Oral every 48 hours  finasteride 5 milliGRAM(s) Oral daily  guaiFENesin  milliGRAM(s) Oral every 12 hours  heparin   Injectable 5000 Unit(s) SubCutaneous every 8 hours  insulin glargine Injectable (LANTUS) 25 Unit(s) SubCutaneous at bedtime  insulin lispro (ADMELOG) corrective regimen sliding scale   SubCutaneous every 6 hours  ipratropium 17 MICROgram(s) HFA Inhaler 1 Puff(s) Inhalation every 6 hours  multivitamin 1 Tablet(s) Oral daily  sevelamer carbonate Powder 800 milliGRAM(s) Oral three times a day with meals  valproic  acid Syrup 750 milliGRAM(s) Oral every 12 hours  vitamin A &amp; D Ointment 1 Application(s) Topical every 12 hours    MEDICATIONS  (PRN):    DVT PROPHYLAXIS: heparin   Injectable 5000 Unit(s) SubCutaneous every 8 hours    GI PROPHYLAXIS:   ANTICOAGULATION:   ANTIBIOTICS:      LAB/STUDIES:  Labs:  CAPILLARY BLOOD GLUCOSE    POCT Blood Glucose.: 138 mg/dL (06 Jan 2024 00:34)  POCT Blood Glucose.: 144 mg/dL (05 Jan 2024 22:48)  POCT Blood Glucose.: 138 mg/dL (05 Jan 2024 17:29)  POCT Blood Glucose.: 125 mg/dL (05 Jan 2024 11:31)  POCT Blood Glucose.: 93 mg/dL (05 Jan 2024 06:04)                        9.4    11.15 )-----------( 338      ( 05 Jan 2024 06:35 )             29.4       01-05    147<H>  |  113<H>  |  63<HH>  ----------------------------<  92  4.9   |  22  |  1.8<H>    Calcium: 8.2 mg/dL (01-05-24 @ 06:35)    LFTs:    ABG - ( 30 Dec 2023 05:45 )  pH: 7.47  /  pCO2: 28    /  pO2: 177   / HCO3: 20    / Base Excess: -2.0  /  SaO2: 99.3      Urinalysis Basic - ( 05 Jan 2024 06:35 )    Color: x / Appearance: x / SG: x / pH: x  Gluc: 92 mg/dL / Ketone: x  / Bili: x / Urobili: x   Blood: x / Protein: x / Nitrite: x   Leuk Esterase: x / RBC: x / WBC x   Sq Epi: x / Non Sq Epi: x / Bacteria: x

## 2024-01-06 NOTE — PROGRESS NOTE ADULT - ASSESSMENT
78yM who presented as a TRAUMA CONSULT s/p mechanical fall (+HT -LOC -AC). Found to have an acute subdural hemorrhage. Hospital course has been complicated by multiple seizures and uncontrolled hypertension. Intubated on 12/21 due to worsening respiratory status.     S/P tracheostomy and PEG placement on 12/22/23, (Portex cuffed 9 and 2cm at skin 20fr PEG). Now with T-piece, O2 at 40%.     Downgraded from SICU to floor on 1/4/24.        PLAN:  - Continue Valproate 750mg q12h until 1/8/24   - Chest PT q4h, Duoneb treatments as needed   - Continue TF w/ Nepro @ 27 cc/hr x 24h  - Sevelamer powder for hyperphosphatemia   - ASA 81 QD, HSQ for DVT ppx   - Monitor vital signs, I&O's, O2 requirements  - Daily labs w/ repletion of electrolytes as needed   - Pain control PRN    # Dispo: patient medically cleared for discharge; pending placement to [facility]   ----------------------------  Trauma Team Surgery  [phone] 78yM who presented as a TRAUMA CONSULT s/p mechanical fall (+HT -LOC -AC). Found to have an acute subdural hemorrhage. Hospital course has been complicated by multiple seizures and uncontrolled hypertension. Intubated on 12/21 due to worsening respiratory status.     S/P tracheostomy and PEG placement on 12/22/23, (Portex cuffed 9 and 2cm at skin 20fr PEG). Now with T-piece, O2 at 40%.     Downgraded from SICU to floor on 1/4/24.        PLAN:  - Continue Valproate 750mg q12h until 1/8/24   - Chest PT q4h, Duoneb treatments as needed   - Continue TF w/ Nepro @ 27 cc/hr x 24h  - Sevelamer powder for hyperphosphatemia   - ASA 81 QD, HSQ for DVT ppx   - Monitor vital signs, I&O's, O2 requirements  - Daily labs w/ repletion of electrolytes as needed   - Pain control PRN    # Dispo: patient medically cleared for discharge; pending placement to [facility]   ----------------------------  Trauma Team Surgery  [phone]

## 2024-01-06 NOTE — PROGRESS NOTE ADULT - ATTENDING COMMENTS
Trauma/ACS Attending  Note Attestation    Patient is examined and evaluated at the bedside with the residents/PAs. Treatment plan discussed with the team, nurses, and consulting physicians and consulting teams. Medications, radiological studies and all other relevant studies reviewed.     JACQUELIN CAI Patient is a 78y old  Male who presents with a chief complaint of fall and sustained  SDH, seizures, altered mentals status requiring intubation followed by tracheostomy and PEG     Vital Signs Last 24 Hrs  T(C): 36.2 (06 Jan 2024 08:54), Max: 37.8 (05 Jan 2024 22:28)  T(F): 97.2 (06 Jan 2024 08:54), Max: 100.1 (05 Jan 2024 22:28)  HR: 78 (06 Jan 2024 08:54) (78 - 96)  BP: 145/67 (06 Jan 2024 08:54) (119/69 - 145/67)  BP(mean): --  RR: 18 (06 Jan 2024 08:54) (18 - 18)  SpO2: 99% (06 Jan 2024 08:54) (96% - 100%)    Parameters below as of 06 Jan 2024 07:58  Patient On (Oxygen Delivery Method): tracheostomy collar    O2 Concentration (%): 30                        9.4    11.15 )-----------( 338      ( 05 Jan 2024 06:35 )             29.4   01-05    147<H>  |  113<H>  |  63<HH>  ----------------------------<  92  4.9   |  22  |  1.8<H>    Ca    8.2<L>      05 Jan 2024 06:35  Phos  5.1     01-05  Mg     2.5     01-05    Diagnosis: Fall with SDH                  S/p tracheostomy/PEG    Plan:	  - supportive care  - pain management  - trach caller  - DVT prophylaxis       [x ] SCDs [x ] Lovenox SQ [ ] Heparins SQ  [ ] None  - GI prophylaxis  - follow up consults  - continue PEG feeds  - repeat studies as needed  - patient state discussed with family at bedside   - PT evaluation and follow up  - replace electrolytes  - case management evaluation for SNF placement     Shawnee Degroot MD, FACS  Trauma/ACS/Surgical Critical Care Attending

## 2024-01-07 LAB
ANION GAP SERPL CALC-SCNC: 14 MMOL/L — SIGNIFICANT CHANGE UP (ref 7–14)
ANION GAP SERPL CALC-SCNC: 14 MMOL/L — SIGNIFICANT CHANGE UP (ref 7–14)
ANION GAP SERPL CALC-SCNC: 17 MMOL/L — HIGH (ref 7–14)
ANION GAP SERPL CALC-SCNC: 17 MMOL/L — HIGH (ref 7–14)
BUN SERPL-MCNC: 75 MG/DL — CRITICAL HIGH (ref 10–20)
BUN SERPL-MCNC: 75 MG/DL — CRITICAL HIGH (ref 10–20)
BUN SERPL-MCNC: 91 MG/DL — CRITICAL HIGH (ref 10–20)
BUN SERPL-MCNC: 91 MG/DL — CRITICAL HIGH (ref 10–20)
CALCIUM SERPL-MCNC: 8.6 MG/DL — SIGNIFICANT CHANGE UP (ref 8.4–10.5)
CHLORIDE SERPL-SCNC: 108 MMOL/L — SIGNIFICANT CHANGE UP (ref 98–110)
CHLORIDE SERPL-SCNC: 108 MMOL/L — SIGNIFICANT CHANGE UP (ref 98–110)
CHLORIDE SERPL-SCNC: 112 MMOL/L — HIGH (ref 98–110)
CHLORIDE SERPL-SCNC: 112 MMOL/L — HIGH (ref 98–110)
CO2 SERPL-SCNC: 19 MMOL/L — SIGNIFICANT CHANGE UP (ref 17–32)
CO2 SERPL-SCNC: 19 MMOL/L — SIGNIFICANT CHANGE UP (ref 17–32)
CO2 SERPL-SCNC: 21 MMOL/L — SIGNIFICANT CHANGE UP (ref 17–32)
CO2 SERPL-SCNC: 21 MMOL/L — SIGNIFICANT CHANGE UP (ref 17–32)
CREAT SERPL-MCNC: 1.9 MG/DL — HIGH (ref 0.7–1.5)
CREAT SERPL-MCNC: 1.9 MG/DL — HIGH (ref 0.7–1.5)
CREAT SERPL-MCNC: 2.1 MG/DL — HIGH (ref 0.7–1.5)
CREAT SERPL-MCNC: 2.1 MG/DL — HIGH (ref 0.7–1.5)
EGFR: 32 ML/MIN/1.73M2 — LOW
EGFR: 32 ML/MIN/1.73M2 — LOW
EGFR: 36 ML/MIN/1.73M2 — LOW
EGFR: 36 ML/MIN/1.73M2 — LOW
GLUCOSE SERPL-MCNC: 120 MG/DL — HIGH (ref 70–99)
GLUCOSE SERPL-MCNC: 120 MG/DL — HIGH (ref 70–99)
GLUCOSE SERPL-MCNC: 141 MG/DL — HIGH (ref 70–99)
GLUCOSE SERPL-MCNC: 141 MG/DL — HIGH (ref 70–99)
HCT VFR BLD CALC: 26.9 % — LOW (ref 42–52)
HCT VFR BLD CALC: 26.9 % — LOW (ref 42–52)
HGB BLD-MCNC: 8.3 G/DL — LOW (ref 14–18)
HGB BLD-MCNC: 8.3 G/DL — LOW (ref 14–18)
MAGNESIUM SERPL-MCNC: 2.4 MG/DL — SIGNIFICANT CHANGE UP (ref 1.8–2.4)
MAGNESIUM SERPL-MCNC: 2.4 MG/DL — SIGNIFICANT CHANGE UP (ref 1.8–2.4)
MAGNESIUM SERPL-MCNC: 2.6 MG/DL — HIGH (ref 1.8–2.4)
MAGNESIUM SERPL-MCNC: 2.6 MG/DL — HIGH (ref 1.8–2.4)
MCHC RBC-ENTMCNC: 28.7 PG — SIGNIFICANT CHANGE UP (ref 27–31)
MCHC RBC-ENTMCNC: 28.7 PG — SIGNIFICANT CHANGE UP (ref 27–31)
MCHC RBC-ENTMCNC: 30.9 G/DL — LOW (ref 32–37)
MCHC RBC-ENTMCNC: 30.9 G/DL — LOW (ref 32–37)
MCV RBC AUTO: 93.1 FL — SIGNIFICANT CHANGE UP (ref 80–94)
MCV RBC AUTO: 93.1 FL — SIGNIFICANT CHANGE UP (ref 80–94)
NRBC # BLD: 0 /100 WBCS — SIGNIFICANT CHANGE UP (ref 0–0)
NRBC # BLD: 0 /100 WBCS — SIGNIFICANT CHANGE UP (ref 0–0)
PHOSPHATE SERPL-MCNC: 5.4 MG/DL — HIGH (ref 2.1–4.9)
PHOSPHATE SERPL-MCNC: 5.4 MG/DL — HIGH (ref 2.1–4.9)
PHOSPHATE SERPL-MCNC: 6.3 MG/DL — HIGH (ref 2.1–4.9)
PHOSPHATE SERPL-MCNC: 6.3 MG/DL — HIGH (ref 2.1–4.9)
PLATELET # BLD AUTO: 249 K/UL — SIGNIFICANT CHANGE UP (ref 130–400)
PLATELET # BLD AUTO: 249 K/UL — SIGNIFICANT CHANGE UP (ref 130–400)
PMV BLD: 8.7 FL — SIGNIFICANT CHANGE UP (ref 7.4–10.4)
PMV BLD: 8.7 FL — SIGNIFICANT CHANGE UP (ref 7.4–10.4)
POTASSIUM SERPL-MCNC: 3.9 MMOL/L — SIGNIFICANT CHANGE UP (ref 3.5–5)
POTASSIUM SERPL-SCNC: 3.9 MMOL/L — SIGNIFICANT CHANGE UP (ref 3.5–5)
RBC # BLD: 2.89 M/UL — LOW (ref 4.7–6.1)
RBC # BLD: 2.89 M/UL — LOW (ref 4.7–6.1)
RBC # FLD: 13.1 % — SIGNIFICANT CHANGE UP (ref 11.5–14.5)
RBC # FLD: 13.1 % — SIGNIFICANT CHANGE UP (ref 11.5–14.5)
SARS-COV-2 RNA SPEC QL NAA+PROBE: SIGNIFICANT CHANGE UP
SARS-COV-2 RNA SPEC QL NAA+PROBE: SIGNIFICANT CHANGE UP
SODIUM SERPL-SCNC: 144 MMOL/L — SIGNIFICANT CHANGE UP (ref 135–146)
SODIUM SERPL-SCNC: 144 MMOL/L — SIGNIFICANT CHANGE UP (ref 135–146)
SODIUM SERPL-SCNC: 147 MMOL/L — HIGH (ref 135–146)
SODIUM SERPL-SCNC: 147 MMOL/L — HIGH (ref 135–146)
WBC # BLD: 10.47 K/UL — SIGNIFICANT CHANGE UP (ref 4.8–10.8)
WBC # BLD: 10.47 K/UL — SIGNIFICANT CHANGE UP (ref 4.8–10.8)
WBC # FLD AUTO: 10.47 K/UL — SIGNIFICANT CHANGE UP (ref 4.8–10.8)
WBC # FLD AUTO: 10.47 K/UL — SIGNIFICANT CHANGE UP (ref 4.8–10.8)

## 2024-01-07 PROCEDURE — 99232 SBSQ HOSP IP/OBS MODERATE 35: CPT

## 2024-01-07 PROCEDURE — 99231 SBSQ HOSP IP/OBS SF/LOW 25: CPT

## 2024-01-07 RX ADMIN — HEPARIN SODIUM 5000 UNIT(S): 5000 INJECTION INTRAVENOUS; SUBCUTANEOUS at 06:50

## 2024-01-07 RX ADMIN — Medication 750 MILLIGRAM(S): at 17:04

## 2024-01-07 RX ADMIN — Medication 1 TABLET(S): at 12:18

## 2024-01-07 RX ADMIN — INSULIN GLARGINE 25 UNIT(S): 100 INJECTION, SOLUTION SUBCUTANEOUS at 22:11

## 2024-01-07 RX ADMIN — Medication 1 APPLICATION(S): at 17:02

## 2024-01-07 RX ADMIN — FINASTERIDE 5 MILLIGRAM(S): 5 TABLET, FILM COATED ORAL at 12:18

## 2024-01-07 RX ADMIN — Medication 3: at 06:54

## 2024-01-07 RX ADMIN — SEVELAMER CARBONATE 800 MILLIGRAM(S): 2400 POWDER, FOR SUSPENSION ORAL at 08:46

## 2024-01-07 RX ADMIN — CARVEDILOL PHOSPHATE 6.25 MILLIGRAM(S): 80 CAPSULE, EXTENDED RELEASE ORAL at 17:05

## 2024-01-07 RX ADMIN — CHLORHEXIDINE GLUCONATE 15 MILLILITER(S): 213 SOLUTION TOPICAL at 06:51

## 2024-01-07 RX ADMIN — CHLORHEXIDINE GLUCONATE 1 APPLICATION(S): 213 SOLUTION TOPICAL at 06:51

## 2024-01-07 RX ADMIN — SEVELAMER CARBONATE 800 MILLIGRAM(S): 2400 POWDER, FOR SUSPENSION ORAL at 12:19

## 2024-01-07 RX ADMIN — Medication 1 APPLICATION(S): at 06:50

## 2024-01-07 RX ADMIN — ATORVASTATIN CALCIUM 20 MILLIGRAM(S): 80 TABLET, FILM COATED ORAL at 21:01

## 2024-01-07 RX ADMIN — Medication 4 MILLIGRAM(S): at 21:01

## 2024-01-07 RX ADMIN — CARVEDILOL PHOSPHATE 6.25 MILLIGRAM(S): 80 CAPSULE, EXTENDED RELEASE ORAL at 06:52

## 2024-01-07 RX ADMIN — FAMOTIDINE 10 MILLIGRAM(S): 10 INJECTION INTRAVENOUS at 17:14

## 2024-01-07 RX ADMIN — Medication 600 MILLIGRAM(S): at 17:04

## 2024-01-07 RX ADMIN — AMLODIPINE BESYLATE 5 MILLIGRAM(S): 2.5 TABLET ORAL at 06:51

## 2024-01-07 RX ADMIN — HEPARIN SODIUM 5000 UNIT(S): 5000 INJECTION INTRAVENOUS; SUBCUTANEOUS at 21:01

## 2024-01-07 RX ADMIN — Medication 600 MILLIGRAM(S): at 06:52

## 2024-01-07 RX ADMIN — Medication 81 MILLIGRAM(S): at 12:18

## 2024-01-07 RX ADMIN — SEVELAMER CARBONATE 800 MILLIGRAM(S): 2400 POWDER, FOR SUSPENSION ORAL at 17:02

## 2024-01-07 RX ADMIN — Medication 750 MILLIGRAM(S): at 06:50

## 2024-01-07 RX ADMIN — Medication 1 APPLICATION(S): at 06:53

## 2024-01-07 RX ADMIN — CHLORHEXIDINE GLUCONATE 15 MILLILITER(S): 213 SOLUTION TOPICAL at 17:02

## 2024-01-07 RX ADMIN — HEPARIN SODIUM 5000 UNIT(S): 5000 INJECTION INTRAVENOUS; SUBCUTANEOUS at 14:36

## 2024-01-07 NOTE — PROGRESS NOTE ADULT - SUBJECTIVE AND OBJECTIVE BOX
GENERAL SURGERY PROGRESS NOTE     JACQUELIN CAI  78y  Male  Hospital day :22d  POD:  Procedure: Insertion of midline catheter    Insertion of arterial line with imaging guidance    Tracheostomy, with PEG tube insertion    Insertion of arterial line with imaging guidance      OVERNIGHT EVENTS: no acute events overnight, awaiting family's decision on facility    T(F): 97 (01-07-24 @ 00:07), Max: 98.8 (01-06-24 @ 12:49)  HR: 84 (01-07-24 @ 00:07) (78 - 91)  BP: 122/58 (01-07-24 @ 00:07) (112/55 - 145/67)  ABP: --  ABP(mean): --  RR: 18 (01-07-24 @ 00:07) (18 - 20)  SpO2: 98% (01-07-24 @ 00:07) (96% - 99%)    DIET/FLUIDS: multivitamin 1 Tablet(s) Oral daily    NG:                                                                                DRAINS:     BM:     EMESIS:     URINE:   01-05-24 @ 07:01  -  01-06-24 @ 07:00  --------------------------------------------------------  OUT: 510 mL       GI proph:  famotidine    Tablet 10 milliGRAM(s) Oral every 48 hours    AC/ proph: aspirin  chewable 81 milliGRAM(s) Enteral Tube daily  heparin   Injectable 5000 Unit(s) SubCutaneous every 8 hours    ABx:     PHYSICAL EXAM:  GENERAL: NAD  CHEST/LUNG: Non-labored breathing, on T piece  HEART: Regular rate and rhythm  ABDOMEN: Soft, Nontender, Nondistended;       LABS  Labs:  CAPILLARY BLOOD GLUCOSE      POCT Blood Glucose.: 144 mg/dL (07 Jan 2024 00:37)  POCT Blood Glucose.: 155 mg/dL (06 Jan 2024 21:00)  POCT Blood Glucose.: 147 mg/dL (06 Jan 2024 17:09)  POCT Blood Glucose.: 144 mg/dL (06 Jan 2024 11:39)  POCT Blood Glucose.: 144 mg/dL (06 Jan 2024 06:02)                          9.4    11.15 )-----------( 338      ( 05 Jan 2024 06:35 )             29.4         01-06    147<H>  |  112<H>  |  75<HH>  ----------------------------<  141<H>  3.9   |  21  |  1.9<H>      Calcium: 8.6 mg/dL (01-06-24 @ 22:15)      LFTs:         Coags:            Urinalysis Basic - ( 06 Jan 2024 22:15 )    Color: x / Appearance: x / SG: x / pH: x  Gluc: 141 mg/dL / Ketone: x  / Bili: x / Urobili: x   Blood: x / Protein: x / Nitrite: x   Leuk Esterase: x / RBC: x / WBC x   Sq Epi: x / Non Sq Epi: x / Bacteria: x            RADIOLOGY & ADDITIONAL TESTS:      A/P

## 2024-01-07 NOTE — PROGRESS NOTE ADULT - ATTENDING COMMENTS
Patient seen and examined with surgery resident on rounds and discussed management plans with family by bedside and answered questions via translation on phone by family. Elderly male with TBI now with trach and PEG barely opening eyes. not much response as per family. Long term outcome discussed.

## 2024-01-07 NOTE — PROGRESS NOTE ADULT - ASSESSMENT
78yM who presented as a TRAUMA CONSULT s/p mechanical fall (+HT -LOC -AC). Found to have an acute subdural hemorrhage. Hospital course has been complicated by multiple seizures and uncontrolled hypertension. Intubated on 12/21 due to worsening respiratory status.     S/P tracheostomy and PEG placement on 12/22/23, (Portex cuffed 9 and 2cm at skin 20fr PEG). Now with T-piece, O2 at 40%.     Downgraded from SICU to floor on 1/4/24.        PLAN:  - Continue Valproate 750mg q12h until 1/8/24   - Chest PT q4h, Duoneb treatments as needed   - Continue TF w/ Nepro @ 27 cc/hr x 24h  - Sevelamer powder for hyperphosphatemia   - ASA 81 QD, HSQ for DVT ppx   - Monitor vital signs, I&O's, O2 requirements  - Daily labs w/ repletion of electrolytes as needed   - Pain control PRN

## 2024-01-07 NOTE — PROGRESS NOTE ADULT - SUBJECTIVE AND OBJECTIVE BOX
JACQUELIN CAI  78y, Male  Allergy: [This allergen will not trigger allergy alert] pollen (Unknown)  No Known Drug Allergies    Hospital Day: 22d    Patient seen and examined earlier today.  No acute events overnight.     PMH/PSH:  PAST MEDICAL & SURGICAL HISTORY:  HTN (hypertension)      Seasonal allergies      History of BPH      Diabetes      No significant past surgical history          LAST 24-Hr EVENTS:    VITALS:  T(F): 97.6 (01-07-24 @ 08:57), Max: 98.8 (01-06-24 @ 12:49)  HR: 76 (01-07-24 @ 08:57)  BP: 112/57 (01-07-24 @ 08:57) (112/55 - 142/65)  RR: 18 (01-07-24 @ 08:57)  SpO2: 97% (01-07-24 @ 08:57)          TESTS & MEASUREMENTS:  Weight/BMI      01-05-24 @ 07:01  -  01-06-24 @ 07:00  --------------------------------------------------------  IN: 0 mL / OUT: 1110 mL / NET: -1110 mL    01-06-24 @ 07:01  -  01-07-24 @ 07:00  --------------------------------------------------------  IN: 851 mL / OUT: 0 mL / NET: 851 mL    01-07-24 @ 07:01  -  01-07-24 @ 10:29  --------------------------------------------------------  IN: 0 mL / OUT: 100 mL / NET: -100 mL            01-06    147<H>  |  112<H>  |  75<HH>  ----------------------------<  141<H>  3.9   |  21  |  1.9<H>    Ca    8.6      06 Jan 2024 22:15  Phos  5.4     01-06  Mg     2.4     01-06              Urinalysis Basic - ( 06 Jan 2024 22:15 )    Color: x / Appearance: x / SG: x / pH: x  Gluc: 141 mg/dL / Ketone: x  / Bili: x / Urobili: x   Blood: x / Protein: x / Nitrite: x   Leuk Esterase: x / RBC: x / WBC x   Sq Epi: x / Non Sq Epi: x / Bacteria: x                    A1C with Estimated Average Glucose Result: 7.0 % (01-01-24 @ 22:36)  A1C with Estimated Average Glucose Result: 6.3 % (02-14-23 @ 05:49)      Indwelling Urethral Catheter:     Connect To:  Straight Drainage/[place]    Indication:  Urinary Retention / Obstruction (01-04-24 @ 03:17) (not performed)  Indwelling Urethral Catheter:     Connect To:  Straight Drainage/[place]    Indication:  Urinary Retention / Obstruction (01-03-24 @ 02:04) (not performed)      RADIOLOGY, ECG, & ADDITIONAL TESTS:  12 Lead ECG:   Ventricular Rate 101 BPM    Atrial Rate 101 BPM    P-R Interval 204 ms    QRS Duration 62 ms    Q-T Interval 338 ms    QTC Calculation(Bazett) 438 ms    P Axis 58 degrees    R Axis 28 degrees    T Axis 24 degrees    Diagnosis Line Sinus tachycardia  Otherwise normal ECG    Confirmed by ALLEGRA SANABRIA, [facility] ([id number]) on 12/17/2023 12:48:36 AM (12-16-23 @ 05:20)    CT Head No Cont:   ACC: [id number] EXAM:  CT BRAIN   ORDERED BY: SIMONE UPTON     PROCEDURE DATE:  12/31/2023          INTERPRETATION:  CLINICAL INDICATION: Subdural hemorrhage, follow-up    TECHNIQUE: CT of the head was performed without the administration of   intravenous contrast. Coronal and sagittal reformats were obtained.    COMPARISON: CT head 12/27/2023    FINDINGS:  Decreased density of subdural hemorrhage along the left site of the falx,   now measuring 0.7 cm in maximum thickness (previously 0.9 cm). Resolved   subdural hemorrhage along the right frontoparietal convexity. Decreased   size of hypodense extra-axial subdural collection, corresponding to   previously seen subdural hemorrhage, along the left cerebral hemisphere   measuring up to 0.7 cm (previously 0.9 cm). There is mild mass effect on   the left occipital lobe. No midline shift.    Age-related involutional changes and chronic microvascular ischemic   changes. Stable chronic lacunar infarct in the right basal ganglia.   Stable chronic lacunar infarct in the left caudate head.    Redemonstrated ventriculomegaly out of proportion to parenchymal volume   loss, which can be seen with normal pressure hydrocephalus.    The visualized intraorbital contents are unremarkable. The imaged   portions of the paranasal sinuses are aerated. Moderate opacification of   the bilateral mastoid air cells.    No acute depressed calvarial fracture.    IMPRESSION:  Evolving subdural hematomas as detailed above.    --- End of Report ---          PINEDA MENDOZA MD; Resident Radiologist  This document has been electronically signed.  NIKOLE LUO MD; Attending Radiologist  This document has been electronically signed. Dec 31 2023 12:34PM (12-31-23 @ 05:34)    RECENT DIAGNOSTIC ORDERS:      MEDICATIONS:  MEDICATIONS  (STANDING):  amantadine 100 milliGRAM(s) Oral <User Schedule>  amLODIPine   Tablet 5 milliGRAM(s) Oral daily  aspirin  chewable 81 milliGRAM(s) Enteral Tube daily  atorvastatin 20 milliGRAM(s) Oral at bedtime  bacitracin   Ointment 1 Application(s) Topical every 12 hours  carvedilol 6.25 milliGRAM(s) Oral every 12 hours  chlorhexidine 0.12% Liquid 15 milliLiter(s) Oral Mucosa two times a day  chlorhexidine 2% Cloths 1 Application(s) Topical <User Schedule>  chlorhexidine 2% Cloths 1 Application(s) Topical <User Schedule>  doxazosin 4 milliGRAM(s) Oral at bedtime  famotidine    Tablet 10 milliGRAM(s) Oral every 48 hours  finasteride 5 milliGRAM(s) Oral daily  guaiFENesin  milliGRAM(s) Oral every 12 hours  heparin   Injectable 5000 Unit(s) SubCutaneous every 8 hours  insulin glargine Injectable (LANTUS) 25 Unit(s) SubCutaneous at bedtime  insulin lispro (ADMELOG) corrective regimen sliding scale   SubCutaneous every 6 hours  ipratropium 17 MICROgram(s) HFA Inhaler 1 Puff(s) Inhalation every 6 hours  multivitamin 1 Tablet(s) Oral daily  sevelamer carbonate Powder 800 milliGRAM(s) Oral three times a day with meals  valproic  acid Syrup 750 milliGRAM(s) Oral every 12 hours  vitamin A &amp; D Ointment 1 Application(s) Topical every 12 hours    MEDICATIONS  (PRN):      HOME MEDICATIONS:  amLODIPine 5 mg oral tablet (02-14)  aspirin 81 mg oral tablet (02-14)  atorvastatin 20 mg oral tablet (12-16)  carvedilol 6.25 mg oral tablet (02-14)  clopidogrel 75 mg oral tablet (02-25)  Jardiance 25 mg oral tablet (02-14)  Nephplex Rx oral tablet (02-14)  SITagliptin 50 mg oral tablet (12-16)  sodium bicarbonate 650 mg oral tablet (02-25)  tamsulosin 0.4 mg oral capsule (12-16)  valsartan 320 mg oral tablet (02-14)      PHYSICAL EXAM:  General Appearance: NAD,   HEENT: EOMI, sclera non-icteric.  Heart: Regular rate   Lungs: T piece 40/7  Abdomen:  Soft, nontender, nondistended.   MSK/Extremities: Warm & well-perfused.   Skin: Warm, dry. No jaundice.        JACQUELIN CAI  78y, Male  Allergy: [This allergen will not trigger allergy alert] pollen (Unknown)  No Known Drug Allergies    Hospital Day: 22d    Patient seen and examined earlier today.  No acute events overnight.     PMH/PSH:  PAST MEDICAL & SURGICAL HISTORY:  HTN (hypertension)      Seasonal allergies      History of BPH      Diabetes      No significant past surgical history          LAST 24-Hr EVENTS:    VITALS:  T(F): 97.6 (01-07-24 @ 08:57), Max: 98.8 (01-06-24 @ 12:49)  HR: 76 (01-07-24 @ 08:57)  BP: 112/57 (01-07-24 @ 08:57) (112/55 - 142/65)  RR: 18 (01-07-24 @ 08:57)  SpO2: 97% (01-07-24 @ 08:57)          TESTS & MEASUREMENTS:  Weight/BMI      01-05-24 @ 07:01  -  01-06-24 @ 07:00  --------------------------------------------------------  IN: 0 mL / OUT: 1110 mL / NET: -1110 mL    01-06-24 @ 07:01  -  01-07-24 @ 07:00  --------------------------------------------------------  IN: 851 mL / OUT: 0 mL / NET: 851 mL    01-07-24 @ 07:01  -  01-07-24 @ 10:29  --------------------------------------------------------  IN: 0 mL / OUT: 100 mL / NET: -100 mL            01-06    147<H>  |  112<H>  |  75<HH>  ----------------------------<  141<H>  3.9   |  21  |  1.9<H>    Ca    8.6      06 Jan 2024 22:15  Phos  5.4     01-06  Mg     2.4     01-06              Urinalysis Basic - ( 06 Jan 2024 22:15 )    Color: x / Appearance: x / SG: x / pH: x  Gluc: 141 mg/dL / Ketone: x  / Bili: x / Urobili: x   Blood: x / Protein: x / Nitrite: x   Leuk Esterase: x / RBC: x / WBC x   Sq Epi: x / Non Sq Epi: x / Bacteria: x                    A1C with Estimated Average Glucose Result: 7.0 % (01-01-24 @ 22:36)  A1C with Estimated Average Glucose Result: 6.3 % (02-14-23 @ 05:49)      Indwelling Urethral Catheter:     Connect To:  Straight Drainage/[place]    Indication:  Urinary Retention / Obstruction (01-04-24 @ 03:17) (not performed)  Indwelling Urethral Catheter:     Connect To:  Straight Drainage/[place]    Indication:  Urinary Retention / Obstruction (01-03-24 @ 02:04) (not performed)      RADIOLOGY, ECG, & ADDITIONAL TESTS:  12 Lead ECG:   Ventricular Rate 101 BPM    Atrial Rate 101 BPM    P-R Interval 204 ms    QRS Duration 62 ms    Q-T Interval 338 ms    QTC Calculation(Bazett) 438 ms    P Axis 58 degrees    R Axis 28 degrees    T Axis 24 degrees    Diagnosis Line Sinus tachycardia  Otherwise normal ECG    Confirmed by ALLEGRA SANABRIA, [facility] ([id number]) on 12/17/2023 12:48:36 AM (12-16-23 @ 05:20)    CT Head No Cont:   ACC: [id number] EXAM:  CT BRAIN   ORDERED BY: SIMONE UPTON     PROCEDURE DATE:  12/31/2023          INTERPRETATION:  CLINICAL INDICATION: Subdural hemorrhage, follow-up    TECHNIQUE: CT of the head was performed without the administration of   intravenous contrast. Coronal and sagittal reformats were obtained.    COMPARISON: CT head 12/27/2023    FINDINGS:  Decreased density of subdural hemorrhage along the left site of the falx,   now measuring 0.7 cm in maximum thickness (previously 0.9 cm). Resolved   subdural hemorrhage along the right frontoparietal convexity. Decreased   size of hypodense extra-axial subdural collection, corresponding to   previously seen subdural hemorrhage, along the left cerebral hemisphere   measuring up to 0.7 cm (previously 0.9 cm). There is mild mass effect on   the left occipital lobe. No midline shift.    Age-related involutional changes and chronic microvascular ischemic   changes. Stable chronic lacunar infarct in the right basal ganglia.   Stable chronic lacunar infarct in the left caudate head.    Redemonstrated ventriculomegaly out of proportion to parenchymal volume   loss, which can be seen with normal pressure hydrocephalus.    The visualized intraorbital contents are unremarkable. The imaged   portions of the paranasal sinuses are aerated. Moderate opacification of   the bilateral mastoid air cells.    No acute depressed calvarial fracture.    IMPRESSION:  Evolving subdural hematomas as detailed above.    --- End of Report ---          PINEDA MENDOZA MD; Resident Radiologist  This document has been electronically signed.  NIKOLE LUO MD; Attending Radiologist  This document has been electronically signed. Dec 31 2023 12:34PM (12-31-23 @ 05:34)    RECENT DIAGNOSTIC ORDERS:      MEDICATIONS:  MEDICATIONS  (STANDING):  amantadine 100 milliGRAM(s) Oral <User Schedule>  amLODIPine   Tablet 5 milliGRAM(s) Oral daily  aspirin  chewable 81 milliGRAM(s) Enteral Tube daily  atorvastatin 20 milliGRAM(s) Oral at bedtime  bacitracin   Ointment 1 Application(s) Topical every 12 hours  carvedilol 6.25 milliGRAM(s) Oral every 12 hours  chlorhexidine 0.12% Liquid 15 milliLiter(s) Oral Mucosa two times a day  chlorhexidine 2% Cloths 1 Application(s) Topical <User Schedule>  chlorhexidine 2% Cloths 1 Application(s) Topical <User Schedule>  doxazosin 4 milliGRAM(s) Oral at bedtime  famotidine    Tablet 10 milliGRAM(s) Oral every 48 hours  finasteride 5 milliGRAM(s) Oral daily  guaiFENesin  milliGRAM(s) Oral every 12 hours  heparin   Injectable 5000 Unit(s) SubCutaneous every 8 hours  insulin glargine Injectable (LANTUS) 25 Unit(s) SubCutaneous at bedtime  insulin lispro (ADMELOG) corrective regimen sliding scale   SubCutaneous every 6 hours  ipratropium 17 MICROgram(s) HFA Inhaler 1 Puff(s) Inhalation every 6 hours  multivitamin 1 Tablet(s) Oral daily  sevelamer carbonate Powder 800 milliGRAM(s) Oral three times a day with meals  valproic  acid Syrup 750 milliGRAM(s) Oral every 12 hours  vitamin A &amp; D Ointment 1 Application(s) Topical every 12 hours    MEDICATIONS  (PRN):      HOME MEDICATIONS:  amLODIPine 5 mg oral tablet (02-14)  aspirin 81 mg oral tablet (02-14)  atorvastatin 20 mg oral tablet (12-16)  carvedilol 6.25 mg oral tablet (02-14)  clopidogrel 75 mg oral tablet (02-25)  Jardiance 25 mg oral tablet (02-14)  Nephplex Rx oral tablet (02-14)  SITagliptin 50 mg oral tablet (12-16)  sodium bicarbonate 650 mg oral tablet (02-25)  tamsulosin 0.4 mg oral capsule (12-16)  valsartan 320 mg oral tablet (02-14)      PHYSICAL EXAM:  General Appearance: NAD,   HEENT: EOMI, sclera non-icteric.  Heart: Regular rate   Lungs: T piece 40/7  Abdomen:  Soft, nontender, nondistended.   MSK/Extremities: Warm & well-perfused.   Skin: Warm, dry. No jaundice.

## 2024-01-08 PROCEDURE — 99232 SBSQ HOSP IP/OBS MODERATE 35: CPT

## 2024-01-08 PROCEDURE — 99232 SBSQ HOSP IP/OBS MODERATE 35: CPT | Mod: 24

## 2024-01-08 RX ADMIN — SEVELAMER CARBONATE 800 MILLIGRAM(S): 2400 POWDER, FOR SUSPENSION ORAL at 08:24

## 2024-01-08 RX ADMIN — SEVELAMER CARBONATE 800 MILLIGRAM(S): 2400 POWDER, FOR SUSPENSION ORAL at 11:42

## 2024-01-08 RX ADMIN — AMLODIPINE BESYLATE 5 MILLIGRAM(S): 2.5 TABLET ORAL at 05:41

## 2024-01-08 RX ADMIN — Medication 4 MILLIGRAM(S): at 21:45

## 2024-01-08 RX ADMIN — INSULIN GLARGINE 25 UNIT(S): 100 INJECTION, SOLUTION SUBCUTANEOUS at 20:45

## 2024-01-08 RX ADMIN — CHLORHEXIDINE GLUCONATE 1 APPLICATION(S): 213 SOLUTION TOPICAL at 05:42

## 2024-01-08 RX ADMIN — CHLORHEXIDINE GLUCONATE 15 MILLILITER(S): 213 SOLUTION TOPICAL at 17:38

## 2024-01-08 RX ADMIN — SEVELAMER CARBONATE 800 MILLIGRAM(S): 2400 POWDER, FOR SUSPENSION ORAL at 17:37

## 2024-01-08 RX ADMIN — HEPARIN SODIUM 5000 UNIT(S): 5000 INJECTION INTRAVENOUS; SUBCUTANEOUS at 05:42

## 2024-01-08 RX ADMIN — ATORVASTATIN CALCIUM 20 MILLIGRAM(S): 80 TABLET, FILM COATED ORAL at 21:45

## 2024-01-08 RX ADMIN — Medication 600 MILLIGRAM(S): at 17:37

## 2024-01-08 RX ADMIN — Medication 1 APPLICATION(S): at 17:37

## 2024-01-08 RX ADMIN — Medication 1 APPLICATION(S): at 05:42

## 2024-01-08 RX ADMIN — Medication 1 TABLET(S): at 11:42

## 2024-01-08 RX ADMIN — Medication 1 PUFF(S): at 08:25

## 2024-01-08 RX ADMIN — CARVEDILOL PHOSPHATE 6.25 MILLIGRAM(S): 80 CAPSULE, EXTENDED RELEASE ORAL at 05:41

## 2024-01-08 RX ADMIN — Medication 600 MILLIGRAM(S): at 05:41

## 2024-01-08 RX ADMIN — Medication 81 MILLIGRAM(S): at 11:42

## 2024-01-08 RX ADMIN — HEPARIN SODIUM 5000 UNIT(S): 5000 INJECTION INTRAVENOUS; SUBCUTANEOUS at 13:09

## 2024-01-08 RX ADMIN — Medication 1 APPLICATION(S): at 05:43

## 2024-01-08 RX ADMIN — Medication 100 MILLIGRAM(S): at 08:24

## 2024-01-08 RX ADMIN — Medication 3: at 11:42

## 2024-01-08 RX ADMIN — Medication 1 PUFF(S): at 21:35

## 2024-01-08 RX ADMIN — HEPARIN SODIUM 5000 UNIT(S): 5000 INJECTION INTRAVENOUS; SUBCUTANEOUS at 21:44

## 2024-01-08 RX ADMIN — CHLORHEXIDINE GLUCONATE 15 MILLILITER(S): 213 SOLUTION TOPICAL at 05:42

## 2024-01-08 RX ADMIN — FINASTERIDE 5 MILLIGRAM(S): 5 TABLET, FILM COATED ORAL at 11:41

## 2024-01-08 NOTE — PROGRESS NOTE ADULT - SUBJECTIVE AND OBJECTIVE BOX
TRAUMA SURGERY PROGRESS NOTE    Patient: JACQUELIN CAI , 78y ([birth year])Male   MRN: [id number]  Location: [address] ([facility]) 016 B  Visit: 12-16-23 Inpatient  Date: 01-08-24 @ 01:25    Hospital Day #: 25  Post-Op Day #: 17    DIAGNOSIS / PROCEDURE:   * Acute SDH   S/P tracheostomy and PEG placement (12/22/23)     INTERVAL HX:  No acute events overnight. Patient on T-piece.     VITALS:   T(F): 98.7 (01-08-24 @ 00:17), Max: 98.8 (01-07-24 @ 22:47)  HR: 82 (01-08-24 @ 00:17)  BP: 115/48 (01-08-24 @ 00:17)  RR: 20 (01-08-24 @ 00:17)  SpO2: 99% (01-08-24 @ 00:17)      DIET:   Diet, NPO with Tube Feed:   Tube Feeding Modality: Gastrostomy  Nepro with Carb Steady  Total Volume for 24 Hours (mL): 648  Continuous  Starting Tube Feed Rate mL per Hour: 27  Increase Tube Feed Rate by (mL): 0     Every 4 hours  Until Goal Tube Feed Rate (mL per Hour): 27  Tube Feed Duration (in Hours): 24  Free Water Flush  Bolus   Total Volume per Flush (mL): 250   Frequency: Every 6 Hours  Free Water Flush Instructions:  Please flush PEG with 50cc of sterile water before feeds start and 100cc of sterile water after feeds end  Banatrol TF     Qty per Day:  3        01-06 @ 07:01  -  01-07 @ 07:00  --------------------------------------------------------  OUT:  Total OUT: 0 mL      01-07 @ 07:01  -  01-08 @ 01:25  --------------------------------------------------------  OUT:    Indwelling Catheter - Urethral (mL): 450 mL  Total OUT: 450 mL        AC/DVT PPX:  heparin   Injectable 5000 Unit(s) SubCutaneous every 8 hours    PHYSICAL EXAM:  General Appearance: NAD  HEENT: EOMI, sclera non-icteric.  Heart: Regular rate   Lungs: T piece   Abdomen:  Soft, nontender, nondistended. PEG in place.   MSK/Extremities: Warm & well-perfused.   Skin: Warm, dry. No jaundice.     LABS:             8.3    10.47 )-----------( 249       01-07 @ 21:12             26.9     144  |  108  |  91  ----------------------------<  120        01-07 @ 21:12  3.9   |  19  |  2.1        Calcium: 8.6 mg/dL (01-07 @ 21:12)  Magnesium: 2.6 mg/dL *H* (01-07 @ 21:12)  Phosphorus: 6.3 mg/dL *H* (01-07 @ 21:12)    RADIOLOGY & OTHER STUDIES: No new imaging obtained in the past 24h       ACCESS DEVICES:  [X] Peripheral IV  [ ] Central Venous Line	[ ] R	[ ] L	[ ] IJ	[ ] Fem	[ ] SC	Placed:   [ ] Arterial Line		[ ] R	[ ] L	[ ] Fem	[ ] Rad	[ ] Ax	Placed:   [ ] PICC:					[ ] Mediport  [ ] Urinary Catheter,  Date Placed:   [ ] Chest tube: [ ] Right, [ ] Left  [ ] MARIA LUISA/Ronald Drains  [X] 20Fr PEG (12/22)   [X] Tracheostomy (12/22)   TRAUMA SURGERY PROGRESS NOTE    Patient: JACQUELIN CAI , 78y ([birth year])Male   MRN: [id number]  Location: [address] ([facility]) 016 B  Visit: 12-16-23 Inpatient  Date: 01-08-24 @ 01:25    Hospital Day #: 25  Post-Op Day #: 17    DIAGNOSIS / PROCEDURE:   * Acute SDH   S/P tracheostomy and PEG placement (12/22/23)     INTERVAL HX:  No acute events overnight. Patient on T-piece.     VITALS:   T(F): 98.7 (01-08-24 @ 00:17), Max: 98.8 (01-07-24 @ 22:47)  HR: 82 (01-08-24 @ 00:17)  BP: 115/48 (01-08-24 @ 00:17)  RR: 20 (01-08-24 @ 00:17)  SpO2: 99% (01-08-24 @ 00:17)      DIET:   Diet, NPO with Tube Feed:   Tube Feeding Modality: Gastrostomy  Nepro with Carb Steady  Total Volume for 24 Hours (mL): 648  Continuous  Starting Tube Feed Rate mL per Hour: 27  Increase Tube Feed Rate by (mL): 0     Every 4 hours  Until Goal Tube Feed Rate (mL per Hour): 27  Tube Feed Duration (in Hours): 24  Free Water Flush  Bolus   Total Volume per Flush (mL): 250   Frequency: Every 6 Hours  Free Water Flush Instructions:  Please flush PEG with 50cc of sterile water before feeds start and 100cc of sterile water after feeds end  Banatrol TF     Qty per Day:  3        01-06 @ 07:01  -  01-07 @ 07:00  --------------------------------------------------------  OUT:  Total OUT: 0 mL      01-07 @ 07:01  -  01-08 @ 01:25  --------------------------------------------------------  OUT:    Indwelling Catheter - Urethral (mL): 450 mL  Total OUT: 450 mL        AC/DVT PPX:  heparin   Injectable 5000 Unit(s) SubCutaneous every 8 hours    PHYSICAL EXAM:  General Appearance: NAD  HEENT: EOMI, sclera non-icteric.  Heart: Regular rate   Lungs: T piece   Abdomen:  Soft, nontender, nondistended. PEG in place.   MSK/Extremities: Warm & well-perfused.   Skin: Warm, dry. No jaundice.     LABS:             8.3    10.47 )-----------( 249       01-07 @ 21:12             26.9     144  |  108  |  91  ----------------------------<  120        01-07 @ 21:12  3.9   |  19  |  2.1        Calcium: 8.6 mg/dL (01-07 @ 21:12)  Magnesium: 2.6 mg/dL *H* (01-07 @ 21:12)  Phosphorus: 6.3 mg/dL *H* (01-07 @ 21:12)    RADIOLOGY & OTHER STUDIES: No new imaging obtained in the past 24h       ACCESS DEVICES:  [X] Peripheral IV  [ ] Central Venous Line	[ ] R	[ ] L	[ ] IJ	[ ] Fem	[ ] SC	Placed:   [ ] Arterial Line		[ ] R	[ ] L	[ ] Fem	[ ] Rad	[ ] Ax	Placed:   [ ] PICC:					[ ] Mediport  [ ] Urinary Catheter,  Date Placed:   [ ] Chest tube: [ ] Right, [ ] Left  [ ] MARIA LUISA/Ronald Drains  [X] 20Fr PEG (12/22)   [X] Tracheostomy (12/22)

## 2024-01-08 NOTE — CHART NOTE - NSCHARTNOTEFT_GEN_A_CORE
patients family at bedside with Cantonese  #[id number]    Explained the need for an implanted loop recorder placed by Electrophysiology prior to discharge due to history of Non sustained Ventricular Tachycardia.   Family would like Daughter Rashi Castillo "Ezra Gamino to be informed to make a decision. EP, ILR, and NSVT was briefly discussed since the family was not familiar with these terms.     If Electrophysiology could call her at [phone] with . They would like to hear from them.     Carlo Santana MD  Trauma Surgery 1949 patients family at bedside with Cantonese  #[id number]    Explained the need for an implanted loop recorder placed by Electrophysiology prior to discharge due to history of Non sustained Ventricular Tachycardia.   Family would like Daughter Rashi Castillo "Ezra Gamino to be informed to make a decision. EP, ILR, and NSVT was briefly discussed since the family was not familiar with these terms.     If Electrophysiology could call her at [phone] with . They would like to hear from them.     Carlo Santana MD  Trauma Surgery [id number]

## 2024-01-08 NOTE — PROGRESS NOTE ADULT - SUBJECTIVE AND OBJECTIVE BOX
Patient is a 78y old  Male who presents with a chief complaint of SDH (01-08-24)      Pt seen and examined at bedside. trach vent       PAST MEDICAL & SURGICAL HISTORY:  HTN (hypertension)    Seasonal allergies    History of BPH    Diabetes    No significant past surgical history        VITAL SIGNS (Last 24 hrs):  T(C): 36.4 (01-08-24 @ 08:52), Max: 37.1 (01-07-24 @ 22:47)  HR: 76 (01-08-24 @ 08:52) (76 - 84)  BP: 122/58 (01-08-24 @ 08:52) (95/51 - 122/58)  RR: 18 (01-08-24 @ 08:52) (18 - 22)  SpO2: 97% (01-08-24 @ 08:52) (97% - 99%)  Wt(kg): --  Daily     Daily     I&O's Summary    07 Jan 2024 07:01  -  08 Jan 2024 07:00  --------------------------------------------------------  IN: 270 mL / OUT: 800 mL / NET: -530 mL        PHYSICAL EXAM:  GENERAL: NAD  HEAD:  Atraumatic, Normocephalic  EYES: EOMI, PERRLA, conjunctiva and sclera clear  NECK: Supple, No JVD, trach   CHEST/LUNG: Clear to auscultation bilaterally; No wheeze  HEART: Regular rate and rhythm; No murmurs, rubs, or gallops  ABDOMEN: Soft, Nontender, Nondistended; Bowel sounds present, peg   EXTREMITIES:  2+ Peripheral Pulses, No clubbing, cyanosis, or edema  PSYCH: non responsive       Labs Reviewed  Spoke to patient in regards to abnormal labs.    CBC Full  -  ( 07 Jan 2024 21:12 )  WBC Count : 10.47 K/uL  Hemoglobin : 8.3 g/dL  Hematocrit : 26.9 %  Platelet Count - Automated : 249 K/uL  Mean Cell Volume : 93.1 fL  Mean Cell Hemoglobin : 28.7 pg  Mean Cell Hemoglobin Concentration : 30.9 g/dL  Auto Neutrophil # : x  Auto Lymphocyte # : x  Auto Monocyte # : x  Auto Eosinophil # : x  Auto Basophil # : x  Auto Neutrophil % : x  Auto Lymphocyte % : x  Auto Monocyte % : x  Auto Eosinophil % : x  Auto Basophil % : x    BMP:    01-07 @ 21:12    Blood Urea Nitrogen - 91  Calcium - 8.6  Carbond Dioxide - 19  Chloride - 108  Creatinine - 2.1  Glucose - 120  Potassium - 3.9  Sodium - 144      Hemoglobin A1c -     Urine Culture:        COVID Labs  CRP:      D-Dimer:      MEDICATIONS  (STANDING):  amantadine 100 milliGRAM(s) Oral <User Schedule>  amLODIPine   Tablet 5 milliGRAM(s) Oral daily  aspirin  chewable 81 milliGRAM(s) Enteral Tube daily  atorvastatin 20 milliGRAM(s) Oral at bedtime  bacitracin   Ointment 1 Application(s) Topical every 12 hours  carvedilol 6.25 milliGRAM(s) Oral every 12 hours  chlorhexidine 0.12% Liquid 15 milliLiter(s) Oral Mucosa two times a day  chlorhexidine 2% Cloths 1 Application(s) Topical <User Schedule>  chlorhexidine 2% Cloths 1 Application(s) Topical <User Schedule>  doxazosin 4 milliGRAM(s) Oral at bedtime  famotidine    Tablet 10 milliGRAM(s) Oral every 48 hours  finasteride 5 milliGRAM(s) Oral daily  guaiFENesin  milliGRAM(s) Oral every 12 hours  heparin   Injectable 5000 Unit(s) SubCutaneous every 8 hours  insulin glargine Injectable (LANTUS) 25 Unit(s) SubCutaneous at bedtime  insulin lispro (ADMELOG) corrective regimen sliding scale   SubCutaneous every 6 hours  ipratropium 17 MICROgram(s) HFA Inhaler 1 Puff(s) Inhalation every 6 hours  multivitamin 1 Tablet(s) Oral daily  sevelamer carbonate Powder 800 milliGRAM(s) Oral three times a day with meals  vitamin A &amp; D Ointment 1 Application(s) Topical every 12 hours    MEDICATIONS  (PRN):

## 2024-01-08 NOTE — PROGRESS NOTE ADULT - ASSESSMENT
78yM who presented as a TRAUMA CONSULT s/p mechanical fall (+HT -LOC -AC). Found to have an acute subdural hemorrhage. Hospital course has been complicated by multiple seizures and uncontrolled hypertension. Intubated on 12/21 due to worsening respiratory status.     S/P tracheostomy and PEG placement on 12/22/23, ([place] cuffed 9 and 2cm at skin 20fr PEG). Now with T-piece, O2 at 40%.     Downgraded from SICU to floor on 1/4/24.      PLAN:  - Continue TF w/ Nepro @ 27 cc/hr x 24h  - Sevelamer powder for hyperphosphatemia   - ASA 81 QD, HSQ for DVT ppx   - Monitor vital signs, I&O's, O2 requirements  - Pain control PRN    # Dispo: patient medically cleared for discharge; pending placement [email or web address]   ----------------------------  Trauma Team Surgery  [phone]      Date/Time: 01-08-24 @ 01:25 78yM who presented as a TRAUMA CONSULT s/p mechanical fall (+HT -LOC -AC). Found to have an acute subdural hemorrhage. Hospital course has been complicated by multiple seizures and uncontrolled hypertension. Intubated on 12/21 due to worsening respiratory status.     S/P tracheostomy and PEG placement on 12/22/23, (Portex cuffed 9 and 2cm at skin 20fr PEG). Now with T-piece, O2 at 40%.     Downgraded from SICU to floor on 1/4/24.      PLAN:  - Continue TF w/ Nepro @ 27 cc/hr x 24h  - Sevelamer powder for hyperphosphatemia   - ASA 81 QD, HSQ for DVT ppx   - Monitor vital signs, I&O's, O2 requirements  - Pain control PRN    # Dispo: patient medically cleared for discharge; pending placement to [facility]   ----------------------------  Trauma Team Surgery  [phone]      Date/Time: 01-08-24 @ 01:25

## 2024-01-08 NOTE — PROGRESS NOTE ADULT - ASSESSMENT
78yM who presented as a TRAUMA CONSULT s/p mechanical fall (+HT -LOC -AC). Found to have an acute subdural hemorrhage. Hospital course has been complicated by multiple seizures and uncontrolled hypertension. Intubated on 12/21 due to worsening respiratory status.   S/P tracheostomy and PEG placement on 12/22/23, (Portex cuffed 9 and 2cm at skin 20fr PEG). Now with T-piece, O2 at 40%. Downgraded from SICU to floor on 1/4/24.      12/21-intubated for airway protection, accessory muscle use  12/22- s/p tracheostomy and PEG placement, Portex cuffed 9 and 20fr PEG 2cm at skin     #Acute SDH   #h/o stroke (2/2023) w/residual right sided weakness  #focal seizure  -12/18 HCT#4 -stable SDH, no new acute findings  -12/27: Pt obtunded, not responding to noxious stim - VPA/LFTs/BMP/ammonia ordered, CTH f/up: stable  - Q4 neuro checks  - amantadine, 100mg q48h. renal dosing  - NSGY: 1/1: "Risk vs benefits should be weighed and discussed with patient prior to initiation of Plavix". --> will not be restarting plavix per Dr. Degroot  - RIGHT anterior thalamus infarct,  LEFT caudate head lacunar infarct    - vEEG No seizures; Discontinued 12/19    - Neurology cs- d/c EEG, 2 days prior to discharge call neurology so they can do a spot EEG --> recalled on 12/28 to evaluate diminished mental status     - Valproic acid 750mg q12h x 1 week (end today 1/8/24)      #Respiratory Failure secondary to impaired secretion clearance  s/p tracheostomy 12/22, size 9 cuffed portex  Tolerating t-piece since 12/29   chest PT q4  guaifenesin q12 to thin tracheal secretions      #acute Hypotension intraop, resolved  -off levophed since 12/22 PM    #HTN  #hx HLD  c/w home coreg and amlodipine  -Valsartan 320mg HOLDING   c/w atorvastatin      #DM     -ISS     - hgb A1C 7.0     -Off insulin gtt 12/20      -Lantus 25 units HS     -FSG q6 while on TF    #NSVT    -EP/Cards: No arrhythmias on tele only artifact. No further work up needed. Recommend ILR prior to discharge if patient/family agreeable  Imaging:   Echo: 12/2023: EF 66%, G1DD, trace MR, mild TR     #CKD   #hyperphosphatemia  #Hypernatremia - improved  - baseline Cr 2.1  - holding home sodium bicarb 650mg BID  - c/w sevelamer powder     #hx BPH  - cardura 4mg (flomax at home, non crushable)      #hx of CVA    - ASA81 mg cleared by NSGY   - continue to hold home plavix      #Metabolic encephalopathy  possibly due to hypothyroidism (improved)     - TSH - 4.16, T4 - 5.9, T3 - 79, WNL      DW Trauma, pt pending placement   Medicine to signoff please recall PRN

## 2024-01-09 LAB
ANION GAP SERPL CALC-SCNC: 13 MMOL/L — SIGNIFICANT CHANGE UP (ref 7–14)
ANION GAP SERPL CALC-SCNC: 13 MMOL/L — SIGNIFICANT CHANGE UP (ref 7–14)
BASOPHILS # BLD AUTO: 0.04 K/UL — SIGNIFICANT CHANGE UP (ref 0–0.2)
BASOPHILS # BLD AUTO: 0.04 K/UL — SIGNIFICANT CHANGE UP (ref 0–0.2)
BASOPHILS NFR BLD AUTO: 0.5 % — SIGNIFICANT CHANGE UP (ref 0–1)
BASOPHILS NFR BLD AUTO: 0.5 % — SIGNIFICANT CHANGE UP (ref 0–1)
BUN SERPL-MCNC: 88 MG/DL — CRITICAL HIGH (ref 10–20)
BUN SERPL-MCNC: 88 MG/DL — CRITICAL HIGH (ref 10–20)
CALCIUM SERPL-MCNC: 8 MG/DL — LOW (ref 8.4–10.5)
CALCIUM SERPL-MCNC: 8 MG/DL — LOW (ref 8.4–10.5)
CHLORIDE SERPL-SCNC: 109 MMOL/L — SIGNIFICANT CHANGE UP (ref 98–110)
CHLORIDE SERPL-SCNC: 109 MMOL/L — SIGNIFICANT CHANGE UP (ref 98–110)
CO2 SERPL-SCNC: 19 MMOL/L — SIGNIFICANT CHANGE UP (ref 17–32)
CO2 SERPL-SCNC: 19 MMOL/L — SIGNIFICANT CHANGE UP (ref 17–32)
CREAT SERPL-MCNC: 1.9 MG/DL — HIGH (ref 0.7–1.5)
CREAT SERPL-MCNC: 1.9 MG/DL — HIGH (ref 0.7–1.5)
EGFR: 36 ML/MIN/1.73M2 — LOW
EGFR: 36 ML/MIN/1.73M2 — LOW
EOSINOPHIL # BLD AUTO: 0.14 K/UL — SIGNIFICANT CHANGE UP (ref 0–0.7)
EOSINOPHIL # BLD AUTO: 0.14 K/UL — SIGNIFICANT CHANGE UP (ref 0–0.7)
EOSINOPHIL NFR BLD AUTO: 1.8 % — SIGNIFICANT CHANGE UP (ref 0–8)
EOSINOPHIL NFR BLD AUTO: 1.8 % — SIGNIFICANT CHANGE UP (ref 0–8)
GLUCOSE SERPL-MCNC: 140 MG/DL — HIGH (ref 70–99)
GLUCOSE SERPL-MCNC: 140 MG/DL — HIGH (ref 70–99)
HCT VFR BLD CALC: 25.2 % — LOW (ref 42–52)
HCT VFR BLD CALC: 25.2 % — LOW (ref 42–52)
HGB BLD-MCNC: 8 G/DL — LOW (ref 14–18)
HGB BLD-MCNC: 8 G/DL — LOW (ref 14–18)
IMM GRANULOCYTES NFR BLD AUTO: 2.3 % — HIGH (ref 0.1–0.3)
IMM GRANULOCYTES NFR BLD AUTO: 2.3 % — HIGH (ref 0.1–0.3)
LYMPHOCYTES # BLD AUTO: 0.83 K/UL — LOW (ref 1.2–3.4)
LYMPHOCYTES # BLD AUTO: 0.83 K/UL — LOW (ref 1.2–3.4)
LYMPHOCYTES # BLD AUTO: 10.6 % — LOW (ref 20.5–51.1)
LYMPHOCYTES # BLD AUTO: 10.6 % — LOW (ref 20.5–51.1)
MAGNESIUM SERPL-MCNC: 2.4 MG/DL — SIGNIFICANT CHANGE UP (ref 1.8–2.4)
MAGNESIUM SERPL-MCNC: 2.4 MG/DL — SIGNIFICANT CHANGE UP (ref 1.8–2.4)
MCHC RBC-ENTMCNC: 29 PG — SIGNIFICANT CHANGE UP (ref 27–31)
MCHC RBC-ENTMCNC: 29 PG — SIGNIFICANT CHANGE UP (ref 27–31)
MCHC RBC-ENTMCNC: 31.7 G/DL — LOW (ref 32–37)
MCHC RBC-ENTMCNC: 31.7 G/DL — LOW (ref 32–37)
MCV RBC AUTO: 91.3 FL — SIGNIFICANT CHANGE UP (ref 80–94)
MCV RBC AUTO: 91.3 FL — SIGNIFICANT CHANGE UP (ref 80–94)
MONOCYTES # BLD AUTO: 0.53 K/UL — SIGNIFICANT CHANGE UP (ref 0.1–0.6)
MONOCYTES # BLD AUTO: 0.53 K/UL — SIGNIFICANT CHANGE UP (ref 0.1–0.6)
MONOCYTES NFR BLD AUTO: 6.8 % — SIGNIFICANT CHANGE UP (ref 1.7–9.3)
MONOCYTES NFR BLD AUTO: 6.8 % — SIGNIFICANT CHANGE UP (ref 1.7–9.3)
NEUTROPHILS # BLD AUTO: 6.09 K/UL — SIGNIFICANT CHANGE UP (ref 1.4–6.5)
NEUTROPHILS # BLD AUTO: 6.09 K/UL — SIGNIFICANT CHANGE UP (ref 1.4–6.5)
NEUTROPHILS NFR BLD AUTO: 78 % — HIGH (ref 42.2–75.2)
NEUTROPHILS NFR BLD AUTO: 78 % — HIGH (ref 42.2–75.2)
NRBC # BLD: 0 /100 WBCS — SIGNIFICANT CHANGE UP (ref 0–0)
NRBC # BLD: 0 /100 WBCS — SIGNIFICANT CHANGE UP (ref 0–0)
PHOSPHATE SERPL-MCNC: 5.1 MG/DL — HIGH (ref 2.1–4.9)
PHOSPHATE SERPL-MCNC: 5.1 MG/DL — HIGH (ref 2.1–4.9)
PLATELET # BLD AUTO: 212 K/UL — SIGNIFICANT CHANGE UP (ref 130–400)
PLATELET # BLD AUTO: 212 K/UL — SIGNIFICANT CHANGE UP (ref 130–400)
PMV BLD: 8.4 FL — SIGNIFICANT CHANGE UP (ref 7.4–10.4)
PMV BLD: 8.4 FL — SIGNIFICANT CHANGE UP (ref 7.4–10.4)
POTASSIUM SERPL-MCNC: 3.8 MMOL/L — SIGNIFICANT CHANGE UP (ref 3.5–5)
POTASSIUM SERPL-MCNC: 3.8 MMOL/L — SIGNIFICANT CHANGE UP (ref 3.5–5)
POTASSIUM SERPL-SCNC: 3.8 MMOL/L — SIGNIFICANT CHANGE UP (ref 3.5–5)
POTASSIUM SERPL-SCNC: 3.8 MMOL/L — SIGNIFICANT CHANGE UP (ref 3.5–5)
RBC # BLD: 2.76 M/UL — LOW (ref 4.7–6.1)
RBC # BLD: 2.76 M/UL — LOW (ref 4.7–6.1)
RBC # FLD: 13.1 % — SIGNIFICANT CHANGE UP (ref 11.5–14.5)
RBC # FLD: 13.1 % — SIGNIFICANT CHANGE UP (ref 11.5–14.5)
SODIUM SERPL-SCNC: 141 MMOL/L — SIGNIFICANT CHANGE UP (ref 135–146)
SODIUM SERPL-SCNC: 141 MMOL/L — SIGNIFICANT CHANGE UP (ref 135–146)
WBC # BLD: 7.81 K/UL — SIGNIFICANT CHANGE UP (ref 4.8–10.8)
WBC # BLD: 7.81 K/UL — SIGNIFICANT CHANGE UP (ref 4.8–10.8)
WBC # FLD AUTO: 7.81 K/UL — SIGNIFICANT CHANGE UP (ref 4.8–10.8)
WBC # FLD AUTO: 7.81 K/UL — SIGNIFICANT CHANGE UP (ref 4.8–10.8)

## 2024-01-09 PROCEDURE — 99232 SBSQ HOSP IP/OBS MODERATE 35: CPT | Mod: 25

## 2024-01-09 RX ORDER — IPRATROPIUM/ALBUTEROL SULFATE 18-103MCG
3 AEROSOL WITH ADAPTER (GRAM) INHALATION EVERY 6 HOURS
Refills: 0 | Status: DISCONTINUED | OUTPATIENT
Start: 2024-01-09 | End: 2024-01-12

## 2024-01-09 RX ADMIN — Medication 1 APPLICATION(S): at 17:29

## 2024-01-09 RX ADMIN — SEVELAMER CARBONATE 800 MILLIGRAM(S): 2400 POWDER, FOR SUSPENSION ORAL at 11:33

## 2024-01-09 RX ADMIN — CHLORHEXIDINE GLUCONATE 1 APPLICATION(S): 213 SOLUTION TOPICAL at 05:53

## 2024-01-09 RX ADMIN — Medication 4 MILLIGRAM(S): at 23:15

## 2024-01-09 RX ADMIN — CHLORHEXIDINE GLUCONATE 15 MILLILITER(S): 213 SOLUTION TOPICAL at 17:15

## 2024-01-09 RX ADMIN — Medication 1 APPLICATION(S): at 06:00

## 2024-01-09 RX ADMIN — HEPARIN SODIUM 5000 UNIT(S): 5000 INJECTION INTRAVENOUS; SUBCUTANEOUS at 05:41

## 2024-01-09 RX ADMIN — CHLORHEXIDINE GLUCONATE 1 APPLICATION(S): 213 SOLUTION TOPICAL at 05:52

## 2024-01-09 RX ADMIN — FINASTERIDE 5 MILLIGRAM(S): 5 TABLET, FILM COATED ORAL at 11:31

## 2024-01-09 RX ADMIN — Medication 600 MILLIGRAM(S): at 17:16

## 2024-01-09 RX ADMIN — CARVEDILOL PHOSPHATE 6.25 MILLIGRAM(S): 80 CAPSULE, EXTENDED RELEASE ORAL at 17:16

## 2024-01-09 RX ADMIN — HEPARIN SODIUM 5000 UNIT(S): 5000 INJECTION INTRAVENOUS; SUBCUTANEOUS at 14:05

## 2024-01-09 RX ADMIN — Medication 1 APPLICATION(S): at 05:42

## 2024-01-09 RX ADMIN — AMLODIPINE BESYLATE 5 MILLIGRAM(S): 2.5 TABLET ORAL at 05:41

## 2024-01-09 RX ADMIN — Medication 81 MILLIGRAM(S): at 11:31

## 2024-01-09 RX ADMIN — ATORVASTATIN CALCIUM 20 MILLIGRAM(S): 80 TABLET, FILM COATED ORAL at 23:16

## 2024-01-09 RX ADMIN — Medication 3 MILLILITER(S): at 20:14

## 2024-01-09 RX ADMIN — Medication 600 MILLIGRAM(S): at 05:42

## 2024-01-09 RX ADMIN — FAMOTIDINE 10 MILLIGRAM(S): 10 INJECTION INTRAVENOUS at 17:43

## 2024-01-09 RX ADMIN — Medication 1 TABLET(S): at 11:31

## 2024-01-09 RX ADMIN — CHLORHEXIDINE GLUCONATE 15 MILLILITER(S): 213 SOLUTION TOPICAL at 05:42

## 2024-01-09 RX ADMIN — HEPARIN SODIUM 5000 UNIT(S): 5000 INJECTION INTRAVENOUS; SUBCUTANEOUS at 23:16

## 2024-01-09 RX ADMIN — SEVELAMER CARBONATE 800 MILLIGRAM(S): 2400 POWDER, FOR SUSPENSION ORAL at 17:16

## 2024-01-09 RX ADMIN — Medication 1 APPLICATION(S): at 17:16

## 2024-01-09 RX ADMIN — CARVEDILOL PHOSPHATE 6.25 MILLIGRAM(S): 80 CAPSULE, EXTENDED RELEASE ORAL at 05:41

## 2024-01-09 RX ADMIN — Medication 3: at 11:32

## 2024-01-09 RX ADMIN — Medication 3: at 23:42

## 2024-01-09 RX ADMIN — INSULIN GLARGINE 25 UNIT(S): 100 INJECTION, SOLUTION SUBCUTANEOUS at 23:23

## 2024-01-09 RX ADMIN — SEVELAMER CARBONATE 800 MILLIGRAM(S): 2400 POWDER, FOR SUSPENSION ORAL at 08:18

## 2024-01-09 NOTE — PROGRESS NOTE ADULT - SUBJECTIVE AND OBJECTIVE BOX
GENERAL SURGERY PROGRESS NOTE     JACQUELIN CAI  [address] [place] [address] day :24d    POD:  Procedure: Insertion of midline catheter    Insertion of arterial line with imaging guidance    Tracheostomy, with PEG tube insertion    Insertion of arterial line with imaging guidance      Surgical Attending: Isidro Montemayor  Overnight events:    no acute events  vitals within normal limits  martinez replaced for urinary retention. straight cath 900cc.     T(F): 98.6 (01-09-24 @ 12:05), Max: 98.7 (01-09-24 @ 00:30)  HR: 81 (01-09-24 @ 12:05) (80 - 85)  BP: 139/62 (01-09-24 @ 12:05) (110/56 - 150/66)  ABP: --  ABP(mean): --  RR: 18 (01-09-24 @ 12:05) (18 - 18)  SpO2: 99% (01-09-24 @ 12:05) (96% - 99%)    IN'S / OUT's:    01-08-24 @ 07:01  -  01-09-24 @ 07:00  --------------------------------------------------------  IN:    Enteral Tube Flush: 500 mL    Nepro with Carb Steady: 324 mL  Total IN: 824 mL    OUT:    Intermittent Catheterization - Urethral (mL): 900 mL  Total OUT: 900 mL    Total NET: -76 mL      01-09-24 @ 07:01  -  01-09-24 @ 14:09  --------------------------------------------------------  IN:  Total IN: 0 mL    OUT:    Intermittent Catheterization - Urethral (mL): 800 mL  Total OUT: 800 mL    Total NET: -800 mL          PHYSICAL EXAM:  GENERAL: NAD  CHEST/LUNG:  T piece 30/8 @99%   HEART: Regular rate and rhythm  ABDOMEN: Soft, Nontender, Nondistended;   : martinez in place   EXTREMITIES:  No clubbing, cyanosis, or edema      LABS  Labs:  CAPILLARY BLOOD GLUCOSE      POCT Blood Glucose.: 165 mg/dL (09 Jan 2024 11:27)  POCT Blood Glucose.: 146 mg/dL (09 Jan 2024 06:38)  POCT Blood Glucose.: 133 mg/dL (08 Jan 2024 17:09)                          8.0    7.81  )-----------( 212      ( 09 Jan 2024 11:55 )             25.2       Auto Neutrophil %: 78.0 % (01-09-24 @ 11:55)  Auto Immature Granulocyte %: 2.3 % (01-09-24 @ 11:55)    01-09    141  |  109  |  88<HH>  ----------------------------<  140<H>  3.8   |  19  |  1.9<H>      Calcium: 8.0 mg/dL (01-09-24 @ 11:55)      LFTs:         Coags:            Urinalysis Basic - ( 09 Jan 2024 11:55 )    Color: x / Appearance: x / SG: x / pH: x  Gluc: 140 mg/dL / Ketone: x  / Bili: x / Urobili: x   Blood: x / Protein: x / Nitrite: x   Leuk Esterase: x / RBC: x / WBC x   Sq Epi: x / Non Sq Epi: x / Bacteria: x            RADIOLOGY & ADDITIONAL TESTS:      A/P:  JACQUELIN CAI is a 78yM who presented as a TRAUMA CONSULT s/p mechanical fall (+HT -LOC -AC). Found to have an acute subdural hemorrhage. Hospital course has been complicated by multiple seizures and uncontrolled hypertension. Intubated on 12/21 due to worsening respiratory status.     S/P tracheostomy and PEG placement on 12/22/23, (Portex cuffed 9 and 2cm at skin 20fr PEG). Now with T-piece, O2 at 40%.     Downgraded from SICU to floor on 1/4/24.        PLAN:   - EP said the ILR not needed. They had spoken to the family previously and they declined.  "NSVT" reported was artifact and no other telemetry events were noted.  - monitor FSG- on lantus 25 QHS  - Continue Carvedilol and Amlodipine for hx of Afib/Flutter, HTN, Cryptogenic stroke with Residual R weakness.   - 250cc Q6 free water flushes through enteric tube   - Hypernatremia resolved 141 (144)  - continue tube feeds   - monitor respiratory status on T piece at 30/8 @99%   - monitor urinary output- Martinez replaced for retention. hx of urinary incontinence and BPH  - DVT/ GI ppx   - Continue sevelamer for hyperphosphatemia 5.1 (6.3)   - F/u CM for dispo         Disposition: Pending Auth and availabilty at [facility] in [place].     Above plan discussed with Attending Surgeon  ***  , patient, patient family, and Primary team    Blue Spectra [id number]  TAP (Trauma, Acute care, Pediatrics) Spectra [phone]  Green Spectra 8027  Vascular [phone]   GENERAL SURGERY PROGRESS NOTE     JACQUELIN CAI  [address] [place] [address] day :24d    POD:  Procedure: Insertion of midline catheter    Insertion of arterial line with imaging guidance    Tracheostomy, with PEG tube insertion    Insertion of arterial line with imaging guidance      Surgical Attending: Isidro Montemayor  Overnight events:    no acute events  vitals within normal limits  martinez replaced for urinary retention. straight cath 900cc.     T(F): 98.6 (01-09-24 @ 12:05), Max: 98.7 (01-09-24 @ 00:30)  HR: 81 (01-09-24 @ 12:05) (80 - 85)  BP: 139/62 (01-09-24 @ 12:05) (110/56 - 150/66)  ABP: --  ABP(mean): --  RR: 18 (01-09-24 @ 12:05) (18 - 18)  SpO2: 99% (01-09-24 @ 12:05) (96% - 99%)    IN'S / OUT's:    01-08-24 @ 07:01  -  01-09-24 @ 07:00  --------------------------------------------------------  IN:    Enteral Tube Flush: 500 mL    Nepro with Carb Steady: 324 mL  Total IN: 824 mL    OUT:    Intermittent Catheterization - Urethral (mL): 900 mL  Total OUT: 900 mL    Total NET: -76 mL      01-09-24 @ 07:01  -  01-09-24 @ 14:09  --------------------------------------------------------  IN:  Total IN: 0 mL    OUT:    Intermittent Catheterization - Urethral (mL): 800 mL  Total OUT: 800 mL    Total NET: -800 mL          PHYSICAL EXAM:  GENERAL: NAD  CHEST/LUNG:  T piece 30/8 @99%   HEART: Regular rate and rhythm  ABDOMEN: Soft, Nontender, Nondistended;   : martinez in place   EXTREMITIES:  No clubbing, cyanosis, or edema      LABS  Labs:  CAPILLARY BLOOD GLUCOSE      POCT Blood Glucose.: 165 mg/dL (09 Jan 2024 11:27)  POCT Blood Glucose.: 146 mg/dL (09 Jan 2024 06:38)  POCT Blood Glucose.: 133 mg/dL (08 Jan 2024 17:09)                          8.0    7.81  )-----------( 212      ( 09 Jan 2024 11:55 )             25.2       Auto Neutrophil %: 78.0 % (01-09-24 @ 11:55)  Auto Immature Granulocyte %: 2.3 % (01-09-24 @ 11:55)    01-09    141  |  109  |  88<HH>  ----------------------------<  140<H>  3.8   |  19  |  1.9<H>      Calcium: 8.0 mg/dL (01-09-24 @ 11:55)      LFTs:         Coags:            Urinalysis Basic - ( 09 Jan 2024 11:55 )    Color: x / Appearance: x / SG: x / pH: x  Gluc: 140 mg/dL / Ketone: x  / Bili: x / Urobili: x   Blood: x / Protein: x / Nitrite: x   Leuk Esterase: x / RBC: x / WBC x   Sq Epi: x / Non Sq Epi: x / Bacteria: x            RADIOLOGY & ADDITIONAL TESTS:      A/P:  JACQUELIN CAI is a 78yM who presented as a TRAUMA CONSULT s/p mechanical fall (+HT -LOC -AC). Found to have an acute subdural hemorrhage. Hospital course has been complicated by multiple seizures and uncontrolled hypertension. Intubated on 12/21 due to worsening respiratory status.     S/P tracheostomy and PEG placement on 12/22/23, (Portex cuffed 9 and 2cm at skin 20fr PEG). Now with T-piece, O2 at 40%.     Downgraded from SICU to floor on 1/4/24.        PLAN:   - EP said the ILR not needed. They had spoken to the family previously and they declined.  "NSVT" reported was artifact and no other telemetry events were noted.  - monitor FSG- on lantus 25 QHS  - Continue Carvedilol and Amlodipine for hx of Afib/Flutter, HTN, Cryptogenic stroke with Residual R weakness.   - 250cc Q6 free water flushes through enteric tube   - Hypernatremia resolved 141 (144)  - continue tube feeds   - monitor respiratory status on T piece at 30/8 @99%   - monitor urinary output- Martinez replaced for retention. hx of urinary incontinence and BPH  - DVT/ GI ppx   - Continue sevelamer for hyperphosphatemia 5.1 (6.3)   - F/u CM for dispo         Disposition: Pending Auth and availabilty at [facility] in [place].     Above plan discussed with Attending Surgeon  ***  , patient, patient family, and Primary team    Blue Spectra [id number]  TAP (Trauma, Acute care, Pediatrics) Spectra [phone]  Green Spectra 8018  Vascular [phone]   GENERAL SURGERY PROGRESS NOTE     JACQUELIN CAI  [address] [place] [address] day :24d    POD:  Procedure: Insertion of midline catheter    Insertion of arterial line with imaging guidance    Tracheostomy, with PEG tube insertion    Insertion of arterial line with imaging guidance      Surgical Attending: Isidro Montemayor  Overnight events:    no acute events  vitals within normal limits  martinez replaced for urinary retention. straight cath 900cc.     T(F): 98.6 (01-09-24 @ 12:05), Max: 98.7 (01-09-24 @ 00:30)  HR: 81 (01-09-24 @ 12:05) (80 - 85)  BP: 139/62 (01-09-24 @ 12:05) (110/56 - 150/66)  ABP: --  ABP(mean): --  RR: 18 (01-09-24 @ 12:05) (18 - 18)  SpO2: 99% (01-09-24 @ 12:05) (96% - 99%)    IN'S / OUT's:    01-08-24 @ 07:01  -  01-09-24 @ 07:00  --------------------------------------------------------  IN:    Enteral Tube Flush: 500 mL    Nepro with Carb Steady: 324 mL  Total IN: 824 mL    OUT:    Intermittent Catheterization - Urethral (mL): 900 mL  Total OUT: 900 mL    Total NET: -76 mL      01-09-24 @ 07:01  -  01-09-24 @ 14:09  --------------------------------------------------------  IN:  Total IN: 0 mL    OUT:    Intermittent Catheterization - Urethral (mL): 800 mL  Total OUT: 800 mL    Total NET: -800 mL          PHYSICAL EXAM:  GENERAL: NAD  CHEST/LUNG:  T piece 30/8 @99%   HEART: Regular rate and rhythm  ABDOMEN: Soft, Nontender, Nondistended;   : martinez in place   EXTREMITIES:  No clubbing, cyanosis, or edema      LABS  Labs:  CAPILLARY BLOOD GLUCOSE      POCT Blood Glucose.: 165 mg/dL (09 Jan 2024 11:27)  POCT Blood Glucose.: 146 mg/dL (09 Jan 2024 06:38)  POCT Blood Glucose.: 133 mg/dL (08 Jan 2024 17:09)                          8.0    7.81  )-----------( 212      ( 09 Jan 2024 11:55 )             25.2       Auto Neutrophil %: 78.0 % (01-09-24 @ 11:55)  Auto Immature Granulocyte %: 2.3 % (01-09-24 @ 11:55)    01-09    141  |  109  |  88<HH>  ----------------------------<  140<H>  3.8   |  19  |  1.9<H>      Calcium: 8.0 mg/dL (01-09-24 @ 11:55)      LFTs:         Coags:            Urinalysis Basic - ( 09 Jan 2024 11:55 )    Color: x / Appearance: x / SG: x / pH: x  Gluc: 140 mg/dL / Ketone: x  / Bili: x / Urobili: x   Blood: x / Protein: x / Nitrite: x   Leuk Esterase: x / RBC: x / WBC x   Sq Epi: x / Non Sq Epi: x / Bacteria: x            RADIOLOGY & ADDITIONAL TESTS:      A/P:  JACQUELIN CAI is a 78yM who presented as a TRAUMA CONSULT s/p mechanical fall (+HT -LOC -AC). Found to have an acute subdural hemorrhage. Hospital course has been complicated by multiple seizures and uncontrolled hypertension. Intubated on 12/21 due to worsening respiratory status.     S/P tracheostomy and PEG placement on 12/22/23, (Portex cuffed 9 and 2cm at skin 20fr PEG). Now with T-piece, O2 at 40%.     Downgraded from SICU to floor on 1/4/24.        PLAN:   - EP said the ILR not needed. They had spoken to the family previously and they declined.  "NSVT" reported was artifact and no other telemetry events were noted.  - monitor FSG- on lantus 25 QHS  - Continue Carvedilol and Amlodipine for hx of Afib/Flutter, HTN, Cryptogenic stroke with Residual R weakness.   - 250cc Q6 free water flushes through enteric tube   - Hypernatremia resolved 141 (144)  - continue tube feeds   - monitor respiratory status on T piece at 30/8 @99%   - monitor urinary output- Martinez replaced for retention. hx of urinary incontinence and BPH  - DVT/ GI ppx   - Continue sevelamer for hyperphosphatemia 5.1 (6.3)   - F/u CM for dispo         Disposition: Pending Auth and availabilty at [facility] in [place].     Above plan discussed with Attending Surgeon Dr. Roman  , patient, patient family, and Primary team    Blue Spectra [id number]  TAP (Trauma, Acute care, Pediatrics) Spectra 8259  Green Spectra 8070  Vascular 6060   GENERAL SURGERY PROGRESS NOTE     JACQUELIN CAI  [address] [place] [address] day :24d    POD:  Procedure: Insertion of midline catheter    Insertion of arterial line with imaging guidance    Tracheostomy, with PEG tube insertion    Insertion of arterial line with imaging guidance      Surgical Attending: Isidro Montemayor  Overnight events:    no acute events  vitals within normal limits  martinez replaced for urinary retention. straight cath 900cc.     T(F): 98.6 (01-09-24 @ 12:05), Max: 98.7 (01-09-24 @ 00:30)  HR: 81 (01-09-24 @ 12:05) (80 - 85)  BP: 139/62 (01-09-24 @ 12:05) (110/56 - 150/66)  ABP: --  ABP(mean): --  RR: 18 (01-09-24 @ 12:05) (18 - 18)  SpO2: 99% (01-09-24 @ 12:05) (96% - 99%)    IN'S / OUT's:    01-08-24 @ 07:01  -  01-09-24 @ 07:00  --------------------------------------------------------  IN:    Enteral Tube Flush: 500 mL    Nepro with Carb Steady: 324 mL  Total IN: 824 mL    OUT:    Intermittent Catheterization - Urethral (mL): 900 mL  Total OUT: 900 mL    Total NET: -76 mL      01-09-24 @ 07:01  -  01-09-24 @ 14:09  --------------------------------------------------------  IN:  Total IN: 0 mL    OUT:    Intermittent Catheterization - Urethral (mL): 800 mL  Total OUT: 800 mL    Total NET: -800 mL          PHYSICAL EXAM:  GENERAL: NAD  CHEST/LUNG:  T piece 30/8 @99%   HEART: Regular rate and rhythm  ABDOMEN: Soft, Nontender, Nondistended;   : martinez in place   EXTREMITIES:  No clubbing, cyanosis, or edema      LABS  Labs:  CAPILLARY BLOOD GLUCOSE      POCT Blood Glucose.: 165 mg/dL (09 Jan 2024 11:27)  POCT Blood Glucose.: 146 mg/dL (09 Jan 2024 06:38)  POCT Blood Glucose.: 133 mg/dL (08 Jan 2024 17:09)                          8.0    7.81  )-----------( 212      ( 09 Jan 2024 11:55 )             25.2       Auto Neutrophil %: 78.0 % (01-09-24 @ 11:55)  Auto Immature Granulocyte %: 2.3 % (01-09-24 @ 11:55)    01-09    141  |  109  |  88<HH>  ----------------------------<  140<H>  3.8   |  19  |  1.9<H>      Calcium: 8.0 mg/dL (01-09-24 @ 11:55)      LFTs:         Coags:            Urinalysis Basic - ( 09 Jan 2024 11:55 )    Color: x / Appearance: x / SG: x / pH: x  Gluc: 140 mg/dL / Ketone: x  / Bili: x / Urobili: x   Blood: x / Protein: x / Nitrite: x   Leuk Esterase: x / RBC: x / WBC x   Sq Epi: x / Non Sq Epi: x / Bacteria: x            RADIOLOGY & ADDITIONAL TESTS:      A/P:  JACQUELIN CAI is a 78yM who presented as a TRAUMA CONSULT s/p mechanical fall (+HT -LOC -AC). Found to have an acute subdural hemorrhage. Hospital course has been complicated by multiple seizures and uncontrolled hypertension. Intubated on 12/21 due to worsening respiratory status.     S/P tracheostomy and PEG placement on 12/22/23, (Portex cuffed 9 and 2cm at skin 20fr PEG). Now with T-piece, O2 at 40%.     Downgraded from SICU to floor on 1/4/24.        PLAN:   - EP said the ILR not needed. They had spoken to the family previously and they declined.  "NSVT" reported was artifact and no other telemetry events were noted.  - monitor FSG- on lantus 25 QHS  - Continue Carvedilol and Amlodipine for hx of Afib/Flutter, HTN, Cryptogenic stroke with Residual R weakness.   - 250cc Q6 free water flushes through enteric tube   - Hypernatremia resolved 141 (144)  - continue tube feeds   - monitor respiratory status on T piece at 30/8 @99%   - monitor urinary output- Martinez replaced for retention. hx of urinary incontinence and BPH  - DVT/ GI ppx   - Continue sevelamer for hyperphosphatemia 5.1 (6.3)   - F/u CM for dispo         Disposition: Pending Auth and availabilty at [facility] in [place].     Above plan discussed with Attending Surgeon Dr. Roman  , patient, patient family, and Primary team    Blue Spectra [id number]  TAP (Trauma, Acute care, Pediatrics) Spectra [phone]  Green Spectra 8071  Vascular [phone]

## 2024-01-09 NOTE — PROGRESS NOTE ADULT - ATTENDING COMMENTS
Aguirre placed for acute urine retention.  On T-collar  Tube feeds  DVT prophylaxis  SS for discharge planning

## 2024-01-10 LAB
SARS-COV-2 RNA SPEC QL NAA+PROBE: SIGNIFICANT CHANGE UP
SARS-COV-2 RNA SPEC QL NAA+PROBE: SIGNIFICANT CHANGE UP

## 2024-01-10 PROCEDURE — 99232 SBSQ HOSP IP/OBS MODERATE 35: CPT

## 2024-01-10 PROCEDURE — 93010 ELECTROCARDIOGRAM REPORT: CPT

## 2024-01-10 RX ADMIN — SEVELAMER CARBONATE 800 MILLIGRAM(S): 2400 POWDER, FOR SUSPENSION ORAL at 12:34

## 2024-01-10 RX ADMIN — Medication 1 APPLICATION(S): at 06:03

## 2024-01-10 RX ADMIN — Medication 4 MILLIGRAM(S): at 21:56

## 2024-01-10 RX ADMIN — FINASTERIDE 5 MILLIGRAM(S): 5 TABLET, FILM COATED ORAL at 12:34

## 2024-01-10 RX ADMIN — CARVEDILOL PHOSPHATE 6.25 MILLIGRAM(S): 80 CAPSULE, EXTENDED RELEASE ORAL at 17:52

## 2024-01-10 RX ADMIN — Medication 3 MILLILITER(S): at 08:18

## 2024-01-10 RX ADMIN — Medication 1 APPLICATION(S): at 17:53

## 2024-01-10 RX ADMIN — CHLORHEXIDINE GLUCONATE 15 MILLILITER(S): 213 SOLUTION TOPICAL at 06:03

## 2024-01-10 RX ADMIN — Medication 100 MILLIGRAM(S): at 09:31

## 2024-01-10 RX ADMIN — Medication 1 TABLET(S): at 12:34

## 2024-01-10 RX ADMIN — ATORVASTATIN CALCIUM 20 MILLIGRAM(S): 80 TABLET, FILM COATED ORAL at 21:56

## 2024-01-10 RX ADMIN — HEPARIN SODIUM 5000 UNIT(S): 5000 INJECTION INTRAVENOUS; SUBCUTANEOUS at 06:04

## 2024-01-10 RX ADMIN — INSULIN GLARGINE 25 UNIT(S): 100 INJECTION, SOLUTION SUBCUTANEOUS at 22:27

## 2024-01-10 RX ADMIN — Medication 3 MILLILITER(S): at 14:56

## 2024-01-10 RX ADMIN — SEVELAMER CARBONATE 800 MILLIGRAM(S): 2400 POWDER, FOR SUSPENSION ORAL at 09:31

## 2024-01-10 RX ADMIN — HEPARIN SODIUM 5000 UNIT(S): 5000 INJECTION INTRAVENOUS; SUBCUTANEOUS at 21:56

## 2024-01-10 RX ADMIN — CHLORHEXIDINE GLUCONATE 1 APPLICATION(S): 213 SOLUTION TOPICAL at 06:04

## 2024-01-10 RX ADMIN — AMLODIPINE BESYLATE 5 MILLIGRAM(S): 2.5 TABLET ORAL at 06:03

## 2024-01-10 RX ADMIN — CARVEDILOL PHOSPHATE 6.25 MILLIGRAM(S): 80 CAPSULE, EXTENDED RELEASE ORAL at 06:02

## 2024-01-10 RX ADMIN — Medication 600 MILLIGRAM(S): at 06:03

## 2024-01-10 RX ADMIN — SEVELAMER CARBONATE 800 MILLIGRAM(S): 2400 POWDER, FOR SUSPENSION ORAL at 17:51

## 2024-01-10 RX ADMIN — Medication 1 APPLICATION(S): at 17:52

## 2024-01-10 RX ADMIN — Medication 1 APPLICATION(S): at 06:04

## 2024-01-10 RX ADMIN — Medication 81 MILLIGRAM(S): at 12:34

## 2024-01-10 RX ADMIN — Medication 3 MILLILITER(S): at 21:11

## 2024-01-10 RX ADMIN — HEPARIN SODIUM 5000 UNIT(S): 5000 INJECTION INTRAVENOUS; SUBCUTANEOUS at 13:43

## 2024-01-10 NOTE — PROGRESS NOTE ADULT - ATTENDING COMMENTS
On T-collar  Tolerates tube feeds  Intermittently opens eyes, does not follow commands - this is unchanged    Assessment/Plan:  - continue tube feeds  - chest PT  - DVT prophylaxis  - SS for discharge planning

## 2024-01-10 NOTE — PROGRESS NOTE ADULT - SUBJECTIVE AND OBJECTIVE BOX
GENERAL SURGERY PROGRESS NOTE     JACQUELIN CAI  [address] [place] [address] day :25d    POD:  Procedure: Insertion of midline catheter    Insertion of arterial line with imaging guidance    Tracheostomy, with PEG tube insertion    Insertion of arterial line with imaging guidance      Surgical Attending: Isidro Montemayor  Overnight events:\    no acute issues overnight  voiding into martinez  tolerating T piece @100%  Tube feeds at goal     T(F): 99.4 (01-10-24 @ 00:40), Max: 99.7 (01-09-24 @ 21:25)  HR: 88 (01-10-24 @ 00:40) (80 - 95)  BP: 124/58 (01-10-24 @ 00:40) (116/58 - 150/66)  ABP: --  ABP(mean): --  RR: 18 (01-10-24 @ 00:40) (18 - 18)  SpO2: 100% (01-10-24 @ 00:40) (97% - 100%)    IN'S / OUT's:    01-08-24 @ 07:01  -  01-09-24 @ 07:00  --------------------------------------------------------  IN:    Enteral Tube Flush: 500 mL    Nepro with Carb Steady: 324 mL  Total IN: 824 mL    OUT:    Intermittent Catheterization - Urethral (mL): 900 mL  Total OUT: 900 mL    Total NET: -76 mL      01-09-24 @ 07:01  -  01-10-24 @ 01:56  --------------------------------------------------------  IN:    Enteral Tube Flush: 500 mL    Nepro with Carb Steady: 324 mL  Total IN: 824 mL    OUT:    Intermittent Catheterization - Urethral (mL): 1675 mL  Total OUT: 1675 mL    Total NET: -851 mL          PHYSICAL EXAM:  GENERAL: NAD   CHEST/LUNG: Clear to auscultation bilaterally T piece 30/8 @100%   HEART: Regular rate and rhythm  ABDOMEN: Soft, Nontender, Nondistended;  : martinez in place   EXTREMITIES:  No clubbing, cyanosis, or edema      LABS  Labs:  CAPILLARY BLOOD GLUCOSE      POCT Blood Glucose.: 147 mg/dL (10 Dar 2024 00:11)  POCT Blood Glucose.: 168 mg/dL (09 Jan 2024 23:23)  POCT Blood Glucose.: 145 mg/dL (09 Jan 2024 17:10)  POCT Blood Glucose.: 165 mg/dL (09 Jan 2024 11:27)  POCT Blood Glucose.: 146 mg/dL (09 Jan 2024 06:38)                          8.0    7.81  )-----------( 212      ( 09 Jan 2024 11:55 )             25.2       Auto Neutrophil %: 78.0 % (01-09-24 @ 11:55)  Auto Immature Granulocyte %: 2.3 % (01-09-24 @ 11:55)    01-09    141  |  109  |  88<HH>  ----------------------------<  140<H>  3.8   |  19  |  1.9<H>      Calcium: 8.0 mg/dL (01-09-24 @ 11:55)      LFTs:         Coags:            Urinalysis Basic - ( 09 Jan 2024 11:55 )    Color: x / Appearance: x / SG: x / pH: x  Gluc: 140 mg/dL / Ketone: x  / Bili: x / Urobili: x   Blood: x / Protein: x / Nitrite: x   Leuk Esterase: x / RBC: x / WBC x   Sq Epi: x / Non Sq Epi: x / Bacteria: x            RADIOLOGY & ADDITIONAL TESTS:      A/P:  JACQUELIN CAI is a 78yM who presented as a TRAUMA CONSULT s/p mechanical fall (+HT -LOC -AC). Found to have an acute subdural hemorrhage. Hospital course has been complicated by multiple seizures and uncontrolled hypertension. Intubated on 12/21 due to worsening respiratory status.     S/P tracheostomy and PEG placement on 12/22/23, (Portex cuffed 9 and 2cm at skin 20fr PEG). Now with T-piece, O2 at 40%.     Downgraded from SICU to floor on 1/4/24.        PLAN:   - EP said the ILR not needed. They had spoken to the family previously and they declined.  "NSVT" reported was artifact and no other telemetry events were noted.  - monitor FSG- on lantus 25 QHS  - Continue Carvedilol and Amlodipine for hx of Afib/Flutter, HTN, Cryptogenic stroke with Residual R weakness.   - 250cc Q6 free water flushes through enteric tube   - Hypernatremia resolved 141 (144)  - continue tube feeds   - monitor respiratory status on T piece at 30/8 @99%   - monitor urinary output- Martinez replaced for retention. hx of urinary incontinence and BPH  - DVT/ GI ppx   - Continue sevelamer for hyperphosphatemia       [place]:  [facility] [facility] [place] bed available Thursday 1/11/23     Above plan to be discussed with Attending Surgeon Dr. Roman  , patient, patient family, and Primary team      TAP (Trauma, Acute care, Pediatrics) Spectra [phone]   GENERAL SURGERY PROGRESS NOTE     JACQUELIN CAI  [address] [place] [address] day :25d    POD:  Procedure: Insertion of midline catheter    Insertion of arterial line with imaging guidance    Tracheostomy, with PEG tube insertion    Insertion of arterial line with imaging guidance      Surgical Attending: Isidro Montemayor  Overnight events:\    no acute issues overnight  voiding into martinez  tolerating T piece @100%  Tube feeds at goal     T(F): 99.4 (01-10-24 @ 00:40), Max: 99.7 (01-09-24 @ 21:25)  HR: 88 (01-10-24 @ 00:40) (80 - 95)  BP: 124/58 (01-10-24 @ 00:40) (116/58 - 150/66)  ABP: --  ABP(mean): --  RR: 18 (01-10-24 @ 00:40) (18 - 18)  SpO2: 100% (01-10-24 @ 00:40) (97% - 100%)    IN'S / OUT's:    01-08-24 @ 07:01  -  01-09-24 @ 07:00  --------------------------------------------------------  IN:    Enteral Tube Flush: 500 mL    Nepro with Carb Steady: 324 mL  Total IN: 824 mL    OUT:    Intermittent Catheterization - Urethral (mL): 900 mL  Total OUT: 900 mL    Total NET: -76 mL      01-09-24 @ 07:01  -  01-10-24 @ 01:56  --------------------------------------------------------  IN:    Enteral Tube Flush: 500 mL    Nepro with Carb Steady: 324 mL  Total IN: 824 mL    OUT:    Intermittent Catheterization - Urethral (mL): 1675 mL  Total OUT: 1675 mL    Total NET: -851 mL          PHYSICAL EXAM:  GENERAL: NAD   CHEST/LUNG: Clear to auscultation bilaterally T piece 30/8 @100%   HEART: Regular rate and rhythm  ABDOMEN: Soft, Nontender, Nondistended;  : martinez in place   EXTREMITIES:  No clubbing, cyanosis, or edema      LABS  Labs:  CAPILLARY BLOOD GLUCOSE      POCT Blood Glucose.: 147 mg/dL (10 Dar 2024 00:11)  POCT Blood Glucose.: 168 mg/dL (09 Jan 2024 23:23)  POCT Blood Glucose.: 145 mg/dL (09 Jan 2024 17:10)  POCT Blood Glucose.: 165 mg/dL (09 Jan 2024 11:27)  POCT Blood Glucose.: 146 mg/dL (09 Jan 2024 06:38)                          8.0    7.81  )-----------( 212      ( 09 Jan 2024 11:55 )             25.2       Auto Neutrophil %: 78.0 % (01-09-24 @ 11:55)  Auto Immature Granulocyte %: 2.3 % (01-09-24 @ 11:55)    01-09    141  |  109  |  88<HH>  ----------------------------<  140<H>  3.8   |  19  |  1.9<H>      Calcium: 8.0 mg/dL (01-09-24 @ 11:55)      LFTs:         Coags:            Urinalysis Basic - ( 09 Jan 2024 11:55 )    Color: x / Appearance: x / SG: x / pH: x  Gluc: 140 mg/dL / Ketone: x  / Bili: x / Urobili: x   Blood: x / Protein: x / Nitrite: x   Leuk Esterase: x / RBC: x / WBC x   Sq Epi: x / Non Sq Epi: x / Bacteria: x            RADIOLOGY & ADDITIONAL TESTS:      A/P:  JACQUELIN CAI is a 78yM who presented as a TRAUMA CONSULT s/p mechanical fall (+HT -LOC -AC). Found to have an acute subdural hemorrhage. Hospital course has been complicated by multiple seizures and uncontrolled hypertension. Intubated on 12/21 due to worsening respiratory status.     S/P tracheostomy and PEG placement on 12/22/23, (Portex cuffed 9 and 2cm at skin 20fr PEG). Now with T-piece, O2 at 40%.     Downgraded from SICU to floor on 1/4/24.        PLAN:   - EP said the ILR not needed. They had spoken to the family previously and they declined.  "NSVT" reported was artifact and no other telemetry events were noted.  - monitor FSG- on lantus 25 QHS  - Continue Carvedilol and Amlodipine for hx of Afib/Flutter, HTN, Cryptogenic stroke with Residual R weakness.   - 250cc Q6 free water flushes through enteric tube   - Hypernatremia resolved 141 (144)  - continue tube feeds   - monitor respiratory status on T piece at 30/8 @99%   - monitor urinary output- Martinez replaced for retention. hx of urinary incontinence and BPH  - DVT/ GI ppx   - Continue sevelamer for hyperphosphatemia       Disposition:  [facility] in [place]- bed available Thursday 1/11/23     Above plan to be discussed with Attending Surgeon Dr. Roman  , patient, patient family, and Primary team      TAP (Trauma, Acute care, Pediatrics) Spectra [phone]

## 2024-01-11 LAB
ANION GAP SERPL CALC-SCNC: 11 MMOL/L — SIGNIFICANT CHANGE UP (ref 7–14)
APPEARANCE UR: ABNORMAL
APPEARANCE UR: ABNORMAL
BACTERIA # UR AUTO: NEGATIVE /HPF — SIGNIFICANT CHANGE UP
BACTERIA # UR AUTO: NEGATIVE /HPF — SIGNIFICANT CHANGE UP
BASOPHILS # BLD AUTO: 0.03 K/UL — SIGNIFICANT CHANGE UP (ref 0–0.2)
BASOPHILS # BLD AUTO: 0.03 K/UL — SIGNIFICANT CHANGE UP (ref 0–0.2)
BASOPHILS NFR BLD AUTO: 0.4 % — SIGNIFICANT CHANGE UP (ref 0–1)
BASOPHILS NFR BLD AUTO: 0.4 % — SIGNIFICANT CHANGE UP (ref 0–1)
BILIRUB UR-MCNC: NEGATIVE — SIGNIFICANT CHANGE UP
BILIRUB UR-MCNC: NEGATIVE — SIGNIFICANT CHANGE UP
BLD GP AB SCN SERPL QL: SIGNIFICANT CHANGE UP
BLD GP AB SCN SERPL QL: SIGNIFICANT CHANGE UP
BUN SERPL-MCNC: 76 MG/DL — CRITICAL HIGH (ref 10–20)
CALCIUM SERPL-MCNC: 7.7 MG/DL — LOW (ref 8.4–10.5)
CALCIUM SERPL-MCNC: 7.7 MG/DL — LOW (ref 8.4–10.5)
CALCIUM SERPL-MCNC: 8 MG/DL — LOW (ref 8.4–10.5)
CALCIUM SERPL-MCNC: 8 MG/DL — LOW (ref 8.4–10.5)
CAST: 6 /LPF — HIGH (ref 0–4)
CAST: 6 /LPF — HIGH (ref 0–4)
CHLORIDE SERPL-SCNC: 109 MMOL/L — SIGNIFICANT CHANGE UP (ref 98–110)
CHLORIDE SERPL-SCNC: 109 MMOL/L — SIGNIFICANT CHANGE UP (ref 98–110)
CHLORIDE SERPL-SCNC: 114 MMOL/L — HIGH (ref 98–110)
CHLORIDE SERPL-SCNC: 114 MMOL/L — HIGH (ref 98–110)
CO2 SERPL-SCNC: 20 MMOL/L — SIGNIFICANT CHANGE UP (ref 17–32)
CO2 SERPL-SCNC: 20 MMOL/L — SIGNIFICANT CHANGE UP (ref 17–32)
CO2 SERPL-SCNC: 22 MMOL/L — SIGNIFICANT CHANGE UP (ref 17–32)
CO2 SERPL-SCNC: 22 MMOL/L — SIGNIFICANT CHANGE UP (ref 17–32)
COLOR SPEC: YELLOW — SIGNIFICANT CHANGE UP
COLOR SPEC: YELLOW — SIGNIFICANT CHANGE UP
CREAT SERPL-MCNC: 1.8 MG/DL — HIGH (ref 0.7–1.5)
CREAT SERPL-MCNC: 1.8 MG/DL — HIGH (ref 0.7–1.5)
CREAT SERPL-MCNC: 1.9 MG/DL — HIGH (ref 0.7–1.5)
CREAT SERPL-MCNC: 1.9 MG/DL — HIGH (ref 0.7–1.5)
DIFF PNL FLD: ABNORMAL
DIFF PNL FLD: ABNORMAL
EGFR: 36 ML/MIN/1.73M2 — LOW
EGFR: 36 ML/MIN/1.73M2 — LOW
EGFR: 38 ML/MIN/1.73M2 — LOW
EGFR: 38 ML/MIN/1.73M2 — LOW
EOSINOPHIL # BLD AUTO: 0.04 K/UL — SIGNIFICANT CHANGE UP (ref 0–0.7)
EOSINOPHIL # BLD AUTO: 0.04 K/UL — SIGNIFICANT CHANGE UP (ref 0–0.7)
EOSINOPHIL NFR BLD AUTO: 0.5 % — SIGNIFICANT CHANGE UP (ref 0–8)
EOSINOPHIL NFR BLD AUTO: 0.5 % — SIGNIFICANT CHANGE UP (ref 0–8)
GLUCOSE SERPL-MCNC: 114 MG/DL — HIGH (ref 70–99)
GLUCOSE SERPL-MCNC: 114 MG/DL — HIGH (ref 70–99)
GLUCOSE SERPL-MCNC: 165 MG/DL — HIGH (ref 70–99)
GLUCOSE SERPL-MCNC: 165 MG/DL — HIGH (ref 70–99)
GLUCOSE UR QL: NEGATIVE MG/DL — SIGNIFICANT CHANGE UP
GLUCOSE UR QL: NEGATIVE MG/DL — SIGNIFICANT CHANGE UP
HCT VFR BLD CALC: 21.9 % — LOW (ref 42–52)
HCT VFR BLD CALC: 21.9 % — LOW (ref 42–52)
HCT VFR BLD CALC: 23.5 % — LOW (ref 42–52)
HCT VFR BLD CALC: 23.5 % — LOW (ref 42–52)
HGB BLD-MCNC: 6.8 G/DL — CRITICAL LOW (ref 14–18)
HGB BLD-MCNC: 6.8 G/DL — CRITICAL LOW (ref 14–18)
HGB BLD-MCNC: 7.6 G/DL — LOW (ref 14–18)
HGB BLD-MCNC: 7.6 G/DL — LOW (ref 14–18)
IMM GRANULOCYTES NFR BLD AUTO: 1.8 % — HIGH (ref 0.1–0.3)
IMM GRANULOCYTES NFR BLD AUTO: 1.8 % — HIGH (ref 0.1–0.3)
KETONES UR-MCNC: NEGATIVE MG/DL — SIGNIFICANT CHANGE UP
KETONES UR-MCNC: NEGATIVE MG/DL — SIGNIFICANT CHANGE UP
LEUKOCYTE ESTERASE UR-ACNC: ABNORMAL
LEUKOCYTE ESTERASE UR-ACNC: ABNORMAL
LYMPHOCYTES # BLD AUTO: 0.91 K/UL — LOW (ref 1.2–3.4)
LYMPHOCYTES # BLD AUTO: 0.91 K/UL — LOW (ref 1.2–3.4)
LYMPHOCYTES # BLD AUTO: 10.9 % — LOW (ref 20.5–51.1)
LYMPHOCYTES # BLD AUTO: 10.9 % — LOW (ref 20.5–51.1)
MAGNESIUM SERPL-MCNC: 2.2 MG/DL — SIGNIFICANT CHANGE UP (ref 1.8–2.4)
MCHC RBC-ENTMCNC: 28.8 PG — SIGNIFICANT CHANGE UP (ref 27–31)
MCHC RBC-ENTMCNC: 28.8 PG — SIGNIFICANT CHANGE UP (ref 27–31)
MCHC RBC-ENTMCNC: 29.5 PG — SIGNIFICANT CHANGE UP (ref 27–31)
MCHC RBC-ENTMCNC: 29.5 PG — SIGNIFICANT CHANGE UP (ref 27–31)
MCHC RBC-ENTMCNC: 31.1 G/DL — LOW (ref 32–37)
MCHC RBC-ENTMCNC: 31.1 G/DL — LOW (ref 32–37)
MCHC RBC-ENTMCNC: 32.3 G/DL — SIGNIFICANT CHANGE UP (ref 32–37)
MCHC RBC-ENTMCNC: 32.3 G/DL — SIGNIFICANT CHANGE UP (ref 32–37)
MCV RBC AUTO: 91.1 FL — SIGNIFICANT CHANGE UP (ref 80–94)
MCV RBC AUTO: 91.1 FL — SIGNIFICANT CHANGE UP (ref 80–94)
MCV RBC AUTO: 92.8 FL — SIGNIFICANT CHANGE UP (ref 80–94)
MCV RBC AUTO: 92.8 FL — SIGNIFICANT CHANGE UP (ref 80–94)
MONOCYTES # BLD AUTO: 0.83 K/UL — HIGH (ref 0.1–0.6)
MONOCYTES # BLD AUTO: 0.83 K/UL — HIGH (ref 0.1–0.6)
MONOCYTES NFR BLD AUTO: 9.9 % — HIGH (ref 1.7–9.3)
MONOCYTES NFR BLD AUTO: 9.9 % — HIGH (ref 1.7–9.3)
NEUTROPHILS # BLD AUTO: 6.4 K/UL — SIGNIFICANT CHANGE UP (ref 1.4–6.5)
NEUTROPHILS # BLD AUTO: 6.4 K/UL — SIGNIFICANT CHANGE UP (ref 1.4–6.5)
NEUTROPHILS NFR BLD AUTO: 76.5 % — HIGH (ref 42.2–75.2)
NEUTROPHILS NFR BLD AUTO: 76.5 % — HIGH (ref 42.2–75.2)
NITRITE UR-MCNC: NEGATIVE — SIGNIFICANT CHANGE UP
NITRITE UR-MCNC: NEGATIVE — SIGNIFICANT CHANGE UP
NRBC # BLD: 0 /100 WBCS — SIGNIFICANT CHANGE UP (ref 0–0)
PH UR: 5 — SIGNIFICANT CHANGE UP (ref 5–8)
PH UR: 5 — SIGNIFICANT CHANGE UP (ref 5–8)
PHOSPHATE SERPL-MCNC: 4.4 MG/DL — SIGNIFICANT CHANGE UP (ref 2.1–4.9)
PHOSPHATE SERPL-MCNC: 4.4 MG/DL — SIGNIFICANT CHANGE UP (ref 2.1–4.9)
PHOSPHATE SERPL-MCNC: 4.5 MG/DL — SIGNIFICANT CHANGE UP (ref 2.1–4.9)
PHOSPHATE SERPL-MCNC: 4.5 MG/DL — SIGNIFICANT CHANGE UP (ref 2.1–4.9)
PLATELET # BLD AUTO: 173 K/UL — SIGNIFICANT CHANGE UP (ref 130–400)
PLATELET # BLD AUTO: 173 K/UL — SIGNIFICANT CHANGE UP (ref 130–400)
PLATELET # BLD AUTO: 183 K/UL — SIGNIFICANT CHANGE UP (ref 130–400)
PLATELET # BLD AUTO: 183 K/UL — SIGNIFICANT CHANGE UP (ref 130–400)
PMV BLD: 8.2 FL — SIGNIFICANT CHANGE UP (ref 7.4–10.4)
PMV BLD: 8.2 FL — SIGNIFICANT CHANGE UP (ref 7.4–10.4)
PMV BLD: 8.6 FL — SIGNIFICANT CHANGE UP (ref 7.4–10.4)
PMV BLD: 8.6 FL — SIGNIFICANT CHANGE UP (ref 7.4–10.4)
POTASSIUM SERPL-MCNC: 3.7 MMOL/L — SIGNIFICANT CHANGE UP (ref 3.5–5)
POTASSIUM SERPL-MCNC: 3.7 MMOL/L — SIGNIFICANT CHANGE UP (ref 3.5–5)
POTASSIUM SERPL-MCNC: 3.8 MMOL/L — SIGNIFICANT CHANGE UP (ref 3.5–5)
POTASSIUM SERPL-MCNC: 3.8 MMOL/L — SIGNIFICANT CHANGE UP (ref 3.5–5)
POTASSIUM SERPL-SCNC: 3.7 MMOL/L — SIGNIFICANT CHANGE UP (ref 3.5–5)
POTASSIUM SERPL-SCNC: 3.7 MMOL/L — SIGNIFICANT CHANGE UP (ref 3.5–5)
POTASSIUM SERPL-SCNC: 3.8 MMOL/L — SIGNIFICANT CHANGE UP (ref 3.5–5)
POTASSIUM SERPL-SCNC: 3.8 MMOL/L — SIGNIFICANT CHANGE UP (ref 3.5–5)
PROT UR-MCNC: 30 MG/DL
PROT UR-MCNC: 30 MG/DL
RBC # BLD: 2.36 M/UL — LOW (ref 4.7–6.1)
RBC # BLD: 2.36 M/UL — LOW (ref 4.7–6.1)
RBC # BLD: 2.58 M/UL — LOW (ref 4.7–6.1)
RBC # BLD: 2.58 M/UL — LOW (ref 4.7–6.1)
RBC # FLD: 13.2 % — SIGNIFICANT CHANGE UP (ref 11.5–14.5)
RBC # FLD: 13.2 % — SIGNIFICANT CHANGE UP (ref 11.5–14.5)
RBC # FLD: 13.6 % — SIGNIFICANT CHANGE UP (ref 11.5–14.5)
RBC # FLD: 13.6 % — SIGNIFICANT CHANGE UP (ref 11.5–14.5)
RBC CASTS # UR COMP ASSIST: 2 /HPF — SIGNIFICANT CHANGE UP (ref 0–4)
RBC CASTS # UR COMP ASSIST: 2 /HPF — SIGNIFICANT CHANGE UP (ref 0–4)
SODIUM SERPL-SCNC: 142 MMOL/L — SIGNIFICANT CHANGE UP (ref 135–146)
SODIUM SERPL-SCNC: 142 MMOL/L — SIGNIFICANT CHANGE UP (ref 135–146)
SODIUM SERPL-SCNC: 145 MMOL/L — SIGNIFICANT CHANGE UP (ref 135–146)
SODIUM SERPL-SCNC: 145 MMOL/L — SIGNIFICANT CHANGE UP (ref 135–146)
SP GR SPEC: 1.01 — SIGNIFICANT CHANGE UP (ref 1–1.03)
SP GR SPEC: 1.01 — SIGNIFICANT CHANGE UP (ref 1–1.03)
SQUAMOUS # UR AUTO: 2 /HPF — SIGNIFICANT CHANGE UP (ref 0–5)
SQUAMOUS # UR AUTO: 2 /HPF — SIGNIFICANT CHANGE UP (ref 0–5)
UROBILINOGEN FLD QL: 0.2 MG/DL — SIGNIFICANT CHANGE UP (ref 0.2–1)
UROBILINOGEN FLD QL: 0.2 MG/DL — SIGNIFICANT CHANGE UP (ref 0.2–1)
WBC # BLD: 8.36 K/UL — SIGNIFICANT CHANGE UP (ref 4.8–10.8)
WBC # BLD: 8.36 K/UL — SIGNIFICANT CHANGE UP (ref 4.8–10.8)
WBC # BLD: 8.59 K/UL — SIGNIFICANT CHANGE UP (ref 4.8–10.8)
WBC # BLD: 8.59 K/UL — SIGNIFICANT CHANGE UP (ref 4.8–10.8)
WBC # FLD AUTO: 8.36 K/UL — SIGNIFICANT CHANGE UP (ref 4.8–10.8)
WBC # FLD AUTO: 8.36 K/UL — SIGNIFICANT CHANGE UP (ref 4.8–10.8)
WBC # FLD AUTO: 8.59 K/UL — SIGNIFICANT CHANGE UP (ref 4.8–10.8)
WBC # FLD AUTO: 8.59 K/UL — SIGNIFICANT CHANGE UP (ref 4.8–10.8)
WBC UR QL: 2 /HPF — SIGNIFICANT CHANGE UP (ref 0–5)
WBC UR QL: 2 /HPF — SIGNIFICANT CHANGE UP (ref 0–5)

## 2024-01-11 PROCEDURE — 71045 X-RAY EXAM CHEST 1 VIEW: CPT | Mod: 26

## 2024-01-11 PROCEDURE — 99232 SBSQ HOSP IP/OBS MODERATE 35: CPT

## 2024-01-11 RX ORDER — SODIUM CHLORIDE 9 MG/ML
500 INJECTION, SOLUTION INTRAVENOUS ONCE
Refills: 0 | Status: COMPLETED | OUTPATIENT
Start: 2024-01-11 | End: 2024-01-11

## 2024-01-11 RX ORDER — ACETAMINOPHEN 500 MG
1000 TABLET ORAL ONCE
Refills: 0 | Status: COMPLETED | OUTPATIENT
Start: 2024-01-11 | End: 2024-01-11

## 2024-01-11 RX ADMIN — Medication 1 APPLICATION(S): at 05:44

## 2024-01-11 RX ADMIN — CARVEDILOL PHOSPHATE 6.25 MILLIGRAM(S): 80 CAPSULE, EXTENDED RELEASE ORAL at 05:43

## 2024-01-11 RX ADMIN — FAMOTIDINE 10 MILLIGRAM(S): 10 INJECTION INTRAVENOUS at 17:44

## 2024-01-11 RX ADMIN — Medication 3 MILLILITER(S): at 14:58

## 2024-01-11 RX ADMIN — HEPARIN SODIUM 5000 UNIT(S): 5000 INJECTION INTRAVENOUS; SUBCUTANEOUS at 21:59

## 2024-01-11 RX ADMIN — Medication 400 MILLIGRAM(S): at 01:41

## 2024-01-11 RX ADMIN — ATORVASTATIN CALCIUM 20 MILLIGRAM(S): 80 TABLET, FILM COATED ORAL at 21:59

## 2024-01-11 RX ADMIN — Medication 1 APPLICATION(S): at 17:42

## 2024-01-11 RX ADMIN — Medication 3 MILLILITER(S): at 19:47

## 2024-01-11 RX ADMIN — Medication 600 MILLIGRAM(S): at 05:42

## 2024-01-11 RX ADMIN — Medication 81 MILLIGRAM(S): at 11:52

## 2024-01-11 RX ADMIN — CHLORHEXIDINE GLUCONATE 15 MILLILITER(S): 213 SOLUTION TOPICAL at 05:44

## 2024-01-11 RX ADMIN — SODIUM CHLORIDE 1000 MILLILITER(S): 9 INJECTION, SOLUTION INTRAVENOUS at 02:27

## 2024-01-11 RX ADMIN — CHLORHEXIDINE GLUCONATE 1 APPLICATION(S): 213 SOLUTION TOPICAL at 05:44

## 2024-01-11 RX ADMIN — AMLODIPINE BESYLATE 5 MILLIGRAM(S): 2.5 TABLET ORAL at 05:43

## 2024-01-11 RX ADMIN — FINASTERIDE 5 MILLIGRAM(S): 5 TABLET, FILM COATED ORAL at 11:52

## 2024-01-11 RX ADMIN — HEPARIN SODIUM 5000 UNIT(S): 5000 INJECTION INTRAVENOUS; SUBCUTANEOUS at 05:42

## 2024-01-11 RX ADMIN — SEVELAMER CARBONATE 800 MILLIGRAM(S): 2400 POWDER, FOR SUSPENSION ORAL at 17:43

## 2024-01-11 RX ADMIN — Medication 3: at 00:55

## 2024-01-11 RX ADMIN — Medication 3 MILLILITER(S): at 08:26

## 2024-01-11 RX ADMIN — INSULIN GLARGINE 25 UNIT(S): 100 INJECTION, SOLUTION SUBCUTANEOUS at 21:59

## 2024-01-11 RX ADMIN — SEVELAMER CARBONATE 800 MILLIGRAM(S): 2400 POWDER, FOR SUSPENSION ORAL at 11:52

## 2024-01-11 RX ADMIN — SEVELAMER CARBONATE 800 MILLIGRAM(S): 2400 POWDER, FOR SUSPENSION ORAL at 08:24

## 2024-01-11 RX ADMIN — Medication 1 TABLET(S): at 11:52

## 2024-01-11 RX ADMIN — Medication 3: at 12:15

## 2024-01-11 RX ADMIN — HEPARIN SODIUM 5000 UNIT(S): 5000 INJECTION INTRAVENOUS; SUBCUTANEOUS at 13:15

## 2024-01-11 RX ADMIN — CARVEDILOL PHOSPHATE 6.25 MILLIGRAM(S): 80 CAPSULE, EXTENDED RELEASE ORAL at 17:43

## 2024-01-11 RX ADMIN — Medication 4 MILLIGRAM(S): at 21:59

## 2024-01-11 NOTE — PROVIDER CONTACT NOTE (OTHER) - SITUATION
VS as noted above. Pt is s/p IVF bolus, IV tylenol , EKG & CXR. Pt suctioned PRN
VS  as noted above/ pt has trachea- blood tinged secretions suctioned PRN.

## 2024-01-11 NOTE — PROGRESS NOTE ADULT - ATTENDING COMMENTS
Needs aggressive trach suctioning  Chest PT  On T-collar  Continue tube feeds  Follow CXR and UA  DVT prophylaxis

## 2024-01-11 NOTE — PROGRESS NOTE ADULT - ASSESSMENT
JACQUELIN CAI is a 78yM who presented as a TRAUMA CONSULT s/p mechanical fall (+HT -LOC -AC). Found to have an acute subdural hemorrhage. Hospital course has been complicated by multiple seizures and uncontrolled hypertension. Intubated on 12/21 due to worsening respiratory status.     S/P tracheostomy and PEG placement on 12/22/23, (Portex cuffed 9 and 2cm at skin 20fr PEG). Now with T-piece, O2 at 40%.     Downgraded from SICU to floor on 1/4/24.        PLAN:   - EP said the ILR not needed. They had spoken to the family previously and they declined.  "NSVT" reported was artifact and no other telemetry events were noted.  - monitor FSG- on lantus 25 QHS  - Continue Carvedilol and Amlodipine for hx of Afib/Flutter, HTN, Cryptogenic stroke with Residual R weakness.   - continue tube feeds   - monitor respiratory status on T piece at 30/8 @99%   - monitor for fevers, f/u fever workup  - monitor urinary output- Aguirre replaced for retention. hx of urinary incontinence and BPH  - DVT/ GI ppx

## 2024-01-11 NOTE — PROGRESS NOTE ADULT - SUBJECTIVE AND OBJECTIVE BOX
GENERAL SURGERY PROGRESS NOTE     JACQUELIN CAI  78y  Male  Hospital day :26d  POD:  Procedure: Insertion of midline catheter    Insertion of arterial line with imaging guidance    Tracheostomy, with PEG tube insertion    Insertion of arterial line with imaging guidance      OVERNIGHT EVENTS: Overnight patient was febrile with Tmax of 101.5 and was tachycardic to the 110s. He was given a bolus, and tachycardia persisted. On exam he had bloody secretions so trach was suctioned multiple times. EKG was obtained which showed normal sinus rhythm.     T(F): 98.3 (01-11-24 @ 08:57), Max: 101.5 (01-10-24 @ 20:43)  HR: 83 (01-11-24 @ 08:57) (83 - 111)  BP: 118/56 (01-11-24 @ 08:57) (113/83 - 139/63)  ABP: --  ABP(mean): --  RR: 18 (01-11-24 @ 08:57) (18 - 18)  SpO2: 97% (01-11-24 @ 08:57) (96% - 100%)    DIET/FLUIDS: multivitamin 1 Tablet(s) Oral daily      URINE:   01-10-24 @ 07:01  -  01-11-24 @ 07:00  --------------------------------------------------------  OUT: 1450 mL     Indwelling Urethral Catheter:     Connect To:  Straight Drainage/[place]    Indication:  Urinary Retention / Obstruction (01-10-24 @ 10:32)  Indwelling Urethral Catheter:     Connect To:  Straight Drainage/[place]    Indication:  Urinary Retention / Obstruction (01-10-24 @ 10:32)    GI proph:  famotidine    Tablet 10 milliGRAM(s) Oral every 48 hours    AC/ proph: aspirin  chewable 81 milliGRAM(s) Enteral Tube daily  heparin   Injectable 5000 Unit(s) SubCutaneous every 8 hours    ABx:     PHYSICAL EXAM:  GENERAL: NAD  CHEST/LUNG: Non-labored breathing, trach with small amount of bloody secretions  HEART: regular rhythm  ABDOMEN: Soft, Nontender, Nondistended;       LABS  Labs:  CAPILLARY BLOOD GLUCOSE      POCT Blood Glucose.: 117 mg/dL (11 Jan 2024 05:41)  POCT Blood Glucose.: 162 mg/dL (11 Jan 2024 00:43)  POCT Blood Glucose.: 166 mg/dL (10 Dar 2024 22:17)  POCT Blood Glucose.: 121 mg/dL (10 Dar 2024 17:41)  POCT Blood Glucose.: 133 mg/dL (10 Dar 2024 11:05)                          7.6    8.36  )-----------( 173      ( 11 Jan 2024 06:30 )             23.5       Auto Neutrophil %: 76.5 % (01-11-24 @ 06:30)  Auto Immature Granulocyte %: 1.8 % (01-11-24 @ 06:30)    01-11    142  |  109  |  76<HH>  ----------------------------<  114<H>  3.7   |  22  |  1.9<H>      Calcium: 8.0 mg/dL (01-11-24 @ 06:30)        Urinalysis Basic - ( 11 Jan 2024 06:30 )    Color: x / Appearance: x / SG: x / pH: x  Gluc: 114 mg/dL / Ketone: x  / Bili: x / Urobili: x   Blood: x / Protein: x / Nitrite: x   Leuk Esterase: x / RBC: x / WBC x   Sq Epi: x / Non Sq Epi: x / Bacteria: x            RADIOLOGY & ADDITIONAL TESTS:      A/P         GENERAL SURGERY PROGRESS NOTE     JACQUELIN CAI  78y  Male  Hospital day :26d  POD:  Procedure: Insertion of midline catheter    Insertion of arterial line with imaging guidance    Tracheostomy, with PEG tube insertion    Insertion of arterial line with imaging guidance      OVERNIGHT EVENTS: Overnight patient was febrile with Tmax of 101.5 and was tachycardic to the 110s. He was given a bolus, and tachycardia persisted. On exam he had bloody secretions so trach was suctioned multiple times. EKG was obtained which showed normal sinus rhythm.     T(F): 98.3 (01-11-24 @ 08:57), Max: 101.5 (01-10-24 @ 20:43)  HR: 83 (01-11-24 @ 08:57) (83 - 111)  BP: 118/56 (01-11-24 @ 08:57) (113/83 - 139/63)  ABP: --  ABP(mean): --  RR: 18 (01-11-24 @ 08:57) (18 - 18)  SpO2: 97% (01-11-24 @ 08:57) (96% - 100%)    DIET/FLUIDS: multivitamin 1 Tablet(s) Oral daily      URINE:   01-10-24 @ 07:01  -  01-11-24 @ 07:00  --------------------------------------------------------  OUT: 1450 mL     Indwelling Urethral Catheter:     Connect To:  Straight Drainage/[place]    Indication:  Urinary Retention / Obstruction (01-10-24 @ 10:32)  Indwelling Urethral Catheter:     Connect To:  Straight Drainage/[place]    Indication:  Urinary Retention / Obstruction (01-10-24 @ 10:32)    GI proph:  famotidine    Tablet 10 milliGRAM(s) Oral every 48 hours    AC/ proph: aspirin  chewable 81 milliGRAM(s) Enteral Tube daily  heparin   Injectable 5000 Unit(s) SubCutaneous every 8 hours    ABx:     PHYSICAL EXAM:  GENERAL: NAD  CHEST/LUNG: Non-labored breathing, trach with small amount of bloody secretions  HEART: regular rhythm  ABDOMEN: Soft, Nontender, Nondistended;       LABS  Labs:  CAPILLARY BLOOD GLUCOSE      POCT Blood Glucose.: 117 mg/dL (11 Jan 2024 05:41)  POCT Blood Glucose.: 162 mg/dL (11 Jan 2024 00:43)  POCT Blood Glucose.: 166 mg/dL (10 Dar 2024 22:17)  POCT Blood Glucose.: 121 mg/dL (10 Dar 2024 17:41)  POCT Blood Glucose.: 133 mg/dL (10 Dar 2024 11:05)                          7.6    8.36  )-----------( 173      ( 11 Jan 2024 06:30 )             23.5       Auto Neutrophil %: 76.5 % (01-11-24 @ 06:30)  Auto Immature Granulocyte %: 1.8 % (01-11-24 @ 06:30)    01-11    142  |  109  |  76<HH>  ----------------------------<  114<H>  3.7   |  22  |  1.9<H>      Calcium: 8.0 mg/dL (01-11-24 @ 06:30)        Urinalysis Basic - ( 11 Jan 2024 06:30 )    Color: x / Appearance: x / SG: x / pH: x  Gluc: 114 mg/dL / Ketone: x  / Bili: x / Urobili: x   Blood: x / Protein: x / Nitrite: x   Leuk Esterase: x / RBC: x / WBC x   Sq Epi: x / Non Sq Epi: x / Bacteria: x            RADIOLOGY & ADDITIONAL TESTS:      A/P

## 2024-01-12 ENCOUNTER — TRANSCRIPTION ENCOUNTER (OUTPATIENT)
Age: 79
End: 2024-01-12

## 2024-01-12 VITALS
SYSTOLIC BLOOD PRESSURE: 110 MMHG | HEART RATE: 94 BPM | TEMPERATURE: 100 F | RESPIRATION RATE: 18 BRPM | DIASTOLIC BLOOD PRESSURE: 56 MMHG | OXYGEN SATURATION: 96 %

## 2024-01-12 LAB
HCT VFR BLD CALC: 20.8 % — LOW (ref 42–52)
HCT VFR BLD CALC: 20.8 % — LOW (ref 42–52)
HCT VFR BLD CALC: 25.6 % — LOW (ref 42–52)
HCT VFR BLD CALC: 25.6 % — LOW (ref 42–52)
HGB BLD-MCNC: 6.8 G/DL — CRITICAL LOW (ref 14–18)
HGB BLD-MCNC: 6.8 G/DL — CRITICAL LOW (ref 14–18)
HGB BLD-MCNC: 8.5 G/DL — LOW (ref 14–18)
HGB BLD-MCNC: 8.5 G/DL — LOW (ref 14–18)
MCHC RBC-ENTMCNC: 29.7 PG — SIGNIFICANT CHANGE UP (ref 27–31)
MCHC RBC-ENTMCNC: 29.7 PG — SIGNIFICANT CHANGE UP (ref 27–31)
MCHC RBC-ENTMCNC: 29.8 PG — SIGNIFICANT CHANGE UP (ref 27–31)
MCHC RBC-ENTMCNC: 29.8 PG — SIGNIFICANT CHANGE UP (ref 27–31)
MCHC RBC-ENTMCNC: 32.7 G/DL — SIGNIFICANT CHANGE UP (ref 32–37)
MCHC RBC-ENTMCNC: 32.7 G/DL — SIGNIFICANT CHANGE UP (ref 32–37)
MCHC RBC-ENTMCNC: 33.2 G/DL — SIGNIFICANT CHANGE UP (ref 32–37)
MCHC RBC-ENTMCNC: 33.2 G/DL — SIGNIFICANT CHANGE UP (ref 32–37)
MCV RBC AUTO: 89.5 FL — SIGNIFICANT CHANGE UP (ref 80–94)
MCV RBC AUTO: 89.5 FL — SIGNIFICANT CHANGE UP (ref 80–94)
MCV RBC AUTO: 91.2 FL — SIGNIFICANT CHANGE UP (ref 80–94)
MCV RBC AUTO: 91.2 FL — SIGNIFICANT CHANGE UP (ref 80–94)
NRBC # BLD: 0 /100 WBCS — SIGNIFICANT CHANGE UP (ref 0–0)
PLATELET # BLD AUTO: 169 K/UL — SIGNIFICANT CHANGE UP (ref 130–400)
PLATELET # BLD AUTO: 169 K/UL — SIGNIFICANT CHANGE UP (ref 130–400)
PLATELET # BLD AUTO: 177 K/UL — SIGNIFICANT CHANGE UP (ref 130–400)
PLATELET # BLD AUTO: 177 K/UL — SIGNIFICANT CHANGE UP (ref 130–400)
PMV BLD: 8.4 FL — SIGNIFICANT CHANGE UP (ref 7.4–10.4)
PMV BLD: 8.4 FL — SIGNIFICANT CHANGE UP (ref 7.4–10.4)
PMV BLD: 8.9 FL — SIGNIFICANT CHANGE UP (ref 7.4–10.4)
PMV BLD: 8.9 FL — SIGNIFICANT CHANGE UP (ref 7.4–10.4)
RBC # BLD: 2.28 M/UL — LOW (ref 4.7–6.1)
RBC # BLD: 2.28 M/UL — LOW (ref 4.7–6.1)
RBC # BLD: 2.86 M/UL — LOW (ref 4.7–6.1)
RBC # BLD: 2.86 M/UL — LOW (ref 4.7–6.1)
RBC # FLD: 13.5 % — SIGNIFICANT CHANGE UP (ref 11.5–14.5)
RBC # FLD: 13.5 % — SIGNIFICANT CHANGE UP (ref 11.5–14.5)
RBC # FLD: 13.6 % — SIGNIFICANT CHANGE UP (ref 11.5–14.5)
RBC # FLD: 13.6 % — SIGNIFICANT CHANGE UP (ref 11.5–14.5)
WBC # BLD: 8.08 K/UL — SIGNIFICANT CHANGE UP (ref 4.8–10.8)
WBC # BLD: 8.08 K/UL — SIGNIFICANT CHANGE UP (ref 4.8–10.8)
WBC # BLD: 8.75 K/UL — SIGNIFICANT CHANGE UP (ref 4.8–10.8)
WBC # BLD: 8.75 K/UL — SIGNIFICANT CHANGE UP (ref 4.8–10.8)
WBC # FLD AUTO: 8.08 K/UL — SIGNIFICANT CHANGE UP (ref 4.8–10.8)
WBC # FLD AUTO: 8.08 K/UL — SIGNIFICANT CHANGE UP (ref 4.8–10.8)
WBC # FLD AUTO: 8.75 K/UL — SIGNIFICANT CHANGE UP (ref 4.8–10.8)
WBC # FLD AUTO: 8.75 K/UL — SIGNIFICANT CHANGE UP (ref 4.8–10.8)

## 2024-01-12 PROCEDURE — 99238 HOSP IP/OBS DSCHRG MGMT 30/<: CPT

## 2024-01-12 RX ORDER — FINASTERIDE 5 MG/1
1 TABLET, FILM COATED ORAL
Qty: 0 | Refills: 0 | DISCHARGE
Start: 2024-01-12

## 2024-01-12 RX ORDER — EMPAGLIFLOZIN 10 MG/1
1 TABLET, FILM COATED ORAL
Qty: 0 | Refills: 0 | DISCHARGE

## 2024-01-12 RX ORDER — BACITRACIN ZINC 500 UNIT/G
1 OINTMENT IN PACKET (EA) TOPICAL
Qty: 0 | Refills: 0 | DISCHARGE
Start: 2024-01-12

## 2024-01-12 RX ORDER — DOXAZOSIN MESYLATE 4 MG
1 TABLET ORAL
Qty: 0 | Refills: 0 | DISCHARGE
Start: 2024-01-12

## 2024-01-12 RX ORDER — HEPARIN SODIUM 5000 [USP'U]/ML
5000 INJECTION INTRAVENOUS; SUBCUTANEOUS
Qty: 1 | Refills: 0
Start: 2024-01-12

## 2024-01-12 RX ORDER — ASPIRIN/CALCIUM CARB/MAGNESIUM 324 MG
1 TABLET ORAL
Qty: 0 | Refills: 0 | DISCHARGE
Start: 2024-01-12

## 2024-01-12 RX ORDER — SALICYLIC ACID 0.5 %
1 CLEANSER (GRAM) TOPICAL
Qty: 0 | Refills: 0 | DISCHARGE
Start: 2024-01-12

## 2024-01-12 RX ORDER — ASPIRIN/CALCIUM CARB/MAGNESIUM 324 MG
1 TABLET ORAL
Qty: 0 | Refills: 0 | DISCHARGE

## 2024-01-12 RX ORDER — INSULIN GLARGINE 100 [IU]/ML
25 INJECTION, SOLUTION SUBCUTANEOUS
Qty: 25 | Refills: 0
Start: 2024-01-12

## 2024-01-12 RX ORDER — POTASSIUM CHLORIDE 20 MEQ
20 PACKET (EA) ORAL ONCE
Refills: 0 | Status: COMPLETED | OUTPATIENT
Start: 2024-01-12 | End: 2024-01-12

## 2024-01-12 RX ORDER — AMANTADINE HCL 100 MG
1 CAPSULE ORAL
Qty: 0 | Refills: 0 | DISCHARGE
Start: 2024-01-12

## 2024-01-12 RX ORDER — FAMOTIDINE 10 MG/ML
1 INJECTION INTRAVENOUS
Qty: 0 | Refills: 0 | DISCHARGE
Start: 2024-01-12

## 2024-01-12 RX ORDER — INSULIN LISPRO 100/ML
3 VIAL (ML) SUBCUTANEOUS
Qty: 1 | Refills: 0
Start: 2024-01-12

## 2024-01-12 RX ORDER — IPRATROPIUM/ALBUTEROL SULFATE 18-103MCG
3 AEROSOL WITH ADAPTER (GRAM) INHALATION
Qty: 0 | Refills: 0 | DISCHARGE
Start: 2024-01-12

## 2024-01-12 RX ORDER — SITAGLIPTIN 50 MG/1
1 TABLET, FILM COATED ORAL
Refills: 0 | DISCHARGE

## 2024-01-12 RX ADMIN — Medication 1 APPLICATION(S): at 05:48

## 2024-01-12 RX ADMIN — Medication 1 APPLICATION(S): at 05:49

## 2024-01-12 RX ADMIN — CHLORHEXIDINE GLUCONATE 15 MILLILITER(S): 213 SOLUTION TOPICAL at 05:49

## 2024-01-12 RX ADMIN — Medication 3 MILLILITER(S): at 08:23

## 2024-01-12 RX ADMIN — SEVELAMER CARBONATE 800 MILLIGRAM(S): 2400 POWDER, FOR SUSPENSION ORAL at 11:38

## 2024-01-12 RX ADMIN — Medication 81 MILLIGRAM(S): at 11:38

## 2024-01-12 RX ADMIN — CHLORHEXIDINE GLUCONATE 1 APPLICATION(S): 213 SOLUTION TOPICAL at 05:46

## 2024-01-12 RX ADMIN — AMLODIPINE BESYLATE 5 MILLIGRAM(S): 2.5 TABLET ORAL at 05:44

## 2024-01-12 RX ADMIN — Medication 1 TABLET(S): at 11:38

## 2024-01-12 RX ADMIN — Medication 600 MILLIGRAM(S): at 05:44

## 2024-01-12 RX ADMIN — HEPARIN SODIUM 5000 UNIT(S): 5000 INJECTION INTRAVENOUS; SUBCUTANEOUS at 13:04

## 2024-01-12 RX ADMIN — HEPARIN SODIUM 5000 UNIT(S): 5000 INJECTION INTRAVENOUS; SUBCUTANEOUS at 05:48

## 2024-01-12 RX ADMIN — Medication 3 MILLILITER(S): at 13:27

## 2024-01-12 RX ADMIN — Medication 50 MILLIEQUIVALENT(S): at 05:50

## 2024-01-12 RX ADMIN — FINASTERIDE 5 MILLIGRAM(S): 5 TABLET, FILM COATED ORAL at 11:38

## 2024-01-12 RX ADMIN — Medication 100 MILLIGRAM(S): at 08:27

## 2024-01-12 RX ADMIN — SEVELAMER CARBONATE 800 MILLIGRAM(S): 2400 POWDER, FOR SUSPENSION ORAL at 08:27

## 2024-01-12 RX ADMIN — Medication 3: at 11:48

## 2024-01-12 RX ADMIN — Medication 3: at 07:21

## 2024-01-12 RX ADMIN — CARVEDILOL PHOSPHATE 6.25 MILLIGRAM(S): 80 CAPSULE, EXTENDED RELEASE ORAL at 05:45

## 2024-01-12 NOTE — PROGRESS NOTE ADULT - SUBJECTIVE AND OBJECTIVE BOX
GENERAL SURGERY PROGRESS NOTE     JACQUELIN CAI  78y  Male  Hospital day :27d     Procedure: Insertion of midline catheter    Insertion of arterial line with imaging guidance    Tracheostomy, with PEG tube insertion    Insertion of arterial line with imaging guidance      OVERNIGHT EVENTS:    - Hgb 7.6 --> 6.8  - 1 unit pRBC given     T(F): 98 (01-12-24 @ 00:09), Max: 99.8 (01-11-24 @ 16:00)  HR: 90 (01-12-24 @ 00:09) (83 - 103)  BP: 101/42 (01-12-24 @ 00:09) (101/42 - 118/56)  ABP: --  ABP(mean): --  RR: 18 (01-12-24 @ 00:09) (18 - 18)  SpO2: 99% (01-11-24 @ 19:32) (95% - 100%)    DIET/FLUIDS: multivitamin 1 Tablet(s) Oral daily  potassium chloride  20 mEq/100 mL IVPB 20 milliEquivalent(s) IV Intermittent once       URINE:   01-10-24 @ 07:01  -  01-11-24 @ 07:00  --------------------------------------------------------  OUT: 1450 mL     Indwelling Urethral Catheter:     Connect To:  Straight Drainage/[place]    Indication:  Urinary Retention / Obstruction (01-11-24 @ 16:01)    GI proph:  famotidine    Tablet 10 milliGRAM(s) Oral every 48 hours    AC/ proph: aspirin  chewable 81 milliGRAM(s) Enteral Tube daily  heparin   Injectable 5000 Unit(s) SubCutaneous every 8 hours    ABx:     PHYSICAL EXAM:  GENERAL: NAD   ABDOMEN: Soft, Nontender, Nondistended;   EXTREMITIES:  No clubbing, cyanosis, or edema      LABS  Labs:  CAPILLARY BLOOD GLUCOSE      POCT Blood Glucose.: 179 mg/dL (11 Jan 2024 21:45)  POCT Blood Glucose.: 135 mg/dL (11 Jan 2024 16:37)  POCT Blood Glucose.: 180 mg/dL (11 Jan 2024 11:52)  POCT Blood Glucose.: 117 mg/dL (11 Jan 2024 05:41)                          6.8    8.08  )-----------( 177      ( 12 Jan 2024 01:37 )             20.8       Auto Neutrophil %: 76.5 % (01-11-24 @ 06:30)  Auto Immature Granulocyte %: 1.8 % (01-11-24 @ 06:30)    01-11    145  |  114<H>  |  76<HH>  ----------------------------<  165<H>  3.8   |  20  |  1.8<H>      Calcium: 7.7 mg/dL (01-11-24 @ 21:08)     Urinalysis Basic - ( 11 Jan 2024 21:08 )    Color: x / Appearance: x / SG: x / pH: x  Gluc: 165 mg/dL / Ketone: x  / Bili: x / Urobili: x   Blood: x / Protein: x / Nitrite: x   Leuk Esterase: x / RBC: x / WBC x   Sq Epi: x / Non Sq Epi: x / Bacteria: x            RADIOLOGY & ADDITIONAL TESTS:      A/P    JACQUELIN CAI is a 78yM who presented as a TRAUMA CONSULT s/p mechanical fall (+HT -LOC -AC). Found to have an acute subdural hemorrhage. Hospital course has been complicated by multiple seizures and uncontrolled hypertension. Intubated on 12/21 due to worsening respiratory status.     S/P tracheostomy and PEG placement on 12/22/23, (Portex cuffed 9 and 2cm at skin 20fr PEG). Now with T-piece, O2 at 40%.     Downgraded from SICU to floor on 1/4/24.        PLAN:   - monitor FSG- on lantus 25 QHS  - Continue Carvedilol and Amlodipine for hx of Afib/Flutter, HTN, Cryptogenic stroke with Residual R weakness.   - Trend hgb   - continue tube feeds   - monitor respiratory status on T piece at 30/8 @99%   - monitor for fevers, f/u fever workup  - monitor urinary output- Aguirre replaced for retention. hx of urinary incontinence and BPH  - DVT/ GI ppx          GENERAL SURGERY PROGRESS NOTE     JACQUELIN CAI  78y  Male  Hospital day :27d     Procedure: Insertion of midline catheter    Insertion of arterial line with imaging guidance    Tracheostomy, with PEG tube insertion    Insertion of arterial line with imaging guidance      OVERNIGHT EVENTS:    - Hgb 7.6 --> 6.8  - 1 unit pRBC given     T(F): 98 (01-12-24 @ 00:09), Max: 99.8 (01-11-24 @ 16:00)  HR: 90 (01-12-24 @ 00:09) (83 - 103)  BP: 101/42 (01-12-24 @ 00:09) (101/42 - 118/56)  ABP: --  ABP(mean): --  RR: 18 (01-12-24 @ 00:09) (18 - 18)  SpO2: 99% (01-11-24 @ 19:32) (95% - 100%)    DIET/FLUIDS: multivitamin 1 Tablet(s) Oral daily  potassium chloride  20 mEq/100 mL IVPB 20 milliEquivalent(s) IV Intermittent once       URINE:   01-10-24 @ 07:01  -  01-11-24 @ 07:00  --------------------------------------------------------  OUT: 1450 mL     Indwelling Urethral Catheter:     Connect To:  Straight Drainage/[place]    Indication:  Urinary Retention / Obstruction (01-11-24 @ 16:01)    GI proph:  famotidine    Tablet 10 milliGRAM(s) Oral every 48 hours    AC/ proph: aspirin  chewable 81 milliGRAM(s) Enteral Tube daily  heparin   Injectable 5000 Unit(s) SubCutaneous every 8 hours    ABx:     PHYSICAL EXAM:  GENERAL: NAD   ABDOMEN: Soft, Nontender, Nondistended;   EXTREMITIES:  No clubbing, cyanosis, or edema      LABS  Labs:  CAPILLARY BLOOD GLUCOSE      POCT Blood Glucose.: 179 mg/dL (11 Jan 2024 21:45)  POCT Blood Glucose.: 135 mg/dL (11 Jan 2024 16:37)  POCT Blood Glucose.: 180 mg/dL (11 Jan 2024 11:52)  POCT Blood Glucose.: 117 mg/dL (11 Jan 2024 05:41)                          6.8    8.08  )-----------( 177      ( 12 Jan 2024 01:37 )             20.8       Auto Neutrophil %: 76.5 % (01-11-24 @ 06:30)  Auto Immature Granulocyte %: 1.8 % (01-11-24 @ 06:30)    01-11    145  |  114<H>  |  76<HH>  ----------------------------<  165<H>  3.8   |  20  |  1.8<H>      Calcium: 7.7 mg/dL (01-11-24 @ 21:08)     Urinalysis Basic - ( 11 Jan 2024 21:08 )    Color: x / Appearance: x / SG: x / pH: x  Gluc: 165 mg/dL / Ketone: x  / Bili: x / Urobili: x   Blood: x / Protein: x / Nitrite: x   Leuk Esterase: x / RBC: x / WBC x   Sq Epi: x / Non Sq Epi: x / Bacteria: x            RADIOLOGY & ADDITIONAL TESTS:      A/P    JACQUELIN CAI is a 78yM who presented as a TRAUMA CONSULT s/p mechanical fall (+HT -LOC -AC). Found to have an acute subdural hemorrhage. Hospital course has been complicated by multiple seizures and uncontrolled hypertension. Intubated on 12/21 due to worsening respiratory status.     S/P tracheostomy and PEG placement on 12/22/23, (Portex cuffed 9 and 2cm at skin 20fr PEG). Now with T-piece, O2 at 40%.     Downgraded from SICU to floor on 1/4/24.        PLAN:   - monitor FSG- on lantus 25 QHS  - Continue Carvedilol and Amlodipine for hx of Afib/Flutter, HTN, Cryptogenic stroke with Residual R weakness.   - Trend hgb   - continue tube feeds   - monitor respiratory status on T piece at 30/8 @99%   - monitor for fevers, f/u fever workup  - monitor urinary output- Aguirre replaced for retention. hx of urinary incontinence and BPH  - DVT/ GI ppx

## 2024-01-12 NOTE — DISCHARGE NOTE PROVIDER - HOSPITAL COURSE
JACQUELIN CAI is a 79 y/o male w/ PMHx of CVA w/ residual right-sided weakness, HTN, DM, CKD, and BPH who presented to [facility] on 12/16/2023 as a TRAUMA CONSULT s/p mechanical fall (+head trauma, - LOC, no anticoagulation). Patient was reportedly getting out of the care when he fell backwards and hit his head. He was able to ambulate afterwards, but started worsening over the course of a few days w/ lower extremity pain and weakness. On presentation, his head CT revealed 3 acute subdural hemorrhages: 0.7cm along the left hemisphere, 0.3cm along the right frontal area, and 1.1cm along the left falx.     Patient was admitted to SICU for q1h neuro checks. His hospitalization was complicated by recurrent seizures requiring EEG study - focal and generalized slowing w/o seizure - and treatment with Valproic acid. His admission was further complicated by respiratory failure secondary to impaired secretion clearance requiring mechanical ventilation, intubated on 12/21. He is now s/p tracheostomy (Protex cuffed 9) and PEG placement (20fr PEG 2cm @ skin) on 12/22. Post-operative course has been uncomplicated. Patient has been tolerating T-piece without a vent since 12/31. He was started on amantadine 100mg q48h for neurologic stimulation. His GCS has been 6-7T since requiring intubation. Neuro exam unchanged over the past week - opens eyes to pain, not following commands. Patient was downgraded to the floor on 1/4/2024. Hospital course has been prolonged due to dispo issues / bed availability / insurance approval / family choice / etc. He was accepted to [facility] on 1/12/2024.     HD #29 (1/12/2024) - Patient evaluated at bedside by the on-call trauma attending, Dr Roman, and deemed medically stable for discharge to [facility].      JACQUELIN CAI is a 77 y/o male w/ PMHx of CVA w/ residual right-sided weakness, HTN, DM, CKD, and BPH who presented to [facility] on 12/16/2023 as a TRAUMA CONSULT s/p mechanical fall (+head trauma, - LOC, no anticoagulation). Patient was reportedly getting out of the care when he fell backwards and hit his head. He was able to ambulate afterwards, but started worsening over the course of a few days w/ lower extremity pain and weakness. On presentation, his head CT revealed 3 acute subdural hemorrhages: 0.7cm along the left hemisphere, 0.3cm along the right frontal area, and 1.1cm along the left falx.     Patient was admitted to SICU for q1h neuro checks. His hospitalization was complicated by recurrent seizures requiring EEG study - focal and generalized slowing w/o seizure - and treatment with Valproic acid. His admission was further complicated by respiratory failure secondary to impaired secretion clearance requiring mechanical ventilation, intubated on 12/21. He is now s/p tracheostomy (Protex cuffed 9) and PEG placement (20fr PEG 2cm @ skin) on 12/22. Post-operative course has been uncomplicated. Patient has been tolerating T-piece without a vent since 12/31. He was started on amantadine 100mg q48h for neurologic stimulation. His GCS has been 6-7T since requiring intubation. Neuro exam unchanged over the past week - opens eyes to pain, not following commands. Patient was downgraded to the floor on 1/4/2024. Hospital course has been prolonged due to dispo issues / bed availability / insurance approval / family choice / etc. He was accepted to [facility] on 1/12/2024.     HD #29 (1/12/2024) - Patient evaluated at bedside by the on-call trauma attending, Dr Roman, and deemed medically stable for discharge to [facility].

## 2024-01-12 NOTE — DISCHARGE NOTE PROVIDER - NSDCCPCAREPLAN_GEN_ALL_CORE_FT
PRINCIPAL DISCHARGE DIAGNOSIS  Diagnosis: Subdural hemorrhage  Assessment and Plan of Treatment: You are being discharged from [facility]. Please follow up with the Trauma Clinic after discharge from [facility]. If you have any further questions about your care, please do not hesitate to contact us at [phone].     PRINCIPAL DISCHARGE DIAGNOSIS  Diagnosis: Subdural hemorrhage  Assessment and Plan of Treatment: You are being discharged from [facility]. Please follow up with the Trauma Clinic after discharge from [facility]. If you have any further questions about your care, please do not hesitate to contact us at [phone].

## 2024-01-12 NOTE — DISCHARGE NOTE PROVIDER - NSDCFUADDINST_GEN_ALL_CORE_FT
Critical SCR called from [facility] of 5.19  Per recent Nephrology note, this is patient's current baseline.  Will route to post transplant coordinator  
DATE:  6/28/2018   TIME OF RECEIPT FROM LAB:  9:36  LAB TEST:  Creatinine  LAB VALUE:  5.19   RESULTS GIVEN WITH READ-BACK TO (PROVIDER):  txt paged coordinator, Glenis REYES, at 9:39. txt paged KRYSTAL Ramos @ 9:57.  Reported to KRYSTAL Ramos.  TIME LAB VALUE REPORTED TO PROVIDER:   9:58    
Activity - Increase As Tolerated:     Time/Priority:  Routine (12-16-23 @ 04:03) [Active]

## 2024-01-12 NOTE — DISCHARGE NOTE PROVIDER - NSDCMRMEDTOKEN_GEN_ALL_CORE_FT
amantadine 100 mg oral capsule: 1 cap(s) orally once a day via PEG tube  amLODIPine 5 mg oral tablet: 1 tab(s) orally once a day  aspirin 81 mg oral tablet, chewable: 1 tab(s) orally once a day  atorvastatin 20 mg oral tablet: 1 tab(s) orally once a day  bacitracin 500 units/g topical ointment: 1 Apply topically to affected area every 12 hours  carvedilol 6.25 mg oral tablet: 1 tab(s) orally 2 times a day  doxazosin 4 mg oral tablet: 1 tab(s) orally once a day (at bedtime)  famotidine 10 mg oral tablet: 1 tab(s) orally every 48 hours  finasteride 5 mg oral tablet: 1 tab(s) orally once a day  guaiFENesin 600 mg oral tablet, extended release: 1 tab(s) orally every 12 hours  heparin 5000 units/mL injectable solution: 5,000 unit(s) subcutaneously every 8 hours  insulin glargine 100 units/mL subcutaneous solution: 25 unit(s) subcutaneous once a day (at bedtime)  ipratropium-albuterol 0.5 mg-2.5 mg/3 mL inhalation solution: 3 milliliter(s) inhaled every 6 hours  Nephplex Rx oral tablet: 1 tab(s) orally once a day  sodium bicarbonate 650 mg oral tablet: 1 tab(s) orally 2 times a day   tamsulosin 0.4 mg oral capsule: 2 cap(s) orally once a day (at bedtime)  valsartan 320 mg oral tablet: 1 tab(s) orally once a day  vitamin A and D topical ointment: 1 Apply topically to affected area every 12 hours

## 2024-01-12 NOTE — DISCHARGE NOTE PROVIDER - INSTRUCTIONS
Diet, NPO with Tube Feed:   Tube Feeding Modality: Gastrostomy  Nepro with Carb Steady  Total Volume for 24 Hours (mL): 648  Continuous  Starting Tube Feed Rate {mL per Hour}: 27  Increase Tube Feed Rate by (mL): 0     Every 4 hours  Until Goal Tube Feed Rate (mL per Hour): 27  Tube Feed Duration (in Hours): 24  Free Water Flush  Bolus   Total Volume per Flush (mL): 250   Frequency: Every 6 Hours  Free Water Flush Instructions:  Please flush PEG with 50cc of sterile water before feeds start and 100cc of sterile water after feeds end  Banatrol TF     Qty per Day:  3 (01-01-24 @ 22:49) [Active]

## 2024-01-12 NOTE — PROGRESS NOTE ADULT - REASON FOR ADMISSION
SDH

## 2024-01-12 NOTE — PROVIDER CONTACT NOTE (OTHER) - ACTION/TREATMENT ORDERED:
no further intervention as per MD.
EKG AND CXR ordered
JAYNE Villeda made aware. No interventions at this time.

## 2024-01-12 NOTE — DISCHARGE NOTE NURSING/CASE MANAGEMENT/SOCIAL WORK - NSDCPEFALRISK_GEN_ALL_CORE
For information on Fall & Injury Prevention, visit: [email or web address] OR  [email or web address] OR  https://www.cdc.gov/steadi/patient.html For information on Fall & Injury Prevention, visit: [email or web address] OR  [email or web address] OR  https://www.cdc.gov/steadi/patient.html

## 2024-01-12 NOTE — DISCHARGE NOTE NURSING/CASE MANAGEMENT/SOCIAL WORK - PATIENT PORTAL LINK FT
You can access the FollowMyHealth Patient Portal offered by [facility] by registering at the following website: [email or web address]. By joining [employer]’s FollowMyHealth portal, you will also be able to view your health information using other applications (apps) compatible with our system. You can access the FollowMyHealth Patient Portal offered by [facility] by registering at the following website: [email or web address]. By joining [employer]’s FollowMyHealth portal, you will also be able to view your health information using other applications (apps) compatible with our system.

## 2024-01-12 NOTE — DISCHARGE NOTE PROVIDER - NSFOLLOWUPCLINICS_GEN_ALL_ED_FT
[facility]  Trauma Surgery  [address] [place] [address]  Phone: [phone]  Fax:      [facility]  Trauma Surgery  [address] [place] [address]  Phone: [phone]  Fax:

## 2024-01-16 LAB
CULTURE RESULTS: SIGNIFICANT CHANGE UP
SPECIMEN SOURCE: SIGNIFICANT CHANGE UP

## 2024-01-19 DIAGNOSIS — B95.61 METHICILLIN SUSCEPTIBLE STAPHYLOCOCCUS AUREUS INFECTION AS THE CAUSE OF DISEASES CLASSIFIED ELSEWHERE: ICD-10-CM

## 2024-01-19 DIAGNOSIS — E83.39 OTHER DISORDERS OF PHOSPHORUS METABOLISM: ICD-10-CM

## 2024-01-19 DIAGNOSIS — Z79.84 LONG TERM (CURRENT) USE OF ORAL HYPOGLYCEMIC DRUGS: ICD-10-CM

## 2024-01-19 DIAGNOSIS — R19.7 DIARRHEA, UNSPECIFIED: ICD-10-CM

## 2024-01-19 DIAGNOSIS — R40.2252 COMA SCALE, BEST VERBAL RESPONSE, ORIENTED, AT ARRIVAL TO EMERGENCY DEPARTMENT: ICD-10-CM

## 2024-01-19 DIAGNOSIS — E11.22 TYPE 2 DIABETES MELLITUS WITH DIABETIC CHRONIC KIDNEY DISEASE: ICD-10-CM

## 2024-01-19 DIAGNOSIS — N40.1 BENIGN PROSTATIC HYPERPLASIA WITH LOWER URINARY TRACT SYMPTOMS: ICD-10-CM

## 2024-01-19 DIAGNOSIS — S06.A1XA TRAUMATIC BRAIN COMPRESSION WITH HERNIATION, INITIAL ENCOUNTER: ICD-10-CM

## 2024-01-19 DIAGNOSIS — R13.10 DYSPHAGIA, UNSPECIFIED: ICD-10-CM

## 2024-01-19 DIAGNOSIS — R32 UNSPECIFIED URINARY INCONTINENCE: ICD-10-CM

## 2024-01-19 DIAGNOSIS — G93.41 METABOLIC ENCEPHALOPATHY: ICD-10-CM

## 2024-01-19 DIAGNOSIS — R40.2362 COMA SCALE, BEST MOTOR RESPONSE, OBEYS COMMANDS, AT ARRIVAL TO EMERGENCY DEPARTMENT: ICD-10-CM

## 2024-01-19 DIAGNOSIS — J04.10 ACUTE TRACHEITIS WITHOUT OBSTRUCTION: ICD-10-CM

## 2024-01-19 DIAGNOSIS — R29.810 FACIAL WEAKNESS: ICD-10-CM

## 2024-01-19 DIAGNOSIS — Z79.02 LONG TERM (CURRENT) USE OF ANTITHROMBOTICS/ANTIPLATELETS: ICD-10-CM

## 2024-01-19 DIAGNOSIS — Z78.1 PHYSICAL RESTRAINT STATUS: ICD-10-CM

## 2024-01-19 DIAGNOSIS — D62 ACUTE POSTHEMORRHAGIC ANEMIA: ICD-10-CM

## 2024-01-19 DIAGNOSIS — A41.9 SEPSIS, UNSPECIFIED ORGANISM: ICD-10-CM

## 2024-01-19 DIAGNOSIS — Z11.52 ENCOUNTER FOR SCREENING FOR COVID-19: ICD-10-CM

## 2024-01-19 DIAGNOSIS — Z79.82 LONG TERM (CURRENT) USE OF ASPIRIN: ICD-10-CM
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
## 2024-01-19 DIAGNOSIS — I16.0 HYPERTENSIVE URGENCY: ICD-10-CM

## 2024-01-19 DIAGNOSIS — E03.9 HYPOTHYROIDISM, UNSPECIFIED: ICD-10-CM

## 2024-01-19 DIAGNOSIS — R33.8 OTHER RETENTION OF URINE: ICD-10-CM

## 2024-01-19 DIAGNOSIS — R40.2132 COMA SCALE, EYES OPEN, TO SOUND, AT ARRIVAL TO EMERGENCY DEPARTMENT: ICD-10-CM

## 2024-01-19 DIAGNOSIS — E87.0 HYPEROSMOLALITY AND HYPERNATREMIA: ICD-10-CM

## 2024-01-19 DIAGNOSIS — E78.5 HYPERLIPIDEMIA, UNSPECIFIED: ICD-10-CM

## 2024-01-19 DIAGNOSIS — I48.91 UNSPECIFIED ATRIAL FIBRILLATION: ICD-10-CM

## 2024-01-19 DIAGNOSIS — R31.9 HEMATURIA, UNSPECIFIED: ICD-10-CM

## 2024-01-19 DIAGNOSIS — E87.20 ACIDOSIS, UNSPECIFIED: ICD-10-CM

## 2024-01-19 DIAGNOSIS — J98.11 ATELECTASIS: ICD-10-CM

## 2024-01-19 DIAGNOSIS — I95.89 OTHER HYPOTENSION: ICD-10-CM

## 2024-01-19 DIAGNOSIS — S06.1X0A TRAUMATIC CEREBRAL EDEMA WITHOUT LOSS OF CONSCIOUSNESS, INITIAL ENCOUNTER: ICD-10-CM

## 2024-01-19 DIAGNOSIS — E11.65 TYPE 2 DIABETES MELLITUS WITH HYPERGLYCEMIA: ICD-10-CM
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
## 2024-01-19 DIAGNOSIS — S06.5X0A TRAUMATIC SUBDURAL HEMORRHAGE WITHOUT LOSS OF CONSCIOUSNESS, INITIAL ENCOUNTER: ICD-10-CM

## 2024-01-19 DIAGNOSIS — L89.156 PRESSURE-INDUCED DEEP TISSUE DAMAGE OF SACRAL REGION: ICD-10-CM

## 2024-01-19 DIAGNOSIS — J96.01 ACUTE RESPIRATORY FAILURE WITH HYPOXIA: ICD-10-CM

## 2024-01-19 DIAGNOSIS — N18.32 CHRONIC KIDNEY DISEASE, STAGE 3B: ICD-10-CM

## 2024-01-19 DIAGNOSIS — I12.9 HYPERTENSIVE CHRONIC KIDNEY DISEASE WITH STAGE 1 THROUGH STAGE 4 CHRONIC KIDNEY DISEASE, OR UNSPECIFIED CHRONIC KIDNEY DISEASE: ICD-10-CM

## 2024-01-19 DIAGNOSIS — E83.41 HYPERMAGNESEMIA: ICD-10-CM

## 2024-01-19 DIAGNOSIS — N17.9 ACUTE KIDNEY FAILURE, UNSPECIFIED: ICD-10-CM

## 2024-01-19 DIAGNOSIS — J30.2 OTHER SEASONAL ALLERGIC RHINITIS: ICD-10-CM

## 2024-01-19 DIAGNOSIS — G40.109 LOCALIZATION-RELATED (FOCAL) (PARTIAL) SYMPTOMATIC EPILEPSY AND EPILEPTIC SYNDROMES WITH SIMPLE PARTIAL SEIZURES, NOT INTRACTABLE, WITHOUT STATUS EPILEPTICUS: ICD-10-CM

## 2024-04-12 PROBLEM — Z00.00 ENCOUNTER FOR PREVENTIVE HEALTH EXAMINATION: Status: ACTIVE | Noted: 2024-04-12

## 2024-05-24 NOTE — PROGRESS NOTE ADULT - SUBJECTIVE AND OBJECTIVE BOX
NEUROLOGY CLINIC VISIT WITH DR. NGUYEN     PLEASE READ THIS ENTIRE DOCUMENT CAREFULLY AND COMPLETELY:    First and foremost, you matter to Dr. Nguyen and you deserve the best care.   Dr. Nguyen prides himself on providing the best possible care to all his patients. He strives to make each appointment meaningful, so that all your concerns are being addressed and all your neurological problems are being optimally treated. In order to achieve these goals for everyone, Dr. Nguyen has listed important reminders and the best ways to prepare for each appointment. Please read each item carefully. Thank you!    Due to the high volume of patients we are trying to help, your physician will not be able to respond by phone or in [employer] to your routine concerns between appointments.  This does not reflect a lack of interest or concern for you or your diagnosis.  Please bring these questions and concerns to your appointment where your physician can answer.  Please relay more pressing concerns to our office, either via [employer], or by phone; if not able to reach us please visit nearby Urgent Care Center or Emergency Department.  If any emergent medical needs, please seek emergent medical help and/or call 911.    Also, please note that we are not able to fill out paperwork that might be related to your work, utility company, disability, and/or driving, among others, in between the visits.  Please schedule a dedicated appointment to address any and all paperwork.  This is not due to lack of concern or interest for your disease-related work/administrative problems, but to make sure that we provide the best possible care and to fill out your paperwork in a correct, complete, and timely manner.  ------------------------------------------------------------------------------------------  Please let our office know if you have any changes in your seizure frequency and/characteristics.     Please keep a diary of your seizures and bring it   TRAUMA SURGERY PROGRESS NOTE    Patient: JACQUELIN CAI , 78y ([birth year])Male   MRN: [id number]  Location: [address]  Visit: 12-16-23 Inpatient  Date: 12-26-23 @ 17:55    Hospital Day #: 12    DIAGNOSIS / PROCEDURES:   * Acute subdural hemorrhage     INTERVAL HX:   T-piece placed at 8AM today. Lantus increased to 20U QHS. +Liquid stool - nutrition consulted for tube feed adjustments.      VITALS:   T(F): 99.9 (12-26-23 @ 16:00), Max: 100.1 (12-26-23 @ 12:00)  HR: 83 (12-26-23 @ 17:00)  BP: 141/64 (12-26-23 @ 17:00)  RR: 23 (12-26-23 @ 17:00)  SpO2: 100% (12-26-23 @ 17:00)  Mode: standby, FiO2: 40    DIET:   Diet, NPO with Tube Feed:   Tube Feeding Modality: Gastrostomy  Glucerna 1.2 Tonio  Total Volume for 24 Hours (mL): 1260  Continuous  Starting Tube Feed Rate mL per Hour: 55  Increase Tube Feed Rate by (mL): 10     Every 4 hours  Until Goal Tube Feed Rate (mL per Hour): 70  Tube Feed Duration (in Hours): 18  Tube Feed Start Time: 12:00  Tube Feed Stop Time: 06:00  Free Water Flush Instructions:  Please flush PEG with 50cc of sterile water before feeds start and 100cc of sterile water after feeds end      12-25 @ 07:01  -  12-26 @ 07:00  --------------------------------------------------------  OUT:    Rectal Tube (mL): 600 mL    Voided (mL): 2965 mL  Total OUT: 3565 mL      12-26 @ 07:01  -  12-26 @ 17:55  --------------------------------------------------------  OUT:    Rectal Tube (mL): 400 mL    Voided (mL): 1160 mL  Total OUT: 1560 mL    GI PPX:  pantoprazole  Injectable 40 milliGRAM(s) IV Push every 24 hours    AC/DVT PPX:  heparin   Injectable 5000 Unit(s) SubCutaneous every 8 hours    ABx:  ceFAZolin   IVPB 2000 milliGRAM(s) IV Intermittent every 8 hours      Bowel Movement: : [X] YES [] NO  Flatus: : [X] YES [] NO    PHYSICAL EXAM:  General Appearance: NAD  HEENT: EOMI, sclera non-icteric; T-piece in place  Heart: regular rate   Lungs: Equal chest rise bilateral  Abdomen:  Soft, nontender, nondistended. PEG in place  MSK/Extremities: Warm & well-perfused.   Skin: Warm, dry. No jaundice.   Neuro: GCS 9 (E4/V1/M4), patient non-verbal, CNs grossly intact, unable to follow commands     LABS:                        7.6    13.63 )-----------( 391       12-25 @ 20:11             23.6     149  |  116  |  69  ----------------------------<  171        12-25 @ 20:11  3.9   |  20  |  3.0        Calcium: 7.5 mg/dL *L* (12-25 @ 20:11)  Magnesium: 2.9 mg/dL *H* (12-25 @ 20:11)  Phosphorus: 3.8 mg/dL (12-25 @ 20:11)      LIVER FUNCTIONS - ( 25 Dec 2023 04:55 )  Alb: 2.4 g/dL / Pro: x     / ALK PHOS: x     / ALT: x     / AST: x     / GGT: x               MICROBIOLOGY:    (collected 12-22 @ 10:40)  Source: Trach Asp Tracheal Aspirate  Final Report (12-24 @ 17:26):    Numerous Staphylococcus aureus  Organism: Staphylococcus aureus (12-24 @ 17:26)  Organism: Staphylococcus aureus (12-24 @ 17:26)    Sensitivities:      Method Type: TERRI      -  Ampicillin/Sulbactam: S <=8/4      -  Cefazolin: S <=4      -  Clindamycin: R <=0.25 This isolate is presumed to be clindamycin resistant based on detection of inducible resistance. Clindamycin may still be effective in some patients.      -  Erythromycin: R >4      -  Gentamicin: S <=1 Should not be used as monotherapy      -  Oxacillin: S <=0.25 Oxacillin predicts susceptibility for dicloxacillin, methicillin, and nafcillin      -  Rifampin: S <=1 Should not be used as monotherapy      -  Tetracycline: S <=1      -  Trimethoprim/Sulfamethoxazole: S <=0.5/9.5      -  Vancomycin: S 1     (collected 12-21 @ 18:19)  Source: .Blood Blood-Peripheral  Preliminary Report (12-26 @ 01:01):    No growth at 4 days     (collected 12-21 @ 18:19)  Source: .Blood Blood-Peripheral  Preliminary Report (12-26 @ 01:01):    No growth at 4 days     (collected 12-21 @ 18:14)  Source: ET Tube ET Tube  Final Report (12-23 @ 17:27):    Numerous Staphylococcus aureus    Normal Respiratory Fany absent  Organism: Staphylococcus aureus (12-23 @ 17:27)  Organism: Staphylococcus aureus (12-23 @ 17:27)    Sensitivities:      Method Type: TERRI      -  Ampicillin/Sulbactam: S <=8/4      -  Cefazolin: S <=4      -  Clindamycin: R <=0.25 This isolate is presumed to be clindamycin resistant based on detection of inducible resistance. Clindamycin may still be effective in some patients.      -  Erythromycin: R >4      -  Gentamicin: S <=1 Should not be used as monotherapy      -  Oxacillin: S <=0.25 Oxacillin predicts susceptibility for dicloxacillin, methicillin, and nafcillin      -  Rifampin: S <=1 Should not be used as monotherapy      -  Tetracycline: S <=1      -  Trimethoprim/Sulfamethoxazole: S <=0.5/9.5      -  Vancomycin: S 1     (collected 12-21 @ 07:00)  Source: .Sputum Sputum  Final Report (12-23 @ 11:07):    Numerous Staphylococcus aureus    Normal Respiratory Fany present  Organism: Staphylococcus aureus (12-23 @ 11:07)  Organism: Staphylococcus aureus (12-23 @ 11:07)    Sensitivities:      Method Type: TERRI      -  Ampicillin/Sulbactam: S <=8/4      -  Cefazolin: S <=4      -  Clindamycin: R 0.5 This isolate is presumed to be clindamycin resistant based on detection of inducible resistance. Clindamycin may still be effective in some patients.      -  Erythromycin: R >4      -  Gentamicin: S <=1 Should not be used as monotherapy      -  Oxacillin: S <=0.25 Oxacillin predicts susceptibility for dicloxacillin, methicillin, and nafcillin      -  Rifampin: S <=1 Should not be used as monotherapy      -  Tetracycline: S <=1      -  Trimethoprim/Sulfamethoxazole: S <=0.5/9.5      -  Vancomycin: S 2        RADIOLOGY & OTHER STUDIES:  Xray Chest 1 View- PORTABLE-Routine (12.26.23 @ 06:26) >  FINDINGS:    Support devices: Tracheostomy tube in stable position.    Cardiac/mediastinum/hilum: Stable.    Lung parenchyma/Pleura: Near resolution of bilateral opacities/effusions.   No pneumothorax.    Skeleton/soft tissues: Stable.    IMPRESSION:    Near resolution of bilateral opacities/effusions.    --- End of Report ---      ACCESS DEVICES:  [X] Peripheral IV  [ ] Central Venous Line	[ ] R	[ ] L	[ ] IJ	[ ] Fem	[ ] SC	Placed:   [ ] Arterial Line		[ ] R	[ ] L	[ ] Fem	[ ] Rad	[ ] Ax	Placed:   [ ] PICC:					[ ] Mediport  [ ] Urinary Catheter,  Date Placed:   [ ] Chest tube: [ ] Right, [ ] Left  [ ] MARIA LUISA/Ronald Drains  [X] PEG with you to each appointment.    Please take vitamin D3 [id number] internation units daily.     Please abstain from driving until further notice    If you are a biological female with epilepsy who is of reproductive age, who is actively breastfeeding, and/or who infants/young children:  Please take folic acid 1 mg daily. This is an over-the-counter supplement that is recommended to prevent certain developmental problems in your baby, in case you become pregnant in the future.  It is critical that you let our office know as soon as you become pregnant or plan to become pregnant.  If you are caring for a baby/young child, please make sure to be sitting on a soft surface while holding your baby/young child, so in case you have a seizure, your baby/young child is not injured due to fall.   Please let us know if, while breastfeeding, you observe that your baby is excessively sleepy and/or has other behavioral changes. Because many antiseizure medications are collected in breast milk, some nursing babies can suffer adverse medication effects.    Please note that the following might precipitate seizures:   missed doses of antiseizure medications  being sick with a fever, stress  Fatigue  sleep deprivation or abnormal sleeping patterns  not eating regularly  not drinking enough water  drinking too much alcohol  stopping alcohol suddenly if you are currently using it on a regular/daily basis,   using recreational drugs, among others.    Please note that the following might lead to an injury or even be life-threatening in the event you have a seizure and/or lose awareness while:  being in a large body of water by yourself, such as bath, pool, lake, ocean, among others (risk of drowning)  being on unprotected heights (risk of fall)  being around and/or operating heavy machinery (risk of injury)  being around open fire/hot surfaces (risk of burns)  any other activities/circumstances, in which if you lose awareness, you might  injure yourself and/or others.  -------------------------------------------------------------------------------------------  SUDEP (SUDDEN UNEXPECTED DEATH IN EPILEPSY)  It is important that your seizures are well controlled and you have none or have them rarely. In addition to avoiding injury related to breakthrough seizures, frequent seizures increase risk of SUDEP (sudden unexpected death in epilepsy), where a person goes into a seizure and then never wakes up. The best way to prevent SUDEP is to control your seizures well.   ------------------------------------------------------------------------------------------  Please call for help (crisis line and/or 911) in case you have thoughts of harming yourself and/or others.  ------------------------------------------------------------------------------------------  INSTRUCTIONS FOR YOUR FAMILY/CAREGIVERS:  Please call 911 if the patient has a seizure longer than 2-3 minutes, if seizures are back to back without her recovering to her baseline, or she does not start recovering within 5-10 minutes after the seizure stops. During the seizure - please turn her on her side, please make sure her head is protected (for example, you should put a pillow under her head, if one is available), and please do not put anything in her mouth.   ------------------------------------------------------------------------------------------  PATIENT EXPECTATIONS,  IMPORTANT APPOINTMENT REMINDERS, AND ADDITIONAL HELPFUL TIPS:   REFILLS:   Request refills AT LEAST 1 week in advance to ensure you do not run out of medications    MyChart  It is STRONGLY encouraged that ALL patients sign up for MyChart. It is BY FAR the fastest and most convenient way for both Dr. Nguyen and patients to obtain timely refills.  If you are having trouble signing up or logging into your account, staff are available to help you. Please ask a medical assistant or staff at the  to assist you.    TEST RESULS:    All labs and diagnostic test results will be reviewed at your next visit, UNLESS  Dr. Nguyen determines that there are important findings on the tests need to be acted on sooner. Dr. Nguyen will either call or send a message through [employer] if this is the case.    BE PREPARED PRIOR TO EVERY APPOINTMENT:  All patient are responsible for ensuring that ALL test results that were completed outside of the [facility] system have been received by our Neurology Department PRIOR to your appointment with Dr. Nguyen.    IMPORTANT:  ALL images (not just the reports) must be sent and uploaded to the [facility] system. Dr. Nguyen reviews all images personally prior to each visit. Ensuring that ALL the test results and test images are accessible to Dr. Nguyen prior to your appointment is YOUR responsibility and an important part of making the most out of each appointment.   Bring a government-issues picture ID and an updated insurance card EVERY visit.  It is highly recommended that you bring at every visit a list of the most important topics that you want address. While it may not be possible to address all items on the list in a single visit, preparing a list will ensure that Dr. Nguyen addresses the items that are most important to you and your health    PAPERWORK, DOCUMENTATION, LETTER REQUESTS:  You must notify the office ahead of your appointment of all paperwork or letter requests.   Please DO NOT wait until the last minute to make these requests. Please give all paperwork to the medical assistant at the start of the appointment and check-in process. Please note that Dr. Nguyen may not be able complete some types of documentation in a single appointment or even within a single day or week. This is why it is important to communicate paperwork requests prior to your appointment and at least 2 weeks prior to any deadlines.    KNOW ALL YOU MEDICATIONS:   AT EVERY SINGLE APPOINTMENT, please bring a list of every single prescribed,   TRAUMA SURGERY PROGRESS NOTE    Patient: JACQUELIN CAI , 78y ([birth year])Male   MRN: [id number]  Location: [address]  Visit: 12-16-23 Inpatient  Date: 12-26-23 @ 17:55    Hospital Day #: 12    DIAGNOSIS / PROCEDURES:   * Acute subdural hemorrhage     INTERVAL HX:   T-piece placed at 8AM today. Lantus increased to 20U QHS. +Liquid stool - nutrition consulted for tube feed adjustments.      VITALS:   T(F): 99.9 (12-26-23 @ 16:00), Max: 100.1 (12-26-23 @ 12:00)  HR: 83 (12-26-23 @ 17:00)  BP: 141/64 (12-26-23 @ 17:00)  RR: 23 (12-26-23 @ 17:00)  SpO2: 100% (12-26-23 @ 17:00)  Mode: standby, FiO2: 40    DIET:   Diet, NPO with Tube Feed:   Tube Feeding Modality: Gastrostomy  Glucerna 1.2 Tonio  Total Volume for 24 Hours (mL): 1260  Continuous  Starting Tube Feed Rate mL per Hour: 55  Increase Tube Feed Rate by (mL): 10     Every 4 hours  Until Goal Tube Feed Rate (mL per Hour): 70  Tube Feed Duration (in Hours): 18  Tube Feed Start Time: 12:00  Tube Feed Stop Time: 06:00  Free Water Flush Instructions:  Please flush PEG with 50cc of sterile water before feeds start and 100cc of sterile water after feeds end      12-25 @ 07:01  -  12-26 @ 07:00  --------------------------------------------------------  OUT:    Rectal Tube (mL): 600 mL    Voided (mL): 2965 mL  Total OUT: 3565 mL      12-26 @ 07:01  -  12-26 @ 17:55  --------------------------------------------------------  OUT:    Rectal Tube (mL): 400 mL    Voided (mL): 1160 mL  Total OUT: 1560 mL    GI PPX:  pantoprazole  Injectable 40 milliGRAM(s) IV Push every 24 hours    AC/DVT PPX:  heparin   Injectable 5000 Unit(s) SubCutaneous every 8 hours    ABx:  ceFAZolin   IVPB 2000 milliGRAM(s) IV Intermittent every 8 hours      Bowel Movement: : [X] YES [] NO  Flatus: : [X] YES [] NO    PHYSICAL EXAM:  General Appearance: NAD  HEENT: EOMI, sclera non-icteric; T-piece in place  Heart: regular rate   Lungs: Equal chest rise bilateral  Abdomen:  Soft, nontender, nondistended. PEG in place  MSK/Extremities: Warm & well-perfused.   Skin: Warm, dry. No jaundice.   Neuro: GCS 9 (E4/V1/M4), patient non-verbal, CNs grossly intact, unable to follow commands     LABS:                        7.6    13.63 )-----------( 391       12-25 @ 20:11             23.6     149  |  116  |  69  ----------------------------<  171        12-25 @ 20:11  3.9   |  20  |  3.0        Calcium: 7.5 mg/dL *L* (12-25 @ 20:11)  Magnesium: 2.9 mg/dL *H* (12-25 @ 20:11)  Phosphorus: 3.8 mg/dL (12-25 @ 20:11)      LIVER FUNCTIONS - ( 25 Dec 2023 04:55 )  Alb: 2.4 g/dL / Pro: x     / ALK PHOS: x     / ALT: x     / AST: x     / GGT: x               MICROBIOLOGY:    (collected 12-22 @ 10:40)  Source: Trach Asp Tracheal Aspirate  Final Report (12-24 @ 17:26):    Numerous Staphylococcus aureus  Organism: Staphylococcus aureus (12-24 @ 17:26)  Organism: Staphylococcus aureus (12-24 @ 17:26)    Sensitivities:      Method Type: TERRI      -  Ampicillin/Sulbactam: S <=8/4      -  Cefazolin: S <=4      -  Clindamycin: R <=0.25 This isolate is presumed to be clindamycin resistant based on detection of inducible resistance. Clindamycin may still be effective in some patients.      -  Erythromycin: R >4      -  Gentamicin: S <=1 Should not be used as monotherapy      -  Oxacillin: S <=0.25 Oxacillin predicts susceptibility for dicloxacillin, methicillin, and nafcillin      -  Rifampin: S <=1 Should not be used as monotherapy      -  Tetracycline: S <=1      -  Trimethoprim/Sulfamethoxazole: S <=0.5/9.5      -  Vancomycin: S 1     (collected 12-21 @ 18:19)  Source: .Blood Blood-Peripheral  Preliminary Report (12-26 @ 01:01):    No growth at 4 days     (collected 12-21 @ 18:19)  Source: .Blood Blood-Peripheral  Preliminary Report (12-26 @ 01:01):    No growth at 4 days     (collected 12-21 @ 18:14)  Source: ET Tube ET Tube  Final Report (12-23 @ 17:27):    Numerous Staphylococcus aureus    Normal Respiratory Fany absent  Organism: Staphylococcus aureus (12-23 @ 17:27)  Organism: Staphylococcus aureus (12-23 @ 17:27)    Sensitivities:      Method Type: TERRI      -  Ampicillin/Sulbactam: S <=8/4      -  Cefazolin: S <=4      -  Clindamycin: R <=0.25 This isolate is presumed to be clindamycin resistant based on detection of inducible resistance. Clindamycin may still be effective in some patients.      -  Erythromycin: R >4      -  Gentamicin: S <=1 Should not be used as monotherapy      -  Oxacillin: S <=0.25 Oxacillin predicts susceptibility for dicloxacillin, methicillin, and nafcillin      -  Rifampin: S <=1 Should not be used as monotherapy      -  Tetracycline: S <=1      -  Trimethoprim/Sulfamethoxazole: S <=0.5/9.5      -  Vancomycin: S 1     (collected 12-21 @ 07:00)  Source: .Sputum Sputum  Final Report (12-23 @ 11:07):    Numerous Staphylococcus aureus    Normal Respiratory Fany present  Organism: Staphylococcus aureus (12-23 @ 11:07)  Organism: Staphylococcus aureus (12-23 @ 11:07)    Sensitivities:      Method Type: TERRI      -  Ampicillin/Sulbactam: S <=8/4      -  Cefazolin: S <=4      -  Clindamycin: R 0.5 This isolate is presumed to be clindamycin resistant based on detection of inducible resistance. Clindamycin may still be effective in some patients.      -  Erythromycin: R >4      -  Gentamicin: S <=1 Should not be used as monotherapy      -  Oxacillin: S <=0.25 Oxacillin predicts susceptibility for dicloxacillin, methicillin, and nafcillin      -  Rifampin: S <=1 Should not be used as monotherapy      -  Tetracycline: S <=1      -  Trimethoprim/Sulfamethoxazole: S <=0.5/9.5      -  Vancomycin: S 2        RADIOLOGY & OTHER STUDIES:  Xray Chest 1 View- PORTABLE-Routine (12.26.23 @ 06:26) >  FINDINGS:    Support devices: Tracheostomy tube in stable position.    Cardiac/mediastinum/hilum: Stable.    Lung parenchyma/Pleura: Near resolution of bilateral opacities/effusions.   No pneumothorax.    Skeleton/soft tissues: Stable.    IMPRESSION:    Near resolution of bilateral opacities/effusions.    --- End of Report ---      ACCESS DEVICES:  [X] Peripheral IV  [ ] Central Venous Line	[ ] R	[ ] L	[ ] IJ	[ ] Fem	[ ] SC	Placed:   [ ] Arterial Line		[ ] R	[ ] L	[ ] Fem	[ ] Rad	[ ] Ax	Placed:   [ ] PICC:					[ ] Mediport  [ ] Urinary Catheter,  Date Placed:   [ ] Chest tube: [ ] Right, [ ] Left  [ ] MARIA LUISA/Ronald Drains  [X] PEG non-prescribed, and over the counter medication or supplements you are taking, including ones taken on a rare or intermittent basis.  Include the following information for each prescribed or non-prescribed medications:  Name of medication   The strength of EACH pill/capsule/tablet, etc.   The number of pills/capsules/tablets, etc taken per dose  The number and time of day that doses are taken  For every single Supplement that you take on a routine or intermittent basis, you must include:  The Brand Name   A complete list of every single ingredient, compound, vitamin, and/or mineral in each dose, along with the corresponding amounts/strengths of all ingredients, vitamins, minerals, etc., if such information is provided or known  The number of doses taken per day and time of day doses are taken  If medications are taken on an intermittent or as needed basis, please estimate how many days per week or days per month the medications are used  DO NOT just print out your medication list from [employer] or bring a list from a prior appointment or hospitalizations because the information is often often unreliable, inaccurate, outdated, and/or incomplete   The list should be printed or written  If you forget or do not have a list of all the medication, then it is acceptable, although less preferred, to bring all the bottles to the appointment     ARRIVE EARLY FOR ALL VISITS:  Please note that we are unable to accommodate late arrivals as per office policy.  YOU-the patient - (NOT a parent, spouse, or friend) must be physically present at check-in no later than 12 minutes after the scheduled appointment time, or you will be asked to reschedule   Consider scheduling a virtual appointment with Dr. Nguyen through [employer] as an alternative if transportation to the clinic is difficult or unavailable   Please note, however, that virtual visits can only be scheduled after being an established patient of Dr. Nguyen. All new appointments  "must be done in-person in clinic  Some insurances will not cover the cost of virtual appointments. Please check with your insurance to find out if these visits are covered    COMMUNICATING URGENT AND NON-URGENT MATTERS:  Your concerns are important and deserve to be heard and addressed. If you have an urgent matter, there are two methods that will ensure your concerns are prioritized appropriately:   Preferred method: Sign-up/Login to your [employer] account and send a message addressed to Dr. Nguyen or Cathy Antonio (Dr. Nguyen's assistant). In the subject line, type \"urgent\" followed by a word or phrase describing the situation (For example, write \"Urgent: Out of antiseuzre med and need refill\" or \"Urgent: Severe side effects to new meds\". In doing this, our staff can ensure urgent messages are triaged appropriately and communicated to Dr. Nguyen that day.  Call [facility] main line at [phone]. Dr. Nguyen's voicemail extension is [phone]. When leaving a voice message, specifically indicate if it is urgent (or non-urgent) so that the matter can be triaged appropriately and addressed in a timely manner    Thank you for entrusting your neurological care to [facility] and we look forward to continuing to serve you.   "
